# Patient Record
Sex: MALE | Race: WHITE | NOT HISPANIC OR LATINO | Employment: UNEMPLOYED | URBAN - METROPOLITAN AREA
[De-identification: names, ages, dates, MRNs, and addresses within clinical notes are randomized per-mention and may not be internally consistent; named-entity substitution may affect disease eponyms.]

---

## 2019-12-19 ENCOUNTER — TRANSFERRED RECORDS (OUTPATIENT)
Dept: HEALTH INFORMATION MANAGEMENT | Facility: CLINIC | Age: 29
End: 2019-12-19

## 2019-12-22 ENCOUNTER — TRANSFERRED RECORDS (OUTPATIENT)
Dept: HEALTH INFORMATION MANAGEMENT | Facility: CLINIC | Age: 29
End: 2019-12-22

## 2019-12-23 ENCOUNTER — MEDICAL CORRESPONDENCE (OUTPATIENT)
Dept: HEALTH INFORMATION MANAGEMENT | Facility: CLINIC | Age: 29
End: 2019-12-23

## 2020-01-14 NOTE — PLAN OF CARE
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 him to the college - has had him in her car many times with no issue - she thinks that all his services closed because he was doing so well and he could not happen the lack of support.   We do have a large program right now and he would do better with smaller numbers so will see how he stabilizes over the next weeks or so as some of the patients start to complete the program and then will look at starting him.  Also reviewed this with the consultation team and agreed that he needs more stability to manage the program.    She did call back mid week and said she had some phone contact with him and he seemed to be better, however, he still had not set up medications management follow up, and she was going to be moving the kids as well as having a visit with him with the kids so that might be a good test.  Talia called back on 1/31/2020 and stated he had set up follow up med management and the visit went well and he managed the information that the kids were being moved to foster care from mom's house.  She states he appears to be much closer to his baseline and thinks he could manage the program so we will try him in PHP.      We will work with his CPS worker regarding referrals for more services.  ARM (Adult Rehabilitative Mental Health Services) to rehabilitate the areas of functional impairments.    It is my professional opinion that patient:     1. Has symptoms of mental illness that impair function in the  following areas:       Mental health symptoms, Mental health service needs, Interpersonal skills, Community integration, Obtaining / maintaining financial assistance, Self-care / Independent living, Using transportation and Use of drugs or alcohol     2. Rehabilitative mental health services would reduce symptoms to allow regulated, restored or improved functioning.  Maintain stability, function, preventing risk of significant functional decompensation or more restrictive service setting.      3. Has the cognitive capacity to benefit from rehabilitative mental health techniques and methods.    A Release of Information has been obtained for the following: CPS worker Talia.    The patient  MAY utilize the Alpha Stimulator therapy.   Patient Does not have a pacemaker.      I certify that Partial Hospitalization (PHP) services are medically necessary to improve or maintain the client's condition and functional level and to prevent relapse or hospitalization.  PHP services will be provided in lieu of psychiatric hospitalization, no less intensive level of care would be sufficient to provide the medically necessary treatment the client requires.  These services will include group therapy and OT group therapy daily and individual therapy and medications management as needed.    Due to the clinical severity of the symptoms reported by the client, functional impairments exist that significantly disrupt functioning.  The client reports these symptoms negatively impact their quality of life.  Without the recommended medically necessary treatments listed, the client's symptoms are likely to increase in severity and functioning may further decline.  If the client participates and complies with recommended treatment, the prognosis if fair.    Yes, the patient has been informed that any other mental health professional providing mental health services to me will need access to this Diagnostic Assessment in order to develop a  treatment plan and receive payment.    Julia Johnson, [facility]

## 2020-01-20 ENCOUNTER — HOSPITAL ENCOUNTER (OUTPATIENT)
Dept: BEHAVIORAL HEALTH | Facility: HOSPITAL | Age: 30
Discharge: HOME | End: 2020-01-20
Attending: PSYCHIATRY & NEUROLOGY | Admitting: PSYCHIATRY & NEUROLOGY
Payer: COMMERCIAL

## 2020-01-20 PROCEDURE — 90791 PSYCH DIAGNOSTIC EVALUATION: CPT | Performed by: SOCIAL WORKER

## 2020-01-20 ASSESSMENT — PATIENT HEALTH QUESTIONNAIRE - PHQ9
SUM OF ALL RESPONSES TO PHQ QUESTIONS 1-9: 18
5. POOR APPETITE OR OVEREATING: NEARLY EVERY DAY

## 2020-01-20 ASSESSMENT — ANXIETY QUESTIONNAIRES
7. FEELING AFRAID AS IF SOMETHING AWFUL MIGHT HAPPEN: MORE THAN HALF THE DAYS
IF YOU CHECKED OFF ANY PROBLEMS ON THIS QUESTIONNAIRE, HOW DIFFICULT HAVE THESE PROBLEMS MADE IT FOR YOU TO DO YOUR WORK, TAKE CARE OF THINGS AT HOME, OR GET ALONG WITH OTHER PEOPLE: VERY DIFFICULT
5. BEING SO RESTLESS THAT IT IS HARD TO SIT STILL: MORE THAN HALF THE DAYS
GAD7 TOTAL SCORE: 16
2. NOT BEING ABLE TO STOP OR CONTROL WORRYING: MORE THAN HALF THE DAYS
1. FEELING NERVOUS, ANXIOUS, OR ON EDGE: MORE THAN HALF THE DAYS
3. WORRYING TOO MUCH ABOUT DIFFERENT THINGS: MORE THAN HALF THE DAYS
6. BECOMING EASILY ANNOYED OR IRRITABLE: NEARLY EVERY DAY

## 2020-01-21 ASSESSMENT — ANXIETY QUESTIONNAIRES: GAD7 TOTAL SCORE: 16

## 2020-02-04 ENCOUNTER — HOSPITAL ENCOUNTER (OUTPATIENT)
Dept: BEHAVIORAL HEALTH | Facility: HOSPITAL | Age: 30
End: 2020-02-04
Attending: PSYCHIATRY & NEUROLOGY
Payer: COMMERCIAL

## 2020-02-04 PROCEDURE — H0035 MH PARTIAL HOSP TX UNDER 24H: HCPCS | Performed by: SOCIAL WORKER

## 2020-02-04 NOTE — PLAN OF CARE
MY COPING PLAN FOR SAFETY          The things that are most important to me and the reasons for living, are: My kids              My relapse warning signs are: Using chemicals, increased hallucinations  I will make my environment safer by: taking my meds, attending my appointments as scheduled   When in crisis, I will cope in the following ways: talking to my mom  I will use my support system:   Personal Support: I can ask for reminders, support, them to stay with me  Family Member/s : My mom         Professional Support: I can ask for med changes, emergency appointments, help  Psychiatrist: [facility] at [facility]   Therapist: None at this time - possible referral later     Other/s: CPS worker, Talia - maybe a referral to [facility] or case management again after PHP     I can call a crisis line to know of options I can t see when I am this depressed, anxious or confused:     [place] Area:  [facility], Crisis stabilization [facility]- [phone]  [facility] Crisis Line: 1-261.147.2485  [facility]: 823-3466  [facility]: [phone] or 1-186.115.6029  [place] [facility]: [phone] Ext: 279       Calls answered Mon-Fri-8:00 am--4:30 pm    [place]:  [facility]: [phone]  [facility] first call for help: [phone]  [facility]:  [phone]    [place] Area:  Warm Line: 1-420.754.6298       Calls answered Tuesday--Saturday 4:00 pm--10:00 pm  [facility] Crisis Line - [phone]  [place] [facility] [phone]    [place] Statewide:  [place] Crisis and Referral Services: 6-486-430-5559  National Suicide Prevention Lifeline: 4-222-318-TALK (1957)   - vts1vpnj- Text  Life  to 86823  First Call for Help: 2-1-1  [facility] Helpline- 7-638-RDJN-HELP       I will attend my Treatment Program and talk to my one of my therapists: x    x   I agree that I will report any thoughts, impulses or plans of suicide, homicide,  SIB or substance relapse as they occur, during the treatment day.   x   I agree that I will not act on the above symptoms, but will follow the above plan instead.    If nothing above is working for me, I can go to:   Emergency Care Department - Located at the [address] [address]   You can reach us directly by calling [phone] or call the [facility] at [phone] or [phone].

## 2020-02-04 NOTE — PROGRESS NOTES
Group Therapy Progress Notes     Client Initial Individualized Goals for Treatment: Will report on symptoms and identify skills to use to manage moods and Will learn and practice 1-2 coping skills to help improve moods    Area of Treatment Focus:  Develop / Improve Independent Living / Socialization Skills    Therapeutic Interventions/Treatment Strategies:  Facilitate increased self awareness  Provide education regarding time management  Teach adaptive coping skills and communication skills    Response to Treatment Strategies:  Accepted Feedback, Gave Feedback, Listened , Attentive and Alert    Name of Groups:  Time Management - Time: 11:10-12:00    Description and Therapeutic Outcome:   Self development group - OT - Time Management  Time management group focuses on assisting clients to define self and increase positive self -regard.  Psychoeducation provided related to developing strategies/skills to support effective time management.  Clients will increase knowledge and awareness of time management, increase knowledge/awareness of skills/techniques/behaviors that support time management and when/how to utilize new time management strategies.  Clients will recognize that other peers share similar feelings, thoughts, and problems, and will expand their knowledge/skills through observing others self exploration and working through issues and personal development.  The goal is to help clients learn strategies to have success with time management ie. prioritizing, task analysis, limiting distractions, giving yourself enough time, and use of lists.  Steven is new to this OT.  Notes that typically he has had good time management in the past but states that it has been slipping.  Notes not working has lessened his routine and he could just stay in bed for days.  Was open to idea of utilizing daily routine and calender to assist with time management.       Is this a Weekly Review of the Progress on the Treatment  Plan?  NO    Are Treatment Plan Goals being addressed?  YES      Are Treatment Plan Strategies to Address Goals Effective?  YES      Are there any current contracts in place?  YES

## 2020-02-04 NOTE — PROGRESS NOTES
"Group Therapy Progress Notes     Client Initial Individualized Goals for Treatment:     Will report on symptoms and identify skills to use to manage moods and Will learn and practice 1-2 coping skills to help improve moods    Area of Treatment Focus:  Symptom Stabilization and Management  Abstinence / Relapse Prevention    Therapeutic Interventions/Treatment Strategies:  Facilitate increased self awareness  Teach adaptive coping skills and communication skills    Response to Treatment Strategies:  Accepted Feedback, Gave Feedback, Listened  and Focused on Goals    Name of Groups:  Default/Focused Mode 10-10:50; Positive Thoughts 1-1:50    Description and Therapeutic Outcome:     During Default/Focused Mode group, Steven presented as focused, moderately guarded - evidenced in sharing without specifics on some responses; easily participatory, and grasped the concept. He was able to identify his Defaults as shifting from contentment to complacency; Isolating and sitting in the dark; All or Nothing Thinking. He is aware of feeling \"empty\" with his children not being as dependent on him as now his 10 year-old son plays video games and his seven-year-old daughter plays with a neighbor friend.  \"I take ownership for where I am at. I don't blame anyone. It's just a fear of the unknown and paranoia\". Pt reports \"I have sat at so many tables\" referring to previous groups he has attended; I have done this before. Discussed downloading what he is learning. He was not specific in responding to what he wants to get out of the program. He did identify that he will work on \"Know My Truth\" skill.    During the Positive Thoughts group, Steven identified \"good things\" as family, home; having \"another day\". He is \"passionate about his kids and getting them back\". He enjoys coffee; working out; good hygiene. An early warning sign is poor hygiene; dishes not washed. Discussed Awareness; Acceptance and Action. He also has his mother for " "support. He does report \"Determination to get back what he has lost\" is what is going well. He identified Thriving of the Focused as a skill he wants to work on.     Is this a Weekly Review of the Progress on the Treatment Plan?  NO    Are Treatment Plan Goals being addressed?  YES      Are Treatment Plan Strategies to Address Goals Effective?  YES      Are there any current contracts in place?  YES           "

## 2020-02-04 NOTE — PROGRESS NOTES
"Group Therapy Progress Notes     Client Initial Individualized Goals for Treatment: Will report on symptoms and identify skills to use to manage moods and Will learn and practice 1-2 coping skills to help improve moods    Area of Treatment Focus:  Symptom Stabilization and Management and Abstinence / Relapse Prevention    Therapeutic Interventions/Treatment Strategies:  Facilitate increased self awareness  Teach adaptive coping skills and communication skills    Response to Treatment Strategies:  Accepted Feedback, Listened , Focused on Goals and Accepted Support    Name of Groups:  Psychtherapy Wrap Up - Time: 2:00-3:00    Description and Therapeutic Outcome:   In psychotherapy wrap up group, Steven reviewed his take away from today - states he is not sure - this is the earliest he has gotten up in a long time.  States \"I've spent a lot of time around tables learning this stuff\".  States he liked the idea of thriving of the focused and thought the day was not too bad.  Steven remains guarded and is difficult to engage however this is only his first day - will continue to encourage him to engage with peers and learn the skills.  No safety issues noted.  Steven remains appropriate for PHP as long as he is willing to engage with the staff and peers - if he remains too guarded we will have to discuss further with him.        Is this a Weekly Review of the Progress on the Treatment Plan?  NO    Are Treatment Plan Goals being addressed?  YES      Are Treatment Plan Strategies to Address Goals Effective?  YES      Are there any current contracts in place?  YES             "

## 2020-02-04 NOTE — TREATMENT PLAN
Individualized Treatment Plan     Date of Plan: 2020    Name: Steven Carter MRN: [id number]    : 1990    Programs:  [facility] ()     DSM-V Diagnoses: Schizoaffective Disorder Bipolar Type;  TBI at the age of 3, ADD per DAYSI Jaimes, CNP 17    DA Date: 2020  Psychosocial & Contextual Factors: family of origin issues, financial hardship, limited social support, mental health symptoms, parent-child stress, relationship stress and transportation issues  WHODAS: 38  LOCUS: 21      Team Members Contributing to Plan:  Dr. Allyssa Johnson, [facility]  Christy Cain, [facility]  Ailyn Jamil LUCINDA    Client Strengths:  caring, has a previous history of therapy, motivated, open to learning, responsible parent, wants to learn and work history .     Client Participation in Plan:  Contributed to goals and plan   Agrees with plan   Received copy of treatment plan     Areas of Vulnerability:  Poor impulse control   Anxiety  Cognitive impairment   Active/history of addiction/substance abuse  TBI     Long-Term Goals:  Knowledge about illness and management of symptoms   Maintenance of personal safety   Maintenance of sobriety   Effective management of impulsivity     Abuse Prevention Plan:  Safe, therapeutic environment   Safety coping plan as needed   Education regarding illness and skill development   Coordination with care providers   Impluse control education and intervention   Medication adjustment/management (MI/CD)   Monitor for use of substances    Discharge Criteria:  Satisfactory progress toward treatment goals   Improvement re: identified problems and symptoms   Ability to continue recovery at next level of service   Has a discharge plan in place   Has safety/coping plan in place   Ability to maintain sobriety  Regular attendance as scheduled         Areas of Treatment Focus            Area of Treatment Focus:   Symptom Stabilization and Management and Abstinence  / Relapse Prevention  Start Date:    2/4/2020    Goal:  Target Date: 2/7/2020 Status: Active  Will report on symptoms and identify skills to use to manage moods and Will learn and practice 1-2 coping skills to help improve moods      Progress:             Treatment Strategies:   Facilitate increased self awareness  Teach adaptive coping skills and communication skills    These services will include group therapy and OT group therapy daily and individual therapy and medications management as needed.                 Area of Treatment Focus:   Symptom Stabilization and Management and Abstinence / Relapse Prevention  Start Date:    2/10/2020    Goal:  Target Date: 2/14/2020 Status: Active  Will increase use of adaptive life skills by reporting in group how you are starting to use some of the coping skills and Will learn and practice 1-2 coping skills to help improve mood and functioning      Progress:             Treatment Strategies:   Engage in safety planning when indicated  Facilitate increased self awareness  Teach adaptive coping skills and communication skills  Use reality based supportive approach    These services will include group therapy and OT group therapy daily and individual therapy and medications management as needed.               Area of Treatment Focus:   Symptom Stabilization and Management, Community Resources / Support and Discharge Planning and Abstinence / Relapse Prevention  Start Date:    2/17/2020    Goal:  Target Date: 2/21/2020 Status: Active  Will improve wellness related behaviors by reporting skills he has gained confidence in using to help him manage his MH symptoms and Will develop an aftercare / transition plan by the end of the program      Progress:             Treatment Strategies:   Assist with discharge planning  Facilitate increased self awareness  Provide education regarding community based resources  Teach adaptive coping skills and communication skills    These services will  include group therapy and OT group therapy daily and individual therapy and medications management as needed.

## 2020-02-04 NOTE — PROGRESS NOTES
Group Therapy Progress Notes     Client Initial Individualized Goals for Treatment: Will report on symptoms and identify skills to use to manage moods and Will learn and practice 1-2 coping skills to help improve moods    Area of Treatment Focus:  Symptom Stabilization and Management and Abstinence / Relapse Prevention    Therapeutic Interventions/Treatment Strategies:  Facilitate increased self awareness  Teach adaptive coping skills and communication skills    Response to Treatment Strategies:  Accepted Feedback, Listened , Focused on Goals and Accepted Support    Name of Groups:  Psychotherapy - Time: 9-9:50    Description and Therapeutic Outcome:   In psychotherapy group, this was Steven's first group - presented as quiet and reserved - allowed him to tell the group anything he wanted about himself - appeared uncomfortable.       Is this a Weekly Review of the Progress on the Treatment Plan?  NO    Are Treatment Plan Goals being addressed?  YES      Are Treatment Plan Strategies to Address Goals Effective?  YES      Are there any current contracts in place?  YES

## 2020-02-05 ENCOUNTER — HOSPITAL ENCOUNTER (OUTPATIENT)
Dept: BEHAVIORAL HEALTH | Facility: HOSPITAL | Age: 30
End: 2020-02-05
Attending: PSYCHIATRY & NEUROLOGY
Payer: COMMERCIAL

## 2020-02-05 PROCEDURE — H0035 MH PARTIAL HOSP TX UNDER 24H: HCPCS | Performed by: SOCIAL WORKER

## 2020-02-05 NOTE — PROGRESS NOTES
"Group Therapy Progress Notes     Client Initial Individualized Goals for Treatment:     Will report on symptoms and identify skills to use to manage moods and Will learn and practice 1-2 coping skills to help improve moods    Area of Treatment Focus:  Symptom Stabilization and Management  Abstinence / Relapse Prevention    Therapeutic Interventions/Treatment Strategies:  Facilitate increased self awareness  Teach adaptive coping skills and communication skills    Response to Treatment Strategies:  Accepted Feedback, Gave Feedback and Listened     Name of Groups:  Changing My Thinking - Time: 10-10:50; 1-1:50    Description and Therapeutic Outcome:     During Changing My Thinking group,Steven presented as socially appropriate, moderately guarded and occasionally tangential in response. He had a challenge tracking when responding . \"I think I lost my thought. Where wasI I going with that. What was the question?\". He did provide encouraging feedback to a graduating peer. He responded to a challenge on the worksheet of \"I can't stand it if someone gets upset with me\" with \"I can be confident with myself'. Discussed 3 C's. He also reports can be Passive Aggressive but is aware of this and is working on it to rather be assertive.     During the Self Respect group, Steven responded that self respect for him is staying home and isolating and \"I don't trust\". Discussed Default Thinking and developing Focused Mode. This was his skill yesterday but today said \"I don't understand why we don't add ...ed to \"Focus\" . \"O. I see you do have ..e d..nevermind\". Steven required redirection due to rambling responses and run on sentences.He is otherwise appropriate with peers.       Is this a Weekly Review of the Progress on the Treatment Plan?  YES    Are Treatment Plan Goals being addressed?  YES      Are Treatment Plan Strategies to Address Goals Effective?  YES      Are there any current contracts in place?  YES           "

## 2020-02-05 NOTE — PROGRESS NOTES
"Group Therapy Progress Notes     Client Initial Individualized Goals for Treatment: Will report on symptoms and identify skills to use to manage moods and Will learn and practice 1-2 coping skills to help improve moods    Area of Treatment Focus:  Symptom Stabilization and Management and Abstinence / Relapse Prevention    Therapeutic Interventions/Treatment Strategies:  Facilitate increased self awareness  Provide education regarding healthy goal setting  Teach adaptive coping skills and communication skills    Response to Treatment Strategies:  Accepted Feedback, Gave Feedback and Accepted Support    Name of Groups:  Healthy Goals - Time: 11:10-12:00    Description and Therapeutic Outcome:   Healthy Goals  OT life skills group with focus on identification of appropriate goals for overall health and wellbeing.  Clients will create individualized goals, identify barriers to meeting those goals, and problem solve to make goals achievable.  \"Setting healthy goals\" handout provided as well as individualized goal setting worksheet. In OT group today, Steven presents as somewhat distracted but is able to participate appropriately.  Does discuss how the \"little things\" are important to him.  Set a goal and worked through what he can do it he runs into an obstacle.         Is this a Weekly Review of the Progress on the Treatment Plan?  NO    Are Treatment Plan Goals being addressed?  YES      Are Treatment Plan Strategies to Address Goals Effective?  YES      Are there any current contracts in place?  YES             "

## 2020-02-06 ENCOUNTER — HOSPITAL ENCOUNTER (OUTPATIENT)
Dept: BEHAVIORAL HEALTH | Facility: HOSPITAL | Age: 30
End: 2020-02-06
Attending: PSYCHIATRY & NEUROLOGY
Payer: COMMERCIAL

## 2020-02-06 PROCEDURE — H0035 MH PARTIAL HOSP TX UNDER 24H: HCPCS | Performed by: SOCIAL WORKER

## 2020-02-06 PROCEDURE — 99213 OFFICE O/P EST LOW 20 MIN: CPT | Performed by: NURSE PRACTITIONER

## 2020-02-06 NOTE — PROGRESS NOTES
Group Therapy Progress Notes     Client Initial Individualized Goals for Treatment: Will report on symptoms and identify skills to use to manage moods and Will learn and practice 1-2 coping skills to help improve moods    Area of Treatment Focus:  Symptom Stabilization and Management and Abstinence / Relapse Prevention    Therapeutic Interventions/Treatment Strategies:  Facilitate increased self awareness  Teach adaptive coping skills and communication skills    Response to Treatment Strategies:  Accepted Feedback, Listened , Focused on Goals and Accepted Support    Name of Groups:  Psychtherapy Wrap Up - Time: 2:00-3:00    Description and Therapeutic Outcome:   In psychotherapy wrap up group, Steven reviewed his take away from today - states he felt like he is getting to know people a little better today, a little more comfortable in the groups - he clarified better but not safer.  Steven remains guarded and takes a lot of support to get going on talking about things - he again states he did not get anything out of the day that he will use, however he did engage with his peers and gave some nice feedback to his peers during the groups today - will continue to monitor to see if this is the appropriate level of programming for Steven.  For today, Steven is appropriate for PHP.       Is this a Weekly Review of the Progress on the Treatment Plan?  NO    Are Treatment Plan Goals being addressed?  YES      Are Treatment Plan Strategies to Address Goals Effective?  YES      Are there any current contracts in place?  YES

## 2020-02-06 NOTE — PROGRESS NOTES
Group Therapy Progress Notes     Client Initial Individualized Goals for Treatment: Will report on symptoms and identify skills to use to manage moods and Will learn and practice 1-2 coping skills to help improve moods    Area of Treatment Focus:  Symptom Stabilization and Management and Abstinence / Relapse Prevention    Therapeutic Interventions/Treatment Strategies:  Facilitate increased self awareness  Teach adaptive coping skills and communication skills    Response to Treatment Strategies:  Accepted Feedback, Listened , Focused on Goals and Accepted Support    Name of Groups:  Psychotherapy - Time: 9-9:50    Description and Therapeutic Outcome:   In psychotherapy group, Steven reviewed his evening - very productive evening - took out the garbage, cleaned the fridge, did the dishes, went grocery shopping got the laundry ready to do and watched a movie.  He states he wanted to go to the liquor store because he felt like he deserved a drink after all the hard work but he did not go - watched his movie and went to bed.   He also might get his kids for a visit so didn't want to blow anything - interesting discussion about needing to fix the holes in his walls that he punched - unclear why he punched the holes as he did not really say, sort of said he was angry but also made it sound like it related to his MH - but he does feel like he needs to fix the holes and repaint.  He does seems slightly more comfortable with the group each day.       Is this a Weekly Review of the Progress on the Treatment Plan?  NO    Are Treatment Plan Goals being addressed?  YES      Are Treatment Plan Strategies to Address Goals Effective?  YES      Are there any current contracts in place?  YES

## 2020-02-06 NOTE — PROGRESS NOTES
Group Therapy Progress Notes     Client Initial Individualized Goals for Treatment:     Will report on symptoms and identify skills to use to manage moods and Will learn and practice 1-2 coping skills to help improve moods    Area of Treatment Focus:  Symptom Stabilization and Management  Abstinence / Relapse Prevention    Therapeutic Interventions/Treatment Strategies:  Facilitate increased self awareness  Teach adaptive coping skills and communication skills    Response to Treatment Strategies:  Accepted Feedback, Gave Feedback and Listened     Name of Groups: Psychotherapy Check in group 9:00-10:00     Description and Therapeutic Outcome:     In Psychotherapy Check in group Steven was quiet and stand offish, due to this being his first group with new people. He did report that he slept well and ate a healthy dinner. Steven was respectful of his peers and his peers were welcoming him into the group. He presented as calm with a blunted affect during Psychotherapy Check in group. Steven seems to be appropriate for PHP at this time.      Is this a Weekly Review of the Progress on the Treatment Plan?  YES    Are Treatment Plan Goals being addressed?  YES      Are Treatment Plan Strategies to Address Goals Effective?  YES      Are there any current contracts in place?  YES

## 2020-02-06 NOTE — PROGRESS NOTES
Group Therapy Progress Notes     Client Initial Individualized Goals for Treatment: Will report on symptoms and identify skills to use to manage moods and Will learn and practice 1-2 coping skills to help improve moods    Area of Treatment Focus:  Symptom Stabilization and Management and Abstinence / Relapse Prevention    Therapeutic Interventions/Treatment Strategies:  Facilitate increased self awareness  Teach adaptive coping skills and communication skills    Response to Treatment Strategies:  Accepted Feedback, Listened , Focused on Goals and Accepted Support    Name of Groups:  Psychtherapy Wrap Up - Time: 2:00-3:00    Description and Therapeutic Outcome:   In psychotherapy wrap up group, Steven reviewed his take away from today - he states he was mostly just listening today and getting to know people - met with the provider today and he felt that was helpful.   Steven engaged when other people were talking and gave some feedback and support to them however when the focus is on him, he is less comfortable and sharing - plans to nap today and do laundry before his kids come.  No safety issues noted - Steven remains appropriate for PHP.      Is this a Weekly Review of the Progress on the Treatment Plan?  NO    Are Treatment Plan Goals being addressed?  YES      Are Treatment Plan Strategies to Address Goals Effective?  YES      Are there any current contracts in place?  YES

## 2020-02-06 NOTE — PROGRESS NOTES
Group Therapy Progress Notes     Client Initial Individualized Goals for Treatment:  Will report on symptoms and identify skills to use to manage moods and Will learn and practice 1-2 coping skills to help improve moods    Area of Treatment Focus:  Symptom Stabilization and Management    Therapeutic Interventions/Treatment Strategies:  Facilitate increased self awareness  Provide education regarding money management  Teach adaptive coping skills and communication skills    Response to Treatment Strategies:  Accepted Feedback, Gave Feedback and Listened     Name of Groups:  Money Management - Time: 11:10-12:00    Description and Therapeutic Outcome:   OT - Life Skills - Money Management  In this group, clients learn the basics of banking and money management.  Education and discussion provided around wants vs needs when creating a feasible budget.  Clients are provided with a budget form to complete and assistance is provided as needed to complete the budgeting task.  Clients will leave group with increased knowledge of banking accounts, acquire an appropriate budget, increased knowledge of general money management skills, and what tools can be used to assist with money management if having continued difficulty (ie. Rep payee.). In OT group today, Steven presents as engaged and participatory.  Talks about going to the bank to look at the resources that are available to get his debt paid off and work towards creating a savings account.  States he already has an appointment set up with his bank to do this.  Also talks about wanting to go to school for culinary arts so he can make a better living wage to support himself and his kids.          Is this a Weekly Review of the Progress on the Treatment Plan?  NO    Are Treatment Plan Goals being addressed?  YES      Are Treatment Plan Strategies to Address Goals Effective?  YES      Are there any current contracts in place?  YES

## 2020-02-06 NOTE — PROGRESS NOTES
"Group Therapy Progress Notes     Client Initial Individualized Goals for Treatment:     Will report on symptoms and identify skills to use to manage moods and Will learn and practice 1-2 coping skills to help improve moods    Area of Treatment Focus:  Symptom Stabilization and Management  Abstinence/ Relapse Prevention    Therapeutic Interventions/Treatment Strategies:  Facilitate increased self awareness  Teach adaptive coping skills and communication skills    Response to Treatment Strategies:  Accepted Feedback, Gave Feedback and Listened     Name of Groups:  Communicatio - Time: 10-10:50; Dialectics 1-1:50    Description and Therapeutic Outcome:     During Communication group, Steven presented as tired though responsive to the group process. He successfully practiced the I  Statements. Steven refers to his girlfriend calling and wanting to come over. He realized she had been drinking and had though of trying to be supportive - \"the mom of my kids\". He did realized that is not healthy to have her come over. Discussed saying \"no\" without an excuse. He owns that as a challenge for him.     During Dialectic group, Steven continues to present as moderately tired but was responsive, mainly when prompted. He engages appropriately with peers. Steven was less intrusive today regarding rambly responses and tracked when responding. He reports trust issues with females so just isolates. Discussed finding the Middle Ground, being non-judgemental skill and Present Moment as well as Drop The Rope and 3 C's so he can move forward. He listened intently regarding the skills.    Is this a Weekly Review of the Progress on the Treatment Plan?    NO    Are Treatment Plan Goals being addressed?  YES      Are Treatment Plan Strategies to Address Goals Effective?  YES      Are there any current contracts in place?  YES           "
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
## 2020-02-07 ENCOUNTER — HOSPITAL ENCOUNTER (OUTPATIENT)
Dept: BEHAVIORAL HEALTH | Facility: HOSPITAL | Age: 30
End: 2020-02-07
Attending: PSYCHIATRY & NEUROLOGY
Payer: COMMERCIAL

## 2020-02-07 PROCEDURE — H0035 MH PARTIAL HOSP TX UNDER 24H: HCPCS | Performed by: SOCIAL WORKER

## 2020-02-07 NOTE — PROGRESS NOTES
Group Therapy Progress Notes     Client Initial Individualized Goals for Treatment: Will report on symptoms and identify skills to use to manage moods and Will learn and practice 1-2 coping skills to help improve moods    Area of Treatment Focus:  Symptom Stabilization and Management and Abstinence / Relapse Prevention    Therapeutic Interventions/Treatment Strategies:  Facilitate increased self awareness  Teach adaptive coping skills and communication skills    Response to Treatment Strategies:  Accepted Feedback, Listened , Focused on Goals and Accepted Support    Name of Groups:  Psychotherapy - Time: 9-9:50     Description and Therapeutic Outcome:   In psychotherapy group, Steven reviewed his evening - he did say in group yesterday that he was out of his meds so had him bring in the bottles today but it was the Vivance and Gabapentin - which he already talked to Alvin Singh about - explained that we can't fill these and Alvin talked to him about this and he denied and started to walk away - made him come back into the office and reminded him of the conversation and then he agreed - he got upset one more time and was going to walk away and again I had him come back and reminded him that we are here to help him and he needs to stick with it and talk to us and not just get mad and walk away.      In group - he said he gets to visit with his kids tonight and he went out to eat chinese last night and he got an order of some sweet and sour chicken that his daughter likes.  He is very animated and engaged when talking about his kids - feels he has a purpose when he talks about his kids.       Is this a Weekly Review of the Progress on the Treatment Plan?  NO    Are Treatment Plan Goals being addressed?  YES      Are Treatment Plan Strategies to Address Goals Effective?  YES      Are there any current contracts in place?  YES

## 2020-02-07 NOTE — PROGRESS NOTES
"Group Therapy Progress Notes     Client Initial Individualized Goals for Treatment:     Will report on symptoms and identify skills to use to manage moods and Will learn and practice 1-2 coping skills to help improve moods    Area of Treatment Focus:  Symptom Stabilization and Management  Abstinence/ Relapse Prevention    Therapeutic Interventions/Treatment Strategies:  Facilitate increased self awareness  Teach adaptive coping skills and communication skills    Response to Treatment Strategies:  Accepted Feedback, Gave Feedback and Listened     Name of Groups:  Hello Emotion - Time: 10-10:50    Description and Therapeutic Outcome:     During Hello Emotion group, Steven presented as moderately attentive and did respond appropriately to the prompt regarding putting hs phone away. He requires prompts at times to maintain focus by having him read or prompting an opinion. He identified \"empathy\" for his Hello Emotion. Because of all my experiences , I feel I can have empathy for prisoners even though I have not been to jail\". He reports meds and therapy and rewiring will help interrupt when he entertains negative thoughts as well as the Thriving of the Focused skill. Steven lights up when talking about his kids and is excited that they come for a visit today. He remains appropriate for PHP and presents as more comfortable in group each day.      Is this a Weekly Review of the Progress on the Treatment Plan?  NO    Are Treatment Plan Goals being addressed?  YES      Are Treatment Plan Strategies to Address Goals Effective?  YES      Are there any current contracts in place?  YES           "

## 2020-02-07 NOTE — PROGRESS NOTES
"Group Therapy Progress Notes     Client Initial Individualized Goals for Treatment:     Will report on symptoms and identify skills to use to manage moods and Will learn and practice 1-2 coping skills to help improve moods    Area of Treatment Focus:  Symptom Stabilization and Management  Abstinence/ Relapse Prevention    Therapeutic Interventions/Treatment Strategies:  Facilitate increased self awareness  Teach adaptive coping skills and communication skills    Response to Treatment Strategies:  Accepted Feedback, Gave Feedback and Listened     Name of Groups:  Distress Tolerance 1-1:50    Description and Therapeutic Outcome:   During distress tolerance, Steven presented as distracted and states that he doesn't want to share when asked how he can use the \"STOP\" skill. However, he later shares that he will be seeing his kids this weekend and plans on using \"present moment\" and focusing on \"being grateful\" for the time he gets with them.    Is this a Weekly Review of the Progress on the Treatment Plan?    NO    Are Treatment Plan Goals being addressed?  YES      Are Treatment Plan Strategies to Address Goals Effective?  YES      Are there any current contracts in place?  YES           "

## 2020-02-07 NOTE — PROGRESS NOTES
Group Therapy Progress Notes     Client Initial Individualized Goals for Treatment: Will report on symptoms and identify skills to use to manage moods and Will learn and practice 1-2 coping skills to help improve moods    Area of Treatment Focus:  Symptom Stabilization and Management and Abstinence / Relapse Prevention    Therapeutic Interventions/Treatment Strategies:  Facilitate increased self awareness  Teach adaptive coping skills and communication skills    Response to Treatment Strategies:  Accepted Feedback, Listened , Focused on Goals and Accepted Support    Name of Groups:  Psychtherapy Wrap Up - Time: 2:00-3:00    Description and Therapeutic Outcome:   In psychotherapy wrap up group, Steven reviewed his take away from today - initially when asked if there was anything that stood out for him today he said no.  But then goes on to say he needs to stay motivated to get back into working out, back into getting back on track - said it is hard to be motivated right now and not get down on himself and he needs to stay focused.  Steven is making slow progress as each day he appears to be a little more comfortable with the process.  No safety issues noted - Steven remains appropriate for PHP.      Is this a Weekly Review of the Progress on the Treatment Plan?  NO    Are Treatment Plan Goals being addressed?  YES      Are Treatment Plan Strategies to Address Goals Effective?  YES      Are there any current contracts in place?  YES

## 2020-02-10 ENCOUNTER — HOSPITAL ENCOUNTER (OUTPATIENT)
Dept: BEHAVIORAL HEALTH | Facility: HOSPITAL | Age: 30
End: 2020-02-10
Attending: PSYCHIATRY & NEUROLOGY
Payer: COMMERCIAL

## 2020-02-10 PROCEDURE — H0035 MH PARTIAL HOSP TX UNDER 24H: HCPCS

## 2020-02-10 NOTE — PROGRESS NOTES
Group Therapy Progress Notes     Client Initial Individualized Goals for Treatment: Will increase use of adaptive life skills by reporting in group how you are starting to use some of the coping skills and Will learn and practice 1-2 coping skills to help improve mood and functioning    Area of Treatment Focus:  Symptom Stabilization and Management and Abstinence / Relapse Prevention    Therapeutic Interventions/Treatment Strategies:  Engage in safety planning when indicated  Facilitate increased self awareness  Teach adaptive coping skills and communication skills  Use reality based supportive approach    Response to Treatment Strategies:  Accepted Feedback, Gave Feedback, Listened , Focused on Goals, Attentive and Accepted Support    Name of Groups:  Psychtherapy Wrap Up - Time: 2:00-3:00    Description and Therapeutic Outcome:   In psychotherapy wrap up group, attempted to have Steven review his take away from the day - he was on his phone texting while another group member was talking - confronted him about this and he said he got a text from his CPS worker.   I asked him if he was wasting his time being in the program and he became very defensive - states we don't understand and don't see his side of things - asked him to explain his side of things and he kept saying just forget it.  Tried to talk about the coping skills we are teaching in the group that might be helpful for his situation right now - he states he has been in treatment programs like this before and he isn't learning anything because he has heard all this before and maybe if we would teach different things or in a different way then maybe he could get something out of it.  Again suggested that we are wasting his time if he has no investment in learning anything or making any changes - he kept saying that we are not seeing things from his side and to just forget it - I ended by saying that if he is going to continue in the program and come back  tomorrow, he needs to think about what he wants to really get out of the program so he is not wasting his time in coming each day.      I did call and speak with Steven's CPS worker, Talia earlier in the day.  With his presentation today being worse than last week, I am questioning if he used chemicals over the weekend and wondering if they do UA's on him through CPS or probation as he has talked about using illegal substances since he has gotten out of the hospital and he is now having visits with his kids - his presentation on Friday is completely different than today and would lead me to believe he used some kind of chemicals over the weekend.  He also forgot to set up a ride home today so called and asked her to set up a ride for him so she is sending a  for him and will have them go by [facility] for a UA - she states she talked to him on Friday and he seemed really good and he does seem irritable and edgy today to her also.  We will connect again tomorrow.  I will set very clear expectation for Steven prior to the first group if he return tomorrow and expectations - if he is not agreeable, we will not be able to have him continue in the program.      Is this a Weekly Review of the Progress on the Treatment Plan?  YES    Are Treatment Plan Goals being addressed?  YES      Are Treatment Plan Strategies to Address Goals Effective?  YES      Are there any current contracts in place?  YES

## 2020-02-10 NOTE — PROGRESS NOTES
Group Therapy Progress Notes     Client Initial Individualized Goals for Treatment: Will increase use of adaptive life skills by reporting in group how you are starting to use some of the coping skills and Will learn and practice 1-2 coping skills to help improve mood and functioning    Area of Treatment Focus:  Symptom Stabilization and Management and Develop / Improve Independent Living / Socialization Skills    Therapeutic Interventions/Treatment Strategies:  Facilitate increased self awareness  Provide education regarding hygiene  Teach adaptive coping skills and communication skills    Response to Treatment Strategies:  Accepted Feedback and Listened     Name of Groups:  Hygeine/Self Care - Time: 11:10-12:00    Description and Therapeutic Outcome:   OT group - Hygiene and Self Care  This group focus is on self care and proper hygiene.  Clients will explore self care topics such as diet, exercise, spirituality, overall health management, self improvement, hygiene, self regulation, leisure, sleep, and sobriety; as well as learn how those aspects can be addressed for overall health and wellness.  Clients will have the opportunity to assess their current hygiene routines and habits, and make changes for improvement if necessary.    Steven is quiet and sleepy in OT group today.  Does not actively engage in group topic unless directly asked.  Reports poor sleep lastnight when questioned why he is tired today.    Is this a Weekly Review of the Progress on the Treatment Plan?  NO    Are Treatment Plan Goals being addressed?  YES      Are Treatment Plan Strategies to Address Goals Effective?  YES      Are there any current contracts in place?  YES

## 2020-02-10 NOTE — TREATMENT PLAN
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 Allyssa Johnson, [facility]  Christy Cain, [facility]  Ailyn Jamil LUCINDA    Client Strengths:  caring, has a previous history of therapy, motivated, open to learning, responsible parent, wants to learn and work history .     Client Participation in Plan:  Contributed to goals and plan   Agrees with plan   Received copy of treatment plan     Areas of Vulnerability:  Poor impulse control   Anxiety  Cognitive impairment   Active/history of addiction/substance abuse  TBI     Long-Term Goals:  Knowledge about illness and management of symptoms   Maintenance of personal safety   Maintenance of sobriety   Effective management of impulsivity     Abuse Prevention Plan:  Safe, therapeutic environment   Safety coping plan as needed   Education regarding illness and skill development   Coordination with care providers   Impluse control education and intervention   Medication adjustment/management (MI/CD)   Monitor for use of substances    Discharge Criteria:  Satisfactory progress toward treatment goals   Improvement re: identified problems and symptoms   Ability to continue recovery at next level of service   Has a discharge plan in place   Has safety/coping plan in place   Ability to maintain sobriety  Regular attendance as scheduled         Areas of Treatment Focus          Area of Treatment Focus:   Symptom Stabilization and Management and Abstinence /  Relapse Prevention  Start Date:    2/4/2020    Goal:  Target Date: 2/7/2020 Status: Active  Will report on symptoms and identify skills to use to manage moods and Will learn and practice 1-2 coping skills to help improve moods      Progress:  Each day that Steven has been in the program, he seems to be a little more comfortable.  He engages well with the other group members and does participate in the groups at times.   Hopefully as we continue to review coping skills and discuss symptoms this next week, Steven will find some things that are helpful to him and be able to start practicing the skills outside of the group.        Treatment Strategies:   Facilitate increased self awareness  Teach adaptive coping skills and communication skills    These services will include group therapy and OT group therapy daily and individual therapy and medications management as needed.                 Area of Treatment Focus:   Symptom Stabilization and Management and Abstinence / Relapse Prevention  Start Date:    2/10/2020    Goal:  Target Date: 2/14/2020 Status: Active  Will increase use of adaptive life skills by reporting in group how you are starting to use some of the coping skills and Will learn and practice 1-2 coping skills to help improve mood and functioning      Progress:             Treatment Strategies:   Engage in safety planning when indicated  Facilitate increased self awareness  Teach adaptive coping skills and communication skills  Use reality based supportive approach    These services will include group therapy and OT group therapy daily and individual therapy and medications management as needed.               Area of Treatment Focus:   Symptom Stabilization and Management, Community Resources / Support and Discharge Planning and Abstinence / Relapse Prevention  Start Date:    2/17/2020    Goal:  Target Date: 2/21/2020 Status: Active  Will improve wellness related behaviors by reporting skills he has gained  confidence in using to help him manage his MH symptoms and Will develop an aftercare / transition plan by the end of the program      Progress:             Treatment Strategies:   Assist with discharge planning  Facilitate increased self awareness  Provide education regarding community based resources  Teach adaptive coping skills and communication skills    These services will include group therapy and OT group therapy daily and individual therapy and medications management as needed.

## 2020-02-10 NOTE — PROGRESS NOTES
"Group Therapy Progress Notes     Client Initial Individualized Goals for Treatment:     Will report on symptoms and identify skills to use to manage moods and Will learn and practice 1-2 coping skills to help improve moods    Area of Treatment Focus:  Symptom Stabilization and Management  Abstinence/ Relapse Prevention    Therapeutic Interventions/Treatment Strategies:    Engage in safety planning when indicated  Facilitate increased self awareness  Teach adaptive coping skills and communication skills  Use reality based supportive approach    Response to Treatment Strategies:  Gave Feedback and Listened     Name of Groups:  Self Destructive Coping Strategies - Time: 10-10:50; Improve The Moment 1-150    Description and Therapeutic Outcome:      During Self Destructive Coping Strategies group, Steven presented as withdrawn, \"tired, edgy and irritable\". \"Now you asking me what I am getting out of or would like to get out of the program. I'm here am I not? Too many questions. This is my 50th [place]\". He remained quiet after his response.     During Improve the Moment, pt presented as moderately engaged evidenced in his initiating to read from the handout. He also identified that he will work on the brief \"Vacation\" skill. Steven did promote to a peer that struggled with sobriety that \"You can have a bottle once a month\". Even after the peer declined this approach, he reiterated his proposal. The peer remained assertive on his stance of not drinking.  Pt did not identify skills that he will work on today but has cleaned his apartment and still has laundry to do.      Is this a Weekly Review of the Progress on the Treatment Plan?  YES    Are Treatment Plan Goals being addressed?  YES      Are Treatment Plan Strategies to Address Goals Effective?  YES      Are there any current contracts in place?  NO           "

## 2020-03-03 ENCOUNTER — HOSPITAL ENCOUNTER (INPATIENT)
Facility: HOSPITAL | Age: 30
End: 2020-03-03
Attending: PSYCHIATRY & NEUROLOGY | Admitting: PSYCHIATRY & NEUROLOGY
Payer: COMMERCIAL

## 2020-03-20 ENCOUNTER — TRANSFERRED RECORDS (OUTPATIENT)
Dept: HEALTH INFORMATION MANAGEMENT | Facility: CLINIC | Age: 30
End: 2020-03-20

## 2020-11-29 ENCOUNTER — OFFICE VISIT (OUTPATIENT)
Dept: FAMILY MEDICINE | Facility: OTHER | Age: 30
End: 2020-11-29
Attending: FAMILY MEDICINE
Payer: COMMERCIAL

## 2020-11-29 DIAGNOSIS — Z01.818 PRE-OP EXAM: Primary | ICD-10-CM

## 2020-11-29 PROCEDURE — U0003 INFECTIOUS AGENT DETECTION BY NUCLEIC ACID (DNA OR RNA); SEVERE ACUTE RESPIRATORY SYNDROME CORONAVIRUS 2 (SARS-COV-2) (CORONAVIRUS DISEASE [COVID-19]), AMPLIFIED PROBE TECHNIQUE, MAKING USE OF HIGH THROUGHPUT TECHNOLOGIES AS DESCRIBED BY CMS-2020-01-R: HCPCS | Mod: ZL | Performed by: FAMILY MEDICINE

## 2020-11-30 LAB
SARS-COV-2 RNA SPEC QL NAA+PROBE: NOT DETECTED
SPECIMEN SOURCE: NORMAL

## 2021-03-07 ENCOUNTER — HOSPITAL ENCOUNTER (INPATIENT)
Facility: HOSPITAL | Age: 31
LOS: 26 days | Discharge: PSYCHIATRIC OR SUBSTANCE USE FACILITY | End: 2021-04-02
Attending: EMERGENCY MEDICINE | Admitting: PSYCHIATRY & NEUROLOGY
Payer: COMMERCIAL

## 2021-03-07 DIAGNOSIS — F32.A DEPRESSION, UNSPECIFIED DEPRESSION TYPE: ICD-10-CM

## 2021-03-07 PROBLEM — F25.9 SCHIZOAFFECTIVE DISORDER (H): Status: ACTIVE | Noted: 2021-03-07

## 2021-03-07 PROBLEM — F06.1 CATATONIA: Status: ACTIVE | Noted: 2021-03-07

## 2021-03-07 LAB
ANION GAP SERPL CALCULATED.3IONS-SCNC: 11 MMOL/L (ref 3–14)
APAP SERPL-MCNC: <2 MG/L (ref 10–20)
BASOPHILS # BLD AUTO: 0 10E9/L (ref 0–0.2)
BASOPHILS NFR BLD AUTO: 0.5 %
BUN SERPL-MCNC: 23 MG/DL (ref 7–30)
CALCIUM SERPL-MCNC: 9.2 MG/DL (ref 8.5–10.1)
CHLORIDE SERPL-SCNC: 107 MMOL/L (ref 94–109)
CO2 SERPL-SCNC: 20 MMOL/L (ref 20–32)
CREAT SERPL-MCNC: 0.8 MG/DL (ref 0.66–1.25)
DIFFERENTIAL METHOD BLD: NORMAL
EOSINOPHIL # BLD AUTO: 0.1 10E9/L (ref 0–0.7)
EOSINOPHIL NFR BLD AUTO: 0.6 %
ERYTHROCYTE [DISTWIDTH] IN BLOOD BY AUTOMATED COUNT: 11.9 % (ref 10–15)
GFR SERPL CREATININE-BSD FRML MDRD: >90 ML/MIN/{1.73_M2}
GLUCOSE BLDC GLUCOMTR-MCNC: 74 MG/DL (ref 70–99)
GLUCOSE BLDC GLUCOMTR-MCNC: 84 MG/DL (ref 70–99)
GLUCOSE SERPL-MCNC: 69 MG/DL (ref 70–99)
HCT VFR BLD AUTO: 49.5 % (ref 40–53)
HGB BLD-MCNC: 16.9 G/DL (ref 13.3–17.7)
IMM GRANULOCYTES # BLD: 0 10E9/L (ref 0–0.4)
IMM GRANULOCYTES NFR BLD: 0.3 %
LABORATORY COMMENT REPORT: NORMAL
LYMPHOCYTES # BLD AUTO: 2.1 10E9/L (ref 0.8–5.3)
LYMPHOCYTES NFR BLD AUTO: 25.8 %
MCH RBC QN AUTO: 29.3 PG (ref 26.5–33)
MCHC RBC AUTO-ENTMCNC: 34.1 G/DL (ref 31.5–36.5)
MCV RBC AUTO: 86 FL (ref 78–100)
MONOCYTES # BLD AUTO: 0.6 10E9/L (ref 0–1.3)
MONOCYTES NFR BLD AUTO: 7 %
NEUTROPHILS # BLD AUTO: 5.2 10E9/L (ref 1.6–8.3)
NEUTROPHILS NFR BLD AUTO: 65.8 %
NRBC # BLD AUTO: 0 10*3/UL
NRBC BLD AUTO-RTO: 0 /100
PLATELET # BLD AUTO: 278 10E9/L (ref 150–450)
POTASSIUM SERPL-SCNC: 4 MMOL/L (ref 3.4–5.3)
RBC # BLD AUTO: 5.76 10E12/L (ref 4.4–5.9)
SALICYLATES SERPL-MCNC: 3 MG/DL
SARS-COV-2 RNA RESP QL NAA+PROBE: NEGATIVE
SODIUM SERPL-SCNC: 138 MMOL/L (ref 133–144)
SPECIMEN SOURCE: NORMAL
WBC # BLD AUTO: 8 10E9/L (ref 4–11)

## 2021-03-07 PROCEDURE — 999N001017 HC STATISTIC GLUCOSE BY METER IP

## 2021-03-07 PROCEDURE — 80048 BASIC METABOLIC PNL TOTAL CA: CPT | Performed by: EMERGENCY MEDICINE

## 2021-03-07 PROCEDURE — 85025 COMPLETE CBC W/AUTO DIFF WBC: CPT | Performed by: EMERGENCY MEDICINE

## 2021-03-07 PROCEDURE — 99285 EMERGENCY DEPT VISIT HI MDM: CPT | Performed by: PSYCHIATRY & NEUROLOGY

## 2021-03-07 PROCEDURE — 124N000001 HC R&B MH

## 2021-03-07 PROCEDURE — 99285 EMERGENCY DEPT VISIT HI MDM: CPT | Performed by: EMERGENCY MEDICINE

## 2021-03-07 PROCEDURE — C9803 HOPD COVID-19 SPEC COLLECT: HCPCS | Performed by: PSYCHIATRY & NEUROLOGY

## 2021-03-07 PROCEDURE — U0003 INFECTIOUS AGENT DETECTION BY NUCLEIC ACID (DNA OR RNA); SEVERE ACUTE RESPIRATORY SYNDROME CORONAVIRUS 2 (SARS-COV-2) (CORONAVIRUS DISEASE [COVID-19]), AMPLIFIED PROBE TECHNIQUE, MAKING USE OF HIGH THROUGHPUT TECHNOLOGIES AS DESCRIBED BY CMS-2020-01-R: HCPCS | Performed by: EMERGENCY MEDICINE

## 2021-03-07 PROCEDURE — 93010 ELECTROCARDIOGRAM REPORT: CPT | Performed by: INTERNAL MEDICINE

## 2021-03-07 PROCEDURE — 36415 COLL VENOUS BLD VENIPUNCTURE: CPT | Performed by: PSYCHIATRY & NEUROLOGY

## 2021-03-07 PROCEDURE — 80179 DRUG ASSAY SALICYLATE: CPT | Performed by: EMERGENCY MEDICINE

## 2021-03-07 PROCEDURE — U0005 INFEC AGEN DETEC AMPLI PROBE: HCPCS | Performed by: EMERGENCY MEDICINE

## 2021-03-07 PROCEDURE — 93005 ELECTROCARDIOGRAM TRACING: CPT | Performed by: PSYCHIATRY & NEUROLOGY

## 2021-03-07 PROCEDURE — 80143 DRUG ASSAY ACETAMINOPHEN: CPT | Performed by: EMERGENCY MEDICINE

## 2021-03-07 RX ORDER — OLANZAPINE 10 MG/1
10 TABLET ORAL 3 TIMES DAILY PRN
Status: DISCONTINUED | OUTPATIENT
Start: 2021-03-07 | End: 2021-03-08

## 2021-03-07 RX ORDER — ACETAMINOPHEN 325 MG/1
650 TABLET ORAL EVERY 4 HOURS PRN
Status: DISCONTINUED | OUTPATIENT
Start: 2021-03-07 | End: 2021-04-02 | Stop reason: HOSPADM

## 2021-03-07 RX ORDER — MAGNESIUM HYDROXIDE/ALUMINUM HYDROXICE/SIMETHICONE 120; 1200; 1200 MG/30ML; MG/30ML; MG/30ML
30 SUSPENSION ORAL EVERY 4 HOURS PRN
Status: DISCONTINUED | OUTPATIENT
Start: 2021-03-07 | End: 2021-04-02 | Stop reason: HOSPADM

## 2021-03-07 RX ORDER — LANOLIN ALCOHOL/MO/W.PET/CERES
3 CREAM (GRAM) TOPICAL
Status: DISCONTINUED | OUTPATIENT
Start: 2021-03-07 | End: 2021-03-22

## 2021-03-07 RX ORDER — OLANZAPINE 10 MG/2ML
10 INJECTION, POWDER, FOR SOLUTION INTRAMUSCULAR 3 TIMES DAILY PRN
Status: DISCONTINUED | OUTPATIENT
Start: 2021-03-07 | End: 2021-03-08

## 2021-03-07 RX ORDER — HYDROXYZINE HYDROCHLORIDE 25 MG/1
25-50 TABLET, FILM COATED ORAL EVERY 4 HOURS PRN
Status: DISCONTINUED | OUTPATIENT
Start: 2021-03-07 | End: 2021-04-02 | Stop reason: HOSPADM

## 2021-03-07 ASSESSMENT — ACTIVITIES OF DAILY LIVING (ADL)
TOILETING_ISSUES: NO
WHICH_OF_THE_ABOVE_FUNCTIONAL_RISKS_HAD_A_RECENT_ONSET_OR_CHANGE?: COGNITION;COMMUNICATION/SPEECH
DIFFICULTY_COMMUNICATING: YES
COMMUNICATION: UNABLE TO SPEAK;OTHER (SEE COMMENTS)
FALL_HISTORY_WITHIN_LAST_SIX_MONTHS: NO
DIFFICULTY_EATING/SWALLOWING: OTHER (SEE COMMENTS)
DOING_ERRANDS_INDEPENDENTLY_DIFFICULTY: NO
WALKING_OR_CLIMBING_STAIRS_DIFFICULTY: NO
VISION_MANAGEMENT: GLASSES
WEAR_GLASSES_OR_BLIND: YES
CONCENTRATING,_REMEMBERING_OR_MAKING_DECISIONS_DIFFICULTY: YES
DRESSING/BATHING_DIFFICULTY: NO
HEARING_DIFFICULTY_OR_DEAF: NO

## 2021-03-07 ASSESSMENT — MIFFLIN-ST. JEOR: SCORE: 2000.88

## 2021-03-07 NOTE — ED PROVIDER NOTES
History     Chief Complaint   Patient presents with     Drug Overdose     HPI  Steven Carter is a 30 year old male who has a history of schizoaffective disorder, depression, suicide attempts, drug abuse, who presents with abnormal behavior and unresponsiveness.  He does not provide any history on arrival.  All history is obtained from his mother Sherrill.  She reports that she was able to converse with him just 2 days ago and he seemed to be doing okay, he is staying in his own apartment currently.  She had not heard from him and he had apparently turned off his phone, so she went to check on him today and found him laying in bed, not responding to her at all and seeming very withdrawn.  He would not participate or talk to her.  She had not observed him to be sick at all previous to this.  He had not made any mention of clotting overdose or self-harm.  Despite multiple prompts here the patient does not answer any questions or participate at all in exam.    Allergies:  No Known Allergies    Problem List:    Patient Active Problem List    Diagnosis Date Noted     Catatonia 03/07/2021     Priority: Medium     Depression, unspecified depression type 03/07/2021     Priority: Medium     Suicidal behavior 07/23/2014     Priority: Medium     Polydrug dependence excluding opioid type drug, abuse (H) 07/23/2014     Priority: Medium     Moderate major depression (H) 07/23/2014     Priority: Medium        Past Medical History:    Reviewed in EMR.     Past Surgical History:    None pertinent    Family History:    None pertinent    Social History:  Marital Status:  Single [1]  Social History     Tobacco Use     Smoking status: Current Every Day Smoker     Packs/day: 0.50     Years: 9.00     Pack years: 4.50     Smokeless tobacco: Current User     Types: Chew   Substance Use Topics     Alcohol use: No     Comment: daily to occasionally per pt.     Drug use: Yes     Types: Other     Comment: reports hx of use but none recently         Medications:    No current outpatient medications on file.    Review of Systems   Unable to complete ROS due to poor patient cooperation    Physical Exam   BP: 142/93  Pulse: 69  Temp: 98.4  F (36.9  C)  Resp: 16  Weight: 101.4 kg (223 lb 8.7 oz)  SpO2: 96 %  Constitutional: Well developed, Well nourished, no distress  HENT: Normocephalic, Atraumatic, Bilateral external ears normal. Neck Supple.  Eyes: EOMI, Conjunctiva normal.  Respiratory: Breathing comfortably on room air. Speaks full sentences easily. Lungs clear to ascultation.  Cardiovascular: Normal heart rate, Regular rhythm. No peripheral edema.  Abdomen: Soft, nontender.  Musculoskeletal: Good range of motion in all major joints. No major deformities noted.  Integument: Warm, Dry.  Neurologic: Alert & awake. Nonparticipatory. Face is symmetric.  Psychiatric: Disheveled appearing.  Fairly limited participation in any exam.  Poor eye contact.  Not obviously responding to internal stimuli or hallucinating.  Does not answer any other questions.    ED Course          EKG Interpretation:      Interpreted by Gregory Seaman MD  Time reviewed: 248  Symptoms at time of EKG: ?Overdose   Rhythm: normal sinus   Rate: normal  Axis: normal  Ectopy: none  Conduction: normal  ST Segments/ T Waves: No ST-T wave changes  Q Waves: none  Comparison to prior: Unchanged from 7/19/2014    Clinical Impression: Sinus rhythm with sinus arrhythmia      Results for orders placed or performed during the hospital encounter of 03/07/21 (from the past 24 hour(s))   Glucose by meter   Result Value Ref Range    Glucose 84 70 - 99 mg/dL   Asymptomatic SARS-CoV-2 COVID-19 Virus (Coronavirus) by PCR    Specimen: Nasopharyngeal   Result Value Ref Range    SARS-CoV-2 Virus Specimen Source Nasopharyngeal     SARS-CoV-2 PCR Result NEGATIVE     SARS-CoV-2 PCR Comment       Testing was performed using the [employer] X[employer] SARS-CoV-2 Assay on the [employer].  Additional information about this Emergency Use Authorization (EUA)   assay can be found via the Lab Guide.     CBC with platelets differential   Result Value Ref Range    WBC 8.0 4.0 - 11.0 10e9/L    RBC Count 5.76 4.4 - 5.9 10e12/L    Hemoglobin 16.9 13.3 - 17.7 g/dL    Hematocrit 49.5 40.0 - 53.0 %    MCV 86 78 - 100 fl    MCH 29.3 26.5 - 33.0 pg    MCHC 34.1 31.5 - 36.5 g/dL    RDW 11.9 10.0 - 15.0 %    Platelet Count 278 150 - 450 10e9/L    Diff Method Automated Method     % Neutrophils 65.8 %    % Lymphocytes 25.8 %    % Monocytes 7.0 %    % Eosinophils 0.6 %    % Basophils 0.5 %    % Immature Granulocytes 0.3 %    Nucleated RBCs 0 0 /100    Absolute Neutrophil 5.2 1.6 - 8.3 10e9/L    Absolute Lymphocytes 2.1 0.8 - 5.3 10e9/L    Absolute Monocytes 0.6 0.0 - 1.3 10e9/L    Absolute Eosinophils 0.1 0.0 - 0.7 10e9/L    Absolute Basophils 0.0 0.0 - 0.2 10e9/L    Abs Immature Granulocytes 0.0 0 - 0.4 10e9/L    Absolute Nucleated RBC 0.0    Basic metabolic panel   Result Value Ref Range    Sodium 138 133 - 144 mmol/L    Potassium 4.0 3.4 - 5.3 mmol/L    Chloride 107 94 - 109 mmol/L    Carbon Dioxide 20 20 - 32 mmol/L    Anion Gap 11 3 - 14 mmol/L    Glucose 69 (L) 70 - 99 mg/dL    Urea Nitrogen 23 7 - 30 mg/dL    Creatinine 0.80 0.66 - 1.25 mg/dL    GFR Estimate >90 >60 mL/min/[1.73_m2]    GFR Estimate If Black >90 >60 mL/min/[1.73_m2]    Calcium 9.2 8.5 - 10.1 mg/dL   Acetaminophen level   Result Value Ref Range    Acetaminophen Level <2 mg/L   Salicylate level   Result Value Ref Range    Salicylate Level 3 mg/dL   Glucose by meter   Result Value Ref Range    Glucose 74 70 - 99 mg/dL       Medications - No data to display    Assessments & Plan (with Medical Decision Making)   Patient is a 30-year-old male with a history of schizoaffective disorder, depression, previous suicide attempt, who presents with abnormal behavior.  He is vitally stable when he arrives to the emergency department.  He does not really  participate in any sort of psychiatric assessment, but seems to be fairly volitional.  He underwent broad screening for potential overdose as above.  This was generally stable and reassuring including EKG, Tylenol level, and basic labs.  Reviewed his medication list with poison control, the most concerning medication would be propranolol and fortunately this would have a relatively short onset of action.  They recommended observing him for 4 hours and this was accomplished here.  He continued to essentially be nonparticipatory throughout his ED observation.  I am concerned he may be severely depressed and decompensated.  Think he would be best served by mental health admission.  Reviewed case with Christelle, the TAURUS on call for mental health admissions who was in agreement with admitting him here for behavioral health stabilization.  Updated the patient's mother who was also agreeable.  Patient was stable throughout his ED course.    Discussed patient again with overnight TAURUS and addressed some nursing staff concerns. At this point I do not think the patient has true catatonia. He is volitional, able to adjust his blankets and stand up with staff assistance here. He is still very poorly participatory but I do not think requires additional medical treatment at this time. Should be stable for behavioral health admission.      Final diagnoses:   Depression, unspecified depression type       3/7/2021   [facility]        Gregory Seaman MD  03/07/21 7146

## 2021-03-07 NOTE — ED NOTES
Patient presented via paramedics; paramedics brought 4 containers of medications with him.  1.  Fluphenazepine 5mg #30 that was filled 10/30/2020-zero tablets left  2.  Propranolol 10mg BID #60 that was filled 12/24/2020-zero tablets left  3.  Gabapentin 400 mg TID # 90-filled 12/2/2020 zero tablets left  4. Benztropine 0.5mg BID #30 filled 10/20/2020 #24 left     Provider updated.

## 2021-03-07 NOTE — ED NOTES
Brought in via [place] [facility].  Will not communicate, lying on bed with hands together.  Will resist when attempting to move arm. Eye were open on arrival, but will clench lids when trying to open eyes. Md in room on pt arrival

## 2021-03-07 NOTE — ED NOTES
Security noted pt with eyes open.   Staff attempted to communicate with pt, pt remains to be non-verbal, will not communicate with staff.  Vitals remain WNL.

## 2021-03-07 NOTE — ED NOTES
Attempted to see if pt would want food to eye, had eyes open prior to entering the room but closed them we staff walked into the room.  Remains none verbal.  Pupils PEARLA on previous assessment.

## 2021-03-08 LAB
GLUCOSE BLDC GLUCOMTR-MCNC: 67 MG/DL (ref 70–99)
GLUCOSE BLDC GLUCOMTR-MCNC: 68 MG/DL (ref 70–99)
GLUCOSE BLDC GLUCOMTR-MCNC: 71 MG/DL (ref 70–99)
GLUCOSE BLDC GLUCOMTR-MCNC: 75 MG/DL (ref 70–99)
GLUCOSE BLDC GLUCOMTR-MCNC: 76 MG/DL (ref 70–99)

## 2021-03-08 PROCEDURE — 999N001017 HC STATISTIC GLUCOSE BY METER IP

## 2021-03-08 PROCEDURE — 124N000001 HC R&B MH

## 2021-03-08 PROCEDURE — 99223 1ST HOSP IP/OBS HIGH 75: CPT | Performed by: NURSE PRACTITIONER

## 2021-03-08 RX ORDER — FLUPHENAZINE HYDROCHLORIDE 5 MG/1
5 TABLET ORAL AT BEDTIME
Status: DISCONTINUED | OUTPATIENT
Start: 2021-03-08 | End: 2021-03-24

## 2021-03-08 RX ORDER — LORAZEPAM 1 MG/1
1 TABLET ORAL EVERY 4 HOURS PRN
Status: DISCONTINUED | OUTPATIENT
Start: 2021-03-08 | End: 2021-03-09

## 2021-03-08 RX ORDER — FLUPHENAZINE HYDROCHLORIDE 5 MG/1
5 TABLET ORAL AT BEDTIME
Status: ON HOLD | COMMUNITY
End: 2021-03-25

## 2021-03-08 RX ORDER — OLANZAPINE 5 MG/1
5 TABLET ORAL AT BEDTIME
Status: ON HOLD | COMMUNITY
End: 2021-03-30

## 2021-03-08 RX ORDER — BENZTROPINE MESYLATE 0.5 MG/1
0.5 TABLET ORAL 2 TIMES DAILY PRN
Status: DISCONTINUED | OUTPATIENT
Start: 2021-03-08 | End: 2021-04-02 | Stop reason: HOSPADM

## 2021-03-08 RX ORDER — BENZTROPINE MESYLATE 0.5 MG/1
0.5 TABLET ORAL 2 TIMES DAILY
Status: ON HOLD | COMMUNITY
End: 2021-03-25

## 2021-03-08 RX ORDER — DEXTROAMPHETAMINE SACCHARATE, AMPHETAMINE ASPARTATE MONOHYDRATE, DEXTROAMPHETAMINE SULFATE AND AMPHETAMINE SULFATE 2.5; 2.5; 2.5; 2.5 MG/1; MG/1; MG/1; MG/1
10 CAPSULE, EXTENDED RELEASE ORAL DAILY
Status: ON HOLD | COMMUNITY
End: 2021-03-25

## 2021-03-08 RX ORDER — GABAPENTIN 300 MG/1
300 CAPSULE ORAL 3 TIMES DAILY
Status: DISCONTINUED | OUTPATIENT
Start: 2021-03-08 | End: 2021-03-20

## 2021-03-08 RX ORDER — PROPRANOLOL HYDROCHLORIDE 10 MG/1
10 TABLET ORAL 2 TIMES DAILY PRN
Status: ON HOLD | COMMUNITY
End: 2021-03-25

## 2021-03-08 RX ORDER — PROPRANOLOL HYDROCHLORIDE 10 MG/1
10 TABLET ORAL 2 TIMES DAILY PRN
Status: DISCONTINUED | OUTPATIENT
Start: 2021-03-08 | End: 2021-03-22

## 2021-03-08 RX ORDER — OLANZAPINE 10 MG/1
10 TABLET, ORALLY DISINTEGRATING ORAL 3 TIMES DAILY PRN
Status: DISCONTINUED | OUTPATIENT
Start: 2021-03-08 | End: 2021-03-21

## 2021-03-08 RX ORDER — LISDEXAMFETAMINE DIMESYLATE 50 MG/1
50 CAPSULE ORAL EVERY MORNING
Status: ON HOLD | COMMUNITY
End: 2021-03-25

## 2021-03-08 RX ORDER — LORAZEPAM 2 MG/ML
1 INJECTION INTRAMUSCULAR EVERY 4 HOURS PRN
Status: DISCONTINUED | OUTPATIENT
Start: 2021-03-08 | End: 2021-03-09

## 2021-03-08 RX ORDER — OLANZAPINE 10 MG/2ML
10 INJECTION, POWDER, FOR SOLUTION INTRAMUSCULAR 3 TIMES DAILY PRN
Status: DISCONTINUED | OUTPATIENT
Start: 2021-03-08 | End: 2021-03-21

## 2021-03-08 RX ORDER — TRAZODONE HYDROCHLORIDE 100 MG/1
100 TABLET ORAL AT BEDTIME
Status: ON HOLD | COMMUNITY
End: 2021-03-25

## 2021-03-08 RX ORDER — GABAPENTIN 400 MG/1
400 CAPSULE ORAL 3 TIMES DAILY
Status: ON HOLD | COMMUNITY
End: 2021-03-25

## 2021-03-08 ASSESSMENT — ACTIVITIES OF DAILY LIVING (ADL)
LAUNDRY: UNABLE TO COMPLETE
DRESS: SCRUBS (BEHAVIORAL HEALTH);INDEPENDENT
ORAL_HYGIENE: INDEPENDENT
HYGIENE/GROOMING: INDEPENDENT

## 2021-03-08 NOTE — PLAN OF CARE
"  Problem: Behavioral Health Plan of Care  Goal: Patient-Specific Goal (Individualization)  Description: Pt will be compliant with with treatment team recommendations during hospitalization.   Pt will be medication compliatn.   Pt will participate in nursing assessment.   Pt will eat at least 50% of meals.   ELOPEMENT RISK   1:1 d/t unknown ability to ambulate   Outcome: Improving     Report received from PM nurse.   0004: Patient in bed, appears to be comfortable. Does not speak or answer any assessment questions. Unresponsive, but does open eyes spontaneously.  Withdraws from pain. BG was 68. On call NP notified and updated on patient. No new orders at this time. Offered patient juice and crackers. Did not verbally respond.     Vitals at the time:   /83   Pulse 68   Temp 98.6  F (37  C) (Tympanic)   Resp 16   Ht 1.905 m (6' 3\")   Wt 96.5 kg (212 lb 12.8 oz)   SpO2 95%   BMI 26.60 kg/m      0200: BG 67. Remains in bed, eyes closed. Juice and snack once again offered, but does not verbally communicate with writer. Eyes open occasionally. Respirations regular. Has 1:1 staff at bedside for safety. Per 1:1 attendant, patient did get up x 2 quickly and sat at edge up bed. Some purposeful movement noted. Did not verbally respond to staff or make eye contact. Laid back down right away.   0400: Did get up to side of bed and drank 240 ml of milk.   0550: Patient has slept about 5 hours. Has been up intermittently throughout the shift. Continues to be non-verbal. Does not respond to questions. 1:1 continues to be at bedside.   0640: Patient BG 75.     Face to face end of shift report will communicated to  AM TRAE.     Jade Shaikh RN  3/8/2021  6:56 AM          "
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
## 2021-03-08 NOTE — PLAN OF CARE
Face to face shift report received from nurse. Rounding completed, pt observed.    Problem: Behavioral Health Plan of Care  Goal: Patient-Specific Goal (Individualization)  Description: Pt will be compliant with with treatment team recommendations during hospitalization.   Pt will be medication compliatn.   Pt will participate in nursing assessment.   Pt will eat at least 50% of meals.   ELOPEMENT RISK   1:1 d/t unknown ability to ambulate   Outcome: No Change  Note: Patient didn't eat an breakfast or lunch. Patients blood glucose at 1020 was 71. Hat was placed in toilet to try and get a urine sample. Patient hasn't said a single word to anyone this shift. Patient most of the time doesn't respond to any voices. VS are normal. 1400 Patients 1:1 order was discontinued. Patient got up and went to the bathroom at 1300 by himself, it was noted that he was steady and balanced on his feet. Patient removed the hat in toilet before voiding.       Problem: Suicide Risk  Goal: Absence of Self-Harm  Outcome: No Change  Face to face report will be communicated to oncoming RN.    Yoan Erickson RN  3/8/2021
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
## 2021-03-08 NOTE — PLAN OF CARE
Problem: Behavioral Health Plan of Care  Goal: Patient-Specific Goal (Individualization)  Description: Pt will be compliant with with treatment team recommendations during hospitalization.   Pt will be medication compliatn.   Pt will participate in nursing assessment.   Pt will eat at least 50% of meals.   ELOPEMENT RISK       Pt has not got out of bed this shift. Pt refuses to talk to staff, refused to eat dinner or drink water. Pt did appear to react to staff when staff asked if his tatoos are the names of his children, pt turned to his side away from staff.  Staff heard a noise coming from pt's room, pt did knock off his grapes from the nightstand. Pt did not eat any grapes or pick them up.     Pt did eat some of his dinner and some of his snack. Pt refused to have his vitals taken at 1600 and accucheck taken at dinner time. Pt did allow accucheck at 2000 (76). Staff came into pt's room and took vitals. Pt did not agree to vitals but did not stop staff when they took them.     Pt refused to take night time meds, pt had been sitting up to eat his snack but laid down and crossed his arms over his chest as soon as staff entered room. Pt at one point held his breath, second staff noticed and asked him if he was holding his breath. Pt started breathing after about 45 seconds.         Outcome: Improving  Note:        Problem: Suicide Risk  Goal: Absence of Self-Harm  Outcome: Improving       Face to face end of shift report communicated to night shift RN. Reported that pt is a suicide risk.     Sarahi Hui, TRAE  3/8/2021  3:51 PM

## 2021-03-08 NOTE — PLAN OF CARE
ADMISSION NOTE    Reason for admission catatonia.  Safety concerns unknown ability to communicate or ambulate at time of admit.  Risk for or history of violence unknown.   Full skin assessment: deferred and reported to oncoming RN    Patient arrived on unit from [facility] ED accompanied by house supervisor, two security officers, ED RN and ED unit assistant on 3/7/2021  6:31 PM.     Status on arrival: transported by W/C, eyes closed, pt is nonverbal and not responding to verbal cues. Three staff required to transfer pt when out of wheelchair. Lifted onto bed and 1:1 ordered for pt safety. Pt glucose checked at bedside by staff RN, result of 74 reported to provider by phone. VSS as noted in flowsheet. Pt respirations increased to 26 at times.     Patient not given tour of unit or Welcome to  unit papers, unable to wand pt as he is not able to stand on own. Belongings inventoried, admission assessment deferred due to pt being unresponsive to staff.   Patient's legal status on arrival is health officer hold. Legal rights not discussed with or given to patient due to unresponsiveness. Patient Bill of Rights not yet discussed or given to patient.   Unable to assess patient for current SI, HI, and thoughts of self harm while on unit. Oncoming shift RN to be updated that pt has not been given paperwork and legal status papers.     3/7/2021  9:34 PM     Problem: Behavioral Health Plan of Care  Goal: Patient-Specific Goal (Individualization)  Description: Pt will be compliant with with treatment team recommendations during hospitalization.   Pt will be medication compliatn.   Pt will participate in nursing assessment.   Pt will eat at least 50% of meals.   ELOPEMENT RISK   1:1 d/t unknown ability to ambulate   Outcome: No Change    Pt remained unresponsive to verbal cues throughout the 3-11 shift, does not open eyes to voice or command, although frequent eye fluttering and some blinking is noted. Pt was noted to  have purposeful movements, adjusted pillows and covered his face with blanket. At 10pm pt quickly jumped out of bed and walked short distance in room; eyes were open but he did not respond to staff speaking to him. Pt turned and got back into bed, did not answer this writer when brief questions asked. Pt has Hx of Schizoaffective disorder, TBI at age 3, seizure history with cardiac arrest at [facility] in 2020, intentional selective serotonin reuptake inhibitor overdose as well as polydrug overdose per records. Noted history of absconding from facility per chart.   Pt has had no PO intake on this shift, did abruptly get up OOB at 2245, walked into bathroom on his own and voided in the toilet. Pt accompanied back to bed with stand-by from RN, no response when spoken to, and pt returned to bed.     Problem: Suicide Risk  Goal: Absence of Self-Harm  Outcome: No Change  2330 - Face to face end of shift report to be communicated to oncoming shift RN.     Harriett Cameron RN  3/7/2021  11:02 PM

## 2021-03-08 NOTE — PROGRESS NOTES
03/07/21 2009   Patient Belongings   Did you bring any home meds/supplements to the hospital?  Yes   Disposition of meds  Sent to security/pharmacy per site process   Patient Belongings sent to security per site process;remains with patient   Patient Belongings Remaining with Patient glasses;clothing   Patient Belongings Put in Hospital Secure Location (Security or Locker, etc.) other (see comments)   Belongings Search Yes   Clothing Search Yes   Second Staff patrice KELLY   Comment black winter hat, gray baasketball shorts, black nike sweatpants, blue nike hoodie (has been cut by the neck), columbia quarter button fleece (has been cut also by the neck down the sleeve), black face mask, black tshirt, and pair of socks   .List items sent to safe: white envelope has been torn on both corners is not open.  All other belongings put in assigned cubby in belongings room.   On 4/1/2021 pt had belongings dropped off:  Maroon suitcase, black jacket,blue mask, black bandana, black mask, dress pants, black pants with , black shirt with . Belt, 4 shirts with 4 hangers, small deodorant, shaving cream, lotion, 2 body washes, finger nail kit, shoes, 3 tins of snus, 2 packs of yolanda sweets, 2 packs of cigarillos, 1 pack of marboro, matches, poster of kids, 2 pairs of underwear, 3 pairs of socks, package of razors, capps shaver kit in box.    I have reviewed my belongings list on admission and verify that it is correct.     Patient signature_______________________________    Second staff witness (if patient unable to sign) ______________________________       I have received all my belongings at discharge.    Patient signature________________________________    Kayley ALCARAZ  04/01/2021  9:19PM

## 2021-03-08 NOTE — ED NOTES
Stephanie (poison control) called for update on pt.  Vitals WNL, tylenol and salicylate levels normal.  Poison control will sigh off on pt.

## 2021-03-09 PROBLEM — Z00.8 ENCOUNTER FOR MEDICAL CLEARANCE FOR PATIENT HOLD: Status: ACTIVE | Noted: 2020-03-02

## 2021-03-09 PROBLEM — F25.1 SCHIZOAFFECTIVE DISORDER, DEPRESSIVE TYPE (H): Status: ACTIVE | Noted: 2018-01-30

## 2021-03-09 PROBLEM — F29 PSYCHOSIS (H): Status: ACTIVE | Noted: 2020-06-23

## 2021-03-09 PROBLEM — S62.024K CLOSED NONDISPLACED FRACTURE OF MIDDLE THIRD OF SCAPHOID OF RIGHT WRIST WITH NONUNION, SUBSEQUENT ENCOUNTER: Status: ACTIVE | Noted: 2020-09-01

## 2021-03-09 LAB — GLUCOSE BLDC GLUCOMTR-MCNC: 80 MG/DL (ref 70–99)

## 2021-03-09 PROCEDURE — 999N001017 HC STATISTIC GLUCOSE BY METER IP

## 2021-03-09 PROCEDURE — 99233 SBSQ HOSP IP/OBS HIGH 50: CPT | Performed by: NURSE PRACTITIONER

## 2021-03-09 PROCEDURE — 124N000001 HC R&B MH

## 2021-03-09 RX ORDER — LORAZEPAM 1 MG/1
1 TABLET ORAL 3 TIMES DAILY
Status: DISCONTINUED | OUTPATIENT
Start: 2021-03-09 | End: 2021-03-10

## 2021-03-09 RX ORDER — LORAZEPAM 1 MG/1
1 TABLET ORAL EVERY 6 HOURS PRN
Status: DISCONTINUED | OUTPATIENT
Start: 2021-03-09 | End: 2021-03-26

## 2021-03-09 RX ORDER — LORAZEPAM 2 MG/ML
1 INJECTION INTRAMUSCULAR EVERY 6 HOURS PRN
Status: DISCONTINUED | OUTPATIENT
Start: 2021-03-09 | End: 2021-03-26

## 2021-03-09 ASSESSMENT — ACTIVITIES OF DAILY LIVING (ADL)
HYGIENE/GROOMING: INDEPENDENT
ORAL_HYGIENE: INDEPENDENT
ORAL_HYGIENE: INDEPENDENT
LAUNDRY: UNABLE TO COMPLETE
LAUNDRY: UNABLE TO COMPLETE
DRESS: SCRUBS (BEHAVIORAL HEALTH);INDEPENDENT
HYGIENE/GROOMING: INDEPENDENT
DRESS: SCRUBS (BEHAVIORAL HEALTH);INDEPENDENT

## 2021-03-09 NOTE — PLAN OF CARE
Problem: Behavioral Health Plan of Care  Goal: Patient-Specific Goal (Individualization)  Description: Pt will be compliant with with treatment team recommendations during hospitalization.   Pt will be medication compliatn.   Pt will participate in nursing assessment.   Pt will eat at least 50% of meals.   ELOPEMENT RISK   Outcome: No Change     End of shift report received from previous shift RN. Pt observed, resting supine in bed having regular respirations. Wouldn't allow staff to do VS.  1630- Pt sitting on edge of bed when writer enters room to assess. Declines participating in nursing assessment, pt takes deep breath and becomes tense when nurse asks questions but does not answer. Looks at writer upon entering room then avoids eye contact. Disheveled appearance. Purposeful movements noted, adjusts pillow and blanket.  Observed having balanced, steady gait.   Meal tray brought to pt, fluids at bedside.   1830- Received call from pt's sister, Mariposa. Transferred call to Loop phone for pt, pt declines talking by pulling the blanket over his head. Encouraged sister to try again tomorrow.   Does drink fluids this shift, 360mL noted. No food intake. Does get up to go to the bathroom.  Does allow staff to get HS VS, WNL.  Is not compliant with HS meds. Remains supine in bed with eyes closed during attempt. Will continue to monitor.   Face to face end of shift report communicated to oncoming RN.     Jazmyne Clifton RN  3/9/2021  10:35 PM    Problem: Suicide Risk  Goal: Absence of Self-Harm  Outcome: No Change  Not eating.   Problem: Suicidal Behavior  Goal: Suicidal Behavior is Absent or Managed  Outcome: No Change

## 2021-03-09 NOTE — PLAN OF CARE
KENDRA submitted Petition for Committment to [facility].     KENDRA called KENDRA Rees with [facility] and she verified she received the Petition for committment and will review it.

## 2021-03-09 NOTE — PLAN OF CARE
Face to face shift report received from nurse. Rounding completed, pt observed.    Problem: Behavioral Health Plan of Care  Goal: Patient-Specific Goal (Individualization)  Description: Pt will be compliant with with treatment team recommendations during hospitalization.   Pt will be medication compliatn.   Pt will participate in nursing assessment.   Pt will eat at least 50% of meals.   ELOPEMENT RISK   1:1 d/t unknown ability to ambulate   Outcome: No Change  Note: Patient remains the same as yesterday. Patient wont talk to anyone or acknowledge anyone there. Patient has gotten up and used the bathroom and been noted nibbling on some food. Patient still refuses to acknowledge staff.        Problem: Suicide Risk  Goal: Absence of Self-Harm  Outcome: No Change     Problem: Suicidal Behavior  Goal: Suicidal Behavior is Absent or Managed  Outcome: No Change     Face to face report will be communicated to oncoming RN.    Yoan Erickson RN  3/9/2021

## 2021-03-09 NOTE — PLAN OF CARE
"Problem: Behavioral Health Plan of Care  Goal: Patient-Specific Goal (Individualization)  Description: Pt will be compliant with with treatment team recommendations during hospitalization.   Pt will be medication compliatn.   Pt will participate in nursing assessment.   Pt will eat at least 50% of meals.   ELOPEMENT RISK   Outcome: No Change    Report received from evening RN, pt initially saw in room eating food. Per evening RN pt had been seen in room eat. When staff attempted to interact pt laid down in bed and closed eyes. 15 minute checks continued.        Pt appeared to be sleeping for roughly 4 hours. Difficult to get accurate estimate as pt shuts when staff enter room but is seen moving around in bed between checks. Pt repositioning independently through the night. Fluids and food offered and encouraged. Pt refused to participate in nursing assessment. BG this AM 80. BP 97/62 (BP Location: Right arm)   Pulse 55   Temp 97.8  F (36.6  C) (Temporal)   Resp 14   Ht 1.905 m (6' 3\")   Wt 96.5 kg (212 lb 12.8 oz)   SpO2 92%   BMI 26.60 kg/m      Face to face end of shift report will be given to Yoan LARKIN.     Charlie Beltran RN  3/9/2021  6:47 AM        "

## 2021-03-10 PROCEDURE — 250N000011 HC RX IP 250 OP 636: Performed by: NURSE PRACTITIONER

## 2021-03-10 PROCEDURE — 99233 SBSQ HOSP IP/OBS HIGH 50: CPT | Performed by: NURSE PRACTITIONER

## 2021-03-10 PROCEDURE — 124N000001 HC R&B MH

## 2021-03-10 RX ORDER — LORAZEPAM 2 MG/ML
2 INJECTION INTRAMUSCULAR ONCE
Status: COMPLETED | OUTPATIENT
Start: 2021-03-10 | End: 2021-03-10

## 2021-03-10 RX ORDER — LORAZEPAM 2 MG/ML
2 INJECTION INTRAMUSCULAR 3 TIMES DAILY
Status: DISCONTINUED | OUTPATIENT
Start: 2021-03-10 | End: 2021-03-11

## 2021-03-10 RX ADMIN — LORAZEPAM 2 MG: 2 INJECTION INTRAMUSCULAR; INTRAVENOUS at 11:33

## 2021-03-10 NOTE — PLAN OF CARE
Face to face report received from Jazmyne GARCIA RN. Pt. Observed.     Problem: Behavioral Health Plan of Care  Goal: Patient-Specific Goal (Individualization)  Description: Pt will be compliant with with treatment team recommendations during hospitalization.   Pt will be medication compliatn.   Pt will participate in nursing assessment.   Pt will eat at least 50% of meals.   Staff to bring meal trays to patient's room.  ELOPEMENT RISK     Outcome: No Change  Note: Pt has been in bed with eyes closed and regular respirations x 7 hours this noc shift. 15 minute and PRN checks all night. No complaints offered. Will continue to monitor.         Face to face end of shift report to be communicated to oncoming RN.     Liat Lai RN  3/10/2021

## 2021-03-10 NOTE — PLAN OF CARE
"Pt in bed at start of shift. According to charting, slept approx. 8 hours last night. Did not eat breakfast. Fluids at bedside. There was an empty apple juice at bedside this morning. Declines to participate in assessment. Refused medications. Refuses to acknowledge staff. Disheveled. Screener called; did not acknowledge staff when told him who was on the phone. Hat remains in bathroom to attempt to obtain utox.    1133 Ativan 2mg IM administered for catatonia. Needed assistance by other RN to roll pt to side for administration.  1200 Lunch tray brought in.    1245 Went in with other RN to attempt to get pt to change into new scrubs. Pt refused; kept pulling blankets back up. Left scrubs next to patient on bed in hopes that he would change himself.  1300 Pt noted to be sitting up. Lunch tray left untouched. Went in with other RN to attempt to give scheduled 1400 medications. Pt mumbled something along the lines of \"I don't need that\". Attempted to help pt sit up in bed. Pt would not take medications. It was noted that the scrubs that were set in front of him were now placed on the other bed.   1330 [place] called again to attempt to screen. Pt shook head when asked if he would talk or just listen to screener. Pt sitting half up in bed at this time.  1430 Pt drank rolando cup full of water and ate a saltine cracker.  1500 Currently eating a bag of chips and banana.  1515 Pt changed into clean clothes independently.    Problem: Behavioral Health Plan of Care  Goal: Patient-Specific Goal (Individualization)  Description: Pt will be compliant with with treatment team recommendations during hospitalization.   Pt will be medication compliatn.   Pt will participate in nursing assessment.   Pt will eat at least 50% of meals.   Staff to bring meal trays to patient's room.  ELOPEMENT RISK     Outcome: No Change    Problem: Suicidal Behavior  Goal: Suicidal Behavior is Absent or Managed  Outcome: No Change     Face to face end of " shift report communicated to oncoming RN.     Yuridia Blancas, TRAE  3/10/2021

## 2021-03-11 PROCEDURE — 124N000001 HC R&B MH

## 2021-03-11 PROCEDURE — 99233 SBSQ HOSP IP/OBS HIGH 50: CPT | Performed by: NURSE PRACTITIONER

## 2021-03-11 PROCEDURE — 250N000011 HC RX IP 250 OP 636: Performed by: NURSE PRACTITIONER

## 2021-03-11 RX ORDER — LORAZEPAM 2 MG/ML
2 INJECTION INTRAMUSCULAR 4 TIMES DAILY
Status: DISCONTINUED | OUTPATIENT
Start: 2021-03-11 | End: 2021-03-14

## 2021-03-11 RX ADMIN — LORAZEPAM 2 MG: 2 INJECTION INTRAMUSCULAR; INTRAVENOUS at 22:09

## 2021-03-11 RX ADMIN — LORAZEPAM 2 MG: 2 INJECTION INTRAMUSCULAR; INTRAVENOUS at 13:09

## 2021-03-11 RX ADMIN — LORAZEPAM 2 MG: 2 INJECTION INTRAMUSCULAR; INTRAVENOUS at 06:23

## 2021-03-11 ASSESSMENT — ACTIVITIES OF DAILY LIVING (ADL)
HYGIENE/GROOMING: INDEPENDENT
ORAL_HYGIENE: INDEPENDENT
LAUNDRY: UNABLE TO COMPLETE
DRESS: SCRUBS (BEHAVIORAL HEALTH);INDEPENDENT

## 2021-03-11 NOTE — PROGRESS NOTES
[facility]  Psychiatric Progress Note      Impression:   This is a 30 year old yo male with a history of schizoaffective disorder, TBI, and polysubstance abuse.    This is my first time meeting with him.  He will not open his eyes when I am talking to him and he is awake.  I asked him questions and he would not acknowledge me in the room.  He did not open his eyes.  I asked him if I could touch his arm he did not respond so I did try to move his arm and he was quite resistant with any movements though not aggressive.  He does have significant social aversion and negativism.  he meets multiple criteria on the clemons [place] rating scale to start administering lorazepam started though he did not take any medications.  He has not taken any of his scheduled Prolixin eitherl. He does not acknowledge when staff bring him medications.  He has apparently been in his catatonic state in the past and does have a history of catatonia.  We will try to review documentation as to what helps him in these states and what he stabilizes most quickly on.  He has had some food and fluid intake and his vitals remained stable though his intake is very minimal.          Educated regarding medication indications, risks, benefits, side effects, contraindications and possible interactions. Verbally expressed understanding.        Diagnoses:   Schizoaffective disorder- depressed type (by history)  R/o substance induced psychosis  ADHD, unspecified  History of TBI  History of polysubstance abuse (amphetamines most recent)      Attestation:  Patient has been seen and evaluated by me,  April Dominga Heaton NP          Interim History:   The patient's care was discussed with the treatment team and chart notes were reviewed.               Medications:     Current Facility-Administered Medications Ordered in Epic   Medication Dose Route Frequency Last Rate Last Admin     acetaminophen (TYLENOL) tablet 650 mg  650 mg Oral Q4H PRN      "    alum & mag hydroxide-simethicone (MAALOX) suspension 30 mL  30 mL Oral Q4H PRN         benztropine (COGENTIN) tablet 0.5 mg  0.5 mg Oral BID PRN         fluPHENAZine (PROLIXIN) tablet 5 mg  5 mg Oral At Bedtime         gabapentin (NEURONTIN) capsule 300 mg  300 mg Oral TID         hydrOXYzine (ATARAX) tablet 25-50 mg  25-50 mg Oral Q4H PRN         LORazepam (ATIVAN) tablet 1 mg  1 mg Oral Q6H PRN        Or     LORazepam (ATIVAN) injection 1 mg  1 mg Intramuscular Q6H PRN         LORazepam (ATIVAN) tablet 1 mg  1 mg Oral TID         melatonin tablet 3 mg  3 mg Oral At Bedtime PRN         nicotine (NICORETTE) gum 2 mg  2 mg Buccal Q1H PRN         OLANZapine zydis (zyPREXA) ODT tab 10 mg  10 mg Oral TID PRN        Or     OLANZapine (zyPREXA) injection 10 mg  10 mg Intramuscular TID PRN         propranolol (INDERAL) tablet 10 mg  10 mg Oral BID PRN         No current Epic-ordered outpatient medications on file.            10 point ROS- uncooperative with assessment       Allergies:   No Known Allergies         Psychiatric Examination:   /72   Pulse 87   Temp 98.2  F (36.8  C) (Temporal)   Resp 16   Ht 1.905 m (6' 3\")   Wt 96.5 kg (212 lb 12.8 oz)   SpO2 96%   BMI 26.60 kg/m    Weight is 212 lbs 12.8 oz  Body mass index is 26.6 kg/m .    Appearance:  dressed in hospital scrubs, appeared as age stated and slightly unkempt  Attitude:  uncooperative  Eye Contact:  None, keeps eyes closed  Mood:  Unable to assess. Pt not participating in assessment  Affect:  Unable to assess. Pt not participating in assessment  Speech:  nonverbal  Psychomotor Behavior:  no evidence of tardive dyskinesia, dystonia, or tics  Thought Process:  Unable to assess. Pt not participating in assessment  Associations:  Unable to assess. Pt not participating in assessment  Thought Content:  Unable to assess. Pt not participating in assessment  Insight:  Unable to assess. Pt not participating in assessment  Judgment:  Unable to assess. " Pt not participating in assessment  Oriented to:  Unable to assess. Pt not participating in assessment  Attention Span and Concentration:  Unable to assess. Pt not participating in assessment  Recent and Remote Memory:  Unable to assess. Pt not participating in assessment  Fund of Knowledge: Unable to assess. Pt not participating in assessment  Muscle Strength and Tone: normal  Gait and Station: Normal           Labs:     No results found for this or any previous visit (from the past 24 hour(s)).   BEHAVIORAL TEAM DISCUSSION    Progress: minimal. Limited intake.  Did open eyes after injection given, sat up and ate shortly after.     Continued Stay Criteria/Rationale: Catatonic symptoms, adding scheduled Ativan. Petition for commitment filed.    Medical/Physical: no acute, poor intake- monitoring I&O  Precautions:   Falls precaution?: No  Behavioral Orders   Procedures     Code 1 - Restrict to Unit     Elopement precautions     Routine Programming     As clinically indicated     Status 15     Every 15 minutes.       Plan:     Schedule ativan 2 mg im tid.   Can not force the ativan unless vitals unstable  Continue offer prolixin  Will fill out ledbetter

## 2021-03-11 NOTE — PLAN OF CARE
In bed at start of shift; propped self up with arm, eyes wide open. According to charting, slept approx. 2.5 hours last night. Ate 25% of breakfast- fruit cup. Stares directly at writer when asked assessment questions. Did not take scheduled gabapentin this morning; attempted to get patient to shake head yes or no for medication but did not do this. Did get up to use the restroom. Did not eat lunch. Did allow administration of ativan IM. Drank half a bottle of water.    1330 Pt up to bathroom. Shower supplies placed in room. Pt awake in bed.  1345  called; phone placed in front of patient on speaker.  1355 Pt up walking hallway, changed socks and returned to bed.    Problem: Behavioral Health Plan of Care  Goal: Patient-Specific Goal (Individualization)  Description: Pt will be compliant with with treatment team recommendations during hospitalization.   Pt will be medication compliatn.   Pt will participate in nursing assessment.   Pt will eat at least 50% of meals.   Staff to bring meal trays to patient's room.  ELOPEMENT RISK     Outcome: No Change    Problem: Suicide Risk  Goal: Absence of Self-Harm  Outcome: Improving     Problem: Suicidal Behavior  Goal: Suicidal Behavior is Absent or Managed  Outcome: Improving     Face to face end of shift report communicated to oncoming RN.     Yuridia Blancas RN  3/11/2021

## 2021-03-11 NOTE — PLAN OF CARE
Problem: Behavioral Health Plan of Care  Goal: Patient-Specific Goal (Individualization)  Description: Pt will be compliant with with treatment team recommendations during hospitalization.   Pt will be medication compliatn.   Pt will participate in nursing assessment.   Pt will eat at least 50% of meals.   Staff to bring meal trays to patient's room.  ELOPEMENT RISK     Pt remained in his room all shift, mostly in his bed. Pt walked in lounge and used the phone. Pt refused dinner. Pt refused his oral medications at 2100. Pt given ativan 2mg IM.     Outcome: No Change     Problem: Suicide Risk  Goal: Absence of Self-Harm  Outcome: Improving     Problem: Suicidal Behavior  Goal: Suicidal Behavior is Absent or Managed  Outcome: Improving     Face to face end of shift report communicated to night shift RN. Reported that pt is a suicide risk and an elopement risk.     Sarahi Hui, RN  3/11/2021  3:57 PM

## 2021-03-11 NOTE — PLAN OF CARE
Face to face report received from Sobeida CARDOZA RN. Pt. Observed.     Problem: Behavioral Health Plan of Care  Goal: Patient-Specific Goal (Individualization)  Description: Pt will be compliant with with treatment team recommendations during hospitalization.   Pt will be medication compliatn.   Pt will participate in nursing assessment.   Pt will eat at least 50% of meals.   Staff to bring meal trays to patient's room.  ELOPEMENT RISK     Outcome: No Change  Note: Pt has been in bed with eyes closed and regular respirations for a total of 2.5 hours this noc shift. Pt. Having difficulty falling asleep and staying asleep. 15 minute and PRN checks all night. No complaints offered. Will continue to monitor.       Face to face end of shift report to be communicated to oncoming RN.     Liat Lai RN  3/11/2021

## 2021-03-11 NOTE — PROGRESS NOTES
[facility]  Psychiatric Progress Note      Impression:   This is a 30 year old yo male with a history of schizoaffective disorder, TBI, and polysubstance abuse.      Yesterday after receiving Ativan in the evening, he went out to the day room walked around, he asked staff for soap, and he ate a significant amount more food.  When I met with him this morning he did not respond to me and did not open his eyes and I did know he was awake.  Staff state that they feel the Ativan has helped him remarkably specifically after the amount he ate yesterday and walking out of his room requesting soap.  I have only scheduled it at 3 times a day and may need to schedule it 4 times a day.  I did fill out a Sarah petition as he still is not taking his medications and he would likely benefit from a long-acting medication.        Educated regarding medication indications, risks, benefits, side effects, contraindications and possible interactions. Verbally expressed understanding.        Diagnoses:   Schizoaffective disorder- depressed type (by history)  R/o substance induced psychosis  ADHD, unspecified  History of TBI  History of polysubstance abuse (amphetamines most recent)      Attestation:  Patient has been seen and evaluated by me,  April Dominga Heaton NP          Interim History:   The patient's care was discussed with the treatment team and chart notes were reviewed.               Medications:     Current Facility-Administered Medications Ordered in Epic   Medication Dose Route Frequency Last Rate Last Admin     acetaminophen (TYLENOL) tablet 650 mg  650 mg Oral Q4H PRN         alum & mag hydroxide-simethicone (MAALOX) suspension 30 mL  30 mL Oral Q4H PRN         benztropine (COGENTIN) tablet 0.5 mg  0.5 mg Oral BID PRN         fluPHENAZine (PROLIXIN) tablet 5 mg  5 mg Oral At Bedtime         gabapentin (NEURONTIN) capsule 300 mg  300 mg Oral TID         hydrOXYzine (ATARAX) tablet 25-50 mg  25-50 mg Oral  "Q4H PRN         LORazepam (ATIVAN) tablet 1 mg  1 mg Oral Q6H PRN        Or     LORazepam (ATIVAN) injection 1 mg  1 mg Intramuscular Q6H PRN         LORazepam (ATIVAN) tablet 1 mg  1 mg Oral TID         melatonin tablet 3 mg  3 mg Oral At Bedtime PRN         nicotine (NICORETTE) gum 2 mg  2 mg Buccal Q1H PRN         OLANZapine zydis (zyPREXA) ODT tab 10 mg  10 mg Oral TID PRN        Or     OLANZapine (zyPREXA) injection 10 mg  10 mg Intramuscular TID PRN         propranolol (INDERAL) tablet 10 mg  10 mg Oral BID PRN         No current Epic-ordered outpatient medications on file.            10 point ROS- uncooperative with assessment       Allergies:   No Known Allergies         Psychiatric Examination:   /72   Pulse 87   Temp 98.2  F (36.8  C) (Temporal)   Resp 16   Ht 1.905 m (6' 3\")   Wt 96.5 kg (212 lb 12.8 oz)   SpO2 96%   BMI 26.60 kg/m    Weight is 212 lbs 12.8 oz  Body mass index is 26.6 kg/m .    Appearance:  dressed in hospital scrubs, appeared as age stated and slightly unkempt  Attitude:  uncooperative  Eye Contact:  None, keeps eyes closed  Mood:  Unable to assess. Pt not participating in assessment  Affect:  Unable to assess. Pt not participating in assessment  Speech:  nonverbal  Psychomotor Behavior:  no evidence of tardive dyskinesia, dystonia, or tics  Thought Process:  Unable to assess. Pt not participating in assessment  Associations:  Unable to assess. Pt not participating in assessment  Thought Content:  Unable to assess. Pt not participating in assessment  Insight:  Unable to assess. Pt not participating in assessment  Judgment:  Unable to assess. Pt not participating in assessment  Oriented to:  Unable to assess. Pt not participating in assessment  Attention Span and Concentration:  Unable to assess. Pt not participating in assessment  Recent and Remote Memory:  Unable to assess. Pt not participating in assessment  Fund of Knowledge: Unable to assess. Pt not participating in " assessment  Muscle Strength and Tone: normal  Gait and Station: Normal           Labs:     No results found for this or any previous visit (from the past 24 hour(s)).   BEHAVIORAL TEAM DISCUSSION    Progress: minimal. Limited intake.  Did open eyes after injection given, sat up and ate shortly after.     Continued Stay Criteria/Rationale: Catatonic symptoms, adding scheduled Ativan. Petition for commitment filed.    Medical/Physical: no acute, poor intake- monitoring I&O  Precautions:   Falls precaution?: No  Behavioral Orders   Procedures     Code 1 - Restrict to Unit     Elopement precautions     Routine Programming     As clinically indicated     Status 15     Every 15 minutes.       Plan:     Schedule ativan 2 mg im qid.   Can not force the ativan unless vitals unstable  Continue offer prolixin  Will fill out ledbetter           Pineda-Orville Catatonia Rating Scale   Severity Score (Number of points for items 1 -23) __________   Screening Score (Presence or absence of items 1 - 14) ___________   Number of items 1-23 __________   Patient: ____________________ Date: ________ Time:_________ Examiner: _______   1. Immobility/stupor: Extreme hypoactivity, immobile, minimally responsive to stimuli.   0 - Absent.   1 - Sits abnormally still, may interact briefly.   2 - Virtually no interaction with external world.   3 - Stuporous, non-reactive to painful stimuli.   2. Mutism: Verbally unresponsive or minimally responsive.   0 = Absent.   1 = Verbally unresponsive to majority of questions; incomprehensible whisper.   2 = Speaks less than 20 words/5mins.   3 = No speech.   3. = Staring: Fixed gaze, little or no visual scanning of environment, decreased blinking.   0 = Absent.   1 = Poor eye contact, repeatedly gazes less than 20 s between shifting of attention; decreased blinking.   2 = Gaze held longer than 20 s, occasionally shifts attention.   3 = Fixed gaze, non-reactive.   4. Posturing/catalepsy: Spontaneous maintenance  of posture (s), including mundane (e.g. sitting or standing for long periods without reacting).   0 = Absent.   1 = Less than I min.   2 Greater than one minute, less than 15 min.   3 Bizarre posture, or mundane maintained more than 15 min.   5. Grimacing: Maintenance of odd facial expressions.   0 = Absent.   1 = Less than [id number].   2 = Less than 1 min.   3 = Bizarre expression(s) or maintained more than 1 min.   6. Echopraxia/echolalia: Mimicking of examiner's movements (echopraxia) or speech (echolalia).   0 = Absent   1 = Occasional.   2 = Frequent.   3 = Constant   7. Stereotypy: Repetitive, non-goal-directed motor activity (e.g. finger-play, repeatedly touching, patting or rubbing self); abnormality not inherent in act but in its frequency.   0 - Absent   1 - Occasional.   2 - Frequent.   3 - Constant.   8. Mannerisms: Odd, purposeful movements (hopping or walking tiptoe, saluting passers-by or exaggerated caricatures of mundane movements); abnormality inherent in act itself.   0 - Absent   1 - Occasional.   2 - Frequent.   3 - Constant.   9. Stereotyped & meaningless repetition of words & phrases (verbigeration): Repetition of phrases or sentences (like a scratched records).   0 - Absent.   1 - Occasional.   2 - Frequent, difficult to interrupt.   3 - Constant.   10. Rigidity: Maintenance of a rigid position despite efforts to be moved (exclude if cog-wheeling or tremor present)   0 = Absent.   1 = Mild resistance.   2 = Moderate.   3 = Severe, cannot be repostured.   11. Negativism: Apparently motiveless resistance to instructions or attempts to move/examine patients. Contrary behavior, does exact opposite of instruction.   0 - Absent   1 - Mild resistance and/or occasionally contrary.   2 - Moderate resistance and/or frequently contrary.   3 - Severe resistance and/or continually contrary.   12. Waxy flexibility: During repositioning of patient, patient offers initial resistance before allowing  him/herself to be repositioned, similar to that of a bending candle. (also defined as slow resistance to movement as the patient allows the examiner to place his/her extremities in unusual positions. The limb may remain in the position in which they are placed or not)   0 - Absent   3 - Present.   13. Withdrawal: Refusal to eat, drink and/or make eye contact.   0 = Absent.   1 = Minimal oral intake/interaction for less than 1 day.   2 = Minimal oral intake/interaction for more than 1 day.   3 = No oral intake/interaction for 1 day or more.   14. Excitement: Extreme hyperactivity, constant motor unrest which is apparently non-purposeful.   Not to be attributed to akathisia or goal-directed agitation.   1 - Excessive motion, intermittent.   2 - Constant motion, hyperkinetic without rest periods.   3 - Full-blown catatonic excitement, endless frenzied motor activity.   ------------End of Screening Items-------------   15. Impulsivity: Patient suddenly engages in inappropriate behavior (e.g. runs down hallway, starts screaming or takes off clothes) without provocation. Afterwards can give no, or only a facile explanation.   0 - Absent.   1 - Occasional.   2 - Frequent.   3 - Constant or not redirectable.   16. Automatic obedience: Exaggerated cooperation with examiner's request or spontaneous continuation of movement requested.   0 = Absent.   1 = Occasional   2 = Frequent   3 = Constant.   17. Passive Obedience (mitgehen): Patient raises arm in response to light pressure of finger, despite instructions to the contrary.   0 = Absent.   3 = Present.   18. Muscle Resistance (gegenhalten): Involuntary resistance to passive movement of a limb to a new position. Resistance increases with the speed of the movement.   0 - Absent   3 - Present.   19. Motorically Stuck (ambitendency): Patient appears stuck in indecisive, hesitant motor movements.   0 - Absent.   3 = Present.   20. Grasp reflex: Striking the patient's open palm  "with two extended fingers of the examiner's hand results in automatic closure of patients hand.   0 = Absent   3 = Present   21. Perseveration: Repeatedly returns to same topic or persists with the same movements.   0 = Absent.   3 = Present.   22. Combativeness: Belligerence or aggression, Usually in an undirected manner, without explanation.   0 = Absent   1 = Occasionally strikes out, low potential for injury.   2 = Frequently strikes out, moderate potential for injury.   3 = Serious danger to others.   23. Autonomic abnormality: Abnormality of body temperature (fever), blood pressure, pulse, respiratory rate, inappropriate sweating, flushing.   0 = Absent   1 = Abnormality of one parameter (exclude pre-existing hypertension).   2 = Abnormality of two parameters.   3 = Abnormality of three or more parameters.   Appendix I - Standardized examination for catatonia. The method described here is used to complete the 23-item Pineda-Orville Catatonia Rating Scale (CRS) and the 14-item Catatonia Screening Instrument (CSI). Item definitions on the two scales are the same. The CRS measures the severity of 23 signs on a 0- 3 scale, while the CSI measures only the presence or absence of the first 14 signs.   Ratings are to be made solely on the basis of observed behaviour during the examination with the exception of completion of the items for 'withdrawal' and autonomic abnormality', which may be based on directly observed behaviour and for chart documentation. As a general rule, only rate items which are clearly present. If uncertain as to the presence of an item, rate the item as '0'.   Procedure  Examines    1  Observe patient while trying to engage in a conversation  Activity level, Abn movements Abn speech    2  Examiner scratches head in exaggerated manner  Echopraxia    3  Examine arm for cogwheeling. Attempt to reposture, instructing patient to \"keep your arm loose\" - move arm with alternating lighter & heavier force. " " Negativism, Waxy flexibility    4  Ask patient to extend arm. Place one finger beneath hand and try to raise slowly after stating, \"Do NOT let me raise your arm\".  Passive obedience    5  Extend hand stating \"Do NOT shake my hand\". Gets stuck trying to do both.  Motorically stuck    6  Reach into pocket and state, \"Stick out your tongue, I want to stick a pin in it\".  Automatic obedience    7  Check for grasp reflex.  Grasp reflex    8  Check chart for reports of previous 24-hour period. In particular check for oral intake, vital signs, and any incidents.    9  Observe patient indirectly, at least for a brief period, each day.    Honey, 1996, c/o Robert Perkins, Modified by Ashok, 2013                                                                                                                                      "

## 2021-03-11 NOTE — PLAN OF CARE
SW received Commitment Rights for patient from [facility]. Provided and explained Commitment Rights to patient. Patient just stared and did not respond.

## 2021-03-12 LAB — GLUCOSE BLDC GLUCOMTR-MCNC: 82 MG/DL (ref 70–99)

## 2021-03-12 PROCEDURE — 999N001017 HC STATISTIC GLUCOSE BY METER IP

## 2021-03-12 PROCEDURE — 250N000011 HC RX IP 250 OP 636: Performed by: NURSE PRACTITIONER

## 2021-03-12 PROCEDURE — 124N000001 HC R&B MH

## 2021-03-12 PROCEDURE — 99233 SBSQ HOSP IP/OBS HIGH 50: CPT | Performed by: NURSE PRACTITIONER

## 2021-03-12 RX ADMIN — LORAZEPAM 2 MG: 2 INJECTION INTRAMUSCULAR; INTRAVENOUS at 06:19

## 2021-03-12 RX ADMIN — LORAZEPAM 2 MG: 2 INJECTION INTRAMUSCULAR; INTRAVENOUS at 11:19

## 2021-03-12 RX ADMIN — LORAZEPAM 2 MG: 2 INJECTION INTRAMUSCULAR; INTRAVENOUS at 17:02

## 2021-03-12 ASSESSMENT — ACTIVITIES OF DAILY LIVING (ADL)
HYGIENE/GROOMING: INDEPENDENT
DRESS: SCRUBS (BEHAVIORAL HEALTH);INDEPENDENT
LAUNDRY: UNABLE TO COMPLETE
ORAL_HYGIENE: INDEPENDENT

## 2021-03-12 NOTE — PLAN OF CARE
Face to face shift report received from TRAE Josue.       Problem: Behavioral Health Plan of Care  Goal: Patient-Specific Goal (Individualization)  Description: Pt will be compliant with with treatment team recommendations during hospitalization.   Pt will be medication compliatn.   Pt will participate in nursing assessment.   Pt will eat at least 50% of meals.   Staff to bring meal trays to patient's room.  ELOPEMENT RISK     Outcome: No Change   Does not engage in conversation with others. At times opens eyes and blinks,when spoken to, but does not make eye contact. Attempted to administer AM and 1400 Gabapentin, pt did not respond to writer when medication offered. 1115 VS obtained prior to IM Ativan administration. Pt lifted arm when requested to apply BP cuff. Pt educated on IM injections. Pt cooperative with administration Ativan 2mg IM. Pt observed to reposition self frequently throughout shift. Ankles slightly red due to having feet crossed while laying. Blanchable, no open areas. Ankles uncrossed by writer. Pillow placed to elevate heels from bed.  A short time later pt had removed feet from pillow. No redness observed on coccyx, hips and heels. Ate 0% of breakfast. Drank 250ml of water.  1400: Pt observed to be sitting on edge of bed eating food items left from lunch. Ate dinner roll with butter, and tomato soup. Drank 180ml coffee and 240ml water. Writer brought pt 240 ml of apple juice and chocolate pudding. When writer attempted to hand pt apple juice he reached over to nightstand and moved a napkin as if to allow writer to set down items.  Writer attempted to engage pt in conversation, pt continued to stare at wall in front on him and did not respond.     Problem: Suicide Risk  Goal: Absence of Self-Harm  Outcome: Improving   Free from self harm or injury this shift.       Face to face end of shift report to be communicated to oncoming RN.

## 2021-03-12 NOTE — PROGRESS NOTES
[facility]  Psychiatric Progress Note      Impression:   This is a 30 year old yo male with a history of schizoaffective disorder, TBI, and polysubstance abuse.      I tried to meet with Steven on approximately 3 different occasions within a couple hour timeframe though it had been 4 hours since his last injection of lorazepam.  He did open his eyes though when he opened his eyes he was blinking excessively and making no eye contact with me.  He did seem to have some regard that I was talking to him.  When I was trying to talk to him his pillow fell and he reached for his pillow picked it back up and put it under him.  He has been laying in the same position each time I see him though nursing staff has been having to change positions if they do not notice that he has.  He has been getting out of bed more frequently per nursing staff and today he told the  that he did not want to go to court.  It was the first time we have heard him speak what I understand.  When I asked him questions today he shrugged his shoulders in response which was improvement from what I have seen so far.  His intake has improved a bit.  I discussed with him restarting a mood stabilizer while I was in his room and his eyes were open and he did not disagree to do this.  He had been on Trileptal in the past.  I do not see that he has ever been on lithium.  He may want to wait until his oral intake for fluids has improved a bit..        Educated regarding medication indications, risks, benefits, side effects, contraindications and possible interactions. Verbally expressed understanding.        Diagnoses:   Schizoaffective disorder- depressed type (by history)  R/o substance induced psychosis  ADHD, unspecified  History of TBI  History of polysubstance abuse (amphetamines most recent)      Attestation:  Patient has been seen and evaluated by me,  April Dominga Heaton NP          Interim History:   The patient's care  "was discussed with the treatment team and chart notes were reviewed.               Medications:     Current Facility-Administered Medications Ordered in Epic   Medication Dose Route Frequency Last Rate Last Admin     acetaminophen (TYLENOL) tablet 650 mg  650 mg Oral Q4H PRN         alum & mag hydroxide-simethicone (MAALOX) suspension 30 mL  30 mL Oral Q4H PRN         benztropine (COGENTIN) tablet 0.5 mg  0.5 mg Oral BID PRN         fluPHENAZine (PROLIXIN) tablet 5 mg  5 mg Oral At Bedtime         gabapentin (NEURONTIN) capsule 300 mg  300 mg Oral TID         hydrOXYzine (ATARAX) tablet 25-50 mg  25-50 mg Oral Q4H PRN         LORazepam (ATIVAN) tablet 1 mg  1 mg Oral Q6H PRN        Or     LORazepam (ATIVAN) injection 1 mg  1 mg Intramuscular Q6H PRN         LORazepam (ATIVAN) tablet 1 mg  1 mg Oral TID         melatonin tablet 3 mg  3 mg Oral At Bedtime PRN         nicotine (NICORETTE) gum 2 mg  2 mg Buccal Q1H PRN         OLANZapine zydis (zyPREXA) ODT tab 10 mg  10 mg Oral TID PRN        Or     OLANZapine (zyPREXA) injection 10 mg  10 mg Intramuscular TID PRN         propranolol (INDERAL) tablet 10 mg  10 mg Oral BID PRN         No current [facility]-ordered outpatient medications on file.            10 point ROS- uncooperative with assessment       Allergies:   No Known Allergies         Psychiatric Examination:   /60 (BP Location: Left arm)   Pulse 78   Temp 97.1  F (36.2  C) (Tympanic)   Resp 16   Ht 1.905 m (6' 3\")   Wt 96.5 kg (212 lb 12.8 oz)   SpO2 98%   BMI 26.60 kg/m    Weight is 212 lbs 12.8 oz  Body mass index is 26.6 kg/m .    Appearance:  dressed in hospital scrubs, appeared as age stated and slightly unkempt  Attitude: Appears to respond to me a bit better today by opening his eyes but makes no eye contact  Eye Contact: Does open eyes today  Mood: Extremely blunted  Affect: Extremely blunted and restricted  Speech:  nonverbal.  Does shrug shoulders which is some improvement  Psychomotor " Behavior:  no evidence of tardive dyskinesia, dystonia, or tics  Thought Process:  Unable to assess. Pt not participating in assessment.  Does not appear preoccupied though very difficult to assess  Associations:  Unable to assess. Pt not participating in assessment  Thought Content:  Unable to assess. Pt not participating in assessment  Insight: Likely very poor  Judgment: Very poor  Oriented to:  Unable to assess. Pt not participating in assessment  Attention Span and Concentration:  Unable to assess. Pt not participating in assessment  Recent and Remote Memory:  Unable to assess. Pt not participating in assessment  Fund of Knowledge: Unable to assess. Pt not participating in assessment  Muscle Strength and Tone: normal  Gait and Station: Normal           Labs:     No results found for this or any previous visit (from the past 24 hour(s)).   BEHAVIORAL TEAM DISCUSSION    Progress: Has had some improvement with lorazepam.  He is coming out of his room more, is liquid intake has improved and he has spoke very briefly with staff.     Continued Stay Criteria/ ationale: Catatonic symptoms persist.    Medical/Physical: no acute, poor intake- monitoring I&O  Precautions:   Falls precaution?: No  Behavioral Orders   Procedures     Code 1 - Restrict to Unit     Elopement precautions     Routine Programming     As clinically indicated     Status 15     Every 15 minutes.       Plan:     Schedule ativan 2 mg im qid.   Can not force the ativan unless vitals unstable  Continue offer prolixin  Will fill out ledbetter           Pineda-Orville Catatonia Rating Scale   Severity Score (Number of points for items 1 -23) __________   Screening Score (Presence or absence of items 1 - 14) ___________   Number of items 1-23 __________   Patient: ____________________ Date: ________ Time:_________ Examiner: _______   1. Immobility/stupor: Extreme hypoactivity, immobile, minimally responsive to stimuli.   0 - Absent.   1 - Sits abnormally still,  may interact briefly.   2 - Virtually no interaction with external world.   3 - Stuporous, non-reactive to painful stimuli.   2. Mutism: Verbally unresponsive or minimally responsive.   0 = Absent.   1 = Verbally unresponsive to majority of questions; incomprehensible whisper.   2 = Speaks less than 20 words/5mins.   3 = No speech.   3. = Staring: Fixed gaze, little or no visual scanning of environment, decreased blinking.   0 = Absent.   1 = Poor eye contact, repeatedly gazes less than 20 s between shifting of attention; decreased blinking.   2 = Gaze held longer than 20 s, occasionally shifts attention.   3 = Fixed gaze, non-reactive.   4. Posturing/catalepsy: Spontaneous maintenance of posture (s), including mundane (e.g. sitting or standing for long periods without reacting).   0 = Absent.   1 = Less than I min.   2 Greater than one minute, less than 15 min.   3 Bizarre posture, or mundane maintained more than 15 min.   5. Grimacing: Maintenance of odd facial expressions.   0 = Absent.   1 = Less than [id number].   2 = Less than 1 min.   3 = Bizarre expression(s) or maintained more than 1 min.   6. Echopraxia/echolalia: Mimicking of examiner's movements (echopraxia) or speech (echolalia).   0 = Absent   1 = Occasional.   2 = Frequent.   3 = Constant   7. Stereotypy: Repetitive, non-goal-directed motor activity (e.g. finger-play, repeatedly touching, patting or rubbing self); abnormality not inherent in act but in its frequency.   0 - Absent   1 - Occasional.   2 - Frequent.   3 - Constant.   8. Mannerisms: Odd, purposeful movements (hopping or walking tiptoe, saluting passers-by or exaggerated caricatures of mundane movements); abnormality inherent in act itself.   0 - Absent   1 - Occasional.   2 - Frequent.   3 - Constant.   9. Stereotyped & meaningless repetition of words & phrases (verbigeration): Repetition of phrases or sentences (like a scratched records).   0 - Absent.   1 - Occasional.   2 - Frequent,  difficult to interrupt.   3 - Constant.   10. Rigidity: Maintenance of a rigid position despite efforts to be moved (exclude if cog-wheeling or tremor present)   0 = Absent.   1 = Mild resistance.   2 = Moderate.   3 = Severe, cannot be repostured.   11. Negativism: Apparently motiveless resistance to instructions or attempts to move/examine patients. Contrary behavior, does exact opposite of instruction.   0 - Absent   1 - Mild resistance and/or occasionally contrary.   2 - Moderate resistance and/or frequently contrary.   3 - Severe resistance and/or continually contrary.   12. Waxy flexibility: During repositioning of patient, patient offers initial resistance before allowing him/herself to be repositioned, similar to that of a bending candle. (also defined as slow resistance to movement as the patient allows the examiner to place his/her extremities in unusual positions. The limb may remain in the position in which they are placed or not)   0 - Absent   3 - Present.   13. Withdrawal: Refusal to eat, drink and/or make eye contact.   0 = Absent.   1 = Minimal oral intake/interaction for less than 1 day.   2 = Minimal oral intake/interaction for more than 1 day.   3 = No oral intake/interaction for 1 day or more.   14. Excitement: Extreme hyperactivity, constant motor unrest which is apparently non-purposeful.   Not to be attributed to akathisia or goal-directed agitation.   1 - Excessive motion, intermittent.   2 - Constant motion, hyperkinetic without rest periods.   3 - Full-blown catatonic excitement, endless frenzied motor activity.   ------------End of Screening Items-------------   15. Impulsivity: Patient suddenly engages in inappropriate behavior (e.g. runs down hallway, starts screaming or takes off clothes) without provocation. Afterwards can give no, or only a facile explanation.   0 - Absent.   1 - Occasional.   2 - Frequent.   3 - Constant or not redirectable.   16. Automatic obedience: Exaggerated  cooperation with examiner's request or spontaneous continuation of movement requested.   0 = Absent.   1 = Occasional   2 = Frequent   3 = Constant.   17. Passive Obedience (mitgehen): Patient raises arm in response to light pressure of finger, despite instructions to the contrary.   0 = Absent.   3 = Present.   18. Muscle Resistance (gegenhalten): Involuntary resistance to passive movement of a limb to a new position. Resistance increases with the speed of the movement.   0 - Absent   3 - Present.   19. Motorically Stuck (ambitendency): Patient appears stuck in indecisive, hesitant motor movements.   0 - Absent.   3 = Present.   20. Grasp reflex: Striking the patient's open palm with two extended fingers of the examiner's hand results in automatic closure of patients hand.   0 = Absent   3 = Present   21. Perseveration: Repeatedly returns to same topic or persists with the same movements.   0 = Absent.   3 = Present.   22. Combativeness: Belligerence or aggression, Usually in an undirected manner, without explanation.   0 = Absent   1 = Occasionally strikes out, low potential for injury.   2 = Frequently strikes out, moderate potential for injury.   3 = Serious danger to others.   23. Autonomic abnormality: Abnormality of body temperature (fever), blood pressure, pulse, respiratory rate, inappropriate sweating, flushing.   0 = Absent   1 = Abnormality of one parameter (exclude pre-existing hypertension).   2 = Abnormality of two parameters.   3 = Abnormality of three or more parameters.   Appendix I - Standardized examination for catatonia. The method described here is used to complete the 23-item Pineda-Orville Catatonia Rating Scale (CRS) and the 14-item Catatonia Screening Instrument (CSI). Item definitions on the two scales are the same. The CRS measures the severity of 23 signs on a 0- 3 scale, while the CSI measures only the presence or absence of the first 14 signs.   Ratings are to be made solely on the basis  "of observed behaviour during the examination with the exception of completion of the items for 'withdrawal' and autonomic abnormality', which may be based on directly observed behaviour and for chart documentation. As a general rule, only rate items which are clearly present. If uncertain as to the presence of an item, rate the item as '0'.   Procedure  Examines    1  Observe patient while trying to engage in a conversation  Activity level, Abn movements Abn speech    2  Examiner scratches head in exaggerated manner  Echopraxia    3  Examine arm for cogwheeling. Attempt to reposture, instructing patient to \"keep your arm loose\" - move arm with alternating lighter & heavier force.  Negativism, Waxy flexibility    4  Ask patient to extend arm. Place one finger beneath hand and try to raise slowly after stating, \"Do NOT let me raise your arm\".  Passive obedience    5  Extend hand stating \"Do NOT shake my hand\". Gets stuck trying to do both.  Motorically stuck    6  Reach into pocket and state, \"Stick out your tongue, I want to stick a pin in it\".  Automatic obedience    7  Check for grasp reflex.  Grasp reflex    8  Check chart for reports of previous 24-hour period. In particular check for oral intake, vital signs, and any incidents.    9  Observe patient indirectly, at least for a brief period, each day.    Honey, 1996, c/o Robert Perkins, Modified by Ashok, 2013                                                                                                                                      "

## 2021-03-12 NOTE — PLAN OF CARE
Face to face report received from Sarahi ALEXANDRA RN. Pt. Observed.     Problem: Behavioral Health Plan of Care  Goal: Patient-Specific Goal (Individualization)  Description: Pt will be compliant with with treatment team recommendations during hospitalization.   Pt will be medication compliatn.   Pt will participate in nursing assessment.   Pt will eat at least 50% of meals.   Staff to bring meal trays to patient's room.  ELOPEMENT RISK     Outcome: No Change  Note: Pt has been in bed with eyes closed and regular respirations for a total of 2 hours this noc shift. 15 minute and PRN checks all night. No complaints offered. Will continue to monitor.    Pt. To lounge multiple times to get water. Pt in room eating chips.      Face to face end of shift report to be communicated to oncoming RN.     Liat Lai RN  3/12/2021

## 2021-03-12 NOTE — PLAN OF CARE
"  Patient had his confinement hearing this morning @ 8:15 am. SW attempted to have patient go to court, patient declined he said, '' No I do not.\"   ordered for the continued confinement of patient until his commitment hearing March 23 rd @ 8:15 am.     SW notified patient that the  ordered his continued confinement until his next hearing scheduled for March 23 rd. Patient did not respond.     KENDRA updated nursing staff that patient is confined until next court hearing.    KENDRA faxed Sarah paper work to court admin.       "

## 2021-03-13 PROCEDURE — 250N000011 HC RX IP 250 OP 636: Performed by: NURSE PRACTITIONER

## 2021-03-13 PROCEDURE — 124N000001 HC R&B MH

## 2021-03-13 PROCEDURE — 99232 SBSQ HOSP IP/OBS MODERATE 35: CPT | Performed by: NURSE PRACTITIONER

## 2021-03-13 RX ADMIN — LORAZEPAM 2 MG: 2 INJECTION INTRAMUSCULAR; INTRAVENOUS at 11:19

## 2021-03-13 RX ADMIN — LORAZEPAM 2 MG: 2 INJECTION INTRAMUSCULAR; INTRAVENOUS at 21:31

## 2021-03-13 RX ADMIN — LORAZEPAM 2 MG: 2 INJECTION INTRAMUSCULAR; INTRAVENOUS at 16:12

## 2021-03-13 ASSESSMENT — ACTIVITIES OF DAILY LIVING (ADL)
DRESS: SCRUBS (BEHAVIORAL HEALTH);INDEPENDENT
ORAL_HYGIENE: INDEPENDENT
ORAL_HYGIENE: INDEPENDENT
DRESS: INDEPENDENT
HYGIENE/GROOMING: INDEPENDENT
HYGIENE/GROOMING: PROMPTS
LAUNDRY: UNABLE TO COMPLETE

## 2021-03-13 NOTE — PLAN OF CARE
"  Problem: Behavioral Health Plan of Care  Goal: Patient-Specific Goal (Individualization)  Description: Pt will be compliant with with treatment team recommendations during hospitalization.   Pt will be medication compliatn.   Pt will participate in nursing assessment.   Pt will eat at least 50% of meals.   Staff to bring meal trays to patient's room.  ELOPEMENT RISK     Pt in room this shift. Pt not talking to staff this shift, pt did communicate with staff through shoulder shrug. Pt came out of his room at 2100, shrugged his shoulders when asked if he wanted a snack. Pt ate his night snack and went back to bed. Pt did not refuse his ativan IM this afternoon, pt asked to lay on his bed for his IM which he did.     Pt has not agreed to take his medications this shift. Pt was laying on his side when nursing asked him to take his oral medications. Pt shook his head \"no\". Nursing then told pt that he has an ativan IM also, pt turned onto his back and pulled the covers up to his neck. Pt did allow UA to take his vitals but refused to turn over for the IM. Nursing put a hand on his shoulder to attempt to guide him to his side but pt kept covers up to his neck and would not move.     Outcome: Improving  Note:        Problem: Suicide Risk  Goal: Absence of Self-Harm  Outcome: Improving     Problem: Suicidal Behavior  Goal: Suicidal Behavior is Absent or Managed  Outcome: Improving    Face to face end of shift report communicated to Night shift RN. Reported that pt is a suicide risk.     Sarahi Hui RN  3/12/2021  8:04 PM          "

## 2021-03-13 NOTE — PLAN OF CARE
Face to face shift report received from nurse. Rounding completed, pt observed.    Problem: Behavioral Health Plan of Care  Goal: Patient-Specific Goal (Individualization)  Description: Pt will be compliant with with treatment team recommendations during hospitalization.   Pt will be medication compliatn.   Pt will participate in nursing assessment.   Pt will eat at least 50% of meals.   Staff to bring meal trays to patient's room.  ELOPEMENT RISK   Outcome: No Change  Note: Pt has been in bed with eyes closed and regular respirations observed all night. Will continue to monitor. Normal breathing noted. Patient slept 7 hours. Patient refused ativan this morning.       Problem: Suicide Risk  Goal: Absence of Self-Harm  Outcome: No Change     Problem: Suicidal Behavior  Goal: Suicidal Behavior is Absent or Managed  Outcome: No Change     Face to face report will be communicated to oncoming RN.    Yoan Erickson RN  3/13/2021

## 2021-03-13 NOTE — PLAN OF CARE
Problem: Behavioral Health Plan of Care  Goal: Patient-Specific Goal (Individualization)  Description: Pt will be compliant with with treatment team recommendations during hospitalization.   Pt will be medication compliatn.   Pt will participate in nursing assessment.   Pt will eat at least 50% of meals.   Staff to bring meal trays to patient's room.  ELOPEMENT RISK     Outcome: No Change  Note:      Patient asleep most of shift. Does open eyes and blink on occassions. Unable to assess and complete full nursing assessment.  Remains mute, does not answer assessment questions. No non-verbal forms of communication present. Attempted to reassess patient on a few different occassions to complete assessment with no success. Did take vitals prior to administration of 1130 dose of ativan.  When taking vitals, patient sat up in bed for a brief second and rolled over to oppostie side he was laying on. Noted to have purposeful movement in both arms and legs. Writer educated patient on ativan. No indication of learning observed.  Patient did not verbally respond but opened eyes and blinked. Allowed writer to administer scheduled ativan IM.      Patient did not eat breakfast or lunch this shift. Did not have any fluid intake as well. Did place patient's breakfast and lunch at bedside. He was reminded on a few occassions that he had a meal and drinks at bedside.   Did assess patient's pressure points to heels, ankles, and hips. Some blanchable redness noted to bilateral lower ankles. Patient has been shifting weight and repositioning throughout the shift. Also reminded patient about shifting weight while in bed. 15 minute safety and PRN checks done throughout the shift.     1400: Patient did sit up in bed. Ate half a cheese burger, brownie, and had about 360 ml of fluid. Did not communicate with staff at this time. Laid back down afterwards.       Face to face end of shift report will be communicated to TRAE liu.    Jade  RADHA Shaikh RN  3/13/2021  2:54 PM

## 2021-03-13 NOTE — PROGRESS NOTES
"[facility]  Psychiatric Progress Note      Impression:   This is a 30 year old yo male with a history of schizoaffective disorder, TBI, and polysubstance abuse.      Missed 2 doses of ativan in a row (last night and this morning). He did not necessarily say \"no\" though pulled up his blankets. After he received todays dose nursing started noticing small improvement. He did not move position or eat breakfast this morning. Did not drink either. After injection at half of lunch and drank. He sat up and had his eyes open. I tried to talk to him at this time though he would not talk to me.         Educated regarding medication indications, risks, benefits, side effects, contraindications and possible interactions. Verbally expressed understanding.        Diagnoses:   Schizoaffective disorder- depressed type (by history)  R/o substance induced psychosis  ADHD, unspecified  History of TBI  History of polysubstance abuse (amphetamines most recent)      Attestation:  Patient has been seen and evaluated by me,  Ashely Heaton NP          Interim History:   The patient's care was discussed with the treatment team and chart notes were reviewed.               Medications:     Current Facility-Administered Medications Ordered in Epic   Medication Dose Route Frequency Last Rate Last Admin     acetaminophen (TYLENOL) tablet 650 mg  650 mg Oral Q4H PRN         alum & mag hydroxide-simethicone (MAALOX) suspension 30 mL  30 mL Oral Q4H PRN         benztropine (COGENTIN) tablet 0.5 mg  0.5 mg Oral BID PRN         fluPHENAZine (PROLIXIN) tablet 5 mg  5 mg Oral At Bedtime         gabapentin (NEURONTIN) capsule 300 mg  300 mg Oral TID         hydrOXYzine (ATARAX) tablet 25-50 mg  25-50 mg Oral Q4H PRN         LORazepam (ATIVAN) tablet 1 mg  1 mg Oral Q6H PRN        Or     LORazepam (ATIVAN) injection 1 mg  1 mg Intramuscular Q6H PRN         LORazepam (ATIVAN) tablet 1 mg  1 mg Oral TID         melatonin tablet 3 mg  " "3 mg Oral At Bedtime PRN         nicotine (NICORETTE) gum 2 mg  2 mg Buccal Q1H PRN         OLANZapine zydis (zyPREXA) ODT tab 10 mg  10 mg Oral TID PRN        Or     OLANZapine (zyPREXA) injection 10 mg  10 mg Intramuscular TID PRN         propranolol (INDERAL) tablet 10 mg  10 mg Oral BID PRN         No current Epic-ordered outpatient medications on file.            10 point ROS- uncooperative with assessment       Allergies:   No Known Allergies         Psychiatric Examination:   /69   Pulse 97   Temp 97.3  F (36.3  C) (Temporal)   Resp 12   Ht 1.905 m (6' 3\")   Wt 96.5 kg (212 lb 12.8 oz)   SpO2 98%   BMI 26.60 kg/m    Weight is 212 lbs 12.8 oz  Body mass index is 26.6 kg/m .    Appearance:  dressed in hospital scrubs, appeared as age stated and slightly unkempt  Attitude: Appears to respond to me a bit better today by opening his eyes but makes no eye contact  Eye Contact: Does open eyes today  Mood: Extremely blunted  Affect: Extremely blunted and restricted  Speech:  nonverbal.  Does shrug shoulders which is some improvement  Psychomotor Behavior:  no evidence of tardive dyskinesia, dystonia, or tics  Thought Process:  Unable to assess. Pt not participating in assessment.  Does not appear preoccupied though very difficult to assess  Associations:  Unable to assess. Pt not participating in assessment  Thought Content:  Unable to assess. Pt not participating in assessment  Insight: Likely very poor  Judgment: Very poor  Oriented to:  Unable to assess. Pt not participating in assessment  Attention Span and Concentration:  Unable to assess. Pt not participating in assessment  Recent and Remote Memory:  Unable to assess. Pt not participating in assessment  Fund of Knowledge: Unable to assess. Pt not participating in assessment  Muscle Strength and Tone: normal  Gait and Station: Normal           Labs:     No results found for this or any previous visit (from the past 24 hour(s)).   BEHAVIORAL TEAM " DISCUSSION    Progress: Has had some improvement with lorazepam.  He is coming out of his room more, is liquid intake has improved and he has spoke very briefly with staff.     Continued Stay Criteria/ ationale: Catatonic symptoms persist.    Medical/Physical: no acute, poor intake- monitoring I&O  Precautions:   Falls precaution?: No  Behavioral Orders   Procedures     Code 1 - Restrict to Unit     Elopement precautions     Routine Programming     As clinically indicated     Status 15     Every 15 minutes.       Plan:     Continue Scheduled ativan 2 mg im qid.   Can not force the ativan unless vitals unstable  Continue offer prolixin  Will fill out khloe           Pineda-Orville Catatonia Rating Scale   Severity Score (Number of points for items 1 -23) __________   Screening Score (Presence or absence of items 1 - 14) ___________   Number of items 1-23 __________   Patient: ____________________ Date: ________ Time:_________ Examiner: _______   1. Immobility/stupor: Extreme hypoactivity, immobile, minimally responsive to stimuli.   0 - Absent.   1 - Sits abnormally still, may interact briefly.   2 - Virtually no interaction with external world.   3 - Stuporous, non-reactive to painful stimuli.   2. Mutism: Verbally unresponsive or minimally responsive.   0 = Absent.   1 = Verbally unresponsive to majority of questions; incomprehensible whisper.   2 = Speaks less than 20 words/5mins.   3 = No speech.   3. = Staring: Fixed gaze, little or no visual scanning of environment, decreased blinking.   0 = Absent.   1 = Poor eye contact, repeatedly gazes less than 20 s between shifting of attention; decreased blinking.   2 = Gaze held longer than 20 s, occasionally shifts attention.   3 = Fixed gaze, non-reactive.   4. Posturing/catalepsy: Spontaneous maintenance of posture (s), including mundane (e.g. sitting or standing for long periods without reacting).   0 = Absent.   1 = Less than I min.   2 Greater than one minute, less  than 15 min.   3 Bizarre posture, or mundane maintained more than 15 min.   5. Grimacing: Maintenance of odd facial expressions.   0 = Absent.   1 = Less than [id number].   2 = Less than 1 min.   3 = Bizarre expression(s) or maintained more than 1 min.   6. Echopraxia/echolalia: Mimicking of examiner's movements (echopraxia) or speech (echolalia).   0 = Absent   1 = Occasional.   2 = Frequent.   3 = Constant   7. Stereotypy: Repetitive, non-goal-directed motor activity (e.g. finger-play, repeatedly touching, patting or rubbing self); abnormality not inherent in act but in its frequency.   0 - Absent   1 - Occasional.   2 - Frequent.   3 - Constant.   8. Mannerisms: Odd, purposeful movements (hopping or walking tiptoe, saluting passers-by or exaggerated caricatures of mundane movements); abnormality inherent in act itself.   0 - Absent   1 - Occasional.   2 - Frequent.   3 - Constant.   9. Stereotyped & meaningless repetition of words & phrases (verbigeration): Repetition of phrases or sentences (like a scratched records).   0 - Absent.   1 - Occasional.   2 - Frequent, difficult to interrupt.   3 - Constant.   10. Rigidity: Maintenance of a rigid position despite efforts to be moved (exclude if cog-wheeling or tremor present)   0 = Absent.   1 = Mild resistance.   2 = Moderate.   3 = Severe, cannot be repostured.   11. Negativism: Apparently motiveless resistance to instructions or attempts to move/examine patients. Contrary behavior, does exact opposite of instruction.   0 - Absent   1 - Mild resistance and/or occasionally contrary.   2 - Moderate resistance and/or frequently contrary.   3 - Severe resistance and/or continually contrary.   12. Waxy flexibility: During repositioning of patient, patient offers initial resistance before allowing him/herself to be repositioned, similar to that of a bending candle. (also defined as slow resistance to movement as the patient allows the examiner to place his/her  extremities in unusual positions. The limb may remain in the position in which they are placed or not)   0 - Absent   3 - Present.   13. Withdrawal: Refusal to eat, drink and/or make eye contact.   0 = Absent.   1 = Minimal oral intake/interaction for less than 1 day.   2 = Minimal oral intake/interaction for more than 1 day.   3 = No oral intake/interaction for 1 day or more.   14. Excitement: Extreme hyperactivity, constant motor unrest which is apparently non-purposeful.   Not to be attributed to akathisia or goal-directed agitation.   1 - Excessive motion, intermittent.   2 - Constant motion, hyperkinetic without rest periods.   3 - Full-blown catatonic excitement, endless frenzied motor activity.   ------------End of Screening Items-------------   15. Impulsivity: Patient suddenly engages in inappropriate behavior (e.g. runs down hallway, starts screaming or takes off clothes) without provocation. Afterwards can give no, or only a facile explanation.   0 - Absent.   1 - Occasional.   2 - Frequent.   3 - Constant or not redirectable.   16. Automatic obedience: Exaggerated cooperation with examiner's request or spontaneous continuation of movement requested.   0 = Absent.   1 = Occasional   2 = Frequent   3 = Constant.   17. Passive Obedience (mitgehen): Patient raises arm in response to light pressure of finger, despite instructions to the contrary.   0 = Absent.   3 = Present.   18. Muscle Resistance (gegenhalten): Involuntary resistance to passive movement of a limb to a new position. Resistance increases with the speed of the movement.   0 - Absent   3 - Present.   19. Motorically Stuck (ambitendency): Patient appears stuck in indecisive, hesitant motor movements.   0 - Absent.   3 = Present.   20. Grasp reflex: Striking the patient's open palm with two extended fingers of the examiner's hand results in automatic closure of patients hand.   0 = Absent   3 = Present   21. Perseveration: Repeatedly returns to  "same topic or persists with the same movements.   0 = Absent.   3 = Present.   22. Combativeness: Belligerence or aggression, Usually in an undirected manner, without explanation.   0 = Absent   1 = Occasionally strikes out, low potential for injury.   2 = Frequently strikes out, moderate potential for injury.   3 = Serious danger to others.   23. Autonomic abnormality: Abnormality of body temperature (fever), blood pressure, pulse, respiratory rate, inappropriate sweating, flushing.   0 = Absent   1 = Abnormality of one parameter (exclude pre-existing hypertension).   2 = Abnormality of two parameters.   3 = Abnormality of three or more parameters.   Appendix I - Standardized examination for catatonia. The method described here is used to complete the 23-item Pineda-Orville Catatonia Rating Scale (CRS) and the 14-item Catatonia Screening Instrument (CSI). Item definitions on the two scales are the same. The CRS measures the severity of 23 signs on a 0- 3 scale, while the CSI measures only the presence or absence of the first 14 signs.   Ratings are to be made solely on the basis of observed behaviour during the examination with the exception of completion of the items for 'withdrawal' and autonomic abnormality', which may be based on directly observed behaviour and for chart documentation. As a general rule, only rate items which are clearly present. If uncertain as to the presence of an item, rate the item as '0'.   Procedure  Examines    1  Observe patient while trying to engage in a conversation  Activity level, Abn movements Abn speech    2  Examiner scratches head in exaggerated manner  Echopraxia    3  Examine arm for cogwheeling. Attempt to reposture, instructing patient to \"keep your arm loose\" - move arm with alternating lighter & heavier force.  Negativism, Waxy flexibility    4  Ask patient to extend arm. Place one finger beneath hand and try to raise slowly after stating, \"Do NOT let me raise your arm\".  " "Passive obedience    5  Extend hand stating \"Do NOT shake my hand\". Gets stuck trying to do both.  Motorically stuck    6  Reach into pocket and state, \"Stick out your tongue, I want to stick a pin in it\".  Automatic obedience    7  Check for grasp reflex.  Grasp reflex    8  Check chart for reports of previous 24-hour period. In particular check for oral intake, vital signs, and any incidents.    9  Observe patient indirectly, at least for a brief period, each day.    Honey, 1996, c/o Robert Perkins, Modified by Ashok, 2013                                                                                                                                      "

## 2021-03-14 PROCEDURE — 124N000001 HC R&B MH

## 2021-03-14 PROCEDURE — 250N000011 HC RX IP 250 OP 636: Performed by: NURSE PRACTITIONER

## 2021-03-14 RX ORDER — LORAZEPAM 2 MG/ML
3 INJECTION INTRAMUSCULAR 4 TIMES DAILY
Status: DISCONTINUED | OUTPATIENT
Start: 2021-03-14 | End: 2021-03-15

## 2021-03-14 RX ADMIN — LORAZEPAM 2 MG: 2 INJECTION INTRAMUSCULAR; INTRAVENOUS at 11:04

## 2021-03-14 RX ADMIN — LORAZEPAM 2 MG: 2 INJECTION INTRAMUSCULAR; INTRAVENOUS at 06:36

## 2021-03-14 RX ADMIN — LORAZEPAM 3 MG: 2 INJECTION INTRAMUSCULAR; INTRAVENOUS at 16:22

## 2021-03-14 RX ADMIN — LORAZEPAM 3 MG: 2 INJECTION INTRAMUSCULAR; INTRAVENOUS at 21:02

## 2021-03-14 ASSESSMENT — ACTIVITIES OF DAILY LIVING (ADL)
HYGIENE/GROOMING: PROMPTS
DRESS: WITH ASSISTANCE
ORAL_HYGIENE: PROMPTS

## 2021-03-14 ASSESSMENT — MIFFLIN-ST. JEOR: SCORE: 2017.21

## 2021-03-14 NOTE — PLAN OF CARE
"Problem: Thought Process Alteration  Goal: Optimal Thought Clarity  Description: Pt will participate in nursing assessment by discharge  Pt will be able to complete his ADL's independently by discharge  Pt will be able to ask for his needs to be met by discharge  Outcome: No change    Pt has not answered any assessment questions, has not completed ADL's independently, Pt has not spoken all shift.     Problem: Behavioral Health Plan of Care  Goal: Patient-Specific Goal (Individualization)  Description: Pt will be compliant with treatment team recommendations during hospitalization.   Pt will be medication compliant.   Pt will participate in nursing assessment.   Pt will eat at least 50% of meals.   Staff to bring meal trays to patient's room.  ELOPEMENT RISK     3/14/2021 1400 by Joon Funk RN  Outcome: Improving  Note: Shift Summery:      Patient's Stated Goal for Shift:  \"Get bedding changed\"    Goal Status:  In Process    0730 Face to face rounding complete.  Pt introduced to nursing for the shift.    Pt has been in bed almost all shift except to eat, drink, and to use the bathroom.  Pt's fluid and food intake was adequate.  Pt has not responded to nursing during any interactions.  PT was helped to a standing position to be weighed and did shake his head \"no\" to feeling dizzy. Pt cooperated with his Ativan injection. He would not take his oral medications.  Pt has moved some independently changing positions at least every hour. He has some redness where he crosses his ankles when laying on his back.    Pt was helped into the shower with two staff. He needed significant prompting and hand over hand direction    1500 Face to face end of shift report communicated to Evening shift RN's along with Pt's fall risk..     Joon Funk RN  3/14/2021         "

## 2021-03-14 NOTE — PLAN OF CARE
Face to face shift report received from nurse. Rounding completed, pt observed.    Problem: Behavioral Health Plan of Care  Goal: Patient-Specific Goal (Individualization)  Description: Pt will be compliant with with treatment team recommendations during hospitalization.   Pt will be medication compliatn.   Pt will participate in nursing assessment.   Pt will eat at least 50% of meals.   Staff to bring meal trays to patient's room.  ELOPEMENT RISK   Outcome: No Change  Note: Pt has been in bed with eyes closed and regular respirations observed all night. Will continue to monitor. Normal breathing noted continued to monitor. Patient slept 6.5 hours.    Problem: Suicide Risk  Goal: Absence of Self-Harm  Outcome: No Change     Problem: Suicidal Behavior  Goal: Suicidal Behavior is Absent or Managed  Outcome: No Change     Face to face report will be communicated to oncoming RN.    Yoan Erickson RN  3/14/2021

## 2021-03-15 PROCEDURE — 124N000001 HC R&B MH

## 2021-03-15 PROCEDURE — 250N000011 HC RX IP 250 OP 636: Performed by: NURSE PRACTITIONER

## 2021-03-15 PROCEDURE — 99232 SBSQ HOSP IP/OBS MODERATE 35: CPT | Performed by: NURSE PRACTITIONER

## 2021-03-15 RX ORDER — LORAZEPAM 2 MG/ML
2 INJECTION INTRAMUSCULAR 4 TIMES DAILY
Status: DISCONTINUED | OUTPATIENT
Start: 2021-03-15 | End: 2021-03-17

## 2021-03-15 RX ADMIN — LORAZEPAM 3 MG: 2 INJECTION INTRAMUSCULAR; INTRAVENOUS at 06:27

## 2021-03-15 RX ADMIN — LORAZEPAM 3 MG: 2 INJECTION INTRAMUSCULAR; INTRAVENOUS at 16:37

## 2021-03-15 RX ADMIN — LORAZEPAM 2 MG: 2 INJECTION INTRAMUSCULAR; INTRAVENOUS at 21:27

## 2021-03-15 RX ADMIN — LORAZEPAM 3 MG: 2 INJECTION INTRAMUSCULAR; INTRAVENOUS at 10:54

## 2021-03-15 ASSESSMENT — ACTIVITIES OF DAILY LIVING (ADL)
ORAL_HYGIENE: PROMPTS
HYGIENE/GROOMING: PROMPTS
LAUNDRY: UNABLE TO COMPLETE
LAUNDRY: UNABLE TO COMPLETE
ORAL_HYGIENE: PROMPTS
DRESS: WITH ASSISTANCE
HYGIENE/GROOMING: PROMPTS
DRESS: PROMPTS

## 2021-03-15 NOTE — PLAN OF CARE
Faxed the Sarah Petition to  Harry Sosa. Emailed and left message asking if he can represent the hospital for the Sarah Hearing scheduled for March 23 rd @ 8:15 am.

## 2021-03-15 NOTE — PLAN OF CARE
Face to face end of shift report received from Ita GANNON RN.  Pt observed in his bed with normal respirations.      Problem: Behavioral Health Plan of Care  Goal: Patient-Specific Goal (Individualization)  Description: Pt will be compliant with with treatment team recommendations during hospitalization.   Pt will be medication compliatn.   Pt will participate in nursing assessment.   Pt will eat at least 50% of meals.   Staff to bring meal trays to patient's room.  ELOPEMENT RISK   Outcome: No Change  Note:      Pt has been laying in bed with eyes closed.  He refuses to acknowlge writer.  He does not answer any questions.  Pt refused his scheduled oral gabapentin at 0800 and 1400. Pt was accepting of his IM ativan 3 at 1054.  Pt's vitals were monitored and continued to remain within normal limits. He did not eat breakfast or lunch even though staff gave pt several prompts.   He does move independently in bed.   Pt drank a total of 240 mls of water.    1530- Writer is continuing care through evening shift for pt. Pt observed in his bed with a normal breathing pattern. Pt continues to isolate to his bed. He continues to move independently in his bed.  He was cooperative with his scheduled IM ativan at 1637.  Pt's vitals were monitored and continued to remain within normal limits.  Pt ate a late supper.  He ate 50 % supper after nursing staff gave him prompts.  Writer did call suman ESTES NP and give her on update on how the pt was doing.  2000 pt popped out of bed and began walking around the unit.  He used the water dispenser in the lounge and got himself some water.  Staff prompted pt to group room where he ate 100% of snack.  Pt ate two sandwiches a banana, and two pepe crackers.  After pt was done eating he went to the bathroom and got back into bed.  Writer did attempt to talk to pt.  Pt would not say anything he just stared at writer.  Pt refused his oral HS medications.  He was accepting of 2 mg ativan  IM at 2127.  Pt drank a total of 240 mls of water this shift.  He had to urine occurrences.  Will continue to monitor pt.     Problem: Thought Process Alteration  Goal: Optimal Thought Clarity  Description: Pt will participate in nursing assessment by discharge  Pt will be able to complete his ADL's independently by discharge  Pt will be able to ask for his needs to be met by discharge  Outcome: No Change   Pt refused to participate in nursing assessment.    Face to face end of shift report communicated to oncoming RN.     Prisca Erickson RN  3/15/2021

## 2021-03-15 NOTE — PLAN OF CARE
Face to face end of shift report will be communicated to oncoming RN.      Problem: Behavioral Health Plan of Care  Goal: Patient-Specific Goal (Individualization)  Description: Pt will be compliant with with treatment team recommendations during hospitalization.   Pt will be medication compliatn.   Pt will participate in nursing assessment.   Pt will eat at least 50% of meals.   Staff to bring meal trays to patient's room.  ELOPEMENT RISK     Outcome: No Change     Problem: Thought Process Alteration  Goal: Optimal Thought Clarity  Description: Pt will participate in nursing assessment by discharge  Pt will be able to complete his ADL's independently by discharge  Pt will be able to ask for his needs to be met by discharge  Outcome: No Change   Face to face end of shift report obtained from TRAE Lujan. Pt observed resting in bed.  Pt appears to be sleeping in bed with eyes closed, having regular respirations, and position changes. 15 minutes and PRN safety checks completed with no noted complains. No delusional comments noted or reported so far this shift.  0600- Pt appeared to have slept 6.5 hours last night. No intake or output noted this shift.   0728- 1 hour after Ativan BP decrease from 99/63 to 90/54. Respirations 14 P 57. Pt was laying from on right side at this time, previously pt was in his back.  Pt did open eyes for second set of VS.

## 2021-03-15 NOTE — PLAN OF CARE
"  Problem: Behavioral Health Plan of Care  Goal: Patient-Specific Goal (Individualization)  Description: Pt will be compliant with with treatment team recommendations during hospitalization.   Pt will be medication compliatn.   Pt will participate in nursing assessment.   Pt will eat at least 50% of meals.   Staff to bring meal trays to patient's room.  ELOPEMENT RISK     Outcome: Improving  Note: Shift Summery:      Patient remains in bed and has not eaten anything this shift. Patient refuses to look at or talk with staff when questions are asked of him. Allowed shot of Ativan this afternoon. Patient moves independently in bed. At times is seen sitting at the edge of the bed.    2200 Patient out of his room briefly to get water. Declined offer of snack.    Face to face end of shift report communicated to 11-7 shift RN.     Le Bermudez RN  3/14/2021  10:48 PM          Patient's Stated Goal for Shift:  \"no stated goal\"    Goal Status:  In Process       Problem: Thought Process Alteration  Goal: Optimal Thought Clarity  Description: Pt will participate in nursing assessment by discharge  Pt will be able to complete his ADL's independently by discharge  Pt will be able to ask for his needs to be met by discharge  Outcome: Improving     "

## 2021-03-15 NOTE — PROGRESS NOTES
[facility]  Psychiatric Progress Note      Impression:   This is a 30 year old yo male with a history of schizoaffective disorder, TBI, and polysubstance abuse.      Ativan was increased yesterday to 3 mg qid at 4 pm. Will continue this until today at 4 pm and if no improvements will likely lower back down.  No significant changes in speech or activity level since increase of Ativan.  He is now had 3 doses.  Will wait till this evening and likely then change to 2 mg.  When I went to speak with him today he did not acknowledge me his eyes were open.  He did not appear to be sedated though not engaging at all with eye contact or conversation.  Staff did note that yesterday they noticed improvement as he did put his hand out when he was in the shower signaling that he wanted shampoo.  I do not believe he was prompted to do this.  He did need to be prompted to shower      Educated regarding medication indications, risks, benefits, side effects, contraindications and possible interactions. Verbally expressed understanding.        Diagnoses:   Schizoaffective disorder- depressed type (by history)  R/o substance induced psychosis  ADHD, unspecified  History of TBI  History of polysubstance abuse (amphetamines most recent)      Attestation:  Patient has been seen and evaluated by me,  April Dominga Heaton NP          Interim History:   The patient's care was discussed with the treatment team and chart notes were reviewed.               Medications:     Current Facility-Administered Medications Ordered in Epic   Medication Dose Route Frequency Last Rate Last Admin     acetaminophen (TYLENOL) tablet 650 mg  650 mg Oral Q4H PRN         alum & mag hydroxide-simethicone (MAALOX) suspension 30 mL  30 mL Oral Q4H PRN         benztropine (COGENTIN) tablet 0.5 mg  0.5 mg Oral BID PRN         fluPHENAZine (PROLIXIN) tablet 5 mg  5 mg Oral At Bedtime         gabapentin (NEURONTIN) capsule 300 mg  300 mg Oral TID      "    hydrOXYzine (ATARAX) tablet 25-50 mg  25-50 mg Oral Q4H PRN         LORazepam (ATIVAN) tablet 1 mg  1 mg Oral Q6H PRN        Or     LORazepam (ATIVAN) injection 1 mg  1 mg Intramuscular Q6H PRN         LORazepam (ATIVAN) tablet 1 mg  1 mg Oral TID         melatonin tablet 3 mg  3 mg Oral At Bedtime PRN         nicotine (NICORETTE) gum 2 mg  2 mg Buccal Q1H PRN         OLANZapine zydis (zyPREXA) ODT tab 10 mg  10 mg Oral TID PRN        Or     OLANZapine (zyPREXA) injection 10 mg  10 mg Intramuscular TID PRN         propranolol (INDERAL) tablet 10 mg  10 mg Oral BID PRN         No current Epic-ordered outpatient medications on file.            10 point ROS- uncooperative with assessment       Allergies:   No Known Allergies         Psychiatric Examination:   BP 90/54 (BP Location: Left arm)   Pulse 57   Temp 97.7  F (36.5  C) (Temporal)   Resp 14   Ht 1.905 m (6' 3\")   Wt 98.2 kg (216 lb 6.4 oz)   SpO2 96%   BMI 27.05 kg/m    Weight is 216 lbs 6.4 oz  Body mass index is 27.05 kg/m .    Appearance:  dressed in hospital scrubs, appeared as age stated and slightly unkempt  Attitude:  Eye Contact: Does open eyes today  Mood: Extremely blunted  Affect: Extremely blunted and restricted  Speech:  nonverbal.  Does shrug shoulders which is some improvement  Psychomotor Behavior:  no evidence of tardive dyskinesia, dystonia, or tics  Thought Process:  Unable to assess. Pt not participating in assessment.  Does not appear preoccupied though very difficult to assess  Associations:  Unable to assess. Pt not participating in assessment  Thought Content:  Unable to assess. Pt not participating in assessment  Insight: Likely very poor  Judgment: Very poor  Oriented to:  Unable to assess. Pt not participating in assessment  Attention Span and Concentration:  Unable to assess. Pt not participating in assessment  Recent and Remote Memory:  Unable to assess. Pt not participating in assessment  Fund of Knowledge: Unable to " assess. Pt not participating in assessment  Muscle Strength and Tone: normal  Gait and Station: Normal           Labs:     No results found for this or any previous visit (from the past 24 hour(s)).   BEHAVIORAL TEAM DISCUSSION    Progress: Has had some improvement with lorazepam.  He is coming out of his room more, is liquid intake has improved and he has spoke very briefly with staff.     Continued Stay Criteria/ ationale: Catatonic symptoms persist.    Medical/Physical: no acute, poor intake- monitoring I&O  Precautions:   Falls precaution?: No  Behavioral Orders   Procedures     Code 1 - Restrict to Unit     Elopement precautions     Routine Programming     As clinically indicated     Status 15     Every 15 minutes.       Plan:     Continue Scheduled ativan 3 mg im qid we will discontinue tonight if no further improvement is noted  Can not force the ativan unless vitals unstable  Continue offer prolixin  [facility] petition was filled out.  Considering jarrett Herman           Pineda-Orvlile Catatonia Rating Scale   Severity Score (Number of points for items 1 -23) __________   Screening Score (Presence or absence of items 1 - 14) ___________   Number of items 1-23 __________   Patient: ____________________ Date: ________ Time:_________ Examiner: _______   1. Immobility/stupor: Extreme hypoactivity, immobile, minimally responsive to stimuli.   0 - Absent.   1 - Sits abnormally still, may interact briefly.   2 - Virtually no interaction with external world.   3 - Stuporous, non-reactive to painful stimuli.   2. Mutism: Verbally unresponsive or minimally responsive.   0 = Absent.   1 = Verbally unresponsive to majority of questions; incomprehensible whisper.   2 = Speaks less than 20 words/5mins.   3 = No speech.   3. = Staring: Fixed gaze, little or no visual scanning of environment, decreased blinking.   0 = Absent.   1 = Poor eye contact, repeatedly gazes less than 20 s between shifting of attention; decreased  blinking.   2 = Gaze held longer than 20 s, occasionally shifts attention.   3 = Fixed gaze, non-reactive.   4. Posturing/catalepsy: Spontaneous maintenance of posture (s), including mundane (e.g. sitting or standing for long periods without reacting).   0 = Absent.   1 = Less than I min.   2 Greater than one minute, less than 15 min.   3 Bizarre posture, or mundane maintained more than 15 min.   5. Grimacing: Maintenance of odd facial expressions.   0 = Absent.   1 = Less than [id number].   2 = Less than 1 min.   3 = Bizarre expression(s) or maintained more than 1 min.   6. Echopraxia/echolalia: Mimicking of examiner's movements (echopraxia) or speech (echolalia).   0 = Absent   1 = Occasional.   2 = Frequent.   3 = Constant   7. Stereotypy: Repetitive, non-goal-directed motor activity (e.g. finger-play, repeatedly touching, patting or rubbing self); abnormality not inherent in act but in its frequency.   0 - Absent   1 - Occasional.   2 - Frequent.   3 - Constant.   8. Mannerisms: Odd, purposeful movements (hopping or walking tiptoe, saluting passers-by or exaggerated caricatures of mundane movements); abnormality inherent in act itself.   0 - Absent   1 - Occasional.   2 - Frequent.   3 - Constant.   9. Stereotyped & meaningless repetition of words & phrases (verbigeration): Repetition of phrases or sentences (like a scratched records).   0 - Absent.   1 - Occasional.   2 - Frequent, difficult to interrupt.   3 - Constant.   10. Rigidity: Maintenance of a rigid position despite efforts to be moved (exclude if cog-wheeling or tremor present)   0 = Absent.   1 = Mild resistance.   2 = Moderate.   3 = Severe, cannot be repostured.   11. Negativism: Apparently motiveless resistance to instructions or attempts to move/examine patients. Contrary behavior, does exact opposite of instruction.   0 - Absent   1 - Mild resistance and/or occasionally contrary.   2 - Moderate resistance and/or frequently contrary.   3 - Severe  resistance and/or continually contrary.   12. Waxy flexibility: During repositioning of patient, patient offers initial resistance before allowing him/herself to be repositioned, similar to that of a bending candle. (also defined as slow resistance to movement as the patient allows the examiner to place his/her extremities in unusual positions. The limb may remain in the position in which they are placed or not)   0 - Absent   3 - Present.   13. Withdrawal: Refusal to eat, drink and/or make eye contact.   0 = Absent.   1 = Minimal oral intake/interaction for less than 1 day.   2 = Minimal oral intake/interaction for more than 1 day.   3 = No oral intake/interaction for 1 day or more.   14. Excitement: Extreme hyperactivity, constant motor unrest which is apparently non-purposeful.   Not to be attributed to akathisia or goal-directed agitation.   1 - Excessive motion, intermittent.   2 - Constant motion, hyperkinetic without rest periods.   3 - Full-blown catatonic excitement, endless frenzied motor activity.   ------------End of Screening Items-------------   15. Impulsivity: Patient suddenly engages in inappropriate behavior (e.g. runs down hallway, starts screaming or takes off clothes) without provocation. Afterwards can give no, or only a facile explanation.   0 - Absent.   1 - Occasional.   2 - Frequent.   3 - Constant or not redirectable.   16. Automatic obedience: Exaggerated cooperation with examiner's request or spontaneous continuation of movement requested.   0 = Absent.   1 = Occasional   2 = Frequent   3 = Constant.   17. Passive Obedience (mitgehen): Patient raises arm in response to light pressure of finger, despite instructions to the contrary.   0 = Absent.   3 = Present.   18. Muscle Resistance (gegenhalten): Involuntary resistance to passive movement of a limb to a new position. Resistance increases with the speed of the movement.   0 - Absent   3 - Present.   19. Motorically Stuck (ambitendency):  Patient appears stuck in indecisive, hesitant motor movements.   0 - Absent.   3 = Present.   20. Grasp reflex: Striking the patient's open palm with two extended fingers of the examiner's hand results in automatic closure of patients hand.   0 = Absent   3 = Present   21. Perseveration: Repeatedly returns to same topic or persists with the same movements.   0 = Absent.   3 = Present.   22. Combativeness: Belligerence or aggression, Usually in an undirected manner, without explanation.   0 = Absent   1 = Occasionally strikes out, low potential for injury.   2 = Frequently strikes out, moderate potential for injury.   3 = Serious danger to others.   23. Autonomic abnormality: Abnormality of body temperature (fever), blood pressure, pulse, respiratory rate, inappropriate sweating, flushing.   0 = Absent   1 = Abnormality of one parameter (exclude pre-existing hypertension).   2 = Abnormality of two parameters.   3 = Abnormality of three or more parameters.   Appendix I - Standardized examination for catatonia. The method described here is used to complete the 23-item Pineda-Orville Catatonia Rating Scale (CRS) and the 14-item Catatonia Screening Instrument (CSI). Item definitions on the two scales are the same. The CRS measures the severity of 23 signs on a 0- 3 scale, while the CSI measures only the presence or absence of the first 14 signs.   Ratings are to be made solely on the basis of observed behaviour during the examination with the exception of completion of the items for 'withdrawal' and autonomic abnormality', which may be based on directly observed behaviour and for chart documentation. As a general rule, only rate items which are clearly present. If uncertain as to the presence of an item, rate the item as '0'.   Procedure  Examines    1  Observe patient while trying to engage in a conversation  Activity level, Abn movements Abn speech    2  Examiner scratches head in exaggerated manner  Echopraxia    3   "Examine arm for cogwheeling. Attempt to reposture, instructing patient to \"keep your arm loose\" - move arm with alternating lighter & heavier force.  Negativism, Waxy flexibility    4  Ask patient to extend arm. Place one finger beneath hand and try to raise slowly after stating, \"Do NOT let me raise your arm\".  Passive obedience    5  Extend hand stating \"Do NOT shake my hand\". Gets stuck trying to do both.  Motorically stuck    6  Reach into pocket and state, \"Stick out your tongue, I want to stick a pin in it\".  Automatic obedience    7  Check for grasp reflex.  Grasp reflex    8  Check chart for reports of previous 24-hour period. In particular check for oral intake, vital signs, and any incidents.    9  Observe patient indirectly, at least for a brief period, each day.    Honey, 1996, c/o Robert Perkins, Modified by Ashok, 2013                                                                                                                                      "

## 2021-03-16 PROCEDURE — 250N000011 HC RX IP 250 OP 636: Performed by: NURSE PRACTITIONER

## 2021-03-16 PROCEDURE — 124N000001 HC R&B MH

## 2021-03-16 PROCEDURE — 99233 SBSQ HOSP IP/OBS HIGH 50: CPT | Performed by: NURSE PRACTITIONER

## 2021-03-16 RX ADMIN — LORAZEPAM 2 MG: 2 INJECTION INTRAMUSCULAR; INTRAVENOUS at 16:36

## 2021-03-16 RX ADMIN — LORAZEPAM 2 MG: 2 INJECTION INTRAMUSCULAR; INTRAVENOUS at 11:42

## 2021-03-16 RX ADMIN — LORAZEPAM 2 MG: 2 INJECTION INTRAMUSCULAR; INTRAVENOUS at 06:11

## 2021-03-16 RX ADMIN — LORAZEPAM 2 MG: 2 INJECTION INTRAMUSCULAR; INTRAVENOUS at 21:26

## 2021-03-16 ASSESSMENT — ACTIVITIES OF DAILY LIVING (ADL)
LAUNDRY: UNABLE TO COMPLETE
ORAL_HYGIENE: PROMPTS
ORAL_HYGIENE: PROMPTS
DRESS: SCRUBS (BEHAVIORAL HEALTH)
LAUNDRY: UNABLE TO COMPLETE
DRESS: SCRUBS (BEHAVIORAL HEALTH);PROMPTS;INDEPENDENT
HYGIENE/GROOMING: PROMPTS
HYGIENE/GROOMING: PROMPTS

## 2021-03-16 NOTE — PLAN OF CARE
Problem: Behavioral Health Plan of Care  Goal: Patient-Specific Goal (Individualization)  Description: Pt will be compliant with with treatment team recommendations during hospitalization.   Pt will be medication compliatn.   Pt will participate in nursing assessment.   Pt will eat at least 50% of meals.   Staff to bring meal trays to patient's room.  ELOPEMENT RISK     Outcome: Improving  Note: 07:30: Received end of shift report from TRAE Jean Baptiste. Pt displaying s/s of sleep upon arrival.  08:30: Pt up eating breakfast, continues to unacknowledged this writer et/or staff,  0900: Refusal of 0900 gabapentin, reluctant to respond or make eye contact; mute.     11:45: Scheduled IM lorazepam 2 mg given to right ventral gluteal, follows simple commands, is delayed, continues withdrawn catatonic like activity.    14:43: Pt up ambulated with this writer et Charge RN, extensive prompting et encouragement needed; Conversed with charge nurse; unmeasured void x1; approximately 680 ml of witnessed fluid intake. Ate 75% of breakfast.      See above progress note by author Tobi Jordan for further details.     Face to face end of shift report to be communicated to on-coming staff.     Connie Wharton, TRAE  3/16/2021  2:44 PM                Problem: Behavioral Health Plan of Care  Goal: Adheres to Safety Considerations for Self and Others  Outcome: No Change     Problem: Thought Process Alteration  Goal: Optimal Thought Clarity  Description: Pt will participate in nursing assessment by discharge  Pt will be able to complete his ADL's independently by discharge  Pt will be able to ask for his needs to be met by discharge  Outcome: No Change     Problem: Behavioral Health Plan of Care  Goal: Develops/Participates in Therapeutic [place] to Support Successful Transition  Intervention: Foster Therapeutic [place]  Recent Flowsheet Documentation  Taken 3/16/2021 1000 by Connie Wharton, RN  Trust Relationship/Rapport:    care  explained    choices provided    reassurance provided    questions encouraged

## 2021-03-16 NOTE — PLAN OF CARE
KENDRA received an email from  Harry Sosa. He stated he can represent the Hospital on March 23 rd, 2021 @ 8:15 am for the [facility] Hearing.

## 2021-03-16 NOTE — PLAN OF CARE
"1350: patient approached regarding signing a release of information for [facility] facility. He continues to have a blank stare and not sign the form. He is then assisted by staff to get out of bed, walk in the hallway and get some ice water in the lounge. He is also given clean scrubs and a clean bed sheet. He went into the bathroom and changed his scrubs. He then went and sat on his bed.   1400: Patient sat up on his bed and tells this nurse that \"this is bull shit, I came in on a 72 hour hold, how long am I going to be kept here\". He is told that he is currently court confined and his next court hearing is on the 23rd.  He is encouraged to participate in his care here. He states \"I'm not going to stop drinking, I will go home and drink, they think I took a bunch of pills but I didn't, they came into my apartment and saw empty pill bottles from last October and thought I took them all\". When asked again about getting records from [facility] he states \"I'm not going to sign that, why would I do that it was three years ago and they didn't help me either\". \"I just want to go home\". \"I'm pissed, I want to go home\". \"I need my vyvanse and adderall\", \"I take 50mg in the morning and 10 mg of adderall after lunch.   1410: he came to the nurses station and asked for a snack. Snack was provided for him. He returned to his room to eat.   "

## 2021-03-16 NOTE — PLAN OF CARE
"Face to face end of shift report will be communicated to oncoming RN.      Problem: Behavioral Health Plan of Care  Goal: Patient-Specific Goal (Individualization)  Description: Pt will be compliant with with treatment team recommendations during hospitalization.   Pt will be medication compliatn.   Pt will participate in nursing assessment.   Pt will eat at least 50% of meals.   Staff to bring meal trays to patient's room.  ELOPEMENT RISK     Outcome: No Change     Problem: Thought Process Alteration  Goal: Optimal Thought Clarity  Description: Pt will participate in nursing assessment by discharge  Pt will be able to complete his ADL's independently by discharge  Pt will be able to ask for his needs to be met by discharge  Outcome: No Change   Face to face end of shift report obtained from TRAE Cope. Pt observed resting in bed awake. Water cup placed at bedside.  0000- Pt walked to [employer], looks around, reads the board, then walks to nurse station. Nurse asked if needed anything, Pt looked at nurse and shrugged shoulder and walked back to his bed.  0158- Pt again got up and walked to ice machine. Drink cup of water and returned to bed. Pt did not look as \"robotic\" as has been noted previously.    0600- Pt appeared to had slept 6 hours. At times difficult to determine. Pt continues to not talk to staff. Pt was noted to be repositioning in bed freely on and off this shift.   0615- Ativan 2 mg IM given per order. Nurse gave patient opportunity to refused. Pt acknowledge nurse by opening eyes but didn't verbalized, move eyes in response or move arm to refused medication. Pt drink glass of water left earlier in the shift. Patient drank total of 240 ml this shift so far.        "

## 2021-03-16 NOTE — PROGRESS NOTES
"[facility]  Psychiatric Progress Note      Impression:   This is a 30 year old yo male with a history of schizoaffective disorder, TBI, and polysubstance abuse.      Sister kingsley was listed as emergency contact. Did try to call her and I left a message with our unit number and cell. Wanting to find out what medications have helped him in the past. [facility] will not send his records unless he signs. I signed them stating emergency situation patient unable to sign and they would not send records. He is not necessarily unwilling to sign just does not sign.     At around 330 his mother called.  I have not talked to her yet.  I was able to talk with her for about 1/2-hour.  She tells me that she was with why it to days prior to finding him in this catatonic state.  She states he was depressed appearing though was talking and interacting.  She took [facility] to [facility].  She states she is concerned about the medication \"that he was liking too much\" that he was prescribed [facility].  She did believe it was a stimulant.  It does appear he was taking Adderall she is concerned he was abusing that and is also concerned he was abusing large amounts of dextromethorphan.  She did look around his apartment a bit and did not find any dextromethorphan though states he has abused large amounts in the past.  She states \"he has told me that when he takes that he will be high for many days\" she states that he is taken 90 tablets at a time.  She is concerned that the state he is in now could be secondary to that and then he could still possibly have some intoxication of some sort from the dextromethorphan.  Nursing staff did call me around the same time and told me that he had been talking more this evening which is very unusual for him no medication changes have been made in the past 24 hours.  Now that he is talking and he is eating and drinking sufficient we will likely wait on Patterson " Cheng though if he declines we will fill it out.  His court hearing is on the 23rd.  This will need to be evaluated closely over the next couple of days to see if the farias Herman needs to be completed      Educated regarding medication indications, risks, benefits, side effects, contraindications and possible interactions. Verbally expressed understanding.        Diagnoses:   Schizoaffective disorder- depressed type (by history)  R/o substance induced psychosis  ADHD, unspecified  History of TBI  History of polysubstance abuse (amphetamines most recent)      Attestation:  Patient has been seen and evaluated by me,  Ashely Heaton NP          Interim History:   The patient's care was discussed with the treatment team and chart notes were reviewed.               Medications:     Current Facility-Administered Medications Ordered in Epic   Medication Dose Route Frequency Last Rate Last Admin     acetaminophen (TYLENOL) tablet 650 mg  650 mg Oral Q4H PRN         alum & mag hydroxide-simethicone (MAALOX) suspension 30 mL  30 mL Oral Q4H PRN         benztropine (COGENTIN) tablet 0.5 mg  0.5 mg Oral BID PRN         fluPHENAZine (PROLIXIN) tablet 5 mg  5 mg Oral At Bedtime         gabapentin (NEURONTIN) capsule 300 mg  300 mg Oral TID         hydrOXYzine (ATARAX) tablet 25-50 mg  25-50 mg Oral Q4H PRN         LORazepam (ATIVAN) tablet 1 mg  1 mg Oral Q6H PRN        Or     LORazepam (ATIVAN) injection 1 mg  1 mg Intramuscular Q6H PRN         LORazepam (ATIVAN) tablet 1 mg  1 mg Oral TID         melatonin tablet 3 mg  3 mg Oral At Bedtime PRN         nicotine (NICORETTE) gum 2 mg  2 mg Buccal Q1H PRN         OLANZapine zydis (zyPREXA) ODT tab 10 mg  10 mg Oral TID PRN        Or     OLANZapine (zyPREXA) injection 10 mg  10 mg Intramuscular TID PRN         propranolol (INDERAL) tablet 10 mg  10 mg Oral BID PRN         No current [facility]-ordered outpatient medications on file.            10 point ROS- uncooperative  "with assessment       Allergies:   No Known Allergies         Psychiatric Examination:   /70   Pulse 58   Temp 97.8  F (36.6  C) (Temporal)   Resp 16   Ht 1.905 m (6' 3\")   Wt 98.2 kg (216 lb 6.4 oz)   SpO2 94%   BMI 27.05 kg/m    Weight is 216 lbs 6.4 oz  Body mass index is 27.05 kg/m .    Appearance:  dressed in hospital scrubs, appeared as age stated and slightly unkempt  Attitude:  Eye Contact: Does open eyes   Mood: Extremely blunted  Affect: Extremely blunted and restricted  Speech:  nonverbal.  Does shrug shoulders which is some improvement  Psychomotor Behavior:  no evidence of tardive dyskinesia, dystonia, or tics  Thought Process:  Unable to assess. Pt not participating in assessment.  Does not appear preoccupied though very difficult to assess  Associations:  Unable to assess. Pt not participating in assessment  Thought Content:  Unable to assess. Pt not participating in assessment  Insight: Likely very poor  Judgment: Very poor  Oriented to:  Unable to assess. Pt not participating in assessment  Attention Span and Concentration:  Unable to assess. Pt not participating in assessment  Recent and Remote Memory:  Unable to assess. Pt not participating in assessment  Fund of Knowledge: Unable to assess. Pt not participating in assessment  Muscle Strength and Tone: normal  Gait and Station: Normal           Labs:     No results found for this or any previous visit (from the past 24 hour(s)).   BEHAVIORAL TEAM DISCUSSION    Progress: Has had some improvement with lorazepam.  Is eating and drinking sufficient amounts.    Continued Stay Criteria/ ationale: Catatonic symptoms persist.  Mother concerned that he had been abusing large amounts of dextromethorphan in the state he is in now could be secondary to that.    Medical/Physical:  monitoring I&O  Precautions:   Falls precaution?: No  Behavioral Orders   Procedures     Code 1 - Restrict to Unit     Elopement precautions     Routine Programming     " As clinically indicated     Status 15     Every 15 minutes.       Plan:     Continue Ativan 2 mg 4 times a day for catatonia  If catatonia persists, not fill out price-Sandoval for court on the 23rd.  This will need to be evaluated closely over the next couple of days and filled out within the next couple of days.  Apparently he has had some improvement this evening and is talking much more.  Mother is concerned that this is secondary to dextromethorphan abuse    Continue offer prolixin    Sarah petition was filled out.     Did contact lexy Ratliff for further recommendations regarding this patient's care.  I spoke with Dr. Guerrero prior to finding out information about the dextromethorphan abuse as well as his improvement in speech this evening.  Recommended to fill out farias Herman though will monitor close over the next 2 days and fill out if still needed.           Pineda-Orville Catatonia Rating Scale   Severity Score (Number of points for items 1 -23) __________   Screening Score (Presence or absence of items 1 - 14) ___________   Number of items 1-23 __________   Patient: ____________________ Date: ________ Time:_________ Examiner: _______   1. Immobility/stupor: Extreme hypoactivity, immobile, minimally responsive to stimuli.   0 - Absent.   1 - Sits abnormally still, may interact briefly.   2 - Virtually no interaction with external world.   3 - Stuporous, non-reactive to painful stimuli.   2. Mutism: Verbally unresponsive or minimally responsive.   0 = Absent.   1 = Verbally unresponsive to majority of questions; incomprehensible whisper.   2 = Speaks less than 20 words/5mins.   3 = No speech.   3. = Staring: Fixed gaze, little or no visual scanning of environment, decreased blinking.   0 = Absent.   1 = Poor eye contact, repeatedly gazes less than 20 s between shifting of attention; decreased blinking.   2 = Gaze held longer than 20 s, occasionally shifts attention.   3 = Fixed gaze,  non-reactive.   4. Posturing/catalepsy: Spontaneous maintenance of posture (s), including mundane (e.g. sitting or standing for long periods without reacting).   0 = Absent.   1 = Less than I min.   2 Greater than one minute, less than 15 min.   3 Bizarre posture, or mundane maintained more than 15 min.   5. Grimacing: Maintenance of odd facial expressions.   0 = Absent.   1 = Less than [id number].   2 = Less than 1 min.   3 = Bizarre expression(s) or maintained more than 1 min.   6. Echopraxia/echolalia: Mimicking of examiner's movements (echopraxia) or speech (echolalia).   0 = Absent   1 = Occasional.   2 = Frequent.   3 = Constant   7. Stereotypy: Repetitive, non-goal-directed motor activity (e.g. finger-play, repeatedly touching, patting or rubbing self); abnormality not inherent in act but in its frequency.   0 - Absent   1 - Occasional.   2 - Frequent.   3 - Constant.   8. Mannerisms: Odd, purposeful movements (hopping or walking tiptoe, saluting passers-by or exaggerated caricatures of mundane movements); abnormality inherent in act itself.   0 - Absent   1 - Occasional.   2 - Frequent.   3 - Constant.   9. Stereotyped & meaningless repetition of words & phrases (verbigeration): Repetition of phrases or sentences (like a scratched records).   0 - Absent.   1 - Occasional.   2 - Frequent, difficult to interrupt.   3 - Constant.   10. Rigidity: Maintenance of a rigid position despite efforts to be moved (exclude if cog-wheeling or tremor present)   0 = Absent.   1 = Mild resistance.   2 = Moderate.   3 = Severe, cannot be repostured.   11. Negativism: Apparently motiveless resistance to instructions or attempts to move/examine patients. Contrary behavior, does exact opposite of instruction.   0 - Absent   1 - Mild resistance and/or occasionally contrary.   2 - Moderate resistance and/or frequently contrary.   3 - Severe resistance and/or continually contrary.   12. Waxy flexibility: During repositioning of  patient, patient offers initial resistance before allowing him/herself to be repositioned, similar to that of a bending candle. (also defined as slow resistance to movement as the patient allows the examiner to place his/her extremities in unusual positions. The limb may remain in the position in which they are placed or not)   0 - Absent   3 - Present.   13. Withdrawal: Refusal to eat, drink and/or make eye contact.   0 = Absent.   1 = Minimal oral intake/interaction for less than 1 day.   2 = Minimal oral intake/interaction for more than 1 day.   3 = No oral intake/interaction for 1 day or more.   14. Excitement: Extreme hyperactivity, constant motor unrest which is apparently non-purposeful.   Not to be attributed to akathisia or goal-directed agitation.   1 - Excessive motion, intermittent.   2 - Constant motion, hyperkinetic without rest periods.   3 - Full-blown catatonic excitement, endless frenzied motor activity.   ------------End of Screening Items-------------   15. Impulsivity: Patient suddenly engages in inappropriate behavior (e.g. runs down hallway, starts screaming or takes off clothes) without provocation. Afterwards can give no, or only a facile explanation.   0 - Absent.   1 - Occasional.   2 - Frequent.   3 - Constant or not redirectable.   16. Automatic obedience: Exaggerated cooperation with examiner's request or spontaneous continuation of movement requested.   0 = Absent.   1 = Occasional   2 = Frequent   3 = Constant.   17. Passive Obedience (mitgehen): Patient raises arm in response to light pressure of finger, despite instructions to the contrary.   0 = Absent.   3 = Present.   18. Muscle Resistance (gegenhalten): Involuntary resistance to passive movement of a limb to a new position. Resistance increases with the speed of the movement.   0 - Absent   3 - Present.   19. Motorically Stuck (ambitendency): Patient appears stuck in indecisive, hesitant motor movements.   0 - Absent.   3 =  "Present.   20. Grasp reflex: Striking the patient's open palm with two extended fingers of the examiner's hand results in automatic closure of patients hand.   0 = Absent   3 = Present   21. Perseveration: Repeatedly returns to same topic or persists with the same movements.   0 = Absent.   3 = Present.   22. Combativeness: Belligerence or aggression, Usually in an undirected manner, without explanation.   0 = Absent   1 = Occasionally strikes out, low potential for injury.   2 = Frequently strikes out, moderate potential for injury.   3 = Serious danger to others.   23. Autonomic abnormality: Abnormality of body temperature (fever), blood pressure, pulse, respiratory rate, inappropriate sweating, flushing.   0 = Absent   1 = Abnormality of one parameter (exclude pre-existing hypertension).   2 = Abnormality of two parameters.   3 = Abnormality of three or more parameters.   Appendix I - Standardized examination for catatonia. The method described here is used to complete the 23-item Pineda-Orville Catatonia Rating Scale (CRS) and the 14-item Catatonia Screening Instrument (CSI). Item definitions on the two scales are the same. The CRS measures the severity of 23 signs on a 0- 3 scale, while the CSI measures only the presence or absence of the first 14 signs.   Ratings are to be made solely on the basis of observed behaviour during the examination with the exception of completion of the items for 'withdrawal' and autonomic abnormality', which may be based on directly observed behaviour and for chart documentation. As a general rule, only rate items which are clearly present. If uncertain as to the presence of an item, rate the item as '0'.   Procedure  Examines    1  Observe patient while trying to engage in a conversation  Activity level, Abn movements Abn speech    2  Examiner scratches head in exaggerated manner  Echopraxia    3  Examine arm for cogwheeling. Attempt to reposture, instructing patient to \"keep your " "arm loose\" - move arm with alternating lighter & heavier force.  Negativism, Waxy flexibility    4  Ask patient to extend arm. Place one finger beneath hand and try to raise slowly after stating, \"Do NOT let me raise your arm\".  Passive obedience    5  Extend hand stating \"Do NOT shake my hand\". Gets stuck trying to do both.  Motorically stuck    6  Reach into pocket and state, \"Stick out your tongue, I want to stick a pin in it\".  Automatic obedience    7  Check for grasp reflex.  Grasp reflex    8  Check chart for reports of previous 24-hour period. In particular check for oral intake, vital signs, and any incidents.    9  Observe patient indirectly, at least for a brief period, each day.    Honey, 1996, c/o Robert Perkins, Modified by Ashok, 2013                                                                                                                                      "

## 2021-03-17 PROCEDURE — 250N000011 HC RX IP 250 OP 636: Performed by: NURSE PRACTITIONER

## 2021-03-17 PROCEDURE — 99233 SBSQ HOSP IP/OBS HIGH 50: CPT | Performed by: NURSE PRACTITIONER

## 2021-03-17 PROCEDURE — 124N000001 HC R&B MH

## 2021-03-17 PROCEDURE — 250N000013 HC RX MED GY IP 250 OP 250 PS 637: Performed by: NURSE PRACTITIONER

## 2021-03-17 RX ORDER — LORAZEPAM 2 MG/ML
1.5 INJECTION INTRAMUSCULAR 4 TIMES DAILY
Status: DISCONTINUED | OUTPATIENT
Start: 2021-03-17 | End: 2021-03-17

## 2021-03-17 RX ADMIN — LORAZEPAM 2 MG: 2 INJECTION INTRAMUSCULAR; INTRAVENOUS at 11:12

## 2021-03-17 RX ADMIN — LORAZEPAM 2 MG: 2 INJECTION INTRAMUSCULAR; INTRAVENOUS at 06:44

## 2021-03-17 RX ADMIN — LORAZEPAM 1.5 MG: 0.5 TABLET ORAL at 20:30

## 2021-03-17 RX ADMIN — GABAPENTIN 300 MG: 300 CAPSULE ORAL at 20:30

## 2021-03-17 ASSESSMENT — ACTIVITIES OF DAILY LIVING (ADL)
DRESS: SCRUBS (BEHAVIORAL HEALTH)
ORAL_HYGIENE: PROMPTS
LAUNDRY: UNABLE TO COMPLETE
DRESS: PROMPTS
LAUNDRY: UNABLE TO COMPLETE
HYGIENE/GROOMING: INDEPENDENT
ORAL_HYGIENE: INDEPENDENT
HYGIENE/GROOMING: PROMPTS

## 2021-03-17 NOTE — PLAN OF CARE
Problem: Behavioral Health Plan of Care  Goal: Patient-Specific Goal (Individualization)  Description: Pt will be compliant with with treatment team recommendations during hospitalization.   Pt will be medication compliatn.   Pt will participate in nursing assessment.   Pt will eat at least 50% of meals.   Staff to bring meal trays to patient's room.  ELOPEMENT RISK     Patient has been in bed most of the shift, he did come out a few times to get some water and to non-verbally (with gestures) ask for juice. His eye contact is poor, he opens eyes for a moment, then closes them. He fails to acknowledge staff in his room at times. He allowed his IM injections of Ativan before dinner and at bedtime with no issues, but does not take the PO medications. He listens with no reply to writer explaining things, but he does not answer questions. He is taking in fluids. He ate his sides for his dinner, but did not eat his pizza. He has gotten up on his own to urinate, then lies back down in bed.   2215-patient came out to [facility] and asked staff for a bag of chips, then went back to his room. Has ambulated and had position changes independently all shift.  Patient has eaten 3 bags of chips this evening, and had 1200 mL of fluid intake.  Face to face end of shift report communicated to oncoming shift RN.     Shruthi Sepulveda RN  3/16/2021  10:16 PM          Outcome: No Change    Problem: Thought Process Alteration  Goal: Optimal Thought Clarity  Description: Pt will participate in nursing assessment by discharge  Pt will be able to complete his ADL's independently by discharge  Pt will be able to ask for his needs to be met by discharge    Patient does not participate in nursing assessment, but is able to make his needs known.   Outcome: No Change

## 2021-03-17 NOTE — PROGRESS NOTES
"[facility]  Psychiatric Progress Note      Impression:   This is a 30 year old yo male with a history of schizoaffective disorder, TBI, and polysubstance abuse.    Steven is resting in bed when I see him today. He is awake and does make brief eye contact, though does not respond to any of my questions. He apparently was talking more yesterday to his nurse in the afternoon, asking questions about why he was still here since his hold had . He then did not speak to staff yesterday evening or so far today. This did not seem to be correlated with the timing of the IM Ativan, as he received a dose at 1130 and was conversing with his nurse around 1400. He is still eating meals, does receive Ativan dose prior to each meal. Continues to not accept any oral medications from staff.     In review of notes it appears that he was hospitalized at [facility] in 2020 and was treated with Ativan for likely catatonia. He had a somewhat similar presentation where he was essentially nonverbal and was not eating and required IV dextrose due to hypoglycemia. However I am only able to see the records available from his brief stay on medical unit prior to transfer to behavioral health. It does appear that Ativan improved catatonic symptoms, though initially was able to get Ativan IV when on the medical floor, which was changed to PO after a day or two. There is also some collateral provided in that ED note from his  who indicated that Steven had attempted suicide several months before and was set up for organ donation, however the patient \"woke up\" and since then has not been the same.     Addendum: I did receive a call from the nurse around 1630 indicating that Steven was up and speaking more. This is just prior to him receiving an Ativan dose. He requested to take Ativan in pill form as opposed to IM. He also stated that he was not taking the other medications because they did not help before. Will " change order to oral per his request.      Educated regarding medication indications, risks, benefits, side effects, contraindications and possible interactions. Verbally expressed understanding.        Diagnoses:   Schizoaffective disorder- depressed type (by history)  R/o substance induced psychosis  ADHD, unspecified  History of TBI  History of polysubstance abuse (amphetamines most recent)      Attestation:  Patient has been seen and evaluated by me,  Christelle Contreras NP          Interim History:   The patient's care was discussed with the treatment team and chart notes were reviewed.      BEHAVIORAL TEAM DISCUSSION    Progress: Limited. Has had some improvement with lorazepam.  Is eating and drinking sufficient amounts. Would not speak with me today.    Continued Stay Criteria/Rationale: Catatonic symptoms persist.  Mother concerned that he had been abusing large amounts of dextromethorphan in the state he is in now could be secondary to that.    Medical/Physical:  monitoring I&O  Precautions:   Falls precaution?: No  Behavioral Orders   Procedures     Code 1 - Restrict to Unit     Elopement precautions     Routine Programming     As clinically indicated     Status 15     Every 15 minutes.       Plan:     Change Ativan to 1.5 mg oral 4 times a day for catatonia. He requested to have switched to pill form.  If catatonia persists, consider farias-Cheng for court on the 23rd. Will likely decide this tomorrow if no further improvement. There have been brief periods of improvement .  Mother is concerned that this is secondary to dextromethorphan abuse.    Continue offer nichole Sarah petition was filled out.            Medications:     Current Facility-Administered Medications Ordered in Epic   Medication Dose Route Frequency Last Rate Last Admin     acetaminophen (TYLENOL) tablet 650 mg  650 mg Oral Q4H PRN         alum & mag hydroxide-simethicone (MAALOX) suspension 30 mL  30 mL Oral Q4H PRN          "benztropine (COGENTIN) tablet 0.5 mg  0.5 mg Oral BID PRN         fluPHENAZine (PROLIXIN) tablet 5 mg  5 mg Oral At Bedtime         gabapentin (NEURONTIN) capsule 300 mg  300 mg Oral TID         hydrOXYzine (ATARAX) tablet 25-50 mg  25-50 mg Oral Q4H PRN         LORazepam (ATIVAN) tablet 1 mg  1 mg Oral Q6H PRN        Or     LORazepam (ATIVAN) injection 1 mg  1 mg Intramuscular Q6H PRN         LORazepam (ATIVAN) tablet 1 mg  1 mg Oral TID         melatonin tablet 3 mg  3 mg Oral At Bedtime PRN         nicotine (NICORETTE) gum 2 mg  2 mg Buccal Q1H PRN         OLANZapine zydis (zyPREXA) ODT tab 10 mg  10 mg Oral TID PRN        Or     OLANZapine (zyPREXA) injection 10 mg  10 mg Intramuscular TID PRN         propranolol (INDERAL) tablet 10 mg  10 mg Oral BID PRN         No current Epic-ordered outpatient medications on file.            10 point ROS- uncooperative with assessment       Allergies:   No Known Allergies         Psychiatric Examination:   /62   Pulse 61   Temp 97.3  F (36.3  C) (Tympanic)   Resp 14   Ht 1.905 m (6' 3\")   Wt 98.2 kg (216 lb 6.4 oz)   SpO2 97%   BMI 27.05 kg/m    Weight is 216 lbs 6.4 oz  Body mass index is 27.05 kg/m .    Appearance:  dressed in hospital scrubs, appeared as age stated and slightly unkempt  Attitude: relatively uncooperative  Eye Contact: Does open eyes   Mood: Extremely blunted  Affect: Extremely blunted and restricted  Speech:  Nonverbal when I meet with him, has spoken some to staff  Psychomotor Behavior:  no evidence of tardive dyskinesia, dystonia, or tics  Thought Process:  Unable to assess. Pt not participating in assessment.  Does not appear preoccupied though very difficult to assess  Associations:  Unable to assess. Pt not participating in assessment  Thought Content:  Unable to assess. Pt not participating in assessment  Insight: Likely very poor  Judgment: Very poor  Oriented to:  Unable to assess. Pt not participating in assessment  Attention Span " and Concentration:  Unable to assess. Pt not participating in assessment  Recent and Remote Memory:  Unable to assess. Pt not participating in assessment  Fund of Knowledge: Unable to assess. Pt not participating in assessment  Muscle Strength and Tone: normal  Gait and Station: Normal           Labs:     No results found for this or any previous visit (from the past 24 hour(s)).

## 2021-03-17 NOTE — PLAN OF CARE
Problem: Behavioral Health Plan of Care  Goal: Optimized Coping Skills in Response to Life Stressors  Outcome: No Change     Problem: Thought Process Alteration  Goal: Optimal Thought Clarity  Description: Pt will participate in nursing assessment by discharge  Pt will be able to complete his ADL's independently by discharge  Pt will be able to ask for his needs to be met by discharge  Outcome: No Change     Problem: Behavioral Health Plan of Care  Goal: Patient-Specific Goal (Individualization)  Description: Pt will be compliant with with treatment team recommendations during hospitalization.   Pt will be medication compliatn.   Pt will participate in nursing assessment.   Pt will eat at least 50% of meals.   Staff to bring meal trays to patient's room.  ELOPEMENT RISK     Outcome: Improving   Face to face shift report received from RN. Rounding completed, pt observed. Client ate 75% of breakfast and took 240cc fluid. He refused to take his oral medications. Client opened his eyes and followed prompts to slide down on his bed as his head was hanging over the edge. Client refused to engage this recorder in any conversation. He is noted to make position changes throughout the day. He was compliant with scheduled IM Ativan 2mg. Client encouraged by this recorder and staff to take an active role in cares, take medications and verbalize with staff and treatment team. Client walked out of room, approached water dispenser and drank 2 glasses of water then took a 3rd back to his room with him. He ate lunch and allowed this recorder to obtain vital signs but remained non verbal.He refused his 1400 Gabapentin. Face to face report will be communicated to oncoming RN.    Didier Brooks RN  3/17/2021  1:49 PM

## 2021-03-17 NOTE — PLAN OF CARE
"  Problem: Behavioral Health Plan of Care  Goal: Patient-Specific Goal (Individualization)  Description: Pt will be compliant with with treatment team recommendations during hospitalization.   Pt will be medication compliatn.   Pt will participate in nursing assessment.   Pt will eat at least 50% of meals.   Staff to bring meal trays to patient's room.  ELOPEMENT RISK     Patient isolative and withdrawn, spending time in his bed. Affect is flat, mood is calm. He did become a bit agitated when he was told his Ativan dose was decreased, he refused the IM dose, states \"why'd it get cut? Can I have a pill?\". Provider updated. Patient was offered Ativan 1.5 mg PO, he refused this, states \"I changed my mind, I want the shot\", then refused to respond to writer. Provider updated. Patient was reminded that he has a voice and if he wants to get out of here sooner, he needs to verbalize his feelings, needs, and wants, he refuses to speak any further to writer, and pulls the blanket over his head.   Patient took his bedtime dose of Ativan and Gabapentin, he refused the Prolixin. Writer entered room with pills in package and set them on the bed, he chose the ones he would take and set aside the one he would not.   His fluid intake this evening was 960 mL. He ate 25% of dinner, and ate 2 bags of chips and 2 cheese sticks throughout the evening.    Continuation of care provided by writer to next shift.  Outcome: No Change    Problem: Thought Process Alteration  Goal: Optimal Thought Clarity  Description: Pt will participate in nursing assessment by discharge  Pt will be able to complete his ADL's independently by discharge  Pt will be able to ask for his needs to be met by discharge    Patient able to non-verbally and verbally make his needs known.  Outcome: No Change     Problem: Suicide Risk    Patient has remained free from self harm/injury.   Goal: Absence of Self-Harm            "

## 2021-03-17 NOTE — PLAN OF CARE
Face to face end of shift report will be communicated to oncoming RN.     Problem: Behavioral Health Plan of Care  Goal: Patient-Specific Goal (Individualization)  Description: Pt will be compliant with with treatment team recommendations during hospitalization.   Pt will be medication compliatn.   Pt will participate in nursing assessment.   Pt will eat at least 50% of meals.   Staff to bring meal trays to patient's room.  ELOPEMENT RISK     Outcome: No Change     Problem: Thought Process Alteration  Goal: Optimal Thought Clarity  Description: Pt will participate in nursing assessment by discharge  Pt will be able to complete his ADL's independently by discharge  Pt will be able to ask for his needs to be met by discharge  Outcome: No Change   Face to face end of shift report obtained from TRAE Hawkins. Pt observed resting in bed.  0115- Pt walked to get water of ice machine, looked to nurses in nurse station and walked back to bed.   0300-Pt appears to be sleeping in bed with eyes closed, having regular respirations, and position changes. 15 minutes and PRN safety checks completed with no noted complains.   0510- Pt use bathroom and walked to get another glass of water. Look at staff and then walked to bed.  0600-Pt did not acknowledge staff for vitals.  0630- Pt allowed nurse to check VS. VS stable and within normal limits.  0644- Pt allowed IM Ativan to left deltoid.

## 2021-03-18 PROCEDURE — 250N000013 HC RX MED GY IP 250 OP 250 PS 637: Performed by: NURSE PRACTITIONER

## 2021-03-18 PROCEDURE — 124N000001 HC R&B MH

## 2021-03-18 RX ADMIN — LORAZEPAM 1.5 MG: 0.5 TABLET ORAL at 12:00

## 2021-03-18 RX ADMIN — GABAPENTIN 300 MG: 300 CAPSULE ORAL at 13:43

## 2021-03-18 RX ADMIN — GABAPENTIN 300 MG: 300 CAPSULE ORAL at 08:55

## 2021-03-18 RX ADMIN — LORAZEPAM 1.5 MG: 0.5 TABLET ORAL at 20:39

## 2021-03-18 RX ADMIN — GABAPENTIN 300 MG: 300 CAPSULE ORAL at 20:39

## 2021-03-18 RX ADMIN — LORAZEPAM 1.5 MG: 0.5 TABLET ORAL at 06:17

## 2021-03-18 RX ADMIN — LORAZEPAM 1.5 MG: 0.5 TABLET ORAL at 16:30

## 2021-03-18 ASSESSMENT — ACTIVITIES OF DAILY LIVING (ADL)
LAUNDRY: UNABLE TO COMPLETE
HYGIENE/GROOMING: INDEPENDENT
ORAL_HYGIENE: INDEPENDENT
DRESS: SCRUBS (BEHAVIORAL HEALTH)

## 2021-03-18 NOTE — PLAN OF CARE
Problem: Behavioral Health Plan of Care  Goal: Patient-Specific Goal (Individualization)  Description: Pt will be compliant with with treatment team recommendations during hospitalization.   Pt will be medication compliatn.   Pt will participate in nursing assessment.   Pt will eat at least 50% of meals.   Staff to bring meal trays to patient's room.  ELOPEMENT RISK     0015-patient in bed with eyes closed and regular respirations noted.  0600-patient slept through the night with no issues. He slept about 7 hours last night.   He was in bed, refused morning vitals, denies feeling dizzy or lightheaded. Was initially refusing his Ativan 1.5 mg, once writer started walking away with the pills, he stuck his hand out to take them.   His fluid intake over night was 330 mL.  Face to face end of shift report communicated to oncoming shift RN.     Shruthi Sepulveda RN  3/18/2021  6:03 AM      3/18/2021 0020 by Shruthi Sepulveda RN  Outcome: No Change

## 2021-03-18 NOTE — PLAN OF CARE
Problem: Behavioral Health Plan of Care  Goal: Patient-Specific Goal (Individualization)  Description: Pt will be compliant with with treatment team recommendations during hospitalization.   Pt will be medication compliant.   Pt will participate in nursing assessment.   Pt will eat at least 50% of meals.   Staff to bring meal trays to patient's room.  ELOPEMENT RISK     Outcome: No Change  Note: Patient in bed at start of shift.  Meal trays brought to patient's room.  Patient compliant with scheduled Gabapentin.  Patient would not participate in assessment this morning.  His affect is flat.  Patient is untidy and disheveled.  Repositioning independently. Patient denies he needs anything at time of morning medication administration.        Problem: Thought Process Alteration  Goal: Optimal Thought Clarity  Description: Pt will participate in nursing assessment by discharge  Pt will be able to complete his ADL's independently by discharge  Pt will be able to ask for his needs to be met by discharge  Outcome: No Change   Patient does not participate in nursing assessment.

## 2021-03-19 PROCEDURE — 250N000013 HC RX MED GY IP 250 OP 250 PS 637: Performed by: NURSE PRACTITIONER

## 2021-03-19 PROCEDURE — 124N000001 HC R&B MH

## 2021-03-19 PROCEDURE — 99233 SBSQ HOSP IP/OBS HIGH 50: CPT | Performed by: NURSE PRACTITIONER

## 2021-03-19 RX ADMIN — LORAZEPAM 1.5 MG: 0.5 TABLET ORAL at 17:11

## 2021-03-19 RX ADMIN — LORAZEPAM 1.5 MG: 0.5 TABLET ORAL at 21:01

## 2021-03-19 RX ADMIN — LORAZEPAM 1.5 MG: 0.5 TABLET ORAL at 11:37

## 2021-03-19 RX ADMIN — GABAPENTIN 300 MG: 300 CAPSULE ORAL at 08:15

## 2021-03-19 RX ADMIN — GABAPENTIN 300 MG: 300 CAPSULE ORAL at 14:22

## 2021-03-19 RX ADMIN — LORAZEPAM 1.5 MG: 0.5 TABLET ORAL at 06:20

## 2021-03-19 RX ADMIN — GABAPENTIN 300 MG: 300 CAPSULE ORAL at 21:01

## 2021-03-19 NOTE — PLAN OF CARE
Face to face end of shift report received from . RN. Rounding completed and patient observed lying in bed with eyes closed, breathing regular and unlabored.     21:00 Update: Staff did not bring patient his meals and he was prompted to get up and get them. He did and he ate about 50%. Patient made eye contact with this writer which is improved from previous. He did not answer any questions and did not nod or shake his head when asked questions. He isolated to his room but was observed to walk out to the phones and  the . He was not observed to talk to anyone on the phone. Patient was to leave his room for a short time so that staff could change his bedding. He allowed this and walked in the lounge and looked out the window. Patient took scheduled Ativan and gabapentin but refused to take the prolixin. He appears blank. He is disheveled and did not shower on this shift.     Face to face end of shift report communicated to oncoming RN.       Problem: Behavioral Health Plan of Care  Goal: Patient-Specific Goal (Individualization)  Description: Pt will be compliant with with treatment team recommendations during hospitalization.   Pt will be medication compliant.   Pt will participate in nursing assessment.   Pt will eat at least 50% of meals.   Staff to bring meal trays to patient's room.  ELOPEMENT RISK     Outcome: No Change     Problem: Thought Process Alteration  Goal: Optimal Thought Clarity  Description: Pt will participate in nursing assessment by discharge  Pt will be able to complete his ADL's independently by discharge  Pt will be able to ask for his needs to be met by discharge  Outcome: No Change

## 2021-03-19 NOTE — PLAN OF CARE
Face to face end of shift report received from . RN. Rounding completed and patient observed lying in bed with eyes closed, breathing regular and unlabored.     06:30 Update: Patient appeared to sleep throughout the night with position changes noted. Patient slept approximately 7 hours. Patient did not request any PRNs and had no complaints of pain. No falls.     Face to face end of shift report communicated to oncoming RN.    Problem: Behavioral Health Plan of Care  Goal: Patient-Specific Goal (Individualization)  Description: Pt will be compliant with with treatment team recommendations during hospitalization.   Pt will be medication compliant.   Pt will participate in nursing assessment.   Pt will eat at least 50% of meals.   Staff to bring meal trays to patient's room.  ELOPEMENT RISK     Outcome: No Change     Problem: Thought Process Alteration  Goal: Optimal Thought Clarity  Description: Pt will participate in nursing assessment by discharge  Pt will be able to complete his ADL's independently by discharge  Pt will be able to ask for his needs to be met by discharge  Outcome: No Change

## 2021-03-19 NOTE — PROGRESS NOTES
[facility][1]  Psychiatric Progress Note      Impression:   This is a 30 year old yo male with a history of schizoaffective disorder, TBI, and polysubstance abuse.    Steven is resting in bed when I see him today. He has the blankets pulled up over his head and does not lower them to acknowledge me. Does not respond when asked if he has any questions about medication or about his upcoming commitment hearing. He continues to willingly take the scheduled Ativan and Gabapentin, though refuses to take the neuroleptic. Ativan is being given prior to meals, and he has been able to eat adequate amounts. I did observe him yesterday to get out of bed and to go out to the phones to contact someone. Unclear if he was able to get in touch with anyone, as he then did not respond when I asked who he had been speaking to. He will have his commitment and Sarah hearing on Tuesday.        Educated regarding medication indications, risks, benefits, side effects, contraindications and possible interactions. Verbally expressed understanding.        Diagnoses:   Schizoaffective disorder- depressed type (by history)  R/o substance induced psychosis  ADHD, unspecified  History of TBI  History of polysubstance abuse (amphetamines most recent)      Attestation:  Patient has been seen and evaluated by me,  Christelle Contreras NP          Interim History:   The patient's care was discussed with the treatment team and chart notes were reviewed.      BEHAVIORAL TEAM DISCUSSION    Progress: Limited. Is eating and drinking adequate amounts. Remains nonverbal, does not make eye contact today. Taking scheduled oral Ativan and Gabapentin, though declining neuroleptic.     Continued Stay Criteria/Rationale: Catatonic symptoms persist.  Mother concerned that he had been abusing large amounts of dextromethorphan in the state he is in now could be secondary to that.    Medical/Physical:  monitoring I&O  Precautions:   Falls precaution?:  "No  Behavioral Orders   Procedures     Code 1 - Restrict to Unit     Elopement precautions     Routine Programming     As clinically indicated     Status 15     Every 15 minutes.       Plan:   Continue Ativan 1.5 mg oral QID for catatonia. Document response after dose given.   Continue to offer prolixin  Continue Gabapentin 300 mg TID  Confined, commitment and ledbetter hearing 3/23           Medications:     Current Facility-Administered Medications Ordered in Epic   Medication Dose Route Frequency Last Rate Last Admin     acetaminophen (TYLENOL) tablet 650 mg  650 mg Oral Q4H PRN         alum & mag hydroxide-simethicone (MAALOX) suspension 30 mL  30 mL Oral Q4H PRN         benztropine (COGENTIN) tablet 0.5 mg  0.5 mg Oral BID PRN         fluPHENAZine (PROLIXIN) tablet 5 mg  5 mg Oral At Bedtime         gabapentin (NEURONTIN) capsule 300 mg  300 mg Oral TID         hydrOXYzine (ATARAX) tablet 25-50 mg  25-50 mg Oral Q4H PRN         LORazepam (ATIVAN) tablet 1 mg  1 mg Oral Q6H PRN        Or     LORazepam (ATIVAN) injection 1 mg  1 mg Intramuscular Q6H PRN         LORazepam (ATIVAN) tablet 1 mg  1 mg Oral TID         melatonin tablet 3 mg  3 mg Oral At Bedtime PRN         nicotine (NICORETTE) gum 2 mg  2 mg Buccal Q1H PRN         OLANZapine zydis (zyPREXA) ODT tab 10 mg  10 mg Oral TID PRN        Or     OLANZapine (zyPREXA) injection 10 mg  10 mg Intramuscular TID PRN         propranolol (INDERAL) tablet 10 mg  10 mg Oral BID PRN         No current [facility]-ordered outpatient medications on file.            10 point ROS- uncooperative with assessment       Allergies:   No Known Allergies         Psychiatric Examination:   BP 94/52   Pulse 72   Temp 98.2  F (36.8  C) (Temporal)   Resp 14   Ht 1.905 m (6' 3\")   Wt 98.2 kg (216 lb 6.4 oz)   SpO2 95%   BMI 27.05 kg/m    Weight is 216 lbs 6.4 oz  Body mass index is 27.05 kg/m .    Appearance:  dressed in hospital scrubs, appeared as age stated and slightly " unkempt  Attitude: relatively uncooperative  Eye Contact: no eye contact today  Mood: Extremely blunted  Affect: Extremely blunted and restricted  Speech:  Nonverbal today  Psychomotor Behavior:  no evidence of tardive dyskinesia, dystonia, or tics  Thought Process:  Unable to assess. Pt not participating in assessment.  Does not appear preoccupied though very difficult to assess  Associations:  Unable to assess. Pt not participating in assessment  Thought Content:  Unable to assess. Pt not participating in assessment  Insight: Likely very poor  Judgment: Very poor  Oriented to:  Unable to assess. Pt not participating in assessment  Attention Span and Concentration:  Unable to assess. Pt not participating in assessment  Recent and Remote Memory:  Unable to assess. Pt not participating in assessment  Fund of Knowledge: Unable to assess. Pt not participating in assessment  Muscle Strength and Tone: normal  Gait and Station: Normal           Labs:     No results found for this or any previous visit (from the past 24 hour(s)).

## 2021-03-19 NOTE — PLAN OF CARE
Problem: Behavioral Health Plan of Care  Goal: Patient-Specific Goal (Individualization)  Description: Pt will be compliant with with treatment team recommendations during hospitalization.   Pt will be medication compliant.   Pt will participate in nursing assessment.   Pt will eat at least 50% of meals.   Staff to bring meal trays to patient's room.  ELOPEMENT RISK     Note: Patient laying in bed resting/napping for entire shift. Ate 100% of breakfast and 25% of lunch with meal trays brought to him in bedroom. Patient did not speak to staff once this entire shift, despite this writer's numerous attempts. There were a few times that patient would smile and pull the blanket up over his head as if to avoid laughing with writer. Compliant with all scheduled medications. Continue to monitor at this time.      Problem: Thought Process Alteration  Goal: Optimal Thought Clarity  Description: Pt will participate in nursing assessment by discharge  Pt will be able to complete his ADL's independently by discharge  Pt will be able to ask for his needs to be met by discharge  Note: Continue to monitor at this time.      Face to face end of shift report communicated to oncoming RN.     Stephanie Pearson RN  3/19/2021  2:56 PM

## 2021-03-19 NOTE — PLAN OF CARE
Problem: Thought Process Alteration  Goal: Optimal Thought Clarity  Description: Pt will participate in nursing assessment by discharge  Pt will be able to complete his ADL's independently by discharge  Pt will be able to ask for his needs to be met by discharge  Outcome: No Change   Patient refuses to respond to staff during nursing assessment.       Problem: Behavioral Health Plan of Care  Goal: Patient-Specific Goal (Individualization)  Description: Pt will be compliant with with treatment team recommendations during hospitalization.   Pt will be medication compliant.   Pt will participate in nursing assessment.   Pt will eat at least 50% of meals.   Staff to bring meal trays to patient's room.  ELOPEMENT RISK     Outcome: No Change  Note:      Patient has been isolative and withdrawn to room all shift.  He gets up to use the bathroom and did come out to the lounge to make a phone call.  Does not answer any questions from staff.  Drinking fluids and ate 25% of supper.  When given HS medications patient took out the prolixin pill and gave it back to this writer.  Patient did take scheduled Ativan and gabapentin.  Accepting of offers of water and juice.  VS WNL.  Face to face end of shift report communicated to night shift RN.     Shantal Real, TRAE  3/18/2021  10:06 PM

## 2021-03-20 PROCEDURE — 99233 SBSQ HOSP IP/OBS HIGH 50: CPT | Performed by: NURSE PRACTITIONER

## 2021-03-20 PROCEDURE — 124N000001 HC R&B MH

## 2021-03-20 PROCEDURE — 250N000013 HC RX MED GY IP 250 OP 250 PS 637: Performed by: NURSE PRACTITIONER

## 2021-03-20 RX ORDER — GABAPENTIN 400 MG/1
400 CAPSULE ORAL 3 TIMES DAILY
Status: DISCONTINUED | OUTPATIENT
Start: 2021-03-20 | End: 2021-03-21

## 2021-03-20 RX ORDER — CLONAZEPAM 1 MG/1
1 TABLET ORAL 4 TIMES DAILY
Status: DISCONTINUED | OUTPATIENT
Start: 2021-03-20 | End: 2021-03-22

## 2021-03-20 RX ADMIN — LORAZEPAM 1 MG: 1 TABLET ORAL at 21:36

## 2021-03-20 RX ADMIN — GABAPENTIN 300 MG: 300 CAPSULE ORAL at 08:24

## 2021-03-20 RX ADMIN — LORAZEPAM 1.5 MG: 0.5 TABLET ORAL at 06:33

## 2021-03-20 RX ADMIN — GABAPENTIN 400 MG: 400 CAPSULE ORAL at 20:19

## 2021-03-20 RX ADMIN — CLONAZEPAM 1 MG: 1 TABLET ORAL at 20:19

## 2021-03-20 RX ADMIN — LORAZEPAM 1.5 MG: 0.5 TABLET ORAL at 11:51

## 2021-03-20 RX ADMIN — CLONAZEPAM 1 MG: 1 TABLET ORAL at 14:04

## 2021-03-20 RX ADMIN — GABAPENTIN 400 MG: 400 CAPSULE ORAL at 14:04

## 2021-03-20 RX ADMIN — CLONAZEPAM 1 MG: 1 TABLET ORAL at 17:18

## 2021-03-20 ASSESSMENT — ACTIVITIES OF DAILY LIVING (ADL)
DRESS: PROMPTS;INDEPENDENT
HYGIENE/GROOMING: INDEPENDENT
DRESS: SCRUBS (BEHAVIORAL HEALTH)
ORAL_HYGIENE: PROMPTS;INDEPENDENT
HYGIENE/GROOMING: INDEPENDENT;PROMPTS
LAUNDRY: UNABLE TO COMPLETE
LAUNDRY: UNABLE TO COMPLETE
ORAL_HYGIENE: INDEPENDENT

## 2021-03-20 NOTE — PLAN OF CARE
Problem: Behavioral Health Plan of Care  Goal: Patient-Specific Goal (Individualization)  Description: Pt will be compliant with with treatment team recommendations during hospitalization.   Pt will be medication compliant.   Pt will participate in nursing assessment.   Pt will eat at least 50% of meals.   Staff to bring meal trays to patient's room.  ELOPEMENT RISK     Outcome: Improving   Face to face shift report received from RN. Rounding completed, pt observed. Client rested in room all night. He was up x 1 at 0330 and requested snack. He ate crackers and drank 480 cc of fluid. He returned to his room and has rested for 4 hours.Face to face report will be communicated to oncoming RN.    Didier Brooks RN  3/20/2021  5:54 AM

## 2021-03-20 NOTE — PROGRESS NOTES
"[facility]  Psychiatric Progress Note      Impression:   This is a 30 year old yo male with a history of schizoaffective disorder, TBI, and polysubstance abuse.    Initially Steven is resting in bed when I go to see him today. He does make eye contact, though does not answer any of the questions I ask and just stares blankly. Later in the afternoon Steven was told by staff that the independent examiner would like to talk to him on the phone. He got out of bed and was able to have a roughly 20 minute conversation with this person, seemed to be answering questions appropriately. After he was finished with this phone call I did go back to his room to try to speak to him again. He was able to participate in a linear conversation. He reported feeling angry that he was in the hospital, \"I don't belong here. I didn't do anything wrong\". He states that he is upset that the police were called to his house in the first place, does blame his mother for this though states he is no longer upset with her. He adamantly denies that he had overdosed prior to admission. He states that in the past he was prescribed Prolixin, though never took it. He states that in the past he has gained a large amount of weight from Zyprexa and felt it was too sedating. States he was on Buspar which made him feel \"icky\". States he takes the Gabapentin for nerve pain, we do agree to increase this. He notes that he was previously dosed at 800 mg. He denies any hallucinations, he does not make any paranoid or delusional statements during our conversation. He states that he just got off of commitment and probation and does not want to be placed back on another commitment. He states that the Ativan he is taking makes him feel \"terrible\", states he feels it wear off about 2-3 hours prior to the next dose. It is really unclear at this point how much the Ativan has been helping and if symptoms have been specifically related to catatonia. We discuss " "switching to something longer acting, such as Valium or Klonopin, which can then be more easily tapered when needed. Will see if his ability to interact changes with this switch. He does state that he had been tested for ADHD at [facility] a few months ago and had been started back on Vyvanse in January. He states that when he was on this medication in the past, he was able to maintain his sobriety for about 3.5 years. He does not feel that he needs any type of treatment. He states that the reason that he has not been interacting with staff or answering any questions is because he is angry that he is in the hospital \"because I don't belong here\". Discussed that he needs to cooperate with staff and providers so that an accurate assessment can be made of his mental state over the next few days as he does have his commitment and ledbetter hearing on Tuesday. He does state that he does not wish to attend the hearing \"because I just told you and him (ind examiner) everything. I don't need to say it again\".       Educated regarding medication indications, risks, benefits, side effects, contraindications and possible interactions. Verbally expressed understanding.        Diagnoses:   Schizoaffective disorder- depressed type (by history)  R/o substance induced psychosis  ADHD, unspecified  History of TBI  History of polysubstance abuse (amphetamines most recent)      Attestation:  Patient has been seen and evaluated by me,  Christelle Contreras NP          Interim History:   The patient's care was discussed with the treatment team and chart notes were reviewed.      BEHAVIORAL TEAM DISCUSSION    Progress: Progress in the sense that he participated in independent examination and did speak with me today. Conversation was relatively linear though he lacks any insight into his current situation. Does not feel he needs commitment. Is eating and drinking adequate amounts.     Continued Stay Criteria/Rationale: Catatonic " "symptoms persist, though showed some improvement today. Mother concerned that he had been abusing large amounts of dextromethorphan in the state he is in now could be secondary to that.    Medical/Physical:  monitoring I&O  Precautions:   Falls precaution?: No  Behavioral Orders   Procedures     Code 1 - Restrict to Unit     Elopement precautions     Routine Programming     As clinically indicated     Status 15     Every 15 minutes.       Plan:   Change Ativan to Klonopin, pt reports Ativan makes him feel \"terrible\" as it wears off too soon and he does not want to stay on it. Will see how this affects his interactions with staff.  Continue to offer Prolixin  Increase Gabapentin to 400 mg TID  Confined, commitment and ledbetter hearing 3/23           Medications:     Current Facility-Administered Medications Ordered in Epic   Medication Dose Route Frequency Last Rate Last Admin     acetaminophen (TYLENOL) tablet 650 mg  650 mg Oral Q4H PRN         alum & mag hydroxide-simethicone (MAALOX) suspension 30 mL  30 mL Oral Q4H PRN         benztropine (COGENTIN) tablet 0.5 mg  0.5 mg Oral BID PRN         fluPHENAZine (PROLIXIN) tablet 5 mg  5 mg Oral At Bedtime         gabapentin (NEURONTIN) capsule 300 mg  300 mg Oral TID         hydrOXYzine (ATARAX) tablet 25-50 mg  25-50 mg Oral Q4H PRN         LORazepam (ATIVAN) tablet 1 mg  1 mg Oral Q6H PRN        Or     LORazepam (ATIVAN) injection 1 mg  1 mg Intramuscular Q6H PRN         LORazepam (ATIVAN) tablet 1 mg  1 mg Oral TID         melatonin tablet 3 mg  3 mg Oral At Bedtime PRN         nicotine (NICORETTE) gum 2 mg  2 mg Buccal Q1H PRN         OLANZapine zydis (zyPREXA) ODT tab 10 mg  10 mg Oral TID PRN        Or     OLANZapine (zyPREXA) injection 10 mg  10 mg Intramuscular TID PRN         propranolol (INDERAL) tablet 10 mg  10 mg Oral BID PRN         No current [facility]-ordered outpatient medications on file.            10 point ROS- uncooperative with assessment       " "Allergies:   No Known Allergies         Psychiatric Examination:   /70   Pulse 67   Temp 96.8  F (36  C) (Temporal)   Resp 14   Ht 1.905 m (6' 3\")   Wt 98.2 kg (216 lb 6.4 oz)   SpO2 96%   BMI 27.05 kg/m    Weight is 216 lbs 6.4 oz  Body mass index is 27.05 kg/m .    Appearance:  dressed in hospital scrubs, appeared as age stated and slightly unkempt  Attitude: became more cooperative today  Eye Contact: better  Mood: mildly irritable  Affect: blunted  Speech:  Speech is clear, coherent, regular rate and rhythm  Psychomotor Behavior:  no evidence of tardive dyskinesia, dystonia, or tics  Thought Process:  Seems more linear  Associations: no loosening of associations  Thought Content: no delusional thoughts noted in conversation today  Insight: limited  Judgment: limited  Oriented to:  Person, place  Attention Span and Concentration: somewhat distractable during our conversation  Recent and Remote Memory: largely intact  Fund of Knowledge: difficult to assess  Muscle Strength and Tone: normal  Gait and Station: Normal           Labs:     No results found for this or any previous visit (from the past 24 hour(s)).         "

## 2021-03-20 NOTE — PLAN OF CARE
Face to face report received from enma CORDOVA RN. Pt. Observed.     Problem: Behavioral Health Plan of Care  Goal: Patient-Specific Goal (Individualization)  Description: Pt will be compliant with with treatment team recommendations during hospitalization.   Pt will be medication compliant.   Pt will participate in nursing assessment.   Pt will eat at least 50% of meals.   Staff to bring meal trays to patient's room.  ELOPEMENT RISK     Outcome: No Change  Note:   Pt. is withdrawn and isolating to room refusing to get out of bed, only for geting his meal tray. Refusing skin assessment but is able to offload his weight and transfer independently. Denies HI, SI, anxiety, AH, depression and pain. Pt Out of room to grab meals. Will not answer this writer verbaly but nods his head when this writer askes yes or no questions. Pt. encouraged to shower and attend unit programing. Pt. nods In agreement to update staff to thoughts feelings of wanting to harm self or others. Pt. Cooperative with medications. Pt.eating WDL. Pt. Denies issues with bowel and bladder.      Face to face end of shift report to be communicated to oncoming RN.     Liat Lai RN  3/20/2021

## 2021-03-20 NOTE — PLAN OF CARE
"  Problem: Behavioral Health Plan of Care  Goal: Patient-Specific Goal (Individualization)  Description: Pt will be compliant with with treatment team recommendations during hospitalization.   Pt will be medication compliant.   Pt will participate in nursing assessment.   Pt will eat at least 50% of meals.   Staff to bring meal trays to patient's room.  ELOPEMENT RISK     Patient is flat, withdrawn and isolative. He did come out to the [facility] to use the telephone multiple times for long conversations, and to drink some ice water. He refuses to respond to assessment questions, he just stares at writer. He refused his bedtime Prolixin dose. He is eating and drinking appropriately, he does help himself to the refrigerator in the group room.  2055-patient in room, frustrated and angry, threw a chair against the wall, then sat on the edge of his bed and refused to speak to staff. He then went out to the [facility] to make a phone call. Writer spent some time talking with patient, he is frustrated with the court process, with being here, and not being able to reach his children. He feels he doesn't have a say in what happens, that he wasn't doing anything wrong to end up here, that he is afraid of losing his apartment, and he is angry at his mother. States \"I don't want a commitment, I fucking just got off of one. I want to be in my kids' lives. I want to get a job. I don't want to end up in a homeless shelter again. The meds don't work, I just need my Vyvanse and Adderall. Actually what I really need is Sonata\". He was agreeable to take Ativan 1 mg PO at 2136. States he doesn't want to take Zyprexa as it causes him to gain too much weight. He calmed down and went to bed.   Face to face end of shift report communicated to oncoming shift RN.     Shruthi Sepulveda RN  3/20/2021  10:42 PM          Outcome: No Change    Problem: Thought Process Alteration  Goal: Optimal Thought Clarity  Description: Pt will participate in " nursing assessment by discharge  Pt will be able to complete his ADL's independently by discharge  Pt will be able to ask for his needs to be met by discharge    Patient refuses to participate with nursing assessment, is able to make his needs known though, verbally and non-verbally.   Outcome: No Change

## 2021-03-21 PROCEDURE — 124N000001 HC R&B MH

## 2021-03-21 PROCEDURE — 250N000013 HC RX MED GY IP 250 OP 250 PS 637: Performed by: NURSE PRACTITIONER

## 2021-03-21 RX ORDER — DIPHENHYDRAMINE HCL 50 MG
50 CAPSULE ORAL 3 TIMES DAILY PRN
Status: DISCONTINUED | OUTPATIENT
Start: 2021-03-21 | End: 2021-04-02 | Stop reason: HOSPADM

## 2021-03-21 RX ORDER — HALOPERIDOL 5 MG/ML
5-10 INJECTION INTRAMUSCULAR 3 TIMES DAILY PRN
Status: DISCONTINUED | OUTPATIENT
Start: 2021-03-21 | End: 2021-03-31

## 2021-03-21 RX ORDER — GABAPENTIN 300 MG/1
600 CAPSULE ORAL 3 TIMES DAILY
Status: DISCONTINUED | OUTPATIENT
Start: 2021-03-21 | End: 2021-03-22

## 2021-03-21 RX ORDER — HALOPERIDOL 5 MG/1
5-10 TABLET ORAL 3 TIMES DAILY PRN
Status: DISCONTINUED | OUTPATIENT
Start: 2021-03-21 | End: 2021-03-31

## 2021-03-21 RX ORDER — DIPHENHYDRAMINE HYDROCHLORIDE 50 MG/ML
50 INJECTION INTRAMUSCULAR; INTRAVENOUS 3 TIMES DAILY PRN
Status: DISCONTINUED | OUTPATIENT
Start: 2021-03-21 | End: 2021-04-02 | Stop reason: HOSPADM

## 2021-03-21 RX ADMIN — LORAZEPAM 1 MG: 1 TABLET ORAL at 18:28

## 2021-03-21 RX ADMIN — CLONAZEPAM 1 MG: 1 TABLET ORAL at 21:19

## 2021-03-21 RX ADMIN — GABAPENTIN 600 MG: 300 CAPSULE ORAL at 21:19

## 2021-03-21 RX ADMIN — DIPHENHYDRAMINE HYDROCHLORIDE 50 MG: 50 CAPSULE ORAL at 21:19

## 2021-03-21 RX ADMIN — CLONAZEPAM 1 MG: 1 TABLET ORAL at 14:13

## 2021-03-21 RX ADMIN — GABAPENTIN 400 MG: 400 CAPSULE ORAL at 14:13

## 2021-03-21 RX ADMIN — LORAZEPAM 1 MG: 1 TABLET ORAL at 09:11

## 2021-03-21 RX ADMIN — GABAPENTIN 400 MG: 400 CAPSULE ORAL at 08:37

## 2021-03-21 RX ADMIN — CLONAZEPAM 1 MG: 1 TABLET ORAL at 17:47

## 2021-03-21 RX ADMIN — CLONAZEPAM 1 MG: 1 TABLET ORAL at 08:37

## 2021-03-21 ASSESSMENT — ACTIVITIES OF DAILY LIVING (ADL)
ORAL_HYGIENE: INDEPENDENT
LAUNDRY: UNABLE TO COMPLETE
HYGIENE/GROOMING: INDEPENDENT
DRESS: SCRUBS (BEHAVIORAL HEALTH);INDEPENDENT

## 2021-03-21 ASSESSMENT — MIFFLIN-ST. JEOR: SCORE: 2046.24

## 2021-03-21 NOTE — PLAN OF CARE
"This writer was standing in the [facility] for dinner time and PT was talking to someone on the phone stating \"my house is tapped.\" \"once I get home I am just going to go get myself a bottle and drink it then I am going to pack my bags and leave.\" \"I only get to see my kids once a month if that!\" \"when I get home I am going to do the fattest line.\" \"Well I dont actually mean that.\" I am sick of life!\" \"I have called for 5 days straight and she wont answer the phone.\" Patient ended the call by slamming the phone down and going in his room.   "

## 2021-03-21 NOTE — PLAN OF CARE
Problem: Behavioral Health Plan of Care  Goal: Patient-Specific Goal (Individualization)  Description: Pt will be compliant with with treatment team recommendations during hospitalization.   Pt will be medication compliant.   Pt will participate in nursing assessment.   Pt will eat at least 50% of meals.   Staff to bring meal trays to patient's room.  ELOPEMENT RISK     Outcome: Improving   Face to face shift report received from RN. Rounding completed, pt observed. Client has rested in room for 7 hours with eyes closed and respirations noted. He was up x 1 and drank a glass of water then returned to room. Face to face report will be communicated to oncoming RN.    Didier Brooks RN  3/21/2021  4:47 AM

## 2021-03-21 NOTE — PLAN OF CARE
"Face to face report received from Didier CORDOVA RN. Pt. Observed.     Problem: Behavioral Health Plan of Care  Goal: Patient-Specific Goal (Individualization)  Description: Pt will be compliant with with treatment team recommendations during hospitalization.   Pt will be medication compliant.   Pt will participate in nursing assessment.   Pt will eat at least 50% of meals.   ELOPEMENT RISK     Pt. is withdrawn and isolating to room refusing to get out of bed most of the a.m. only for geting his meal tray and phone. Pt. Speaking minimal to this writer. When this pt. Asks for pt. Name or date of birth pt. Refuses. \"It's a secret.\" Pt. Handing over wrist band when asking for PRN. Refusing skin assessment but is able to offload his weight and transfer independently. Denies HI, SI, anxiety, AH, depression and pain. Pt Out of room to grab Breakfast but returns to room with meal. Pt. encouraged to shower and attend unit programing. Pt. Not in agreement with this writer, Reporting \"I just showered, I'm not going to again\" Pt. Mom Ml called this writer @ 0857 reporting Pt. Was calling her and was threatening and agitated making his daughter cry. No VAMSI was signed and pt. Refused to sign so this writer was not able to give mom update. Mom reports she will not call PD but will block pt. From her phone. Pt. asked by this writer if he was threatening his mom and daughter. Pt. answers with \"I don't want you to talk to them. Staff housekeeping updated this writer pt. Threw his chair in his room causing a hole in the wall approximately 3 feet from floor. Damage to wall was unwitnessed by any floor staff. House supervisor called with update room was blocked off till wall is fixed. Pt. Transfered to -ICU @ approximately 1030 for increasing agitation. \"I didn't do anything. It was an accident.\" Pt. Refusing to wake up for lunch X multiple attempts. Lunch was removed from pt. Room after an hour. Pt. Raising voice, clenching fist and " "pointing at this writer \"You took my lunch. I had it for a few minutes. Its not one o'clock. Your lying. Let me see a clock.\" Pt. Showed clock and fresh lunch reorder. Pt. Offered and refused Zyprexa. Pt. Reporting to this writer he did not throw a chair today. \"It happened yesterday. I was told it was no big deal. I don't why you are being mean to me.\" Pt. asked why he thought staff was mean to him. Pt. Reporting \"Because you took my food.\"     Outcome: No Change   Face to face end of shift report communicated to alexa LARKIN.     Liat Lai RN  3/21/2021  10:23 AM       "

## 2021-03-22 PROCEDURE — 99233 SBSQ HOSP IP/OBS HIGH 50: CPT | Performed by: NURSE PRACTITIONER

## 2021-03-22 PROCEDURE — 250N000013 HC RX MED GY IP 250 OP 250 PS 637: Performed by: NURSE PRACTITIONER

## 2021-03-22 PROCEDURE — U0003 INFECTIOUS AGENT DETECTION BY NUCLEIC ACID (DNA OR RNA); SEVERE ACUTE RESPIRATORY SYNDROME CORONAVIRUS 2 (SARS-COV-2) (CORONAVIRUS DISEASE [COVID-19]), AMPLIFIED PROBE TECHNIQUE, MAKING USE OF HIGH THROUGHPUT TECHNOLOGIES AS DESCRIBED BY CMS-2020-01-R: HCPCS | Performed by: NURSE PRACTITIONER

## 2021-03-22 PROCEDURE — U0005 INFEC AGEN DETEC AMPLI PROBE: HCPCS | Performed by: NURSE PRACTITIONER

## 2021-03-22 PROCEDURE — 124N000001 HC R&B MH

## 2021-03-22 RX ORDER — CLONAZEPAM 1 MG/1
1 TABLET ORAL 2 TIMES DAILY
Status: DISCONTINUED | OUTPATIENT
Start: 2021-03-22 | End: 2021-03-22

## 2021-03-22 RX ORDER — DIPHENHYDRAMINE HYDROCHLORIDE AND LIDOCAINE HYDROCHLORIDE AND ALUMINUM HYDROXIDE AND MAGNESIUM HYDRO
10 KIT EVERY 6 HOURS PRN
Status: DISCONTINUED | OUTPATIENT
Start: 2021-03-22 | End: 2021-04-02 | Stop reason: HOSPADM

## 2021-03-22 RX ORDER — GABAPENTIN 400 MG/1
800 CAPSULE ORAL 3 TIMES DAILY
Status: DISCONTINUED | OUTPATIENT
Start: 2021-03-22 | End: 2021-03-31

## 2021-03-22 RX ORDER — LANOLIN ALCOHOL/MO/W.PET/CERES
6 CREAM (GRAM) TOPICAL
Status: DISCONTINUED | OUTPATIENT
Start: 2021-03-22 | End: 2021-04-01

## 2021-03-22 RX ORDER — IBUPROFEN 600 MG/1
600 TABLET, FILM COATED ORAL EVERY 6 HOURS PRN
Status: DISCONTINUED | OUTPATIENT
Start: 2021-03-22 | End: 2021-04-02 | Stop reason: HOSPADM

## 2021-03-22 RX ORDER — CLONIDINE HYDROCHLORIDE 0.1 MG/1
0.1 TABLET ORAL 3 TIMES DAILY PRN
Status: DISCONTINUED | OUTPATIENT
Start: 2021-03-22 | End: 2021-04-02 | Stop reason: HOSPADM

## 2021-03-22 RX ADMIN — ACETAMINOPHEN 650 MG: 325 TABLET, FILM COATED ORAL at 01:23

## 2021-03-22 RX ADMIN — ACETAMINOPHEN 650 MG: 325 TABLET, FILM COATED ORAL at 21:02

## 2021-03-22 RX ADMIN — GABAPENTIN 600 MG: 300 CAPSULE ORAL at 13:31

## 2021-03-22 RX ADMIN — CLONAZEPAM 1 MG: 1 TABLET ORAL at 20:26

## 2021-03-22 RX ADMIN — ACETAMINOPHEN 650 MG: 325 TABLET, FILM COATED ORAL at 10:24

## 2021-03-22 RX ADMIN — IBUPROFEN 600 MG: 600 TABLET ORAL at 10:24

## 2021-03-22 RX ADMIN — DIPHENHYDRAMINE HYDROCHLORIDE 50 MG: 50 CAPSULE ORAL at 08:46

## 2021-03-22 RX ADMIN — CLONAZEPAM 1 MG: 1 TABLET ORAL at 08:46

## 2021-03-22 RX ADMIN — GABAPENTIN 800 MG: 400 CAPSULE ORAL at 20:25

## 2021-03-22 RX ADMIN — ACETAMINOPHEN 650 MG: 325 TABLET, FILM COATED ORAL at 05:52

## 2021-03-22 RX ADMIN — GABAPENTIN 600 MG: 300 CAPSULE ORAL at 08:46

## 2021-03-22 RX ADMIN — DIPHENHYDRAMINE HYDROCHLORIDE 50 MG: 50 CAPSULE ORAL at 20:25

## 2021-03-22 RX ADMIN — LORAZEPAM 1 MG: 1 TABLET ORAL at 14:00

## 2021-03-22 RX ADMIN — LORAZEPAM 1 MG: 1 TABLET ORAL at 20:25

## 2021-03-22 RX ADMIN — CLONAZEPAM 1 MG: 1 TABLET ORAL at 13:02

## 2021-03-22 RX ADMIN — CLONAZEPAM 1.5 MG: 1 TABLET ORAL at 16:37

## 2021-03-22 RX ADMIN — MELATONIN 3 MG: 3 TAB ORAL at 00:44

## 2021-03-22 ASSESSMENT — ACTIVITIES OF DAILY LIVING (ADL)
HYGIENE/GROOMING: INDEPENDENT
DRESS: SCRUBS (BEHAVIORAL HEALTH);INDEPENDENT
LAUNDRY: UNABLE TO COMPLETE
DRESS: INDEPENDENT;SCRUBS (BEHAVIORAL HEALTH)
HYGIENE/GROOMING: PROMPTS;SHOWER
ORAL_HYGIENE: INDEPENDENT

## 2021-03-22 NOTE — PLAN OF CARE
Problem: Behavioral Health Plan of Care  Goal: Patient-Specific Goal (Individualization)  Description: Pt will be compliant with with treatment team recommendations during hospitalization.   Pt will be medication compliant.   Pt will participate in nursing assessment.   Pt will eat at least 50% of meals.   Staff to bring meal trays to patient's room.  ELOPEMENT RISK   Outcome: Improving    Pt remains in MHICU today, not weaning out to open unit. Pt has chosen to intermittently respond to staff today, makes clear requests at times and will be unresponsive at others. Pt did c/o 7/10 toothache pain, Ibuprofen order rcv'd and given. Pt refused medicated mouth rinse. Re-swab for Covid was tolerated and sent to lab. Pt made specific requests for snack foods mid-afternoon, and was provided with phone to make calls. Quite talkative and shared frustration with legal system this afternoon, was angry on phone call. Requested to discuss medication changes with provider, messaged relayed.     Problem: Thought Process Alteration  Goal: Optimal Thought Clarity  Description: Pt will participate in nursing assessment by discharge  Pt will be able to complete his ADL's independently by discharge  Pt will be able to ask for his needs to be met by discharge  Outcome: Improving    1530 - Face to face end of shift report communicated to oncoming shift RN.     Harriett Cameron RN  3/22/2021  5:11 PM

## 2021-03-22 NOTE — PLAN OF CARE
Problem: Behavioral Health Plan of Care  Goal: Patient-Specific Goal (Individualization)  Description: Pt will be compliant with with treatment team recommendations during hospitalization.   Pt will be medication compliant.   Pt will participate in nursing assessment.   Pt will eat at least 50% of meals.   Staff to bring meal trays to patient's room.  ELOPEMENT RISK     Outcome: Improving   Face to face shift report received from RN. Rounding completed, pt observed. Client was up and requested a snack at beginning of shift. He laid down but approached staff shortly after and stated that he could not sleep. Client given Melatonin 3 mg po for insomnia at 0044. He received Tylenol 650 mg po at 0123 for tooth pain of 5 and 650 mg at 0552 for tooth pain of 6. Client has rested in room with eyes closed and respirations noted for 4 hours.Face to face report will be communicated to oncoming RN.    Didier Brooks RN  3/22/2021  6:13 AM

## 2021-03-22 NOTE — PROGRESS NOTES
[facility]    Medical Services Progress Note    Date of Service (when I saw the patient): 03/22/2021    Assessment & Plan     Principal Problem:    Schizoaffective disorder (H)    Active Problems:    Catatonia    Depression, unspecified depression type    Tooth pain- nursing reports pt is complaining of lower right side tooth pain. Nurse Harriett stated he initially refused medication this morning but eventually did take ibuprofen. Tried assessing pt but he refused to talk. He laid with his eyes open staring, but would not acknowledge any questions or allow provider to look in his mouth. Did not notice any facial edema or erythema, afebrile.  He does have tylenol, ibuprofen ordered prn and will order magic mouthwash prn.    Pt medically stable, no acute medical concerns. No chronic medical problems idenitifed. Will sign off. Please consult for any new medical issues or concerns.       Code Status: Full Code.    Harmony Samuel CNP      -Data reviewed today: I reviewed all new labs and imaging results over the last 24 hours.     Physical Exam   Temp: 96.4  F (35.8  C) Temp src: Temporal BP: 96/50 Pulse: 66   Resp: 15 SpO2: 97 % O2 Device: None (Room air)    Vitals:    03/07/21 1832 03/14/21 1217 03/21/21 0800   Weight: 96.5 kg (212 lb 12.8 oz) 98.2 kg (216 lb 6.4 oz) 101.1 kg (222 lb 12.8 oz)     Vital Signs with Ranges  Temp:  [96.4  F (35.8  C)] 96.4  F (35.8  C)  Pulse:  [66] 66  Resp:  [15] 15  BP: (96)/(50) 96/50  SpO2:  [97 %] 97 %  No intake/output data recorded.    Constitutional: awake, uncooperative, no apparent distress, and appears stated age  ENT: (would not cooperate with assessment) no facial edema or erythema noted, Normocephalic, without obvious abnormality, atraumatic, external ears without lesions  Respiratory: No increased work of breathing, good air exchange, clear to auscultation bilaterally, no crackles or wheezing  Cardiovascular: Normal apical impulse, regular rate and rhythm, normal  S1 and S2, no S3 or S4, and no murmur noted  Neuropsychiatric: General: silent, uncooperative and normal eye contact    Medications     clonazePAM  1 mg Oral 4x Daily     fluPHENAZine  5 mg Oral At Bedtime     gabapentin  600 mg Oral TID       Data   No lab results found in last 7 days.    No results found for this or any previous visit (from the past 24 hour(s)).

## 2021-03-22 NOTE — PLAN OF CARE
"  Problem: Behavioral Health Plan of Care  Goal: Patient-Specific Goal (Individualization)  Description: Pt will be compliant with with treatment team recommendations during hospitalization.   Pt will be medication compliant.   Pt will participate in nursing assessment.   Pt will eat at least 50% of meals.   Staff to bring meal trays to patient's room.  ELOPEMENT RISK     Outcome: No Change  Note: Patient is withdrawn and isolative to his room this shift.  He states he is agitated.    1828:  PRN ativan 1 mg po adminsitered for c/o agitation.  He refused to take offered haldol and benadryl at that time.  \"I'm not taking Haldol. I have taken it before. It a horrible med.\"  Patient gives minimal answers to staff during assessment.  Refused t take scheduled HS Prolixin.  His affect is tense.  Patient is irritable.  Patient is untidy and disheveled.    2119:  PRN benadryl 50 mg po administered for sleep.       Problem: Thought Process Alteration  Goal: Optimal Thought Clarity  Description: Pt will participate in nursing assessment by discharge  Pt will be able to complete his ADL's independently by discharge  Pt will be able to ask for his needs to be met by discharge  Outcome: No Change  Note: Patient does not participate in assessment.      "

## 2021-03-22 NOTE — PROGRESS NOTES
"[facility]  Psychiatric Progress Note      Impression:   This is a 30 year old yo male with a history of schizoaffective disorder, TBI, and polysubstance abuse.    [facility] becoming agitated and throwing a chair against the wall, creating a hole in the wall. He continues to be more verbal over the last two days. He does state that he does not need to be here in the hospital and there is no reason to keep him here. Becomes dismissive when asked why he had been uncooperative with all nursing and provider assessments for the first 2 weeks of his hospital stay, to which he replies \"I was just mad. And I don't like talking to doctors\". He is upset about the commitment hearing tomorrow, as he states \"I just got off of a commitment two months ago!\" He does report feeling ongoing agitation. Attempted to discuss use of a mood stabilizer such as Lithium or Depakote, though he replies \"no, I've heard bad things about those\". It appears he had taken Trileptal in the past though he has no interest in starting on this again. He reports that he does not want to take any type of neuroleptic medication, as he does not like how they make him feel. He does have commitment and ledbetter hearing tomorrow.      Educated regarding medication indications, risks, benefits, side effects, contraindications and possible interactions. Verbally expressed understanding.        Diagnoses:   Schizoaffective disorder- depressed type (by history)  R/o substance induced psychosis  ADHD, unspecified  History of TBI  History of polysubstance abuse (amphetamines most recent)      Attestation:  Patient has been seen and evaluated by me,  Christelle Contreras NP          Interim History:   The patient's care was discussed with the treatment team and chart notes were reviewed.      BEHAVIORAL TEAM DISCUSSION    Progress: Progress in the sense that he participated in independent examination and did speak with me " "today. Conversation was relatively linear though he lacks any insight into his current situation. Does not feel he needs commitment. Is eating and drinking adequate amounts.     Continued Stay Criteria/Rationale: Catatonic symptoms persist, though showed some improvement today. Mother concerned that he had been abusing large amounts of dextromethorphan in the state he is in now could be secondary to that.    Medical/Physical:  monitoring I&O  Precautions:   Falls precaution?: No  Behavioral Orders   Procedures     Code 1 - Restrict to Unit     Elopement precautions     Routine Programming     As clinically indicated     Status 15     Every 15 minutes.       Plan:   Continue scheduled Klonopin, pt reports Ativan makes him feel \"terrible\" as it wears off too soon and he does not want to stay on it. Had been taking Ativan 2 mg QID. Once taking a neuroleptic, will look at switching to phenobarbital and tapering off.   Continue to offer Prolixin at bedtime  Increase Gabapentin to 800 mg TID  Add Clonidine 0.1 mg TID prn anxiety/restless  Confined, commitment and ledbetter hearing tomorrow 3/23           Medications:     Current Facility-Administered Medications Ordered in Epic   Medication Dose Route Frequency Last Rate Last Admin     acetaminophen (TYLENOL) tablet 650 mg  650 mg Oral Q4H PRN         alum & mag hydroxide-simethicone (MAALOX) suspension 30 mL  30 mL Oral Q4H PRN         benztropine (COGENTIN) tablet 0.5 mg  0.5 mg Oral BID PRN         fluPHENAZine (PROLIXIN) tablet 5 mg  5 mg Oral At Bedtime         gabapentin (NEURONTIN) capsule 300 mg  300 mg Oral TID         hydrOXYzine (ATARAX) tablet 25-50 mg  25-50 mg Oral Q4H PRN         LORazepam (ATIVAN) tablet 1 mg  1 mg Oral Q6H PRN        Or     LORazepam (ATIVAN) injection 1 mg  1 mg Intramuscular Q6H PRN         LORazepam (ATIVAN) tablet 1 mg  1 mg Oral TID         melatonin tablet 3 mg  3 mg Oral At Bedtime PRN         nicotine (NICORETTE) gum 2 mg  2 mg " "Buccal Q1H PRN         OLANZapine zydis (zyPREXA) ODT tab 10 mg  10 mg Oral TID PRN        Or     OLANZapine (zyPREXA) injection 10 mg  10 mg Intramuscular TID PRN         propranolol (INDERAL) tablet 10 mg  10 mg Oral BID PRN         No current Epic-ordered outpatient medications on file.            10 point ROS- reporting some tooth pain, medical NP to assess       Allergies:   No Known Allergies         Psychiatric Examination:   BP 96/50   Pulse 66   Temp 96.4  F (35.8  C) (Temporal)   Resp 15   Ht 1.905 m (6' 3\")   Wt 101.1 kg (222 lb 12.8 oz)   SpO2 97%   BMI 27.85 kg/m    Weight is 222 lbs 12.8 oz  Body mass index is 27.85 kg/m .    Appearance:  dressed in hospital scrubs, appeared as age stated and slightly unkempt  Attitude: has been more cooperative  Eye Contact: better  Mood: mildly irritable  Affect: blunted  Speech:  Speech is clear, coherent, regular rate and rhythm  Psychomotor Behavior:  no evidence of tardive dyskinesia, dystonia, or tics  Thought Process:  Seems more linear  Associations: no loosening of associations  Thought Content: no delusional thoughts noted in conversation today  Insight: limited  Judgment: limited  Oriented to:  Person, place, time  Attention Span and Concentration: somewhat distractable during our conversation  Recent and Remote Memory: largely intact  Fund of Knowledge: difficult to assess  Muscle Strength and Tone: normal  Gait and Station: Normal           Labs:     No results found for this or any previous visit (from the past 24 hour(s)).         "

## 2021-03-23 LAB
LABORATORY COMMENT REPORT: NORMAL
SARS-COV-2 RNA RESP QL NAA+PROBE: NEGATIVE
SPECIMEN SOURCE: NORMAL

## 2021-03-23 PROCEDURE — 124N000004

## 2021-03-23 PROCEDURE — 250N000013 HC RX MED GY IP 250 OP 250 PS 637: Performed by: NURSE PRACTITIONER

## 2021-03-23 PROCEDURE — 99233 SBSQ HOSP IP/OBS HIGH 50: CPT | Performed by: NURSE PRACTITIONER

## 2021-03-23 PROCEDURE — 124N000001 HC R&B MH

## 2021-03-23 RX ADMIN — CLONAZEPAM 1.5 MG: 1 TABLET ORAL at 08:18

## 2021-03-23 RX ADMIN — MELATONIN 6 MG: 3 TAB ORAL at 20:50

## 2021-03-23 RX ADMIN — IBUPROFEN 600 MG: 600 TABLET ORAL at 20:50

## 2021-03-23 RX ADMIN — DIPHENHYDRAMINE HYDROCHLORIDE 50 MG: 50 CAPSULE ORAL at 16:13

## 2021-03-23 RX ADMIN — LORAZEPAM 1 MG: 1 TABLET ORAL at 16:13

## 2021-03-23 RX ADMIN — IBUPROFEN 600 MG: 600 TABLET ORAL at 03:49

## 2021-03-23 RX ADMIN — ACETAMINOPHEN 650 MG: 325 TABLET, FILM COATED ORAL at 03:49

## 2021-03-23 RX ADMIN — CLONAZEPAM 1.5 MG: 1 TABLET ORAL at 13:28

## 2021-03-23 RX ADMIN — CLONAZEPAM 1.5 MG: 1 TABLET ORAL at 20:43

## 2021-03-23 RX ADMIN — GABAPENTIN 800 MG: 400 CAPSULE ORAL at 13:28

## 2021-03-23 RX ADMIN — LORAZEPAM 1 MG: 1 TABLET ORAL at 09:11

## 2021-03-23 RX ADMIN — GABAPENTIN 800 MG: 400 CAPSULE ORAL at 20:43

## 2021-03-23 RX ADMIN — DIPHENHYDRAMINE HYDROCHLORIDE 50 MG: 50 CAPSULE ORAL at 09:11

## 2021-03-23 RX ADMIN — GABAPENTIN 800 MG: 400 CAPSULE ORAL at 08:19

## 2021-03-23 RX ADMIN — CLONAZEPAM 1.5 MG: 1 TABLET ORAL at 01:36

## 2021-03-23 RX ADMIN — ACETAMINOPHEN 650 MG: 325 TABLET, FILM COATED ORAL at 20:50

## 2021-03-23 NOTE — PLAN OF CARE
"  Problem: Behavioral Health Plan of Care  Goal: Patient-Specific Goal (Individualization)  Description: Pt will be compliant with with treatment team recommendations during hospitalization.   Pt will be medication compliant.   Pt will participate in nursing assessment.   Pt will eat at least 50% of meals.   Staff to bring meal trays to patient's room.  ELOPEMENT RISK     Outcome: No Change  Note:   In MHICU due to unpredictable behavior.  Very demanding, yelling at staff regarding medications. Asking why  Klonopin orders were changed.  Asked to use the phone, called his sister, & mother. Blames his mother for him being here.   Asked staff to give him a list of his medications. Two different staff wrote out med list and he was upset the Ativan and Klonopin had changed. Wanted to show writer that he did not have to take neuroleptics because of something he read in his court papers, then he couldn't find it in the papers. Refused to take Prolixin. Another staff went back and spoke with patient.   Did call Freda GANNON, who was on call and updated her. See new orders.   Patient did apologize for being so upset earlier states, \"I'm not upset about the medications it's because I don't belong here, I was at home just relaxing and enjoying the quietness and they came & got me & brought me here\".   Asked patient how he could lay in bed and not talk to anybody for seven days and he responded, \"I was mad & I didn't want to talk to anybody\"        End of shift report given to oncoming midnight shift RN       "

## 2021-03-23 NOTE — PLAN OF CARE
"Pt had court via ITV with [facility]. Pt was ordered a ledbetter and 6 month commitment.    Pt was upset right after the hearing ended and gave the middle finger to the ITV saying \"fuck you\" and then walked to the door and stated \"And fuck this place!\". Pt left group room and entered back in the MHICU.  "

## 2021-03-23 NOTE — PLAN OF CARE
"  Problem: Thought Process Alteration  Goal: Optimal Thought Clarity  Description: Pt will participate in nursing assessment by discharge  Pt will be able to complete his ADL's independently by discharge  Pt will be able to ask for his needs to be met by discharge  Outcome: No Change   Patient is unable to have a reality based conversation with staff.  Speech is pressured and rambling.         Problem: Behavioral Health Plan of Care  Goal: Patient-Specific Goal (Individualization)  Description: Pt will be compliant with with treatment team recommendations during hospitalization.   Pt will be medication compliant.   Pt will participate in nursing assessment.   Pt will eat at least 50% of meals.   Staff to bring meal trays to patient's room.  ELOPEMENT RISK   3/23/21-pt is able to wean if behavior is appropriate. Treatment team to assess daily.    Outcome: No Change   Patient has been angry and irritable most of the shift.  He was yelling and swearing after court.  States \"I didn't fucking do anything.  You guys are all fucking liars.  I'm going to lose my apartment because of this.\"  Patient demanding to use the phone and speak to a provider.  He agreed to take Ativan and Benadryl at 0911.  He then sat in the [facility] lounge for a while before laying down in his room at 1045.  He was calmer after lunch and took a shower.  Still upset but was not swearing at staff.  He did state \"I'm not taking the fucking Prolixin and I'm not taking a shot.\"  No complaints of pain.  Vs WNL.  Face to face end of shift report communicated to evening shift RN.     Shantal Real RN  3/23/2021  3:06 PM       "

## 2021-03-23 NOTE — PLAN OF CARE
"Face to face end of shift report received from Shantal ALCARAZ RN. Rounding completed and patient observed in the MHICU lounge. Requested 1mg Ativan and 50mg Benadryl and received at 16:13.     20:00 Update: Patient has been quiet and withdrawn throughout the shift. He made only a few requests. He verbalized being upset about court today but did not raise his voice. He denied HI/SI and hallucinations. He showed no insight and took no responsibility for his actions prior to admit. He continued to blame it on his mom and said since he should've never been here in the first place, everything that's happened since being here should not be admissible. Patient talked quite a bit and then said \"I don't want to talk about it anymore. It's just making me upset.\"     20:50 Update: Patient requested ibuprofen and tylenol for at toothache rated 6 of 10 on the right lower side of his mouth. He also requested 6mg Melatonin to help him sleep. Patient did come out of the MHICU to make a phone call when peers were in group. He ate double portions on snack and requested more to drink. He declined to take the prolixin. Patient returned to the MHICU without incident. He weaned for a total of 5 minutes and said he had no interest to wean longer. He did not raise his voice when using the phone.     Face to face end of shift report communicated to oncoming RN.      Problem: Behavioral Health Plan of Care  Goal: Patient-Specific Goal (Individualization)  Description: Pt will be compliant with with treatment team recommendations during hospitalization.   Pt will be medication compliant.   Pt will participate in nursing assessment.   Pt will eat at least 50% of meals.   Staff to bring meal trays to patient's room.  ELOPEMENT RISK     Outcome: No Change     Problem: Thought Process Alteration  Goal: Optimal Thought Clarity  Description: Pt will participate in nursing assessment by discharge  Pt will be able to complete his ADL's independently by " discharge  Pt will be able to ask for his needs to be met by discharge  Outcome: No Change

## 2021-03-23 NOTE — PROGRESS NOTES
"[facility]  Psychiatric Progress Note      Impression:   This is a 30 year old yo male with a history of schizoaffective disorder, TBI, and polysubstance abuse.    Steven remains in the MHICU due to unpredictable behavior and periods of increased irritability. Commitment and Sarah hearing was this morning, he became upset after being told via ITV that he would be under a 6 month commitment. Stuck his middle finger up and yelled \"well fuck you!\" and walked out of the room stating \"and fuck this place!\" He continues to argue that he does not need to be in the hospital, has no insight into the fact that for the first 13 days of his stay he has been largely unresponsive to staff and provider assessments, which is what led to the commitment being filed. He states that he will not go to a treatment center and is only willing to go home. He states that if he does go home and can't continue to take the benzodiazepines that he will \"start drinking and use dope\". Discussed that mood stabilizers and neuroleptics would help with his irritability and anger, though he continues to feel that he does not need these types of medications.       Educated regarding medication indications, risks, benefits, side effects, contraindications and possible interactions. Verbally expressed understanding.        Diagnoses:   Schizoaffective disorder- depressed type (by history)  R/o substance induced psychosis   ADHD, unspecified  History of TBI  History of polysubstance abuse (amphetamines most recent)      Attestation:  Patient has been seen and evaluated by me,  Christelle Contreras NP          Interim History:   The patient's care was discussed with the treatment team and chart notes were reviewed.      BEHAVIORAL TEAM DISCUSSION    Progress: Has been speaking with staff and provider since Saturday afternoon. Conversation was relatively linear though he lacks any insight into his current situation. Does not feel he needs " "commitment. Is eating and drinking adequate amounts.     Continued Stay Criteria/Rationale: ongoing irritability and agitation. Will start neuroleptic if Ledbetter granted    Medical/Physical:  monitoring I&O  Precautions:   Falls precaution?: No  Behavioral Orders   Procedures     Code 1 - Restrict to Unit     Elopement precautions     Routine Programming     As clinically indicated     Status 15     Every 15 minutes.       Plan:   Continue scheduled Klonopin, pt reports Ativan makes him feel \"terrible\" as it wears off too soon and he does not want to stay on it. Had been taking Ativan 2 mg QID. Once taking a neuroleptic, will look at switching to phenobarbital and tapering off.   Continue to offer Prolixin at bedtime  Continue Gabapentin 800 mg TID  Add Clonidine 0.1 mg TID prn anxiety/restless  Commitment and ledbetter hearing today 3/23- no paperwork received as of 330pm. Will link neuroleptic to IM if Ledbetter order received.           Medications:     Current Facility-Administered Medications Ordered in Epic   Medication Dose Route Frequency Last Rate Last Admin     acetaminophen (TYLENOL) tablet 650 mg  650 mg Oral Q4H PRN         alum & mag hydroxide-simethicone (MAALOX) suspension 30 mL  30 mL Oral Q4H PRN         benztropine (COGENTIN) tablet 0.5 mg  0.5 mg Oral BID PRN         fluPHENAZine (PROLIXIN) tablet 5 mg  5 mg Oral At Bedtime         gabapentin (NEURONTIN) capsule 300 mg  300 mg Oral TID         hydrOXYzine (ATARAX) tablet 25-50 mg  25-50 mg Oral Q4H PRN         LORazepam (ATIVAN) tablet 1 mg  1 mg Oral Q6H PRN        Or     LORazepam (ATIVAN) injection 1 mg  1 mg Intramuscular Q6H PRN         LORazepam (ATIVAN) tablet 1 mg  1 mg Oral TID         melatonin tablet 3 mg  3 mg Oral At Bedtime PRN         nicotine (NICORETTE) gum 2 mg  2 mg Buccal Q1H PRN         OLANZapine zydis (zyPREXA) ODT tab 10 mg  10 mg Oral TID PRN        Or     OLANZapine (zyPREXA) injection 10 mg  10 mg Intramuscular TID PRN         " "propranolol (INDERAL) tablet 10 mg  10 mg Oral BID PRN         No current Epic-ordered outpatient medications on file.            10 point ROS- reports some low back pain, denies need for intervention       Allergies:   No Known Allergies         Psychiatric Examination:   /71   Pulse 75   Temp 98.6  F (37  C) (Temporal)   Resp 17   Ht 1.905 m (6' 3\")   Wt 101.1 kg (222 lb 12.8 oz)   SpO2 100%   BMI 27.85 kg/m    Weight is 222 lbs 12.8 oz  Body mass index is 27.85 kg/m .    Appearance:  dressed in hospital scrubs, appeared as age stated and slightly unkempt  Attitude: argumentative though has remained cooperative  Eye Contact: better  Mood: irritable and angry today  Affect: blunted  Speech:  Speech is clear, coherent, regular rate and rhythm  Psychomotor Behavior:  no evidence of tardive dyskinesia, dystonia, or tics  Thought Process: can be irrational at times  Associations: no loosening of associations  Thought Content: no delusional thoughts noted in conversation today  Insight: limited  Judgment: limited  Oriented to:  Person, place, time  Attention Span and Concentration: somewhat distractable during our conversation  Recent and Remote Memory: limited recall of hospital stay  Fund of Knowledge: difficult to assess  Muscle Strength and Tone: normal  Gait and Station: Normal           Labs:     No results found for this or any previous visit (from the past 24 hour(s)).         "

## 2021-03-23 NOTE — PLAN OF CARE
Problem: Behavioral Health Plan of Care  Goal: Patient-Specific Goal (Individualization)  Description: Pt will be compliant with with treatment team recommendations during hospitalization.   Pt will be medication compliant.   Pt will participate in nursing assessment.   Pt will eat at least 50% of meals.   Staff to bring meal trays to patient's room.  ELOPEMENT RISK     Outcome: Improving  Note: Report received from Lorenzo Rounding complete. Pt observed sleeping in supine position with regular and unlabored respirations.    0135- Pt up and to the nurse's station window with request for a snack. He was provided vanilla pudding, apple juice, and pepe crackers. Pt is pleasant and cooperative but does verbalize that he is upset regarding the way he ended up here. He stated he didn't answer his mother's phone call and when she came to his apt he told her to leave and she called 911. He does feel that he needed some medication adjustments so he sees the positives in being here. He also endorses that him not talking to the police or healthcare workers did cause him further issues and for them to think he was having a mental health type crisis, when in fact, he stated he was just upset and didn't want to talk to anyone at the time. He stated he had an interview set up with a temp agency and worked hard to get his apartment that he just got in October of 2020. He is also upset that his mother has not been allowing him to talk to his children. He endorses getting agitated at times and stated he doesn't like getting upset. He stated that he has tried to notify staff that he feels like he is getting agitated and feels like staff thinks he is med seeking and feels they are annoyed with him, when he is just trying to keep from his agitation and anxiety from getting worse. Pt offered reassurance that staff is here to help him and does try to get to everyone in a timely manner, although it may seem like things are taking longer  "than they really are if you feel you are agitated and anxious. Pt verbalized that he tends to blame others and knows that sometimes it might take a minute because the unit is busy. He did state he was upset today with getting his court papers and that they were going off a DA from 2 years ago. He stated that he just wants to be home and feels like the way he ended up here wasn't right. He did state that he does not like Prolixin and doesn't feel it works for him. as well as side effects he considers more of a risk than benefit, which is why he doesn't want to take it now. He also stated that ativan and benadryl work well for when he is upset and that the Klonopin and gabapentin help for his anxiety. He wants to keep these as they were scheduled before the changes made on 3/22/21 where his klonopin was decreased. He endorses being afraid that he \"is never gonna get out of here.\" He does state that he likes the peace quiet of having a private room but also feels like being in the MHICU will prevent him from getting to go home as well. Pt remained pleasant and cooperative throughout our conversation and was able to realistically and logically relay his thoughts, feelings, and goals.    0330- Pt up to nurse's station with request for a snack. He stated he can't eat meat or ng. Unfortunately we do not have any other options for sandwiches tonight other than egg salad and meat/cheese. Pt declined and accepted jello instead. Pt would like to see if another option like PB&J could be ordered for a snack for him for NOC shift due to dietary restrictions. Sticky note sent to provider.     0349- Pt c/o 7/10 lower right wisdom tooth pain due to what he described as a broken and fragmented wisdom tooth. Pt was offered and accepted 600 mg Ibuprofen and 650 mg Tylenol.    0430- Pt observed to be sleeping again    Pt has been in bed with eyes closed and regular respirations. 15 minute and PRN checks all night. No complaints " offered. Will continue to monitor.    Pt slept approx 6 hours this NOC shift    Face to face end of shift report communicated to oncoming RN.    March 23, 2021  12:02 AM          Problem: Thought Process Alteration  Goal: Optimal Thought Clarity  Description: Pt will participate in nursing assessment by discharge  Pt will be able to complete his ADL's independently by discharge  Pt will be able to ask for his needs to be met by discharge  Outcome: Improving  Note: See above. Pt is able to make his needs known and maintain a linear and reality conversation.

## 2021-03-24 PROCEDURE — 124N000004

## 2021-03-24 PROCEDURE — 124N000001 HC R&B MH

## 2021-03-24 PROCEDURE — 99233 SBSQ HOSP IP/OBS HIGH 50: CPT | Performed by: NURSE PRACTITIONER

## 2021-03-24 PROCEDURE — 250N000013 HC RX MED GY IP 250 OP 250 PS 637: Performed by: NURSE PRACTITIONER

## 2021-03-24 RX ORDER — FLUPHENAZINE HYDROCHLORIDE 2.5 MG/ML
2.5 INJECTION, SOLUTION INTRAMUSCULAR AT BEDTIME
Status: DISCONTINUED | OUTPATIENT
Start: 2021-03-24 | End: 2021-03-25

## 2021-03-24 RX ADMIN — CLONAZEPAM 1.5 MG: 1 TABLET ORAL at 01:53

## 2021-03-24 RX ADMIN — MELATONIN 6 MG: 3 TAB ORAL at 21:35

## 2021-03-24 RX ADMIN — GABAPENTIN 800 MG: 400 CAPSULE ORAL at 20:33

## 2021-03-24 RX ADMIN — CLONAZEPAM 1.5 MG: 1 TABLET ORAL at 14:29

## 2021-03-24 RX ADMIN — CLONAZEPAM 1.5 MG: 1 TABLET ORAL at 09:09

## 2021-03-24 RX ADMIN — CLONAZEPAM 1.5 MG: 1 TABLET ORAL at 20:33

## 2021-03-24 RX ADMIN — GABAPENTIN 800 MG: 400 CAPSULE ORAL at 09:09

## 2021-03-24 RX ADMIN — ARIPIPRAZOLE 7.5 MG: 15 TABLET ORAL at 20:33

## 2021-03-24 RX ADMIN — LORAZEPAM 1 MG: 1 TABLET ORAL at 18:32

## 2021-03-24 RX ADMIN — GABAPENTIN 800 MG: 400 CAPSULE ORAL at 14:31

## 2021-03-24 RX ADMIN — DIPHENHYDRAMINE HYDROCHLORIDE 50 MG: 50 CAPSULE ORAL at 18:32

## 2021-03-24 RX ADMIN — DIPHENHYDRAMINE HYDROCHLORIDE 50 MG: 50 CAPSULE ORAL at 12:21

## 2021-03-24 NOTE — PLAN OF CARE
"Problem: Behavioral Health Plan of Care  Goal: Patient-Specific Goal (Individualization)  Description: Pt will be compliant with treatment team recommendations during hospitalization.   Pt will be medication compliant.   Pt will participate in nursing assessment.   Pt will eat at least 50% of meals.   Weaning Care Plan: 3/23/21 Patient is allowed to wean to open unit for phone use/drinks if behavior is appropriate. Treatment team to re-assess daily.   ELOPEMENT RISK   Outcome: Improving  He is awake in his bed. He has a flat affect. He is irritable with being in the hospital. When asked about being committed he states \"I guess\". He talks of \"I like to sleep\". He asks for benadryl and when questioned about why he wanted it he couldn't give a reason. His request was denied at this time. He took his scheduled clonopin and gabapentin this morning. He is offered opportunity to come to the open unit and he declined.   1130: refused vital signs. Irritable with being jarvised and then took the BP cuff off.   1149: patient demanding benadryl and ativan. When medication brought to his room he refused it due to not having the ativan.   1221: patient again asked for \"my prn's\". He is brought haldol and benadryl. He states \"I don't take haldol, they forced me to take that when the put me in the hole in [facility] for 8 months\". He then took the benadryl and threw it on the floor. He waited a minute then picked it up and took it. He continued to refuse the haldol. \"that's it, I'm done, I'm not talking anymore, I'm not taking any more medications\".    1326: patient offered scheduled clonopin and gabapentin and he refused. He did not talk to this nurse this time.    1431: patient again requested his medication. He is given clonopin 1.5 mg and gabapentin 800 mg at this time.    Problem: Thought Process Alteration  Goal: Optimal Thought Clarity  Description: Pt will participate in nursing assessment by discharge  Pt will be able to " complete his ADL's independently by discharge  Pt will be able to ask for his needs to be met by discharge  Outcome: Improving  He denies hallucinations. He spend most of his time laying in bed. He is able to manage a linear conversation.      Face to face end of shift report communicated to evening shift RN.     Tobi Jordan RN  3/24/2021  3:33 PM

## 2021-03-24 NOTE — PLAN OF CARE
Face to face end of shift report communicated to oncoming shift.      Sobeida Benton RN  3/24/2021  11:16 PM    Problem: Behavioral Health Plan of Care  Goal: Patient-Specific Goal (Individualization)  Description: Pt will be compliant with treatment team recommendations during hospitalization.   Pt will be medication compliant.   Pt will participate in nursing assessment.   Pt will eat at least 50% of meals.   Weaning Care Plan: 3/23/21 Patient is allowed to wean to open unit for phone use/drinks if behavior is appropriate. Treatment team to re-assess daily.     ELOPEMENT RISK     Outcome: Improving  Note: Shift Summery:    1740:  Patient irritable at this time, requesting PRN medication to take the edge off.  Patient requesting Benadryl and Ativan.  Educated patient too soon for the Benadryl at this time the way it is ordered.  Patient agrees to wait on medication until he can take both medications together.   1820: Patient requesting Ativan and Benadryl at this time.   1832:  PRN Benadryl 50 mg and Ativan 1 mg given at this time.    Patient uncooperative and irritable with nursing assessment.  Patient eats 100% of dinner.  Patient takes all medications as ordered.      Minimal requests made this shift, only to ask for PRN medication and a snack.    2135:  PRN melatonin 6 mg for sleep    Face to face start of shift report received from RN.  [place] [facility] [place], will continue to monitor.

## 2021-03-24 NOTE — PLAN OF CARE
Problem: Behavioral Health Plan of Care  Goal: Patient-Specific Goal (Individualization)  Description: Pt will be compliant with treatment team recommendations during hospitalization.   Pt will be medication compliant.   Pt will participate in nursing assessment.   Pt will eat at least 50% of meals.   Weaning Care Plan: 3/23/21 Patient is allowed to wean to open unit for phone use/drinks if behavior is appropriate. Treatment team to re-assess daily.     ELOPEMENT RISK     Outcome: Improving  Note: Report received from Felix. Rounding complete. Pt observed sleeping in supine position with regular and unlabored respirations.    0153- Pt up with request for snack. Pt provided PB&J sandwich with ice water and pepe crackers. Pt also admin his scheduled 1.5 mg clonazepam at this time. Pt is balanced and steady on his feet and pleasant and cooperative with staff.     Pt has been in bed with eyes closed and regular respirations. 15 minute and PRN checks all night. No complaints offered. Will continue to monitor.    Pt slept approx 7 hours this NOC shift    Face to face end of shift report communicated to oncoming RN.    March 24, 2021  1:59 AM          Problem: Thought Process Alteration  Goal: Optimal Thought Clarity  Description: Pt will participate in nursing assessment by discharge  Pt will be able to complete his ADL's independently by discharge  Pt will be able to ask for his needs to be met by discharge  Outcome: Improving  Note: Pt does not converse with staff much tonight but is pleasant and cooperative and able to make his needs easily known. No issues or concerns noted at this time.

## 2021-03-24 NOTE — PLAN OF CARE
"    Faxed Referral to [facility] for [facility] list.    SW and provider spoke with patient. Patient stressed his view that he liked his medications where they were at and did not want them changed. Patient further explained that the time he appeared catatonic he was faking it putting on his poker face to make sure staff worked for their pay. The provider attempted to persuade the patient to take his medications voluntarily. Patient said, \" Put me on what ever the fuck you want, now I am angry.\"   "

## 2021-03-25 PROCEDURE — 250N000013 HC RX MED GY IP 250 OP 250 PS 637: Performed by: NURSE PRACTITIONER

## 2021-03-25 PROCEDURE — 99233 SBSQ HOSP IP/OBS HIGH 50: CPT | Performed by: NURSE PRACTITIONER

## 2021-03-25 PROCEDURE — 124N000004

## 2021-03-25 RX ORDER — TRAZODONE HYDROCHLORIDE 100 MG/1
100 TABLET ORAL
Status: ON HOLD | COMMUNITY
End: 2021-03-30

## 2021-03-25 RX ORDER — GABAPENTIN 400 MG/1
400 CAPSULE ORAL 3 TIMES DAILY
Status: ON HOLD | COMMUNITY
End: 2021-03-30

## 2021-03-25 RX ORDER — DIVALPROEX SODIUM 500 MG/1
500 TABLET, EXTENDED RELEASE ORAL AT BEDTIME
Status: DISCONTINUED | OUTPATIENT
Start: 2021-03-25 | End: 2021-03-26

## 2021-03-25 RX ORDER — PROPRANOLOL HYDROCHLORIDE 10 MG/1
10 TABLET ORAL 2 TIMES DAILY PRN
Status: ON HOLD | COMMUNITY
End: 2021-03-30

## 2021-03-25 RX ORDER — FLUPHENAZINE HYDROCHLORIDE 5 MG/1
5 TABLET ORAL EVERY EVENING
Status: ON HOLD | COMMUNITY
End: 2021-03-30

## 2021-03-25 RX ORDER — BENZTROPINE MESYLATE 0.5 MG/1
0.5 TABLET ORAL 2 TIMES DAILY
Status: ON HOLD | COMMUNITY
End: 2021-03-30

## 2021-03-25 RX ADMIN — ACETAMINOPHEN 650 MG: 325 TABLET, FILM COATED ORAL at 02:48

## 2021-03-25 RX ADMIN — LORAZEPAM 1 MG: 1 TABLET ORAL at 12:21

## 2021-03-25 RX ADMIN — CLONAZEPAM 1.5 MG: 1 TABLET ORAL at 14:57

## 2021-03-25 RX ADMIN — IBUPROFEN 600 MG: 600 TABLET ORAL at 23:53

## 2021-03-25 RX ADMIN — GABAPENTIN 800 MG: 400 CAPSULE ORAL at 14:57

## 2021-03-25 RX ADMIN — GABAPENTIN 800 MG: 400 CAPSULE ORAL at 08:02

## 2021-03-25 RX ADMIN — CLONAZEPAM 1.5 MG: 1 TABLET ORAL at 08:02

## 2021-03-25 RX ADMIN — CLONAZEPAM 1.5 MG: 1 TABLET ORAL at 01:43

## 2021-03-25 RX ADMIN — GABAPENTIN 800 MG: 400 CAPSULE ORAL at 20:03

## 2021-03-25 RX ADMIN — CLONAZEPAM 1.5 MG: 1 TABLET ORAL at 20:03

## 2021-03-25 RX ADMIN — LORAZEPAM 1 MG: 1 TABLET ORAL at 02:48

## 2021-03-25 RX ADMIN — DIVALPROEX SODIUM 500 MG: 500 TABLET, EXTENDED RELEASE ORAL at 20:03

## 2021-03-25 RX ADMIN — IBUPROFEN 600 MG: 600 TABLET ORAL at 02:48

## 2021-03-25 RX ADMIN — ACETAMINOPHEN 650 MG: 325 TABLET, FILM COATED ORAL at 23:53

## 2021-03-25 RX ADMIN — DIPHENHYDRAMINE HYDROCHLORIDE 50 MG: 50 CAPSULE ORAL at 20:03

## 2021-03-25 RX ADMIN — DIPHENHYDRAMINE HYDROCHLORIDE 50 MG: 50 CAPSULE ORAL at 02:48

## 2021-03-25 RX ADMIN — DIPHENHYDRAMINE HYDROCHLORIDE 50 MG: 50 CAPSULE ORAL at 12:21

## 2021-03-25 ASSESSMENT — ACTIVITIES OF DAILY LIVING (ADL)
HYGIENE/GROOMING: INDEPENDENT;PROMPTS
HYGIENE/GROOMING: INDEPENDENT
LAUNDRY: UNABLE TO COMPLETE
DRESS: SCRUBS (BEHAVIORAL HEALTH);INDEPENDENT
ORAL_HYGIENE: INDEPENDENT
LAUNDRY: UNABLE TO COMPLETE
ORAL_HYGIENE: INDEPENDENT
DRESS: SCRUBS (BEHAVIORAL HEALTH);INDEPENDENT

## 2021-03-25 NOTE — PLAN OF CARE
Prior to Admission Medication Reconciliation:     Medications added:   [x] None  [] As listed below:    Medications deleted:   [] None  [x] As listed below:    vyvanse 50 mg- stopped on 2/23/21 by behavioral health provider    adderall- stopped on 2/23/21 by behavioral health provider    Marked cogentin, fluphenazine, gabapentin, inderal and trazodone for review/removal: per [facility], pt came from [facility] beginning of the year. There was no mention of these medications when he was seen so they were not formally stopped by his new provider. Fill dates look like he should be out of them all. The only medication that is marked as active at [facility] is olanzapine. He was on 2 stimulants for a month but those were both stopped. He should be out of the stimulants.     Cleared outside meds.     Changes made to existing medications:   [] None  [x] As listed below:    Cleaned up meds    Last times/dates taken verified with patient:  [] Yes- completed myself  [] Nurse completed no changes made  [x] Unable to review with patient:  [] Nurse completed/changes made:     Allergy modifications:    [x]Did not review  []Patient verified NKA  []Patient verified current existing allergies: no changes made  []New allergies: listed below    Medication reconciliation sources:   []Patient  []Patient family member/emergency contact: **  [][facility] Report Review  []Epic Chart Review  [x]Care Everywhere review:  fluPHENAZine (Prolixin) 5 MG tablet   Take 1 Tab by mouth every evening. 30 Tab   0 08/05/2020   Active   Multiple Vitamins-Minerals (Multi-Vitamin Gummies) Tablet Chewable   Take 2 Tabs by mouth one time a day.   0     Active   benztropine (Cogentin) 0.5 MG tablet   Take 0.5 mg by mouth two times a day. Take after meals or with food if GI upset occurs.   0     Active   propranolol (Inderal) 10 MG tablet   Take 10 mg by mouth two times a day as needed.   0     Active   traZODone (Desyrel) 50 MG tablet   Take 100 mg by mouth at bedtime  "as needed.   0     Active   gabapentin (Neurontin) 300 MG capsule   Take 300 mg by mouth three times a day.   0     Active   Magnesium Hydroxide (MILK OF MAGNESIA OR)   Take by mouth as needed. 2-4 TBSP prn do not exceed 4 TBSP in 24 hours   0     Active   Acetaminophen (Tylenol) 325 MG Capsule   Take 650 mg by mouth every four hours as needed.   0     Active   GNP Natural Fiber 48.57 % Powder   GIVE 1 ROUNDED TEASPOONFUL BY MOUTH ONCE A DAY FOR 30 DAYS (MIX WITH AT LEAST 8OZ OF LIQUID) 368 g   0 10/09/2020   Active       []Pharmacy med list: **     []Pharmacy phone call  []Outside meds dispense report  []Nursing home or Assisted Living MAR:  [x]Other: [facility]: [facility]- last seen 2/23/21  \"Stopped stimulants and restarted stimulant on 2/23/21\"    zyprexa 5 mg at bedtime     adderall 10 mg once in the afternoon (stopped)    vyvanse 50 mg once daily (Stopped)    Pharmacy desired at discharge: uses [facility]    Is patient on coumadin?  [x]No    Requests for consultation by provider or pharmacist:   [] Patient understands why all of their meds were prescribed and how to take them. No questions.   [] Fill dates coincide with compliancy for all maintenance meds.   [] Fill dates do not coincide with compliancy with maintenance meds. See notes in PTA medlist about how patient is taking.   [] Patient has questions about the following:    Comments: unable to speak with patient. At this time his behavioral health medications are being managed by [facility] and the only active med is the olanzapine. See above for those notes. There was no documentation of the medications from [facility] being stopped when he switched from [facility] to [facility], or documentation that the provider was aware of those medications. When I called over there Raya was unable to find notes about them. So I marked them for review by MD. Medical medications that I could find are only PRN's and OTC's so without speaking with patient I will " leave them out of PTA meds for now.       Cynthia Nguyen on 3/25/2021 at 10:23 AM       Discrepancies: [x] No []Not Applicable []Yes: listed below    Time spent on medication reconciliation:   []5-20 mins  [x]20-40 mins  []> 40 mins    Issues completing PTA medication reconciliation:  [] On hold for a long time  [] Waited for a call back  [] Fax didn't come through  [] Fax took a long time  [] Other:    Notifying appropriate party of changes/additions/discrepancies:  []No pertinent changes made, notification not necessary.   [] Notified attending provider via text page  [] Notified attending provider in person  [] Notified pharmacy  [x] Notified nurse  [] Attending provider not available, left detailed notes  [] Changes/additions made don't need provider notification because provider has not seen patient or input any orders  [] Changes/additions made don't need provider notification because changes made are to medications not ordered  Medications Prior to Admission   Medication Sig Dispense Refill Last Dose     benztropine (COGENTIN) 0.5 MG tablet Take 0.5 mg by mouth 2 times daily        fluPHENAZine (PROLIXIN) 5 MG tablet Take 5 mg by mouth every evening        gabapentin (NEURONTIN) 400 MG capsule Take 400 mg by mouth 3 times daily        OLANZapine (ZYPREXA) 5 MG tablet Take 5 mg by mouth At Bedtime as directed        propranolol (INDERAL) 10 MG tablet Take 10 mg by mouth 2 times daily as needed        traZODone (DESYREL) 100 MG tablet Take 100 mg by mouth nightly as needed for sleep          Medication Dispense History (from 3/25/2020 to 3/25/2021)  Expand All  Collapse All  Albuterol Sulfate   Dispensed Days Supply Quantity Provider Pharmacy   ALBUTEROL SUL 2.5 MG/3 ML SOLN 04/09/2020 7 75 vial KERI COELHO [facility] ...         Amoxicillin   Dispensed Days Supply Quantity Provider Pharmacy   AMOXICILLIN 500 MG CAPSULE 08/10/2020 10 30 each TALIB CHURCH M...          Amphetamine-Dextroamphetamine   Dispensed Days Supply Quantity Provider Pharmacy   DEXTROAMP-AMPHETAMIN 10 MG TAB [facility]23/2021 [id number]         Benztropine Mesylate   Dispensed Days Supply Quantity Provider Pharmacy   BENZTROPINE 0.5MG   TAB 12/22/2020 30 60 Units JOSHLISA CARLTON [facility] ...   BENZTROPINE 0.5MG   TAB 11/11/2020 30 [facility] ...   BENZTROPINE MES 0.5 MG TAB 10/20/2020 30 60 Units LISA KELLY M...   BENZTROPINE MES 0.5 MG TAB 09/21/2020 30 60 each LISA KELLY M...   BENZTROPINE MES 0.5 MG TAB 08/17/2020 30 60 each LISA KELLY M...   BENZTROPINE MES 0.5 MG TAB 07/23/2020 30 60 tablet PARTH VICTORIA M...   BENZTROPINE MES 0.5 MG TAB 07/17/2020 10 20 tablet PARTH VICTORIA M...         Bisacodyl   Dispensed Days Supply Quantity Provider Pharmacy   EQ GENTLE LAX 5MG TAB 11/18/2020 1 2 [facility] ...         Gabapentin   Dispensed Days Supply Quantity Provider Pharmacy   GABAPENTIN 400MG    CAP 12/24/2020 30 90 Units JOSHLISA CARLTON [facility] ...   GABAPENTIN 400MG    CAP 12/02/2020 30 90 Units JOSHLISA CARLTON [facility] ...   GABAPENTIN 400MG    CAP 10/29/2020 30 90 Units JOSHLISA CARLTON [facility] ...   GABAPENTIN 300 MG CAPS 10/20/2020 30 90 Units LISA KELLY M...   GABAPENTIN 300 MG CAPSULE 09/21/2020 30 90 capsule LISA KELLY M...   GABAPENTIN 300 MG CAPS 08/26/2020 30 90 each LISA KELLY M...   GABAPENTIN 300 MG CAPSULE 08/19/2020 7 14 capsule LISA KELLY M...         HYDROcodone-Acetaminophen   Dispensed Days Supply Quantity Provider Pharmacy   HYDROCODON-APAP 5-325 09/21/2020 5 20 each YADIRA CANELA -  [facility]         Lisdexamfetamine Dimesylate   Dispensed Days Supply Quantity Provider Pharmacy   VYVANSE 50 MG CAPSULE 01/23/2021 30 30 capsule KAREN PRITCHARD [id number] ...   VYVANSE 50 MG CAPSULE 01/13/2021 12 12 capsule KAREN PRITCHARD [facility] ...         Magnesium Citrate   Dispensed Days Supply Quantity Provider Pharmacy   CITRATE OF MAGNESIA SOLN 08/27/2020 1 296 each GIO SANCHEZ M...         Multiple Vitamin   Dispensed Days Supply Quantity Provider Pharmacy   DAILY-JERE TABLET 04/20/2020 30 30 tablet LELE RIVERA [facility] ...         Omeprazole   Dispensed Days Supply Quantity Provider Pharmacy   OMEPRAZOLE DR 20 MG CAPSULE 04/09/2020 30 60 capsule KERI COELHO [facility] ...         PEG 3350-KCl-Na Bicarb-NaCl   Dispensed Days Supply Quantity Provider Pharmacy   PEG-3350/KCL/SODIUM SOL 11/18/2020 2 4,000 mL EDDI QUIROS [facility] ...         Propranolol HCl   Dispensed Days Supply Quantity Provider Pharmacy   PROPRANOLOL 10MG    TAB 12/24/2020 30 60 Units LISA KELLY [facility] ...   PROPRANOLOL 10MG    TAB 12/02/2020 30 60 Units LETICIALISA [facility] ...   PROPRANOLOL 10 MG TABLET 10/20/2020 30 60 Units LISA KELLY [facility]   PROPRANOLOL 10 MG TABLET 09/17/2020 30 60 each LISA KELLY M...   PROPRANOLOL 10 MG TABLET 08/17/2020 30 60 each LISA KELLY M...   PROPRANOLOL 10 MG TABLET 07/23/2020 30 60 tablet PARTH VICTOIRA M...   PROPRANOLOL 10 MG TABLET 07/15/2020 10 20 tablet PARTH VICTORIA M...         Psyllium   Dispensed Days Supply Quantity Provider Pharmacy   NATURAL FIBER POWDER 10/09/2020 30 368 g GIO SANCHEZ M...   NATURAL FIBER POWDER 08/27/2020 30 368 each GIO SANCHEZ M...         Other   Dispensed Days Supply  Quantity Provider Pharmacy   OLANZapine 5MG      TAB 02/23/2021 30 30 Units KAREN PRITCHARD [facility] ...   FOLIC ACID 800 MCG TABLET [facility]/20/2020 30 [facility]   VITAMIN B-1 100 MG TABLET 04/20/2020 30 30 [facility] ...         fluPHENAZine HCl   Dispensed Days Supply Quantity Provider Pharmacy   FluPHENAZine 5MG    TAB 12/24/2020 30 [facility] ...   FluPHENAZine 5MG    TAB 12/02/2020 30 30 Units [facility] ...   FluPHENAZine 5MG    TAB 10/30/2020 30 30 Units [facility] ...   FLUPHENAZINE HCL 5 MG TABS 10/05/2020 30 30 Units LISA KELLY M...   FLUPHENAZINE HCL 5 MG TABS 09/02/2020 30 30 each LISA KELLY M...   FLUPHENAZINE HCL 5 MG TABS 08/05/2020 30 30 each GIO SANCHEZ M...   FLUPHENAZINE 5 MG TABLET 07/14/2020 30 30 tablet EDDI DE ANDA [address]...         hydrOXYzine HCl   Dispensed Days Supply Quantity Provider Pharmacy   HYDROXYZINE HCL 50MG TABLET 06/05/2020 10 30 Units KERI COELHO [facility] - Vi...   HYDROXYZINE HCL 50 MG TABLET 04/09/2020 10 30 tablet KERI COELHO [facility] ...         traZODone HCl   Dispensed Days Supply Quantity Provider Pharmacy   TraZODone 100MG     TAB 12/24/2020 30 30 [facility] ...   TraZODone 100MG     TAB 12/02/2020 30 30 Units [facility] ...   TraZODone 100MG     TAB 10/29/2020 30 30 [facility] ...   TRAZODONE  MG TABS 10/05/2020 30 30 Units LISA KELLY M...   TRAZODONE  MG TABS 09/09/2020 30 30 each LISA KELLY M...   TRAZODONE  MG TABS 08/13/2020 30 30 each LISA KELLY M...   TRAZODONE 50 MG TABLET  07/24/2020 30 30 tablet PARTH VICTORIA M...   TRAZODONE 50 MG TABLET 07/15/2020 7 7 tablet PARTH VICTORIA M...

## 2021-03-25 NOTE — PROGRESS NOTES
"[facility]  Psychiatric Progress Note      Impression:   This is a 30 year old yo male with a history of schizoaffective disorder, TBI, and polysubstance abuse.      Last night at 0200 he had outburst with staff after he heard bangining on his door. He believed nurses were doing it to manipuatle him and make him stay longer. He ended up throwing an empty water cup across his room yelling \"fuck you\" to staff. Did sleep a total of 6 hours waking up twice. Staff note him to be agitated and pressured in speech. He did take abilify last night.       He was much more pleasant with me than I expected him to be after the difficult night he had a last night.  He is very blaming about staff for \"the knock on my door\" apparently another patient had knocked on his door.  I told him that his reactions to small stressors are very extreme.  He tells me \"I just hate people\".  He is still quite disheveled.  I asked him if he always, even when he is doing well, has a beard.  He is unshaven and his hair is quite disheveled.  He tells me \"sometimes, but I am actually doing just fine I do not need other medications\" we discussed mood stabilizers more at length.  He states he does not want to be a zombie nor does he want to gain a ton of weight.  He states that he used to lift weights and been in very good shape though gained 50 or more pounds on medications.  I told him that I assume it was the Zyprexa and Prolixin he had been on last year and he states that he believes so as well.  I did tell him that I would like to see him on medication he can tolerate because the chances of him continuing the medication are much higher after his commitment is up and once he is at home.  At times he seems to agree with this though at other times he will make comments such as \"I am just going to quit taking the medications when I am out of here anyways\" he denies having any current suicidal ideation.  He does not appear to be responding " to any hallucinations and does not appear to be preoccupied.  Educated regarding medication indications, risks, benefits, side effects, contraindications and possible interactions. Verbally expressed understanding.        Diagnoses:     Bipolar disorder type I, most recent episode depressed versus mixed severe with catatonic features  Schizoaffective disorder- depressed type (by history)  R/o substance induced psychosis  ADHD, unspecified  History of TBI  History of polysubstance abuse (amphetamines most recent)      Attestation:  Patient has been seen and evaluated by me,  Ashely Heaton NP          Interim History:   The patient's care was discussed with the treatment team and chart notes were reviewed.               Medications:     Current Facility-Administered Medications Ordered in Epic   Medication Dose Route Frequency Last Rate Last Admin     acetaminophen (TYLENOL) tablet 650 mg  650 mg Oral Q4H PRN         alum & mag hydroxide-simethicone (MAALOX) suspension 30 mL  30 mL Oral Q4H PRN         benztropine (COGENTIN) tablet 0.5 mg  0.5 mg Oral BID PRN         fluPHENAZine (PROLIXIN) tablet 5 mg  5 mg Oral At Bedtime         gabapentin (NEURONTIN) capsule 300 mg  300 mg Oral TID         hydrOXYzine (ATARAX) tablet 25-50 mg  25-50 mg Oral Q4H PRN         LORazepam (ATIVAN) tablet 1 mg  1 mg Oral Q6H PRN        Or     LORazepam (ATIVAN) injection 1 mg  1 mg Intramuscular Q6H PRN         LORazepam (ATIVAN) tablet 1 mg  1 mg Oral TID         melatonin tablet 3 mg  3 mg Oral At Bedtime PRN         nicotine (NICORETTE) gum 2 mg  2 mg Buccal Q1H PRN         OLANZapine zydis (zyPREXA) ODT tab 10 mg  10 mg Oral TID PRN        Or     OLANZapine (zyPREXA) injection 10 mg  10 mg Intramuscular TID PRN         propranolol (INDERAL) tablet 10 mg  10 mg Oral BID PRN         No current [facility]-ordered outpatient medications on file.            10 point ROS- uncooperative with assessment       Allergies:     Allergies  "  Allergen Reactions     Pork Allergy             Psychiatric Examination:   /73   Pulse 95   Temp 96.9  F (36.1  C) (Temporal)   Resp 14   Ht 1.905 m (6' 3\")   Wt 101.1 kg (222 lb 12.8 oz)   SpO2 95%   BMI 27.85 kg/m    Weight is 222 lbs 12.8 oz  Body mass index is 27.85 kg/m .    Appearance:  dressed in hospital scrubs, appeared as age stated and slightly unkempt  Attitude: A bit more cooperative today at times but this fluctuates  Eye Contact: Intact  Mood: Flat  Affect: Restricted though not as blunted  Speech: Not pressured not tangential  Psychomotor Behavior:  no evidence of tardive dyskinesia, dystonia, or tics  Thought Process: No current delusional thoughts although thought process is very fixated on how \"people fuck me over all of the time\".  Associations: No loose associations  Thought Content: Some delusional.  Denies suicidal ideation no thoughts to harm others.    Insight: Limited  Judgment: Very poor  Oriented to: Oriented to all spheres  Attention Span and Concentration: Appears to be intact  Recent and Remote Memory: Intact  Fund of Knowledge: Average  Muscle Strength and Tone: normal  Gait and Station: Normal           Labs:     No results found for this or any previous visit (from the past 24 hour(s)).   BEHAVIORAL TEAM DISCUSSION    Progress: none    Continued Stay Criteria/ ationale: Very minimal insight.  Is now just starting medication stopping Abilify and starting Depakote    Medical/Physical: None  Precautions:   Falls precaution?: No  Behavioral Orders   Procedures     Code 1 - Restrict to Unit     Elopement precautions     Routine Programming     As clinically indicated     Status 15     Every 15 minutes.       Plan:     Starting Abilify and Prolixin and starting Depakote.  If this is not effective, will likely restart Abilify                                                                                               "

## 2021-03-25 NOTE — PLAN OF CARE
"  Problem: Behavioral Health Plan of Care  Goal: Patient-Specific Goal (Individualization)  Description: Pt will be compliant with treatment team recommendations during hospitalization.   Pt will be medication compliant.   Pt will participate in nursing assessment.   Pt will eat at least 50% of meals.   Weaning Care Plan: 3/23/21 Patient is allowed to wean to open unit for phone use/drinks if behavior is appropriate. Treatment team to re-assess daily.     ELOPEMENT RISK     Outcome: IMPROVING  Note: 15:40: Received end of shift report from TRAE Jenkins. Pt requesting snack upon arrival, blunted affect, irritable mood.     16:37: Pt out on open unit x2 briefly for phone call, attempt to call mother to apologize for behavior. Pt voices frustrations re: hospitalization et not wanting to be here, \"all I want to do is go home, play call of duty et drink a beer\" \"That abilify makes me feel like shit\" \"I want to sleep, can't do that.\"  Pt continues to fixates on the negative aspects in life, statements of, \"I'm just going to lay in bed.\" \"I'm not going to groups.\" \"Everyone here is full of bullshit, I just want to go home.\" \"Just give me all the pills you can, just the way you want me to be, fake!!\" \"like this pill, depakote\"     23:02: PRN diphenhydramine given with HS medication administration per pt request for sleep. Pt has been displaying s/s of sleep since approximately 2100. Frequent encouragement, reinforcement initiated via out the shift.     Face to face end of shift report to be communicated to on-coming staff.     Connie Wharton RN  3/25/2021  11:03 PM          Problem: Behavioral Health Plan of Care  Goal: Optimized Coping Skills in Response to Life Stressors  Outcome: No Change     Problem: Behavioral Health Plan of Care  Goal: Adheres to Safety Considerations for Self and Others  Outcome: Improving     Problem: Thought Process Alteration  Goal: Optimal Thought Clarity  Description: Pt will participate in " nursing assessment by discharge  Pt will be able to complete his ADL's independently by discharge  Pt will be able to ask for his needs to be met by discharge  Outcome: Improving

## 2021-03-25 NOTE — PLAN OF CARE
"  Problem: Behavioral Health Plan of Care  Goal: Patient-Specific Goal (Individualization)  Description: Pt will be compliant with treatment team recommendations during hospitalization.   Pt will be medication compliant.   Pt will participate in nursing assessment.   Pt will eat at least 50% of meals.   Weaning Care Plan: 3/23/21 Patient is allowed to wean to open unit for phone use/drinks if behavior is appropriate. Treatment team to re-assess daily.     ELOPEMENT RISK     Outcome: Improving  Note: Report received from Sobeida. Rounding complete. Pt observed sleeping in left side lying position with regular and unlabored respirations.    Pt has been in bed with eyes closed and regular respirations. 15 minute and PRN checks all night. No complaints offered. Will continue to monitor.    0141- Pt tot he nurse's station accusing staff of banging on hs door with a clipboard. Peer in the MHICU admitted to knocking on his door and he claimed we were lying and that a staff member banged on his door with a clipboard just to wake him up. Pt again advised that staff was not in the MHICU when he claims we \"banged\" on his door loudly with a clipboard and we do not bring the clipboard into the MHICU for checks during the NOC shift. Pt again loudly accused staff of lying and doing so to keep him here longer and to make him angry so we can chart that he is having outbursts and keep him here longer, when he shouldn't be here in the first place. Pt insisted that we rewind and show him the security footage from the cameras proving that staff did not bang on his door. Pt offered reassurance that we did not bang on the door but also that we do not have a way to rewind our cameras on the unit or within the unit and have no way to show him the proof that he is requesting. Even with peer apologizing to pt for knocking on his door, pt still claimed that we were lying and that we were all ganging up on him. Writer and UA reassured pt that " "we try to be as quiet at night as possible, especially when doing the checks because we know the ICU doors can be loud and don't want to wake them. Pt stated, \"You guys are fucking liars! You're all lying to me! You're doing it on purpose to piss me off so you can chart that I'm flippin out and keep me here longer!\" Writer again advised pt that we have no control over how long he is here and don't do things to punish or keep pt's here and we try to take care of pt's the best we can while they are here. Pt did also accuse staff of being mean, manipulating him, treating him \"like shit\", \"making shit up\", and that someone on staff lied and told him if he took his abilify, he would get to go home. He then stated, \"that was a fucking lie too! I'm never gonna get to go home because you guys just wanna keep me here!\" Pt again stated we were lying and demanded to speak to a supervisor. Writer did call supervisor and was advised to give patient a grievance which was offered to and refused by pt. Pt stated, \"I ain't filling out a fucking grievance!\" Writer advised that it may be a bit before supervisor is available and pt stated, \"Well then it looks like I'm not sleeping tonight\" as he sat with a chair pulled up to the nurse's station door. [facility] for approx 5-10 minutes before getting up, sliding his chair and holding onto it for a moment as if he was going to shove or throw it, but instead ended up throwing his empty water cup across the MHICU and yelling \"Fuck You!\" before going back to his room and getting into bed.     0245- Pt back to the nurse's station window asking, \"What prn's can I have?\" Pt is still visibly and verbally agitated but speaking somewhat calmer. Pt stated he doesn't get mad and doesn't like when he is angry. He stated he thinks that staff intentionally makes him angry and triggers him so we can keep him here longer. Writer again reassured him that this is not " "the case. Pt again starts getting loud and claiming staff or someone was banging on his door with a clipboard to intentionally wake him up.His tone is somewhat cyclical in going from seemingly calm to highly agitated and angry. Pt is labile in presentation and affect. Writer advised pt that his ativan and bendryl are available and he asked about zydis. He did have zyprexa ordered with the first 24 hours he was here but it was discontinued shortly after. Pt stated he wanted the benadryl and ativan but adamantly stated he does not take the haldol and will not. Writer did offer tylenol and ibuprofen for recurring c/o right lower wisdom tooth pain each night, which pt also accepted. Writer and charge brought requested meds back to pt in the MHICU and he then stated, \"you guys are constantly trying to throw pills at me.\" Writer reminded pt that he had just asked for these meds and that if he didn't want them we were not trying to make him take them. Pt stated \"no I'll take them.. He then stated, \"but then you guys think I am a drug seeker when I ask for meds.\" Writer did advise pt that we would prefer he let us know he needs a prn than to get angry or agitated to the point of having a big outburst and not being able to maintain control of his behaviors. He is still accusatory and his complaints are incongruent and don't make sense. He stated no one talks to him and when advised we will talk to him whenever he needs to talk even after we have done our assessment for the shift, he then stated, \"I don't want to talk to anyone, I wanna be left alone.\" Writer did try to explain to pt where this can be confusing for staff if he wants to talk to someone but then states he wants to be left alone. He also stated he is sick of being in the MHICU and stated he was not allowed to wean, when reminded that they tried to let him wean and he stated he did not want to and said he wanted to be alone in his room. He stated yeah I guess " "that is true. Pt then stated we don't give him anything good for sleep and that he requested sonata but was told no.  When asked what he thought he needed for sleep, Pt stated, \"4 mg of Xanax bars.\" Writer advised that was not going to be happening. Pt then asked, \"why not?\" He then talked about when he was just committed before due to a suicide attempt and spoke about it as if it were nothing and has no insight into how his behaviors and serious things like suicide attempts have caused him to be inpatient and committed or that these were serious events. He blames others for his actions and does not accept responsibility for any of the things he has done. By the end of the conversation with staff this time he is visibly calmer and no longer being rude or dismissive. He does request a snack and reminds writer that he can't have ng or meat. We only have egg salad sandwiches tonight so writer brought pt 3 types of jello and apple juice and pt stated he just wanted to be left alone and didn't want anything anymore and stated, \"the jello tastes like shit anyways.\" Pt advised for staff to throw it away and didn't want it left in his room.     0330- Pt appears to be sleeping with regular and unlabored respirations.    Pt did manage to sleep approx 6 hours this NOC shift in intervals this NOC shift.    0650- Pt up briefly asking what he can have for sleep stating he hasn't slept all night. Writer advised that we don't give meds for sleep at this time of the morning due to it being almost shift change into day shift. He asked what time it was and writer advised 0650 am. Pt stated \"No it's not, you're lying.\" Writer showed pt her watch and pt stated well I haven't slept at all tonight and need a prn. He then asked for melatonin and writer advised that he already got it last night at HS and we don't give it after 0300 in the morning. Pt also advised that he was observed asleep last night with the exception of the 2 times he " "got up and he stated \"no he wasn't.\" Staff did physically observe pt q15m sleeping with audible light snoring at times. Pt upset that writer advised that I had only hydroxyzine or clonidine for anxiety (which he was not reporting as the issue) and that he had nothing available for sleep at this time. Pt put his head down in a disappointed manner and walked back to his room and got back into bed. Pt was more pleasant this interaction, even when somewhat argumentative and disappointed with this writers response.     Face to face end of shift report communicated to alexa RN.    March 24, 2021  11:59 PM        Problem: Thought Process Alteration  Goal: Optimal Thought Clarity  Description: Pt will participate in nursing assessment by discharge  Pt will be able to complete his ADL's independently by discharge  Pt will be able to ask for his needs to be met by discharge  Outcome: Improving  Note: Pt is tangential, pressured, rambling, and extremely agitated tonight. He is not able to maintain a linear, congruent, or reality based conversation. This is different than what this writer has observed on previous shifts as pt has always been pleasant and cooperative with this writer and staff. He was making paranoid, accusatory, and delusional statements tonight which was also out of the ordinary for what this writer has previously seen with this pt. He was also not very redirectable with his level of irritability and agitation. Please see above note.           "

## 2021-03-25 NOTE — PLAN OF CARE
"  Problem: Behavioral Health Plan of Care  Goal: Patient-Specific Goal (Individualization)  Description: Pt will be compliant with treatment team recommendations during hospitalization.   Pt will be medication compliant.   Pt will participate in nursing assessment.   Pt will eat at least 50% of meals.   Weaning Care Plan: 3/23/21 Patient is allowed to wean to open unit for phone use/drinks if behavior is appropriate. Treatment team to re-assess daily.     ELOPEMENT RISK     Outcome: No Change  Note: Shift Summery:  Patient withdrawn and isolative to his room most of this shift.  Patient compliant with morning medications.  He is not compliant with nursing assessment.  His affect is flat.  Patient is irritable and mood is labile.  Patient is untidy and disheveled.    1221:  Patient asks this writer to communicate with NP.  \"I told her im going to stop taking my meds when I leave here.  I'm not going to stop taking them. I feel tired and groggy from the Abilify.\"  Patient states he feels like a \"remedial\".  \"You guys are forcing fucking meds down my throat that I don't need.  I wasn't on meds before I got here and I was doing just fine.\"  Patient is aware Abilify was discontinued and Depakote will start today at HS.  \"I feel like Depakote is an end of the line drug. It's a last resrt like Haldol.\"  PRN Ativan 1 mg po and benadryl 50 mg po administered. Patient appears to be increasingly agitated as he talks about medications.            Problem: Thought Process Alteration  Goal: Optimal Thought Clarity  Description: Pt will participate in nursing assessment by discharge  Pt will be able to complete his ADL's independently by discharge  Pt will be able to ask for his needs to be met by discharge  Outcome: No Change  Note: Patient is not compliant with nursing assessment.       "

## 2021-03-26 PROCEDURE — 250N000013 HC RX MED GY IP 250 OP 250 PS 637: Performed by: NURSE PRACTITIONER

## 2021-03-26 PROCEDURE — 124N000004

## 2021-03-26 PROCEDURE — 99233 SBSQ HOSP IP/OBS HIGH 50: CPT | Performed by: NURSE PRACTITIONER

## 2021-03-26 RX ORDER — CLONAZEPAM 1 MG/1
1 TABLET ORAL EVERY 8 HOURS PRN
Status: DISCONTINUED | OUTPATIENT
Start: 2021-03-26 | End: 2021-03-30

## 2021-03-26 RX ORDER — CLONAZEPAM 1 MG/1
2 TABLET ORAL 3 TIMES DAILY
Status: DISCONTINUED | OUTPATIENT
Start: 2021-03-26 | End: 2021-04-01

## 2021-03-26 RX ADMIN — GABAPENTIN 800 MG: 400 CAPSULE ORAL at 14:46

## 2021-03-26 RX ADMIN — CLONAZEPAM 1 MG: 1 TABLET ORAL at 20:04

## 2021-03-26 RX ADMIN — MELATONIN 6 MG: 3 TAB ORAL at 01:01

## 2021-03-26 RX ADMIN — CLONAZEPAM 1.5 MG: 1 TABLET ORAL at 08:00

## 2021-03-26 RX ADMIN — HYDROXYZINE HYDROCHLORIDE 50 MG: 25 TABLET, FILM COATED ORAL at 16:29

## 2021-03-26 RX ADMIN — DIPHENHYDRAMINE HYDROCHLORIDE 50 MG: 50 CAPSULE ORAL at 16:29

## 2021-03-26 RX ADMIN — LORAZEPAM 1 MG: 1 TABLET ORAL at 11:38

## 2021-03-26 RX ADMIN — GABAPENTIN 800 MG: 400 CAPSULE ORAL at 21:32

## 2021-03-26 RX ADMIN — DIPHENHYDRAMINE HYDROCHLORIDE 50 MG: 50 CAPSULE ORAL at 08:27

## 2021-03-26 RX ADMIN — CLONAZEPAM 2 MG: 1 TABLET ORAL at 21:32

## 2021-03-26 RX ADMIN — GABAPENTIN 800 MG: 400 CAPSULE ORAL at 08:00

## 2021-03-26 RX ADMIN — DIVALPROEX SODIUM 750 MG: 500 TABLET, EXTENDED RELEASE ORAL at 21:32

## 2021-03-26 RX ADMIN — CLONAZEPAM 2 MG: 1 TABLET ORAL at 14:46

## 2021-03-26 RX ADMIN — CLONAZEPAM 1.5 MG: 1 TABLET ORAL at 01:01

## 2021-03-26 ASSESSMENT — ACTIVITIES OF DAILY LIVING (ADL)
HYGIENE/GROOMING: INDEPENDENT;PROMPTS
LAUNDRY: UNABLE TO COMPLETE
DRESS: INDEPENDENT;SCRUBS (BEHAVIORAL HEALTH)
ORAL_HYGIENE: INDEPENDENT

## 2021-03-26 NOTE — PLAN OF CARE
Andrade called [facility] and spoke with James. He stated that they are in the process of reviewing the [facility] referral and [place] paper work and the patient will be put on the [facility] list. James recommended calling mid next week to see if the  has him open to all.     ANDRADE faxed updated notes to [facility] for the [facility] list.

## 2021-03-26 NOTE — PLAN OF CARE
1138: Pt requested and received Ativan 1mg PO after speaking with April EDWARD. Pt irritable on approach. Tense affect. April CNP agreeable to administration of this dose.

## 2021-03-26 NOTE — PLAN OF CARE
"  Problem: Behavioral Health Plan of Care  Goal: Patient-Specific Goal (Individualization)  Description: Pt will be compliant with treatment team recommendations during hospitalization.   Pt will be medication compliant.   Pt will participate in nursing assessment.   Pt will eat at least 50% of meals.   Weaning Care Plan: 3/26/21 Patient is allowed to wean to open unit for phone use/drinks if behavior is appropriate. Treatment team to re-assess daily.     ELOPEMENT RISK     Outcome: No Change  Note: Shift Summery:  Patient is irritable and tense this shift. Patient is upset he did not receive a lorazepam at 1100 yesterday.  Attempted to explain this medication is PRN and is not scheduled.  Patient became somewhat agitated \"I need it at 11 and 5 pm. They wouldn't give it to me until after 6 pm yesterday.\"  Will reassess at 1100 to see if patient meets the criteria for administration of lorazepam.  0827:  PRN diphenhydramine 50 mg po administered r/t agitation.  His affect is tense.  Patient minimally participates in nursing assessment.  Patient is untidy and disheveled.  He refused shower supplies.  \"I showered yesterday.\"     Problem: Thought Process Alteration  Goal: Optimal Thought Clarity  Description: Pt will participate in nursing assessment by discharge  Pt will be able to complete his ADL's independently by discharge  Pt will be able to ask for his needs to be met by discharge  Outcome: No Change  Note: Patient minimally participates in nursing assessment.  If patient wants to talk about a topic, his speech is pressured and tangential.       "

## 2021-03-26 NOTE — PLAN OF CARE
"Became irritated when another patient was leaving. \"Don't believe anything they say to you they lie.\" Upset that he is still here and feels staff are all against him. \"They don't even talk to me about a plan.Because they don't have one. They will send me somewhere but when.\" Refused to take haldol and benadryl. \"I hate medications. I have to stop whinnying.Can't I have the klonopin..\" Informed him orders had changed. Agreed to take a shower.  Asked for atarax and benadryl after shower.  [facility] 1640[facility] with [facility]. \" Do you know how many times I have had to start over. Eight times were I lost everything and had to start over.\" Fell asleep on the floor for awhile. Weaned out to make a phone call. Behavior is now in control but states still feels angry and frustrated. Given an Klonopin 1 mg at 2000. Took his HS meds at 2145.    Face to face end of shift report communicated to night shift RN.     Neema Dias RN  3/26/2021  9:58 PM            Problem: Behavioral Health Plan of Care  Goal: Patient-Specific Goal (Individualization)  Description: Pt will be compliant with treatment team recommendations during hospitalization.   Pt will be medication compliant.   Pt will participate in nursing assessment.   Pt will eat at least 50% of meals.   Weaning Care Plan: 3/26/21 Patient is allowed to wean to open unit for phone use/drinks if behavior is appropriate. Treatment team to re-assess daily.     ELOPEMENT RISK     Outcome: No Change     Problem: Thought Process Alteration  Goal: Optimal Thought Clarity  Description: Pt will participate in nursing assessment by discharge  Pt will be able to complete his ADL's independently by discharge  Pt will be able to ask for his needs to be met by discharge  Outcome: No Change     "

## 2021-03-26 NOTE — PROGRESS NOTES
"[facility]  Psychiatric Progress Note      Impression:   This is a 30 year old yo male with a history of schizoaffective disorder, TBI, and polysubstance abuse.    Slept 7 hours. Was not as argumentative and confrontational with staff. continues to feel depakote and other psychiatric medications \"make me feel fake\".  Steven is still argumentative today though not as argumentative.  He appears to be very irritable with a sad affect.  He is not aggressive verbally though has minimal to no insight.  He states that he feels much better on the clonazepam and the Ativan makes him too tired.  He feels that a higher dose of clonazepam with less frequency would help him more.  I did tell him that I would not be increasing his overall daytime dose so I could increase his dose and spread out the frequency through the day.  He continuously states that he absolutely does not want to go to an Ertz program and that he had just finished an Ertz program.  He also states \"I am not going to [facility] I do not want to go to another hospital and want to go home\" he believes that he is stable.  I told him there has been some improvement since I first met with him though he is not stable by any means.  He asked why he was not and I said that he does not socialize with any other people and he is very irritable.  He states \"it is because I hate people because I have been flopped over so many times\" he is very accusatory of staff and myself of causing the situation for him.  His accusatory statements are not necessarily delusional though definitely misperceptions.  He is not grossly delusional though very accusatory      Educated regarding medication indications, risks, benefits, side effects, contraindications and possible interactions. Verbally expressed understanding.        Diagnoses:     Bipolar disorder type I, most recent episode depressed versus mixed severe with catatonic features  Schizoaffective disorder- depressed type " "(by history)  R/o substance induced psychosis  ADHD, unspecified  History of TBI  History of polysubstance abuse (amphetamines most recent)      Attestation:  Patient has been seen and evaluated by me,  Ashely Heaton NP          Interim History:   The patient's care was discussed with the treatment team and chart notes were reviewed.               Medications:     Current Facility-Administered Medications Ordered in Epic   Medication Dose Route Frequency Last Rate Last Admin     acetaminophen (TYLENOL) tablet 650 mg  650 mg Oral Q4H PRN         alum & mag hydroxide-simethicone (MAALOX) suspension 30 mL  30 mL Oral Q4H PRN         benztropine (COGENTIN) tablet 0.5 mg  0.5 mg Oral BID PRN         fluPHENAZine (PROLIXIN) tablet 5 mg  5 mg Oral At Bedtime         gabapentin (NEURONTIN) capsule 300 mg  300 mg Oral TID         hydrOXYzine (ATARAX) tablet 25-50 mg  25-50 mg Oral Q4H PRN         LORazepam (ATIVAN) tablet 1 mg  1 mg Oral Q6H PRN        Or     LORazepam (ATIVAN) injection 1 mg  1 mg Intramuscular Q6H PRN         LORazepam (ATIVAN) tablet 1 mg  1 mg Oral TID         melatonin tablet 3 mg  3 mg Oral At Bedtime PRN         nicotine (NICORETTE) gum 2 mg  2 mg Buccal Q1H PRN         OLANZapine zydis (zyPREXA) ODT tab 10 mg  10 mg Oral TID PRN        Or     OLANZapine (zyPREXA) injection 10 mg  10 mg Intramuscular TID PRN         propranolol (INDERAL) tablet 10 mg  10 mg Oral BID PRN         No current [facility]-ordered outpatient medications on file.            10 point ROS- uncooperative with assessment       Allergies:     Allergies   Allergen Reactions     Pork Allergy             Psychiatric Examination:   /75   Pulse 76   Temp 96  F (35.6  C) (Temporal)   Resp 16   Ht 1.905 m (6' 3\")   Wt 101.1 kg (222 lb 12.8 oz)   SpO2 97%   BMI 27.85 kg/m    Weight is 222 lbs 12.8 oz  Body mass index is 27.85 kg/m .    Appearance:  dressed in hospital scrubs, appeared as age stated and slightly " "unkempt  Attitude: Still argumentative though not as agitated  Eye Contact: Intact  Mood: Flat  Affect: Restricted though not as blunted  Speech: Not pressured not tangential  Psychomotor Behavior:  no evidence of tardive dyskinesia, dystonia, or tics  Thought Process: No current delusional thoughts although thought process is very fixated on how \"people fuck me over all of the time\".  Associations: No loose associations  Thought Content: Some delusional.  Denies suicidal ideation no thoughts to harm others.    Insight: Limited  Judgment: Very poor  Oriented to: Oriented to all spheres  Attention Span and Concentration: Appears to be intact  Recent and Remote Memory: Intact  Fund of Knowledge: Average  Muscle Strength and Tone: normal  Gait and Station: Normal           Labs:     No results found for this or any previous visit (from the past 24 hour(s)).   BEHAVIORAL TEAM DISCUSSION    Progress: none. Is taking depakote though makes comments about \"I just want ot go home and drink:\"does not want to take naltrexone. Was offered     Continued Stay Criteria/ ationale: Very minimal insight.  Increase depakote to 7 or 50 mg will monitor ammonia levels and any signs of elevated ammonia closely as he is on clonazepam with Depakote    Medical/Physical: None  Precautions:   Falls precaution?: No  Behavioral Orders   Procedures     Code 1 - Restrict to Unit     Elopement precautions     Routine Programming     As clinically indicated     Status 15     Every 15 minutes.       Plan:     Decrease frequency of clonazepam though increase scheduled doses.  Will not start tapering this medication yet  Change as needed Ativan to clonazepam  Increase depakote to 750 today  Monitoring closely for symptoms of elevated ammonia as he is on Depakote and clonazepam which can both contribute to elevated ammonia.                                                                                             "

## 2021-03-26 NOTE — PLAN OF CARE
Problem: Behavioral Health Plan of Care  Goal: Patient-Specific Goal (Individualization)  Description: Pt will be compliant with treatment team recommendations during hospitalization.   Pt will be medication compliant.   Pt will participate in nursing assessment.   Pt will eat at least 50% of meals.   Weaning Care Plan: 3/23/21 Patient is allowed to wean to open unit for phone use/drinks if behavior is appropriate. Treatment team to re-assess daily.     ELOPEMENT RISK     Outcome: No Change  Note: Report received from Connie. Rounding complete. Pt observed sleeping in right side lying position with regular and unlabored respirations.    0101- Pt requested and was admin 6 mg melatonin for sleep with his scheduled 1.5 mg klonopin. Pt was much more pleasant and polite this NOC shift. He was not argumentative and made no delusional, accusatory, or paranoid statements this shift.     Pt slept approx 7 hours this NOC shift     Pt has been in bed with eyes closed and regular respirations. 15 minute and PRN checks all night. No complaints offered. Will continue to monitor.    Face to face end of shift report communicated to oncoming RN.    March 25, 2021  11:42 PM          Problem: Thought Process Alteration  Goal: Optimal Thought Clarity  Description: Pt will participate in nursing assessment by discharge  Pt will be able to complete his ADL's independently by discharge  Pt will be able to ask for his needs to be met by discharge  Outcome: No Change  Note: See above note. No issues or concerns noted at this time.

## 2021-03-27 LAB
AMPHETAMINES UR QL: NOT DETECTED NG/ML
BARBITURATES UR QL SCN: NOT DETECTED NG/ML
BENZODIAZ UR QL SCN: ABNORMAL NG/ML
BUPRENORPHINE UR QL: NOT DETECTED NG/ML
CANNABINOIDS UR QL: NOT DETECTED NG/ML
COCAINE UR QL SCN: NOT DETECTED NG/ML
D-METHAMPHET UR QL: NOT DETECTED NG/ML
METHADONE UR QL SCN: NOT DETECTED NG/ML
OPIATES UR QL SCN: NOT DETECTED NG/ML
OXYCODONE UR QL SCN: NOT DETECTED NG/ML
PCP UR QL SCN: NOT DETECTED NG/ML
PROPOXYPH UR QL: NOT DETECTED NG/ML
TRICYCLICS UR QL SCN: NOT DETECTED NG/ML

## 2021-03-27 PROCEDURE — 80306 DRUG TEST PRSMV INSTRMNT: CPT | Performed by: NURSE PRACTITIONER

## 2021-03-27 PROCEDURE — 124N000004

## 2021-03-27 PROCEDURE — 99232 SBSQ HOSP IP/OBS MODERATE 35: CPT | Performed by: NURSE PRACTITIONER

## 2021-03-27 PROCEDURE — 250N000013 HC RX MED GY IP 250 OP 250 PS 637: Performed by: NURSE PRACTITIONER

## 2021-03-27 RX ORDER — DIVALPROEX SODIUM 500 MG/1
1000 TABLET, EXTENDED RELEASE ORAL AT BEDTIME
Status: DISCONTINUED | OUTPATIENT
Start: 2021-03-27 | End: 2021-03-30

## 2021-03-27 RX ADMIN — DIPHENHYDRAMINE HYDROCHLORIDE 50 MG: 50 CAPSULE ORAL at 21:20

## 2021-03-27 RX ADMIN — GABAPENTIN 800 MG: 400 CAPSULE ORAL at 21:20

## 2021-03-27 RX ADMIN — CLONAZEPAM 2 MG: 1 TABLET ORAL at 08:12

## 2021-03-27 RX ADMIN — CLONAZEPAM 1 MG: 1 TABLET ORAL at 15:56

## 2021-03-27 RX ADMIN — IBUPROFEN 600 MG: 600 TABLET ORAL at 00:43

## 2021-03-27 RX ADMIN — DIPHENHYDRAMINE HYDROCHLORIDE 50 MG: 50 CAPSULE ORAL at 00:43

## 2021-03-27 RX ADMIN — MELATONIN 6 MG: 3 TAB ORAL at 00:43

## 2021-03-27 RX ADMIN — CLONAZEPAM 2 MG: 1 TABLET ORAL at 13:43

## 2021-03-27 RX ADMIN — GABAPENTIN 800 MG: 400 CAPSULE ORAL at 13:43

## 2021-03-27 RX ADMIN — CLONAZEPAM 2 MG: 1 TABLET ORAL at 21:19

## 2021-03-27 RX ADMIN — ACETAMINOPHEN 650 MG: 325 TABLET, FILM COATED ORAL at 00:42

## 2021-03-27 RX ADMIN — MELATONIN 6 MG: 3 TAB ORAL at 23:51

## 2021-03-27 RX ADMIN — CLONAZEPAM 1 MG: 1 TABLET ORAL at 23:50

## 2021-03-27 RX ADMIN — GABAPENTIN 800 MG: 400 CAPSULE ORAL at 08:12

## 2021-03-27 RX ADMIN — HYDROXYZINE HYDROCHLORIDE 50 MG: 25 TABLET, FILM COATED ORAL at 13:43

## 2021-03-27 RX ADMIN — DIVALPROEX SODIUM 1000 MG: 500 TABLET, EXTENDED RELEASE ORAL at 21:20

## 2021-03-27 ASSESSMENT — MIFFLIN-ST. JEOR: SCORE: 2087.98

## 2021-03-27 NOTE — PROGRESS NOTES
"[facility]  Psychiatric Progress Note      Impression:   This is a 30 year old yo male with a history of schizoaffective disorder, TBI, and polysubstance abuse.    Steven is still slightly irritable though not agitated. He continues to state \"I dont want to come out to the lounge. I want to go home, plus staff will not let me come to the lounge\". His affect and mood are tense and distressed though he is not pressurred. He constantly returns to the same topics almost verbatim each time I see him. He states he hasnt been sleeping well at night beucase \"I sleep all day\". I did tell him that I do not want him sleeping all day and that he will need to come out to the lounge and show social improvments to have any consideration of being discharged as he is currently not viewed as being stable. He states \"what do you mean Im not stable' I explain my rationale every day to him and he has no insight. He is not appearing to be responding to hallucinations. He denies hallucinations.     Educated regarding medication indications, risks, benefits, side effects, contraindications and possible interactions. Verbally expressed understanding.        Diagnoses:     Bipolar disorder type I, most recent episode depressed versus mixed severe with catatonic features  Schizoaffective disorder- depressed type (by history)  R/o substance induced psychosis  ADHD, unspecified  History of TBI  History of polysubstance abuse (amphetamines most recent)      Attestation:  Patient has been seen and evaluated by me,  Ashely Heaton NP          Interim History:   The patient's care was discussed with the treatment team and chart notes were reviewed.               Medications:     Current Facility-Administered Medications Ordered in Epic   Medication Dose Route Frequency Last Rate Last Admin     acetaminophen (TYLENOL) tablet 650 mg  650 mg Oral Q4H PRN         alum & mag hydroxide-simethicone (MAALOX) suspension 30 mL  30 mL " "Oral Q4H PRN         benztropine (COGENTIN) tablet 0.5 mg  0.5 mg Oral BID PRN         fluPHENAZine (PROLIXIN) tablet 5 mg  5 mg Oral At Bedtime         gabapentin (NEURONTIN) capsule 300 mg  300 mg Oral TID         hydrOXYzine (ATARAX) tablet 25-50 mg  25-50 mg Oral Q4H PRN         LORazepam (ATIVAN) tablet 1 mg  1 mg Oral Q6H PRN        Or     LORazepam (ATIVAN) injection 1 mg  1 mg Intramuscular Q6H PRN         LORazepam (ATIVAN) tablet 1 mg  1 mg Oral TID         melatonin tablet 3 mg  3 mg Oral At Bedtime PRN         nicotine (NICORETTE) gum 2 mg  2 mg Buccal Q1H PRN         OLANZapine zydis (zyPREXA) ODT tab 10 mg  10 mg Oral TID PRN        Or     OLANZapine (zyPREXA) injection 10 mg  10 mg Intramuscular TID PRN         propranolol (INDERAL) tablet 10 mg  10 mg Oral BID PRN         No current Epic-ordered outpatient medications on file.            10 point ROS- uncooperative with assessment       Allergies:     Allergies   Allergen Reactions     Pork Allergy             Psychiatric Examination:   /67   Pulse 66   Temp 96  F (35.6  C) (Temporal)   Resp 16   Ht 1.905 m (6' 3\")   Wt 105.2 kg (232 lb)   SpO2 96%   BMI 29.00 kg/m    Weight is 232 lbs 0 oz  Body mass index is 29 kg/m .    Appearance:  dressed in hospital scrubs, appeared as age stated and slightly unkempt  Attitude: Still argumentative though not as agitated  Eye Contact: Intact  Mood: Flat  Affect: Restricted though not as blunted  Speech: Not pressured not tangential  Psychomotor Behavior:  no evidence of tardive dyskinesia, dystonia, or tics  Thought Process: No current delusional thoughts although thought process is very fixated on how \"people fuck me over all of the time\".  Associations: No loose associations  Thought Content: Some delusional.  Denies suicidal ideation no thoughts to harm others.    Insight: Limited  Judgment: Very poor  Oriented to: Oriented to all spheres  Attention Span and Concentration: Appears to be " "intact  Recent and Remote Memory: Intact  Fund of Knowledge: Average  Muscle Strength and Tone: normal  Gait and Station: Normal           Labs:     Results for orders placed or performed during the hospital encounter of 03/07/21 (from the past 24 hour(s))   Urine Drugs of Abuse Screen Panel 13   Result Value Ref Range    Cannabinoids (19-mzh-1-carboxy-9-THC) Not Detected NDET^Not Detected ng/mL    Phencyclidine (Phencyclidine) Not Detected NDET^Not Detected ng/mL    Cocaine (Benzoylecgonine) Not Detected NDET^Not Detected ng/mL    Methamphetamine (d-Methamphetamine) Not Detected NDET^Not Detected ng/mL    Opiates (Morphine) Not Detected NDET^Not Detected ng/mL    Amphetamine (d-Amphetamine) Not Detected NDET^Not Detected ng/mL    Benzodiazepines (Nordiazepam) Detected, Abnormal Result (A) NDET^Not Detected ng/mL    Tricyclic Antidepressants (Desipramine) Not Detected NDET^Not Detected ng/mL    Methadone (Methadone) Not Detected NDET^Not Detected ng/mL    Barbiturates (Butalbital) Not Detected NDET^Not Detected ng/mL    Oxycodone (Oxycodone) Not Detected NDET^Not Detected ng/mL    Propoxyphene (Norpropoxyphene) Not Detected NDET^Not Detected ng/mL    Buprenorphine (Buprenorphine) Not Detected NDET^Not Detected ng/mL      BEHAVIORAL TEAM DISCUSSION    Progress: none. Is taking depakote though makes comments about \"I just want ot go home and drink:\"does not want to take naltrexone. Was offered     Continued Stay Criteria/ ationale: Very minimal insight.  Increase depakote to 7 or 50 mg will monitor ammonia levels and any signs of elevated ammonia closely as he is on clonazepam with Depakote    Medical/Physical: None  Precautions:   Falls precaution?: No  Behavioral Orders   Procedures     Code 1 - Restrict to Unit     Elopement precautions     Routine Programming     As clinically indicated     Status 15     Every 15 minutes.       Plan:     Increase depakote to 1000 mg   Monitoring closely for symptoms of elevated " ammonia as he is on Depakote and clonazepam which can both contribute to elevated ammonia.

## 2021-03-27 NOTE — PLAN OF CARE
Has weaned to open side. Made several phone calls and has been social with peers. Asks a lot of questions about his stay here. [facility] His medications and when he had some and when he can have more.   Asking for double portions to be ordered. Note left for NP.        Problem: Behavioral Health Plan of Care  Goal: Patient-Specific Goal (Individualization)  Description: Pt will be compliant with treatment team recommendations during hospitalization.   Pt will be medication compliant.   Pt will participate in nursing assessment.   Pt will eat at least 50% of meals.   Weaning Care Plan: 3/26/21 Patient is allowed to wean to open unit for phone use/drinks if behavior is appropriate. Treatment team to re-assess daily.     ELOPEMENT RISK     Outcome: No Change     Problem: Thought Process Alteration  Goal: Optimal Thought Clarity  Description: Pt will participate in nursing assessment by discharge  Pt will be able to complete his ADL's independently by discharge  Pt will be able to ask for his needs to be met by discharge  Outcome: No Change

## 2021-03-27 NOTE — PLAN OF CARE
"  Problem: Behavioral Health Plan of Care  Goal: Patient-Specific Goal (Individualization)  Description: Pt will be compliant with treatment team recommendations during hospitalization.   Pt will be medication compliant.   Pt will participate in nursing assessment.   Pt will eat at least 50% of meals.   Weaning Care Plan: 3/26/21 Patient is allowed to wean to open unit for phone use/drinks if behavior is appropriate. Treatment team to re-assess daily.   ELOPEMENT RISK   Outcome: Improving  He is awake in his room this morning. He ate all of his breakfast. He is irritable with conversation. He states \"are you here to provoke me and hurry through this\".  He is taking his medication as prescribed. He makes very little eye contact.   1343: patient is anxious. He requested and received hydroxyxine 50 mg at this time.   1425: requested and received shower supplies. He is also requesting to speak with his provider. April, NP notified.     Problem: Thought Process Alteration  Goal: Optimal Thought Clarity  Description: Pt will participate in nursing assessment by discharge  Pt will be able to complete his ADL's independently by discharge  Pt will be able to ask for his needs to be met by discharge  Outcome: Improving  He doesn't answer questions appropriately. He is irritable with interaction. He is able to make his needs known. He denies hallucinations.      10:30: patient is up in the hallway. Urine obtained per orders. Patient now has increased paranoia. He talks of \"If anything comes up in that tox screen, that is bull shit, it was that oval white pill that that lady slipped me yesterday\". \"If this fucks me over, that is bull shit, I didn't do any drugs\".    Face to face end of shift report to be communicated to evening shift RN.     Tobi Jordan RN  3/27/2021  2:26 PM          "

## 2021-03-27 NOTE — PLAN OF CARE
Took his HS meds plus benadryl 50 for bedtime. In bed at present.    Face to face end of shift report communicated to night nurse RN.     Neema Dias RN  3/27/2021  9:34 PM          Problem: Behavioral Health Plan of Care  Goal: Patient-Specific Goal (Individualization)  Description: Pt will be compliant with treatment team recommendations during hospitalization.   Pt will be medication compliant.   Pt will participate in nursing assessment.   Pt will eat at least 50% of meals.   Weaning Care Plan: 3/26/21 Patient is allowed to wean to open unit for phone use/drinks if behavior is appropriate. Treatment team to re-assess daily.     ELOPEMENT RISK     Outcome: No Change     Problem: Thought Process Alteration  Goal: Optimal Thought Clarity  Description: Pt will participate in nursing assessment by discharge  Pt will be able to complete his ADL's independently by discharge  Pt will be able to ask for his needs to be met by discharge  Outcome: No Change

## 2021-03-27 NOTE — PLAN OF CARE
Problem: Behavioral Health Plan of Care  Goal: Patient-Specific Goal (Individualization)  Description: Pt will be compliant with treatment team recommendations during hospitalization.   Pt will be medication compliant.   Pt will participate in nursing assessment.   Pt will eat at least 50% of meals.   Weaning Care Plan: 3/26/21 Patient is allowed to wean to open unit for phone use/drinks if behavior is appropriate. Treatment team to re-assess daily.     ELOPEMENT RISK     Outcome: Improving  Note: Report received from Neema. Rounding complete. Pt observed sleeping in supine position with regular and unlabored respirations.    0043- Pt requested and was admin melatonin 6 mg and benadryl 50 mg for sleep and mild agitation, and Tylenol 650 mg and  mg for 5/10 right lower wisdom tooth pain. Pt is still upset about minor confusion with a med time on 3/25/21 evening shift (feels staff told him a wrong time his med would be available again by 2 hours later- no evidence that pt was misadvised) but is not overly agitated and verbalizes his frustration in an acceptable way. He does seem calmer and not as tense when verbalizing his frustration compared to previous shifts.     0330- Pt awake and eating a snack in bed of juice and chips    Pt has been in bed with eyes closed and regular respirations. 15 minute and PRN checks all night. No complaints offered. Will continue to monitor.    Pt slept approx 6 hours this NOC shift. He was fairly pleasant and cooperative at interactions with staff and polite with all requests.    Face to face end of shift report communicated to oncoming RN.    March 26, 2021  11:50 PM          Problem: Thought Process Alteration  Goal: Optimal Thought Clarity  Description: Pt will participate in nursing assessment by discharge  Pt will be able to complete his ADL's independently by discharge  Pt will be able to ask for his needs to be met by discharge  Outcome: Improving  Note: Unable to  assess due to pt sleeping. No issues or concerns noted at this time.

## 2021-03-28 PROCEDURE — 250N000013 HC RX MED GY IP 250 OP 250 PS 637: Performed by: NURSE PRACTITIONER

## 2021-03-28 PROCEDURE — 124N000004

## 2021-03-28 RX ADMIN — CLONAZEPAM 1 MG: 1 TABLET ORAL at 17:12

## 2021-03-28 RX ADMIN — CLONAZEPAM 2 MG: 1 TABLET ORAL at 08:16

## 2021-03-28 RX ADMIN — DIPHENHYDRAMINE HYDROCHLORIDE 50 MG: 50 CAPSULE ORAL at 23:18

## 2021-03-28 RX ADMIN — CLONAZEPAM 1 MG: 1 TABLET ORAL at 11:12

## 2021-03-28 RX ADMIN — DIVALPROEX SODIUM 1000 MG: 500 TABLET, EXTENDED RELEASE ORAL at 21:02

## 2021-03-28 RX ADMIN — IBUPROFEN 600 MG: 600 TABLET ORAL at 01:58

## 2021-03-28 RX ADMIN — GABAPENTIN 800 MG: 400 CAPSULE ORAL at 21:01

## 2021-03-28 RX ADMIN — IBUPROFEN 600 MG: 600 TABLET ORAL at 23:18

## 2021-03-28 RX ADMIN — GABAPENTIN 800 MG: 400 CAPSULE ORAL at 08:16

## 2021-03-28 RX ADMIN — CLONAZEPAM 2 MG: 1 TABLET ORAL at 21:01

## 2021-03-28 RX ADMIN — CLONAZEPAM 2 MG: 1 TABLET ORAL at 14:28

## 2021-03-28 RX ADMIN — ACETAMINOPHEN 650 MG: 325 TABLET, FILM COATED ORAL at 23:18

## 2021-03-28 RX ADMIN — MELATONIN 6 MG: 3 TAB ORAL at 21:02

## 2021-03-28 RX ADMIN — ACETAMINOPHEN 650 MG: 325 TABLET, FILM COATED ORAL at 01:58

## 2021-03-28 RX ADMIN — GABAPENTIN 800 MG: 400 CAPSULE ORAL at 14:29

## 2021-03-28 ASSESSMENT — ACTIVITIES OF DAILY LIVING (ADL)
HYGIENE/GROOMING: INDEPENDENT
ORAL_HYGIENE: INDEPENDENT
DRESS: SCRUBS (BEHAVIORAL HEALTH)
LAUNDRY: UNABLE TO COMPLETE

## 2021-03-28 NOTE — PLAN OF CARE
Problem: Thought Process Alteration  Goal: Optimal Thought Clarity  Description: Pt will participate in nursing assessment by discharge  Pt will be able to complete his ADL's independently by discharge  Pt will be able to ask for his needs to be met by discharge  Outcome: Improving   Patient is able to have a linear, reality based conversation with this writer.  He denies hallucinations and delusional thinking.        Problem: Behavioral Health Plan of Care  Goal: Patient-Specific Goal (Individualization)  Description: Pt will be compliant with treatment team recommendations during hospitalization.   Pt will be medication compliant.   Pt will participate in nursing assessment.   Pt will eat at least 50% of meals.   Weaning Care Plan: 3/26/21 Patient is allowed to wean to open unit for phone use/drinks if behavior is appropriate. Treatment team to re-assess daily.     ELOPEMENT RISK     Outcome: Improving  Note:      Patient has been isolative to his room much of the day.  He comes out to the lounge briefly to make phone calls.  He did shower independently today.  Denies all mental health criteria.  Affect is blunted and flat.  He states he feels anxious and agitated much of the time.  Requested PRN medication and was given Klonopin 1 mg at 1112 with good relief of symptoms. At end of the shift patient became irritable with staff over his medications.  He states he is suppose to be getting his Klonopin every 3 hours and is angry that his med times keep changing.  Provided education to patient about doses and times but he just became more frustrated and upset.  Patient was not verbally threatening or aggressive in anyway just frustrated that he doesn't understand his medication schedule.   No complaints of pain.  VS WNL.  Face to face end of shift report communicated to evening shift RN.     Shantal Real RN  3/28/2021  1:12 PM

## 2021-03-28 NOTE — PLAN OF CARE
2345-Pt requested something for sleep.  Administered PRN Melatonin and Klonopin per MD order.  Update given to primary RN.

## 2021-03-28 NOTE — PLAN OF CARE
"  Problem: Behavioral Health Plan of Care  Goal: Patient-Specific Goal (Individualization)  Description: Pt will be compliant with treatment team recommendations during hospitalization.   Pt will be medication compliant.   Pt will participate in nursing assessment.   Pt will eat at least 50% of meals.   Weaning Care Plan: 3/26/21 Patient is allowed to wean to open unit for phone use/drinks if behavior is appropriate. Treatment team to re-assess daily.     ELOPEMENT RISK     Outcome: Improving  Note: Report received from Neema. Rounding complete.     2350- Per other RN on shift tonight... Pt c/o not being able to sleep and being slightly agitated with feeling like the increase in depakote is not helping him sleep \"like they said it would, and I ain't sleeping. It's not working.\" Pt requested and was admin 6 mg melatonin and 1 mg clonazepam. Pt returned to his bed and laid down.    0158- Pt c/o right lower wisdom tooth pain again. Pt requested and was admin 650 mg Tylenol and 600 mg IB. He also requested and was provided a snack of pepe crackers and orange juice. Pt again back to bed and laying in supine position with no other complaints offered. He is polite and cooperative with staff this NOC shift.     0245- Pt observed sleeping in supine position with regular and unlabored respirations.    Pt has been in bed with eyes closed and regular respirations. 15 minute and PRN checks all night. No complaints offered. Will continue to monitor.    Pt slept approx 6 hours total this NOC shift    Face to face end of shift report communicated to oncoming RN.    March 28, 2021  2:19 AM          Problem: Thought Process Alteration  Goal: Optimal Thought Clarity  Description: Pt will participate in nursing assessment by discharge  Pt will be able to complete his ADL's independently by discharge  Pt will be able to ask for his needs to be met by discharge  Outcome: Improving  Note: Pt is able to makes needs known and is able to " maintain linear and reality based conversations. No issues or concerns noted at this time.

## 2021-03-29 PROCEDURE — 250N000013 HC RX MED GY IP 250 OP 250 PS 637: Performed by: NURSE PRACTITIONER

## 2021-03-29 PROCEDURE — 99232 SBSQ HOSP IP/OBS MODERATE 35: CPT | Performed by: NURSE PRACTITIONER

## 2021-03-29 PROCEDURE — 124N000004

## 2021-03-29 RX ADMIN — CLONAZEPAM 1 MG: 1 TABLET ORAL at 01:08

## 2021-03-29 RX ADMIN — CLONAZEPAM 1 MG: 1 TABLET ORAL at 11:28

## 2021-03-29 RX ADMIN — GABAPENTIN 800 MG: 400 CAPSULE ORAL at 07:32

## 2021-03-29 RX ADMIN — CLONAZEPAM 2 MG: 1 TABLET ORAL at 22:30

## 2021-03-29 RX ADMIN — HYDROXYZINE HYDROCHLORIDE 50 MG: 25 TABLET, FILM COATED ORAL at 12:57

## 2021-03-29 RX ADMIN — IBUPROFEN 600 MG: 600 TABLET ORAL at 22:35

## 2021-03-29 RX ADMIN — MELATONIN 6 MG: 3 TAB ORAL at 22:35

## 2021-03-29 RX ADMIN — CLONAZEPAM 2 MG: 1 TABLET ORAL at 07:33

## 2021-03-29 RX ADMIN — CLONAZEPAM 1 MG: 1 TABLET ORAL at 19:30

## 2021-03-29 RX ADMIN — GABAPENTIN 800 MG: 400 CAPSULE ORAL at 13:57

## 2021-03-29 RX ADMIN — DIVALPROEX SODIUM 1000 MG: 500 TABLET, EXTENDED RELEASE ORAL at 21:58

## 2021-03-29 RX ADMIN — ACETAMINOPHEN 650 MG: 325 TABLET, FILM COATED ORAL at 22:34

## 2021-03-29 RX ADMIN — CLONAZEPAM 2 MG: 1 TABLET ORAL at 13:57

## 2021-03-29 RX ADMIN — GABAPENTIN 800 MG: 400 CAPSULE ORAL at 22:35

## 2021-03-29 ASSESSMENT — ACTIVITIES OF DAILY LIVING (ADL)
HYGIENE/GROOMING: INDEPENDENT
ORAL_HYGIENE: INDEPENDENT
DRESS: SCRUBS (BEHAVIORAL HEALTH);INDEPENDENT
LAUNDRY: UNABLE TO COMPLETE

## 2021-03-29 NOTE — PLAN OF CARE
Weaned a lot tonight. Ate supper in his room but brought out his own dishes in a garbage bag. Inquired about his medications only once tonight and has been much more polite with peers and staff. Klonopin 1 mg given after supper.2321 tylenol  650 Ibuprofen 600 and benadryl 50 given for toothache.    Face to face end of shift report communicated to night shift RN.     Neema Dias RN  3/28/2021  9:39 PM          Problem: Behavioral Health Plan of Care  Goal: Patient-Specific Goal (Individualization)  Description: Pt will be compliant with treatment team recommendations during hospitalization.   Pt will be medication compliant.   Pt will participate in nursing assessment.   Pt will eat at least 50% of meals.   Weaning Care Plan: 3/26/21 Patient is allowed to wean to open unit for phone use/drinks if behavior is appropriate. Treatment team to re-assess daily.     ELOPEMENT RISK     Outcome: Improving     Problem: Thought Process Alteration  Goal: Optimal Thought Clarity  Description: Pt will participate in nursing assessment by discharge  Pt will be able to complete his ADL's independently by discharge  Pt will be able to ask for his needs to be met by discharge  Outcome: Improving

## 2021-03-29 NOTE — PLAN OF CARE
Problem: Behavioral Health Plan of Care  Goal: Patient-Specific Goal (Individualization)  Description: Pt will be compliant with treatment team recommendations during hospitalization.   Pt will be medication compliant.   Pt will participate in nursing assessment.   Pt will eat at least 50% of meals.   Weaning Care Plan:3/28/21 Wean as appropriate as long as behaviors are controlled and pt has no outbursts.    ELOPEMENT RISK     Outcome: Improving  Note: Report received from Neema. Rounding complete. Pt observed sleeping in left side lying position with regular and unlabored respirations.    Pt has been in bed with eyes closed and regular respirations. 15 minute and PRN checks all night. No complaints offered. Will continue to monitor.    0108- Pt requested and was admin 1 mg clonazepam for c/o mild anxiety related to not being able to sleep. Pt was observed to be sleeping soundly previously and had audible low snoring.     Pt slept approx 5.5 hours this NOC shift    Face to face end of shift report communicated to alexa RN.    March 28, 2021  11:50 PM          Problem: Thought Process Alteration  Goal: Optimal Thought Clarity  Description: Pt will participate in nursing assessment by discharge  Pt will be able to complete his ADL's independently by discharge  Pt will be able to ask for his needs to be met by discharge  Outcome: Improving  Note: Unable to assess due to pt sleeping. No issues or concerns noted at this time.

## 2021-03-29 NOTE — PROGRESS NOTES
"[facility]  Psychiatric Progress Note      Impression:   This is a 30 year old yo male with a history of schizoaffective disorder, TBI, and polysubstance abuse.    He is much less irritable than I have seen him in the past. He has been more pleasant with staff. Not a lot brighter though not as negative. Speaks about more topics than he had before. In past few days all he would say was things related to\"this is bullshit. I shouldn't have to socialzie with others, I hate people. Its their fault that im here\".  Today he talks about hobbies he used to do. He talks about his children more and the relationship he used to have with his children's mother. He is still wanting to leave and still has limited insight though he seems to have some improvement. Doesn't appear sedated. No side effects noted.     Educated regarding medication indications, risks, benefits, side effects, contraindications and possible interactions. Verbally expressed understanding.        Diagnoses:     Bipolar disorder type I, most recent episode depressed versus mixed severe with catatonic features  Schizoaffective disorder- depressed type (by history)  R/o substance induced psychosis  ADHD, unspecified  History of TBI  History of polysubstance abuse (amphetamines most recent)      Attestation:  Patient has been seen and evaluated by me,  Ashely Heaton NP          Interim History:   The patient's care was discussed with the treatment team and chart notes were reviewed.               Medications:     Current Facility-Administered Medications Ordered in Epic   Medication Dose Route Frequency Last Rate Last Admin     acetaminophen (TYLENOL) tablet 650 mg  650 mg Oral Q4H PRN         alum & mag hydroxide-simethicone (MAALOX) suspension 30 mL  30 mL Oral Q4H PRN         benztropine (COGENTIN) tablet 0.5 mg  0.5 mg Oral BID PRN         fluPHENAZine (PROLIXIN) tablet 5 mg  5 mg Oral At Bedtime         gabapentin (NEURONTIN) capsule " "300 mg  300 mg Oral TID         hydrOXYzine (ATARAX) tablet 25-50 mg  25-50 mg Oral Q4H PRN         LORazepam (ATIVAN) tablet 1 mg  1 mg Oral Q6H PRN        Or     LORazepam (ATIVAN) injection 1 mg  1 mg Intramuscular Q6H PRN         LORazepam (ATIVAN) tablet 1 mg  1 mg Oral TID         melatonin tablet 3 mg  3 mg Oral At Bedtime PRN         nicotine (NICORETTE) gum 2 mg  2 mg Buccal Q1H PRN         OLANZapine zydis (zyPREXA) ODT tab 10 mg  10 mg Oral TID PRN        Or     OLANZapine (zyPREXA) injection 10 mg  10 mg Intramuscular TID PRN         propranolol (INDERAL) tablet 10 mg  10 mg Oral BID PRN         No current Epic-ordered outpatient medications on file.            10 point ROS- uncooperative with assessment       Allergies:     Allergies   Allergen Reactions     Pork Allergy             Psychiatric Examination:   /71   Pulse 78   Temp 96.7  F (35.9  C) (Temporal)   Resp 14   Ht 1.905 m (6' 3\")   Wt 105.2 kg (232 lb)   SpO2 97%   BMI 29.00 kg/m    Weight is 232 lbs 0 oz  Body mass index is 29 kg/m .    Appearance:  dressed in hospital scrubs, appeared as age stated and slightly unkempt  Attitude: less irritable.   Eye Contact: Intact  Mood: Flat  Affect:less restricted  Speech: Not pressured not tangential  Psychomotor Behavior:  no evidence of tardive dyskinesia, dystonia, or tics  Thought Process: Not as accusatory .. less persecutory thoughts.   Associations: No loose associations  Thought Content: Some delusional.  Denies suicidal ideation no thoughts to harm others.    Insight: Limited  Judgment: slight improvement.   Oriented to: Oriented to all spheres  Attention Span and Concentration: Appears to be intact  Recent and Remote Memory: Intact  Fund of Knowledge: Average  Muscle Strength and Tone: normal  Gait and Station: Normal           Labs:     No results found for this or any previous visit (from the past 24 hour(s)).   BEHAVIORAL TEAM DISCUSSION    Progress: some. Not as irritable. " Less repetitious speech and talking about different more appropriate topics.     Continued Stay Criteria/ ationale: still has minimal insight. Still irritable. depakote likely not yet therapuetic.     Medical/Physical: None  Precautions:   Falls precaution?: No  Behavioral Orders   Procedures     Code 1 - Restrict to Unit     Elopement precautions     Routine Programming     As clinically indicated     Status 15     Every 15 minutes.       Plan:           Cbc, cmp, depakote, and ammonia in computer for tomorrow night  No changes in medications today.     Monitoring closely for symptoms of elevated ammonia as he is on Depakote and clonazepam which can both contribute to elevated ammonia.

## 2021-03-29 NOTE — PLAN OF CARE
KENDRA called [facility] and asked if patient was officially added to the [facility] list. James from [facility] stated that the patient was not opened up to all of them yet and his  needs to call and have him added to all.     KENDRA emailed and left phone message with patient's  updating them that patient needs to be added to all [facility] lists.     KENDRA spoke to patient. Explained that he was on the [facility] list. The patient upset and stress he wanted to just go home.
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
## 2021-03-29 NOTE — PLAN OF CARE
"Problem: Behavioral Health Plan of Care  Goal: Patient-Specific Goal (Individualization)  Description: Pt will be compliant with treatment team recommendations during hospitalization.   Pt will be medication compliant.   Pt will participate in nursing assessment.   Pt will eat at least 50% of meals.   Weaning Care Plan: 3/26/21 Patient is allowed to wean to open unit for phone use/drinks if behavior is appropriate. Treatment team to re-assess daily.   ELOPEMENT RISK   Outcome: Improving  He is awake in his room this morning. He answers minimal questions. He is appropriate with his responses. His affect is flat. He continues to feel tired this morning. He has complaints of a \"sore throat\" and states \"I think I have strep\". Patient encouraged to continue drinking fluids and the provider will be notified of patients complaints. He denies any problems with appetite or sleep. He denies hallucinations. He continues with some depression and anxiety. He denies any suicidal thoughts.   1128: he requested and received klonopin 1 mg for increased anxiety at this time.  1257: he requested and received hydroxyzine 50 mg at this time for anxiety.     Problem: Thought Process Alteration  Goal: Optimal Thought Clarity  Description: Pt will participate in nursing assessment by discharge  Pt will be able to complete his ADL's independently by discharge  Pt will be able to ask for his needs to be met by discharge  Outcome: Improving  He denies hallucinations. He is frustrated with being in the hospital. He is able to make his needs known appropriately.     Face to face end of shift report to be communicated to evening shift RN.      Tobi Jordan RN  3/29/2021  2:51 PM           "

## 2021-03-30 LAB
ALBUMIN SERPL-MCNC: 3.7 G/DL (ref 3.4–5)
ALP SERPL-CCNC: 70 U/L (ref 40–150)
ALT SERPL W P-5'-P-CCNC: 90 U/L (ref 0–70)
AMMONIA PLAS-SCNC: 70 UMOL/L (ref 10–50)
ANION GAP SERPL CALCULATED.3IONS-SCNC: 7 MMOL/L (ref 3–14)
AST SERPL W P-5'-P-CCNC: 25 U/L (ref 0–45)
BASOPHILS # BLD AUTO: 0 10E9/L (ref 0–0.2)
BASOPHILS NFR BLD AUTO: 0.4 %
BILIRUB SERPL-MCNC: 0.4 MG/DL (ref 0.2–1.3)
BUN SERPL-MCNC: 12 MG/DL (ref 7–30)
CALCIUM SERPL-MCNC: 8.5 MG/DL (ref 8.5–10.1)
CHLORIDE SERPL-SCNC: 107 MMOL/L (ref 94–109)
CO2 SERPL-SCNC: 24 MMOL/L (ref 20–32)
CREAT SERPL-MCNC: 0.61 MG/DL (ref 0.66–1.25)
DIFFERENTIAL METHOD BLD: NORMAL
EOSINOPHIL # BLD AUTO: 0.1 10E9/L (ref 0–0.7)
EOSINOPHIL NFR BLD AUTO: 1 %
ERYTHROCYTE [DISTWIDTH] IN BLOOD BY AUTOMATED COUNT: 11.9 % (ref 10–15)
GFR SERPL CREATININE-BSD FRML MDRD: >90 ML/MIN/{1.73_M2}
GLUCOSE SERPL-MCNC: 117 MG/DL (ref 70–99)
HCT VFR BLD AUTO: 46.5 % (ref 40–53)
HGB BLD-MCNC: 15.6 G/DL (ref 13.3–17.7)
IMM GRANULOCYTES # BLD: 0.1 10E9/L (ref 0–0.4)
IMM GRANULOCYTES NFR BLD: 0.7 %
LYMPHOCYTES # BLD AUTO: 2.2 10E9/L (ref 0.8–5.3)
LYMPHOCYTES NFR BLD AUTO: 30.6 %
MCH RBC QN AUTO: 29.2 PG (ref 26.5–33)
MCHC RBC AUTO-ENTMCNC: 33.5 G/DL (ref 31.5–36.5)
MCV RBC AUTO: 87 FL (ref 78–100)
MONOCYTES # BLD AUTO: 0.3 10E9/L (ref 0–1.3)
MONOCYTES NFR BLD AUTO: 4.6 %
NEUTROPHILS # BLD AUTO: 4.6 10E9/L (ref 1.6–8.3)
NEUTROPHILS NFR BLD AUTO: 62.7 %
NRBC # BLD AUTO: 0 10*3/UL
NRBC BLD AUTO-RTO: 0 /100
PLATELET # BLD AUTO: 235 10E9/L (ref 150–450)
POTASSIUM SERPL-SCNC: 3.6 MMOL/L (ref 3.4–5.3)
PROT SERPL-MCNC: 6.7 G/DL (ref 6.8–8.8)
RBC # BLD AUTO: 5.35 10E12/L (ref 4.4–5.9)
SODIUM SERPL-SCNC: 138 MMOL/L (ref 133–144)
VALPROATE SERPL-MCNC: 73 MG/L (ref 50–100)
WBC # BLD AUTO: 7.3 10E9/L (ref 4–11)

## 2021-03-30 PROCEDURE — 36415 COLL VENOUS BLD VENIPUNCTURE: CPT | Performed by: NURSE PRACTITIONER

## 2021-03-30 PROCEDURE — 99232 SBSQ HOSP IP/OBS MODERATE 35: CPT | Performed by: NURSE PRACTITIONER

## 2021-03-30 PROCEDURE — 85025 COMPLETE CBC W/AUTO DIFF WBC: CPT | Performed by: NURSE PRACTITIONER

## 2021-03-30 PROCEDURE — 250N000013 HC RX MED GY IP 250 OP 250 PS 637: Performed by: NURSE PRACTITIONER

## 2021-03-30 PROCEDURE — 82140 ASSAY OF AMMONIA: CPT | Performed by: NURSE PRACTITIONER

## 2021-03-30 PROCEDURE — 80164 ASSAY DIPROPYLACETIC ACD TOT: CPT | Performed by: NURSE PRACTITIONER

## 2021-03-30 PROCEDURE — 124N000004

## 2021-03-30 PROCEDURE — 80053 COMPREHEN METABOLIC PANEL: CPT | Performed by: NURSE PRACTITIONER

## 2021-03-30 RX ORDER — DIVALPROEX SODIUM 500 MG/1
500 TABLET, EXTENDED RELEASE ORAL AT BEDTIME
COMMUNITY
Start: 2021-03-31 | End: 2021-03-31

## 2021-03-30 RX ORDER — GABAPENTIN 400 MG/1
800 CAPSULE ORAL 3 TIMES DAILY
COMMUNITY
Start: 2021-03-31 | End: 2021-04-01

## 2021-03-30 RX ORDER — IBUPROFEN 600 MG/1
600 TABLET, FILM COATED ORAL EVERY 6 HOURS PRN
Status: ON HOLD | COMMUNITY
Start: 2021-03-30 | End: 2021-10-22

## 2021-03-30 RX ORDER — DIVALPROEX SODIUM 500 MG/1
500 TABLET, EXTENDED RELEASE ORAL AT BEDTIME
Status: DISCONTINUED | OUTPATIENT
Start: 2021-03-31 | End: 2021-03-31

## 2021-03-30 RX ORDER — DIPHENHYDRAMINE HCL 50 MG
50 CAPSULE ORAL 3 TIMES DAILY PRN
Status: ON HOLD | COMMUNITY
Start: 2021-03-30 | End: 2021-10-22

## 2021-03-30 RX ORDER — CLONAZEPAM 2 MG/1
2 TABLET ORAL 3 TIMES DAILY
Status: ON HOLD | COMMUNITY
Start: 2021-03-31 | End: 2021-10-22

## 2021-03-30 RX ORDER — BENZTROPINE MESYLATE 0.5 MG/1
0.5 TABLET ORAL 2 TIMES DAILY PRN
Status: ON HOLD | COMMUNITY
Start: 2021-03-30 | End: 2021-10-21

## 2021-03-30 RX ORDER — HALOPERIDOL 5 MG/1
5-10 TABLET ORAL 3 TIMES DAILY PRN
COMMUNITY
Start: 2021-03-30 | End: 2021-03-31

## 2021-03-30 RX ORDER — LANOLIN ALCOHOL/MO/W.PET/CERES
6 CREAM (GRAM) TOPICAL
COMMUNITY
Start: 2021-03-30 | End: 2021-04-01

## 2021-03-30 RX ORDER — CLONIDINE HYDROCHLORIDE 0.1 MG/1
0.1 TABLET ORAL 3 TIMES DAILY PRN
Status: ON HOLD | COMMUNITY
Start: 2021-03-30 | End: 2021-10-22

## 2021-03-30 RX ADMIN — GABAPENTIN 800 MG: 400 CAPSULE ORAL at 21:03

## 2021-03-30 RX ADMIN — DIPHENHYDRAMINE HYDROCHLORIDE 50 MG: 50 CAPSULE ORAL at 05:58

## 2021-03-30 RX ADMIN — DIPHENHYDRAMINE HYDROCHLORIDE 50 MG: 50 CAPSULE ORAL at 21:03

## 2021-03-30 RX ADMIN — CLONAZEPAM 2 MG: 1 TABLET ORAL at 15:01

## 2021-03-30 RX ADMIN — MELATONIN 6 MG: 3 TAB ORAL at 21:03

## 2021-03-30 RX ADMIN — HYDROXYZINE HYDROCHLORIDE 50 MG: 25 TABLET, FILM COATED ORAL at 17:36

## 2021-03-30 RX ADMIN — GABAPENTIN 800 MG: 400 CAPSULE ORAL at 08:42

## 2021-03-30 RX ADMIN — BENZOCAINE AND MENTHOL 1 LOZENGE: 15; 3.6 LOZENGE ORAL at 06:13

## 2021-03-30 RX ADMIN — CLONAZEPAM 2 MG: 1 TABLET ORAL at 08:36

## 2021-03-30 RX ADMIN — GABAPENTIN 800 MG: 400 CAPSULE ORAL at 15:02

## 2021-03-30 RX ADMIN — HYDROXYZINE HYDROCHLORIDE 50 MG: 25 TABLET, FILM COATED ORAL at 22:32

## 2021-03-30 RX ADMIN — HYDROXYZINE HYDROCHLORIDE 50 MG: 25 TABLET, FILM COATED ORAL at 05:58

## 2021-03-30 RX ADMIN — CLONAZEPAM 2 MG: 1 TABLET ORAL at 21:03

## 2021-03-30 ASSESSMENT — ACTIVITIES OF DAILY LIVING (ADL)
HYGIENE/GROOMING: INDEPENDENT
LAUNDRY: UNABLE TO COMPLETE
ORAL_HYGIENE: INDEPENDENT
DRESS: SCRUBS (BEHAVIORAL HEALTH)
ORAL_HYGIENE: INDEPENDENT
DRESS: SCRUBS (BEHAVIORAL HEALTH);INDEPENDENT
HYGIENE/GROOMING: INDEPENDENT

## 2021-03-30 NOTE — PLAN OF CARE
Face to face end of shift report received from Alice CARDOZA RN. Rounding completed and patient observed lying in bed with eyes closed, breathing regular and unlabored.      06:30 Update: Patient appeared to sleep off and on throughout the night with position changes noted. Patient slept approximately 7 hours. Patient requested any PRN he could have at 6:00am. When he was offered it, he refused everything but took 50mg Benadryl and 50mg Atarax. He complained of pain but refused to take a PRN for this. No falls. Patient verbalized being frustrated and requested to talk to provider this AM.     Face to face end of shift report communicated to alexa RN.      Problem: Behavioral Health Plan of Care  Goal: Patient-Specific Goal (Individualization)  Description: Pt will be compliant with treatment team recommendations during hospitalization.   Pt will be medication compliant.   Pt will participate in nursing assessment.   Pt will eat at least 50% of meals.   Weaning Care Plan: 3/29/21 Patient is allowed to wean to open unit if behavior is appropriate. Treatment team to re-assess daily.   ELOPEMENT RISK     Outcome: No Change     Problem: Thought Process Alteration  Goal: Optimal Thought Clarity  Description: Pt will participate in nursing assessment by discharge  Pt will be able to complete his ADL's independently by discharge  Pt will be able to ask for his needs to be met by discharge  Outcome: No Change

## 2021-03-30 NOTE — PLAN OF CARE
"  Problem: Behavioral Health Plan of Care  Goal: Patient-Specific Goal (Individualization)  Description: Pt will be compliant with treatment team recommendations during hospitalization.   Pt will be medication compliant.   Pt will participate in nursing assessment.   Pt will eat at least 50% of meals.   Weaning Care Plan: 3/30/21 Patient is allowed to wean to open unit if behavior is appropriate. Treatment team to re-assess daily.   ELOPEMENT RISK     Patient isolative and withdrawn, spending time in his room in bed, but does come to nurse's station window in [facility] with questions and concerns. He is flat, and a bit irritable at times. Does refuse to participate in nursing assessment, but did take his morning medications as prescribed. States \"I don't want to talk to anybody right now, I don't want to be here, I just want to go home\". Later states \"can you tell April I have a DA from [facility]?\", writer attempted to call provider, no answer. He was encouraged to shower today.   1345-patient in bed sleeping. Writer attempted to administer his 1400 medications, patient unable to rouse or is refusing to open his eyes to take his medications. Will attempt again. Patient can be seen on camera changing positions with eyes open.   1400-writer attempted again to administer medications of Klonopin 2 mg and Gabapentin 800 mg, again patient refused to acknowledge that writer was there, writer left room with pills in hand and entered the nurse's station and could see patient on camera adjusting his pillows. Patient was later up ambulating to bathroom, then went back to bed, refuses to acknowledge staff when they enter his room.   1445-writer went to let patient know that he was accepted to the [facility] in [place] tomorrow, with a  time of 1000. Patient states \"that's bullshit, I'm not going to a [facility], April said I didn't have to go\". Writer clarified that April told him that a [facility] was where he is going " next. Patient upset and yelling that he wants to speak to April, that he wants to go home.   Patient agreeable to take his 1400 medications, and is requesting food, pizza was ordered. He does want to use the telephone, [facility] so he can wean to the open use to use the telephone.   Face to face end of shift report communicated [facility].     Shruthi Sepulveda RN  3/30/2021  2:36 PM      Outcome: No Change    Problem: Thought Process Alteration  Goal: Optimal Thought Clarity  Description: Pt will participate in nursing assessment by discharge  Pt will be able to complete his ADL's independently by discharge  Pt will be able to ask for his needs to be met by discharge    Patient is able to make needs known, is able to complete ADL's, but does not participate in nursing assessment.   Outcome: No Change     Problem: Suicidal Behavior  Goal: Suicidal Behavior is Absent or Managed  Patient denies SI, has remained free from self harm/injury.

## 2021-03-30 NOTE — PLAN OF CARE
"Problem: Behavioral Health Plan of Care  Goal: Patient-Specific Goal (Individualization)  Description: Pt will be compliant with treatment team recommendations during hospitalization.   Pt will be medication compliant.   Pt will participate in nursing assessment.   Pt will eat at least 50% of meals.   Weaning Care Plan: 3/30/21 Patient is allowed to wean to open unit if behavior is appropriate. Treatment team to re-assess daily.   ELOPEMENT RISK   Outcome: No Change  Note: Pt knocking on nurses station window at the start of the shift with requests to use the phone. Phone was brought back to pt. Pt attempted to call his mom, sister and [facility] worker to see if they could drop him off clothing, cigarettes and money. Pt expressed feelings of anger and frustration re: nobody answering the phone and with plan to discharge to [facility] tomorrow morning. Insight into situation remains poor. Pt denies mental illness. Pt stated \"I didn't even do anything to be here. I was just laying in bed. My mom is responsible for me being here.\" Pt weaned and was social with peers. He showered tonight. He was compliant with his scheduled medications. Depakote not given due to critical ammonia lab of 70.     1735 - Pt requested and received 50 mg of PRN Hydroxyzine for anxiety. Medication somewhat effective.    2103 - Pt requested and received 50 mg of PO PRN Benadryl and 6 mg of PRN Melatonin for sleep.    2230 - Pt requested and received 50 mg of PRN Hydroxyzine for ongoing anxiety.     Face to face end of shift report communicated to oncoming RN.      Problem: Thought Process Alteration  Goal: Optimal Thought Clarity  Description: Pt will participate in nursing assessment by discharge  Pt will be able to complete his ADL's independently by discharge  Pt will be able to ask for his needs to be met by discharge  Outcome: Improving  Note: Pt was able to make his needs known. He showered tonight without prompting. He participated in " the nursing assessment.
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
## 2021-03-30 NOTE — PLAN OF CARE
Patient's  Rajwinder emailed  and updated that she called [facility] and had patient opened up to all [facility] lists. She also expressed it was a shame that patient's committment did not include CD a s that is a large part of the patient's issues.     James with [facility] called and stated the patient was accepted to [facility] for Thursday after noon. Rajwinder patient's  is working on setting up transportation. Rajwinder will contact  when a ride has officially been scheduled.        Patient's  called and updated SW that [facility] had the wrong date. Patient can be accepted tomorrow 3/31/2021 and is being picked up for transport for 10:00 am to [facility]. Rajwinder is setting up transport with [facility] to pick patient up.

## 2021-03-31 PROCEDURE — 99232 SBSQ HOSP IP/OBS MODERATE 35: CPT | Performed by: NURSE PRACTITIONER

## 2021-03-31 PROCEDURE — 124N000004

## 2021-03-31 PROCEDURE — 99233 SBSQ HOSP IP/OBS HIGH 50: CPT | Performed by: NURSE PRACTITIONER

## 2021-03-31 PROCEDURE — U0003 INFECTIOUS AGENT DETECTION BY NUCLEIC ACID (DNA OR RNA); SEVERE ACUTE RESPIRATORY SYNDROME CORONAVIRUS 2 (SARS-COV-2) (CORONAVIRUS DISEASE [COVID-19]), AMPLIFIED PROBE TECHNIQUE, MAKING USE OF HIGH THROUGHPUT TECHNOLOGIES AS DESCRIBED BY CMS-2020-01-R: HCPCS | Performed by: NURSE PRACTITIONER

## 2021-03-31 PROCEDURE — 250N000013 HC RX MED GY IP 250 OP 250 PS 637: Performed by: NURSE PRACTITIONER

## 2021-03-31 PROCEDURE — U0005 INFEC AGEN DETEC AMPLI PROBE: HCPCS | Performed by: NURSE PRACTITIONER

## 2021-03-31 RX ORDER — GABAPENTIN 300 MG/1
900 CAPSULE ORAL 3 TIMES DAILY
Status: DISCONTINUED | OUTPATIENT
Start: 2021-03-31 | End: 2021-04-02 | Stop reason: HOSPADM

## 2021-03-31 RX ORDER — CARBAMAZEPINE 100 MG/1
100 TABLET, EXTENDED RELEASE ORAL 2 TIMES DAILY
Status: DISCONTINUED | OUTPATIENT
Start: 2021-03-31 | End: 2021-04-02 | Stop reason: HOSPADM

## 2021-03-31 RX ORDER — FLUPHENAZINE HYDROCHLORIDE 5 MG/1
5 TABLET ORAL EVERY 8 HOURS PRN
Status: ON HOLD | COMMUNITY
Start: 2021-03-31 | End: 2021-10-21

## 2021-03-31 RX ORDER — FLUPHENAZINE HYDROCHLORIDE 5 MG/1
5 TABLET ORAL EVERY 8 HOURS PRN
Status: DISCONTINUED | OUTPATIENT
Start: 2021-03-31 | End: 2021-04-02 | Stop reason: HOSPADM

## 2021-03-31 RX ORDER — FLUPHENAZINE HYDROCHLORIDE 2.5 MG/ML
2.5 INJECTION, SOLUTION INTRAMUSCULAR EVERY 8 HOURS PRN
Status: DISCONTINUED | OUTPATIENT
Start: 2021-03-31 | End: 2021-04-02 | Stop reason: HOSPADM

## 2021-03-31 RX ADMIN — GABAPENTIN 800 MG: 400 CAPSULE ORAL at 08:19

## 2021-03-31 RX ADMIN — DIPHENHYDRAMINE HYDROCHLORIDE 50 MG: 50 CAPSULE ORAL at 18:50

## 2021-03-31 RX ADMIN — CLONAZEPAM 2 MG: 1 TABLET ORAL at 08:19

## 2021-03-31 RX ADMIN — GABAPENTIN 900 MG: 300 CAPSULE ORAL at 20:07

## 2021-03-31 RX ADMIN — HYDROXYZINE HYDROCHLORIDE 50 MG: 25 TABLET, FILM COATED ORAL at 22:21

## 2021-03-31 RX ADMIN — CLONIDINE HYDROCHLORIDE 0.1 MG: 0.1 TABLET ORAL at 02:05

## 2021-03-31 RX ADMIN — CLONAZEPAM 2 MG: 1 TABLET ORAL at 13:18

## 2021-03-31 RX ADMIN — CARBAMAZEPINE 100 MG: 100 TABLET, EXTENDED RELEASE ORAL at 20:07

## 2021-03-31 RX ADMIN — CLONAZEPAM 2 MG: 1 TABLET ORAL at 20:04

## 2021-03-31 RX ADMIN — MELATONIN 6 MG: 3 TAB ORAL at 22:21

## 2021-03-31 RX ADMIN — DIPHENHYDRAMINE HYDROCHLORIDE 50 MG: 50 CAPSULE ORAL at 08:34

## 2021-03-31 RX ADMIN — GABAPENTIN 900 MG: 300 CAPSULE ORAL at 13:18

## 2021-03-31 ASSESSMENT — ACTIVITIES OF DAILY LIVING (ADL)
ORAL_HYGIENE: INDEPENDENT
LAUNDRY: UNABLE TO COMPLETE
HYGIENE/GROOMING: INDEPENDENT
HYGIENE/GROOMING: INDEPENDENT
DRESS: SCRUBS (BEHAVIORAL HEALTH)
DRESS: SCRUBS (BEHAVIORAL HEALTH)
ORAL_HYGIENE: INDEPENDENT

## 2021-03-31 NOTE — PLAN OF CARE
"    SW received an email from patient's  Rajwinder, CPA was also wrong concerning Thursday acceptance to [facility]. [facility] can accept patient Friday 04/02/2021. Rajwinder set up [facility] transport to [facility] for 8:00 am 4/02/2021. Updated patient's Nurse and NP.    SW spoke to patient and explained the miscommunication between CPA, his  Rajwinder, and the Treatment Team. Patient said,   \" I am sorry for my outburst of anger I had my mom go out of her way to bring my belongings this morning, and now its not happening. I feel bad that I unconvinced my mom.\"        Patient further explained his frustration with the PRNs. SW explained he needs to speak about medications with his provider and nursing staff. The patient stated he understood and would bring up his issues with his provider.     KENDRA spoke with NP and explained that patient wanted to speak to her about the his medications.   "

## 2021-03-31 NOTE — PROGRESS NOTES
[facility]  Psychiatric Progress Note      Impression:   This is a 30 year old yo male with a history of schizoaffective disorder, TBI, and polysubstance abuse.     Steven is upset this morning as his discharge plan apparently fell through and was pushed back to Friday. He reports feeling frustrated and feels that staff is trying to make him frustrated on purpose. He is argumentative throughout our conversation. He states that he does not want the conversation to be solely about medications, though then proceeds to only talk about the medications. We discussed his elevated ammonia and ALT, likely from depakote which we agree to stop. Did discuss that if he was not willing to try another mood stabilizer that we would need to start a neuroleptic, to which he agreed to try Tegretol tonight. Discussed that our main concern is his ongoing mood lability which would likely respond to a mood stabilizer. He does continue to deny the need to be in the hospital and places all blame regarding admission on his mother. Current plan per  is for discharge to [facility] on Friday.     Educated regarding medication indications, risks, benefits, side effects, contraindications and possible interactions. Verbally expressed understanding.        Diagnoses:     Bipolar disorder type I, most recent episode depressed versus mixed severe with catatonic features  Schizoaffective disorder- depressed type (by history)  R/o substance induced psychosis  History of TBI  History of polysubstance abuse (amphetamines most recent)  History of dextromethorphan abuse        Attestation:  Patient has been seen and evaluated by me,  Christelle Contreras NP          Interim History:   The patient's care was discussed with the treatment team and chart notes were reviewed.      BEHAVIORAL TEAM DISCUSSION    Progress: Limited. Continues to be argumentative in conversation. Lacks insight. Mood lability continues.    Continued Stay Criteria/  ationale: still has minimal insight. Mood labile. Depakote stopped, started on Tegretol.    Medical/Physical: None  Precautions:   Falls precaution?: No  Behavioral Orders   Procedures     Code 1 - Restrict to Unit     Elopement precautions     Routine Programming     As clinically indicated     Status 15     Every 15 minutes.       Plan:     Plan for discharge to [facility] on Friday 4/2/21    Stopping prn klonopin. Monitoring for return of catatonic symptoms.   Ammonia, ALT elevated. Depakote stopped  Start Tegretol  mg BID. If refusing to take this, will restart Abilify.             Medications:     Current Facility-Administered Medications Ordered in Epic   Medication Dose Route Frequency Last Rate Last Admin     acetaminophen (TYLENOL) tablet 650 mg  650 mg Oral Q4H PRN         alum & mag hydroxide-simethicone (MAALOX) suspension 30 mL  30 mL Oral Q4H PRN         benztropine (COGENTIN) tablet 0.5 mg  0.5 mg Oral BID PRN         fluPHENAZine (PROLIXIN) tablet 5 mg  5 mg Oral At Bedtime         gabapentin (NEURONTIN) capsule 300 mg  300 mg Oral TID         hydrOXYzine (ATARAX) tablet 25-50 mg  25-50 mg Oral Q4H PRN         LORazepam (ATIVAN) tablet 1 mg  1 mg Oral Q6H PRN        Or     LORazepam (ATIVAN) injection 1 mg  1 mg Intramuscular Q6H PRN         LORazepam (ATIVAN) tablet 1 mg  1 mg Oral TID         melatonin tablet 3 mg  3 mg Oral At Bedtime PRN         nicotine (NICORETTE) gum 2 mg  2 mg Buccal Q1H PRN         OLANZapine zydis (zyPREXA) ODT tab 10 mg  10 mg Oral TID PRN        Or     OLANZapine (zyPREXA) injection 10 mg  10 mg Intramuscular TID PRN         propranolol (INDERAL) tablet 10 mg  10 mg Oral BID PRN         No current [facility]-ordered outpatient medications on file.            10 point ROS- reports some lower back pain though refused to accept any treatments when seen by medical NP       Allergies:     Allergies   Allergen Reactions     Pork Allergy             Psychiatric  "Examination:   BP 98/62   Pulse 70   Temp 96.8  F (36  C) (Temporal)   Resp 14   Ht 1.905 m (6' 3\")   Wt 105.2 kg (232 lb)   SpO2 97%   BMI 29.00 kg/m    Weight is 232 lbs 0 oz  Body mass index is 29 kg/m .    Appearance:  dressed in hospital scrubs, appeared as age stated and slightly unkempt  Attitude: irritable and argumentative   Eye Contact: Intact  Mood: labile  Affect: less restricted  Speech: does have some trouble with stutter at times, slightly pressured  Psychomotor Behavior:  no evidence of tardive dyskinesia, dystonia, or tics  Thought Process: Not as accusatory .. less persecutory thoughts. Circumstantial  Associations: No loose associations  Thought Content: Denies suicidal ideation no thoughts to harm others. Denies hallucinations.   Insight: Limited  Judgment: slight improvement.   Oriented to: Oriented to all spheres  Attention Span and Concentration: Appears to be intact  Recent and Remote Memory: Intact  Fund of Knowledge: Average  Muscle Strength and Tone: normal  Gait and Station: Normal           Labs:     Results for orders placed or performed during the hospital encounter of 03/07/21 (from the past 24 hour(s))   Valproic acid   Result Value Ref Range    Valproic Acid Level 73 50 - 100 mg/L   Ammonia   Result Value Ref Range    Ammonia 70 (HH) 10 - 50 umol/L   Comprehensive metabolic panel   Result Value Ref Range    Sodium 138 133 - 144 mmol/L    Potassium 3.6 3.4 - 5.3 mmol/L    Chloride 107 94 - 109 mmol/L    Carbon Dioxide 24 20 - 32 mmol/L    Anion Gap 7 3 - 14 mmol/L    Glucose 117 (H) 70 - 99 mg/dL    Urea Nitrogen 12 7 - 30 mg/dL    Creatinine 0.61 (L) 0.66 - 1.25 mg/dL    GFR Estimate >90 >60 mL/min/[1.73_m2]    GFR Estimate If Black >90 >60 mL/min/[1.73_m2]    Calcium 8.5 8.5 - 10.1 mg/dL    Bilirubin Total 0.4 0.2 - 1.3 mg/dL    Albumin 3.7 3.4 - 5.0 g/dL    Protein Total 6.7 (L) 6.8 - 8.8 g/dL    Alkaline Phosphatase 70 40 - 150 U/L    ALT 90 (H) 0 - 70 U/L    AST 25 0 - 45 " U/L   CBC with platelets differential   Result Value Ref Range    WBC 7.3 4.0 - 11.0 10e9/L    RBC Count 5.35 4.4 - 5.9 10e12/L    Hemoglobin 15.6 13.3 - 17.7 g/dL    Hematocrit 46.5 40.0 - 53.0 %    MCV 87 78 - 100 fl    MCH 29.2 26.5 - 33.0 pg    MCHC 33.5 31.5 - 36.5 g/dL    RDW 11.9 10.0 - 15.0 %    Platelet Count 235 150 - 450 10e9/L    Diff Method Automated Method     % Neutrophils 62.7 %    % Lymphocytes 30.6 %    % Monocytes 4.6 %    % Eosinophils 1.0 %    % Basophils 0.4 %    % Immature Granulocytes 0.7 %    Nucleated RBCs 0 0 /100    Absolute Neutrophil 4.6 1.6 - 8.3 10e9/L    Absolute Lymphocytes 2.2 0.8 - 5.3 10e9/L    Absolute Monocytes 0.3 0.0 - 1.3 10e9/L    Absolute Eosinophils 0.1 0.0 - 0.7 10e9/L    Absolute Basophils 0.0 0.0 - 0.2 10e9/L    Abs Immature Granulocytes 0.1 0 - 0.4 10e9/L    Absolute Nucleated RBC 0.0    Asymptomatic SARS-CoV-2 COVID-19 Virus (Coronavirus) by PCR    Specimen: Nasopharyngeal   Result Value Ref Range    SARS-CoV-2 Virus Specimen Source Nasopharyngeal     SARS-CoV-2 PCR Result NEGATIVE     SARS-CoV-2 PCR Comment       Testing was performed using the Nextt Xpress SARS-CoV-2 Assay on the Cepheid Gene-Xpert   Instrument Systems. Additional information about this Emergency Use Authorization (EUA)   assay can be found via the Lab Guide.

## 2021-03-31 NOTE — PLAN OF CARE
Problem: Behavioral Health Plan of Care  Goal: Patient-Specific Goal (Individualization)  Description: Pt will be compliant with treatment team recommendations during hospitalization.   Pt will be medication compliant.   Pt will participate in nursing assessment.   Pt will eat at least 50% of meals.   Weaning Care Plan: 3/30/21 Patient is allowed to wean to open unit if behavior is appropriate. Treatment team to re-assess daily.   ELOPEMENT RISK     Outcome: No Change  Note:        Problem: Thought Process Alteration  Goal: Optimal Thought Clarity  Description: Pt will participate in nursing assessment by discharge  Pt will be able to complete his ADL's independently by discharge  Pt will be able to ask for his needs to be met by discharge  Outcome: No Change   Pt. Has been spending all of time in bed this morning, irritable with questions, is medication compliant, eating 100% of meals, has not wanted to wean out to open unit, is independent with ADL's, becomes irritable easily, ruminates frequently about discharge plan not working out as planned, denies suicidal ideation and hallucinations.      Face to face end of shift report will be communicated to oncoming afternoon shift RN.     Farida Saenz RN  3/31/2021  12:07 PM

## 2021-03-31 NOTE — PLAN OF CARE
DATE:  3/30/2021   TIME OF RECEIPT FROM LAB:  2107  LAB TEST:  Ammonia  LAB VALUE:  70  RESULTS GIVEN WITH READ-BACK TO (PROVIDER):  Christelle Contreras  TIME LAB VALUE REPORTED TO PROVIDER:   2110    Pt did not have any confusion. He was alert and oriented to person, place, date and time. Appetite was excellent. No hand tremor observed. Will continue to monitor. 1,000 mg dose of Depakote ER discontinued by on-call provider Christelle HARRIS

## 2021-03-31 NOTE — PROGRESS NOTES
"[facility]    Medical Services Progress Note    Date of Service (when I saw the patient): 03/31/2021    Assessment & Plan     Principal Problem:    Schizoaffective disorder (H)    Active Medical Problems:    Catatonia    Depression, unspecified depression type    Back pain- consulted by pmhnp Christelle. Pt complaining of low back pain. He states this is a chronic issue that he has seen a chiropractor about in the past. Denies any injury, denies any urinary symptoms.  He states he has tried flexeril but it makes him too tired so can't it. He also states he has tried robaxin and all the other muscle relaxers and none of them work. He states he was told by the pmhnp that I could give him the muscle relaxer to help him relax so he isn't so agitated with nursing. This provider explained to him that muscle relaxers are not prescribed for agitation. He also states he wants to be off all pills and does not want any \"pain killers\". This provider offered muscle rub or to try lidoderm patches and he states nothing works and he has tried all of these in the past. He states he just wants a muscle relaxer for agitation.  Pt does have tylenol and ibuprofen ordered prn. Explained to pt that I would discuss his concerns about agitation  with his pmhnp. Pt verbalized understanding.     Pt medically stable, no acute medical concerns. Chronic medical problems stable. Will sign off. Please consult for any new medical issues or concerns.         Code Status: Full Code.    Harmony Samuel CNP        -Data reviewed today: I reviewed all new labs and imaging results over the last 24 hours.     Physical Exam   Temp: 96.8  F (36  C) Temp src: Temporal BP: 98/62 Pulse: 70   Resp: 14 SpO2: 97 % O2 Device: None (Room air)    Vitals:    03/14/21 1217 03/21/21 0800 03/27/21 1230   Weight: 98.2 kg (216 lb 6.4 oz) 101.1 kg (222 lb 12.8 oz) 105.2 kg (232 lb)     Vital Signs with Ranges  Temp:  [96.8  F (36  C)-98.8  F (37.1  C)] 96.8  F (36 "  C)  Pulse:  [] 70  Resp:  [14-18] 14  BP: ()/(62-82) 98/62  SpO2:  [97 %] 97 %  No intake/output data recorded.    Constitutional: awake, alert, cooperative, no apparent distress, and appears stated age  Respiratory: No increased work of breathing, good air exchange, clear to auscultation bilaterally, no crackles or wheezing  Cardiovascular: Normal apical impulse, regular rate and rhythm, normal S1 and S2, no S3 or S4, and no murmur noted  Musculoskeletal: There is no redness, warmth, or swelling of the joints.  Full range of motion noted.  Motor strength is 5 out of 5 all extremities bilaterally.  Tone is normal.  Neuropsychiatric: General: fidgeting, restless, and normal eye contact    Medications     clonazePAM  2 mg Oral TID     divalproex sodium extended-release  500 mg Oral At Bedtime     gabapentin  800 mg Oral TID       Data   Recent Labs   Lab 03/30/21  2040   WBC 7.3   HGB 15.6   MCV 87         POTASSIUM 3.6   CHLORIDE 107   CO2 24   BUN 12   CR 0.61*   ANIONGAP 7   NIKKI 8.5   *   ALBUMIN 3.7   PROTTOTAL 6.7*   BILITOTAL 0.4   ALKPHOS 70   ALT 90*   AST 25       No results found for this or any previous visit (from the past 24 hour(s)).

## 2021-03-31 NOTE — PLAN OF CARE
Face to face shift report received from Caryl KIMBLE RN. Rounding completed, pt observed in [facility], awake requesting a snack.  Pt denies pain.   Respirations are even and non labored.  Remains in [facility] for decreased stimuli.        Problem: Behavioral Health Plan of Care  Goal: Patient-Specific Goal (Individualization)  Description: Pt will be compliant with treatment team recommendations during hospitalization.   Pt will be medication compliant.   Pt will participate in nursing assessment.   Pt will eat at least 50% of meals.   Weaning Care Plan: 3/30/21 Patient is allowed to wean to open unit if behavior is appropriate. Treatment team to re-assess daily.   ELOPEMENT RISK     Outcome: Improving     Problem: Thought Process Alteration  Goal: Optimal Thought Clarity  Description: Pt will participate in nursing assessment by discharge  Pt will be able to complete his ADL's independently by discharge  Pt will be able to ask for his needs to be met by discharge  Outcome: Improving     Pt requested and received Clonoidine at 0205 for anxiety.  Pt returned to bed.  Slept approx. 5 hours during the night.      Face to face report will be communicated to oncoming RN.    Maria Elena Yan RN  3/31/2021  6:08 AM

## 2021-04-01 PROCEDURE — 250N000013 HC RX MED GY IP 250 OP 250 PS 637: Performed by: NURSE PRACTITIONER

## 2021-04-01 PROCEDURE — 124N000004

## 2021-04-01 PROCEDURE — 99232 SBSQ HOSP IP/OBS MODERATE 35: CPT | Performed by: NURSE PRACTITIONER

## 2021-04-01 RX ORDER — LANOLIN ALCOHOL/MO/W.PET/CERES
6 CREAM (GRAM) TOPICAL AT BEDTIME
Status: ON HOLD | COMMUNITY
Start: 2021-04-01 | End: 2021-10-22

## 2021-04-01 RX ORDER — CARBAMAZEPINE 100 MG/1
100 TABLET, EXTENDED RELEASE ORAL 2 TIMES DAILY
Status: ON HOLD | COMMUNITY
Start: 2021-04-01 | End: 2021-10-21

## 2021-04-01 RX ORDER — LANOLIN ALCOHOL/MO/W.PET/CERES
6 CREAM (GRAM) TOPICAL AT BEDTIME
Status: DISCONTINUED | OUTPATIENT
Start: 2021-04-01 | End: 2021-04-02 | Stop reason: HOSPADM

## 2021-04-01 RX ORDER — CLONAZEPAM 1 MG/1
2 TABLET ORAL 3 TIMES DAILY
Status: DISCONTINUED | OUTPATIENT
Start: 2021-04-01 | End: 2021-04-02 | Stop reason: HOSPADM

## 2021-04-01 RX ORDER — DIAZEPAM 5 MG
10 TABLET ORAL
Status: DISCONTINUED | OUTPATIENT
Start: 2021-04-01 | End: 2021-04-02 | Stop reason: HOSPADM

## 2021-04-01 RX ORDER — GABAPENTIN 300 MG/1
900 CAPSULE ORAL 3 TIMES DAILY
Status: ON HOLD | COMMUNITY
Start: 2021-04-01 | End: 2021-10-31

## 2021-04-01 RX ADMIN — ACETAMINOPHEN 650 MG: 325 TABLET, FILM COATED ORAL at 01:15

## 2021-04-01 RX ADMIN — CLONAZEPAM 2 MG: 1 TABLET ORAL at 17:11

## 2021-04-01 RX ADMIN — MELATONIN 6 MG: 3 TAB ORAL at 20:10

## 2021-04-01 RX ADMIN — GABAPENTIN 900 MG: 300 CAPSULE ORAL at 08:09

## 2021-04-01 RX ADMIN — GABAPENTIN 900 MG: 300 CAPSULE ORAL at 20:10

## 2021-04-01 RX ADMIN — CLONAZEPAM 2 MG: 1 TABLET ORAL at 08:09

## 2021-04-01 RX ADMIN — CARBAMAZEPINE 100 MG: 100 TABLET, EXTENDED RELEASE ORAL at 08:09

## 2021-04-01 RX ADMIN — GABAPENTIN 900 MG: 300 CAPSULE ORAL at 13:01

## 2021-04-01 RX ADMIN — DIAZEPAM 10 MG: 5 TABLET ORAL at 20:10

## 2021-04-01 RX ADMIN — CLONAZEPAM 2 MG: 1 TABLET ORAL at 13:01

## 2021-04-01 RX ADMIN — CARBAMAZEPINE 100 MG: 100 TABLET, EXTENDED RELEASE ORAL at 20:10

## 2021-04-01 RX ADMIN — IBUPROFEN 600 MG: 600 TABLET ORAL at 01:16

## 2021-04-01 ASSESSMENT — ACTIVITIES OF DAILY LIVING (ADL)
ORAL_HYGIENE: INDEPENDENT
DRESS: SCRUBS (BEHAVIORAL HEALTH)
HYGIENE/GROOMING: INDEPENDENT
DRESS: INDEPENDENT;SCRUBS (BEHAVIORAL HEALTH)
HYGIENE/GROOMING: INDEPENDENT
ORAL_HYGIENE: INDEPENDENT
LAUNDRY: UNABLE TO COMPLETE

## 2021-04-01 NOTE — PLAN OF CARE
Face to face end of shift report received from Farida BERGERON RN.  Pt observed awake in his room.     Problem: Behavioral Health Plan of Care  Goal: Patient-Specific Goal (Individualization)  Description: Pt will be compliant with treatment team recommendations during hospitalization.   Pt will be medication compliant.   Pt will participate in nursing assessment.   Pt will eat at least 50% of meals.   Weaning Care Plan: 3/30/21 Patient is allowed to wean to open unit if behavior is appropriate. Treatment team to re-assess daily.   ELOPEMENT RISK     Outcome: No Change  Note:   Pt is irritable at times. Pt received the wrong meal at supper.  He thought that staff was playing tricks on him and that staff spit in his food.  He did order a new meal but continued to say that staff spit in his food. Writer did reinforce to pt that staff did not spit in his food. He did wean to the open for a phone call. Cooperative with all scheduled medications.  Requested PRN valium 10 mg for sleep and anxiety at 2010.  Pt then went to bed.  He came to the nurses station and asked for a snack at 2230 and then went back to bed.  Pt makes needs known.      Problem: Thought Process Alteration  Goal: Optimal Thought Clarity  Description: Pt will participate in nursing assessment by discharge  Pt will be able to complete his ADL's independently by discharge  Pt will be able to ask for his needs to be met by discharge  Outcome: No Change     Face to face end of shift report communicated to alexa LARKIN.     Prisca Erickson RN  4/1/2021

## 2021-04-01 NOTE — DISCHARGE SUMMARY
Psychiatric Discharge Summary    Steven Carter MRN# [id number]   Age: 30 year old YOB: 1990     Date of Admission:  3/7/2021  Date of Discharge:  4/2/2021  Admitting Physician:  Nuno Guerrero MD  Discharge Physician:  Christelle Contreras CNP         Event Leading to Hospitalization and Hospital Stay   Admission:  Steven Carter is a 30 year old single male who was brought to the [facility] ED by EMS for evaluation of possible catatonia and decompensated schizoaffective disorder. His mother had not heard from him in 2 days and went to check on him, found him to be withdrawn, minimal responses. In the ED he would not respond to any questions or participate in assessments. Mother reported that he had been able to carry a conversation just 2 days prior, has been staying in his own apartment. Documented history of schizoaffective disorder- depressed type, TBI, ADHD, and polysubstance abuse. He was observed in the ED to rule out overdose on any of his medications. When medically cleared he was transferred to behavioral health for further evaluation.      Steven is lying in bed with his hands across his chest. He has his eyes closed, he does not open them when I try to engage him in conversation. I am unable to obtain any information from the patient. It appears that he has not been taking medications, last fill of Fluphenazine was in December. Has had a few hospitalizations in the last year, at least 2 at [facility] and 1 at [facility]. Seems to present with similar symptoms, initially very withdrawn and noncommunicative. PCP notes indicate that he was on a civil commitment in August 2020, was at [facility]. Most recent prescriptions found were Fluphenazine 5 mg at bedtime, last filled in December. Also taking Gabapentin, Cogentin, and Propranolol as needed.      In further review of [place] prescription monitoring site, he was prescribed Vyvanse 50 mg and Adderall 10 mg on 1/23/21. There is documentation  "in his [facility] record that there was question of amphetamine induced psychosis as he had admitted to overusing his prescriptions. These were not continued when he was in the hospital, and will not be ordered while he is here.     Hospital stay:  Steven was admitted to the behavioral health unit on a 72 hour hold. A petition for commitment and ledbetter were filled out and both were supported by the [facility]. For the first 13 days of his stay he remained largely unresponsive to staff, would not answer assessment questions. He initially had very poor intake, was not eating. Ativan was scheduled for possible catatonia and concern for poor nutrition. This did seem to help improve his ability to engage to some extent, he did start eating meals and snacks. Hospital day 14 was when he started to speak with providers and staff. However he took no responsibility for events prior to admission and blamed his mother for putting him in the hospital. He stated that the only reason that he was not responding to staff was because \"I was pissed\". He did then start to show increased mood lability, easily agitated with poor frustration tolerance. He initially refused all mood stabilizing medications, though after Ledbetter was granted did agree to try Depakote. However follow-up lab work showed critical ammonia level and some elevation in LFTs, so this was stopped. He was started on Tegretol just prior to discharge, seemed to be tolerating this. He was accepted to [facility] in [place] for today, will discharge for further evaluation and care.          At time of discharge, there is no evidence that patient is in immediate danger of self or others.        DIagnoses:   Bipolar disorder type I, most recent episode depressed versus mixed severe with catatonic features  Schizoaffective disorder- depressed type (by history)  R/o substance induced psychosis  History of TBI  History of polysubstance abuse (amphetamines most recent)  History of " dextromethorphan abuse           Labs:     Results for orders placed or performed during the hospital encounter of 03/07/21   CBC with platelets differential     Status: None   Result Value Ref Range    WBC 8.0 4.0 - 11.0 10e9/L    RBC Count 5.76 4.4 - 5.9 10e12/L    Hemoglobin 16.9 13.3 - 17.7 g/dL    Hematocrit 49.5 40.0 - 53.0 %    MCV 86 78 - 100 fl    MCH 29.3 26.5 - 33.0 pg    MCHC 34.1 31.5 - 36.5 g/dL    RDW 11.9 10.0 - 15.0 %    Platelet Count 278 150 - 450 10e9/L    Diff Method Automated Method     % Neutrophils 65.8 %    % Lymphocytes 25.8 %    % Monocytes 7.0 %    % Eosinophils 0.6 %    % Basophils 0.5 %    % Immature Granulocytes 0.3 %    Nucleated RBCs 0 0 /100    Absolute Neutrophil 5.2 1.6 - 8.3 10e9/L    Absolute Lymphocytes 2.1 0.8 - 5.3 10e9/L    Absolute Monocytes 0.6 0.0 - 1.3 10e9/L    Absolute Eosinophils 0.1 0.0 - 0.7 10e9/L    Absolute Basophils 0.0 0.0 - 0.2 10e9/L    Abs Immature Granulocytes 0.0 0 - 0.4 10e9/L    Absolute Nucleated RBC 0.0    Basic metabolic panel     Status: Abnormal   Result Value Ref Range    Sodium 138 133 - 144 mmol/L    Potassium 4.0 3.4 - 5.3 mmol/L    Chloride 107 94 - 109 mmol/L    Carbon Dioxide 20 20 - 32 mmol/L    Anion Gap 11 3 - 14 mmol/L    Glucose 69 (L) 70 - 99 mg/dL    Urea Nitrogen 23 7 - 30 mg/dL    Creatinine 0.80 0.66 - 1.25 mg/dL    GFR Estimate >90 >60 mL/min/[1.73_m2]    GFR Estimate If Black >90 >60 mL/min/[1.73_m2]    Calcium 9.2 8.5 - 10.1 mg/dL   Acetaminophen level     Status: None   Result Value Ref Range    Acetaminophen Level <2 mg/L   Salicylate level     Status: None   Result Value Ref Range    Salicylate Level 3 mg/dL   Asymptomatic SARS-CoV-2 COVID-19 Virus (Coronavirus) by PCR     Status: None    Specimen: Nasopharyngeal   Result Value Ref Range    SARS-CoV-2 Virus Specimen Source Nasopharyngeal     SARS-CoV-2 PCR Result NEGATIVE     SARS-CoV-2 PCR Comment       Testing was performed using the Xpert Xpress SARS-CoV-2 Assay on the  [employer]. Additional information about this Emergency Use Authorization (EUA)   assay can be found via the Lab Guide.     Glucose by meter     Status: None   Result Value Ref Range    Glucose 84 70 - 99 mg/dL   Glucose by meter     Status: None   Result Value Ref Range    Glucose 74 70 - 99 mg/dL   Glucose by meter     Status: Abnormal   Result Value Ref Range    Glucose 68 (L) 70 - 99 mg/dL   Glucose by meter     Status: Abnormal   Result Value Ref Range    Glucose 67 (L) 70 - 99 mg/dL   Glucose by meter     Status: None   Result Value Ref Range    Glucose 75 70 - 99 mg/dL   Glucose by meter     Status: None   Result Value Ref Range    Glucose 71 70 - 99 mg/dL   Urine Drugs of Abuse Screen Panel 13     Status: Abnormal   Result Value Ref Range    Cannabinoids (31-nxd-3-carboxy-9-THC) Not Detected NDET^Not Detected ng/mL    Phencyclidine (Phencyclidine) Not Detected NDET^Not Detected ng/mL    Cocaine (Benzoylecgonine) Not Detected NDET^Not Detected ng/mL    Methamphetamine (d-Methamphetamine) Not Detected NDET^Not Detected ng/mL    Opiates (Morphine) Not Detected NDET^Not Detected ng/mL    Amphetamine (d-Amphetamine) Not Detected NDET^Not Detected ng/mL    Benzodiazepines (Nordiazepam) Detected, Abnormal Result (A) NDET^Not Detected ng/mL    Tricyclic Antidepressants (Desipramine) Not Detected NDET^Not Detected ng/mL    Methadone (Methadone) Not Detected NDET^Not Detected ng/mL    Barbiturates (Butalbital) Not Detected NDET^Not Detected ng/mL    Oxycodone (Oxycodone) Not Detected NDET^Not Detected ng/mL    Propoxyphene (Norpropoxyphene) Not Detected NDET^Not Detected ng/mL    Buprenorphine (Buprenorphine) Not Detected NDET^Not Detected ng/mL   Glucose by meter     Status: None   Result Value Ref Range    Glucose 76 70 - 99 mg/dL   Glucose by meter     Status: None   Result Value Ref Range    Glucose 80 70 - 99 mg/dL   Glucose by meter     Status: None   Result Value Ref Range     Glucose 82 70 - 99 mg/dL   Asymptomatic SARS-CoV-2 COVID-19 Virus (Coronavirus) by PCR     Status: None    Specimen: Nasopharyngeal   Result Value Ref Range    SARS-CoV-2 Virus Specimen Source Nasopharyngeal     SARS-CoV-2 PCR Result NEGATIVE     SARS-CoV-2 PCR Comment       Testing was performed using the Xpert Xpress SARS-CoV-2 Assay on the Cep[employer] Gene-Xpert   Instrument Systems. Additional information about this Emergency Use Authorization (EUA)   assay can be found via the Lab Guide.     Valproic acid     Status: None   Result Value Ref Range    Valproic Acid Level 73 50 - 100 mg/L   Ammonia     Status: Abnormal   Result Value Ref Range    Ammonia 70 (HH) 10 - 50 umol/L   Comprehensive metabolic panel     Status: Abnormal   Result Value Ref Range    Sodium 138 133 - 144 mmol/L    Potassium 3.6 3.4 - 5.3 mmol/L    Chloride 107 94 - 109 mmol/L    Carbon Dioxide 24 20 - 32 mmol/L    Anion Gap 7 3 - 14 mmol/L    Glucose 117 (H) 70 - 99 mg/dL    Urea Nitrogen 12 7 - 30 mg/dL    Creatinine 0.61 (L) 0.66 - 1.25 mg/dL    GFR Estimate >90 >60 mL/min/[1.73_m2]    GFR Estimate If Black >90 >60 mL/min/[1.73_m2]    Calcium 8.5 8.5 - 10.1 mg/dL    Bilirubin Total 0.4 0.2 - 1.3 mg/dL    Albumin 3.7 3.4 - 5.0 g/dL    Protein Total 6.7 (L) 6.8 - 8.8 g/dL    Alkaline Phosphatase 70 40 - 150 U/L    ALT 90 (H) 0 - 70 U/L    AST 25 0 - 45 U/L   CBC with platelets differential     Status: None   Result Value Ref Range    WBC 7.3 4.0 - 11.0 10e9/L    RBC Count 5.35 4.4 - 5.9 10e12/L    Hemoglobin 15.6 13.3 - 17.7 g/dL    Hematocrit 46.5 40.0 - 53.0 %    MCV 87 78 - 100 fl    MCH 29.2 26.5 - 33.0 pg    MCHC 33.5 31.5 - 36.5 g/dL    RDW 11.9 10.0 - 15.0 %    Platelet Count 235 150 - 450 10e9/L    Diff Method Automated Method     % Neutrophils 62.7 %    % Lymphocytes 30.6 %    % Monocytes 4.6 %    % Eosinophils 1.0 %    % Basophils 0.4 %    % Immature Granulocytes 0.7 %    Nucleated RBCs 0 0 /100    Absolute Neutrophil 4.6 1.6 - 8.3  10e9/L    Absolute Lymphocytes 2.2 0.8 - 5.3 10e9/L    Absolute Monocytes 0.3 0.0 - 1.3 10e9/L    Absolute Eosinophils 0.1 0.0 - 0.7 10e9/L    Absolute Basophils 0.0 0.0 - 0.2 10e9/L    Abs Immature Granulocytes 0.1 0 - 0.4 10e9/L    Absolute Nucleated RBC 0.0    Asymptomatic SARS-CoV-2 COVID-19 Virus (Coronavirus) by PCR     Status: None    Specimen: Nasopharyngeal   Result Value Ref Range    SARS-CoV-2 Virus Specimen Source Nasopharyngeal     SARS-CoV-2 PCR Result NEGATIVE     SARS-CoV-2 PCR Comment       Testing was performed using the [place] Xpress SARS-CoV-2 Assay on the Cepheid Gene-Xpert   Instrument Systems. Additional information about this Emergency Use Authorization (EUA)   assay can be found via the Lab Guide.                Discharge Medications:     Current Discharge Medication List      START taking these medications    Details   carBAMazepine (TEGRETOL XR) 100 MG 12 hr tablet Take 1 tablet (100 mg) by mouth 2 times daily  Qty:        clonazePAM (KLONOPIN) 2 MG tablet Take 1 tablet (2 mg) by mouth 3 times daily  Qty:        cloNIDine (CATAPRES) 0.1 MG tablet Take 1 tablet (0.1 mg) by mouth 3 times daily as needed (anxiety/restless)  Qty:        diphenhydrAMINE (BENADRYL) 50 MG capsule Take 1 capsule (50 mg) by mouth 3 times daily as needed (agitation/sleep)  Qty:        ibuprofen (ADVIL/MOTRIN) 600 MG tablet Take 1 tablet (600 mg) by mouth every 6 hours as needed for moderate pain  Qty:           CONTINUE these medications which have CHANGED    Details   benztropine (COGENTIN) 0.5 MG tablet Take 1 tablet (0.5 mg) by mouth 2 times daily as needed for movement disorders  Qty:        fluPHENAZine (PROLIXIN) 5 MG tablet Take 1 tablet (5 mg) by mouth every 8 hours as needed (agitation/paranoia)  Qty:        gabapentin (NEURONTIN) 300 MG capsule Take 3 capsules (900 mg) by mouth 3 times daily  Qty:           CONTINUE these medications which have NOT CHANGED    Details   melatonin 3 MG tablet Take 2 tablets  (6 mg) by mouth At Bedtime  Qty:           STOP taking these medications       OLANZapine (ZYPREXA) 5 MG tablet Comments:   Reason for Stopping: not taking        propranolol (INDERAL) 10 MG tablet Comments:   Reason for Stopping: not taking        traZODone (DESYREL) 100 MG tablet Comments:   Reason for Stopping: not taking                Justification for dual anti-psychotic use: not applicable       Psychiatric Examination:   Appearance:  awake, alert and disheveled   Attitude: limited cooperation,    Eye Contact:  good, intense  Mood:  angry, labile  Affect:  labile  Speech:  Stuttered at times  Psychomotor Behavior:  no evidence of tardive dyskinesia, dystonia, or tics  Thought Process:  Irrational, circumstantial  Associations:  no loose associations  Thought Content:  Denies any suicidal thoughts, denies hallucinations  Insight:  limited  Judgment:  limited  Oriented to:  time, person, and place  Attention Span and Concentration:  limited  Recent and Remote Memory:  fair  Fund of Knowledge: low-normal  Muscle Strength and Tone: normal  Gait and Station: Normal  Perception: no perceptual disturbances noted       Discharge Plan:   Patient discharging to [facility] on 4/[facility]/21 for further stabilization.    Can follow-up with outpatient providers when back in the area.    Attestation:  The patient has been seen and evaluated by Christelle mendoza NP         Discharge Services Provided:    40 minutes spent on discharge services, including:  Final examination of patient.  Review and discussion of Hospital stay.  Instructions for continued outpatient care/goals.  Preparation of discharge records.  Preparation of medications refills and new prescriptions.

## 2021-04-01 NOTE — PLAN OF CARE
0115- Pt requested and was admin 650 mg Tylenol and 600 mg Ibuprofen for c/o 6/10 lower right wisdom tooth pain.

## 2021-04-01 NOTE — PLAN OF CARE
Face to face shift report received from Jazmyne GARCIA RN. Rounding completed, pt observed in MHICU awake requesting something for sleep, writer advised that PRN medication Benadryl and Clonopin could be given pt agreed to benadryl then refused once offered.  Pt was stating he could not sleep and stated that all the medications were not working.  Writer offered non pharmaceutical options, to which pt declined. Pt then went to bed to try to sleep.  Respirations are even and non labored, in no apparent distress, seems a bit agitated.        Problem: Behavioral Health Plan of Care  Goal: Patient-Specific Goal (Individualization)  Description: Pt will be compliant with treatment team recommendations during hospitalization.   Pt will be medication compliant.   Pt will participate in nursing assessment.   Pt will eat at least 50% of meals.   Weaning Care Plan: 3/30/21 Patient is allowed to wean to open unit if behavior is appropriate. Treatment team to re-assess daily.   ELOPEMENT RISK     Outcome: No Change     Problem: Thought Process Alteration  Goal: Optimal Thought Clarity  Description: Pt will participate in nursing assessment by discharge  Pt will be able to complete his ADL's independently by discharge  Pt will be able to ask for his needs to be met by discharge  Outcome: No Change   0115-Pt requested and received APAP/IBU from staff RN for report of tooth pain.  0200-Pt sleeping, no complaint of pain at this time.  Medication appears to be effective.      Pt requested to see Christelle obrien 04/01/21.  Sticky not left.      Pt slept approx.  4 hours    Face to face report will be communicated to oncoming RN.    Maria Elena Yan RN  4/1/2021  5:31 AM

## 2021-04-01 NOTE — PLAN OF CARE
"  Problem: Thought Process Alteration  Goal: Optimal Thought Clarity  Description: Pt will participate in nursing assessment by discharge  Pt will be able to complete his ADL's independently by discharge  Pt will be able to ask for his needs to be met by discharge  Outcome: No Change     Problem: Behavioral Health Plan of Care  Goal: Patient-Specific Goal (Individualization)  Description: Pt will be compliant with treatment team recommendations during hospitalization.   Pt will be medication compliant.   Pt will participate in nursing assessment.   Pt will eat at least 50% of meals.   Weaning Care Plan: 3/30/21 Patient is allowed to wean to open unit if behavior is appropriate. Treatment team to re-assess daily.   ELOPEMENT RISK     Outcome: No Change  Note:      Patient has been angry and irritable all shift.  He requested multiple PRN medications then would refuse them when nursing staff brought them to him. Did agree to take Benadryl 50 mg from other staff nurse at 1850 and also had hydroxyzine 50 mg and melatonin 6 mg at 2221 Stated that all the nurses are \"liars\" and we are charting that he is taking medications that he is not taking.  Yelling in the MHICU for a few hours.  Stated \"you don't know how I feel, I'm going to make you understand.\"  Patient then yelled at nursing staff through the window about how horrible his life has been and having his kids taken away.  Patient then stated \"Now I bet you feel sorry for me!\"  Unable to have a reality based conversation with patient.  He is tangential with pressured speech.  Arguing with staff about his medications all evening.  He is requesting a urine screening to prove to staff that he has not been taking clonidine.  Believes staff are laughing at him and spitting in his water.  Has not threatened staff verbally or physically.  Just angry and accusatory.  CM will drop off patient belongings at 0730 tomorrow morning.  Nurse to nurse report called to [facility] Shamar " Clint and MAR faxed.  Face to face end of shift report communicated to night shift RN.     Shantal Real RN  3/31/2021  10:50 PM

## 2021-04-01 NOTE — PROGRESS NOTES
"[facility]  Psychiatric Progress Note      Impression:   This is a 30 year old yo male with a history of schizoaffective disorder, TBI, and polysubstance abuse.     Steven has been requesting to speak with me all morning. When I go to see him he again wants to talk about medications while in the same statement tells me that he does not want to discuss medications again. Talks about wanting to be off of all medications though at the same time states that he feels that the medications help. We discussed not changing anything drastically today as he will be leaving in the AM. Will change timing of Clonazepam per his request. Still remains upset that he will be discharging to another hospital, stating \"now I'm just gonna lose my apartment and I have to start all over again!\" I do believe that his mother pays for his apartment currently. Denies any side effects from Tegretol, can likely increase in next few days.     Educated regarding medication indications, risks, benefits, side effects, contraindications and possible interactions. Verbally expressed understanding.        Diagnoses:     Bipolar disorder type I, most recent episode depressed versus mixed severe with catatonic features  Schizoaffective disorder- depressed type (by history)  R/o substance induced psychosis  History of TBI  History of polysubstance abuse (amphetamines most recent)  History of dextromethorphan abuse        Attestation:  Patient has been seen and evaluated by me,  Christelle Contreras NP          Interim History:   The patient's care was discussed with the treatment team and chart notes were reviewed.      BEHAVIORAL TEAM DISCUSSION    Progress: Limited. Continues to be argumentative in conversation. Lacks insight. Mood lability continues at times.    Continued Stay Criteria/Rationale: still has minimal insight. Mood labile. Depakote stopped, started on Tegretol.    Medical/Physical: None  Precautions:   Falls precaution?: " "No  Behavioral Orders   Procedures     Code 1 - Restrict to Unit     Elopement precautions     Routine Programming     As clinically indicated     Status 15     Every 15 minutes.       Plan:     Plan for discharge to [facility] tomorrow 4/2    Schedule Melatonin 6 mg at bedtime  Continue Tegretol  mg BID. If refusing to take this, will restart Abilify.             Medications:     Current Facility-Administered Medications Ordered in Epic   Medication Dose Route Frequency Last Rate Last Admin     acetaminophen (TYLENOL) tablet 650 mg  650 mg Oral Q4H PRN         alum & mag hydroxide-simethicone (MAALOX) suspension 30 mL  30 mL Oral Q4H PRN         benztropine (COGENTIN) tablet 0.5 mg  0.5 mg Oral BID PRN         fluPHENAZine (PROLIXIN) tablet 5 mg  5 mg Oral At Bedtime         gabapentin (NEURONTIN) capsule 300 mg  300 mg Oral TID         hydrOXYzine (ATARAX) tablet 25-50 mg  25-50 mg Oral Q4H PRN         LORazepam (ATIVAN) tablet 1 mg  1 mg Oral Q6H PRN        Or     LORazepam (ATIVAN) injection 1 mg  1 mg Intramuscular Q6H PRN         LORazepam (ATIVAN) tablet 1 mg  1 mg Oral TID         melatonin tablet 3 mg  3 mg Oral At Bedtime PRN         nicotine (NICORETTE) gum 2 mg  2 mg Buccal Q1H PRN         OLANZapine zydis (zyPREXA) ODT tab 10 mg  10 mg Oral TID PRN        Or     OLANZapine (zyPREXA) injection 10 mg  10 mg Intramuscular TID PRN         propranolol (INDERAL) tablet 10 mg  10 mg Oral BID PRN         No current [facility]-ordered outpatient medications on file.            10 point ROS- reports some lower back pain though refused to accept any treatments when seen by medical NP       Allergies:     Allergies   Allergen Reactions     Pork Allergy             Psychiatric Examination:   /64   Pulse 71   Temp 96.9  F (36.1  C) (Temporal)   Resp 18   Ht 1.905 m (6' 3\")   Wt 105.2 kg (232 lb)   SpO2 96%   BMI 29.00 kg/m    Weight is 232 lbs 0 oz  Body mass index is 29 kg/m .    Appearance:  " Awake, alert, dressed in hospital scrubs, disheveled appearance  Attitude: argumentative though remains cooperative  Eye Contact: Intact  Mood: labile at times, gets upset when discussing discharge  Affect: less restricted  Speech: does have some trouble with stutter at times, slightly pressured  Psychomotor Behavior:  no evidence of tardive dyskinesia, dystonia, or tics  Thought Process: Not as accusatory .. less persecutory thoughts. Circumstantial  Associations: No loose associations  Thought Content: Denies suicidal ideation no thoughts to harm others. Denies hallucinations.   Insight: Limited  Judgment: slight improvement.   Oriented to: Oriented to all spheres  Attention Span and Concentration: Appears to be intact  Recent and Remote Memory: Intact  Fund of Knowledge: Average  Muscle Strength and Tone: normal  Gait and Station: Normal           Labs:     Results for orders placed or performed during the hospital encounter of 03/07/21 (from the past 24 hour(s))   Asymptomatic SARS-CoV-2 COVID-19 Virus (Coronavirus) by PCR    Specimen: Nasopharyngeal   Result Value Ref Range    SARS-CoV-2 Virus Specimen Source Nasopharyngeal     SARS-CoV-2 PCR Result NEGATIVE     SARS-CoV-2 PCR Comment       Testing was performed using the Xpert Xpress SARS-CoV-2 Assay on the Cepheid Gene-Xpert   Instrument Systems. Additional information about this Emergency Use Authorization (EUA)   assay can be found via the Lab Guide.

## 2021-04-01 NOTE — PLAN OF CARE
Problem: Behavioral Health Plan of Care  Goal: Patient-Specific Goal (Individualization)  Description: Pt will be compliant with treatment team recommendations during hospitalization.   Pt will be medication compliant.   Pt will participate in nursing assessment.   Pt will eat at least 50% of meals.   Weaning Care Plan: 3/30/21 Patient is allowed to wean to open unit if behavior is appropriate. Treatment team to re-assess daily.   ELOPEMENT RISK     Outcome: No Change  Note:        Problem: Thought Process Alteration  Goal: Optimal Thought Clarity  Description: Pt will participate in nursing assessment by discharge  Pt will be able to complete his ADL's independently by discharge  Pt will be able to ask for his needs to be met by discharge  Outcome: No Change   Pt. Was up and ate breakfast in room, walked out to lounge to get water, walked right back to room in [facility], took prescribed medications, irritable at times, asking to talk to provider, NP notified, is independent with ADL's,  1230-  Pt. Talked to provider, behavior in control, ate lunch, appetite good, up to shower.  1400-  Pt. Weaning to open unit, making phone calls, social with peers.    Face to face end of shift report will be communicated to oncoming afternoon shift RN.     Farida Saenz, RN  4/1/2021  11:13 AM

## 2021-04-02 VITALS
SYSTOLIC BLOOD PRESSURE: 134 MMHG | TEMPERATURE: 97.3 F | HEIGHT: 75 IN | RESPIRATION RATE: 18 BRPM | DIASTOLIC BLOOD PRESSURE: 89 MMHG | HEART RATE: 80 BPM | WEIGHT: 232 LBS | BODY MASS INDEX: 28.85 KG/M2 | OXYGEN SATURATION: 98 %

## 2021-04-02 PROCEDURE — 99239 HOSP IP/OBS DSCHRG MGMT >30: CPT | Performed by: NURSE PRACTITIONER

## 2021-04-02 PROCEDURE — 250N000013 HC RX MED GY IP 250 OP 250 PS 637: Performed by: NURSE PRACTITIONER

## 2021-04-02 RX ADMIN — CLONAZEPAM 2 MG: 1 TABLET ORAL at 05:55

## 2021-04-02 RX ADMIN — GABAPENTIN 900 MG: 300 CAPSULE ORAL at 05:55

## 2021-04-02 RX ADMIN — CARBAMAZEPINE 100 MG: 100 TABLET, EXTENDED RELEASE ORAL at 05:55

## 2021-04-02 NOTE — PLAN OF CARE
Patient discharged to [facility] via [facility].  Faxed discharge paperwork to   [facility] per their request as well as CPA and  when it is completed.

## 2021-04-02 NOTE — PLAN OF CARE
Discharge Note    Patient Discharged to [facility] on 4/2/2021 8:24 AM via [facility] accompanied by security.     Patient informed of discharge instructions in AVS. patient verbalizes understanding and denies having any questions pertaining to AVS. Patient stable at time of discharge. Patient denies SI, HI, and thoughts of self harm at time of discharge. All personal belongings returned to patient. Discharge prescriptions sent to [facility] via electronic communication. Psych evaluation, history and physical, AVS, and discharge summary faxed to next level of care- per KENDRA.     Shantal Real, RN  4/2/2021  8:42 AM

## 2021-04-02 NOTE — PLAN OF CARE
Face to face shift report received from Prisca LARKIN. Rounding completed, pt observed in room appears to be sleeping in no apparent distress, respirations are even and non labored.        Problem: Behavioral Health Plan of Care  Goal: Patient-Specific Goal (Individualization)  Description: Pt will be compliant with treatment team recommendations during hospitalization.   Pt will be medication compliant.   Pt will participate in nursing assessment.   Pt will eat at least 50% of meals.   Weaning Care Plan: 3/30/21 Patient is allowed to wean to open unit if behavior is appropriate. Treatment team to re-assess daily.   ELOPEMENT RISK     Outcome: Improving     Problem: Thought Process Alteration  Goal: Optimal Thought Clarity  Description: Pt will participate in nursing assessment by discharge  Pt will be able to complete his ADL's independently by discharge  Pt will be able to ask for his needs to be met by discharge  Outcome: Adequate for Discharge     Pt slept approx. 5.5 hours during the night.  AVS signed this AM-Pt stated he did not want writer to review AVS with him, writer highlighted points, and advised if he had any questions to speak with staff prior to discharge.      Face to face report will be communicated to oncsalomón LARKIN.    Maria Elena Yan RN  4/2/2021  6:40 AM

## 2021-04-02 NOTE — PLAN OF CARE
Problem: Behavioral Health Plan of Care  Goal: Plan of Care Review  Outcome: Adequate for Discharge  Goal: Adheres to Safety Considerations for Self and Others  Outcome: Adequate for Discharge  Goal: Absence of New-Onset Illness or Injury  Outcome: Adequate for Discharge  Goal: Optimized Coping Skills in Response to Life Stressors  Outcome: Adequate for Discharge  Goal: Develops/Participates in Therapeutic [place] to Support Successful Transition  Outcome: Adequate for Discharge     Problem: Thought Process Alteration  Goal: Optimal Thought Clarity  Description: Pt will participate in nursing assessment by discharge  Pt will be able to complete his ADL's independently by discharge  Pt will be able to ask for his needs to be met by discharge  Outcome: Adequate for Discharge   Pt discharging to [facility] for continued care.

## 2021-06-08 ENCOUNTER — HOSPITAL ENCOUNTER (EMERGENCY)
Facility: HOSPITAL | Age: 31
Discharge: HOME | End: 2021-06-09
Attending: EMERGENCY MEDICINE | Admitting: INTERNAL MEDICINE
Payer: COMMERCIAL

## 2021-06-08 VITALS — SYSTOLIC BLOOD PRESSURE: 125 MMHG | DIASTOLIC BLOOD PRESSURE: 93 MMHG | OXYGEN SATURATION: 95 % | HEART RATE: 114 BPM

## 2021-06-08 DIAGNOSIS — F23 ACUTE PSYCHOSIS (H): ICD-10-CM

## 2021-06-08 LAB
ALBUMIN SERPL-MCNC: 4.6 G/DL (ref 3.4–5)
ALP SERPL-CCNC: 74 U/L (ref 40–150)
ALT SERPL W P-5'-P-CCNC: 29 U/L (ref 0–70)
ANION GAP SERPL CALCULATED.3IONS-SCNC: 8 MMOL/L (ref 3–14)
AST SERPL W P-5'-P-CCNC: 32 U/L (ref 0–45)
BASOPHILS # BLD AUTO: 0 10E9/L (ref 0–0.2)
BASOPHILS NFR BLD AUTO: 0.4 %
BILIRUB SERPL-MCNC: 0.8 MG/DL (ref 0.2–1.3)
BUN SERPL-MCNC: 22 MG/DL (ref 7–30)
CALCIUM SERPL-MCNC: 9.4 MG/DL (ref 8.5–10.1)
CHLORIDE SERPL-SCNC: 110 MMOL/L (ref 94–109)
CO2 SERPL-SCNC: 23 MMOL/L (ref 20–32)
CREAT SERPL-MCNC: 1.29 MG/DL (ref 0.66–1.25)
DIFFERENTIAL METHOD BLD: ABNORMAL
EOSINOPHIL # BLD AUTO: 0 10E9/L (ref 0–0.7)
EOSINOPHIL NFR BLD AUTO: 0.4 %
ERYTHROCYTE [DISTWIDTH] IN BLOOD BY AUTOMATED COUNT: 12.1 % (ref 10–15)
ETHANOL SERPL-MCNC: <0.01 G/DL
GFR SERPL CREATININE-BSD FRML MDRD: 72 ML/MIN/{1.73_M2}
GLUCOSE SERPL-MCNC: 101 MG/DL (ref 70–99)
HCT VFR BLD AUTO: 47.6 % (ref 40–53)
HGB BLD-MCNC: 16.3 G/DL (ref 13.3–17.7)
IMM GRANULOCYTES # BLD: 0 10E9/L (ref 0–0.4)
IMM GRANULOCYTES NFR BLD: 0.3 %
LYMPHOCYTES # BLD AUTO: 2.1 10E9/L (ref 0.8–5.3)
LYMPHOCYTES NFR BLD AUTO: 18.7 %
MCH RBC QN AUTO: 29.1 PG (ref 26.5–33)
MCHC RBC AUTO-ENTMCNC: 34.2 G/DL (ref 31.5–36.5)
MCV RBC AUTO: 85 FL (ref 78–100)
MONOCYTES # BLD AUTO: 1.2 10E9/L (ref 0–1.3)
MONOCYTES NFR BLD AUTO: 10.7 %
NEUTROPHILS # BLD AUTO: 7.8 10E9/L (ref 1.6–8.3)
NEUTROPHILS NFR BLD AUTO: 69.5 %
NRBC # BLD AUTO: 0 10*3/UL
NRBC BLD AUTO-RTO: 0 /100
PLATELET # BLD AUTO: 261 10E9/L (ref 150–450)
POTASSIUM SERPL-SCNC: 3.4 MMOL/L (ref 3.4–5.3)
PROT SERPL-MCNC: 8.3 G/DL (ref 6.8–8.8)
RBC # BLD AUTO: 5.6 10E12/L (ref 4.4–5.9)
SODIUM SERPL-SCNC: 141 MMOL/L (ref 133–144)
WBC # BLD AUTO: 11.2 10E9/L (ref 4–11)

## 2021-06-08 PROCEDURE — 80053 COMPREHEN METABOLIC PANEL: CPT | Performed by: INTERNAL MEDICINE

## 2021-06-08 PROCEDURE — 99284 EMERGENCY DEPT VISIT MOD MDM: CPT | Mod: 25

## 2021-06-08 PROCEDURE — 80306 DRUG TEST PRSMV INSTRMNT: CPT | Performed by: INTERNAL MEDICINE

## 2021-06-08 PROCEDURE — 99284 EMERGENCY DEPT VISIT MOD MDM: CPT | Performed by: INTERNAL MEDICINE

## 2021-06-08 PROCEDURE — 85025 COMPLETE CBC W/AUTO DIFF WBC: CPT | Performed by: INTERNAL MEDICINE

## 2021-06-08 PROCEDURE — 36415 COLL VENOUS BLD VENIPUNCTURE: CPT | Performed by: INTERNAL MEDICINE

## 2021-06-08 PROCEDURE — 82077 ASSAY SPEC XCP UR&BREATH IA: CPT | Performed by: INTERNAL MEDICINE

## 2021-06-08 PROCEDURE — 250N000011 HC RX IP 250 OP 636: Performed by: INTERNAL MEDICINE

## 2021-06-08 PROCEDURE — 96372 THER/PROPH/DIAG INJ SC/IM: CPT | Performed by: INTERNAL MEDICINE

## 2021-06-08 RX ORDER — MIDAZOLAM HYDROCHLORIDE 5 MG/ML
7.5 INJECTION, SOLUTION INTRAMUSCULAR; INTRAVENOUS ONCE
Status: COMPLETED | OUTPATIENT
Start: 2021-06-08 | End: 2021-06-08

## 2021-06-08 RX ORDER — HALOPERIDOL 5 MG/ML
10 INJECTION INTRAMUSCULAR ONCE
Status: COMPLETED | OUTPATIENT
Start: 2021-06-08 | End: 2021-06-08

## 2021-06-08 RX ADMIN — HALOPERIDOL LACTATE 10 MG: 5 INJECTION, SOLUTION INTRAMUSCULAR at 22:06

## 2021-06-08 RX ADMIN — MIDAZOLAM HYDROCHLORIDE 7.5 MG: 5 INJECTION, SOLUTION INTRAMUSCULAR; INTRAVENOUS at 22:06

## 2021-06-09 LAB
AMPHETAMINES UR QL: ABNORMAL NG/ML
BARBITURATES UR QL SCN: NOT DETECTED NG/ML
BENZODIAZ UR QL SCN: ABNORMAL NG/ML
BUPRENORPHINE UR QL: NOT DETECTED NG/ML
CANNABINOIDS UR QL: ABNORMAL NG/ML
COCAINE UR QL SCN: NOT DETECTED NG/ML
D-METHAMPHET UR QL: ABNORMAL NG/ML
METHADONE UR QL SCN: NOT DETECTED NG/ML
OPIATES UR QL SCN: NOT DETECTED NG/ML
OXYCODONE UR QL SCN: NOT DETECTED NG/ML
PCP UR QL SCN: NOT DETECTED NG/ML
PROPOXYPH UR QL: NOT DETECTED NG/ML
TRICYCLICS UR QL SCN: NOT DETECTED NG/ML

## 2021-06-09 PROCEDURE — 250N000013 HC RX MED GY IP 250 OP 250 PS 637: Performed by: INTERNAL MEDICINE

## 2021-06-09 RX ORDER — DIAZEPAM 5 MG
10 TABLET ORAL ONCE
Status: COMPLETED | OUTPATIENT
Start: 2021-06-09 | End: 2021-06-09

## 2021-06-09 RX ADMIN — DIAZEPAM 10 MG: 5 TABLET ORAL at 01:43

## 2021-06-09 ASSESSMENT — ENCOUNTER SYMPTOMS
AGITATION: 1
CONFUSION: 0
HALLUCINATIONS: 1
NECK STIFFNESS: 0
SLEEP DISTURBANCE: 1
DELUSIONS: 1
HEADACHES: 0
EYE REDNESS: 0
BIZARRE BEHAVIOR: 1
PARANOIA: 1
FEVER: 0
HYPERACTIVE: 1
ARTHRALGIAS: 0
DIFFICULTY URINATING: 0
SHORTNESS OF BREATH: 0
ABDOMINAL PAIN: 0
COLOR CHANGE: 0
NERVOUS/ANXIOUS: 1

## 2021-06-09 NOTE — ED NOTES
Patient wanting to leave, addressed with provider and provider wanted AMA paperwork signed. AMA paperwork signed by patient at this time. Vitals not completed due to patient refused.

## 2021-06-09 NOTE — ED NOTES
Patient stated he just wants to leave.  Refusing to talk to DEC.  Ate breakfast.  Denies being suicidal or homicidal.  Refuses detox or any other intervention.  Asking to use the phone.    Pt will be released against medical advice.    GOOD Cardoso

## 2021-06-09 NOTE — ED NOTES
"Provided patient with my card.  Discussed mental health services in the clinic with him.  Also the saboxone program.  \" I don't use opioids any more, just some meth\"  Encouraged him to come back or reach out to me if he would like help.    Jennifer Jung, MOLINAW  "

## 2021-06-09 NOTE — ED PROVIDER NOTES
History     Chief Complaint   Patient presents with     Paranoid     having hallucinations     Psychiatric Evaluation     The history is provided by the patient.   Mental Health Problem  Presenting symptoms: agitation, bizarre behavior, delusional, hallucinations and paranoid behavior    Presenting symptoms: no suicidal thoughts, no suicidal threats and no suicide attempt    Patient accompanied by:  Law enforcement  Degree of incapacity (severity):  Severe  Onset quality:  Unable to specify  Chronicity:  Recurrent  Context: drug abuse    Associated symptoms: anxiety    Associated symptoms: no abdominal pain, no chest pain and no headaches    Risk factors: hx of mental illness          Allergies:  Allergies   Allergen Reactions     Pork Allergy        Problem List:    Patient Active Problem List    Diagnosis Date Noted     Catatonia 03/07/2021     Priority: Medium     Depression, unspecified depression type 03/07/2021     Priority: Medium     Schizoaffective disorder (H) 03/07/2021     Priority: Medium     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, subsequent encounter 09/01/2020     Priority: Medium     Added automatically from request for surgery [id number]       Psychosis (H) 06/23/2020     Priority: Medium     Encounter for medical clearance for patient hold 03/02/2020     Priority: Medium     Schizoaffective disorder, depressive type (H) 01/30/2018     Priority: Medium     Seizure (H) 04/19/2016     Priority: Medium     ADD (attention deficit disorder) 12/02/2015     Priority: Medium     Methamphetamine abuse (H) 12/02/2015     Priority: Medium     Back pain without sciatica 11/30/2015     Priority: Medium     Suicidal behavior 07/23/2014     Priority: Medium     Polydrug dependence excluding opioid type drug, abuse (H) 07/23/2014     Priority: Medium     Moderate major depression (H) 07/23/2014     Priority: Medium     Moderate dextromethorphan use disorder (H)  07/20/2014     Priority: Medium     Paranoia (H) 07/20/2014     Priority: Medium     Depression with anxiety 03/02/2009     Priority: Medium     Overview:   IMO Update 10/11       Alcohol abuse 02/02/2009     Priority: Medium     Overview:   IMO Update 10/11       Drug abuse (H) 02/02/2009     Priority: Medium     Tobacco use disorder 11/01/2008     Priority: Medium     Anxiety disorder 11/01/2008     Priority: Medium     Formatting of this note might be different from the original.  Diagnosed Rosalba Padilla          Past Medical History:    No past medical history on file.    Past Surgical History:    No past surgical history on file.    Family History:    No family history on file.    Social History:  Marital Status:  Single [1]  Social History     Tobacco Use     Smoking status: Current Every Day Smoker     Packs/day: 0.50     Years: 9.00     Pack years: 4.50     Smokeless tobacco: Current User     Types: Chew   Substance Use Topics     Alcohol use: No     Comment: daily to occasionally per pt.     Drug use: Yes     Types: Other     Comment: reports hx of use but none recently        Medications:    benztropine (COGENTIN) 0.5 MG tablet  carBAMazepine (TEGRETOL XR) 100 MG 12 hr tablet  clonazePAM (KLONOPIN) 2 MG tablet  cloNIDine (CATAPRES) 0.1 MG tablet  diphenhydrAMINE (BENADRYL) 50 MG capsule  fluPHENAZine (PROLIXIN) 5 MG tablet  gabapentin (NEURONTIN) 300 MG capsule  ibuprofen (ADVIL/MOTRIN) 600 MG tablet  melatonin 3 MG tablet          Review of Systems   Constitutional: Negative for fever.   HENT: Negative for congestion.    Eyes: Negative for redness.   Respiratory: Negative for shortness of breath.    Cardiovascular: Negative for chest pain.   Gastrointestinal: Negative for abdominal pain.   Genitourinary: Negative for difficulty urinating.   Musculoskeletal: Negative for arthralgias and neck stiffness.   Skin: Negative for color change.   Neurological: Negative for headaches.   Psychiatric/Behavioral:  Positive for agitation, behavioral problems, hallucinations, paranoia and sleep disturbance. Negative for confusion and suicidal ideas. The patient is nervous/anxious and is hyperactive.    All other systems reviewed and are negative.      Physical Exam   BP: 125/93  Pulse: 114  SpO2: 95 %      Physical Exam  Constitutional:       General: He is not in acute distress.     Appearance: He is not diaphoretic.   HENT:      Head: Atraumatic.   Eyes:      General: No scleral icterus.     Pupils: Pupils are equal, round, and reactive to light.   Cardiovascular:      Heart sounds: Normal heart sounds.   Pulmonary:      Effort: No respiratory distress.      Breath sounds: Normal breath sounds.   Abdominal:      General: Bowel sounds are normal.      Palpations: Abdomen is soft.      Tenderness: There is no abdominal tenderness.   Musculoskeletal:         General: No tenderness.   Skin:     General: Skin is warm.      Findings: No rash.   Psychiatric:         Attention and Perception: He is inattentive.         Mood and Affect: Affect is labile.         Speech: Speech is rapid and pressured.         Behavior: Behavior is uncooperative and agitated.         Thought Content: Thought content is paranoid and delusional. Thought content does not include homicidal or suicidal ideation. Thought content does not include homicidal or suicidal plan.         Judgment: Judgment is inappropriate.         ED Course        Procedures             Results for orders placed or performed during the hospital encounter of 06/08/21 (from the past 24 hour(s))   Alcohol ethyl   Result Value Ref Range    Ethanol g/dL <0.01 0.01 g/dL   CBC with platelets differential   Result Value Ref Range    WBC 11.2 (H) 4.0 - 11.0 10e9/L    RBC Count 5.60 4.4 - 5.9 10e12/L    Hemoglobin 16.3 13.3 - 17.7 g/dL    Hematocrit 47.6 40.0 - 53.0 %    MCV 85 78 - 100 fl    MCH 29.1 26.5 - 33.0 pg    MCHC 34.2 31.5 - 36.5 g/dL    RDW 12.1 10.0 - 15.0 %    Platelet Count  261 150 - 450 10e9/L    Diff Method Automated Method     % Neutrophils 69.5 %    % Lymphocytes 18.7 %    % Monocytes 10.7 %    % Eosinophils 0.4 %    % Basophils 0.4 %    % Immature Granulocytes 0.3 %    Nucleated RBCs 0 0 /100    Absolute Neutrophil 7.8 1.6 - 8.3 10e9/L    Absolute Lymphocytes 2.1 0.8 - 5.3 10e9/L    Absolute Monocytes 1.2 0.0 - 1.3 10e9/L    Absolute Eosinophils 0.0 0.0 - 0.7 10e9/L    Absolute Basophils 0.0 0.0 - 0.2 10e9/L    Abs Immature Granulocytes 0.0 0 - 0.4 10e9/L    Absolute Nucleated RBC 0.0    Comprehensive metabolic panel   Result Value Ref Range    Sodium 141 133 - 144 mmol/L    Potassium 3.4 3.4 - 5.3 mmol/L    Chloride 110 (H) 94 - 109 mmol/L    Carbon Dioxide 23 20 - 32 mmol/L    Anion Gap 8 3 - 14 mmol/L    Glucose 101 (H) 70 - 99 mg/dL    Urea Nitrogen 22 7 - 30 mg/dL    Creatinine 1.29 (H) 0.66 - 1.25 mg/dL    GFR Estimate 72 >60 mL/min/[1.73_m2]    GFR Estimate If Black 84 >60 mL/min/[1.73_m2]    Calcium 9.4 8.5 - 10.1 mg/dL    Bilirubin Total 0.8 0.2 - 1.3 mg/dL    Albumin 4.6 3.4 - 5.0 g/dL    Protein Total 8.3 6.8 - 8.8 g/dL    Alkaline Phosphatase 74 40 - 150 U/L    ALT 29 0 - 70 U/L    AST 32 0 - 45 U/L       Medications   haloperidol lactate (HALDOL) injection 10 mg (10 mg Intramuscular Given 6/8/21 2206)   midazolam (VERSED) injection 7.5 mg (7.5 mg Intramuscular Given 6/8/21 2206)   diazepam (VALIUM) tablet 10 mg (10 mg Oral Given 6/9/21 0143)       Assessments & Plan (with Medical Decision Making)   Acute psychosis with paranoid delusion , hallucination   He believes he saw undercover policemen in his home trying to get him  Very agitated in ER , sedated with IM haldol + versed  Suspected to Substance abuse, heroin abuse  DEC assessment appreciated, not able to properly evaluate him and recommended reevaluation.  S/o to Dr Stone at the change of shift.   I have reviewed the nursing notes.    I have reviewed the findings, diagnosis, plan and need for follow up with the  patient.      New Prescriptions    No medications on file       Final diagnoses:   Acute psychosis (H)       6/8/2021   [facility]     Edis Burkett MD  06/09/21 0648

## 2021-06-09 NOTE — ED NOTES
Patient in handcuffs that were placed by [place] officer. They state that patient is having hallucinations. Patient states that he called police because there were undercover officers in his apartment and they were using heroin. Officers state that patient pointed to a towel and told them that that was one of the undercover officers. Officers also state that patient had tore up his apartment and had made cuts in his furniture and tried to burn a mattress. Patient is yelling at officers and swearing.

## 2021-06-09 NOTE — ED NOTES
"LICSW attempted to complete DEC assessment with pt. However, pt mainly answered questions with \"no,\" regardless of the type of question asked. Pt's responses are contrary to the information provided by police and ED staff. LICSW not able to effectively assess pt and make a determination at this time. Pt to be reassessed in the morning.   "

## 2021-06-09 NOTE — ED NOTES
Care Transitions focused note:      Chart reviewed, patient did meet with DEC  however she wants him more awake and she will reassess.  Ordered breakfast and will get him up and feed him.    If he is cleared by DEC I will offer detox services and other treatment options.    Will follow    GOOD Cardoso

## 2021-06-09 NOTE — ED TRIAGE NOTES
"Patient arrives ambulatory and handcuffed by [place] [employer] officer RDAHA Powell. Patient's mom called police for a check. Patient was found with heroin and having paranoia and hallucinations. Patient told PD that he was going to burn things down and feels that there are undercover police trying to get him. Patient is loud and yelling about how the police \"are corrupt\", threatening to \"kill all you \". Patient swearing. Patient remains handcuffed in room.   "

## 2021-09-04 ENCOUNTER — HOSPITAL ENCOUNTER (EMERGENCY)
Facility: HOSPITAL | Age: 31
Discharge: HOME | End: 2021-09-04
Attending: NURSE PRACTITIONER | Admitting: NURSE PRACTITIONER
Payer: COMMERCIAL

## 2021-09-04 VITALS
DIASTOLIC BLOOD PRESSURE: 89 MMHG | SYSTOLIC BLOOD PRESSURE: 135 MMHG | OXYGEN SATURATION: 96 % | HEART RATE: 90 BPM | RESPIRATION RATE: 18 BRPM | TEMPERATURE: 97.4 F

## 2021-09-04 PROCEDURE — 999N000104 HC STATISTIC NO CHARGE

## 2021-09-08 NOTE — PLAN OF CARE
Face to face end of shift report communicated to oncoming shift.     Sobeida Benton RN  3/10/2021  11:11 PM    Problem: Behavioral Health Plan of Care  Goal: Patient-Specific Goal (Individualization)  Description: Pt will be compliant with with treatment team recommendations during hospitalization.   Pt will be medication compliatn.   Pt will participate in nursing assessment.   Pt will eat at least 50% of meals.   Staff to bring meal trays to patient's room.  ELOPEMENT RISK     Outcome: No Change  Note: Shift Summery:    Patient lies in bed, does not participate with nursing assessment at this time.  Fluids offered and encouraged.  Patient does not acknowledge staff at this time.      1710:  Patient up on the unit walking in the pacheco.  Patient tries door on coffee room, redirectable by staff.  Patient rubbing hands together, requests soap at this time.     1815:  Patient ate side salad and cookie from dinner tray.  Encouraged fluids.  Patient drinks water.     2145:  Brought in patient's HS medications at this time, patient does not acknowledge this writer, asked multiple times if patient would like medication.  Told patient to lay on back if he was refusing the IM medication, patient rolled to back, will attempt administration again in 20 minutes.      2220:  Attempted to give patient HS medications again at this time, patient stares at the wall eating a bag of chips, does not respond to this writer.  No HS medications given.     Face to face start of shift report received from RN.  Patient in room at this time, will continue to monitor.       [facility]    PROGRESS NOTE - Hospitalist Service    Assessment and Plan    Principal Problem:    Bilateral cerebellar and left medulla infarction  Active Problems:    Essential hypertension    Mixed hyperlipidemia    Yoana Weeks is a 60 year old old female with h/o hypertension, smoking, hyperlipidemia who presented with acute onset dizziness slurred speech and left-sided facial numbness with nausea.  MRI MRA of the head showed multiple infarcts of varying ages in each of the cerebellar hemisphere.  Patient failed swallow study and is currently n.p.o.  Neurology was consulted and started on dual antiplatelet therapy with aspirin and Brilinta.  Echocardiogram is unremarkable carotid ultrasound is still pending patient was started on Lipitor.  Patient is visiting from [place] and insisting on leaving.  Urine toxicology was positive for marijuana which patient the patient acknowledged.     Acute bilateral cerebellar strokes  --MRI confirmed bilateral subacute and acute infarcts  --Continue aspirin & Brilinta for 30 days and then aspirin indefinitely  --Continue Lipitor as per neurology   --Patient is normotensive and normoglycemic.  --Echocardiogram is unremarkable  --Carotid ultrasound less than 50% bilateral ICA stenosis  - SLP and VSS soft & bite sized (level6) and mildly thick liquids level 2.   --Continue on Neurotele   --EKG does not show atrial fibrillation therefore no indication for anticoagulation  - PT note on 9/7 recommends Acute rehab but per nursing notes scoring 1-2NIH overnight and per neurology notes no severe findings on exam today other then dysmetria FNF on left and Heel shin. Motor intact on todays exam,uncertain of how severe patient was yesterday?? Awaiting PT/OT documentation today.      Dysphagia   - SLP and VSS diet soft & bite sized (level6) and mildly thick liquids level 2.      Acute confusion on presentation none seen on my exam   --Urine tox was positive for  marijuana     Hypertension  --Allow permissive hypertension as per neurology     BISI resolved  --Creatinine of 1.3 on admission resolved      Hypokalemia mild  Stable 3.7      Moderate protein calorie malnutrition  --Likely due to poor p.o. intake     Smoking and marijuana use  --Patient is planning to quit.     VTE prophylaxis:  Enoxaparin (Lovenox) SQ  DIET: Orders Placed This Encounter      Soft & Bite Sized Diet (level 6) Mildly Thick (level 2); No Straws    Drains/Lines: none  Weight bearing status: WBAT  Disposition/Barriers to discharge: anticipated discharge tomorrow, patient declining TCU or acute rehab, she would like to return to [place] and then will contact her PCP for HC orders. She sosa shave a friend who can transport her home. Patient willing to stay overnight and get more therapy, she has improved per notes from previously   Code Status: Full Code    Subjective:  Patient very pleasant to writer and would like to return home and declines TCU or acute rehab but is open to HC but lives in [place] and would contact her PCP when she returns home since we can not order at discharge but only recommend     PHYSICAL EXAM  Temp:  [97.5  F (36.4  C)-98.9  F (37.2  C)] 98.5  F (36.9  C)  Pulse:  [56-90] 90  Resp:  [12-20] 20  BP: (140-184)/(58-81) 149/58  SpO2:  [95 %-97 %] 97 %  Wt Readings from Last 1 Encounters:   09/07/21 53.7 kg (118 lb 4.8 oz)       Intake/Output Summary (Last 24 hours) at 9/8/2021 0734  Last data filed at 9/7/2021 2313  Gross per 24 hour   Intake 1396 ml   Output --   Net 1396 ml      Body mass index is 19.09 kg/m .    Physical Exam  Constitutional:       Appearance: Normal appearance.   HENT:      Head:      Comments: Left facial droop, lower  Eyes:      Extraocular Movements: Extraocular movements intact.      Pupils: Pupils are equal, round, and reactive to light.   Cardiovascular:      Rate and Rhythm: Normal rate and regular rhythm.      Pulses: Normal pulses.      Heart sounds:  Normal heart sounds.   Pulmonary:      Effort: Pulmonary effort is normal.      Breath sounds: Normal breath sounds.   Abdominal:      General: Bowel sounds are normal.      Palpations: Abdomen is soft.   Musculoskeletal:         General: Normal range of motion.   Skin:     General: Skin is warm.   Neurological:      Mental Status: She is alert and oriented to person, place, and time.      Cranial Nerves: No cranial nerve deficit.      Comments: Left lower facial droop and loss of sensation, abn fine motor, motor intact no lateralizing deficits          PERTINENT LABS/IMAGING:  Results for orders placed or performed during the hospital encounter of 09/06/21   MRA Brain ([place]) wo Contrast    Impression    IMPRESSION:  HEAD MRI:   1.  Multiple infarcts of varying ages in each cerebellar hemisphere. These are predominantly favored to be subacute in nature with a few scattered small acute infarcts in the left cerebellar hemisphere and left dorsal medulla. The remainder are chronic.   No mass effect or space occupying hemorrhage.    2.  Small amount of presumed chronic hemorrhage in the left cerebellar hemisphere.    3.  Age-related changes.    HEAD MRA:   1.  Limited by motion. No proximal occlusion of the [place].    Findings were discussed with Dr. Castaneda at 2042 09/06/2021.   CT Head w/o Contrast    Impression    IMPRESSION:  1.  Patchy areas of low attenuation in the left cerebellar hemisphere are somewhat poorly evaluated due to streak artifact, though may represent acute to early subacute ischemia. These are superimposed on mild burden scattered chronic bilateral   cerebellar infarcts.    2.  Mild volume loss and moderate burden presumed chronic small vessel ischemia.    3.  No hemorrhage.    Findings discussed Dr. Castaneda at 1936 09/06/2021.   MR Brain for Stroke Limited    Impression    IMPRESSION:  HEAD MRI:   1.  Multiple infarcts of varying ages in each cerebellar hemisphere. These are  predominantly favored to be subacute in nature with a few scattered small acute infarcts in the left cerebellar hemisphere and left dorsal medulla. The remainder are chronic.   No mass effect or space occupying hemorrhage.    2.  Small amount of presumed chronic hemorrhage in the left cerebellar hemisphere.    3.  Age-related changes.    HEAD MRA:   1.  Limited by motion. No proximal occlusion of the [place].    Findings were discussed with Dr. Castaneda at 2042 09/06/2021.   US Carotid Bilateral    Impression    IMPRESSION:  1.  Mild plaque formation, velocities consistent with less than 50% stenosis in the right internal carotid artery.  2.  Mild plaque formation, velocities consistent with less than 50% stenosis in the left internal carotid artery.  3.  Flow within the vertebral arteries is antegrade.   XR Video Swallow with SLP or OT    Impression    IMPRESSION:  1.  Incomplete epiglottic tilt.  2.  Thin barium penetration and trace silent aspiration.     Echocardiogram Complete   Result Value Ref [facility]    LVEF  55-60%      Most Recent 3 CBC's:Recent Labs   Lab Test 09/07/21 0617 09/06/21 1944   WBC 11.1* 11.5*   HGB 10.8* 12.6   MCV 90 91    339     Most Recent 3 BMP's:Recent Labs   Lab Test 09/08/21  1324 09/08/21  0926 09/08/21  0851 09/08/21  0829 09/07/21  2145 09/07/21  1257 09/07/21  0617 09/07/21  0133 09/06/21 1944 09/06/21 1938   NA  --   --   --   --   --   --  138  --  139  --    POTASSIUM  --   --  3.7  --  3.6 3.3* 3.3*  --  3.1*  --    CHLORIDE  --   --   --   --   --   --  104  --  101  --    CO2  --   --   --   --   --   --  25  --  23  --    BUN  --   --   --   --   --   --  11  --  14  --    CR  --   --   --   --   --   --  0.92  --  1.30* 1.3*   ANIONGAP  --   --   --   --   --   --  9  --  15  --    KIRSTY  --   --   --   --   --   --  8.5  --  9.7  --     88  --  59*  --   --  89   < > 112  --     < > = values in this interval not displayed.     Most Recent 2 LFT's:No lab  results found.  Argentina Isabel MD  [facility] Medicine Service  [phone]

## 2021-09-23 ENCOUNTER — MEDICAL CORRESPONDENCE (OUTPATIENT)
Dept: HEALTH INFORMATION MANAGEMENT | Facility: CLINIC | Age: 31
End: 2021-09-23

## 2021-10-21 ENCOUNTER — TELEPHONE (OUTPATIENT)
Dept: BEHAVIORAL HEALTH | Facility: CLINIC | Age: 31
End: 2021-10-21

## 2021-10-21 ENCOUNTER — HOSPITAL ENCOUNTER (INPATIENT)
Facility: HOSPITAL | Age: 31
LOS: 11 days | Discharge: NURSING, REHAB OR LONG-TERM CARE | End: 2021-11-01
Attending: STUDENT IN AN ORGANIZED HEALTH CARE EDUCATION/TRAINING PROGRAM | Admitting: STUDENT IN AN ORGANIZED HEALTH CARE EDUCATION/TRAINING PROGRAM
Payer: COMMERCIAL

## 2021-10-21 ENCOUNTER — TRANSFERRED RECORDS (OUTPATIENT)
Dept: HEALTH INFORMATION MANAGEMENT | Facility: CLINIC | Age: 31
End: 2021-10-21
Payer: COMMERCIAL

## 2021-10-21 DIAGNOSIS — F06.1 CATATONIA: Primary | ICD-10-CM

## 2021-10-21 DIAGNOSIS — F28 OTHER PSYCHOTIC DISORDER NOT DUE TO SUBSTANCE OR KNOWN PHYSIOLOGICAL CONDITION (H): ICD-10-CM

## 2021-10-21 DIAGNOSIS — F25.1 SCHIZOAFFECTIVE DISORDER, DEPRESSIVE TYPE (H): ICD-10-CM

## 2021-10-21 PROBLEM — R45.851 SUICIDAL IDEATION: Status: ACTIVE | Noted: 2021-10-21

## 2021-10-21 PROCEDURE — 124N000001 HC R&B MH

## 2021-10-21 PROCEDURE — 250N000013 HC RX MED GY IP 250 OP 250 PS 637: Performed by: NURSE PRACTITIONER

## 2021-10-21 PROCEDURE — 99222 1ST HOSP IP/OBS MODERATE 55: CPT | Performed by: NURSE PRACTITIONER

## 2021-10-21 PROCEDURE — 99223 1ST HOSP IP/OBS HIGH 75: CPT | Performed by: NURSE PRACTITIONER

## 2021-10-21 RX ORDER — GABAPENTIN 300 MG/1
900 CAPSULE ORAL 3 TIMES DAILY
Status: DISCONTINUED | OUTPATIENT
Start: 2021-10-21 | End: 2021-11-01 | Stop reason: HOSPADM

## 2021-10-21 RX ORDER — RISPERIDONE 2 MG/1
2 TABLET ORAL AT BEDTIME
Status: DISCONTINUED | OUTPATIENT
Start: 2021-10-21 | End: 2021-10-22

## 2021-10-21 RX ORDER — IBUPROFEN 200 MG
400 TABLET ORAL EVERY 6 HOURS PRN
Status: DISCONTINUED | OUTPATIENT
Start: 2021-10-21 | End: 2021-10-21 | Stop reason: DRUGHIGH

## 2021-10-21 RX ORDER — ACETAMINOPHEN 325 MG/1
650 TABLET ORAL EVERY 4 HOURS PRN
Status: DISCONTINUED | OUTPATIENT
Start: 2021-10-21 | End: 2021-10-26

## 2021-10-21 RX ORDER — TRAZODONE HYDROCHLORIDE 50 MG/1
50 TABLET, FILM COATED ORAL
Status: DISCONTINUED | OUTPATIENT
Start: 2021-10-21 | End: 2021-11-01 | Stop reason: HOSPADM

## 2021-10-21 RX ORDER — HYDROXYZINE HYDROCHLORIDE 25 MG/1
50 TABLET, FILM COATED ORAL EVERY 4 HOURS PRN
Status: DISCONTINUED | OUTPATIENT
Start: 2021-10-21 | End: 2021-11-01 | Stop reason: HOSPADM

## 2021-10-21 RX ORDER — CLONAZEPAM 1 MG/1
2 TABLET ORAL ONCE
Status: COMPLETED | OUTPATIENT
Start: 2021-10-21 | End: 2021-10-21

## 2021-10-21 RX ORDER — LAMOTRIGINE 200 MG/1
200 TABLET ORAL DAILY
Status: ON HOLD | COMMUNITY
End: 2021-10-31

## 2021-10-21 RX ORDER — AMOXICILLIN 250 MG
1 CAPSULE ORAL 2 TIMES DAILY PRN
Status: DISCONTINUED | OUTPATIENT
Start: 2021-10-21 | End: 2021-11-01 | Stop reason: HOSPADM

## 2021-10-21 RX ORDER — RISPERIDONE 2 MG/1
2 TABLET ORAL AT BEDTIME
Status: ON HOLD | COMMUNITY
End: 2021-10-31

## 2021-10-21 RX ORDER — OLANZAPINE 10 MG/1
10 TABLET ORAL 3 TIMES DAILY PRN
Status: DISCONTINUED | OUTPATIENT
Start: 2021-10-21 | End: 2021-10-27

## 2021-10-21 RX ORDER — IBUPROFEN 600 MG/1
600 TABLET, FILM COATED ORAL EVERY 6 HOURS PRN
Status: DISCONTINUED | OUTPATIENT
Start: 2021-10-21 | End: 2021-10-23

## 2021-10-21 RX ORDER — DIPHENHYDRAMINE HYDROCHLORIDE AND LIDOCAINE HYDROCHLORIDE AND ALUMINUM HYDROXIDE AND MAGNESIUM HYDRO
10 KIT EVERY 6 HOURS PRN
Status: DISCONTINUED | OUTPATIENT
Start: 2021-10-21 | End: 2021-11-01 | Stop reason: HOSPADM

## 2021-10-21 RX ORDER — MAGNESIUM HYDROXIDE/ALUMINUM HYDROXICE/SIMETHICONE 120; 1200; 1200 MG/30ML; MG/30ML; MG/30ML
30 SUSPENSION ORAL EVERY 4 HOURS PRN
Status: DISCONTINUED | OUTPATIENT
Start: 2021-10-21 | End: 2021-11-01 | Stop reason: HOSPADM

## 2021-10-21 RX ORDER — LAMOTRIGINE 100 MG/1
200 TABLET ORAL DAILY
Status: DISCONTINUED | OUTPATIENT
Start: 2021-10-21 | End: 2021-10-22

## 2021-10-21 RX ORDER — LANOLIN ALCOHOL/MO/W.PET/CERES
3 CREAM (GRAM) TOPICAL
Status: DISCONTINUED | OUTPATIENT
Start: 2021-10-21 | End: 2021-10-28

## 2021-10-21 RX ORDER — LIDOCAINE 4 G/G
2 PATCH TOPICAL
Status: DISCONTINUED | OUTPATIENT
Start: 2021-10-21 | End: 2021-11-01 | Stop reason: HOSPADM

## 2021-10-21 RX ADMIN — GABAPENTIN 900 MG: 300 CAPSULE ORAL at 20:41

## 2021-10-21 RX ADMIN — LAMOTRIGINE 200 MG: 100 TABLET ORAL at 20:42

## 2021-10-21 RX ADMIN — RISPERIDONE 2 MG: 2 TABLET ORAL at 20:42

## 2021-10-21 RX ADMIN — IBUPROFEN 400 MG: 200 TABLET, FILM COATED ORAL at 16:37

## 2021-10-21 RX ADMIN — CLONAZEPAM 2 MG: 1 TABLET ORAL at 20:42

## 2021-10-21 ASSESSMENT — ACTIVITIES OF DAILY LIVING (ADL)
HYGIENE/GROOMING: INDEPENDENT
ORAL_HYGIENE: INDEPENDENT

## 2021-10-21 ASSESSMENT — MIFFLIN-ST. JEOR: SCORE: 2008.58
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
## 2021-10-21 NOTE — PROGRESS NOTES
10/21/21 1624   Patient Belongings   Did you bring any home meds/supplements to the hospital?  Yes   Disposition of meds  Sent to security/pharmacy per site process  (Given to RN)   Patient Belongings remains with patient;locker;sent to security per site process   Patient Belongings Remaining with Patient other (see comments)  (Glasses)   Patient Belongings Put in Hospital Secure Location (Security or Locker, etc.) clothing;shoes;other (see comments)  (Sweatshirt, sweatpants, hat, longsleeve, lighter, blue shoes, black shoes, black pants, socks, dresshirt)   Belongings Search Yes   Clothing Search Yes   Second Staff Vero   List items sent to safe: Black wallet-[place] ID card, social security card, bus pass, $5.00 dollar bill, vaccine card, two debit cards, casino card.    10/31/2021- Pt received Jacket, 6 Pack of underwear, underwear, shirt x2, socks x3, envelope w/ papers, hat, mouthwash, toothbrush, toothpaste, deodorant, body wash, bag of tobacco, lighter, rolling paper x2, pack of cigars, bag  All other belongings put in assigned cubby in belongings room.     I have reviewed my belongings list on admission and verify that it is correct.     Patient signature_______________________________    Second staff witness (if patient unable to sign) ______________________________       I have received all my belongings at discharge.    Patient signature________________________________    Kavita  10/21/2021  4:27 PM
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
## 2021-10-22 PROCEDURE — 124N000001 HC R&B MH

## 2021-10-22 PROCEDURE — 250N000013 HC RX MED GY IP 250 OP 250 PS 637: Performed by: NURSE PRACTITIONER

## 2021-10-22 PROCEDURE — 99233 SBSQ HOSP IP/OBS HIGH 50: CPT | Performed by: NURSE PRACTITIONER

## 2021-10-22 RX ORDER — PENICILLIN V POTASSIUM 500 MG/1
500 TABLET, FILM COATED ORAL EVERY 6 HOURS
Status: ON HOLD | COMMUNITY
Start: 2021-10-11 | End: 2021-10-31

## 2021-10-22 RX ORDER — LITHIUM CARBONATE 450 MG
450 TABLET, EXTENDED RELEASE ORAL AT BEDTIME
Status: DISCONTINUED | OUTPATIENT
Start: 2021-10-22 | End: 2021-10-23

## 2021-10-22 RX ORDER — CLONAZEPAM 1 MG/1
1 TABLET ORAL ONCE
Status: DISCONTINUED | OUTPATIENT
Start: 2021-10-22 | End: 2021-10-22

## 2021-10-22 RX ORDER — RAMELTEON 8 MG/1
8 TABLET ORAL AT BEDTIME
Status: DISCONTINUED | OUTPATIENT
Start: 2021-10-22 | End: 2021-10-23

## 2021-10-22 RX ORDER — CLONAZEPAM 1 MG/1
1 TABLET ORAL ONCE
Status: COMPLETED | OUTPATIENT
Start: 2021-10-22 | End: 2021-10-22

## 2021-10-22 RX ORDER — CLONAZEPAM 1 MG/1
1 TABLET ORAL 3 TIMES DAILY
Status: DISCONTINUED | OUTPATIENT
Start: 2021-10-22 | End: 2021-10-23

## 2021-10-22 RX ORDER — CLONAZEPAM 1 MG/1
1 TABLET ORAL 3 TIMES DAILY PRN
Status: ON HOLD | COMMUNITY
Start: 2021-10-16 | End: 2021-10-31

## 2021-10-22 RX ADMIN — CLONAZEPAM 1 MG: 1 TABLET ORAL at 12:22

## 2021-10-22 RX ADMIN — GABAPENTIN 900 MG: 300 CAPSULE ORAL at 20:00

## 2021-10-22 RX ADMIN — GABAPENTIN 900 MG: 300 CAPSULE ORAL at 13:15

## 2021-10-22 RX ADMIN — LAMOTRIGINE 200 MG: 100 TABLET ORAL at 08:26

## 2021-10-22 RX ADMIN — CLONAZEPAM 1 MG: 1 TABLET ORAL at 14:26

## 2021-10-22 RX ADMIN — Medication 2 PATCH: at 19:59

## 2021-10-22 RX ADMIN — CLONAZEPAM 1 MG: 1 TABLET ORAL at 20:00

## 2021-10-22 RX ADMIN — GABAPENTIN 900 MG: 300 CAPSULE ORAL at 08:26

## 2021-10-22 RX ADMIN — IBUPROFEN 600 MG: 600 TABLET, FILM COATED ORAL at 09:13

## 2021-10-22 RX ADMIN — RAMELTEON 8 MG: 8 TABLET, FILM COATED ORAL at 20:00

## 2021-10-22 RX ADMIN — LITHIUM CARBONATE 450 MG: 450 TABLET, EXTENDED RELEASE ORAL at 20:00

## 2021-10-22 ASSESSMENT — ACTIVITIES OF DAILY LIVING (ADL)
DRESS: SCRUBS (BEHAVIORAL HEALTH);INDEPENDENT
HYGIENE/GROOMING: INDEPENDENT
ORAL_HYGIENE: INDEPENDENT
LAUNDRY: UNABLE TO COMPLETE

## 2021-10-22 NOTE — PLAN OF CARE
Prior to Admission Medication Reconciliation:     Medications added:   [] None  [x] As listed below:    Penicillin- pt started on 10/12. Was supposed to take q6h until gone. Only took 6 doses. Missed a lot of doses.     Medications deleted:   [] None  [x] As listed below:    Clonidine    ibu    Melatonin    benadryl    Medications marked for review/removal by attending:  [x] None  [] As listed below:    Changes made to existing medications:   [] None  [] Updated strengths and frequencies to most current  [x] As listed below:    Clonazepam from 2 mg to 1 mg TID PRN, no doses given this past month    Last times/dates taken verified with patient:  [x] Yes- completed myself  [] Prepared PTA medlist for review only. (will not be available to review personally)  [] Did not review with patient. Rx verification only. Review completed by nursing.    [] Nurse completed no changes made (double checked entries)  [] Unable to review with patient at this time:  [] Nurse completed/changes made:     Allergies listed at another location:  []Allergies match allergies listed in Epic  []Other allergies listed:    Allergy review:    [x]Did not review: reviewed by nursing  []Did not review: pt unable at this time  []Patient/MAR verified NKDA  []Patient/MAR verified current existing allergies: no changes made  []Patient confirmed current existing allergies and new allergies added:    Medication reconciliation sources:   []Patient  []Patient family member/emergency contact: **  [][facility] Report Review  []Epic Chart Review  []Care Everywhere review  []Pharmacy med list: **  []Pharmacy phone call  [x]Outside meds dispense report: see below  [x]Nursing home or Assisted Living MAR: [facility]   Name Strength Instructions DATE TAKEN TIME TAKEN Notes   [x] Gabapentin  300 mg 3 caps TID  10/18     [x] lamictal  200 mg  Daily  10/19/21  Relatively compliant with. Has taken 12 times in October.    [x] Penicillin V potassium  500 mg 1  tab q6h til gone 10/18  Non-compliant: took six doses within 6 days   [x] risperidone 2 mg  At bedtime  10/14     [x] clonazepam 1 mg TID PRN         []Other: **    Pharmacy desired at discharge: [facility] at [facility] otherwise    Is patient on coumadin?  [x]No    Requests for consultation by provider or pharmacist:   [] Patient understands why all of their meds were prescribed and how to take them. No questions.   [x] Managing party has no questions.   [] Patient/ managing party has questions about the following:  [] Did not review with patient. Cannot assess.     Fill dates and reported compliancy:  [x] Fill dates coincide with reported compliancy for all/most maintenance meds.   [] Fill dates do not coincide with compliancy with maintenance meds. See notes in PTA medlist and in comments.    [] Fill dates do not coincide with the following medications but pt reports compliancy:  [] Did not review with patient. Cannot assess.     Historian accuracy:  [] Excellent- alert and oriented, understands why meds were prescribed and how to take, able to answer specifics  [] Good- alert and oriented, understands why meds were prescribed and how to take, some confusion   [] Fair- alert and oriented, doesn't know medications without list, cannot answer specifics about medications, but has a decent process for which to take at home  [] Poor- does not know medications, may not have a process to take at home, may be cognitively unable to review at this time  []Medication management done by family member or facility, no concerns about historian accuracy.   [x] Did not review with patient. Cannot assess.     Medication Management:  [] Manages meds independently  [] Family member/ other party manages meds/assists:  [x] Meds managed by staff at facility  [] Meds set up by home care, family/other party helps administer  [] Meds set up by home care, self administers  [] Did not review with patient. Cannot assess.     Other  medications aside from PTA:  [x] Denies taking any medications aside from those listed in PTA meds  [] Reports taking another medication(s) but cannot recall the name(s)  [] Refuses to say.  [] Did not review with patient. Cannot assess.     Comments: non-compliant at facility.       Cynthia Nguyen on 10/22/2021 at 8:24 AM       Discrepancies: [x] No []Not Applicable [x]Yes: listed below    Issues completing PTA medication reconciliation:  [] On hold for a long time  [] Waited for a call back  [] Fax didn't come through  [] Fax took a long time  [] Other:    Notifying appropriate party of changes/additions/discrepancies:  [x]No pertinent changes made, notification not necessary.   [] Notified attending provider via text page/phone call  [] Notified attending provider in person  [] Notified pharmacy  [] Notified nurse  [] Medications have not been reconciled by a provider yet, notification not necessary  [] Pt is not admitted to floor yet, PTA meds completed before admission.     Medications Prior to Admission   Medication Sig Dispense Refill Last Dose     clonazePAM (KLONOPIN) 1 MG tablet Take 1 mg by mouth 3 times daily as needed    Unknown at Unknown time     gabapentin (NEURONTIN) 300 MG capsule Take 3 capsules (900 mg) by mouth 3 times daily   10/20/2021 at Unknown time     lamoTRIgine (LAMICTAL) 200 MG tablet Take 200 mg by mouth daily   Past Week at Unknown time     risperiDONE (RISPERDAL) 2 MG tablet Take 2 mg by mouth At Bedtime   Past Month at Unknown time     penicillin V (VEETID) 500 MG tablet Take 500 mg by mouth every 6 hours until gone.   10/18/2021       Medication Dispense History (from 10/22/2020 to 10/21/2021)  Expand All  Collapse All    Amphetamine-Dextroamphetamine     Dispensed Days Supply Quantity Provider Pharmacy   DEXTROAMP-AMPHETAMIN 10 MG TAB 01/23/2021 30 30 tablet [id number] ...           Benztropine Mesylate     Dispensed Days Supply Quantity Provider Pharmacy    BENZTROPINE 0.5MG   TAB 12/22/2020 [facility] ...   BENZTROPINE 0.5MG   TAB 11/11/2020 30 [facility] ...           Bisacodyl     Dispensed Days Supply Quantity Provider Pharmacy   EQ GENTLE LAX 5MG TAB 11/18/2020 1 2 Units EDDI QUIROS [facility] ...           Gabapentin     Dispensed Days Supply Quantity Provider Pharmacy   GABAPENTIN 300MG CAPSULE 10/12/2021 30 270 Units [facility]...   GABAPENTIN 300MG    CAP 09/15/2021 30 [facility] ...   GABAPENTIN 300MG    CAP 08/21/2021 30 270 Units KAREN PRITCHARD [facility] ...   GABAPENTIN 300MG    CAP 07/23/2021 30 270 Units KAREN PRITCHARD [facility] ...   GABAPENTIN 300MG    CAP 06/30/2021 30 270 Units KAREN PRITCHARD [facility] ...   GABAPENTIN 300MG    CAP 06/05/2021 30 270 Units EDDI TEE [facility] ...   GABAPENTIN 300MG    CAP 05/07/2021 30 270 Units EDDI TEE [facility] ...   GABAPENTIN 400MG    CAP 12/24/2020 30 [facility] ...   GABAPENTIN 400MG    CAP 12/02/2020 30 90 [facility] ...   GABAPENTIN 400MG    CAP 10/29/2020 30 90 [facility] ...           Lisdexamfetamine Dimesylate     Dispensed Days Supply Quantity Provider Pharmacy   VYVANSE 50 MG CAPSULE 01/23/2021 30 30 capsule [facility] ...   VYVANSE 50 MG CAPSULE 01/13/2021 12 12 capsule KAREN PRITCHARD [facility] ...           Multiple Vitamins-Minerals     Dispensed Days Supply Quantity Provider Pharmacy   UNICOMPLEX-M TAB 06/05/2021 30 30 Units EDDI TEE [facility] ...   UNICOMPLEX-M TAB 05/08/2021 30 30 Units [facility] ...           PEG 3350-KCl-Na Bicarb-NaCl     Dispensed Days Supply Quantity Provider Pharmacy   PEG-3350/KCL/SODIUM SOL  11/18/2020 2 4,000 [facility] ...           Penicillin V Potassium     Dispensed Days Supply Quantity Provider Pharmacy   PENICILLN VK 500MG TAB 10/11/2021 10 40 [facility] ...           Propranolol HCl     Dispensed Days Supply Quantity Provider Pharmacy   PROPRANOLOL 10MG    TAB 12/24/2020 30 60 Units LISA KELLY [facility] ...   PROPRANOLOL 10MG    TAB 12/02/2020 30 60 Units LETICIALISA [facility] ...           Other     Dispensed Days Supply Quantity Provider Pharmacy   OLANZapine 5MG      TAB 02/23/2021 30 30 Units KAREN PRITCHARD [facility] ...           carBAMazepine     Dispensed Days Supply Quantity Provider Pharmacy   CARBAMAZEPIN ER 100MG TAB 06/30/2021 30 30 Units KAREN PRITCHARD [facility] ...   CARBAMAZEPINE ER 200MG TAB 06/30/2021 30 30 Units KAREN PRITCHARD [facility] ...   CARBAMAZEPIN ER 100MG TAB 06/05/2021 30 30 [facility] ...   CARBAMAZEPINE ER 200MG TAB 06/05/2021 30 30 Units EDDI TEE [facility] ...   CARBAMAZEPIN ER 100MG TAB 05/07/2021 30 30 Units EDDI TEE [facility] ...   CARBAMAZEPINE ER 200MG TAB 05/07/2021 30 30 Units EDDI TEE [facility] ...           clonazePAM     Dispensed Days Supply Quantity Provider Pharmacy   CLONAZEPAM 1MG      TAB 10/16/2021 30 90 Units [facility] ...   CLONAZEPAM 1MG      TAB 09/15/2021 30 90 [facility] ...   CLONAZEPAM 1MG      TAB 09/09/2021 30 90 [facility] ...   CLONAZEPAM 0.5MG    TAB 08/12/2021 30 90 [facility] ...   CLONAZEPAM 1MG      TAB 07/27/2021 30 90 Units KAREN PRITCHARD [facility] ...   CLONAZEPAM 1MG      TAB 06/30/2021 30 90 [facility] ...   CLONAZEPAM 1MG      TAB 06/05/2021 30 90 Units EDDI TEE [facility]  [facility] ...   CLONAZEPAM 1MG      TAB 05/07/2021 30 90 Units EDDI TEE [facility] ...           fluPHENAZine HCl     Dispensed Days Supply Quantity Provider Pharmacy   FluPHENAZine 5MG    TAB 12/24/2020 30 30 Units LETICIALISA [facility] ...   FluPHENAZine 5MG    TAB 12/02/2020 30 [facility] ...   FluPHENAZine 5MG    TAB 10/30/2020 30 30 [facility] ...           lamoTRIgine     Dispensed Days Supply Quantity Provider Pharmacy   LAMOTRIGINE 200MG TABLET 10/18/2021 30 30 Units KAREN PRITCHARD #38 - Vi...   LamoTRIgine 200MG   TAB 09/15/2021 30 30 Units [facility] ...   LamoTRIgine 200MG   TAB 08/18/2021 30 30 [facility] ...   LamoTRIgine 100MG   TAB 07/27/2021 14 14 Units KAREN PRITCHARD [facility] ...   LamoTRIgine 200MG   TAB 07/27/2021 30 30 Units KAREN PRITCHARD [facility] ...   LamoTRIgine 25MG    TAB 06/22/2021 28 45 [facility] ...           risperiDONE     Dispensed Days Supply Quantity Provider Pharmacy   RisperiDONE 2MG     TAB 10/05/2021 30 30 [facility] ...   RisperiDONE 2MG     TAB 09/09/2021 30 30 [facility] ...   RisperiDONE 3MG     TAB 08/21/2021 30 30 Units KAREN PRITCHARD [facility] ...   RisperiDONE 3MG     TAB 07/27/2021 30 [facility] ...   RisperiDONE 3MG     TAB 06/05/2021 30 30 Units EDDI TEE [facility] ...   RisperiDONE 3MG     TAB 05/07/2021 30 30 Units EDDI TEE [facility] ...           traZODone HCl     Dispensed Days Supply Quantity Provider Pharmacy   TraZODone 100MG     TAB 12/24/2020 30 30 [facility] ...   TraZODone 100MG     TAB 12/02/2020 30 30 Units LISA KELLY [facility] ...   TraZODone 100MG     TAB  10/29/2020 30 30 Units LISA KELLY [facility] ...           Disclaimer    Certain dispenses may not be available or accurate in this report, including over-the-counter medications, low cost prescriptions, prescriptions paid for by the patient or non-participating sources, or errors in insurance claims information. The provider should independently verify medication history with the patient.     External Sources

## 2021-10-22 NOTE — PLAN OF CARE
Sent petition for committment to [facility].    Patrizia,  from [facility] came and screened pt for commitment. She will follow-up with pt/staff on Monday.

## 2021-10-22 NOTE — PLAN OF CARE
"Face to face shift report received from TRAE Dupree.       Problem: Behavioral Health Plan of Care  Goal: Patient-Specific Goal (Individualization)  Description: Pt will eat at least 50% of meals  Pt will sleep at least 6 hours at night  Pt will attend at least 50% of group  Pt will comply with treatment team and recommendations  Pt will be medication compliant      Outcome: No Change  Note: Irritable but cooperative. Medication compliant. Reports suicidal ideation, \"it's going to happen, doesn't matter if you keep me here. Once I get out I'm going to O.D. or something.\" Contracts for safety at this time. Reports depression, stated \"the only time I am happy is when I am drunk or high.\" Makes threats of \"if I get commited I will throw this chair at that TV and this table through the window then jump out of it.\" Denies intention to follow through with this at this time. Voiced frustration of being treated \"like a a guinea pig with all these medications.\" Pt reports \"the only thing that helped me is when I was on Klonopin and Ativan last time I was here. 1mg of Klonopin three times a day doesn't do shit. It wears off before my next dose.\" Pt also voiced that he believes that Rozarem will not be helpful with sleep and told writer that Ambien or Lunesta would be helpful. Irritable affect. No reports of pain.       Problem: Suicidal Behavior  Goal: Suicidal Behavior is Absent or Managed  Description: Pt will have an absence of suicidal of ideation by discharge.  Pt will contract for safety while on the unit  Pt will let staff know if he feels like harming himself  Outcome: Improving   Free from self harm or injury this shift.         Face to face end of shift report to be communicated to oncoming RN.     "

## 2021-10-22 NOTE — PLAN OF CARE
"  Problem: Suicidal Behavior  Goal: Suicidal Behavior is Absent or Managed  Description: Pt will have an absence of suicidal of ideation by discharge.  Pt will contract for safety while on the unit  Pt will let staff know if he feels like harming himself  10/22/2021 1253 by Joon Funk, RN  Outcome: Declining    Pt is actively suicidal with a plan to sign out, go to the woods, and hang himself.  PT contracts to remain safe on the unit and will tell staff if he feels like hurting himself.     Problem: Behavioral Health Plan of Care  Goal: Patient-Specific Goal (Individualization)  Description: Pt will eat at least 50% of meals  Pt will sleep at least 6 hours at night  Pt will attend at least 50% of group  Pt will comply with treatment team and recommendations  Pt will be medication compliant    10/22/2021 1253 by Joon Funk, RN  Outcome: Improving  Note: Shift Summery:      Patient's Stated Goal for Shift:  \"leave hospital to kill self\"    Goal Status:  In Process    0730 Face to face rounding complete.  Pt introduced to nursing for the shift.    Pt was up and active all shift. He attended part of some groups.and spent time watching TV in the dayroom. He told me that he is still very suicidal.  \"See other notes\"  He told me that he just cannot take continuing on like this.   He said that the 72 hour hold will not matter and will not change his mind about killing himself. He says that he will leave at some point and will follow through killing himself by hanging or overdose.  He told me that nothing has worked for his depression in the past.  He said that the only time he has not felt suicidal or depressed is when he is high.  He told me that he is not that interested in taking the lithium because he has heard it is a bad medication. I did some extensive teaching on Lithium with him but he told me that it will not work and that it does not matter.      1500 Pt approached the nurses station complaining about " being on a 72 hour hold and facing being committed again. Pt told me that he just wants to go home even though he has no home. He said that none of this matters because he will just kill himself at some point anyway.  Pt was very angry and called someone on the phone and could be heard loudly telling them that he wants to just leave and be dead.      1400  Face to face end of shift report communicated to Evening shift RN's along with Pt's fall risk.     Joon Funk RN  10/22/2021

## 2021-10-22 NOTE — PLAN OF CARE
"Pt told me that he is really struggling right now. He said, \"I am just done!  I just want to leave here and go in the woods and hang myself.  I told my mom the same thing.  I just can't do this anymore!\"  Pt told me that he is suicidal but that he will be safe here on the unit for now.  Pt was given a one time dose of Clonopin at 1230 to help with his anxiety.  Pt's affect is very flat and he appears very preoccupied.  "

## 2021-10-22 NOTE — PLAN OF CARE
Pt's CM from [facility] called to inform me that Steven's mother had just received a call from Steven.  Steven told her that he plans to sign out today so that he can go to the woods and hang himself.  She was given assurance that we would intervene.  She is hoping that Steven could be committed again with longer hospitalization
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
## 2021-10-22 NOTE — PLAN OF CARE
Problem: Behavioral Health Plan of Care  Goal: Patient-Specific Goal (Individualization)  Description: Pt will eat at least 50% of meals  Pt will sleep at least 6 hours at night  Pt will attend at least 50% of group  Pt will comply with treatment team and recommendations  Pt will be medication compliant      Outcome: No Change  Note:        Problem: Suicidal Behavior  Goal: Suicidal Behavior is Absent or Managed  Outcome: No Change     Face to face shift report received from Denise LARKIN. Rounding completed, pt observed.     Pt appeared to be sleeping most of this shift. Pt did not have any noted episodes of self harm this shift.    Face to face report will be communicated to oncoming RN.    Kecia Eisenberg RN  10/22/2021  6:20 AM

## 2021-10-23 PROCEDURE — 250N000013 HC RX MED GY IP 250 OP 250 PS 637: Performed by: NURSE PRACTITIONER

## 2021-10-23 PROCEDURE — 124N000001 HC R&B MH

## 2021-10-23 PROCEDURE — 99232 SBSQ HOSP IP/OBS MODERATE 35: CPT | Performed by: NURSE PRACTITIONER

## 2021-10-23 RX ORDER — ESZOPICLONE 1 MG/1
1 TABLET, FILM COATED ORAL AT BEDTIME
Status: DISCONTINUED | OUTPATIENT
Start: 2021-10-23 | End: 2021-10-24

## 2021-10-23 RX ORDER — CLONAZEPAM 1 MG/1
1 TABLET ORAL 2 TIMES DAILY
Status: DISCONTINUED | OUTPATIENT
Start: 2021-10-24 | End: 2021-10-26

## 2021-10-23 RX ORDER — LITHIUM CARBONATE 300 MG/1
600 TABLET, FILM COATED, EXTENDED RELEASE ORAL AT BEDTIME
Status: DISCONTINUED | OUTPATIENT
Start: 2021-10-23 | End: 2021-10-25

## 2021-10-23 RX ADMIN — ESZOPICLONE 1 MG: 1 TABLET, FILM COATED ORAL at 20:06

## 2021-10-23 RX ADMIN — IBUPROFEN 600 MG: 600 TABLET, FILM COATED ORAL at 04:03

## 2021-10-23 RX ADMIN — CLONAZEPAM 1 MG: 1 TABLET ORAL at 14:13

## 2021-10-23 RX ADMIN — CLONAZEPAM 1 MG: 1 TABLET ORAL at 08:08

## 2021-10-23 RX ADMIN — GABAPENTIN 900 MG: 300 CAPSULE ORAL at 14:13

## 2021-10-23 RX ADMIN — GABAPENTIN 900 MG: 300 CAPSULE ORAL at 08:08

## 2021-10-23 RX ADMIN — ACETAMINOPHEN 650 MG: 325 TABLET, FILM COATED ORAL at 19:24

## 2021-10-23 RX ADMIN — Medication 2 PATCH: at 20:06

## 2021-10-23 RX ADMIN — GABAPENTIN 900 MG: 300 CAPSULE ORAL at 20:06

## 2021-10-23 RX ADMIN — LITHIUM CARBONATE 600 MG: 300 TABLET, EXTENDED RELEASE ORAL at 20:06

## 2021-10-23 ASSESSMENT — ACTIVITIES OF DAILY LIVING (ADL)
DRESS: SCRUBS (BEHAVIORAL HEALTH);INDEPENDENT
HYGIENE/GROOMING: INDEPENDENT
ORAL_HYGIENE: INDEPENDENT
HYGIENE/GROOMING: INDEPENDENT
ORAL_HYGIENE: INDEPENDENT
LAUNDRY: UNABLE TO COMPLETE
DRESS: SCRUBS (BEHAVIORAL HEALTH);INDEPENDENT

## 2021-10-23 NOTE — PLAN OF CARE
"Problem: Behavioral Health Plan of Care  Goal: Patient-Specific Goal (Individualization)  Description: Pt will eat at least 50% of meals  Pt will sleep at least 6 hours at night  Pt will attend at least 50% of group  Pt will comply with treatment team and recommendations  Pt will be medication compliant  Outcome: No Change  Note: Pt had a flat affect and irritable, depressed mood tonight. He expressed frustration about coming in voluntary and then being placed on a 72 hour hold and then being filed on for commitment. Pt informed writer that he wanted to be brought to \"detox\" not the hospital. He stated \"I would have froze to death if I stayed outside, so I just said I was suicidal.\" Pt fixated on his medication regimen and potential for commitment. Pt stated \"I'm going to miss [facility], [facility] and [place] with my kids now. I might as well just kill myself by hanging myself.\"  When pt was asked if he had suicidal intent, he denied and reported that he is making suicidal statements out of anger and frustration.     Pt became upset when he was informed that his Klonopin was decreased to two times daily. At one point, he requested to go into the MH-ICU. When staff was about to change his room assignment, he changed his mind and decided to sit at a table in the lounge. He calmed down and did take all of his scheduled medications.     1924 - Pt requested and received 650 mg of PRN Tylenol for \"5/10\" left lower tooth pain. Pt reported that he is supposed to be taking Penicillin right now.     Face to face end of shift report communicated to oncoming RN.      Problem: Suicidal Behavior  Goal: Suicidal Behavior is Absent or Managed  Description: Pt will have an absence of suicidal of ideation by discharge.  Pt will contract for safety while on the unit  Pt will let staff know if he feels like harming himself  Outcome: No Change     "

## 2021-10-23 NOTE — PLAN OF CARE
Problem: Behavioral Health Plan of Care  Goal: Patient-Specific Goal (Individualization)  Description: Pt will eat at least 50% of meals  Pt will sleep at least 6 hours at night  Pt will attend at least 50% of group  Pt will comply with treatment team and recommendations  Pt will be medication compliant      Outcome: No Change  Note:        Problem: Suicidal Behavior  Goal: Suicidal Behavior is Absent or Managed  Description: Pt will have an absence of suicidal of ideation by discharge.  Pt will contract for safety while on the unit  Pt will let staff know if he feels like harming himself  Outcome: No Change     Face to face shift report received from Nevaeh LARKIN. Rounding completed, pt observed.     Pt appeared to be sleeping most of this shift. Pt did wake up and was given Ibuprofen 600 mg at 0403 and returned to bed. Pt did not have any noted episodes of self harm this shift.    Face to face report will be communicated to oncoming RN.    Kecia Eisenberg RN  10/23/2021  6:13 AM

## 2021-10-23 NOTE — PLAN OF CARE
"Problem: Suicidal Behavior  Goal: Suicidal Behavior is Absent or Managed  Description: Pt will have an absence of suicidal of ideation by discharge.  Pt will contract for safety while on the unit  Pt will let staff know if he feels like harming himself  Outcome: No change    Pt states he is still suicidal and will kill himself when he is realeased     Problem: Behavioral Health Plan of Care  Goal: Patient-Specific Goal (Individualization)  Description: Pt will eat at least 50% of meals  Pt will sleep at least 6 hours at night  Pt will attend at least 50% of group  Pt will comply with treatment team and recommendations  Pt will be medication compliant    Outcome: Improving  Note: Shift Summery:      Patient's Stated Goal for Shift:  \"Pt did not have a goal\"    Goal Status:  In Process    0730 Face to face rounding complete.  Pt introduced to nursing for the shift.    Pt was withdrawn to his room until lunch today. He declined to attend the available groups and instead slept. Pt's affect is very sullen and flat. Pt told me nothing has changed since yesterday and does not want to talk with me. He is complaining of not sleeping at night though is sleeping a great deal during the day and evening shift.    1500 Face to face end of shift report communicated to Evening shift RN's along with Pt's fall risk.     Joon Funk RN  10/23/2021              "
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
## 2021-10-24 PROCEDURE — 99233 SBSQ HOSP IP/OBS HIGH 50: CPT | Performed by: NURSE PRACTITIONER

## 2021-10-24 PROCEDURE — 250N000013 HC RX MED GY IP 250 OP 250 PS 637: Performed by: NURSE PRACTITIONER

## 2021-10-24 PROCEDURE — 124N000001 HC R&B MH

## 2021-10-24 RX ORDER — DIAZEPAM 5 MG
10 TABLET ORAL AT BEDTIME
Status: DISCONTINUED | OUTPATIENT
Start: 2021-10-24 | End: 2021-10-26

## 2021-10-24 RX ADMIN — CLONAZEPAM 1 MG: 1 TABLET ORAL at 08:37

## 2021-10-24 RX ADMIN — Medication 2 PATCH: at 20:03

## 2021-10-24 RX ADMIN — LITHIUM CARBONATE 600 MG: 300 TABLET, EXTENDED RELEASE ORAL at 20:03

## 2021-10-24 RX ADMIN — GABAPENTIN 900 MG: 300 CAPSULE ORAL at 13:39

## 2021-10-24 RX ADMIN — ACETAMINOPHEN 650 MG: 325 TABLET, FILM COATED ORAL at 20:08

## 2021-10-24 RX ADMIN — DIAZEPAM 10 MG: 5 TABLET ORAL at 20:03

## 2021-10-24 RX ADMIN — GABAPENTIN 900 MG: 300 CAPSULE ORAL at 20:03

## 2021-10-24 RX ADMIN — GABAPENTIN 900 MG: 300 CAPSULE ORAL at 08:37

## 2021-10-24 RX ADMIN — CLONAZEPAM 1 MG: 1 TABLET ORAL at 15:26

## 2021-10-24 ASSESSMENT — ACTIVITIES OF DAILY LIVING (ADL)
LAUNDRY: UNABLE TO COMPLETE
DRESS: SCRUBS (BEHAVIORAL HEALTH);INDEPENDENT
HYGIENE/GROOMING: INDEPENDENT
ORAL_HYGIENE: INDEPENDENT

## 2021-10-24 NOTE — PLAN OF CARE
Face to face shift report received from TRAE Dupree.       Problem: Behavioral Health Plan of Care  Goal: Patient-Specific Goal (Individualization)  Description: Pt will eat at least 50% of meals  Pt will sleep at least 6 hours at night  Pt will attend at least 50% of group  Pt will comply with treatment team and recommendations  Pt will be medication compliant      Outcome: Improving  Note: Calm and cooperative. Medication compliant. Denies thoughts of harming self or others. Reports frustration due to continued hospitalization and petition for commitment. Short during conversation but less irritable than previously. Withdrawn to room. Report left lower molar pain, requested and received Tylenol 650mg with HS medication.        Problem: Suicidal Behavior  Goal: Suicidal Behavior is Absent or Managed  Description: Pt will have an absence of suicidal of ideation by discharge.  Pt will contract for safety while on the unit  Pt will let staff know if he feels like harming himself  Outcome: Improving   Free from self harm or injury this shift.         Face to face end of shift report to be communicated to oncoming RN.

## 2021-10-24 NOTE — PLAN OF CARE
"Problem: Suicidal Behavior  Goal: Suicidal Behavior is Absent or Managed  Description: Pt will have an absence of suicidal of ideation by discharge.  Pt will contract for safety while on the unit  Pt will let staff know if he feels like harming himself  Outcome: Improving    Pt says that he is not suicidal anymore, says he is safe on the unit, and has no thoughts of harming himself     Problem: Behavioral Health Plan of Care  Goal: Patient-Specific Goal (Individualization)  Description: Pt will eat at least 50% of meals  Pt will sleep at least 6 hours at night  Pt will attend at least 50% of group  Pt will comply with treatment team and recommendations  Pt will be medication compliant      Outcome: Improving  Note: Shift Summery:      Patient's Stated Goal for Shift:  \"Pt did not state a goal\"    Goal Status:  In Process    0730 Face to face rounding complete.  Pt introduced to nursing for the shift.    Pt was up watching TV most of the shift today. He did not attend groups. He told me that he is no longer suicidal and want to go home. He denied depression though has a very sullen affect.  He would not talk to me about his symptoms but did talk to me about not sleeping last night.  He discussed his medications with his provider.      1500 Face to face end of shift report communicated to Evening shift RN's along with Pt's fall risk.     Joon Funk RN  10/24/2021            "

## 2021-10-24 NOTE — PROGRESS NOTES
Pt came to the room to wait for popcorn and began to talk with this writer.  He reported how he felt that he was brought here under false pretenses as he wanted to be brought to detox.  He stated that it was to cold to stay outdoors and he wanted a warm plac to sleep.  Now he is here and he is very angry because he just got off of commitment and will now be put back on one again. He reports that he just wants his life to be over, yet he also wants to be with his children for the holidays.  This evening he was upset about meds and  Was given the option by his nurse to call the provider about them, and he couldn't make a decision about doing that, it appeared to increase his anxiety to the point that he asked to be put in the MHICU.  He was given the option to go in the MHICU or to his room to rest and he couldn't decide what to do, so he sat down at the table in the lounge and was left to try and calm down. He is showing signs of erratic behavior and disorganized thinking.  He was able to put some thoughts together during our conversation but was having difficulty with low self esteem. During our conversation he mentioned that his children don't know about his other SA, and they will be fine when he is gone.  He has mentioned hanging himself with bedsheets here or  using a gun or his meds as a means of suicide when he leaves.  He is looking for a way to harm himself when he is here as he stated this in our conversation.  He reports that he has no trust for a provider that says one thing and does another without telling him.

## 2021-10-24 NOTE — PLAN OF CARE
Problem: Behavioral Health Plan of Care  Goal: Patient-Specific Goal (Individualization)  Description: Pt will eat at least 50% of meals  Pt will sleep at least 6 hours at night  Pt will attend at least 50% of group  Pt will comply with treatment team and recommendations  Pt will be medication compliant      Outcome: No Change  Note:        Problem: Suicidal Behavior  Goal: Suicidal Behavior is Absent or Managed  Description: Pt will have an absence of suicidal of ideation by discharge.  Pt will contract for safety while on the unit  Pt will let staff know if he feels like harming himself  Outcome: No Change     Face to face shift report received from Le LARKIN. Rounding completed, pt observed.     Pt appeared to sleep most of this shift. Pt did not have any noted episodes of self harm this shift.    Face to face report will be communicated to oncoming RN.    Kecia Eisenberg RN  10/24/2021  6:11 AM

## 2021-10-25 PROCEDURE — 250N000013 HC RX MED GY IP 250 OP 250 PS 637: Performed by: NURSE PRACTITIONER

## 2021-10-25 PROCEDURE — 99232 SBSQ HOSP IP/OBS MODERATE 35: CPT | Performed by: NURSE PRACTITIONER

## 2021-10-25 PROCEDURE — 124N000001 HC R&B MH

## 2021-10-25 RX ORDER — LITHIUM CARBONATE 450 MG
900 TABLET, EXTENDED RELEASE ORAL AT BEDTIME
Status: DISCONTINUED | OUTPATIENT
Start: 2021-10-25 | End: 2021-11-01 | Stop reason: HOSPADM

## 2021-10-25 RX ORDER — PENICILLIN V POTASSIUM 500 MG/1
500 TABLET, FILM COATED ORAL EVERY 6 HOURS SCHEDULED
Status: COMPLETED | OUTPATIENT
Start: 2021-10-25 | End: 2021-11-01

## 2021-10-25 RX ADMIN — CLONAZEPAM 1 MG: 1 TABLET ORAL at 16:38

## 2021-10-25 RX ADMIN — PENICILLIN V POTASSIUM 500 MG: 500 TABLET, FILM COATED ORAL at 20:13

## 2021-10-25 RX ADMIN — DIPHENHYDRAMINE HYDROCHLORIDE AND LIDOCAINE HYDROCHLORIDE AND ALUMINUM HYDROXIDE AND MAGNESIUM HYDRO 10 ML: KIT at 22:28

## 2021-10-25 RX ADMIN — DIAZEPAM 10 MG: 5 TABLET ORAL at 20:08

## 2021-10-25 RX ADMIN — GABAPENTIN 900 MG: 300 CAPSULE ORAL at 20:08

## 2021-10-25 RX ADMIN — LITHIUM CARBONATE 900 MG: 450 TABLET, EXTENDED RELEASE ORAL at 20:08

## 2021-10-25 RX ADMIN — GABAPENTIN 900 MG: 300 CAPSULE ORAL at 08:03

## 2021-10-25 RX ADMIN — Medication 2 PATCH: at 20:08

## 2021-10-25 RX ADMIN — GABAPENTIN 900 MG: 300 CAPSULE ORAL at 14:12

## 2021-10-25 RX ADMIN — ACETAMINOPHEN 650 MG: 325 TABLET, FILM COATED ORAL at 20:07

## 2021-10-25 RX ADMIN — PENICILLIN V POTASSIUM 500 MG: 500 TABLET, FILM COATED ORAL at 23:52

## 2021-10-25 RX ADMIN — CLONAZEPAM 1 MG: 1 TABLET ORAL at 08:03

## 2021-10-25 ASSESSMENT — ACTIVITIES OF DAILY LIVING (ADL)
DRESS: INDEPENDENT
ORAL_HYGIENE: INDEPENDENT
HYGIENE/GROOMING: INDEPENDENT
LAUNDRY: UNABLE TO COMPLETE
HYGIENE/GROOMING: INDEPENDENT
DRESS: INDEPENDENT;SCRUBS (BEHAVIORAL HEALTH)
LAUNDRY: UNABLE TO COMPLETE
ORAL_HYGIENE: INDEPENDENT

## 2021-10-25 NOTE — PLAN OF CARE
Problem: Behavioral Health Plan of Care  Goal: Patient-Specific Goal (Individualization)  Description: Pt will eat at least 50% of meals  Pt will sleep at least 6 hours at night  Pt will attend at least 50% of group  Pt will comply with treatment team and recommendations  Pt will be medication compliant      Outcome: Improving  Note: Shift Summery:     Pt in bed at the start of the shift. Pt denied anxiety, depression, SI, HI and hallucinations. Pt did report pain at 4/10. Pt out in the lounge most of the shift watching tv but did attend group for a short time. Pt declined prn for pain this shift.         Problem: Suicidal Behavior  Goal: Suicidal Behavior is Absent or Managed  Description: Pt will have an absence of suicidal of ideation by discharge.  Pt will contract for safety while on the unit  Pt will let staff know if he feels like harming himself  Outcome: Improving

## 2021-10-25 NOTE — PLAN OF CARE
Problem: Behavioral Health Plan of Care  Goal: Patient-Specific Goal (Individualization)  Description: Pt will eat at least 50% of meals  Pt will sleep at least 6 hours at night  Pt will attend at least 50% of group  Pt will comply with treatment team and recommendations  Pt will be medication compliant      Outcome: No Change  Note:        Problem: Suicidal Behavior  Goal: Suicidal Behavior is Absent or Managed  Description: Pt will have an absence of suicidal of ideation by discharge.  Pt will contract for safety while on the unit  Pt will let staff know if he feels like harming himself  Outcome: No Change     Face to face shift report received from Nevaeh LARKIN. Rounding completed, pt observed.     Pt appeared to be sleeping most of this shift. Pt did not have any noted episodes of self harm this shift.    Face to face report will be communicated to oncoming RN.    Kecia Eisenberg RN  10/25/2021  6:06 AM

## 2021-10-25 NOTE — PLAN OF CARE
SHIFT SUMMARY:  Denies pain, suicidal or homicidal ideation, auditory or visual hallucinations, depression and anxiety. Watching TV, conversing with a peer this evening. Flat affect. Calm and cooperative. Administered acetaminophen 650 mg PO PRN related to 3/10 lower left tooth pain. Pencillin first-dose administered after confirming he was not allergic and provided education.       Problem: Behavioral Health Plan of Care  Goal: Patient-Specific Goal (Individualization)  Description: Pt will eat at least 50% of meals  Pt will sleep at least 6 hours at night  Pt will attend at least 50% of group  Pt will comply with treatment team and recommendations  Pt will be medication compliant      Outcome: Improving  Goal: Absence of New-Onset Illness or Injury  Outcome: Improving     Problem: Suicidal Behavior  Goal: Suicidal Behavior is Absent or Managed  Description: Pt will have an absence of suicidal of ideation by discharge.  Pt will contract for safety while on the unit  Pt will let staff know if he feels like harming himself  Outcome: Improving

## 2021-10-26 LAB
ALBUMIN SERPL-MCNC: 4 G/DL (ref 3.4–5)
ALP SERPL-CCNC: 71 U/L (ref 40–150)
ALT SERPL W P-5'-P-CCNC: 41 U/L (ref 0–70)
ANION GAP SERPL CALCULATED.3IONS-SCNC: 5 MMOL/L (ref 3–14)
AST SERPL W P-5'-P-CCNC: 12 U/L (ref 0–45)
BILIRUB SERPL-MCNC: 0.3 MG/DL (ref 0.2–1.3)
BUN SERPL-MCNC: 13 MG/DL (ref 7–30)
CALCIUM SERPL-MCNC: 9.1 MG/DL (ref 8.5–10.1)
CHLORIDE BLD-SCNC: 107 MMOL/L (ref 94–109)
CO2 SERPL-SCNC: 25 MMOL/L (ref 20–32)
CREAT SERPL-MCNC: 0.87 MG/DL (ref 0.66–1.25)
GFR SERPL CREATININE-BSD FRML MDRD: >90 ML/MIN/1.73M2
GLUCOSE BLD-MCNC: 120 MG/DL (ref 70–99)
POTASSIUM BLD-SCNC: 4.3 MMOL/L (ref 3.4–5.3)
PROT SERPL-MCNC: 7.4 G/DL (ref 6.8–8.8)
SODIUM SERPL-SCNC: 137 MMOL/L (ref 133–144)
TSH SERPL DL<=0.005 MIU/L-ACNC: 2.47 MU/L (ref 0.4–4)

## 2021-10-26 PROCEDURE — 250N000013 HC RX MED GY IP 250 OP 250 PS 637: Performed by: NURSE PRACTITIONER

## 2021-10-26 PROCEDURE — 250N000009 HC RX 250: Performed by: NURSE PRACTITIONER

## 2021-10-26 PROCEDURE — 124N000001 HC R&B MH

## 2021-10-26 PROCEDURE — 99233 SBSQ HOSP IP/OBS HIGH 50: CPT | Performed by: NURSE PRACTITIONER

## 2021-10-26 PROCEDURE — 36415 COLL VENOUS BLD VENIPUNCTURE: CPT | Performed by: NURSE PRACTITIONER

## 2021-10-26 PROCEDURE — 84443 ASSAY THYROID STIM HORMONE: CPT | Performed by: NURSE PRACTITIONER

## 2021-10-26 PROCEDURE — 80053 COMPREHEN METABOLIC PANEL: CPT | Performed by: NURSE PRACTITIONER

## 2021-10-26 RX ORDER — ACETAMINOPHEN 325 MG/1
975 TABLET ORAL EVERY 6 HOURS PRN
Status: DISCONTINUED | OUTPATIENT
Start: 2021-10-26 | End: 2021-11-01 | Stop reason: HOSPADM

## 2021-10-26 RX ORDER — LORAZEPAM 1 MG/1
1 TABLET ORAL 2 TIMES DAILY
Status: DISCONTINUED | OUTPATIENT
Start: 2021-10-26 | End: 2021-10-28

## 2021-10-26 RX ORDER — ACETAMINOPHEN 325 MG/1
325 TABLET ORAL ONCE
Status: COMPLETED | OUTPATIENT
Start: 2021-10-26 | End: 2021-10-26

## 2021-10-26 RX ORDER — LORAZEPAM 1 MG/1
2 TABLET ORAL AT BEDTIME
Status: DISCONTINUED | OUTPATIENT
Start: 2021-10-26 | End: 2021-10-28

## 2021-10-26 RX ADMIN — PENICILLIN V POTASSIUM 500 MG: 500 TABLET, FILM COATED ORAL at 18:51

## 2021-10-26 RX ADMIN — OLANZAPINE 10 MG: 10 TABLET, FILM COATED ORAL at 21:09

## 2021-10-26 RX ADMIN — PENICILLIN V POTASSIUM 500 MG: 500 TABLET, FILM COATED ORAL at 12:58

## 2021-10-26 RX ADMIN — CLONAZEPAM 1 MG: 1 TABLET ORAL at 08:44

## 2021-10-26 RX ADMIN — LITHIUM CARBONATE 900 MG: 450 TABLET, EXTENDED RELEASE ORAL at 20:06

## 2021-10-26 RX ADMIN — GABAPENTIN 900 MG: 300 CAPSULE ORAL at 13:22

## 2021-10-26 RX ADMIN — ACETAMINOPHEN 325 MG: 325 TABLET, FILM COATED ORAL at 19:39

## 2021-10-26 RX ADMIN — PENICILLIN V POTASSIUM 500 MG: 500 TABLET, FILM COATED ORAL at 06:19

## 2021-10-26 RX ADMIN — DIPHENHYDRAMINE HYDROCHLORIDE AND LIDOCAINE HYDROCHLORIDE AND ALUMINUM HYDROXIDE AND MAGNESIUM HYDRO 10 ML: KIT at 18:01

## 2021-10-26 RX ADMIN — LORAZEPAM 1 MG: 1 TABLET ORAL at 13:22

## 2021-10-26 RX ADMIN — LORAZEPAM 2 MG: 1 TABLET ORAL at 20:06

## 2021-10-26 RX ADMIN — Medication: at 20:34

## 2021-10-26 RX ADMIN — ACETAMINOPHEN 650 MG: 325 TABLET, FILM COATED ORAL at 18:12

## 2021-10-26 RX ADMIN — GABAPENTIN 900 MG: 300 CAPSULE ORAL at 20:06

## 2021-10-26 RX ADMIN — GABAPENTIN 900 MG: 300 CAPSULE ORAL at 08:44

## 2021-10-26 ASSESSMENT — ACTIVITIES OF DAILY LIVING (ADL)
DRESS: SCRUBS (BEHAVIORAL HEALTH);INDEPENDENT
HYGIENE/GROOMING: INDEPENDENT
ORAL_HYGIENE: INDEPENDENT
LAUNDRY: UNABLE TO COMPLETE
DRESS: SCRUBS (BEHAVIORAL HEALTH);INDEPENDENT
HYGIENE/GROOMING: INDEPENDENT
ORAL_HYGIENE: INDEPENDENT
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
## 2021-10-26 NOTE — PLAN OF CARE
"  Problem: Behavioral Health Plan of Care  Goal: Patient-Specific Goal (Individualization)  Description: Pt will eat at least 50% of meals  Pt will sleep at least 6 hours at night  Pt will attend at least 50% of group  Pt will comply with treatment team and recommendations  Pt will be medication compliant      Outcome: No Change  Note: Patient denies SI, HI, hallucinations.  Patient has some anxiety r/t medication changes occurring today. Patient states he does not understand why valium and klonopin have been discontinued and lorazepam will be started instead.  \"I guess I do know why.  It's fine.\"  Patient's affect is flat.  He is withdrawn, though he did attend group this morning.  Cooperative with medications.        Problem: Suicidal Behavior  Goal: Suicidal Behavior is Absent or Managed  Description: Pt will have an absence of suicidal of ideation by discharge.  Pt will contract for safety while on the unit  Pt will let staff know if he feels like harming himself  Outcome: Improving  Note: Patient had no noted self harm this shift.        Face to face end of shift report communicated to evening shift RN.     Alice CARDOZA RN  10/26/2021  3:15 PM        "

## 2021-10-26 NOTE — PLAN OF CARE
Problem: Behavioral Health Plan of Care  Goal: Patient-Specific Goal (Individualization)  Description: Pt will eat at least 50% of meals  Pt will sleep at least 6 hours at night  Pt will attend at least 50% of group  Pt will comply with treatment team and recommendations  Pt will be medication compliant      Outcome: Improving  Note: Report received from Adithya. Gertrudis complete. Pt observed sleeping in right side lying position with regular and unlabored respirations.    Pt has been in bed with eyes closed and regular respirations. 15 minute and PRN checks all night. No complaints offered. Will continue to monitor.    Pt slept approx 5.25 hours this NOC shift    Face to face end of shift report communicated to oncoming RN.    Farida KIMBLE RN  October 26, 2021  3:00 AM          Problem: Suicidal Behavior  Goal: Suicidal Behavior is Absent or Managed  Description: Pt will have an absence of suicidal of ideation by discharge.  Pt will contract for safety while on the unit  Pt will let staff know if he feels like harming himself  Outcome: Improving  Note: Unable to assess due to pt sleeping. Pt has remained free of self-harm.

## 2021-10-26 NOTE — PHARMACY
Pharmacy Antimicrobial Stewardship Assessment     Current Antimicrobial Therapy:  Anti-infectives (From now, onward)    Start     Dose/Rate Route Frequency Ordered Stop    10/25/21 1930  penicillin V (VEETID) tablet 500 mg      500 mg Oral EVERY 6 HOURS SCHEDULED 10/25/21 1904 21 1759        Indication: Abscess    Days of Therapy: 2     Pertinent Labs:  Recent Labs   Lab Test 21  2301 21  2040 21  1518   WBC 11.2* 7.3 8.0     No lab results found.    Invalid input(s): RATE, ESR     Temperature:  Temp (24hrs), Av.1  F (37.3  C), Min:98.4  F (36.9  C), Max:99.8  F (37.7  C)    Culture Results:   10/21/21: COVID = negative     Recommendations/Interventions:  1. None at this time    Patrizia Terrell, [facility]  2021

## 2021-10-26 NOTE — PLAN OF CARE
"Emailed [facility]  to follow-up on petition for commitment. She stated, \"I completed the petition and sent it to the  yesterday morning about 10:00...\".    She shortly wrote this writer stating that, pt's NP was requesting to withdraw petition and was going to speak with pt's , Keila FLORES. [facility]  then confirmed that they retracted filing, per hospital (NP's) request.     Emailed , Keila ALEXANDRA to ask more about discharge plans and anything this writer could assist with. She asked, ..\"What are your thoughts on CD treatment following his stay with you before returning to the community...\". Let her know that pt has declined wanting a rule 25 or treatment. Asked if she could talk to pt and talk to him more about this.   "
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
## 2021-10-27 PROCEDURE — 124N000001 HC R&B MH

## 2021-10-27 PROCEDURE — 250N000013 HC RX MED GY IP 250 OP 250 PS 637: Performed by: NURSE PRACTITIONER

## 2021-10-27 PROCEDURE — 99233 SBSQ HOSP IP/OBS HIGH 50: CPT | Performed by: NURSE PRACTITIONER

## 2021-10-27 RX ORDER — OLANZAPINE 5 MG/1
5 TABLET ORAL 3 TIMES DAILY PRN
Status: DISCONTINUED | OUTPATIENT
Start: 2021-10-27 | End: 2021-11-01 | Stop reason: HOSPADM

## 2021-10-27 RX ADMIN — PENICILLIN V POTASSIUM 500 MG: 500 TABLET, FILM COATED ORAL at 19:58

## 2021-10-27 RX ADMIN — ACETAMINOPHEN 975 MG: 325 TABLET, FILM COATED ORAL at 23:00

## 2021-10-27 RX ADMIN — LORAZEPAM 1 MG: 1 TABLET ORAL at 08:12

## 2021-10-27 RX ADMIN — PENICILLIN V POTASSIUM 500 MG: 500 TABLET, FILM COATED ORAL at 05:56

## 2021-10-27 RX ADMIN — GABAPENTIN 900 MG: 300 CAPSULE ORAL at 20:00

## 2021-10-27 RX ADMIN — PENICILLIN V POTASSIUM 500 MG: 500 TABLET, FILM COATED ORAL at 13:19

## 2021-10-27 RX ADMIN — LITHIUM CARBONATE 900 MG: 450 TABLET, EXTENDED RELEASE ORAL at 20:00

## 2021-10-27 RX ADMIN — GABAPENTIN 900 MG: 300 CAPSULE ORAL at 08:12

## 2021-10-27 RX ADMIN — GABAPENTIN 900 MG: 300 CAPSULE ORAL at 13:19

## 2021-10-27 RX ADMIN — LORAZEPAM 1 MG: 1 TABLET ORAL at 13:18

## 2021-10-27 RX ADMIN — PENICILLIN V POTASSIUM 500 MG: 500 TABLET, FILM COATED ORAL at 00:04

## 2021-10-27 RX ADMIN — LORAZEPAM 2 MG: 1 TABLET ORAL at 20:00

## 2021-10-27 ASSESSMENT — ACTIVITIES OF DAILY LIVING (ADL)
DRESS: SCRUBS (BEHAVIORAL HEALTH)
DRESS: SCRUBS (BEHAVIORAL HEALTH);INDEPENDENT
HYGIENE/GROOMING: INDEPENDENT
ORAL_HYGIENE: INDEPENDENT
HYGIENE/GROOMING: INDEPENDENT
ORAL_HYGIENE: INDEPENDENT
LAUNDRY: UNABLE TO COMPLETE

## 2021-10-27 NOTE — PLAN OF CARE
Problem: Behavioral Health Plan of Care  Goal: Patient-Specific Goal (Individualization)  Description: Pt will eat at least 50% of meals  Pt will sleep at least 6 hours at night  Pt will attend at least 50% of group  Pt will comply with treatment team and recommendations  Pt will be medication compliant      Outcome: No Change  Note:        Problem: Suicidal Behavior  Goal: Suicidal Behavior is Absent or Managed  Description: Pt will have an absence of suicidal of ideation by discharge.  Pt will contract for safety while on the unit  Pt will let staff know if he feels like harming himself  Outcome: No Change     Face to face shift report received from Farida LARKIN. Rounding completed, pt observed.     Pt denies SI at this time. Pt does eat meals in lobby and does socialize with some peers on the unit. Encouraged pt to attend some groups this shift. Pt compliant with medications this shift. Pt signed voluntary paperwork this shift.    Face to face report will be communicated to oncoming RN.    Kecia Eisenberg RN  10/27/2021  12:55 PM

## 2021-10-27 NOTE — PLAN OF CARE
Problem: Behavioral Health Plan of Care  Goal: Patient-Specific Goal (Individualization)  Description: Pt will eat at least 50% of meals  Pt will sleep at least 6 hours at night  Pt will attend at least 50% of group  Pt will comply with treatment team and recommendations  Pt will be medication compliant      Outcome: Improving  Note: Report received from Le castañeda. Pt observed sleeping in left side lying position with regular and unlabored respirations.    Pt has been in bed with eyes closed and regular respirations. 15 minute and PRN checks all night. No complaints offered. Will continue to monitor.    Pt slept approx 7 hours     Face to face end of shift report communicated to oncoming RN.    Farida KIMBLE RN  October 27, 2021  12:19 AM          Problem: Suicidal Behavior  Goal: Suicidal Behavior is Absent or Managed  Description: Pt will have an absence of suicidal of ideation by discharge.  Pt will contract for safety while on the unit  Pt will let staff know if he feels like harming himself  Outcome: Improving  Note: Unable to assess due to pt sleeping. Pt has remained free of self-harm.
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
## 2021-10-27 NOTE — PLAN OF CARE
Problem: Behavioral Health Plan of Care  Goal: Patient-Specific Goal (Individualization)  Description: Pt will eat at least 50% of meals  Pt will sleep at least 6 hours at night  Pt will attend at least 50% of group  Pt will comply with treatment team and recommendations  Pt will be medication compliant      Outcome: No Change  Note:        Problem: Suicidal Behavior  Goal: Suicidal Behavior is Absent or Managed  Description: Pt will have an absence of suicidal of ideation by discharge.  Pt will contract for safety while on the unit  Pt will let staff know if he feels like harming himself  Outcome: Improving   Pt. Has been up and about on unit, eating at least 50% of meals, slept 7 hours last night, does not like to attend group therapy, compliant with treatment team recommendations, taking prescribed medications, denies suicidal ideation, endorses depression and anxiety, spending time in dayroom, will continue to monitor.  2300-  Pt. Requested/received tylenol 975 mg po for mouth pain rated at a 3 of 10.    Face to face end of shift report will be communicated to oncoming night shift RN.     Farida Saenz RN  10/27/2021  6:14 PM

## 2021-10-27 NOTE — PLAN OF CARE
"Problem: Behavioral Health Plan of Care  Goal: Patient-Specific Goal (Individualization)  Description: Pt will eat at least 50% of meals  Pt will sleep at least 6 hours at night  Pt will attend at least 50% of group  Pt will comply with treatment team and recommendations  Pt will be medication compliant  Outcome: No Change  Note: Pt had a flat affect tonight. Re: depression and anxiety, he stated \"of course, always.\" He denied suicidal ideation and has remained free from self harm. He offered minimal conversation. Pt's main concern tonight was left lower tooth pain. 1801 - Pt received magic mouth wash. 1812 - Pt received 650 mg of PRN Tylenol. On-call provider called due to no change in pain. Pt was given an additional 325 mg at 1939. 2034 - Pt requested to apply orajel maximum strength. He remains on antibiotic Penicillin V. He was compliant with his scheduled medications tonight. No tremor noted by this writer. 2109 - Pt requested and received 10 mg of PRN Zyprexa for agitation r/t tooth pain and difficulty falling asleep.     Face to face end of shift report communicated to oncoming RN.     Problem: Suicidal Behavior  Goal: Suicidal Behavior is Absent or Managed  Description: Pt will have an absence of suicidal of ideation by discharge.  Pt will contract for safety while on the unit  Pt will let staff know if he feels like harming himself  Outcome: Improving  Note: Pt denied suicidal ideation. He remained free from self harm.      "

## 2021-10-27 NOTE — PLAN OF CARE
Pt's  Keila, contacted this writer and sent a VAMSI for pt to sign for [facility]. Pt signed and is hopeful he will be able to discharge here. Let him know that I will keep him updated about this. Sent signed VAMSI back to .     [facility] requested pt's H&P and MAR. Emailed this to [facility] staff, Sobeida.     Sobeida from [facility] got back to this writer. She stated that pt has been accepted for Monday 11/1. Pharmacy is [facility] [phone] and fax is [phone]. Informed pt's NP.

## 2021-10-28 PROCEDURE — 124N000001 HC R&B MH

## 2021-10-28 PROCEDURE — 250N000013 HC RX MED GY IP 250 OP 250 PS 637: Performed by: NURSE PRACTITIONER

## 2021-10-28 PROCEDURE — 99232 SBSQ HOSP IP/OBS MODERATE 35: CPT | Performed by: NURSE PRACTITIONER

## 2021-10-28 RX ORDER — LORAZEPAM 1 MG/1
1 TABLET ORAL 3 TIMES DAILY
Status: DISCONTINUED | OUTPATIENT
Start: 2021-10-28 | End: 2021-11-01 | Stop reason: HOSPADM

## 2021-10-28 RX ORDER — RAMELTEON 8 MG/1
8 TABLET ORAL
Status: DISCONTINUED | OUTPATIENT
Start: 2021-10-28 | End: 2021-11-01 | Stop reason: HOSPADM

## 2021-10-28 RX ORDER — LORAZEPAM 0.5 MG/1
0.5 TABLET ORAL
Status: DISCONTINUED | OUTPATIENT
Start: 2021-10-28 | End: 2021-11-01 | Stop reason: HOSPADM

## 2021-10-28 RX ADMIN — LORAZEPAM 0.5 MG: 0.5 TABLET ORAL at 20:09

## 2021-10-28 RX ADMIN — LORAZEPAM 1 MG: 1 TABLET ORAL at 13:29

## 2021-10-28 RX ADMIN — GABAPENTIN 900 MG: 300 CAPSULE ORAL at 13:29

## 2021-10-28 RX ADMIN — OLANZAPINE 5 MG: 5 TABLET, FILM COATED ORAL at 21:04

## 2021-10-28 RX ADMIN — GABAPENTIN 900 MG: 300 CAPSULE ORAL at 20:04

## 2021-10-28 RX ADMIN — PENICILLIN V POTASSIUM 500 MG: 500 TABLET, FILM COATED ORAL at 13:29

## 2021-10-28 RX ADMIN — ACETAMINOPHEN 975 MG: 325 TABLET, FILM COATED ORAL at 21:04

## 2021-10-28 RX ADMIN — GABAPENTIN 900 MG: 300 CAPSULE ORAL at 08:28

## 2021-10-28 RX ADMIN — LITHIUM CARBONATE 900 MG: 450 TABLET, EXTENDED RELEASE ORAL at 20:04

## 2021-10-28 RX ADMIN — PENICILLIN V POTASSIUM 500 MG: 500 TABLET, FILM COATED ORAL at 23:51

## 2021-10-28 RX ADMIN — LORAZEPAM 1 MG: 1 TABLET ORAL at 20:04

## 2021-10-28 RX ADMIN — DIPHENHYDRAMINE HYDROCHLORIDE AND LIDOCAINE HYDROCHLORIDE AND ALUMINUM HYDROXIDE AND MAGNESIUM HYDRO 10 ML: KIT at 23:00

## 2021-10-28 RX ADMIN — PENICILLIN V POTASSIUM 500 MG: 500 TABLET, FILM COATED ORAL at 00:22

## 2021-10-28 RX ADMIN — PENICILLIN V POTASSIUM 500 MG: 500 TABLET, FILM COATED ORAL at 06:14

## 2021-10-28 RX ADMIN — LORAZEPAM 1 MG: 1 TABLET ORAL at 08:28

## 2021-10-28 RX ADMIN — PENICILLIN V POTASSIUM 500 MG: 500 TABLET, FILM COATED ORAL at 19:39

## 2021-10-28 ASSESSMENT — ACTIVITIES OF DAILY LIVING (ADL)
ORAL_HYGIENE: INDEPENDENT
DRESS: SCRUBS (BEHAVIORAL HEALTH)
LAUNDRY: UNABLE TO COMPLETE
HYGIENE/GROOMING: INDEPENDENT

## 2021-10-28 NOTE — PROGRESS NOTES
"[facility]  Psychiatric Progress Note      Impression:     This is a 31 year old yo male with history of bipolar disorder, TBI, and polysubstance abuse who just got off of a MI/CD commitment in September. He suprisingly agreed to continue to work with his  after the commitment  though he recently started using, got evicted from the [facility] and suicidal ideation increased. He states he has been taking risperdal and lamictal regularly. He does not want to continue the risperdal. He is making suicidal statements and feels he has nothing left to live for.         Interim History:   Steven is up in the lounge when I see him today. He states that the medications seem to \"be doing pretty good\". He states that the Lithium was something that he never thought he would have to take, though does feel that it is helping his mood. Is happy that he is no longer taking the Risperdal. Denies any suicidal thoughts. Denies hallucinations. He is glad that he will be able to go to [facility] on Monday, feels that he is ready. States that he is happy because it is close to his kids who are in [place], so he can hopefully see them once a month. Does appear that he has bene sleeping well and attending groups.       Educated regarding medication indications, risks, benefits, side effects, contraindications and possible interactions. Verbally expressed understanding.        Diagnoses:   Bipolar disorder, most recent episode depressed  Methamphetamine use disorder severe  Alcohol use disorder, moderate to severe       Attestation:  Patient has been seen and evaluated by me,  Christelle Contreras NP      The patient's care was discussed with the treatment team and chart notes were reviewed.            Medications:     Current Facility-Administered Medications   Medication     acetaminophen (TYLENOL) tablet 975 mg     alum & mag hydroxide-simethicone (MAALOX) suspension 30 mL     benzocaine (ORAJEL MAXIMUM " "STRENGTH) 20 % gel     gabapentin (NEURONTIN) capsule 900 mg     hydrOXYzine (ATARAX) tablet 50 mg     Lidocaine (LIDOCARE) 4 % Patch 2 patch    And     lidocaine patch in PLACE     lithium (ESKALITH CR/LITHOBID) CR tablet 900 mg     LORazepam (ATIVAN) tablet 0.5 mg     LORazepam (ATIVAN) tablet 1 mg     magic mouthwash suspension (diphenhydramine, lidocaine, aluminum-magnesium & simethicone)     nicotine (NICORETTE) gum 2 mg     OLANZapine (zyPREXA) tablet 5 mg     penicillin V (VEETID) tablet 500 mg     ramelteon (ROZEREM) tablet 8 mg     senna-docusate (SENOKOT-S/PERICOLACE) 8.6-50 MG per tablet 1 tablet     traZODone (DESYREL) tablet 50 mg              10 point ROS negative        Allergies:     Allergies   Allergen Reactions     Pork Allergy             Psychiatric Examination:   /69   Pulse 85   Temp 99.1  F (37.3  C) (Temporal)   Resp 16   Ht 1.829 m (6')   Wt 102.6 kg (226 lb 1.6 oz)   SpO2 94%   BMI 30.66 kg/m    Weight is 226 lbs 1.6 oz  Body mass index is 30.66 kg/m .  378}    MSE/PSYCH  PSYCHIATRIC EXAM  /69   Pulse 85   Temp 99.1  F (37.3  C) (Temporal)   Resp 16   Ht 1.829 m (6')   Wt 102.6 kg (226 lb 1.6 oz)   SpO2 94%   BMI 30.66 kg/m    -Appearance/Behavior: awake, alert, dressed in hospital scrubs, casually groomed  -Motor: intact  -Gait: intact  -Abnormal involuntary movements: none  -Mood: \"little better\"  -Affect: still flat though a bit brighter than admit  -Speech: regular rate and rhythm  -Thought process/associations: more linear and goal oriented  -Thought content: no delusions or paranoia  -Perceptual disturbances: No hallucinations.             -Suicidal/Homicidal Ideation: denies any suicidal thoughts today  -Judgment: limited  -Insight: limited  *Orientation: time, place and person.  *Memory: intact  *Attention: fair  *Language: fluent, no aphasias, able to repeat phrases and name objects. Vocab intact.  *Fund of information: appropriate for " education  *Cognitive functioning estimate: 0 - independent.           Labs:     No results found for this or any previous visit (from the past 24 hour(s)).      BEHAVIORAL TEAM DISCUSSION    Progress: Gradual. Tolerating meds, cooperative and attending groups.    Continued Stay Criteria/Rationale: high suicide risk.  Does need to stabilize on medications prior to discharge    Medical/Physical: none  Precautions: suicide precautions  Falls precaution?: No  Behavioral Orders   Procedures     Code 1 - Restrict to Unit     Routine Programming     As clinically indicated     Status 15     Every 15 minutes.     Plan:   Continue Lithium  mg at bedtime.  Lithium level and BMP ordered for Saturday  Continue Gabapentin 900 mg TID  Decrease Lorazepam to 1 mg TID  Likely discharge Monday to [facility] where they will be administering his medications to him.          Participants: DAYSI Griffith, CNP, Dr. Timmons, SW, OT, Nursing

## 2021-10-28 NOTE — PLAN OF CARE
"Face to face shift report received from Farida SALVADOR RN.     Problem: Behavioral Health Plan of Care  Goal: Patient-Specific Goal (Individualization)  Description: Pt will eat at least 50% of meals  Pt will sleep at least 6 hours at night  Pt will attend at least 50% of group  Pt will comply with treatment team and recommendations  Pt will be medication compliant      Outcome: Improving  Note: Calm, cooperative, and medication compliant. Denies thoughts of harming self or others. Reports being \"tired\" this AM with a plan to rest in bed. Offers little during conversation, but not irritable. Withdrawn to bed before lunch, but then in public areas after. Attended group. No reports of pain.        Problem: Suicidal Behavior  Goal: Suicidal Behavior is Absent or Managed  Description: Pt will have an absence of suicidal of ideation by discharge.  Pt will contract for safety while on the unit  Pt will let staff know if he feels like harming himself  Outcome: Improving   Free from self harm or injury this shift.         Face to face end of shift report to be communicated to oncoming RN.     "

## 2021-10-28 NOTE — PLAN OF CARE
Talked 1:1 with this writer. Pt states he is thankful that he is able to get into [facility]. Pt lets this writer know that he will need to have follow-up scheduled with her outpatient provider and wasn't sure if he had a current appointment with them. Will follow-up with this tomorrow and schedule follow-ups. Pt has no other requests for this writer at this time.

## 2021-10-28 NOTE — PLAN OF CARE
Problem: Behavioral Health Plan of Care  Goal: Patient-Specific Goal (Individualization)  Description: Pt will eat at least 50% of meals  Pt will sleep at least 6 hours at night  Pt will attend at least 50% of group  Pt will comply with treatment team and recommendations  Pt will be medication compliant      Outcome: Improving  Note: Report received from Farida castañeda. Pt observed sleeping in left side lying position with regular and unlabored respirations.    Pt has been in bed with eyes closed and regular respirations. 15 minute and PRN checks all night. No complaints offered. Will continue to monitor.    Pt slept approx 6.75 hours this NOC shift    Face to face end of shift report communicated to oncoming RN.    Farida KIMBLE RN  October 28, 2021  1:18 AM          Problem: Suicidal Behavior  Goal: Suicidal Behavior is Absent or Managed  Description: Pt will have an absence of suicidal of ideation by discharge.  Pt will contract for safety while on the unit  Pt will let staff know if he feels like harming himself  Outcome: Improving  Note: Unable to assess due to pt sleeping. Pt has remained free of self-harm.

## 2021-10-29 LAB — SARS-COV-2 RNA RESP QL NAA+PROBE: NEGATIVE

## 2021-10-29 PROCEDURE — 250N000013 HC RX MED GY IP 250 OP 250 PS 637: Performed by: NURSE PRACTITIONER

## 2021-10-29 PROCEDURE — U0005 INFEC AGEN DETEC AMPLI PROBE: HCPCS | Performed by: NURSE PRACTITIONER

## 2021-10-29 PROCEDURE — 124N000001 HC R&B MH

## 2021-10-29 RX ADMIN — GABAPENTIN 900 MG: 300 CAPSULE ORAL at 08:15

## 2021-10-29 RX ADMIN — PENICILLIN V POTASSIUM 500 MG: 500 TABLET, FILM COATED ORAL at 06:13

## 2021-10-29 RX ADMIN — PENICILLIN V POTASSIUM 500 MG: 500 TABLET, FILM COATED ORAL at 17:51

## 2021-10-29 RX ADMIN — PENICILLIN V POTASSIUM 500 MG: 500 TABLET, FILM COATED ORAL at 12:02

## 2021-10-29 RX ADMIN — LORAZEPAM 1 MG: 1 TABLET ORAL at 08:15

## 2021-10-29 RX ADMIN — ACETAMINOPHEN 975 MG: 325 TABLET, FILM COATED ORAL at 20:09

## 2021-10-29 RX ADMIN — GABAPENTIN 900 MG: 300 CAPSULE ORAL at 13:10

## 2021-10-29 RX ADMIN — LORAZEPAM 1 MG: 1 TABLET ORAL at 13:09

## 2021-10-29 RX ADMIN — OLANZAPINE 5 MG: 5 TABLET, FILM COATED ORAL at 20:09

## 2021-10-29 RX ADMIN — LORAZEPAM 1 MG: 1 TABLET ORAL at 20:09

## 2021-10-29 RX ADMIN — LORAZEPAM 0.5 MG: 0.5 TABLET ORAL at 20:09

## 2021-10-29 RX ADMIN — LITHIUM CARBONATE 900 MG: 450 TABLET, EXTENDED RELEASE ORAL at 20:09

## 2021-10-29 RX ADMIN — GABAPENTIN 900 MG: 300 CAPSULE ORAL at 20:09

## 2021-10-29 ASSESSMENT — ACTIVITIES OF DAILY LIVING (ADL)
HYGIENE/GROOMING: INDEPENDENT
LAUNDRY: UNABLE TO COMPLETE
ORAL_HYGIENE: INDEPENDENT
DRESS: SCRUBS (BEHAVIORAL HEALTH)

## 2021-10-29 NOTE — PLAN OF CARE
"  Problem: Behavioral Health Plan of Care  Goal: Patient-Specific Goal (Individualization)  Description: Pt will eat at least 50% of meals  Pt will sleep at least 6 hours at night  Pt will attend at least 50% of group  Pt will comply with treatment team and recommendations  Pt will be medication compliant    Patient has been out on the open unit, attending groups and sitting in the lounge watching tv. Affect is flat, mood is calm. He denies SI, HI, anxiety, depression, hallucinations, and pain. States he is frustrated with his Ativan order still, he doesn't understand why it would be changed. States he doesn't want to take the Zyprexa at bedtime tonight, states \"I take enough pills, and I was on that before and it made me gain too much weight, and I don't want that to happen\". He states he wants to speak to his provider about his medications, then states \"I don't want to complain about things, I'm done with that\". Has been appropriate with staff and peers.   2009-requested and accepted Zyprexa 5 mg for agitation, and Ativan 0.5 mg for sleep, states \"if I don't take both, I won't sleep\".  Face to face end of shift report communicated to oncoming RN.     Shruthi Sepulveda RN  10/29/2021  10:53 PM    Outcome: No Change    Problem: Suicidal Behavior  Goal: Suicidal Behavior is Absent or Managed  Description: Pt will have an absence of suicidal of ideation by discharge.  Pt will contract for safety while on the unit  Pt will let staff know if he feels like harming himself    Patient has remained free from self harm/injury.   Outcome: Improving          "

## 2021-10-29 NOTE — PLAN OF CARE
"Face to face shift report received from Farida SALVADOR RN.       Problem: Behavioral Health Plan of Care  Goal: Patient-Specific Goal (Individualization)  Description: Pt will eat at least 50% of meals  Pt will sleep at least 6 hours at night  Pt will attend at least 50% of group  Pt will comply with treatment team and recommendations  Pt will be medication compliant      Outcome: Improving  Note: Calm, cooperative, and medication compliant. Denies mental health issues. Reports \"I'm still sleeping\" this AM during administration of medications. Out in lounge area this afternoon. Offers little during conversation. Slightly irritable. Withdrawn to bed. No reports of pain.          Problem: Suicidal Behavior  Goal: Suicidal Behavior is Absent or Managed  Description: Pt will have an absence of suicidal of ideation by discharge.  Pt will contract for safety while on the unit  Pt will let staff know if he feels like harming himself  Outcome: Improving   Free from self harm or injury this shift.       Face to face end of shift report to be communicated to oncoming RN.     "

## 2021-10-29 NOTE — PLAN OF CARE
Called to schedule pt an insurance ride for Monday with [employer]. They arranged pt a to get a discharge ride on Monday November 1st at noon with [employer] ([phone]). Requested they park at the [place] entrance and call the unit on arrival.     Informed pt of discharge time for Monday. Pt was hopeful that it would be earlier, although is glad that he will be leaving. Pt has no requests for this writer at this time.

## 2021-10-29 NOTE — PLAN OF CARE
Problem: Behavioral Health Plan of Care  Goal: Patient-Specific Goal (Individualization)  Description: Pt will eat at least 50% of meals  Pt will sleep at least 6 hours at night  Pt will attend at least 50% of group  Pt will comply with treatment team and recommendations  Pt will be medication compliant      Outcome: Improving  Note: Report received from Shruthi. Rounding complete. Pt observed at the water machine. Pt not happy with abx scheduled at 0100 and wanted it scheduled earlier or to see if there was a different abx that didn't require a q6h schedule. Sticky note sent to provider.     0145-Pt observed sleeping in right side lying position with regular and unlabored respirations.    Pt has been in bed with eyes closed and regular respirations. 15 minute and PRN checks all night. No complaints offered. Will continue to monitor.    Pt slept approx 6 hours this NOC shift    Face to face end of shift report communicated to oncoming RN.    Farida KIMBLE RN  October 29, 2021  3:29 AM          Problem: Suicidal Behavior  Goal: Suicidal Behavior is Absent or Managed  Description: Pt will have an absence of suicidal of ideation by discharge.  Pt will contract for safety while on the unit  Pt will let staff know if he feels like harming himself  Outcome: Improving  Note: Unable to assess due to pt sleeping. Pt has remained free of self-harm.

## 2021-10-30 LAB
ANION GAP SERPL CALCULATED.3IONS-SCNC: 4 MMOL/L (ref 3–14)
BUN SERPL-MCNC: 14 MG/DL (ref 7–30)
CALCIUM SERPL-MCNC: 8.9 MG/DL (ref 8.5–10.1)
CHLORIDE BLD-SCNC: 110 MMOL/L (ref 94–109)
CO2 SERPL-SCNC: 27 MMOL/L (ref 20–32)
CREAT SERPL-MCNC: 0.93 MG/DL (ref 0.66–1.25)
GFR SERPL CREATININE-BSD FRML MDRD: >90 ML/MIN/1.73M2
GLUCOSE BLD-MCNC: 77 MG/DL (ref 70–99)
LITHIUM SERPL-SCNC: 0.6 MMOL/L
POTASSIUM BLD-SCNC: 4.1 MMOL/L (ref 3.4–5.3)
SODIUM SERPL-SCNC: 141 MMOL/L (ref 133–144)

## 2021-10-30 PROCEDURE — 250N000013 HC RX MED GY IP 250 OP 250 PS 637: Performed by: NURSE PRACTITIONER

## 2021-10-30 PROCEDURE — 124N000001 HC R&B MH

## 2021-10-30 PROCEDURE — 99232 SBSQ HOSP IP/OBS MODERATE 35: CPT | Performed by: NURSE PRACTITIONER

## 2021-10-30 PROCEDURE — 36415 COLL VENOUS BLD VENIPUNCTURE: CPT | Performed by: NURSE PRACTITIONER

## 2021-10-30 PROCEDURE — 80178 ASSAY OF LITHIUM: CPT | Performed by: NURSE PRACTITIONER

## 2021-10-30 PROCEDURE — 80048 BASIC METABOLIC PNL TOTAL CA: CPT | Performed by: NURSE PRACTITIONER

## 2021-10-30 RX ORDER — METAXALONE 800 MG/1
800 TABLET ORAL 3 TIMES DAILY PRN
Status: DISCONTINUED | OUTPATIENT
Start: 2021-10-30 | End: 2021-10-30

## 2021-10-30 RX ORDER — BACLOFEN 10 MG/1
10 TABLET ORAL
Status: DISCONTINUED | OUTPATIENT
Start: 2021-10-30 | End: 2021-10-31

## 2021-10-30 RX ADMIN — PENICILLIN V POTASSIUM 500 MG: 500 TABLET, FILM COATED ORAL at 17:17

## 2021-10-30 RX ADMIN — LORAZEPAM 0.5 MG: 0.5 TABLET ORAL at 20:07

## 2021-10-30 RX ADMIN — GABAPENTIN 900 MG: 300 CAPSULE ORAL at 08:25

## 2021-10-30 RX ADMIN — GABAPENTIN 900 MG: 300 CAPSULE ORAL at 13:50

## 2021-10-30 RX ADMIN — PENICILLIN V POTASSIUM 500 MG: 500 TABLET, FILM COATED ORAL at 06:28

## 2021-10-30 RX ADMIN — OLANZAPINE 5 MG: 5 TABLET, FILM COATED ORAL at 20:05

## 2021-10-30 RX ADMIN — LITHIUM CARBONATE 900 MG: 450 TABLET, EXTENDED RELEASE ORAL at 20:05

## 2021-10-30 RX ADMIN — GABAPENTIN 900 MG: 300 CAPSULE ORAL at 20:05

## 2021-10-30 RX ADMIN — LORAZEPAM 1 MG: 1 TABLET ORAL at 20:05

## 2021-10-30 RX ADMIN — LORAZEPAM 1 MG: 1 TABLET ORAL at 08:25

## 2021-10-30 RX ADMIN — LORAZEPAM 1 MG: 1 TABLET ORAL at 13:51

## 2021-10-30 RX ADMIN — PENICILLIN V POTASSIUM 500 MG: 500 TABLET, FILM COATED ORAL at 12:31

## 2021-10-30 RX ADMIN — BACLOFEN 10 MG: 10 TABLET ORAL at 20:05

## 2021-10-30 RX ADMIN — PENICILLIN V POTASSIUM 500 MG: 500 TABLET, FILM COATED ORAL at 23:30

## 2021-10-30 RX ADMIN — PENICILLIN V POTASSIUM 500 MG: 500 TABLET, FILM COATED ORAL at 00:32

## 2021-10-30 ASSESSMENT — ACTIVITIES OF DAILY LIVING (ADL)
LAUNDRY: UNABLE TO COMPLETE
DRESS: INDEPENDENT;SCRUBS (BEHAVIORAL HEALTH)
ORAL_HYGIENE: INDEPENDENT
HYGIENE/GROOMING: INDEPENDENT

## 2021-10-30 NOTE — PLAN OF CARE
Problem: Behavioral Health Plan of Care  Goal: Patient-Specific Goal (Individualization)  Description: Pt will eat at least 50% of meals  Pt will sleep at least 6 hours at night  Pt will attend at least 50% of group  Pt will comply with treatment team and recommendations  Pt will be medication compliant      Outcome: Improving  Note: Report received from Pancho. Rounding complete. Pt observed sleeping in right side lying position with regular and unlabored respirations.    Pt has been in bed with eyes closed and regular respirations. 15 minute and PRN checks all night. No complaints offered. Will continue to monitor.    Pt slept approx 7.5 hours this NOC shift    Face to face end of shift report communicated to oncoming RN.    Farida KIMBLE RN  October 30, 2021  1:20 AM          Problem: Suicidal Behavior  Goal: Suicidal Behavior is Absent or Managed  Description: Pt will have an absence of suicidal of ideation by discharge.  Pt will contract for safety while on the unit  Pt will let staff know if he feels like harming himself  Outcome: Improving  Note: Unable to assess due to pt sleeping. Pt has remained free of self-harm.

## 2021-10-30 NOTE — PLAN OF CARE
"Face to face end of shift report received from Liat CORDOVA RN. Rounding completed and patient observed walking in the halls. No requests at this time.     18:20 Update: Patient denied depression and SI/HI and hallucinations. He endorsed a small amount of anxiety. He is pleasant and cooperative. He is clean and neatly dressed. Patient attends groups and interacts with peers using appropriate boundaries. He endorsed some tooth pain but said \"I'm okay. I think the penicillin is starting to work.\" Patient was awake all shift. Patient's affect is bright and he spends a great deal of time walking in the [place].     20:05 Update: Patient requested HS meds and PRNs to help him sleep. He verbalized frustration over not having the option of taking Soma which he said was the only thing that helps him. He took 10mg baclofen, 0.5mg Ativan, and 5mg Zyprexa at this time. Patient reported he has taken the baclofen in the past and it hasn't helped him but agreed to try it again.     Face to face end of shift report communicated to oncoming RN.         Problem: Behavioral Health Plan of Care  Goal: Patient-Specific Goal (Individualization)  Description: Pt will eat at least 50% of meals  Pt will sleep at least 6 hours at night  Pt will attend at least 50% of group  Pt will comply with treatment team and recommendations  Pt will be medication compliant      Outcome: Improving     Problem: Suicidal Behavior  Goal: Suicidal Behavior is Absent or Managed  Description: Pt will have an absence of suicidal of ideation by discharge.  Pt will contract for safety while on the unit  Pt will let staff know if he feels like harming himself  Outcome: Improving     "

## 2021-10-31 PROCEDURE — 250N000013 HC RX MED GY IP 250 OP 250 PS 637: Performed by: NURSE PRACTITIONER

## 2021-10-31 PROCEDURE — 99231 SBSQ HOSP IP/OBS SF/LOW 25: CPT | Performed by: NURSE PRACTITIONER

## 2021-10-31 PROCEDURE — 124N000001 HC R&B MH

## 2021-10-31 RX ORDER — CYCLOBENZAPRINE HCL 5 MG
10 TABLET ORAL
Status: DISCONTINUED | OUTPATIENT
Start: 2021-10-31 | End: 2021-11-01 | Stop reason: HOSPADM

## 2021-10-31 RX ORDER — GABAPENTIN 300 MG/1
900 CAPSULE ORAL 3 TIMES DAILY
Qty: 270 CAPSULE | Refills: 0 | Status: ON HOLD | OUTPATIENT
Start: 2021-10-31 | End: 2021-12-08

## 2021-10-31 RX ORDER — OLANZAPINE 5 MG/1
5 TABLET ORAL 3 TIMES DAILY PRN
Qty: 60 TABLET | Refills: 0 | Status: ON HOLD | OUTPATIENT
Start: 2021-10-31 | End: 2021-12-08

## 2021-10-31 RX ORDER — LORAZEPAM 1 MG/1
1 TABLET ORAL 3 TIMES DAILY
Qty: 90 TABLET | Refills: 0 | Status: ON HOLD | OUTPATIENT
Start: 2021-10-31 | End: 2021-12-08

## 2021-10-31 RX ORDER — LITHIUM CARBONATE 450 MG
900 TABLET, EXTENDED RELEASE ORAL AT BEDTIME
Qty: 60 TABLET | Refills: 0 | Status: ON HOLD | OUTPATIENT
Start: 2021-10-31 | End: 2021-12-08

## 2021-10-31 RX ADMIN — GABAPENTIN 900 MG: 300 CAPSULE ORAL at 13:03

## 2021-10-31 RX ADMIN — PENICILLIN V POTASSIUM 500 MG: 500 TABLET, FILM COATED ORAL at 17:09

## 2021-10-31 RX ADMIN — GABAPENTIN 900 MG: 300 CAPSULE ORAL at 20:14

## 2021-10-31 RX ADMIN — LORAZEPAM 1 MG: 1 TABLET ORAL at 20:14

## 2021-10-31 RX ADMIN — LORAZEPAM 0.5 MG: 0.5 TABLET ORAL at 20:14

## 2021-10-31 RX ADMIN — PENICILLIN V POTASSIUM 500 MG: 500 TABLET, FILM COATED ORAL at 12:06

## 2021-10-31 RX ADMIN — LITHIUM CARBONATE 900 MG: 450 TABLET, EXTENDED RELEASE ORAL at 20:14

## 2021-10-31 RX ADMIN — LORAZEPAM 1 MG: 1 TABLET ORAL at 08:58

## 2021-10-31 RX ADMIN — PENICILLIN V POTASSIUM 500 MG: 500 TABLET, FILM COATED ORAL at 23:56

## 2021-10-31 RX ADMIN — LORAZEPAM 1 MG: 1 TABLET ORAL at 13:03

## 2021-10-31 RX ADMIN — CYCLOBENZAPRINE 10 MG: 5 TABLET, FILM COATED ORAL at 20:14

## 2021-10-31 RX ADMIN — GABAPENTIN 900 MG: 300 CAPSULE ORAL at 08:58

## 2021-10-31 RX ADMIN — PENICILLIN V POTASSIUM 500 MG: 500 TABLET, FILM COATED ORAL at 06:18

## 2021-10-31 ASSESSMENT — ACTIVITIES OF DAILY LIVING (ADL)
ORAL_HYGIENE: INDEPENDENT
LAUNDRY: UNABLE TO COMPLETE
DRESS: INDEPENDENT;SCRUBS (BEHAVIORAL HEALTH)
HYGIENE/GROOMING: INDEPENDENT

## 2021-10-31 ASSESSMENT — MIFFLIN-ST. JEOR: SCORE: 2033.98

## 2021-10-31 NOTE — PROGRESS NOTES
"[facility]  Psychiatric Progress Note      Impression:     This is a 31 year old yo male with history of bipolar disorder, TBI, and polysubstance abuse who just got off of a MI/CD commitment in September. He suprisingly agreed to continue to work with his  after the commitment  though he recently started using, got evicted from the board and lodge and suicidal ideation increased. He states he has been taking risperdal and lamictal regularly. He does not want to continue the risperdal. He is making suicidal statements and feels he has nothing left to live for.         Interim History:   Steven is sitting in the lounge conversing with peers when I see him today. He is looking forward to discharging to [facility] tomorrow. He states that he was embarrassed that he ended up back in the hospital this time, though also states he is very appreciative of the care he received. He notes that he was able to get back on medications and find housing, which were both things that he did not have prior to admission. He does smile a few times during our conversation today, which is an improvement. He admits to sleeping \"pretty good\", though states that he always has a lot of anxiety about not being able to sleep at night. He denies any side effects from medications.     Educated regarding medication indications, risks, benefits, side effects, contraindications and possible interactions. Verbally expressed understanding.        Diagnoses:   Bipolar disorder, most recent episode depressed  Methamphetamine use disorder severe  Alcohol use disorder, moderate to severe       Attestation:  Patient has been seen and evaluated by me,  Christelle Contreras NP      The patient's care was discussed with the treatment team and chart notes were reviewed.            Medications:     Current Facility-Administered Medications   Medication     acetaminophen (TYLENOL) tablet 975 mg     alum & mag hydroxide-simethicone " (MAALOX) suspension 30 mL     benzocaine (ORAJEL MAXIMUM STRENGTH) 20 % gel     cyclobenzaprine (FLEXERIL) tablet 10 mg     gabapentin (NEURONTIN) capsule 900 mg     hydrOXYzine (ATARAX) tablet 50 mg     Lidocaine (LIDOCARE) 4 % Patch 2 patch    And     lidocaine patch in PLACE     lithium (ESKALITH CR/LITHOBID) CR tablet 900 mg     LORazepam (ATIVAN) tablet 0.5 mg     LORazepam (ATIVAN) tablet 1 mg     magic mouthwash suspension (diphenhydramine, lidocaine, aluminum-magnesium & simethicone)     nicotine (NICORETTE) gum 2 mg     OLANZapine (zyPREXA) tablet 5 mg     penicillin V (VEETID) tablet 500 mg     ramelteon (ROZEREM) tablet 8 mg     senna-docusate (SENOKOT-S/PERICOLACE) 8.6-50 MG per tablet 1 tablet     traZODone (DESYREL) tablet 50 mg              10 point ROS- tooth pain improving       Allergies:     Allergies   Allergen Reactions     Pork Allergy             Psychiatric Examination:   /73   Pulse 65   Temp 98  F (36.7  C) (Temporal)   Resp 14   Ht 1.829 m (6')   Wt 105.1 kg (231 lb 11.2 oz)   SpO2 96%   BMI 31.42 kg/m    Weight is 231 lbs 11.2 oz  Body mass index is 31.42 kg/m .  378}    MSE/PSYCH  PSYCHIATRIC EXAM  /73   Pulse 65   Temp 98  F (36.7  C) (Temporal)   Resp 14   Ht 1.829 m (6')   Wt 105.1 kg (231 lb 11.2 oz)   SpO2 96%   BMI 31.42 kg/m    -Appearance/Behavior: awake, alert, dressed in hospital scrubs, casually groomed  -Motor: intact  -Gait: intact  -Abnormal involuntary movements: none  -Mood: better, some anxiety about discharge   -Affect: brighter  -Speech: regular rate and rhythm  -Thought process/associations: more linear and goal oriented  -Thought content: no delusions or paranoia  -Perceptual disturbances: denies hallucinations             -Suicidal/Homicidal Ideation: denies any suicidal thoughts today  -Judgment: limited  -Insight: limited though some improvement since admit  *Orientation: time, place and person.  *Memory: intact  *Attention:  fair  *Language: fluent, no aphasias, able to repeat phrases and name objects. Vocab intact.  *Fund of information: appropriate for education  *Cognitive functioning estimate: 0 - independent.           Labs:     No results found for this or any previous visit (from the past 24 hour(s)).      BEHAVIORAL TEAM DISCUSSION    Progress: Gradual. Tolerating meds, cooperative and attending groups. Looking forward to discharge tomorrow.    Continued Stay Criteria/Rationale: adjust medications to treat symptoms. Needs safe discharge plan in place    Medical/Physical: none  Precautions: suicide precautions  Falls precaution?: No  Behavioral Orders   Procedures     Code 1 - Restrict to Unit     Routine Programming     As clinically indicated     Status 15     Every 15 minutes.     Plan:   Continue Lithium  mg at bedtime.    Continue Gabapentin 900 mg TID  Continue Lorazepam 1 mg TID  Will discharge Monday to [facility] and lod where they will be administering his medications to him.          Participants: DAYSI Griffith, CNP,  Nursing

## 2021-10-31 NOTE — PLAN OF CARE
"Face to face end of shift report received from Liat CORDOVA RN. Rounding completed and patient observed in the lounge. No requests at this time.     20:00 Update: Patient denied depression, anxiety, SI/HI and hallucinations. He is irritable but cooperative. He was dismissive to this writer. He was reminded that he needs to have his last dose of penicillin before he leaves tomorrow and he acknowledged this. He is clean and neatly dressed. Patient is withdrawn but he did stay out in the lounge much of the shift. He denied pain and denied needing any other PRNs at this time. Patient was awake all shift. He took meds without complaint.    20:14 Update: Patient requested 10mg Flexeril and 0.5mg Ativan. He declined to take PRN Zyprexa which he took the last few nights. He said he wasn't \"supposed to\" because he was taking flexeril.     Face to face end of shift report communicated to oncoming RN.         Problem: Behavioral Health Plan of Care  Goal: Patient-Specific Goal (Individualization)  Description: Pt will eat at least 50% of meals  Pt will sleep at least 6 hours at night  Pt will attend at least 50% of group  Pt will comply with treatment team and recommendations  Pt will be medication compliant      Outcome: No Change     Problem: Suicidal Behavior  Goal: Suicidal Behavior is Absent or Managed  Description: Pt will have an absence of suicidal of ideation by discharge.  Pt will contract for safety while on the unit  Pt will let staff know if he feels like harming himself  Outcome: No Change     "

## 2021-10-31 NOTE — PROGRESS NOTES
[facility]  Psychiatric Progress Note      Impression:     This is a 31 year old yo male with history of bipolar disorder, TBI, and polysubstance abuse who just got off of a MI/CD commitment in September. He suprisingly agreed to continue to work with his  after the commitment  though he recently started using, got evicted from the board and lodge and suicidal ideation increased. He states he has been taking risperdal and lamictal regularly. He does not want to continue the risperdal. He is making suicidal statements and feels he has nothing left to live for.         Interim History:   Setven is up pacing the hallway with a peer when I go to see him today. Does ask to speak with me. We reviewed his labs and lithium level. He denies any side effects from the medication. He denies any suicidal thoughts, denies hallucinations. His affect seems brighter even compared to yesterday and he is easier to engage in conversation. He is looking forward to discharge on Monday, is glad he will be closer to his family. Appears he has been sleeping well even with decrease in Ativan dose. He has been attending groups.     Educated regarding medication indications, risks, benefits, side effects, contraindications and possible interactions. Verbally expressed understanding.        Diagnoses:   Bipolar disorder, most recent episode depressed  Methamphetamine use disorder severe  Alcohol use disorder, moderate to severe       Attestation:  Patient has been seen and evaluated by me,  Christelle Contreras NP      The patient's care was discussed with the treatment team and chart notes were reviewed.            Medications:     Current Facility-Administered Medications   Medication     acetaminophen (TYLENOL) tablet 975 mg     alum & mag hydroxide-simethicone (MAALOX) suspension 30 mL     baclofen (LIORESAL) tablet 10 mg     benzocaine (ORAJEL MAXIMUM STRENGTH) 20 % gel     gabapentin (NEURONTIN) capsule 900 mg      hydrOXYzine (ATARAX) tablet 50 mg     Lidocaine (LIDOCARE) 4 % Patch 2 patch    And     lidocaine patch in PLACE     lithium (ESKALITH CR/LITHOBID) CR tablet 900 mg     LORazepam (ATIVAN) tablet 0.5 mg     LORazepam (ATIVAN) tablet 1 mg     magic mouthwash suspension (diphenhydramine, lidocaine, aluminum-magnesium & simethicone)     nicotine (NICORETTE) gum 2 mg     OLANZapine (zyPREXA) tablet 5 mg     penicillin V (VEETID) tablet 500 mg     ramelteon (ROZEREM) tablet 8 mg     senna-docusate (SENOKOT-S/PERICOLACE) 8.6-50 MG per tablet 1 tablet     traZODone (DESYREL) tablet 50 mg              10 point ROS- tooth pain improving       Allergies:     Allergies   Allergen Reactions     Pork Allergy             Psychiatric Examination:   /72   Pulse 83   Temp 98.8  F (37.1  C) (Tympanic)   Resp 16   Ht 1.829 m (6')   Wt 102.6 kg (226 lb 1.6 oz)   SpO2 95%   BMI 30.66 kg/m    Weight is 226 lbs 1.6 oz  Body mass index is 30.66 kg/m .  378}    MSE/PSYCH  PSYCHIATRIC EXAM  /72   Pulse 83   Temp 98.8  F (37.1  C) (Tympanic)   Resp 16   Ht 1.829 m (6')   Wt 102.6 kg (226 lb 1.6 oz)   SpO2 95%   BMI 30.66 kg/m    -Appearance/Behavior: awake, alert, dressed in hospital scrubs, casually groomed  -Motor: intact  -Gait: intact  -Abnormal involuntary movements: none  -Mood: better, no longer labile  -Affect: appears brighter  -Speech: regular rate and rhythm  -Thought process/associations: more linear and goal oriented  -Thought content: no delusions or paranoia  -Perceptual disturbances: denies hallucinations             -Suicidal/Homicidal Ideation: denies any suicidal thoughts today  -Judgment: limited  -Insight: limited thoug some improvement since admit  *Orientation: time, place and person.  *Memory: intact  *Attention: fair  *Language: fluent, no aphasias, able to repeat phrases and name objects. Vocab intact.  *Fund of information: appropriate for education  *Cognitive functioning estimate: 0 -  independent.           Labs:     Results for orders placed or performed during the hospital encounter of 10/21/21 (from the past 24 hour(s))   Lithium level   Result Value Ref Range    Lithium 0.6   mmol/L   Basic metabolic panel   Result Value Ref Range    Sodium 141 133 - 144 mmol/L    Potassium 4.1 3.4 - 5.3 mmol/L    Chloride 110 (H) 94 - 109 mmol/L    Carbon Dioxide (CO2) 27 20 - 32 mmol/L    Anion Gap 4 3 - 14 mmol/L    Urea Nitrogen 14 7 - 30 mg/dL    Creatinine 0.93 0.66 - 1.25 mg/dL    Calcium 8.9 8.5 - 10.1 mg/dL    Glucose 77 70 - 99 mg/dL    GFR Estimate >90 >60 mL/min/1.73m2         BEHAVIORAL TEAM DISCUSSION    Progress: Gradual. Tolerating meds, cooperative and attending groups.    Continued Stay Criteria/Rationale: adjust medications to treat symptoms. Needs safe discharge plan in place    Medical/Physical: none  Precautions: suicide precautions  Falls precaution?: No  Behavioral Orders   Procedures     Code 1 - Restrict to Unit     Routine Programming     As clinically indicated     Status 15     Every 15 minutes.     Plan:   Continue Lithium  mg at bedtime.    Labs reviewed  Continue Gabapentin 900 mg TID  Continue Lorazepam 1 mg TID  Will discharge Monday to [facility] where they will be administering his medications to him.          Participants: DAYSI Griffith, CNP,  Nursing

## 2021-10-31 NOTE — PLAN OF CARE
Face to face report received from Farida LARKIN. Pt. Observed.     Problem: Behavioral Health Plan of Care  Goal: Patient-Specific Goal (Individualization)  Description: Pt will eat at least 50% of meals  Pt will sleep at least 6 hours at night  Pt will attend at least 50% of group  Pt will comply with treatment team and recommendations  Pt will be medication compliant  Outcome: No Change   Pt. Denies HI, SI, depression, hallucinations and pain. Pt. isolating to room in early a.m. refusing breakfast. Pt. out to lounge before lunch, social wth peers and staff. Pt. Cooperative with medications and nursing assessment. Pt. In agreement to update staff to thoughts feelings of wanting to harm self or others. Pt. Eating WDL. Pt. Encouraged to attend unit programing and shower. Pt. requesting medication for back pain. Provider updated.     Face to face end of shift report communicated to oncoming TRAE.     Liat Lai RN

## 2021-10-31 NOTE — PLAN OF CARE
"Face to face report received from Farida SALVADOR RN. Pt. Observed.     Problem: Behavioral Health Plan of Care  Goal: Patient-Specific Goal (Individualization)  Description: Pt will eat at least 50% of meals  Pt will sleep at least 6 hours at night  Pt will attend at least 50% of group  Pt will comply with treatment team and recommendations  Pt will be medication compliant  10/31/2021 0943 by Liat Lai RN  Outcome: No Change  Note:   Pt. Denies HI, SI, depression, hallucinations and pain. Pt. isolating to room out for meals. Pt. Encouraged to get out of bed during morning medication pass. Pt. Stated \"Can I get my ativan PRN right now or is it just at bedtime.\" Pt. Updated his PRN ativan was only for HS. Cooperative with medications and nursing assessment. Pt. In agreement to update staff to thoughts feelings of wanting to harm self or others. Pt. Encouraged to attend unit programing and shower. Pt. Eating WDL. Pt. has no c/o issues with bowel and bladder.      Face to face end of shift report to be communicated to oncoming RN.     Liat Lai RN  10/31/2021    "

## 2021-10-31 NOTE — PLAN OF CARE
Problem: Behavioral Health Plan of Care  Goal: Patient-Specific Goal (Individualization)  Description: Pt will eat at least 50% of meals  Pt will sleep at least 6 hours at night  Pt will attend at least 50% of group  Pt will comply with treatment team and recommendations  Pt will be medication compliant      Outcome: Improving  Note: Report received from Felix. Rounding complete. Pt observed sleeping in right side lying position with regular and unlabored respirations.    Pt has been in bed with eyes closed and regular respirations. 15 minute and PRN checks all night. No complaints offered. Will continue to monitor.    Pt slept approx 7.5 hours this NOC shift.      Face to face end of shift report communicated to oncoming RN.    Farida KIMBLE RN  October 31, 2021  2:19 AM          Problem: Suicidal Behavior  Goal: Suicidal Behavior is Absent or Managed  Description: Pt will have an absence of suicidal of ideation by discharge.  Pt will contract for safety while on the unit  Pt will let staff know if he feels like harming himself  Outcome: Improving  Note: Unable to assess due to pt sleeping. Pt has remained free of self-harm.

## 2021-11-01 VITALS
DIASTOLIC BLOOD PRESSURE: 61 MMHG | WEIGHT: 231.7 LBS | OXYGEN SATURATION: 98 % | TEMPERATURE: 98 F | HEIGHT: 72 IN | HEART RATE: 70 BPM | BODY MASS INDEX: 31.38 KG/M2 | SYSTOLIC BLOOD PRESSURE: 96 MMHG | RESPIRATION RATE: 16 BRPM

## 2021-11-01 PROCEDURE — 250N000013 HC RX MED GY IP 250 OP 250 PS 637: Performed by: NURSE PRACTITIONER

## 2021-11-01 PROCEDURE — 99239 HOSP IP/OBS DSCHRG MGMT >30: CPT | Performed by: NURSE PRACTITIONER

## 2021-11-01 RX ADMIN — PENICILLIN V POTASSIUM 500 MG: 500 TABLET, FILM COATED ORAL at 10:38

## 2021-11-01 RX ADMIN — OLANZAPINE 5 MG: 5 TABLET, FILM COATED ORAL at 00:58

## 2021-11-01 RX ADMIN — LORAZEPAM 1 MG: 1 TABLET ORAL at 08:14

## 2021-11-01 RX ADMIN — GABAPENTIN 900 MG: 300 CAPSULE ORAL at 08:14

## 2021-11-01 RX ADMIN — PENICILLIN V POTASSIUM 500 MG: 500 TABLET, FILM COATED ORAL at 06:08

## 2021-11-01 ASSESSMENT — ACTIVITIES OF DAILY LIVING (ADL)
HYGIENE/GROOMING: INDEPENDENT
DRESS: INDEPENDENT
ORAL_HYGIENE: INDEPENDENT

## 2021-11-01 NOTE — PLAN OF CARE
Problem: Behavioral Health Plan of Care  Goal: Patient-Specific Goal (Individualization)  Description: Pt will eat at least 50% of meals  Pt will sleep at least 6 hours at night  Pt will attend at least 50% of group  Pt will comply with treatment team and recommendations  Pt will be medication compliant      Outcome: Improving  Note: Report received from Felix. Rounding complete. Pt to the restroom at shift change then came to nurse's station per this writer's request to take his scheduled PCN.     0058- Pt back to nurse's station with request for Zyprexa 5 mg for agitation r/t not being able to sleep. Admin by other RN    Pt observed sleeping in right side lying position with regular and unlabored respirations.    Pt has been in bed with eyes closed and regular respirations. 15 minute and PRN checks all night. No complaints offered. Will continue to monitor.    Pt slept approx 7 hours this NOC shift    Face to face end of shift report communicated to oncoming RN.    Farida KIMBLE RN  November 1, 2021  1:13 AM          Problem: Suicidal Behavior  Goal: Suicidal Behavior is Absent or Managed  Description: Pt will have an absence of suicidal of ideation by discharge.  Pt will contract for safety while on the unit  Pt will let staff know if he feels like harming himself  Outcome: Improving  Note: No endorsement of SI. Pt has not self harmed or injured this shift

## 2021-11-01 NOTE — PLAN OF CARE
Discharge Note    Patient Discharged to [facility] on 11/1/2021 12:01 PM via Health plan transportation, [employer].    Patient informed of discharge instructions in AVS. patient verbalizes understanding and denies having any questions pertaining to AVS. Patient stable at time of discharge. Patient denies SI, HI, and thoughts of self harm at time of discharge. All personal belongings returned to patient. Discharge prescriptions sent to [facility] in [place] via electronic communication.     Farida Saenz RN  11/1/2021  12:24 PM

## 2021-11-01 NOTE — PLAN OF CARE
Problem: Behavioral Health Plan of Care  Goal: Patient-Specific Goal (Individualization)  Description: Pt will eat at least 50% of meals  Pt will sleep at least 6 hours at night  Pt will attend at least 50% of group  Pt will comply with treatment team and recommendations  Pt will be medication compliant      11/1/2021 1004 by Farida Saenz, RN  Outcome: Adequate for Discharge  Note:        Problem: Suicidal Behavior  Goal: Suicidal Behavior is Absent or Managed  Description: Pt will have an absence of suicidal of ideation by discharge.  Pt will contract for safety while on the unit  Pt will let staff know if he feels like harming himself  11/1/2021 1004 by Farida Saenz, RN  Outcome: Adequate for Discharge

## 2021-11-01 NOTE — DISCHARGE INSTRUCTIONS
Behavioral Discharge Planning and Instructions    Summary: You were admitted on 10/21/2021  due to Suicidal Ideations.  You were treated by the treatment team and discharged on 11/1/2021 from [facility]    Main Diagnosis:   Bipolar disorder, most recent episode depressed  Methamphetamine use disorder severe  Alcohol use disorder, moderate to severe    Health Care Follow-up:     [facility]  Keila Klein, - [phone]  [facility]  [address] [place] [address]  Phone [phone]  Fax [phone]    [facility]- accepted and admit October 1st  Intake contact- Sobeida FISHER  Pharmacy- [facility] in [place]-P: [phone]; F:[phone]  [address] [facility]  [phone]    [facility]  Medication Management- Nuno Darianaadarsh- November 8th @ 11:00am via telehealth  [address] [place] [address] [phone] Phone  [phone] Fax    [facility]  Crisis Bed Services   [address] [place]  [address]  Phone - [phone]  Fax - [phone]    Attend all scheduled appointments with your outpatient providers. Call at least 24 hours in advance if you need to reschedule an appointment to ensure continued access to your outpatient providers.     Major Treatments, Procedures and Findings:  You were provided with: a psychiatric assessment, assessed for medical stability, medication evaluation and/or management and group therapy    Symptoms to Report: feeling more aggressive, increased confusion, losing more sleep, mood getting worse or thoughts of suicide    Early warning signs can include: increased depression or anxiety sleep disturbances increased thoughts or behaviors of suicide or self-harm  increased unusual thinking, such as paranoia or hearing voices    Safety and Wellness:  Take all medicines as directed.  Make no changes unless your doctor suggests them.       "Follow treatment recommendations.  Refrain from alcohol and non-prescribed drugs.  Ask your support system to help you reduce your access to items that could harm yourself or others. Items could include:  Firearms  Medicines (both prescribed and over-the-counter)  Knives and other sharp objects  Ropes and like materials  If there is a concern for safety, call 911. If there is a concern for safety, call 911.    Resources:   Crisis Intervention: 686.741.1281 or 669-747-4193 (TTY: 201.151.9222).  Call anytime for help.  National [place] on Mental Illness (www.[place].nichelle.org): 301.490.6403 or 005-524-4083.  Alcoholics Anonymous (www.alcoholics-anonymous.org): Check your phone book for your local chapter.  Suicide Awareness Voices of Education (SAVE) (www.save.org): 278-307-JJVS (1885)  National Suicide Prevention Line (www.mentalhealthmn.org): 727-089-WVUS (5249)  Mental Health Consumer/Survivor Network of [place] (www.mhcsn.net): 297.747.1590 or 130-449-9848  Mental Health Association of [place] (www.mentalhealth.org): 844.802.1467 or 750-846-1264  Self- Management and Recovery Training., SMART-- Toll free: 620.139.8295  www.[place].WelVU  Text 4 Life: txt \"LIFE\" to 33488 for immediate support and crisis intervention  Crisis text line: Text \"[place]\" to 076168. Free, confidential, 24/7.  Crisis Intervention: 266.711.8982 or 472-075-3995. Call anytime for help.   [place] Area:  [facility] stabilization [facility]- [phone]  [facility] Crisis Line: 1-593.619.1136  Advocates For Family Peace: 719-6043  Sexual Assault Program [facility]: 641.799.5174 or 1-620.821.2840  [place] [facility] Battered Women's Program: 9-402-251-6857 Ext: 279       Calls answered Mon-Fri-8:00 am--4:30 pm    [place]:  Advocates for Family Peace: 1-492-669-2377  [facility] - 5-642-892-8961  [facility] first call for help: 1-418.475.7175  [facility]:  (297) 986-7843    [place] Area:  Warm Line: " "0-221-033-3526       Calls answered Tuesday--Saturday 4:00 pm--10:00 pm  [facility] Crisis Line - 638.602.6988  [place] [facility] Stabilization 168-668-4197    [place] Statewide:  [place] Crisis and Referral Services: 0-501-421-6672  National Suicide Prevention Lifeline: 2-599-543-TALK (1746)   - zus5hxle- Text \"Life\" to 70555  First Call for Help: 2-1-1  PRATIK Helpline- 2-540-GJKX-HELP   Crisis Text Line: Text  [place]  to 678410    General Medication Instructions:   See your medication sheet(s) for instructions.   Take all medicines as directed.  Make no changes unless your doctor suggests them.   Go to all your doctor visits.  Be sure to have all your required lab tests. This way, your medicines can be refilled on time.  Do not use any drugs not prescribed by your doctor.  Avoid alcohol.    Advance Directives:   Scanned document on file with [place]? No scanned doc  Is document scanned? No. Copy Requested.  Honoring Choices Your Rights Handout: Informed and given  Was more information offered? Pt declined    The Treatment team has appreciated the opportunity to work with you. If you have any questions or concerns about your recent admission, you can contact the unit which can receive your call 24 hours a day, 7 days a week. They will be able to get in touch with a Provider if needed. The unit number is [phone] .  "
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
## 2021-11-01 NOTE — PLAN OF CARE
Problem: Behavioral Health Plan of Care  Goal: Patient-Specific Goal (Individualization)  Description: Pt will eat at least 50% of meals  Pt will sleep at least 6 hours at night  Pt will attend at least 50% of group  Pt will comply with treatment team and recommendations  Pt will be medication compliant      11/1/2021 0918 by Farida Saenz RN  Outcome: No Change  Note:        Problem: Suicidal Behavior  Goal: Suicidal Behavior is Absent or Managed  Description: Pt will have an absence of suicidal of ideation by discharge.  Pt will contract for safety while on the unit  Pt will let staff know if he feels like harming himself  Outcome: Improving

## 2021-11-29 ENCOUNTER — TRANSFERRED RECORDS (OUTPATIENT)
Dept: HEALTH INFORMATION MANAGEMENT | Facility: CLINIC | Age: 31
End: 2021-11-29
Payer: COMMERCIAL

## 2021-11-30 ENCOUNTER — TRANSFERRED RECORDS (OUTPATIENT)
Dept: HEALTH INFORMATION MANAGEMENT | Facility: CLINIC | Age: 31
End: 2021-11-30
Payer: COMMERCIAL

## 2021-12-01 ENCOUNTER — TELEPHONE (OUTPATIENT)
Dept: BEHAVIORAL HEALTH | Facility: CLINIC | Age: 31
End: 2021-12-01

## 2021-12-01 ENCOUNTER — HOSPITAL ENCOUNTER (INPATIENT)
Facility: HOSPITAL | Age: 31
LOS: 8 days | Discharge: PSYCHIATRIC OR SUBSTANCE USE FACILITY | End: 2021-12-09
Attending: STUDENT IN AN ORGANIZED HEALTH CARE EDUCATION/TRAINING PROGRAM | Admitting: STUDENT IN AN ORGANIZED HEALTH CARE EDUCATION/TRAINING PROGRAM
Payer: COMMERCIAL

## 2021-12-01 DIAGNOSIS — M54.9 BACK PAIN WITHOUT SCIATICA: Primary | ICD-10-CM

## 2021-12-01 DIAGNOSIS — F25.1 SCHIZOAFFECTIVE DISORDER, DEPRESSIVE TYPE (H): ICD-10-CM

## 2021-12-01 PROCEDURE — 250N000013 HC RX MED GY IP 250 OP 250 PS 637: Performed by: NURSE PRACTITIONER

## 2021-12-01 PROCEDURE — 124N000001 HC R&B MH

## 2021-12-01 PROCEDURE — 99222 1ST HOSP IP/OBS MODERATE 55: CPT | Performed by: NURSE PRACTITIONER

## 2021-12-01 PROCEDURE — 99223 1ST HOSP IP/OBS HIGH 75: CPT | Performed by: NURSE PRACTITIONER

## 2021-12-01 RX ORDER — LORAZEPAM 0.5 MG/1
0.5 TABLET ORAL 2 TIMES DAILY PRN
Status: DISCONTINUED | OUTPATIENT
Start: 2021-12-01 | End: 2021-12-01

## 2021-12-01 RX ORDER — LITHIUM CARBONATE 450 MG
900 TABLET, EXTENDED RELEASE ORAL AT BEDTIME
Status: DISCONTINUED | OUTPATIENT
Start: 2021-12-01 | End: 2021-12-09 | Stop reason: HOSPADM

## 2021-12-01 RX ORDER — PREGABALIN 25 MG/1
50 CAPSULE ORAL 3 TIMES DAILY
Status: DISCONTINUED | OUTPATIENT
Start: 2021-12-01 | End: 2021-12-02

## 2021-12-01 RX ORDER — OLANZAPINE 10 MG/1
10 TABLET ORAL 3 TIMES DAILY PRN
Status: DISCONTINUED | OUTPATIENT
Start: 2021-12-01 | End: 2021-12-09 | Stop reason: HOSPADM

## 2021-12-01 RX ORDER — LITHIUM CARBONATE 300 MG/1
300 TABLET, FILM COATED, EXTENDED RELEASE ORAL AT BEDTIME
Status: DISCONTINUED | OUTPATIENT
Start: 2021-12-01 | End: 2021-12-01

## 2021-12-01 RX ORDER — LANOLIN ALCOHOL/MO/W.PET/CERES
3 CREAM (GRAM) TOPICAL
Status: DISCONTINUED | OUTPATIENT
Start: 2021-12-01 | End: 2021-12-09 | Stop reason: HOSPADM

## 2021-12-01 RX ORDER — DIPHENHYDRAMINE HYDROCHLORIDE AND LIDOCAINE HYDROCHLORIDE AND ALUMINUM HYDROXIDE AND MAGNESIUM HYDRO
10 KIT EVERY 6 HOURS PRN
Status: DISCONTINUED | OUTPATIENT
Start: 2021-12-01 | End: 2021-12-09 | Stop reason: HOSPADM

## 2021-12-01 RX ORDER — RISPERIDONE 2 MG/1
2 TABLET ORAL AT BEDTIME
Status: ON HOLD | COMMUNITY
Start: 2021-11-15 | End: 2021-12-08

## 2021-12-01 RX ORDER — LORAZEPAM 0.5 MG/1
0.5 TABLET ORAL 2 TIMES DAILY PRN
Status: DISCONTINUED | OUTPATIENT
Start: 2021-12-01 | End: 2021-12-07

## 2021-12-01 RX ORDER — ACETAMINOPHEN 325 MG/1
650 TABLET ORAL EVERY 4 HOURS PRN
Status: DISCONTINUED | OUTPATIENT
Start: 2021-12-01 | End: 2021-12-09 | Stop reason: HOSPADM

## 2021-12-01 RX ORDER — IBUPROFEN 200 MG
400 TABLET ORAL EVERY 6 HOURS PRN
Status: DISCONTINUED | OUTPATIENT
Start: 2021-12-01 | End: 2021-12-01

## 2021-12-01 RX ORDER — LORAZEPAM 2 MG/ML
2 INJECTION INTRAMUSCULAR 2 TIMES DAILY PRN
Status: DISCONTINUED | OUTPATIENT
Start: 2021-12-01 | End: 2021-12-01

## 2021-12-01 RX ORDER — AMOXICILLIN 250 MG
1 CAPSULE ORAL 2 TIMES DAILY PRN
Status: DISCONTINUED | OUTPATIENT
Start: 2021-12-01 | End: 2021-12-09 | Stop reason: HOSPADM

## 2021-12-01 RX ORDER — TRAZODONE HYDROCHLORIDE 50 MG/1
50 TABLET, FILM COATED ORAL
Status: DISCONTINUED | OUTPATIENT
Start: 2021-12-01 | End: 2021-12-01

## 2021-12-01 RX ORDER — MAGNESIUM HYDROXIDE/ALUMINUM HYDROXICE/SIMETHICONE 120; 1200; 1200 MG/30ML; MG/30ML; MG/30ML
30 SUSPENSION ORAL EVERY 4 HOURS PRN
Status: DISCONTINUED | OUTPATIENT
Start: 2021-12-01 | End: 2021-12-09 | Stop reason: HOSPADM

## 2021-12-01 RX ORDER — LAMOTRIGINE 200 MG/1
200 TABLET ORAL DAILY
Status: ON HOLD | COMMUNITY
Start: 2021-11-15 | End: 2021-12-08

## 2021-12-01 RX ORDER — LORAZEPAM 1 MG/1
2 TABLET ORAL 2 TIMES DAILY PRN
Status: DISCONTINUED | OUTPATIENT
Start: 2021-12-01 | End: 2021-12-01

## 2021-12-01 RX ORDER — HYDROXYZINE HYDROCHLORIDE 25 MG/1
50 TABLET, FILM COATED ORAL EVERY 4 HOURS PRN
Status: DISCONTINUED | OUTPATIENT
Start: 2021-12-01 | End: 2021-12-09 | Stop reason: HOSPADM

## 2021-12-01 RX ORDER — CLONAZEPAM 1 MG/1
1 TABLET ORAL 3 TIMES DAILY PRN
Status: ON HOLD | COMMUNITY
Start: 2021-11-15 | End: 2021-12-08

## 2021-12-01 RX ADMIN — PREGABALIN 50 MG: 25 CAPSULE ORAL at 15:58

## 2021-12-01 RX ADMIN — PREGABALIN 50 MG: 25 CAPSULE ORAL at 20:14

## 2021-12-01 RX ADMIN — OLANZAPINE 10 MG: 10 TABLET, FILM COATED ORAL at 20:14

## 2021-12-01 RX ADMIN — LORAZEPAM 2 MG: 1 TABLET ORAL at 14:01

## 2021-12-01 RX ADMIN — Medication 3 MG: at 20:14

## 2021-12-01 RX ADMIN — LITHIUM CARBONATE 900 MG: 450 TABLET, EXTENDED RELEASE ORAL at 20:14

## 2021-12-01 ASSESSMENT — MIFFLIN-ST. JEOR: SCORE: 1961.41
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
## 2021-12-01 NOTE — PLAN OF CARE
Received report from pt's  that the patient would like to complete a substance use assessment. This writer spoke with patient and agreed to meet with patient to complete the assessment at this writer's next availability, the afternoon of Friday December 3rd.
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
## 2021-12-01 NOTE — PROGRESS NOTES
12/01/21 1039   Patient Belongings   Patient Belongings locker   Patient Belongings Remaining with Patient clothing;shoes   Patient Belongings Put in Hospital Secure Location (Security or Locker, etc.) none   Belongings Search Yes   Clothing Search Yes   Second Staff Colette Shaw Blue tennis shoes, navy underwear, black button up shirt, grey t-shirt, black hat, grey hospital socks, 2 pairs of white socks, , black pants, navy blue jacket, black gloves   List items sent to safe: NONE  All other belongings put in assigned cubby in belongings room.     EDIT 12/1/21: Eyeglasses remain w/ pt.     I have reviewed my belongings list on admission and verify that it is correct.     Patient signature_______________________________    Second staff witness (if patient unable to sign) ______________________________       I have received all my belongings at discharge.    Patient signature________________________________    Juanita CARDOZA   12/1/2021  10:42 AM
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
## 2021-12-01 NOTE — PLAN OF CARE
card was in pt's belonings with a court date for today at 8:15am. Gave this card to pt and encouraged him to call his . Pt went to phone. Pt also states that his PO is aware of him being here.

## 2021-12-01 NOTE — PLAN OF CARE
Prior to Admission Medication Reconciliation:     Medications added:   [] None  [x] As listed below:    Clonazepam, lamictal, risperidone- all dispensed 11/15/21    Medications deleted:   [x] None  [] As listed below:    Medications marked for review/removal by attending:  [] None  [x] As listed below:    Clonazepam, lamictal and risperidone: pt's psych provider, Nuno Hendrix- restarted these medications 11/15/21, pt reports that he refused to take them because they had been discontinued by our facility last admission. Staff at [facility] confirmed.     Changes made to existing medications:   [] None  [] Updated strengths and frequencies to most current  [x] As listed below:    zyprexa- pt usually only takes 1 at bedtime PRN    Last times/dates taken verified with patient:  [x] Yes- completed myself: pt reports taking medications last night at home and reports he was given Lorazepam and gabapentin at [place]  [] Prepared PTA medlist for review only. (will not be available to review personally)  [] Did not review with patient. Rx verification only. Review completed by nursing.    [] Nurse completed no changes made (double checked entries)  [] Unable to review with patient at this time:  [] Nurse completed/changes made:     Allergies listed at another location:  []Allergies match allergies listed in Epic  []Other allergies listed:    Allergy review:    []Did not review: reviewed by nursing  []Did not review: pt unable at this time  []Patient/MAR verified NKDA  [x]Patient/MAR verified current existing allergies: no changes made  []Patient confirmed current existing allergies and new allergies added:    Medication reconciliation sources:   [x]Patient  []Patient family member/emergency contact: **  [x][facility] Report Review  [x]Epic Chart Review:  [x]Care Everywhere review  []Pharmacy med list: **  []Pharmacy phone call  [x]Outside meds dispense report: see below  []Nursing home or Assisted Living MAR: [facility]  left 11/20/21    Wasn't taking all of the meds that he discharged from [facility] on   [x]Other: [facility] 8/12/21  [facility] 11/15/21    Clonazepam 1 mg TID PRN     Risperidone 2 mg at bedtime     Gabapentin 300 mg 3 caps TID    lamictal 200 mg every day     zyprexa 5 mg TID PRN    Lithium- not changed by Dr. Hendrix    Lorazepam- started elsewhere, not managed by Dr. Hendrix     Pharmacy desired at discharge: wants to select when discharged    Is patient on coumadin?  [x]No      Requests for consultation by provider or pharmacist:   [x] Patient understands why all of their meds were prescribed and how to take them. No questions.   [] Managing party has no questions.   [] Patient/ managing party has questions about the following:  [] Did not review with patient. Cannot assess.     Fill dates and reported compliancy:  [x] Fill dates coincide with reported compliancy for all/most maintenance meds.   [] Fill dates do not coincide with compliancy with maintenance meds. See notes in PTA medlist and in comments.    [] Fill dates do not coincide with the following medications but pt reports compliancy:  [] Did not review with patient. Cannot assess.     Historian accuracy:  [] Excellent- alert and oriented, understands why meds were prescribed and how to take, able to answer specifics  [x] Good- alert and oriented, understands why meds were prescribed and how to take, some confusion   [] Fair- alert and oriented, doesn't know medications without list, cannot answer specifics about medications, but has a decent process for which to take at home  [] Poor- does not know medications, may not have a process to take at home, may be cognitively unable to review at this time  []Medication management done by family member or facility, no concerns about historian accuracy.   [] Did not review with patient. Cannot assess.     Medication Management:  [x] Manages meds independently  [] Family member/  other party manages meds/assists:  [] Meds managed by staff at facility  [] Meds set up by home care, family/other party helps administer  [] Meds set up by home care, self administers  [] Did not review with patient. Cannot assess.     Other medications aside from PTA:  [x] Denies taking any medications aside from those listed in PTA meds  [] Reports taking another medication(s) but cannot recall the name(s)  [] Refuses to say.  [] Did not review with patient. Cannot assess.     Comments: pt knows the medications he takes well enough. Not sure on the accuracy of reported compliancy.     Cynthia Nguyen on 12/1/2021 at 10:32 AM       Discrepancies: [x] No []Not Applicable [x]Yes: listed below    Issues completing PTA medication reconciliation:  [] On hold for a long time  [] Waited for a call back  [] Fax didn't come through  [] Fax took a long time  [] Other:    Notifying appropriate party of changes/additions/discrepancies:  []No pertinent changes made, notification not necessary.   [] Notified attending provider via text page/phone call  [] Notified attending provider in person  [] Notified pharmacy  [] Notified nurse  [x] Medications have not been reconciled by a provider yet, notification not necessary  [] Pt is not admitted to floor yet, PTA meds completed before admission.     Medications Prior to Admission   Medication Sig Dispense Refill Last Dose     benzocaine (ORAJEL MAXIMUM STRENGTH) 20 % GEL gel Take by mouth 4 times daily as needed for moderate pain   Unknown at Unknown time     gabapentin (NEURONTIN) 300 MG capsule Take 3 capsules (900 mg) by mouth 3 times daily 270 capsule 0 12/1/2021 at AM     lithium (ESKALITH CR/LITHOBID) 450 MG CR tablet Take 2 tablets (900 mg) by mouth At Bedtime 60 tablet 0 11/30/2021 at HS     LORazepam (ATIVAN) 1 MG tablet Take 1 tablet (1 mg) by mouth 3 times daily 90 tablet 0 12/1/2021 at Am     OLANZapine (ZYPREXA) 5 MG tablet Take 1 tablet (5 mg) by mouth 3 times daily as  needed (anxiety/agitation) (Patient taking differently: Take 5 mg by mouth nightly as needed (anxiety/agitation) ) 60 tablet 0 11/30/2021 at HS     clonazePAM (KLONOPIN) 1 MG tablet Take 1 mg by mouth 3 times daily as needed        lamoTRIgine (LAMICTAL) 200 MG tablet Take 200 mg by mouth daily        risperiDONE (RISPERDAL) 2 MG tablet Take 2 mg by mouth At Bedtime                Medication Dispense History (from 12/1/2020 to 12/1/2021)  Expand All  Collapse All    Amphetamine-Dextroamphetamine     Dispensed Days Supply Quantity Provider Pharmacy   DEXTROAMP-AMPHETAMIN 10 MG TAB 01/23/2021 30 30 tablet KAREN PRITCHARD [id number] ...           Benztropine Mesylate     Dispensed Days Supply Quantity Provider Pharmacy   BENZTROPINE 0.5MG   TAB 12/22/2020 30 60 Units JOSHLISA CARLTON [facility] ...           Gabapentin     Dispensed Days Supply Quantity Provider Pharmacy   GABAPENTIN 300 MG CAPSULE 11/03/2021 30 270 capsule ETHAN LUCIANO Pharmacy...   GABAPENTIN 300MG CAPSULE 11/03/2021 30 270 Units ETHAN LUCIANO #61 - Fa...   GABAPENTIN 300MG CAPSULE 10/12/2021 30 270 Units KAREN PRITCHARD #38 - Vi...   GABAPENTIN 300MG    CAP 09/15/2021 30 270 Units KAREN PRITCHARD [facility] ...   GABAPENTIN 300MG    CAP 08/21/2021 30 270 Units KAREN PRITCHARD [facility] ...   GABAPENTIN 300MG    CAP 07/23/2021 30 270 Units KAREN PRITCHARD [facility] ...   GABAPENTIN 300MG    [facility]30/2021 [facility]   GABAPENTIN 300MG    CAP 06/05/2021 30 270 Units EDDI TEE [facility] ...   GABAPENTIN 300MG    CAP 05/07/2021 30 [place] [facility] ...   GABAPENTIN 400MG    [facility]2020 [facility] ...   GABAPENTIN 400MG    CAP 12/02/2020 [facility] ...           LORazepam     Dispensed Days Supply Quantity  Provider Pharmacy   LORAZEPAM 1MG TABLET 11/01/2021 30 90 Units ETHAN LUCIANO [facility]...           Lisdexamfetamine Dimesylate     Dispensed Days Supply Quantity Provider Pharmacy   VYVANSE 50 MG CAPSULE 01/23/2021 30 30 capsule [facility] ...   VYVANSE 50 MG CAPSULE 01/13/2021 12 12 capsule KAREN PRITCHARD [facility] ...           Lithium Carbonate     Dispensed Days Supply Quantity Provider Pharmacy   LITHIUM CARBONATE 450MG ER TAB 11/01/2021 30 60 Units ETHAN LUCIANO [facility]...           Multiple Vitamins-Minerals     Dispensed Days Supply Quantity Provider Pharmacy   UNICOMPLEX-M TAB 06/05/2021 30 30 Units EDDI TEE [facility] ...   UNICOMPLEX-M TAB 05/08/2021 30 30 Units EDDI TEE [facility] ...           OLANZapine     Dispensed Days Supply Quantity Provider Pharmacy   OLANZAPINE 5MG TABLET 11/16/2021 30 90 Units [facility]...   OLANZAPINE 5MG TABLET 11/02/2021 20 60 Units ETHAN LUCIANO [facility]...           Penicillin V Potassium     Dispensed Days Supply Quantity Provider Pharmacy   PENICILLN VK 500MG TAB 10/11/2021 10 40 [facility] ...           Propranolol HCl     Dispensed Days Supply Quantity Provider Pharmacy   PROPRANOLOL 10MG    TAB 12/24/2020 30 60 Units LISA KELLY [facility] ...   PROPRANOLOL 10MG    TAB 12/02/2020 30 60 Units LETICIALISA [facility] ...           Other     Dispensed Days Supply Quantity Provider Pharmacy   OLANZapine 5MG      TAB 02/23/2021 30 30 [facility] ...           carBAMazepine     Dispensed Days Supply Quantity Provider Pharmacy   CARBAMAZEPIN ER 100MG TAB 06/30/2021 30 30 Units KAREN PRITCHARD [facility] ...   CARBAMAZEPINE ER 200MG TAB 06/30/2021 30 30 Units KAREN PRITCHARD [facility] ...   CARBAMAZEPIN ER 100MG TAB 06/05/2021 30 30  Units [facility]37 ...   CARBAMAZEPINE ER 200MG TAB 06/05/2021 30 30 Units [facility] ...   CARBAMAZEPIN ER 100MG TAB 05/07/2021 30 30 Units [facility] ...   CARBAMAZEPINE ER 200MG TAB 05/07/2021 30 30 Units [facility] ...           clonazePAM     Dispensed Days Supply Quantity Provider Pharmacy   CLONAZEPAM 1MG TABLET 11/15/2021 30 90 Units KAREN PRITCHARD [place]...   CLONAZEPAM 1MG      TAB 10/16/2021 [facility] ...   CLONAZEPAM 1MG      TAB 09/15/2021 30 [facility] ...   CLONAZEPAM 1MG      TAB 09/09/2021 30 90 [facility] ...   CLONAZEPAM 0.5MG    TAB 08/12/2021 30 90 [facility] ...   CLONAZEPAM 1MG      TAB 07/27/2021 30 90 [facility] ...   CLONAZEPAM 1MG      TAB 06/30/2021 30 [facility] ...   CLONAZEPAM 1MG      TAB 06/05/2021 30 90 Units [facility] ...   CLONAZEPAM 1MG      TAB 05/07/2021 30 90 Units [facility] ...           fluPHENAZine HCl     Dispensed Days Supply Quantity Provider Pharmacy   FluPHENAZine 5MG    TAB [facility]/24/2020 [facility] ...   FluPHENAZine 5MG    TAB 12/02/2020 30 [facility] ...           lamoTRIgine     Dispensed Days Supply Quantity Provider Pharmacy   LAMOTRIGINE 200MG TABLET 11/15/2021 30 30 Units KAREN PRITCHARD Pharmacy...   LAMOTRIGINE 200MG TABLET 10/18/2021 30 30 Units KAREN PIRTCHARD #38 - Vi...   LamoTRIgine 200MG   TAB 09/15/2021 30 30 Units KAREN PRITCHARD [facility] ...   LamoTRIgine 200MG   TAB 08/18/2021 30 30 Units KAREN PRITCHARD [facility] ...   LamoTRIgine 100MG   TAB 07/27/2021 14 14 Units KAREN PRITCHARD [facility] ...    LamoTRIgine 200MG   TAB 07/27/2021 30 [facility] ...   LamoTRIgine 25MG    TAB 06/22/2021 28 45 [facility] ...           risperiDONE     Dispensed Days Supply Quantity Provider Pharmacy   RISPERIDONE 2MG TABLET 11/15/2021 30 30 Units KAREN PRITCHARD [place] [facility]...   RisperiDONE 2MG     TAB 10/05/2021 30 30 [facility] ...   RisperiDONE 2MG     TAB 09/09/2021 30 30 Units KAREN PRITCHARD [facility] ...   RisperiDONE 3MG     TAB 08/21/2021 30 30 [facility] ...   RisperiDONE 3MG     TAB 07/27/2021 30 30 Units KAREN PRITCHARD [facility] ...   RisperiDONE 3MG     TAB 06/05/2021 30 30 Units EDDI TEE [facility] ...   RisperiDONE 3MG     TAB 05/07/2021 30 30 Units EDDI TEE [facility] ...           traZODone HCl     Dispensed Days Supply Quantity Provider Pharmacy   TraZODone 100MG     TAB 12/24/2020 30 30 Units JOSHLISA CARLTON [facility] ...   TraZODone 100MG     TAB 12/02/2020 30 [facility] ...           Disclaimer    Certain dispenses may not be available or accurate in this report, including over-the-counter medications, low cost prescriptions, prescriptions paid for by the patient or non-participating sources, or errors in insurance claims information. The provider should independently verify medication history with the patient.     External Sources

## 2021-12-02 PROCEDURE — 250N000013 HC RX MED GY IP 250 OP 250 PS 637: Performed by: NURSE PRACTITIONER

## 2021-12-02 PROCEDURE — 124N000001 HC R&B MH

## 2021-12-02 PROCEDURE — 99233 SBSQ HOSP IP/OBS HIGH 50: CPT | Performed by: NURSE PRACTITIONER

## 2021-12-02 RX ORDER — RIVASTIGMINE TARTRATE 1.5 MG/1
1.5 CAPSULE ORAL 2 TIMES DAILY
Status: DISCONTINUED | OUTPATIENT
Start: 2021-12-03 | End: 2021-12-03

## 2021-12-02 RX ORDER — RIVASTIGMINE TARTRATE 1.5 MG/1
1.5 CAPSULE ORAL 2 TIMES DAILY
Status: DISCONTINUED | OUTPATIENT
Start: 2021-12-03 | End: 2021-12-02

## 2021-12-02 RX ORDER — PREGABALIN 25 MG/1
25 CAPSULE ORAL ONCE
Status: COMPLETED | OUTPATIENT
Start: 2021-12-02 | End: 2021-12-02

## 2021-12-02 RX ORDER — PREGABALIN 25 MG/1
75 CAPSULE ORAL 3 TIMES DAILY
Status: DISCONTINUED | OUTPATIENT
Start: 2021-12-03 | End: 2021-12-04

## 2021-12-02 RX ADMIN — OLANZAPINE 10 MG: 10 TABLET, FILM COATED ORAL at 20:08

## 2021-12-02 RX ADMIN — LITHIUM CARBONATE 900 MG: 450 TABLET, EXTENDED RELEASE ORAL at 20:08

## 2021-12-02 RX ADMIN — PREGABALIN 50 MG: 25 CAPSULE ORAL at 13:26

## 2021-12-02 RX ADMIN — PREGABALIN 25 MG: 25 CAPSULE ORAL at 21:08

## 2021-12-02 RX ADMIN — PREGABALIN 50 MG: 25 CAPSULE ORAL at 20:08

## 2021-12-02 RX ADMIN — PREGABALIN 50 MG: 25 CAPSULE ORAL at 08:15

## 2021-12-02 RX ADMIN — Medication 3 MG: at 20:08

## 2021-12-02 ASSESSMENT — ACTIVITIES OF DAILY LIVING (ADL)
LAUNDRY: UNABLE TO COMPLETE
ORAL_HYGIENE: INDEPENDENT
ORAL_HYGIENE: INDEPENDENT
HYGIENE/GROOMING: INDEPENDENT
LAUNDRY: UNABLE TO COMPLETE
HYGIENE/GROOMING: INDEPENDENT
DRESS: SCRUBS (BEHAVIORAL HEALTH);INDEPENDENT
DRESS: INDEPENDENT;SCRUBS (BEHAVIORAL HEALTH)

## 2021-12-02 NOTE — PLAN OF CARE
Face to face received from Ira CORDOVA RN.   Rounding completed, pt observed in lounge this morning eating breakfast with peers.     Pt cooperative with morning medications.   Pt states mild lower back pain this morning. Pt does receive scheduled Lyrica this morning.  Pt refuses PRN medication when offered at this time.     Pt denies SI and/or HI this shift.   Pt denies AH and/ VH.   Pt irritated and not wanting to elaborate when this writer questioning reason for admission.     Pt withdrawn, keeps to self this shift, although does eat meals in lounge.   Pt refuses group sessions this shift.     Problem: Behavioral Health Plan of Care  Goal: Patient-Specific Goal (Individualization)  Description: Pt will  be medication complaint, and follow recommendations of the treatment team.   Pt will attend at least 50 % or more of group sessions.  Pt will sleep at least 6-8 hours each night.   Pt will eat at least 50 % of all meals.  Outcome: Improving  Note:        Problem: Thought Process Alteration  Goal: Optimal Thought Clarity  Description: Pt will maintain reality based conversation prior to discharge.   Pt will report decrease in hallucinations by discharge.  Outcome: Improving     Problem: Suicidal Behavior  Goal: Suicidal Behavior is Absent or Managed  Description: Pt will be free from self harm while on unit.   Pt will be free from thoughts of self harm by discharge.   Outcome: Improving     Problem: Violence Risk or Actual  Goal: Anger and Impulse Control  Outcome: Improving    Face to face end of shift report communicated to oncsalomón LARKIN.     Stephanie Davila RN  12/2/2021  12:02 PM

## 2021-12-02 NOTE — PLAN OF CARE
"  Problem: Behavioral Health Plan of Care  Goal: Patient-Specific Goal (Individualization)  Description: Pt will  be medication complaint, and follow recommendations of the treatment team.   Pt will attend at least 50 % or more of group sessions.  Pt will sleep at least 6-8 hours each night.   Pt will eat at least 50 % of all meals.  Outcome: Improving  Note: Shift Summery:      Pt walking in the hallways at the start of the shift. Pt reported back pain in his lower back. Pt asked to rate his pain, pt stated \"I can't really say\". Pt also stated that his anxiety is still high. Pt offered tylenol for pain but refused. Pt offered medication for anxiety but refused stating \"I'll be alright\". Pt asked about suicidal thoughts and replied \"Not really\". When asked if he was having fleeting thoughts pt stated \"what is that?, I don't know, I'll be all right.\"     Pt approached nursing and asked if April had changed his medications. Nursing told pt we could look at her note and see if there were changes, pt then said \"no, it's ok. I'll be alright and walked away\". Pt asked what meds he could get with his night meds. Nursing went over his prns that he could choose from.   Pt asked for zyprexa 10mg and melatonin 3mg at 2008 with his night meds. Pt asked if he could also have his ativan after taking his night meds. After nursing pulled the med and brought him the med, pt refused and stated \"I'll be fine, I don't need that\". Pt was encouraged to take his prn, pt has been pacing most of the shift and had a hard time answering questions. Pt stated \"I don't want it, I'll work it out\".         Problem: Thought Process Alteration  Goal: Optimal Thought Clarity  Description: Pt will maintain reality based conversation prior to discharge.   Pt will report decrease in hallucinations by discharge.  12/2/2021 1607 by Sarahi Hui RN  Outcome: Improving     Problem: Suicidal Behavior  Goal: Suicidal Behavior is Absent or " Managed  Description: Pt will be free from self harm while on unit.   Pt will be free from thoughts of self harm by discharge.   12/2/2021 1607 by Sarahi Hui RN  Outcome: Improving     Problem: Violence Risk or Actual  Goal: Anger and Impulse Control  12/2/2021 1607 by Sarahi Hui RN  Outcome: Improving    Face to face end of shift report communicated to night shift RN. Reported that pt is a risk for violence and suicide.     Sarahi Hui RN  12/2/2021  4:08 PM

## 2021-12-02 NOTE — PLAN OF CARE
Face to face report received from Shima RN. Pt. Observed.     Problem: Behavioral Health Plan of Care  Goal: Patient-Specific Goal (Individualization)  Description: Pt will  be medication complaint, and follow recommendations of the treatment team.   Pt will attend at least 50 % or more of group sessions.  Pt will sleep at least 6-8 hours each night.   Pt will eat at least 50 % of all meals.  Outcome: No Change  Note:   Pt has been in bed with eyes closed and regular respirations x 7 hours this noc shift. 15 minute and PRN checks all night. No complaints offered. Will continue to monitor.       Face to face end of shift report to be communicated to oncoming RN.     Liat Lai RN  12/2/2021  12:19 AM

## 2021-12-03 PROCEDURE — 250N000013 HC RX MED GY IP 250 OP 250 PS 637: Performed by: NURSE PRACTITIONER

## 2021-12-03 PROCEDURE — 124N000001 HC R&B MH

## 2021-12-03 RX ORDER — RIVASTIGMINE TARTRATE 3 MG/1
3 CAPSULE ORAL DAILY
Status: DISCONTINUED | OUTPATIENT
Start: 2021-12-03 | End: 2021-12-08

## 2021-12-03 RX ADMIN — OLANZAPINE 10 MG: 10 TABLET, FILM COATED ORAL at 20:10

## 2021-12-03 RX ADMIN — PREGABALIN 75 MG: 25 CAPSULE ORAL at 08:08

## 2021-12-03 RX ADMIN — PREGABALIN 75 MG: 25 CAPSULE ORAL at 14:03

## 2021-12-03 RX ADMIN — RIVASTIGMINE TARTRATE 3 MG: 3 CAPSULE ORAL at 14:04

## 2021-12-03 RX ADMIN — LORAZEPAM 0.5 MG: 0.5 TABLET ORAL at 16:10

## 2021-12-03 RX ADMIN — LITHIUM CARBONATE 900 MG: 450 TABLET, EXTENDED RELEASE ORAL at 20:10

## 2021-12-03 RX ADMIN — HYDROXYZINE HYDROCHLORIDE 50 MG: 25 TABLET, FILM COATED ORAL at 18:56

## 2021-12-03 RX ADMIN — PREGABALIN 75 MG: 25 CAPSULE ORAL at 20:09

## 2021-12-03 ASSESSMENT — ACTIVITIES OF DAILY LIVING (ADL)
HYGIENE/GROOMING: INDEPENDENT
ORAL_HYGIENE: INDEPENDENT
ORAL_HYGIENE: INDEPENDENT
DRESS: INDEPENDENT;SCRUBS (BEHAVIORAL HEALTH)
DRESS: INDEPENDENT;SCRUBS (BEHAVIORAL HEALTH)
HYGIENE/GROOMING: INDEPENDENT
LAUNDRY: UNABLE TO COMPLETE
LAUNDRY: UNABLE TO COMPLETE
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
## 2021-12-03 NOTE — PLAN OF CARE
Care of pt. Continues into day shift.     Problem: Behavioral Health Plan of Care  Goal: Patient-Specific Goal (Individualization)  Description: Pt will  be medication complaint, and follow recommendations of the treatment team.   Pt will attend at least 50 % or more of group sessions.  Pt will sleep at least 6-8 hours each night.   Pt will eat at least 50 % of all meals.  12/3/2021 1140 by Liat Lai RN  Outcome: No Change  Note:   Pt. Denies HI,SI, hallucinations and pain. Pt. Appears responding to internal stimuli. Affect is flat, speech is very pressured and delayed this a.m. Pt. Withdrawn and isolating to room. Out to lounge for meals. Pt. Is in agreement to update staff to thoughts feelings of wanting to harm self or others. Pt. Is cooperative with medications and nursing assessment. Pt. Eating WDL. Pt. Denies any issues with bowel and bladder.      Pt. Pacing in pacheco for short time after 1330. Pt. Requesting written education on Exelon. education printed for pt.      Face to face end of shift report communicated to oncoming RN.     Liat Lai RN  12/3/2021

## 2021-12-03 NOTE — PROGRESS NOTES
"[facility]  Psychiatric Progress Note      Impression:     This is a 31 year old yo male with multiple hospitalizations here and a history of at least 2 civil commitments.  He was just released from longterm after methamphetamine charges and brought himself to the emergency room to seek help as he had been off of his lithium the entire time he was in longterm.  He states that it was quite helpful for him though he still had urges to use methamphetamine because initially \"it helps me think more clearly\".  He has no current psychosis no delusions or hallucinations though affect is very flat.  He did just restart his lithium yesterday after being off for 10 days.  He is having suicidal ideation though not a current plan and states that he is willing and wanting to get help though is \"unsure if I need to go to treatment because I have been to treatment many times but I need to do something different\".  He does tell me that he was on Vyvanse for 3 years in the past and was able to maintain sobriety the entire time until he got into a relationship with a person who used drugs heavily.  At that time he had custody of his children.  He was questioning the use of a stimulant and we did briefly discuss a Daytrana patch as he did bring this up and stated he has heard of a patch form of a stimulant.  He also discussed Wellbutrin though stated he had been on it in the past and was not sure if it was effective though at that time he was not on lithium.            Interim History:     Steven states he does not want to try wellbutrin again. He states he had been on it and did not notice any differences. Yesterday we discussed possibly starting rivistigmine off label for methamphetamine use dsiorder as there are studies that show effectiveness. He asks about this medication again today. He states he is \"willing to take almost anything so I quit feeling like this\". He does states his inability to think clear and complete tasks " causes his cravings for meth. 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 I did also discuss how aricept has been used in patients with TBI . rivistigmine could have more benefit with executive functioning.   Educated regarding medication indications, risks, benefits, side effects, contraindications and possible interactions. Verbally expressed understanding.        Diagnoses:        Schizoaffective disorder, depressed type.   History of catatonia  Methamphetamine use disorder, moderate to severe  etoh use disorder, mild to moderate       Attestation:  Patient has been seen and evaluated by me,  Ashely Heaton NP      The patient's care was discussed with the treatment team and chart notes were reviewed.            Medications:   I have reviewed this patient's current medications    Prescription Medications as of 12/2/2021       Rx Number Disp Refills Start End Last Dispensed Date Next Fill Date Owning Pharmacy    benzocaine (ORAJEL MAXIMUM STRENGTH) 20 % GEL gel    10/31/2021    [facility] [address] [place] [address]    Sig: Take by mouth 4 times daily as needed for moderate pain    Class: Historical    Route: Mouth/Throat    clonazePAM (KLONOPIN) 1 MG tablet    11/15/2021        Sig: Take 1 mg by mouth 3 times daily as needed    Class: Historical    Route: Oral    gabapentin (NEURONTIN) 300 MG capsule  270 capsule 0 10/31/2021    [facility] [address] [place] [address]    Sig: Take 3 capsules (900 mg) by mouth 3 times daily    Class: E-Prescribe    Route: Oral    Renewals     Renewal requests to authorizing provider (Christelle Contreras NP) <b>prohibited</b>          lamoTRIgine (LAMICTAL) 200 MG tablet    11/15/2021        Sig: Take 200 mg by mouth daily    Class: Historical    Route: Oral    lithium (ESKALITH CR/LITHOBID) 450 MG CR tablet  60 tablet 0 10/31/2021    [facility] [address] [place] [address]  [facility]    Sig: Take 2 tablets (900 mg) by mouth At Bedtime    Class: E-Prescribe    Route: Oral    Renewals     Renewal requests to authorizing provider (Christelle Contreras NP) <b>prohibited</b>          LORazepam (ATIVAN) 1 MG tablet  90 tablet 0 10/31/2021    [facility] [address]    Sig: Take 1 tablet (1 mg) by mouth 3 times daily    Class: E-Prescribe    Route: Oral    Renewals     Renewal requests to authorizing provider (Christelle Contreras NP) <b>prohibited</b>          OLANZapine (ZYPREXA) 5 MG tablet  60 tablet 0 10/31/2021    [facility] [address] [place] [address]    Sig: Take 1 tablet (5 mg) by mouth 3 times daily as needed (anxiety/agitation)    Class: E-Prescribe    Route: Oral    Renewals     Renewal requests to authorizing provider (Christelle Contreras NP) <b>prohibited</b>          risperiDONE (RISPERDAL) 2 MG tablet    11/15/2021        Sig: Take 2 mg by mouth At Bedtime    Class: Historical    Route: Oral      Hospital Medications as of 12/2/2021       Dose Frequency Start End    acetaminophen (TYLENOL) tablet 650 mg 650 mg EVERY 4 HOURS PRN 12/1/2021     Admin Instructions: Do not use if the patient has significant liver disease. MAX acetaminophen = 4000 mg/24 hrs.  MAX acetaminophen LESS than 3000 mg/24 hrs for patients GREATER than or EQUAL to 65 years old.  Maximum acetaminophen dose from all sources = 75 mg/kg/day not to exceed 4 grams/day.    Class: E-Prescribe    Route: Oral    alum & mag hydroxide-simethicone (MAALOX) suspension 30 mL 30 mL EVERY 4 HOURS PRN 12/1/2021     Admin Instructions: Shake well.    Class: E-Prescribe    Route: Oral    benzocaine (ORAJEL MAXIMUM STRENGTH) 20 % gel  4 TIMES DAILY PRN 12/1/2021     Class: E-Prescribe    Route: Mouth/Throat    hydrOXYzine (ATARAX) tablet 50 mg 50 mg EVERY 4 HOURS PRN 12/1/2021     Admin Instructions: 2nd choice for sleep    Class:  E-Prescribe    Route: Oral    lithium (ESKALITH CR/LITHOBID) CR tablet 900 mg 900 mg AT BEDTIME 12/1/2021     Admin Instructions: DO NOT CRUSH.    Class: E-Prescribe    Route: Oral    LORazepam (ATIVAN) tablet 0.5 mg 0.5 mg 2 TIMES DAILY PRN 12/1/2021     Class: E-Prescribe    Route: Oral    magic mouthwash suspension (diphenhydramine, lidocaine, aluminum-magnesium & simethicone) 10 mL EVERY 6 HOURS PRN 12/1/2021     Class: E-Prescribe    Route: Swish & Swallow    melatonin tablet 3 mg 3 mg AT BEDTIME PRN 12/1/2021     Class: E-Prescribe    Route: Oral    nicotine (NICORETTE) gum 2 mg 2 mg EVERY 1 HOUR PRN 12/1/2021     Admin Instructions: Chew until tingling, then place between cheek and gum.    Repeat.    Do not swallow.    Not to exceed 48 mg in a 24 hour time period.    Class: E-Prescribe    Route: Buccal    OLANZapine (zyPREXA) tablet 10 mg 10 mg 3 TIMES DAILY PRN 12/1/2021     Admin Instructions: Combined IM and PO doses may significantly increase the risk of orthostatic hypotension at 30 mg per day or higher.    Class: E-Prescribe    Route: Oral    pregabalin (LYRICA) capsule 50 mg 50 mg 3 TIMES DAILY 12/1/2021     Class: E-Prescribe    Route: Oral    senna-docusate (SENOKOT-S/PERICOLACE) 8.6-50 MG per tablet 1 tablet 1 tablet 2 TIMES DAILY PRN 12/1/2021     Admin Instructions: Hold for loose stools.    Class: E-Prescribe    Route: Oral               10 point ROS negative        Allergies:     Allergies   Allergen Reactions     Pork Allergy             Psychiatric Examination:   /76   Pulse 69   Temp 98.9  F (37.2  C) (Temporal)   Resp 16   Ht 1.829 m (6')   Wt 97.8 kg (215 lb 11.2 oz)   SpO2 98%   BMI 29.25 kg/m    Weight is 215 lbs 11.2 oz  Body mass index is 29.25 kg/m .    MSE/PSYCH  PSYCHIATRIC EXAM  /76   Pulse 69   Temp 98.9  F (37.2  C) (Temporal)   Resp 16   Ht 1.829 m (6')   Wt 97.8 kg (215 lb 11.2 oz)   SpO2 98%   BMI 29.25 kg/m    -Appearance/Behavior: flat  apathetic  -Motor: intact  -Gait: intact  -Abnormal involuntary movements: none  -Mood: sad  -Affect: flat  -Speech: regular though monotone  -Thought process/associations: Logical and Goal directed.  -Thought content: no delusions or hallucinations  -Perceptual disturbances: No hallucinations..              -Suicidal/Homicidal Ideation: denies current suicidal thoughts.  -Judgment: poor  -Insight: poor  *Orientation: time, place and person.  *Memory: intact  *Attention: fair  *Language: fluent, no aphasias, able to repeat phrases and name objects. Vocab intact.  *Fund of information: appropriate for education  *Cognitive functioning estimate: 0 - independent.                 Labs:   No results found for this or any previous visit (from the past 24 hour(s)).  No labs today    BEHAVIORAL TEAM DISCUSSION    Progress: minimal  Continued Stay Criteria/Rationale: need to target depression and likely door to door to treament  Medical/Physical: none  Precautions: none  Falls precaution?: No  Behavioral Orders   Procedures     Code 1 - Restrict to Unit     Routine Programming     As clinically indicated     Status 15     Every 15 minutes.           Plan:       -start rivistigmine tomorrow AM for cognitive dysfunction secondary to TBI as well as possibly aiding in methamphetamine abstinence.  He is aware this is off label and we did discuss side effects.     Rationale for change in precautions or plan: Rule 25 assessment will be completed likely tomorrow and will look into treatment centers.      Participants: DAYSI Pearce, CNP, Dr. Timmons, SW, OT, Nursing

## 2021-12-03 NOTE — PLAN OF CARE
Talked 1:1 with pt. Pt is walking the halls. Pt is flat in presentation, although talks with this writer. Ask pt how his rule 25 went, and he states it went fine and that he thinks he may be recommended for inpatient treatment. Ask pt more about his  Keila and if he would like to sign an VAMSI and have this writer update them. He states he will try to call her himself for now, and would let this writer know. Pt has no requests for this writer at this time.

## 2021-12-03 NOTE — PLAN OF CARE
ALEC Consult complete. The following are the patient's recommendations:     1. The patient is recommended to abstain from the use of non-prescribed mood-altering substances.   2. The patient is recommended to attend any scheduled/preventative medical appointments necessary to maintain healthy self-care. The patient is recommended to follow the recommendations of his medical providers, including taking medications as prescribed.   3. Per positive GAIN-SS it appears the patient may benefit from completion of a mental health diagnostic assessment to determine whether there is a need for additional mental health services.   4. The patient is recommended to enter and successfully complete a residential treatment program near his area of residence and follow discharge recommendations.     Patient's first choice for treatment was [facility], which is no longer open. This writer had also discussed a possible referral to [facility] due to patient's TBI, however the patient felt it would be more important to him to remain closer to his home area. Therefore, this writer emailed patient's referral documents to Nicolasa, intake at [facility], to inquire about bed availability. Pt signed an VAMSI for NR.

## 2021-12-03 NOTE — PROGRESS NOTES
"[facility]  Psychiatric Progress Note      Impression:     This is a 31 year old yo male with multiple hospitalizations here and a history of at least 2 civil commitments.  He was just released from half-way after methamphetamine charges and brought himself to the emergency room to seek help as he had been off of his lithium the entire time he was in half-way.  He states that it was quite helpful for him though he still had urges to use methamphetamine because initially \"it helps me think more clearly\".  He has no current psychosis no delusions or hallucinations though affect is very flat.  He did just restart his lithium yesterday after being off for 10 days.  He is having suicidal ideation though not a current plan and states that he is willing and wanting to get help though is \"unsure if I need to go to treatment because I have been to treatment many times but I need to do something different\".  He does tell me that he was on Vyvanse for 3 years in the past and was able to maintain sobriety the entire time until he got into a relationship with a person who used drugs heavily.  At that time he had custody of his children.  He was questioning the use of a stimulant and we did briefly discuss a Daytrana patch as he did bring this up and stated he has heard of a patch form of a stimulant.  He also discussed Wellbutrin though stated he had been on it in the past and was not sure if it was effective though at that time he was not on lithium.            Interim History:     Steven stated \"I was starting to consider leaving yesterday but I decided not to.\" I asked why and he stated \"if im going to go to treatment, I might as well go out and use another last time.\" he did state that he really does want to quit using though \"its hard because I feel terrible all of the time\". He states he is not currently having suicidal ideation though his affect is still extremely flat. He denies any hallucinations and doesn't appear " preoccupied.  He states he had very high anxiety last night. He was happy that he was able to talk to both of his children last night. He states he had not talked to them in about one month .        Educated regarding medication indications, risks, benefits, side effects, contraindications and possible interactions. Verbally expressed understanding.        Diagnoses:        Schizoaffective disorder, depressed type.   History of catatonia  Methamphetamine use disorder, moderate to severe  etoh use disorder, mild to moderate       Attestation:  Patient has been seen and evaluated by me,  Ashely Heaton NP      The patient's care was discussed with the treatment team and chart notes were reviewed.            Medications:   I have reviewed this patient's current medications    Prescription Medications as of 12/3/2021       Rx Number Disp Refills Start End Last Dispensed Date Next Fill Date Owning Pharmacy    benzocaine (ORAJEL MAXIMUM STRENGTH) 20 % GEL gel    10/31/2021    [facility] [address] [place] [address]    Sig: Take by mouth 4 times daily as needed for moderate pain    Class: Historical    Route: Mouth/Throat    clonazePAM (KLONOPIN) 1 MG tablet    11/15/2021        Sig: Take 1 mg by mouth 3 times daily as needed    Class: Historical    Route: Oral    gabapentin (NEURONTIN) 300 MG capsule  270 capsule 0 10/31/2021    [facility] [address] [place] [address]    Sig: Take 3 capsules (900 mg) by mouth 3 times daily    Class: E-Prescribe    Route: Oral    Renewals     Renewal requests to authorizing provider (Christelle Contreras, NP) <b>prohibited</b>          lamoTRIgine (LAMICTAL) 200 MG tablet    11/15/2021        Sig: Take 200 mg by mouth daily    Class: Historical    Route: Oral    lithium (ESKALITH CR/LITHOBID) 450 MG CR tablet  60 tablet 0 10/31/2021    [facility] [address] [place] [address]    Sig:  Take 2 tablets (900 mg) by mouth At Bedtime    Class: E-Prescribe    Route: Oral    Renewals     Renewal requests to authorizing provider (Christelle Contreras NP) <b>prohibited</b>          LORazepam (ATIVAN) 1 MG tablet  90 tablet 0 10/31/2021    [facility] [address] [place] [address], [address]    Sig: Take 1 tablet (1 mg) by mouth 3 times daily    Class: E-Prescribe    Route: Oral    Renewals     Renewal requests to authorizing provider (Christelle Contreras NP) <b>prohibited</b>          OLANZapine (ZYPREXA) 5 MG tablet  60 tablet 0 10/31/2021    [facility] [address] [place] [address]    Sig: Take 1 tablet (5 mg) by mouth 3 times daily as needed (anxiety/agitation)    Class: E-Prescribe    Route: Oral    Renewals     Renewal requests to authorizing provider (Christelle Contreras NP) <b>prohibited</b>          risperiDONE (RISPERDAL) 2 MG tablet    11/15/2021        Sig: Take 2 mg by mouth At Bedtime    Class: Historical    Route: Oral      Hospital Medications as of 12/3/2021       Dose Frequency Start End    acetaminophen (TYLENOL) tablet 650 mg 650 mg EVERY 4 HOURS PRN 12/1/2021     Admin Instructions: Do not use if the patient has significant liver disease. MAX acetaminophen = 4000 mg/24 hrs.  MAX acetaminophen LESS than 3000 mg/24 hrs for patients GREATER than or EQUAL to 65 years old.  Maximum acetaminophen dose from all sources = 75 mg/kg/day not to exceed 4 grams/day.    Class: E-Prescribe    Route: Oral    alum & mag hydroxide-simethicone (MAALOX) suspension 30 mL 30 mL EVERY 4 HOURS PRN 12/1/2021     Admin Instructions: Shake well.    Class: E-Prescribe    Route: Oral    benzocaine (ORAJEL MAXIMUM STRENGTH) 20 % gel  4 TIMES DAILY PRN 12/1/2021     Class: E-Prescribe    Route: Mouth/Throat    hydrOXYzine (ATARAX) tablet 50 mg 50 mg EVERY 4 HOURS PRN 12/1/2021     Admin Instructions: 2nd choice for sleep    Class: E-Prescribe    Route: Oral    lithium  (ESKALITH CR/LITHOBID) CR tablet 900 mg 900 mg AT BEDTIME 12/1/2021     Admin Instructions: DO NOT CRUSH.    Class: E-Prescribe    Route: Oral    LORazepam (ATIVAN) tablet 0.5 mg 0.5 mg 2 TIMES DAILY PRN 12/1/2021     Class: E-Prescribe    Route: Oral    magic mouthwash suspension (diphenhydramine, lidocaine, aluminum-magnesium & simethicone) 10 mL EVERY 6 HOURS PRN 12/1/2021     Class: E-Prescribe    Route: Swish & Swallow    melatonin tablet 3 mg 3 mg AT BEDTIME PRN 12/1/2021     Class: E-Prescribe    Route: Oral    nicotine (NICORETTE) gum 2 mg 2 mg EVERY 1 HOUR PRN 12/1/2021     Admin Instructions: Chew until tingling, then place between cheek and gum.    Repeat.    Do not swallow.    Not to exceed 48 mg in a 24 hour time period.    Class: E-Prescribe    Route: Buccal    OLANZapine (zyPREXA) tablet 10 mg 10 mg 3 TIMES DAILY PRN 12/1/2021     Admin Instructions: Combined IM and PO doses may significantly increase the risk of orthostatic hypotension at 30 mg per day or higher.    Class: E-Prescribe    Route: Oral    pregabalin (LYRICA) capsule 25 mg (Completed) 25 mg ONCE 12/2/2021 12/2/2021    Class: E-Prescribe    Route: Oral    pregabalin (LYRICA) capsule 75 mg 75 mg 3 TIMES DAILY 12/3/2021     Class: E-Prescribe    Route: Oral    rivastigmine (EXELON) capsule 1.5 mg 1.5 mg 2 TIMES DAILY 12/3/2021     Class: E-Prescribe    Notes to Pharmacy: im using this off label for his TBI as well as studies showing it can help with meth addiction    Route: Oral    senna-docusate (SENOKOT-S/PERICOLACE) 8.6-50 MG per tablet 1 tablet 1 tablet 2 TIMES DAILY PRN 12/1/2021     Admin Instructions: Hold for loose stools.    Class: E-Prescribe    Route: Oral               10 point ROS negative        Allergies:     Allergies   Allergen Reactions     Pork Allergy             Psychiatric Examination:   /54   Pulse 63   Temp 97.6  F (36.4  C) (Temporal)   Resp 14   Ht 1.829 m (6')   Wt 97.8 kg (215 lb 11.2 oz)   SpO2 98%    BMI 29.25 kg/m    Weight is 215 lbs 11.2 oz  Body mass index is 29.25 kg/m .    MSE/PSYCH  PSYCHIATRIC EXAM  /54   Pulse 63   Temp 97.6  F (36.4  C) (Temporal)   Resp 14   Ht 1.829 m (6')   Wt 97.8 kg (215 lb 11.2 oz)   SpO2 98%   BMI 29.25 kg/m    -Appearance/Behavior: flat apathetic  -Motor: intact  -Gait: intact  -Abnormal involuntary movements: none  -Mood: sad  -Affect: flat  -Speech: regular though monotone  -Thought process/associations: Logical and Goal directed.  -Thought content: no delusions or hallucinations  -Perceptual disturbances: No hallucinations..              -Suicidal/Homicidal Ideation: denies current suicidal thoughts.  -Judgment: poor  -Insight: poor  *Orientation: time, place and person.  *Memory: intact  *Attention: fair  *Language: fluent, no aphasias, able to repeat phrases and name objects. Vocab intact.  *Fund of information: appropriate for education  *Cognitive functioning estimate: 0 - independent.                 Labs:   No results found for this or any previous visit (from the past 24 hour(s)).  No labs today    BEHAVIORAL TEAM DISCUSSION    Progress: minimal  Continued Stay Criteria/Rationale: need to target depression and likely door to door to treament  Medical/Physical: none  Precautions: none  Falls precaution?: No  Behavioral Orders   Procedures     Code 1 - Restrict to Unit     Routine Programming     As clinically indicated     Status 15     Every 15 minutes.           Plan:     -continue rivastigmine  -increase lyrica 100 tid  -lithium level tomorrow am   -tsh recheck closer to discharge.         Rationale for change in precautions or plan: Rule 25 assessment will be completed likely tomorrow and will look into treatment centers.      Participants: DAYSI Pearce, CNP, Dr. Timmons, SW, OT, Nursing

## 2021-12-03 NOTE — PLAN OF CARE
Face to face report received from Shima RN. Pt. Observed.     Problem: Behavioral Health Plan of Care  Goal: Patient-Specific Goal (Individualization)  Description: Pt will  be medication complaint, and follow recommendations of the treatment team.   Pt will attend at least 50 % or more of group sessions.  Pt will sleep at least 6-8 hours each night.   Pt will eat at least 50 % of all meals.  Outcome: No Change  Pt has been in bed with eyes closed and regular respirations x 7 hours this noc shift. 15 minute and PRN checks all night. No complaints offered. Will continue to monitor.      Care of pt. Continues into day shift.     Liat Lai RN  12/3/2021

## 2021-12-04 PROCEDURE — 250N000013 HC RX MED GY IP 250 OP 250 PS 637: Performed by: NURSE PRACTITIONER

## 2021-12-04 PROCEDURE — 99233 SBSQ HOSP IP/OBS HIGH 50: CPT | Performed by: NURSE PRACTITIONER

## 2021-12-04 PROCEDURE — 124N000001 HC R&B MH

## 2021-12-04 RX ORDER — PREGABALIN 100 MG/1
100 CAPSULE ORAL 3 TIMES DAILY
Status: DISCONTINUED | OUTPATIENT
Start: 2021-12-04 | End: 2021-12-09 | Stop reason: HOSPADM

## 2021-12-04 RX ADMIN — PREGABALIN 100 MG: 100 CAPSULE ORAL at 20:06

## 2021-12-04 RX ADMIN — OLANZAPINE 10 MG: 10 TABLET, FILM COATED ORAL at 20:06

## 2021-12-04 RX ADMIN — PREGABALIN 100 MG: 100 CAPSULE ORAL at 13:07

## 2021-12-04 RX ADMIN — LITHIUM CARBONATE 900 MG: 450 TABLET, EXTENDED RELEASE ORAL at 20:06

## 2021-12-04 RX ADMIN — PREGABALIN 75 MG: 25 CAPSULE ORAL at 08:38

## 2021-12-04 RX ADMIN — LORAZEPAM 0.5 MG: 0.5 TABLET ORAL at 17:42

## 2021-12-04 RX ADMIN — RIVASTIGMINE TARTRATE 3 MG: 3 CAPSULE ORAL at 08:38

## 2021-12-04 NOTE — PLAN OF CARE
"Late entry for 12/3/21  Evening shift.        Problem: Behavioral Health Plan of Care  Goal: Patient-Specific Goal (Individualization)  Description: Pt will  be medication complaint, and follow recommendations of the treatment team.   Pt will attend at least 50 % or more of group sessions.  Pt will sleep at least 6-8 hours each night.   Pt will eat at least 50 % of all meals.  Outcome: Improving  Note: Shift Summery:     Pt in the hallway at the start of the shift pacing. Pt minimized all symptoms during nursing assessment stating \"I'll be alright.\" Pt does report back pain but declines a prn and ice pack. Pt does report anxiety and depression. Pt denies HI. When asked about suicidal ideation, pt states \"Not really\" when asked to clarify, pt states \"I shouldn't have said that\".  Pt encouraged to take a prn for anxiety, pt argued stating \"I don't need that\" but agreed that he has very high anxiety at this time. Pt did agree to take ativan 0.5mg at 1610.     After supper nursing walked with pt in the halls, pt stated that his anxiety did feel better after taking a prn. Pt then started to talk about being frustrated. Pt stated that he took his Rule 25 today so he doesn't know why he shouldn't just leave. Pt reminded that he won't take the Rule 25 until Monday. Pt states \"maybe I should just leave now and come back Monday then\". Pt speech became more pressured and pt started swearing often in conversation. Every now and then pt would say \"I'm sorry for swearing\"  Pt began to get agitated as he walked and talked to nursing staff. Pt states that he already got sober and had custody of his kids but he relapsed and \"fucked it up\" and not he \"barely relapsed\" but he will probably never get to see his kids again. Pt states that maybe he should just leave, he has been thinking of just getting some heroin, stating \"and i'm not even a heroin addict but it would be easier to just do what he always does and make bad decisions, " "then it wouldn't be so hard. Discussed that using may be easier right now but there are long term consequences to that choice. Discussed how depression can make you feel as if you don't care anymore, pt states he has been to therapy and treatment and it doesn't work and it is too hard. It he got high right now at least he could forget for awhile. Pt then stated that he may just quit taking his scheduled medications. Pt asked about the new medication and stated \"do I look like I have fucking dementia\" Nursing encouraged pt to take another prn as he was still agitated. Pt refused to take a prn, when told he would be more comfortable if he could get his anxiety down, pt declined. Pt paced some more and came to the nurses station and stated \"hydroxyzine then\". Pt give hydroxyzine 50mg at 1856. Pt did calm down after this and speech was less pressured. Pt watched some of the basketball game with peers, asked for his night time meds and zyprexa 10mg and melatonin and went to bed.           Problem: Thought Process Alteration  Goal: Optimal Thought Clarity  Description: Pt will maintain reality based conversation prior to discharge.   Pt will report decrease in hallucinations by discharge.  Outcome: Improving     Problem: Suicidal Behavior  Goal: Suicidal Behavior is Absent or Managed  Description: Pt will be free from self harm while on unit.   Pt will be free from thoughts of self harm by discharge.   Outcome: Improving     "

## 2021-12-04 NOTE — PLAN OF CARE
Problem: Behavioral Health Plan of Care  Goal: Patient-Specific Goal (Individualization)  Description: Pt will  be medication complaint, and follow recommendations of the treatment team.   Pt will attend at least 50 % or more of group sessions.  Pt will sleep at least 6-8 hours each night.   Pt will eat at least 50 % of all meals.  Outcome: Improving     Problem: Violence Risk or Actual  Goal: Anger and Impulse Control  Outcome: Improving     Face to face shift report received from RN. Rounding completed, pt observed.Client rested in room for 7 hours with eyes closed and respirations noted. No signs of distress. Client has had no physical or verbal aggression this shift.Face to face report will be communicated to oncoming RN.    Didier Brooks RN  12/4/2021  6:02 AM

## 2021-12-04 NOTE — PLAN OF CARE
Face to face shift report received from TRAE Velásquez.       Problem: Behavioral Health Plan of Care  Goal: Patient-Specific Goal (Individualization)  Description: Pt will  be medication complaint, and follow recommendations of the treatment team.   Pt will attend at least 50 % or more of group sessions.  Pt will sleep at least 6-8 hours each night.   Pt will eat at least 50 % of all meals.  Outcome: No Change  Note: Calm, cooperative, and medication compliant. Denies mental health issues. Flat affect. Withdrawn to bed beginning of shift, but then out in public areas at lunch time. Attends some groups. No reports of pain.       Problem: Thought Process Alteration  Goal: Optimal Thought Clarity  Description: Pt will maintain reality based conversation prior to discharge.   Pt will report decrease in hallucinations by discharge.  Outcome: No Change        Problem: Suicidal Behavior  Goal: Suicidal Behavior is Absent or Managed  Description: Pt will be free from self harm while on unit.   Pt will be free from thoughts of self harm by discharge.   Outcome: Improving   Free from self harm or injury this shift.     Problem: Violence Risk or Actual  Goal: Anger and Impulse Control  Outcome: Improving   Free from violent and/or threatening behaviors.     Face to face end of shift report to be communicated to oncoming RN.

## 2021-12-05 LAB — LITHIUM SERPL-SCNC: 0.7 MMOL/L

## 2021-12-05 PROCEDURE — 80178 ASSAY OF LITHIUM: CPT | Performed by: NURSE PRACTITIONER

## 2021-12-05 PROCEDURE — 250N000013 HC RX MED GY IP 250 OP 250 PS 637: Performed by: NURSE PRACTITIONER

## 2021-12-05 PROCEDURE — 36415 COLL VENOUS BLD VENIPUNCTURE: CPT | Performed by: NURSE PRACTITIONER

## 2021-12-05 PROCEDURE — 124N000001 HC R&B MH

## 2021-12-05 RX ADMIN — PREGABALIN 100 MG: 100 CAPSULE ORAL at 08:15

## 2021-12-05 RX ADMIN — PREGABALIN 100 MG: 100 CAPSULE ORAL at 20:06

## 2021-12-05 RX ADMIN — RIVASTIGMINE TARTRATE 3 MG: 3 CAPSULE ORAL at 08:15

## 2021-12-05 RX ADMIN — PREGABALIN 100 MG: 100 CAPSULE ORAL at 13:19

## 2021-12-05 RX ADMIN — LITHIUM CARBONATE 900 MG: 450 TABLET, EXTENDED RELEASE ORAL at 20:06

## 2021-12-05 RX ADMIN — OLANZAPINE 10 MG: 10 TABLET, FILM COATED ORAL at 20:06

## 2021-12-05 ASSESSMENT — ACTIVITIES OF DAILY LIVING (ADL)
DRESS: SCRUBS (BEHAVIORAL HEALTH);INDEPENDENT
ORAL_HYGIENE: INDEPENDENT
HYGIENE/GROOMING: INDEPENDENT

## 2021-12-05 NOTE — PLAN OF CARE
"Report received PT in bed.     PT came out into lounge and watched TV for most of shift. PT reported that he feels \"pretty much the same\"  PT denies SI/HI and hallucinations.  PT endorses anxiety and depression.   PT had flat affect and was withdrawn in lounge watching TV throughout most of shift.   PT requested Ativan 0.5mg per request for anxiety at 1742.    PT requested Zyprexa 10mg with HS medications. Reporting that it keeps him from getting agitated.   PT went to bed after medications.     Face to face end of shift report communicated to oncoming RN.     Maria Esther Bridges RN  12/4/2021  9:44 PM       Problem: Behavioral Health Plan of Care  Goal: Patient-Specific Goal (Individualization)  Description: Pt will  be medication complaint, and follow recommendations of the treatment team.   Pt will attend at least 50 % or more of group sessions.  Pt will sleep at least 6-8 hours each night.   Pt will eat at least 50 % of all meals.  Outcome: Improving     Problem: Thought Process Alteration  Goal: Optimal Thought Clarity  Description: Pt will maintain reality based conversation prior to discharge.   Pt will report decrease in hallucinations by discharge.  Outcome: Improving     Problem: Suicidal Behavior  Goal: Suicidal Behavior is Absent or Managed  Description: Pt will be free from self harm while on unit.   Pt will be free from thoughts of self harm by discharge.   Outcome: Improving     Problem: Violence Risk or Actual  Goal: Anger and Impulse Control  Outcome: Improving     "

## 2021-12-05 NOTE — PLAN OF CARE
"Face to face shift report received from TRAE Velásquez.       Problem: Behavioral Health Plan of Care  Goal: Patient-Specific Goal (Individualization)  Description: Pt will  be medication complaint, and follow recommendations of the treatment team.   Pt will attend at least 50 % or more of group sessions.  Pt will sleep at least 6-8 hours each night.   Pt will eat at least 50 % of all meals.  Outcome: No Change  Note: Calm, cooperative, and medication compliant. Denies thoughts of harming self or others. Reports depression and anxiety. Flat affect. Offers little during conversation, but is polite using \"please\" and \"thank you.\" Withdrawn to room. No reports of pain.        Problem: Thought Process Alteration  Goal: Optimal Thought Clarity  Description: Pt will maintain reality based conversation prior to discharge.   Pt will report decrease in hallucinations by discharge.  Outcome: Improving       Problem: Suicidal Behavior  Goal: Suicidal Behavior is Absent or Managed  Description: Pt will be free from self harm while on unit.   Pt will be free from thoughts of self harm by discharge.   Outcome: Improving   Free from self harm or injury this shift.     Problem: Violence Risk or Actual  Goal: Anger and Impulse Control  Outcome: Improving   Free from violent and threatening behaviors this shift.     Face to face end of shift report to be communicated to oncoming RN.     "

## 2021-12-05 NOTE — PLAN OF CARE
Problem: Behavioral Health Plan of Care  Goal: Patient-Specific Goal (Individualization)  Description: Pt will  be medication complaint, and follow recommendations of the treatment team.   Pt will attend at least 50 % or more of group sessions.  Pt will sleep at least 6-8 hours each night.   Pt will eat at least 50 % of all meals.  Outcome: Improving     Problem: Violence Risk or Actual  Goal: Anger and Impulse Control  Outcome: Improving     Face to face shift report received from RN. Rounding completed, pt observed.Client rested in room for 7 hours with eyes closed and respirations noted. No signs of distress. Client had no physical or verbal aggression this shift. Face to face report will be communicated to oncoming RN.    Didier Brooks RN  12/5/2021  6:26 AM

## 2021-12-06 PROCEDURE — 250N000013 HC RX MED GY IP 250 OP 250 PS 637: Performed by: NURSE PRACTITIONER

## 2021-12-06 PROCEDURE — 99232 SBSQ HOSP IP/OBS MODERATE 35: CPT | Performed by: NURSE PRACTITIONER

## 2021-12-06 PROCEDURE — 124N000001 HC R&B MH

## 2021-12-06 RX ORDER — BUPROPION HYDROCHLORIDE 150 MG/1
150 TABLET, EXTENDED RELEASE ORAL DAILY
Status: DISCONTINUED | OUTPATIENT
Start: 2021-12-06 | End: 2021-12-08

## 2021-12-06 RX ADMIN — PREGABALIN 100 MG: 100 CAPSULE ORAL at 20:14

## 2021-12-06 RX ADMIN — BUPROPION HYDROCHLORIDE 150 MG: 150 TABLET, EXTENDED RELEASE ORAL at 13:38

## 2021-12-06 RX ADMIN — OLANZAPINE 10 MG: 10 TABLET, FILM COATED ORAL at 20:14

## 2021-12-06 RX ADMIN — LITHIUM CARBONATE 900 MG: 450 TABLET, EXTENDED RELEASE ORAL at 20:14

## 2021-12-06 RX ADMIN — PREGABALIN 100 MG: 100 CAPSULE ORAL at 08:51

## 2021-12-06 RX ADMIN — PREGABALIN 100 MG: 100 CAPSULE ORAL at 13:38

## 2021-12-06 RX ADMIN — RIVASTIGMINE TARTRATE 3 MG: 3 CAPSULE ORAL at 08:51

## 2021-12-06 ASSESSMENT — ACTIVITIES OF DAILY LIVING (ADL)
HYGIENE/GROOMING: INDEPENDENT;SHOWER
ORAL_HYGIENE: INDEPENDENT
DRESS: INDEPENDENT;SCRUBS (BEHAVIORAL HEALTH)
HYGIENE/GROOMING: INDEPENDENT;SHOWER
DRESS: SCRUBS (BEHAVIORAL HEALTH);INDEPENDENT
LAUNDRY: UNABLE TO COMPLETE

## 2021-12-06 NOTE — PLAN OF CARE
Problem: Behavioral Health Plan of Care  Goal: Patient-Specific Goal (Individualization)  Description: Pt will  be medication complaint, and follow recommendations of the treatment team.   Pt will attend at least 50 % or more of group sessions.  Pt will sleep at least 6-8 hours each night.   Pt will eat at least 50 % of all meals.  Outcome: Improving     Problem: Violence Risk or Actual  Goal: Anger and Impulse Control  Outcome: Improving     Face to face shift report received from RN. Rounding completed, pt observed.Client rested in room for 7 hours with eyes closed and respirations noted. No signs of distress. Client demonstrated no verbal or physical aggression this shift.Face to face report will be communicated to oncoming RN.    Didier Brooks RN  12/6/2021  6:16 AM

## 2021-12-06 NOTE — PLAN OF CARE
"Problem: Behavioral Health Plan of Care  Goal: Patient-Specific Goal (Individualization)  Description: Pt will  be medication complaint, and follow recommendations of the treatment team.   Pt will attend at least 50 % or more of group sessions.  Pt will sleep at least 6-8 hours each night.   Pt will eat at least 50 % of all meals.  Outcome: No Change  Note: Pt observed in the dayroom at the start of the shift watching TV. Affect was blunted, flat. He endorsed depression and anxiety. He denied suicidal ideation. Eye contact was poor. Pt offered minimal conversation. He came up to the nurses station around 1730 and asked for an Ativan. He then stated \"nevermind\" and walked away to pace in the hallway. He was compliant with his scheduled medications. 2006 - Pt requested and received 10 mg of PRN Zyprexa for feelings of agitation and to help with sleep.     Face to face end of shift report communicated to oncoming RN.     Problem: Thought Process Alteration  Goal: Optimal Thought Clarity  Description: Pt will maintain reality based conversation prior to discharge.   Pt will report decrease in hallucinations by discharge.  Outcome: Improving  Note: Pt denied hallucinations. He offered minimal conversation; however, it was reality oriented.      Problem: Violence Risk or Actual  Goal: Anger and Impulse Control  Outcome: Improving  Note: Pt had controlled behavior tonight - no anger outbursts or impulsive behavior.      "

## 2021-12-06 NOTE — PLAN OF CARE
"Problem: Behavioral Health Plan of Care  Goal: Patient-Specific Goal (Individualization)  Description: Pt will  be medication complaint, and follow recommendations of the treatment team.   Pt will attend at least 50 % or more of group sessions.  Pt will sleep at least 6-8 hours each night.   Pt will eat at least 50 % of all meals.  Outcome: No Change    Pt is up on the unit for breakfast with peers, returned to bed after meal. Cooperative with assessment and feels he is \"doing pretty good.\" Denied SI and hallucinations, has ongoing depression and reported anxiety is tolerable at this time. Remains quiet and withdrawn, but is pleasant with staff and peers. Not attending groups, but is up to watch TV in lounge. Takes medication as ordered, denied need of PRN for anything and denied physical complaint today. Eating 100% of meals.     Problem: Thought Process Alteration  Goal: Optimal Thought Clarity  Description: Pt will maintain reality based conversation prior to discharge.   Pt will report decrease in hallucinations by discharge.  Outcome: Improving    Problem: Suicidal Behavior  Goal: Suicidal Behavior is Absent or Managed  Description: Pt will be free from self harm while on unit.   Pt will be free from thoughts of self harm by discharge.   Outcome: Improving    1530 - Face to face end of shift report to be communicated to oncoming shift RN.     Harriett Cameron RN  12/6/2021  11:41 AM                "

## 2021-12-06 NOTE — PLAN OF CARE
Discussion with patient about his treatment referral - updated patient that [facility] states they do have open beds available and that their treatment team is currently reviewing his documents. Pt stressed that he would like to go to a facility with smaller groups. Informed patient that [facility] has a max capacity of 16 men in their high intensity program. Pt would also like to explore other referral options with similar sized groups. This writer agreed to look into other possible referrals. Pt states that [facility] would still be his first choice as he would like to remain as close to home as possible.

## 2021-12-07 PROCEDURE — 99232 SBSQ HOSP IP/OBS MODERATE 35: CPT | Performed by: NURSE PRACTITIONER

## 2021-12-07 PROCEDURE — 250N000013 HC RX MED GY IP 250 OP 250 PS 637: Performed by: NURSE PRACTITIONER

## 2021-12-07 PROCEDURE — 124N000001 HC R&B MH

## 2021-12-07 RX ADMIN — PREGABALIN 100 MG: 100 CAPSULE ORAL at 08:55

## 2021-12-07 RX ADMIN — PREGABALIN 100 MG: 100 CAPSULE ORAL at 20:04

## 2021-12-07 RX ADMIN — BUPROPION HYDROCHLORIDE 150 MG: 150 TABLET, EXTENDED RELEASE ORAL at 08:55

## 2021-12-07 RX ADMIN — LITHIUM CARBONATE 900 MG: 450 TABLET, EXTENDED RELEASE ORAL at 20:04

## 2021-12-07 RX ADMIN — OLANZAPINE 10 MG: 10 TABLET, FILM COATED ORAL at 20:05

## 2021-12-07 RX ADMIN — RIVASTIGMINE TARTRATE 3 MG: 3 CAPSULE ORAL at 08:55

## 2021-12-07 RX ADMIN — PREGABALIN 100 MG: 100 CAPSULE ORAL at 13:40

## 2021-12-07 ASSESSMENT — ACTIVITIES OF DAILY LIVING (ADL)
HYGIENE/GROOMING: INDEPENDENT;SHOWER
DRESS: INDEPENDENT;SCRUBS (BEHAVIORAL HEALTH)
DRESS: SCRUBS (BEHAVIORAL HEALTH)
ORAL_HYGIENE: INDEPENDENT
HYGIENE/GROOMING: INDEPENDENT

## 2021-12-07 NOTE — PLAN OF CARE
Problem: Behavioral Health Plan of Care  Goal: Patient-Specific Goal (Individualization)  Description: Pt will  be medication complaint, and follow recommendations of the treatment team.   Pt will attend at least 50 % or more of group sessions.  Pt will sleep at least 6-8 hours each night.   Pt will eat at least 50 % of all meals.  Outcome: No Change    No change in pt condition, remains calm and cooperative with staff, takes medications as ordered and participates in group t/o the day. Denied SI, HI and hallucinations, anxiety is manageable without PRN meds needed, depression is ongoing. Pt is looking forward to discharge to a treatment program in coming days. Up most of the day, had no physical complaints.     Problem: Thought Process Alteration  Goal: Optimal Thought Clarity  Description: Pt will maintain reality based conversation prior to discharge.   Pt will report decrease in hallucinations by discharge.  Outcome: Improving    Problem: Suicidal Behavior  Goal: Suicidal Behavior is Absent or Managed  Description: Pt will be free from self harm while on unit.   Pt will be free from thoughts of self harm by discharge.   Outcome: Improving    Problem: Violence Risk or Actual  Goal: Anger and Impulse Control  Outcome: Improving    1530 - Face to face end of shift report to be communicated to oncoming shift RN.     Harriett Cameron RN  12/7/2021  3:42 PM

## 2021-12-07 NOTE — PLAN OF CARE
"Report received. PT in bed, no needs at this time.     PT in bed throughout almost entire shift with the exception of meals and snacks.  PT denies SI/HI and hallucinations. PT endorses depression and anxiety.  PT requested Zyprexa 10mg to \"keep me from getting agitated and helps me sleep\"  PT did not attend groups and was withdrawn and flat throughout most of the shift.     Face to face end of shift report communicated to oncoming RN    Maria Esther Bridges RN  12/6/2021  9:48 PM       Problem: Behavioral Health Plan of Care  Goal: Patient-Specific Goal (Individualization)  Description: Pt will  be medication complaint, and follow recommendations of the treatment team.   Pt will attend at least 50 % or more of group sessions.  Pt will sleep at least 6-8 hours each night.   Pt will eat at least 50 % of all meals.  Outcome: Improving  Note:        Problem: Suicidal Behavior  Goal: Suicidal Behavior is Absent or Managed  Description: Pt will be free from self harm while on unit.   Pt will be free from thoughts of self harm by discharge.   Outcome: Improving     Problem: Violence Risk or Actual  Goal: Anger and Impulse Control  Outcome: Improving     "

## 2021-12-07 NOTE — PLAN OF CARE
"Spoke to TENZIN Brooke from [facility], regarding patient's referral to treatment. Mary inquired about patient's history of catatonia and his history of two commitments. This writer assured her that his catatonia had been resolved several months ago and that the patient has been appropriate on our unit and attending groups. The patient appears motivated to get into treatment and is voluntarily seeking services. Mary states she will review this with another counselor and have someone get back to me.     This writer sent a follow-up email with the following information: \"After looking it up, it looks like he was committed from March to September of this year. He had also been committed from July 2020 to January 2021. The provider here said he seems different (better) than when he's been here in the past, and that they have no concerns about him decompensating as long as he stays sober. His catatonia has long-since resolved (months ago) and he is interacting appropriately with staff and attending groups here. He is taking his medications voluntarily. He has been here voluntarily since admission, including going to the ER on his own will to seek care when he was experiencing suicidal ideation. He follows up with me at least daily about when he will be able to go to treatment. He is not currently under commitment, however his  here at the hospital spoke to his past  at the [facility] (Keila Andrews) and she states that if he wants to re-engage in case management services voluntarily, she would be happy to do that.\"    Received response from [facility] that Steven has been approved from a counseling standpoint and is being reviewed by nursing. This writer updated patient and his nurse that someone from [facility] may be calling to do a nurse to nurse. Email sent to Nicolasa ([facility] Intake) to inquire when he would be able to transfer.   "

## 2021-12-07 NOTE — PROGRESS NOTES
"[facility]  Psychiatric Progress Note      Impression:     This is a 31 year old yo male with multiple hospitalizations here and a history of at least 2 civil commitments.  He was just released from [facility] after methamphetamine charges and brought himself to the emergency room to seek help as he had been off of his lithium the entire time he was in [facility].  He states that it was quite helpful for him though he still had urges to use methamphetamine because initially \"it helps me think more clearly\".  He has no current psychosis no delusions or hallucinations though affect is very flat.  He did just restart his lithium yesterday after being off for 10 days.  He is having suicidal ideation though not a current plan and states that he is willing and wanting to get help though is \"unsure if I need to go to treatment because I have been to treatment many times but I need to do something different\".  He does tell me that he was on Vyvanse for 3 years in the past and was able to maintain sobriety the entire time until he got into a relationship with a person who used drugs heavily.  At that time he had custody of his children.  He was questioning the use of a stimulant and we did briefly discuss a Daytrana patch as he did bring this up and stated he has heard of a patch form of a stimulant.  He also discussed Wellbutrin though stated he had been on it in the past and was not sure if it was effective though at that time he was not on lithium.            Interim History:     Steven appears to be doing quite well. He states he may be able to get into treatment very soon and is hopeful about [facility]. He asks me \"do you think im ready. I dont want to go unless im ready and im not sure if I am ready. Do you think im ready? My depression is still high but Im not suicidal. Do you think I should start the wellbutrin still because I will.\"  I did tell him that I felt it would benefit energy and movitation and " also could help improve focus and concentration. I did tell him that I was impressed with how he was doing as this was the first time he has ever brought himself in on a voluntary basis and first time he really seems interested in staying sober. He does state that he feels the lithium makes a significant difference and he realized that and wanted to get back on it. He feels that he would have a much easier time staying sober on the right medications.      Educated regarding medication indications, risks, benefits, side effects, contraindications and possible interactions. Verbally expressed understanding.        Diagnoses:        Schizoaffective disorder, depressed type.   History of catatonia  Methamphetamine use disorder, moderate to severe  etoh use disorder, mild to moderate       Attestation:  Patient has been seen and evaluated by me,  Ashely Heaton NP      The patient's care was discussed with the treatment team and chart notes were reviewed.            Medications:   I have reviewed this patient's current medications    Prescription Medications as of 12/6/2021       Rx Number Disp Refills Start End Last Dispensed Date Next Fill Date Owning Pharmacy    benzocaine (ORAJEL MAXIMUM STRENGTH) 20 % GEL gel    10/31/2021    [facility] [address] [place] [address], [address]    Sig: Take by mouth 4 times daily as needed for moderate pain    Class: Historical    Route: Mouth/Throat    clonazePAM (KLONOPIN) 1 MG tablet    11/15/2021        Sig: Take 1 mg by mouth 3 times daily as needed    Class: Historical    Route: Oral    gabapentin (NEURONTIN) 300 MG capsule  270 capsule 0 10/31/2021    [facility] [address] [place] [address]    Sig: Take 3 capsules (900 mg) by mouth 3 times daily    Class: E-Prescribe    Route: Oral    Renewals     Renewal requests to authorizing provider (Christelle Contreras, NP) <b>prohibited</b>          lamoTRIgine (LAMICTAL)  200 MG tablet    11/15/2021        Sig: Take 200 mg by mouth daily    Class: Historical    Route: Oral    lithium (ESKALITH CR/LITHOBID) 450 MG CR tablet  60 tablet 0 10/31/2021    [facility] [address] [place] [address]    Sig: Take 2 tablets (900 mg) by mouth At Bedtime    Class: E-Prescribe    Route: Oral    Renewals     Renewal requests to authorizing provider (Christelle Contreras NP) <b>prohibited</b>          LORazepam (ATIVAN) 1 MG tablet  90 tablet 0 10/31/2021    [facility] [address] [place] [address]    Sig: Take 1 tablet (1 mg) by mouth 3 times daily    Class: E-Prescribe    Route: Oral    Renewals     Renewal requests to authorizing provider (Christelle Contreras NP) <b>prohibited</b>          OLANZapine (ZYPREXA) 5 MG tablet  60 tablet 0 10/31/2021    [facility] [address] [place] [address]    Sig: Take 1 tablet (5 mg) by mouth 3 times daily as needed (anxiety/agitation)    Class: E-Prescribe    Route: Oral    Renewals     Renewal requests to authorizing provider (Christelle Contreras NP) <b>prohibited</b>          risperiDONE (RISPERDAL) 2 MG tablet    11/15/2021        Sig: Take 2 mg by mouth At Bedtime    Class: Historical    Route: Oral      Hospital Medications as of 12/6/2021       Dose Frequency Start End    acetaminophen (TYLENOL) tablet 650 mg 650 mg EVERY 4 HOURS PRN 12/1/2021     Admin Instructions: Do not use if the patient has significant liver disease. MAX acetaminophen = 4000 mg/24 hrs.  MAX acetaminophen LESS than 3000 mg/24 hrs for patients GREATER than or EQUAL to 65 years old.  Maximum acetaminophen dose from all sources = 75 mg/kg/day not to exceed 4 grams/day.    Class: E-Prescribe    Route: Oral    alum & mag hydroxide-simethicone (MAALOX) suspension 30 mL 30 mL EVERY 4 HOURS PRN 12/1/2021     Admin Instructions: Shake well.    Class: E-Prescribe    Route: Oral    benzocaine (ORAJEL  MAXIMUM STRENGTH) 20 % gel  4 TIMES DAILY PRN 12/1/2021     Class: E-Prescribe    Route: Mouth/Throat    buPROPion (WELLBUTRIN SR) 12 hr tablet 150 mg 150 mg DAILY 12/6/2021     Admin Instructions: DO NOT CRUSH.    Class: E-Prescribe    Route: Oral    hydrOXYzine (ATARAX) tablet 50 mg 50 mg EVERY 4 HOURS PRN 12/1/2021     Admin Instructions: 2nd choice for sleep    Class: E-Prescribe    Route: Oral    lithium (ESKALITH CR/LITHOBID) CR tablet 900 mg 900 mg AT BEDTIME 12/1/2021     Admin Instructions: DO NOT CRUSH.    Class: E-Prescribe    Route: Oral    LORazepam (ATIVAN) tablet 0.5 mg 0.5 mg 2 TIMES DAILY PRN 12/1/2021     Class: E-Prescribe    Route: Oral    magic mouthwash suspension (diphenhydramine, lidocaine, aluminum-magnesium & simethicone) 10 mL EVERY 6 HOURS PRN 12/1/2021     Class: E-Prescribe    Route: Swish & Swallow    melatonin tablet 3 mg 3 mg AT BEDTIME PRN 12/1/2021     Class: E-Prescribe    Route: Oral    nicotine (NICORETTE) gum 2 mg 2 mg EVERY 1 HOUR PRN 12/1/2021     Admin Instructions: Chew until tingling, then place between cheek and gum.    Repeat.    Do not swallow.    Not to exceed 48 mg in a 24 hour time period.    Class: E-Prescribe    Route: Buccal    OLANZapine (zyPREXA) tablet 10 mg 10 mg 3 TIMES DAILY PRN 12/1/2021     Admin Instructions: Combined IM and PO doses may significantly increase the risk of orthostatic hypotension at 30 mg per day or higher.    Class: E-Prescribe    Route: Oral    pregabalin (LYRICA) capsule 100 mg 100 mg 3 TIMES DAILY 12/4/2021     Class: E-Prescribe    Route: Oral    rivastigmine (EXELON) capsule 3 mg 3 mg DAILY 12/3/2021     Class: E-Prescribe    Route: Oral    senna-docusate (SENOKOT-S/PERICOLACE) 8.6-50 MG per tablet 1 tablet 1 tablet 2 TIMES DAILY PRN 12/1/2021     Admin Instructions: Hold for loose stools.    Class: E-Prescribe    Route: Oral               10 point ROS negative        Allergies:     Allergies   Allergen Reactions     Pork Allergy              Psychiatric Examination:   /80 (BP Location: Right arm)   Pulse 67   Temp 97.5  F (36.4  C) (Tympanic)   Resp 16   Ht 1.829 m (6')   Wt 97.8 kg (215 lb 11.2 oz)   SpO2 100%   BMI 29.25 kg/m    Weight is 215 lbs 11.2 oz  Body mass index is 29.25 kg/m .    MSE/PSYCH  PSYCHIATRIC EXAM  /80 (BP Location: Right arm)   Pulse 67   Temp 97.5  F (36.4  C) (Tympanic)   Resp 16   Ht 1.829 m (6')   Wt 97.8 kg (215 lb 11.2 oz)   SpO2 100%   BMI 29.25 kg/m    -Appearance/Behavior: unshaven though not dishevled.  -Motor: intact  -Gait: intact  -Abnormal involuntary movements: none  -Mood: less depressed  -Affect: flat  -Speech: regular though monotone  -Thought process/associations: Logical and Goal directed.  -Thought content: no delusions or hallucinations  -Perceptual disturbances: No hallucinations..              -Suicidal/Homicidal Ideation: denies current suicidal thoughts.  -Judgment: poor  -Insight: poor  *Orientation: time, place and person.  *Memory: intact  *Attention: fair  *Language: fluent, no aphasias, able to repeat phrases and name objects. Vocab intact.  *Fund of information: appropriate for education  *Cognitive functioning estimate: 0 - independent.                 Labs:   No results found for this or any previous visit (from the past 24 hour(s)).  No labs today    BEHAVIORAL TEAM DISCUSSION    Progress: minimal  Continued Stay Criteria/Rationale: need to target depression and likely door to door to [facility]  Medical/Physical: none  Precautions: none  Falls precaution?: No  Behavioral Orders   Procedures     Code 1 - Restrict to Unit     Routine Programming     As clinically indicated     Status 15     Every 15 minutes.           Plan:         -start wellbutirn  mg daily and increase to BID in 5 days if tolerates well.   -continue rivastigmine  -continue lyrica        Rationale for change in precautions or plan: should go door to door to  treatment    Participants:  Ashely green, APRN, CNP,

## 2021-12-07 NOTE — PLAN OF CARE
Talked 1:1 with pt. Pt states to have been doing okay and attending group programming. Pt asks this writer if they have heard anything about his referrals. [facility] know [facility] still reviewing referral, and have not heard back about them accepting pt or not. Pt has no other requests for this writer at this time.

## 2021-12-07 NOTE — PLAN OF CARE
Face to face report received from Maria Esther LARKIN. Pt. Observed.     Problem: Behavioral Health Plan of Care  Goal: Patient-Specific Goal (Individualization)  Description: Pt will  be medication complaint, and follow recommendations of the treatment team.   Pt will attend at least 50 % or more of group sessions.  Pt will sleep at least 6-8 hours each night.   Pt will eat at least 50 % of all meals.  Outcome: No Change   Pt has been in bed with eyes closed and regular respirations x 7.5 hours this noc shict. 15 minute and PRN checks all night. No complaints offered. Will continue to monitor.      Face to face end of shift report to be communicated to oncoming RN.     Liat Lai RN  12/7/2021

## 2021-12-08 LAB — SARS-COV-2 RNA RESP QL NAA+PROBE: NEGATIVE

## 2021-12-08 PROCEDURE — U0003 INFECTIOUS AGENT DETECTION BY NUCLEIC ACID (DNA OR RNA); SEVERE ACUTE RESPIRATORY SYNDROME CORONAVIRUS 2 (SARS-COV-2) (CORONAVIRUS DISEASE [COVID-19]), AMPLIFIED PROBE TECHNIQUE, MAKING USE OF HIGH THROUGHPUT TECHNOLOGIES AS DESCRIBED BY CMS-2020-01-R: HCPCS | Performed by: NURSE PRACTITIONER

## 2021-12-08 PROCEDURE — 99232 SBSQ HOSP IP/OBS MODERATE 35: CPT | Performed by: NURSE PRACTITIONER

## 2021-12-08 PROCEDURE — 250N000013 HC RX MED GY IP 250 OP 250 PS 637: Performed by: NURSE PRACTITIONER

## 2021-12-08 PROCEDURE — 124N000001 HC R&B MH

## 2021-12-08 RX ORDER — LITHIUM CARBONATE 450 MG
900 TABLET, EXTENDED RELEASE ORAL AT BEDTIME
Qty: 60 TABLET | Refills: 0 | Status: ON HOLD | OUTPATIENT
Start: 2021-12-08 | End: 2022-03-07

## 2021-12-08 RX ORDER — BUPROPION HYDROCHLORIDE 150 MG/1
150 TABLET ORAL DAILY
Qty: 30 TABLET | Refills: 0 | Status: ON HOLD | OUTPATIENT
Start: 2021-12-09 | End: 2022-03-07

## 2021-12-08 RX ORDER — OLANZAPINE 10 MG/1
10 TABLET ORAL 2 TIMES DAILY PRN
Qty: 60 TABLET | Refills: 0 | Status: ON HOLD | OUTPATIENT
Start: 2021-12-08 | End: 2022-03-07

## 2021-12-08 RX ORDER — PREGABALIN 100 MG/1
100 CAPSULE ORAL 3 TIMES DAILY
Qty: 90 CAPSULE | Refills: 0 | Status: ON HOLD | OUTPATIENT
Start: 2021-12-08 | End: 2022-03-07

## 2021-12-08 RX ORDER — BUPROPION HYDROCHLORIDE 150 MG/1
150 TABLET ORAL DAILY
Status: DISCONTINUED | OUTPATIENT
Start: 2021-12-09 | End: 2021-12-09 | Stop reason: HOSPADM

## 2021-12-08 RX ADMIN — LITHIUM CARBONATE 900 MG: 450 TABLET, EXTENDED RELEASE ORAL at 20:11

## 2021-12-08 RX ADMIN — PREGABALIN 100 MG: 100 CAPSULE ORAL at 20:11

## 2021-12-08 RX ADMIN — PREGABALIN 100 MG: 100 CAPSULE ORAL at 13:56

## 2021-12-08 RX ADMIN — PREGABALIN 100 MG: 100 CAPSULE ORAL at 08:21

## 2021-12-08 RX ADMIN — RIVASTIGMINE TARTRATE 3 MG: 3 CAPSULE ORAL at 08:21

## 2021-12-08 RX ADMIN — BUPROPION HYDROCHLORIDE 150 MG: 150 TABLET, EXTENDED RELEASE ORAL at 08:21

## 2021-12-08 RX ADMIN — OLANZAPINE 10 MG: 10 TABLET, FILM COATED ORAL at 20:11

## 2021-12-08 ASSESSMENT — ACTIVITIES OF DAILY LIVING (ADL)
DRESS: INDEPENDENT;SCRUBS (BEHAVIORAL HEALTH)
ORAL_HYGIENE: INDEPENDENT
DRESS: INDEPENDENT;SCRUBS (BEHAVIORAL HEALTH)
HYGIENE/GROOMING: INDEPENDENT
HYGIENE/GROOMING: INDEPENDENT
LAUNDRY: UNABLE TO COMPLETE
LAUNDRY: UNABLE TO COMPLETE
ORAL_HYGIENE: INDEPENDENT

## 2021-12-08 NOTE — PROGRESS NOTES
"[facility]  Psychiatric Progress Note      Impression:     This is a 31 year old yo male with multiple hospitalizations here and a history of at least 2 civil commitments.  He was just released from [facility] after methamphetamine charges and brought himself to the emergency room to seek help as he had been off of his lithium the entire time he was in [facility].  He states that it was quite helpful for him though he still had urges to use methamphetamine because initially \"it helps me think more clearly\".  He has no current psychosis no delusions or hallucinations though affect is very flat.  He did just restart his lithium yesterday after being off for 10 days.  He is having suicidal ideation though not a current plan and states that he is willing and wanting to get help though is \"unsure if I need to go to treatment because I have been to treatment many times but I need to do something different\".  He does tell me that he was on Vyvanse for 3 years in the past and was able to maintain sobriety the entire time until he got into a relationship with a person who used drugs heavily.  At that time he had custody of his children.  He was questioning the use of a stimulant and we did briefly discuss a Daytrana patch as he did bring this up and stated he has heard of a patch form of a stimulant.  He also discussed Wellbutrin though stated he had been on it in the past and was not sure if it was effective though at that time he was not on lithium.            Interim History:     Steven is up in his room when I see him today. Reviewed that he has been accepted to [facility] for tomorrow. He does feel that he is ready to discharge to treatment at this point. He denies any suicidal thoughts. He reports that he has been tolerating the medications, was started on Wellbutrin, agreeable to change to XL for once daily dosing. We also discuss whether he feels Exelon has been helpful for cravings or cognitive impairments, " patient is not sure whether he has noticed any difference. It is nonformulary with his insurance and given that there has been limited noticeable changes, will stop this to help simplify regimen. 30 days of medications sent to [facility] in [place].     Educated regarding medication indications, risks, benefits, side effects, contraindications and possible interactions. Verbally expressed understanding.        Diagnoses:     Schizoaffective disorder, depressed type.   History of catatonia  Methamphetamine use disorder, moderate to severe  ETOH use disorder, mild to moderate       Attestation:  Patient has been seen and evaluated by me,  Christelle Contreras NP      The patient's care was discussed with the treatment team and chart notes were reviewed.            Medications:   I have reviewed this patient's current medications    Current Facility-Administered Medications   Medication     acetaminophen (TYLENOL) tablet 650 mg     alum & mag hydroxide-simethicone (MAALOX) suspension 30 mL     benzocaine (ORAJEL MAXIMUM STRENGTH) 20 % gel     [START ON 12/9/2021] buPROPion (WELLBUTRIN XL) 24 hr tablet 150 mg     hydrOXYzine (ATARAX) tablet 50 mg     lithium (ESKALITH CR/LITHOBID) CR tablet 900 mg     magic mouthwash suspension (diphenhydramine, lidocaine, aluminum-magnesium & simethicone)     melatonin tablet 3 mg     nicotine (NICORETTE) gum 2 mg     OLANZapine (zyPREXA) tablet 10 mg     pregabalin (LYRICA) capsule 100 mg     senna-docusate (SENOKOT-S/PERICOLACE) 8.6-50 MG per tablet 1 tablet            10 point ROS- denies acute concerns       Allergies:     Allergies   Allergen Reactions     Pork Allergy             Psychiatric Examination:   /68 (BP Location: Right arm)   Pulse 92   Temp 96.8  F (36  C) (Temporal)   Resp 16   Ht 1.829 m (6')   Wt 97.8 kg (215 lb 11.2 oz)   SpO2 99%   BMI 29.25 kg/m    Weight is 215 lbs 11.2 oz  Body mass index is 29.25 kg/m .    MSE/PSYCH  PSYCHIATRIC EXAM  BP  "112/68 (BP Location: Right arm)   Pulse 92   Temp 96.8  F (36  C) (Temporal)   Resp 16   Ht 1.829 m (6')   Wt 97.8 kg (215 lb 11.2 oz)   SpO2 99%   BMI 29.25 kg/m    -Appearance/Behavior: awake, alert, dressed in hospital scrubs, casually groomed  -Motor: intact  -Gait: intact  -Abnormal involuntary movements: none   -Mood: \"okay\", is anxious about discharge  -Affect: slightly brighter  -Speech: regular though monotone  -Thought process/associations: Logical and Goal directed.  -Thought content: no delusions or hallucinations  -Perceptual disturbances: No hallucinations..              -Suicidal/Homicidal Ideation: denies current suicidal thoughts.  -Judgment: limited  -Insight: improving  *Orientation: time, place and person.  *Memory: intact  *Attention: fair  *Language: fluent, no aphasias, able to repeat phrases and name objects. Vocab intact.  *Fund of information: appropriate for education  *Cognitive functioning estimate: 0 - independent.           Labs:   No results found for this or any previous visit (from the past 24 hour(s)).      BEHAVIORAL TEAM DISCUSSION    Progress: improving. Denies SI. Looking forward to discharge tomorrow.    Continued Stay Criteria/Rationale: need to target depression and likely door to door to treament.    Medical/Physical: none  Precautions: none  Falls precaution?: No  Behavioral Orders   Procedures     Code 1 - Restrict to Unit     Routine Programming     As clinically indicated     Status 15     Every 15 minutes.     Plan:   Change Wellbutrin to  mg daily  Stop Rivastigmine d/t limited efficacy  Continue Lithium  mg at bedtime  Continue Lyrica 100 mg TID  Has been accepted to [facility] for tomorrow        Participants: Christelle Contreras, APRN, CNP,  OT, SW, Dr Timmons, nursing  "

## 2021-12-08 NOTE — PLAN OF CARE
Face to face report received from Le SUTTON RN. Pt. Observed.     Problem: Behavioral Health Plan of Care  Goal: Patient-Specific Goal (Individualization)  Description: Pt will  be medication complaint, and follow recommendations of the treatment team.   Pt will attend at least 50 % or more of group sessions.  Pt will sleep at least 6-8 hours each night.   Pt will eat at least 50 % of all meals.  Outcome: No Change     Pt has been in bed with eyes closed and regular respirations x 7 hours this noc shift. 15 minute and PRN checks all night. No complaints offered. Will continue to monitor.    Face to face end of shift report to be communicated to oncoming RN.     Liat Lai RN  12/8/2021

## 2021-12-08 NOTE — PLAN OF CARE
[facility] will be calling pt's nurse today to complete a nurse to nurse. Informed pt's RN.     Per [facility], pt has been accepted to [facility] for tomorrow 12/9. See today's [facility] note for full details.    Spoke with the patient. He declined to RS for his for his left shoulder at this time.

## 2021-12-08 NOTE — PLAN OF CARE
WILLIAM BRANDT RN  12/8/2021  7:43 AM  Face to face shift report received from TRAE Roth. Rounding completed, pt observed.     0849-Ate breakfast in lounge and went back to bed.  Compliant with meds.  Denies SI, HI, hallucinations, anxiety, depression, and pain at this time.  Reports sleeping well.  Answers questions with yes/no- not very talkative this morning.       1016-Selina from [facility] called and given a nurse to nurse report.    1400-pt did not attend groups today.  Compliant with meds and cooperative with staff.  COVID sample obtained and sent to lab.  Pt tolerated well. Offers no complaints.      Problem: Behavioral Health Plan of Care  Goal: Patient-Specific Goal (Individualization)  Description: Pt will  be medication complaint, and follow recommendations of the treatment team.   Pt will attend at least 50 % or more of group sessions.  Pt will sleep at least 6-8 hours each night.   Pt will eat at least 50 % of all meals.  Outcome: Improving     Problem: Thought Process Alteration  Goal: Optimal Thought Clarity  Description: Pt will maintain reality based conversation prior to discharge.   Pt will report decrease in hallucinations by discharge.  Outcome: Improving     Problem: Suicidal Behavior  Goal: Suicidal Behavior is Absent or Managed  Description: Pt will be free from self harm while on unit.   Pt will be free from thoughts of self harm by discharge.   Outcome: Improving     Problem: Violence Risk or Actual  Goal: Anger and Impulse Control  Outcome: Improving     Face to face end of shift report communicated to oncoming shift RN.

## 2021-12-08 NOTE — PROGRESS NOTES
"[facility]  Psychiatric Progress Note      Impression:     This is a 31 year old yo male with multiple hospitalizations here and a history of at least 2 civil commitments.  He was just released from residential after methamphetamine charges and brought himself to the emergency room to seek help as he had been off of his lithium the entire time he was in residential.  He states that it was quite helpful for him though he still had urges to use methamphetamine because initially \"it helps me think more clearly\".  He has no current psychosis no delusions or hallucinations though affect is very flat.  He did just restart his lithium yesterday after being off for 10 days.  He is having suicidal ideation though not a current plan and states that he is willing and wanting to get help though is \"unsure if I need to go to treatment because I have been to treatment many times but I need to do something different\".  He does tell me that he was on Vyvanse for 3 years in the past and was able to maintain sobriety the entire time until he got into a relationship with a person who used drugs heavily.  At that time he had custody of his children.  He was questioning the use of a stimulant and we did briefly discuss a Daytrana patch as he did bring this up and stated he has heard of a patch form of a stimulant.  He also discussed Wellbutrin though stated he had been on it in the past and was not sure if it was effective though at that time he was not on lithium.            Interim History:     Steven appears to be tolerating the wellbutrin. He was told that there is high possibiltiy that he may be accepted to [facility] treatment though nursing is revieing his information now. He is very hopeful of this. He is appearing a bit brighter than usual which is still pretty restricted though improved for him. He denies any suicidal thoughts. Multiple times he states \"do you think I should stay here while I wait for treatment\". I told him that " "he should go door to door and only other place I think I would approve would be a crisis center such as [facility] though it sounded like if he were accepted it would not take long to get in. He later approached me and stated \"I dont want to go. Ill be ok. Ill wait here. Pretend I didn't ask that\".       Educated regarding medication indications, risks, benefits, side effects, contraindications and possible interactions. Verbally expressed understanding.        Diagnoses:        Schizoaffective disorder, depressed type.   History of catatonia  Methamphetamine use disorder, moderate to severe  etoh use disorder, mild to moderate       Attestation:  Patient has been seen and evaluated by me,  Ashely Heaton NP      The patient's care was discussed with the treatment team and chart notes were reviewed.            Medications:   I have reviewed this patient's current medications    Prescription Medications as of 12/7/2021       Rx Number Disp Refills Start End Last Dispensed Date Next Fill Date Owning Pharmacy    benzocaine (ORAJEL MAXIMUM STRENGTH) 20 % GEL gel    10/31/2021    [facility] [address] [place] [address]    Sig: Take by mouth 4 times daily as needed for moderate pain    Class: Historical    Route: Mouth/Throat    clonazePAM (KLONOPIN) 1 MG tablet    11/15/2021        Sig: Take 1 mg by mouth 3 times daily as needed    Class: Historical    Route: Oral    gabapentin (NEURONTIN) 300 MG capsule  270 capsule 0 10/31/2021    [facility] [address] [place] [address]    Sig: Take 3 capsules (900 mg) by mouth 3 times daily    Class: E-Prescribe    Route: Oral    Renewals     Renewal requests to authorizing provider (Christelle Contreras, NP) <b>prohibited</b>          lamoTRIgine (LAMICTAL) 200 MG tablet    11/15/2021        Sig: Take 200 mg by mouth daily    Class: Historical    Route: Oral    lithium (ESKALITH CR/LITHOBID) 450 MG CR tablet  60 " tablet 0 10/31/2021    [facility] [address] [place] [address]    Sig: Take 2 tablets (900 mg) by mouth At Bedtime    Class: E-Prescribe    Route: Oral    Renewals     Renewal requests to authorizing provider (Christelle Contreras NP) <b>prohibited</b>          LORazepam (ATIVAN) 1 MG tablet  90 tablet 0 10/31/2021    [facility] [address] [place] [address]    Sig: Take 1 tablet (1 mg) by mouth 3 times daily    Class: E-Prescribe    Route: Oral    Renewals     Renewal requests to authorizing provider (Christelle Contreras NP) <b>prohibited</b>          OLANZapine (ZYPREXA) 5 MG tablet  60 tablet 0 10/31/2021    [facility] #[facility] [address]    Sig: Take 1 tablet (5 mg) by mouth 3 times daily as needed (anxiety/agitation)    Class: E-Prescribe    Route: Oral    Renewals     Renewal requests to authorizing provider (Christelle Contreras NP) <b>prohibited</b>          risperiDONE (RISPERDAL) 2 MG tablet    11/15/2021        Sig: Take 2 mg by mouth At Bedtime    Class: Historical    Route: Oral      Hospital Medications as of 12/7/2021       Dose Frequency Start End    acetaminophen (TYLENOL) tablet 650 mg 650 mg EVERY 4 HOURS PRN 12/1/2021     Admin Instructions: Do not use if the patient has significant liver disease. MAX acetaminophen = 4000 mg/24 hrs.  MAX acetaminophen LESS than 3000 mg/24 hrs for patients GREATER than or EQUAL to 65 years old.  Maximum acetaminophen dose from all sources = 75 mg/kg/day not to exceed 4 grams/day.    Class: E-Prescribe    Route: Oral    alum & mag hydroxide-simethicone (MAALOX) suspension 30 mL 30 mL EVERY 4 HOURS PRN 12/1/2021     Admin Instructions: Shake well.    Class: E-Prescribe    Route: Oral    benzocaine (ORAJEL MAXIMUM STRENGTH) 20 % gel  4 TIMES DAILY PRN 12/1/2021     Class: E-Prescribe    Route: Mouth/Throat    buPROPion (WELLBUTRIN SR) 12 hr tablet 150 mg 150 mg DAILY  12/6/2021     Admin Instructions: DO NOT CRUSH.    Class: E-Prescribe    Route: Oral    hydrOXYzine (ATARAX) tablet 50 mg 50 mg EVERY 4 HOURS PRN 12/1/2021     Admin Instructions: 2nd choice for sleep    Class: E-Prescribe    Route: Oral    lithium (ESKALITH CR/LITHOBID) CR tablet 900 mg 900 mg AT BEDTIME 12/1/2021     Admin Instructions: DO NOT CRUSH.    Class: E-Prescribe    Route: Oral    magic mouthwash suspension (diphenhydramine, lidocaine, aluminum-magnesium & simethicone) 10 mL EVERY 6 HOURS PRN 12/1/2021     Class: E-Prescribe    Route: Swish & Swallow    melatonin tablet 3 mg 3 mg AT BEDTIME PRN 12/1/2021     Class: E-Prescribe    Route: Oral    nicotine (NICORETTE) gum 2 mg 2 mg EVERY 1 HOUR PRN 12/1/2021     Admin Instructions: Chew until tingling, then place between cheek and gum.    Repeat.    Do not swallow.    Not to exceed 48 mg in a 24 hour time period.    Class: E-Prescribe    Route: Buccal    OLANZapine (zyPREXA) tablet 10 mg 10 mg 3 TIMES DAILY PRN 12/1/2021     Admin Instructions: Combined IM and PO doses may significantly increase the risk of orthostatic hypotension at 30 mg per day or higher.    Class: E-Prescribe    Route: Oral    pregabalin (LYRICA) capsule 100 mg 100 mg 3 TIMES DAILY 12/4/2021     Class: E-Prescribe    Route: Oral    rivastigmine (EXELON) capsule 3 mg 3 mg DAILY 12/3/2021     Class: E-Prescribe    Route: Oral    senna-docusate (SENOKOT-S/PERICOLACE) 8.6-50 MG per tablet 1 tablet 1 tablet 2 TIMES DAILY PRN 12/1/2021     Admin Instructions: Hold for loose stools.    Class: E-Prescribe    Route: Oral               10 point ROS negative        Allergies:     Allergies   Allergen Reactions     Pork Allergy             Psychiatric Examination:   /78   Pulse 68   Temp 98.7  F (37.1  C) (Temporal)   Resp 16   Ht 1.829 m (6')   Wt 97.8 kg (215 lb 11.2 oz)   SpO2 98%   BMI 29.25 kg/m    Weight is 215 lbs 11.2 oz  Body mass index is 29.25 kg/m .    MSE/PSYCH  PSYCHIATRIC  EXAM  /78   Pulse 68   Temp 98.7  F (37.1  C) (Temporal)   Resp 16   Ht 1.829 m (6')   Wt 97.8 kg (215 lb 11.2 oz)   SpO2 98%   BMI 29.25 kg/m    -Appearance/Behavior: unshaven though not dishevled.  -Motor: intact  -Gait: intact  -Abnormal involuntary movements: none  -Mood: less depressed  -Affect: slightly brighter  -Speech: regular though monotone  -Thought process/associations: Logical and Goal directed.  -Thought content: no delusions or hallucinations  -Perceptual disturbances: No hallucinations..              -Suicidal/Homicidal Ideation: denies current suicidal thoughts.  -Judgment: limited  -Insight: imrpvoing  *Orientation: time, place and person.  *Memory: intact  *Attention: fair  *Language: fluent, no aphasias, able to repeat phrases and name objects. Vocab intact.  *Fund of information: appropriate for education  *Cognitive functioning estimate: 0 - independent.                 Labs:   No results found for this or any previous visit (from the past 24 hour(s)).  No labs today    BEHAVIORAL TEAM DISCUSSION    Progress: improving  Continued Stay Criteria/Rationale: need to target depression and likely door to door to treament  Medical/Physical: none  Precautions: none  Falls precaution?: No  Behavioral Orders   Procedures     Code 1 - Restrict to Unit     Routine Programming     As clinically indicated     Status 15     Every 15 minutes.           Plan:         -continue wellbutirn  mg likely increase in 5 days to bid  -continue rivastigmine. Possibly increase tomorrow  -continue lyrica  -prn 0.5 mg ativan will be stopped.       Rationale for change in precautions or plan: should go door to door to Cd treatment    Participants: DAYSI Pearce, CNP,

## 2021-12-08 NOTE — PLAN OF CARE
"Received confirmation from [facility] that patient has been accepted to their program and can admit tomorrow morning at 11am. Called and informed patient's provider and provided pharmacy information for prescriptions to be called in. Also notified patient's .     Transportation has been arranged via [facility] through \"[employer]\" phone number [phone], for  from the hospital at 10am on 12/9/21. This writer provided [facility]'s phone number for contact upon arrival.     Note: While scheduling pt's transport to [facility], Critical access hospital ride line stated that the patient was already scheduled for a ride to an appointment at [facility] in [place] for 12/10/21. This writer asked to have that transport cancelled, as patient will be in treatment. Confirmed with patient, who states this was a dental appointment and approved for this writer to call [facility] to cancel his appointment so he can re-schedule it after he gets back from treatment.   "

## 2021-12-08 NOTE — PLAN OF CARE
"  Problem: Behavioral Health Plan of Care  Goal: Patient-Specific Goal (Individualization)  Description: Pt will  be medication complaint, and follow recommendations of the treatment team.   Pt will attend at least 50 % or more of group sessions.  Pt will sleep at least 6-8 hours each night.   Pt will eat at least 50 % of all meals.  Outcome: Improving  Note: Shift Summery:      1800 Patient is up and walking in the hallway. Patient is quiet and reserved. Hesitates to answer questions of staff but then answers with only 1 or 2 words. Denies having any current need. Patient is not attending groups and is not social with peers. Has no physical complaints of pain or other physical problems. Ate well at supper meal. Patient is steady on his feet. Appears restless as he paces in the hallway. Sad and flat affect.    2000 Patient requested and given HS medications and ate a snack. Sad, flat affect. Patient has retired for bed.    Face to face end of shift report communicated to 11-7 shift RN.     Le Bermudez RN  12/7/2021  9:49 PM        Patient's Stated Goal for Shift:  \"no stated goal\"    Goal Status:  In Process       Problem: Thought Process Alteration  Goal: Optimal Thought Clarity  Description: Pt will maintain reality based conversation prior to discharge.   Pt will report decrease in hallucinations by discharge.  Outcome: Improving     Problem: Suicidal Behavior  Goal: Suicidal Behavior is Absent or Managed  Description: Pt will be free from self harm while on unit.   Pt will be free from thoughts of self harm by discharge.   Outcome: Improving     Problem: Violence Risk or Actual  Goal: Anger and Impulse Control  Outcome: Improving     "

## 2021-12-09 VITALS
HEART RATE: 79 BPM | HEIGHT: 72 IN | TEMPERATURE: 97.5 F | SYSTOLIC BLOOD PRESSURE: 112 MMHG | WEIGHT: 215.7 LBS | BODY MASS INDEX: 29.22 KG/M2 | RESPIRATION RATE: 14 BRPM | OXYGEN SATURATION: 96 % | DIASTOLIC BLOOD PRESSURE: 65 MMHG

## 2021-12-09 PROCEDURE — 250N000013 HC RX MED GY IP 250 OP 250 PS 637: Performed by: NURSE PRACTITIONER

## 2021-12-09 PROCEDURE — 99239 HOSP IP/OBS DSCHRG MGMT >30: CPT | Performed by: NURSE PRACTITIONER

## 2021-12-09 RX ADMIN — BUPROPION HYDROCHLORIDE 150 MG: 150 TABLET, FILM COATED, EXTENDED RELEASE ORAL at 08:48

## 2021-12-09 RX ADMIN — PREGABALIN 100 MG: 100 CAPSULE ORAL at 08:48

## 2021-12-09 NOTE — PLAN OF CARE
Patient had not been picked up for his transport to treatment by 10:40, which had been scheduled for 10am. [facility]  was on the way. There was no answer, so this writer left a voicemail.  reported she believed that [facility] may have thought the ride was scheduled for 11:00 when they called for confirmation. This writer called and spoke to Nicolasa at [facility] to update that the patient will be arriving late and that I will update her when the patient is picked up or if the ride does not arrive.     Patient's  arrived a few moments later, and stated they were late due to road conditions. This writer called [facility] to update that the patient would likely arrive around 11:45.

## 2021-12-09 NOTE — DISCHARGE INSTRUCTIONS
Behavioral Discharge Planning and Instructions    Summary: You were admitted on 12/1/2021  due to Suicidal Ideations.  You were treated by the treatment team and discharged on 12/9/2021 from  to [facility]    Main Diagnosis:   Schizoaffective disorder, depressed type.   History of catatonia  Methamphetamine use disorder, moderate to severe  etoh use disorder, mild to moderate    Health Care Follow-up:     [facility]   Medication Management- Nuno Mercer- December 13th @ 1:00pm via video call  [address] [place] [address]  Phone: [phone]  Fax: [phone]   [email or web address]  Case Management- Keila Vol  [address] [place] [address]  Phone:  [phone]   Fax - [phone]    [facility]- accepted. Admission 12/9.  [address] [place], [place] [address] [phone]  Fax: [phone]    [facility] Crisis Stabilization  [address] [place], [place]  Phone - [phone] - Client number  Fax - [phone]    [facility]   [address] [place]  [address]  Phone - [phone]  Fax - [phone]    Attend all scheduled appointments with your outpatient providers. Call at least 24 hours in advance if you need to reschedule an appointment to ensure continued access to your outpatient providers.     Major Treatments, Procedures and Findings:  You were provided with: a psychiatric assessment, assessed for medical stability, medication evaluation and/or management and group therapy    Symptoms to Report: feeling more aggressive, increased confusion, losing more sleep, mood getting worse or thoughts of suicide    Early warning signs can include: increased depression or anxiety sleep disturbances increased thoughts or behaviors of suicide or self-harm  increased unusual thinking, such as paranoia or hearing voices    Safety and  "Wellness:  Take all medicines as directed.  Make no changes unless your doctor suggests them.      Follow treatment recommendations.  Refrain from alcohol and non-prescribed drugs.  Ask your support system to help you reduce your access to items that could harm yourself or others. If there is a concern for safety, call 911.    Resources:   Crisis Intervention: 364.543.1745 or 198-618-2452 (TTY: 715.709.7778).  Call anytime for help.  National [place] on Mental Illness (www.[place].nichelle.org): 468.988.7445 or 136-056-8555.  Alcoholics Anonymous (www.alcoholics-anonymous.org): Check your phone book for your local chapter.  Suicide Awareness Voices of Education (SAVE) (www.save.org): 684-934-JWMH (1900)  National Suicide Prevention Line (www.mentalhealthmn.org): 124-880-FATC (4916)  Mental Health Consumer/Survivor Network of [place] (www.mhcsn.net): 376.396.8033 or 983-352-8555  Mental Health Association of [place] (www.mentalhealth.org): 673.888.6330 or 558-927-4326  Self- Management and Recovery Training., SMART-- Toll free: 972.753.5431  www.[place].travelmob  Text 4 Life: txt \"LIFE\" to 64546 for immediate support and crisis intervention  Crisis text line: Text \"[place]\" to 711791. Free, confidential, 24/7.  Crisis Intervention: 543.550.4564 or 046-603-0585. Call anytime for help.   [place] Area:  [facility], Crisis stabilization [facility]- [phone]  [facility] Crisis Line: 1-999.595.1617  Advocates For Family Peace: 511-7577  [facility]ual [facility]: 894.398.5598 or 1-620.673.7310  [place] Battered Women's Program: 5-118-153-5892 Ext: 279       Calls answered Mon-Fri-8:00 am--4:30 pm    [place]:  Advocates for Family Peace: 3-082-820-0178  [facility] - 2-296-116-4246  [facility] first call for help: 1-597.901.4423  [facility] Crisis Center:  (583) 479-5088    [place] Area:  Warm Line: 1-262.467.5867       Calls answered Tuesday--Saturday 4:00 pm--10:00 pm  [facility] Crisis Line - " "593-917-9380  [facility] Stabil[facility] 838-488-9950    [place] Statewide:  [place] Crisis and Referral Services: 1-184.234.5646  National Suicide Prevention Lifeline: 4-339-719-TALK (7962)   - oqt2pcrl- Text \"Life\" to 91518  First Call for Help: 2-1-1  [facility] Helpline- 5-448-UJKW-HELP   Crisis Text Line: Text  [place]  to 916588    General Medication Instructions:   See your medication sheet(s) for instructions.   Take all medicines as directed.  Make no changes unless your doctor suggests them.   Go to all your doctor visits.  Be sure to have all your required lab tests. This way, your medicines can be refilled on time.  Do not use any drugs not prescribed by your doctor.  Avoid alcohol.    Advance Directives:   Scanned document on file with [facility]? No scanned doc  Is document scanned? Pt states no documents  Honoring Choices Your Rights Handout: Informed and given  Was more information offered? Pt declined    The Treatment team has appreciated the opportunity to work with you. If you have any questions or concerns about your recent admission, you can contact the unit which can receive your call 24 hours a day, 7 days a week. They will be able to get in touch with a Provider if needed. The unit number is [phone] .  "

## 2021-12-09 NOTE — DISCHARGE SUMMARY
"Psychiatric Discharge Summary    Steven Carter MRN# [id number]   Age: 31 year old YOB: 1990     Date of Admission:  12/1/2021  Date of Discharge:  12/9/2021  Admitting Physician:  David Timmons, DO  Discharge Physician:  Christelle Contreras CNP         Event Leading to Hospitalization and Hospital Stay   Admission: (see H&P completed by DUNCAN Heaton)  Steven Carter is a 31 year old  male who was just  released from senior care after a 10-day stay after he was arrested with methamphetamine.  We discharged him in early November last month after a week and a half long stay.  During that time he had started lithium and has had some improvement in moods.  He was discharged to the [facility] and [place] and states that when he was there he did not interact with many people and found the town to be very bleak.  He states \"the only thing the town had was a couple of bars\".  He got along with other residents though did not have much communication with them while he was at the [facility] [place].  He states that his roommate was frequently gone and he got very bored.  He states that he had decided to leave [facility] [place] on his own and started hitchhiking to [place] when he arrived to [place] he found methamphetamine and when he was \"loading his pipe\" police entered the residence and he was arrested and brought to senior care for 10 days.  He was not able to take lithium, Ativan or gabapentin while he was in senior care and when he was discharged from senior care after 10 days he went directly to the emergency room in [place] yesterday.  He was then sent to our unit for increase in suicidal ideation and hopelessness.  He is actually more verbal than I have seen him in the past he is less delayed though his affect is still quite flat and restricted.  He is very monotone as usual.  He states that he felt like feeling of lithium was \"doing something\" I felt a little bit better but then \"I fucked up\"  and went and used.  He states that he is " "disappointed in himself for going out and using.  He states he has not seen his kids for 2 months nor has he seen his mother and he states that now he will not be able to see his kids for a longer period of time because of this.  He appears to be remorseful and quite sad about his recent use and arrest.  He states he is not currently having active suicidal thoughts with a plan though is having passive suicidal thoughts.  He states \"I need to do something like go to treatment or do something otherwise I am going to be worse\".  He has better insight this admission than I have seen in the past.  He states that he is currently willing to stay here until he improves and figures out a safe discharge plan.  He is here on a voluntary basis and he is aware of this.  He states he has been having a difficult time sleeping which is typical for him he will frequently not sleep well and \"think all night long\" I have never seen him to have pressured speech or tangential thoughts or any other significant manic symptoms though his sleep is always been a significant issue while he is here.  He does have significant irritability during these times as well as a motivation and anhedonia.  I do not believe he is ever been physically aggressive while on the unit though at times in the past he has made some verbal threats.  He is much more cooperative this admission that I have seen him in the past.     He has been on multiple antipsychotics in the past including Risperdal, Zyprexa and Seroquel.  He has never had significant benefit from the medications though the Zyprexa did help him sleep better at times.  He currently has no symptoms of psychosis and the only symptom of psychosis that I can recall is him having the believe that a person was in his ceiling tiles trying to get to his daughter to sell her for sex trafficking.  He was high on methamphetamine when he had this delusion though this situation was very realistic to him and he " "still believes that somebody was in the ceiling tiles a couple of years ago attempting to steal his daughter.  He does not talk about people being in the ceiling tiles nor does he have other paranoid thoughts currently or during his last hospitalization.     I asked him how I can better help him and he states \"I am almost always miserable.  The lithium helped a little bit but I am still empty and sad\".  He states that this is what triggers him to use methamphetamine because he is then able to think more clearly and \"get things done but then everything else gets out of control quickly\"       Hospital stay:  Steven was admitted to the behavioral health unit as a voluntary patient. Lithium was restarted and Wellbutrin added to target depression and mood. Benzodiazepine was not restarted with anticipation that he would transfer to treatment facility, he had already been off of this for almost 2 weeks so no withdrawal symptoms exhibited. Gabapentin was not restarted however was replaced with Lyrica. Rivastigmine had initially been started for off label use of meth cravings and cognitive impairment from TBI. Patient was unsure this was providing any benefit so this was stopped. This also was nonformulary with his insurance. He did tolerate the above changes, denied side effects. Lithium level at discharge was 0.7. He denied any further suicidal thoughts. No paranoia or delusions reported. He was sleeping at least 7 hours at night. He did complete a Rule 25 which recommended that he enter and complete a residential treatment program near his area of residence. Referrals were sent and he was accepted to [facility] in [place]. During this process he did remain voluntary and was agreeable with door to door transfer to ensure he was able to maintain sobriety. He will discharge this afternoon to [facility] and will follow-up with outpatient services.         At time of discharge, there is no " evidence that patient is in immediate danger of self or others.        DIagnoses:   Schizoaffective disorder, depressed type.   Stimulant (methamphetamine) use disorder, severe  ETOH use disorder, severe  Cannabis use disorder, moderate  History of TBI         Labs:   Labs collected in ED prior to admission:  COVID- negative  CMP, CBC unremarkable  ETOH- negative  TSH at 4.66, free T3 and free T4 are WNL  Lithium level undetectable    Results for orders placed or performed during the hospital encounter of 12/01/21   Lithium level     Status: Normal   Result Value Ref Range    Lithium 0.7   mmol/L   Asymptomatic COVID-19 Virus (Coronavirus) by PCR Nasopharyngeal     Status: Normal    Specimen: Nasopharyngeal; Swab   Result Value Ref Range    SARS CoV2 PCR Negative Negative    Narrative    Testing was performed using the [employer] Xpress SARS-CoV-2 Assay on the   [employer]. Additional information about   this Emergency Use Authorization (EUA) assay can be found via the Lab   Guide. This test should be ordered for the detection of SARS-CoV-2 in   individuals who meet SARS-CoV-2 clinical and/or epidemiological   criteria. Test performance is unknown in asymptomatic patients. This   test is for in vitro diagnostic use under the FDA EUA for   laboratories certified under CLIA to perform high complexity testing.   This test has not been FDA cleared or approved. A negative result   does not rule out the presence of PCR inhibitors in the specimen or   target RNA in concentration below the limit of detection for the   assay. The possibility of a false negative should be considered if   the patient's recent exposure or clinical presentation suggests   COVID-19. This test was validated by [facility] laboratory. This laboratory is certified under the Clinical Laboratory Improve  ment Amendments (CLIA) as qualified to perform high complexity testing.              Discharge Medications:      Current Discharge Medication List      START taking these medications    Details   buPROPion (WELLBUTRIN XL) 150 MG 24 hr tablet Take 1 tablet (150 mg) by mouth daily  Qty: 30 tablet, Refills: 0    Associated Diagnoses: Schizoaffective disorder, depressive type (H)      pregabalin (LYRICA) 100 MG capsule Take 1 capsule (100 mg) by mouth 3 times daily  Qty: 90 capsule, Refills: 0    Associated Diagnoses: Back pain without sciatica         CONTINUE these medications which have CHANGED    Details   lithium (ESKALITH CR/LITHOBID) 450 MG CR tablet Take 2 tablets (900 mg) by mouth At Bedtime  Qty: 60 tablet, Refills: 0    Associated Diagnoses: Schizoaffective disorder, depressive type (H)      OLANZapine (ZYPREXA) 10 MG tablet Take 1 tablet (10 mg) by mouth 2 times daily as needed (agitation/anxiety)  Qty: 60 tablet, Refills: 0    Associated Diagnoses: Schizoaffective disorder, depressive type (H)         STOP taking these medications       benzocaine (ORAJEL MAXIMUM STRENGTH) 20 % GEL gel Comments:   Reason for Stopping: not taking        clonazePAM (KLONOPIN) 1 MG tablet Comments:   Reason for Stopping: not taking        gabapentin (NEURONTIN) 300 MG capsule Comments:   Reason for Stopping: not taking        lamoTRIgine (LAMICTAL) 200 MG tablet Comments:   Reason for Stopping: not taking        LORazepam (ATIVAN) 1 MG tablet Comments:   Reason for Stopping: not taking        risperiDONE (RISPERDAL) 2 MG tablet Comments:   Reason for Stopping: not taking                Justification for dual anti-psychotic use: not applicable       Psychiatric Examination:   Appearance:  awake, alert, adequately groomed and appeared as age stated  Attitude:  cooperative, pleasant  Eye Contact:  good  Mood:  better  Affect:  Restricted though some improvement after starting medication  Speech:  clear, coherent, monotone  Psychomotor Behavior:  no evidence of tardive dyskinesia, dystonia, or tics  Thought Process:  linear and goal  oriented  Associations:  no loose associations  Thought Content:  no evidence of suicidal ideation or homicidal ideation and no evidence of psychotic thought  Insight:  fair  Judgment:  fair  Oriented to:  time, person, and place  Attention Span and Concentration:  fair  Recent and Remote Memory:  intact  Fund of Knowledge: appropriate  Muscle Strength and Tone: normal  Gait and Station: Normal  Perception: no perceptual disturbances noted       Discharge Plan:   Health Care Follow-up: Discharged to [facility] for treatment     [facility]   Medication Management- Nuno Hudson- December 13th @ 1:00pm via video call  [address] [place] [address]  Phone: [phone]  Fax: [phone]   [email or web address]     [facility]  Case Management- Rajwinder Andrews  [address] [place] [address]  Phone:  [phone]   Fax - [phone]     [facility]- accepted. Admission 12/9.  [address] [place], [place] [address] [phone]  Fax: [phone]     [facility]  [address], [place]  Phone - [phone] - Client number  Fax - [phone]     [facility]   [address] [place]  [address]  Phone - [phone]  Fax - [phone]      Attestation:  The patient has been seen and evaluated by me,  Christelle Contreras NP         Discharge Services Provided:    40 minutes spent on discharge services, including:  Final examination of patient.  Review and discussion of Hospital stay.  Instructions for continued outpatient care/goals.  Preparation of discharge records.  Preparation of medications refills and new prescriptions.

## 2021-12-09 NOTE — PLAN OF CARE
Pt is discharging at the recommendation of the treatment team. Pt is discharging to [facility] transported by [employer] transportation via [employer] ride. Pt denies having any thoughts of hurting themself or anyone else. Pt has follow up with [facility]. Discharge instructions, including; demographic sheet, psychiatric evaluation, discharge summary, and AVS were faxed to these next level of care providers.

## 2021-12-09 NOTE — PLAN OF CARE
Problem: Behavioral Health Plan of Care  Goal: Patient-Specific Goal (Individualization)  Description: Pt will  be medication complaint, and follow recommendations of the treatment team.   Pt will attend at least 50 % or more of group sessions.  Pt will sleep at least 6-8 hours each night.   Pt will eat at least 50 % of all meals.  Outcome: Improving     Problem: Violence Risk or Actual  Goal: Anger and Impulse Control  Outcome: Improving     Face to face shift report received from RN. Rounding completed, pt observed.Client rested in room for 7 hours with eyes closed and respirations noted. No signs of distress.Client has displayed no verbal or physical aggression this shift.Face to face report will be communicated to oncoming RN.    Didier Brooks RN  12/9/2021  6:15 AM

## 2021-12-09 NOTE — PLAN OF CARE
SHIFT SUMMARY:  Denies pain, anxiety, suicidal or homicidal ideation, auditory or visual hallucinations. Rates depression 5/10. Flat affect. Calm and cooperative. Withdrawn. At 2011, administered PRN PO zyprexa 10 mg for feelings of agitation; Effective.      Problem: Thought Process Alteration  Goal: Optimal Thought Clarity  Description: Pt will maintain reality based conversation prior to discharge.   Pt will report decrease in hallucinations by discharge.  Outcome: No Change     Problem: Behavioral Health Plan of Care  Goal: Patient-Specific Goal (Individualization)  Description: Pt will  be medication complaint, and follow recommendations of the treatment team.   Pt will attend at least 50 % or more of group sessions.  Pt will sleep at least 6-8 hours each night.   Pt will eat at least 50 % of all meals.  Outcome: Improving  Goal: Absence of New-Onset Illness or Injury  Outcome: Improving     Problem: Suicidal Behavior  Goal: Suicidal Behavior is Absent or Managed  Description: Pt will be free from self harm while on unit.   Pt will be free from thoughts of self harm by discharge.   Outcome: Improving     Problem: Violence Risk or Actual  Goal: Anger and Impulse Control  Outcome: Improving

## 2021-12-09 NOTE — PLAN OF CARE
"Face to face shift report received from TRAE Velásquez.       Problem: Behavioral Health Plan of Care  Goal: Patient-Specific Goal (Individualization)  Description: Pt will  be medication complaint, and follow recommendations of the treatment team.   Pt will attend at least 50 % or more of group sessions.  Pt will sleep at least 6-8 hours each night.   Pt will eat at least 50 % of all meals.  Outcome: Improving  Note: Calm, cooperative, and medication compliant. Denies thoughts of harming self or others. Reports anxiety, declines PRN medications. Pt reports \"I guess so\" when asked if he was confident with discharge plan. Flat affect. No reports of pain.        Problem: Thought Process Alteration  Goal: Optimal Thought Clarity  Description: Pt will maintain reality based conversation prior to discharge.   Pt will report decrease in hallucinations by discharge.  Outcome: Improving         Problem: Suicidal Behavior  Goal: Suicidal Behavior is Absent or Managed  Description: Pt will be free from self harm while on unit.   Pt will be free from thoughts of self harm by discharge.   Outcome: Improving  Free from self harm or injury this shift.     Problem: Violence Risk or Actual  Goal: Anger and Impulse Control  Outcome: Improving   Free from violent and threatening behaviors.        Discharge Note    Patient Discharged to [facility] on 12/9/2021 10:52 AM via Health plan transportation accompanied by unit staff to [facility] to meet transportation. .     Patient informed of discharge instructions in AVS. patient verbalizes understanding and denies having any questions pertaining to AVS. Patient stable at time of discharge. Patient denies SI, HI, and thoughts of self harm at time of discharge. All personal belongings returned to patient. Discharge prescriptions sent to [facility] via electronic communication. Psych evaluation, history and physical, AVS, and discharge summary faxed to next level " of care- per LSW note.     Nevaeh Cui RN  12/9/2021  10:58 AM

## 2021-12-14 ENCOUNTER — OFFICE VISIT (OUTPATIENT)
Dept: PEDIATRICS | Facility: OTHER | Age: 31
End: 2021-12-14
Payer: COMMERCIAL

## 2021-12-14 ENCOUNTER — TELEPHONE (OUTPATIENT)
Dept: BEHAVIORAL HEALTH | Facility: HOSPITAL | Age: 31
End: 2021-12-14
Payer: COMMERCIAL

## 2021-12-14 VITALS
WEIGHT: 215.3 LBS | HEIGHT: 72 IN | RESPIRATION RATE: 16 BRPM | DIASTOLIC BLOOD PRESSURE: 70 MMHG | OXYGEN SATURATION: 98 % | TEMPERATURE: 98.6 F | HEART RATE: 89 BPM | SYSTOLIC BLOOD PRESSURE: 110 MMHG | BODY MASS INDEX: 29.16 KG/M2

## 2021-12-14 DIAGNOSIS — Z13.0 SCREENING, ANEMIA, DEFICIENCY, IRON: ICD-10-CM

## 2021-12-14 DIAGNOSIS — F25.0 SCHIZOAFFECTIVE DISORDER, BIPOLAR TYPE (H): ICD-10-CM

## 2021-12-14 DIAGNOSIS — F19.10 POLYSUBSTANCE ABUSE (H): Primary | ICD-10-CM

## 2021-12-14 DIAGNOSIS — F06.1 CATATONIA: ICD-10-CM

## 2021-12-14 DIAGNOSIS — Z91.89 AT RISK FOR TUBERCULOSIS: ICD-10-CM

## 2021-12-14 DIAGNOSIS — Z11.59 NEED FOR HEPATITIS C SCREENING TEST: ICD-10-CM

## 2021-12-14 DIAGNOSIS — Z11.4 ENCOUNTER FOR SCREENING FOR HIV: ICD-10-CM

## 2021-12-14 DIAGNOSIS — E55.9 VITAMIN D DEFICIENCY: ICD-10-CM

## 2021-12-14 DIAGNOSIS — F31.9 BIPOLAR 1 DISORDER (H): ICD-10-CM

## 2021-12-14 PROBLEM — F32.A DEPRESSION, UNSPECIFIED DEPRESSION TYPE: Status: RESOLVED | Noted: 2021-03-07 | Resolved: 2021-12-14

## 2021-12-14 PROBLEM — Z00.8 ENCOUNTER FOR MEDICAL CLEARANCE FOR PATIENT HOLD: Status: RESOLVED | Noted: 2020-03-02 | Resolved: 2021-12-14

## 2021-12-14 PROBLEM — F25.1 SCHIZOAFFECTIVE DISORDER, DEPRESSIVE TYPE (H): Status: RESOLVED | Noted: 2018-01-30 | Resolved: 2021-12-14

## 2021-12-14 PROCEDURE — 99204 OFFICE O/P NEW MOD 45 MIN: CPT | Performed by: INTERNAL MEDICINE

## 2021-12-14 PROCEDURE — G0463 HOSPITAL OUTPT CLINIC VISIT: HCPCS

## 2021-12-14 ASSESSMENT — PAIN SCALES - GENERAL: PAINLEVEL: NO PAIN (0)

## 2021-12-14 ASSESSMENT — MIFFLIN-ST. JEOR: SCORE: 1959.59

## 2021-12-14 NOTE — NURSING NOTE
Patient presents to the clinic for a H&P for [facility].  Mckayla Ayala LPN 12/14/2021   8:54 AM    Chief Complaint   Patient presents with     H & P       Initial /70 (BP Location: Right arm, Patient Position: Sitting, Cuff Size: Adult Regular)   Pulse 89   Temp 98.6  F (37  C) (Tympanic)   Resp 16   Ht 1.829 m (6')   Wt 97.7 kg (215 lb 4.8 oz)   SpO2 98%   BMI 29.20 kg/m   Estimated body mass index is 29.2 kg/m  as calculated from the following:    Height as of this encounter: 1.829 m (6').    Weight as of this encounter: 97.7 kg (215 lb 4.8 oz).  Medication Reconciliation: complete  Mckayla Ayala LPN

## 2021-12-14 NOTE — TELEPHONE ENCOUNTER
"Received email from Elida Kaur, TENZIN, [facility], stating the following: \"hoping you can help us out  W.T. is not eating meals, sleeping or attending groups. Any thoughts on how we should proceed with this hailee. IRTS facility?\" This writer inquired as to whether the patient was medication compliant, which Elida reports he is. This writer suggested finding out if the patient is willing to sign a VAMSI for [facility] so they can contact Keila Andrews, who had been patient's most recent commitment , because she had said she was willing to work with the patient voluntarily if he wanted to and may be able to help with IRTS referrals.   "

## 2021-12-14 NOTE — PROGRESS NOTES
"Subjective   Steven Carter is a 31 year old male who presents for H&P for [facility].  He tells me he is from [place].  He is at [facility] to \"get my life on track.\"  He is not feeling ill.    Objective   Vitals: /70 (BP Location: Right arm, Patient Position: Sitting, Cuff Size: Adult Regular)   Pulse 89   Temp 98.6  F (37  C) (Tympanic)   Resp 16   Ht 1.829 m (6')   Wt 97.7 kg (215 lb 4.8 oz)   SpO2 98%   BMI 29.20 kg/m      General: Appears well, is well-groomed.  Cardiovascular: Regular rate and rhythm, no murmur  Pulmonary: Clear  Psych: Flat affect is present.  At 1 point during our encounter he stood up and left.  I followed him as we walked very slowly down the [place] to the [employer].  I called to him a few times on the way.  As we arrived at the lobby I asked him if he would follow me back to the exam room which she did.  He indicated he felt stress after I mentioned a blood draw.    Review and Analysis of Data   I personally reviewed the following:  External notes: Yes Available notes from [facility] are reviewed  Results: Yes Most recent lab work from medical record is reviewed  Use of an independent historian: No  Independent review of a test performed by another physician: No  Discussion of management with another physician: No  Moderate risk of morbidity from additional diagnostic testing and/or treatment.    Assessment & Plan   1. Polysubstance abuse (H)  2. Bipolar 1 disorder (H)  3. Schizoaffective disorder, bipolar type (H)  4. Catatonia  Significant history of mental health and substance abuse.  I see no medical contraindication to undergoing treatments at [facility].  - TSH; Future  - Vitamin D Deficiency; Future  - Vitamin D Deficiency  - TSH    5. Screening, anemia, deficiency, iron  - CBC with platelets; Future  - CBC with platelets    6. Need for hepatitis C screening test  - Hepatitis C Screen Reflex to HCV RNA Quant and Genotype; " Future  - Hepatitis C Screen Reflex to HCV RNA Quant and Genotype    7. Encounter for screening for HIV  - HIV Antigen Antibody Combo; Future  - HIV Antigen Antibody Combo    8. At risk for tuberculosis  - Quantiferon-TB Gold Plus; Future  - Quantiferon-TB Gold Plus    I am glad he got his COVID-19 vaccine.  I recommended influenza vaccination which was declined.    Signed, Sudhakar Herrera MD, FAAP, FACP  Internal Medicine & Pediatrics

## 2021-12-14 NOTE — LETTER
December 17, 2021      Steven JOS Carter  [address] [place] [address]        Dear ,    We are writing to inform you of your test results.    Your vitamin D level is too low.  I'd like you to take the prescription vitamin D 50,000 IU weekly for 12 weeks, then come back for a recheck. I have sent the prescription to your pharmacy. If normal at that time then I'd have you switch to the over the counter vitamin D3 2000 I Udaily.    You do not have HIV.  You do not have Hep C.  You do not have tuberculosis.  Your thyroid is normal.    Signed, Sudhakar Herrera MD, FAAP, FACP  Internal Medicine & Pediatrics      Resulted Orders   HIV Antigen Antibody Combo   Result Value Ref Range    HIV Antigen Antibody Combo Nonreactive Nonreactive      Comment:      HIV-1 p24 Ag & HIV-1/HIV-2 Ab Not Detected   Hepatitis C Screen Reflex to HCV RNA Quant and Genotype   Result Value Ref Range    Hepatitis C Antibody Nonreactive Nonreactive    Narrative    Assay performance characteristics have not been established for newborns, infants, and children.   Vitamin D Deficiency   Result Value Ref Range    Vitamin D, Total (25-Hydroxy) 16 (L) 30 - 100 ug/L      Comment:      Deficiency:          <10 ug/L  Insufficiency:       10-29 ug/L  Sufficiency:          ug/L  Possible Toxicity:   >100 ug/L     TSH   Result Value Ref Range    TSH 3.46 0.40 - 4.00 mU/L   Quantiferon TB Gold Plus Grey Tube   Result Value Ref Range    Quantiferon Nil Tube 0.00 IU/mL   Quantiferon TB Gold Plus Green Tube   Result Value Ref Range    Quantiferon TB1 Tube 0.00 IU/mL   Quantiferon TB Gold Plus Yellow Tube   Result Value Ref Range    Quantiferon TB2 Tube 0.00    Quantiferon TB Gold Plus Purple Tube   Result Value Ref Range    Quantiferon Mitogen 10.00 IU/mL   Quantiferon TB Gold Plus   Result Value Ref Range    Quantiferon-TB Gold Plus Negative Negative      Comment:      No interferon gamma response to M.tuberculosis antigens was detected.  Infection with M.tuberculosis is unlikely, however a single negative result does not exclude infection. In patients at high risk for infection, a second test should be considered in accordance with the 2017 ATS/IDSA/CDC Clinical Pract  ice Guidelines for Diagnosis of Tuberculosis in Adults and Children     TB1 Ag minus Nil Value 0.00 IU/mL    TB2 Ag minus Nil Value 0.00 IU/mL    Mitogen minus Nil Result 10.00 IU/mL    Nil Result 0.00 IU/mL       If you have any questions or concerns, please call the clinic at the number listed above.       Sincerely,      Sudhakar Herrera MD

## 2021-12-15 ENCOUNTER — TELEPHONE (OUTPATIENT)
Dept: BEHAVIORAL HEALTH | Facility: CLINIC | Age: 31
End: 2021-12-15

## 2021-12-15 ENCOUNTER — HOSPITAL ENCOUNTER (EMERGENCY)
Facility: OTHER | Age: 31
Discharge: PSYCHIATRIC OR SUBSTANCE USE FACILITY | End: 2021-12-16
Attending: FAMILY MEDICINE | Admitting: FAMILY MEDICINE
Payer: COMMERCIAL

## 2021-12-15 DIAGNOSIS — F19.20 DRUG DEPENDENCE (H): ICD-10-CM

## 2021-12-15 DIAGNOSIS — F06.1 CATATONIA ASSOCIATED WITH ANOTHER MENTAL DISORDER: ICD-10-CM

## 2021-12-15 LAB
ALBUMIN SERPL-MCNC: 5 G/DL (ref 3.5–5.7)
ALP SERPL-CCNC: 53 U/L (ref 34–104)
ALT SERPL W P-5'-P-CCNC: 12 U/L (ref 7–52)
AMPHETAMINES UR QL: NOT DETECTED
ANION GAP SERPL CALCULATED.3IONS-SCNC: 10 MMOL/L (ref 3–14)
AST SERPL W P-5'-P-CCNC: 12 U/L (ref 13–39)
BARBITURATES UR QL SCN: NOT DETECTED
BASOPHILS # BLD AUTO: 0 10E3/UL (ref 0–0.2)
BASOPHILS NFR BLD AUTO: 0 %
BENZODIAZ UR QL SCN: NOT DETECTED
BILIRUB SERPL-MCNC: 0.9 MG/DL (ref 0.3–1)
BUN SERPL-MCNC: 9 MG/DL (ref 7–25)
BUPRENORPHINE UR QL: NOT DETECTED
CALCIUM SERPL-MCNC: 9.9 MG/DL (ref 8.6–10.3)
CANNABINOIDS UR QL: NOT DETECTED
CHLORIDE BLD-SCNC: 104 MMOL/L (ref 98–107)
CO2 SERPL-SCNC: 24 MMOL/L (ref 21–31)
COCAINE UR QL SCN: NOT DETECTED
CREAT SERPL-MCNC: 1.15 MG/DL (ref 0.7–1.3)
D-METHAMPHET UR QL: NOT DETECTED
DEPRECATED CALCIDIOL+CALCIFEROL SERPL-MC: 16 UG/L (ref 30–100)
EOSINOPHIL # BLD AUTO: 0.1 10E3/UL (ref 0–0.7)
EOSINOPHIL NFR BLD AUTO: 1 %
ERYTHROCYTE [DISTWIDTH] IN BLOOD BY AUTOMATED COUNT: 11.9 % (ref 10–15)
GFR SERPL CREATININE-BSD FRML MDRD: 83 ML/MIN/1.73M2
GLUCOSE BLD-MCNC: 84 MG/DL (ref 70–105)
HCT VFR BLD AUTO: 45.1 % (ref 40–53)
HGB BLD-MCNC: 15.7 G/DL (ref 13.3–17.7)
IMM GRANULOCYTES # BLD: 0 10E3/UL
IMM GRANULOCYTES NFR BLD: 0 %
LYMPHOCYTES # BLD AUTO: 1.6 10E3/UL (ref 0.8–5.3)
LYMPHOCYTES NFR BLD AUTO: 19 %
MCH RBC QN AUTO: 29.6 PG (ref 26.5–33)
MCHC RBC AUTO-ENTMCNC: 34.8 G/DL (ref 31.5–36.5)
MCV RBC AUTO: 85 FL (ref 78–100)
METHADONE UR QL SCN: NOT DETECTED
MONOCYTES # BLD AUTO: 0.6 10E3/UL (ref 0–1.3)
MONOCYTES NFR BLD AUTO: 7 %
NEUTROPHILS # BLD AUTO: 6.4 10E3/UL (ref 1.6–8.3)
NEUTROPHILS NFR BLD AUTO: 73 %
NRBC # BLD AUTO: 0 10E3/UL
NRBC BLD AUTO-RTO: 0 /100
OPIATES UR QL SCN: NOT DETECTED
OXYCODONE UR QL SCN: NOT DETECTED
PCP UR QL SCN: NOT DETECTED
PLATELET # BLD AUTO: 281 10E3/UL (ref 150–450)
POTASSIUM BLD-SCNC: 3.9 MMOL/L (ref 3.5–5.1)
PROPOXYPH UR QL: NOT DETECTED
PROT SERPL-MCNC: 7.2 G/DL (ref 6.4–8.9)
RBC # BLD AUTO: 5.3 10E6/UL (ref 4.4–5.9)
SARS-COV-2 RNA RESP QL NAA+PROBE: NEGATIVE
SODIUM SERPL-SCNC: 138 MMOL/L (ref 134–144)
TRICYCLICS UR QL SCN: NOT DETECTED
TSH SERPL DL<=0.005 MIU/L-ACNC: 3.46 MU/L (ref 0.4–4)
WBC # BLD AUTO: 8.7 10E3/UL (ref 4–11)

## 2021-12-15 PROCEDURE — 99285 EMERGENCY DEPT VISIT HI MDM: CPT | Mod: 25 | Performed by: FAMILY MEDICINE

## 2021-12-15 PROCEDURE — 36415 COLL VENOUS BLD VENIPUNCTURE: CPT | Mod: ZL | Performed by: INTERNAL MEDICINE

## 2021-12-15 PROCEDURE — C9803 HOPD COVID-19 SPEC COLLECT: HCPCS | Performed by: FAMILY MEDICINE

## 2021-12-15 PROCEDURE — 96372 THER/PROPH/DIAG INJ SC/IM: CPT | Performed by: FAMILY MEDICINE

## 2021-12-15 PROCEDURE — 87389 HIV-1 AG W/HIV-1&-2 AB AG IA: CPT | Mod: ZL | Performed by: INTERNAL MEDICINE

## 2021-12-15 PROCEDURE — 80306 DRUG TEST PRSMV INSTRMNT: CPT | Performed by: FAMILY MEDICINE

## 2021-12-15 PROCEDURE — 250N000013 HC RX MED GY IP 250 OP 250 PS 637: Performed by: FAMILY MEDICINE

## 2021-12-15 PROCEDURE — 85004 AUTOMATED DIFF WBC COUNT: CPT | Performed by: FAMILY MEDICINE

## 2021-12-15 PROCEDURE — 250N000011 HC RX IP 250 OP 636: Performed by: FAMILY MEDICINE

## 2021-12-15 PROCEDURE — 80053 COMPREHEN METABOLIC PANEL: CPT | Performed by: FAMILY MEDICINE

## 2021-12-15 PROCEDURE — 86803 HEPATITIS C AB TEST: CPT | Mod: ZL | Performed by: INTERNAL MEDICINE

## 2021-12-15 PROCEDURE — U0005 INFEC AGEN DETEC AMPLI PROBE: HCPCS | Performed by: FAMILY MEDICINE

## 2021-12-15 PROCEDURE — 99285 EMERGENCY DEPT VISIT HI MDM: CPT | Performed by: FAMILY MEDICINE

## 2021-12-15 PROCEDURE — 84443 ASSAY THYROID STIM HORMONE: CPT | Mod: ZL | Performed by: INTERNAL MEDICINE

## 2021-12-15 PROCEDURE — 86481 TB AG RESPONSE T-CELL SUSP: CPT | Mod: ZL | Performed by: INTERNAL MEDICINE

## 2021-12-15 PROCEDURE — 82306 VITAMIN D 25 HYDROXY: CPT | Mod: ZL | Performed by: INTERNAL MEDICINE

## 2021-12-15 RX ORDER — LORAZEPAM 1 MG/1
2 TABLET ORAL ONCE
Status: COMPLETED | OUTPATIENT
Start: 2021-12-15 | End: 2021-12-15

## 2021-12-15 RX ORDER — OLANZAPINE 2.5 MG/1
10 TABLET, FILM COATED ORAL ONCE
Status: DISCONTINUED | OUTPATIENT
Start: 2021-12-15 | End: 2021-12-15

## 2021-12-15 RX ORDER — LORAZEPAM 2 MG/ML
2 INJECTION INTRAMUSCULAR ONCE
Status: COMPLETED | OUTPATIENT
Start: 2021-12-15 | End: 2021-12-15

## 2021-12-15 RX ORDER — PREGABALIN 100 MG/1
100 CAPSULE ORAL 3 TIMES DAILY
Status: DISCONTINUED | OUTPATIENT
Start: 2021-12-15 | End: 2021-12-16 | Stop reason: HOSPADM

## 2021-12-15 RX ORDER — LITHIUM CARBONATE 300 MG/1
900 TABLET, FILM COATED, EXTENDED RELEASE ORAL AT BEDTIME
Status: DISCONTINUED | OUTPATIENT
Start: 2021-12-15 | End: 2021-12-16 | Stop reason: HOSPADM

## 2021-12-15 RX ORDER — OLANZAPINE 2.5 MG/1
10 TABLET, FILM COATED ORAL 2 TIMES DAILY PRN
Status: DISCONTINUED | OUTPATIENT
Start: 2021-12-15 | End: 2021-12-16 | Stop reason: HOSPADM

## 2021-12-15 RX ORDER — OLANZAPINE 10 MG/2ML
10 INJECTION, POWDER, FOR SOLUTION INTRAMUSCULAR ONCE
Status: DISCONTINUED | OUTPATIENT
Start: 2021-12-15 | End: 2021-12-16 | Stop reason: HOSPADM

## 2021-12-15 RX ADMIN — LORAZEPAM 2 MG: 1 TABLET ORAL at 17:16

## 2021-12-15 RX ADMIN — LITHIUM CARBONATE 900 MG: 300 TABLET, FILM COATED, EXTENDED RELEASE ORAL at 22:04

## 2021-12-15 RX ADMIN — OLANZAPINE 10 MG: 2.5 TABLET ORAL at 18:30

## 2021-12-15 RX ADMIN — PREGABALIN 100 MG: 100 CAPSULE ORAL at 17:17

## 2021-12-15 RX ADMIN — LORAZEPAM 2 MG: 2 INJECTION, SOLUTION INTRAMUSCULAR; INTRAVENOUS at 13:42

## 2021-12-15 NOTE — ED NOTES
Post ativan, patient is verbal, connecting eye to eye, states he is willing to participate in assessment with DEC in a few minutes.  States he thinks he has been taking his meds.

## 2021-12-15 NOTE — TELEPHONE ENCOUNTER
413pm - April, on call provider for [place], calls and accepts pt     S: [facility], 31/M, catatonia     B: MD reports concern of catatonia, not talking or eating due to catatonia.     Medically cleared, eating, drinking, ambulating indep  Patient cleared and ready for behavioral bed placement: Yes   No covid concerns, test ordered    A: Voluntary     R: 5S/[place]     Pt placed in queue   430pm - unit charge notified, report when covid is neg   431pm - ED notified

## 2021-12-15 NOTE — ED NOTES
Patient spoke with provider at [facility] and states that he is will to be admitted there.

## 2021-12-15 NOTE — ED TRIAGE NOTES
EMS Arrival Note  ________________________________  Steven Carter is a 31 year old Male that arrives via [facility] BLS ambulance service from [facility] where he has been for a couple weeks. Before he was sent to [facility] he came directly from [facility]. Staff from [facility] reported to EMS that patient was refusing to speak and was clenching his fists tight. They were concerned that he was going to act out physically.   Pre hospital clinical presentation per EMS personnel includes patient has been fairly cooperative but refuses to speak.  Pre hospital personnel report vital signs of:  B/P unable to get a BP due to patient pulled back his arm; HR 74, RR 14;SpO2 97% on RA.    Patient arrives with:  GCS Total = alert but able to assess orientation.  Airway intact  Breathing Assessment Normal  Circulation Assessment Normal      Placed in room E10, gowned, warm blanket provided, side rails up,  ID verified and band placed, and call light within reach.       Previous living situation Residential Facility [facility]
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
## 2021-12-15 NOTE — ED NOTES
MD checking with Susana to obtain suggestions about how to handle patient and his disposition.  Call made to behavioral health to get suggestions about assessment when patient is non conversive.

## 2021-12-15 NOTE — ED NOTES
Pt still not verbally responding to writer, has tried to leave the room 3 times. The first 2 times when writer asked pt to stay in the room, pt would return to sitting on bed. The 3rd time pt ignored reminder about staying in room, came passed writer and tried to push through double doors to icu. When writer asked for security pt returned back to room. Breakfast delivered on safe tray and is at bedside.

## 2021-12-15 NOTE — ED PROVIDER NOTES
History     Chief Complaint   Patient presents with     Mental Health Problem     HPI  Steven Carter is a 31 year old male with a known history of severe catatonia, schizoaffective disorder with bipolar illness as well as multidrug abuse history, paranoia and anxiety who presents to the emergency room from [facility] where he was sent on 12/9 by [place] psych when he was not interacting with staff and was clenching his fists.  They were concerned he might become aggressive and requested that he be evaluated.    Allergies:  Allergies   Allergen Reactions     Pork Allergy        Problem List:    Patient Active Problem List    Diagnosis Date Noted     Bipolar 1 disorder (H) 12/14/2021     Priority: Medium     Suicidal ideation 10/21/2021     Priority: Medium     Catatonia 03/07/2021     Priority: Medium     Schizoaffective disorder, bipolar type (H) 03/07/2021     Priority: Medium     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, subsequent encounter 09/01/2020     Priority: Medium     Added automatically from request for surgery [id number]       Psychosis (H) 06/23/2020     Priority: Medium     Seizure (H) 04/19/2016     Priority: Medium     ADD (attention deficit disorder) 12/02/2015     Priority: Medium     Methamphetamine abuse (H) 12/02/2015     Priority: Medium     Suicidal behavior 07/23/2014     Priority: Medium     Moderate dextromethorphan use disorder (H) 07/20/2014     Priority: Medium     Paranoia (H) 07/20/2014     Priority: Medium     Depression with anxiety 03/02/2009     Priority: Medium     Overview:   IMO Update 10/11       Alcohol abuse 02/02/2009     Priority: Medium     Overview:   IMO Update 10/11       Polysubstance abuse (H) 02/02/2009     Priority: Medium     Tobacco use disorder 11/01/2008     Priority: Medium     Anxiety disorder 11/01/2008     Priority: Medium     Formatting of this note might be different from the original.  Diagnosed  alliJefry Padilla          Past Medical History:    History reviewed. No pertinent past medical history.    Past Surgical History:    History reviewed. No pertinent surgical history.    Family History:    History reviewed. No pertinent family history.    Social History:  Marital Status:  Single [1]  Social History     Tobacco Use     Smoking status: Current Every Day Smoker     Packs/day: 0.50     Years: 9.00     Pack years: 4.50     Smokeless tobacco: Current User     Types: Chew   Vaping Use     Vaping Use: Never used   Substance Use Topics     Alcohol use: No     Comment: daily to occasionally per pt.     Drug use: Yes     Types: Other     Comment: reports hx of use but none recently        Medications:    buPROPion (WELLBUTRIN XL) 150 MG 24 hr tablet  lithium (ESKALITH CR/LITHOBID) 450 MG CR tablet  OLANZapine (ZYPREXA) 10 MG tablet  pregabalin (LYRICA) 100 MG capsule          Review of Systems   Unable to perform ROS: Mental status change   Patient is catatonic is unwilling to answer any questions.    Physical Exam   Pulse: 86  Temp: 98.3  F (36.8  C)  Resp: 18  SpO2: 98 %      Physical Exam  Vitals and nursing note reviewed.   Constitutional:       Appearance: Normal appearance.   HENT:      Head: Normocephalic and atraumatic.      Right Ear: Tympanic membrane and ear canal normal.      Left Ear: Tympanic membrane and ear canal normal.      Nose: Nose normal.      Mouth/Throat:      Mouth: Mucous membranes are moist.      Pharynx: Oropharynx is clear.   Eyes:      Extraocular Movements: Extraocular movements intact.      Conjunctiva/sclera: Conjunctivae normal.      Pupils: Pupils are equal, round, and reactive to light.   Cardiovascular:      Rate and Rhythm: Normal rate and regular rhythm.      Pulses: Normal pulses.      Heart sounds: Normal heart sounds.   Pulmonary:      Effort: Pulmonary effort is normal.      Breath sounds: Normal breath sounds.   Abdominal:      General: Abdomen is flat. Bowel sounds are  normal.      Palpations: Abdomen is soft.   Musculoskeletal:         General: Normal range of motion.      Cervical back: Normal range of motion and neck supple. No rigidity or tenderness.   Skin:     General: Skin is warm and dry.      Capillary Refill: Capillary refill takes less than 2 seconds.   Neurological:      General: No focal deficit present.      Mental Status: He is alert.       ED Course   Patient seen and examined upon initial arrival.  He was sitting in the bed staring straight ahead.  Was not interactive with this provider.  He allowed me to do the exam without difficulty and did have his hands clenched in his coat.  Was unable to get his coat off.  He did not offer any help.  He did not speak to me during the entire time I was doing his exam.    Attempted an hour later to interact with patient again he still did not interact.  We have been unable to get him to respond to us in any way shape or form.  He was offered breakfast and was put in his room but he did not take any of it.  After he initially got here he did try to leave by walking out of the door and was told that he could not do that.    He has since been in his room sitting looking at the wall and does not appear to be moving.  Review of his chart shows that he was on Wellbutrin  mg every 24 hours, Lyrica 100 mg p.o. 3 times daily, lithium 450 mg tablets 2 tabs at bedtime Zyprexa 10 mg p.o. twice daily as needed anxiety and agitation.  Initially I was going to give him Zyprexa, but the psych nurse practitioner stated that this sometimes will make the catatonia worse so we held that.    Phone call placed to [facility] as he was sent from [facility] to [facility] for 5 days ago.  Waiting a callback.      Patient signed out at change of shift for transportation to [place].       ED Course as of 12/18/21 1931   Wed Dec 15, 2021   1254 Discussed with Ashely Heaton at [facility] where he has been previously.  Recommended that we give him 2 mg Ativan as that will likely help to break the catatonia. They do have beds and she would be willing to take him back for further treatment. Would like a call back after the first dose of Ativan to see how he responds.    1335 Additional information- wasn't eating at [facility]. Barely answering questions.   Today sudden change.    1508 Transferred to Speak with April. Patient is agreeing to voluntarily be admitted. Need to call central intake for admission.      1623 Called Central intake- Dr Timmons will be accepting physician. Patient will need COVID 19 swab. Discussed case with intake Suyapa. She will let us know when he can come once he has had his COVID test.    1641 Transport forms filled out. Patient will need transport hold.    1654 April called and stated that they can't take him until morning. She will talk with patient to see if she can convince him to be voluntary. If not , he is holdable with history of catatonia and risk to himself because of that.    1655 Patient tried to elope from department. IS getting more agitated. Will give dosage of Zyprexa 10 mg and place on 72 hour hold.    1913 I assumed care of patient at shift change from Dr. Roca; in brief this is a 31-year-old man with history of schizophrenia, catatonia, recent inpatient behavioral health admission, coming from [facility] with possible catatonic state; improved after ativan, threatening to leave so placed on 72-hour hold. Accepted in transfer to [facility] in [place] but no staff available till tomorrow morning. S/p zyprexa for agitation   Thu Dec 16, 2021   0510 Patient now more agitated, requesting to leave; I informed patient he remains on a 72-hour hold and awaiting transport to [facility] for inpatient behavioral health evaluation   0550 Patient accepted 10 mg oral zyprexa for anxiety/agitation     She also noted that often he needs multiple doses of the Ativan to break his catatonia  and at times it is very difficult to get him to interact.  He has actually had an episode that lasted a week.  Procedures        Results for orders placed or performed during the hospital encounter of 12/15/21 (from the past 24 hour(s))   CBC with platelets differential    Narrative    The following orders were created for panel order CBC with platelets differential.  Procedure                               Abnormality         Status                     ---------                               -----------         ------                     CBC with platelets and d...[[id number]]                      Final result                 Please view results for these tests on the individual orders.   Comprehensive metabolic panel   Result Value Ref Range    Sodium 138 134 - 144 mmol/L    Potassium 3.9 3.5 - 5.1 mmol/L    Chloride 104 98 - 107 mmol/L    Carbon Dioxide (CO2) 24 21 - 31 mmol/L    Anion Gap 10 3 - 14 mmol/L    Urea Nitrogen 9 7 - 25 mg/dL    Creatinine 1.15 0.70 - 1.30 mg/dL    Calcium 9.9 8.6 - 10.3 mg/dL    Glucose 84 70 - 105 mg/dL    Alkaline Phosphatase 53 34 - 104 U/L    AST 12 (L) 13 - 39 U/L    ALT 12 7 - 52 U/L    Protein Total 7.2 6.4 - 8.9 g/dL    Albumin 5.0 3.5 - 5.7 g/dL    Bilirubin Total 0.9 0.3 - 1.0 mg/dL    GFR Estimate 83 >60 mL/min/1.73m2   CBC with platelets and differential   Result Value Ref Range    WBC Count 8.7 4.0 - 11.0 10e3/uL    RBC Count 5.30 4.40 - 5.90 10e6/uL    Hemoglobin 15.7 13.3 - 17.7 g/dL    Hematocrit 45.1 40.0 - 53.0 %    MCV 85 78 - 100 fL    MCH 29.6 26.5 - 33.0 pg    MCHC 34.8 31.5 - 36.5 g/dL    RDW 11.9 10.0 - 15.0 %    Platelet Count 281 150 - 450 10e3/uL    % Neutrophils 73 %    % Lymphocytes 19 %    % Monocytes 7 %    % Eosinophils 1 %    % Basophils 0 %    % Immature Granulocytes 0 %    NRBCs per 100 WBC 0 <1 /100    Absolute Neutrophils 6.4 1.6 - 8.3 10e3/uL    Absolute Lymphocytes 1.6 0.8 - 5.3 10e3/uL    Absolute Monocytes 0.6 0.0 - 1.3 10e3/uL    Absolute  Eosinophils 0.1 0.0 - 0.7 10e3/uL    Absolute Basophils 0.0 0.0 - 0.2 10e3/uL    Absolute Immature Granulocytes 0.0 <=0.4 10e3/uL    Absolute NRBCs 0.0 10e3/uL   Urine Drugs of Abuse Screen    Narrative    The following orders were created for panel order Urine Drugs of Abuse Screen.  Procedure                               Abnormality         Status                     ---------                               -----------         ------                     Urine Drugs of Abuse Scr...[[id number]]  Normal              Final result                 Please view results for these tests on the individual orders.   Urine Drugs of Abuse Screen Panel 13   Result Value Ref Range    Cannabinoids (73-cyg-4-carboxy-9-THC) Not Detected Not Detected    Phencyclidine Not Detected Not Detected    Cocaine (Benzoylecgonine) Not Detected Not Detected    Methamphetamine (d-Methamphetamine) Not Detected Not Detected    Opiates (Morphine) Not Detected Not Detected    Amphetamine (d-Amphetamine) Not Detected Not Detected    Benzodiazepines (Nordiazepam) Not Detected Not Detected    Tricyclic Antidepressants (Desipramine) Not Detected Not Detected    Methadone Not Detected Not Detected    Barbiturates (Butalbital) Not Detected Not Detected    Oxycodone Not Detected Not Detected    Propoxyphene (Norpropoxyphene) Not Detected Not Detected    Buprenorphine Not Detected Not Detected       Medications   LORazepam (ATIVAN) injection 2 mg (2 mg Intramuscular Given 12/15/21 1342)       Assessments & Plan (with Medical Decision Making)     I have reviewed the nursing notes.    I have reviewed the findings, diagnosis, plan and need for follow up with the patient.    New Prescriptions    No medications on file       Final diagnoses:   Catatonia associated with another mental disorder   Drug dependence (H)       12/15/2021   [facility]  12/18/21 1932

## 2021-12-16 ENCOUNTER — HOSPITAL ENCOUNTER (INPATIENT)
Facility: HOSPITAL | Age: 31
LOS: 82 days | Discharge: NURSING, REHAB OR LONG-TERM CARE | End: 2022-03-08
Attending: STUDENT IN AN ORGANIZED HEALTH CARE EDUCATION/TRAINING PROGRAM | Admitting: STUDENT IN AN ORGANIZED HEALTH CARE EDUCATION/TRAINING PROGRAM
Payer: COMMERCIAL

## 2021-12-16 VITALS — OXYGEN SATURATION: 97 % | TEMPERATURE: 98.2 F | RESPIRATION RATE: 18 BRPM | HEART RATE: 90 BPM

## 2021-12-16 DIAGNOSIS — M54.9 BACK PAIN WITHOUT SCIATICA: ICD-10-CM

## 2021-12-16 DIAGNOSIS — F25.1 SCHIZOAFFECTIVE DISORDER, DEPRESSIVE TYPE (H): ICD-10-CM

## 2021-12-16 DIAGNOSIS — F06.1 CATATONIA: Primary | Chronic | ICD-10-CM

## 2021-12-16 PROBLEM — T14.91XA SUICIDAL BEHAVIOR WITH ATTEMPTED SELF-INJURY (H): Status: ACTIVE | Noted: 2021-11-01

## 2021-12-16 PROCEDURE — 250N000011 HC RX IP 250 OP 636: Performed by: NURSE PRACTITIONER

## 2021-12-16 PROCEDURE — 250N000013 HC RX MED GY IP 250 OP 250 PS 637: Performed by: FAMILY MEDICINE

## 2021-12-16 PROCEDURE — 250N000013 HC RX MED GY IP 250 OP 250 PS 637: Performed by: NURSE PRACTITIONER

## 2021-12-16 PROCEDURE — 99223 1ST HOSP IP/OBS HIGH 75: CPT | Performed by: NURSE PRACTITIONER

## 2021-12-16 PROCEDURE — 124N000001 HC R&B MH

## 2021-12-16 PROCEDURE — 99222 1ST HOSP IP/OBS MODERATE 55: CPT | Performed by: NURSE PRACTITIONER

## 2021-12-16 RX ORDER — OLANZAPINE 10 MG/1
10 TABLET, ORALLY DISINTEGRATING ORAL 3 TIMES DAILY PRN
Status: DISCONTINUED | OUTPATIENT
Start: 2021-12-16 | End: 2022-03-08 | Stop reason: HOSPADM

## 2021-12-16 RX ORDER — OLANZAPINE 10 MG/1
10 TABLET ORAL 2 TIMES DAILY PRN
Status: DISCONTINUED | OUTPATIENT
Start: 2021-12-16 | End: 2021-12-16

## 2021-12-16 RX ORDER — ACETAMINOPHEN 325 MG/1
650 TABLET ORAL EVERY 4 HOURS PRN
Status: DISCONTINUED | OUTPATIENT
Start: 2021-12-16 | End: 2022-01-20

## 2021-12-16 RX ORDER — BUPROPION HYDROCHLORIDE 150 MG/1
150 TABLET ORAL DAILY
Status: DISCONTINUED | OUTPATIENT
Start: 2021-12-17 | End: 2021-12-23

## 2021-12-16 RX ORDER — OLANZAPINE 10 MG/2ML
10 INJECTION, POWDER, FOR SOLUTION INTRAMUSCULAR 3 TIMES DAILY PRN
Status: DISCONTINUED | OUTPATIENT
Start: 2021-12-16 | End: 2022-03-08 | Stop reason: HOSPADM

## 2021-12-16 RX ORDER — MEMANTINE HYDROCHLORIDE 5 MG/1
5 TABLET ORAL DAILY
Status: DISCONTINUED | OUTPATIENT
Start: 2021-12-16 | End: 2021-12-16

## 2021-12-16 RX ORDER — NICOTINE 21 MG/24HR
1 PATCH, TRANSDERMAL 24 HOURS TRANSDERMAL DAILY
Status: DISCONTINUED | OUTPATIENT
Start: 2021-12-16 | End: 2021-12-25

## 2021-12-16 RX ORDER — MEMANTINE HYDROCHLORIDE 5 MG/1
5 TABLET ORAL DAILY
Status: DISCONTINUED | OUTPATIENT
Start: 2021-12-16 | End: 2021-12-19

## 2021-12-16 RX ORDER — OLANZAPINE 10 MG/2ML
10 INJECTION, POWDER, FOR SOLUTION INTRAMUSCULAR 3 TIMES DAILY PRN
Status: DISCONTINUED | OUTPATIENT
Start: 2021-12-16 | End: 2021-12-16

## 2021-12-16 RX ORDER — OLANZAPINE 10 MG/1
10 TABLET, ORALLY DISINTEGRATING ORAL 3 TIMES DAILY PRN
Status: DISCONTINUED | OUTPATIENT
Start: 2021-12-16 | End: 2021-12-16

## 2021-12-16 RX ORDER — LORAZEPAM 1 MG/1
2 TABLET ORAL 4 TIMES DAILY
Status: DISCONTINUED | OUTPATIENT
Start: 2021-12-16 | End: 2021-12-18

## 2021-12-16 RX ORDER — LORAZEPAM 1 MG/1
2 TABLET ORAL ONCE
Status: COMPLETED | OUTPATIENT
Start: 2021-12-16 | End: 2021-12-16

## 2021-12-16 RX ORDER — OLANZAPINE 10 MG/1
10 TABLET ORAL AT BEDTIME
Status: DISCONTINUED | OUTPATIENT
Start: 2021-12-16 | End: 2022-01-03

## 2021-12-16 RX ORDER — LITHIUM CARBONATE 450 MG
900 TABLET, EXTENDED RELEASE ORAL AT BEDTIME
Status: DISCONTINUED | OUTPATIENT
Start: 2021-12-16 | End: 2021-12-17

## 2021-12-16 RX ORDER — IBUPROFEN 600 MG/1
600 TABLET, FILM COATED ORAL EVERY 6 HOURS PRN
Status: DISCONTINUED | OUTPATIENT
Start: 2021-12-16 | End: 2021-12-17

## 2021-12-16 RX ORDER — LORAZEPAM 2 MG/ML
2 INJECTION INTRAMUSCULAR ONCE
Status: COMPLETED | OUTPATIENT
Start: 2021-12-16 | End: 2021-12-16

## 2021-12-16 RX ORDER — ERGOCALCIFEROL 1.25 MG/1
50000 CAPSULE, LIQUID FILLED ORAL
Status: DISCONTINUED | OUTPATIENT
Start: 2021-12-16 | End: 2022-03-07

## 2021-12-16 RX ADMIN — OLANZAPINE 10 MG: 10 TABLET, FILM COATED ORAL at 21:20

## 2021-12-16 RX ADMIN — LITHIUM CARBONATE 900 MG: 450 TABLET, EXTENDED RELEASE ORAL at 21:20

## 2021-12-16 RX ADMIN — ACETAMINOPHEN 650 MG: 325 TABLET, FILM COATED ORAL at 16:15

## 2021-12-16 RX ADMIN — MEMANTINE 5 MG: 5 TABLET ORAL at 21:24

## 2021-12-16 RX ADMIN — OLANZAPINE 10 MG: 2.5 TABLET ORAL at 05:22

## 2021-12-16 RX ADMIN — LORAZEPAM 2 MG: 2 INJECTION INTRAMUSCULAR; INTRAVENOUS at 10:16

## 2021-12-16 RX ADMIN — NICOTINE 1 PATCH: 21 PATCH, EXTENDED RELEASE TRANSDERMAL at 11:59

## 2021-12-16 RX ADMIN — LORAZEPAM 2 MG: 1 TABLET ORAL at 17:53

## 2021-12-16 RX ADMIN — IBUPROFEN 600 MG: 600 TABLET, FILM COATED ORAL at 16:15

## 2021-12-16 RX ADMIN — LORAZEPAM 2 MG: 1 TABLET ORAL at 21:20

## 2021-12-16 RX ADMIN — ERGOCALCIFEROL 50000 UNITS: 1.25 CAPSULE, LIQUID FILLED ORAL at 13:01

## 2021-12-16 RX ADMIN — LORAZEPAM 2 MG: 1 TABLET ORAL at 11:59

## 2021-12-16 ASSESSMENT — ACTIVITIES OF DAILY LIVING (ADL)
DRESS: INDEPENDENT;SCRUBS (BEHAVIORAL HEALTH)
HYGIENE/GROOMING: INDEPENDENT;SHOWER

## 2021-12-16 ASSESSMENT — MIFFLIN-ST. JEOR: SCORE: 1947.35

## 2021-12-16 NOTE — ED NOTES
Pt is getting a little agitated and does not want to be here. Writer again explained to him. Pt is sitting in bed and sitting there. Pt is not agressive towards staff.

## 2021-12-16 NOTE — PROGRESS NOTES
12/16/21 1056   Patient Belongings   Patient Belongings locker   Patient Belongings Remaining with Patient clothing;shoes;glasses   Belongings Search No belongings   Clothing Search Yes   Second Staff Caryl   Comment black sweat pant, blue tennis shoes, black t shirt, black jacket, black hat, tin of chew,tooth paste, tooth brush, ear plugs     .behb

## 2021-12-16 NOTE — ED NOTES
"Pt walked out of hisroom. Writer asked him to go back into his room. Pt is redirectable and did sit back down when writer asked. Writer told him again he cannot walk  Out into the hallway unless he needs to \"go to the bathroom\". Pt is sitting at bedside. Pt did take PO Zyprexa when asked again if he wanted PO Zyprexa or a shot. Pt has not been aggressive towards staff and is redirected.   "
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
## 2021-12-16 NOTE — ED NOTES
Pt is up in hallway and wanting to leave. Writer explained to him why he is here and that he is going to [place] in the am.  Pt went back into his room and sat down. Pt was able to be redirected. MD in room talking with pt.

## 2021-12-16 NOTE — PROGRESS NOTES
12/16/21 1117   Patient Belongings   Did you bring any home meds/supplements to the hospital?  Yes   Disposition of meds  Sent to security/pharmacy per site process   Patient Belongings locker;remains with patient   Patient Belongings Remaining with Patient glasses   Patient Belongings Put in Hospital Secure Location (Security or Locker, etc.) clothing;shoes   Belongings Search Yes   Clothing Search Yes   Second Staff Caryl RN   Comment black tennis shoes, black sweat pant, black t shirt, black jacket, black hat, chew, toothpaste, tooth brush, ear plugs.   List items sent to safe:none    All other belongings put in assigned cubby in belongings room.       I have reviewed my belongings list on admission and verify that it is correct.     Patient signature_______________________________    Second staff witness (if patient unable to sign) ______________________________       I have received all my belongings at discharge.    Patient signature________________________________    Marvel   12/16/2021  11:22 AM
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
## 2021-12-16 NOTE — PLAN OF CARE
Prior to Admission Medication Reconciliation:     Medications added:   [x] None  [] As listed below:    Medications deleted:   [x] None  [] As listed below:    Medications marked for review/removal by attending:  [x] None  [] As listed below:    Changes made to existing medications:   [x] None  [] Updated strengths and frequencies to most current  [] As listed below:    Last times/dates taken verified with patient:  [x] Yes- completed myself: doses given while pt was in the ER  [] Prepared PTA medlist for review only. (will not be available to review personally)  [] Did not review with patient. Rx verification only. Review completed by nursing.    [] Nurse completed no changes made (double checked entries)  [] Unable to review with patient at this time:  [] Nurse completed/changes made:         Allergies listed at another location:  []Allergies match allergies listed in Epic  []Other allergies listed:    Allergy review:    [x]Did not review: reviewed by nursing  []Did not review: pt unable at this time  []Patient/MAR verified NKDA  []Patient/MAR verified current existing allergies: no changes made  []Patient confirmed current existing allergies and new allergies added:      Medication reconciliation sources:   []Patient  []Patient family member/emergency contact: **  [][facility] Report Review  []Epic Chart Review  []Care Everywhere review  []Pharmacy med list: **  []Pharmacy phone call  [x]Outside meds dispense report: see below  [x]Nursing home or Assisted Living MAR: [facility]       []Other: **    Pharmacy [facility] at discharge[facility] recovery    Is patient on coumadin?  [x]No    Requests for consultation by provider or pharmacist:   [] Patient understands why all of their meds were prescribed and how to take them. No questions.   [x] Managing party has no questions.   [] Patient/ managing party has questions about the following:  [] Did not review with patient. Cannot assess.      Fill dates and reported compliancy:  [x] Fill dates coincide with reported compliancy for all/most maintenance meds.   [] Fill dates do not coincide with compliancy with maintenance meds. See notes in PTA medlist and in comments.    [] Fill dates do not coincide with the following medications but pt reports compliancy:  [] Did not review with patient. Cannot assess.     Historian accuracy:  [] Excellent- alert and oriented, understands why meds were prescribed and how to take, able to answer specifics  [] Good- alert and oriented, understands why meds were prescribed and how to take, some confusion   [] Fair- alert and oriented, doesn't know medications without list, cannot answer specifics about medications, but has a decent process for which to take at home  [] Poor- does not know medications, may not have a process to take at home, may be cognitively unable to review at this time  [x]Medication management done by family member or facility, no concerns about historian accuracy.   [] Did not review with patient. Cannot assess.     Medication Management:  [] Manages meds independently  [] Family member/ other party manages meds/assists:  [x] Meds managed by staff at facility  [] Meds set up by home care, family/other party helps administer  [] Meds set up by home care, self administers  [] Did not review with patient. Cannot assess.     Other medications aside from PTA:  [x] Denies taking any medications aside from those listed in PTA meds  [] Reports taking another medication(s) but cannot recall the name(s)  [] Refuses to say.  [] Did not review with patient. Cannot assess.     Comments: none      Cynthia Nguyen on 12/16/2021 at 11:43 AM       Discrepancies: [x] No []Not Applicable [x]Yes: listed below    Issues completing PTA medication reconciliation:  [] On hold for a long time  [] Waited for a call back  [] Fax didn't come through  [] Fax took a long time  [] Other:    Notifying appropriate party of  changes/additions/discrepancies:  []No pertinent changes made, notification not necessary.   [] Notified attending provider via text page/phone call  [] Notified attending provider in person  [] Notified pharmacy  [] Notified nurse  [x] Medications have not been reconciled by a provider yet, notification not necessary  [] Pt is not admitted to floor yet, PTA meds completed before admission.       Medications Prior to Admission   Medication Sig Dispense Refill Last Dose     lithium (ESKALITH CR/LITHOBID) 450 MG CR tablet Take 2 tablets (900 mg) by mouth At Bedtime 60 tablet 0 12/15/2021 at 2204     OLANZapine (ZYPREXA) 10 MG tablet Take 1 tablet (10 mg) by mouth 2 times daily as needed (agitation/anxiety) 60 tablet 0 12/16/2021 at 0522     pregabalin (LYRICA) 100 MG capsule Take 1 capsule (100 mg) by mouth 3 times daily 90 capsule 0 12/15/2021 at 1717     buPROPion (WELLBUTRIN XL) 150 MG 24 hr tablet Take 1 tablet (150 mg) by mouth daily 30 tablet 0            Medication Dispense History (from 12/16/2020 to 12/16/2021)  Expand All  Collapse All    Amphetamine-Dextroamphetamine     Dispensed Days Supply Quantity Provider Pharmacy   DEXTROAMP-AMPHETAMIN 10 MG TAB 01/23/2021 30 30 tablet [facility] ...           Benztropine Mesylate     Dispensed Days Supply Quantity Provider Pharmacy   BENZTROPINE 0.5MG   TAB 12/22/2020 30 60 [facility] ...           Gabapentin     Dispensed Days Supply Quantity Provider Pharmacy   GABAPENTIN 300 MG CAPSULE 11/03/2021 30 270 capsule ETHAN LUCIANO Pharmacy...   GABAPENTIN 300MG CAPSULE 11/03/2021 30 270 Units ETHAN LUCIANO #61 - Fa...   GABAPENTIN 300MG CAPSULE 10/12/2021 30 270 Units KAREN PRITCHARD #38 - Vi...   GABAPENTIN 300MG    CAP 09/15/2021 30 270 Units [facility] ...   GABAPENTIN 300MG    CAP 08/21/2021 30 270 Units [facility] ...    GABAPENTIN 300MG    CAP 07/23/2021 30 270 [facility] ...   GABAPENTIN 300MG    CAP 06/30/2021 30 270 Units KAREN PRITCHARD [facility] ...   GABAPENTIN 300MG    CAP 06/05/2021 30 270 Units EDDI TEE [facility] ...   GABAPENTIN 300MG    CAP 05/07/2021 30 270 Units EDDI TEE [facility] ...   GABAPENTIN 400MG    CAP 12/24/2020 30 90 Units LISA KELLY [facility] ...           LORazepam     Dispensed Days Supply Quantity Provider Pharmacy   LORAZEPAM 1MG TABLET 11/01/2021 30 90 Units ETHAN LUCIANO Pharmacy...           Lisdexamfetamine Dimesylate     Dispensed Days Supply Quantity Provider Pharmacy   VYVANSE 50 MG CAPSULE 01/23/2021 30 30 capsule [facility] ...   VYVANSE 50 MG CAPSULE 01/13/2021 12 12 capsule KAREN PRITCHARD [facility] ...           Lithium Carbonate     Dispensed Days Supply Quantity Provider Pharmacy   LITHIUM CARBONATE 450MG ER TAB 12/08/2021 30 60 Units ETHAN LUCIANO Pharmacy...   LITHIUM CARBONATE 450MG ER TAB 11/01/2021 30 60 Units ETHAN LUCIANO Pharmacy...           Multiple Vitamins-Minerals     Dispensed Days Supply Quantity Provider Pharmacy   UNICOMPLEX-M TAB 06/05/2021 30 30 Units EDDI TEE [facility] ...   UNICOMPLEX-M TAB 05/08/2021 30 30 [facility] ...           OLANZapine     Dispensed Days Supply Quantity Provider Pharmacy   OLANZAPINE 10MG TABLET 12/08/2021 16 32 Units ETHAN LUCIANO Pharmacy...   OLANZAPINE 5MG TABLET 11/16/2021 30 90 Units KAREN PRITCHARD Pharmacy...   OLANZAPINE 5MG TABLET 11/02/2021 20 60 Units ETHAN LUCIANO Pharmacy...           Penicillin V Potassium     Dispensed Days Supply Quantity Provider Pharmacy   PENICILLN VK 500MG TAB 10/11/2021 10 40 Units [facility] ...           Pregabalin     Dispensed  Days Supply Quantity Provider Pharmacy   PREGABALIN 100MG CAPSULE 12/08/2021 30 [facility]           Propranolol HCl     Dispensed Days Supply Quantity Provider Pharmacy   PROPRANOLOL 10MG    TAB 12/24/2020 30 60 Units LISA KELLY [facility] ...           Other     Dispensed Days Supply Quantity Provider Pharmacy   OLANZapine 5MG      TAB 02/23/2021 30 30 [facility] ...           buPROPion HCl     Dispensed Days Supply Quantity Provider Pharmacy   BUPROPION 150MG ER (XL) TABLET 12/08/2021 30 30 [facility]           carBAMazepine     Dispensed Days Supply Quantity Provider Pharmacy   CARBAMAZEPIN ER 100MG TAB 06/30/2021 30 30 [facility] ...   CARBAMAZEPINE ER 200MG TAB 06/30/2021 30 30 [facility] ...   CARBAMAZEPIN ER 100MG TAB 06/05/2021 30 30 Units EDDI TEE [facility] ...   CARBAMAZEPINE ER 200MG TAB 06/05/2021 30 30 Units EDDI TEE [facility] ...   CARBAMAZEPIN ER 100MG TAB 05/07/2021 30 30 Units EDDI TEE [facility] ...   CARBAMAZEPINE ER 200MG TAB 05/07/2021 30 30 Units EDDI TEE [facility] ...           clonazePAM     Dispensed Days Supply Quantity Provider Pharmacy   CLONAZEPAM 1MG TABLET 11/15/2021 30 90 Units KAREN PRITCHARD Pharmacy...   CLONAZEPAM 1MG      TAB 10/16/2021 30 90 [facility] ...   CLONAZEPAM 1MG      TAB 09/15/2021 30 90 [facility] ...   CLONAZEPAM 1MG      TAB 09/09/2021 30 90 [facility] ...   CLONAZEPAM 0.5MG    TAB 08/12/2021 30 90 Units KAREN PRITCHARD [facility] ...   CLONAZEPAM 1MG      TAB 07/27/2021 30 90 [facility] ...   CLONAZEPAM 1MG      TAB 06/30/2021 30 90 Units KAR[facility] PRITCHARD [facility] ...   CLONAZEPAM  1MG      TAB 06/05/2021 30 [facility] ...   CLONAZEPAM 1MG      TAB 05/07/2021 30 90 Units EDDI TEE [facility] ...           fluPHENAZine HCl     Dispensed Days Supply Quantity Provider Pharmacy   FluPHENAZine 5MG    TAB 12/24/2020 30 30 [facility] ...           lamoTRIgine     Dispensed Days Supply Quantity Provider Pharmacy   LAMOTRIGINE 200MG TABLET 11/15/2021 30 30 Units KAREN PRITCHARD Pharmacy...   LAMOTRIGINE 200MG TABLET 10/18/2021 30 30 Units KAREN PRITCHARD #38 - Vi...   LamoTRIgine 200MG   TAB 09/15/2021 30 30 [facility] ...   LamoTRIgine 200MG   TAB 08/18/2021 30 30 Units [facility] ...   LamoTRIgine 100MG   TAB 07/27/2021 14 14 [facility] ...   LamoTRIgine 200MG   TAB 07/27/2021 30 30 Units KAREN PRITCHARD [facility] ...   LamoTRIgine 25MG    TAB 06/22/2021 28 45 Units [facility] ...           risperiDONE     Dispensed Days Supply Quantity Provider Pharmacy   RISPERIDONE 2MG TABLET 11/15/2021 30 30 Units KAREN PRITCHARD Pharmacy...   RisperiDONE 2MG     TAB 10/05/2021 30 30 [facility] ...   RisperiDONE 2MG     TAB 09/09/2021 30 30 Units [facility] ...   RisperiDONE 3MG     TAB 08/21/2021 30 30 [facility] ...   RisperiDONE 3MG     TAB 07/27/2021 30 30 Units KAREN PRITCHARD [facility] ...   RisperiDONE 3MG     TAB 06/05/2021 30 30 Units EDDI TEE [facility] ...   RisperiDONE 3MG     TAB 05/07/2021 30 30 Units EDDI TEE [facility] ...           traZODone HCl     Dispensed Days Supply Quantity Provider Pharmacy   TraZODone 100MG     TAB 12/24/2020 30 30 Units LISA KE[facility] ...           Disclaimer    Certain dispenses may not be available or  accurate in this report, including over-the-counter medications, low cost prescriptions, prescriptions paid for by the patient or non-participating sources, or errors in insurance claims information. The provider should independently verify medication history with the patient.     External Sources

## 2021-12-16 NOTE — ED NOTES
Nurse to Nurse report given to Caryl LARKIN.  [facility] called for transport, will be arriving around 0900 to transport pt to [place].

## 2021-12-16 NOTE — ED PROVIDER NOTES
Continuation of care note  ED Course as of 12/16/21 0609   5123 I assumed care of patient at shift change from Dr. Roca; in brief this is a 31-year-old man with history of schizophrenia, catatonia, recent inpatient behavioral health admission, coming from [facility] with possible catatonic state; improved after ativan, threatening to leave so placed on 72-hour hold. Accepted in transfer to [facility] in [place] but no staff available till tomorrow morning. S/p zyprexa for agitation   Thu Dec 16, 2021   0510 Patient now more agitated, requesting to leave; I informed patient he remains on a 72-hour hold and awaiting transport to [facility] for inpatient behavioral health evaluation   0550 Patient accepted 10 mg oral zyprexa for anxiety/agitation     Patient remained stable, transferred to [facility] in [place] for inpatient behavioral health.    Clinical impression:  1. Catatonia associated with another mental health disorder  2. Drug dependence       Thong Bach MD  12/16/21 0610

## 2021-12-16 NOTE — ED NOTES
Called [facility] to give report. Writer was told by Yoan to call back between [phone] to give the day nurse report. Will report off to day nurse.

## 2021-12-16 NOTE — ED NOTES
Pt belongings removed from locker and given to [facility].   Receiving facility called and notified to pt departure time.

## 2021-12-16 NOTE — PLAN OF CARE
"Placed call to [facility] per  request to find out whether patient was being discharged from programming or could return there when stable. Spoke to Elida Kaur, [facility], [facility], who states that they are discharging the patient from programming. Elida states that the patient was not eating for days at a time over the previous weekend, and that he had stopped speaking to staff and would not even answer questions like \"Are you okay\", \"Will you take your medication\", and \"Will you go to the hospital.\" Elida states the patient got to the point where he had been refusing groups and \"just sitting in his room.\" Updated NP and .   "

## 2021-12-16 NOTE — H&P
[facility]    History and Physical  Medical Services       Date of Admission:  12/16/2021  Date of Service (when I saw the patient): 12/16/21    Assessment & Plan     Principal Problem:    Catatonia    Active Medical Problems:  Chronic Back Pain- pt complains of chronic back pain on past admissions. He does not report any worsening back pain. He is guarded during assessment. Tylenol and ibuprofen ordered prn. He does shake his head yes that he would like to try Lidoderm patches.     Vitamin D deficiency- Vitamin D level in the ED on 12/15/21 was low at 16. Will start Vitamin D 50,000 units every week for 8 weeks.     ED course- ovid negative, UDS negative, TSH wnl, CBC and CMP unremarkable, Vitamin D level is low at 16, Hepatitis C, HIV and QuantiferonTB are all pending.     Pt medically stable, no acute medical concerns. Chronic medical problems stable. Will sign off. Please consult for any new medical issues or concerns.       Code Status: Full Code    Harmony Samuel CNP    Primary Care Physician   Physician No Ref-Primary    Chief Complaint   Psych evaluation     History is obtained from the patient and medical chart     History of Present Illness   (per ED) Steven Carter is a 31 year old male with a known history of severe catatonia, schizoaffective disorder with bipolar illness as well as multidrug abuse history, paranoia and anxiety who presents to the emergency room from [facility] where he was sent on 12/9 by Susana melvin when he was not interacting with staff and was clenching his fists.  They were concerned he might become aggressive and requested that he be evaluated.       Past Medical History    I have reviewed this patient's medical history and updated it with pertinent information if needed.   No past medical history on file.    Past Surgical History   I have reviewed this patient's surgical history and updated it with pertinent information if needed.  No past surgical history  on file.    Prior to Admission Medications   Prior to Admission Medications   Prescriptions Last Dose Informant Patient Reported? Taking?   OLANZapine (ZYPREXA) 10 MG tablet   No No   Sig: Take 1 tablet (10 mg) by mouth 2 times daily as needed (agitation/anxiety)   buPROPion (WELLBUTRIN XL) 150 MG 24 hr tablet   No No   Sig: Take 1 tablet (150 mg) by mouth daily   lithium (ESKALITH CR/LITHOBID) 450 MG CR tablet   No No   Sig: Take 2 tablets (900 mg) by mouth At Bedtime   pregabalin (LYRICA) 100 MG capsule   No No   Sig: Take 1 capsule (100 mg) by mouth 3 times daily      Facility-Administered Medications: None     Allergies   Allergies   Allergen Reactions     Pork Allergy        Social History   I have reviewed this patient's social history and updated it with pertinent information if needed. Steven Carter  reports that he has been smoking. He has a 4.50 pack-year smoking history. His smokeless tobacco use includes chew. He reports current drug use. Drug: Other. He reports that he does not drink alcohol.    Family History   I have reviewed this patient's family history and updated it with pertinent information if needed.   No family history on file.    Review of Systems   CONSTITUTIONAL:  negative  EYES:  negative  HEENT:  negative  RESPIRATORY:  negative  CARDIOVASCULAR:  negative  GASTROINTESTINAL:  negative  GENITOURINARY:  negative  INTEGUMENT/BREAST:  negative  HEMATOLOGIC/LYMPHATIC:  negative  ALLERGIC/IMMUNOLOGIC:  negative  ENDOCRINE:  negative  MUSCULOSKELETAL:  Negative except chronic back pain.   NEUROLOGICAL:  negative    Physical Exam   Temp: 97.6  F (36.4  C) Temp src: Tympanic BP: 120/70 Pulse: 73   Resp: 16 SpO2: 98 % O2 Device: None (Room air)    Vital Signs with Ranges  Temp:  [97.6  F (36.4  C)-98.2  F (36.8  C)] 97.6  F (36.4  C)  Pulse:  [73-90] 73  Resp:  [16-18] 16  BP: (120)/(70) 120/70  SpO2:  [97 %-98 %] 98 %  0 lbs 0 oz    Constitutional: awake, alert, cooperative, no apparent  distress, and vitals stable   Eyes: Lids and lashes normal, pupils equal, round and reactive to light, extra ocular muscles intact, sclera clear, conjunctiva normal  ENT: Normocephalic, without obvious abnormality, atraumatic, external ears without lesions, oral pharynx with moist mucous membranes, no erythema or exudates  Hematologic / Lymphatic: no cervical lymphadenopathy  Respiratory: No increased work of breathing, good air exchange, clear to auscultation bilaterally, no crackles or wheezing  Cardiovascular: Normal apical impulse, regular rate and rhythm, normal S1 and S2, no S3 or S4, and no murmur noted  GI: normal bowel sounds, soft, non-distended, non-tender, no masses palpated, no hepatosplenomegally  Genitounirinary: deferred  Skin: normal skin color, texture, turgor, no redness, warmth, or swelling and no rashes  Musculoskeletal: There is no redness, warmth, or swelling of the joints.  Full range of motion noted.    Neurologic: Awake, alert, responds to name. Unknown if oriented to place and time.   Neuropsychiatric: General: guarded, quiet, normal eye contact    Data   Data reviewed today:   Recent Labs   Lab 12/15/21  1413   WBC 8.7   HGB 15.7   MCV 85         POTASSIUM 3.9   CHLORIDE 104   CO2 24   BUN 9   CR 1.15   ANIONGAP 10   NIKKI 9.9   GLC 84   ALBUMIN 5.0   PROTTOTAL 7.2   BILITOTAL 0.9   ALKPHOS 53   ALT 12   AST 12*       No results found for this or any previous visit (from the past 24 hour(s)).

## 2021-12-17 LAB
GAMMA INTERFERON BACKGROUND BLD IA-ACNC: 0 IU/ML
HCV AB SERPL QL IA: NONREACTIVE
HIV 1+2 AB+HIV1 P24 AG SERPL QL IA: NONREACTIVE
M TB IFN-G BLD-IMP: NEGATIVE
M TB IFN-G CD4+ BCKGRND COR BLD-ACNC: 10 IU/ML
MITOGEN IGNF BCKGRD COR BLD-ACNC: 0 IU/ML
MITOGEN IGNF BCKGRD COR BLD-ACNC: 0 IU/ML
QUANTIFERON MITOGEN: 10 IU/ML
QUANTIFERON NIL TUBE: 0 IU/ML
QUANTIFERON TB1 TUBE: 0 IU/ML
QUANTIFERON TB2 TUBE: 0

## 2021-12-17 PROCEDURE — 124N000001 HC R&B MH

## 2021-12-17 PROCEDURE — 99233 SBSQ HOSP IP/OBS HIGH 50: CPT | Performed by: NURSE PRACTITIONER

## 2021-12-17 PROCEDURE — 250N000013 HC RX MED GY IP 250 OP 250 PS 637: Performed by: NURSE PRACTITIONER

## 2021-12-17 RX ORDER — AMANTADINE HYDROCHLORIDE 100 MG/1
100 CAPSULE, GELATIN COATED ORAL DAILY
Status: DISCONTINUED | OUTPATIENT
Start: 2021-12-17 | End: 2021-12-21

## 2021-12-17 RX ORDER — METAXALONE 800 MG/1
800 TABLET ORAL ONCE
Status: COMPLETED | OUTPATIENT
Start: 2021-12-17 | End: 2021-12-17

## 2021-12-17 RX ORDER — LITHIUM CARBONATE 450 MG
900 TABLET, EXTENDED RELEASE ORAL AT BEDTIME
Status: DISCONTINUED | OUTPATIENT
Start: 2021-12-18 | End: 2022-03-08 | Stop reason: HOSPADM

## 2021-12-17 RX ORDER — ERGOCALCIFEROL 1.25 MG/1
50000 CAPSULE, LIQUID FILLED ORAL WEEKLY
Qty: 12 CAPSULE | Refills: 0 | Status: SHIPPED | OUTPATIENT
Start: 2021-12-17 | End: 2022-03-07

## 2021-12-17 RX ORDER — LITHIUM CARBONATE 300 MG/1
600 TABLET, FILM COATED, EXTENDED RELEASE ORAL AT BEDTIME
Status: COMPLETED | OUTPATIENT
Start: 2021-12-17 | End: 2021-12-17

## 2021-12-17 RX ORDER — LITHIUM CARBONATE 300 MG/1
600 TABLET, FILM COATED, EXTENDED RELEASE ORAL AT BEDTIME
Status: DISCONTINUED | OUTPATIENT
Start: 2021-12-17 | End: 2021-12-17

## 2021-12-17 RX ADMIN — ACETAMINOPHEN 650 MG: 325 TABLET, FILM COATED ORAL at 23:19

## 2021-12-17 RX ADMIN — LORAZEPAM 2 MG: 1 TABLET ORAL at 16:48

## 2021-12-17 RX ADMIN — LITHIUM CARBONATE 600 MG: 300 TABLET, EXTENDED RELEASE ORAL at 20:02

## 2021-12-17 RX ADMIN — MEMANTINE 5 MG: 5 TABLET ORAL at 08:54

## 2021-12-17 RX ADMIN — BUPROPION HYDROCHLORIDE 150 MG: 150 TABLET, FILM COATED, EXTENDED RELEASE ORAL at 08:54

## 2021-12-17 RX ADMIN — LORAZEPAM 2 MG: 1 TABLET ORAL at 20:03

## 2021-12-17 RX ADMIN — METAXALONE 800 MG: 800 TABLET ORAL at 21:52

## 2021-12-17 RX ADMIN — OLANZAPINE 10 MG: 10 TABLET, FILM COATED ORAL at 20:03

## 2021-12-17 RX ADMIN — LORAZEPAM 2 MG: 1 TABLET ORAL at 08:54

## 2021-12-17 RX ADMIN — AMANTADINE HYDROCHLORIDE 100 MG: 100 CAPSULE ORAL at 14:53

## 2021-12-17 RX ADMIN — LORAZEPAM 2 MG: 1 TABLET ORAL at 13:11

## 2021-12-17 ASSESSMENT — ACTIVITIES OF DAILY LIVING (ADL)
ORAL_HYGIENE: INDEPENDENT
DRESS: INDEPENDENT
HYGIENE/GROOMING: INDEPENDENT
HYGIENE/GROOMING: INDEPENDENT
ORAL_HYGIENE: INDEPENDENT
DRESS: SCRUBS (BEHAVIORAL HEALTH);INDEPENDENT

## 2021-12-17 NOTE — PLAN OF CARE
"  Problem: Behavioral Health Plan of Care  Goal: Patient-Specific Goal (Individualization)  Description: Patient will be free from self harm or injury  Patient will eat at least 50% of meals  Patient will attend at least 50% of groups when able.   Outcome: Improving  Note: Shift Summery:      Pt in bed at the start of the shift. Pt willing to answer questions when asked but only with yes or no answers. Pt denied pain, anxiety, SI, HI and hallucinations. Pt does report depression but states it isn't high. Pt denies suicidal ideation but paused a long time before answering \"no\". Asked if he would admit to suicidal thoughts if he had them, pt said that he would.     Pt in bed most of the shift, pt up for meals and to request shower supplies. Pt incontinent of loose stool this morning.      Problem: Behavior Regulation Impairment (Psychotic Signs/Symptoms)  Goal: Improved Behavioral Control (Psychotic Signs/Symptoms)  Outcome: Improving     Problem: Suicidal Behavior  Goal: Suicidal Behavior is Absent or Managed  Outcome: Improving     Face to face end of shift report communicated to evening shift RN. Reported that pt is a suicidal risk.     Sarahi Hui RN  12/17/2021  9:26 AM       "
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
## 2021-12-17 NOTE — PLAN OF CARE
Problem: Behavioral Health Plan of Care  Goal: Patient-Specific Goal (Individualization)  Description: Patient will be free from self harm or injury  Patient will eat at least 50% of meals  Patient will attend at least 50% of groups when able.   Outcome: No Change    Pt moved to open unit earlier today, behavior is appropriate on open unit. Pt keeps to himself and is quiet, polite and slightly paranoid in conversation. Denied SI and hallucinations, only minimally responds to assessment questions. Pt gave mixed/unreliable responses to some admission questions when asked by this writer. Pt concerned at start of shift due to wanting his regular meds restarted, provider ordered and these were given at HS. Pt had complaints of 6/10 back pain, given PRN Ibuprofen and Tylenol at 1615, reported some relief after taking. Ate 100% of dinner.    Problem: Behavior Regulation Impairment (Psychotic Signs/Symptoms)  Goal: Improved Behavioral Control (Psychotic Signs/Symptoms)  Outcome: Improving    Problem: Suicidal Behavior  Goal: Suicidal Behavior is Absent or Managed  Outcome: Improving    2330 - Face to face end of shift report to be communicated to oncoming shift RN.     Harriett Cameron RN  12/16/2021  10:39 PM

## 2021-12-17 NOTE — PLAN OF CARE
Face to face end of shift report will be communicated to oncoming RN.     Problem: Behavioral Health Plan of Care  Goal: Patient-Specific Goal (Individualization)  Description: Patient will be free from self harm or injury  Patient will eat at least 50% of meals  Patient will attend at least 50% of groups when able.       Outcome: No Change     Problem: Behavior Regulation Impairment (Psychotic Signs/Symptoms)  Goal: Improved Behavioral Control (Psychotic Signs/Symptoms)  Outcome: No Change     Problem: Suicidal Behavior  Goal: Suicidal Behavior is Absent or Managed  Outcome: No Change    Face to face end of shift report obtained from TRAE Lorenzana. Pt observed resting in bed.  Pt appears to be sleeping in bed with eyes closed, having regular respirations, and position changes. 15 minutes and PRN safety checks completed with no noted complains. No self harm noted or reported so far this shift.  0600-Pt woke up at 0530, walked to nurse station and asked for set of scrubs. Noted odor coming from room. Pt had episode of loose stool. Pt changed pants, declined need for anything and denied any stomach pain. 2 cups of water brought to room, encouraged oral intake. Temp 100.2 This morning. Room warm. Shower supplies also provided.

## 2021-12-18 PROCEDURE — 124N000001 HC R&B MH

## 2021-12-18 PROCEDURE — 250N000013 HC RX MED GY IP 250 OP 250 PS 637: Performed by: NURSE PRACTITIONER

## 2021-12-18 PROCEDURE — 99233 SBSQ HOSP IP/OBS HIGH 50: CPT | Performed by: NURSE PRACTITIONER

## 2021-12-18 RX ORDER — ERGOCALCIFEROL 1.25 MG/1
50000 CAPSULE, LIQUID FILLED ORAL WEEKLY
Status: DISCONTINUED | OUTPATIENT
Start: 2021-12-18 | End: 2021-12-18

## 2021-12-18 RX ORDER — LIDOCAINE 4 G/G
2 PATCH TOPICAL
Status: DISCONTINUED | OUTPATIENT
Start: 2021-12-18 | End: 2021-12-25

## 2021-12-18 RX ORDER — LORAZEPAM 1 MG/1
2 TABLET ORAL 4 TIMES DAILY
Status: DISCONTINUED | OUTPATIENT
Start: 2021-12-18 | End: 2021-12-29

## 2021-12-18 RX ADMIN — AMANTADINE HYDROCHLORIDE 100 MG: 100 CAPSULE ORAL at 09:35

## 2021-12-18 RX ADMIN — OLANZAPINE 10 MG: 10 TABLET, FILM COATED ORAL at 20:16

## 2021-12-18 RX ADMIN — LORAZEPAM 2 MG: 1 TABLET ORAL at 09:35

## 2021-12-18 RX ADMIN — LORAZEPAM 2 MG: 1 TABLET ORAL at 22:14

## 2021-12-18 RX ADMIN — LITHIUM CARBONATE 900 MG: 450 TABLET, EXTENDED RELEASE ORAL at 20:16

## 2021-12-18 RX ADMIN — LORAZEPAM 2 MG: 1 TABLET ORAL at 16:24

## 2021-12-18 RX ADMIN — BUPROPION HYDROCHLORIDE 150 MG: 150 TABLET, FILM COATED, EXTENDED RELEASE ORAL at 09:35

## 2021-12-18 NOTE — PROGRESS NOTES
[facility]  Psychiatric Progress Note      Impression:     This is a 31 year old yo male with likely schizoaffective disorder, depressed type who has decompensated significantly after I discontinued his ativan at his last hospital stay. He was voluntary at that time and we discharged him to treatment where he quickly declined which lead to mutism and refusal of food for days. He has now started showing improvement with catatonic symptoms with ativan though still is extremely sad appearing.         Interim History:     Nursing notes indicate that he was talking loudly to himself at 0100. When I asked him about this he stated he was not having any hallucinations.  I asked him if he recalled talking to himself last night or talking out loud and he shook his head no.  He responds very minimally today and mostly answers by nodding his head yes or no.  I met with him at approximately 935.  He tells me he did not eat breakfast this morning.  When asked if he ate last night he appeared to be trying to recall if he ate.  He shrugged his shoulders as if he did not know.  It does appear that he did eat last night.  I am going to change the times of his lorazepam to half hour prior to meals with the hopes that this will help him eat more.      He is taking all prescribed medications. Amantadine was added to medications to target catatonia off label. I do not see that he has had further diarrhea. Unsure if this was secondary to lithium vs namenda. Would like to retry namenda as well for catatonia in a few days. Will increase lithium back to 900 mg tonight.  I did review past records from childhood he was on Wellbutrin and notes indicate that he did well on the Wellbutrin.  I believe this with age 10.  I did tell him that I reviewed this and that we likely should increase his Wellbutrin in the next few days.  He is not appearing to be preoccupied though extremely flat.  I asked if he is having any thoughts about  people trying to harm him and he shakes his head no.  I asked him if he feels like anybody else is present in his room or watching him and he shakes his head no.  He shakes his head no that he has not had any further diarrhea after I held Namenda.    Educated regarding medication indications, risks, benefits, side effects, contraindications and possible interactions. Verbally expressed understanding.        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            Medications:       amantadine  100 mg Oral Daily     buPROPion  150 mg Oral Daily     lithium ER  900 mg Oral At Bedtime     LORazepam  2 mg Oral 4x Daily     [Held by provider] memantine  5 mg Oral Daily     nicotine  1 patch Transdermal Daily     nicotine   Transdermal Q8H     OLANZapine  10 mg Oral At Bedtime     vitamin D2  50,000 Units Oral Q7 Days       Prescription Medications as of 12/18/2021       Rx Number Disp Refills Start End Last Dispensed Date Next Fill Date Owning Pharmacy    buPROPion (WELLBUTRIN XL) 150 MG 24 hr tablet  30 tablet 0 12/9/2021    [id number] - [place], [place] - [address]    Sig: Take 1 tablet (150 mg) by mouth daily    Class: E-Prescribe    Route: Oral    Renewals     Renewal requests to authorizing provider (Christelle Contreras NP) <b>prohibited</b>          lithium (ESKALITH CR/LITHOBID) 450 MG CR tablet  60 tablet 0 12/8/2021    [id number] - [place], [place] - [address]    Sig: Take 2 tablets (900 mg) by mouth At Bedtime    Class: E-Prescribe    Route: Oral    Renewals     Renewal requests to authorizing provider (Christelle Contreras NP) <b>prohibited</b>          OLANZapine  (ZYPREXA) 10 MG tablet  60 tablet 0 12/8/2021    [id number], [place] - [address]    Sig: Take 1 tablet (10 mg) by mouth 2 times daily as needed (agitation/anxiety)    Class: E-Prescribe    Route: Oral    Renewals     Renewal requests to authorizing provider (Christelle Contreras, NP) <b>prohibited</b>          pregabalin (LYRICA) 100 MG capsule  90 capsule 0 12/8/2021    [id number], [place] - [address]    Sig: Take 1 capsule (100 mg) by mouth 3 times daily    Class: E-Prescribe    Route: Oral    Renewals     Renewal requests to authorizing provider (Christelle Contreras, NP) <b>prohibited</b>          vitamin D2 (ERGOCALCIFEROL) 22245 units (1250 mcg) capsule  12 capsule 0 12/17/2021    [facility] [place] [address], [place] - [address]    Sig: Take 1 capsule (50,000 Units) by mouth once a week    Class: E-Prescribe    Route: Oral      Hospital Medications as of 12/18/2021       Dose Frequency Start End    acetaminophen (TYLENOL) tablet 650 mg 650 mg EVERY 4 HOURS PRN 12/16/2021     Admin Instructions: Use 1st.  Maximum acetaminophen dose from all sources = 75 mg/kg/day not to exceed 4 grams/day.    Class: E-Prescribe    Route: Oral    amantadine (SYMMETREL) capsule 100 mg 100 mg DAILY 12/17/2021     Class: E-Prescribe    Route: Oral    buPROPion (WELLBUTRIN XL) 24 hr tablet 150 mg 150 mg DAILY 12/17/2021     Admin Instructions: DO NOT CRUSH.    Class: E-Prescribe    Route: Oral    lithium (ESKALITH CR/LITHOBID) CR tablet 900 mg 900 mg AT BEDTIME 12/18/2021     Admin Instructions: DO NOT CRUSH.    Class: E-Prescribe    Route: Oral    lithium ER (LITHOBID) CR tablet 600 mg (Completed) 600 mg AT BEDTIME 12/17/2021 12/17/2021    Admin Instructions: DO NOT CRUSH.    Class: E-Prescribe    Route: Oral    LORazepam (ATIVAN) tablet 2 mg 2 mg 4 TIMES DAILY 12/16/2021     Admin Instructions: Please have patient chew tablet abs place sublingual  "or buccal    Class: E-Prescribe    Route: Oral    memantine (NAMENDA) tablet 5 mg ([Held by provider] since 12/17/2021  2:33 PM) 5 mg DAILY 12/16/2021     Class: E-Prescribe    Notes to Pharmacy: For catattonia    Route: Oral    metaxalone (SKELAXIN) tablet 800 mg (Completed) 800 mg ONCE 12/17/2021 12/17/2021    Admin Instructions: For back pain.    Class: E-Prescribe    Route: Oral    nicotine (NICODERM CQ) 21 MG/24HR 24 hr patch 1 patch 1 patch DAILY 12/16/2021     Admin Instructions: Reminder: Remove previous patch before applying new patch.    Class: E-Prescribe    Route: Transdermal    nicotine Patch in Place  EVERY 8 HOURS 12/16/2021     Admin Instructions: Chart every shift, confirming that patch is still in place on patient (no barcode scan needed). See patch order for dose information.    Class: E-Prescribe    Route: Transdermal    OLANZapine (zyPREXA) injection 10 mg 10 mg 3 TIMES DAILY PRN 12/16/2021     Admin Instructions: Dissolve the contents of the 10 mg vial using 2.1 mL of Sterile Water for Injection to provide a solution containing 5 mg/mL of olanzapine. Withdraw the ordered dose from vial. Use immediately (within 1 hour) after reconstitution.  Discard any unused portion.    Class: E-Prescribe    Route: Intramuscular    Linked Group 1: \"Or\" Linked Group Details        OLANZapine (zyPREXA) tablet 10 mg 10 mg AT BEDTIME 12/16/2021     Admin Instructions: Combined IM and PO doses may significantly increase the risk of orthostatic hypotension at 30 mg per day or higher.    Class: E-Prescribe    Route: Oral    OLANZapine zydis (zyPREXA) ODT tab 10 mg 10 mg 3 TIMES DAILY PRN 12/16/2021     Admin Instructions: Combined IM and PO doses may significantly increase the risk of orthostatic hypotension at 30 mg per day or higher.  With dry hands, peel back foil backing and gently remove tablet. Do not push oral disintegrating tablet through foil backing. Administer immediately on tongue and oral disintegrating " "tablet dissolves in seconds, then swallow with saliva. Liquid not required.    Class: E-Prescribe    Route: Oral    Linked Group 1: \"Or\" Linked Group Details        vitamin D2 (ERGOCALCIFEROL) 66196 units (1250 mcg) capsule 50,000 Units 50,000 Units EVERY 7 DAYS 12/16/2021     Admin Instructions: One capsule weekly for 8 weeks.    Class: E-Prescribe    Route: Oral               10 point ROS negative back pain       Allergies:     Allergies   Allergen Reactions     Pork Allergy             Psychiatric Examination:   /63 (BP Location: Left arm)   Pulse 71   Temp 97.1  F (36.2  C) (Temporal)   Resp 16   Ht 1.829 m (6')   Wt 96.4 kg (212 lb 9.6 oz)   SpO2 98%   BMI 28.83 kg/m    Weight is 212 lbs 9.6 oz  Body mass index is 28.83 kg/m .  Appearance/Behavior: flat apathetic  -Motor: intact  -Gait: intact  -Abnormal involuntary movements: none  -Mood: sad  -Affect: sad  -Speech: monotone and delayed.   -Thought process/associations: though blocking   -Thought content: no delusions or hallucinations  -Perceptual disturbances: No hallucinations..              -Suicidal/Homicidal Ideation: denies current SI. Denies hallucinations.   -Judgment: poor  -Insight: poor  *Orientation: time, place and person.  *Memory: intact  *Attention: fair  *Language: fluent, no aphasias, able to repeat phrases and name objects. Vocab intact.  *Fund of information: appropriate for education  *Cognitive functioning estimate: 0 - independent.           Labs:   No results found for this or any previous visit (from the past 24 hour(s)).  No labs today    BEHAVIORAL TEAM DISCUSSION    Progress: is eating. No aggression. Catatonia improving.   Continued Stay Criteria/Rationale: high risk of returning to catatonic state and refusal of food  Medical/Physical: back pain  Precautions: I and O  Falls precaution?: No  Behavioral Orders   Procedures     Code 1 - Restrict to Unit     Routine Programming     As clinically indicated     Status 15 "     Every 15 minutes.           Plan:       -Continue to hold namenda due to diarrhea  -Restart 900 of lithium.  Did have 600 mg dose yesterday  - started amantadine for catatonia yesterday  -continue ativan no change times two 1/2-hour prior to food  -continue zyprexa 10 mg  -Waiting on records from [facility]  -Likely increase Wellbutrin in the next few days  -Going to speak with Dr. Timmons about possible ECT if catatonia does not improve more.  May need to increase dose of lorazepam.      Family/Collateral contact if applicable: he is not wanting me to call his mother though states possibly he will be allowing this in the future.         Rationale for change in precautions or plan: did fill out petition for commitment      Participants: DAYSI Pearce, CNP, Dr. Timmons, SW, OT, Nursing

## 2021-12-18 NOTE — PLAN OF CARE
"  Problem: Behavioral Health Plan of Care  Goal: Patient-Specific Goal (Individualization)  Description: Patient will be free from self harm or injury  Patient will eat at least 50% of meals  Patient will attend at least 50% of groups when able.       Outcome: Improving  Note: Shift Summery:      1800 Patient has remained in his room except came out for dinner meal and ate well. Patient has good eye contact but has few verbal interactions with staff. Does answer questions with 1-2 words. Patient out to desk and requested a PRN and when the list of medications was read he said he would wait until bedtime. Patient is sad and flat. Does not attend groups or socialize with peers.    2100 Patient approached window and complained of back pain at a 6 on 0/10 scale. States that he would like a one time dose of hydocodone. Phoned practitioner and order received.    Face to face end of shift report communicated to 11-7 shift RN.     Le Bermudez RN  12/17/2021  9:43 PM        Patient's Stated Goal for Shift:  \"no stated goal\"    Goal Status:  In Process       Problem: Behavior Regulation Impairment (Psychotic Signs/Symptoms)  Goal: Improved Behavioral Control (Psychotic Signs/Symptoms)  Outcome: Improving     Problem: Suicidal Behavior  Goal: Suicidal Behavior is Absent or Managed  Outcome: Improving     "

## 2021-12-18 NOTE — PROGRESS NOTES
[facility]  Psychiatric Progress Note      Impression:     This is a 31 year old yo male with likely schizoaffective disorder, depressed type who has decompensated significantly after I discontinued his ativan at his last hospital stay. He was voluntary at that time and we discharged him to treatment where he quickly declined which lead to mutism and refusal of food for days. He has now started showing improvement with catatonic symptoms with ativan though still is extremely sad appearing.         Interim History:     Steven had diarrhea and was incontinent twice. Possibly secondary to namenda. Possibly secondary to restarting lithiujm at 900 and unsure if he had missed a few doses when catatonic. Will hold namenda. He is eating better. He is talking more. His affect is still extremely blunted. I discussed in our treatment team the rationale of why he may need a commitment vs not needing a commitment as he is quite cooperative. He recently was voluntary in lieu of commitment went to a [facility] [place] and relapsed on meth. The relapse was brief though he did decompensate mentally quite significantly. He does not follow through well with outpatient services while under commitment. I did tell him I was filling out a petition because of his historical follow through and stated that I want to get him feeling better and keep him feelling better and on the right track and feeling well and if his follow through is minimal after discharge he will decompensate quickly. He did not become angry though did quit talking to me until I returned to his room later in the day. He then talked to me and did state he would sign a release for hospital records to review his nuero notes from past tbi.       Educated regarding medication indications, risks, benefits, side effects, contraindications and possible interactions. Verbally expressed understanding.        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            Medications:       amantadine  100 mg Oral Daily     buPROPion  150 mg Oral Daily     lithium ER  900 mg Oral At Bedtime     LORazepam  2 mg Oral 4x Daily     [Held by provider] memantine  5 mg Oral Daily     nicotine  1 patch Transdermal Daily     nicotine   Transdermal Q8H     OLANZapine  10 mg Oral At Bedtime     vitamin D2  50,000 Units Oral Q7 Days       Prescription Medications as of 12/18/2021       Rx Number Disp Refills Start End Last Dispensed Date Next Fill Date Owning Pharmacy    buPROPion (WELLBUTRIN XL) 150 MG 24 hr tablet  30 tablet 0 12/9/2021    [id number], [place] - [address]    Sig: Take 1 tablet (150 mg) by mouth daily    Class: E-Prescribe    Route: Oral    Renewals     Renewal requests to authorizing provider (Christelle Contreras NP) <b>prohibited</b>          lithium (ESKALITH CR/LITHOBID) 450 MG CR tablet  60 tablet 0 12/8/2021    [facility] [place] [address], [place] - [address]    Sig: Take 2 tablets (900 mg) by mouth At Bedtime    Class: E-Prescribe    Route: Oral    Renewals     Renewal requests to authorizing provider (Christelle Contreras NP) <b>prohibited</b>          OLANZapine (ZYPREXA) 10 MG tablet  60 tablet 0 12/8/2021    [facility] [place] [address], [place] - [address]    Sig: Take 1 tablet (10 mg) by mouth 2 times daily as needed (agitation/anxiety)    Class: E-Prescribe    Route: Oral    Renewals     Renewal requests to authorizing provider (Christelle Contreras NP) <b>prohibited</b>          pregabalin (LYRICA) 100 MG capsule  90 capsule 0 12/8/2021    [facility], [place] - [address]     Sig: Take 1 capsule (100 mg) by mouth 3 times daily    Class: E-Prescribe    Route: Oral    Renewals     Renewal requests to authorizing provider (Christelle Contreras, NP) <b>prohibited</b>          vitamin D2 (ERGOCALCIFEROL) 86334 units (1250 mcg) capsule  12 capsule 0 12/17/2021    [id number] - [place], [place] - [address]    Sig: Take 1 capsule (50,000 Units) by mouth once a week    Class: E-Prescribe    Route: Oral      Hospital Medications as of 12/18/2021       Dose Frequency Start End    acetaminophen (TYLENOL) tablet 650 mg 650 mg EVERY 4 HOURS PRN 12/16/2021     Admin Instructions: Use 1st.  Maximum acetaminophen dose from all sources = 75 mg/kg/day not to exceed 4 grams/day.    Class: E-Prescribe    Route: Oral    amantadine (SYMMETREL) capsule 100 mg 100 mg DAILY 12/17/2021     Class: E-Prescribe    Route: Oral    buPROPion (WELLBUTRIN XL) 24 hr tablet 150 mg 150 mg DAILY 12/17/2021     Admin Instructions: DO NOT CRUSH.    Class: E-Prescribe    Route: Oral    lithium (ESKALITH CR/LITHOBID) CR tablet 900 mg 900 mg AT BEDTIME 12/18/2021     Admin Instructions: DO NOT CRUSH.    Class: E-Prescribe    Route: Oral    lithium ER (LITHOBID) CR tablet 600 mg (Completed) 600 mg AT BEDTIME 12/17/2021 12/17/2021    Admin Instructions: DO NOT CRUSH.    Class: E-Prescribe    Route: Oral    LORazepam (ATIVAN) tablet 2 mg 2 mg 4 TIMES DAILY 12/16/2021     Admin Instructions: Please have patient chew tablet abs place sublingual or buccal    Class: E-Prescribe    Route: Oral    memantine (NAMENDA) tablet 5 mg ([Held by provider] since 12/17/2021  2:33 PM) 5 mg DAILY 12/16/2021     Class: E-Prescribe    Notes to Pharmacy: For catattonia    Route: Oral    metaxalone (SKELAXIN) tablet 800 mg (Completed) 800 mg ONCE 12/17/2021 12/17/2021    Admin Instructions: For back pain.    Class: E-Prescribe    Route: Oral    nicotine (NICODERM CQ) 21 MG/24HR 24 hr patch 1 patch 1 patch DAILY 12/16/2021     Admin  "Instructions: Reminder: Remove previous patch before applying new patch.    Class: E-Prescribe    Route: Transdermal    nicotine Patch in Place  EVERY 8 HOURS 12/16/2021     Admin Instructions: Chart every shift, confirming that patch is still in place on patient (no barcode scan needed). See patch order for dose information.    Class: E-Prescribe    Route: Transdermal    OLANZapine (zyPREXA) injection 10 mg 10 mg 3 TIMES DAILY PRN 12/16/2021     Admin Instructions: Dissolve the contents of the 10 mg vial using 2.1 mL of Sterile Water for Injection to provide a solution containing 5 mg/mL of olanzapine. Withdraw the ordered dose from vial. Use immediately (within 1 hour) after reconstitution.  Discard any unused portion.    Class: E-Prescribe    Route: Intramuscular    Linked Group 1: \"Or\" Linked Group Details        OLANZapine (zyPREXA) tablet 10 mg 10 mg AT BEDTIME 12/16/2021     Admin Instructions: Combined IM and PO doses may significantly increase the risk of orthostatic hypotension at 30 mg per day or higher.    Class: E-Prescribe    Route: Oral    OLANZapine zydis (zyPREXA) ODT tab 10 mg 10 mg 3 TIMES DAILY PRN 12/16/2021     Admin Instructions: Combined IM and PO doses may significantly increase the risk of orthostatic hypotension at 30 mg per day or higher.  With dry hands, peel back foil backing and gently remove tablet. Do not push oral disintegrating tablet through foil backing. Administer immediately on tongue and oral disintegrating tablet dissolves in seconds, then swallow with saliva. Liquid not required.    Class: E-Prescribe    Route: Oral    Linked Group 1: \"Or\" Linked Group Details        vitamin D2 (ERGOCALCIFEROL) 22548 units (1250 mcg) capsule 50,000 Units 50,000 Units EVERY 7 DAYS 12/16/2021     Admin Instructions: One capsule weekly for 8 weeks.    Class: E-Prescribe    Route: Oral               10 point ROS negative back pain       Allergies:     Allergies   Allergen Reactions     Pork " Allergy             Psychiatric Examination:   /63 (BP Location: Left arm)   Pulse 71   Temp 97.1  F (36.2  C) (Temporal)   Resp 16   Ht 1.829 m (6')   Wt 96.4 kg (212 lb 9.6 oz)   SpO2 98%   BMI 28.83 kg/m    Weight is 212 lbs 9.6 oz  Body mass index is 28.83 kg/m .  Appearance/Behavior: flat apathetic  -Motor: intact  -Gait: intact  -Abnormal involuntary movements: none  -Mood: sad  -Affect: sad  -Speech: monotone and delayed.   -Thought process/associations: though blocking   -Thought content: no delusions or hallucinations  -Perceptual disturbances: No hallucinations..              -Suicidal/Homicidal Ideation: denies current SI. Denies hallucinations.   -Judgment: poor  -Insight: poor  *Orientation: time, place and person.  *Memory: intact  *Attention: fair  *Language: fluent, no aphasias, able to repeat phrases and name objects. Vocab intact.  *Fund of information: appropriate for education  *Cognitive functioning estimate: 0 - independent.           Labs:   No results found for this or any previous visit (from the past 24 hour(s)).  No labs today    BEHAVIORAL TEAM DISCUSSION    Progress: is eating. No aggression. Catatonia improving.   Continued Stay Criteria/Rationale: high risk of returning to catatonic state and refusal of food  Medical/Physical: back pain  Precautions: I and O  Falls precaution?: No  Behavioral Orders   Procedures     Code 1 - Restrict to Unit     Routine Programming     As clinically indicated     Status 15     Every 15 minutes.           Plan:       - did hold namenda due to diarrhea  -decreased lithium to 600mg due to diarrhea then increase to 900 mg again  - started amantadine for catatonia  -continue ativan  -continue zyprexa  -signed release for [facility] for TBI records.         Family/Collateral contact if applicable: he is not wanting me to call his mother though states possibly he will be allowing this in the future.         Rationale for change in  precautions or plan: did fill out petition for commitment      Participants: DAYSI Pearce, CNP, Dr. Timmons, SW, OT, Nursing

## 2021-12-18 NOTE — PLAN OF CARE
"Face to face end of shift report received from Le SUTTON RN. Rounding completed and patient observed lying in bed with eyes closed. Breathing regular and unlabored.     13:00 Update: Patient has isolated o his room fo most of the shift. He took he oral medications as prescribed. He declined his nicotine patch. Patient declined to eat any breakfast. He asked for it and then changed his mind and said \"never mind.\" He has been dismissive. He denied pain. He has been cooperative but declines all symptoms for this writer. He is clean and appropriately dressed. Once in awhile, he attends groups but denied all prompts to get him to go today. Patient has not had any angry outbursts. His speech is blunted and float and he says very few words.     15:15 Face to face end of shift report communicated to the charge nurse. This writer will continue to provide nursing services for patient throughout the next shift. Rounding completed and patient observed.    20:00 Update: Patient took his meds without complaint. He came to the nurses station and asked for his meds. He declined his lidocaine patches. Patient mainly isolated to his room thoughout the shift. Many times he did not respond to questions from staff. He ate his dinner and ate snack.     Face to face end of shift report communicated to oncsalomón RN.        Problem: Behavioral Health Plan of Care  Goal: Patient-Specific Goal (Individualization)  Description: Patient will be free from self harm or injury  Patient will eat at least 50% of meals  Patient will attend at least 50% of groups when able.       Outcome: No Change     Problem: Behavior Regulation Impairment (Psychotic Signs/Symptoms)  Goal: Improved Behavioral Control (Psychotic Signs/Symptoms)  Outcome: No Change     Problem: Suicidal Behavior  Goal: Suicidal Behavior is Absent or Managed  Outcome: Improving     "

## 2021-12-18 NOTE — PLAN OF CARE
"  Problem: Behavioral Health Plan of Care  Goal: Patient-Specific Goal (Individualization)  Description: Patient will be free from self harm or injury  Patient will eat at least 50% of meals  Patient will attend at least 50% of groups when able.       12/18/2021 0108 by Le Bermudez RN  Outcome: Improving  Note: Shift Summery:      0100 Patient remains awake and is heard at times talking loudly to himself. Offered patient Zyprexa but patient refused stating he doesn't need it. Mostly patient remains in his room and resting on his bed.    0400 Patient has finally fallen asleep in bed.     Face to face end of shift report communicated to 7-3 shift RN.     Le Bermudez RN  12/18/2021  5:07 AM          Patient's Stated Goal for Shift:  \"no stated goal\"    Goal Status:  Met       Problem: Behavior Regulation Impairment (Psychotic Signs/Symptoms)  Goal: Improved Behavioral Control (Psychotic Signs/Symptoms)  12/18/2021 0108 by Le Bermudez RN  Outcome: Improving  12/17/2021 1841 by Le Bermudez RN  Outcome: Improving     Problem: Suicidal Behavior  Goal: Suicidal Behavior is Absent or Managed  12/18/2021 0108 by Le Bermudez RN  Outcome: Improving     "

## 2021-12-19 PROCEDURE — 250N000013 HC RX MED GY IP 250 OP 250 PS 637: Performed by: NURSE PRACTITIONER

## 2021-12-19 PROCEDURE — 124N000001 HC R&B MH

## 2021-12-19 RX ADMIN — LORAZEPAM 2 MG: 1 TABLET ORAL at 16:08

## 2021-12-19 RX ADMIN — LORAZEPAM 2 MG: 1 TABLET ORAL at 08:05

## 2021-12-19 RX ADMIN — AMANTADINE HYDROCHLORIDE 100 MG: 100 CAPSULE ORAL at 08:05

## 2021-12-19 RX ADMIN — LORAZEPAM 2 MG: 1 TABLET ORAL at 21:23

## 2021-12-19 RX ADMIN — BUPROPION HYDROCHLORIDE 150 MG: 150 TABLET, FILM COATED, EXTENDED RELEASE ORAL at 08:05

## 2021-12-19 RX ADMIN — OLANZAPINE 10 MG: 10 TABLET, FILM COATED ORAL at 21:23

## 2021-12-19 RX ADMIN — LORAZEPAM 2 MG: 1 TABLET ORAL at 12:03

## 2021-12-19 RX ADMIN — LITHIUM CARBONATE 900 MG: 450 TABLET, EXTENDED RELEASE ORAL at 21:23

## 2021-12-19 NOTE — PLAN OF CARE
Problem: Behavioral Health Plan of Care  Goal: Patient-Specific Goal (Individualization)  Description: Patient will be free from self harm or injury  Patient will eat at least 50% of meals  Patient will attend at least 50% of groups when able.       Outcome: Improving     Problem: Suicidal Behavior  Goal: Suicidal Behavior is Absent or Managed  Outcome: Improving     Face to face shift report received from RN. Rounding completed, pt observed. Client rested in room for 7 hours with eyes closed and respirations noted.Client made no suicidal statements or gestures this shift. Face to face report will be communicated to oncoming RN.    Didier Brooks RN  12/19/2021  6:42 AM

## 2021-12-19 NOTE — PLAN OF CARE
"Face to face end of shift report received from Didier CORDOVA RN. Rounding completed and patient observed lying in bed with eyes closed. Breathing regular and unlabored.     12:45 Update: Patient did not communicate with this writer. When asked questions he would nod \"yes\" or \"no.\" He isolated to his room.. He declined to eat breakfast and then did not come out for lunch. Patient's affect was blunted and flat. He got up once to get coffee. He has been prompted to eat and drink multiple times by saff but declined.  He is disheveled and did not shower.     Face to face end of shift report communicated to oncoming RN.     Problem: Behavioral Health Plan of Care  Goal: Patient-Specific Goal (Individualization)  Description: Patient will be free from self harm or injury  Patient will eat at least 50% of meals  Patient will attend at least 50% of groups when able.       Outcome: No Change     Problem: Behavior Regulation Impairment (Psychotic Signs/Symptoms)  Goal: Improved Behavioral Control (Psychotic Signs/Symptoms)  Description: Patient will be able to have a reality based conversation.     Outcome: No Change     "

## 2021-12-20 PROCEDURE — 250N000013 HC RX MED GY IP 250 OP 250 PS 637: Performed by: NURSE PRACTITIONER

## 2021-12-20 PROCEDURE — 99233 SBSQ HOSP IP/OBS HIGH 50: CPT | Performed by: NURSE PRACTITIONER

## 2021-12-20 PROCEDURE — 124N000001 HC R&B MH

## 2021-12-20 RX ORDER — DIPHENHYDRAMINE HCL 50 MG
50 CAPSULE ORAL
Status: DISCONTINUED | OUTPATIENT
Start: 2021-12-20 | End: 2022-03-08 | Stop reason: HOSPADM

## 2021-12-20 RX ADMIN — BUPROPION HYDROCHLORIDE 150 MG: 150 TABLET, FILM COATED, EXTENDED RELEASE ORAL at 08:42

## 2021-12-20 RX ADMIN — LITHIUM CARBONATE 900 MG: 450 TABLET, EXTENDED RELEASE ORAL at 20:04

## 2021-12-20 RX ADMIN — DIPHENHYDRAMINE HCL 50 MG: 50 CAPSULE ORAL at 23:44

## 2021-12-20 RX ADMIN — LORAZEPAM 2 MG: 1 TABLET ORAL at 16:34

## 2021-12-20 RX ADMIN — AMANTADINE HYDROCHLORIDE 100 MG: 100 CAPSULE ORAL at 08:42

## 2021-12-20 RX ADMIN — OLANZAPINE 10 MG: 10 TABLET, FILM COATED ORAL at 20:04

## 2021-12-20 RX ADMIN — LORAZEPAM 2 MG: 1 TABLET ORAL at 11:42

## 2021-12-20 RX ADMIN — LORAZEPAM 2 MG: 1 TABLET ORAL at 21:06

## 2021-12-20 RX ADMIN — LORAZEPAM 2 MG: 1 TABLET ORAL at 08:42

## 2021-12-20 NOTE — PLAN OF CARE
Problem: Behavioral Health Plan of Care  Goal: Patient-Specific Goal (Individualization)  Description: Patient will be free from self harm or injury  Patient will eat at least 50% of meals  Patient will attend at least 50% of groups when able.       Outcome: Improving     Face to face shift report received from RN. Rounding completed, pt observed.Client rested in room for 7 hours with eyes closed and respirations noted. No signs of distress.Face to face report will be communicated to oncoming RN.    Didier Brooks RN  12/20/2021  6:31 AM

## 2021-12-20 NOTE — PROGRESS NOTES
"[facility]  Psychiatric Progress Note      Impression:     This is a 31 year old yo male with likely schizoaffective disorder, depressed type who has decompensated significantly after I discontinued his ativan at his last hospital stay. He was voluntary at that time and we discharged him to treatment where he quickly declined which lead to mutism and refusal of food for days. He has now started showing improvement with catatonic symptoms with ativan though still is extremely sad appearing.         Interim History:     Steven is veyr flat today. He intermittently eats his meals. Did not eat breakfast though ate dinner last night. Appear depressed. Not tearful though very sad. He states he is depressed. He tells me \"I accept this is what life is going to be like\". He asked to leave and I reminded him I filled out a petition for commitment and that he came in in a catatonic state and there is no way that he would stay stable if I discharged him and it would not be safe. He did not get upset about this. He is not irritable though at times he initially refuses meds though returns to nursing stating he will take them. He communicates with no one. He does not use the phone.     Educated regarding medication indications, risks, benefits, side effects, contraindications and possible interactions. Verbally expressed understanding.        Diagnoses:       Schizoaffective disorder, depressed type with catatonic symptoms  TBI-with coma for one week age 3  Methamphetamine use disorder severe  Alcohol use disorder, moderate    Attestation:  Patient has been seen and evaluated by me,  Ashely Heaton NP      The patient's care was discussed with the treatment team and chart notes were reviewed.            Medications:       amantadine  100 mg Oral Daily     buPROPion  150 mg Oral Daily     lidocaine  2 patch Transdermal Q24H     lidocaine   Transdermal Q8H     lithium ER  900 mg Oral At Bedtime     LORazepam  2 " mg Oral 4x Daily     nicotine  1 patch Transdermal Daily     nicotine   Transdermal Q8H     OLANZapine  10 mg Oral At Bedtime     vitamin D2  50,000 Units Oral Q7 Days       Prescription Medications as of 12/20/2021       Rx Number Disp Refills Start End Last Dispensed Date Next Fill Date Owning Pharmacy    buPROPion (WELLBUTRIN XL) 150 MG 24 hr tablet  30 tablet 0 12/9/2021    [facility] [address] [place] [address], [place] - [address]    Sig: Take 1 tablet (150 mg) by mouth daily    Class: E-Prescribe    Route: Oral    Renewals     Renewal requests to authorizing provider (Christelle Contreras, NP) <b>prohibited</b>          lithium (ESKALITH CR/LITHOBID) 450 MG CR tablet  60 tablet 0 12/8/2021    [facility] [address] [place] [address], [place] - [address]    Sig: Take 2 tablets (900 mg) by mouth At Bedtime    Class: E-Prescribe    Route: Oral    Renewals     Renewal requests to authorizing provider (Christelle Contreras, NP) <b>prohibited</b>          OLANZapine (ZYPREXA) 10 MG tablet  60 tablet 0 12/8/2021    [facility] [address] [place] [address], [place] - [address]    Sig: Take 1 tablet (10 mg) by mouth 2 times daily as needed (agitation/anxiety)    Class: E-Prescribe    Route: Oral    Renewals     Renewal requests to authorizing provider (Christelle Contreras, NP) <b>prohibited</b>          pregabalin (LYRICA) 100 MG capsule  90 capsule 0 12/8/2021    [facility] [address] [place] [address], [place] - [address]    Sig: Take 1 capsule (100 mg) by mouth 3 times daily    Class: E-Prescribe    Route: Oral    Renewals     Renewal requests to authorizing provider (Christelle Contreras NP) <b>prohibited</b>          vitamin D2 (ERGOCALCIFEROL) 22908 units (1250 mcg) capsule  12 capsule 0 12/17/2021    [facility] [address] [place] [address], [place] - [address]    Sig: Take 1 capsule (50,000 Units) by mouth once a week    Class: E-Prescribe    Route: Oral      Hospital Medications as of  12/20/2021       Dose Frequency Start End    acetaminophen (TYLENOL) tablet 650 mg 650 mg EVERY 4 HOURS PRN 12/16/2021     Admin Instructions: Use 1st.  Maximum acetaminophen dose from all sources = 75 mg/kg/day not to exceed 4 grams/day.    Class: E-Prescribe    Route: Oral    amantadine (SYMMETREL) capsule 100 mg 100 mg DAILY 12/17/2021     Class: E-Prescribe    Route: Oral    buPROPion (WELLBUTRIN XL) 24 hr tablet 150 mg 150 mg DAILY 12/17/2021     Admin Instructions: DO NOT CRUSH.    Class: E-Prescribe    Route: Oral    Lidocaine (LIDOCARE) 4 % Patch 2 patch 2 patch EVERY 24 HOURS 0800 12/18/2021     Admin Instructions: Apply patch(s) to back. To prevent lidocaine toxicity, patient should be patch free for 12 hrs daily. Patches may be cut to smaller size prior to removing release liner.  Reminder: Remove previous patch before applying new patch.  NEVER APPLY HEAT OVER PATCH which increases absorption and may lead to local anesthetic toxicity. Do not apply over area where liposomal bupivacaine was injected for 96 hours post injection.    Class: E-Prescribe    Route: Transdermal    lidocaine patch in PLACE  EVERY 8 HOURS 12/18/2021     Admin Instructions: Chart every shift, confirming that patch is still in place on patient (no barcode scan needed). See patch order for dose information.  NEVER APPLY HEAT OVER PATCH which will increase absorption and may lead to risk of local anesthetic toxicity.  Do not apply over area where liposomal bupivacaine injected for 96 hours.    Class: E-Prescribe    Route: Transdermal    lithium (ESKALITH CR/LITHOBID) CR tablet 900 mg 900 mg AT BEDTIME 12/18/2021     Admin Instructions: DO NOT CRUSH.    Class: E-Prescribe    Route: Oral    LORazepam (ATIVAN) tablet 2 mg 2 mg 4 TIMES DAILY 12/18/2021     Admin Instructions: Please have patient chew tablet abs place sublingual or buccal    Ativan to be given 30-45 min prior to meals    Class: E-Prescribe    Route: Oral    nicotine  "(NICODERM CQ) 21 MG/24HR 24 hr patch 1 patch 1 patch DAILY 12/16/2021     Admin Instructions: Reminder: Remove previous patch before applying new patch.    Class: E-Prescribe    Route: Transdermal    nicotine Patch in Place  EVERY 8 HOURS 12/16/2021     Admin Instructions: Chart every shift, confirming that patch is still in place on patient (no barcode scan needed). See patch order for dose information.    Class: E-Prescribe    Route: Transdermal    OLANZapine (zyPREXA) injection 10 mg 10 mg 3 TIMES DAILY PRN 12/16/2021     Admin Instructions: Dissolve the contents of the 10 mg vial using 2.1 mL of Sterile Water for Injection to provide a solution containing 5 mg/mL of olanzapine. Withdraw the ordered dose from vial. Use immediately (within 1 hour) after reconstitution.  Discard any unused portion.    Class: E-Prescribe    Route: Intramuscular    Linked Group 1: \"Or\" Linked Group Details        OLANZapine (zyPREXA) tablet 10 mg 10 mg AT BEDTIME 12/16/2021     Admin Instructions: Combined IM and PO doses may significantly increase the risk of orthostatic hypotension at 30 mg per day or higher.    Class: E-Prescribe    Route: Oral    OLANZapine zydis (zyPREXA) ODT tab 10 mg 10 mg 3 TIMES DAILY PRN 12/16/2021     Admin Instructions: Combined IM and PO doses may significantly increase the risk of orthostatic hypotension at 30 mg per day or higher.  With dry hands, peel back foil backing and gently remove tablet. Do not push oral disintegrating tablet through foil backing. Administer immediately on tongue and oral disintegrating tablet dissolves in seconds, then swallow with saliva. Liquid not required.    Class: E-Prescribe    Route: Oral    Linked Group 1: \"Or\" Linked Group Details        vitamin D2 (ERGOCALCIFEROL) 99825 units (1250 mcg) capsule 50,000 Units 50,000 Units EVERY 7 DAYS 12/16/2021     Admin Instructions: One capsule weekly for 8 weeks.    Class: E-Prescribe    Route: Oral               10 point ROS " negative back pain       Allergies:     Allergies   Allergen Reactions     Pork Allergy             Psychiatric Examination:   /59 (BP Location: Left arm)   Pulse 68   Temp 97.8  F (36.6  C) (Temporal)   Resp 16   Ht 1.829 m (6')   Wt 96.4 kg (212 lb 9.6 oz)   SpO2 95%   BMI 28.83 kg/m    Weight is 212 lbs 9.6 oz  Body mass index is 28.83 kg/m .  Appearance/Behavior: flat apathetic  -Motor: intact  -Gait: intact  -Abnormal involuntary movements: none  -Mood: sad  -Affect: sad  -Speech: monotone and delayed.   -Thought process/associations: though blocking   -Thought content: no delusions or hallucinations  -Perceptual disturbances: No hallucinations..              -Suicidal/Homicidal Ideation: denies current SI. Denies hallucinations.   -Judgment: poor  -Insight: poor  *Orientation: time, place and person.  *Memory: intact  *Attention: fair  *Language: fluent, no aphasias, able to repeat phrases and name objects. Vocab intact.  *Fund of information: appropriate for education  *Cognitive functioning estimate: 0 - independent.           Labs:   No results found for this or any previous visit (from the past 24 hour(s)).  No labs today    BEHAVIORAL TEAM DISCUSSION    Progress: is eating. No aggression. Catatonia improving.   Continued Stay Criteria/Rationale: high risk of returning to catatonic state and refusal of food  Medical/Physical: back pain  Precautions: I and O  Falls precaution?: No  Behavioral Orders   Procedures     Code 1 - Restrict to Unit     Routine Programming     As clinically indicated     Status 15     Every 15 minutes.           Plan:       -Continue to hold namenda due to diarrhea  -continue lithium 900  -continue amantadine  -continue ativan and give  1/2-hour prior to food  -continue zyprexa 10 mg  -[facility] on [facility]  -Likely increase Wellbutrin in the next few days  -Going to speak with Dr. Timmons about possible ECT if catatonia does not improve more.  May need to  increase dose of lorazepam.      Family/Collateral contact if applicable: he is not wanting me to call his mother though states possibly he will be allowing this in the future.         Rationale for change in precautions or plan: did fill out petition for commitment and I believe county is reviewing case      Participants: DAYSI Pearce, CNP, Dr. Timmons, SW, OT, Nursing

## 2021-12-20 NOTE — PLAN OF CARE
"Problem: Behavioral Health Plan of Care  Goal: Patient-Specific Goal (Individualization)  Description: Patient will be free from self harm or injury  Patient will eat at least 50% of meals  Patient will attend at least 50% of groups when able.   Outcome: No Change  Note: Pt isolative and withdrawn to his room tonight. He ate 75% of supper. Affect was blunted, flat. He endorsed anxiety and depression. He denied hallucinations, homicidal and suicidal ideation. He endorsed back pain, but would not rate on the numeric scale. He stated \"it's manageable\" and declined interventions. Responses were minimal - one or two words, a head nod, or shoulder shrug. He initially refused his bedtime medications; however, he did end up coming up to the nurses station to ask for them. He had a couple of snacks tonight.     Face to face end of shift report communicated to oncoming RN.      "

## 2021-12-20 NOTE — PLAN OF CARE
"Face to face end of shift report received from . RN. Rounding completed and patient observed lying in bed with eyes closed, breathing regular and unlabored.    11:00 Update: Patient has not been talking or responding much to staff. When offered his scheduled AM meds, patient refused at first. Shortly after, he said out loud, \"I'll just take them.\" He made better eye contact. He is disheveled,and he isolates to his room. He denied pain. Patient did not answer nursing assessment questions. He declined to eat breakfast. ..    Face to face end of shift report communicated      Problem: Behavioral Health Plan of Care  Goal: Patient-Specific Goal (Individualization)  Description: Patient will be free from self harm or injury  Patient will eat at least 50% of meals  Patient will attend at least 50% of groups when able.       Outcome: No Change     Problem: Behavior Regulation Impairment (Psychotic Signs/Symptoms)  Goal: Improved Behavioral Control (Psychotic Signs/Symptoms)  Description: Patient will be able to have a reality based conversation.     Outcome: No Change     "

## 2021-12-21 PROCEDURE — 124N000001 HC R&B MH

## 2021-12-21 PROCEDURE — 99233 SBSQ HOSP IP/OBS HIGH 50: CPT | Performed by: NURSE PRACTITIONER

## 2021-12-21 PROCEDURE — 250N000013 HC RX MED GY IP 250 OP 250 PS 637: Performed by: NURSE PRACTITIONER

## 2021-12-21 RX ORDER — AMANTADINE HYDROCHLORIDE 100 MG/1
100 CAPSULE, GELATIN COATED ORAL 2 TIMES DAILY
Status: DISCONTINUED | OUTPATIENT
Start: 2021-12-21 | End: 2022-02-03

## 2021-12-21 RX ADMIN — BUPROPION HYDROCHLORIDE 150 MG: 150 TABLET, FILM COATED, EXTENDED RELEASE ORAL at 08:29

## 2021-12-21 RX ADMIN — LORAZEPAM 2 MG: 1 TABLET ORAL at 12:31

## 2021-12-21 RX ADMIN — LITHIUM CARBONATE 900 MG: 450 TABLET, EXTENDED RELEASE ORAL at 20:08

## 2021-12-21 RX ADMIN — LORAZEPAM 2 MG: 1 TABLET ORAL at 06:51

## 2021-12-21 RX ADMIN — LORAZEPAM 2 MG: 1 TABLET ORAL at 21:03

## 2021-12-21 RX ADMIN — AMANTADINE HYDROCHLORIDE 100 MG: 100 CAPSULE ORAL at 20:08

## 2021-12-21 RX ADMIN — DIPHENHYDRAMINE HCL 50 MG: 50 CAPSULE ORAL at 21:04

## 2021-12-21 RX ADMIN — AMANTADINE HYDROCHLORIDE 100 MG: 100 CAPSULE ORAL at 08:29

## 2021-12-21 RX ADMIN — LORAZEPAM 2 MG: 1 TABLET ORAL at 16:35

## 2021-12-21 RX ADMIN — OLANZAPINE 10 MG: 10 TABLET, FILM COATED ORAL at 20:08

## 2021-12-21 ASSESSMENT — ACTIVITIES OF DAILY LIVING (ADL)
DRESS: INDEPENDENT;SCRUBS (BEHAVIORAL HEALTH)
HYGIENE/GROOMING: INDEPENDENT;PROMPTS
HYGIENE/GROOMING: INDEPENDENT
ORAL_HYGIENE: INDEPENDENT;PROMPTS
DRESS: INDEPENDENT;SCRUBS (BEHAVIORAL HEALTH)
LAUNDRY: UNABLE TO COMPLETE

## 2021-12-21 NOTE — PLAN OF CARE
Problem: Behavioral Health Plan of Care  Goal: Patient-Specific Goal (Individualization)  Description: Patient will be free from self harm or injury  Patient will eat at least 50% of meals  Patient will attend at least 50% of groups when able.       Outcome: Improving  Note: Report received from Elzbieta castañeda. Pt observed sleeping in left side lying position with regular and unlabored respirations.    Pt has been in bed with eyes closed and regular respirations. 15 minute and PRN checks all night. No complaints offered. Will continue to monitor.    Pt slept all NOC shift    Pt took scheduled ativan without issue and answered writers questions appropriately and endorsed that he slept well and did not need anything right now.    Face to face end of shift report communicated to oncoming RN.    Farida KIMBLE RN  December 21, 2021  5:31 AM          Problem: Behavior Regulation Impairment (Psychotic Signs/Symptoms)  Goal: Improved Behavioral Control (Psychotic Signs/Symptoms)  Description: Patient will be able to have a reality based conversation.     Outcome: Improving  Note: Unable to assess due to pt sleeping. No issues or concerns noted at this time.

## 2021-12-21 NOTE — PLAN OF CARE
"  Problem: Behavioral Health Plan of Care  Goal: Patient-Specific Goal (Individualization)  Description: Patient will be free from self harm or injury  Patient will eat at least 50% of meals  Patient will attend at least 50% of groups when able.       Outcome: Improving  Note: Shift Summery:     Pt in bed at the start of the shift. Pt woken up for morning medications. Pt asked what meds he was getting. Pt then asked why he wasn't getting ativan. Explained that it is not ordered at this time. Pt layed on bed and refused to answer nursing assessment question, repeatedly stated \"I don't know\".      Problem: Behavior Regulation Impairment (Psychotic Signs/Symptoms)  Goal: Improved Behavioral Control (Psychotic Signs/Symptoms)  Description: Patient will be able to have a reality based conversation.     Outcome: Improving     Face to face end of shift report communicated to evening shift RN.     Sarahi Hui RN  12/21/2021  7:49 AM       "

## 2021-12-21 NOTE — PLAN OF CARE
Problem: Behavioral Health Plan of Care  Goal: Patient-Specific Goal (Individualization)  Description: Patient will be free from self harm or injury  Patient will eat at least 50% of meals  Patient will attend at least 50% of groups when able.   Outcome: No Change    Pt appears quite depressed, remains isolative to his room and only comes out for meal. Pt is nonverbal for initial assessment, only gestured and nodded responses as he stood in the pacheco. Does give name and birth date outloud for medication pass. Ate 100% of supper meal, denied pain or need of PRN at start of shift. Takes scheduled Ativan and follows instruction to chew tablet. Not attending group or interacting with peers.   2344 - Pt up to nurse's desk earlier, noted difficulty sleeping and requested Benadryl. Provider order rcv'd and pt was given Benadryl at this time. Oncoming shift RN aware.    Problem: Behavior Regulation Impairment (Psychotic Signs/Symptoms)  Goal: Improved Behavioral Control (Psychotic Signs/Symptoms)  Description: Patient will be able to have a reality based conversation.   Outcome: No Change    2330 - Face to face end of shift report to be communicated to oncoming shift RN.     Hrariett Cameron RN  12/20/2021  7:10 PM

## 2021-12-21 NOTE — PLAN OF CARE
"Problem: Behavioral Health Plan of Care  Goal: Patient-Specific Goal (Individualization)  Description: Patient will be free from self harm or injury  Patient will eat at least 50% of meals  Patient will attend at least 50% of groups when able.   Outcome: No Change     Pt appears withdrawn and sad, described feeling hopeless and not ever having improvement from it. Denied SI, HI and hallucinations. Anxiety manageable with scheduled Ativan. Pt eats 100% of meal in the lounge and returns to his room. Does not have physical complaints or need for PRN meds at this time. Reported sleeping adequately after taking Benadryl late last evening, will offer to repeat at HS if needed.   2115 - Pt was in his room alone when he made loud bellowing sound. Writer responded to room to find pt sitting on edge of bed and very tense. Pt shared frustration that he only feels better when using drugs and ETOH, that he has been sober for \"probably 35 days,\" and his Mother wants him to return to treatment. Pt stated he doesn't believe he needs treatment. Writer listened and offered reassurance, but pt even more distraught when finding providers were to change after today. Pt expected to meet new provider today, this did not happen and he is upset and feels he cannot deal with changing to a new one. Pt continues to verbalize hopelessness and had more interaction than in past days. Dr. Timmons notified of pt distraught condition and spoke to pt by ascom phone. Pt calmed some, requested HS medication and Benadryl for sleep.     Problem: Behavior Regulation Impairment (Psychotic Signs/Symptoms)  Goal: Improved Behavioral Control (Psychotic Signs/Symptoms)  Description: Patient will be able to have a reality based conversation.   Outcome: No Change    2330 - Face to face end of shift report to be communicated to oncoming shift RN.     Harriett Cameron RN  12/21/2021  5:45 PM          "

## 2021-12-22 PROCEDURE — 250N000013 HC RX MED GY IP 250 OP 250 PS 637: Performed by: NURSE PRACTITIONER

## 2021-12-22 PROCEDURE — 99233 SBSQ HOSP IP/OBS HIGH 50: CPT | Mod: 95 | Performed by: STUDENT IN AN ORGANIZED HEALTH CARE EDUCATION/TRAINING PROGRAM

## 2021-12-22 PROCEDURE — 124N000001 HC R&B MH

## 2021-12-22 RX ADMIN — LORAZEPAM 2 MG: 1 TABLET ORAL at 21:19

## 2021-12-22 RX ADMIN — OLANZAPINE 10 MG: 10 TABLET, FILM COATED ORAL at 20:06

## 2021-12-22 RX ADMIN — AMANTADINE HYDROCHLORIDE 100 MG: 100 CAPSULE ORAL at 20:06

## 2021-12-22 RX ADMIN — AMANTADINE HYDROCHLORIDE 100 MG: 100 CAPSULE ORAL at 09:09

## 2021-12-22 RX ADMIN — LORAZEPAM 2 MG: 1 TABLET ORAL at 16:09

## 2021-12-22 RX ADMIN — LITHIUM CARBONATE 900 MG: 450 TABLET, EXTENDED RELEASE ORAL at 20:06

## 2021-12-22 RX ADMIN — LORAZEPAM 2 MG: 1 TABLET ORAL at 06:42

## 2021-12-22 RX ADMIN — BUPROPION HYDROCHLORIDE 150 MG: 150 TABLET, FILM COATED, EXTENDED RELEASE ORAL at 09:09

## 2021-12-22 RX ADMIN — LORAZEPAM 2 MG: 1 TABLET ORAL at 12:13

## 2021-12-22 ASSESSMENT — ACTIVITIES OF DAILY LIVING (ADL)
ORAL_HYGIENE: INDEPENDENT
DRESS: SCRUBS (BEHAVIORAL HEALTH);INDEPENDENT
HYGIENE/GROOMING: INDEPENDENT
LAUNDRY: UNABLE TO COMPLETE

## 2021-12-22 NOTE — PROGRESS NOTES
[facility] Psychiatric Progress Note     Assessment     Mr. Carter is a 31 year old male with a PMH of schizoaffective disorder, depressive type, catatonia, severe methamphetamine use disorder, alcohol use disorder, and significant TBI at the age of 3 who presented with depression with catatonia being off of Ativan after recently being discharged. This is the patient's 3rd hospitalization in the past roughly two months with substance use complicating his course.     The patient's catatonia has improved with resumption of Ativan. Attempting to see if the patient can be maintained on alternatives given the risks with his substance use disorders. Amantadine was recently increased. Will explore increasing this while watching for signs of psychosis. Will also determine best course of action to treat this depression with further review with increase in Wellbutrin being possible.    Educated regarding medication indications, risks, benefits, side effects, contraindications and possible interactions. Verbally expressed understanding.      Diagnoses     1. Schizoaffective disorder, depressive type, multiple episodes, currently in acute episode, with catatonia   2. 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 Methamphetamine use disorder, severe  4. Alcohol use disorder, moderate     Plan     Unit 5  Legal Status: Court Hold, Commitment filed    Safety Assessment:    Behavioral Orders   Procedures     Code 1 - Restrict to Unit     Routine Programming     As clinically indicated     Status 15     Every 15 minutes.      Medications:     Outpatient medications held:     Lyrica 100 mg TID    Outpatient medications continued/changed:     Wellbutrin  mg daily  Lithium 900 mg at bedtime  Zyprexa 10 mg BID prn - > 10 mg at bedtime   Vitamin D 50,000 international unit(s) weekly    New medications initiated:     Ativan 2 mg QID  Amantadine 100 mg BID  Standard unit PRNs    Programming:  "Patient will be treated in a therapeutic milieu with appropriate individual and group therapies. Education will be provided on diagnoses, medications, and treatments.     Medical diagnoses:      #. Concern for poor intake  - I/Os  - Consult nutrition  - Monitoring     Consult: Nutrition  Labs: None    Anticipated LOS: 2-4 weeks  Dispo: IRTS vs CD treatment vs home with outpatient services     Interim History     The patient notes that he is feeling depressed. He notes that the medications are not causing him any problems. He denies any current psychotic thought. He denies anxiety. Denies any medical concerns.    He notes that he is concerned about his life. He notes that his life was going well until he relapsed on meth and had his kids taken away a couple of years ago. He would like to get back on his feet. He despairs about this.     He denies any current safety concerns. He notes that he has some cravings for substances. Will continue to discuss this.     Medications       amantadine  100 mg Oral BID     buPROPion  150 mg Oral Daily     lidocaine  2 patch Transdermal Q24H     lidocaine   Transdermal Q8H     lithium ER  900 mg Oral At Bedtime     LORazepam  2 mg Oral 4x Daily     nicotine  1 patch Transdermal Daily     nicotine   Transdermal Q8H     OLANZapine  10 mg Oral At Bedtime     vitamin D2  50,000 Units Oral Q7 Days        Allergies     Allergies   Allergen Reactions     Pork Allergy         Psychiatric Examination     /81   Pulse 75   Temp 97.7  F (36.5  C) (Temporal)   Resp 16   Ht 1.829 m (6')   Wt 96.4 kg (212 lb 9.6 oz)   SpO2 97%   BMI 28.83 kg/m    Weight is 212 lbs 9.6 oz  Body mass index is 28.83 kg/m .    Appearance: Alert, oriented, dressed in hospital scrubs, disheveled  Attitude: Cooperative   Eye Contact: Fair  Mood: \"Depressed\"  Affect: Flat, mood congruent  Speech: Reduction in rate. Normal rhythm   Psychomotor Behavior: No tremor, rigidity, akathisia, or psychomotor " retardation. Recent automatic obedience   Thought Process: Logical, goal directed   Associations: No loose associations   Thought Content: Denies SI. No SIB. Denies A/V hallucinations. No evidence of delusional thought.  Insight: Adequate   Judgment: Adequate  Oriented to: Person, place, and time  Attention Span and Concentration: Intact  Recent and Remote Memory: Intact  Language: English with appropriate syntax and vocabulary  Fund of Knowledge: Average  Muscle Strength and Tone: Grossly normal  Gait and Station: Grossly normal     Labs     No results found for this or any previous visit (from the past 24 hour(s)).     Treatment Team Care Plan     BEHAVIORAL TEAM DISCUSSION    Progress: Continued symptoms    Continued Stay Criteria/Rationale: Continued symptoms without sufficient improvement/resolution    Medical/Physical: See above    Precautions: See above.     Plan: Continue inpatient care with unit support and medication management    Rationale for change in precautions or plan: NA due to no change    Participants: David Timmons DO, Nursing, SW, OT    The patient's care was discussed with the treatment team and chart notes were reviewed.    Attestation      Patient has been seen and evaluated by:    David Timmons DO, MA  Psychiatrist    VIDEO VISIT    Patient has given verbal consent for video visit?: Yes     Video- Visit Details  Type of service:  video visit for mental health treatment.  Time of service:    Date:  12/22/2021    Video Start Time: 200PM      Video End Time: 230PM    Reason for video visit: COVID-19 [place]  Originating Site (patient location):  [facility]  Distant Site (provider location):  Remote location  Mode of Communication:  Video Conference via [employer]

## 2021-12-22 NOTE — PROGRESS NOTES
"[facility]  Psychiatric Progress Note      Impression:     This is a 31 year old yo male with likely schizoaffective disorder, depressed type who has decompensated significantly after I discontinued his ativan at his last hospital stay. He was voluntary at that time and we discharged him to treatment where he quickly declined which lead to mutism and refusal of food for days. He has now started showing improvement with catatonic symptoms with ativan though still is extremely sad appearing.         Interim History:       Appears to still have catatonic symptoms.  He is extremely flat and speaks less than 20 words in 5 minutes.  He does interact more when he is approached though it is very minimal he has no rigidity no stereotypy no verbigeration though was very socially withdrawn.  He has significant automatic obedience and staff have noted how he has been \"almost overly agreeable\".  At times he refuses to take his medications shortly after walked back to the nursing station and states he will take them. At times he has periods of ambitendency.  He will start trying to ask a question then pause and states \"ill be ok\" and he never finishes the questions.     I have been having a difficult time with accuracy in diagnosis.  I do not believe I have ever seen him to have psychosis in the absence of methamphetamine use.  I questioned him about this and he tells me that he does not hear any voices and he does not appear to be preoccupied.  I reminded him of the situation he had told me about over 1 year ago where he was high on methamphetamine and believed that a person was in his ceiling tiles and was going to come down to harm his daughter.  He tells me that he still believes that there was a person attempting to do this on that day though he does not feel this is occurring any longer.  He states it was very real and he truly believes that during this stay somebody was in his ceiling tiles he does not believe " "that anyone and he is not worried about this.  He is very depressed.  He states he is not supposed for makes multiple statements such as \"this is just the way life is\" he insinuates that after he lost custody of his children to live for.  I am unsure how much he is eating some documentation indicates that he eats well and some documentation indicates he eats poorly.  There is no record of the fluid he is taking in.  His vital signs are all stable.    Educated regarding medication indications, risks, benefits, side effects, contraindications and possible interactions. Verbally expressed understanding.        Diagnoses:       Schizoaffective disorder, depressed type with catatonic symptoms  TBI-with coma for one week age 3  Methamphetamine use disorder severe  Alcohol use disorder, moderate    Attestation:  Patient has been seen and evaluated by me,  Ashely Heaton NP      The patient's care was discussed with the treatment team and chart notes were reviewed.            Medications:       amantadine  100 mg Oral BID     buPROPion  150 mg Oral Daily     lidocaine  2 patch Transdermal Q24H     lidocaine   Transdermal Q8H     lithium ER  900 mg Oral At Bedtime     LORazepam  2 mg Oral 4x Daily     nicotine  1 patch Transdermal Daily     nicotine   Transdermal Q8H     OLANZapine  10 mg Oral At Bedtime     vitamin D2  50,000 Units Oral Q7 Days       Prescription Medications as of 12/21/2021       Rx Number Disp Refills Start End Last Dispensed Date Next Fill Date Owning Pharmacy    buPROPion (WELLBUTRIN XL) 150 MG 24 hr tablet  30 tablet 0 12/9/2021    [id number] - [place], [place] - [address]    Sig: Take 1 tablet (150 mg) by mouth daily    Class: E-Prescribe    Route: Oral    Renewals     Renewal requests to authorizing provider (Christelle Contreras, NP) <b>prohibited</b>          lithium (ESKALITH CR/LITHOBID) 450 MG CR tablet  60 tablet 0 12/8/2021    [id number] - " [place], [place] - [address]    Sig: Take 2 tablets (900 mg) by mouth At Bedtime    Class: E-Prescribe    Route: Oral    Renewals     Renewal requests to authorizing provider (Christelle Contreras NP) <b>prohibited</b>          OLANZapine (ZYPREXA) 10 MG tablet  60 tablet 0 12/8/2021    [id number], [place] - [address]    Sig: Take 1 tablet (10 mg) by mouth 2 times daily as needed (agitation/anxiety)    Class: E-Prescribe    Route: Oral    Renewals     Renewal requests to authorizing provider (Christelle Contreras NP) <b>prohibited</b>          pregabalin (LYRICA) 100 MG capsule  90 capsule 0 12/8/2021    [id number], [place] - [address]    Sig: Take 1 capsule (100 mg) by mouth 3 times daily    Class: E-Prescribe    Route: Oral    Renewals     Renewal requests to authorizing provider (Christelle Contreras NP) <b>prohibited</b>          vitamin D2 (ERGOCALCIFEROL) 45335 units (1250 mcg) capsule  12 capsule 0 12/17/2021    [id number] [place] [address], [place] - [address]    Sig: Take 1 capsule (50,000 Units) by mouth once a week    Class: E-Prescribe    Route: Oral      Hospital Medications as of 12/21/2021       Dose Frequency Start End    acetaminophen (TYLENOL) tablet 650 mg 650 mg EVERY 4 HOURS PRN 12/16/2021     Admin Instructions: Use 1st.  Maximum acetaminophen dose from all sources = 75 mg/kg/day not to exceed 4 grams/day.    Class: E-Prescribe    Route: Oral    amantadine (SYMMETREL) capsule 100 mg 100 mg 2 TIMES DAILY 12/21/2021     Class: E-Prescribe    Route: Oral    buPROPion (WELLBUTRIN XL) 24 hr tablet 150 mg 150 mg DAILY 12/17/2021     Admin Instructions: DO NOT CRUSH.    Class: E-Prescribe    Route: Oral    diphenhydrAMINE (BENADRYL) capsule 50 mg 50 mg AT BEDTIME PRN 12/20/2021     Class: E-Prescribe    Route: Oral    Lidocaine (LIDOCARE) 4 % Patch 2 patch 2 patch EVERY 24 HOURS 0800 12/18/2021     Admin  Instructions: Apply patch(s) to back. To prevent lidocaine toxicity, patient should be patch free for 12 hrs daily. Patches may be cut to smaller size prior to removing release liner.  Reminder: Remove previous patch before applying new patch.  NEVER APPLY HEAT OVER PATCH which increases absorption and may lead to local anesthetic toxicity. Do not apply over area where liposomal bupivacaine was injected for 96 hours post injection.    Class: E-Prescribe    Route: Transdermal    lidocaine patch in PLACE  EVERY 8 HOURS 12/18/2021     Admin Instructions: Chart every shift, confirming that patch is still in place on patient (no barcode scan needed). See patch order for dose information.  NEVER APPLY HEAT OVER PATCH which will increase absorption and may lead to risk of local anesthetic toxicity.  Do not apply over area where liposomal bupivacaine injected for 96 hours.    Class: E-Prescribe    Route: Transdermal    lithium (ESKALITH CR/LITHOBID) CR tablet 900 mg 900 mg AT BEDTIME 12/18/2021     Admin Instructions: DO NOT CRUSH.    Class: E-Prescribe    Route: Oral    LORazepam (ATIVAN) tablet 2 mg 2 mg 4 TIMES DAILY 12/18/2021     Admin Instructions: Please have patient chew tablet abs place sublingual or buccal    Ativan to be given 30-45 min prior to meals    Class: E-Prescribe    Route: Oral    nicotine (NICODERM CQ) 21 MG/24HR 24 hr patch 1 patch 1 patch DAILY 12/16/2021     Admin Instructions: Reminder: Remove previous patch before applying new patch.    Class: E-Prescribe    Route: Transdermal    nicotine Patch in Place  EVERY 8 HOURS 12/16/2021     Admin Instructions: Chart every shift, confirming that patch is still in place on patient (no barcode scan needed). See patch order for dose information.    Class: E-Prescribe    Route: Transdermal    OLANZapine (zyPREXA) injection 10 mg 10 mg 3 TIMES DAILY PRN 12/16/2021     Admin Instructions: Dissolve the contents of the 10 mg vial using 2.1 mL of Sterile Water for  "Injection to provide a solution containing 5 mg/mL of olanzapine. Withdraw the ordered dose from vial. Use immediately (within 1 hour) after reconstitution.  Discard any unused portion.    Class: E-Prescribe    Route: Intramuscular    Linked Group 1: \"Or\" Linked Group Details        OLANZapine (zyPREXA) tablet 10 mg 10 mg AT BEDTIME 12/16/2021     Admin Instructions: Combined IM and PO doses may significantly increase the risk of orthostatic hypotension at 30 mg per day or higher.    Class: E-Prescribe    Route: Oral    OLANZapine zydis (zyPREXA) ODT tab 10 mg 10 mg 3 TIMES DAILY PRN 12/16/2021     Admin Instructions: Combined IM and PO doses may significantly increase the risk of orthostatic hypotension at 30 mg per day or higher.  With dry hands, peel back foil backing and gently remove tablet. Do not push oral disintegrating tablet through foil backing. Administer immediately on tongue and oral disintegrating tablet dissolves in seconds, then swallow with saliva. Liquid not required.    Class: E-Prescribe    Route: Oral    Linked Group 1: \"Or\" Linked Group Details        vitamin D2 (ERGOCALCIFEROL) 83249 units (1250 mcg) capsule 50,000 Units 50,000 Units EVERY 7 DAYS 12/16/2021     Admin Instructions: One capsule weekly for 8 weeks.    Class: E-Prescribe    Route: Oral               10 point ROS negative back pain       Allergies:     Allergies   Allergen Reactions     Pork Allergy             Psychiatric Examination:   /70   Pulse 73   Temp 97.8  F (36.6  C) (Temporal)   Resp 14   Ht 1.829 m (6')   Wt 96.4 kg (212 lb 9.6 oz)   SpO2 99%   BMI 28.83 kg/m    Weight is 212 lbs 9.6 oz  Body mass index is 28.83 kg/m .  Appearance/Behavior: flat apathetic  -Motor: intact  -Gait: intact  -Abnormal involuntary movements: none  -Mood: sad  -Affect: sad  -Speech: monotone and delayed.   -Thought process/associations: though blocking   -Thought content: no delusions or hallucinations  -Perceptual disturbances: " No hallucinations..              -Suicidal/Homicidal Ideation: denies current SI. Denies hallucinations.   -Judgment: poor  -Insight: poor  *Orientation: time, place and person.  *Memory: intact  *Attention: fair  *Language: fluent, no aphasias, able to repeat phrases and name objects. Vocab intact.  *Fund of information: appropriate for education  *Cognitive functioning estimate: 0 - independent.           Labs:   No results found for this or any previous visit (from the past 24 hour(s)).  No labs today    BEHAVIORAL TEAM DISCUSSION    Progress: is eating. No aggression. Catatonia improving.   Continued Stay Criteria/Rationale: high risk of returning to catatonic state and refusal of food  Medical/Physical: back pain  Precautions: I and O  Falls precaution?: No  Behavioral Orders   Procedures     Code 1 - Restrict to Unit     Routine Programming     As clinically indicated     Status 15     Every 15 minutes.           Plan:       -continue lithium 900  -amantadine 100 mg twice a day to target catatonia  -continue ativan and give  1/2-hour prior to food  -continue zyprexa 10 mg  -Waiting on records from [facility]  -Likely increase Wellbutrin in the next few days  -Going to speak with Dr. Timmons about possible ECT if catatonia does not improve more.  May need to increase dose of lorazepam.      Family/Collateral contact if applicable: he is not wanting me to call his mother though states possibly he will be allowing this in the future.         Rationale for change in precautions or plan: did fill out petition for commitment and I believe [facility] is reviewing case      Participants: DAYSI Pearce, CNP, Dr. Timmons, SW, OT, Nursing

## 2021-12-22 NOTE — PLAN OF CARE
Problem: Behavioral Health Plan of Care  Goal: Patient-Specific Goal (Individualization)  Description: Patient will be free from self harm or injury  Patient will eat at least 50% of meals  Patient will attend at least 50% of groups when able.       Outcome: No Change     Problem: Behavior Regulation Impairment (Psychotic Signs/Symptoms)  Goal: Improved Behavioral Control (Psychotic Signs/Symptoms)  Description: Patient will be able to have a reality based conversation.     Outcome: No Change     Face to face shift report received from TRAE Lorenzana. Rounding completed, pt observed laying in bed, appeared to be sleeping.    Patient appeared to be sleeping for approximately 7.75 hours.    Patient had no reported self harm this shift.      Patient took medications as ordered.     0100 - Patient up and requested 2 grape juice 236mL and 4 pk (8 crackers) of saltines.       Patient had 120mL of water with his morning mediations and no reported output this shift.    Face to face report will be communicated to alexa LARKIN.    Caryl Palmer RN  12/22/2021  6:47 AM

## 2021-12-22 NOTE — PLAN OF CARE
Face to face shift report received from NASRIN Ronquillo RN.       Problem: Behavioral Health Plan of Care  Goal: Patient-Specific Goal (Individualization)  Description: Patient will be free from self harm or injury  Patient will eat at least 50% of meals  Patient will attend at least 50% of groups when able.       Outcome: No Change  Note: Medication compliant. Clam and cooperative. Denies thoughts of harming self or others. Reports anxiety and depression. Flat affect. Offers little during conversation. Withdrawn to bed but did come to lounge for meal and snack. No reports of pain.         Problem: Behavior Regulation Impairment (Psychotic Signs/Symptoms)  Goal: Improved Behavioral Control (Psychotic Signs/Symptoms)  Description: Patient will be able to have a reality based conversation.   Outcome: Improving         Face to face end of shift report to be communicated to oncoming RN.

## 2021-12-22 NOTE — PLAN OF CARE
Problem: Behavioral Health Plan of Care  Goal: Patient-Specific Goal (Individualization)  Description: Patient will be free from self harm or injury  Patient will eat at least 50% of meals  Patient will attend at least 50% of groups when able.     12/22/2021 1130 by Sarahi Hui RN  Outcome: Improving  Note: Shift Summery:     Pt in bed at the start of the shift. Pt med compliant, answered nursing assessment questions with short one word answers. Pt reported low back pain rated 3/10 but declines a prn. Pt reported anxiety at 3/10, depression at 6/10. Pt denies SI, HI and hallucinations. Pt did get up and go to group this morning when encouraged.  served pt court papers this morning.     Pt met with provider, pt came to the nursing office later and asked to talk to provider again. Pt asked about the dementa medication he was on meaning exelon. Pt stated that he wanted to be restarted on that med, that it made his thinking more clear. Pt also asked about the medicaiton like suboxone only for meth.      Problem: Behavior Regulation Impairment (Psychotic Signs/Symptoms)  Goal: Improved Behavioral Control (Psychotic Signs/Symptoms)  Description: Patient will be able to have a reality based conversation.     12/22/2021 1130 by Sarahi Hui, RN  Outcome: Improving       Face to face end of shift report communicated to evening shift RN.     Sarahi Hui RN  12/22/2021  11:31 AM

## 2021-12-22 NOTE — PLAN OF CARE
"Pt was served court paper work this morning. Copy placed in pts paper chart. Pt has their confinement hearing December 27th @ 1:30 PM and their commitment hearing scheduled for January 4th @ 8:30 AM.     Spoke to pt this afternoon. Asked pt if he had any concerns or questions about court coming up. Pt denies but askes what will be his discharge plan. This writer states the team will need to discuss it in team. When asked what he would be interested in, pt states \"[facility] a shelter.\"  Pt affect flat and does not make eye contact. Pt has no other concerns at this time.   "

## 2021-12-23 LAB — SARS-COV-2 RNA RESP QL NAA+PROBE: NEGATIVE

## 2021-12-23 PROCEDURE — 93010 ELECTROCARDIOGRAM REPORT: CPT | Performed by: INTERNAL MEDICINE

## 2021-12-23 PROCEDURE — U0005 INFEC AGEN DETEC AMPLI PROBE: HCPCS | Performed by: STUDENT IN AN ORGANIZED HEALTH CARE EDUCATION/TRAINING PROGRAM

## 2021-12-23 PROCEDURE — 93005 ELECTROCARDIOGRAM TRACING: CPT

## 2021-12-23 PROCEDURE — 99233 SBSQ HOSP IP/OBS HIGH 50: CPT | Mod: 95 | Performed by: STUDENT IN AN ORGANIZED HEALTH CARE EDUCATION/TRAINING PROGRAM

## 2021-12-23 PROCEDURE — 250N000013 HC RX MED GY IP 250 OP 250 PS 637: Performed by: NURSE PRACTITIONER

## 2021-12-23 PROCEDURE — 124N000001 HC R&B MH

## 2021-12-23 PROCEDURE — 83825 ASSAY OF MERCURY: CPT | Performed by: STUDENT IN AN ORGANIZED HEALTH CARE EDUCATION/TRAINING PROGRAM

## 2021-12-23 PROCEDURE — 36415 COLL VENOUS BLD VENIPUNCTURE: CPT | Performed by: STUDENT IN AN ORGANIZED HEALTH CARE EDUCATION/TRAINING PROGRAM

## 2021-12-23 RX ORDER — BUPROPION HYDROCHLORIDE 300 MG/1
300 TABLET ORAL DAILY
Status: DISCONTINUED | OUTPATIENT
Start: 2021-12-24 | End: 2022-02-14

## 2021-12-23 RX ADMIN — OLANZAPINE 10 MG: 10 TABLET, FILM COATED ORAL at 20:05

## 2021-12-23 RX ADMIN — LORAZEPAM 2 MG: 1 TABLET ORAL at 06:44

## 2021-12-23 RX ADMIN — ERGOCALCIFEROL 50000 UNITS: 1.25 CAPSULE, LIQUID FILLED ORAL at 12:21

## 2021-12-23 RX ADMIN — AMANTADINE HYDROCHLORIDE 100 MG: 100 CAPSULE ORAL at 08:40

## 2021-12-23 RX ADMIN — LITHIUM CARBONATE 900 MG: 450 TABLET, EXTENDED RELEASE ORAL at 20:05

## 2021-12-23 RX ADMIN — LORAZEPAM 2 MG: 1 TABLET ORAL at 16:28

## 2021-12-23 RX ADMIN — LORAZEPAM 2 MG: 1 TABLET ORAL at 12:21

## 2021-12-23 RX ADMIN — AMANTADINE HYDROCHLORIDE 100 MG: 100 CAPSULE ORAL at 20:05

## 2021-12-23 RX ADMIN — LORAZEPAM 2 MG: 1 TABLET ORAL at 21:30

## 2021-12-23 RX ADMIN — BUPROPION HYDROCHLORIDE 150 MG: 150 TABLET, FILM COATED, EXTENDED RELEASE ORAL at 08:40

## 2021-12-23 RX ADMIN — DIPHENHYDRAMINE HCL 50 MG: 50 CAPSULE ORAL at 21:30

## 2021-12-23 ASSESSMENT — ACTIVITIES OF DAILY LIVING (ADL): HYGIENE/GROOMING: INDEPENDENT

## 2021-12-23 NOTE — PLAN OF CARE
"Spoke with pt today. Pt was found laying in bed. Pt's affect is flat and does not make much eye contact. Pt states he should not be here and that the hospital and staff \"fucked him over\". Explained to pt that with multiple admissions and the safety concern that the team felt it was neccessary to file a petition, so that he was able to get more services in the long run. Pt replied with \"that's not going to do anything for me\". Pt again states they would want to go to a shelter and that he know's the hospital will end up sending him to a [facility]. Encouraged pt to communicate with staff about wants for reasonable discharge and that [facility] isn't always the case. This writer explained possible places to go, pt responded with \"no I don't want those\". Pt had no further questions or concerns.   "

## 2021-12-23 NOTE — PROGRESS NOTES
[facility] Psychiatric Progress Note     Assessment     Mr. Carter is a 31 year old male with a PMH of schizoaffective disorder, depressive type, catatonia, severe methamphetamine use disorder, alcohol use disorder, and significant TBI at the age of 3 who presented with depression with catatonia being off of Ativan after recently being discharged. This is the patient's 3rd hospitalization in the past roughly two months with substance use complicating his course.     The patient's catatonia has improved with resumption of Ativan. Attempting to see if the patient can be maintained on alternatives given the risks with his substance use disorders, thus Amantadine is being used. Will address patient's depression also.    Today: Patient's catatonia is well controlled currently. He is not showing signs at examination with Ativan and Amantadine working. The patient continues to be depressed with some element likely being negative symptoms and despair over how his functioning has declined over the past few years. Provided the patient with multiple options with him preferring to increase Wellbutrin after discussing risks, including drug interactions given CYP2D6 strong inhibition which can increase levels of Ativan and Zyprexa. Will monitor for any complications, including any worsening of catatonia which can occur with increased blood levels of neuroleptics. Once patient is more stabilized, will try to lower Ativan dose to see if Amantadine will be sufficient to manage his catatonia.    Educated regarding medication indications, risks, benefits, side effects, contraindications and possible interactions. Verbally expressed understanding.      Diagnoses     1. Schizoaffective disorder, depressive type, multiple episodes, currently in acute episode, with catatonia   2. 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 Methamphetamine use disorder, severe  4. Alcohol use disorder, moderate      Plan     Unit 5  Legal Status: Court Hold, Commitment filed    Safety Assessment:    Behavioral Orders   Procedures     Code 1 - Restrict to Unit     Routine Programming     As clinically indicated     Status 15     Every 15 minutes.      Medications:     Outpatient medications held:     Lyrica 100 mg TID    Outpatient medications continued/changed:     Wellbutrin  mg daily -> 300 mg daily on 12/24  Lithium 900 mg at bedtime  Zyprexa 10 mg BID prn - > 10 mg at bedtime   Vitamin D 50,000 international unit(s) weekly    New medications initiated:     Ativan 2 mg QID  Amantadine 100 mg BID  Standard unit PRNs    Programming: Patient will be treated in a therapeutic milieu with appropriate individual and group therapies. Education will be provided on diagnoses, medications, and treatments.     Medical diagnoses:      #. 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 He consents to increasing Wellbutrin after discussing risks like drug interactions leading to headache, GI side effects, sedation, and fall risks. The patient notes that he has done the best with this medication in terms of antidepressants. He is not interested in alternatives right now.    The patient denies any withdrawal and is eating/drinking adequately currently. Treatment has helped with this. He is conversing regularly. No echopraxia or echolalia.    Steven denies any problems with his current medications. No side effects. Not dizzy or oversedated. Encouraged patient to go to groups if able.     Medications       amantadine  100 mg Oral BID     buPROPion  150 mg Oral Daily     lidocaine  2 patch Transdermal Q24H     lidocaine   Transdermal Q8H     lithium ER  900 mg  "Oral At Bedtime     LORazepam  2 mg Oral 4x Daily     nicotine  1 patch Transdermal Daily     nicotine   Transdermal Q8H     OLANZapine  10 mg Oral At Bedtime     vitamin D2  50,000 Units Oral Q7 Days        Allergies     Allergies   Allergen Reactions     Pork Allergy         Psychiatric Examination     /65   Pulse 84   Temp 98.6  F (37  C) (Temporal)   Resp 16   Ht 1.829 m (6')   Wt 96.4 kg (212 lb 9.6 oz)   SpO2 97%   BMI 28.83 kg/m    Weight is 212 lbs 9.6 oz  Body mass index is 28.83 kg/m .    Appearance: Alert, oriented, dressed in hospital scrubs, long-beard, unkempt   Attitude: Cooperative   Eye Contact: Fair  Mood: \"Depressed\"  Affect: Flat, mood congruent  Speech: Reduction in rate. Normal rhythm   Psychomotor Behavior: No tremor, rigidity, akathisia, or psychomotor retardation. No signs of catatonia.  Thought Process: Logical, goal directed   Associations: No loose associations   Thought Content: Denies SI. No SIB. Denies A/V hallucinations. No evidence of delusional thought.  Insight: Adequate   Judgment: Adequate  Oriented to: Person, place, and time  Attention Span and Concentration: Intact  Recent and Remote Memory: Intact  Language: English with appropriate syntax and vocabulary  Fund of Knowledge: Average  Muscle Strength and Tone: Grossly normal  Gait and Station: Grossly normal     Labs     No results found for this or any previous visit (from the past 24 hour(s)).     Treatment Team Care Plan     BEHAVIORAL TEAM DISCUSSION    Progress: Continued symptoms    Continued Stay Criteria/Rationale: Continued symptoms without sufficient improvement/resolution    Medical/Physical: See above    Precautions: See above.     Plan: Continue inpatient care with unit support and medication management    Rationale for change in precautions or plan: NA due to no change    Participants: David Timmons, DO, Nursing, SW, OT    The patient's care was discussed with the treatment team and chart notes were " reviewed.    Attestation      Patient has been seen and evaluated by:    David Timmons DO, MA  Psychiatrist    VIDEO VISIT    Patient has given verbal consent for video visit?: Yes     Video- Visit Details  Type of service:  video visit for mental health treatment.  Time of service:    Date:  12/23/2021    Video Start Time: 1030AM      Video End Time: 1045AM    Reason for video visit: COVID-19 [place]  Originating Site (patient location):  [facility]  Distant Site (provider location):  Remote location  Mode of Communication:  Video Conference via [employer]

## 2021-12-23 NOTE — PLAN OF CARE
"  Problem: Behavioral Health Plan of Care  Goal: Patient-Specific Goal (Individualization)  Description: Patient will be free from self harm or injury  Patient will eat at least 50% of meals  Patient will attend at least 50% of groups when able.       Outcome: No Change  Note:        Problem: Behavior Regulation Impairment (Psychotic Signs/Symptoms)  Goal: Improved Behavioral Control (Psychotic Signs/Symptoms)  Description: Patient will be able to have a reality based conversation.     Outcome: Improving   Pt is quiet and calm. Minimal conversation with writer but does answer questions. Medication compliant- Nicotine patch not given d/t pt stated \" I don't need it\". Isolates self to room but does come out for meals. Appears awake during assessment with no catatonic gestures.   1150- Dr Timmons completed a catatonia test. Covid swab obtained and sent to lab  1235- EKG being performed  1300- EKG report given to Dr Palacios - hospitalist by respiratory tech. Writer called Dr Timmons and read results, also faxed report to him per request and given hospitalist number- Dr Timmons stated he will call him. Assessed pt -pt denies chest pain, nausea, N/T SOB or any other symptoms. walking around with no difficulty .  /76 HR 84 RA sat 100% R 20.    1335- received a call from the superviser- EKG unchanged from previous EKG per hospitalist.   1445- no s/s chest pain or SOB  Face to face end of shift report communicated to TRAE Brown RN  12/23/2021  9:05 AM       "

## 2021-12-23 NOTE — PLAN OF CARE
Problem: Behavioral Health Plan of Care  Goal: Patient-Specific Goal (Individualization)  Description: Patient will be free from self harm or injury  Patient will eat at least 50% of meals  Patient will attend at least 50% of groups when able.      Outcome: No Change     Problem: Behavior Regulation Impairment (Psychotic Signs/Symptoms)  Goal: Improved Behavioral Control (Psychotic Signs/Symptoms)  Description: Patient will be able to have a reality based conversation.   Outcome: No Change     Face to face shift report received from TRAE Herring. Rounding completed, pt observed laying in bed appeared to be sleeping.    Patient appeared to be sleeping for approximately 7.25 hours this shift.    Patient took medications as ordered.     120mL of water in with AM medication.    Face to face report will be communicated to oncoming RN.    Caryl Palmer RN  12/23/2021  6:53 AM

## 2021-12-23 NOTE — PROGRESS NOTES
"CLINICAL NUTRITION SERVICES  -  ASSESSMENT NOTE      Steven Carter : provider order    31 yom admitted for catatonia. Pt has a hx of methamphetamine use, alcohol use disorder, schizoaffective disorder, TBI at age of 3. Noted weight loss in the past 2 months- 18lbs. Weight loss does meet criteria for malnutrition. Suspect alcohol and drug use contributing to weight loss. Weight in 2020 was around 235-240lb.  Intake has been variable this admission. Most recently 100% of the last 4 meals documented, no lunch documented yesterday. Pt is ordering an adequate amount of food. Pt feels he is eating his usual amount. Offered Ensure to be sent with meals and he would like strawberry ensure on all his meal trays.    Diet Order: Regular  Intake: 17 meals with 0-100% intake    Height: 6' 0\"  Weight: 212 lbs 9.6 oz  Body mass index is 28.83 kg/m .  Weight Status:  Overweight BMI 25-29.9  Weight History:  Max IBW- 83.9kg   Wt Readings from Last 10 Encounters:   12/16/21 96.4 kg (212 lb 9.6 oz)   12/14/21 97.7 kg (215 lb 4.8 oz)   12/01/21 97.8 kg (215 lb 11.2 oz)   10/31/21 105.1 kg (231 lb 11.2 oz)   03/27/21 105.2 kg (232 lb)   11/30/15 96.6 kg (213 lb)   08/18/14 94.5 kg (208 lb 6.4 oz)   08/15/14 93.4 kg (206 lb)   07/19/14 102.1 kg (225 lb)     83.9kg- max ibw  Estimated Energy Needs: 4866-1670 kcals (25-30 Kcal/Kg)   Estimated Protein Needs:  grams protein (1-1.2 g pro/Kg)    MALNUTRITION:  % Weight Loss: 7.5% in 3 months (severe malnutrition)  % Intake:  </= 75% for >/= 1 month (severe malnutrition)  Muscle and subcutaneous fat loss- mild loss     Malnutrition Diagnosis: severe malnutrition  In Context of:  Chronic illness or disease  Environmental or social circumstances    NUTRITION DIAGNOSIS:  Malnutrition related to inadequate oral intake as evidenced by 8% weight loss in the past 2 months    NUTRITION RECOMMENDATIONS  - Encourage intake with meals  - Kitchen to send strawberry Ensure on meal trays. Pt to note " on meal ticket if he wants a change in flavor.  - Monitor weight    MONITORING AND EVALUATION:  RD will monitor intake, weight, labs

## 2021-12-24 PROCEDURE — 124N000001 HC R&B MH

## 2021-12-24 PROCEDURE — 99233 SBSQ HOSP IP/OBS HIGH 50: CPT | Performed by: NURSE PRACTITIONER

## 2021-12-24 PROCEDURE — 250N000013 HC RX MED GY IP 250 OP 250 PS 637: Performed by: NURSE PRACTITIONER

## 2021-12-24 PROCEDURE — 250N000013 HC RX MED GY IP 250 OP 250 PS 637: Performed by: STUDENT IN AN ORGANIZED HEALTH CARE EDUCATION/TRAINING PROGRAM

## 2021-12-24 RX ORDER — PREGABALIN 25 MG/1
50 CAPSULE ORAL 3 TIMES DAILY
Status: DISCONTINUED | OUTPATIENT
Start: 2021-12-24 | End: 2021-12-26

## 2021-12-24 RX ADMIN — LORAZEPAM 2 MG: 1 TABLET ORAL at 21:00

## 2021-12-24 RX ADMIN — AMANTADINE HYDROCHLORIDE 100 MG: 100 CAPSULE ORAL at 20:16

## 2021-12-24 RX ADMIN — OLANZAPINE 10 MG: 10 TABLET, FILM COATED ORAL at 20:16

## 2021-12-24 RX ADMIN — AMANTADINE HYDROCHLORIDE 100 MG: 100 CAPSULE ORAL at 08:23

## 2021-12-24 RX ADMIN — PREGABALIN 50 MG: 25 CAPSULE ORAL at 20:17

## 2021-12-24 RX ADMIN — LORAZEPAM 2 MG: 1 TABLET ORAL at 15:56

## 2021-12-24 RX ADMIN — LITHIUM CARBONATE 900 MG: 450 TABLET, EXTENDED RELEASE ORAL at 20:16

## 2021-12-24 RX ADMIN — BUPROPION HYDROCHLORIDE 300 MG: 300 TABLET, EXTENDED RELEASE ORAL at 08:23

## 2021-12-24 RX ADMIN — LORAZEPAM 2 MG: 1 TABLET ORAL at 11:50

## 2021-12-24 RX ADMIN — LORAZEPAM 2 MG: 1 TABLET ORAL at 07:55

## 2021-12-24 RX ADMIN — PREGABALIN 50 MG: 25 CAPSULE ORAL at 14:53

## 2021-12-24 ASSESSMENT — ACTIVITIES OF DAILY LIVING (ADL)
HYGIENE/GROOMING: INDEPENDENT
DRESS: SCRUBS (BEHAVIORAL HEALTH)
ORAL_HYGIENE: INDEPENDENT
DRESS: SCRUBS (BEHAVIORAL HEALTH);INDEPENDENT
HYGIENE/GROOMING: INDEPENDENT
LAUNDRY: UNABLE TO COMPLETE
ORAL_HYGIENE: INDEPENDENT
LAUNDRY: UNABLE TO COMPLETE

## 2021-12-24 NOTE — PLAN OF CARE
Problem: Behavior Regulation Impairment (Psychotic Signs/Symptoms)  Goal: Improved Behavioral Control (Psychotic Signs/Symptoms)  Description: Patient will be able to have a reality based conversation.     Outcome: Improving   Patient has limited conversation with staff but will answer assessment questions.  States that he doesn't feel much different but is agreeable to increase in Wellbutrin.  Reviewed current dose and side effects with patient.       Problem: Behavioral Health Plan of Care  Goal: Patient-Specific Goal (Individualization)  Description: Patient will be free from self harm or injury  Patient will eat at least 50% of meals  Patient will attend at least 50% of groups when able.       Outcome: Improving  Note:      Patient has been isolative and withdrawn much of the morning.  He did come out to the lounge for meals but does not socialize with peers.  He is polite and friendly with staff.  Makes needs known.  Affect is blunted and flat with poor eye contact.  No complaints of pain.  VS WNL.  Face to face end of shift report communicated to evening shift RN.     Shantal Real RN  12/24/2021  10:47 AM

## 2021-12-24 NOTE — PLAN OF CARE
"Face to face shift report received from TRAE Walker.       Problem: Behavioral Health Plan of Care  Goal: Patient-Specific Goal (Individualization)  Description: Patient will be free from self harm or injury  Patient will eat at least 50% of meals  Patient will attend at least 50% of groups when able.     Outcome: No Change  Calm, cooperative, and medication compliant. Reports anxiety and depression. Denies thoughts of harming self or others. Withdrawn to bed majority of shift. Pt stated \"I don't thing any of these meds are working. I feel like self medicating with alcohol everyday.\" Did not attend groups. Ate 100% of dinner and drank 720ml   Requested and received Benadryl 50mg with HS medications.     Problem: Behavior Regulation Impairment (Psychotic Signs/Symptoms)  Goal: Improved Behavioral Control (Psychotic Signs/Symptoms)  Description: Patient will be able to have a reality based conversation.     Outcome: No Change         Face to face end of shift report to be communicated to oncoming RN.     "

## 2021-12-25 PROCEDURE — 124N000001 HC R&B MH

## 2021-12-25 PROCEDURE — 250N000013 HC RX MED GY IP 250 OP 250 PS 637: Performed by: NURSE PRACTITIONER

## 2021-12-25 PROCEDURE — 99232 SBSQ HOSP IP/OBS MODERATE 35: CPT | Performed by: NURSE PRACTITIONER

## 2021-12-25 PROCEDURE — 250N000013 HC RX MED GY IP 250 OP 250 PS 637: Performed by: STUDENT IN AN ORGANIZED HEALTH CARE EDUCATION/TRAINING PROGRAM

## 2021-12-25 RX ADMIN — LORAZEPAM 2 MG: 1 TABLET ORAL at 08:08

## 2021-12-25 RX ADMIN — PREGABALIN 50 MG: 25 CAPSULE ORAL at 20:12

## 2021-12-25 RX ADMIN — OLANZAPINE 10 MG: 10 TABLET, FILM COATED ORAL at 20:12

## 2021-12-25 RX ADMIN — LORAZEPAM 2 MG: 1 TABLET ORAL at 16:29

## 2021-12-25 RX ADMIN — BUPROPION HYDROCHLORIDE 300 MG: 300 TABLET, EXTENDED RELEASE ORAL at 08:09

## 2021-12-25 RX ADMIN — PREGABALIN 50 MG: 25 CAPSULE ORAL at 08:09

## 2021-12-25 RX ADMIN — AMANTADINE HYDROCHLORIDE 100 MG: 100 CAPSULE ORAL at 20:12

## 2021-12-25 RX ADMIN — LORAZEPAM 2 MG: 1 TABLET ORAL at 21:03

## 2021-12-25 RX ADMIN — LITHIUM CARBONATE 900 MG: 450 TABLET, EXTENDED RELEASE ORAL at 20:12

## 2021-12-25 RX ADMIN — AMANTADINE HYDROCHLORIDE 100 MG: 100 CAPSULE ORAL at 08:09

## 2021-12-25 RX ADMIN — OLANZAPINE 10 MG: 10 TABLET, ORALLY DISINTEGRATING ORAL at 10:42

## 2021-12-25 RX ADMIN — LORAZEPAM 2 MG: 1 TABLET ORAL at 11:45

## 2021-12-25 RX ADMIN — PREGABALIN 50 MG: 25 CAPSULE ORAL at 13:44

## 2021-12-25 ASSESSMENT — MIFFLIN-ST. JEOR: SCORE: 1956.87

## 2021-12-25 ASSESSMENT — ACTIVITIES OF DAILY LIVING (ADL)
ORAL_HYGIENE: INDEPENDENT
HYGIENE/GROOMING: INDEPENDENT
LAUNDRY: UNABLE TO COMPLETE
DRESS: SCRUBS (BEHAVIORAL HEALTH);INDEPENDENT

## 2021-12-25 NOTE — PLAN OF CARE
"PRN olanzapine zydis 10 mg given for c/o \"My anxiety is getting too much... I'm just pissed, I shouldn't even be here.\"   "

## 2021-12-25 NOTE — PLAN OF CARE
Face to face report received from Aishwarya LARKIN. Pt. Observed.     Problem: Behavioral Health Plan of Care  Goal: Patient-Specific Goal (Individualization)  Description: Patient will be free from self harm or injury  Patient will eat at least 50% of meals  Patient will attend at least 50% of groups when able.       Outcome: No Change    Problem: Behavioral Health Plan of Care  Goal: Patient-Specific Goal (Individualization)  Description: Patient will be free from self harm or injury  Patient will eat at least 50% of meals  Patient will attend at least 50% of groups when able.   Outcome: No Change     Pt has been in bed with eyes closed and regular respirations x 7 hours this noc shift. 15 minute and PRN checks all night. No complaints offered. Will continue to monitor.       Face to face end of shift report to be communicated to oncoming RN.     Liat Lai RN  12/25/2021

## 2021-12-25 NOTE — PLAN OF CARE
"  Problem: Behavioral Health Plan of Care  Goal: Patient-Specific Goal (Individualization)  Description: Patient will be free from self harm or injury  Patient will eat at least 50% of meals  Patient will attend at least 50% of groups when able.       12/25/2021 0922 by Aishwarya Harris, RN  Note: Pt was up this morning at shift change.  He ate breakfast in lounge and then requested his AM medication.  Pt came up to nurse's station around 0910 and said he wanted  to leave.  He doesn't feel like he should be here in the first place.  He said at the treatment facility he was at, he attended groups and did the programming required.  He just wasn't eating or talking.  He's frustrated that he will be committed again and he has to wait he for court.  He doesn't feel it's justified, especially because he was doing the deal and has 40 days clean.  Pt was not threatening, but frustrated.  He ate 100% this morning for breakfast.  Pt is able to make needs known.     Eves:  Pt asked when his medication times were and said \"the nurse usually brings them to me.\"  Writer assured him that if he didn't know the time, writer would find him to administer them.  Pt spent most of evening in room, laying or sitting on his bed.  Pt continues to be flat, but saying \"this is the best I have felt in a long time.\"  Pt requested to speak to practitioner saying \"it doesn't have to do with medication.\"  He asked at each med pass if the Lyrica went up to 100 mg.  When he was told it's at 50 mg he said \"it's no big deal.\"  Writer told him per practitioner that her goal was to get him back to the 100 mg he was on.           Problem: Behavior Regulation Impairment (Psychotic Signs/Symptoms)  Goal: Improved Behavioral Control (Psychotic Signs/Symptoms)  Description: Patient will be able to have a reality based conversation.     12/25/2021 0922 by Aishwarya Harris, RN  Outcome: Improving     "

## 2021-12-25 NOTE — PROGRESS NOTES
"[facility]  Psychiatric Progress Note      Impression:    Mr. Carter is a 31 year old male with a PMH of schizoaffective disorder, depressive type, catatonia, severe methamphetamine use disorder, alcohol use disorder, and significant TBI at the age of 3 who presented with depression with catatonia being off of Ativan after recently being discharged. This is the patient's 3rd hospitalization in the past roughly two months with substance use complicating his course.      The patient's catatonia has improved with resumption of Ativan. Attempting to see if the patient can be maintained on alternatives given the risks with his substance use disorders, thus Amantadine is being used. Will address patient's depression also.      Interim History:   Steven is in his room when I see him today. He appears somewhat tense. He reports that he is feeling depressed and feeling agitated. \"But I can't tell that new doctor that\". He states that he does not believe that he needs to be here in the hospital and is upset that a petition for commitment had been filed. He states he would rather go stay in a shelter. Reviewed whether agitated feeling has increased with Wellbutrin adjustment, though patient denies this and states it has been ongoing, will continue to monitor. He does report ongoing pain and asks about having Lyrica restarted. PTA had been taking 100 mg TID. We also discussed that this could potentially help with anxiety as well. We agree to add back a smaller dose today, monitor for any increased sedation. Seems to be handling current medications well, denies any side effects. I believe he had previous been taking Gabapentin for anxiety/pain though did feel Lyrica was more helpful. He states he is worried that \"they are gonna send me to a [facility] or [facility]\". Did review that this is not always the plan and that discharge disposition would be discussed at a later date.       Educated regarding medication " indications, risks, benefits, side effects, contraindications and possible interactions. Verbally expressed understanding.        Diagnoses:   1. Schizoaffective disorder, depressive type, multiple episodes, currently in acute episode, with catatonia   2. 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 Methamphetamine use disorder, severe  4. Alcohol use disorder, moderate      Attestation:  Patient has been seen and evaluated by me,  Christelle Contreras, NP      The patient's care was discussed with the treatment team and chart notes were reviewed.         Medications:     Current Facility-Administered Medications Ordered in Epic   Medication Dose Route Frequency Last Rate Last Admin     acetaminophen (TYLENOL) tablet 650 mg  650 mg Oral Q4H PRN   650 mg at 12/17/21 2319     amantadine (SYMMETREL) capsule 100 mg  100 mg Oral BID   100 mg at 12/25/21 0809     buPROPion (WELLBUTRIN XL) 24 hr tablet 300 mg  300 mg Oral Daily   300 mg at 12/25/21 0809     diphenhydrAMINE (BENADRYL) capsule 50 mg  50 mg Oral At Bedtime PRN   50 mg at 12/23/21 2130     lithium (ESKALITH CR/LITHOBID) CR tablet 900 mg  900 mg Oral At Bedtime   900 mg at 12/24/21 2016     LORazepam (ATIVAN) tablet 2 mg  2 mg Oral 4x Daily   2 mg at 12/25/21 1145     OLANZapine zydis (zyPREXA) ODT tab 10 mg  10 mg Oral TID PRN   10 mg at 12/25/21 1042    Or     OLANZapine (zyPREXA) injection 10 mg  10 mg Intramuscular TID PRN         OLANZapine (zyPREXA) tablet 10 mg  10 mg Oral At Bedtime   10 mg at 12/24/21 2016     pregabalin (LYRICA) capsule 50 mg  50 mg Oral TID   50 mg at 12/25/21 0809     vitamin D2 (ERGOCALCIFEROL) 35312 units (1250 mcg) capsule 50,000 Units  50,000 Units Oral Q7 Days   50,000 Units at 12/23/21 1221     Current Outpatient Medications Ordered in Epic   Medication     vitamin D2 (ERGOCALCIFEROL) 70861 units (1250 mcg) capsule            10 point ROS- chronic back pain       Allergies:     Allergies   Allergen  Reactions     Pork Allergy             Psychiatric Examination:   /73 (BP Location: Left arm, Patient Position: Chair)   Pulse 92   Temp 98.7  F (37.1  C) (Tympanic)   Resp 12   Ht 1.829 m (6')   Wt 97.4 kg (214 lb 11.2 oz)   SpO2 100%   BMI 29.12 kg/m    Weight is 214 lbs 11.2 oz  Body mass index is 29.12 kg/m .    Appearance:  awake, alert, dressed in hospital scrubs, appeared as age stated and slightly unkempt  Attitude:  cooperative  Eye Contact:  fair  Mood: depressed, some mild irritability  Affect:  intensity is flat  Speech: reduction of rate, normal rhythm  Psychomotor Behavior:  no evidence of tardive dyskinesia, dystonia, or tics  Thought Process:  linear and goal oriented  Associations:  no loose associations  Thought Content:  no evidence of suicidal ideation or homicidal ideation and no evidence of psychotic thought, denies hallucinations  Insight:  fair  Judgment:  fair  Oriented to:  time, person, and place  Attention Span and Concentration:  intact  Recent and Remote Memory:  intact  Fund of Knowledge: appropriate  Muscle Strength and Tone: normal  Gait and Station: Normal           Labs:   No results found for this or any previous visit (from the past 24 hour(s)).      BEHAVIORAL TEAM DISCUSSION    Progress: Fair. Tolerating medication well. Does remain isolative.    Continued Stay Criteria/Rationale: Continued symptoms without sufficient improvement/resolution.    Medical/Physical: chronic back pain  Precautions:   Falls precaution?: No  Behavioral Orders   Procedures     Code 1 - Restrict to Unit     Routine Programming     As clinically indicated     Status 15     Every 15 minutes.     Plan:   Wellbutrin XL increased to 300 mg daily today  Continue Lithium  mg at bedtime  Continue Zyprexa 10 mg at bedtime  Continue Symmetrel 100 mg BID  Continue Ativan 2 mg QID  Add back Lyrica 50 mg TID for anxiety/pain. Patient taking 100 mg TID PTA. Reviewed to watch for any increased  sedation given combination of medications he is taking.        Rationale for change in precautions or plan:   Reports chronic pain is helped by lyrica, targeting depression with wellbutrin      Participants: DAYSI Griffith, CNP,  Nursing

## 2021-12-25 NOTE — PROGRESS NOTES
"[facility]  Psychiatric Progress Note      Impression:    Mr. Carter is a 31 year old male with a PMH of schizoaffective disorder, depressive type, catatonia, severe methamphetamine use disorder, alcohol use disorder, and significant TBI at the age of 3 who presented with depression with catatonia being off of Ativan after recently being discharged. This is the patient's 3rd hospitalization in the past roughly two months with substance use complicating his course.      The patient's catatonia has improved with resumption of Ativan. Attempting to see if the patient can be maintained on alternatives given the risks with his substance use disorders, thus Amantadine is being used. Will address patient's depression also.      Interim History:   Steven was out of his room when I saw him today, asks to speak with me. He states \"it's weird, but I think the medications are helping. I don't feel as depressed today\". He does not appear as tense today though affect remains flat. He does again state that he feels it is unfair that he has to stay in the hospital as he feels he did not do anything wrong. He reports that he slept well last night, appetite has been good. He has not noticed any increase in sedation or dizziness with medication adjustment. Of note, he did attend group yesterday afternoon and did trim his mane.     Educated regarding medication indications, risks, benefits, side effects, contraindications and possible interactions. Verbally expressed understanding.        Diagnoses:   1. Schizoaffective disorder, depressive type, multiple episodes, currently in acute episode, with catatonia   2. 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 Methamphetamine use disorder, severe  4. Alcohol use disorder, moderate      Attestation:  Patient has been seen and evaluated by me,  Christelle Contreras NP      The patient's care was discussed with the treatment team and chart notes were " reviewed.         Medications:     Current Facility-Administered Medications Ordered in Epic   Medication Dose Route Frequency Last Rate Last Admin     acetaminophen (TYLENOL) tablet 650 mg  650 mg Oral Q4H PRN   650 mg at 12/17/21 2319     amantadine (SYMMETREL) capsule 100 mg  100 mg Oral BID   100 mg at 12/25/21 0809     buPROPion (WELLBUTRIN XL) 24 hr tablet 300 mg  300 mg Oral Daily   300 mg at 12/25/21 0809     diphenhydrAMINE (BENADRYL) capsule 50 mg  50 mg Oral At Bedtime PRN   50 mg at 12/23/21 2130     lithium (ESKALITH CR/LITHOBID) CR tablet 900 mg  900 mg Oral At Bedtime   900 mg at 12/24/21 2016     LORazepam (ATIVAN) tablet 2 mg  2 mg Oral 4x Daily   2 mg at 12/25/21 1145     OLANZapine zydis (zyPREXA) ODT tab 10 mg  10 mg Oral TID PRN   10 mg at 12/25/21 1042    Or     OLANZapine (zyPREXA) injection 10 mg  10 mg Intramuscular TID PRN         OLANZapine (zyPREXA) tablet 10 mg  10 mg Oral At Bedtime   10 mg at 12/24/21 2016     pregabalin (LYRICA) capsule 50 mg  50 mg Oral TID   50 mg at 12/25/21 0809     vitamin D2 (ERGOCALCIFEROL) 90106 units (1250 mcg) capsule 50,000 Units  50,000 Units Oral Q7 Days   50,000 Units at 12/23/21 1221     Current Outpatient Medications Ordered in Epic   Medication     vitamin D2 (ERGOCALCIFEROL) 12607 units (1250 mcg) capsule            10 point ROS- chronic back pain       Allergies:     Allergies   Allergen Reactions     Pork Allergy             Psychiatric Examination:   /73 (BP Location: Left arm, Patient Position: Chair)   Pulse 92   Temp 98.7  F (37.1  C) (Tympanic)   Resp 12   Ht 1.829 m (6')   Wt 97.4 kg (214 lb 11.2 oz)   SpO2 100%   BMI 29.12 kg/m    Weight is 214 lbs 11.2 oz  Body mass index is 29.12 kg/m .    Appearance: awake, alert, dressed in hospital scrubs, did trim his beard yesterday  Attitude:  cooperative  Eye Contact:  fair  Mood: reports feeling less depressed today  Affect:  intensity is flat  Speech: reduction of rate, normal  rhythm  Psychomotor Behavior:  no evidence of tardive dyskinesia, dystonia, or tics  Thought Process:  linear and goal oriented  Associations:  no loose associations  Thought Content:  no evidence of suicidal ideation or homicidal ideation and no evidence of psychotic thought, denies hallucinations  Insight:  fair  Judgment:  fair  Oriented to:  time, person, and place  Attention Span and Concentration:  intact  Recent and Remote Memory:  intact  Fund of Knowledge: appropriate  Muscle Strength and Tone: normal  Gait and Station: Normal           Labs:   No results found for this or any previous visit (from the past 24 hour(s)).      BEHAVIORAL TEAM DISCUSSION    Progress: Fair. Tolerating medication well, denies side effects. Did attend group yesterday.    Continued Stay Criteria/Rationale: Continued symptoms without sufficient improvement/resolution.    Medical/Physical: chronic back pain  Precautions:   Falls precaution?: No  Behavioral Orders   Procedures     Code 1 - Restrict to Unit     Routine Programming     As clinically indicated     Status 15     Every 15 minutes.     Plan:   Continue Wellbutrin  mg daily  Continue Lithium  mg at bedtime  Continue Zyprexa 10 mg at bedtime  Continue Symmetrel 100 mg BID  Continue Ativan 2 mg QID  Continue Lyrica 50 mg TID for anxiety/pain. Patient taking 100 mg TID PTA.   Will stop nicotine patch and lidocaine patch, has been refusing since admission      Rationale for change in precautions or plan:   Treat and manage symptoms      Participants: DAYSI Griffith, CNP,  Nursing

## 2021-12-25 NOTE — PLAN OF CARE
Problem: Behavioral Health Plan of Care  Goal: Patient-Specific Goal (Individualization)  Description: Patient will be free from self harm or injury  Patient will eat at least 50% of meals  Patient will attend at least 50% of groups when able.       Note: Pt was med compliant.  Pt was isolative and withdrawn.  He did attend evening group this evening shift.  Pt reports that he is feeling less depressed/anxious.  Pt denies hallucinations.  He asked how writer's night was going on [employer].  Pt said he's eating and sleeping well.  He shaved his mane this evening.  Pt is able to make needs known.  Pt said that he doesn't want to take the Benadryl anymore, because it is causing his legs to be restless, which he states wasn't an issue before taking the Benadryl for catatonia.         Problem: Behavior Regulation Impairment (Psychotic Signs/Symptoms)  Goal: Improved Behavioral Control (Psychotic Signs/Symptoms)  Description: Patient will be able to have a reality based conversation.     Note: Pt is able to have reality based conversation.

## 2021-12-26 LAB
ARSENIC BLD-MCNC: <10 UG/L
LEAD BLDV-MCNC: <2 UG/DL
MERCURY BLD-MCNC: 5.3 UG/L

## 2021-12-26 PROCEDURE — 250N000013 HC RX MED GY IP 250 OP 250 PS 637: Performed by: STUDENT IN AN ORGANIZED HEALTH CARE EDUCATION/TRAINING PROGRAM

## 2021-12-26 PROCEDURE — 250N000013 HC RX MED GY IP 250 OP 250 PS 637: Performed by: NURSE PRACTITIONER

## 2021-12-26 PROCEDURE — 124N000001 HC R&B MH

## 2021-12-26 RX ORDER — PREGABALIN 75 MG/1
75 CAPSULE ORAL 3 TIMES DAILY
Status: DISCONTINUED | OUTPATIENT
Start: 2021-12-26 | End: 2021-12-28

## 2021-12-26 RX ADMIN — AMANTADINE HYDROCHLORIDE 100 MG: 100 CAPSULE ORAL at 08:01

## 2021-12-26 RX ADMIN — PREGABALIN 75 MG: 75 CAPSULE ORAL at 13:32

## 2021-12-26 RX ADMIN — LORAZEPAM 2 MG: 1 TABLET ORAL at 08:01

## 2021-12-26 RX ADMIN — LORAZEPAM 2 MG: 1 TABLET ORAL at 21:01

## 2021-12-26 RX ADMIN — LORAZEPAM 2 MG: 1 TABLET ORAL at 11:31

## 2021-12-26 RX ADMIN — PREGABALIN 75 MG: 75 CAPSULE ORAL at 20:08

## 2021-12-26 RX ADMIN — LITHIUM CARBONATE 900 MG: 450 TABLET, EXTENDED RELEASE ORAL at 20:08

## 2021-12-26 RX ADMIN — PREGABALIN 50 MG: 25 CAPSULE ORAL at 08:01

## 2021-12-26 RX ADMIN — OLANZAPINE 10 MG: 10 TABLET, FILM COATED ORAL at 20:08

## 2021-12-26 RX ADMIN — BUPROPION HYDROCHLORIDE 300 MG: 300 TABLET, EXTENDED RELEASE ORAL at 08:01

## 2021-12-26 RX ADMIN — AMANTADINE HYDROCHLORIDE 100 MG: 100 CAPSULE ORAL at 20:08

## 2021-12-26 RX ADMIN — LORAZEPAM 2 MG: 1 TABLET ORAL at 16:43

## 2021-12-26 ASSESSMENT — ACTIVITIES OF DAILY LIVING (ADL)
HYGIENE/GROOMING: INDEPENDENT
DRESS: INDEPENDENT;SCRUBS (BEHAVIORAL HEALTH)

## 2021-12-26 NOTE — PLAN OF CARE
Face to face end of shift report received from Harriett TOTH RN. Rounding completed and patient observed in the hallway. No requests at this time.    20:00 Update: Patient denied all criteria. He was dismissive but made better eye contact and communicated better than previous. He denied pain. He was in the milieu more but still withdrawn. His affect is blunted and flat. Patient ate 100% of dinner and snack. Patient does seem preoccupied at times. He verbalized an understanding of his med change today.     Face to face end of shift report communicated to oncsalomón RN.    Problem: Behavioral Health Plan of Care  Goal: Patient-Specific Goal (Individualization)  Description: Patient will be free from self harm or injury  Patient will eat at least 50% of meals  Patient will attend at least 50% of groups when able.       Outcome: No Change     Problem: Behavior Regulation Impairment (Psychotic Signs/Symptoms)  Goal: Improved Behavioral Control (Psychotic Signs/Symptoms)  Description: Patient will be able to have a reality based conversation.     Outcome: No Change

## 2021-12-26 NOTE — PLAN OF CARE
"Problem: Behavioral Health Plan of Care  Goal: Patient-Specific Goal (Individualization)  Description: Patient will be free from self harm or injury  Patient will eat at least 50% of meals  Patient will attend at least 50% of groups when able.   Outcome: No Change    Pt remains calm and cooperative, less isolative today with 1:1 time spent in group setting with male  for 1.5 hours. Pt felt talking was helpful, does feel depression is improving a little. Anxiety is manageable, denied physical complaint. Stated \"No matter what, I will be here until the 4th.\" In agreement with Lyrica increase today, does take medications as ordered. Pt reported sleeping adequately at night, rests t/o the day. Ate 100% of breakfast, only 25% of lunch, but drinks Ensure with meals.     Problem: Behavior Regulation Impairment (Psychotic Signs/Symptoms)  Goal: Improved Behavioral Control (Psychotic Signs/Symptoms)  Description: Patient will be able to have a reality based conversation.   Outcome: Improving    1530 - Face to face end of shift report to be communicated to oncoming shift RN.     Harriett Cameron RN  12/26/2021  1:50 PM          "

## 2021-12-26 NOTE — PLAN OF CARE
Face to face report received from Aishwarya LARKIN. Pt. Observed.     Problem: Behavioral Health Plan of Care  Goal: Patient-Specific Goal (Individualization)  Description: Patient will be free from self harm or injury  Patient will eat at least 50% of meals  Patient will attend at least 50% of groups when able.       Outcome: No Change  Note:   Pt has been in bed with eyes closed and regular respirations for a total of 7 hours this noc shift. 15 minute and PRN checks all night. No complaints offered. Will continue to monitor.           Face to face end of shift report to be communicated to oncoming RN.     Liat Lai RN  12/26/2021

## 2021-12-27 PROCEDURE — 250N000013 HC RX MED GY IP 250 OP 250 PS 637: Performed by: NURSE PRACTITIONER

## 2021-12-27 PROCEDURE — 99232 SBSQ HOSP IP/OBS MODERATE 35: CPT | Performed by: NURSE PRACTITIONER

## 2021-12-27 PROCEDURE — 124N000001 HC R&B MH

## 2021-12-27 PROCEDURE — 250N000013 HC RX MED GY IP 250 OP 250 PS 637: Performed by: STUDENT IN AN ORGANIZED HEALTH CARE EDUCATION/TRAINING PROGRAM

## 2021-12-27 RX ADMIN — LITHIUM CARBONATE 900 MG: 450 TABLET, EXTENDED RELEASE ORAL at 20:01

## 2021-12-27 RX ADMIN — AMANTADINE HYDROCHLORIDE 100 MG: 100 CAPSULE ORAL at 08:08

## 2021-12-27 RX ADMIN — PREGABALIN 75 MG: 75 CAPSULE ORAL at 14:10

## 2021-12-27 RX ADMIN — LORAZEPAM 2 MG: 1 TABLET ORAL at 21:05

## 2021-12-27 RX ADMIN — LORAZEPAM 2 MG: 1 TABLET ORAL at 07:48

## 2021-12-27 RX ADMIN — BUPROPION HYDROCHLORIDE 300 MG: 300 TABLET, EXTENDED RELEASE ORAL at 08:08

## 2021-12-27 RX ADMIN — LORAZEPAM 2 MG: 1 TABLET ORAL at 16:28

## 2021-12-27 RX ADMIN — AMANTADINE HYDROCHLORIDE 100 MG: 100 CAPSULE ORAL at 20:01

## 2021-12-27 RX ADMIN — LORAZEPAM 2 MG: 1 TABLET ORAL at 11:38

## 2021-12-27 RX ADMIN — OLANZAPINE 10 MG: 10 TABLET, FILM COATED ORAL at 20:01

## 2021-12-27 RX ADMIN — OLANZAPINE 10 MG: 10 TABLET, ORALLY DISINTEGRATING ORAL at 14:32

## 2021-12-27 RX ADMIN — PREGABALIN 75 MG: 75 CAPSULE ORAL at 20:01

## 2021-12-27 RX ADMIN — PREGABALIN 75 MG: 75 CAPSULE ORAL at 08:08

## 2021-12-27 ASSESSMENT — ACTIVITIES OF DAILY LIVING (ADL)
LAUNDRY: UNABLE TO COMPLETE
DRESS: SCRUBS (BEHAVIORAL HEALTH)
HYGIENE/GROOMING: INDEPENDENT
ORAL_HYGIENE: INDEPENDENT

## 2021-12-27 NOTE — PLAN OF CARE
Problem: Behavior Regulation Impairment (Psychotic Signs/Symptoms)  Goal: Improved Behavioral Control (Psychotic Signs/Symptoms)  Description: Patient will be able to have a reality based conversation.     Outcome: Improving     Problem: Behavioral Health Plan of Care  Goal: Patient-Specific Goal (Individualization)  Description: Patient will be free from self harm or injury  Patient will eat at least 50% of meals  Patient will attend at least 50% of groups when able.       Outcome: Improving   Patient has been isolative and withdrawn to room much of the day.  States that he is bored and doesn't feel like going to groups today.  He is eating all of his meals.  Slow to respond to questions but does answer.  Denies any mental health concerns at this time but his affect is flat and blunted.  No of complaints of pain.  VS WNL. Face to face end of shift report communicated to evening shift RN.     Shantal Real RN  12/27/2021  12:19 PM         Have him come in at 3:15 today

## 2021-12-27 NOTE — PLAN OF CARE
Face to face report received. Pt. Observed.     Problem: Behavioral Health Plan of Care  Goal: Patient-Specific Goal (Individualization)  Description: Patient will be free from self harm or injury  Patient will eat at least 50% of meals  Patient will attend at least 50% of groups when able.   Outcome: No Change    Pt has been in bed with eyes closed and regular respirations x 7 hours this noc shift. 15 minute and PRN checks all night. No complaints offered. Will continue to monitor.        Face to face end of shift report communicated to oncoming RN.     Liat Lai RN  12/27/2021

## 2021-12-27 NOTE — PLAN OF CARE
Pt had their confinement court this afternoon. The  had ordered a confinement to [place] until their next hearing on January 4th @ 8:15 AM. Pt is asking why he can not be released. Explained to pt that they are confined here until their court date and the team would have to come up with a safe discharge.

## 2021-12-27 NOTE — PROGRESS NOTES
"CLINICAL NUTRITION SERVICES  -  REASSESSMENT NOTE      Steven JOS Carter     31 yom admitted for catatonia. Pt has a hx of methamphetamine use, alcohol use disorder, schizoaffective disorder, TBI at age of 3. Noted weight loss in the past 2 months- 18lbs. Noted 2lb weight gain in the past week. Intake is adequate. Pt drinking Ensure with meals.    Diet Order: Regular, kitchen sending Ensure  Intake: 11 meals with % intake since last RD review (only 1 meal with 25% intake)      Height: 6' 0\"  Weight: 214 lbs 11.2 oz  Body mass index is 29.12 kg/m .  Weight Status:  Overweight BMI 25-29.9  Weight History:  Max IBW- 83.9kg       Wt Readings from Last 10 Encounters:   12/16/21 96.4 kg (212 lb 9.6 oz)   12/14/21 97.7 kg (215 lb 4.8 oz)   12/01/21 97.8 kg (215 lb 11.2 oz)   10/31/21 105.1 kg (231 lb 11.2 oz)   03/27/21 105.2 kg (232 lb)   11/30/15 96.6 kg (213 lb)   08/18/14 94.5 kg (208 lb 6.4 oz)   08/15/14 93.4 kg (206 lb)   07/19/14 102.1 kg (225 lb)      83.9kg- max ibw  Estimated Energy Needs: 1776-7929 kcals (25-30 Kcal/Kg)   Estimated Protein Needs:  grams protein (1-1.2 g pro/Kg)     Malnutrition Diagnosis: severe malnutrition- improving   In Context of:  Chronic illness or disease  Environmental or social circumstances       NUTRITION RECOMMENDATIONS  - Encourage intake with meals  - Continue to send strawberry Ensure on meal trays. Pt to note on meal ticket if he wants a change in flavor.  - Monitor weight     MONITORING AND EVALUATION:  RD will monitor intake, weight, labs     "

## 2021-12-27 NOTE — PROGRESS NOTES
"[facility]  Psychiatric Progress Note      Impression:    Mr. Carter is a 31 year old male with a PMH of schizoaffective disorder, depressive type, catatonia, severe methamphetamine use disorder, alcohol use disorder, and significant TBI at the age of 3 who presented with depression with catatonia being off of Ativan after recently being discharged. This is the patient's 3rd hospitalization in the past roughly two months with substance use complicating his course.      The patient's catatonia has improved with resumption of Ativan. Attempting to see if the patient can be maintained on alternatives given the risks with his substance use disorders, thus Amantadine is being used. Will address patient's depression also.      Interim History:   Steven had his confinement hearing this afternoon and has been increasingly agitated since. He continues to feel that he did nothing wrong and that he should not be here in the hospital. \"It's bullshit I'm even here\". He does go back to his room and can be heard yelling to himself. He states he does not need to go back to  treatment and instead would prefer to discharge to [place] and work with outpatient services. He states he has been more than 50 days clean, so shouldn't need to go to treatment. SW did tell him that we will likely follow recommendations for Rule 25.       Educated regarding medication indications, risks, benefits, side effects, contraindications and possible interactions. Verbally expressed understanding.        Diagnoses:   1. Schizoaffective disorder, depressive type, multiple episodes, currently in acute episode, with catatonia   2. 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 Methamphetamine use disorder, severe  4. Alcohol use disorder, moderate      Attestation:  Patient has been seen and evaluated by me,  Christelle Contreras NP      The patient's care was discussed with the treatment team and chart notes were " reviewed.         Medications:     Current Facility-Administered Medications Ordered in Epic   Medication Dose Route Frequency Last Rate Last Admin     acetaminophen (TYLENOL) tablet 650 mg  650 mg Oral Q4H PRN   650 mg at 12/17/21 2319     amantadine (SYMMETREL) capsule 100 mg  100 mg Oral BID   100 mg at 12/25/21 0809     buPROPion (WELLBUTRIN XL) 24 hr tablet 300 mg  300 mg Oral Daily   300 mg at 12/25/21 0809     diphenhydrAMINE (BENADRYL) capsule 50 mg  50 mg Oral At Bedtime PRN   50 mg at 12/23/21 2130     lithium (ESKALITH CR/LITHOBID) CR tablet 900 mg  900 mg Oral At Bedtime   900 mg at 12/24/21 2016     LORazepam (ATIVAN) tablet 2 mg  2 mg Oral 4x Daily   2 mg at 12/25/21 1145     OLANZapine zydis (zyPREXA) ODT tab 10 mg  10 mg Oral TID PRN   10 mg at 12/25/21 1042    Or     OLANZapine (zyPREXA) injection 10 mg  10 mg Intramuscular TID PRN         OLANZapine (zyPREXA) tablet 10 mg  10 mg Oral At Bedtime   10 mg at 12/24/21 2016     pregabalin (LYRICA) capsule 50 mg  50 mg Oral TID   50 mg at 12/25/21 0809     vitamin D2 (ERGOCALCIFEROL) 22446 units (1250 mcg) capsule 50,000 Units  50,000 Units Oral Q7 Days   50,000 Units at 12/23/21 1221     Current Outpatient Medications Ordered in Epic   Medication     vitamin D2 (ERGOCALCIFEROL) 61805 units (1250 mcg) capsule            10 point ROS- chronic back pain       Allergies:     Allergies   Allergen Reactions     Pork Allergy             Psychiatric Examination:   /72   Pulse 95   Temp 99.3  F (37.4  C) (Temporal)   Resp 14   Ht 1.829 m (6')   Wt 97.4 kg (214 lb 11.2 oz)   SpO2 98%   BMI 29.12 kg/m    Weight is 214 lbs 11.2 oz  Body mass index is 29.12 kg/m .    Appearance: awake, alert, dressed in hospital scrubs  Attitude: cooperative though more irritable today  Eye Contact:  fair  Mood: irritable and upset after court  Affect:  intensity is flat  Speech: reduction of rate, normal rhythm  Psychomotor Behavior:  no evidence of tardive dyskinesia,  dystonia, or tics  Thought Process:  linear and goal oriented  Associations:  no loose associations  Thought Content:  no evidence of suicidal ideation or homicidal ideation and no evidence of psychotic thought, denies hallucinations  Insight:  fair  Judgment:  fair  Oriented to:  time, person, and place  Attention Span and Concentration:  intact  Recent and Remote Memory:  intact  Fund of Knowledge: appropriate for education  Muscle Strength and Tone: normal  Gait and Station: Normal           Labs:   No results found for this or any previous visit (from the past 24 hour(s)).      BEHAVIORAL TEAM DISCUSSION    Progress: Variable. Tolerating medication well, denies side effects. Did attend group yesterday though not today. Upset with commitment process.    Continued Stay Criteria/Rationale: Continued symptoms without sufficient improvement/resolution.    Medical/Physical: chronic back pain  Precautions:   Falls precaution?: No  Behavioral Orders   Procedures     Code 1 - Restrict to Unit     Routine Programming     As clinically indicated     Status 15     Every 15 minutes.     Plan:   Continue Wellbutrin  mg daily  Continue Lithium  mg at bedtime  Continue Zyprexa 10 mg at bedtime  Continue Symmetrel 100 mg BID  Continue Ativan 2 mg QID  Increase Lyrica to 75 mg TID for anxiety/pain. Patient taking 100 mg TID PTA  Will stop nicotine patch and lidocaine patch, has been refusing since admission      Rationale for change in precautions or plan:   Treat and manage symptoms      Participants: DAYSI Griffith, CNP,  Nursing, OT, SW

## 2021-12-28 PROCEDURE — 250N000013 HC RX MED GY IP 250 OP 250 PS 637: Performed by: NURSE PRACTITIONER

## 2021-12-28 PROCEDURE — 250N000013 HC RX MED GY IP 250 OP 250 PS 637: Performed by: STUDENT IN AN ORGANIZED HEALTH CARE EDUCATION/TRAINING PROGRAM

## 2021-12-28 PROCEDURE — 99232 SBSQ HOSP IP/OBS MODERATE 35: CPT | Performed by: NURSE PRACTITIONER

## 2021-12-28 PROCEDURE — 124N000001 HC R&B MH

## 2021-12-28 RX ADMIN — OLANZAPINE 10 MG: 10 TABLET, ORALLY DISINTEGRATING ORAL at 23:36

## 2021-12-28 RX ADMIN — LITHIUM CARBONATE 900 MG: 450 TABLET, EXTENDED RELEASE ORAL at 20:07

## 2021-12-28 RX ADMIN — PREGABALIN 75 MG: 75 CAPSULE ORAL at 14:06

## 2021-12-28 RX ADMIN — PREGABALIN 100 MG: 75 CAPSULE ORAL at 20:07

## 2021-12-28 RX ADMIN — BUPROPION HYDROCHLORIDE 300 MG: 300 TABLET, EXTENDED RELEASE ORAL at 08:06

## 2021-12-28 RX ADMIN — PREGABALIN 75 MG: 75 CAPSULE ORAL at 08:06

## 2021-12-28 RX ADMIN — AMANTADINE HYDROCHLORIDE 100 MG: 100 CAPSULE ORAL at 08:06

## 2021-12-28 RX ADMIN — LORAZEPAM 2 MG: 1 TABLET ORAL at 07:36

## 2021-12-28 RX ADMIN — LORAZEPAM 2 MG: 1 TABLET ORAL at 21:31

## 2021-12-28 RX ADMIN — LORAZEPAM 2 MG: 1 TABLET ORAL at 16:16

## 2021-12-28 RX ADMIN — AMANTADINE HYDROCHLORIDE 100 MG: 100 CAPSULE ORAL at 20:07

## 2021-12-28 RX ADMIN — OLANZAPINE 10 MG: 10 TABLET, FILM COATED ORAL at 20:07

## 2021-12-28 RX ADMIN — LORAZEPAM 2 MG: 1 TABLET ORAL at 11:32

## 2021-12-28 NOTE — PLAN OF CARE
"  Problem: Behavioral Health Plan of Care  Goal: Patient-Specific Goal (Individualization)  Description: Patient will be free from self harm or injury  Patient will eat at least 50% of meals  Patient will attend at least 50% of groups when able.       Note: Patient withdrawn to bedroom almost entire shift, only coming out to unit for meals and phone calls. Affect continues to appear flat/blunt and offers very little during conversation. Denies all assessment criteria, only stating \"I'm sick of being here\".   Declines numerous group encouragements throughout shift and appears to get frustrated with this writer at times but remains polite. Compliant with all scheduled medications. Laying in bed and appears to be sleeping by 2200. Continue to monitor at this time.     Problem: Behavior Regulation Impairment (Psychotic Signs/Symptoms)  Goal: Improved Behavioral Control (Psychotic Signs/Symptoms)  Description: Patient will be able to have a reality based conversation.     Note: Continue to monitor at this time.      Face to face end of shift report communicated to oncsalomón LARKIN.     Stephanie Pearson RN  12/28/2021  5:02 PM       "

## 2021-12-28 NOTE — PLAN OF CARE
"Face to face shift report received from TRAE Murguia.       Problem: Behavioral Health Plan of Care  Goal: Patient-Specific Goal (Individualization)  Description: Patient will be free from self harm or injury  Patient will eat at least 50% of meals  Patient will attend at least 50% of groups when able.       Outcome: Improving  Note: Calm, cooperative, and medication compliant. Denies thoughts of harming self or others. Reports depression and anxiety. Pt reports \"I need to talk to a therapist after I get out of here.\" Flat affect. Offers little during conversation. No reports of pain.        Problem: Behavior Regulation Impairment (Psychotic Signs/Symptoms)  Goal: Improved Behavioral Control (Psychotic Signs/Symptoms)  Description: Patient will be able to have a reality based conversation.     Outcome: Improving           Face to face end of shift report to be communicated to oncoming RN.     "

## 2021-12-28 NOTE — PLAN OF CARE
Face to face shift report received from nurse. Rounding completed, pt observed.    Problem: Behavioral Health Plan of Care  Goal: Patient-Specific Goal (Individualization)  Description: Patient will be free from self harm or injury  Patient will eat at least 50% of meals  Patient will attend at least 50% of groups when able.   Outcome: No Change  Note: Pt has been in bed with eyes closed and regular respirations observed all night. Will continue to monitor. Pt up at 0130 requesting a snack and returned to bed.     Face to face report will be communicated to oncoming RN.    Yoan Erickson RN  12/28/2021

## 2021-12-28 NOTE — PLAN OF CARE
Problem: Behavior Regulation Impairment (Psychotic Signs/Symptoms)  Goal: Improved Behavioral Control (Psychotic Signs/Symptoms)  Description: Patient will be able to have a reality based conversation.     Outcome: No Change   Patient has limited answers during nursing assessment.  Mostly answers yes or no and has a flat/blunted affect.       Problem: Behavioral Health Plan of Care  Goal: Patient-Specific Goal (Individualization)  Description: Patient will be free from self harm or injury  Patient will eat at least 50% of meals  Patient will attend at least 50% of groups when able.       Outcome: Improving  Note:      Patient has been isolative to his room but did come out to the lounge more then yesterday.  He looked into the group room a few times but did not go and participate in groups.  States he is frustrated with being here in the hospital.  Denies any mental health concerns at this time.  No complaints of pain.  VS WNL.  Face to face end of shift report communicated to evening shift RN.     Shantal Real RN  12/28/2021  12:30 PM

## 2021-12-28 NOTE — PROGRESS NOTES
"[facility]  Psychiatric Progress Note      Impression:    Mr. Carter is a 31 year old male with a PMH of schizoaffective disorder, depressive type, catatonia, severe methamphetamine use disorder, alcohol use disorder, and significant TBI at the age of 3 who presented with depression with catatonia being off of Ativan after recently being discharged. This is the patient's 3rd hospitalization in the past roughly two months with substance use complicating his course.      The patient's catatonia has improved with resumption of Ativan. Attempting to see if the patient can be maintained on alternatives given the risks with his substance use disorders, thus Amantadine is being used. Will address patient's depression also.      Interim History:   Steven is doing much better today. He apologized for his outburst after court yesterday, \"I was acting like a child would act. I need to take responsibility, it was my actions that got me here\". He states that if he needs to go to a placement at discharge that he would prefer to go to an MI/CD IRTS program. He has been compliant with medications. he does report that he may be experiencing a very slight tremor in his hands, questions if this may be due to Wellbutrin. He does extend his hands and there is a very slight tremor noted, discussed that we would continue to monitor this over the next few days as his mood does seem to have improved while taking the Wellbutrin. Also reviewed that plan will be to slowly taper down on Ativan while he is taking the Amantadine to see if this would be sufficient in treating catatonia, he does seem a bit worried about doing this. \"I will talk to Dr Timmons tomorrow, he's back right?\"       Educated regarding medication indications, risks, benefits, side effects, contraindications and possible interactions. Verbally expressed understanding.        Diagnoses:   1. Schizoaffective disorder, depressive type, multiple episodes, currently " in acute episode, with catatonia   2. 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 Methamphetamine use disorder, severe  4. Alcohol use disorder, moderate      Attestation:  Patient has been seen and evaluated by me,  Christelle Contreras NP      The patient's care was discussed with the treatment team and chart notes were reviewed.         Medications:     Current Facility-Administered Medications Ordered in Epic   Medication Dose Route Frequency Last Rate Last Admin     acetaminophen (TYLENOL) tablet 650 mg  650 mg Oral Q4H PRN   650 mg at 12/17/21 2319     amantadine (SYMMETREL) capsule 100 mg  100 mg Oral BID   100 mg at 12/25/21 0809     buPROPion (WELLBUTRIN XL) 24 hr tablet 300 mg  300 mg Oral Daily   300 mg at 12/25/21 0809     diphenhydrAMINE (BENADRYL) capsule 50 mg  50 mg Oral At Bedtime PRN   50 mg at 12/23/21 2130     lithium (ESKALITH CR/LITHOBID) CR tablet 900 mg  900 mg Oral At Bedtime   900 mg at 12/24/21 2016     LORazepam (ATIVAN) tablet 2 mg  2 mg Oral 4x Daily   2 mg at 12/25/21 1145     OLANZapine zydis (zyPREXA) ODT tab 10 mg  10 mg Oral TID PRN   10 mg at 12/25/21 1042    Or     OLANZapine (zyPREXA) injection 10 mg  10 mg Intramuscular TID PRN         OLANZapine (zyPREXA) tablet 10 mg  10 mg Oral At Bedtime   10 mg at 12/24/21 2016     pregabalin (LYRICA) capsule 50 mg  50 mg Oral TID   50 mg at 12/25/21 0809     vitamin D2 (ERGOCALCIFEROL) 48758 units (1250 mcg) capsule 50,000 Units  50,000 Units Oral Q7 Days   50,000 Units at 12/23/21 1221     Current Outpatient Medications Ordered in Epic   Medication     vitamin D2 (ERGOCALCIFEROL) 35626 units (1250 mcg) capsule            10 point ROS- chronic back pain       Allergies:     Allergies   Allergen Reactions     Pork Allergy             Psychiatric Examination:   /81   Pulse 102   Temp 98.6  F (37  C) (Temporal)   Resp 16   Ht 1.829 m (6')   Wt 97.4 kg (214 lb 11.2 oz)   SpO2 96%   BMI 29.12 kg/m     Weight is 214 lbs 11.2 oz  Body mass index is 29.12 kg/m .    Appearance: awake, alert, dressed in hospital scrubs  Attitude: cooperative, pleasant, apologetic  Eye Contact:  fair  Mood: much calmer today  Affect:  intensity is flat  Speech: monotone, normal rhythm  Psychomotor Behavior:  no evidence of tardive dyskinesia, dystonia, or tics  Thought Process:  linear and goal oriented  Associations:  no loose associations  Thought Content:  Denies SI, denies hallucinations  Insight:  fair  Judgment:  fair  Oriented to:  time, person, and place  Attention Span and Concentration:  intact  Recent and Remote Memory:  intact  Fund of Knowledge: appropriate for education  Muscle Strength and Tone: normal  Gait and Station: Normal           Labs:   No results found for this or any previous visit (from the past 24 hour(s)).      BEHAVIORAL TEAM DISCUSSION    Progress: Improoing. Apologetic today about his outburst yesterday after court. Much calmer, coming out of his room.    Continued Stay Criteria/Rationale: Continued symptoms without sufficient improvement/resolution.    Medical/Physical: chronic back pain  Precautions:   Falls precaution?: No  Behavioral Orders   Procedures     Code 1 - Restrict to Unit     Routine Programming     As clinically indicated     Status 15     Every 15 minutes.     Plan:   Continue Wellbutrin  mg daily  Continue Lithium  mg at bedtime  Continue Zyprexa 10 mg at bedtime  Continue Symmetrel 100 mg BID  Continue Ativan 2 mg QID- this will likely start to be tapered soon  Increase Lyrica to 100 mg TID for anxiety/pain (PTA dose)  Will stop nicotine patch and lidocaine patch, has been refusing since admission      Rationale for change in precautions or plan:   Treat and manage symptoms      Participants: Christelle Contreras, APRN, CNP,  Nursing, OT, SW

## 2021-12-29 ENCOUNTER — TRANSFERRED RECORDS (OUTPATIENT)
Dept: HEALTH INFORMATION MANAGEMENT | Facility: CLINIC | Age: 31
End: 2021-12-29
Payer: COMMERCIAL

## 2021-12-29 PROCEDURE — 250N000013 HC RX MED GY IP 250 OP 250 PS 637: Performed by: NURSE PRACTITIONER

## 2021-12-29 PROCEDURE — 99233 SBSQ HOSP IP/OBS HIGH 50: CPT | Performed by: NURSE PRACTITIONER

## 2021-12-29 PROCEDURE — 124N000001 HC R&B MH

## 2021-12-29 PROCEDURE — 250N000013 HC RX MED GY IP 250 OP 250 PS 637: Performed by: STUDENT IN AN ORGANIZED HEALTH CARE EDUCATION/TRAINING PROGRAM

## 2021-12-29 RX ORDER — RIVASTIGMINE TARTRATE 3 MG/1
3 CAPSULE ORAL AT BEDTIME
Status: DISCONTINUED | OUTPATIENT
Start: 2021-12-29 | End: 2021-12-31

## 2021-12-29 RX ORDER — LORAZEPAM 1 MG/1
2 TABLET ORAL 3 TIMES DAILY
Status: DISCONTINUED | OUTPATIENT
Start: 2021-12-29 | End: 2022-01-03

## 2021-12-29 RX ADMIN — PREGABALIN 100 MG: 75 CAPSULE ORAL at 20:06

## 2021-12-29 RX ADMIN — LORAZEPAM 2 MG: 1 TABLET ORAL at 11:51

## 2021-12-29 RX ADMIN — LITHIUM CARBONATE 900 MG: 450 TABLET, EXTENDED RELEASE ORAL at 20:06

## 2021-12-29 RX ADMIN — BUPROPION HYDROCHLORIDE 300 MG: 300 TABLET, EXTENDED RELEASE ORAL at 08:09

## 2021-12-29 RX ADMIN — LORAZEPAM 2 MG: 1 TABLET ORAL at 21:17

## 2021-12-29 RX ADMIN — PREGABALIN 100 MG: 75 CAPSULE ORAL at 14:11

## 2021-12-29 RX ADMIN — AMANTADINE HYDROCHLORIDE 100 MG: 100 CAPSULE ORAL at 08:09

## 2021-12-29 RX ADMIN — PREGABALIN 100 MG: 75 CAPSULE ORAL at 08:09

## 2021-12-29 RX ADMIN — OLANZAPINE 10 MG: 10 TABLET, FILM COATED ORAL at 20:06

## 2021-12-29 RX ADMIN — ACETAMINOPHEN 650 MG: 325 TABLET, FILM COATED ORAL at 17:09

## 2021-12-29 RX ADMIN — AMANTADINE HYDROCHLORIDE 100 MG: 100 CAPSULE ORAL at 20:06

## 2021-12-29 RX ADMIN — RIVASTIGMINE TARTRATE 3 MG: 3 CAPSULE ORAL at 20:06

## 2021-12-29 RX ADMIN — LORAZEPAM 2 MG: 1 TABLET ORAL at 16:28

## 2021-12-29 RX ADMIN — LORAZEPAM 2 MG: 1 TABLET ORAL at 08:09

## 2021-12-29 ASSESSMENT — ACTIVITIES OF DAILY LIVING (ADL)
ORAL_HYGIENE: INDEPENDENT
LAUNDRY: UNABLE TO COMPLETE
DRESS: SCRUBS (BEHAVIORAL HEALTH)
HYGIENE/GROOMING: INDEPENDENT
ORAL_HYGIENE: INDEPENDENT
HYGIENE/GROOMING: INDEPENDENT
LAUNDRY: UNABLE TO COMPLETE
DRESS: INDEPENDENT;SCRUBS (BEHAVIORAL HEALTH)

## 2021-12-29 NOTE — PLAN OF CARE
Face to face shift report received from nurse. Rounding completed, pt observed.    Problem: Behavioral Health Plan of Care  Goal: Patient-Specific Goal (Individualization)  Description: Patient will be free from self harm or injury  Patient will eat at least 50% of meals  Patient will attend at least 50% of groups when able.   Outcome: No Change  Note: Patient requested zyprexa at 2315 gave zyprexa 10mg at 2336. Pt has been in bed with eyes closed and regular respirations observed all night. Will continue to monitor.       Face to face report will be communicated to oncoming RN.    Yoan Erickson RN  12/29/2021

## 2021-12-29 NOTE — PLAN OF CARE
Face to face report received from Yoan LARKIN. Pt. Observed.     Problem: Behavioral Health Plan of Care  Goal: Patient-Specific Goal (Individualization)  Description: Patient will be free from self harm or injury  Patient will eat at least 50% of meals  Patient will attend at least 50% of groups when able.   Outcome: No Change  Note:     Pt. denies HI, SI, and pain. Pt. Is withdrawn and isolating to room out to lounge for meals. Pt. is in agreement to update saff to thoughts feelings of wanting to harm self ot others. Pt. coopertive with medications and nursing assessment. Pt. Encouraged to attend unit programing and shower. Pt. eating WDL. Pt. denies any issues with bowel and bladder.     Fax received from [facility] for Medical records. Per fax, no records in [facility] [place]'s system for requested time frame ([id number]).    Face to face end of shift report t be communicated to oncoming RN.     Liat Lai RN  12/29/2021

## 2021-12-29 NOTE — PROGRESS NOTES
"[facility]  Psychiatric Progress Note      Impression:    Mr. Carter is a 31 year old male with a PMH of schizoaffective disorder, depressive type, catatonia, severe methamphetamine use disorder, alcohol use disorder, and significant TBI at the age of 3 who presented with depression with catatonia being off of Ativan after recently being discharged. This is the patient's 3rd hospitalization in the past roughly two months with substance use complicating his course.      The patient's catatonia has improved with resumption of Ativan. Attempting to see if the patient can be maintained on alternatives given the risks with his substance use disorders, thus Amantadine is being used. Will address patient's depression also.      Interim History:     I have not seen hailey in over a week. His affect is slightly brighter and previous provider stated he has been attending some groups. He offers more in speech today. He trimmed his mustache and appears to have much better hygiene. He still believes the rivastigmaine that was stopped at the last hospitlization was beneficial and states he did not recall saying that medications was not beneficial and stated \"maybe I meant a different medications\". He states he wants to restart this medication and because he felt that his \"mind felt more clear on it\". He did not have side effects on it and I did go through interactions with medications and the interactions are minimal .     Educated regarding medication indications, risks, benefits, side effects, contraindications and possible interactions. Verbally expressed understanding.        Diagnoses:   1. Schizoaffective disorder, depressive type, multiple episodes, currently in acute episode, with catatonia   2. 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 Methamphetamine use disorder, severe  4. Alcohol use disorder, moderate      Attestation:  Patient has been seen and evaluated by me,  April " Dominga Heaton NP      The patient's care was discussed with the treatment team and chart notes were reviewed.         Medications:     Current Facility-Administered Medications Ordered in Epic   Medication Dose Route Frequency Last Rate Last Admin     acetaminophen (TYLENOL) tablet 650 mg  650 mg Oral Q4H PRN   650 mg at 12/17/21 2319     amantadine (SYMMETREL) capsule 100 mg  100 mg Oral BID   100 mg at 12/25/21 0809     buPROPion (WELLBUTRIN XL) 24 hr tablet 300 mg  300 mg Oral Daily   300 mg at 12/25/21 0809     diphenhydrAMINE (BENADRYL) capsule 50 mg  50 mg Oral At Bedtime PRN   50 mg at 12/23/21 2130     lithium (ESKALITH CR/LITHOBID) CR tablet 900 mg  900 mg Oral At Bedtime   900 mg at 12/24/21 2016     LORazepam (ATIVAN) tablet 2 mg  2 mg Oral 4x Daily   2 mg at 12/25/21 1145     OLANZapine zydis (zyPREXA) ODT tab 10 mg  10 mg Oral TID PRN   10 mg at 12/25/21 1042    Or     OLANZapine (zyPREXA) injection 10 mg  10 mg Intramuscular TID PRN         OLANZapine (zyPREXA) tablet 10 mg  10 mg Oral At Bedtime   10 mg at 12/24/21 2016     pregabalin (LYRICA) capsule 50 mg  50 mg Oral TID   50 mg at 12/25/21 0809     vitamin D2 (ERGOCALCIFEROL) 06808 units (1250 mcg) capsule 50,000 Units  50,000 Units Oral Q7 Days   50,000 Units at 12/23/21 1221     Current Outpatient Medications Ordered in Epic   Medication     vitamin D2 (ERGOCALCIFEROL) 94005 units (1250 mcg) capsule            10 point ROS- chronic back pain       Allergies:     Allergies   Allergen Reactions     Pork Allergy             Psychiatric Examination:   /92 (BP Location: Left arm)   Pulse 84   Temp 97.4  F (36.3  C) (Temporal)   Resp 16   Ht 1.829 m (6')   Wt 97.4 kg (214 lb 11.2 oz)   SpO2 98%   BMI 29.12 kg/m    Weight is 214 lbs 11.2 oz  Body mass index is 29.12 kg/m .    Appearance: awake, alert, dressed in hospital scrubs  Attitude: cooperative, pleasant, apologetic  Eye Contact:  fair  Mood: much calmer today  Affect:  intensity  is flat  Speech: monotone, normal rhythm  Psychomotor Behavior:  no evidence of tardive dyskinesia, dystonia, or tics  Thought Process:  linear and goal oriented  Associations:  no loose associations  Thought Content:  Denies SI, denies hallucinations  Insight:  fair  Judgment:  fair  Oriented to:  time, person, and place  Attention Span and Concentration:  intact  Recent and Remote Memory:  intact  Fund of Knowledge: appropriate for education  Muscle Strength and Tone: normal  Gait and Station: Normal           Labs:   No results found for this or any previous visit (from the past 24 hour(s)).      BEHAVIORAL TEAM DISCUSSION    Progress: Improoing minimally    Continued Stay Criteria/Rationale: Continued symptoms without sufficient improvement/resolution.    Medical/Physical: chronic back pain  Precautions:   Falls precaution?: No  Behavioral Orders   Procedures     Code 1 - Restrict to Unit     Routine Programming     As clinically indicated     Status 15     Every 15 minutes.     Plan:      Start rivastigmine 3 mg daily then increase to BID if insurance covers it. Hospital does not have 1.5 mg doses.   Continue Wellbutrin  mg daily  Continue Lithium  mg at bedtime  Continue Zyprexa 10 mg at bedtime  Continue Symmetrel 100 mg BID  Decrease Ativan 2 mg 3 times a day and 1.5 mg at 1130.  Taper slowly.  Increase Lyrica to 100 mg TID for anxiety/pain (PTA dose)  Will stop nicotine patch and lidocaine patch, has been refusing since admission      Rationale for change in precautions or plan:   Treat and manage symptoms      Participants: Ashely TAVAREZ, CNP,  Nursing, OT, SW

## 2021-12-30 LAB — SARS-COV-2 RNA RESP QL NAA+PROBE: NEGATIVE

## 2021-12-30 PROCEDURE — 250N000013 HC RX MED GY IP 250 OP 250 PS 637: Performed by: NURSE PRACTITIONER

## 2021-12-30 PROCEDURE — 250N000013 HC RX MED GY IP 250 OP 250 PS 637: Performed by: STUDENT IN AN ORGANIZED HEALTH CARE EDUCATION/TRAINING PROGRAM

## 2021-12-30 PROCEDURE — U0003 INFECTIOUS AGENT DETECTION BY NUCLEIC ACID (DNA OR RNA); SEVERE ACUTE RESPIRATORY SYNDROME CORONAVIRUS 2 (SARS-COV-2) (CORONAVIRUS DISEASE [COVID-19]), AMPLIFIED PROBE TECHNIQUE, MAKING USE OF HIGH THROUGHPUT TECHNOLOGIES AS DESCRIBED BY CMS-2020-01-R: HCPCS | Performed by: NURSE PRACTITIONER

## 2021-12-30 PROCEDURE — 99232 SBSQ HOSP IP/OBS MODERATE 35: CPT | Performed by: NURSE PRACTITIONER

## 2021-12-30 PROCEDURE — 124N000001 HC R&B MH

## 2021-12-30 RX ADMIN — BUPROPION HYDROCHLORIDE 300 MG: 300 TABLET, EXTENDED RELEASE ORAL at 08:03

## 2021-12-30 RX ADMIN — PREGABALIN 100 MG: 75 CAPSULE ORAL at 08:03

## 2021-12-30 RX ADMIN — AMANTADINE HYDROCHLORIDE 100 MG: 100 CAPSULE ORAL at 20:02

## 2021-12-30 RX ADMIN — LITHIUM CARBONATE 900 MG: 450 TABLET, EXTENDED RELEASE ORAL at 20:02

## 2021-12-30 RX ADMIN — PREGABALIN 100 MG: 75 CAPSULE ORAL at 20:02

## 2021-12-30 RX ADMIN — AMANTADINE HYDROCHLORIDE 100 MG: 100 CAPSULE ORAL at 08:03

## 2021-12-30 RX ADMIN — RIVASTIGMINE TARTRATE 3 MG: 3 CAPSULE ORAL at 20:02

## 2021-12-30 RX ADMIN — OLANZAPINE 10 MG: 10 TABLET, FILM COATED ORAL at 20:02

## 2021-12-30 RX ADMIN — LORAZEPAM 2 MG: 1 TABLET ORAL at 21:01

## 2021-12-30 RX ADMIN — LORAZEPAM 1.5 MG: 1 TABLET ORAL at 11:09

## 2021-12-30 RX ADMIN — LORAZEPAM 2 MG: 1 TABLET ORAL at 08:03

## 2021-12-30 RX ADMIN — PREGABALIN 100 MG: 75 CAPSULE ORAL at 13:32

## 2021-12-30 RX ADMIN — ERGOCALCIFEROL 50000 UNITS: 1.25 CAPSULE, LIQUID FILLED ORAL at 11:09

## 2021-12-30 RX ADMIN — LORAZEPAM 2 MG: 1 TABLET ORAL at 16:55

## 2021-12-30 ASSESSMENT — ACTIVITIES OF DAILY LIVING (ADL): HYGIENE/GROOMING: INDEPENDENT

## 2021-12-30 NOTE — PROGRESS NOTES
"[facility]  Psychiatric Progress Note      Impression:    Mr. Carter is a 31 year old male with a PMH of schizoaffective disorder, depressive type, catatonia, severe methamphetamine use disorder, alcohol use disorder, and significant TBI at the age of 3 who presented with depression with catatonia being off of Ativan after recently being discharged. This is the patient's 3rd hospitalization in the past roughly two months with substance use complicating his course.      The patient's catatonia has improved with resumption of Ativan. Attempting to see if the patient can be maintained on alternatives given the risks with his substance use disorders, thus Amantadine is being used. Will address patient's depression also.      Interim History:     Spoke with outpatient pharmacy yesterday and they do not believe there will be issues with his insurance covering rivistigmine.  Today he appeared even slightly brighter than yesterday. He initiates more and adds more to conversation. He is not asking to leave nor is he stating that he does not need to be here. He does state 'im going to go to some groups again today\". This shows significant improvement as I do not believe he has ever actively persured going to groups. No delusions or hallucinations present. Denies suicidal thoughts. Did not become upset at all about trying to get him to a lower dose of ativan.     Educated regarding medication indications, risks, benefits, side effects, contraindications and possible interactions. Verbally expressed understanding.        Diagnoses:   1. Schizoaffective disorder, depressive type, multiple episodes, currently in acute episode, with catatonia   2. 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 Methamphetamine use disorder, severe  4. Alcohol use disorder, moderate      Attestation:  Patient has been seen and evaluated by me,  Ashely Heaton NP      The patient's care was " discussed with the treatment team and chart notes were reviewed.         Medications:     Current Facility-Administered Medications Ordered in Epic   Medication Dose Route Frequency Last Rate Last Admin     acetaminophen (TYLENOL) tablet 650 mg  650 mg Oral Q4H PRN   650 mg at 12/17/21 2319     amantadine (SYMMETREL) capsule 100 mg  100 mg Oral BID   100 mg at 12/25/21 0809     buPROPion (WELLBUTRIN XL) 24 hr tablet 300 mg  300 mg Oral Daily   300 mg at 12/25/21 0809     diphenhydrAMINE (BENADRYL) capsule 50 mg  50 mg Oral At Bedtime PRN   50 mg at 12/23/21 2130     lithium (ESKALITH CR/LITHOBID) CR tablet 900 mg  900 mg Oral At Bedtime   900 mg at 12/24/21 2016     LORazepam (ATIVAN) tablet 2 mg  2 mg Oral 4x Daily   2 mg at 12/25/21 1145     OLANZapine zydis (zyPREXA) ODT tab 10 mg  10 mg Oral TID PRN   10 mg at 12/25/21 1042    Or     OLANZapine (zyPREXA) injection 10 mg  10 mg Intramuscular TID PRN         OLANZapine (zyPREXA) tablet 10 mg  10 mg Oral At Bedtime   10 mg at 12/24/21 2016     pregabalin (LYRICA) capsule 50 mg  50 mg Oral TID   50 mg at 12/25/21 0809     vitamin D2 (ERGOCALCIFEROL) 40586 units (1250 mcg) capsule 50,000 Units  50,000 Units Oral Q7 Days   50,000 Units at 12/23/21 1221     Current Outpatient Medications Ordered in Epic   Medication     vitamin D2 (ERGOCALCIFEROL) 73026 units (1250 mcg) capsule            10 point ROS- chronic back pain       Allergies:     Allergies   Allergen Reactions     Pork Allergy             Psychiatric Examination:   /76 (BP Location: Left arm)   Pulse 87   Temp 97.1  F (36.2  C) (Temporal)   Resp 16   Ht 1.829 m (6')   Wt 97.4 kg (214 lb 11.2 oz)   SpO2 95%   BMI 29.12 kg/m    Weight is 214 lbs 11.2 oz  Body mass index is 29.12 kg/m .    Appearance: awake, alert, dressed in hospital scrubs  Attitude: cooperative, pleasant, apologetic  Eye Contact:  fair  Mood: much calmer today  Affect:  intensity is flat  Speech: monotone, normal  rhythm  Psychomotor Behavior:  no evidence of tardive dyskinesia, dystonia, or tics  Thought Process:  linear and goal oriented  Associations:  no loose associations  Thought Content:  Denies SI, denies hallucinations  Insight:  fair  Judgment:  fair  Oriented to:  time, person, and place  Attention Span and Concentration:  intact  Recent and Remote Memory:  intact  Fund of Knowledge: appropriate for education  Muscle Strength and Tone: normal  Gait and Station: Normal           Labs:   No results found for this or any previous visit (from the past 24 hour(s)).      BEHAVIORAL TEAM DISCUSSION    Progress: Improoing minimally    Continued Stay Criteria/Rationale: Continued symptoms without sufficient improvement/resolution.    Medical/Physical: chronic back pain  Precautions:   Falls precaution?: No  Behavioral Orders   Procedures     Code 1 - Restrict to Unit     Routine Programming     As clinically indicated     Status 15     Every 15 minutes.     Plan:      Start rivastigmine 3 mg daily then increase to BID if insurance covers it. Hospital does not have 1.5 mg doses.   Continue Wellbutrin  mg daily  Continue Lithium  mg at bedtime  Continue Zyprexa 10 mg at bedtime  Continue Symmetrel 100 mg BID  Decrease Ativan 2 mg 3 times a day and 1.5 mg at 1130.  Taper slowly.  Increase Lyrica to 100 mg TID for anxiety/pain (PTA dose)  Will stop nicotine patch and lidocaine patch, has been refusing since admission      Rationale for change in precautions or plan:   Treat and manage symptoms      Participants: Ashely TAVAREZ, CNP,  Nursing, OT, SW

## 2021-12-30 NOTE — PLAN OF CARE
Face to face report received from Yoan LARKIN. Pt. Observed.     Problem: Behavioral Health Plan of Care  Goal: Patient-Specific Goal (Individualization)  Description: Patient will be free from self harm or injury  Patient will eat at least 50% of meals  Patient will attend at least 50% of groups when able.   Outcome: No Change  Note:   Pt. Continues to colin all criteria, HI, SI, anxiety, depression, and pain. Pt. Is withdrawn and isolating to room out to lounge for meals. Pt. Asks about new medication. Pt. Updated on current time and dose per his request. Pt. is in agreement to update saff to thoughts feelings of wanting to harm self ot others. Pt. coopertive with medications and nursing assessment. Pt. Encouraged to attend unit programing and shower. Pt. eating WDL. Pt. denies any issues with bowel and bladder.     Pt. Faxed copy of his screening paperwork. Paperwork provided to pt. Pt. Reporting things in his screening were not correct. This write updated pt. To call his . KENDRA Pham was updated with pt. Questions.      Face to face end of shift report to be communicated to oncoming RN.     Liat Lai RN  12/30/2021

## 2021-12-30 NOTE — PLAN OF CARE
Face to face shift report received from nurse. Rounding completed, pt observed.    Problem: Behavioral Health Plan of Care  Goal: Patient-Specific Goal (Individualization)  Description: Patient will be free from self harm or injury  Patient will eat at least 50% of meals  Patient will attend at least 50% of groups when able.   Outcome: No Change   Pt has been in bed with eyes closed and regular respirations observed all night. Will continue to monitor. Patient was up once during the shift and got a snack and returned to bed.    Face to face report will be communicated to oncoming RN.    Yoan Erickson RN  12/30/2021

## 2021-12-30 NOTE — PLAN OF CARE
"  Problem: Behavioral Health Plan of Care  Goal: Patient-Specific Goal (Individualization)  Description: Patient will be free from self harm or injury  Patient will eat at least 50% of meals  Patient will attend at least 50% of groups when able.   Outcome: Improving  Note: Shift Summery:     Pt in bed much of the shift. Pt refused to attend groups, but did eat dinner. Pt walked into the group after dinner but only stayed a few minutes. Pt states that he has anxiety around people he doesn't know. Pt denies pain, hallucinations, SI, HI. Pt states that his depression is getting a little bit better. Pt asked what needs to change to get his life back on track. Pt stated \"wait 6 years to get my gets to move in with me\". Pt did state that he may call them tomorrow.      Problem: Behavior Regulation Impairment (Psychotic Signs/Symptoms)  Goal: Improved Behavioral Control (Psychotic Signs/Symptoms)  Description: Patient will be able to have a reality based conversation.     Outcome: Improving       Face to face end of shift report communicated to night shift RN.     Sarahi Hui RN  12/30/2021  4:26 PM       "

## 2021-12-30 NOTE — PLAN OF CARE
"Met 1:1 with patient to discuss [facility] needs. Patient was adamant he did not want to go to inpatient treatment again. He stated he wanted to go to [place] and did not need treatment, just therapy and med management. He reported \"having some cravings but not bad\".    Spoke with patient about his treatment history and periods of sobriety. Patient reported previous inpatient treatments and reported he learned he needs to control his behavior. He stated he struggles with being impulsive, which leads to use. Spoke about how he plans to remain sober, and he stated he just will.    Patient was engaged in the conversation for approximately 15 minutes then didn't want to talk anymore.  "

## 2021-12-30 NOTE — PLAN OF CARE
Problem: Behavior Regulation Impairment (Psychotic Signs/Symptoms)  Goal: Improved Behavioral Control (Psychotic Signs/Symptoms)  Description: Patient will be able to have a reality based conversation.     Outcome: Improving   Patient is able to have a reality based conversation with staff.  No delusional or paranoid statements noted.       Problem: Behavioral Health Plan of Care  Goal: Patient-Specific Goal (Individualization)  Description: Patient will be free from self harm or injury  Patient will eat at least 50% of meals  Patient will attend at least 50% of groups when able.       Outcome: Improving  Note:      Patient has been calm, cooperative, and medication complaint this shift.  He was isolative and withdrawn to his room much of the evening.  Did come out to the [facility] area and attended 1 group this afternoon.  Affect is flat and blunted.  Complained of tooth pain and received 650 mg Tylenol at 1709 with good relief of symptoms.  VS WNL.  Face to face end of shift report communicated to night shift RN.     Shantal Real RN  12/29/2021  8:42 PM

## 2021-12-30 NOTE — PROGRESS NOTES
"CLINICAL NUTRITION SERVICES  -  REASSESSMENT NOTE    Stevenhever Carter     31 yom admitted for catatonia. Pt has a hx of methamphetamine use, alcohol use disorder, schizoaffective disorder, TBI at age of 3. Noted weight loss in the past 2 months- 18lbs. No new measured weight since last review. Intake has declined a bit in the last day. Noted pt has some tooth pain yesterday that could be contributing to the decline in intake.    Diet Order: Regular- kitchen sending Ensure  Intake: 9 meals with % intake    Height: 6' 0\"  Weight: 214 lbs 11.2 oz  Body mass index is 29.12 kg/m .  Weight Status:  Overweight BMI 25-29.9  Weight History:  Max IBW- 83.9kg         Wt Readings from Last 10 Encounters:   12/16/21 96.4 kg (212 lb 9.6 oz)   12/14/21 97.7 kg (215 lb 4.8 oz)   12/01/21 97.8 kg (215 lb 11.2 oz)   10/31/21 105.1 kg (231 lb 11.2 oz)   03/27/21 105.2 kg (232 lb)   11/30/15 96.6 kg (213 lb)   08/18/14 94.5 kg (208 lb 6.4 oz)   08/15/14 93.4 kg (206 lb)   07/19/14 102.1 kg (225 lb)      83.9kg- max ibw  Estimated Energy Needs: 7702-9260 kcals (25-30 Kcal/Kg)   Estimated Protein Needs:  grams protein (1-1.2 g pro/Kg)     Malnutrition Diagnosis: severe malnutrition- improving   In Context of:  Chronic illness or disease  Environmental or social circumstances       NUTRITION RECOMMENDATIONS  - Continue to encourage intake with meals and snacks  - Continue to send strawberry Ensure on meal trays.    - Monitor weight     MONITORING AND EVALUATION:  RD will monitor intake, weight, labs          "

## 2021-12-31 PROCEDURE — 250N000013 HC RX MED GY IP 250 OP 250 PS 637: Performed by: NURSE PRACTITIONER

## 2021-12-31 PROCEDURE — 99232 SBSQ HOSP IP/OBS MODERATE 35: CPT | Performed by: NURSE PRACTITIONER

## 2021-12-31 PROCEDURE — 250N000013 HC RX MED GY IP 250 OP 250 PS 637: Performed by: STUDENT IN AN ORGANIZED HEALTH CARE EDUCATION/TRAINING PROGRAM

## 2021-12-31 PROCEDURE — 124N000001 HC R&B MH

## 2021-12-31 RX ORDER — RIVASTIGMINE TARTRATE 3 MG/1
3 CAPSULE ORAL 2 TIMES DAILY
Status: DISCONTINUED | OUTPATIENT
Start: 2021-12-31 | End: 2022-01-11

## 2021-12-31 RX ADMIN — BUPROPION HYDROCHLORIDE 300 MG: 300 TABLET, EXTENDED RELEASE ORAL at 08:23

## 2021-12-31 RX ADMIN — LORAZEPAM 2 MG: 1 TABLET ORAL at 15:54

## 2021-12-31 RX ADMIN — RIVASTIGMINE TARTRATE 3 MG: 3 CAPSULE ORAL at 20:11

## 2021-12-31 RX ADMIN — LORAZEPAM 1.5 MG: 1 TABLET ORAL at 11:29

## 2021-12-31 RX ADMIN — PREGABALIN 100 MG: 75 CAPSULE ORAL at 13:34

## 2021-12-31 RX ADMIN — AMANTADINE HYDROCHLORIDE 100 MG: 100 CAPSULE ORAL at 20:11

## 2021-12-31 RX ADMIN — LORAZEPAM 2 MG: 1 TABLET ORAL at 07:02

## 2021-12-31 RX ADMIN — LORAZEPAM 2 MG: 1 TABLET ORAL at 21:15

## 2021-12-31 RX ADMIN — OLANZAPINE 10 MG: 10 TABLET, FILM COATED ORAL at 20:11

## 2021-12-31 RX ADMIN — AMANTADINE HYDROCHLORIDE 100 MG: 100 CAPSULE ORAL at 08:23

## 2021-12-31 RX ADMIN — PREGABALIN 100 MG: 75 CAPSULE ORAL at 08:23

## 2021-12-31 RX ADMIN — PREGABALIN 100 MG: 75 CAPSULE ORAL at 20:11

## 2021-12-31 RX ADMIN — LITHIUM CARBONATE 900 MG: 450 TABLET, EXTENDED RELEASE ORAL at 20:11

## 2021-12-31 ASSESSMENT — ACTIVITIES OF DAILY LIVING (ADL)
LAUNDRY: UNABLE TO COMPLETE
ORAL_HYGIENE: INDEPENDENT
HYGIENE/GROOMING: INDEPENDENT
DRESS: SCRUBS (BEHAVIORAL HEALTH)

## 2021-12-31 NOTE — PLAN OF CARE
Problem: Behavioral Health Plan of Care  Goal: Patient-Specific Goal (Individualization)  Description: Patient will be free from self harm or injury  Patient will eat at least 50% of meals  Patient will attend at least 50% of groups when able.       Outcome: No Change     Patient in bed and appears to be asleep. Eyes closed, non labored respirations noted throughout the shift. 15 minute safety and PRN checks.   Patient slept about 7 hours and continues to be asleep at this time.     0619: Pt up in lounge drinking coffee. Received shower supplies.     Face to face report given with opportunity to observe patient.    Report will be given to TRAE LANGSTON.     Jade Shaikh RN   12/31/2021  6:48 AM

## 2021-12-31 NOTE — PLAN OF CARE
"Face to face shift report received from TRAE Hansen.       Problem: Behavioral Health Plan of Care  Goal: Patient-Specific Goal (Individualization)  Description: Patient will be free from self harm or injury  Patient will eat at least 50% of meals  Patient will attend at least 50% of groups when able.       Outcome: Improving  Note: Calm, cooperative, and medication compliant. Denies thoughts of harming self or others. Reports depression, but reports this is \"better than it was.\" Denies anxiety. Withdrawn to room. Flat affect. Offers little during conversation. No reports of pain. Attended group this shift.        Problem: Behavior Regulation Impairment (Psychotic Signs/Symptoms)  Goal: Improved Behavioral Control (Psychotic Signs/Symptoms)  Description: Patient will be able to have a reality based conversation.     Outcome: Improving           Face to face end of shift report to be communicated to oncoming RN.     "

## 2021-12-31 NOTE — PROGRESS NOTES
"[facility]  Psychiatric Progress Note      Impression:    Mr. Carter is a 31 year old male with a PMH of schizoaffective disorder, depressive type, catatonia, severe methamphetamine use disorder, alcohol use disorder, and significant TBI at the age of 3 who presented with depression with catatonia being off of Ativan after recently being discharged. This is the patient's 3rd hospitalization in the past roughly two months with substance use complicating his course.      The patient's catatonia has improved with resumption of Ativan. Attempting to see if the patient can be maintained on alternatives given the risks with his substance use disorders.       Interim History:     I found Steven bedresting this afternoon. He tells me his mood is \"a little worse today,\" but he denies anxiety, suicidal or homicidal ideation and any hallucinations or delusions. He describes his sleep as \"ok\" and his appetite as \"alright.\" He denies any noticeable change in his catatonia symptoms with the recent decrease in Ativan, but does ask to have his rivastigmine increased as he has found this helpful with his cognition.     Will add additional dose of rivastigmine 3 mg tomorrow AM. Patient denies any side effects from his current dose and agrees to monitor for and report any adverse effects going forward.     Educated regarding medication indications, risks, benefits, side effects, contraindications and possible interactions. Verbally expressed understanding.        Diagnoses:   1. Schizoaffective disorder, depressive type, multiple episodes, currently in acute episode, with catatonia   2. 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 Methamphetamine use disorder, severe  4. Alcohol use disorder, moderate      Attestation:  Patient has been seen and evaluated by me,  DAYSI Correa CNP      The patient's care was discussed with the treatment team and chart notes were reviewed.         " Medications:     Current Facility-Administered Medications Ordered in Epic   Medication Dose Route Frequency Last Rate Last Admin     acetaminophen (TYLENOL) tablet 650 mg  650 mg Oral Q4H PRN   650 mg at 12/17/21 2319     amantadine (SYMMETREL) capsule 100 mg  100 mg Oral BID   100 mg at 12/25/21 0809     buPROPion (WELLBUTRIN XL) 24 hr tablet 300 mg  300 mg Oral Daily   300 mg at 12/25/21 0809     diphenhydrAMINE (BENADRYL) capsule 50 mg  50 mg Oral At Bedtime PRN   50 mg at 12/23/21 2130     lithium (ESKALITH CR/LITHOBID) CR tablet 900 mg  900 mg Oral At Bedtime   900 mg at 12/24/21 2016     LORazepam (ATIVAN) tablet 2 mg  2 mg Oral 4x Daily   2 mg at 12/25/21 1145     OLANZapine zydis (zyPREXA) ODT tab 10 mg  10 mg Oral TID PRN   10 mg at 12/25/21 1042    Or     OLANZapine (zyPREXA) injection 10 mg  10 mg Intramuscular TID PRN         OLANZapine (zyPREXA) tablet 10 mg  10 mg Oral At Bedtime   10 mg at 12/24/21 2016     pregabalin (LYRICA) capsule 50 mg  50 mg Oral TID   50 mg at 12/25/21 0809     vitamin D2 (ERGOCALCIFEROL) 23986 units (1250 mcg) capsule 50,000 Units  50,000 Units Oral Q7 Days   50,000 Units at 12/23/21 1221     Current Outpatient Medications Ordered in Epic   Medication     vitamin D2 (ERGOCALCIFEROL) 86029 units (1250 mcg) capsule            10 point ROS- chronic back pain       Allergies:     Allergies   Allergen Reactions     Pork Allergy             Psychiatric Examination:   /69 (BP Location: Right arm)   Pulse 72   Temp 97.3  F (36.3  C) (Temporal)   Resp 18   Ht 1.829 m (6')   Wt 97.4 kg (214 lb 11.2 oz)   SpO2 98%   BMI 29.12 kg/m    Weight is 214 lbs 11.2 oz  Body mass index is 29.12 kg/m .    Appearance: awake, alert, dressed in hospital scrubs  Attitude: cooperative, pleasant  Eye Contact:  fair  Mood: appears slightly dysphoric  Affect:  intensity is flat  Speech: monotone, normal rhythm  Psychomotor Behavior:  no evidence of tardive dyskinesia, dystonia, or  tics  Thought Process:  linear and goal oriented  Associations:  no loose associations  Thought Content:  Denies SI, denies hallucinations  Insight:  fair  Judgment:  fair  Oriented to:  time, person, and place  Attention Span and Concentration:  intact  Recent and Remote Memory:  intact  Fund of Knowledge: appropriate for education  Muscle Strength and Tone: normal  Gait and Station: Normal           Labs:     Results for orders placed or performed during the hospital encounter of 12/16/21 (from the past 24 hour(s))   Asymptomatic COVID-19 Virus (Coronavirus) by PCR Nasopharyngeal    Specimen: Nasopharyngeal; Swab   Result Value Ref Range    SARS CoV2 PCR Negative Negative    Narrative    Testing was performed using the Xpert Xpress SARS-CoV-2 Assay on the   Cepheid Gene-Xpert Instrument Systems. Additional information about   this Emergency Use Authorization (EUA) assay can be found via the Lab   Guide. This test should be ordered for the detection of SARS-CoV-2 in   individuals who meet SARS-CoV-2 clinical and/or epidemiological   criteria. Test performance is unknown in asymptomatic patients. This   test is for in vitro diagnostic use under the FDA EUA for   laboratories certified under CLIA to perform high complexity testing.   This test has not been FDA cleared or approved. A negative result   does not rule out the presence of PCR inhibitors in the specimen or   target RNA in concentration below the limit of detection for the   assay. The possibility of a false negative should be considered if   the patient's recent exposure or clinical presentation suggests   COVID-19. This test was validated by [facility] laboratory. This laboratory is certified under the Clinical Laboratory Improve  ment Amendments (CLIA) as qualified to perform high complexity testing.         BEHAVIORAL TEAM DISCUSSION    Progress: mood is still up and down. Ativan and amantadine has helped somewhat to improve his  catatonia and he has perceived some benefit in his cognition with the addition of rivastigmine    Continued Stay Criteria/Rationale: Continued symptoms with minimal improvement/resolution.    Medical/Physical: chronic back pain  Precautions:   Falls precaution?: No  Behavioral Orders   Procedures     Code 1 - Restrict to Unit     Routine Programming     As clinically indicated     Status 15     Every 15 minutes.     Plan:      Increase rivastigmine to 3 mg BID starting tomorrow AM  Continue Wellbutrin  mg daily  Continue Lithium  mg at bedtime  Continue Zyprexa 10 mg at bedtime  Continue Symmetrel 100 mg BID  Continue Ativan 2 mg 3 times a day and 1.5 mg at 1130.  Taper slowly.  Continue Lyrica 100 mg TID for anxiety/pain (PTA dose)    Rationale for change in precautions or plan:   Treat and manage symptoms    Participants: Lesley TAVAREZ, CNP, Nursing, Dr. David Timmons

## 2022-01-01 PROCEDURE — 250N000013 HC RX MED GY IP 250 OP 250 PS 637: Performed by: STUDENT IN AN ORGANIZED HEALTH CARE EDUCATION/TRAINING PROGRAM

## 2022-01-01 PROCEDURE — 250N000013 HC RX MED GY IP 250 OP 250 PS 637: Performed by: NURSE PRACTITIONER

## 2022-01-01 PROCEDURE — 124N000001 HC R&B MH

## 2022-01-01 PROCEDURE — 99232 SBSQ HOSP IP/OBS MODERATE 35: CPT | Performed by: NURSE PRACTITIONER

## 2022-01-01 RX ADMIN — PREGABALIN 100 MG: 75 CAPSULE ORAL at 08:09

## 2022-01-01 RX ADMIN — RIVASTIGMINE TARTRATE 3 MG: 3 CAPSULE ORAL at 20:35

## 2022-01-01 RX ADMIN — PREGABALIN 100 MG: 75 CAPSULE ORAL at 13:33

## 2022-01-01 RX ADMIN — LORAZEPAM 2 MG: 1 TABLET ORAL at 06:38

## 2022-01-01 RX ADMIN — AMANTADINE HYDROCHLORIDE 100 MG: 100 CAPSULE ORAL at 08:09

## 2022-01-01 RX ADMIN — LITHIUM CARBONATE 900 MG: 450 TABLET, EXTENDED RELEASE ORAL at 20:35

## 2022-01-01 RX ADMIN — RIVASTIGMINE TARTRATE 3 MG: 3 CAPSULE ORAL at 08:09

## 2022-01-01 RX ADMIN — LORAZEPAM 2 MG: 1 TABLET ORAL at 21:13

## 2022-01-01 RX ADMIN — LORAZEPAM 2 MG: 1 TABLET ORAL at 15:37

## 2022-01-01 RX ADMIN — BUPROPION HYDROCHLORIDE 300 MG: 300 TABLET, EXTENDED RELEASE ORAL at 08:09

## 2022-01-01 RX ADMIN — OLANZAPINE 10 MG: 10 TABLET, FILM COATED ORAL at 20:35

## 2022-01-01 RX ADMIN — PREGABALIN 100 MG: 75 CAPSULE ORAL at 20:35

## 2022-01-01 RX ADMIN — AMANTADINE HYDROCHLORIDE 100 MG: 100 CAPSULE ORAL at 20:35

## 2022-01-01 RX ADMIN — LORAZEPAM 1.5 MG: 1 TABLET ORAL at 11:38

## 2022-01-01 ASSESSMENT — ACTIVITIES OF DAILY LIVING (ADL)
DRESS: INDEPENDENT
ORAL_HYGIENE: INDEPENDENT
HYGIENE/GROOMING: INDEPENDENT
LAUNDRY: UNABLE TO COMPLETE
HYGIENE/GROOMING: INDEPENDENT
ORAL_HYGIENE: INDEPENDENT
LAUNDRY: UNABLE TO COMPLETE
DRESS: INDEPENDENT;SCRUBS (BEHAVIORAL HEALTH)

## 2022-01-01 NOTE — PLAN OF CARE
Face to face end of shift report received from Jade GARCIA RN.  Pt observed in [facility].       Problem: Behavioral Health Plan of Care  Goal: Patient-Specific Goal (Individualization)  Description: Patient will be free from self harm or injury  Patient will eat at least 50% of meals  Patient will attend at least 50% of groups when able.   Outcome: Improving  Note:   Pt is pleasant and cooperative with writer.  States he is doing well today.  Denies SI, HI, pain, anxiety, depression, and hallucinations.  States his goal is to call his children today. Pt did attend group this shift.  Cooperative with all scheduled medications.  Makes needs known.      Problem: Behavior Regulation Impairment (Psychotic Signs/Symptoms)  Goal: Improved Behavioral Control (Psychotic Signs/Symptoms)  Description: Patient will be able to have a reality based conversation.   Outcome: Improving   Pt is able to have a reality based conversation.     Face to face end of shift report communicated to oncoming RN.     Prisca Erickson RN  1/1/2022

## 2022-01-01 NOTE — PROGRESS NOTES
[facility]  Psychiatric Progress Note      Impression:    Mr. Carter is a 31 year old male with a PMH of schizoaffective disorder, depressive type, catatonia, severe methamphetamine use disorder, alcohol use disorder, and significant TBI at the age of 3 who presented with depression with catatonia being off of Ativan after recently being discharged. This is the patient's 3rd hospitalization in the past roughly two months with substance use complicating his course.      The patient's catatonia has improved with resumption of Ativan. Attempting to see if the patient can be maintained on alternatives given the risks with his substance use disorders.       Interim History:     I found Steven bedresting this afternoon. He notes an improved mood and denies any symptoms from the rivastigmine increase yesterday. He otherwise denies any suicidal/homicidal ideation, hallucinations or delusions, and indicated he is sleeping and eating well.     Educated regarding medication indications, risks, benefits, side effects, contraindications and possible interactions. Verbally expressed understanding.        Diagnoses:   1. Schizoaffective disorder, depressive type, multiple episodes, currently in acute episode, with catatonia   2. 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 Methamphetamine use disorder, severe  4. Alcohol use disorder, moderate      Attestation:  Patient has been seen and evaluated by me,  DAYSI Correa CNP      The patient's care was discussed with the treatment team and chart notes were reviewed.         Medications:     Current Facility-Administered Medications Ordered in Epic   Medication Dose Route Frequency Last Rate Last Admin     acetaminophen (TYLENOL) tablet 650 mg  650 mg Oral Q4H PRN   650 mg at 12/17/21 8489     amantadine (SYMMETREL) capsule 100 mg  100 mg Oral BID   100 mg at 12/25/21 0809     buPROPion (WELLBUTRIN XL) 24 hr tablet 300 mg  300  mg Oral Daily   300 mg at 12/25/21 0809     diphenhydrAMINE (BENADRYL) capsule 50 mg  50 mg Oral At Bedtime PRN   50 mg at 12/23/21 2130     lithium (ESKALITH CR/LITHOBID) CR tablet 900 mg  900 mg Oral At Bedtime   900 mg at 12/24/21 2016     LORazepam (ATIVAN) tablet 2 mg  2 mg Oral 4x Daily   2 mg at 12/25/21 1145     OLANZapine zydis (zyPREXA) ODT tab 10 mg  10 mg Oral TID PRN   10 mg at 12/25/21 1042    Or     OLANZapine (zyPREXA) injection 10 mg  10 mg Intramuscular TID PRN         OLANZapine (zyPREXA) tablet 10 mg  10 mg Oral At Bedtime   10 mg at 12/24/21 2016     pregabalin (LYRICA) capsule 50 mg  50 mg Oral TID   50 mg at 12/25/21 0809     vitamin D2 (ERGOCALCIFEROL) 00287 units (1250 mcg) capsule 50,000 Units  50,000 Units Oral Q7 Days   50,000 Units at 12/23/21 1221     Current Outpatient Medications Ordered in Epic   Medication     vitamin D2 (ERGOCALCIFEROL) 37838 units (1250 mcg) capsule            10 point ROS- chronic back pain       Allergies:     Allergies   Allergen Reactions     Pork Allergy             Psychiatric Examination:   /64 (BP Location: Left arm)   Pulse 92   Temp 97.9  F (36.6  C) (Temporal)   Resp 16   Ht 1.829 m (6')   Wt 97.4 kg (214 lb 11.2 oz)   SpO2 97%   BMI 29.12 kg/m    Weight is 214 lbs 11.2 oz  Body mass index is 29.12 kg/m .    Appearance: awake, alert, dressed in hospital scrubs  Attitude: cooperative, pleasant  Eye Contact:  fair  Mood: appears brighter today  Affect:  intensity is flat  Speech: monotone, normal rhythm  Psychomotor Behavior:  no evidence of tardive dyskinesia, dystonia, or tics  Thought Process:  linear and goal oriented  Associations:  no loose associations  Thought Content:  Denies SI, denies hallucinations  Insight:  fair  Judgment:  fair  Oriented to:  time, person, and place  Attention Span and Concentration:  intact  Recent and Remote Memory:  intact  Fund of Knowledge: appropriate for education  Muscle Strength and Tone: normal  Gait  and Station: Normal           Labs:     No results found for this or any previous visit (from the past 24 hour(s)).      BEHAVIORAL TEAM DISCUSSION    Progress: mood is still up and down. Ativan and amantadine has helped somewhat to improve his catatonia and he has perceived some benefit in his cognition with the added dose of rivastigmine    Continued Stay Criteria/Rationale: Continued symptoms with minimal improvement/resolution.    Medical/Physical: chronic back pain  Precautions:   Falls precaution?: No  Behavioral Orders   Procedures     Code 1 - Restrict to Unit     Routine Programming     As clinically indicated     Status 15     Every 15 minutes.     Plan:      Continue rivastigmine 3 mg BID   Continue Wellbutrin  mg daily  Continue Lithium  mg at bedtime  Continue Zyprexa 10 mg at bedtime  Continue Symmetrel 100 mg BID  Continue Ativan 2 mg 3 times a day and 1.5 mg at 1130.  Taper slowly.  Continue Lyrica 100 mg TID for anxiety/pain (PTA dose)    Rationale for change in precautions or plan:   Treat and manage symptoms    Participants: Lesley TAVAREZ, CNP, Nursing

## 2022-01-01 NOTE — PLAN OF CARE
"  Problem: Behavioral Health Plan of Care  Goal: Patient-Specific Goal (Individualization)  Description: Patient will be free from self harm or injury  Patient will eat at least 50% of meals  Patient will attend at least 50% of groups when able.       Outcome: Improving  Note:   Pt denies anxiety, pain, AH/VH, and SI/HI. Depression 4/10. Pt is isolative and stays in room. 1630 Pt was yelling and swearing in room. Pt told this writer \"everything is fine\". Pt stopped yelling and swearing. Pt ate 100% of dinner in lounge. Pt affect is hypoactive.          Problem: Behavior Regulation Impairment (Psychotic Signs/Symptoms)  Goal: Improved Behavioral Control (Psychotic Signs/Symptoms)  Description: Patient will be able to have a reality based conversation.     Outcome: Improving     Face to face report will be communicated to oncoming RN.    Danica Hawkins RN  12/31/2021  6:01 PM    "

## 2022-01-01 NOTE — PLAN OF CARE
"  Problem: Behavioral Health Plan of Care  Goal: Patient-Specific Goal (Individualization)  Description: Patient will be free from self harm or injury  Patient will eat at least 50% of meals  Patient will attend at least 50% of groups when able.     Outcome: Improving  Pt denies anxiety, pain, AH/VH, and SI/HI. Depression 3/10. Pt is isolative and stays in room. Pt told this writer \" I'm doing good today\".  Pt ate 100% of dinner in lounge. Pt affect is hypoactive. Pt has no concerns with sleep.      Problem: Behavior Regulation Impairment (Psychotic Signs/Symptoms)  Goal: Improved Behavioral Control (Psychotic Signs/Symptoms)  Description: Patient will be able to have a reality based conversation.     Outcome: Improving    Face to face report will be communicated to oncoming RN.    Danica Hawkins RN  1/1/2022       "

## 2022-01-01 NOTE — PLAN OF CARE
Problem: Behavioral Health Plan of Care  Goal: Patient-Specific Goal (Individualization)  Description: Patient will be free from self harm or injury  Patient will eat at least 50% of meals  Patient will attend at least 50% of groups when able.       Outcome: No Change     0001: Patient in bed and appears to be asleep. Eyes closed, non-labored respirations noted. Pt in side lying position.   0605: Patient in bed and appeared to be asleep most of shift. Patient slept about 7 hours.     Face to face report given with opportunity to observe patient.    Report will be given to TRAE LANGSTON.     Jade Shaikh RN   1/1/2022  6:34 AM

## 2022-01-02 PROCEDURE — 250N000013 HC RX MED GY IP 250 OP 250 PS 637: Performed by: NURSE PRACTITIONER

## 2022-01-02 PROCEDURE — 124N000001 HC R&B MH

## 2022-01-02 PROCEDURE — 250N000013 HC RX MED GY IP 250 OP 250 PS 637: Performed by: STUDENT IN AN ORGANIZED HEALTH CARE EDUCATION/TRAINING PROGRAM

## 2022-01-02 RX ADMIN — LORAZEPAM 2 MG: 1 TABLET ORAL at 06:45

## 2022-01-02 RX ADMIN — LORAZEPAM 2 MG: 1 TABLET ORAL at 15:32

## 2022-01-02 RX ADMIN — AMANTADINE HYDROCHLORIDE 100 MG: 100 CAPSULE ORAL at 21:04

## 2022-01-02 RX ADMIN — AMANTADINE HYDROCHLORIDE 100 MG: 100 CAPSULE ORAL at 08:06

## 2022-01-02 RX ADMIN — LITHIUM CARBONATE 900 MG: 450 TABLET, EXTENDED RELEASE ORAL at 21:04

## 2022-01-02 RX ADMIN — PREGABALIN 100 MG: 75 CAPSULE ORAL at 21:04

## 2022-01-02 RX ADMIN — PREGABALIN 100 MG: 75 CAPSULE ORAL at 13:35

## 2022-01-02 RX ADMIN — LORAZEPAM 2 MG: 1 TABLET ORAL at 21:04

## 2022-01-02 RX ADMIN — RIVASTIGMINE TARTRATE 3 MG: 3 CAPSULE ORAL at 08:06

## 2022-01-02 RX ADMIN — LORAZEPAM 1.5 MG: 1 TABLET ORAL at 11:50

## 2022-01-02 RX ADMIN — OLANZAPINE 10 MG: 10 TABLET, FILM COATED ORAL at 21:04

## 2022-01-02 RX ADMIN — BUPROPION HYDROCHLORIDE 300 MG: 300 TABLET, EXTENDED RELEASE ORAL at 08:06

## 2022-01-02 RX ADMIN — PREGABALIN 100 MG: 75 CAPSULE ORAL at 08:06

## 2022-01-02 RX ADMIN — RIVASTIGMINE TARTRATE 3 MG: 3 CAPSULE ORAL at 21:04

## 2022-01-02 ASSESSMENT — ACTIVITIES OF DAILY LIVING (ADL)
HYGIENE/GROOMING: INDEPENDENT
LAUNDRY: UNABLE TO COMPLETE
ORAL_HYGIENE: INDEPENDENT
HYGIENE/GROOMING: INDEPENDENT
DRESS: INDEPENDENT;SCRUBS (BEHAVIORAL HEALTH)
ORAL_HYGIENE: INDEPENDENT
DRESS: SCRUBS (BEHAVIORAL HEALTH)
LAUNDRY: UNABLE TO COMPLETE

## 2022-01-02 NOTE — PLAN OF CARE
Problem: Behavioral Health Plan of Care  Goal: Patient-Specific Goal (Individualization)  Description: Patient will be free from self harm or injury  Patient will eat at least 50% of meals  Patient will attend at least 50% of groups when able.   Outcome: No Change     Problem: Behavior Regulation Impairment (Psychotic Signs/Symptoms)  Goal: Improved Behavioral Control (Psychotic Signs/Symptoms)  Description: Patient will be able to have a reality based conversation.   Outcome: No Change     Face to face shift report received from TRAE Mishra. Rounding completed, pt observed laying in bed, appeared to be sleeping.    Patient appeared to be sleeping for approximately 6 hours this shift.    Patient had no reported hallucinations, or self harm this shift.     Patient took scheduled medication as ordered.     Face to face report will be communicated to oncoming RN.    Caryl Palmer RN  1/2/2022  6:31 AM

## 2022-01-02 NOTE — PLAN OF CARE
Face to face end of shift report received from Caryl GARCIA RN.  Pt observed in [facility].       Problem: Behavioral Health Plan of Care  Goal: Patient-Specific Goal (Individualization)  Description: Patient will be free from self harm or injury  Patient will eat at least 50% of meals  Patient will attend at least 50% of groups when able.   Outcome: Improving  Note:   Pt has been up in the lounge. Pt is pleasant and cooperative with writer.  Pt denies SI, HI, pain, depression, anxiety, and hallucinations. States he slept well last night. Pt has been isolative to his room this shift.  He did not attend groups this shift.  Cooperative with all scheduled medications. Makes needs known.       Problem: Behavior Regulation Impairment (Psychotic Signs/Symptoms)  Goal: Improved Behavioral Control (Psychotic Signs/Symptoms)  Description: Patient will be able to have a reality based conversation.     Outcome: Improving     Face to face end of shift report communicated to oncoming RN.     Prisca Erickson RN  1/2/2022

## 2022-01-03 LAB — TSH SERPL DL<=0.005 MIU/L-ACNC: 3.05 MU/L (ref 0.4–4)

## 2022-01-03 PROCEDURE — 250N000013 HC RX MED GY IP 250 OP 250 PS 637: Performed by: STUDENT IN AN ORGANIZED HEALTH CARE EDUCATION/TRAINING PROGRAM

## 2022-01-03 PROCEDURE — 250N000013 HC RX MED GY IP 250 OP 250 PS 637: Performed by: NURSE PRACTITIONER

## 2022-01-03 PROCEDURE — 36415 COLL VENOUS BLD VENIPUNCTURE: CPT | Performed by: NURSE PRACTITIONER

## 2022-01-03 PROCEDURE — 124N000001 HC R&B MH

## 2022-01-03 PROCEDURE — 99233 SBSQ HOSP IP/OBS HIGH 50: CPT | Performed by: NURSE PRACTITIONER

## 2022-01-03 PROCEDURE — 86803 HEPATITIS C AB TEST: CPT | Performed by: NURSE PRACTITIONER

## 2022-01-03 PROCEDURE — 84443 ASSAY THYROID STIM HORMONE: CPT | Performed by: NURSE PRACTITIONER

## 2022-01-03 PROCEDURE — 87389 HIV-1 AG W/HIV-1&-2 AB AG IA: CPT | Performed by: NURSE PRACTITIONER

## 2022-01-03 RX ORDER — LORAZEPAM 1 MG/1
2 TABLET ORAL 2 TIMES DAILY
Status: DISCONTINUED | OUTPATIENT
Start: 2022-01-03 | End: 2022-01-05

## 2022-01-03 RX ADMIN — AMANTADINE HYDROCHLORIDE 100 MG: 100 CAPSULE ORAL at 20:06

## 2022-01-03 RX ADMIN — PREGABALIN 100 MG: 75 CAPSULE ORAL at 20:06

## 2022-01-03 RX ADMIN — BUPROPION HYDROCHLORIDE 300 MG: 300 TABLET, EXTENDED RELEASE ORAL at 08:26

## 2022-01-03 RX ADMIN — AMANTADINE HYDROCHLORIDE 100 MG: 100 CAPSULE ORAL at 08:30

## 2022-01-03 RX ADMIN — PREGABALIN 100 MG: 75 CAPSULE ORAL at 08:26

## 2022-01-03 RX ADMIN — RIVASTIGMINE TARTRATE 3 MG: 3 CAPSULE ORAL at 20:06

## 2022-01-03 RX ADMIN — RIVASTIGMINE TARTRATE 3 MG: 3 CAPSULE ORAL at 08:26

## 2022-01-03 RX ADMIN — LORAZEPAM 1.5 MG: 1 TABLET ORAL at 21:18

## 2022-01-03 RX ADMIN — PREGABALIN 100 MG: 75 CAPSULE ORAL at 14:05

## 2022-01-03 RX ADMIN — LORAZEPAM 1.5 MG: 1 TABLET ORAL at 11:41

## 2022-01-03 RX ADMIN — OLANZAPINE 12.5 MG: 2.5 TABLET, FILM COATED ORAL at 20:06

## 2022-01-03 RX ADMIN — LORAZEPAM 2 MG: 1 TABLET ORAL at 15:47

## 2022-01-03 RX ADMIN — LORAZEPAM 2 MG: 1 TABLET ORAL at 06:58

## 2022-01-03 RX ADMIN — LITHIUM CARBONATE 900 MG: 450 TABLET, EXTENDED RELEASE ORAL at 20:06

## 2022-01-03 ASSESSMENT — ACTIVITIES OF DAILY LIVING (ADL)
DRESS: INDEPENDENT
ORAL_HYGIENE: WITH SUPERVISION
HYGIENE/GROOMING: INDEPENDENT

## 2022-01-03 NOTE — PLAN OF CARE
"  Problem: Behavioral Health Plan of Care  Goal: Patient-Specific Goal (Individualization)  Description: Patient will be free from self harm or injury  Patient will eat at least 50% of meals  Patient will attend at least 50% of groups when able.       Outcome: Improving  Note:   Pt denies pain, AH/VH, and SI/HI. Depression 3/10 and anxiety 2/10. Pt is isolative and stays in room. Pt told this writer \" I'm doing well today\".  Pt ate 100% of dinner in lounge. Pt affect is hypoactive. Pt has no concerns with sleep. Takes mediations as prescribed.        Problem: Behavior Regulation Impairment (Psychotic Signs/Symptoms)  Goal: Improved Behavioral Control (Psychotic Signs/Symptoms)  Description: Patient will be able to have a reality based conversation.     Outcome: Improving     Face to face report will be communicated to oncoming RN.    Danica Hawkins RN  1/2/2022  "

## 2022-01-03 NOTE — PLAN OF CARE
Problem: Behavioral Health Plan of Care  Goal: Patient-Specific Goal (Individualization)  Description: Patient will be free from self harm or injury  Patient will eat at least 50% of meals  Patient will attend at least 50% of groups when able.       Outcome: Improving  Note:     Patient slept about 5.5 hours this shift. Difficulty falling asleep initially.15 minute safety and PRN checks done throughout the shift. Position changes noted.     Face to face report given with opportunity to observe patient.    Report given to TRAE LANGSTON.     Jade Shaikh RN   1/3/2022  7:03 AM

## 2022-01-03 NOTE — PLAN OF CARE
Face to face shift report received from Jade GARCIA RN. Rounding completed, pt observed.    Problem: Behavioral Health Plan of Care  Goal: Patient-Specific Goal (Individualization)  Description: Patient will be free from self harm or injury  Patient will eat at least 50% of meals  Patient will attend at least 50% of groups when able.       Outcome: Improving  Note: Shift Summery:     Pt is reserved but sociable , denies suicidology, Attempting groups, eating meals in the lounge, Pt states he feels better on medications. Pt states no other concerns.          Problem: Behavior Regulation Impairment (Psychotic Signs/Symptoms)  Goal: Improved Behavioral Control (Psychotic Signs/Symptoms)  Description: Patient will be able to have a reality based conversation.     Outcome: Improving   /Face to face report will be communicated to oncoming RN.    Alma Delia Garsia RN  1/3/2022

## 2022-01-03 NOTE — PLAN OF CARE
"Face to face end of shift report communicated to oncoming shift.     Sobeida Benton RN  1/3/2022  11:02 PM    Problem: Behavioral Health Plan of Care  Goal: Patient-Specific Goal (Individualization)  Description: Patient will be free from self harm or injury  Patient will eat at least 50% of meals  Patient will attend at least 50% of groups when able.       Outcome: No Change  Note: Shift Summery:      1543: Patient at nurses window requesting \"can I have some Zyprexa now?\"  \"I'm just feeling anxious\"    Offered patient scheduled Ativan at this time, educated if not beneficial to let this writer know.      1715:  Patient requesting PRN dose of Zyprexa at this time reporting \"the Ativan didn't really help\"  Educated patient that if he takes the PRN dose at this time his HS dose would be given closer 2100, patient states \"well I will just wait then and take my bedtime one at eight\"      Patient spends time sitting in lounge watching tv, patient eats nothing for supper, patient does not attend group this shift.     Patient denies SI, HI, AH and VH, endorses anxiety.     2140:  Patient yelling in room, patient throws an item, heard item hitting the wall.  Offered patient PRN medication at this time and asked if there was anything patient needed, patient replied \"I was just having a bad dream is all\"     Face to face start of shift report received from RN.  Patient in room at this time.          "

## 2022-01-03 NOTE — PROGRESS NOTES
"[facility]  Psychiatric Progress Note      Impression:    Mr. Carter is a 31 year old male with a PMH of schizoaffective disorder, depressive type, catatonia, severe methamphetamine use disorder, alcohol use disorder, and significant TBI at the age of 3 who presented with depression with catatonia being off of Ativan after recently being discharged. This is the patient's 3rd hospitalization in the past roughly two months with substance use complicating his course.      The patient's catatonia has improved with resumption of Ativan. Attempting to see if the patient can be maintained on alternatives given the risks with his substance use disorders, thus Amantadine is being used. Will address patient's depression also.      Interim History:     Steven is still relatively flat in affect and monotone though his speech is no longer delayed as it was.  Today he asks me if he is going to have to go to chemical dependency treatment or if he could possibly go to assisted living such as [facility].  I am unsure if any programs are willing to take patients on benzodiazepines.  At the last hospitalization I did taper him off of all benzodiazepines and he decompensated within 2 weeks.  I do believe he was stable on 1 mg of Ativan twice a day in the past though when catatonia returned his dose had to be increased significantly and we are now working on tapering the dose back down.  I did tell him today we are tapering his dose again and he had no complaints about this whatsoever.  His eye contact is much improved.  He is still makes random strange statements such as \"ill be fine. itll be ok, I dont need anything\". In the past he would ask to talk to me about something then when I would return to his room he would state in a worried tone\"nevermind. Ill be ok. Its nothing\". About 10 minutes after I saw him he asked staff if he could \"talk to April for just 5 minutes. It will be real quick\". I went back to his room and " "he told me \"I do hear voices... Only sometimes. I think I do but I cant tell what they are saying.\" I have asked him many times if he had heard voices other than when he was high on meth and he tells me no or \"I dont think so\". I had told him in the past that this was very important for me to know so I can get a more clear understanding for diagnosis and treatment. He has always been extremely ambivalent. 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 2009 Nov 23. Catatonia in the DSM--Shall We Move or Not?    Https://www.ncbi.nlm.nih.gov/pmc/articles/IIX4470585/.            Educated regarding medication indications, risks, benefits, side effects, contraindications and possible interactions. Verbally expressed understanding.        Diagnoses:   1. Schizoaffective disorder, depressive type, multiple episodes, currently in acute episode, with catatonia   2. 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 Methamphetamine use disorder, severe  4. Alcohol use disorder, moderate      Attestation:  Patient has been seen and evaluated by me,  Ashely Heaton NP      The patient's care was discussed with the treatment team and chart notes were reviewed.         Medications:     Current Facility-Administered Medications Ordered in Epic   Medication Dose Route Frequency Last Rate Last Admin     acetaminophen (TYLENOL) tablet 650 mg  650 mg Oral Q4H PRN   650 mg at 12/17/21 7079     amantadine (SYMMETREL) capsule 100 mg  100 mg Oral BID   100 mg at 12/25/21 0809     buPROPion (WELLBUTRIN XL) 24 hr tablet 300 mg  300 mg Oral Daily   300 mg at 12/25/21 0809     diphenhydrAMINE (BENADRYL) capsule 50 mg  50 mg Oral At Bedtime PRN   50 mg at " 12/23/21 2130     lithium (ESKALITH CR/LITHOBID) CR tablet 900 mg  900 mg Oral At Bedtime   900 mg at 12/24/21 2016     LORazepam (ATIVAN) tablet 2 mg  2 mg Oral 4x Daily   2 mg at 12/25/21 1145     OLANZapine zydis (zyPREXA) ODT tab 10 mg  10 mg Oral TID PRN   10 mg at 12/25/21 1042    Or     OLANZapine (zyPREXA) injection 10 mg  10 mg Intramuscular TID PRN         OLANZapine (zyPREXA) tablet 10 mg  10 mg Oral At Bedtime   10 mg at 12/24/21 2016     pregabalin (LYRICA) capsule 50 mg  50 mg Oral TID   50 mg at 12/25/21 0809     vitamin D2 (ERGOCALCIFEROL) 03391 units (1250 mcg) capsule 50,000 Units  50,000 Units Oral Q7 Days   50,000 Units at 12/23/21 1221     Current Outpatient Medications Ordered in Epic   Medication     vitamin D2 (ERGOCALCIFEROL) 05606 units (1250 mcg) capsule            10 point ROS- chronic back pain       Allergies:     Allergies   Allergen Reactions     Pork Allergy             Psychiatric Examination:   /80 (BP Location: Right arm)   Pulse 86   Temp 97.1  F (36.2  C) (Temporal)   Resp 18   Ht 1.829 m (6')   Wt 97.4 kg (214 lb 11.2 oz)   SpO2 97%   BMI 29.12 kg/m    Weight is 214 lbs 11.2 oz  Body mass index is 29.12 kg/m .    Appearance: awake, alert, dressed in hospital scrubs  Attitude: cooperative, pleasant, apologetic  Eye Contact:  fair  Mood: much calmer today  Affect:  intensity is flat  Speech: monotone, normal rhythm  Psychomotor Behavior:  no evidence of tardive dyskinesia, dystonia, or tics  Thought Process:  linear and goal oriented  Associations:  no loose associations  Thought Content:  Denies SI, denies hallucinations  Insight:  fair  Judgment:  fair  Oriented to:  time, person, and place  Attention Span and Concentration:  intact  Recent and Remote Memory:  intact  Fund of Knowledge: appropriate for education  Muscle Strength and Tone: normal  Gait and Station: Normal           Labs:   No results found for this or any previous visit (from the past 24  hour(s)).      BEHAVIORAL TEAM DISCUSSION    Progress: Improoing minimally    Continued Stay Criteria/Rationale: Continued symptoms without sufficient improvement/resolution.    Medical/Physical: chronic back pain  Precautions:   Falls precaution?: No  Behavioral Orders   Procedures     Code 1 - Restrict to Unit     Routine Programming     As clinically indicated     Status 15     Every 15 minutes.     Plan:      Decrease ativan 1.5 mg bid and 2 mg bid  Continue Wellbutrin  mg daily  Continue Lithium  mg at bedtime  Continue Zyprexa 10 mg at bedtime  Continue Symmetrel 100 mg BID  Continue rivistigmine 3 mg bid  Increase Lyrica to 100 mg TID for anxiety/pain (PTA dose)  Will stop nicotine patch and lidocaine patch, has been refusing since admission      Rationale for change in precautions or plan:   Treat and manage symptoms      Participants: Ashely TAVAREZ CNP,  Nursing, OT, SW

## 2022-01-04 LAB
HCV AB SERPL QL IA: NONREACTIVE
HIV 1+2 AB+HIV1 P24 AG SERPL QL IA: NONREACTIVE

## 2022-01-04 PROCEDURE — 250N000013 HC RX MED GY IP 250 OP 250 PS 637: Performed by: STUDENT IN AN ORGANIZED HEALTH CARE EDUCATION/TRAINING PROGRAM

## 2022-01-04 PROCEDURE — 250N000013 HC RX MED GY IP 250 OP 250 PS 637: Performed by: NURSE PRACTITIONER

## 2022-01-04 PROCEDURE — 124N000001 HC R&B MH

## 2022-01-04 PROCEDURE — 124N000004

## 2022-01-04 PROCEDURE — 99233 SBSQ HOSP IP/OBS HIGH 50: CPT | Performed by: NURSE PRACTITIONER

## 2022-01-04 RX ADMIN — PREGABALIN 100 MG: 75 CAPSULE ORAL at 20:17

## 2022-01-04 RX ADMIN — LORAZEPAM 1.5 MG: 1 TABLET ORAL at 21:02

## 2022-01-04 RX ADMIN — AMANTADINE HYDROCHLORIDE 100 MG: 100 CAPSULE ORAL at 08:14

## 2022-01-04 RX ADMIN — BUPROPION HYDROCHLORIDE 300 MG: 300 TABLET, EXTENDED RELEASE ORAL at 08:14

## 2022-01-04 RX ADMIN — OLANZAPINE 10 MG: 10 TABLET, ORALLY DISINTEGRATING ORAL at 23:00

## 2022-01-04 RX ADMIN — RIVASTIGMINE TARTRATE 3 MG: 3 CAPSULE ORAL at 08:14

## 2022-01-04 RX ADMIN — AMANTADINE HYDROCHLORIDE 100 MG: 100 CAPSULE ORAL at 20:17

## 2022-01-04 RX ADMIN — LITHIUM CARBONATE 900 MG: 450 TABLET, EXTENDED RELEASE ORAL at 20:17

## 2022-01-04 RX ADMIN — LORAZEPAM 2 MG: 1 TABLET ORAL at 15:50

## 2022-01-04 RX ADMIN — RIVASTIGMINE TARTRATE 3 MG: 3 CAPSULE ORAL at 20:17

## 2022-01-04 RX ADMIN — PREGABALIN 100 MG: 75 CAPSULE ORAL at 08:14

## 2022-01-04 RX ADMIN — LORAZEPAM 1.5 MG: 1 TABLET ORAL at 11:15

## 2022-01-04 RX ADMIN — LORAZEPAM 2 MG: 1 TABLET ORAL at 07:44

## 2022-01-04 RX ADMIN — PREGABALIN 100 MG: 75 CAPSULE ORAL at 14:06

## 2022-01-04 RX ADMIN — OLANZAPINE 12.5 MG: 2.5 TABLET, FILM COATED ORAL at 20:17

## 2022-01-04 NOTE — PLAN OF CARE
Pt had his commitment court this morning with [facility]. The matter was taken under advisement.

## 2022-01-04 NOTE — PLAN OF CARE
Received Report From Liat MEJIA RN  Observed Pt. Lying in bed.     Problem: Behavioral Health Plan of Care  Goal: Patient-Specific Goal (Individualization)  Description: Patient will be free from self harm or injury  Patient will eat at least 50% of meals  Patient will attend at least 50% of groups when able.       Outcome: Improving  Note: Shift Summery:    Spoke to the Pt. Pt had a court hearing today and is awaiting results. Pt. is engaging in conversation and says he will go to groups today . No suicidology, pt is laying ion bed calm.       Floor staff reported a loud noise coming from the Pt. Room and Pt. talking to himself. Spoke with Pt Pt denies hitting his head or talking to himself    Offered  to give  PRN Zyprexa 10Mg. Pt Refused stating he bumped his head bending over in the shower. Denies any self aggression/Harm at this time.  Pt. has no visible injury to the head.        Problem: Behavior Regulation Impairment (Psychotic Signs/Symptoms)  Goal: Improved Behavioral Control (Psychotic Signs/Symptoms)  Description: Patient will be able to have a reality based conversation.     Outcome: Improving   Face to face report will be communicated to oncoming RN.    Trinidad Hurley RN  1/4/2022  7:56 AM

## 2022-01-04 NOTE — PROGRESS NOTES
"CLINICAL NUTRITION SERVICES  -  REASSESSMENT NOTE    Steven Carter     31 yom admitted for catatonia. Pt has a hx of methamphetamine use, alcohol use disorder, schizoaffective disorder, TBI at age of 3. Noted weight loss in the past 2 months- 18lbs. No new measured weight since 12/25/21. Intake is variable.    Diet Order: Regular  Intake:  15 meals with 0-100% intake (average intake 73%)      Height: 6' 0\"  Weight: 214 lbs 11.2 oz  Body mass index is 29.12 kg/m .  Weight Status:  Overweight BMI 25-29.9  Weight History:  Max IBW- 83.9kg   Wt Readings from Last 10 Encounters:   12/16/21 96.4 kg (212 lb 9.6 oz)   12/14/21 97.7 kg (215 lb 4.8 oz)   12/01/21 97.8 kg (215 lb 11.2 oz)   10/31/21 105.1 kg (231 lb 11.2 oz)   03/27/21 105.2 kg (232 lb)   11/30/15 96.6 kg (213 lb)   08/18/14 94.5 kg (208 lb 6.4 oz)   08/15/14 93.4 kg (206 lb)   07/19/14 102.1 kg (225 lb)      83.9kg- max ibw  Estimated Energy Needs: 7897-4338 kcals (25-30 Kcal/Kg)   Estimated Protein Needs:  grams protein (1-1.2 g pro/Kg)     Malnutrition Diagnosis: severe malnutrition- improving   In Context of:  Chronic illness or disease  Environmental or social circumstances        NUTRITION RECOMMENDATIONS  - Continue to encourage intake with meals and snacks  - Continue to send Ensure on meal trays.    - Monitor weight     MONITORING AND EVALUATION:  RD will monitor intake, weight, labs        "

## 2022-01-04 NOTE — PROGRESS NOTES
"[facility]  Psychiatric Progress Note      Impression:    Mr. Carter is a 31 year old male with a PMH of schizoaffective disorder, depressive type, catatonia, severe methamphetamine use disorder, alcohol use disorder, and significant TBI at the age of 3 who presented with depression with catatonia being off of Ativan after recently being discharged. This is the patient's 3rd hospitalization in the past roughly two months with substance use complicating his course.      The patient's catatonia has improved with resumption of Ativan. Attempting to see if the patient can be maintained on alternatives given the risks with his substance use disorders, thus Amantadine is being used. Will address patient's depression also.      Interim History:     zyprexa was increased yesterday.   tsh normal waiting for hep c and hiv. Today he had court and the  has taken the decision under advisement. I assume that a full commitment would be ordered. Today he approached me and told me \"I am really depressed. I just feel like I have nothing to look forward to and I have had thoughts of using today.\" he states he feels his depression has been increasing over the past three days. He states he is \"still having a very hard time around others. I dont feel like talking and I feel awkward and do not know what to say.  He does feel he thinks the \"enron\" is helping his mental clarity. I did tell him it was exelon not enron. I did notice the  Most improvement after wellbutrin was initiated and the improvements could be from the wellbutrin. He denies suicidal thoughts. I did ask him if he has had cognitive testing in the past and he does not believe he has. He would benefit from neuropsych testing.         Educated regarding medication indications, risks, benefits, side effects, contraindications and possible interactions. Verbally expressed understanding.        Diagnoses:   1. Schizoaffective disorder, depressive type, " multiple episodes, currently in acute episode, with catatonia   2. 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 Methamphetamine use disorder, severe  4. Alcohol use disorder, moderate      Attestation:  Patient has been seen and evaluated by me,  Ashely Heaton NP      The patient's care was discussed with the treatment team and chart notes were reviewed.         Medications:     Current Facility-Administered Medications Ordered in Epic   Medication Dose Route Frequency Last Rate Last Admin     acetaminophen (TYLENOL) tablet 650 mg  650 mg Oral Q4H PRN   650 mg at 12/17/21 2319     amantadine (SYMMETREL) capsule 100 mg  100 mg Oral BID   100 mg at 12/25/21 0809     buPROPion (WELLBUTRIN XL) 24 hr tablet 300 mg  300 mg Oral Daily   300 mg at 12/25/21 0809     diphenhydrAMINE (BENADRYL) capsule 50 mg  50 mg Oral At Bedtime PRN   50 mg at 12/23/21 2130     lithium (ESKALITH CR/LITHOBID) CR tablet 900 mg  900 mg Oral At Bedtime   900 mg at 12/24/21 2016     LORazepam (ATIVAN) tablet 2 mg  2 mg Oral 4x Daily   2 mg at 12/25/21 1145     OLANZapine zydis (zyPREXA) ODT tab 10 mg  10 mg Oral TID PRN   10 mg at 12/25/21 1042    Or     OLANZapine (zyPREXA) injection 10 mg  10 mg Intramuscular TID PRN         OLANZapine (zyPREXA) tablet 10 mg  10 mg Oral At Bedtime   10 mg at 12/24/21 2016     pregabalin (LYRICA) capsule 50 mg  50 mg Oral TID   50 mg at 12/25/21 0809     vitamin D2 (ERGOCALCIFEROL) 34318 units (1250 mcg) capsule 50,000 Units  50,000 Units Oral Q7 Days   50,000 Units at 12/23/21 1221     Current Outpatient Medications Ordered in Epic   Medication     vitamin D2 (ERGOCALCIFEROL) 25952 units (1250 mcg) capsule            10 point ROS- chronic back pain       Allergies:     Allergies   Allergen Reactions     Pork Allergy             Psychiatric Examination:   /76   Pulse 77   Temp 97.8  F (36.6  C) (Temporal)   Resp 16   Ht 1.829 m (6')   Wt 97.4 kg (214 lb 11.2  oz)   SpO2 97%   BMI 29.12 kg/m    Weight is 214 lbs 11.2 oz  Body mass index is 29.12 kg/m .    Appearance: awake, alert, dressed in hospital scrubs  Attitude: cooperative, pleasant, apologetic  Eye Contact:  fair  Mood: slightly anxious and depressed today  Affect:  intensity is flat  Speech: monotone, normal rhythm  Psychomotor Behavior:  no evidence of tardive dyskinesia, dystonia, or tics  Thought Process:  linear and goal oriented  Associations:  no loose associations  Thought Content:  Denies SI, denies hallucinations  Insight:  fair  Judgment:  fair  Oriented to:  time, person, and place  Attention Span and Concentration:  intact  Recent and Remote Memory:  intact  Fund of Knowledge: appropriate for education  Muscle Strength and Tone: normal  Gait and Station: Normal           Labs:     Results for orders placed or performed during the hospital encounter of 12/16/21 (from the past 24 hour(s))   TSH with free T4 reflex   Result Value Ref Range    TSH 3.05 0.40 - 4.00 mU/L         BEHAVIORAL TEAM DISCUSSION    Progress: Improving though reports increased depression/anxiety symptoms.     Continued Stay Criteria/Rationale: need to target depressive symptoms.     Medical/Physical: chronic back pain  Precautions:   Falls precaution?: No  Behavioral Orders   Procedures     Code 1 - Restrict to Unit     Routine Programming     As clinically indicated     Status 15     Every 15 minutes.     Plan:      No changes today  Ot eval ordered for cognition /executive functioning.   zyprexa was increased to 12.5 on 1/3/21  Decrease ativan 1.5 mg bid and 2 mg bid should decrease again 1/5 or 1/6  Continue Wellbutrin  mg daily  Continue Lithium  mg at bedtime  Continue Symmetrel 100 mg BID  Continue rivistigmine 3 mg bid  Increase Lyrica to 100 mg TID for anxiety/pain (PTA dose)  Will stop nicotine patch and lidocaine patch, has been refusing since admission      Rationale for change in precautions or plan:    Treat and manage symptoms      Participants: Ashely TAVAREZ, CNP,  Nursing, OT, SW

## 2022-01-05 ENCOUNTER — APPOINTMENT (OUTPATIENT)
Dept: OCCUPATIONAL THERAPY | Facility: HOSPITAL | Age: 32
End: 2022-01-05
Attending: NURSE PRACTITIONER
Payer: COMMERCIAL

## 2022-01-05 ENCOUNTER — MEDICAL CORRESPONDENCE (OUTPATIENT)
Dept: HEALTH INFORMATION MANAGEMENT | Facility: HOSPITAL | Age: 32
End: 2022-01-05

## 2022-01-05 PROCEDURE — 250N000013 HC RX MED GY IP 250 OP 250 PS 637: Performed by: NURSE PRACTITIONER

## 2022-01-05 PROCEDURE — 99233 SBSQ HOSP IP/OBS HIGH 50: CPT | Performed by: NURSE PRACTITIONER

## 2022-01-05 PROCEDURE — 124N000001 HC R&B MH

## 2022-01-05 PROCEDURE — 124N000004

## 2022-01-05 PROCEDURE — 250N000013 HC RX MED GY IP 250 OP 250 PS 637: Performed by: STUDENT IN AN ORGANIZED HEALTH CARE EDUCATION/TRAINING PROGRAM

## 2022-01-05 PROCEDURE — 97165 OT EVAL LOW COMPLEX 30 MIN: CPT | Mod: GO

## 2022-01-05 RX ADMIN — LORAZEPAM 1.5 MG: 0.5 TABLET ORAL at 20:20

## 2022-01-05 RX ADMIN — AMANTADINE HYDROCHLORIDE 100 MG: 100 CAPSULE ORAL at 20:19

## 2022-01-05 RX ADMIN — LORAZEPAM 2 MG: 1 TABLET ORAL at 08:00

## 2022-01-05 RX ADMIN — PREGABALIN 100 MG: 75 CAPSULE ORAL at 13:44

## 2022-01-05 RX ADMIN — PREGABALIN 100 MG: 75 CAPSULE ORAL at 20:20

## 2022-01-05 RX ADMIN — OLANZAPINE 12.5 MG: 2.5 TABLET, FILM COATED ORAL at 20:20

## 2022-01-05 RX ADMIN — AMANTADINE HYDROCHLORIDE 100 MG: 100 CAPSULE ORAL at 08:00

## 2022-01-05 RX ADMIN — RIVASTIGMINE TARTRATE 3 MG: 3 CAPSULE ORAL at 20:20

## 2022-01-05 RX ADMIN — LORAZEPAM 1.5 MG: 1 TABLET ORAL at 11:42

## 2022-01-05 RX ADMIN — LORAZEPAM 1.5 MG: 0.5 TABLET ORAL at 16:32

## 2022-01-05 RX ADMIN — RIVASTIGMINE TARTRATE 3 MG: 3 CAPSULE ORAL at 08:00

## 2022-01-05 RX ADMIN — BUPROPION HYDROCHLORIDE 300 MG: 300 TABLET, EXTENDED RELEASE ORAL at 08:00

## 2022-01-05 RX ADMIN — LITHIUM CARBONATE 900 MG: 450 TABLET, EXTENDED RELEASE ORAL at 20:20

## 2022-01-05 RX ADMIN — PREGABALIN 100 MG: 75 CAPSULE ORAL at 07:59

## 2022-01-05 ASSESSMENT — ACTIVITIES OF DAILY LIVING (ADL)
DRESS: SCRUBS (BEHAVIORAL HEALTH);INDEPENDENT
HYGIENE/GROOMING: INDEPENDENT
ORAL_HYGIENE: INDEPENDENT
HYGIENE/GROOMING: INDEPENDENT
LAUNDRY: UNABLE TO COMPLETE
ORAL_HYGIENE: INDEPENDENT
DRESS: INDEPENDENT

## 2022-01-05 NOTE — PLAN OF CARE
Face to face report received from Bri RN. Pt. Observed.     Problem: Behavioral Health Plan of Care  Goal: Patient-Specific Goal (Individualization)  Description: Patient will be free from self harm or injury  Patient will eat at least 50% of meals  Patient will attend at least 50% of groups when able.       Outcome: No Change  Note: Pt has been in bed with eyes closed and regular respirations for a total of 7 hours. Pt. having difficulty falling asleep. Out to [facility] x 1 this shift. 15 minute and PRN checks all night. No complaints offered. Will continue to monitor.       Face to face end of shift report to be communicated to oncoming RN.     Liat Lai RN  1/5/2022

## 2022-01-05 NOTE — PLAN OF CARE
Face to face shift report received from Liat CORDOVA RN. Rounding completed, pt observed.    Problem: Behavioral Health Plan of Care  Goal: Patient-Specific Goal (Individualization)  Description: Patient will be free from self harm or injury  Patient will eat at least 50% of meals  Patient will attend at least 50% of groups when able.       Outcome: Improving  Note: Shift Summery:  Patient was awake laying in bed at the start of this shift.  Patient was compliant with scheduled medications.  Declined breakfast but states that he does not always eat breakfast.  Denies pain or unwanted side effects. Expresses frustration over not getting a decision yet about his civil commitment hearing from yesterday.  Patient was encouraged to attend groups.  Patient did shower this shift. No self talk noted this shift.    Problem: Behavior Regulation Impairment (Psychotic Signs/Symptoms)  Goal: Improved Behavioral Control (Psychotic Signs/Symptoms)  Description: Patient will be able to have a reality based conversation.     Outcome: Improving   Face to face report will be communicated to oncoming RN.    Alma Delia Garsia RN  1/5/2022

## 2022-01-05 NOTE — PLAN OF CARE
"Face to face end of shift report communicated to oncoming shift.     Sobeida Benton RN  1/4/2022  11:09 PM    Problem: Behavioral Health Plan of Care  Goal: Patient-Specific Goal (Individualization)  Description: Patient will be free from self harm or injury  Patient will eat at least 50% of meals  Patient will attend at least 50% of groups when able.       Outcome: No Change  Note: Shift Summery:      Patient up on unit in lounge watching tv.  Calm and cooperative with nursing assessment and cares.  Patient reports \"I'm doing good\"  Patient engages in conversation with this writer, has a difficult time maintaining, but is able to keep conversation for few sentences.    Patient does not attend groups. Patient eats 25% of supper.      Patient takes medications as prescribed.      PRN:  Zyprexa 10 mg @ 2300 for agitation \"I just can't sleep, I feel so anxious and agitated\"    Face to face start of shift report received from RN.  Patient in lounge at this time.        "
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
## 2022-01-06 LAB — SARS-COV-2 RNA RESP QL NAA+PROBE: NEGATIVE

## 2022-01-06 PROCEDURE — 250N000013 HC RX MED GY IP 250 OP 250 PS 637: Performed by: NURSE PRACTITIONER

## 2022-01-06 PROCEDURE — U0003 INFECTIOUS AGENT DETECTION BY NUCLEIC ACID (DNA OR RNA); SEVERE ACUTE RESPIRATORY SYNDROME CORONAVIRUS 2 (SARS-COV-2) (CORONAVIRUS DISEASE [COVID-19]), AMPLIFIED PROBE TECHNIQUE, MAKING USE OF HIGH THROUGHPUT TECHNOLOGIES AS DESCRIBED BY CMS-2020-01-R: HCPCS | Performed by: NURSE PRACTITIONER

## 2022-01-06 PROCEDURE — 250N000013 HC RX MED GY IP 250 OP 250 PS 637: Performed by: STUDENT IN AN ORGANIZED HEALTH CARE EDUCATION/TRAINING PROGRAM

## 2022-01-06 PROCEDURE — 124N000004

## 2022-01-06 RX ADMIN — LORAZEPAM 1.5 MG: 0.5 TABLET ORAL at 07:15

## 2022-01-06 RX ADMIN — PREGABALIN 100 MG: 75 CAPSULE ORAL at 14:08

## 2022-01-06 RX ADMIN — LITHIUM CARBONATE 900 MG: 450 TABLET, EXTENDED RELEASE ORAL at 20:04

## 2022-01-06 RX ADMIN — LORAZEPAM 1.5 MG: 0.5 TABLET ORAL at 16:43

## 2022-01-06 RX ADMIN — RIVASTIGMINE TARTRATE 3 MG: 3 CAPSULE ORAL at 08:34

## 2022-01-06 RX ADMIN — BUPROPION HYDROCHLORIDE 300 MG: 300 TABLET, EXTENDED RELEASE ORAL at 08:34

## 2022-01-06 RX ADMIN — LORAZEPAM 1.5 MG: 0.5 TABLET ORAL at 20:04

## 2022-01-06 RX ADMIN — OLANZAPINE 12.5 MG: 2.5 TABLET, FILM COATED ORAL at 20:04

## 2022-01-06 RX ADMIN — PREGABALIN 100 MG: 75 CAPSULE ORAL at 08:34

## 2022-01-06 RX ADMIN — AMANTADINE HYDROCHLORIDE 100 MG: 100 CAPSULE ORAL at 08:34

## 2022-01-06 RX ADMIN — LORAZEPAM 1.5 MG: 0.5 TABLET ORAL at 11:31

## 2022-01-06 RX ADMIN — AMANTADINE HYDROCHLORIDE 100 MG: 100 CAPSULE ORAL at 20:04

## 2022-01-06 RX ADMIN — RIVASTIGMINE TARTRATE 3 MG: 3 CAPSULE ORAL at 20:04

## 2022-01-06 RX ADMIN — PREGABALIN 100 MG: 75 CAPSULE ORAL at 20:04

## 2022-01-06 RX ADMIN — ERGOCALCIFEROL 50000 UNITS: 1.25 CAPSULE, LIQUID FILLED ORAL at 11:56

## 2022-01-06 ASSESSMENT — ACTIVITIES OF DAILY LIVING (ADL)
ADLS_ACUITY_SCORE: 7
ADLS_ACUITY_SCORE: 7
ORAL_HYGIENE: INDEPENDENT
HYGIENE/GROOMING: INDEPENDENT
ADLS_ACUITY_SCORE: 7
HYGIENE/GROOMING: INDEPENDENT
ADLS_ACUITY_SCORE: 7
ORAL_HYGIENE: INDEPENDENT
ADLS_ACUITY_SCORE: 7
DRESS: INDEPENDENT
DRESS: SCRUBS (BEHAVIORAL HEALTH);INDEPENDENT

## 2022-01-06 NOTE — PLAN OF CARE
Problem: Behavioral Health Plan of Care  Goal: Patient-Specific Goal (Individualization)  Description: Patient will be free from self harm or injury  Patient will eat at least 50% of meals  Patient will attend at least 50% of groups when able.   Outcome: Improving  Note: Shift Summery:      Face to face end of shift report received from evening shift RN. Rounding completed. Pt observed in own bed, with eyes closed, in left side-lying position, and with rested breathing. Pt up for refreshments frequently throughout the night. Pt denies pain and other discomforts. Pt accepts and receives this shift's scheduled medication. Will continue to monitor. Face to face end of shift report to be given to day shift RN.     Pt slept approximately 3 hours.      Problem: Behavior Regulation Impairment (Psychotic Signs/Symptoms)  Goal: Improved Behavioral Control (Psychotic Signs/Symptoms)  Description: Patient will be able to have a reality based conversation.   Outcome: Improving

## 2022-01-06 NOTE — PLAN OF CARE
Problem: Behavioral Health Plan of Care  Goal: Patient-Specific Goal (Individualization)  Description: Patient will be free from self harm or injury  Patient will eat at least 50% of meals  Patient will attend at least 50% of groups when able.       Outcome: Improving  Note: Shift Summery:       Problem: Behavior Regulation Impairment (Psychotic Signs/Symptoms)  Goal: Improved Behavioral Control (Psychotic Signs/Symptoms)  Description: Patient will be able to have a reality based conversation.     Outcome: Improving       Pt in bed most of the shift. Pt up for his ativan at 1630 and to eat dinner. Pt denies pain, SI, HI and hallucinations. Pt reports depression at 4/10 and anxiety at 5/10. Pt declined programming this shift.

## 2022-01-06 NOTE — PLAN OF CARE
Called Court Admin. They state they sent an email out on 1/4. No email received on this end. Requested they resend and gave this writers email. They state the  ordered a full commitment.     Spoke to pt and let him know he received a full commitment and that discharge will be to either an IRTS or Treatment. Pt is more in favor with an IRTS. Let pt know as soon as the paperwork came in that this writer would make a copy and give to pt.     Gave pt copy of court paper work, put original in paper chart.     Sent more referrals to [facility] and [facility].

## 2022-01-07 ENCOUNTER — APPOINTMENT (OUTPATIENT)
Dept: OCCUPATIONAL THERAPY | Facility: HOSPITAL | Age: 32
End: 2022-01-07
Attending: STUDENT IN AN ORGANIZED HEALTH CARE EDUCATION/TRAINING PROGRAM
Payer: COMMERCIAL

## 2022-01-07 PROCEDURE — 250N000013 HC RX MED GY IP 250 OP 250 PS 637: Performed by: STUDENT IN AN ORGANIZED HEALTH CARE EDUCATION/TRAINING PROGRAM

## 2022-01-07 PROCEDURE — 99232 SBSQ HOSP IP/OBS MODERATE 35: CPT | Performed by: NURSE PRACTITIONER

## 2022-01-07 PROCEDURE — 250N000013 HC RX MED GY IP 250 OP 250 PS 637: Performed by: NURSE PRACTITIONER

## 2022-01-07 PROCEDURE — 97535 SELF CARE MNGMENT TRAINING: CPT | Mod: GO

## 2022-01-07 PROCEDURE — 124N000004

## 2022-01-07 RX ORDER — LORAZEPAM 1 MG/1
1 TABLET ORAL 2 TIMES DAILY
Status: DISCONTINUED | OUTPATIENT
Start: 2022-01-07 | End: 2022-01-08

## 2022-01-07 RX ORDER — MUSCLE RUB CREAM 100; 150 MG/G; MG/G
CREAM TOPICAL EVERY 6 HOURS PRN
Status: DISCONTINUED | OUTPATIENT
Start: 2022-01-07 | End: 2022-03-08 | Stop reason: HOSPADM

## 2022-01-07 RX ORDER — METAXALONE 800 MG/1
800 TABLET ORAL 3 TIMES DAILY PRN
Status: DISCONTINUED | OUTPATIENT
Start: 2022-01-07 | End: 2022-01-12

## 2022-01-07 RX ADMIN — OLANZAPINE 12.5 MG: 2.5 TABLET, FILM COATED ORAL at 20:04

## 2022-01-07 RX ADMIN — PREGABALIN 100 MG: 75 CAPSULE ORAL at 13:24

## 2022-01-07 RX ADMIN — RIVASTIGMINE TARTRATE 3 MG: 3 CAPSULE ORAL at 08:38

## 2022-01-07 RX ADMIN — AMANTADINE HYDROCHLORIDE 100 MG: 100 CAPSULE ORAL at 20:03

## 2022-01-07 RX ADMIN — LORAZEPAM 1 MG: 1 TABLET ORAL at 16:47

## 2022-01-07 RX ADMIN — LORAZEPAM 1.5 MG: 0.5 TABLET ORAL at 20:03

## 2022-01-07 RX ADMIN — AMANTADINE HYDROCHLORIDE 100 MG: 100 CAPSULE ORAL at 08:38

## 2022-01-07 RX ADMIN — LITHIUM CARBONATE 900 MG: 450 TABLET, EXTENDED RELEASE ORAL at 20:03

## 2022-01-07 RX ADMIN — RIVASTIGMINE TARTRATE 3 MG: 3 CAPSULE ORAL at 20:03

## 2022-01-07 RX ADMIN — LORAZEPAM 1.5 MG: 0.5 TABLET ORAL at 06:57

## 2022-01-07 RX ADMIN — PREGABALIN 100 MG: 75 CAPSULE ORAL at 08:38

## 2022-01-07 RX ADMIN — LORAZEPAM 1.5 MG: 0.5 TABLET ORAL at 11:29

## 2022-01-07 RX ADMIN — BUPROPION HYDROCHLORIDE 300 MG: 300 TABLET, EXTENDED RELEASE ORAL at 08:38

## 2022-01-07 RX ADMIN — PREGABALIN 100 MG: 75 CAPSULE ORAL at 20:04

## 2022-01-07 ASSESSMENT — ACTIVITIES OF DAILY LIVING (ADL)
DRESS: SCRUBS (BEHAVIORAL HEALTH);INDEPENDENT
ADLS_ACUITY_SCORE: 7
DRESS: INDEPENDENT
ADLS_ACUITY_SCORE: 7
HYGIENE/GROOMING: INDEPENDENT
ADLS_ACUITY_SCORE: 7
HYGIENE/GROOMING: INDEPENDENT
ADLS_ACUITY_SCORE: 7
ORAL_HYGIENE: INDEPENDENT
LAUNDRY: UNABLE TO COMPLETE
ADLS_ACUITY_SCORE: 7
ORAL_HYGIENE: INDEPENDENT

## 2022-01-07 NOTE — PROGRESS NOTES
"CLINICAL NUTRITION SERVICES  -  REASSESSMENT NOTE    Steven Carter     31 yom admitted for catatonia. Pt has a hx of methamphetamine use, alcohol use disorder, schizoaffective disorder, TBI at age of 3. Noted weight loss in the past 2 months- 18lbs. Still no new weight documented since 12/25/21. Intake continues to be variable, average intake a bit less than last review.     Diet Order: Regular, Ensure  Intake: 8 meals with 0-100% intake (average intake 53% the last couple days)      Height: 6' 0\"  Weight: 214 lbs 11.2 oz  Body mass index is 29.12 kg/m .  Weight Status:  Overweight BMI 25-29.9  Weight History:  Max IBW- 83.9kg   Wt Readings from Last 10 Encounters:   12/16/21 96.4 kg (212 lb 9.6 oz)   12/14/21 97.7 kg (215 lb 4.8 oz)   12/01/21 97.8 kg (215 lb 11.2 oz)   10/31/21 105.1 kg (231 lb 11.2 oz)   03/27/21 105.2 kg (232 lb)   11/30/15 96.6 kg (213 lb)   08/18/14 94.5 kg (208 lb 6.4 oz)   08/15/14 93.4 kg (206 lb)   07/19/14 102.1 kg (225 lb)      83.9kg- max ibw  Estimated Energy Needs: 2034-7624 kcals (25-30 Kcal/Kg)   Estimated Protein Needs:  grams protein (1-1.2 g pro/Kg)     Malnutrition Diagnosis: severe malnutrition- improving   In Context of:  Chronic illness or disease  Environmental or social circumstances        NUTRITION RECOMMENDATIONS  - Continue to encourage intake with meals and snacks  - Continue to send Ensure on meal trays.    - Monitor weight     MONITORING AND EVALUATION:  RD will monitor intake, weight, labs           " Patient's father was calling again wanting to know if it is okay to come in this afternoon.

## 2022-01-07 NOTE — PLAN OF CARE
Face to face shift report received from Svetlana GARCIA RN. Rounding completed, pt observed.    Problem: Behavioral Health Plan of Care  Goal: Patient-Specific Goal (Individualization)  Description: Patient will be free from self harm or injury  Patient will eat at least 50% of meals  Patient will attend at least 50% of groups when able.       Outcome: Improving  Note: Shift Summery:  Patient was awake in his room at the start of this shift. Patient was compliant with scheduled medications.  Denies pain or unwanted side effects.  Affect is flat but patient is cooperative.  Isolative so encouraged to attend groups.     Problem: Behavior Regulation Impairment (Psychotic Signs/Symptoms)  Goal: Improved Behavioral Control (Psychotic Signs/Symptoms)  Description: Patient will be able to have a reality based conversation.     Outcome: Improving   Face to face report will be communicated to oncoming RN.    Alma Delia Garsia RN  1/7/2022

## 2022-01-07 NOTE — PLAN OF CARE
"Holly will be calling to do a screening @ 2:00 PM    Holly called back. \"the pt lasted only 1/3 of the interview and decided to be done\". Holly says if the pt changes their mind that they will do another screening.     Spoke to pt and asked how the screening went. Pt states he did not want to go there. Reminded pt that there are few options for IRTS up here and to keep an open mind because he could stay here longer. Pt responded with \"just discharge me to a shelter,I have been here for 10 days and I'm fine, the catatonia is gone\". Pt goes on to state \"If you guys are sick of me just discharge me\". Explained to pt that wasn't the case and that the team does not deem discharging to a shelter safe for him. Pt states \" Well you're just keeping me here waiting for what?\" Proceeded to explain to pt that his plan is an IRTS and that he had an interview with [facility] who was interested in him but he declined and told l them no, so there were options to get out of the hospital. Pt was quiet and did not respond.   "

## 2022-01-07 NOTE — PROGRESS NOTES
"[facility]  Psychiatric Progress Note      Impression:    Mr. Carter is a 31 year old male with a PMH of schizoaffective disorder, depressive type, catatonia, severe methamphetamine use disorder, alcohol use disorder, and significant TBI at the age of 3 who presented with depression with catatonia being off of Ativan after recently being discharged. This is the patient's 3rd hospitalization in the past roughly two months with substance use complicating his course.      The patient's catatonia has improved with resumption of Ativan. Attempting to see if the patient can be maintained on alternatives given the risks with his substance use disorders, thus Amantadine is being used. Will address patient's depression also.      Interim History:   Steven is resting in bed when I see him today. Affect remains flat though patient reports \"better\". Seemed to tolerate decrease in Ativan with no reemergence of catatonic symptoms, will continue to taper down with target of 1 mg BID or TID. Will need to ensure that he continues to eat well and is communicative with staff. He denies needing anything today and reports no questions. He did receive a full commitment and current plan will be for IRTS program. Referrals are being sent out. Does appear that he slept well last night.      Educated regarding medication indications, risks, benefits, side effects, contraindications and possible interactions. Verbally expressed understanding.        Diagnoses:   1. Schizoaffective disorder, depressive type, multiple episodes, currently in acute episode, with catatonia   2. 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 Methamphetamine use disorder, severe  4. Alcohol use disorder, moderate      Attestation:  Patient has been seen and evaluated by me,  Christelle Contreras NP      The patient's care was discussed with the treatment team and chart notes were reviewed.         Medications:     Current " "Facility-Administered Medications Ordered in Epic   Medication Dose Route Frequency Last Rate Last Admin     acetaminophen (TYLENOL) tablet 650 mg  650 mg Oral Q4H PRN   650 mg at 12/17/21 2319     amantadine (SYMMETREL) capsule 100 mg  100 mg Oral BID   100 mg at 12/25/21 0809     buPROPion (WELLBUTRIN XL) 24 hr tablet 300 mg  300 mg Oral Daily   300 mg at 12/25/21 0809     diphenhydrAMINE (BENADRYL) capsule 50 mg  50 mg Oral At Bedtime PRN   50 mg at 12/23/21 2130     lithium (ESKALITH CR/LITHOBID) CR tablet 900 mg  900 mg Oral At Bedtime   900 mg at 12/24/21 2016     LORazepam (ATIVAN) tablet 2 mg  2 mg Oral 4x Daily   2 mg at 12/25/21 1145     OLANZapine zydis (zyPREXA) ODT tab 10 mg  10 mg Oral TID PRN   10 mg at 12/25/21 1042    Or     OLANZapine (zyPREXA) injection 10 mg  10 mg Intramuscular TID PRN         OLANZapine (zyPREXA) tablet 10 mg  10 mg Oral At Bedtime   10 mg at 12/24/21 2016     pregabalin (LYRICA) capsule 50 mg  50 mg Oral TID   50 mg at 12/25/21 0809     vitamin D2 (ERGOCALCIFEROL) 15363 units (1250 mcg) capsule 50,000 Units  50,000 Units Oral Q7 Days   50,000 Units at 12/23/21 1221     Current Outpatient Medications Ordered in Epic   Medication     vitamin D2 (ERGOCALCIFEROL) 85579 units (1250 mcg) capsule            10 point ROS- chronic back pain       Allergies:     Allergies   Allergen Reactions     Pork Allergy             Psychiatric Examination:   /72   Pulse 85   Temp 99.3  F (37.4  C) (Tympanic)   Resp 16   Ht 1.829 m (6')   Wt 97.4 kg (214 lb 11.2 oz)   SpO2 97%   BMI 29.12 kg/m    Weight is 214 lbs 11.2 oz  Body mass index is 29.12 kg/m .    Appearance: awake, alert, dressed in hospital scrubs, casually groomed  Attitude: cooperative, pleasant  Eye Contact:  fair  Mood: \"okay\"  Affect: flat  Speech: monotone, normal rhythm  Psychomotor Behavior:  no evidence of tardive dyskinesia, dystonia, or tics  Thought Process:  linear and goal oriented  Associations:  no loose " "associations  Thought Content:  Denies SI, denies hallucinations  Insight:  fair  Judgment:  fair  Oriented to:  time, person, and place  Attention Span and Concentration:  intact  Recent and Remote Memory:  intact  Fund of Knowledge: appropriate for education  Muscle Strength and Tone: normal  Gait and Station: Normal           Labs:     No results found for this or any previous visit (from the past 24 hour(s)).      BEHAVIORAL TEAM DISCUSSION    Progress: Improving. Reports feeling \"okay\". No decompensation noted with decrease in Ativan dose.    Continued Stay Criteria/Rationale: need to target depressive symptoms. Continue taper down on Ativan and assess response.    Medical/Physical: chronic back pain  Precautions:   Falls precaution?: No  Behavioral Orders   Procedures     Code 1 - Restrict to Unit     Routine Programming     As clinically indicated     Status 15     Every 15 minutes.     Plan:      Decrease scheduled Ativan by 1 mg- monitor for emerging catatonia symptoms  Continue Zyprexa 12.5 mg at bedtime  Continue Wellbutrin  mg daily  Continue Lithium  mg at bedtime  Continue Symmetrel 100 mg BID  Continue Rivistigmine 3 mg BID  Continue Lyrica 100 mg TID   Is now on MI/CD commitment      Rationale for change in precautions or plan:   Monitor for emerging catatonia symptoms with decrease in Ativan      Participants: Christelle TAVAREZ, CNP,  Nursing, OT, SW, Dr Timmons  "

## 2022-01-07 NOTE — PROGRESS NOTES
01/07/22 1500   Signing Clinician's Name / Credentials   Signing clinician's name / credentials Michelle GANNON OTR/L   OT: Cognitive   Minutes of Treatment 23 Minutes   Symptoms Noted During/After Treatment None   Treatment Detail Completed the ACL with a score of 4.4/5.8. CLs level 4.4/5.8 indicates that the person may live with someone who does a daily check on the environment, removing any safety hazards and solving problems when minor changes in the home occur. May be alone for part of the day with a procedure for obtaining help by phone or from a neighbor. May have a daily allowance and go to familiar places in the neighborhood. 34% minimum cognitive assistance is required to setup new activities and clean up after routine activities.    OT Discharge Planning    OT Discharge Recommendation (DC Rec)   (IRTS vs treatment. )   OT Rationale for DC Rec per treatment team.    OT Brief overview of current status  Patient flat. Did initially decline but decided to complete ACLs instead of OT coming back next week. Patient did not agree with what the recommendations were based off of his score.    Discharge Planner OT   Patient plan for discharge: IRTS vs treatment  Current status: Patient flat affect. Did decline initially but changed his mind.   Barriers to return to prior living situation: ACLs level 4.4/5.8 indicates that the person may live with someone who does a daily check on the environment, removing any safety hazards and solving problems when minor changes in the home occur. May be alone for part of the day with a procedure for obtaining help by phone or from a neighbor. May have a daily allowance and go to familiar places in the neighborhood. 34% minimum cognitive assistance is required to setup new activities and clean up after routine activities.   The following are ways of preventing common safety problems. The person is at greater than average risk for having these problems at this time:  Prevent poor  "compliance with taking medication by handing measured liquids and pills to the person or by setting up a medication box marked for the day and time. Do not leave alone to supervise the care of a child or a pet. Do not leave alone to prepare hot food that could burn or catch on fire. If a stove is used, check to be sure that the stove is turned off and move anything flammable away from the heat. Supply with daily spending money and manage all other finances. Restrict access to driving a motor vehicle. Supervise when showing how to do a new activity, one step at a time. Do not depend on signs or posted notes as reminders.     Patient did not agree with recommendations based on his score. He notes \"I will be fine\".     Recommendations for discharge: Per treatment team IRTS vs treatment. After this point patient would benefit from a structured living environiment I.e. group home, board and lodge, apartment with several community services I.e. ACT team, CM, Dispill, [facility] worker, MOMs meals/meals on wheels or other easy meal preparation I.e. microwave meals or simple preparation foods.   Rationale for recommendations: Patients current level of function, testing scores, and clinical judgement.        Entered by: Michelle Jackson 01/07/2022 3:49 PM       "

## 2022-01-07 NOTE — PLAN OF CARE
Problem: Behavioral Health Plan of Care  Goal: Patient-Specific Goal (Individualization)  Description: Patient will be free from self harm or injury  Patient will eat at least 50% of meals  Patient will attend at least 50% of groups when able.   Outcome: Improving  Note: Shift [place]y:     Face to face end of shift report received from evening shift RN. Rounding completed. Pt observed in own bed, with eyes closed, in left side-lying position, and with rested breathing. Will continue to monitor. Face to face end of shift report to be given to day shift RN.     Pt slept approximately 7 hours.      Problem: Behavior Regulation Impairment (Psychotic Signs/Symptoms)  Goal: Improved Behavioral Control (Psychotic Signs/Symptoms)  Description: Patient will be able to have a reality based conversation.   Outcome: Improving

## 2022-01-07 NOTE — PLAN OF CARE
"Problem: Behavioral Health Plan of Care  Goal: Patient-Specific Goal (Individualization)  Description: Patient will be free from self harm or injury  Patient will eat at least 50% of meals  Patient will attend at least 50% of groups when able.   1/6/2022 2129 by Le Pinedo RN  Outcome: Improving  Note: Pt had a blunted, flat affect. He was isolative and withdrawn to his room tonight.  Pt expressed frustration about what he deems as \"lies\" in his commitment paperwork. Pt asked several staff when he can discharge. Pt was informed that his plan is to go to either an [facility] or a [facility]. Encouraged pt to participate in group programming, but he declined. He ate 100% of his supper tonight. He was compliant with his scheduled medications.    Face to face end of shift report communicated to oncoming RN.   Problem: Behavior Regulation Impairment (Psychotic Signs/Symptoms)  Goal: Improved Behavioral Control (Psychotic Signs/Symptoms)  Description: Patient will be able to have a reality based conversation.   Outcome: Improving     "

## 2022-01-08 PROCEDURE — 99232 SBSQ HOSP IP/OBS MODERATE 35: CPT | Performed by: NURSE PRACTITIONER

## 2022-01-08 PROCEDURE — 250N000013 HC RX MED GY IP 250 OP 250 PS 637: Performed by: NURSE PRACTITIONER

## 2022-01-08 PROCEDURE — 250N000013 HC RX MED GY IP 250 OP 250 PS 637: Performed by: STUDENT IN AN ORGANIZED HEALTH CARE EDUCATION/TRAINING PROGRAM

## 2022-01-08 PROCEDURE — 124N000004

## 2022-01-08 RX ADMIN — LORAZEPAM 1.5 MG: 0.5 TABLET ORAL at 16:10

## 2022-01-08 RX ADMIN — AMANTADINE HYDROCHLORIDE 100 MG: 100 CAPSULE ORAL at 20:18

## 2022-01-08 RX ADMIN — PREGABALIN 100 MG: 75 CAPSULE ORAL at 13:34

## 2022-01-08 RX ADMIN — LORAZEPAM 1 MG: 1 TABLET ORAL at 08:15

## 2022-01-08 RX ADMIN — BUPROPION HYDROCHLORIDE 300 MG: 300 TABLET, EXTENDED RELEASE ORAL at 08:15

## 2022-01-08 RX ADMIN — LITHIUM CARBONATE 900 MG: 450 TABLET, EXTENDED RELEASE ORAL at 20:18

## 2022-01-08 RX ADMIN — AMANTADINE HYDROCHLORIDE 100 MG: 100 CAPSULE ORAL at 08:15

## 2022-01-08 RX ADMIN — OLANZAPINE 12.5 MG: 2.5 TABLET, FILM COATED ORAL at 20:18

## 2022-01-08 RX ADMIN — PREGABALIN 100 MG: 75 CAPSULE ORAL at 08:15

## 2022-01-08 RX ADMIN — LORAZEPAM 1.5 MG: 0.5 TABLET ORAL at 11:42

## 2022-01-08 RX ADMIN — LORAZEPAM 1.5 MG: 0.5 TABLET ORAL at 20:18

## 2022-01-08 RX ADMIN — PREGABALIN 100 MG: 75 CAPSULE ORAL at 20:18

## 2022-01-08 RX ADMIN — RIVASTIGMINE TARTRATE 3 MG: 3 CAPSULE ORAL at 20:18

## 2022-01-08 RX ADMIN — RIVASTIGMINE TARTRATE 3 MG: 3 CAPSULE ORAL at 08:15

## 2022-01-08 ASSESSMENT — ACTIVITIES OF DAILY LIVING (ADL)
ADLS_ACUITY_SCORE: 7
HYGIENE/GROOMING: INDEPENDENT
ADLS_ACUITY_SCORE: 7
DRESS: SCRUBS (BEHAVIORAL HEALTH)
ADLS_ACUITY_SCORE: 7
ORAL_HYGIENE: INDEPENDENT
ORAL_HYGIENE: INDEPENDENT
ADLS_ACUITY_SCORE: 7
LAUNDRY: UNABLE TO COMPLETE
LAUNDRY: UNABLE TO COMPLETE
ADLS_ACUITY_SCORE: 7
HYGIENE/GROOMING: INDEPENDENT
ADLS_ACUITY_SCORE: 7
DRESS: SCRUBS (BEHAVIORAL HEALTH);INDEPENDENT

## 2022-01-08 ASSESSMENT — MIFFLIN-ST. JEOR: SCORE: 1945.53

## 2022-01-08 NOTE — PLAN OF CARE
"  Problem: Behavioral Health Plan of Care  Goal: Patient-Specific Goal (Individualization)  Description: Patient will be free from self harm or injury  Patient will eat at least 50% of meals  Patient will attend at least 50% of groups when able.       Outcome: No Change  Note: Patient had a noticeable bilateral upper extremity tremor at start of shift.  This decreased throughout he shift, though it was still noticeable at HS medication pass.  He states it \"comes and goes\".  He denies nausea, vomiting, diarrhea, or vision changes.  No noted change in cognition.  This tremor can be felt while palpating tendons in antecubital area. Patient agrees to notify staff immediately of wrosening tremor or development of any other symptoms.  Last Lithium level (drawn 12/5/21) was 0.7. No muscle rigidity or cogwheeling noted. Sticky note left for provider.     Patient is isolative to his room most of this shift.  He was out in the lounge after all of his peers were in bed for the night.  He is cooperative with medications and assessment.  Polite during interactions.  Patient refused dinner meal. His affect is flat.     Problem: Behavior Regulation Impairment (Psychotic Signs/Symptoms)  Goal: Improved Behavioral Control (Psychotic Signs/Symptoms)  Description: Patient will be able to have a reality based conversation.     Outcome: Improving  Note: Patient is able to hold reality based conversation.      "

## 2022-01-08 NOTE — PLAN OF CARE
Face to face report received from Alice LARKIN. Pt. Observed.     Problem: Behavioral Health Plan of Care  Goal: Patient-Specific Goal (Individualization)  Description: Patient will be free from self harm or injury  Patient will eat at least 50% of meals  Patient will attend at least 50% of groups when able.       Outcome: No Change  Note: Pt has been in bed with eyes closed and regular respirations x 7 hours this noc shift. 15 minute and PRN checks all night. No complaints offered. Will continue to monitor.         Face to face end of shift report to be communicated to oncoming RN.     Liat Lai RN  1/8/2022

## 2022-01-08 NOTE — PLAN OF CARE
"  Problem: Behavioral Health Plan of Care  Goal: Patient-Specific Goal (Individualization)  Description: Patient will be free from self harm or injury  Patient will eat at least 50% of meals  Patient will attend at least 50% of groups when able.     Patient is isolative and withdrawn, spending time in his room or in the lounge by himself. Affect is flat, mood is irritable. He asks for his meds, but doesn't engage in any conversation. Eye contact is minimal. Later he asks to speak to his provider, when told that the provider will be made aware, he asks \"like a week ago when I asked to talk to the MD?\". Has been appropriate with staff and peers.   He does have bilateral hand tremors, states \"it's from this morning, I had too much coffee and tea, it's fine\".  Patient took his Ativan before lunch, went to his room, and came back to the nurse's station a short time later, angrily states \"what the fuck is your problem with me? I can hear you talking about me, saying 'we want him the fuck out of here'\". He is waiting to speak to his provider.   Face to face end of shift report communicated to oncoming RN.     Shruthi Sepulveda RN  1/8/2022  2:48 PM    Outcome: No Change    Problem: Behavior Regulation Impairment (Psychotic Signs/Symptoms)  Goal: Improved Behavioral Control (Psychotic Signs/Symptoms)  Description: Patient will be able to have a reality based conversation.     Patient does not engage in much conversation.   Outcome: No Change          "

## 2022-01-09 LAB
ALBUMIN SERPL-MCNC: 4.5 G/DL (ref 3.4–5)
ALP SERPL-CCNC: 71 U/L (ref 40–150)
ALT SERPL W P-5'-P-CCNC: 31 U/L (ref 0–70)
ANION GAP SERPL CALCULATED.3IONS-SCNC: 6 MMOL/L (ref 3–14)
AST SERPL W P-5'-P-CCNC: 8 U/L (ref 0–45)
BASOPHILS # BLD AUTO: 0 10E3/UL (ref 0–0.2)
BASOPHILS NFR BLD AUTO: 0 %
BILIRUB SERPL-MCNC: 0.7 MG/DL (ref 0.2–1.3)
BUN SERPL-MCNC: 10 MG/DL (ref 7–30)
CALCIUM SERPL-MCNC: 9.9 MG/DL (ref 8.5–10.1)
CHLORIDE BLD-SCNC: 108 MMOL/L (ref 94–109)
CO2 SERPL-SCNC: 24 MMOL/L (ref 20–32)
CREAT SERPL-MCNC: 1.13 MG/DL (ref 0.66–1.25)
EOSINOPHIL # BLD AUTO: 0.1 10E3/UL (ref 0–0.7)
EOSINOPHIL NFR BLD AUTO: 1 %
ERYTHROCYTE [DISTWIDTH] IN BLOOD BY AUTOMATED COUNT: 12.1 % (ref 10–15)
GFR SERPL CREATININE-BSD FRML MDRD: 88 ML/MIN/1.73M2
GLUCOSE BLD-MCNC: 105 MG/DL (ref 70–99)
HCT VFR BLD AUTO: 49.1 % (ref 40–53)
HGB BLD-MCNC: 17 G/DL (ref 13.3–17.7)
IMM GRANULOCYTES # BLD: 0 10E3/UL
IMM GRANULOCYTES NFR BLD: 0 %
LYMPHOCYTES # BLD AUTO: 1.8 10E3/UL (ref 0.8–5.3)
LYMPHOCYTES NFR BLD AUTO: 24 %
MCH RBC QN AUTO: 29.6 PG (ref 26.5–33)
MCHC RBC AUTO-ENTMCNC: 34.6 G/DL (ref 31.5–36.5)
MCV RBC AUTO: 85 FL (ref 78–100)
MONOCYTES # BLD AUTO: 0.7 10E3/UL (ref 0–1.3)
MONOCYTES NFR BLD AUTO: 9 %
NEUTROPHILS # BLD AUTO: 4.9 10E3/UL (ref 1.6–8.3)
NEUTROPHILS NFR BLD AUTO: 66 %
NRBC # BLD AUTO: 0 10E3/UL
NRBC BLD AUTO-RTO: 0 /100
PLATELET # BLD AUTO: 281 10E3/UL (ref 150–450)
POTASSIUM BLD-SCNC: 3.7 MMOL/L (ref 3.4–5.3)
PROT SERPL-MCNC: 7.4 G/DL (ref 6.8–8.8)
RBC # BLD AUTO: 5.75 10E6/UL (ref 4.4–5.9)
SODIUM SERPL-SCNC: 138 MMOL/L (ref 133–144)
WBC # BLD AUTO: 7.5 10E3/UL (ref 4–11)

## 2022-01-09 PROCEDURE — 99232 SBSQ HOSP IP/OBS MODERATE 35: CPT | Performed by: NURSE PRACTITIONER

## 2022-01-09 PROCEDURE — 36415 COLL VENOUS BLD VENIPUNCTURE: CPT | Performed by: NURSE PRACTITIONER

## 2022-01-09 PROCEDURE — 250N000013 HC RX MED GY IP 250 OP 250 PS 637: Performed by: STUDENT IN AN ORGANIZED HEALTH CARE EDUCATION/TRAINING PROGRAM

## 2022-01-09 PROCEDURE — 80053 COMPREHEN METABOLIC PANEL: CPT | Performed by: NURSE PRACTITIONER

## 2022-01-09 PROCEDURE — 999N000157 HC STATISTIC RCP TIME EA 10 MIN

## 2022-01-09 PROCEDURE — 82040 ASSAY OF SERUM ALBUMIN: CPT | Performed by: NURSE PRACTITIONER

## 2022-01-09 PROCEDURE — 85025 COMPLETE CBC W/AUTO DIFF WBC: CPT | Performed by: NURSE PRACTITIONER

## 2022-01-09 PROCEDURE — 250N000013 HC RX MED GY IP 250 OP 250 PS 637: Performed by: NURSE PRACTITIONER

## 2022-01-09 PROCEDURE — 124N000004

## 2022-01-09 RX ADMIN — LORAZEPAM 1.5 MG: 0.5 TABLET ORAL at 08:06

## 2022-01-09 RX ADMIN — METAXALONE 800 MG: 800 TABLET ORAL at 12:00

## 2022-01-09 RX ADMIN — AMANTADINE HYDROCHLORIDE 100 MG: 100 CAPSULE ORAL at 08:06

## 2022-01-09 RX ADMIN — LORAZEPAM 1.5 MG: 0.5 TABLET ORAL at 12:00

## 2022-01-09 RX ADMIN — RIVASTIGMINE TARTRATE 3 MG: 3 CAPSULE ORAL at 08:06

## 2022-01-09 RX ADMIN — LORAZEPAM 1.5 MG: 0.5 TABLET ORAL at 16:40

## 2022-01-09 RX ADMIN — PREGABALIN 100 MG: 75 CAPSULE ORAL at 13:43

## 2022-01-09 RX ADMIN — ACETAMINOPHEN 650 MG: 325 TABLET, FILM COATED ORAL at 16:39

## 2022-01-09 RX ADMIN — BUPROPION HYDROCHLORIDE 300 MG: 300 TABLET, EXTENDED RELEASE ORAL at 08:06

## 2022-01-09 RX ADMIN — PREGABALIN 100 MG: 75 CAPSULE ORAL at 08:06

## 2022-01-09 ASSESSMENT — ACTIVITIES OF DAILY LIVING (ADL)
ADLS_ACUITY_SCORE: 7
ORAL_HYGIENE: INDEPENDENT
ADLS_ACUITY_SCORE: 7
ADLS_ACUITY_SCORE: 7
ORAL_HYGIENE: INDEPENDENT
LAUNDRY: UNABLE TO COMPLETE
ADLS_ACUITY_SCORE: 7
DRESS: SCRUBS (BEHAVIORAL HEALTH);INDEPENDENT
HYGIENE/GROOMING: INDEPENDENT
ADLS_ACUITY_SCORE: 7
LAUNDRY: UNABLE TO COMPLETE
ADLS_ACUITY_SCORE: 7
DRESS: SCRUBS (BEHAVIORAL HEALTH)
ADLS_ACUITY_SCORE: 7
HYGIENE/GROOMING: INDEPENDENT
ADLS_ACUITY_SCORE: 7

## 2022-01-09 NOTE — PLAN OF CARE
"  Problem: Behavioral Health Plan of Care  Goal: Patient-Specific Goal (Individualization)  Description: Patient will be free from self harm or injury  Patient will eat at least 50% of meals  Patient will attend at least 50% of groups when able.       Note: Pt is isolative to room and withdrawn with minimal response to questions writer has asked.  Pt only drank ensure for dinner.  Writer went in to ask him if he'd like another ensure?  Pt replied \"i'm fasting.\"  When asked how long he usually fasts for, he didn't give a definitive answer.  Pt was sitting on floor.  He has yelled in room  twice.  The second time he yelled \"I'm not supposed to be here.\"  Writer will try again to give him prn olanzapine.  Pt declined Olanzapine stating to writer that he is fine.   staff went in to talk to him.   staff came back and reported that he thinks we're talking about him.      Pt would go from his room to [facility] this evening.  He came up and requested his HS medication.  After this pt laid down in bed.  He declined having HS snack.       Problem: Behavior Regulation Impairment (Psychotic Signs/Symptoms)  Goal: Improved Behavioral Control (Psychotic Signs/Symptoms)  Description: Patient will be able to have a reality based conversation.     Note: Pt is able to make needs known, however he is paranoid.       "

## 2022-01-09 NOTE — PLAN OF CARE
"  Problem: Behavioral Health Plan of Care  Goal: Patient-Specific Goal (Individualization)  Description: Patient will be free from self harm or injury  Patient will eat at least 50% of meals  Patient will attend at least 50% of groups when able.       Outcome: Improving  Note: 15:35: Received end of shift report from TRAE Hawkins. Pt out in [facility] upon arrival--     16:40: Pt withdrawn, flat affect, depressed mood, notable bilateral hand tremor, denies all mental criteria, states, \"I'm fine.\" Appears almost tearful. PRN acetaminophen given for mild lower back pain/preventative measures.      17:30: Pt reluctant to eat dinner, did consume 100% of supplement Ensure.    18:20: Pt came up to nurses station, demanding to UA, \"I need to go to the ER!\" \"i'm having chest pains.\" \"I have the right to go to the emergency room.\" \"I need to see the doctor!!\"  Bilateral upper extremity tremors noted, staring off into space, pt directed to go to room for vitals, pt compliant with request, slammed door, loud bang. Upon assessment, punched via [place], hole in wall, \"What?\" \"You're not going to put me in the back?\" \"That's destruction of property right there.\"     18:35: Pt compliant with  to -ICU    18:50: NP NOC on-call notified, awaiting lab orders.     19:15: Refuses EKG, states, \"It was just heartburn, I'm fine.\" \"no\" \"i'm going against medical advise.\"     19:45: Extensive encouragment needed for lab draw; catatonic like behavior, intense staring off into space, frequent mind changing.    22:00: Pt displaying s/s of sleep @ present, laying in bed. Pt requesting HS medications, then declining, pt again requested all medications at 21:40. Which pt complied with administration. Pt sitting in corner of room staring off into space, limited food et oral intake. Paranoid activity; UA presented letter pt wrote stating \"I die from witchcraft.\" \"love you, I will always be with.\"     Face to face end of shift " report to be  communicated to on-coming NOC staff.     Connie Wharton RN  1/9/2022  11:21 PM         Problem: Behavior Regulation Impairment (Psychotic Signs/Symptoms)  Goal: Improved Behavioral Control (Psychotic Signs/Symptoms)  Description: Patient will be able to have a reality based conversation.     Outcome: Improving

## 2022-01-09 NOTE — PROGRESS NOTES
[facility]  Psychiatric Progress Note      Impression:    Mr. Carter is a 31 year old male with a PMH of schizoaffective disorder, depressive type, catatonia, severe methamphetamine use disorder, alcohol use disorder, and significant TBI at the age of 3 who presented with depression with catatonia being off of Ativan after recently being discharged. This is the patient's 3rd hospitalization in the past roughly two months with substance use complicating his course.      The patient's catatonia has improved with resumption of Ativan. Attempting to see if the patient can be maintained on alternatives given the risks with his substance use disorders, thus Amantadine is being used. Will address patient's depression also.      Interim History:   Steven is sitting on his bed when I go to see him today. He reports that his thoughts seem to be racing more and that he noticed this yesterday. He appears a bit more on edge today. There has been some noted periods of irritability . We reviewed recent medication changes, most recent being decrease in Ativan dosing. I did also observe that he did not eat any of his lunch today and was told that he only ate 25% of breakfast. Questioned whether the increase in Wellbutrin could be causing some increased irritability, though he denies this is the case and feels that this has been helpful for his mood. Also discussed that a side effect of Rivastigmine is loss of appetite and weight loss, which could also be why he has not been eating as well. Will need to review medications with him to determine what adjustments need to be made moving forward.    Educated regarding medication indications, risks, benefits, side effects, contraindications and possible interactions. Verbally expressed understanding.        Diagnoses:   1. Schizoaffective disorder, depressive type, multiple episodes, currently in acute episode, with catatonia   2. TBI with LOC and coma at age 3 with  significant rehabilitation required  3. Methamphetamine use disorder, severe  4. Alcohol use disorder, moderate      Attestation:  Patient has been seen and evaluated by me,  Christelle Contreras NP      The patient's care was discussed with the treatment team and chart notes were reviewed.         Medications:     Current Facility-Administered Medications Ordered in Epic   Medication Dose Route Frequency Last Rate Last Admin     acetaminophen (TYLENOL) tablet 650 mg  650 mg Oral Q4H PRN   650 mg at 12/17/21 2319     amantadine (SYMMETREL) capsule 100 mg  100 mg Oral BID   100 mg at 12/25/21 0809     buPROPion (WELLBUTRIN XL) 24 hr tablet 300 mg  300 mg Oral Daily   300 mg at 12/25/21 0809     diphenhydrAMINE (BENADRYL) capsule 50 mg  50 mg Oral At Bedtime PRN   50 mg at 12/23/21 2130     lithium (ESKALITH CR/LITHOBID) CR tablet 900 mg  900 mg Oral At Bedtime   900 mg at 12/24/21 2016     LORazepam (ATIVAN) tablet 2 mg  2 mg Oral 4x Daily   2 mg at 12/25/21 1145     OLANZapine zydis (zyPREXA) ODT tab 10 mg  10 mg Oral TID PRN   10 mg at 12/25/21 1042    Or     OLANZapine (zyPREXA) injection 10 mg  10 mg Intramuscular TID PRN         OLANZapine (zyPREXA) tablet 10 mg  10 mg Oral At Bedtime   10 mg at 12/24/21 2016     pregabalin (LYRICA) capsule 50 mg  50 mg Oral TID   50 mg at 12/25/21 0809     vitamin D2 (ERGOCALCIFEROL) 52375 units (1250 mcg) capsule 50,000 Units  50,000 Units Oral Q7 Days   50,000 Units at 12/23/21 1221     Current Outpatient Medications Ordered in Epic   Medication     vitamin D2 (ERGOCALCIFEROL) 16143 units (1250 mcg) capsule            10 point ROS- chronic back pain       Allergies:     Allergies   Allergen Reactions     Pork Allergy             Psychiatric Examination:   /70   Pulse 88   Temp 96.8  F (36  C) (Temporal)   Resp 12   Ht 1.829 m (6')   Wt 96.3 kg (212 lb 3.2 oz)   SpO2 100%   BMI 28.78 kg/m    Weight is 212 lbs 3.2 oz  Body mass index is 28.78 kg/m .    Appearance:  "awake, alert, dressed in hospital scrubs, casually groomed  Attitude: cooperative  Eye Contact:  fair  Mood: \"okay\", seems anxious  Affect: flat  Speech: monotone, normal rhythm  Psychomotor Behavior:  no evidence of tardive dyskinesia, dystonia, or tics  Thought Process:  linear and goal oriented  Associations:  no loose associations  Thought Content:  Denies SI, denies hallucinations  Insight:  fair  Judgment:  fair  Oriented to:  time, person, and place  Attention Span and Concentration:  intact  Recent and Remote Memory:  intact  Fund of Knowledge: appropriate for education  Muscle Strength and Tone: normal  Gait and Station: Normal           Labs:     No results found for this or any previous visit (from the past 24 hour(s)).      BEHAVIORAL TEAM DISCUSSION    Progress: Gradual. Reports feeling \"okay\". Appetite has been decreasing and he reports increased racing thoughts.    Continued Stay Criteria/Rationale: need to target depressive symptoms. Continue taper of Ativan and assess response.    Medical/Physical: chronic back pain  Precautions:   Falls precaution?: No  Behavioral Orders   Procedures     Code 1 - Restrict to Unit     Routine Programming     As clinically indicated     Status 15     Every 15 minutes.     Plan:      Will keep scheduled Ativan at 1.5 mg for today-  Continue Zyprexa 12.5 mg at bedtime  Continue Wellbutrin  mg daily  Continue Lithium  mg at bedtime  Continue Symmetrel 100 mg BID  Continue Rivistigmine 3 mg BID  Continue Lyrica 100 mg TID   Is now on MI/CD commitment      Rationale for change in precautions or plan:   Monitor for emerging catatonia symptoms with decrease in Ativan      Participants: Christelle TAVAREZ, CNP,  Nursing  "

## 2022-01-09 NOTE — PLAN OF CARE
"  Problem: Behavioral Health Plan of Care  Goal: Patient-Specific Goal (Individualization)  Description: Patient will be free from self harm or injury  Patient will eat at least 50% of meals  Patient will attend at least 50% of groups when able.     Patient is isolative and withdrawn, spending time in his room. He does come out to the lounge for coffee. Affect is flat, mood is calm. He doesn't answer assessment questions, just states he's fine. He takes his medications as prescribed. States he wants to speak to his provider \"for a few minutes\". He is requesting to speak to the medical NP regarding back pain, he does not wish to elaborate with writer. Has been appropriate with staff and peers.  1200-requested and accepted Skelaxin 800 mg for back pain. He did not eat lunch, states \"I fast, I'm fine\".  Face to face end of shift report communicated to oncoming RN.     Shruthi Sepulveda RN  1/9/2022  2:20 PM    Outcome: No Change    Problem: Behavior Regulation Impairment (Psychotic Signs/Symptoms)  Goal: Improved Behavioral Control (Psychotic Signs/Symptoms)  Description: Patient will be able to have a reality based conversation.     Patient is able to have a short reality based conversation.   Outcome: No Change          "

## 2022-01-09 NOTE — PLAN OF CARE
Face to face report received from Aishwarya LARKIN. Pt. Observed.     Problem: Behavioral Health Plan of Care  Goal: Patient-Specific Goal (Individualization)  Description: Patient will be free from self harm or injury  Patient will eat at least 50% of meals  Patient will attend at least 50% of groups when able.       Outcome: No Change  Note: Pt has been in bed with eyes closed and regular respirations x 7 hours this noc shift. 15 minute and PRN checks all night. No complaints offered. Will continue to monitor.         Face to face end of shift report to be communicated to oncoming RN.     Liat Lai RN  1/9/2022

## 2022-01-10 PROCEDURE — 99233 SBSQ HOSP IP/OBS HIGH 50: CPT | Performed by: NURSE PRACTITIONER

## 2022-01-10 PROCEDURE — 250N000011 HC RX IP 250 OP 636: Performed by: STUDENT IN AN ORGANIZED HEALTH CARE EDUCATION/TRAINING PROGRAM

## 2022-01-10 PROCEDURE — 124N000004

## 2022-01-10 PROCEDURE — 250N000013 HC RX MED GY IP 250 OP 250 PS 637: Performed by: NURSE PRACTITIONER

## 2022-01-10 PROCEDURE — 250N000013 HC RX MED GY IP 250 OP 250 PS 637: Performed by: STUDENT IN AN ORGANIZED HEALTH CARE EDUCATION/TRAINING PROGRAM

## 2022-01-10 RX ADMIN — OLANZAPINE 10 MG: 10 INJECTION, POWDER, LYOPHILIZED, FOR SOLUTION INTRAMUSCULAR at 07:51

## 2022-01-10 RX ADMIN — AMANTADINE HYDROCHLORIDE 100 MG: 100 CAPSULE ORAL at 20:29

## 2022-01-10 RX ADMIN — BUPROPION HYDROCHLORIDE 300 MG: 300 TABLET, EXTENDED RELEASE ORAL at 08:33

## 2022-01-10 RX ADMIN — PREGABALIN 100 MG: 75 CAPSULE ORAL at 13:21

## 2022-01-10 RX ADMIN — LORAZEPAM 1.5 MG: 0.5 TABLET ORAL at 20:29

## 2022-01-10 RX ADMIN — AMANTADINE HYDROCHLORIDE 100 MG: 100 CAPSULE ORAL at 08:33

## 2022-01-10 RX ADMIN — LITHIUM CARBONATE 900 MG: 450 TABLET, EXTENDED RELEASE ORAL at 20:29

## 2022-01-10 RX ADMIN — LORAZEPAM 1.5 MG: 0.5 TABLET ORAL at 16:35

## 2022-01-10 RX ADMIN — PREGABALIN 100 MG: 75 CAPSULE ORAL at 08:33

## 2022-01-10 RX ADMIN — LORAZEPAM 1.5 MG: 0.5 TABLET ORAL at 11:37

## 2022-01-10 RX ADMIN — PREGABALIN 100 MG: 75 CAPSULE ORAL at 20:29

## 2022-01-10 ASSESSMENT — ACTIVITIES OF DAILY LIVING (ADL)
ADLS_ACUITY_SCORE: 7
LAUNDRY: UNABLE TO COMPLETE
ADLS_ACUITY_SCORE: 7
DRESS: INDEPENDENT;SCRUBS (BEHAVIORAL HEALTH)
ADLS_ACUITY_SCORE: 7
ORAL_HYGIENE: INDEPENDENT
ADLS_ACUITY_SCORE: 7
HYGIENE/GROOMING: INDEPENDENT
ADLS_ACUITY_SCORE: 7

## 2022-01-10 NOTE — PLAN OF CARE
WILLIAM BRANDT RN  1/10/2022  7:38 AM  Face to face shift report received from TRAE Roth. Rounding completed, pt observed.     1425-Intake and output ordered.  Pt drank 2 large glasses of crystal light and ate a blueberry 1/3 of his burger.  Sitting on edge of bed.       1332-Christelle NP went to evaluate pt.  He did not want to talk at that time.  Christelle left ICu and within 2 minutes pt was requesting to speak to Christelle.  She went back into [facility] room and evaluated pt.  Pt did not eat lunch.     1152-Did not eat breakfast.  Compliant with scheduled 1100 ativan.  Denies anxiety, depression, SI, HI, hallucinations, and pain at this time.  Lying in bed awake.  Tremors noted in arms.   0906-Pt complaint with morning meds.  Very dismissive and ignoring staff.  Laying in bed.    0810-Pt requesting to wean and demanding to see SONIDO Bourgeois.  Pt got upset when RN tod him she was not his nurse and would get his nurse.  Writer offered his morning ativan, in which pt stared straight and ignored me.  Pt asked if he was going to take his ativan or refuse- pt did not acknowledge.  Ativan not given.  Pt started pounding on nurses station window and yelling.  De-escalation techniques unsuccessful and pt continued to pound on window.  Code green called @ 0747 .  Nurse and security offered zyprexa injection which pt refused.  Code 21 called @ 0802.  Pt then agreed to injection.  Zyprexa 10mg IM given.  Pt tolerated well.         Problem: Behavioral Health Plan of Care  Goal: Patient-Specific Goal (Individualization)  Description: Patient will be free from self harm or injury  Patient will eat at least 50% of meals  Patient will attend at least 50% of groups when able.   Patient placed in MH-ICU on 01/09/2022 r/t poor impulse control (punched hole in bedroom wall).   Outcome: No Change     Problem: Behavior Regulation Impairment (Psychotic Signs/Symptoms)  Goal: Improved Behavioral Control (Psychotic Signs/Symptoms)  Description:  Patient will be able to have a reality based conversation.   Outcome: No Change     Problem: Violence Risk or Actual  Goal: Anger and Impulse Control  Outcome: No Change     Face to face end of shift report communicated to oncoming shift RN.

## 2022-01-10 NOTE — PLAN OF CARE
Spoke with pt this afternoon, pt states he would like to try more IRTS and if they don't take him around here then he will be open to [facility]. Let pt know this writer would keep sending referrals out.

## 2022-01-10 NOTE — PROGRESS NOTES
"[facility]  Psychiatric Progress Note      Impression:    Mr. Carter is a 31 year old male with a PMH of schizoaffective disorder, depressive type, catatonia, severe methamphetamine use disorder, alcohol use disorder, and significant TBI at the age of 3 who presented with depression with catatonia being off of Ativan after recently being discharged. This is the patient's 3rd hospitalization in the past roughly two months with substance use complicating his course.      The patient's catatonia has improved with resumption of Ativan. Attempting to see if the patient can be maintained on alternatives given the risks with his substance use disorders, thus Amantadine is being used. Will address patient's depression also.      Interim History:   Steven was moved to a room in the MHICU last night due to agitated behavior. Yesterday evening he had demanded to see a doctor in the ED, when it was explained that this was not possible he got upset, slammed his door, and punched a hole in the wall. He did voluntarily transfer to MHICU, did not require any hands on. Once calm he did allow staff to check his vitals, which were unremarkable. He refused an EKG. He did allow some baseline labs- CBC and CMP were unremarkable. Lithium level had been ordered for this morning, though is appears that he refused this.     Today he initially states that he does not want to talk or discuss anything. A few minutes after I leave, he is then requesting to speak with me again. He states that yesterday he \"just got frustrated. It's fine\". He asks to have the Exelon restarted, this was held last night due to concern for side effects. Did attempt to discuss that since this was added and increased, there has noticeably been a decrease in his appetite, he did not eat breakfast and so far has eaten very little for lunch. He is unable to explain why he has not been eating. Also discussed that his irritability seems to be increasing the " "past few days, though he denies that he is feeling more irritable. He states \"April said it would help with my TBI and it is\". Reviewed that we need to weigh risks and benefits of some medications, he replies \"I don't see any risks\".    I did review his chart further and it does appear that his mental health symptoms worsened after he sustained another TBI in March 2020. He apparently overdosed on medication and was brought to the ED. At that time he was unresponsive, had 3 brief seizures, and went into cardiac arrest. He was transferred to the ICU and again was in cardiac arrest and had to be resuscitated. Notes indicate that mother had him \"set up for organ donation\" though he did survive. His  and  indicate that he had \"not been the same since\". He communicated minimally with people and was not eating and these symptoms were more profound following that hospitalization.     Educated regarding medication indications, risks, benefits, side effects, contraindications and possible interactions. Verbally expressed understanding.        Diagnoses:   1. Schizoaffective disorder, depressive type, multiple episodes, currently in acute episode, with catatonia   2. 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 Methamphetamine use disorder, severe  4. Alcohol use disorder, moderate      Attestation:  Patient has been seen and evaluated by me,  Christelle Contreras NP      The patient's care was discussed with the treatment team and chart notes were reviewed.         Medications:     Current Facility-Administered Medications Ordered in Epic   Medication Dose Route Frequency Last Rate Last Admin     acetaminophen (TYLENOL) tablet 650 mg  650 mg Oral Q4H PRN   650 mg at 12/17/21 9319     amantadine (SYMMETREL) capsule 100 mg  100 mg Oral BID   100 mg at 12/25/21 0809     buPROPion (WELLBUTRIN XL) 24 hr tablet 300 mg  300 mg Oral Daily   300 mg at 12/25/21 0809     " "diphenhydrAMINE (BENADRYL) capsule 50 mg  50 mg Oral At Bedtime PRN   50 mg at 12/23/21 2130     lithium (ESKALITH CR/LITHOBID) CR tablet 900 mg  900 mg Oral At Bedtime   900 mg at 12/24/21 2016     LORazepam (ATIVAN) tablet 2 mg  2 mg Oral 4x Daily   2 mg at 12/25/21 1145     OLANZapine zydis (zyPREXA) ODT tab 10 mg  10 mg Oral TID PRN   10 mg at 12/25/21 1042    Or     OLANZapine (zyPREXA) injection 10 mg  10 mg Intramuscular TID PRN         OLANZapine (zyPREXA) tablet 10 mg  10 mg Oral At Bedtime   10 mg at 12/24/21 2016     pregabalin (LYRICA) capsule 50 mg  50 mg Oral TID   50 mg at 12/25/21 0809     vitamin D2 (ERGOCALCIFEROL) 71085 units (1250 mcg) capsule 50,000 Units  50,000 Units Oral Q7 Days   50,000 Units at 12/23/21 1221     Current Outpatient Medications Ordered in Epic   Medication     vitamin D2 (ERGOCALCIFEROL) 09852 units (1250 mcg) capsule            10 point ROS- chronic back pain       Allergies:     Allergies   Allergen Reactions     Pork Allergy             Psychiatric Examination:   /62   Pulse 72   Temp 98.9  F (37.2  C) (Temporal)   Resp 18   Ht 1.829 m (6')   Wt 96.3 kg (212 lb 3.2 oz)   SpO2 97%   BMI 28.78 kg/m    Weight is 212 lbs 3.2 oz  Body mass index is 28.78 kg/m .    Appearance: sitting at edge of bed, casually groomed, awake and alert  Attitude: cooperative though guarded  Eye Contact: limited, makes intermittent eye contact  Mood: \"okay\", seems anxious  Affect: flat  Speech: monotone, normal rhythm  Psychomotor Behavior:  no evidence of tardive dyskinesia, dystonia, or tics  Thought Process:  linear and goal oriented  Associations:  no loose associations  Thought Content:  Denies SI, denies hallucinations or paranoia  Insight:  fair  Judgment:  fair, impulsive  Oriented to:  time, person, and place  Attention Span and Concentration:  intact  Recent and Remote Memory:  intact  Fund of Knowledge: appropriate for education  Muscle Strength and Tone: normal  Gait and " Station: Normal           Labs:     Results for orders placed or performed during the hospital encounter of 12/16/21 (from the past 24 hour(s))   CBC with Platelets & Differential    Narrative    The following orders were created for panel order CBC with Platelets & Differential.  Procedure                               Abnormality         Status                     ---------                               -----------         ------                     CBC with platelets and d...[[id number]]                      Final result                 Please view results for these tests on the individual orders.   Comprehensive metabolic panel   Result Value Ref Range    Sodium 138 133 - 144 mmol/L    Potassium 3.7 3.4 - 5.3 mmol/L    Chloride 108 94 - 109 mmol/L    Carbon Dioxide (CO2) 24 20 - 32 mmol/L    Anion Gap 6 3 - 14 mmol/L    Urea Nitrogen 10 7 - 30 mg/dL    Creatinine 1.13 0.66 - 1.25 mg/dL    Calcium 9.9 8.5 - 10.1 mg/dL    Glucose 105 (H) 70 - 99 mg/dL    Alkaline Phosphatase 71 40 - 150 U/L    AST 8 0 - 45 U/L    ALT 31 0 - 70 U/L    Protein Total 7.4 6.8 - 8.8 g/dL    Albumin 4.5 3.4 - 5.0 g/dL    Bilirubin Total 0.7 0.2 - 1.3 mg/dL    GFR Estimate 88 >60 mL/min/1.73m2   CBC with platelets and differential   Result Value Ref Range    WBC Count 7.5 4.0 - 11.0 10e3/uL    RBC Count 5.75 4.40 - 5.90 10e6/uL    Hemoglobin 17.0 13.3 - 17.7 g/dL    Hematocrit 49.1 40.0 - 53.0 %    MCV 85 78 - 100 fL    MCH 29.6 26.5 - 33.0 pg    MCHC 34.6 31.5 - 36.5 g/dL    RDW 12.1 10.0 - 15.0 %    Platelet Count 281 150 - 450 10e3/uL    % Neutrophils 66 %    % Lymphocytes 24 %    % Monocytes 9 %    % Eosinophils 1 %    % Basophils 0 %    % Immature Granulocytes 0 %    NRBCs per 100 WBC 0 <1 /100    Absolute Neutrophils 4.9 1.6 - 8.3 10e3/uL    Absolute Lymphocytes 1.8 0.8 - 5.3 10e3/uL    Absolute Monocytes 0.7 0.0 - 1.3 10e3/uL    Absolute Eosinophils 0.1 0.0 - 0.7 10e3/uL    Absolute Basophils 0.0 0.0 - 0.2 10e3/uL    Absolute  "Immature Granulocytes 0.0 <=0.4 10e3/uL    Absolute NRBCs 0.0 10e3/uL         BEHAVIORAL TEAM DISCUSSION    Progress: Gradual. Some increase in agitation noted last few days. Appetite has been decreasing and he states he is \"fasting\".    Continued Stay Criteria/Rationale: treat and manage symptoms. Adjust medications as needed.    Medical/Physical: chronic back pain, loss of appetite  Precautions:   Falls precaution?: No  Behavioral Orders   Procedures     Code 1 - Restrict to Unit     Routine Programming     As clinically indicated     Status 15     Every 15 minutes.     Plan:    CMP/CBC are unremarkable. Pt refused Lithium level this AM and refused lithium dose last night  EKG ordered though pt refused  Will keep scheduled Ativan at 1.5 mg due to some decompensation in mental status  Continue Zyprexa 12.5 mg at bedtime  Continue Wellbutrin  mg daily  Continue Lithium  mg at bedtime  Continue Symmetrel 100 mg BID  Continue Rivistigmine 3 mg BID- hold at this time and evaluate behavior (could be causing decreased appetite/agitation?)  Continue Lyrica 100 mg TID- consider switch back to Gabapentin, which pt had previously been taking, pt did bring this up  Is now on MI/CD commitment  Will add order to monitor intake      Rationale for change in precautions or plan:   Monitor for emerging catatonia symptoms and mental decompensation      Participants: Christelle TAVAREZ, CNP,  Nursing, OT, SW  "

## 2022-01-10 NOTE — PLAN OF CARE
Face to face report received from Connie LARKIN. Pt. Observed.     Problem: Behavioral Health Plan of Care  Goal: Patient-Specific Goal (Individualization)  Description: Patient will be free from self harm or injury  Patient will eat at least 50% of meals  Patient will attend at least 50% of groups when able.   Patient placed in MH-ICU on 01/09/2022 r/t poor impulse control (punched hole in bedroom wall).       Outcome: No Change  Note: Pt has been in bed with eyes closed and regular respirations x 7 hours this noc shift. 15 minute and PRN checks all night. No complaints offered. Will continue to monitor.         Face to face end of shift report to be communicated to oncoming RN.     Liat Lai RN  1/10/2022

## 2022-01-10 NOTE — PROGRESS NOTES
"[facility]  Psychiatric Progress Note      Impression:    Mr. Carter is a 31 year old male with a PMH of schizoaffective disorder, depressive type, catatonia, severe methamphetamine use disorder, alcohol use disorder, and significant TBI at the age of 3 who presented with depression with catatonia being off of Ativan after recently being discharged. This is the patient's 3rd hospitalization in the past roughly two months with substance use complicating his course.      The patient's catatonia has improved with resumption of Ativan. Attempting to see if the patient can be maintained on alternatives given the risks with his substance use disorders, thus Amantadine is being used. Will address patient's depression also.      Interim History:   Steven is in his room when I see him today. Affect is flat. He reports that he is feeling \"okay\". States that he is sleeping well. He has a difficult time elaborating when answering my questions, stating \"I don't know\" or \"I guess\". Did review that the Ativan will stay at 1.5 mg today, he is unable to state whether he has noticed a decrease in racing thoughts or irritability. He has a difficult time describing how the medications are helping him, though states \"they are working\". When asked why he has not been eating very well he pauses and then replies \"I'm fasting\". He does report chronic back pain, he had previously refused the scheduled lidoderm patch for his low back. Encouraged him to try utilizing Tylenol, muscle rub, Skelaxin for back pain, to which he states \"okay\". Discussed that we can have the medical NP meet with him on Tuesday if these interventions are not helpful. He is polite throughout our conversation, does remain isolative to his room.     Educated regarding medication indications, risks, benefits, side effects, contraindications and possible interactions. Verbally expressed understanding.        Diagnoses:   1. Schizoaffective disorder, " depressive type, multiple episodes, currently in acute episode, with catatonia   2. 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 Methamphetamine use disorder, severe  4. Alcohol use disorder, moderate      Attestation:  Patient has been seen and evaluated by me,  Christelle Contreras, SONIDO      The patient's care was discussed with the treatment team and chart notes were reviewed.         Medications:     Current Facility-Administered Medications Ordered in Epic   Medication Dose Route Frequency Last Rate Last Admin     acetaminophen (TYLENOL) tablet 650 mg  650 mg Oral Q4H PRN   650 mg at 12/17/21 2319     amantadine (SYMMETREL) capsule 100 mg  100 mg Oral BID   100 mg at 12/25/21 0809     buPROPion (WELLBUTRIN XL) 24 hr tablet 300 mg  300 mg Oral Daily   300 mg at 12/25/21 0809     diphenhydrAMINE (BENADRYL) capsule 50 mg  50 mg Oral At Bedtime PRN   50 mg at 12/23/21 2130     lithium (ESKALITH CR/LITHOBID) CR tablet 900 mg  900 mg Oral At Bedtime   900 mg at 12/24/21 2016     LORazepam (ATIVAN) tablet 2 mg  2 mg Oral 4x Daily   2 mg at 12/25/21 1145     OLANZapine zydis (zyPREXA) ODT tab 10 mg  10 mg Oral TID PRN   10 mg at 12/25/21 1042    Or     OLANZapine (zyPREXA) injection 10 mg  10 mg Intramuscular TID PRN         OLANZapine (zyPREXA) tablet 10 mg  10 mg Oral At Bedtime   10 mg at 12/24/21 2016     pregabalin (LYRICA) capsule 50 mg  50 mg Oral TID   50 mg at 12/25/21 0809     vitamin D2 (ERGOCALCIFEROL) 26656 units (1250 mcg) capsule 50,000 Units  50,000 Units Oral Q7 Days   50,000 Units at 12/23/21 1221     Current Outpatient Medications Ordered in Epic   Medication     vitamin D2 (ERGOCALCIFEROL) 93410 units (1250 mcg) capsule            10 point ROS- chronic back pain       Allergies:     Allergies   Allergen Reactions     Pork Allergy             Psychiatric Examination:   /87   Pulse 87   Temp 98.7  F (37.1  C) (Temporal)   Resp 14   Ht 1.829 m (6')   Wt 96.3 kg (212  "lb 3.2 oz)   SpO2 98%   BMI 28.78 kg/m    Weight is 212 lbs 3.2 oz  Body mass index is 28.78 kg/m .    Appearance: awake, alert, dressed in hospital scrubs, casually groomed  Attitude: cooperative, guarded  Eye Contact: limited  Mood: \"okay\", seems anxious  Affect: flat  Speech: monotone, normal rhythm  Psychomotor Behavior:  no evidence of tardive dyskinesia, dystonia, or tics  Thought Process:  linear and goal oriented  Associations:  no loose associations  Thought Content:  Denies SI, denies hallucinations  Insight:  fair  Judgment:  fair  Oriented to:  time, person, and place  Attention Span and Concentration:  intact  Recent and Remote Memory:  intact  Fund of Knowledge: appropriate for education  Muscle Strength and Tone: normal  Gait and Station: Normal           Labs:     Results for orders placed or performed during the hospital encounter of 12/16/21 (from the past 24 hour(s))   CBC with Platelets & Differential    Narrative    The following orders were created for panel order CBC with Platelets & Differential.  Procedure                               Abnormality         Status                     ---------                               -----------         ------                     CBC with platelets and d...[[id number]]                      Final result                 Please view results for these tests on the individual orders.   Comprehensive metabolic panel   Result Value Ref Range    Sodium 138 133 - 144 mmol/L    Potassium 3.7 3.4 - 5.3 mmol/L    Chloride 108 94 - 109 mmol/L    Carbon Dioxide (CO2) 24 20 - 32 mmol/L    Anion Gap 6 3 - 14 mmol/L    Urea Nitrogen 10 7 - 30 mg/dL    Creatinine 1.13 0.66 - 1.25 mg/dL    Calcium 9.9 8.5 - 10.1 mg/dL    Glucose 105 (H) 70 - 99 mg/dL    Alkaline Phosphatase 71 40 - 150 U/L    AST 8 0 - 45 U/L    ALT 31 0 - 70 U/L    Protein Total 7.4 6.8 - 8.8 g/dL    Albumin 4.5 3.4 - 5.0 g/dL    Bilirubin Total 0.7 0.2 - 1.3 mg/dL    GFR Estimate 88 >60 mL/min/1.73m2 " "  CBC with platelets and differential   Result Value Ref Range    WBC Count 7.5 4.0 - 11.0 10e3/uL    RBC Count 5.75 4.40 - 5.90 10e6/uL    Hemoglobin 17.0 13.3 - 17.7 g/dL    Hematocrit 49.1 40.0 - 53.0 %    MCV 85 78 - 100 fL    MCH 29.6 26.5 - 33.0 pg    MCHC 34.6 31.5 - 36.5 g/dL    RDW 12.1 10.0 - 15.0 %    Platelet Count 281 150 - 450 10e3/uL    % Neutrophils 66 %    % Lymphocytes 24 %    % Monocytes 9 %    % Eosinophils 1 %    % Basophils 0 %    % Immature Granulocytes 0 %    NRBCs per 100 WBC 0 <1 /100    Absolute Neutrophils 4.9 1.6 - 8.3 10e3/uL    Absolute Lymphocytes 1.8 0.8 - 5.3 10e3/uL    Absolute Monocytes 0.7 0.0 - 1.3 10e3/uL    Absolute Eosinophils 0.1 0.0 - 0.7 10e3/uL    Absolute Basophils 0.0 0.0 - 0.2 10e3/uL    Absolute Immature Granulocytes 0.0 <=0.4 10e3/uL    Absolute NRBCs 0.0 10e3/uL         BEHAVIORAL TEAM DISCUSSION    Progress: Gradual. Reports feeling \"okay\". Appetite has been decreasing and he states he is \"fasting\".    Continued Stay Criteria/Rationale: need to target depressive symptoms. Continue taper of Ativan and assess response.    Medical/Physical: chronic back pain  Precautions:   Falls precaution?: No  Behavioral Orders   Procedures     Code 1 - Restrict to Unit     Routine Programming     As clinically indicated     Status 15     Every 15 minutes.     Plan:      Will keep scheduled Ativan at 1.5 mg due to some decompensation in mental status  Continue Zyprexa 12.5 mg at bedtime  Continue Wellbutrin  mg daily  Continue Lithium  mg at bedtime  Continue Symmetrel 100 mg BID  Continue Rivistigmine 3 mg BID- consider stopping. (decreased appetite/agitation?)  Continue Lyrica 100 mg TID   Is now on MI/CD commitment      Rationale for change in precautions or plan:   Monitor for emerging catatonia symptoms and mental decompensation      Participants: Christelle Contreras APRN, CNP,  Nursing  "

## 2022-01-11 PROCEDURE — 124N000004

## 2022-01-11 PROCEDURE — 250N000013 HC RX MED GY IP 250 OP 250 PS 637: Performed by: NURSE PRACTITIONER

## 2022-01-11 PROCEDURE — 250N000013 HC RX MED GY IP 250 OP 250 PS 637: Performed by: STUDENT IN AN ORGANIZED HEALTH CARE EDUCATION/TRAINING PROGRAM

## 2022-01-11 PROCEDURE — 99233 SBSQ HOSP IP/OBS HIGH 50: CPT | Mod: 95 | Performed by: STUDENT IN AN ORGANIZED HEALTH CARE EDUCATION/TRAINING PROGRAM

## 2022-01-11 RX ORDER — LORAZEPAM 1 MG/1
1 TABLET ORAL DAILY
Status: DISCONTINUED | OUTPATIENT
Start: 2022-01-12 | End: 2022-01-14

## 2022-01-11 RX ORDER — LORAZEPAM 1 MG/1
2 TABLET ORAL 2 TIMES DAILY
Status: DISCONTINUED | OUTPATIENT
Start: 2022-01-12 | End: 2022-01-14

## 2022-01-11 RX ADMIN — PREGABALIN 100 MG: 75 CAPSULE ORAL at 20:19

## 2022-01-11 RX ADMIN — AMANTADINE HYDROCHLORIDE 100 MG: 100 CAPSULE ORAL at 08:22

## 2022-01-11 RX ADMIN — PREGABALIN 100 MG: 75 CAPSULE ORAL at 08:22

## 2022-01-11 RX ADMIN — OLANZAPINE 12.5 MG: 2.5 TABLET, FILM COATED ORAL at 20:19

## 2022-01-11 RX ADMIN — BUPROPION HYDROCHLORIDE 300 MG: 300 TABLET, EXTENDED RELEASE ORAL at 08:22

## 2022-01-11 RX ADMIN — LITHIUM CARBONATE 900 MG: 450 TABLET, EXTENDED RELEASE ORAL at 20:19

## 2022-01-11 RX ADMIN — LORAZEPAM 1.5 MG: 0.5 TABLET ORAL at 12:56

## 2022-01-11 RX ADMIN — PREGABALIN 100 MG: 75 CAPSULE ORAL at 13:18

## 2022-01-11 RX ADMIN — LORAZEPAM 1.5 MG: 0.5 TABLET ORAL at 08:22

## 2022-01-11 RX ADMIN — LORAZEPAM 1.5 MG: 0.5 TABLET ORAL at 15:58

## 2022-01-11 RX ADMIN — AMANTADINE HYDROCHLORIDE 100 MG: 100 CAPSULE ORAL at 20:19

## 2022-01-11 RX ADMIN — LORAZEPAM 1.5 MG: 0.5 TABLET ORAL at 20:19

## 2022-01-11 ASSESSMENT — ACTIVITIES OF DAILY LIVING (ADL)
ADLS_ACUITY_SCORE: 7
ORAL_HYGIENE: INDEPENDENT
ADLS_ACUITY_SCORE: 7
HYGIENE/GROOMING: INDEPENDENT
ADLS_ACUITY_SCORE: 7
ORAL_HYGIENE: INDEPENDENT
ADLS_ACUITY_SCORE: 7
DRESS: INDEPENDENT
ADLS_ACUITY_SCORE: 7
HYGIENE/GROOMING: INDEPENDENT
ADLS_ACUITY_SCORE: 7
LAUNDRY: UNABLE TO COMPLETE
ADLS_ACUITY_SCORE: 7
DRESS: SCRUBS (BEHAVIORAL HEALTH);INDEPENDENT
ADLS_ACUITY_SCORE: 7
ADLS_ACUITY_SCORE: 7

## 2022-01-11 NOTE — PROGRESS NOTES
"[facility]  -  REASSESSMENT NOTE    Steven Carter    31 yom admitted for catatonia. Pt has a hx of methamphetamine use, alcohol use disorder, schizoaffective disorder, TBI at age of 3. 2lb weight loss since admission. Intake continues to be variable. More meals with 0-25% intake since last review, intake seems to be improving yesterday/today.    Diet Order: Regular, Ensure  Intake: 12 meals with 0-100% intake.     Height: 6' 0\"  Weight: 212 lbs 3.2 oz  Body mass index is 28.78 kg/m .  Weight Status:  Overweight BMI 25-29.9  Weight History:  Max IBW- 83.9kg   Wt Readings from Last 10 Encounters:   12/16/21 96.4 kg (212 lb 9.6 oz)   12/14/21 97.7 kg (215 lb 4.8 oz)   12/01/21 97.8 kg (215 lb 11.2 oz)   10/31/21 105.1 kg (231 lb 11.2 oz)   03/27/21 105.2 kg (232 lb)   11/30/15 96.6 kg (213 lb)   08/18/14 94.5 kg (208 lb 6.4 oz)   08/15/14 93.4 kg (206 lb)   07/19/14 102.1 kg (225 lb)      83.9kg- max ibw  Estimated Energy Needs: 1774-7034 kcals (25-30 Kcal/Kg)   Estimated Protein Needs:  grams protein (1-1.2 g pro/Kg)     Malnutrition Diagnosis: severe malnutrition   In Context of:  Chronic illness or disease, Environmental or social circumstances     NUTRITION RECOMMENDATIONS  - Continue to encourage intake with meals and snacks  - Continue to send Ensure on meal trays.    - Monitor weight     MONITORING AND EVALUATION:  RD will monitor intake, weight, labs               "

## 2022-01-11 NOTE — PLAN OF CARE
"Face to face shift report received from TRAE Barba.       Problem: Behavioral Health Plan of Care  Goal: Patient-Specific Goal (Individualization)  Description: Patient will be free from self harm or injury  Patient will eat at least 50% of meals  Patient will attend at least 50% of groups when able.   Patient placed in MH-ICU on 01/09/2022 r/t poor impulse control (punched hole in bedroom wall).     Outcome: No Change  Note: Remains in MHICU due to poor impulse control, possibility of unpredictable behaviors. Calm and cooperative. Declined HS Zyprexa, stating \"no I don't need that.\" Denies mental health issues, \"I'm okay.\" Flat affect. Appears preoccupied. Pt observed to be lying on floor in room staring at wall. No reports of pain. Asked multiple times for Exelon, when told that is not currently scheduled pt stated \"okay.\" Pt also requested to be discharged tonight, but when told current discharge plan pt stated \"okay.\"  Ate 50% of dinner and 100% of HS snack. 360ml fluid intake.        Problem: Behavior Regulation Impairment (Psychotic Signs/Symptoms)  Goal: Improved Behavioral Control (Psychotic Signs/Symptoms)  Description: Patient will be able to have a reality based conversation.   Outcome: Improving       Problem: Violence Risk or Actual  Goal: Anger and Impulse Control  Outcome: Improving   Free from violent or threatening outbursts.       Face to face end of shift report to be communicated to oncoming RN.     "

## 2022-01-11 NOTE — PROGRESS NOTES
[facility] Progress Note     Assessment     Mr. Carter is a 31 year old male with a PMH of schizoaffective disorder, depressive type, catatonia, severe methamphetamine use disorder, alcohol use disorder, and significant TBI at the age of 3 who presented with depression with catatonia being off of Ativan after recently being discharged. This is the patient's 3rd hospitalization in the past roughly two months with substance use complicating his course.     The patient's catatonia improved with resumption of Ativan. Also, amantadine has helped. Tapering down given concerns with misuse in the past, while using amantadine and exploring Namenda as alternative means for long-term management. Wellbutrin was increased to further address depression. Zyprexa increased to further help with depression and any element of psychosis. Exelon has been used off-label for cognition related to TBI's. Held due to concerns for contributing to agitation with monitoring occurring. Might switch to Namenda.     Patient has SPMI with him having deficits in his occupational and social functioning. He has had significant CNS insults with him having multiple TBIs, significant substance abuse over years, and near fatal cardiac arrest from overdose (? Anoxic injury, ) that have likely led to neuropsychiatric impact making it more challenging for him to recover. His  and  note he has not been the same since.    Educated regarding medication indications, risks, benefits, side effects, contraindications and possible interactions. Verbally expressed understanding.      Diagnoses     1. Schizoaffective disorder, depressive type, multiple episodes, currently in acute episode, with catatonia   2. 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 Methamphetamine use disorder, severe  4. Alcohol use disorder, moderate     Plan     Unit 5  Legal Status: Committed     Safety Assessment:   "  Behavioral Orders   Procedures     Code 1 - Restrict to Unit     Routine Programming     As clinically indicated     Status 15     Every 15 minutes.      Medications:     Outpatient medications continued/changed:     Wellbutrin  mg daily -> 300 mg on 12/24  Lithium 900 mg at bedtime  Zyprexa 10 mg BID prn - > 10 mg at bedtime -> 12.5 mg at bedtime on 1/3  Lyrica 100 mg TID (initially held but resumed due to benefit for anxiety and pain)  Vitamin D 50,000 international unit(s) weekly    New medications initiated:     Ativan 2 mg QID -> 1.5 mg QID on 1/5 -> 1/2/2 mg on 1/12  Amantadine 100 mg BID  Exelon 3 mg BID (being held, likely discontinue)  Standard unit PRNs    Programming: Patient will be treated in a therapeutic milieu with appropriate individual and group therapies. Education will be provided on diagnoses, medications, and treatments.     Medical diagnoses:      #. Concern for poor intake  - I/Os resumed again   - Nutrition following   - Monitoring     Consult: Nutrition  Labs: None    Anticipated LOS: 2-4 weeks  Dispo: IRTS vs [facility]     Interim History     The patient notes that his appetite has returned.  He notes that he was fasting yesterday.  He notes he was trying to \"clear his mind.\"The patient notes that he is no longer fastings when to eat today.  The patient also notes that he is willing to take his medications that he was refusing the other day.  He does not have a good reason for refusing this and notes that he would like to continue them.  He consents again a lithium level today with him being concerned about a tremor and previous concern that the patient could have a supratherapeutic level contributing to his agitation.    The patient reports that he is tired today.  Discussed how Ativan could be contributing.  He consents to reducing his Ativan with further goal to reduce this medication while monitoring for worsening of catatonia.    The patient notes he is tolerating his medications " "well otherwise.  He is focused on getting back on Exelon.  He notes that it helped with his thinking.  Discussed with the patient the limitations of this medications.  Noted that we would monitor his current state and consider resumption or switching to Namenda to further assist with catatonia.    The patient denies any new physical concerns today.  No safety concerns.     Medications       amantadine  100 mg Oral BID     buPROPion  300 mg Oral Daily     lithium ER  900 mg Oral At Bedtime     LORazepam  1.5 mg Oral 4x Daily     OLANZapine  12.5 mg Oral At Bedtime     pregabalin  100 mg Oral TID     [Held by provider] rivastigmine  3 mg Oral BID     vitamin D2  50,000 Units Oral Q7 Days        Allergies     Allergies   Allergen Reactions     Pork Allergy         Psychiatric Examination     /76 (BP Location: Right arm)   Pulse 77   Temp 96.9  F (36.1  C) (Temporal)   Resp 18   Ht 1.829 m (6')   Wt 96.3 kg (212 lb 3.2 oz)   SpO2 99%   BMI 28.78 kg/m    Weight is 212 lbs 3.2 oz  Body mass index is 28.78 kg/m .    Appearance: Alert, oriented, dressed in hospital scrubs, long beard  Attitude: Cooperative   Eye Contact: Fair  Mood: \"Ok\"  Affect: Flat, mood congruent  Speech: Reduction in rate. Normal rhythm   Psychomotor Behavior: No tremor, rigidity, akathisia, or psychomotor retardation. Recent withdrawal, but no clear signs of catatonia today  Thought Process: Logical, goal directed   Associations: No loose associations   Thought Content: Denies SI. No SIB. Denies A/V hallucinations. No evidence of delusional thought.  Insight: Adequate   Judgment: Adequate  Oriented to: Person, place, and time  Attention Span and Concentration: Intact  Recent and Remote Memory: Intact  Language: English with appropriate syntax and vocabulary  Fund of Knowledge: Average  Muscle Strength and Tone: Grossly normal  Gait and Station: Grossly normal    Pineda-[place] Catatonia Rating Scale   Severity Score (Number of points for " items 1 -23) _______1___   Screening Score (Presence or absence of items 1 - 14) ______1_____   Time: 11:00 AM    1. Immobility/stupor: Extreme hypoactivity, immobile, minimally responsive to stimuli.   0 - Absent.   1 - Sits abnormally still, may interact briefly.   2 - Virtually no interaction with external world.   3 - Stuporous, non-reactive to painful stimuli.   2. Mutism: Verbally unresponsive or minimally responsive.   0 = Absent.   1 = Verbally unresponsive to majority of questions; incomprehensible whisper.   2 = Speaks less than 20 words/5mins.   3 = No speech.   3. = Staring: Fixed gaze, little or no visual scanning of environment, decreased blinking.   0 = Absent.   1 = Poor eye contact, repeatedly gazes less than 20 s between shifting of attention; decreased blinking.   2 = Gaze held longer than 20 s, occasionally shifts attention.   3 = Fixed gaze, non-reactive.   4. Posturing/catalepsy: Spontaneous maintenance of posture (s), including mundane (e.g. sitting or standing for long periods without reacting).   0 = Absent.   1 = Less than I min.   2 Greater than one minute, less than 15 min.   3 Bizarre posture, or mundane maintained more than 15 min.   5. Grimacing: Maintenance of odd facial expressions.   0 = Absent.   1 = Less than a7poxbjed.   2 = Less than 1 min.   3 = Bizarre expression(s) or maintained more than 1 min.   6. Echopraxia/echolalia: Mimicking of examiner's movements (echopraxia) or speech (echolalia).   0 = Absent   1 = Occasional.   2 = Frequent.   3 = Constant   7. Stereotypy: Repetitive, non-goal-directed motor activity (e.g. finger-play, repeatedly touching, patting or rubbing self); abnormality not inherent in act but in its frequency.   0 - Absent   1 - Occasional.   2 - Frequent.   3 - Constant.   8. Mannerisms: Odd, purposeful movements (hopping or walking tiptoe, saluting passers-by or exaggerated caricatures of mundane movements); abnormality inherent in act itself.   0 -  Absent   1 - Occasional.   2 - Frequent.   3 - Constant.   9. Stereotyped & meaningless repetition of words & phrases (verbigeration): Repetition of phrases or sentences (like a scratched records).   0 - Absent.   1 - Occasional.   2 - Frequent, difficult to interrupt.   3 - Constant.   10. Rigidity: Maintenance of a rigid position despite efforts to be moved (exclude if cog-wheeling or tremor present)   0 = Absent.   1 = Mild resistance.   2 = Moderate.   3 = Severe, cannot be repostured.   11. Negativism: Apparently motiveless resistance to instructions or attempts to move/examine patients. Contrary behavior, does exact opposite of instruction.   0 - Absent   1 - Mild resistance and/or occasionally contrary.   2 - Moderate resistance and/or frequently contrary.   3 - Severe resistance and/or continually contrary.   12. Waxy flexibility: During repositioning of patient, patient offers initial resistance before allowing him/herself to be repositioned, similar to that of a bending candle. (also defined as slow resistance to movement as the patient allows the examiner to place his/her extremities in unusual positions. The limb may remain in the position in which they are placed or not)   0 - Absent   3 - Present.   13. Withdrawal: Refusal to eat, drink and/or make eye contact.   0 = Absent.   1 = Minimal oral intake/interaction for less than 1 day.   2 = Minimal oral intake/interaction for more than 1 day.   3 = No oral intake/interaction for 1 day or more.   14. Excitement: Extreme hyperactivity, constant motor unrest which is apparently non-purposeful.   Not to be attributed to akathisia or goal-directed agitation.   1 - Excessive motion, intermittent.   2 - Constant motion, hyperkinetic without rest periods.   3 - Full-blown catatonic excitement, endless frenzied motor activity.   ------------End of Screening Items-------------   15. Impulsivity: Patient suddenly engages in inappropriate behavior (e.g. runs down  hallway, starts screaming or takes off clothes) without provocation. Afterwards can give no, or only a facile explanation.   0 - Absent.   1 - Occasional.   2 - Frequent.   3 - Constant or not redirectable.   16. Automatic obedience: Exaggerated cooperation with examiner's request or spontaneous continuation of movement requested.   0 = Absent.   1 = Occasional   2 = Frequent   3 = Constant.   17. Passive Obedience (mitgehen): Patient raises arm in response to light pressure of finger, despite instructions to the contrary.   0 = Absent.   3 = Present.   18. Muscle Resistance (gegenhalten): Involuntary resistance to passive movement of a limb to a new position. Resistance increases with the speed of the movement.   0 - Absent   3 - Present.   19. Motorically Stuck (ambitendency): Patient appears stuck in indecisive, hesitant motor movements.   0 - Absent.   3 = Present.   20. Grasp reflex: Striking the patient's open palm with two extended fingers of the examiner's hand results in automatic closure of patients hand.   0 = Absent   3 = Present   21. Perseveration: Repeatedly returns to same topic or persists with the same movements.   0 = Absent.   3 = Present.   22. Combativeness: Belligerence or aggression, Usually in an undirected manner, without explanation.   0 = Absent   1 = Occasionally strikes out, low potential for injury.   2 = Frequently strikes out, moderate potential for injury.   3 = Serious danger to others.   23. Autonomic abnormality: Abnormality of body temperature (fever), blood pressure, pulse, respiratory rate, inappropriate sweating, flushing.   0 = Absent   1 = Abnormality of one parameter (exclude pre-existing hypertension).   2 = Abnormality of two parameters.   3 = Abnormality of three or more parameters.      Labs     No results found for this or any previous visit (from the past 24 hour(s)).     Attestation      Patient has been seen and evaluated by:    David Timmons DO,  MA  Psychiatrist    VIDEO VISIT    Patient has given verbal consent for video visit?: Yes     Video- Visit Details  Type of service:  video visit for mental health treatment.  Time of service:    Date:  01/11/2022    Video Start Time: 1100AM      Video End Time: 1130AM    Reason for video visit: COVID-19 [place]  Originating Site (patient location):  [facility]  Distant Site (provider location):  Remote location  Mode of Communication:  Video Conference via [employer]

## 2022-01-11 NOTE — PLAN OF CARE
Face to face shift report received from Joon HARRIS RN. Rounding completed, pt observed.    Problem: Behavioral Health Plan of Care  Goal: Patient-Specific Goal (Individualization)  Description: Patient will be free from self harm or injury  Patient will eat at least 50% of meals  Patient will attend at least 50% of groups when able.   Patient may wean to the open unit as long as his behavior is appropriate.      Outcome: Improving  Note: Shift Summery:  Patient was in a room in the MHICU at the start of this shift.  Patient was cooperative with assessment although used brief answers and minimizes symptoms when asked about them. Patient has not eaten anything this shift and minimal fluid intake seen.  Patient was moved to open unit for unit needs and patient agreed to be safe on open unit.  Patient did shower and change scrubs after moving to room 508.  Patient denies pain. Patient was compliant with medications.  Offered Ensure at evening meal and at HS snack but declined.  Strongly encouraged to eat as this will delay him being discharged when placement is found. Patient agreed to inform staff if feeling agitated.    Problem: Behavior Regulation Impairment (Psychotic Signs/Symptoms)  Goal: Improved Behavioral Control (Psychotic Signs/Symptoms)  Description: Patient will be able to have a reality based conversation.     Outcome: No Change     Problem: Violence Risk or Actual  Goal: Anger and Impulse Control  Description: Pt will be able to control his anger during stay  Pt will ask for PRN medications as needed to control his impulses    Outcome: Improving   Face to face report will be communicated to oncoming RN.    Alma Delia Garsia RN  1/11/2022

## 2022-01-11 NOTE — PLAN OF CARE
"Problem: Violence Risk or Actual  Goal: Anger and Impulse Control  Description: Pt will be able to control his anger during stay  Pt will ask for PRN medications as needed to control his impulses    Outcome: Improving    Pt was calm and in control of his anger     Problem: Behavior Regulation Impairment (Psychotic Signs/Symptoms)  Goal: Improved Behavioral Control (Psychotic Signs/Symptoms)  Description: Patient will be able to have a reality based conversation.     Outcome: Improving    Pt was able to ask for his needs but did not cooperate in the nursing assessment beyond head shakes     Problem: Behavioral Health Plan of Care  Goal: Patient-Specific Goal (Individualization)  Description: Patient will be free from self harm or injury  Patient will eat at least 50% of meals  Patient will attend at least 50% of groups when able.   Patient placed in -ICU on 01/09/2022 r/t poor impulse control (punched hole in bedroom wall).       Outcome: Improving  Note: Shift Summery:      Patient's Stated Goal for Shift:  \"Pt did not \"    Goal Status:  In Process    0730 Face to face rounding complete.  Pt introduced to nursing for the shift.    Pt was withdrawn to his bed in his room all shift. He did shower and sat on the edge of his bed. He would not engage in conversation other than simple requests and yes/no head shakes. He had good fluid and some food intake this shift.  Pt declined offers to wean to the dayroom and group.     1500 Face to face end of shift report communicated to Evening shift RN's along with Pt's fall risk.     Joon Funk RN  1/11/2022          "

## 2022-01-11 NOTE — PLAN OF CARE
Face to face shift report received from Nevaeh GARCIA RN. Rounding completed, pt observed.    Problem: Behavioral Health Plan of Care  Goal: Patient-Specific Goal (Individualization)  Description: Patient will be free from self harm or injury  Patient will eat at least 50% of meals  Patient will attend at least 50% of groups when able.   Patient placed in MH-ICU on 01/09/2022 r/t poor impulse control (punched hole in bedroom wall).       Outcome: Improving  Note: Shift Summery:  Patient was in bed at the start of the shift.  Patient's respirations are visible and regular. Position changes noted.  Awake off and on during the night.No noted use of the bathroom this shift.  No intake this shift.    Problem: Behavior Regulation Impairment (Psychotic Signs/Symptoms)  Goal: Improved Behavioral Control (Psychotic Signs/Symptoms)  Description: Patient will be able to have a reality based conversation.     Outcome: No Change   Face to face report will be communicated to oncoming RN.    Alma Delia Garsia RN  1/10/2022

## 2022-01-11 NOTE — PLAN OF CARE
Pt's plan is to still go to an IRTS. With the pt in the MHICU pt will need to be on the open unit again with 3 days of good notes before attempting to send referrals again.

## 2022-01-12 LAB — LITHIUM SERPL-SCNC: 0.8 MMOL/L

## 2022-01-12 PROCEDURE — 124N000004

## 2022-01-12 PROCEDURE — 250N000013 HC RX MED GY IP 250 OP 250 PS 637: Performed by: NURSE PRACTITIONER

## 2022-01-12 PROCEDURE — 36415 COLL VENOUS BLD VENIPUNCTURE: CPT | Performed by: STUDENT IN AN ORGANIZED HEALTH CARE EDUCATION/TRAINING PROGRAM

## 2022-01-12 PROCEDURE — 250N000013 HC RX MED GY IP 250 OP 250 PS 637: Performed by: STUDENT IN AN ORGANIZED HEALTH CARE EDUCATION/TRAINING PROGRAM

## 2022-01-12 PROCEDURE — 80178 ASSAY OF LITHIUM: CPT | Performed by: STUDENT IN AN ORGANIZED HEALTH CARE EDUCATION/TRAINING PROGRAM

## 2022-01-12 PROCEDURE — 99233 SBSQ HOSP IP/OBS HIGH 50: CPT | Mod: 95 | Performed by: STUDENT IN AN ORGANIZED HEALTH CARE EDUCATION/TRAINING PROGRAM

## 2022-01-12 RX ORDER — POLYETHYLENE GLYCOL 3350 17 G/17G
17 POWDER, FOR SOLUTION ORAL DAILY PRN
Status: DISCONTINUED | OUTPATIENT
Start: 2022-01-12 | End: 2022-03-08 | Stop reason: HOSPADM

## 2022-01-12 RX ORDER — METHOCARBAMOL 500 MG/1
1000 TABLET, FILM COATED ORAL 3 TIMES DAILY PRN
Status: DISCONTINUED | OUTPATIENT
Start: 2022-01-12 | End: 2022-02-02

## 2022-01-12 RX ORDER — LANOLIN ALCOHOL/MO/W.PET/CERES
3 CREAM (GRAM) TOPICAL
Status: DISCONTINUED | OUTPATIENT
Start: 2022-01-12 | End: 2022-03-08 | Stop reason: HOSPADM

## 2022-01-12 RX ADMIN — LORAZEPAM 1 MG: 1 TABLET ORAL at 08:37

## 2022-01-12 RX ADMIN — PREGABALIN 100 MG: 75 CAPSULE ORAL at 08:37

## 2022-01-12 RX ADMIN — ACETAMINOPHEN 650 MG: 325 TABLET, FILM COATED ORAL at 09:58

## 2022-01-12 RX ADMIN — OLANZAPINE 12.5 MG: 2.5 TABLET, FILM COATED ORAL at 20:04

## 2022-01-12 RX ADMIN — LORAZEPAM 2 MG: 1 TABLET ORAL at 20:04

## 2022-01-12 RX ADMIN — PREGABALIN 100 MG: 75 CAPSULE ORAL at 20:04

## 2022-01-12 RX ADMIN — METHOCARBAMOL 1000 MG: 500 TABLET, FILM COATED ORAL at 20:10

## 2022-01-12 RX ADMIN — AMANTADINE HYDROCHLORIDE 100 MG: 100 CAPSULE ORAL at 08:37

## 2022-01-12 RX ADMIN — LITHIUM CARBONATE 900 MG: 450 TABLET, EXTENDED RELEASE ORAL at 20:04

## 2022-01-12 RX ADMIN — METAXALONE 800 MG: 800 TABLET ORAL at 09:58

## 2022-01-12 RX ADMIN — LORAZEPAM 2 MG: 1 TABLET ORAL at 13:00

## 2022-01-12 RX ADMIN — AMANTADINE HYDROCHLORIDE 100 MG: 100 CAPSULE ORAL at 20:04

## 2022-01-12 RX ADMIN — PREGABALIN 100 MG: 75 CAPSULE ORAL at 14:13

## 2022-01-12 RX ADMIN — BUPROPION HYDROCHLORIDE 300 MG: 300 TABLET, EXTENDED RELEASE ORAL at 08:37

## 2022-01-12 ASSESSMENT — ACTIVITIES OF DAILY LIVING (ADL)
LAUNDRY: UNABLE TO COMPLETE
ADLS_ACUITY_SCORE: 7
ADLS_ACUITY_SCORE: 7
ORAL_HYGIENE: INDEPENDENT
ADLS_ACUITY_SCORE: 7
HYGIENE/GROOMING: INDEPENDENT
ADLS_ACUITY_SCORE: 7
DRESS: SCRUBS (BEHAVIORAL HEALTH);INDEPENDENT
ADLS_ACUITY_SCORE: 7
DRESS: INDEPENDENT
HYGIENE/GROOMING: INDEPENDENT
ADLS_ACUITY_SCORE: 7
ADLS_ACUITY_SCORE: 7
ORAL_HYGIENE: INDEPENDENT

## 2022-01-12 NOTE — PLAN OF CARE
Problem: Behavioral Health Plan of Care  Goal: Patient-Specific Goal (Individualization)  Description: Patient will be free from self harm or injury  Patient will eat at least 50% of meals  Patient will attend at least 50% of groups when able.   Patient may wean to the open unit as long as his behavior is appropriate.  Outcome: Improving  Note: Shift Summery:      Face to face end of shift report received from evening shift RN. Rounding completed. Pt observed in own bed, with eyes closed, in left side-lying position, and with rested breathing. 0030 Pt up to request and receive a snack; Pt returns to bed. Will continue to monitor. Face to face end of shift report to be given to day shift RN.     Pt slept approximately 6 hours.      Problem: Behavior Regulation Impairment (Psychotic Signs/Symptoms)  Goal: Improved Behavioral Control (Psychotic Signs/Symptoms)  Description: Patient will be able to have a reality based conversation.   Outcome: Improving -- Pt slept majority of this shift; No additional concerns noted at this time.      Problem: Violence Risk or Actual  Goal: Anger and Impulse Control  Description: Pt will be able to control his anger during stay  Pt will ask for PRN medications as needed to control his impulses  Outcome: Improving -- Pt remains free of aggressive outbursts throughout the duration of this shift.

## 2022-01-12 NOTE — PLAN OF CARE
Face to face shift report received from Joon HARRIS RN. Rounding completed, pt observed.    Problem: Behavioral Health Plan of Care  Goal: Patient-Specific Goal (Individualization)  Description: Patient will be free from self harm or injury  Patient will eat at least 50% of meals  Patient will attend at least 50% of groups when able.   Patient may wean to the open unit as long as his behavior is appropriate.      Outcome: Improving  Note: Shift Summery:  Patient was awake in his room at the start of this shift.  Patient remains guarded and denies pain or hallucinations.  No self talk heard or observed this shift.  Nurse encouraged patient to eat and take in fluids and to try to come out of his room more as that is what he needs to do to be able to go to an IRTS program.  Patient did take tray in lounge.  Ate about 50% and drank juice.  Discussed current medication regime with patient and he did state an understanding.  Patient can make his needs known at times.         Problem: Behavior Regulation Impairment (Psychotic Signs/Symptoms)  Goal: Improved Behavioral Control (Psychotic Signs/Symptoms)  Description: Patient will be able to have a reality based conversation.     Outcome: Improving   Face to face report will be communicated to oncoming RN.    Alma Delia Garsia RN  1/12/2022

## 2022-01-12 NOTE — PLAN OF CARE
Spoke to pt this morning, pt was found sitting in the lounge. Pt states they are ok and requested to do the screening with [facility] again. Let pt know there was another place interested in doing a screening and that he could screen for both, but pt denied and just wanted [facility]. Let pt know this writer would contact them and set up a screening soon. '    Emailed Patrizia from the formerly [facility], pt has Rajwinder Andrews as a  and Patrizia stated she put the pt on the [facility] lists.

## 2022-01-12 NOTE — PLAN OF CARE
"Problem: Violence Risk or Actual  Goal: Anger and Impulse Control  Description: Pt will be able to control his anger during stay  Pt will ask for PRN medications as needed to control his impulses    Outcome: Improving    Pt has been calm and controlled all shift.  Pt agreed to ask for PRN medications if needed.     Problem: Behavior Regulation Impairment (Psychotic Signs/Symptoms)  Goal: Improved Behavioral Control (Psychotic Signs/Symptoms)  Description: Patient will be able to have a reality based conversation.     Outcome: Improving    Pt remains delusional, guarded, and withdrawn     Problem: Behavioral Health Plan of Care  Goal: Patient-Specific Goal (Individualization)  Description: Patient will be free from self harm or injury  Patient will eat at least 50% of meals  Patient will attend at least 50% of groups when able.   Patient may wean to the open unit as long as his behavior is appropriate.    Outcome: Improving  Note: Shift Summery:      Patient's Stated Goal for Shift:  \"Pt did not state a goal\"    Goal Status:  In Process    0730 Face to face rounding complete.  Pt introduced to nursing for the shift.    Pt was up and withdrawn to his room almost all shift.  Pt sat on the edge of his bed or lay on his bed all day.  Pt's speech was a bit more fluid today though remains very superficial.  He answered questions with yes/no though.  He was very reluctant to get his blood drawn for a Lithium level even after extensive teaching. He agreed and then declined four times.  Pt did finally allow the blood draw after NP April went with the lab person.    Pt's affect is very flat and he appears preoccupied.  Pt denied hearing voices.  He did not interact with peers and did not attend any groups.    1500 Face to face end of shift report communicated to Evening Shift RN's along with Pt's fall risk.     Joon Funk RN  1/12/2022       "

## 2022-01-12 NOTE — PROGRESS NOTES
[facility] Psychiatric Progress Note     Assessment     Mr. Carter is a 31 year old male with a PMH of schizoaffective disorder, depressive type, catatonia, severe methamphetamine use disorder, alcohol use disorder, and significant TBI at the age of 3 who presented with depression with catatonia being off of Ativan after recently being discharged. This is the patient's 3rd hospitalization in the past roughly two months with substance use complicating his course.     The patient's catatonia improved with resumption of Ativan. Also, amantadine has helped. Tapering down given concerns with misuse in the past, while using amantadine and exploring Namenda as alternative means for long-term management. Wellbutrin was increased to further address depression. Zyprexa increased to further help with depression and any element of psychosis. Exelon has been used off-label for cognition related to TBI's. Held due to concerns for contributing to agitation with monitoring occurring. Might switch to Namenda.     Patient has SPMI with him having deficits in his occupational and social functioning. He has had significant CNS insults with him having multiple TBIs, significant substance abuse over years, and near fatal cardiac arrest from overdose (? Anoxic injury, ) that have likely led to neuropsychiatric impact making it more challenging for him to recover. His  and  note he has not been the same since. Patient did score a 4.4/5.8 on the ACL, which indicates the patient would need some level of support and could not be alone the whole day safely, needing assistance with such stuff as solving problems, removing any safety hazards. He would likely benefit from assistance with home medication management.    Educated regarding medication indications, risks, benefits, side effects, contraindications and possible interactions. Verbally expressed understanding.      Diagnoses     1. Schizoaffective  disorder, depressive type, multiple episodes, currently in acute episode, with catatonia   2. 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 Methamphetamine use disorder, severe  4. Alcohol use disorder, moderate     Plan     Unit 5  Legal Status: Committed     Safety Assessment:    Behavioral Orders   Procedures     Code 1 - Restrict to Unit     Routine Programming     As clinically indicated     Status 15     Every 15 minutes.      Medications:     Outpatient medications continued/changed:     Wellbutrin  mg daily -> 300 mg on 12/24  Lithium 900 mg at bedtime  Zyprexa 10 mg BID prn - > 10 mg at bedtime -> 12.5 mg at bedtime on 1/3  Lyrica 100 mg TID (initially held but resumed due to benefit for anxiety and pain)  Vitamin D 50,000 international unit(s) weekly    New medications initiated:     Ativan 2 mg QID -> 1.5 mg QID on 1/5 -> 1/2/2 mg on 1/12  Amantadine 100 mg BID  Standard unit PRNs    New medications stopped    Exelon 3 mg BID off label for cognition from TBI due to concerns for agitation with plans to possibly switch to Namenda     Programming: Patient will be treated in a therapeutic milieu with appropriate individual and group therapies. Education will be provided on diagnoses, medications, and treatments.     Medical diagnoses:      #. Concern for poor intake  - I/Os resumed again   - Nutrition following   - Monitoring     #. Intention tremor bilaterally   - Secondary to lithium  - Monitor  - Conservative management    Consult: Nutrition  Labs: Li 0.8 on 1/12    Anticipated LOS: 2-4 weeks  Dispo: IRTS vs [facility]     Interim History     The patient notes that he feels a little better today. Feels like depression has improved a little. He notes that Wellbutrin has helped. He is hopeful about an IRTS facility. He denies psychotic symptoms. The patient was internally preoccupied while trying to get a lab today, refusing it multiple times. He has no explanation why he did  "this. He had some Ensure this morning and was encouraged to eat. He notes that he is willing to get lab draws. His level was reassuring. No signs of toxicity. Patient does have a mild positional tremor bilaterally. No new physical concerns otherwise.     Medications       amantadine  100 mg Oral BID     buPROPion  300 mg Oral Daily     lithium ER  900 mg Oral At Bedtime     LORazepam  1 mg Oral Daily     LORazepam  2 mg Oral BID     OLANZapine  12.5 mg Oral At Bedtime     pregabalin  100 mg Oral TID     vitamin D2  50,000 Units Oral Q7 Days        Allergies     Allergies   Allergen Reactions     Pork Allergy         Psychiatric Examination     /84 (BP Location: Right arm)   Pulse 82   Temp 97.5  F (36.4  C) (Temporal)   Resp 14   Ht 1.829 m (6')   Wt 96.3 kg (212 lb 3.2 oz)   SpO2 98%   BMI 28.78 kg/m    Weight is 212 lbs 3.2 oz  Body mass index is 28.78 kg/m .    Appearance: Alert, oriented, dressed in hospital scrubs, long beard  Attitude: Cooperative   Eye Contact: Fair  Mood: \"Better\"  Affect: Flat, mood congruent  Speech: Reduction in rate. Normal rhythm   Psychomotor Behavior: No tremor, rigidity, akathisia, or psychomotor retardation. Withdrawal  Thought Process: Logical, goal directed   Associations: No loose associations   Thought Content: Denies SI. No SIB. Denies A/V hallucinations. No evidence of delusional thought.  Insight: Adequate   Judgment: Adequate  Oriented to: Person, place, and time  Attention Span and Concentration: Intact  Recent and Remote Memory: Intact  Language: English with appropriate syntax and vocabulary  Fund of Knowledge: Average  Muscle Strength and Tone: Grossly normal  Gait and Station: Grossly normal    Pineda-Orville Catatonia Rating Scale   Severity Score (Number of points for items 1 -23) _______1___   Screening Score (Presence or absence of items 1 - 14) ______1_____   Time: 11:00 AM on 1/11/2022     Labs     Recent Results (from the past 24 hour(s))   Lithium " level    Collection Time: 01/12/22 11:08 AM   Result Value Ref Range    Lithium 0.8   mmol/L        Attestation      Patient has been seen and evaluated by:    David Timmons DO, MA  Psychiatrist    VIDEO VISIT    Patient has given verbal consent for video visit?: Yes     Video- Visit Details  Type of service:  video visit for mental health treatment.  Time of service:    Date:  01/12/2022    Video Start Time: 200PM      Video End Time: 215PM    Reason for video visit: COVID-19 [place]  Originating Site (patient location):  [facility]  Distant Site (provider location):  Remote location  Mode of Communication:  Video Conference via [employer]

## 2022-01-13 PROCEDURE — 250N000013 HC RX MED GY IP 250 OP 250 PS 637: Performed by: STUDENT IN AN ORGANIZED HEALTH CARE EDUCATION/TRAINING PROGRAM

## 2022-01-13 PROCEDURE — 250N000013 HC RX MED GY IP 250 OP 250 PS 637: Performed by: NURSE PRACTITIONER

## 2022-01-13 PROCEDURE — 99233 SBSQ HOSP IP/OBS HIGH 50: CPT | Mod: 95 | Performed by: STUDENT IN AN ORGANIZED HEALTH CARE EDUCATION/TRAINING PROGRAM

## 2022-01-13 PROCEDURE — 124N000004

## 2022-01-13 RX ADMIN — ACETAMINOPHEN 650 MG: 325 TABLET, FILM COATED ORAL at 21:36

## 2022-01-13 RX ADMIN — OLANZAPINE 12.5 MG: 2.5 TABLET, FILM COATED ORAL at 20:36

## 2022-01-13 RX ADMIN — AMANTADINE HYDROCHLORIDE 100 MG: 100 CAPSULE ORAL at 08:21

## 2022-01-13 RX ADMIN — LORAZEPAM 2 MG: 1 TABLET ORAL at 13:54

## 2022-01-13 RX ADMIN — METHOCARBAMOL 1000 MG: 500 TABLET, FILM COATED ORAL at 08:20

## 2022-01-13 RX ADMIN — BUPROPION HYDROCHLORIDE 300 MG: 300 TABLET, EXTENDED RELEASE ORAL at 08:21

## 2022-01-13 RX ADMIN — ERGOCALCIFEROL 50000 UNITS: 1.25 CAPSULE, LIQUID FILLED ORAL at 12:20

## 2022-01-13 RX ADMIN — AMANTADINE HYDROCHLORIDE 100 MG: 100 CAPSULE ORAL at 20:36

## 2022-01-13 RX ADMIN — LORAZEPAM 1 MG: 1 TABLET ORAL at 08:21

## 2022-01-13 RX ADMIN — LITHIUM CARBONATE 900 MG: 450 TABLET, EXTENDED RELEASE ORAL at 20:36

## 2022-01-13 RX ADMIN — PREGABALIN 100 MG: 75 CAPSULE ORAL at 20:37

## 2022-01-13 RX ADMIN — METHOCARBAMOL 1000 MG: 500 TABLET, FILM COATED ORAL at 20:36

## 2022-01-13 RX ADMIN — LORAZEPAM 2 MG: 1 TABLET ORAL at 20:36

## 2022-01-13 RX ADMIN — PREGABALIN 100 MG: 75 CAPSULE ORAL at 13:54

## 2022-01-13 RX ADMIN — PREGABALIN 100 MG: 75 CAPSULE ORAL at 08:21

## 2022-01-13 ASSESSMENT — ACTIVITIES OF DAILY LIVING (ADL)
ADLS_ACUITY_SCORE: 7
HYGIENE/GROOMING: INDEPENDENT
LAUNDRY: UNABLE TO COMPLETE
ADLS_ACUITY_SCORE: 7
DRESS: SCRUBS (BEHAVIORAL HEALTH);INDEPENDENT
DRESS: SCRUBS (BEHAVIORAL HEALTH)
ADLS_ACUITY_SCORE: 7
ORAL_HYGIENE: INDEPENDENT
ADLS_ACUITY_SCORE: 7
ADLS_ACUITY_SCORE: 7
HYGIENE/GROOMING: INDEPENDENT
ADLS_ACUITY_SCORE: 7
ORAL_HYGIENE: INDEPENDENT
ADLS_ACUITY_SCORE: 7

## 2022-01-13 NOTE — PLAN OF CARE
1:1 time with the patient.  No changes made to the discharge plan at this time.   See the AVS for follow up appointments and recommendations.

## 2022-01-13 NOTE — PLAN OF CARE
"Problem: Violence Risk or Actual  Goal: Anger and Impulse Control  Description: Pt will be able to control his anger during stay  Pt will ask for PRN medications as needed to control his impulses    Outcome: Improving    Pt has been calm and in control of his anger all shift     Problem: Behavior Regulation Impairment (Psychotic Signs/Symptoms)  Goal: Improved Behavioral Control (Psychotic Signs/Symptoms)  Description: Patient will be able to have a reality based conversation.     Outcome: Improving    Is speaking more fluidly and is making his needs known.  His thinking is concrete but logical    Problem: Behavioral Health Plan of Care  Goal: Patient-Specific Goal (Individualization)  Description: Patient will be free from self harm or injury  Patient will eat at least 50% of meals  Patient will attend at least 50% of groups when able.   Patient may wean to the open unit as long as his behavior is appropriate.      Outcome: Improving  Note: Shift Summery:      Patient's Stated Goal for Shift:  \"Pt did not state a goal\"    Goal Status:  In Process    0730 Face to face rounding complete.  Pt introduced to nursing for the shift.    Pt was up all shift but withdrawn to his room mostly.  He complained of back pain and was given Skelaxin with his AM medications and reported that it helped a great deal.  Pt also took a shower this morning which also helped his back.  Pt had good fluid intake of 960 of coffee today.  He only ate 25% of breakfast and no lunch.  Pt did come out and walked around the dayroom some but does not interact with peers or attend group. His speech is much more smooth today with little hesitation.  His affect is still very flat and sullen.    1500 Face to face end of shift report communicated to Evening shift RN's along with Pt's fall risk.     Joon Funk RN  1/13/2022       "

## 2022-01-13 NOTE — PROGRESS NOTES
[facility] Psychiatric Progress Note     Assessment     Mr. Carter is a 31 year old male with a PMH of schizoaffective disorder, depressive type, catatonia, severe methamphetamine use disorder, alcohol use disorder, and significant TBI at the age of 3 who presented with depression with catatonia being off of Ativan after recently being discharged. This is the patient's 3rd hospitalization in the past roughly two months with substance use complicating his course.     The patient's catatonia improved with resumption of Ativan. Also, amantadine has helped. Tapering down given concerns with misuse in the past, while using amantadine and exploring Namenda as alternative means for long-term management. Wellbutrin was increased to further address depression. Zyprexa increased to further help with depression and any element of psychosis. Exelon has been used off-label for cognition related to TBI's. Held due to concerns for contributing to agitation with monitoring occurring. Might switch to Namenda.     Patient has SPMI with him having deficits in his occupational and social functioning. He has had significant CNS insults with him having multiple TBIs, significant substance abuse over years, and near fatal cardiac arrest from overdose (? Anoxic injury, ) that have likely led to neuropsychiatric impact making it more challenging for him to recover. His  and  note he has not been the same since. Patient did score a 4.4/5.8 on the ACL, which indicates the patient would need some level of support and could not be alone the whole day safely, needing assistance with such stuff as solving problems, removing any safety hazards. He would likely benefit from assistance with home medication management.    Educated regarding medication indications, risks, benefits, side effects, contraindications and possible interactions. Verbally expressed understanding.      Diagnoses     1. Schizoaffective  disorder, depressive type, multiple episodes, currently in acute episode, with catatonia   2. 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 Methamphetamine use disorder, severe  4. Alcohol use disorder, moderate     Plan     Unit 5  Legal Status: Committed     Safety Assessment:    Behavioral Orders   Procedures     Code 1 - Restrict to Unit     Routine Programming     As clinically indicated     Status 15     Every 15 minutes.      Medications:     Outpatient medications continued/changed:     Wellbutrin  mg daily -> 300 mg on 12/24  Lithium 900 mg at bedtime  Zyprexa 10 mg BID prn - > 10 mg at bedtime -> 12.5 mg at bedtime on 1/3  Lyrica 100 mg TID (initially held but resumed due to benefit for anxiety and pain)  Vitamin D 50,000 international unit(s) weekly    New medications initiated:     Ativan 2 mg QID -> 1.5 mg QID on 1/5 -> 1/2/2 mg on 1/12   Amantadine 100 mg BID  Standard unit PRNs    New medications stopped    Exelon 3 mg BID off label for cognition from TBI due to concerns for agitation with plans to possibly switch to Namenda     Programming: Patient will be treated in a therapeutic milieu with appropriate individual and group therapies. Education will be provided on diagnoses, medications, and treatments.     Medical diagnoses:      #. Concern for poor intake  - I/Os resumed again   - Nutrition following   - Monitoring     #. Intention tremor bilaterally   - Secondary to lithium  - Monitor  - Conservative management    #. Muscle spasms  - Robaxin 1,000 mg TID prn   - Considering alternatives that have less CNS sedation    Consult: Nutrition  Labs: Li 0.8 on 1/12    Anticipated LOS: 2-4 weeks  Dispo: IRTS vs [facility]     Interim History     Staff report the patient has been more internally preoccupied.  There has been some concern the patient has been hallucinating.  He might have an element of ambitendency.  The patient did have some back pain yesterday and his muscle  "relaxant was changed to methocarbamol.    The patient notes he is doing fairly well today.  He denies having any psychotic symptoms.  As the patient multiple times given concerns from staff.  He denies having any fears or worry.  He denies hearing auditory hallucinations.  He denies paranoia.  Does not mention content suggestive of this.  The patient does have some poverty of thought.    The patient noted his sedation has improved some.  He notes that he is eating today.  He does not plan to fast.    Patient denies any acute medical concerns today.  Encouraged the patient to participate in group.     Medications       amantadine  100 mg Oral BID     buPROPion  300 mg Oral Daily     lithium ER  900 mg Oral At Bedtime     LORazepam  1 mg Oral Daily     LORazepam  2 mg Oral BID     OLANZapine  12.5 mg Oral At Bedtime     pregabalin  100 mg Oral TID     vitamin D2  50,000 Units Oral Q7 Days        Allergies     Allergies   Allergen Reactions     Pork Allergy         Psychiatric Examination     /83 (BP Location: Right arm)   Pulse 114   Temp 98.9  F (37.2  C) (Temporal)   Resp 16   Ht 1.829 m (6')   Wt 96.3 kg (212 lb 3.2 oz)   SpO2 98%   BMI 28.78 kg/m    Weight is 212 lbs 3.2 oz  Body mass index is 28.78 kg/m .    Appearance: Alert, oriented, dressed in hospital scrubs, long beard  Attitude: Cooperative   Eye Contact: Fair  Mood: \"Better\"  Affect: Flat, mood congruent  Speech: Reduction in rate. Normal rhythm   Psychomotor Behavior: No tremor, rigidity, akathisia, or psychomotor retardation. Withdrawal, possible ambitendency   Thought Process: Logical, concrete   Associations: No loose associations   Thought Content: Denies SI. No SIB. AH at times. Possible paranoia. Poverty of thought.  Insight: Adequate   Judgment: Adequate  Oriented to: Person, place, and time  Attention Span and Concentration: Intact  Recent and Remote Memory: Intact  Language: English with appropriate syntax and vocabulary  Fund of " Knowledge: Average  Muscle Strength and Tone: Grossly normal  Gait and Station: Grossly normal    Pineda-Orville Catatonia Rating Scale   Severity Score (Number of points for items 1 -23) _______1___   Screening Score (Presence or absence of items 1 - 14) ______1_____   Time: 11:00 AM on 1/11/2022     Labs     Recent Results (from the past 24 hour(s))   Lithium level    Collection Time: 01/12/22 11:08 AM   Result Value Ref Range    Lithium 0.8   mmol/L        Attestation      Patient has been seen and evaluated by:    David Timmons DO, MA  Psychiatrist    VIDEO VISIT    Patient has given verbal consent for video visit?: Yes     Video- Visit Details  Type of service:  video visit for mental health treatment.  Time of service:    Date:  01/13/2022    Video Start Time: 1200PM      Video End Time: 1215PM    Reason for video visit: COVID-19 [place]  Originating Site (patient location):  [facility]  Distant Site (provider location):  Remote location  Mode of Communication:  Video Conference via [employer]

## 2022-01-13 NOTE — PLAN OF CARE
Problem: Behavioral Health Plan of Care  Goal: Patient-Specific Goal (Individualization)  Description: Patient will be free from self harm or injury  Patient will eat at least 50% of meals  Patient will attend at least 50% of groups when able.   Patient may wean to the open unit as long as his behavior is appropriate.      Outcome: Improving     Face to face shift report received from RN. Rounding completed, pt observed.Client rested in room for 6.5 hours with eyes closed and respirations noted. No signs of distress. Face to face report will be communicated to oncoming RN.    Didier Brooks RN  1/13/2022  6:28 AM

## 2022-01-14 LAB — SARS-COV-2 RNA RESP QL NAA+PROBE: NEGATIVE

## 2022-01-14 PROCEDURE — 250N000013 HC RX MED GY IP 250 OP 250 PS 637: Performed by: NURSE PRACTITIONER

## 2022-01-14 PROCEDURE — 99232 SBSQ HOSP IP/OBS MODERATE 35: CPT | Performed by: NURSE PRACTITIONER

## 2022-01-14 PROCEDURE — 99232 SBSQ HOSP IP/OBS MODERATE 35: CPT | Mod: 95 | Performed by: STUDENT IN AN ORGANIZED HEALTH CARE EDUCATION/TRAINING PROGRAM

## 2022-01-14 PROCEDURE — 250N000013 HC RX MED GY IP 250 OP 250 PS 637: Performed by: STUDENT IN AN ORGANIZED HEALTH CARE EDUCATION/TRAINING PROGRAM

## 2022-01-14 PROCEDURE — 124N000004

## 2022-01-14 PROCEDURE — U0005 INFEC AGEN DETEC AMPLI PROBE: HCPCS | Performed by: STUDENT IN AN ORGANIZED HEALTH CARE EDUCATION/TRAINING PROGRAM

## 2022-01-14 RX ORDER — LORAZEPAM 1 MG/1
2 TABLET ORAL AT BEDTIME
Status: DISCONTINUED | OUTPATIENT
Start: 2022-01-14 | End: 2022-01-17

## 2022-01-14 RX ORDER — LORAZEPAM 1 MG/1
1 TABLET ORAL 2 TIMES DAILY
Status: DISCONTINUED | OUTPATIENT
Start: 2022-01-15 | End: 2022-01-17

## 2022-01-14 RX ADMIN — ACETAMINOPHEN 650 MG: 325 TABLET, FILM COATED ORAL at 11:20

## 2022-01-14 RX ADMIN — METHOCARBAMOL 1000 MG: 500 TABLET, FILM COATED ORAL at 08:23

## 2022-01-14 RX ADMIN — LITHIUM CARBONATE 900 MG: 450 TABLET, EXTENDED RELEASE ORAL at 20:28

## 2022-01-14 RX ADMIN — PREGABALIN 100 MG: 75 CAPSULE ORAL at 13:18

## 2022-01-14 RX ADMIN — AMANTADINE HYDROCHLORIDE 100 MG: 100 CAPSULE ORAL at 20:28

## 2022-01-14 RX ADMIN — LORAZEPAM 1 MG: 1 TABLET ORAL at 08:23

## 2022-01-14 RX ADMIN — LORAZEPAM 2 MG: 1 TABLET ORAL at 20:28

## 2022-01-14 RX ADMIN — PREGABALIN 100 MG: 75 CAPSULE ORAL at 08:23

## 2022-01-14 RX ADMIN — METHOCARBAMOL 1000 MG: 500 TABLET, FILM COATED ORAL at 20:32

## 2022-01-14 RX ADMIN — OLANZAPINE 12.5 MG: 2.5 TABLET, FILM COATED ORAL at 20:28

## 2022-01-14 RX ADMIN — LORAZEPAM 2 MG: 1 TABLET ORAL at 13:18

## 2022-01-14 RX ADMIN — PREGABALIN 100 MG: 75 CAPSULE ORAL at 20:28

## 2022-01-14 RX ADMIN — BUPROPION HYDROCHLORIDE 300 MG: 300 TABLET, EXTENDED RELEASE ORAL at 08:23

## 2022-01-14 RX ADMIN — AMANTADINE HYDROCHLORIDE 100 MG: 100 CAPSULE ORAL at 08:23

## 2022-01-14 ASSESSMENT — ACTIVITIES OF DAILY LIVING (ADL)
ADLS_ACUITY_SCORE: 7
HYGIENE/GROOMING: INDEPENDENT
ADLS_ACUITY_SCORE: 7
ORAL_HYGIENE: INDEPENDENT
ADLS_ACUITY_SCORE: 7
LAUNDRY: UNABLE TO COMPLETE
ADLS_ACUITY_SCORE: 7
ORAL_HYGIENE: INDEPENDENT
DRESS: SCRUBS (BEHAVIORAL HEALTH);INDEPENDENT
ADLS_ACUITY_SCORE: 7
ADLS_ACUITY_SCORE: 7
HYGIENE/GROOMING: INDEPENDENT
ADLS_ACUITY_SCORE: 7
ADLS_ACUITY_SCORE: 7
DRESS: SCRUBS (BEHAVIORAL HEALTH);INDEPENDENT
ADLS_ACUITY_SCORE: 7

## 2022-01-14 NOTE — PLAN OF CARE
Problem: Behavioral Health Plan of Care  Goal: Patient-Specific Goal (Individualization)  Description: Patient will be free from self harm or injury  Patient will eat at least 50% of meals  Patient will attend at least 50% of groups when able.   Patient may wean to the open unit as long as his behavior is appropriate.    Outcome: Improving  Note:     A&O, VSS, denies pain. Denies all criteria including: SI, SIB, HI or hallucinations---appears responding to internal stimuli.   Isolative and withdrawn-keeps to room.   Speech is spontaneous-speaking in complete sentences though difficult to engage in conversation. Polite with staff this shift.   Eats 25% dinner fluid intake approx-500 ml.       Problem: Behavior Regulation Impairment (Psychotic Signs/Symptoms)  Goal: Improved Behavioral Control (Psychotic Signs/Symptoms)  Description: Patient will be able to have a reality based conversation.     Outcome: Improving     Problem: Violence Risk or Actual  Goal: Anger and Impulse Control  Description: Pt will be able to control his anger during stay  Pt will ask for PRN medications as needed to control his impulses    Outcome: Improving    Face to face end of shift report communicated to Maria Esther Bridges RN.     Caryl Villanueva RN  1/13/2022  7:35 PM

## 2022-01-14 NOTE — PLAN OF CARE
Problem: Behavioral Health Plan of Care  Goal: Patient-Specific Goal (Individualization)  Description: Patient will be free from self harm or injury  Patient will eat at least 50% of meals  Patient will attend at least 50% of groups when able.         1/13/2022 2332 by Kecia Eisenberg, RN  Outcome: No Change  Note:        Problem: Violence Risk or Actual  Goal: Anger and Impulse Control  Description: Pt will be able to control his anger during stay  Pt will ask for PRN medications as needed to control his impulses    Outcome: No Change     Face to face shift report received from Maria Esther LARKIN. Rounding completed, pt observed.     Pt appeared to be sleeping most of this shift. Pt did not have any noted episodes of violence this shift.    Face to face report will be communicated to oncoming RN.    Kecia Eisenberg RN  1/14/2022  5:55 AM

## 2022-01-14 NOTE — PROGRESS NOTES
"[facility]    Medical Services Progress Note    Date of Service (when I saw the patient): 01/14/2022    Assessment & Plan      Principal Problem:    Catatonia     Active Medical Problems:  Chronic Back Pain- pt complains of chronic back pain on past admissions. He does not report any worsening back pain. He is guarded during assessment. Tylenol and ibuprofen ordered prn. He does shake his head yes that he would like to try Lidoderm patches.   1/14/22- lidoderm patches discontinued. Pt is prescribed robaxin by [facility]  Pt denies any pain currently and reports the muscle relaxer is helping with his pain.      Vitamin D deficiency- Vitamin D level in the ED on 12/15/21 was low at 16. Will start Vitamin D 50,000 units every week for 8 weeks.      ED course- ovid negative, UDS negative, TSH wnl, CBC and CMP unremarkable, Vitamin D level is low at 16, Hepatitis C, HIV and QuantiferonTB are all negative      Severe Malnutrition- RD is following pt and making recommendations. Per RD   \"Malnutrition Diagnosis: severe malnutrition   In Context of:  Chronic illness or disease, Environmental or social circumstances   NUTRITION RECOMMENDATIONS  - Continue to encourage intake with meals and snacks  - Continue to send Ensure on meal trays.    - Monitor weight   MONITORING AND EVALUATION:  RD will monitor intake, weight, labs\"       Chart reviewed. Vitals stable. Discussed pt with Dr. Timmons and nursing. No acute medical complaints reported. Pt denies any complaints.      Pt medically stable, no acute medical concerns. Chronic medical problems stable. Will sign off. Please consult for any new medical issues or concerns.     Code Status: Full Code.    Harmony Samuel CNP        -Data reviewed today: I reviewed all new labs and imaging results over the last 24 hours.     Physical Exam   Temp: 99.1  F (37.3  C) Temp src: Temporal BP: 137/88 Pulse: 78   Resp: 16 SpO2: 98 % O2 Device: None (Room air)    Vitals:    12/16/21 1005 " 12/25/21 0700 01/08/22 0800   Weight: 96.4 kg (212 lb 9.6 oz) 97.4 kg (214 lb 11.2 oz) 96.3 kg (212 lb 3.2 oz)     Vital Signs with Ranges  Temp:  [99.1  F (37.3  C)] 99.1  F (37.3  C)  Pulse:  [78] 78  Resp:  [16] 16  BP: (137)/(88) 137/88  SpO2:  [98 %] 98 %  I/O last 3 completed shifts:  In: 1940 [P.O.:1940]  Out: -     Constitutional: awake, alert, cooperative, no apparent distress, and appears stated age, vitals stable  Respiratory: No increased work of breathing, good air exchange, clear to auscultation bilaterally, no crackles or wheezing  Cardiovascular: Normal apical impulse, regular rate and rhythm, normal S1 and S2, no S3 or S4, and no murmur noted  Neurologic: Awake, alert, oriented to name, place   Neuropsychiatric: General: flat affect, quiet,  and normal eye contact    Medications     amantadine  100 mg Oral BID     buPROPion  300 mg Oral Daily     lithium ER  900 mg Oral At Bedtime     LORazepam  1 mg Oral Daily     LORazepam  2 mg Oral BID     OLANZapine  12.5 mg Oral At Bedtime     pregabalin  100 mg Oral TID     vitamin D2  50,000 Units Oral Q7 Days       Data   Recent Labs   Lab 01/09/22 1949   WBC 7.5   HGB 17.0   MCV 85         POTASSIUM 3.7   CHLORIDE 108   CO2 24   BUN 10   CR 1.13   ANIONGAP 6   NIKKI 9.9   *   ALBUMIN 4.5   PROTTOTAL 7.4   BILITOTAL 0.7   ALKPHOS 71   ALT 31   AST 8       No results found for this or any previous visit (from the past 24 hour(s)).

## 2022-01-14 NOTE — PLAN OF CARE
"Spoke to pt this morning, pt states they want to go to a [facility] and \"get my time done\". Explained to pt that they could be sitting here for a while waiting to get in, but that they were already on the list. Pt state he does not care and is ok with staying here until then. Pt started to form tears and walked away. This writer called out asking if he was ok, pt states he's fine.   "

## 2022-01-14 NOTE — PROGRESS NOTES
[facility] Psychiatric Progress Note     Assessment     Mr. Carter is a 31 year old male with a PMH of schizoaffective disorder, depressive type, catatonia, severe methamphetamine use disorder, alcohol use disorder, and significant TBI at the age of 3 who presented with depression with catatonia being off of Ativan after recently being discharged. This is the patient's 3rd hospitalization in the past roughly two months with substance use complicating his course.     The patient's catatonia improved with resumption of Ativan. Also, amantadine has helped. Tapering down given concerns with misuse in the past, while using amantadine and exploring Namenda as alternative means for long-term management. Wellbutrin was increased to further address depression. Zyprexa increased to further help with depression and any element of psychosis. Exelon has been used off-label for cognition related to TBI's. Held due to concerns for contributing to agitation with monitoring occurring. Might switch to Namenda.     Patient has SPMI with him having deficits in his occupational and social functioning. He has had significant CNS insults with him having multiple TBIs, significant substance abuse over years, and near fatal cardiac arrest from overdose (? Anoxic injury) that have likely led to neuropsychiatric impact making it more challenging for him to recover. His  and  note he has not been the same since. Patient did score a 4.4/5.8 on the ACL, which indicates the patient would need some level of support and could not be alone the whole day safely, needing assistance with such stuff as solving problems, removing any safety hazards. He would likely benefit from assistance with home medication management.    Educated regarding medication indications, risks, benefits, side effects, contraindications and possible interactions. Verbally expressed understanding.      Diagnoses     1. Schizoaffective  disorder, depressive type, multiple episodes, currently in acute episode, with catatonia   2. 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 Methamphetamine use disorder, severe  4. Alcohol use disorder, moderate     Plan     Unit 5  Legal Status: Committed     Safety Assessment:    Behavioral Orders   Procedures     Code 1 - Restrict to Unit     Routine Programming     As clinically indicated     Status 15     Every 15 minutes.      Medications:     Outpatient medications continued/changed:     Wellbutrin  mg daily -> 300 mg on 12/24  Lithium 900 mg at bedtime  Zyprexa 10 mg BID prn - > 10 mg at bedtime -> 12.5 mg at bedtime on 1/3  Lyrica 100 mg TID (initially held but resumed due to benefit for anxiety and pain)  Vitamin D 50,000 international unit(s) weekly    New medications initiated:     Ativan 2 mg QID -> 1.5 mg QID on 1/5 -> 1/2/2 mg on 1/12 -> 1/1/2 mg on 1/15  Amantadine 100 mg BID  Standard unit PRNs    New medications stopped    Exelon 3 mg BID off label for cognition from TBI due to concerns for agitation with plans to possibly switch to Namenda     Programming: Patient will be treated in a therapeutic milieu with appropriate individual and group therapies. Education will be provided on diagnoses, medications, and treatments.     Medical diagnoses:      #. Concern for poor intake  - I/Os resumed again   - Nutrition following   - Monitoring     #. Intention tremor bilaterally   - Secondary to lithium  - Monitor  - Conservative management    #. Muscle spasms  - Robaxin 1,000 mg TID prn   - Considering alternatives that have less CNS sedation    Consult: Nutrition  Labs: Li 0.8 on 1/12    Anticipated LOS: 2-4 weeks  Dispo: IRTS vs [facility]     Interim History     Staff report the patient has been more engaged today.  He attended a group.  He has been more expressive in his speech.    The patient attended group.  Encouraged the patient to continue doing so.  He notes he would  "like to try to go to group today.  He notes he ate a full meal this morning.  He notes feeling a little bit down but otherwise feels similar.  Denies problems with his medications.  Willing to consider going down on his Ativan.    Patient has no new concerns entering the weekend.  Robaxin is helping a little.  Open to exploring alternatives.  He does not feel oversedated from it.     Medications       amantadine  100 mg Oral BID     buPROPion  300 mg Oral Daily     lithium ER  900 mg Oral At Bedtime     LORazepam  1 mg Oral Daily     LORazepam  2 mg Oral BID     OLANZapine  12.5 mg Oral At Bedtime     pregabalin  100 mg Oral TID     vitamin D2  50,000 Units Oral Q7 Days        Allergies     Allergies   Allergen Reactions     Pork Allergy         Psychiatric Examination     /88 (BP Location: Right arm)   Pulse 78   Temp 99.1  F (37.3  C) (Temporal)   Resp 16   Ht 1.829 m (6')   Wt 96.3 kg (212 lb 3.2 oz)   SpO2 98%   BMI 28.78 kg/m    Weight is 212 lbs 3.2 oz  Body mass index is 28.78 kg/m .    Appearance: Alert, oriented, dressed in hospital scrubs, long beard  Attitude: Cooperative   Eye Contact: Fair  Mood: \"Better\"  Affect: Flat, mood congruent  Speech: Reduction in rate. Normal rhythm   Psychomotor Behavior: No tremor, rigidity, akathisia, or psychomotor retardation. Withdrawal, possible ambitendency   Thought Process: Logical, concrete   Associations: No loose associations   Thought Content: Denies SI. No SIB. AH at times. Possible paranoia. Poverty of thought.  Insight: Adequate   Judgment: Adequate  Oriented to: Person, place, and time  Attention Span and Concentration: Intact  Recent and Remote Memory: Intact  Language: English with appropriate syntax and vocabulary  Fund of Knowledge: Average  Muscle Strength and Tone: Grossly normal  Gait and Station: Grossly normal    Pineda-[place] Catatonia Rating Scale   Severity Score (Number of points for items 1 -23) _______1___   Screening Score " (Presence or absence of items 1 - 14) ______1_____   Time: 11:00 AM on 1/11/2022     Labs     No results found for this or any previous visit (from the past 24 hour(s)).     Attestation      Patient has been seen and evaluated by:    David Timmons DO, MA  Psychiatrist    VIDEO VISIT    Patient has given verbal consent for video visit?: Yes     Video- Visit Details  Type of service:  video visit for mental health treatment.  Time of service:    Date:  01/14/2022    Video Start Time: 100PM      Video End Time: 115PM    Reason for video visit: COVID-19 [place]  Originating Site (patient location):  [facility]  Distant Site (provider location):  Remote location  Mode of Communication:  Video Conference via [facility]

## 2022-01-14 NOTE — PLAN OF CARE
"Problem: Violence Risk or Actual  Goal: Anger and Impulse Control  Description: Pt will be able to control his anger during stay  Pt will ask for PRN medications as needed to control his impulses    Outcome: Improving    Pt was calm and in control of his impulses     Problem: Behavior Regulation Impairment (Psychotic Signs/Symptoms)  Goal: Improved Behavioral Control (Psychotic Signs/Symptoms)  Description: Patient will be able to have a reality based conversation.     Outcome: Improving    Pt denies hallucinations though appears      Problem: Behavioral Health Plan of Care  Goal: Patient-Specific Goal (Individualization)  Description: Patient will be free from self harm or injury  Patient will eat at least 50% of meals  Patient will attend at least 50% of groups when able.     Outcome: Improving  Note: Shift Summery:      Patient's Stated Goal for Shift:  \"Pt did not have a goal\"    Goal Status:  In Process    0730 Face to face rounding complete.  Pt introduced to nursing for there shift.    Pt was up this morning and attended group.  He ate most of his breakfast and half of his lunch.  PT drank 960 ml in.  His speech is significantly more fluid with very little hesitation.  He told me that he is thinking a lot about his future and what he is going to do when he is off commitment. He had some back pain and had some relief with Robaxin and Tylenol.  Pt was encouraged to walk and shower which he did. He was offered an ice pack but declined.     1500 Face to face end of shift report communicated to Evening shift RN's along with Pt's fall risk.      Joon Funk RN  1/14/2022       "

## 2022-01-14 NOTE — PLAN OF CARE
"Writer received PT on second half of shift. PT isolative and withdrawn to room throughout shift.     PT in bed when I arrived, and only came out to request HS medications.    PT stated his day was \"Ok, still here\"    PT received Robaxin for back pain PRN   PT received Tylenol 650mg for headache.     Writer gave PT a sleep kit.     Writer visualized PT drinking tea and water when I spoke with him.     PT has flat affect.     Face to face end of shift report communicated to oncoming RN     Maria Esther Bridges RN  1/13/2022  9:56 PM            "

## 2022-01-14 NOTE — PROGRESS NOTES
"CLINICAL NUTRITION SERVICES  -  REASSESSMENT NOTE    Steven Carter     31 yom admitted for catatonia. Pt has a hx of methamphetamine use, alcohol use disorder, schizoaffective disorder, TBI at age of 3.  Intake continues to be variable. Poor appetite more often. 2lb weight loss since admission. No new weight since last review.    Diet Order: Regular, Ensure  Intake: 8 meals with 0-100% intake      Height: 6' 0\"  Weight: 212 lbs 3.2 oz  Body mass index is 28.78 kg/m .  Weight Status:  Overweight BMI 25-29.9  Weight History:  Max IBW- 83.9kg   Wt Readings from Last 10 Encounters:   12/16/21 96.4 kg (212 lb 9.6 oz)   12/14/21 97.7 kg (215 lb 4.8 oz)   12/01/21 97.8 kg (215 lb 11.2 oz)   10/31/21 105.1 kg (231 lb 11.2 oz)   03/27/21 105.2 kg (232 lb)   11/30/15 96.6 kg (213 lb)   08/18/14 94.5 kg (208 lb 6.4 oz)   08/15/14 93.4 kg (206 lb)   07/19/14 102.1 kg (225 lb)       83.9kg- max ibw  Estimated Energy Needs: 1987-9113 kcals (25-30 Kcal/Kg)   Estimated Protein Needs:  grams protein (1-1.2 g pro/Kg)     Malnutrition Diagnosis: severe malnutrition   In Context of:  Chronic illness or disease, Environmental or social circumstances     NUTRITION RECOMMENDATIONS  - Continue to encourage intake with meals and snacks  - Continue to send Ensure on meal trays.    - Monitor weight     MONITORING AND EVALUATION:  RD will monitor intake, weight, labs             "

## 2022-01-15 PROCEDURE — 250N000013 HC RX MED GY IP 250 OP 250 PS 637: Performed by: NURSE PRACTITIONER

## 2022-01-15 PROCEDURE — 99233 SBSQ HOSP IP/OBS HIGH 50: CPT | Performed by: NURSE PRACTITIONER

## 2022-01-15 PROCEDURE — 250N000013 HC RX MED GY IP 250 OP 250 PS 637: Performed by: STUDENT IN AN ORGANIZED HEALTH CARE EDUCATION/TRAINING PROGRAM

## 2022-01-15 PROCEDURE — 124N000004

## 2022-01-15 RX ADMIN — LORAZEPAM 2 MG: 1 TABLET ORAL at 20:07

## 2022-01-15 RX ADMIN — LORAZEPAM 1 MG: 1 TABLET ORAL at 08:04

## 2022-01-15 RX ADMIN — METHOCARBAMOL 1000 MG: 500 TABLET, FILM COATED ORAL at 08:04

## 2022-01-15 RX ADMIN — METHOCARBAMOL 1000 MG: 500 TABLET, FILM COATED ORAL at 20:07

## 2022-01-15 RX ADMIN — AMANTADINE HYDROCHLORIDE 100 MG: 100 CAPSULE ORAL at 08:03

## 2022-01-15 RX ADMIN — PREGABALIN 100 MG: 75 CAPSULE ORAL at 13:03

## 2022-01-15 RX ADMIN — AMANTADINE HYDROCHLORIDE 100 MG: 100 CAPSULE ORAL at 20:07

## 2022-01-15 RX ADMIN — BUPROPION HYDROCHLORIDE 300 MG: 300 TABLET, EXTENDED RELEASE ORAL at 08:03

## 2022-01-15 RX ADMIN — OLANZAPINE 12.5 MG: 2.5 TABLET, FILM COATED ORAL at 20:07

## 2022-01-15 RX ADMIN — PREGABALIN 100 MG: 75 CAPSULE ORAL at 20:07

## 2022-01-15 RX ADMIN — LITHIUM CARBONATE 900 MG: 450 TABLET, EXTENDED RELEASE ORAL at 20:07

## 2022-01-15 RX ADMIN — LORAZEPAM 1 MG: 1 TABLET ORAL at 13:03

## 2022-01-15 RX ADMIN — PREGABALIN 100 MG: 75 CAPSULE ORAL at 08:04

## 2022-01-15 ASSESSMENT — ACTIVITIES OF DAILY LIVING (ADL)
ADLS_ACUITY_SCORE: 7
LAUNDRY: UNABLE TO COMPLETE
ORAL_HYGIENE: INDEPENDENT
ADLS_ACUITY_SCORE: 7
HYGIENE/GROOMING: INDEPENDENT
ADLS_ACUITY_SCORE: 7
DRESS: SCRUBS (BEHAVIORAL HEALTH);INDEPENDENT
ADLS_ACUITY_SCORE: 7
DRESS: SCRUBS (BEHAVIORAL HEALTH)
ADLS_ACUITY_SCORE: 7
ORAL_HYGIENE: INDEPENDENT
ADLS_ACUITY_SCORE: 7
HYGIENE/GROOMING: INDEPENDENT
ADLS_ACUITY_SCORE: 7
ADLS_ACUITY_SCORE: 7

## 2022-01-15 ASSESSMENT — MIFFLIN-ST. JEOR: SCORE: 1971.84

## 2022-01-15 NOTE — PLAN OF CARE
Pt told me that the overdose he had a couple of years ago was a suicide attempt and not to get high.

## 2022-01-15 NOTE — PLAN OF CARE
"Problem: Behavioral Health Plan of Care  Goal: Patient-Specific Goal (Individualization)  Description: Patient will be free from self harm or injury  Patient will eat at least 50% of meals  Patient will attend at least 50% of groups when able.   Outcome: No Change  Note: Pt verbalized that he is \"fine.\" As writer spoke with pt further, he voiced that he is not in agreement with his plan of care. He expressed feelings of frustration and powerlessness. He stated \"I shouldn't even fucking be here. It's bullshit.\" He would not answer further assessment questions. He stated \"I don't want to talk.\" Pt ate supper in the lounge. He has otherwise been isolative and withdrawn to his room.     Face to face end of shift report communicated to oncoming RN.     Problem: Violence Risk or Actual  Goal: Anger and Impulse Control  Description: Pt will be able to control his anger during stay  Pt will ask for PRN medications as needed to control his impulses  Outcome: Improving  Note: Pt remained free from anger outbursts and impulsive behavior. He did not receive any PRN medication.      "

## 2022-01-15 NOTE — PLAN OF CARE
Problem: Behavioral Health Plan of Care  Goal: Patient-Specific Goal (Individualization)  Description: Patient will be free from self harm or injury  Patient will eat at least 50% of meals  Patient will attend at least 50% of groups when able.         Outcome: No Change  Note:        Problem: Violence Risk or Actual  Goal: Anger and Impulse Control  Description: Pt will be able to control his anger during stay  Pt will ask for PRN medications as needed to control his impulses    Outcome: No Change     Face to face shift report received from Le LARKIN. Rounding completed, pt observed.     Pt appeared to sleep most of this shift shift. Pt did not have any noted episodes of violence this shift.    Face to face report will be communicated to oncoming RN.    Kecia Eisenberg RN  1/15/2022  6:09 AM

## 2022-01-15 NOTE — PROGRESS NOTES
[facility] Psychiatric Progress Note     Assessment     Mr. Carter is a 31 year old male with a PMH of schizoaffective disorder, depressive type, catatonia, severe methamphetamine use disorder, alcohol use disorder, and significant TBI at the age of 3 who presented with depression with catatonia being off of Ativan after recently being discharged. This is the patient's 3rd hospitalization in the past roughly two months with substance use complicating his course.     The patient's catatonia improved with resumption of Ativan. Also, amantadine has helped. Tapering down given concerns with misuse in the past, while using amantadine and exploring Namenda as alternative means for long-term management. Wellbutrin was increased to further address depression. Zyprexa increased to further help with depression and any element of psychosis. Exelon has been used off-label for cognition related to TBI's. Held due to concerns for contributing to agitation with monitoring occurring. Might switch to Namenda.     Patient has SPMI with him having deficits in his occupational and social functioning. He has had significant CNS insults with him having multiple TBIs, significant substance abuse over years, and near fatal cardiac arrest from overdose (? Anoxic injury) that have likely led to neuropsychiatric impact making it more challenging for him to recover. His  and  note he has not been the same since. Patient did score a 4.4/5.8 on the ACL, which indicates the patient would need some level of support and could not be alone the whole day safely, needing assistance with such stuff as solving problems, removing any safety hazards. He would likely benefit from assistance with home medication management.    Educated regarding medication indications, risks, benefits, side effects, contraindications and possible interactions. Verbally expressed understanding.      Diagnoses     1. Schizoaffective  disorder, depressive type, multiple episodes, currently in acute episode, with catatonia   2. 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 Methamphetamine use disorder, severe  4. Alcohol use disorder, moderate     Plan     Unit 5  Legal Status: Committed     Safety Assessment:    Behavioral Orders   Procedures     Code 1 - Restrict to Unit     Routine Programming     As clinically indicated     Status 15     Every 15 minutes.      Medications:     Outpatient medications continued/changed:     Wellbutrin  mg daily -> 300 mg on 12/24  Lithium 900 mg at bedtime  Zyprexa 10 mg BID prn - > 10 mg at bedtime -> 12.5 mg at bedtime on 1/3  Lyrica 100 mg TID (initially held but resumed due to benefit for anxiety and pain)  Vitamin D 50,000 international unit(s) weekly    New medications initiated:     Ativan 2 mg QID -> 1.5 mg QID on 1/5 -> 1/2/2 mg on 1/12 -> 1/1/2 mg on 1/15  Amantadine 100 mg BID  Standard unit PRNs    New medications stopped    Exelon 3 mg BID off label for cognition from TBI due to concerns for agitation with plans to possibly switch to Namenda     Programming: Patient will be treated in a therapeutic milieu with appropriate individual and group therapies. Education will be provided on diagnoses, medications, and treatments.     Medical diagnoses:      #. Concern for poor intake  - I/Os resumed again   - Nutrition following   - Monitoring     #. Intention tremor bilaterally   - Secondary to lithium  - Monitor  - Conservative management    #. Muscle spasms  - Robaxin 1,000 mg TID prn   - Considering alternatives that have less CNS sedation    Consult: Nutrition  Labs: Li 0.8 on 1/12    Anticipated LOS: 2-4 weeks  Dispo: IRTS vs [facility]     Interim History     Steven goes back and forth from stating he does not need to be here to feeling he wants the help and still needs the help with medication management for mood symptoms. He states he is only depressed because he is  "here though his affect remains blunted. I have seem him smile and have more affect in the past. Last admission his affect was brighter than this at discharge. I spoke with him about punching a hole in the wall last week and asked why he did this. He stated that he did this because he had chest pain and wanted to go to the ER. He stated \"it ended up being heart burn\". He states \"the anger came and went quickly\". He asks a few questions about how much longer he is going to be here and questions about where he could possibly go. He briefly interrupted me and appeared very bothered that he interrupted me and politely said \"I really am sorry for interrupting you\".  He frequentl, almost excessively apologizes and I told him I was concerned about how much he did this as the apologies are not really warranted as the interruption was miniscule and not really interruptive at all. He does state that he feels guilty for asking questions and that is why he apologizes. \"doesn't want to be a bother to people\".      Medications       amantadine  100 mg Oral BID     buPROPion  300 mg Oral Daily     lithium ER  900 mg Oral At Bedtime     LORazepam  1 mg Oral BID     LORazepam  2 mg Oral At Bedtime     OLANZapine  12.5 mg Oral At Bedtime     pregabalin  100 mg Oral TID     vitamin D2  50,000 Units Oral Q7 Days        Allergies     Allergies   Allergen Reactions     Pork Allergy         Psychiatric Examination     /76 (BP Location: Right arm)   Pulse 72   Temp 97.7  F (36.5  C) (Temporal)   Resp 16   Ht 1.829 m (6')   Wt 98.9 kg (218 lb)   SpO2 98%   BMI 29.57 kg/m    Weight is 218 lbs 0 oz  Body mass index is 29.57 kg/m .  tion: Intact  MSE/PSYCH  PSYCHIATRIC EXAM  /76 (BP Location: Right arm)   Pulse 72   Temp 97.7  F (36.5  C) (Temporal)   Resp 16   Ht 1.829 m (6')   Wt 98.9 kg (218 lb)   SpO2 98%   BMI 29.57 kg/m    -Appearance/Behavior: flat apathetic  -Motor: intact  -Gait: intact  -Abnormal involuntary " movements: none  -Mood:restricted  -Affect: sad  -Speech: monotone mild alogia  -Thought process/associations: poverty of thought.   -Thought content: no delusions or hallucinations  -Perceptual disturbances: No hallucinations..              -Suicidal/Homicidal Ideation: none  -Judgment: limited  -Insight: limited  *Orientation: time, place and person.  *Memory: intact  *Attention: fair  *Language: fluent, no aphasias, able to repeat phrases and name objects. Vocab intact.  *Fund of information: appropriate for education  *Cognitive functioning estimate: 0 - independent.         Labs     Recent Results (from the past 24 hour(s))   Asymptomatic COVID-19 Virus (Coronavirus) by PCR Nasopharyngeal    Collection Time: 01/14/22  2:42 PM    Specimen: Nasopharyngeal; Swab   Result Value Ref Range    SARS CoV2 PCR Negative Negative        Attestation    April nenita cnp, nursing.

## 2022-01-15 NOTE — PLAN OF CARE
SHIFT SUMMARY:  Flat affect. Denies pain. Remained in his room most of his shift, laying in bed. Calm and cooperative. Texted his provider, per his request - he refused to disclose to this nurse why he wanted to see her again today.     Problem: Behavioral Health Plan of Care  Goal: Patient-Specific Goal (Individualization)  Description: Patient will be free from self harm or injury  Patient will eat at least 50% of meals  Patient will attend at least 50% of groups when able.         Outcome: Improving  Note:     Goal: Absence of New-Onset Illness or Injury  Outcome: Improving     Problem: Behavior Regulation Impairment (Psychotic Signs/Symptoms)  Goal: Improved Behavioral Control (Psychotic Signs/Symptoms)  Description: Patient will be able to have a reality based conversation.     Outcome: Improving     Problem: Violence Risk or Actual  Goal: Anger and Impulse Control  Description: Pt will be able to control his anger during stay  Pt will ask for PRN medications as needed to control his impulses    Outcome: Improving     Problem: Behavioral Health Plan of Care  Goal: Absence of New-Onset Illness or Injury  Outcome: Improving

## 2022-01-16 PROCEDURE — 250N000013 HC RX MED GY IP 250 OP 250 PS 637: Performed by: STUDENT IN AN ORGANIZED HEALTH CARE EDUCATION/TRAINING PROGRAM

## 2022-01-16 PROCEDURE — 250N000013 HC RX MED GY IP 250 OP 250 PS 637: Performed by: NURSE PRACTITIONER

## 2022-01-16 PROCEDURE — 124N000004

## 2022-01-16 RX ADMIN — PREGABALIN 100 MG: 75 CAPSULE ORAL at 20:55

## 2022-01-16 RX ADMIN — METHOCARBAMOL 1000 MG: 500 TABLET, FILM COATED ORAL at 08:40

## 2022-01-16 RX ADMIN — METHOCARBAMOL 1000 MG: 500 TABLET, FILM COATED ORAL at 13:51

## 2022-01-16 RX ADMIN — PREGABALIN 100 MG: 75 CAPSULE ORAL at 13:51

## 2022-01-16 RX ADMIN — PREGABALIN 100 MG: 75 CAPSULE ORAL at 08:39

## 2022-01-16 RX ADMIN — BUPROPION HYDROCHLORIDE 300 MG: 300 TABLET, EXTENDED RELEASE ORAL at 08:39

## 2022-01-16 RX ADMIN — AMANTADINE HYDROCHLORIDE 100 MG: 100 CAPSULE ORAL at 08:39

## 2022-01-16 RX ADMIN — LORAZEPAM 1 MG: 1 TABLET ORAL at 13:51

## 2022-01-16 RX ADMIN — AMANTADINE HYDROCHLORIDE 100 MG: 100 CAPSULE ORAL at 20:55

## 2022-01-16 RX ADMIN — LITHIUM CARBONATE 900 MG: 450 TABLET, EXTENDED RELEASE ORAL at 20:56

## 2022-01-16 RX ADMIN — METHOCARBAMOL 1000 MG: 500 TABLET, FILM COATED ORAL at 20:55

## 2022-01-16 RX ADMIN — OLANZAPINE 12.5 MG: 2.5 TABLET, FILM COATED ORAL at 20:55

## 2022-01-16 RX ADMIN — LORAZEPAM 2 MG: 1 TABLET ORAL at 20:55

## 2022-01-16 RX ADMIN — LORAZEPAM 1 MG: 1 TABLET ORAL at 08:39

## 2022-01-16 ASSESSMENT — ACTIVITIES OF DAILY LIVING (ADL)
LAUNDRY: UNABLE TO COMPLETE
ADLS_ACUITY_SCORE: 7
DRESS: SCRUBS (BEHAVIORAL HEALTH)
DRESS: SCRUBS (BEHAVIORAL HEALTH);INDEPENDENT
ORAL_HYGIENE: INDEPENDENT
HYGIENE/GROOMING: INDEPENDENT
ORAL_HYGIENE: INDEPENDENT
ADLS_ACUITY_SCORE: 7
HYGIENE/GROOMING: INDEPENDENT
ADLS_ACUITY_SCORE: 7

## 2022-01-16 NOTE — PLAN OF CARE
"SHIFT SUMMARY:  Anxiety and depression rated \"around a 2 or 3\". At 08:40, administered PRN PO methocarbamol 1,000 mg for back spasms - \"somewhat\" effective. Refused pain medications. Denies auditory or visual hallucinations, and suicidal or homicidal ideation. Approached writer and asked \"Can I discharge?\".  Informed him his discharge plan was [facility] vs IR, which he accepted it calmly and said \"OK, thank you.\"      Problem: Behavioral Health Plan of Care  Goal: Patient-Specific Goal (Individualization)  Description: Patient will be free from self harm or injury  Patient will eat at least 50% of meals  Patient will attend at least 50% of groups when able.         Outcome: No Change     Problem: Behavioral Health Plan of Care  Goal: Absence of New-Onset Illness or Injury  Outcome: Improving     Problem: Behavior Regulation Impairment (Psychotic Signs/Symptoms)  Goal: Improved Behavioral Control (Psychotic Signs/Symptoms)  Description: Patient will be able to have a reality based conversation.     Outcome: Improving     Problem: Violence Risk or Actual  Goal: Anger and Impulse Control  Description: Pt will be able to control his anger during stay  Pt will ask for PRN medications as needed to control his impulses    Outcome: Improving     "

## 2022-01-16 NOTE — PLAN OF CARE
Problem: Behavioral Health Plan of Care  Goal: Patient-Specific Goal (Individualization)  Description: Patient will be free from self harm or injury  Patient will eat at least 50% of meals  Patient will attend at least 50% of groups when able.         Outcome: No Change     Problem: Violence Risk or Actual  Goal: Anger and Impulse Control  Description: Pt will be able to control his anger during stay  Pt will ask for PRN medications as needed to control his impulses    Outcome: No Change     Face to face shift report received from Le LARKIN. Rounding completed, pt observed.     Pt appeared to be sleeping most of this shift. Pt did not have any noted episodes of violence this shift.    Face to face report will be communicated to oncsalomón LARKIN.    Kecia Eisenberg RN  1/16/2022  5:54 AM

## 2022-01-16 NOTE — PLAN OF CARE
"Problem: Behavioral Health Plan of Care  Goal: Patient-Specific Goal (Individualization)  Description: Patient will be free from self harm or injury  Patient will eat at least 50% of meals  Patient will attend at least 50% of groups when able.   Outcome: Improving  Note: Pt had a flat affect and depressed mood. He denied suicidal ideation. When pt was asked about past suicide attempts, he stated \"three years ago, I overdosed on 90 propranolol and 30 citalopram.\" Pt spent the majority of the shift just sitting on his bed. He refused supper, but had an intake of 460 mL. He ate 100% of snack and had an intake of another 120 mL. He was compliant with his scheduled medications. 2007 - Pt received 1,000 mg of PRN Robaxin for muscle spasms. Pt verbalized that he is \"just waiting to go to a [facility].\"    Face to face end of shift report communicated to oncoming RN.      Problem: Violence Risk or Actual  Goal: Anger and Impulse Control  Description: Pt will be able to control his anger during stay  Pt will ask for PRN medications as needed to control his impulses  Outcome: Improving  Note: Pt remained free from anger outbursts and impulsive behavior.      "

## 2022-01-17 ENCOUNTER — APPOINTMENT (OUTPATIENT)
Dept: MRI IMAGING | Facility: HOSPITAL | Age: 32
End: 2022-01-17
Attending: STUDENT IN AN ORGANIZED HEALTH CARE EDUCATION/TRAINING PROGRAM
Payer: COMMERCIAL

## 2022-01-17 PROCEDURE — 250N000013 HC RX MED GY IP 250 OP 250 PS 637: Performed by: NURSE PRACTITIONER

## 2022-01-17 PROCEDURE — 250N000013 HC RX MED GY IP 250 OP 250 PS 637: Performed by: STUDENT IN AN ORGANIZED HEALTH CARE EDUCATION/TRAINING PROGRAM

## 2022-01-17 PROCEDURE — 124N000004

## 2022-01-17 PROCEDURE — 70551 MRI BRAIN STEM W/O DYE: CPT

## 2022-01-17 PROCEDURE — 99233 SBSQ HOSP IP/OBS HIGH 50: CPT | Mod: 95 | Performed by: STUDENT IN AN ORGANIZED HEALTH CARE EDUCATION/TRAINING PROGRAM

## 2022-01-17 RX ORDER — LORAZEPAM 1 MG/1
1 TABLET ORAL 3 TIMES DAILY
Status: DISCONTINUED | OUTPATIENT
Start: 2022-01-17 | End: 2022-01-26

## 2022-01-17 RX ADMIN — PREGABALIN 25 MG: 75 CAPSULE ORAL at 14:59

## 2022-01-17 RX ADMIN — PREGABALIN 100 MG: 75 CAPSULE ORAL at 20:16

## 2022-01-17 RX ADMIN — OLANZAPINE 12.5 MG: 2.5 TABLET, FILM COATED ORAL at 20:17

## 2022-01-17 RX ADMIN — AMANTADINE HYDROCHLORIDE 100 MG: 100 CAPSULE ORAL at 08:31

## 2022-01-17 RX ADMIN — AMANTADINE HYDROCHLORIDE 100 MG: 100 CAPSULE ORAL at 20:17

## 2022-01-17 RX ADMIN — LITHIUM CARBONATE 900 MG: 450 TABLET, EXTENDED RELEASE ORAL at 20:17

## 2022-01-17 RX ADMIN — BUPROPION HYDROCHLORIDE 300 MG: 300 TABLET, EXTENDED RELEASE ORAL at 08:31

## 2022-01-17 RX ADMIN — LORAZEPAM 1 MG: 1 TABLET ORAL at 08:31

## 2022-01-17 RX ADMIN — LORAZEPAM 1 MG: 1 TABLET ORAL at 20:17

## 2022-01-17 RX ADMIN — PREGABALIN 100 MG: 75 CAPSULE ORAL at 08:31

## 2022-01-17 RX ADMIN — LORAZEPAM 1 MG: 1 TABLET ORAL at 15:00

## 2022-01-17 ASSESSMENT — ACTIVITIES OF DAILY LIVING (ADL)
ADLS_ACUITY_SCORE: 7
ADLS_ACUITY_SCORE: 7
HYGIENE/GROOMING: INDEPENDENT
ADLS_ACUITY_SCORE: 7
ORAL_HYGIENE: INDEPENDENT
DRESS: SCRUBS (BEHAVIORAL HEALTH)
ADLS_ACUITY_SCORE: 7

## 2022-01-17 NOTE — PLAN OF CARE
"Problem: Behavioral Health Plan of Care  Goal: Patient-Specific Goal (Individualization)  Description: Patient will be free from self harm or injury  Patient will eat at least 50% of meals  Patient will attend at least 50% of groups when able.   1/16/2022 2207 by Le Pinedo, RN  Outcome: Improving  Note: Pt had a flat affect and depressed mood. He denied suicidal and homicidal ideation. He has been isolative and withdrawn to his room all shift. He ate 25% of supper and had an intake of 500 mL. He was compliant with his scheduled medications. 2055 - He requested and received 1,000 mg of Robaxin for muscle spasms. He endorsed back pain rated \"3/10\".     Face to face end of shift report communicated to oncoming RN.      Problem: Violence Risk or Actual  Goal: Anger and Impulse Control  Description: Pt will be able to control his anger during stay  Pt will ask for PRN medications as needed to control his impulses    Outcome: Improving  Note: Pt remained free from anger outbursts and impulsive behavior tonight.      "

## 2022-01-17 NOTE — PLAN OF CARE
Problem: Behavioral Health Plan of Care  Goal: Patient-Specific Goal (Individualization)  Description: Patient will be free from self harm or injury  Patient will eat at least 50% of meals  Patient will attend at least 50% of groups when able.         Outcome: No Change  Note:        Problem: Violence Risk or Actual  Goal: Anger and Impulse Control  Description: Pt will be able to control his anger during stay  Pt will ask for PRN medications as needed to control his impulses    Outcome: No Change     Face to face shift report received from Le LARKIN. Rounding completed, pt observed.     Pt appeared to sleep most of this shift. Pt did not have any noted episodes of violence this shift.    Face to face report will be communicated to oncsalomón LARKIN.    Kecia Eisenberg RN  1/17/2022  6:18 AM

## 2022-01-17 NOTE — PLAN OF CARE
"Pt states they would like to try the [facility] screening again. Let pt know this writer would call and see if they can schedule another screening.     Left message for Marvin at [facility] for pt's screening.     Pt stopped this writer in the hallway and states \"I want to go to a CB instead\". Asked pt why they have been changing their mind so often. Pt states \"danielle I want my time done and [facility] is more MI/CD and I want [facility]\".     "

## 2022-01-17 NOTE — PLAN OF CARE
Face to face shift report received from TRAE uHtson.       Problem: Behavioral Health Plan of Care  Goal: Patient-Specific Goal (Individualization)  Description: Patient will be free from self harm or injury  Patient will eat at least 50% of meals  Patient will attend one group every day.    Outcome: Improving  Note: Calm and cooperative. Medication compliant. Denies mental health issues. Appears preoccupied. Pleasant and offers more in conversation. Less flat than he has been. Withdrawn to room this shift. [facility] completed this shift, pt was escorted to [facility] with security personnel and unit staff. No issues during transfer to and from [facility]. No reports of pain.        Problem: Behavior Regulation Impairment (Psychotic Signs/Symptoms)  Goal: Improved Behavioral Control (Psychotic Signs/Symptoms)  Description: Patient will be able to have a reality based conversation.     Outcome: Improving       Problem: Violence Risk or Actual  Goal: Anger and Impulse Control  Description: Pt will be able to control his anger during stay  Pt will ask for PRN medications as needed to control his impulses    Outcome: Improving   Free from violent or threatening outbursts this shift.       Face to face end of shift report to be communicated to oncoming RN.

## 2022-01-17 NOTE — PROGRESS NOTES
[facility] Psychiatric Progress Note     Assessment     Mr. Carter is a 31 year old male with a PMH of schizoaffective disorder, depressive type, catatonia, severe methamphetamine use disorder, alcohol use disorder, and significant TBI at the age of 3 who presented with depression with catatonia being off of Ativan after recently being discharged. This is the patient's 3rd hospitalization in the past roughly two months with substance use complicating his course.     The patient's catatonia improved with resumption of Ativan. Also, amantadine has helped. Tapering down given concerns with misuse in the past, while using amantadine and exploring Namenda as alternative means for long-term management. Wellbutrin was increased to further address depression. Zyprexa increased to further help with depression and any element of psychosis. Exelon has been used off-label for cognition related to TBI's. Held due to concerns for contributing to agitation with monitoring occurring. Might switch to Namenda.     Patient has SPMI with him having deficits in his occupational and social functioning. He has had significant CNS insults with him having multiple TBIs, significant substance abuse over years, and near fatal cardiac arrest from overdose (? Anoxic injury) that have likely led to neuropsychiatric impact making it more challenging for him to recover. His  and  note he has not been the same since. Patient did score a 4.4/5.8 on the ACL, which indicates the patient would need some level of support and could not be alone the whole day safely, needing assistance with such stuff as solving problems, removing any safety hazards. He would likely benefit from assistance with home medication management.    Today: Patient is showing some improvement from last week. Less withdrawn and demonstrating less ambitendency. He is attending groups now and is encouraged to do so. Fatigue is improving with  reduction of Ativan, which will be continued with final possible decrease today. Will order MRI to further evaluate for any possible medical causes of neuropsychiatric symptoms, including psychosis.    Educated regarding medication indications, risks, benefits, side effects, contraindications and possible interactions. Verbally expressed understanding.      Diagnoses     1. Schizoaffective disorder, depressive type, multiple episodes, currently in acute episode, with catatonia   2. 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 Methamphetamine use disorder, severe  4. Alcohol use disorder, moderate     Plan     Unit 5  Legal Status: Committed     Safety Assessment:    Behavioral Orders   Procedures     Code 1 - Restrict to Unit     Routine Programming     As clinically indicated     Status 15     Every 15 minutes.      Medications:     Outpatient medications continued/changed:     Wellbutrin  mg daily -> 300 mg on 12/24  Lithium 900 mg at bedtime  Zyprexa 10 mg BID prn - > 10 mg at bedtime -> 12.5 mg at bedtime on 1/3  Lyrica 100 mg TID (initially held but resumed due to benefit for anxiety and pain)  Vitamin D 50,000 international unit(s) weekly    New medications initiated:     Ativan 2 mg QID -> 1.5 mg QID on 1/5 -> 1/2/2 mg on 1/12 -> 1/1/2 mg on 1/15 -> 1 mg TID on 1/17  Amantadine 100 mg BID  Standard unit PRNs    New medications stopped    Exelon 3 mg BID off label for cognition from TBI due to concerns for agitation with plans to possibly switch to Namenda     Programming: Patient will be treated in a therapeutic milieu with appropriate individual and group therapies. Education will be provided on diagnoses, medications, and treatments.     Medical diagnoses:      #. Concern for poor intake  - I/Os resumed again   - Nutrition following   - Monitoring     #. Intention tremor bilaterally   - Secondary to lithium  - Monitor  - Conservative management    #. Muscle spasms  -  "Robaxin 1,000 mg TID prn   - Considering alternatives that have less CNS sedation    Consult: Nutrition  Labs: Li 0.8 on 1/12  Imaging: Order MRI    Anticipated LOS: 2-4 weeks  Dispo: [facility] vs [facility]     Interim History     Patient reports being 75/100 today. He notes he still feels \"terribly depressed.\" When clarifying, he notes a lack of enjoyment and drive. Discussed negative symptoms and how these can seem like depression. Discussed limited available options for this. Encouraged the patient to be active and attend groups. Notes he is less tired. He consents to reducing Ativan. He denies any new physical concerns. He hopes to be able to discharge soon. He notes he didn't eat much for breakfast today. He notes this helps him focus.     Patient consents to an MRI. He denies having claustrophobia. He notes he has no metal implants in his head. No cochlear implants.     Medications       amantadine  100 mg Oral BID     buPROPion  300 mg Oral Daily     lithium ER  900 mg Oral At Bedtime     LORazepam  1 mg Oral BID     LORazepam  2 mg Oral At Bedtime     OLANZapine  12.5 mg Oral At Bedtime     pregabalin  100 mg Oral TID     vitamin D2  50,000 Units Oral Q7 Days        Allergies     Allergies   Allergen Reactions     Pork Allergy         Psychiatric Examination     /78 (BP Location: Right arm)   Pulse 80   Temp 98.4  F (36.9  C) (Temporal)   Resp 16   Ht 1.829 m (6')   Wt 98.9 kg (218 lb)   SpO2 98%   BMI 29.57 kg/m    Weight is 218 lbs 0 oz  Body mass index is 29.57 kg/m .    Appearance: Alert, oriented, dressed in hospital scrubs, long beard  Attitude: Cooperative   Eye Contact: Fair  Mood: \"Terribly depressed, but 75/100\"  Affect: Flat, mood congruent  Speech: Reduction in rate. Normal rhythm   Psychomotor Behavior: No tremor, rigidity, akathisia, or psychomotor retardation. Withdrawal, possible ambitendency   Thought Process: Logical, concrete   Associations: No loose associations   Thought Content: " Denies SI. No SIB. AH at times. Paranoia at times. Poverty of thought.  Insight: Adequate   Judgment: Adequate  Oriented to: Person, place, and time  Attention Span and Concentration: Intact  Recent and Remote Memory: Intact  Language: English with appropriate syntax and vocabulary  Fund of Knowledge: Average  Muscle Strength and Tone: Grossly normal  Gait and Station: Grossly normal    Pineda-Orville Catatonia Rating Scale   Severity Score (Number of points for items 1 -23) _______1___   Screening Score (Presence or absence of items 1 - 14) ______1_____   Time: 11:00 AM on 1/11/2022     Labs     No results found for this or any previous visit (from the past 24 hour(s)).     Attestation      Patient has been seen and evaluated by:    David Timmons DO, MA  Psychiatrist    VIDEO VISIT    Patient has given verbal consent for video visit?: Yes     Video- Visit Details  Type of service:  video visit for mental health treatment.  Time of service:    Date:  01/17/2022    Video Start Time: 1100AM      Video End Time: 1115AM    Reason for video visit: COVID-19 [place]  Originating Site (patient location):  [facility]  Distant Site (provider location):  Remote location  Mode of Communication:  Video Conference via [employer]

## 2022-01-17 NOTE — PLAN OF CARE
Problem: Behavioral Health Plan of Care  Goal: Patient-Specific Goal (Individualization)  Description: Patient will be free from self harm or injury  Patient will eat at least 50% of meals  Attend one group daily.    Outcome: Improving  Note:     A&O, VSS, denies pain this shift.   Affect is less tense and speech is spontaneous. Answering nursing assessment questions with full sentences. Lets needs be known.   Attends and participates in group today.      Problem: Behavior Regulation Impairment (Psychotic Signs/Symptoms)  Goal: Improved Behavioral Control (Psychotic Signs/Symptoms)  Description: Patient will be able to have a reality based conversation.     Outcome: Improving     Problem: Violence Risk or Actual  Goal: Anger and Impulse Control  Description: Pt will be able to control his anger during stay  Pt will ask for PRN medications as needed to control his impulses    Outcome: Improving    Face to face end of shift report communicated to oncoming shift.     Caryl Villanueva RN  1/17/2022  8:59 AM

## 2022-01-18 PROCEDURE — 250N000013 HC RX MED GY IP 250 OP 250 PS 637: Performed by: NURSE PRACTITIONER

## 2022-01-18 PROCEDURE — 99233 SBSQ HOSP IP/OBS HIGH 50: CPT | Mod: 95 | Performed by: STUDENT IN AN ORGANIZED HEALTH CARE EDUCATION/TRAINING PROGRAM

## 2022-01-18 PROCEDURE — 124N000004

## 2022-01-18 PROCEDURE — 250N000013 HC RX MED GY IP 250 OP 250 PS 637: Performed by: STUDENT IN AN ORGANIZED HEALTH CARE EDUCATION/TRAINING PROGRAM

## 2022-01-18 RX ADMIN — LORAZEPAM 1 MG: 1 TABLET ORAL at 13:26

## 2022-01-18 RX ADMIN — METHOCARBAMOL 1000 MG: 500 TABLET, FILM COATED ORAL at 08:20

## 2022-01-18 RX ADMIN — PREGABALIN 100 MG: 75 CAPSULE ORAL at 08:17

## 2022-01-18 RX ADMIN — AMANTADINE HYDROCHLORIDE 100 MG: 100 CAPSULE ORAL at 08:17

## 2022-01-18 RX ADMIN — LORAZEPAM 1 MG: 1 TABLET ORAL at 08:17

## 2022-01-18 RX ADMIN — BUPROPION HYDROCHLORIDE 300 MG: 300 TABLET, EXTENDED RELEASE ORAL at 08:17

## 2022-01-18 RX ADMIN — PREGABALIN 100 MG: 75 CAPSULE ORAL at 13:26

## 2022-01-18 ASSESSMENT — ACTIVITIES OF DAILY LIVING (ADL)
ADLS_ACUITY_SCORE: 7
DRESS: SCRUBS (BEHAVIORAL HEALTH);INDEPENDENT
ADLS_ACUITY_SCORE: 7
ORAL_HYGIENE: INDEPENDENT
ADLS_ACUITY_SCORE: 7
ORAL_HYGIENE: INDEPENDENT
ADLS_ACUITY_SCORE: 7
ADLS_ACUITY_SCORE: 7
HYGIENE/GROOMING: INDEPENDENT
DRESS: INDEPENDENT
ADLS_ACUITY_SCORE: 7
ADLS_ACUITY_SCORE: 7
HYGIENE/GROOMING: INDEPENDENT
ADLS_ACUITY_SCORE: 7

## 2022-01-18 NOTE — PROGRESS NOTES
"CLINICAL NUTRITION SERVICES  -  REASSESSMENT NOTE    Steven Carter     31 yom admitted for catatonia. Pt has a hx of methamphetamine use, alcohol use disorder, schizoaffective disorder, TBI at age of 3. Intake continues to be variable, appears suboptimal overall.  However, weight is up 6lbs since last review.  Overall weight has been fairly stable throughout this admission.     Diet Order: Regular  Intake: 9 meals with 0-100% intake      Height: 6' 0\"  Weight: 218 lbs 0 oz  Body mass index is 29.57 kg/m .  Weight Status:  Overweight BMI 25-29.9  Weight History:  Max IBW- 83.9kg , admit weight 96.4kg (212 lb 9.6 oz)  Wt Readings from Last 10 Encounters:   12/16/21 96.4 kg (212 lb 9.6 oz)   12/14/21 97.7 kg (215 lb 4.8 oz)   12/01/21 97.8 kg (215 lb 11.2 oz)   10/31/21 105.1 kg (231 lb 11.2 oz)   03/27/21 105.2 kg (232 lb)   11/30/15 96.6 kg (213 lb)   08/18/14 94.5 kg (208 lb 6.4 oz)   08/15/14 93.4 kg (206 lb)   07/19/14 102.1 kg (225 lb)         83.9kg- max ibw  Estimated Energy Needs: 0065-7338 kcals (25-30 Kcal/Kg)   Estimated Protein Needs:  grams protein (1-1.2 g pro/Kg)     Malnutrition Diagnosis: severe malnutrition   In Context of:  Chronic illness or disease, Environmental or social circumstances     NUTRITION RECOMMENDATIONS  - Continue to encourage intake with meals and snacks  - Continue to send Ensure on meal trays.    - Monitor weight     MONITORING AND EVALUATION:  RD will monitor intake, weight, labs        "

## 2022-01-18 NOTE — PLAN OF CARE
Face to face shift report received from Kecia SONG RN. Rounding completed, pt observed.    Problem: Behavioral Health Plan of Care  Goal: Patient-Specific Goal (Individualization)  Description: Patient will be free from self harm or injury  Patient will eat at least 50% of meals  Patient will attend one group every day.        Outcome: Improving  Note: Shift Summery:  Patient was awake in his room at the start of this shift.  Patient out of room and had meal tray in lounge.  Eating and drinking adequate amounts this shift.  Patient compliant with scheduled medications and requested Robaxin at 0820.for c/o back pain.  Patient has been in the group room this shift; listens but does not interact/participate much.      Problem: Behavior Regulation Impairment (Psychotic Signs/Symptoms)  Goal: Improved Behavioral Control (Psychotic Signs/Symptoms)  Description: Patient will be able to have a reality based conversation.     Outcome: Improving     Problem: Violence Risk or Actual  Goal: Anger and Impulse Control  Description: Pt will be able to control his anger during stay  Pt will ask for PRN medications as needed to control his impulses    Outcome: Improving   Face to face report will be communicated to oncoming RN.    Alma Delia Garsia RN  1/18/2022

## 2022-01-18 NOTE — PLAN OF CARE
Problem: Behavioral Health Plan of Care  Goal: Patient-Specific Goal (Individualization)  Description: Patient will be free from self harm or injury  Patient will eat at least 50% of meals  Patient will attend one group every day.        Outcome: No Change  Note:        Problem: Behavior Regulation Impairment (Psychotic Signs/Symptoms)  Goal: Improved Behavioral Control (Psychotic Signs/Symptoms)  Description: Patient will be able to have a reality based conversation.     Outcome: No Change     Problem: Violence Risk or Actual  Goal: Anger and Impulse Control  Description: Pt will be able to control his anger during stay  Pt will ask for PRN medications as needed to control his impulses    Outcome: No Change     Face to face shift report received from Nevaeh LARKIN. Rounding completed, pt observed.     Pt appeared to be sleeping most of this shift. Pt did not have any noted episodes of violence this shift.    Face to face report will be communicated to oncoming RN.    Kecia Eisenberg RN  1/18/2022  6:18 AM

## 2022-01-18 NOTE — PROGRESS NOTES
[facility] Psychiatric Progress Note     Assessment     Mr. Carter is a 31 year old male with a PMH of schizoaffective disorder, depressive type, catatonia, severe methamphetamine use disorder, alcohol use disorder, and significant TBI at the age of 3 who presented with depression with catatonia being off of Ativan after recently being discharged. This is the patient's 3rd hospitalization in the past roughly two months with substance use complicating his course.     The patient's catatonia improved with resumption of Ativan. Also, amantadine has helped. Tapering down given concerns with misuse in the past, while using amantadine and exploring Namenda as alternative means for long-term management. Wellbutrin was increased to further address depression. Zyprexa increased to further help with depression and any element of psychosis. Exelon has been used off-label for cognition related to TBI's. Held due to concerns for contributing to agitation with monitoring occurring. Might switch to Namenda.     Patient has SPMI with him having deficits in his occupational and social functioning. He has had significant CNS insults with him having multiple TBIs, significant substance abuse over years, and near fatal cardiac arrest from overdose (? Anoxic injury) that have likely led to neuropsychiatric impact making it more challenging for him to recover. His  and  note he has not been the same since. Patient did score a 4.4/5.8 on the ACL, which indicates the patient would need some level of support and could not be alone the whole day safely, needing assistance with such stuff as solving problems, removing any safety hazards. He would likely benefit from assistance with home medication management.    Today: Patient is showing some improvement from last week. Less withdrawn and demonstrating less ambitendency. He is attending groups now and is encouraged to do so. Fatigue is improving with  reduction of Ativan. MRI showed findings consistent with prior TBIs, which the patient has had. He has notable encephalomalacia in his left temporal/occiptal region, along with left anterior frontal lobe. These findings could explain some of his neuropsychiatric symptoms, like reduction in cognitive function and mood/motivational problems. This might also explain the challenge in improvement in his clinical state.    Educated regarding medication indications, risks, benefits, side effects, contraindications and possible interactions. Verbally expressed understanding.      Diagnoses     1. Schizoaffective disorder, depressive type, multiple episodes, currently in acute episode, with catatonia   2. 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 Methamphetamine use disorder, severe  4. Alcohol use disorder, moderate     Plan     Unit 5  Legal Status: Committed     Safety Assessment:    Behavioral Orders   Procedures     Code 1 - Restrict to Unit     Routine Programming     As clinically indicated     Status 15     Every 15 minutes.      Medications:     Outpatient medications continued/changed:     Wellbutrin  mg daily -> 300 mg on 12/24  Lithium 900 mg at bedtime  Zyprexa 10 mg BID prn - > 10 mg at bedtime -> 12.5 mg at bedtime on 1/3  Lyrica 100 mg TID (initially held but resumed due to benefit for anxiety and pain)  Vitamin D 50,000 international unit(s) weekly    New medications initiated:     Ativan 2 mg QID -> 1.5 mg QID on 1/5 -> 1/2/2 mg on 1/12 -> 1/1/2 mg on 1/15 -> 1 mg TID on 1/17  Amantadine 100 mg BID  Standard unit PRNs    New medications stopped    Exelon 3 mg BID off label for cognition from TBI due to concerns for agitation with plans to possibly switch to Namenda     Programming: Patient will be treated in a therapeutic milieu with appropriate individual and group therapies. Education will be provided on diagnoses, medications, and treatments.     Medical diagnoses:  "     #. Concern for poor intake  - I/Os resumed again   - Nutrition following   - Monitoring     #. Intention tremor bilaterally   - Secondary to lithium  - Monitor  - Conservative management    #. Muscle spasms  - Robaxin 1,000 mg TID prn   - Considering alternatives that have less CNS sedation    #. Multiple TBIs  - MRI findings consistent with encephalomalacia in left temporal-occipital region and left anterior frontal lobe.  - Recommend neuropsychological testing as outpatient     Consult: Nutrition  Labs: Li 0.8 on 1/12  Imaging: MRI recently    Anticipated LOS: 2-4 weeks  Dispo: [facility] vs [facility]     Interim History     The patient notes that he feels better on the lowered dose of Ativan. He notes more energy. He denies problems with making decisions. He denies any problems with his current medications.    Discussed findings of the patient's MRI with him not being markedly affected by the results. He did not have any questions. Showed him direct findings on MRI.    He denies any problems making decisions. Willing to continue going to group. No worsening of catatonia.     Medications       amantadine  100 mg Oral BID     buPROPion  300 mg Oral Daily     lithium ER  900 mg Oral At Bedtime     LORazepam  1 mg Oral TID     OLANZapine  12.5 mg Oral At Bedtime     pregabalin  100 mg Oral TID     vitamin D2  50,000 Units Oral Q7 Days        Allergies     Allergies   Allergen Reactions     Pork Allergy         Psychiatric Examination     /77 (BP Location: Right arm, Patient Position: Sitting)   Pulse 77   Temp 98.4  F (36.9  C) (Temporal)   Resp 14   Ht 1.829 m (6')   Wt 98.9 kg (218 lb)   SpO2 99%   BMI 29.57 kg/m    Weight is 218 lbs 0 oz  Body mass index is 29.57 kg/m .    Appearance: Alert, oriented, dressed in hospital scrubs, long beard  Attitude: Cooperative   Eye Contact: Fair  Mood: \"Aight\"  Affect: Flat, mood congruent  Speech: Reduction in rate. Normal rhythm   Psychomotor Behavior: No tremor, " rigidity, akathisia, or psychomotor retardation. Withdrawal, ambitendency   Thought Process: Logical, concrete   Associations: No loose associations   Thought Content: Denies SI. No SIB. AH at times. Paranoia at times. Poverty of thought.  Insight: Adequate   Judgment: Adequate  Oriented to: Person, place, and time  Attention Span and Concentration: Intact  Recent and Remote Memory: Intact  Language: English with appropriate syntax and vocabulary  Fund of Knowledge: Average  Muscle Strength and Tone: Grossly normal  Gait and Station: Grossly normal    [place] Catatonia Rating Scale   Severity Score (Number of points for items 1 -23) _______1___   Screening Score (Presence or absence of items 1 - 14) ______1_____   Time: 11:00 AM on 1/11/2022   Rater: Dr. Timmons    [facility] Catatonia Rating Scale   Severity Score (Number of points for items 1 -23) ____4______   Screening Score (Presence or absence of items 1 - 14) ___1________   Time: 1145AM on 1/18  Rater: Dr. Timmons    1. Immobility/stupor: Extreme hypoactivity, immobile, minimally responsive to stimuli.   0 - Absent.   1 - Sits abnormally still, may interact briefly.   2 - Virtually no interaction with external world.   3 - Stuporous, non-reactive to painful stimuli.   2. Mutism: Verbally unresponsive or minimally responsive.   0 = Absent.   1 = Verbally unresponsive to majority of questions; incomprehensible whisper.   2 = Speaks less than 20 words/5mins.   3 = No speech.   3. = Staring: Fixed gaze, little or no visual scanning of environment, decreased blinking.   0 = Absent.   1 = Poor eye contact, repeatedly gazes less than 20 s between shifting of attention; decreased blinking.   2 = Gaze held longer than 20 s, occasionally shifts attention.   3 = Fixed gaze, non-reactive.   4. Posturing/catalepsy: Spontaneous maintenance of posture (s), including mundane (e.g. sitting or standing for long periods without reacting).   0 = Absent.   1 = Less than I  min.   2 Greater than one minute, less than 15 min.   3 Bizarre posture, or mundane maintained more than 15 min.   5. Grimacing: Maintenance of odd facial expressions.   0 = Absent.   1 = Less than [id number].   2 = Less than 1 min.   3 = Bizarre expression(s) or maintained more than 1 min.   6. Echopraxia/echolalia: Mimicking of examiner's movements (echopraxia) or speech (echolalia).   0 = Absent   1 = Occasional.   2 = Frequent.   3 = Constant   7. Stereotypy: Repetitive, non-goal-directed motor activity (e.g. finger-play, repeatedly touching, patting or rubbing self); abnormality not inherent in act but in its frequency.   0 - Absent   1 - Occasional.   2 - Frequent.   3 - Constant.   8. Mannerisms: Odd, purposeful movements (hopping or walking tiptoe, saluting passers-by or exaggerated caricatures of mundane movements); abnormality inherent in act itself.   0 - Absent   1 - Occasional.   2 - Frequent.   3 - Constant.   9. Stereotyped & meaningless repetition of words & phrases (verbigeration): Repetition of phrases or sentences (like a scratched records).   0 - Absent.   1 - Occasional.   2 - Frequent, difficult to interrupt.   3 - Constant.   10. Rigidity: Maintenance of a rigid position despite efforts to be moved (exclude if cog-wheeling or tremor present)   0 = Absent.   1 = Mild resistance.   2 = Moderate.   3 = Severe, cannot be repostured.   11. Negativism: Apparently motiveless resistance to instructions or attempts to move/examine patients. Contrary behavior, does exact opposite of instruction.   0 - Absent   1 - Mild resistance and/or occasionally contrary.   2 - Moderate resistance and/or frequently contrary.   3 - Severe resistance and/or continually contrary.   12. Waxy flexibility: During repositioning of patient, patient offers initial resistance before allowing him/herself to be repositioned, similar to that of a bending candle. (also defined as slow resistance to movement as the patient allows  the examiner to place his/her extremities in unusual positions. The limb may remain in the position in which they are placed or not)   0 - Absent   3 - Present.   13. Withdrawal: Refusal to eat, drink and/or make eye contact.   0 = Absent.   1 = Minimal oral intake/interaction for less than 1 day.   2 = Minimal oral intake/interaction for more than 1 day.   3 = No oral intake/interaction for 1 day or more.   14. Excitement: Extreme hyperactivity, constant motor unrest which is apparently non-purposeful.   Not to be attributed to akathisia or goal-directed agitation.   1 - Excessive motion, intermittent.   2 - Constant motion, hyperkinetic without rest periods.   3 - Full-blown catatonic excitement, endless frenzied motor activity.   ------------End of Screening Items-------------   15. Impulsivity: Patient suddenly engages in inappropriate behavior (e.g. runs down hallway, starts screaming or takes off clothes) without provocation. Afterwards can give no, or only a facile explanation.   0 - Absent.   1 - Occasional.   2 - Frequent.   3 - Constant or not redirectable.   16. Automatic obedience: Exaggerated cooperation with examiner's request or spontaneous continuation of movement requested.   0 = Absent.   1 = Occasional   2 = Frequent   3 = Constant.   17. Passive Obedience (mitgehen): Patient raises arm in response to light pressure of finger, despite instructions to the contrary.   0 = Absent.   3 = Present.   18. Muscle Resistance (gegenhalten): Involuntary resistance to passive movement of a limb to a new position. Resistance increases with the speed of the movement.   0 - Absent   3 - Present.   19. Motorically Stuck (ambitendency): Patient appears stuck in indecisive, hesitant motor movements.   0 - Absent.   3 = Present.   20. Grasp reflex: Striking the patient's open palm with two extended fingers of the examiner's hand results in automatic closure of patients hand.   0 = Absent   3 = Present   21.  Perseveration: Repeatedly returns to same topic or persists with the same movements.   0 = Absent.   3 = Present.   22. Combativeness: Belligerence or aggression, Usually in an undirected manner, without explanation.   0 = Absent   1 = Occasionally strikes out, low potential for injury.   2 = Frequently strikes out, moderate potential for injury.   3 = Serious danger to others.   23. Autonomic abnormality: Abnormality of body temperature (fever), blood pressure, pulse, respiratory rate, inappropriate sweating, flushing.   0 = Absent   1 = Abnormality of one parameter (exclude pre-existing hypertension).   2 = Abnormality of two parameters.   3 = Abnormality of three or more parameters.        Labs     No results found for this or any previous visit (from the past 24 hour(s)).       Treatment Team Care Plan     BEHAVIORAL TEAM DISCUSSION    Progress: Improving    Continued Stay Criteria/Rationale: Continued symptoms without sufficient improvement/resolution. Also, safe discharge planning.    Medical/Physical: See above    Precautions: See above.     Plan: Continue inpatient care with unit support and medication management    Rationale for change in precautions or plan: NA due to no change    Participants: David Timmons DO, Nursing, SW, OT    The patient's care was discussed with the treatment team and chart notes were reviewed.      Attestation      Patient has been seen and evaluated by:    David Timmons DO, MA  Psychiatrist    VIDEO VISIT    Patient has given verbal consent for video visit?: Yes     Video- Visit Details  Type of service:  video visit for mental health treatment.  Time of service:    Date:  01/18/2022    Video Start Time: 1145AM      Video End Time: 1200PM    Reason for video visit: COVID-19 [place]  Originating Site (patient location):  [facility]  Distant Site (provider location):  Remote location  Mode of Communication:  Video Conference via  [employer]

## 2022-01-19 PROCEDURE — 250N000013 HC RX MED GY IP 250 OP 250 PS 637: Performed by: NURSE PRACTITIONER

## 2022-01-19 PROCEDURE — 99232 SBSQ HOSP IP/OBS MODERATE 35: CPT | Mod: 95 | Performed by: STUDENT IN AN ORGANIZED HEALTH CARE EDUCATION/TRAINING PROGRAM

## 2022-01-19 PROCEDURE — 124N000004

## 2022-01-19 PROCEDURE — 250N000013 HC RX MED GY IP 250 OP 250 PS 637: Performed by: STUDENT IN AN ORGANIZED HEALTH CARE EDUCATION/TRAINING PROGRAM

## 2022-01-19 RX ADMIN — LITHIUM CARBONATE 900 MG: 450 TABLET, EXTENDED RELEASE ORAL at 20:04

## 2022-01-19 RX ADMIN — LORAZEPAM 1 MG: 1 TABLET ORAL at 09:43

## 2022-01-19 RX ADMIN — BUPROPION HYDROCHLORIDE 300 MG: 300 TABLET, EXTENDED RELEASE ORAL at 09:44

## 2022-01-19 RX ADMIN — AMANTADINE HYDROCHLORIDE 100 MG: 100 CAPSULE ORAL at 09:45

## 2022-01-19 RX ADMIN — PREGABALIN 100 MG: 75 CAPSULE ORAL at 09:44

## 2022-01-19 RX ADMIN — METHOCARBAMOL 1000 MG: 500 TABLET, FILM COATED ORAL at 20:04

## 2022-01-19 RX ADMIN — PREGABALIN 100 MG: 75 CAPSULE ORAL at 20:04

## 2022-01-19 RX ADMIN — LORAZEPAM 1 MG: 1 TABLET ORAL at 20:04

## 2022-01-19 RX ADMIN — AMANTADINE HYDROCHLORIDE 100 MG: 100 CAPSULE ORAL at 20:04

## 2022-01-19 RX ADMIN — PREGABALIN 100 MG: 75 CAPSULE ORAL at 13:22

## 2022-01-19 RX ADMIN — LORAZEPAM 1 MG: 1 TABLET ORAL at 13:22

## 2022-01-19 RX ADMIN — METHOCARBAMOL 1000 MG: 500 TABLET, FILM COATED ORAL at 09:45

## 2022-01-19 ASSESSMENT — ACTIVITIES OF DAILY LIVING (ADL)
ADLS_ACUITY_SCORE: 7
DRESS: SCRUBS (BEHAVIORAL HEALTH);INDEPENDENT
ORAL_HYGIENE: INDEPENDENT
ADLS_ACUITY_SCORE: 7
HYGIENE/GROOMING: INDEPENDENT
ADLS_ACUITY_SCORE: 7

## 2022-01-19 NOTE — PLAN OF CARE
Face to face shift report received from Nurse. Rounding completed, pt observed.       Problem: Behavioral Health Plan of Care  Goal: Patient-Specific Goal (Individualization)  Description: Patient will be free from self harm or injury  Patient will eat at least 50% of meals  Patient will attend one group every day.        Outcome: Improving  Note:     0750 Observed Pt. Laying in bed / Eyes Closed / Noted Breathing without difficulty.     0913 Pt Refused Medications and was advised to think about taking them for his benefit.    0945 Pt Decided to take medications today and asked for them.     Pt has not been in groups has been isolating to  His room.     1326 Pt in room and  compliant to medications.    Pt did not eat breakfast or lunch today, but has been drinking fluids.      Face to face report will be communicated to oncoming RN.    Trinidad Hurley RN  1/19/2022  7:50 AM

## 2022-01-19 NOTE — PROGRESS NOTES
[facility] Psychiatric Progress Note     Assessment     Mr. Carter is a 31 year old male with a PMH of schizoaffective disorder, depressive type, catatonia, severe methamphetamine use disorder, alcohol use disorder, and significant TBI at the age of 3 who presented with depression with catatonia being off of Ativan after recently being discharged. This is the patient's 3rd hospitalization in the past roughly two months with substance use complicating his course.     The patient's catatonia improved with resumption of Ativan. Also, amantadine has helped. Tapering down given concerns with misuse in the past, while using amantadine and exploring Namenda as alternative means for long-term management. Wellbutrin was increased to further address depression. Zyprexa increased to further help with depression and any element of psychosis. Exelon has been used off-label for cognition related to TBI's. Held due to concerns for contributing to agitation with monitoring occurring. Might switch to Namenda.     Patient has SPMI with him having deficits in his occupational and social functioning. He has had significant CNS insults with him having multiple TBIs, significant substance abuse over years, and near fatal cardiac arrest from overdose (? Anoxic injury) that have likely led to neuropsychiatric impact making it more challenging for him to recover. His  and  note he has not been the same since. Patient did score a 4.4/5.8 on the ACL, which indicates the patient would need some level of support and could not be alone the whole day safely, needing assistance with such stuff as solving problems, removing any safety hazards. He would likely benefit from assistance with home medication management.    Today: Patient is showing some improvement from last week. Less withdrawn and demonstrating less ambitendency. He is attending groups now and is encouraged to do so. Fatigue is improving with  reduction of Ativan. MRI showed findings consistent with prior TBIs, which the patient has had. He has notable encephalomalacia in his left temporal/occiptal region, along with left anterior frontal lobe. These findings could explain some of his neuropsychiatric symptoms, like reduction in cognitive function and mood/motivational problems. This might also explain the challenge in large improvement in his clinical state. Recommend TBI programming be explored for discharge planning. Encouraging the patient to stay cognitively engaged with reading, puzzles, and groups.    Educated regarding medication indications, risks, benefits, side effects, contraindications and possible interactions. Verbally expressed understanding.      Diagnoses     1. Schizoaffective disorder, depressive type, multiple episodes, currently in acute episode, with catatonia   2. 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 Methamphetamine use disorder, severe  4. Alcohol use disorder, moderate     Plan     Unit 5  Legal Status: Committed     Safety Assessment:    Behavioral Orders   Procedures     Code 1 - Restrict to Unit     Routine Programming     As clinically indicated     Status 15     Every 15 minutes.      Medications:     Outpatient medications continued/changed:     Wellbutrin  mg daily -> 300 mg on 12/24  Lithium 900 mg at bedtime  Zyprexa 10 mg BID prn - > 10 mg at bedtime -> 12.5 mg at bedtime on 1/3  Lyrica 100 mg TID (initially held but resumed due to benefit for anxiety and pain)  Vitamin D 50,000 international unit(s) weekly    New medications initiated:     Ativan 2 mg QID -> 1.5 mg QID on 1/5 -> 1/2/2 mg on 1/12 -> 1/1/2 mg on 1/15 -> 1 mg TID on 1/17  Amantadine 100 mg BID  Standard unit PRNs    New medications stopped    Exelon 3 mg BID off label for cognition from TBI due to concerns for agitation with plans to possibly switch to Namenda     Programming: Patient will be treated in a  therapeutic milieu with appropriate individual and group therapies. Education will be provided on diagnoses, medications, and treatments.     Medical diagnoses:      #. Severe malnutrition  - Nutrition following   - Gaining weight   - Ensure on meal trays   - Monitoring     #. Intention tremor bilaterally   - Secondary to lithium  - Monitor  - Conservative management    #. Muscle spasms  - Robaxin 1,000 mg TID prn for short term use. Will stop in next couple of days.    #. Multiple TBIs  - MRI findings consistent with encephalomalacia in left temporal-occipital region and left anterior frontal lobe.  - Recommend neuropsychological testing as outpatient and possible TBI focused IR/[facility] facility.    Consult: Nutrition  Labs: Li 0.8 on 1/12  Imaging: MRI recently    Anticipated LOS: 2-4 weeks  Dispo: IRTS vs [facility]     Interim History     The patient notes he feels similar. No worsening. Tolerating medications well. He did not take his medications last night for an unknown reason. He did take them this morning. He will miss breakfast some days, but otherwise eats meals. He has been hydrating.    The patient denies having any psychotic symptoms. Struggles with depression and negative symptoms. Better than upon admission, but no large improvement.    Patient requested to go to a crisis shelter but then agreed with plan to go to IR or [facility]. He cannot decide between the two.     Medications       amantadine  100 mg Oral BID     buPROPion  300 mg Oral Daily     lithium ER  900 mg Oral At Bedtime     LORazepam  1 mg Oral TID     OLANZapine  12.5 mg Oral At Bedtime     pregabalin  100 mg Oral TID     vitamin D2  50,000 Units Oral Q7 Days        Allergies     Allergies   Allergen Reactions     Pork Allergy         Psychiatric Examination     /74 (BP Location: Left arm)   Pulse 71   Temp 98.6  F (37  C) (Temporal)   Resp 18   Ht 1.829 m (6')   Wt 98.9 kg (218 lb)   SpO2 98%   BMI 29.57 kg/m    Weight is 218 lbs 0  "oz  Body mass index is 29.57 kg/m .    Appearance: Alert, oriented, dressed in hospital scrubs, long beard  Attitude: Cooperative   Eye Contact: Fair  Mood: \"Fine\"  Affect: Flat, mood congruent  Speech: Reduction in rate. Normal rhythm   Psychomotor Behavior: No tremor, rigidity, akathisia, or psychomotor retardation. Withdrawal, ambitendency   Thought Process: Logical, concrete   Associations: No loose associations   Thought Content: Denies SI. No SIB. AH and paranoia at times, improved. Poverty of thought.  Insight: Adequate   Judgment: Adequate  Oriented to: Person, place, and time  Attention Span and Concentration: Intact  Recent and Remote Memory: Intact  Language: English with appropriate syntax and vocabulary  Fund of Knowledge: Average  Muscle Strength and Tone: Grossly normal  Gait and Station: Grossly normal    [place] Catatonia Rating Scale   Severity Score (Number of points for items 1 -23) _______1___   Screening Score (Presence or absence of items 1 - 14) ______1_____   Time: 11:00 AM on 1/11/2022   Rater: Dr. Timmons    [place] Catatonia Rating Scale   Severity Score (Number of points for items 1 -23) ____4______   Screening Score (Presence or absence of items 1 - 14) ___1________   Time: 1145AM on 1/18  Rater: Dr. Timmons     Labs     No results found for this or any previous visit (from the past 24 hour(s)).     Attestation      Patient has been seen and evaluated by:    David Timmons DO, MA  Psychiatrist    VIDEO VISIT    Patient has given verbal consent for video visit?: Yes     Video- Visit Details  Type of service:  video visit for mental health treatment.  Time of service:    Date:  01/19/2022    Video Start Time: 1100AM      Video End Time: 1115AM    Reason for video visit: COVID-19 [place]  Originating Site (patient location):  [facility]  Distant Site (provider location):  Remote location  Mode of Communication:  Video Conference via " [employer]

## 2022-01-19 NOTE — PLAN OF CARE
Emailed Keila TAPIA) about looking into TBI programs or homes.     Holly called and will do an interview screening with pt Friday 1/21 @ 11AM.

## 2022-01-19 NOTE — PLAN OF CARE
Problem: Behavioral Health Plan of Care  Goal: Patient-Specific Goal (Individualization)  Description: Patient will be free from self harm or injury  Patient will eat at least 50% of meals  Patient will attend one group every day.        Outcome: No Change  Note:        Problem: Behavior Regulation Impairment (Psychotic Signs/Symptoms)  Goal: Improved Behavioral Control (Psychotic Signs/Symptoms)  Description: Patient will be able to have a reality based conversation.     Outcome: No Change     Problem: Violence Risk or Actual  Goal: Anger and Impulse Control  Description: Pt will be able to control his anger during stay  Pt will ask for PRN medications as needed to control his impulses    Outcome: No Change     Face to face shift report received from Le LARKIN. Rounding completed, pt observed.     Pt appeared to sleep most of this shift. Pt did not have any noted episodes of violence this shift.    Face to face report will be communicated to oncoming RN.    Kecia Eisenberg RN  1/19/2022  5:43 AM

## 2022-01-19 NOTE — PLAN OF CARE
"Problem: Behavioral Health Plan of Care  Goal: Patient-Specific Goal (Individualization)  Description: Patient will be free from self harm or injury  Patient will eat at least 50% of meals  Patient will attend one group every day.  Outcome: No Change  Note: Pt had a flat affect. He has been isolative and withdrawn to his room all shift. He was encouraged to participate in group programming, but declined. For supper, he did not drink anything and only took a couple of bites of his dinner roll. He has been observed filling up his big styrofoam cup with water a couple of times this shift. He endorsed depression. He denied suicidal ideation. 2009 - When pt was approached with his bedtime medications, he stated \"I'm not taking my medications.\" Affect was angry. When pt was asked why he didn't want to take his medications, he repeated \"I'm not taking my medications\" again. He would not answer further questions. Writer exited pt's room.     Face to face end of shift report communicated to oncoming RN.     Problem: Violence Risk or Actual  Goal: Anger and Impulse Control  Description: Pt will be able to control his anger during stay  Pt will ask for PRN medications as needed to control his impulses  Outcome: Improving  Note: Pt had controlled behavior.      "

## 2022-01-20 PROCEDURE — 250N000013 HC RX MED GY IP 250 OP 250 PS 637: Performed by: NURSE PRACTITIONER

## 2022-01-20 PROCEDURE — 124N000004

## 2022-01-20 PROCEDURE — 999N000209 HC STATISTIC OT OUTPT VISIT

## 2022-01-20 PROCEDURE — 250N000013 HC RX MED GY IP 250 OP 250 PS 637: Performed by: STUDENT IN AN ORGANIZED HEALTH CARE EDUCATION/TRAINING PROGRAM

## 2022-01-20 PROCEDURE — 99232 SBSQ HOSP IP/OBS MODERATE 35: CPT | Mod: 95 | Performed by: STUDENT IN AN ORGANIZED HEALTH CARE EDUCATION/TRAINING PROGRAM

## 2022-01-20 RX ORDER — OLANZAPINE 10 MG/1
10 TABLET ORAL AT BEDTIME
Status: DISCONTINUED | OUTPATIENT
Start: 2022-01-20 | End: 2022-01-24

## 2022-01-20 RX ORDER — LIDOCAINE 4 G/G
1 PATCH TOPICAL
Status: DISCONTINUED | OUTPATIENT
Start: 2022-01-20 | End: 2022-03-08 | Stop reason: HOSPADM

## 2022-01-20 RX ORDER — ACETAMINOPHEN 325 MG/1
975 TABLET ORAL EVERY 6 HOURS PRN
Status: DISCONTINUED | OUTPATIENT
Start: 2022-01-20 | End: 2022-03-08 | Stop reason: HOSPADM

## 2022-01-20 RX ADMIN — METHOCARBAMOL 1000 MG: 500 TABLET, FILM COATED ORAL at 09:15

## 2022-01-20 RX ADMIN — LITHIUM CARBONATE 900 MG: 450 TABLET, EXTENDED RELEASE ORAL at 20:35

## 2022-01-20 RX ADMIN — METHOCARBAMOL 1000 MG: 500 TABLET, FILM COATED ORAL at 20:36

## 2022-01-20 RX ADMIN — AMANTADINE HYDROCHLORIDE 100 MG: 100 CAPSULE ORAL at 08:18

## 2022-01-20 RX ADMIN — PREGABALIN 100 MG: 75 CAPSULE ORAL at 13:12

## 2022-01-20 RX ADMIN — LORAZEPAM 1 MG: 1 TABLET ORAL at 08:18

## 2022-01-20 RX ADMIN — ACETAMINOPHEN 650 MG: 325 TABLET, FILM COATED ORAL at 09:15

## 2022-01-20 RX ADMIN — PREGABALIN 100 MG: 75 CAPSULE ORAL at 08:18

## 2022-01-20 RX ADMIN — LORAZEPAM 1 MG: 1 TABLET ORAL at 13:13

## 2022-01-20 RX ADMIN — BUPROPION HYDROCHLORIDE 300 MG: 300 TABLET, EXTENDED RELEASE ORAL at 08:18

## 2022-01-20 RX ADMIN — PREGABALIN 100 MG: 75 CAPSULE ORAL at 20:35

## 2022-01-20 RX ADMIN — ERGOCALCIFEROL 50000 UNITS: 1.25 CAPSULE, LIQUID FILLED ORAL at 11:47

## 2022-01-20 RX ADMIN — AMANTADINE HYDROCHLORIDE 100 MG: 100 CAPSULE ORAL at 20:35

## 2022-01-20 ASSESSMENT — ACTIVITIES OF DAILY LIVING (ADL)
ORAL_HYGIENE: INDEPENDENT
ADLS_ACUITY_SCORE: 7
DRESS: SCRUBS (BEHAVIORAL HEALTH);INDEPENDENT
ADLS_ACUITY_SCORE: 7
ADLS_ACUITY_SCORE: 7
HYGIENE/GROOMING: INDEPENDENT
ADLS_ACUITY_SCORE: 7
ADLS_ACUITY_SCORE: 7

## 2022-01-20 NOTE — PLAN OF CARE
"Problem: Behavioral Health Plan of Care  Goal: Patient-Specific Goal (Individualization)  Description: Patient will be free from self harm or injury  Patient will eat at least 50% of meals  Patient will attend one group every day.  Outcome: Improving  Note: Pt had a flat affect. He has been isolative and withdrawn to his room all shift. Encouraged group programming, but pt declined. He ate 75% of his supper tonight. He endorsed depression. He denied anxiety, hallucinations, homicidal and suicidal ideation. He was compliant with his scheduled medications aside from his Zyprexa. He stated \"I don't want it. It makes me too groggy.\"     Face to face end of shift report communicated to oncoming RN.      Problem: Violence Risk or Actual  Goal: Anger and Impulse Control  Description: Pt will be able to control his anger during stay  Pt will ask for PRN medications as needed to control his impulses  Outcome: Improving  Note: Pt did not have any anger outbursts tonight. He had controlled behavior.      "

## 2022-01-20 NOTE — PROGRESS NOTES
OT attempted to meet with patient today to complete AMMON or CPT. Patient declined to work with OT and wanted to let his provider know that he would like to 'clean slate' his medications.   OT to attempt again later this week or next week.

## 2022-01-20 NOTE — PLAN OF CARE
Problem: Behavioral Health Plan of Care  Goal: Patient-Specific Goal (Individualization)  Description: Patient will be free from self harm or injury  Patient will eat at least 50% of meals  Patient will attend one group every day.        Outcome: No Change     Problem: Behavior Regulation Impairment (Psychotic Signs/Symptoms)  Goal: Improved Behavioral Control (Psychotic Signs/Symptoms)  Description: Patient will be able to have a reality based conversation.     Outcome: No Change     Problem: Violence Risk or Actual  Goal: Anger and Impulse Control  Description: Pt will be able to control his anger during stay  Pt will ask for PRN medications as needed to control his impulses    Outcome: No Change     Face to face shift report received from Le LARKIN. Rounding completed, pt observed.     Pt appeared to sleep most of this shift. Pt did not have any noted episodes of violence this shift.    Face to face report will be communicated to oncsalomón LARKIN.    Kecia Eisenberg RN  1/20/2022  6:22 AM

## 2022-01-20 NOTE — PROGRESS NOTES
[facility] Psychiatric Progress Note     Assessment     Mr. Carter is a 31 year old male with a PMH of schizoaffective disorder, depressive type, catatonia, severe methamphetamine use disorder, alcohol use disorder, and significant TBI at the age of 3 who presented with depression with catatonia being off of Ativan after recently being discharged. This is the patient's 3rd hospitalization in the past roughly two months with substance use complicating his course.     The patient's catatonia improved with resumption of Ativan. Also, amantadine has helped. Tapering down given concerns with misuse in the past, while using amantadine and exploring Namenda as alternative means for long-term management. Wellbutrin was increased to further address depression. Zyprexa increased to further help with depression and any element of psychosis. Exelon has been used off-label for cognition related to TBI's. Held due to concerns for contributing to agitation with monitoring occurring. Might switch to Namenda.     Patient has SPMI with him having deficits in his occupational and social functioning. He has had significant CNS insults with him having multiple TBIs, significant substance abuse over years, and near fatal cardiac arrest from overdose (? Anoxic injury) that have likely led to neuropsychiatric impact making it more challenging for him to recover. His  and  note he has not been the same since. Patient did score a 4.4/5.8 on the ACL, which indicates the patient would need some level of support and could not be alone the whole day safely, needing assistance with such stuff as solving problems, removing any safety hazards. He would likely benefit from assistance with home medication management.    Today: Patient is showing some improvement from last week. Less withdrawn and demonstrating less ambitendency. He is attending groups now and is encouraged to do so. Fatigue is improving with  reduction of Ativan. MRI showed findings consistent with prior TBIs, which the patient has had. He has notable encephalomalacia in his left temporal/occiptal region, along with left anterior frontal lobe. These findings could explain some of his neuropsychiatric symptoms, like reduction in cognitive function and mood/motivational problems. This might also explain the challenge in large improvement in his clinical state. Recommend TBI programming be explored for discharge planning. Encouraging the patient to stay cognitively engaged with reading, puzzles, and groups.    Educated regarding medication indications, risks, benefits, side effects, contraindications and possible interactions. Verbally expressed understanding.      Diagnoses     1. Schizoaffective disorder, depressive type, multiple episodes, currently in acute episode, with catatonia   2. 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 Methamphetamine use disorder, severe  4. Alcohol use disorder, moderate     Plan     Unit 5  Legal Status: Committed     Safety Assessment:    Behavioral Orders   Procedures     Code 1 - Restrict to Unit     Routine Programming     As clinically indicated     Status 15     Every 15 minutes.      Medications:     Outpatient medications continued/changed:     Wellbutrin  mg daily -> 300 mg on 12/24  Lithium 900 mg at bedtime  Zyprexa 10 mg BID prn - > 10 mg at bedtime -> 12.5 mg at bedtime on 1/3 -> Zyprexa 10 mg on 1/20 due to sedation  Lyrica 100 mg TID (initially held but resumed due to benefit for anxiety and pain)  Vitamin D 50,000 international unit(s) weekly    New medications initiated:     Ativan 2 mg QID -> 1.5 mg QID on 1/5 -> 1/2/2 mg on 1/12 -> 1/1/2 mg on 1/15 -> 1 mg TID on 1/17  Amantadine 100 mg BID  Standard unit PRNs    New medications stopped    Exelon 3 mg BID off label for cognition from TBI due to concerns for agitation with plans to possibly switch to Namenda  "    Programming: Patient will be treated in a therapeutic milieu with appropriate individual and group therapies. Education will be provided on diagnoses, medications, and treatments.     Medical diagnoses:      #. Severe malnutrition  - Nutrition following   - Gaining weight   - Ensure on meal trays   - Monitoring     #. Intention tremor bilaterally   - Secondary to lithium  - Monitor  - Conservative management    #. Chronic low back pain  #. Muscle spasms   - Robaxin 1,000 mg TID prn for short term use. Will stop in next couple of days.  - Lidocaine patches prn  - Icy-hot prn   - APAP prn, increased to 1,000 mg   - Recommend referral to PT and PMR on outpatient basis     #. Multiple TBIs  - MRI findings consistent with encephalomalacia in left temporal-occipital region and left anterior frontal lobe.  - Recommend neuropsychological testing as outpatient and possible TBI focused [facility] facility.  - OT to further evaluate    Consult: Nutrition  Labs: Li 0.8 on 1/12  Imaging: MRI recently    Anticipated LOS: 2-4 weeks  Dispo: [facility] vs [facility]     Interim History     The patient notes he feels similar. No worsening. He notes that he stopped his medications recently to \"gain clarity.\" He is not able to explain much of what this means. He then resumed taking his medication because he feels like he should. He notes that he would like to reduce Zyprexa given feeling \"groggy.\" He is not sure if this medication has helped. Instructed patient that reducing it may help with fatigue without worsening psychosis or mood. Patient consents to this change. He is interested in possibly lowering the dose further, which will be considered.     He also notes that he continues to have chronic back pain. It is located in his lower back. Not sure if he injured it. Denies symptoms consistent with neuropathy, weakness, saddle anesthesia, or loss of bowel or bladder. Consents to increasing APAP. Willing to try lidocaine patches again. " "Informed him recommend more short term use of muscle relaxant. Also, made aware that recommend PT and outpatient PMR.    He denies any other current physical concerns. Notes that depression is still problematic, namely anhedonia, which may represent component of negative symptoms.     Medications       amantadine  100 mg Oral BID     buPROPion  300 mg Oral Daily     lithium ER  900 mg Oral At Bedtime     LORazepam  1 mg Oral TID     OLANZapine  10 mg Oral At Bedtime     pregabalin  100 mg Oral TID     vitamin D2  50,000 Units Oral Q7 Days        Allergies     Allergies   Allergen Reactions     Pork Allergy         Psychiatric Examination     /79 (BP Location: Right arm)   Pulse 84   Temp 97.2  F (36.2  C) (Temporal)   Resp 16   Ht 1.829 m (6')   Wt 98.9 kg (218 lb)   SpO2 98%   BMI 29.57 kg/m    Weight is 218 lbs 0 oz  Body mass index is 29.57 kg/m .    Appearance: Alert, oriented, dressed in hospital scrubs, long beard  Attitude: Cooperative   Eye Contact: Fair  Mood: \"Fine\"  Affect: Flat, mood congruent  Speech: Reduction in rate. Normal rhythm   Psychomotor Behavior: No tremor, rigidity, akathisia, or psychomotor retardation. Withdrawal, ambitendency   Thought Process: Logical, concrete   Associations: No loose associations   Thought Content: Denies SI. No SIB. AH and paranoia at times, improved. Poverty of thought.  Insight: Adequate   Judgment: Adequate  Oriented to: Person, place, and time  Attention Span and Concentration: Intact  Recent and Remote Memory: Intact  Language: English with appropriate syntax and vocabulary  Fund of Knowledge: Average  Muscle Strength and Tone: Grossly normal  Gait and Station: Grossly normal    [place] Catatonia Rating Scale   Severity Score (Number of points for items 1 -23) _______1___   Screening Score (Presence or absence of items 1 - 14) ______1_____   Time: 11:00 AM on 1/11/2022   Rater: Dr. Shanelle Schuler Catatonia Rating Scale   Severity Score " (Number of points for items 1 -23) ____4______   Screening Score (Presence or absence of items 1 - 14) ___1________   Time: 1145AM on 1/18  Rater: Dr. Timmons     Labs     No results found for this or any previous visit (from the past 24 hour(s)).     Attestation      Patient has been seen and evaluated by:    David Timmons DO, MA  Psychiatrist    VIDEO VISIT    Patient has given verbal consent for video visit?: Yes     Video- Visit Details  Type of service:  video visit for mental health treatment.  Time of service:    Date:  01/20/2022    Video Start Time: 1230PM      Video End Time: 1245PM    Reason for video visit: COVID-19 [place]  Originating Site (patient location):  [facility]  Distant Site (provider location):  Remote location  Mode of Communication:  Video Conference via [employer]

## 2022-01-20 NOTE — PLAN OF CARE
Face to face shift report received from Nurse. Rounding completed, pt observed.       Problem: Behavioral Health Plan of Care  Goal: Patient-Specific Goal (Individualization)  Description: Patient will be free from self harm or injury  Patient will eat at least 50% of meals  Patient will attend one group every day.        Outcome: Improving  Note:  Pt Denies SI, HI,  Hallucinations, endorses depression and anxiety do the the length of hospitalization and holds do to his conditions. Also does not have any self harm or injury.     Pt Requested Tylenol and methocarbamol for back pain 0915     Pt Attended and participated In 2 groups today.     Pt Declined his OT apointment    Pt is medication compliant     Nuno DARBY from [facility] will call and follow  up for appointments.         Face to face report will be communicated to oncoming RN.    Trinidad Hurley RN  1/20/2022  7:33 AM

## 2022-01-21 PROCEDURE — 99232 SBSQ HOSP IP/OBS MODERATE 35: CPT | Mod: 95 | Performed by: STUDENT IN AN ORGANIZED HEALTH CARE EDUCATION/TRAINING PROGRAM

## 2022-01-21 PROCEDURE — 124N000004

## 2022-01-21 PROCEDURE — 250N000013 HC RX MED GY IP 250 OP 250 PS 637: Performed by: NURSE PRACTITIONER

## 2022-01-21 PROCEDURE — 250N000013 HC RX MED GY IP 250 OP 250 PS 637: Performed by: STUDENT IN AN ORGANIZED HEALTH CARE EDUCATION/TRAINING PROGRAM

## 2022-01-21 RX ADMIN — AMANTADINE HYDROCHLORIDE 100 MG: 100 CAPSULE ORAL at 20:51

## 2022-01-21 RX ADMIN — ACETAMINOPHEN 975 MG: 325 TABLET, FILM COATED ORAL at 12:46

## 2022-01-21 RX ADMIN — OLANZAPINE 10 MG: 10 TABLET, ORALLY DISINTEGRATING ORAL at 05:20

## 2022-01-21 RX ADMIN — PREGABALIN 100 MG: 75 CAPSULE ORAL at 13:21

## 2022-01-21 RX ADMIN — PREGABALIN 100 MG: 75 CAPSULE ORAL at 20:50

## 2022-01-21 RX ADMIN — LITHIUM CARBONATE 900 MG: 450 TABLET, EXTENDED RELEASE ORAL at 20:50

## 2022-01-21 RX ADMIN — LORAZEPAM 1 MG: 1 TABLET ORAL at 00:51

## 2022-01-21 RX ADMIN — ACETAMINOPHEN 975 MG: 325 TABLET, FILM COATED ORAL at 00:51

## 2022-01-21 RX ADMIN — LORAZEPAM 1 MG: 1 TABLET ORAL at 08:08

## 2022-01-21 RX ADMIN — BUPROPION HYDROCHLORIDE 300 MG: 300 TABLET, EXTENDED RELEASE ORAL at 08:08

## 2022-01-21 RX ADMIN — PREGABALIN 100 MG: 75 CAPSULE ORAL at 08:08

## 2022-01-21 RX ADMIN — OLANZAPINE 10 MG: 10 TABLET, FILM COATED ORAL at 22:50

## 2022-01-21 RX ADMIN — AMANTADINE HYDROCHLORIDE 100 MG: 100 CAPSULE ORAL at 08:09

## 2022-01-21 RX ADMIN — LORAZEPAM 1 MG: 1 TABLET ORAL at 13:22

## 2022-01-21 ASSESSMENT — ACTIVITIES OF DAILY LIVING (ADL)
ADLS_ACUITY_SCORE: 7
ORAL_HYGIENE: INDEPENDENT
ADLS_ACUITY_SCORE: 7
HYGIENE/GROOMING: INDEPENDENT
ADLS_ACUITY_SCORE: 7
ADLS_ACUITY_SCORE: 7
LAUNDRY: UNABLE TO COMPLETE
ADLS_ACUITY_SCORE: 7
ORAL_HYGIENE: INDEPENDENT
LAUNDRY: UNABLE TO COMPLETE
ADLS_ACUITY_SCORE: 7
DRESS: SCRUBS (BEHAVIORAL HEALTH)
ADLS_ACUITY_SCORE: 7
HYGIENE/GROOMING: INDEPENDENT
ADLS_ACUITY_SCORE: 7
DRESS: SCRUBS (BEHAVIORAL HEALTH);INDEPENDENT
ADLS_ACUITY_SCORE: 7

## 2022-01-21 NOTE — PLAN OF CARE
Pt had their screening for [facility] this morning. Pt was unable to complete but would like to try again.     This writer will reschedule with Holly.

## 2022-01-21 NOTE — PLAN OF CARE
Face to face shift report received from Nurse. Rounding completed, pt observed.       Problem: Behavioral Health Plan of Care  Goal: Patient-Specific Goal (Individualization)  Description: Patient will be free from self harm or injury  Patient will eat at least 50% of meals  Patient will attend one group every day.    Observed Pt. Laying in bed / Eyes Closed / Noted Breathing without difficulty.     Pt spoke to [facility] on the phone     Pt up and walking around today.        Outcome: Improving  Note:       Pt Denies SI, HI,  Hallucinations, depression and anxiety (do the the length of hospitalization and holds do to his conditions). Also dos not have any self harm or injury. Pt Mood withdrawn.    Pt has not attended and participated any Groups so far today.    Pt. Has attended  And participated in one group today.     Face to face report will be communicated to oncoming RN.    Trinidad Hurley RN  1/21/2022  7:22 AM

## 2022-01-21 NOTE — PROGRESS NOTES
"CLINICAL NUTRITION SERVICES  -  REASSESSMENT NOTE    Stevenhever Carter     31 yom admitted for catatonia. Pt has a hx of methamphetamine use, alcohol use disorder, schizoaffective disorder, TBI at age of 3. Intake continues to be variable, appears suboptimal overall. )% intake for more meals the past couple days. Said he was fasting last evening when he didn't eat supper, but did have a snack later. Appears to be taking in adequate fluids. No new weight since last review    Diet Order: Regular, kitchen still sending Ensure  Intake: 8 meals with 0-75% intake since last review (4 meals with 0% intake)      Height: 6' 0\"  Weight: 218 lbs 0 oz  Body mass index is 29.57 kg/m .  Weight Status:  Overweight BMI 25-29.9  Weight History:  Max IBW- 83.9kg , admit weight 96.4kg (212 lb 9.6 oz)  Wt Readings from Last 10 Encounters:   12/16/21 96.4 kg (212 lb 9.6 oz)   12/14/21 97.7 kg (215 lb 4.8 oz)   12/01/21 97.8 kg (215 lb 11.2 oz)   10/31/21 105.1 kg (231 lb 11.2 oz)   03/27/21 105.2 kg (232 lb)   11/30/15 96.6 kg (213 lb)   08/18/14 94.5 kg (208 lb 6.4 oz)   08/15/14 93.4 kg (206 lb)   07/19/14 102.1 kg (225 lb)      83.9kg- max ibw  Estimated Energy Needs: 6009-9987 kcals (25-30 Kcal/Kg)   Estimated Protein Needs:  grams protein (1-1.2 g pro/Kg)     Malnutrition Diagnosis: severe malnutrition   In Context of:  Chronic illness or disease, Environmental or social circumstances     NUTRITION RECOMMENDATIONS  - Continue to encourage intake with meals and snacks  - Continue to send Ensure on meal trays.    - Monitor weight     MONITORING AND EVALUATION:  RD will monitor intake, weight, labs         "

## 2022-01-21 NOTE — PROGRESS NOTES
[facility] Psychiatric Progress Note     Assessment     Mr. Carter is a 31 year old male with a PMH of schizoaffective disorder, depressive type, catatonia, severe methamphetamine use disorder, alcohol use disorder, and significant TBI at the age of 3 who presented with depression with catatonia being off of Ativan after recently being discharged. This is the patient's 3rd hospitalization in the past roughly two months with substance use complicating his course.     The patient's catatonia improved with resumption of Ativan. Also, amantadine has helped. Tapering down given concerns with misuse in the past, while using amantadine and exploring Namenda as alternative means for long-term management. Wellbutrin was increased to further address depression. Zyprexa increased to further help with depression and any element of psychosis. Exelon has been used off-label for cognition related to TBI's. Held due to concerns for contributing to agitation with monitoring occurring. Might switch to Namenda.     Patient has SPMI with him having deficits in his occupational and social functioning. He has had significant CNS insults with him having multiple TBIs, significant substance abuse over years, and near fatal cardiac arrest from overdose (? Anoxic injury) that have likely led to neuropsychiatric impact making it more challenging for him to recover. His  and  note he has not been the same since. Patient did score a 4.4/5.8 on the ACL, which indicates the patient would need some level of support and could not be alone the whole day safely, needing assistance with such stuff as solving problems, removing any safety hazards. He would likely benefit from assistance with home medication management.    Today: Patient is showing some improvement from last week. Less withdrawn and demonstrating less ambitendency. He is attending groups now and is encouraged to do so. Fatigue is improving with  reduction of Ativan. MRI showed findings consistent with prior TBIs, which the patient has had. He has notable encephalomalacia in his left temporal/occiptal region, along with left anterior frontal lobe. These findings could explain some of his neuropsychiatric symptoms, like reduction in cognitive function and mood/motivational problems. This might also explain the challenge in large improvement in his clinical state. Recommend TBI programming be explored for discharge planning. Encouraging the patient to stay cognitively engaged with reading, puzzles, and groups. Encouraging the patient to continue using medications with plan to trial Namenda off-label on Monday.    Educated regarding medication indications, risks, benefits, side effects, contraindications and possible interactions. Verbally expressed understanding.      Diagnoses     1. Schizoaffective disorder, depressive type, multiple episodes, currently in acute episode, with catatonia   2. 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 Methamphetamine use disorder, severe  4. Alcohol use disorder, moderate     Plan     Unit 5  Legal Status: Committed     Safety Assessment:    Behavioral Orders   Procedures     Code 1 - Restrict to Unit     Routine Programming     As clinically indicated     Status 15     Every 15 minutes.      Medications:     Outpatient medications continued/changed:     Wellbutrin  mg daily -> 300 mg on 12/24  Lithium 900 mg at bedtime  Zyprexa 10 mg BID prn - > 10 mg at bedtime -> 12.5 mg at bedtime on 1/3 -> Zyprexa 10 mg on 1/20 due to sedation  Lyrica 100 mg TID (initially held but resumed due to benefit for anxiety and pain)  Vitamin D 50,000 international unit(s) weekly    New medications initiated:     Ativan 2 mg QID -> 1.5 mg QID on 1/5 -> 1/2/2 mg on 1/12 -> 1/1/2 mg on 1/15 -> 1 mg TID on 1/17  Amantadine 100 mg BID  Standard unit PRNs    New medications stopped    Exelon 3 mg BID off label  "for cognition from TBI due to concerns for agitation with plans to possibly switch to Namenda     Programming: Patient will be treated in a therapeutic milieu with appropriate individual and group therapies. Education will be provided on diagnoses, medications, and treatments.     Medical diagnoses:      #. Severe malnutrition  - Nutrition following   - Gaining weight   - Ensure on meal trays   - Monitoring     #. Intention tremor bilaterally   - Secondary to lithium  - Monitor  - Conservative management    #. Chronic low back pain  #. Muscle spasms   - Robaxin 1,000 mg TID prn for short term use. Will stop in next couple of days.  - Lidocaine patches prn  - Icy-hot prn   - APAP prn, increased to 1,000 mg   - Recommend referral to PT and PMR on outpatient basis     #. Multiple TBIs  - MRI findings consistent with encephalomalacia in left temporal-occipital region and left anterior frontal lobe.  - Recommend neuropsychological testing as outpatient and possible TBI focused [facility] facility.  - OT to further evaluate    Consult: Nutrition  Labs: Li 0.8 on 1/12  Imaging: MRI recently    Anticipated LOS: 2-4 weeks  Dispo: [facility] vs [facility]     Interim History     The patient notes he feels similar. No worsening. He notes that he has been refusing his medications at times because \"he feels he will get out of the hospital sooner.\" Attempted to understand this rationale. Explained to the patient that not taking medication will likely make the hospitalization longer. He understands and will continue them. Will continue Zyprexa at 10 mg. Does not feel as sedated. No new physical concerns. Will monitor back pain. No concerns entering the weekend. Encouraging to move and be cognitively active.     Medications       amantadine  100 mg Oral BID     buPROPion  300 mg Oral Daily     lithium ER  900 mg Oral At Bedtime     LORazepam  1 mg Oral TID     OLANZapine  10 mg Oral At Bedtime     pregabalin  100 mg Oral TID     vitamin D2  " "50,000 Units Oral Q7 Days        Allergies     Allergies   Allergen Reactions     Pork Allergy         Psychiatric Examination     /79 (BP Location: Left arm)   Pulse 60   Temp 98  F (36.7  C) (Temporal)   Resp 16   Ht 1.829 m (6')   Wt 98.9 kg (218 lb)   SpO2 95%   BMI 29.57 kg/m    Weight is 218 lbs 0 oz  Body mass index is 29.57 kg/m .    Appearance: Alert, oriented, dressed in hospital scrubs, long beard  Attitude: Cooperative   Eye Contact: Fair  Mood: \"Ok\"  Affect: Flat, mood congruent  Speech: Reduction in rate. Normal rhythm   Psychomotor Behavior: No tremor, rigidity, akathisia, or psychomotor retardation. Withdrawal, ambitendency   Thought Process: Logical, concrete   Associations: No loose associations   Thought Content: Denies SI. No SIB. AH and paranoia at times, improved. Poverty of thought.  Insight: Adequate   Judgment: Adequate  Oriented to: Person, place, and time  Attention Span and Concentration: Intact  Recent and Remote Memory: Intact  Language: English with appropriate syntax and vocabulary  Fund of Knowledge: Average  Muscle Strength and Tone: Grossly normal  Gait and Station: Grossly normal    Pineda-[place]ville Catatonia Rating Scale   Severity Score (Number of points for items 1 -23) _______1___   Screening Score (Presence or absence of items 1 - 14) ______1_____   Time: 11:00 AM on 1/11/2022   Rater: Dr. Timmons    Pineda-[place] Catatonia Rating Scale   Severity Score (Number of points for items 1 -23) ____4______   Screening Score (Presence or absence of items 1 - 14) ___1________   Time: 1145AM on 1/18  Rater: Dr. Timmons     Labs     No results found for this or any previous visit (from the past 24 hour(s)).     Attestation      Patient has been seen and evaluated by:    David Timmons DO, MA  Psychiatrist    VIDEO VISIT    Patient has given verbal consent for video visit?: Yes     Video- Visit Details  Type of service:  video visit for mental health treatment.  Time of " service:    Date:  01/21/2022    Video Start Time: 1145PM      Video End Time: 1200PM    Reason for video visit: COVID-19 [place]  Originating Site (patient location):  [facility]  Distant Site (provider location):  Remote location  Mode of Communication:  Video Conference via [employer]

## 2022-01-21 NOTE — PROGRESS NOTES
"Spoke with pt about completing AMMON or CPT with OT today, pt declines.  Explained reasoning for OT order.  When asked pt why he did not want to complete assessment pt states \"I dont know\" and lays back down in bed.  Will continue to attempt as able.    "

## 2022-01-21 NOTE — PLAN OF CARE
"Problem: Behavioral Health Plan of Care  Goal: Patient-Specific Goal (Individualization)  Description: Patient will be free from self harm or injury  Patient will eat at least 50% of meals  Patient will attend one group every day.  Outcome: No Change  Note: Pt observed sitting on the bed or floor propped up against the bed prior to supper. He did not participate in any group programming, but did sit at a table in the lounge to watch TV on 3 different occasions tonight. Pt informed writer that he wants to go off of his current medication regimen because \"they are not helping.\" When pt was asked about areas he continues to struggle with or symptoms he is experiencing, he stated \"I don't know. I'll just talk to Dr. Timmons about it.\" He endorsed depression; however, denied anxiety, hallucinations, homicidal and suicidal ideation. Pt refused supper tonight and verbalized that he is \"fasting.\" He has had adequate fluid intake throughout the shift. He did eat a snack. He showered tonight. He refused his scheduled Ativan and Zyprexa tonight.     2036 - He requested and received 1,000 mg PRN Robaxin for back pain.     Face to face end of shift report communicated to oncoming RN.     Problem: Violence Risk or Actual  Goal: Anger and Impulse Control  Description: Pt will be able to control his anger during stay  Pt will ask for PRN medications as needed to control his impulses  Outcome: Improving  Note: Pt had controlled behavior.      "

## 2022-01-21 NOTE — PLAN OF CARE
Problem: Behavioral Health Plan of Care  Goal: Patient-Specific Goal (Individualization)  Description: Patient will be free from self harm or injury  Patient will eat at least 50% of meals  Patient will attend one group every day.        Outcome: No Change     Problem: Violence Risk or Actual  Goal: Anger and Impulse Control  Description: Pt will be able to control his anger during stay  Pt will ask for PRN medications as needed to control his impulses    Outcome: No Change     Face to face shift report received from Le LARKIN. Rounding completed, pt observed.     Pt awake at beginning of this shift. Pt spends time pacing. Pt to nurses station twice asking for his Ativan that he refused at bedtime but then decides he doesn't want it and walks away. Pt back to nurses station and given Tylenol 975 mg for back pain at which time writer was able to get pt to take the missed dose of Ativan 1mg- given at 0051. Pt appeared to sleep from 0115 rounds to 0300 rounds. Pt given Zyprexa 10 mg at 0520.    Face to face report will be communicated to oncoming RN.    Kecia Eisenberg RN  1/21/2022  6:01 AM

## 2022-01-22 LAB — SARS-COV-2 RNA RESP QL NAA+PROBE: NEGATIVE

## 2022-01-22 PROCEDURE — 250N000013 HC RX MED GY IP 250 OP 250 PS 637: Performed by: NURSE PRACTITIONER

## 2022-01-22 PROCEDURE — 250N000013 HC RX MED GY IP 250 OP 250 PS 637: Performed by: STUDENT IN AN ORGANIZED HEALTH CARE EDUCATION/TRAINING PROGRAM

## 2022-01-22 PROCEDURE — U0003 INFECTIOUS AGENT DETECTION BY NUCLEIC ACID (DNA OR RNA); SEVERE ACUTE RESPIRATORY SYNDROME CORONAVIRUS 2 (SARS-COV-2) (CORONAVIRUS DISEASE [COVID-19]), AMPLIFIED PROBE TECHNIQUE, MAKING USE OF HIGH THROUGHPUT TECHNOLOGIES AS DESCRIBED BY CMS-2020-01-R: HCPCS | Performed by: NURSE PRACTITIONER

## 2022-01-22 PROCEDURE — 124N000004

## 2022-01-22 RX ADMIN — LITHIUM CARBONATE 900 MG: 450 TABLET, EXTENDED RELEASE ORAL at 21:59

## 2022-01-22 RX ADMIN — PREGABALIN 100 MG: 75 CAPSULE ORAL at 14:59

## 2022-01-22 RX ADMIN — ACETAMINOPHEN 975 MG: 325 TABLET, FILM COATED ORAL at 11:33

## 2022-01-22 RX ADMIN — BUPROPION HYDROCHLORIDE 300 MG: 300 TABLET, EXTENDED RELEASE ORAL at 08:19

## 2022-01-22 RX ADMIN — LORAZEPAM 1 MG: 1 TABLET ORAL at 14:59

## 2022-01-22 RX ADMIN — PREGABALIN 100 MG: 75 CAPSULE ORAL at 08:19

## 2022-01-22 RX ADMIN — AMANTADINE HYDROCHLORIDE 100 MG: 100 CAPSULE ORAL at 08:21

## 2022-01-22 RX ADMIN — AMANTADINE HYDROCHLORIDE 100 MG: 100 CAPSULE ORAL at 21:59

## 2022-01-22 RX ADMIN — PREGABALIN 100 MG: 75 CAPSULE ORAL at 21:59

## 2022-01-22 RX ADMIN — LORAZEPAM 1 MG: 1 TABLET ORAL at 08:21

## 2022-01-22 RX ADMIN — LORAZEPAM 1 MG: 1 TABLET ORAL at 21:59

## 2022-01-22 ASSESSMENT — ACTIVITIES OF DAILY LIVING (ADL)
ADLS_ACUITY_SCORE: 7
ADLS_ACUITY_SCORE: 7
HYGIENE/GROOMING: INDEPENDENT
ADLS_ACUITY_SCORE: 7
ADLS_ACUITY_SCORE: 7
HYGIENE/GROOMING: INDEPENDENT
ADLS_ACUITY_SCORE: 7
DRESS: SCRUBS (BEHAVIORAL HEALTH);INDEPENDENT
ADLS_ACUITY_SCORE: 7
LAUNDRY: UNABLE TO COMPLETE
LAUNDRY: UNABLE TO COMPLETE
DRESS: SCRUBS (BEHAVIORAL HEALTH);INDEPENDENT
ADLS_ACUITY_SCORE: 7
ORAL_HYGIENE: INDEPENDENT
ADLS_ACUITY_SCORE: 7
ORAL_HYGIENE: INDEPENDENT

## 2022-01-22 NOTE — PLAN OF CARE
Problem: Behavioral Health Plan of Care  Goal: Patient-Specific Goal (Individualization)  Description: Patient will be free from self harm or injury  Patient will eat at least 50% of meals  Patient will attend one group every day.        Outcome: No Change  Note:        Problem: Behavior Regulation Impairment (Psychotic Signs/Symptoms)  Goal: Improved Behavioral Control (Psychotic Signs/Symptoms)  Description: Patient will be able to have a reality based conversation.     Outcome: No Change     Problem: Violence Risk or Actual  Goal: Anger and Impulse Control  Description: Pt will be able to control his anger during stay  Pt will ask for PRN medications as needed to control his impulses    Outcome: No Change     Face to face shift report received from Alice LARKIN. Rounding completed, pt observed.     Pt appeared to sleep most of this night shift.    Writer continued cares of pt for day shift. Pt compliant with morning medications this shift. Pt refused afternoon medications. Pt denies SI and has not had any noted episodes of self harm this shift. Pt continues to be isolative and withdrawn. Pt has not had any noted episodes of violence this shift. Encouraged pt to attend groups today. Pt allowed repeat covid swab to be collected this shift.    Face to face report will be communicated to oncoming RN.    Kecia Eisenberg RN  1/22/2022  1:30 PM

## 2022-01-22 NOTE — PLAN OF CARE
"  Problem: Behavioral Health Plan of Care  Goal: Patient-Specific Goal (Individualization)  Description: Patient will be free from self harm or injury  Patient will eat at least 50% of meals  Patient will attend one group every day.        Outcome: No Change  Note: Patient is isolative and withdrawn to his room most of this shift. He has been sitting in his room on the floor for most of the 15 minute safety checks.  He refused dinner.  He only took the beverages off of his tray.  He did eat HS snack (salami sandwich, chips, 6 packs of pepe crackers, 2 packs of saltines, 2 gape juices).  Patient requested his HS medication at 2050.  He refused scheduled olanzapine and lorazepam.  He was somewhat irritable when staff asked why he was refusing some of his medication.  \"Because I just don't want to take them.\" He took the rest of his HS scheduled medications.  His affect is flat.  He did not attend group this shift.   2250:  Patient at nurse's station window asking for HS scheduled olanzapine.  Asked patient if he would also like the HS scheduled lorazepam.  He agreed and refused lorazepam 4 times until he eventually refused to take it. He did take scheduled olanzapine.        Problem: Behavior Regulation Impairment (Psychotic Signs/Symptoms)  Goal: Improved Behavioral Control (Psychotic Signs/Symptoms)  Description: Patient will be able to have a reality based conversation.     Outcome: No Change  Note: Patient offers short responses to assessment questions.       Problem: Violence Risk or Actual  Goal: Anger and Impulse Control  Description: Pt will be able to control his anger during stay  Pt will ask for PRN medications as needed to control his impulses    Outcome: No Change  Note: Patient had no noted anger outbursts this shift.  He is withdrawn and appears irritable.     "

## 2022-01-23 PROCEDURE — 250N000013 HC RX MED GY IP 250 OP 250 PS 637: Performed by: NURSE PRACTITIONER

## 2022-01-23 PROCEDURE — 124N000004

## 2022-01-23 PROCEDURE — 250N000013 HC RX MED GY IP 250 OP 250 PS 637: Performed by: STUDENT IN AN ORGANIZED HEALTH CARE EDUCATION/TRAINING PROGRAM

## 2022-01-23 RX ADMIN — PREGABALIN 100 MG: 75 CAPSULE ORAL at 14:08

## 2022-01-23 RX ADMIN — ACETAMINOPHEN 975 MG: 325 TABLET, FILM COATED ORAL at 07:23

## 2022-01-23 RX ADMIN — PREGABALIN 100 MG: 75 CAPSULE ORAL at 21:24

## 2022-01-23 RX ADMIN — LORAZEPAM 1 MG: 1 TABLET ORAL at 21:25

## 2022-01-23 RX ADMIN — AMANTADINE HYDROCHLORIDE 100 MG: 100 CAPSULE ORAL at 08:24

## 2022-01-23 RX ADMIN — PREGABALIN 100 MG: 75 CAPSULE ORAL at 08:24

## 2022-01-23 RX ADMIN — LORAZEPAM 1 MG: 1 TABLET ORAL at 14:09

## 2022-01-23 RX ADMIN — BUPROPION HYDROCHLORIDE 300 MG: 300 TABLET, EXTENDED RELEASE ORAL at 08:24

## 2022-01-23 RX ADMIN — LORAZEPAM 1 MG: 1 TABLET ORAL at 08:24

## 2022-01-23 RX ADMIN — LITHIUM CARBONATE 900 MG: 450 TABLET, EXTENDED RELEASE ORAL at 21:25

## 2022-01-23 RX ADMIN — AMANTADINE HYDROCHLORIDE 100 MG: 100 CAPSULE ORAL at 21:25

## 2022-01-23 ASSESSMENT — ACTIVITIES OF DAILY LIVING (ADL)
DRESS: INDEPENDENT;SCRUBS (BEHAVIORAL HEALTH)
ADLS_ACUITY_SCORE: 7
HYGIENE/GROOMING: INDEPENDENT
DRESS: SCRUBS (BEHAVIORAL HEALTH);INDEPENDENT
ADLS_ACUITY_SCORE: 7
LAUNDRY: UNABLE TO COMPLETE
ADLS_ACUITY_SCORE: 7
LAUNDRY: UNABLE TO COMPLETE
ADLS_ACUITY_SCORE: 7
ADLS_ACUITY_SCORE: 7
HYGIENE/GROOMING: INDEPENDENT
ADLS_ACUITY_SCORE: 7
ORAL_HYGIENE: INDEPENDENT
ADLS_ACUITY_SCORE: 7
ORAL_HYGIENE: INDEPENDENT
ADLS_ACUITY_SCORE: 7

## 2022-01-23 ASSESSMENT — MIFFLIN-ST. JEOR: SCORE: 1949.62

## 2022-01-23 NOTE — PLAN OF CARE
"WILLIAM BRANDT RN  1/23/2022  7:43 AM  Face to face shift report received from TRAE Mcdonnell. Rounding completed, pt observed.    1420-Isolative and withdrawn to room.  Only ate a few bites of his brownie for lunch.  Flat affect.     0852-Did not sleep last night and denies being tired.  Flat affect, withdrawn, and isolative to room.  Admits his depression is high.  Wants to talk with provider about \"starting with a clean slate\" with his meds.  Denies anxiety, SI, HI, and hallucinations.  Received tylenol for back pain @ 0725.  Pain is currently a 4/10.  After nurse left room, another staff member walked by pt's room and he was crying.  When approached by staff, pt denies crying.  Encouraged pt to talk with staff if he wants to.                Problem: Behavioral Health Plan of Care  Goal: Patient-Specific Goal (Individualization)  Description: Patient will be free from self harm or injury  Patient will eat at least 50% of meals  Patient will attend one group every day.  Outcome: No Change     Problem: Behavior Regulation Impairment (Psychotic Signs/Symptoms)  Goal: Improved Behavioral Control (Psychotic Signs/Symptoms)  Description: Patient will be able to have a reality based conversation.   Outcome: No Change     Problem: Violence Risk or Actual  Goal: Anger and Impulse Control  Description: Pt will be able to control his anger during stay  Pt will ask for PRN medications as needed to control his impulses  Outcome: Improving     Face to face end [facility]            "

## 2022-01-23 NOTE — PLAN OF CARE
Problem: Behavioral Health Plan of Care  Goal: Patient-Specific Goal (Individualization)  Description: Patient will be free from self harm or injury  Patient will eat at least 50% of meals  Patient will attend one group every day.        Outcome: No Change  Note: Patient denies SI, HI, hallucinations. He is isolative and withdrawn to his room this shift.  His affect is flat.  He appears anxious and depressed when talking to staff.    Patient asked if he set up a virtual visit for tonight, which he did not.  He asked to then set one up.  When staff attempted to ask questions necessary to set up the visit, patient became anxious and decided not to et up visit.  This writer talked to patient about the process of setting up virtual visits. He agreed and set up a visit with Christiano tomorrow at 1800.   Patient ate 25% of dinner meal. He ate a sandwich, a cheese stick and grapes.  He has been drinking water this shift.   Patient refused HS scheduled Zyprexa.  He was compliant with all other scheduled HS medications.      Problem: Behavior Regulation Impairment (Psychotic Signs/Symptoms)  Goal: Improved Behavioral Control (Psychotic Signs/Symptoms)  Description: Patient will be able to have a reality based conversation.     Outcome: No Change     Problem: Violence Risk or Actual  Goal: Anger and Impulse Control  Description: Pt will be able to control his anger during stay  Pt will ask for PRN medications as needed to control his impulses    Outcome: Improving  Note: Patient has no noted anger outbursts this shift.

## 2022-01-23 NOTE — PLAN OF CARE
Problem: Behavioral Health Plan of Care  Goal: Patient-Specific Goal (Individualization)  Description: Patient will be free from self harm or injury  Patient will eat at least 50% of meals  Patient will attend one group every day.        Outcome: No Change  Note:        Problem: Behavior Regulation Impairment (Psychotic Signs/Symptoms)  Goal: Improved Behavioral Control (Psychotic Signs/Symptoms)  Description: Patient will be able to have a reality based conversation.     Outcome: No Change     Problem: Violence Risk or Actual  Goal: Anger and Impulse Control  Description: Pt will be able to control his anger during stay  Pt will ask for PRN medications as needed to control his impulses    Outcome: No Change     Face to face shift report received from Alice LARKIN. Rounding completed, pt observed.     Pt appeared to be awake most of the nights. Pt did not have any noted episodes of violence this shift.    Face to face report will be communicated to oncoming RN.    Kecia Eisenberg RN  1/23/2022  6:30 AM

## 2022-01-23 NOTE — PLAN OF CARE
Problem: Behavioral Health Plan of Care  Goal: Patient-Specific Goal (Individualization)  Description: Patient will be free from self harm or injury  Patient will eat at least 50% of meals  Patient will attend one group every day.        Outcome: No Change  Note: Patient has been withdrawn and isolative to his room again this evening.  He ate 1/2 of his soup but refused the remainder of his dinner.  Patient did eat HS snack- peanut butter and jelly sandwich, chips, and 2 apple juices.  He has been out to the lounge to get water a few times this shift.  His affect is flat.  Encourage patient to attend groups and to spend some time in the lounge. He gives minimal answers to assessment questions.  Patient denies SI, HI, hallucinations, pain.    Patient refused scheduled HS Zyprexa.  He did take the rest of his HS medications.      Problem: Behavior Regulation Impairment (Psychotic Signs/Symptoms)  Goal: Improved Behavioral Control (Psychotic Signs/Symptoms)  Description: Patient will be able to have a reality based conversation.     Outcome: No Change  Note: Patient offers little information during assessment.      Problem: Violence Risk or Actual  Goal: Anger and Impulse Control  Description: Pt will be able to control his anger during stay  Pt will ask for PRN medications as needed to control his impulses    Outcome: Improving  Note: Patient had no noted anger outbursts this shift.

## 2022-01-24 PROCEDURE — 250N000013 HC RX MED GY IP 250 OP 250 PS 637: Performed by: STUDENT IN AN ORGANIZED HEALTH CARE EDUCATION/TRAINING PROGRAM

## 2022-01-24 PROCEDURE — 250N000013 HC RX MED GY IP 250 OP 250 PS 637: Performed by: NURSE PRACTITIONER

## 2022-01-24 PROCEDURE — 99232 SBSQ HOSP IP/OBS MODERATE 35: CPT | Mod: 95 | Performed by: STUDENT IN AN ORGANIZED HEALTH CARE EDUCATION/TRAINING PROGRAM

## 2022-01-24 PROCEDURE — 124N000004

## 2022-01-24 RX ORDER — OLANZAPINE 7.5 MG/1
7.5 TABLET, FILM COATED ORAL AT BEDTIME
Status: DISCONTINUED | OUTPATIENT
Start: 2022-01-24 | End: 2022-02-09 | Stop reason: SINTOL

## 2022-01-24 RX ORDER — MEMANTINE HYDROCHLORIDE 5 MG/1
5 TABLET ORAL DAILY
Status: DISCONTINUED | OUTPATIENT
Start: 2022-01-24 | End: 2022-01-25

## 2022-01-24 RX ADMIN — ACETAMINOPHEN 975 MG: 325 TABLET, FILM COATED ORAL at 00:36

## 2022-01-24 RX ADMIN — POLYETHYLENE GLYCOL 3350 17 G: 17 POWDER, FOR SOLUTION ORAL at 22:52

## 2022-01-24 RX ADMIN — AMANTADINE HYDROCHLORIDE 100 MG: 100 CAPSULE ORAL at 20:37

## 2022-01-24 RX ADMIN — LORAZEPAM 1 MG: 1 TABLET ORAL at 15:18

## 2022-01-24 RX ADMIN — METHOCARBAMOL 1000 MG: 500 TABLET, FILM COATED ORAL at 20:37

## 2022-01-24 RX ADMIN — POLYETHYLENE GLYCOL 3350 17 G: 17 POWDER, FOR SOLUTION ORAL at 09:59

## 2022-01-24 RX ADMIN — MEMANTINE 5 MG: 5 TABLET ORAL at 15:18

## 2022-01-24 RX ADMIN — BUPROPION HYDROCHLORIDE 300 MG: 300 TABLET, EXTENDED RELEASE ORAL at 08:37

## 2022-01-24 RX ADMIN — LORAZEPAM 1 MG: 1 TABLET ORAL at 20:37

## 2022-01-24 RX ADMIN — AMANTADINE HYDROCHLORIDE 100 MG: 100 CAPSULE ORAL at 08:37

## 2022-01-24 RX ADMIN — PREGABALIN 100 MG: 75 CAPSULE ORAL at 15:19

## 2022-01-24 RX ADMIN — LORAZEPAM 1 MG: 1 TABLET ORAL at 08:38

## 2022-01-24 RX ADMIN — PREGABALIN 100 MG: 75 CAPSULE ORAL at 08:37

## 2022-01-24 RX ADMIN — LITHIUM CARBONATE 900 MG: 450 TABLET, EXTENDED RELEASE ORAL at 20:37

## 2022-01-24 RX ADMIN — PREGABALIN 100 MG: 75 CAPSULE ORAL at 20:37

## 2022-01-24 ASSESSMENT — ACTIVITIES OF DAILY LIVING (ADL)
ADLS_ACUITY_SCORE: 7
ADLS_ACUITY_SCORE: 7
DRESS: INDEPENDENT
ADLS_ACUITY_SCORE: 7
ADLS_ACUITY_SCORE: 7
HYGIENE/GROOMING: INDEPENDENT
ADLS_ACUITY_SCORE: 7
ORAL_HYGIENE: INDEPENDENT
HYGIENE/GROOMING: INDEPENDENT
ADLS_ACUITY_SCORE: 7
ORAL_HYGIENE: INDEPENDENT
ADLS_ACUITY_SCORE: 7
DRESS: SCRUBS (BEHAVIORAL HEALTH)
ADLS_ACUITY_SCORE: 7
ADLS_ACUITY_SCORE: 7
LAUNDRY: UNABLE TO COMPLETE

## 2022-01-24 NOTE — PROGRESS NOTES
"[facility] Psychiatric Progress Note     Assessment     Mr. Carter is a 31 year old male with a PMH of schizoaffective disorder, depressive type, catatonia, severe methamphetamine use disorder, alcohol use disorder, and significant TBI at the age of 3 who presented with depression with catatonia being off of Ativan after recently being discharged. This is the patient's 3rd hospitalization in the past roughly two months with substance use complicating his course.     The patient's catatonia improved with resumption of Ativan. Also, amantadine has helped. Tapering down given concerns with misuse in the past, while using amantadine and exploring Namenda as alternative means for long-term management. Wellbutrin was increased to further address depression. Zyprexa increased to further help with depression and any element of psychosis. Exelon has been used off-label for cognition related to TBI's. Held due to concerns for contributing to agitation with monitoring occurring. Might switch to Namenda.     Patient has SPMI with him having deficits in his occupational and social functioning. He has had significant CNS insults with him having multiple TBIs, significant substance abuse over years, and near fatal cardiac arrest from overdose (? Anoxic injury) that have likely led to neuropsychiatric impact making it more challenging for him to recover. His  and  note he has not been the same since. Patient did score a 4.4/5.8 on the ACL, which indicates the patient would need some level of support and could not be alone the whole day safely, needing assistance with such stuff as solving problems, removing any safety hazards. He would likely benefit from assistance with home medication management.    Today: Patient is intermittently not taking medications to \"see what works.\" Encouraged him to continue taking them. Will lower Zyprexa to 7.5 mg per request as way to encourage continued use. Also, " will trial Namenda off-label to address catatonia, cognition, negative symptoms, depression, and chronic pain. Patient is indecisive which can be be a sign of ambitendency, depression, and/or negative symptoms. This has made placement challenging, as patient has a hard time committing to [facility] vs [facility]. Anticipate discharge to [facility].    Educated regarding medication indications, risks, benefits, side effects, contraindications and possible interactions. Verbally expressed understanding.      Diagnoses     1. Schizoaffective disorder, depressive type, multiple episodes, currently in acute episode, with catatonia   2. 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 Methamphetamine use disorder, severe  4. Alcohol use disorder, moderate     Plan     Unit 5  Legal Status: Committed     Safety Assessment:    Behavioral Orders   Procedures     Code 1 - Restrict to Unit     Routine Programming     As clinically indicated     Status 15     Every 15 minutes.      Medications:     Outpatient medications continued/changed:     Wellbutrin  mg daily -> 300 mg on 12/24  Lithium 900 mg at bedtime  Zyprexa 10 mg BID prn - > 10 mg at bedtime -> 12.5 mg at bedtime on 1/3 -> Zyprexa 10 mg on 1/20 due to sedation -> 7.5 mg on 1/24 due to patient preference  Lyrica 100 mg TID (initially held but resumed due to benefit for anxiety and pain)  Vitamin D 50,000 international unit(s) weekly    New medications initiated:     Ativan 2 mg QID -> 1.5 mg QID on 1/5 -> 1/2/2 mg on 1/12 -> 1/1/2 mg on 1/15 -> 1 mg TID on 1/17  Amantadine 100 mg BID  Start Namenda 5 mg daily with plan to go to 5 mg BID to 10 mg BID.  Standard unit PRNs    New medications stopped    Exelon 3 mg BID off label for cognition from TBI due to concerns for agitation with plans to possibly switch to Namenda     Programming: Patient will be treated in a therapeutic milieu with appropriate individual and group therapies. Education will be  provided on diagnoses, medications, and treatments.     Medical diagnoses:      #. Severe malnutrition  - Nutrition following   - Gaining weight   - Ensure on meal trays   - Monitoring     #. Intention tremor bilaterally   - Secondary to lithium  - Monitor  - Conservative management    #. Chronic low back pain  #. Muscle spasms   - Robaxin 1,000 mg TID prn for short term use. Will stop in next couple of days.  - Lidocaine patches prn  - Icy-hot prn   - APAP prn, increased to 1,000 mg   - Recommend referral to PT and PMR on outpatient basis     #. Multiple TBIs  - MRI findings consistent with encephalomalacia in left temporal-occipital region and left anterior frontal lobe.  - Recommend neuropsychological testing as outpatient and possible TBI focused IR/[facility] facility.  - OT to further evaluate    Consult: Nutrition  Labs: Li 0.8 on 1/12  Imaging: MRI recently    Anticipated LOS: 2-4 weeks  Dispo: Likely [facility]     Interim History     The patient notes that he feels similar. He does not have good rationale for wanting to go off of Zyprexa, noting that he will continue taking it at 7.5 mg for now. Discussed the importance of this medication and recommendation to continue for psychotic symptoms. He will consider this. Patient is interested in further treatments with Namenda discussed off-label. Patient consents after discussing B/R/SE/alternatives, including antidepressants, sedation, GI side effects, confusion, dizziness, and agitation.    The patient denies any current physical concerns. He notes he would like to leave today but then discussed understanding he is probably going to an IR or [facility]. Patient now would like to go to [facility].     Medications       amantadine  100 mg Oral BID     buPROPion  300 mg Oral Daily     lithium ER  900 mg Oral At Bedtime     LORazepam  1 mg Oral TID     memantine  5 mg Oral Daily     OLANZapine  7.5 mg Oral At Bedtime     pregabalin  100 mg Oral TID     vitamin D2  50,000 Units  "Oral Q7 Days        Allergies     Allergies   Allergen Reactions     Pork Allergy         Psychiatric Examination     /93   Pulse 84   Temp 98.6  F (37  C) (Tympanic)   Resp 16   Ht 1.829 m (6')   Wt 96.7 kg (213 lb 1.6 oz)   SpO2 98%   BMI 28.90 kg/m    Weight is 213 lbs 1.6 oz  Body mass index is 28.9 kg/m .    Appearance: Alert, oriented, dressed in hospital scrubs, long beard  Attitude: Cooperative   Eye Contact: Fair  Mood: \"Fine\"  Affect: Flat, mood congruent  Speech: Reduction in rate. Normal rhythm   Psychomotor Behavior: No tremor, rigidity, akathisia, or psychomotor retardation. Withdrawal, ambitendency   Thought Process: Logical, concrete   Associations: No loose associations   Thought Content: Denies SI. No SIB. AH and paranoia at times, improved. Poverty of thought.  Insight: Adequate   Judgment: Adequate  Oriented to: Person, place, and time  Attention Span and Concentration: Intact  Recent and Remote Memory: Intact  Language: English with appropriate syntax and vocabulary  Fund of Knowledge: Average  Muscle Strength and Tone: Grossly normal  Gait and Station: Grossly normal    Pineda-Orville Catatonia Rating Scale   Severity Score (Number of points for items 1 -23) _______1___   Screening Score (Presence or absence of items 1 - 14) ______1_____   Time: 11:00 AM on 1/11/2022   Rater: Dr. Timmons    Pineda-Orville Catatonia Rating Scale   Severity Score (Number of points for items 1 -23) ____4______   Screening Score (Presence or absence of items 1 - 14) ___1________   Time: 1145AM on 1/18  Rater: Dr. Timmons     Labs     No results found for this or any previous visit (from the past 24 hour(s)).     Attestation      Patient has been seen and evaluated by:    David Timmons DO, MA  Psychiatrist    VIDEO VISIT    Patient has given verbal consent for video visit?: Yes     Video- Visit Details  Type of service:  video visit for mental health treatment.  Time of service:    Date:  01/24/2022    Video " Start Time: 1100AM      Video End Time: 1115AM    Reason for video visit: COVID-19 [place]  Originating Site (patient location):  [facility]  Distant Site (provider location):  Remote location  Mode of Communication:  Video Conference via [employer]

## 2022-01-24 NOTE — PLAN OF CARE
Problem: Behavioral Health Plan of Care  Goal: Patient-Specific Goal (Individualization)  Description: Patient will be free from self harm or injury  Patient will eat at least 50% of meals  Patient will attend one group every day.        Outcome: Improving     Problem: Violence Risk or Actual  Goal: Anger and Impulse Control  Description: Pt will be able to control his anger during stay  Pt will ask for PRN medications as needed to control his impulses    Outcome: Improving     Face to face shift report received from RN. Rounding completed, pt observed. Client rested in room for 4 hours with eyes closed and respirations noted. Client displayed no verbal or physical aggression this shift. Face to face report will be communicated to oncoming RN.    Didier Brooks RN  1/24/2022  6:23 AM

## 2022-01-24 NOTE — PLAN OF CARE
1518:  Patient at nurse's station window asking for the 1400 medications that he refused earlier.  Administered Namenda 5 mg po, Lyrica 100 mg po, and Ativan 1 mg po.

## 2022-01-24 NOTE — PLAN OF CARE
"  Problem: Violence Risk or Actual  Goal: Anger and Impulse Control  Description: Pt will be able to control his anger during stay  Pt will ask for PRN medications as needed to control his impulses    Outcome: Improving   Patient has been in control of violence  and aggression this shift.         Problem: Behavioral Health Plan of Care  Goal: Patient-Specific Goal (Individualization)  Description: Patient will be free from self harm or injury  Patient will eat at least 50% of meals  Patient will attend one group every day.        Outcome: Improving  Note:      Patient has been calm and  cooperative.  He was isolative to his room but did come out to the lounge for water and to get meal trays.  Patient took am medication but refused scheduled 1400 medications.  States \"I don't think I need those.\"  Affect is blunted and flat.  No complaints of pain.  Vs WNL.  Face to face end of shift report communicated to evening shift RN.     Shantal Real RN  1/24/2022  2:09 PM          "

## 2022-01-24 NOTE — PLAN OF CARE
"Face to face shift report received from Shantal ALCARAZ RN. Rounding completed, pt observed.    Problem: Behavioral Health Plan of Care  Goal: Patient-Specific Goal (Individualization)  Description: Patient will be free from self harm or injury  Patient will eat at least 50% of meals  Patient will attend one group every day.        Outcome: No Change  Note: Shift Summery:  Patient was awake in his room at the start of this shift.  Patient has remained withdrawn to his room and spent most of the shift in there.  Patient asks to be discharged which he has done pretty much everyday.  Encouraged patient to take his scheduled medications as if he is inconsistent with taking scheduled medications that he will be hospitalized longer. Took his meal tray but returned it without eating but did take the Ensure.  Patient has been seen getting fluids for himself.  Denies pain or unwanted side effects.  2115;  Patient refused to take scheduled Zyprexa 7.5 mg po stating \"I don't need that\".  Continued to refuse even after much prompting then began writing something in his journal while nurse still in room.  Asked patient to think about it and to let nurse know if he changed his mind.  States that he \"feels too tired in the morning\".  When taking the Zyprexa.  Problem: Behavior Regulation Impairment (Psychotic Signs/Symptoms)  Goal: Improved Behavioral Control (Psychotic Signs/Symptoms)  Description: Patient will be able to have a reality based conversation.     Outcome: Improving     Problem: Violence Risk or Actual  Goal: Anger and Impulse Control  Description: Pt will be able to control his anger during stay  Pt will ask for PRN medications as needed to control his impulses    Outcome: Improving   Face to face report will be communicated to oncoming RN.    Alma Delia Garsia RN  1/24/2022    "

## 2022-01-25 PROCEDURE — 124N000004

## 2022-01-25 PROCEDURE — 250N000013 HC RX MED GY IP 250 OP 250 PS 637: Performed by: STUDENT IN AN ORGANIZED HEALTH CARE EDUCATION/TRAINING PROGRAM

## 2022-01-25 PROCEDURE — 250N000013 HC RX MED GY IP 250 OP 250 PS 637: Performed by: NURSE PRACTITIONER

## 2022-01-25 PROCEDURE — 99232 SBSQ HOSP IP/OBS MODERATE 35: CPT | Performed by: STUDENT IN AN ORGANIZED HEALTH CARE EDUCATION/TRAINING PROGRAM

## 2022-01-25 RX ORDER — MEMANTINE HYDROCHLORIDE 5 MG/1
5 TABLET ORAL 2 TIMES DAILY
Status: DISCONTINUED | OUTPATIENT
Start: 2022-01-25 | End: 2022-01-31

## 2022-01-25 RX ADMIN — AMANTADINE HYDROCHLORIDE 100 MG: 100 CAPSULE ORAL at 10:40

## 2022-01-25 RX ADMIN — PREGABALIN 100 MG: 75 CAPSULE ORAL at 10:41

## 2022-01-25 RX ADMIN — PREGABALIN 100 MG: 75 CAPSULE ORAL at 14:51

## 2022-01-25 RX ADMIN — LORAZEPAM 1 MG: 1 TABLET ORAL at 14:52

## 2022-01-25 RX ADMIN — MEMANTINE 5 MG: 5 TABLET ORAL at 10:41

## 2022-01-25 RX ADMIN — BUPROPION HYDROCHLORIDE 300 MG: 300 TABLET, EXTENDED RELEASE ORAL at 10:40

## 2022-01-25 RX ADMIN — LORAZEPAM 1 MG: 1 TABLET ORAL at 10:40

## 2022-01-25 ASSESSMENT — ACTIVITIES OF DAILY LIVING (ADL)
ADLS_ACUITY_SCORE: 7
ADLS_ACUITY_SCORE: 7
ORAL_HYGIENE: INDEPENDENT
ADLS_ACUITY_SCORE: 7
DRESS: INDEPENDENT
ADLS_ACUITY_SCORE: 7
HYGIENE/GROOMING: INDEPENDENT
ADLS_ACUITY_SCORE: 7
LAUNDRY: UNABLE TO COMPLETE
ADLS_ACUITY_SCORE: 7
DRESS: SCRUBS (BEHAVIORAL HEALTH)
ADLS_ACUITY_SCORE: 7
ORAL_HYGIENE: INDEPENDENT
ADLS_ACUITY_SCORE: 7
ADLS_ACUITY_SCORE: 7
HYGIENE/GROOMING: INDEPENDENT
ADLS_ACUITY_SCORE: 7

## 2022-01-25 NOTE — PROGRESS NOTES
"CLINICAL NUTRITION SERVICES  -  REASSESSMENT NOTE    Stevenhever Carter     31 yom admitted for catatonia. Pt has a hx of methamphetamine use, alcohol use disorder, schizoaffective disorder, TBI at age of 3. Intake continues to be variable, appears suboptimal overall. Most meals with 25% intake or less. Weight is back down a few lbs since the last time he was weighed. Weight still within the overweight BMI range.    Diet Order:  Intake: 11 meals with 0-100% intake. Mostly 0-25% intake.      Height: 6' 0\"  Weight: 213 lbs 1.6 oz  Body mass index is 28.9 kg/m .  Weight Status:  Overweight BMI 25-29.9  Weight History:  Max IBW- 83.9kg , admit weight 96.4kg (212 lb 9.6 oz)  Wt Readings from Last 10 Encounters:   12/16/21 96.4 kg (212 lb 9.6 oz)   12/14/21 97.7 kg (215 lb 4.8 oz)   12/01/21 97.8 kg (215 lb 11.2 oz)   10/31/21 105.1 kg (231 lb 11.2 oz)   03/27/21 105.2 kg (232 lb)   11/30/15 96.6 kg (213 lb)   08/18/14 94.5 kg (208 lb 6.4 oz)   08/15/14 93.4 kg (206 lb)   07/19/14 102.1 kg (225 lb)      83.9kg- max ibw  Estimated Energy Needs: 6511-5787 kcals (25-30 Kcal/Kg)   Estimated Protein Needs:  grams protein (1-1.2 g pro/Kg)     Malnutrition Diagnosis: severe malnutrition   In Context of:  Chronic illness or disease, Environmental or social circumstances     NUTRITION RECOMMENDATIONS  - Continue to encourage intake with meals, snacks, supplements  - Monitor weight     MONITORING AND EVALUATION:  RD will monitor intake, weight, labs          "

## 2022-01-25 NOTE — PLAN OF CARE
"  Problem: Violence Risk or Actual  Goal: Anger and Impulse Control  Description: Pt will be able to control his anger during stay  Pt will ask for PRN medications as needed to control his impulses    Outcome: Improving   Patient has been in control of violence and aggression this shift.        Problem: Behavior Regulation Impairment (Psychotic Signs/Symptoms)  Goal: Improved Behavioral Control (Psychotic Signs/Symptoms)  Description: Patient will be able to have a reality based conversation.     Outcome: Improving   Patient is unable to have a linear conversation with staff.  He asked multiple times this shift if he will be discharging.  Spent time with patient discussing medications and offering education.  Patient is struggling to understand teaching and just replies \"ok.\"      Problem: Behavioral Health Plan of Care  Goal: Patient-Specific Goal (Individualization)  Description: Patient will be free from self harm or injury  Patient will eat at least 50% of meals  Patient will attend one group every day.        Outcome: Improving   Patient has been calm, cooperative, and medication compliant this shift.  He initially refused his am medications but came back and asked to take them an hour and a half later.  He was withdrawn to his room much of the morning but did come sit in the lounge for a while when peers were in group.  Affect is blunted and flat.  No complaints of pain.  VS WNL.  Face to face end of shift report communicated to evening shift RN.     Shantal Real RN  1/25/2022  11:42 AM       "

## 2022-01-25 NOTE — PLAN OF CARE
"Face to face shift report received from Shantal ALCARAZ RN. Rounding completed, pt observed.    Problem: Behavioral Health Plan of Care  Goal: Patient-Specific Goal (Individualization)  Description: Patient will be free from self harm or injury  Patient will eat at least 50% of meals  Patient will attend one group every day.        Outcome: No Change  Note: Shift Summery:  Patient was awake sitting on the floor next to his bed at the start of this shift,  Patient declined meal tray.  He did have 2 bottles of Ensure in room with one empty and the other partially gone.  Patient states he \"is fasting\".  States it has been 2 days since he has eaten.  Observed getting coffee and water this shift.  Refused his scheduled HS medications stating \"I don't need them\".  Strong encouraged to take the meds and asked to thinks about it and let nurse know. Patient stated he would think about it. No violence or aggression noted.    Problem: Behavior Regulation Impairment (Psychotic Signs/Symptoms)  Goal: Improved Behavioral Control (Psychotic Signs/Symptoms)  Description: Patient will be able to have a reality based conversation.     Outcome: No Change     Problem: Violence Risk or Actual  Goal: Anger and Impulse Control  Description: Pt will be able to control his anger during stay  Pt will ask for PRN medications as needed to control his impulses    Outcome: Improving   Face to face report will be communicated to oncoming RN.    Alma Delia Garsia RN  1/25/2022      "

## 2022-01-25 NOTE — PLAN OF CARE
Called [facility] for update on pt. [facility] states the pt is not a priority and for the people on that list they are still working on getting people in from [employer] [facility].

## 2022-01-25 NOTE — PLAN OF CARE
Care taken over from Didier CORDOVA RN at 0115  Problem: Behavioral Health Plan of Care  Goal: Patient-Specific Goal (Individualization)  Description: Patient will be free from self harm or injury  Patient will eat at least 50% of meals  Patient will attend one group every day.        1/25/2022 0117 by Alma Delia Garsia, RN  Outcome: No Change  Note: Shift Summery:  Patient in bed at the start of this shift. Awake since 0030.  Did walk in hallway a couple times then has been standing in his bathroom but denies needs at this time. Up and in lounge a couple times for water. Observed lying on his floor at one point during the night but denied anything wrong. Back onto bed but did not appear to sleep at all.  Eyes closed at times during rounds but open on the next.         Face to face report will be communicated to oncoming RN.    Alma Delia Garsia, TRAE  1/25/2022

## 2022-01-25 NOTE — PLAN OF CARE
Problem: Behavioral Health Plan of Care  Goal: Patient-Specific Goal (Individualization)  Description: Patient will be free from self harm or injury  Patient will eat at least 50% of meals  Patient will attend one group every day.        Outcome: Improving     Face to face shift report received from RN. Rounding completed, pt observed.

## 2022-01-25 NOTE — PROGRESS NOTES
"[facility] Psychiatric Progress Note     Assessment     Mr. Carter is a 31 year old male with a PMH of schizoaffective disorder, depressive type, catatonia, severe methamphetamine use disorder, alcohol use disorder, and significant TBI at the age of 3 who presented with depression with catatonia being off of Ativan after recently being discharged. This is the patient's 3rd hospitalization in the past roughly two months with substance use complicating his course.     The patient's catatonia improved with resumption of Ativan. Also, amantadine has helped. Tapering down given concerns with misuse in the past, while using amantadine and exploring Namenda as alternative means for long-term management. Wellbutrin was increased to further address depression. Zyprexa increased to further help with depression and any element of psychosis. Exelon has been used off-label for cognition related to TBI's. Held due to concerns for contributing to agitation with monitoring occurring. Might switch to Namenda.     Patient has SPMI with him having deficits in his occupational and social functioning. He has had significant CNS insults with him having multiple TBIs, significant substance abuse over years, and near fatal cardiac arrest from overdose (? Anoxic injury) that have likely led to neuropsychiatric impact making it more challenging for him to recover. His  and  note he has not been the same since. Patient did score a 4.4/5.8 on the ACL, which indicates the patient would need some level of support and could not be alone the whole day safely, needing assistance with such stuff as solving problems, removing any safety hazards. He would likely benefit from assistance with home medication management.    Today: Patient is intermittently not taking medications to \"see what works.\" Encouraged him to continue taking them. Will lower Zyprexa to 7.5 mg per request as way to encourage continued use. Also, " will trial Namenda off-label to address catatonia, cognition, negative symptoms, depression, and chronic pain. Patient is indecisive which can be be a sign of ambitendency, depression, and/or negative symptoms. He also has has been eating less. His ambitendency makes placement challenging, as patient has a hard time committing to IRTS vs [facility]. Anticipate discharge to [facility].    Educated regarding medication indications, risks, benefits, side effects, contraindications and possible interactions. Verbally expressed understanding.      Diagnoses     1. Schizoaffective disorder, depressive type, multiple episodes, currently in acute episode, with catatonia   2. 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 Methamphetamine use disorder, severe  4. Alcohol use disorder, moderate     Plan     Unit 5  Legal Status: Committed     Safety Assessment:    Behavioral Orders   Procedures     Code 1 - Restrict to Unit     Routine Programming     As clinically indicated     Status 15     Every 15 minutes.      Medications:     Outpatient medications continued/changed:     Wellbutrin  mg daily -> 300 mg on 12/24  Lithium 900 mg at bedtime  Zyprexa 10 mg BID prn - > 10 mg at bedtime -> 12.5 mg at bedtime on 1/3 -> Zyprexa 10 mg on 1/20 due to sedation -> 7.5 mg on 1/24 due to patient preference  Lyrica 100 mg TID (initially held but resumed due to benefit for anxiety and pain)  Vitamin D 50,000 international unit(s) weekly    New medications initiated:     Ativan 2 mg QID -> 1.5 mg QID on 1/5 -> 1/2/2 mg on 1/12 -> 1/1/2 mg on 1/15 -> 1 mg TID on 1/17  Amantadine 100 mg BID  Namenda 5 mg daily -> 5 mg BID on 1/25  Standard unit PRNs    New medications stopped    Exelon 3 mg BID off label for cognition from TBI due to concerns for agitation with plans to possibly switch to Namenda     Programming: Patient will be treated in a therapeutic milieu with appropriate individual and group therapies. Education  "will be provided on diagnoses, medications, and treatments.     Medical diagnoses:      #. Severe malnutrition  - Nutrition following   - Gaining weight   - Ensure on meal trays   - Monitoring     #. Intention tremor bilaterally   - Secondary to lithium  - Monitor  - Conservative management    #. Chronic low back pain  #. Muscle spasms   - Robaxin 1,000 mg TID prn for short term use. Will stop eventually  - Lidocaine patches prn  - Icy-hot prn   - APAP prn, increased to 1,000 mg   - Recommend referral to PT and PMR on outpatient basis     #. Multiple TBIs  - MRI findings consistent with encephalomalacia in left temporal-occipital region and left anterior frontal lobe.  - Recommend neuropsychological testing as outpatient and possible TBI focused IRTS/[facility] facility.  - OT to further evaluate    Consult: Nutrition  Labs: Li 0.8 on 1/12  Imaging: MRI recently    Anticipated LOS: 2-4 weeks  Dispo: Likely [facility]     Interim History     The patient notes that he feels \"60/40\" today. He notes that his mood is similar. He didn't take medications this morning originally because \"he thinks it might get him out of here sooner.\" Discussed how this is inaccurate. The patient also notes that he has not been using Zyprexa because he wants to use it \"prn.\" Discussed how this could lead to worsening of depression and psychosis, with patient then deciding it is fine to use. Will keep scheduled with anticipation that the patient might not use. Will consider alternatives.    Patient denies any diarrhea or GI side effects from Namenda. He would like to continue using. He consents to further increase.    He hopes to be able to leave soon. No acute physical concerns.     Medications       amantadine  100 mg Oral BID     buPROPion  300 mg Oral Daily     lithium ER  900 mg Oral At Bedtime     LORazepam  1 mg Oral TID     memantine  5 mg Oral Daily     OLANZapine  7.5 mg Oral At Bedtime     pregabalin  100 mg Oral TID     vitamin D2  50,000 " "Units Oral Q7 Days        Allergies     Allergies   Allergen Reactions     Pork Allergy         Psychiatric Examination     /81 (BP Location: Left arm)   Pulse 92   Temp 97.8  F (36.6  C) (Temporal)   Resp 16   Ht 1.829 m (6')   Wt 96.7 kg (213 lb 1.6 oz)   SpO2 99%   BMI 28.90 kg/m    Weight is 213 lbs 1.6 oz  Body mass index is 28.9 kg/m .    Appearance: Alert, oriented, dressed in hospital scrubs, long beard  Attitude: Cooperative   Eye Contact: Fair  Mood: \"Ok\"  Affect: Flat, mood congruent  Speech: Reduction in rate. Normal rhythm   Psychomotor Behavior: No tremor, rigidity, akathisia, or psychomotor retardation. Withdrawal, ambitendency   Thought Process: Logical, concrete   Associations: No loose associations   Thought Content: Denies SI. No SIB. AH and paranoia at times, improved. Poverty of thought.  Insight: Adequate   Judgment: Adequate  Oriented to: Person, place, and time  Attention Span and Concentration: Intact  Recent and Remote Memory: Intact  Language: English with appropriate syntax and vocabulary  Fund of Knowledge: Average  Muscle Strength and Tone: Grossly normal  Gait and Station: Grossly normal    [place] Catatonia Rating Scale   Severity Score (Number of points for items 1 -23) _______1___   Screening Score (Presence or absence of items 1 - 14) ______1_____   Time: 11:00 AM on 1/11/2022   Rater: Dr. Timmons    [place] Catatonia Rating Scale   Severity Score (Number of points for items 1 -23) ____4______   Screening Score (Presence or absence of items 1 - 14) ___1________   Time: 1145AM on 1/18  Rater: Dr. Timmons     Labs     No results found for this or any previous visit (from the past 24 hour(s)).     Attestation      Patient has been seen and evaluated by:    David Timmons DO, MA  Psychiatrist                                 "

## 2022-01-26 PROCEDURE — 124N000004

## 2022-01-26 PROCEDURE — 999N000209 HC STATISTIC OT OUTPT VISIT

## 2022-01-26 PROCEDURE — 250N000013 HC RX MED GY IP 250 OP 250 PS 637: Performed by: NURSE PRACTITIONER

## 2022-01-26 PROCEDURE — 250N000013 HC RX MED GY IP 250 OP 250 PS 637: Performed by: STUDENT IN AN ORGANIZED HEALTH CARE EDUCATION/TRAINING PROGRAM

## 2022-01-26 PROCEDURE — 99233 SBSQ HOSP IP/OBS HIGH 50: CPT | Performed by: STUDENT IN AN ORGANIZED HEALTH CARE EDUCATION/TRAINING PROGRAM

## 2022-01-26 RX ADMIN — LITHIUM CARBONATE 900 MG: 450 TABLET, EXTENDED RELEASE ORAL at 20:19

## 2022-01-26 RX ADMIN — LORAZEPAM 1 MG: 1 TABLET ORAL at 13:12

## 2022-01-26 RX ADMIN — MEMANTINE 5 MG: 5 TABLET ORAL at 20:20

## 2022-01-26 RX ADMIN — PREGABALIN 100 MG: 75 CAPSULE ORAL at 20:19

## 2022-01-26 RX ADMIN — AMANTADINE HYDROCHLORIDE 100 MG: 100 CAPSULE ORAL at 20:20

## 2022-01-26 RX ADMIN — PREGABALIN 100 MG: 75 CAPSULE ORAL at 13:12

## 2022-01-26 RX ADMIN — OLANZAPINE 7.5 MG: 7.5 TABLET ORAL at 20:20

## 2022-01-26 RX ADMIN — LORAZEPAM 1.5 MG: 1 TABLET ORAL at 20:19

## 2022-01-26 ASSESSMENT — ACTIVITIES OF DAILY LIVING (ADL)
ADLS_ACUITY_SCORE: 7
ORAL_HYGIENE: INDEPENDENT
ADLS_ACUITY_SCORE: 7
DRESS: SCRUBS (BEHAVIORAL HEALTH)
ADLS_ACUITY_SCORE: 7
ADLS_ACUITY_SCORE: 7
LAUNDRY: UNABLE TO COMPLETE
HYGIENE/GROOMING: INDEPENDENT
ADLS_ACUITY_SCORE: 7

## 2022-01-26 NOTE — PROGRESS NOTES
OT attempted x 3 to meet with patient today to attempt to complete AMMON. Patient did note today to check in with him later and on 3rd attempt stated that he will work with OT tomorrow after breakfast. OT will attempt again tomorrow and if patient declines will place patient on hold.

## 2022-01-26 NOTE — PLAN OF CARE
Face to face report received from Alma Delia LARKIN. Pt. Observed.     Problem: Behavioral Health Plan of Care  Goal: Patient-Specific Goal (Individualization)  Description: Patient will be free from self harm or injury  Patient will eat at least 50% of meals  Patient will attend one group every day.      Outcome: No Change   Pt has been in bed with eyes closed and regular respirations for a total of 2.5 hours this noc shift. Pt. Unable to stay asleep. 15 minute and PRN checks all night. No complaints offered. Will continue to monitor.    Face to face end of shift report to be communicated to oncoming RN.     Liat Lai RN  1/26/2022

## 2022-01-26 NOTE — PLAN OF CARE
"  Problem: Behavioral Health Plan of Care  Goal: Patient-Specific Goal (Individualization)  Description: Patient will be free from self harm or injury  Patient will eat at least 50% of meals  Patient will attend one group every day.        Note: Patient laying in bed awake and resting almost entire shift. Refuses scheduled medications and declines breakfast, despite staff encouragements.   Patient appears disheveled and room is untidy, refusing numerous shower encouragements.   Patient also refuses lunch this shift. Initially takes meal tray but then quickly brings it back. Reports being \"a little hungry\" but states, \"In can wait until dinner\".   Only coming out to lounge for a short period this afternoon. Denies all assessment criteria and asks writer when he is able to discharge. Writer again encouraged patient to eat, take medications and talk to provider but patient walked away without responding.        Problem: Behavior Regulation Impairment (Psychotic Signs/Symptoms)  Goal: Improved Behavioral Control (Psychotic Signs/Symptoms)  Description: Patient will be able to have a reality based conversation.     Note: Continue to monitor at this time.      Problem: Violence Risk or Actual  Goal: Anger and Impulse Control  Description: Pt will be able to control his anger during stay  Pt will ask for PRN medications as needed to control his impulses    Note: Continue to monitor at this time.      Face to face end of shift report communicated to oncsalomón LARKIN.     Stephanie Pearson RN  1/26/2022  8:06 AM       "

## 2022-01-27 PROCEDURE — 99232 SBSQ HOSP IP/OBS MODERATE 35: CPT | Performed by: STUDENT IN AN ORGANIZED HEALTH CARE EDUCATION/TRAINING PROGRAM

## 2022-01-27 PROCEDURE — 250N000013 HC RX MED GY IP 250 OP 250 PS 637: Performed by: STUDENT IN AN ORGANIZED HEALTH CARE EDUCATION/TRAINING PROGRAM

## 2022-01-27 PROCEDURE — 124N000004

## 2022-01-27 PROCEDURE — 999N000209 HC STATISTIC OT OUTPT VISIT

## 2022-01-27 PROCEDURE — 250N000013 HC RX MED GY IP 250 OP 250 PS 637: Performed by: NURSE PRACTITIONER

## 2022-01-27 RX ADMIN — PREGABALIN 100 MG: 75 CAPSULE ORAL at 20:01

## 2022-01-27 RX ADMIN — OLANZAPINE 7.5 MG: 7.5 TABLET ORAL at 20:01

## 2022-01-27 RX ADMIN — AMANTADINE HYDROCHLORIDE 100 MG: 100 CAPSULE ORAL at 09:13

## 2022-01-27 RX ADMIN — LITHIUM CARBONATE 900 MG: 450 TABLET, EXTENDED RELEASE ORAL at 20:01

## 2022-01-27 RX ADMIN — LORAZEPAM 1.5 MG: 1 TABLET ORAL at 20:01

## 2022-01-27 RX ADMIN — PREGABALIN 100 MG: 75 CAPSULE ORAL at 13:22

## 2022-01-27 RX ADMIN — LORAZEPAM 1.5 MG: 1 TABLET ORAL at 13:22

## 2022-01-27 RX ADMIN — PREGABALIN 100 MG: 75 CAPSULE ORAL at 09:13

## 2022-01-27 RX ADMIN — AMANTADINE HYDROCHLORIDE 100 MG: 100 CAPSULE ORAL at 20:01

## 2022-01-27 RX ADMIN — BUPROPION HYDROCHLORIDE 300 MG: 300 TABLET, EXTENDED RELEASE ORAL at 09:13

## 2022-01-27 RX ADMIN — MEMANTINE 5 MG: 5 TABLET ORAL at 20:01

## 2022-01-27 RX ADMIN — LORAZEPAM 1.5 MG: 1 TABLET ORAL at 09:13

## 2022-01-27 RX ADMIN — MEMANTINE 5 MG: 5 TABLET ORAL at 09:13

## 2022-01-27 RX ADMIN — ERGOCALCIFEROL 50000 UNITS: 1.25 CAPSULE, LIQUID FILLED ORAL at 13:22

## 2022-01-27 ASSESSMENT — ACTIVITIES OF DAILY LIVING (ADL)
ADLS_ACUITY_SCORE: 7
LAUNDRY: UNABLE TO COMPLETE
ADLS_ACUITY_SCORE: 7
HYGIENE/GROOMING: INDEPENDENT
ADLS_ACUITY_SCORE: 7
ORAL_HYGIENE: INDEPENDENT
ADLS_ACUITY_SCORE: 7
LAUNDRY: UNABLE TO COMPLETE
ORAL_HYGIENE: PROMPTS
ADLS_ACUITY_SCORE: 7
HYGIENE/GROOMING: INDEPENDENT
ADLS_ACUITY_SCORE: 7
ADLS_ACUITY_SCORE: 7
DRESS: SCRUBS (BEHAVIORAL HEALTH);INDEPENDENT
ADLS_ACUITY_SCORE: 7
DRESS: SCRUBS (BEHAVIORAL HEALTH)
ADLS_ACUITY_SCORE: 7
ADLS_ACUITY_SCORE: 7

## 2022-01-27 NOTE — PLAN OF CARE
"SHIFT NOTE: [id number]    Problem: Violence Risk or Actual  Goal: Anger and Impulse Control  Description: Pt will be able to control his anger during stay  Pt will ask for PRN medications as needed to control his impulses    Outcome: Improving   Patient has been in control of violence and aggression this shift.       Problem: Behavior Regulation Impairment (Psychotic Signs/Symptoms)  Goal: Improved Behavioral Control (Psychotic Signs/Symptoms)  Description: Patient will be able to have a reality based conversation.     Outcome: No Change   Patient is unable to have a linear conversation with staff.  He answers questions minimally and asked multiple times  \"can I discharge today.\"        Problem: Behavioral Health Plan of Care  Goal: Patient-Specific Goal (Individualization)  Description: Patient will be free from self harm or injury  Patient will eat at least 50% of meals  Patient will attend one group every day.        Outcome: Improving  Note:      Patient has been calm, cooperative, and medication compliant this shift.  He was isolative to his room much of the evening.  Did come out to the lounge briefly but does not socialize with peers.  Patient refused supper but did drink his ensure and ate 100% of snack.  Affect is blunted and flat.  No complaints of pain.  VS WNL. Patient slept through the night. Regular position changes and respirations noted. Face to face end of shift report communicated to day shift RN.     Shantal Real RN  1/26/2022  10:21 PM      "

## 2022-01-27 NOTE — PROGRESS NOTES
OT met with patient this am as patient had agreed to complete the Lj. Upon arrival patient wished to know what the test entailed, explained. Patient asked if he had to complete this test. Did explain it was his choice. Patient declined. OT to place patient on a hold.

## 2022-01-27 NOTE — PROGRESS NOTES
"[facility] Psychiatric Progress Note     Assessment     Mr. Carter is a 31 year old male with a PMH of schizoaffective disorder, depressive type, catatonia, severe methamphetamine use disorder, alcohol use disorder, and significant TBI at the age of 3 who presented with depression with catatonia being off of Ativan after recently being discharged. This is the patient's 3rd hospitalization in the past roughly two months with substance use complicating his course.     The patient's catatonia improved with resumption of Ativan. Also, amantadine has helped. Tapering down given concerns with misuse in the past, while using amantadine and exploring Namenda as alternative means for long-term management. Wellbutrin was increased to further address depression. Zyprexa increased to further help with depression and any element of psychosis. Exelon has been used off-label for cognition related to TBI's. Held due to concerns for contributing to agitation with monitoring occurring. Might switch to Namenda.     Patient has SPMI with him having deficits in his occupational and social functioning. He has had significant CNS insults with him having multiple TBIs, significant substance abuse over years, and near fatal cardiac arrest from overdose (? Anoxic injury) that have likely led to neuropsychiatric impact making it more challenging for him to recover. His  and  note he has not been the same since. Patient did score a 4.4/5.8 on the ACL, which indicates the patient would need some level of support and could not be alone the whole day safely, needing assistance with such stuff as solving problems, removing any safety hazards. He would likely benefit from assistance with home medication management.    Today: Patient is continuing to refuse medications at times to \"gain clarity.\" He is starting to eat less, have more immobility, and have more ambitendency. This started with him stopping Zyprexa " first. It is possible that lowering his Ativan over the last two weeks possibly contributed. Will increase again to gain further control of catatonia. Encouraging patient to continue medications, despite any notions he is having regarding them and false assumption that will help him get discharged. If patient stops medication all together, he will decompensate and possibly need emergency medications with Sarah likely being necessary. Also, will consider ECT for the patient.    Educated regarding medication indications, risks, benefits, side effects, contraindications and possible interactions. Verbally expressed understanding.      Diagnoses     1. Schizoaffective disorder, depressive type, multiple episodes, currently in acute episode, with catatonia   2. 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 Methamphetamine use disorder, severe  4. Alcohol use disorder, moderate     Plan     Unit 5  Legal Status: Committed     Safety Assessment:    Behavioral Orders   Procedures     Code 1 - Restrict to Unit     Routine Programming     As clinically indicated     Status 15     Every 15 minutes.      Medications:     Outpatient medications continued/changed:     Wellbutrin  mg daily -> 300 mg on 12/24  Lithium 900 mg at bedtime  Zyprexa 10 mg BID prn - > 10 mg at bedtime -> 12.5 mg at bedtime on 1/3 -> Zyprexa 10 mg on 1/20 due to sedation -> 7.5 mg on 1/24 due to patient preference  Lyrica 100 mg TID (initially held but resumed due to benefit for anxiety and pain)  Vitamin D 50,000 international unit(s) weekly    New medications initiated:     Ativan 2 mg QID -> 1.5 mg QID on 1/5 -> 1/2/2 mg on 1/12 -> 1/1/2 mg on 1/15 -> 1 mg TID on 1/17 -> 1.5 mg TID on 1/26  Amantadine 100 mg BID  Namenda 5 mg daily -> 5 mg BID on 1/25  Standard unit PRNs    New medications stopped    Exelon 3 mg BID off label for cognition from TBI due to concerns for agitation with plans to possibly switch to  Namenda     Programming: Patient will be treated in a therapeutic milieu with appropriate individual and group therapies. Education will be provided on diagnoses, medications, and treatments.     Medical diagnoses:      #. Severe malnutrition  - Nutrition following   - Gaining weight   - Ensure on meal trays   - Monitoring, restart IOs    #. Intention tremor bilaterally   - Secondary to lithium  - Monitor  - Conservative management    #. Chronic low back pain  #. Muscle spasms   - Robaxin 1,000 mg TID prn for short term use. Will stop eventually  - Lidocaine patches prn  - Icy-hot prn   - APAP prn, increased to 1,000 mg   - Recommend referral to PT and PMR on outpatient basis     #. Multiple TBIs  - MRI findings consistent with encephalomalacia in left temporal-occipital region and left anterior frontal lobe.  - Recommend neuropsychological testing as outpatient and possible TBI focused IRTS/[facility] facility.  - OT to further evaluate    Consult: Nutrition  Labs: Li 0.8 on 1/12  Imaging: MRI recently    Anticipated LOS: 2-4 weeks  Dispo: Likely [facility]     Interim History     The patient did take his medications overnight. Nursing staff report the patient has been less conversational. He has been isolating to room, sitting on bed frequently.    The patient was found resting in his bed. He reports doing alright. He notes being tired. Encouraged the patient to continue taking his medications. He said that he will this morning. No problems with them. Patient denies any physical concerns.    He hopes to be able to leave soon. He has irrational ideas regarding when and how he can leave, in addition to where he will go.     Medications       amantadine  100 mg Oral BID     buPROPion  300 mg Oral Daily     lithium ER  900 mg Oral At Bedtime     LORazepam  1.5 mg Oral TID     memantine  5 mg Oral BID     OLANZapine  7.5 mg Oral At Bedtime     pregabalin  100 mg Oral TID     vitamin D2  50,000 Units Oral Q7 Days        Allergies  "    Allergies   Allergen Reactions     Pork Allergy         Psychiatric Examination     /76   Pulse 100   Temp 98.6  F (37  C) (Temporal)   Resp 14   Ht 1.829 m (6')   Wt 96.7 kg (213 lb 1.6 oz)   SpO2 100%   BMI 28.90 kg/m    Weight is 213 lbs 1.6 oz  Body mass index is 28.9 kg/m .    Appearance: Alert, oriented, dressed in hospital scrubs, long beard  Attitude: Cooperative   Eye Contact: Fair  Mood: \"alright\"  Affect: Flat, mood congruent  Speech: Reduction in rate. Normal rhythm   Psychomotor Behavior: No tremor, rigidity, akathisia, or psychomotor retardation. Withdrawal, ambitendency, immobility   Thought Process: Linear, concrete, irrational at times   Associations: No loose associations   Thought Content: Denies SI. No SIB. AH and paranoia at times, improved. Poverty of thought.  Insight: Limited  Judgment: Limited  Oriented to: Person, place, and time  Attention Span and Concentration: Intact  Recent and Remote Memory: Intact  Language: English with appropriate syntax and vocabulary  Fund of Knowledge: Average  Muscle Strength and Tone: Grossly normal  Gait and Station: Grossly normal    [place] Catatonia Rating Scale   Severity Score (Number of points for items 1 -23) _______1___   Screening Score (Presence or absence of items 1 - 14) ______1_____   Time: 11:00 AM on 1/11/2022   Rater: Dr. Shanelle Schuler Catatonia Rating Scale   Severity Score (Number of points for items 1 -23) ____4______   Screening Score (Presence or absence of items 1 - 14) ___1________   Time: 1145AM on 1/18  Rater: Dr. Shanelle Schuler Catatonia Rating Scale   Severity Score (Number of points for items 1 -23) ____7______   Screening Score (Presence or absence of items 1 - 14) ____4_______   Time: 500 PM on 1/26  Rater: Dr. Timmons       Labs     No results found for this or any previous visit (from the past 24 hour(s)).     Attestation      Patient has been seen and evaluated by:    David Timmons DO, " MA  Psychiatrist

## 2022-01-27 NOTE — PROGRESS NOTES
"[facility] Psychiatric Progress Note     Assessment     Mr. Carter is a 31 year old male with a PMH of schizoaffective disorder, depressive type, catatonia, severe methamphetamine use disorder, alcohol use disorder, and significant TBI at the age of 3 who presented with depression with catatonia being off of Ativan after recently being discharged. This is the patient's 3rd hospitalization in the past roughly two months with substance use complicating his course.     The patient's catatonia improved with resumption of Ativan. Also, amantadine has helped. Tapering down given concerns with misuse in the past, while using amantadine and exploring Namenda as alternative means for long-term management. Wellbutrin was increased to further address depression. Zyprexa increased to further help with depression and any element of psychosis. Exelon has been used off-label for cognition related to TBI's. Held due to concerns for contributing to agitation with monitoring occurring. Might switch to Namenda.     Patient has SPMI with him having deficits in his occupational and social functioning. He has had significant CNS insults with him having multiple TBIs, significant substance abuse over years, and near fatal cardiac arrest from overdose (? Anoxic injury) that have likely led to neuropsychiatric impact making it more challenging for him to recover. His  and  note he has not been the same since. Patient did score a 4.4/5.8 on the ACL, which indicates the patient would need some level of support and could not be alone the whole day safely, needing assistance with such stuff as solving problems, removing any safety hazards. He would likely benefit from assistance with home medication management.    Today: Patient is continuing to refuse medications at times to \"gain clarity.\" He is starting to eat less, have more immobility, and have more ambitendency. This started with him stopping Zyprexa " first. It is possible that lowering his Ativan over the last two weeks possibly contributed. Will increase again to gain further control of catatonia. Encouraging patient to continue medications, despite any notions he is having regarding them and false assumption that will help him get discharged. If patient stops medication all together, he will decompensate and possibly need emergency medications with Sarah likely being necessary. Also, will consider ECT for the patient.    Educated regarding medication indications, risks, benefits, side effects, contraindications and possible interactions. Verbally expressed understanding.      Diagnoses     1. Schizoaffective disorder, depressive type, multiple episodes, currently in acute episode, with catatonia   2. 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 Methamphetamine use disorder, severe  4. Alcohol use disorder, moderate     Plan     Unit 5  Legal Status: Committed     Safety Assessment:    Behavioral Orders   Procedures     Code 1 - Restrict to Unit     Routine Programming     As clinically indicated     Status 15     Every 15 minutes.      Medications:     Outpatient medications continued/changed:     Wellbutrin  mg daily -> 300 mg on 12/24  Lithium 900 mg at bedtime  Zyprexa 10 mg BID prn - > 10 mg at bedtime -> 12.5 mg at bedtime on 1/3 -> Zyprexa 10 mg on 1/20 due to sedation -> 7.5 mg on 1/24 due to patient preference  Lyrica 100 mg TID (initially held but resumed due to benefit for anxiety and pain)  Vitamin D 50,000 international unit(s) weekly    New medications initiated:     Ativan 2 mg QID -> 1.5 mg QID on 1/5 -> 1/2/2 mg on 1/12 -> 1/1/2 mg on 1/15 -> 1 mg TID on 1/17 -> 1.5 mg TID on 1/26  Amantadine 100 mg BID  Namenda 5 mg daily -> 5 mg BID on 1/25  Standard unit PRNs    New medications stopped    Exelon 3 mg BID off label for cognition from TBI due to concerns for agitation with plans to possibly switch to  "Namenda     Programming: Patient will be treated in a therapeutic milieu with appropriate individual and group therapies. Education will be provided on diagnoses, medications, and treatments.     Medical diagnoses:      #. Severe malnutrition  - Nutrition following   - Gaining weight   - Ensure on meal trays   - Monitoring, restart IOs    #. Intention tremor bilaterally   - Secondary to lithium  - Monitor  - Conservative management    #. Chronic low back pain  #. Muscle spasms   - Robaxin 1,000 mg TID prn for short term use. Will stop eventually  - Lidocaine patches prn  - Icy-hot prn   - APAP prn, increased to 1,000 mg   - Recommend referral to PT and PMR on outpatient basis     #. Multiple TBIs  - MRI findings consistent with encephalomalacia in left temporal-occipital region and left anterior frontal lobe.  - Recommend neuropsychological testing as outpatient and possible TBI focused IRTS/[facility] facility.  - OT to further evaluate    Consult: Nutrition  Labs: Li 0.8 on 1/12  Imaging: MRI recently    Anticipated LOS: 2-4 weeks  Dispo: Likely [facility]     Interim History     The patient notes that he refused medications to \"gain clarity.\" He also notes that he is not eating as much to \"fast.\" He was found in his room sitting in his bed, being more immobile. He has been eating less than 25% of his meals at times. He denies any physical symptoms.    Patient is not having any problems with his medications that could be contributing. He notes that he is tolerating them well. He said that he will continue taking them but then has a hard time committing when the time comes.    He hopes to be able to leave soon. No acute physical concerns.     Medications       amantadine  100 mg Oral BID     buPROPion  300 mg Oral Daily     lithium ER  900 mg Oral At Bedtime     LORazepam  1 mg Oral TID     memantine  5 mg Oral BID     OLANZapine  7.5 mg Oral At Bedtime     pregabalin  100 mg Oral TID     vitamin D2  50,000 Units Oral Q7 Days " "       Allergies     Allergies   Allergen Reactions     Pork Allergy         Psychiatric Examination     /76   Pulse 100   Temp 98.6  F (37  C) (Temporal)   Resp 14   Ht 1.829 m (6')   Wt 96.7 kg (213 lb 1.6 oz)   SpO2 100%   BMI 28.90 kg/m    Weight is 213 lbs 1.6 oz  Body mass index is 28.9 kg/m .    Appearance: Alert, oriented, dressed in hospital scrubs, long beard  Attitude: Cooperative   Eye Contact: Fair  Mood: \"Fine\"  Affect: Flat, mood congruent  Speech: Reduction in rate. Normal rhythm   Psychomotor Behavior: No tremor, rigidity, akathisia, or psychomotor retardation. Withdrawal, ambitendency, immobility   Thought Process: Logical, concrete   Associations: No loose associations   Thought Content: Denies SI. No SIB. AH and paranoia at times, improved. Poverty of thought.  Insight: Adequate   Judgment: Adequate  Oriented to: Person, place, and time  Attention Span and Concentration: Intact  Recent and Remote Memory: Intact  Language: English with appropriate syntax and vocabulary  Fund of Knowledge: Average  Muscle Strength and Tone: Grossly normal  Gait and Station: Grossly normal    Pineda-[place] Catatonia Rating Scale   Severity Score (Number of points for items 1 -23) _______1___   Screening Score (Presence or absence of items 1 - 14) ______1_____   Time: 11:00 AM on 1/11/2022   Rater: Dr. Shanelle Schuler Catatonia Rating Scale   Severity Score (Number of points for items 1 -23) ____4______   Screening Score (Presence or absence of items 1 - 14) ___1________   Time: 1145AM on 1/18  Rater: Dr. Shanelle Schuler Catatonia Rating Scale   Severity Score (Number of points for items 1 -23) ____7______   Screening Score (Presence or absence of items 1 - 14) ____4_______   Time: 500 PM on 1/26  Rater: Dr. Timmons    1. Immobility/stupor: Extreme hypoactivity, immobile, minimally responsive to stimuli.   0 - Absent.   1 - Sits abnormally still, may interact briefly.   2 - Virtually no " interaction with external world.   3 - Stuporous, non-reactive to painful stimuli.   2. Mutism: Verbally unresponsive or minimally responsive.   0 = Absent.   1 = Verbally unresponsive to majority of questions; incomprehensible whisper.   2 = Speaks less than 20 words/5mins.   3 = No speech.   3. = Staring: Fixed gaze, little or no visual scanning of environment, decreased blinking.   0 = Absent.   1 = Poor eye contact, repeatedly gazes less than 20 s between shifting of attention; decreased blinking.   2 = Gaze held longer than 20 s, occasionally shifts attention.   3 = Fixed gaze, non-reactive.   4. Posturing/catalepsy: Spontaneous maintenance of posture (s), including mundane (e.g. sitting or standing for long periods without reacting).   0 = Absent.   1 = Less than I min.   2 Greater than one minute, less than 15 min.   3 Bizarre posture, or mundane maintained more than 15 min.   5. Grimacing: Maintenance of odd facial expressions.   0 = Absent.   1 = Less than [id number].   2 = Less than 1 min.   3 = Bizarre expression(s) or maintained more than 1 min.   6. Echopraxia/echolalia: Mimicking of examiner's movements (echopraxia) or speech (echolalia).   0 = Absent   1 = Occasional.   2 = Frequent.   3 = Constant   7. Stereotypy: Repetitive, non-goal-directed motor activity (e.g. finger-play, repeatedly touching, patting or rubbing self); abnormality not inherent in act but in its frequency.   0 - Absent   1 - Occasional.   2 - Frequent.   3 - Constant.   8. Mannerisms: Odd, purposeful movements (hopping or walking tiptoe, saluting passers-by or exaggerated caricatures of mundane movements); abnormality inherent in act itself.   0 - Absent   1 - Occasional.   2 - Frequent.   3 - Constant.   9. Stereotyped & meaningless repetition of words & phrases (verbigeration): Repetition of phrases or sentences (like a scratched records).   0 - Absent.   1 - Occasional.   2 - Frequent, difficult to interrupt.   3 - Constant.    10. Rigidity: Maintenance of a rigid position despite efforts to be moved (exclude if cog-wheeling or tremor present)   0 = Absent.   1 = Mild resistance.   2 = Moderate.   3 = Severe, cannot be repostured.   11. Negativism: Apparently motiveless resistance to instructions or attempts to move/examine patients. Contrary behavior, does exact opposite of instruction.   0 - Absent   1 - Mild resistance and/or occasionally contrary.   2 - Moderate resistance and/or frequently contrary.   3 - Severe resistance and/or continually contrary.   12. Waxy flexibility: During repositioning of patient, patient offers initial resistance before allowing him/herself to be repositioned, similar to that of a bending candle. (also defined as slow resistance to movement as the patient allows the examiner to place his/her extremities in unusual positions. The limb may remain in the position in which they are placed or not)   0 - Absent   3 - Present.   13. Withdrawal: Refusal to eat, drink and/or make eye contact.   0 = Absent.   1 = Minimal oral intake/interaction for less than 1 day.   2 = Minimal oral intake/interaction for more than 1 day.   3 = No oral intake/interaction for 1 day or more.   14. Excitement: Extreme hyperactivity, constant motor unrest which is apparently non-purposeful.   Not to be attributed to akathisia or goal-directed agitation.   1 - Excessive motion, intermittent.   2 - Constant motion, hyperkinetic without rest periods.   3 - Full-blown catatonic excitement, endless frenzied motor activity.   ------------End of Screening Items-------------   15. Impulsivity: Patient suddenly engages in inappropriate behavior (e.g. runs down hallway, starts screaming or takes off clothes) without provocation. Afterwards can give no, or only a facile explanation.   0 - Absent.   1 - Occasional.   2 - Frequent.   3 - Constant or not redirectable.   16. Automatic obedience: Exaggerated cooperation with examiner's request or  spontaneous continuation of movement requested.   0 = Absent.   1 = Occasional   2 = Frequent   3 = Constant.   17. Passive Obedience (mitgehen): Patient raises arm in response to light pressure of finger, despite instructions to the contrary.   0 = Absent.   3 = Present.   18. Muscle Resistance (gegenhalten): Involuntary resistance to passive movement of a limb to a new position. Resistance increases with the speed of the movement.   0 - Absent   3 - Present.   19. Motorically Stuck (ambitendency): Patient appears stuck in indecisive, hesitant motor movements.   0 - Absent.   3 = Present.   20. Grasp reflex: Striking the patient's open palm with two extended fingers of the examiner's hand results in automatic closure of patients hand.   0 = Absent   3 = Present   21. Perseveration: Repeatedly returns to same topic or persists with the same movements.   0 = Absent.   3 = Present.   22. Combativeness: Belligerence or aggression, Usually in an undirected manner, without explanation.   0 = Absent   1 = Occasionally strikes out, low potential for injury.   2 = Frequently strikes out, moderate potential for injury.   3 = Serious danger to others.   23. Autonomic abnormality: Abnormality of body temperature (fever), blood pressure, pulse, respiratory rate, inappropriate sweating, flushing.   0 = Absent   1 = Abnormality of one parameter (exclude pre-existing hypertension).   2 = Abnormality of two parameters.   3 = Abnormality of three or more parameters.      Labs     No results found for this or any previous visit (from the past 24 hour(s)).     Attestation      Patient has been seen and evaluated by:    David Timmons DO, MA  Psychiatrist

## 2022-01-27 NOTE — PLAN OF CARE
Problem: Behavioral Health Plan of Care  Goal: Patient-Specific Goal (Individualization)  Description: Patient will be free from self harm or injury  Patient will eat at least 50% of meals  Patient will attend one group every day.        Outcome: No Change  Note: Patient is awake and in his room at start of shift.    Patient is isolative to his room most of the shift.  He went into one group, signed the paper as if he were in group, and immediately left the group room.  Patient asked for and immediately declined AM medication multiple times.  This writer approached patient in his room with am medications.  Patient then agreed to and took his scheduled medication.    His affect is flat.  He appears depressed and anxious.    Patient ate 1 piece of french toast for breakfast.  He took 1 coffee, 1 juice, and his ensure to his room.    Patient ate 50% of lunch.          Problem: Behavior Regulation Impairment (Psychotic Signs/Symptoms)  Goal: Improved Behavioral Control (Psychotic Signs/Symptoms)  Description: Patient will be able to have a reality based conversation.     Outcome: No Change     Problem: Violence Risk or Actual  Goal: Anger and Impulse Control  Description: Pt will be able to control his anger during stay  Pt will ask for PRN medications as needed to control his impulses    Outcome: Improving  Note: Patient had no noted anger outbursts this shift.

## 2022-01-28 PROCEDURE — 124N000004

## 2022-01-28 PROCEDURE — 250N000013 HC RX MED GY IP 250 OP 250 PS 637: Performed by: STUDENT IN AN ORGANIZED HEALTH CARE EDUCATION/TRAINING PROGRAM

## 2022-01-28 PROCEDURE — 99232 SBSQ HOSP IP/OBS MODERATE 35: CPT | Mod: 95 | Performed by: STUDENT IN AN ORGANIZED HEALTH CARE EDUCATION/TRAINING PROGRAM

## 2022-01-28 PROCEDURE — 250N000013 HC RX MED GY IP 250 OP 250 PS 637: Performed by: NURSE PRACTITIONER

## 2022-01-28 RX ADMIN — MEMANTINE 5 MG: 5 TABLET ORAL at 20:17

## 2022-01-28 RX ADMIN — PREGABALIN 100 MG: 75 CAPSULE ORAL at 13:10

## 2022-01-28 RX ADMIN — LITHIUM CARBONATE 900 MG: 450 TABLET, EXTENDED RELEASE ORAL at 20:17

## 2022-01-28 RX ADMIN — AMANTADINE HYDROCHLORIDE 100 MG: 100 CAPSULE ORAL at 08:20

## 2022-01-28 RX ADMIN — ACETAMINOPHEN 975 MG: 325 TABLET, FILM COATED ORAL at 10:16

## 2022-01-28 RX ADMIN — BUPROPION HYDROCHLORIDE 300 MG: 300 TABLET, EXTENDED RELEASE ORAL at 08:20

## 2022-01-28 RX ADMIN — LORAZEPAM 1.5 MG: 1 TABLET ORAL at 13:10

## 2022-01-28 RX ADMIN — PREGABALIN 100 MG: 75 CAPSULE ORAL at 08:20

## 2022-01-28 RX ADMIN — PREGABALIN 100 MG: 75 CAPSULE ORAL at 20:17

## 2022-01-28 RX ADMIN — LORAZEPAM 1.5 MG: 1 TABLET ORAL at 20:17

## 2022-01-28 RX ADMIN — METHOCARBAMOL 1000 MG: 500 TABLET, FILM COATED ORAL at 10:16

## 2022-01-28 RX ADMIN — METHOCARBAMOL 1000 MG: 500 TABLET, FILM COATED ORAL at 20:17

## 2022-01-28 RX ADMIN — LORAZEPAM 1.5 MG: 1 TABLET ORAL at 08:19

## 2022-01-28 RX ADMIN — MEMANTINE 5 MG: 5 TABLET ORAL at 08:20

## 2022-01-28 RX ADMIN — AMANTADINE HYDROCHLORIDE 100 MG: 100 CAPSULE ORAL at 20:17

## 2022-01-28 ASSESSMENT — ACTIVITIES OF DAILY LIVING (ADL)
ADLS_ACUITY_SCORE: 7
DRESS: SCRUBS (BEHAVIORAL HEALTH)
LAUNDRY: UNABLE TO COMPLETE
ADLS_ACUITY_SCORE: 7
ORAL_HYGIENE: PROMPTS
ADLS_ACUITY_SCORE: 7
ADLS_ACUITY_SCORE: 7
HYGIENE/GROOMING: INDEPENDENT
ADLS_ACUITY_SCORE: 7

## 2022-01-28 NOTE — PLAN OF CARE
Problem: Behavioral Health Plan of Care  Goal: Patient-Specific Goal (Individualization)  Description: Patient will be free from self harm or injury  Patient will eat at least 50% of meals  Patient will attend one group every day.  Outcome: Improving  Note: Shift Summery:     Face to face end of shift report received from evening shift RN. Rounding completed. Pt observed in own bed, with eyes closed, in left side-lying position, and with rested breathing. Will continue to monitor. Face to face end of shift report to be given to day shift RN.     Pt slept approximately 7 hours.     Problem: Behavior Regulation Impairment (Psychotic Signs/Symptoms)  Goal: Improved Behavioral Control (Psychotic Signs/Symptoms)  Description: Patient will be able to have a reality based conversation.   Outcome: Improving -- Pt sleeps majority of this shift; No new concerns noted at this time.      Problem: Violence Risk or Actual  Goal: Anger and Impulse Control  Description: Pt will be able to control his anger during stay  Pt will ask for PRN medications as needed to control his impulses  Outcome: Improving -- Pt sleeps majority of this shift; No new concerns noted at this time.

## 2022-01-28 NOTE — PROGRESS NOTES
[facility] Psychiatric Progress Note     Assessment     Mr. Carter is a 31 year old male with a PMH of schizoaffective disorder, depressive type, catatonia, severe methamphetamine use disorder, alcohol use disorder, and significant TBI at the age of 3 who presented with depression with catatonia being off of Ativan after recently being discharged. This is the patient's 3rd hospitalization in the past roughly two months with substance use complicating his course.     The patient's catatonia improved with resumption of Ativan. Also, amantadine has helped. Tapering down given concerns with misuse in the past, while using amantadine and exploring Namenda as alternative means for long-term management. Wellbutrin was increased to further address depression. Zyprexa increased to further help with depression and any element of psychosis. Exelon has been used off-label for cognition related to TBI's. Held due to concerns for contributing to agitation with monitoring occurring. Might switch to Namenda.     Patient has SPMI with him having deficits in his occupational and social functioning. He has had significant CNS insults with him having multiple TBIs, significant substance abuse over years, and near fatal cardiac arrest from overdose (? Anoxic injury) that have likely led to neuropsychiatric impact making it more challenging for him to recover. His  and  note he has not been the same since. Patient did score a 4.4/5.8 on the ACL, which indicates the patient would need some level of support and could not be alone the whole day safely, needing assistance with such stuff as solving problems, removing any safety hazards. He would likely benefit from assistance with home medication management.    Today: Patient is more consistently taking medications now with more engagement and less withdrawal. Will continue current treatment with him being encouraged to continue taking medications and being  active. Will consider changes next week like going up in Namenda and possibly tapering off of Amantadine, given possible limited benefit.    Educated regarding medication indications, risks, benefits, side effects, contraindications and possible interactions. Verbally expressed understanding.      Diagnoses     1. Schizoaffective disorder, depressive type, multiple episodes, currently in acute episode, with catatonia   2. 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 Methamphetamine use disorder, severe  4. Alcohol use disorder, moderate     Plan     Unit 5  Legal Status: Committed     Safety Assessment:    Behavioral Orders   Procedures     Code 1 - Restrict to Unit     Routine Programming     As clinically indicated     Status 15     Every 15 minutes.      Medications:     Outpatient medications continued/changed:     Wellbutrin  mg daily -> 300 mg on 12/24  Lithium 900 mg at bedtime  Zyprexa 10 mg BID prn - > 10 mg at bedtime -> 12.5 mg at bedtime on 1/3 -> Zyprexa 10 mg on 1/20 due to sedation -> 7.5 mg on 1/24 due to patient preference  Lyrica 100 mg TID (initially held but resumed due to benefit for anxiety and pain)  Vitamin D 50,000 international unit(s) weekly    New medications initiated:     Ativan 2 mg QID -> 1.5 mg QID on 1/5 -> 1/2/2 mg on 1/12 -> 1/1/2 mg on 1/15 -> 1 mg TID on 1/17 -> 1.5 mg TID on 1/26  Amantadine 100 mg BID  Namenda 5 mg daily -> 5 mg BID on 1/25  Standard unit PRNs    New medications stopped    Exelon 3 mg BID off label for cognition from TBI due to concerns for agitation with plans to possibly switch to Namenda     Programming: Patient will be treated in a therapeutic milieu with appropriate individual and group therapies. Education will be provided on diagnoses, medications, and treatments.     Medical diagnoses:      #. Severe malnutrition  - Nutrition following   - Gaining weight   - Ensure on meal trays   - Monitoring, restart IOs    #.  "Intention tremor bilaterally   - Secondary to lithium  - Monitor  - Conservative management    #. Chronic low back pain  #. Muscle spasms   - Robaxin 1,000 mg TID prn for short term use. Will stop eventually  - Lidocaine patches prn  - Icy-hot prn   - APAP prn, increased to 1,000 mg   - Recommend referral to PT and PMR on outpatient basis     #. Multiple TBIs  - MRI findings consistent with encephalomalacia in left temporal-occipital region and left anterior frontal lobe.  - Recommend neuropsychological testing as outpatient and possible TBI focused IRTS/[facility] facility.  - OT to further evaluate    Consult: Nutrition  Labs: Li 0.8 on 1/12  Imaging: MRI recently    Anticipated LOS: 2- 6 weeks  Dispo: Likely [facility]     Interim History     The patient did take his medications overnight. He did get up to go to group today. He ate more than prior, eating 50% of meal.    The patient reports feeling similar. He continues to look disheveled but does not look as distressed as before. He will continue taking medications. No physical concerns. Encouraged him to reach out to children. No safety concerns.     Medications       amantadine  100 mg Oral BID     buPROPion  300 mg Oral Daily     lithium ER  900 mg Oral At Bedtime     LORazepam  1.5 mg Oral TID     memantine  5 mg Oral BID     OLANZapine  7.5 mg Oral At Bedtime     pregabalin  100 mg Oral TID     vitamin D2  50,000 Units Oral Q7 Days        Allergies     Allergies   Allergen Reactions     Pork Allergy         Psychiatric Examination     /67   Pulse 78   Temp 97.3  F (36.3  C) (Tympanic)   Resp 16   Ht 1.829 m (6')   Wt 96.7 kg (213 lb 1.6 oz)   SpO2 98%   BMI 28.90 kg/m    Weight is 213 lbs 1.6 oz  Body mass index is 28.9 kg/m .    Appearance: Alert, oriented, dressed in hospital scrubs, long beard  Attitude: Cooperative   Eye Contact: Fair  Mood: \"fine\"  Affect: Flat, mood congruent  Speech: Reduction in rate. Normal rhythm   Psychomotor Behavior: No " tremor, rigidity, akathisia, or psychomotor retardation. Withdrawal, ambitendency, immobility, improving some  Thought Process: Linear, concrete, irrational at times   Associations: No loose associations   Thought Content: Denies SI. No SIB. AH and paranoia at times, improved. Poverty of thought.  Insight: Limited  Judgment: Limited  Oriented to: Person, place, and time  Attention Span and Concentration: Intact  Recent and Remote Memory: Intact  Language: English with appropriate syntax and vocabulary  Fund of Knowledge: Average  Muscle Strength and Tone: Grossly normal  Gait and Station: Grossly normal    Pined[place] Catatonia Rating Scale   Severity Score (Number of points for items 1 -23) _______1___   Screening Score (Presence or absence of items 1 - 14) ______1_____   Time: 11:00 AM on 1/11/2022   Rater: Dr. Shanelle Schuler Catatonia Rating Scale   Severity Score (Number of points for items 1 -23) ____4______   Screening Score (Presence or absence of items 1 - 14) ___1________   Time: 1145AM on 1/18  Rater: Dr. Shanelle Schuler Catatonia Rating Scale   Severity Score (Number of points for items 1 -23) ____7______   Screening Score (Presence or absence of items 1 - 14) ____4_______   Time: 500 PM on 1/26  Rater: Dr. Timmons       Labs     No results found for this or any previous visit (from the past 24 hour(s)).     Attestation      Patient has been seen and evaluated by:    David Timmons DO, MA  Psychiatrist    VIDEO VISIT    Patient has given verbal consent for video visit?: Yes     Video- Visit Details  Type of service:  video visit for mental health treatment.  Time of service:    Date:  01/28/2022    Video Start Time: 11:00AM      Video End Time: 1115AM    Reason for video visit: COVID-19 [place]  Originating Site (patient location):  [facility]  Distant Site (provider location):  Remote location  Mode of Communication:  Video Conference via  [employer]

## 2022-01-28 NOTE — PLAN OF CARE
Problem: Behavioral Health Plan of Care  Goal: Patient-Specific Goal (Individualization)  Description: Patient will be free from self harm or injury  Patient will eat at least 50% of meals  Patient will attend one group every day.        Note: Patient up to unit for breakfast with staff encouragement, ate about 50% but doesn't appear to be taking in any fluids. Compliant with scheduled medications,only taking a sip of water with them.   Affect continues to be flat/blunt and observed staring into space off/on for short periods. Clear speech and polite during conversation, asking how this writer is doing today.   Continues to appear disheveled/untidy but he does clean room up after breakfast.   Laying in bed resting for majority of morning. Up to unit again for lunch, eating 75%. Patient does attend group this afternoon for almost an hour and does participate appropriately.   Laying back in bed resting towards end of shift. Continue to monitor at this time.        Problem: Behavior Regulation Impairment (Psychotic Signs/Symptoms)  Goal: Improved Behavioral Control (Psychotic Signs/Symptoms)  Description: Patient will be able to have a reality based conversation.     Note: Continue to monitor at this time.      Problem: Violence Risk or Actual  Goal: Anger and Impulse Control  Description: Pt will be able to control his anger during stay  Pt will ask for PRN medications as needed to control his impulses    Note: Continue to monitor at this time.      Face to face end of shift report communicated to oncsalomón LARKIN.     Stephanie Pearson RN  1/28/2022  9:13 AM

## 2022-01-28 NOTE — PLAN OF CARE
Problem: Violence Risk or Actual  Goal: Anger and Impulse Control  Description: Pt will be able to control his anger during stay  Pt will ask for PRN medications as needed to control his impulses    Outcome: Improving   Patient has been in control of violence and aggression this shift.        Problem: Behavior Regulation Impairment (Psychotic Signs/Symptoms)  Goal: Improved Behavioral Control (Psychotic Signs/Symptoms)  Description: Patient will be able to have a reality based conversation.     Outcome: No Change      Patient is unable to have a reality based conversation with staff.  He offers little and continues to ask if her can discharge today.  Provided education about [facility] list.       Problem: Behavior Regulation Impairment (Psychotic Signs/Symptoms)  Goal: Improved Behavioral Control (Psychotic Signs/Symptoms)  Description: Patient will be able to have a reality based conversation.     Outcome: No Change   Patient has been polite and cooperative.  Affect is blunted and flat.  He isolates to his room but did spend more time out in the lounge then previous days. Only ate about 25% of supper but drank ensure and ate 100% of evening snack.  He initially refused his Hs Zyprexa but then changed his mind and agreed to take it.  No complaints of pain.  VS WNL.  Face to face end of shift report communicated to night shift RN.     Shantal Real RN  1/27/2022  9:15 PM

## 2022-01-28 NOTE — PLAN OF CARE
Rajwinder Andrews (NIHARIKA) called and spoke to pt. 1:1 time with the patient.  No changes made to the discharge plan at this time.   See the AVS for follow up appointments and recommendations.

## 2022-01-28 NOTE — PROGRESS NOTES
"CLINICAL NUTRITION SERVICES  -  REASSESSMENT NOTE    Stevenhever Carter     31 yom admitted for catatonia. Pt has a hx of methamphetamine use, alcohol use disorder, schizoaffective disorder, TBI at age of 3. Intake continues to be variable, mostly inadequate intake. Pt refusing to take medications at times could be contributing to reducing intake. No new weight since last review.    Diet Order: Regular  Intake: 0-50% intake      Height: 6' 0\"  Weight: 213 lbs 1.6 oz  Body mass index is 28.9 kg/m .  Weight Status:  Overweight BMI 25-29.9  Weight History:  Max IBW- 83.9kg , admit weight 96.4kg (212 lb 9.6 oz)  Wt Readings from Last 10 Encounters:   12/16/21 96.4 kg (212 lb 9.6 oz)   12/14/21 97.7 kg (215 lb 4.8 oz)   12/01/21 97.8 kg (215 lb 11.2 oz)   10/31/21 105.1 kg (231 lb 11.2 oz)   03/27/21 105.2 kg (232 lb)   11/30/15 96.6 kg (213 lb)   08/18/14 94.5 kg (208 lb 6.4 oz)   08/15/14 93.4 kg (206 lb)   07/19/14 102.1 kg (225 lb)      83.9kg- max ibw  Estimated Energy Needs: 4222-2324 kcals (25-30 Kcal/Kg)   Estimated Protein Needs:  grams protein (1-1.2 g pro/Kg)     Malnutrition Diagnosis: severe malnutrition   In Context of:  Chronic illness or disease, Environmental or social circumstances     NUTRITION RECOMMENDATIONS  - Continue to encourage intake with meals, snacks, supplements  - Monitor weight     MONITORING AND EVALUATION:  RD will monitor intake, weight, labs         "

## 2022-01-29 PROCEDURE — 124N000004

## 2022-01-29 PROCEDURE — 250N000013 HC RX MED GY IP 250 OP 250 PS 637: Performed by: NURSE PRACTITIONER

## 2022-01-29 PROCEDURE — 99233 SBSQ HOSP IP/OBS HIGH 50: CPT | Performed by: NURSE PRACTITIONER

## 2022-01-29 PROCEDURE — 250N000013 HC RX MED GY IP 250 OP 250 PS 637: Performed by: STUDENT IN AN ORGANIZED HEALTH CARE EDUCATION/TRAINING PROGRAM

## 2022-01-29 RX ADMIN — MEMANTINE 5 MG: 5 TABLET ORAL at 08:45

## 2022-01-29 RX ADMIN — MEMANTINE 5 MG: 5 TABLET ORAL at 22:35

## 2022-01-29 RX ADMIN — AMANTADINE HYDROCHLORIDE 100 MG: 100 CAPSULE ORAL at 22:35

## 2022-01-29 RX ADMIN — BUPROPION HYDROCHLORIDE 300 MG: 300 TABLET, EXTENDED RELEASE ORAL at 08:45

## 2022-01-29 RX ADMIN — LITHIUM CARBONATE 900 MG: 450 TABLET, EXTENDED RELEASE ORAL at 22:35

## 2022-01-29 RX ADMIN — AMANTADINE HYDROCHLORIDE 100 MG: 100 CAPSULE ORAL at 08:45

## 2022-01-29 RX ADMIN — LORAZEPAM 1.5 MG: 1 TABLET ORAL at 22:35

## 2022-01-29 RX ADMIN — LORAZEPAM 1.5 MG: 1 TABLET ORAL at 08:45

## 2022-01-29 RX ADMIN — OLANZAPINE 7.5 MG: 7.5 TABLET ORAL at 22:35

## 2022-01-29 RX ADMIN — LORAZEPAM 1.5 MG: 1 TABLET ORAL at 14:05

## 2022-01-29 RX ADMIN — PREGABALIN 100 MG: 75 CAPSULE ORAL at 22:35

## 2022-01-29 RX ADMIN — PREGABALIN 100 MG: 75 CAPSULE ORAL at 14:05

## 2022-01-29 RX ADMIN — PREGABALIN 100 MG: 75 CAPSULE ORAL at 08:45

## 2022-01-29 ASSESSMENT — ACTIVITIES OF DAILY LIVING (ADL)
ADLS_ACUITY_SCORE: 7
ORAL_HYGIENE: PROMPTS
ADLS_ACUITY_SCORE: 7
ORAL_HYGIENE: PROMPTS
ADLS_ACUITY_SCORE: 7
ADLS_ACUITY_SCORE: 7
HYGIENE/GROOMING: INDEPENDENT
ADLS_ACUITY_SCORE: 7
LAUNDRY: UNABLE TO COMPLETE
DRESS: SCRUBS (BEHAVIORAL HEALTH)
ADLS_ACUITY_SCORE: 7
HYGIENE/GROOMING: INDEPENDENT
ADLS_ACUITY_SCORE: 7
LAUNDRY: UNABLE TO COMPLETE
DRESS: SCRUBS (BEHAVIORAL HEALTH)
ADLS_ACUITY_SCORE: 7

## 2022-01-29 NOTE — PLAN OF CARE
Face to face shift report received from nurse. Rounding completed, pt observed.    Problem: Behavioral Health Plan of Care  Goal: Patient-Specific Goal (Individualization)  Description: Patient will be free from self harm or injury  Patient will eat at least 50% of meals  Patient will attend one group every day.  Outcome: No Change  Note: Patient ate breakfast, patient refused morning medications right away but came up to the nursing station window 20 minutes later and asked for them and took all medications. Patient was up and took a shower. Patient ate meals and took 1400 medications without incident.    Problem: Behavior Regulation Impairment (Psychotic Signs/Symptoms)  Goal: Improved Behavioral Control (Psychotic Signs/Symptoms)  Description: Patient will be able to have a reality based conversation.   Outcome: No Change     Problem: Violence Risk or Actual  Goal: Anger and Impulse Control  Description: Pt will be able to control his anger during stay  Pt will ask for PRN medications as needed to control his impulses  Outcome: No Change     Face to face report will be communicated to oncoming RN.    Yoan Erickson RN  1/29/2022

## 2022-01-29 NOTE — PROGRESS NOTES
[facility] Psychiatric Progress Note     Assessment     Mr. Carter is a 31 year old male with a PMH of schizoaffective disorder, depressive type, catatonia, severe methamphetamine use disorder, alcohol use disorder, and significant TBI at the age of 3 who presented with depression with catatonia being off of Ativan after recently being discharged. This is the patient's 3rd hospitalization in the past roughly two months with substance use complicating his course.     The patient's catatonia improved with resumption of Ativan. Also, amantadine has helped. Tapering down given concerns with misuse in the past, while using amantadine and exploring Namenda as alternative means for long-term management. Wellbutrin was increased to further address depression. Zyprexa increased to further help with depression and any element of psychosis. Exelon has been used off-label for cognition related to TBI's. Held due to concerns for contributing to agitation with monitoring occurring. Might switch to Namenda.     Patient has SPMI with him having deficits in his occupational and social functioning. He has had significant CNS insults with him having multiple TBIs, significant substance abuse over years, and near fatal cardiac arrest from overdose (? Anoxic injury) that have likely led to neuropsychiatric impact making it more challenging for him to recover. His  and  note he has not been the same since. Patient did score a 4.4/5.8 on the ACL, which indicates the patient would need some level of support and could not be alone the whole day safely, needing assistance with such stuff as solving problems, removing any safety hazards. He would likely benefit from assistance with home medication management.    Today: he initially refused mediations thi morning though minutes later went to nursing station and asked for his medications. He is still very flat though responds appropriately to questions. He  "talks a bit more than he did with me in comparison to the last few times I have seen him. I frequently ask him about his family and if I can speak with them. He usually says no though today he stated that I could call his mother and gave me her phone number.  I spoke with his mother and length and she was very thankful that he has allowed us to speak wiht her.  Did speak with her about his catatonia and she states he has been \"in this state a few other times\". She states she has never seen him psychotic other than when he was high on methamphetamine believing a person was coming after his daughter through ceiling tiles. We discussed his mood and affect over the years and discussed the times he was doing well and what he was prescribed at the time. She states he did well on vyvnase and was able to work. 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 She states \"he wasn't smiling but he looked focused and stated he could think clearly\". She states  She has only seen him smile a few times since his brain injury. She had asked him about this and he told her \"it almost feels like it hurts to smile\". She believes he needs to be in a controlled environment and does not  Believe he will be able to live on his own and take care of himself which I did tell her I too agree with.     Educated regarding medication indications, risks, benefits, side effects, contraindications and possible interactions. Verbally expressed understanding.      Diagnoses     1. Schizoaffective disorder, depressive type, multiple episodes, currently in acute episode, with catatonia   2. 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 Methamphetamine use disorder, severe  4. Alcohol use disorder, moderate     Plan     Unit 5  Legal Status: Committed     Safety Assessment:    Behavioral Orders   Procedures     Code 1 - Restrict to Unit     Routine Programming     As clinically indicated     Status 15     Every 15 minutes.    "   Medications:     Outpatient medications continued/changed:       No medicaiton changes made today.   Wellbutrin  mg daily -> 300 mg on 12/24  Lithium 900 mg at bedtime  Zyprexa 10 mg BID prn - > 10 mg at bedtime -> 12.5 mg at bedtime on 1/3 -> Zyprexa 10 mg on 1/20 due to sedation -> 7.5 mg on 1/24 due to patient preference  Lyrica 100 mg TID (initially held but resumed due to benefit for anxiety and pain)  Vitamin D 50,000 international unit(s) weekly    New medications initiated:     Ativan 2 mg QID -> 1.5 mg QID on 1/5 -> 1/2/2 mg on 1/12 -> 1/1/2 mg on 1/15 -> 1 mg TID on 1/17 -> 1.5 mg TID on 1/26  Amantadine 100 mg BID  Namenda 5 mg daily -> 5 mg BID on 1/25  Standard unit PRNs    New medications stopped    Exelon 3 mg BID off label for cognition from TBI due to concerns for agitation with plans to possibly switch to Namenda     Programming: Patient will be treated in a therapeutic milieu with appropriate individual and group therapies. Education will be provided on diagnoses, medications, and treatments.     Medical diagnoses:      #. Severe malnutrition  - Nutrition following   - Gaining weight   - Ensure on meal trays   - Monitoring, restart IOs    #. Intention tremor bilaterally   - Secondary to lithium  - Monitor  - Conservative management    #. Chronic low back pain  #. Muscle spasms   - Robaxin 1,000 mg TID prn for short term use. Will stop eventually  - Lidocaine patches prn  - Icy-hot prn   - APAP prn, increased to 1,000 mg   - Recommend referral to PT and PMR on outpatient basis     #. Multiple TBIs  - MRI findings consistent with encephalomalacia in left temporal-occipital region and left anterior frontal lobe.  - Recommend neuropsychological testing as outpatient and possible TBI focused [facility] facility.  - OT to further evaluate    Consult: Nutrition  Labs: Li 0.8 on 1/12  Imaging: MRI recently    Anticipated LOS: 2- 6 weeks  Dispo: Likely [facility]         Medications       amantadine  100  "mg Oral BID     buPROPion  300 mg Oral Daily     lithium ER  900 mg Oral At Bedtime     LORazepam  1.5 mg Oral TID     memantine  5 mg Oral BID     OLANZapine  7.5 mg Oral At Bedtime     pregabalin  100 mg Oral TID     vitamin D2  50,000 Units Oral Q7 Days        Allergies     Allergies   Allergen Reactions     Pork Allergy         Psychiatric Examination     /74   Pulse 80   Temp 98.2  F (36.8  C) (Temporal)   Resp 16   Ht 1.829 m (6')   Wt 96.7 kg (213 lb 1.6 oz)   SpO2 100%   BMI 28.90 kg/m    Weight is 213 lbs 1.6 oz  Body mass index is 28.9 kg/m .    Appearance: Alert, oriented, dressed in hospital scrubs, long beard  Attitude: Cooperative   Eye Contact: Fair  Mood: \"fine\"  Affect: Flat, mood congruent  Speech: Reduction in rate. Normal rhythm   Psychomotor Behavior: No tremor, rigidity, akathisia, or psychomotor retardation. Withdrawal, ambitendency, immobility, improving some  Thought Process: Linear, concrete, irrational at times   Associations: No loose associations   Thought Content: Denies SI. No SIB. AH and paranoia at times, improved. Poverty of thought.  Insight: Limited  Judgment: Limited  Oriented to: Person, place, and time  Attention Span and Concentration: Intact  Recent and Remote Memory: Intact  Language: English with appropriate syntax and vocabulary  Fund of Knowledge: Average  Muscle Strength and Tone: Grossly normal  Gait and Station: Grossly normal    [place] Catatonia Rating Scale   Severity Score (Number of points for items 1 -23) _______1___   Screening Score (Presence or absence of items 1 - 14) ______1_____   Time: 11:00 AM on 1/11/2022   Rater: Dr. Shanelle Schuler Catatonia Rating Scale   Severity Score (Number of points for items 1 -23) ____4______   Screening Score (Presence or absence of items 1 - 14) ___1________   Time: 1145AM on 1/18  Rater: Dr. Shanelle Schuler Catatonia Rating Scale   Severity Score (Number of points for items 1 -23) ____7______ "   Screening Score (Presence or absence of items 1 - 14) ____4_______   Time: 500 PM on 1/26  Rater: Dr. Timmons       Labs     No results found for this or any previous visit (from the past 24 hour(s)).     Attestation        Ashely green, CNP

## 2022-01-29 NOTE — PLAN OF CARE
SHIFT NOTE: [id number]    Problem: Violence Risk or Actual  Goal: Anger and Impulse Control  Description: Pt will be able to control his anger during stay  Pt will ask for PRN medications as needed to control his impulses    Outcome: Improving   Patient has been in control of anger and aggression this shift.       Problem: Behavior Regulation Impairment (Psychotic Signs/Symptoms)  Goal: Improved Behavioral Control (Psychotic Signs/Symptoms)  Description: Patient will be able to have a reality based conversation.     Outcome: No Change   Patient is unable to have a reality based conversation.  He will only talk with this writer about when he will discharge but is polite with staff.        Problem: Behavioral Health Plan of Care  Goal: Patient-Specific Goal (Individualization)  Description: Patient will be free from self harm or injury  Patient will eat at least 50% of meals  Patient will attend one group every day.        Outcome: Improving   Patient has been calm and cooperative this shift.  He refused his HS Zyprexa but took all other medications.  [facility] and  did not attend groups.  Affect is blunted and flat.  He ate 25% of supper and had a snack before bed.  Slept through the night.  No complaints of pain.  VS WNL.  Face to face end of shift report communicated to day shift RN.     Shantal Real RN  1/28/2022  9:05 PM

## 2022-01-29 NOTE — PLAN OF CARE
Problem: Behavioral Health Plan of Care  Goal: Patient-Specific Goal (Individualization)  Description: Patient will be free from self harm or injury  Patient will eat at least 50% of meals  Patient will attend one group every day.        Outcome: No Change     Problem: Behavior Regulation Impairment (Psychotic Signs/Symptoms)  Goal: Improved Behavioral Control (Psychotic Signs/Symptoms)  Description: Patient will be able to have a reality based conversation.     Outcome: No Change  Pt dismissive.  Does not offer much for conversation.     Problem: Violence Risk or Actual  Goal: Anger and Impulse Control  Description: Pt will be able to control his anger during stay  Pt will ask for PRN medications as needed to control his impulses  Outcome: No Change  No anger noted this shift   Face to face shift report received from joseline ghotra. Rounding completed, pt observed.    Pt stayed in his room for the evening except at mealtimes to come out and get his food.  Pt's affect is flat and calm.   Pt refused all of his medications this evening.  But then, at 22:35, pt came out to request his HS meds.  Meds given.   Did not request any PRNs.    Face to face report will be communicated to alexa LARKIN.    Yuridia ARCHIBALD RN  1/29/2022  1

## 2022-01-30 PROCEDURE — 250N000013 HC RX MED GY IP 250 OP 250 PS 637: Performed by: NURSE PRACTITIONER

## 2022-01-30 PROCEDURE — 250N000013 HC RX MED GY IP 250 OP 250 PS 637: Performed by: STUDENT IN AN ORGANIZED HEALTH CARE EDUCATION/TRAINING PROGRAM

## 2022-01-30 PROCEDURE — 124N000004

## 2022-01-30 RX ADMIN — AMANTADINE HYDROCHLORIDE 100 MG: 100 CAPSULE ORAL at 08:39

## 2022-01-30 RX ADMIN — BUPROPION HYDROCHLORIDE 300 MG: 300 TABLET, EXTENDED RELEASE ORAL at 08:39

## 2022-01-30 RX ADMIN — MEMANTINE 5 MG: 5 TABLET ORAL at 08:39

## 2022-01-30 RX ADMIN — LORAZEPAM 1.5 MG: 1 TABLET ORAL at 08:39

## 2022-01-30 RX ADMIN — PREGABALIN 100 MG: 75 CAPSULE ORAL at 08:38

## 2022-01-30 ASSESSMENT — ACTIVITIES OF DAILY LIVING (ADL)
ADLS_ACUITY_SCORE: 7
LAUNDRY: UNABLE TO COMPLETE
ADLS_ACUITY_SCORE: 7
ADLS_ACUITY_SCORE: 7
ORAL_HYGIENE: PROMPTS
ORAL_HYGIENE: PROMPTS
HYGIENE/GROOMING: INDEPENDENT
DRESS: SCRUBS (BEHAVIORAL HEALTH)
DRESS: SCRUBS (BEHAVIORAL HEALTH)
ADLS_ACUITY_SCORE: 7
LAUNDRY: UNABLE TO COMPLETE
ADLS_ACUITY_SCORE: 7
HYGIENE/GROOMING: INDEPENDENT
ADLS_ACUITY_SCORE: 7
ADLS_ACUITY_SCORE: 7

## 2022-01-30 ASSESSMENT — MIFFLIN-ST. JEOR: SCORE: 1953.7

## 2022-01-30 NOTE — PLAN OF CARE
Face to face shift report received from nurse. Rounding completed, pt observed.    Problem: Behavioral Health Plan of Care  Goal: Patient-Specific Goal (Individualization)  Description: Patient will be free from self harm or injury  Patient will eat at least 50% of meals  Patient will attend one group every day.  Outcome: No Change  Note: Patient up and ate breakfast took all medications without issue and returned to bed. Patient was free from self harm and injury. Patient was free from behaviors. Patient refused 1400 ativan 1.5mg and lyrica 100mg. Patient was calm and cooperative throughout shift.    Problem: Behavior Regulation Impairment (Psychotic Signs/Symptoms)  Goal: Improved Behavioral Control (Psychotic Signs/Symptoms)  Description: Patient will be able to have a reality based conversation.   Outcome: Improving     Problem: Violence Risk or Actual  Goal: Anger and Impulse Control  Description: Pt will be able to control his anger during stay  Pt will ask for PRN medications as needed to control his impulses  Outcome: Improving     Face to face report will be communicated to oncoming RN.    Yoan Erickson RN  1/30/2022

## 2022-01-30 NOTE — PLAN OF CARE
Problem: Behavioral Health Plan of Care  Goal: Patient-Specific Goal (Individualization)  Description: Patient will be free from self harm or injury  Patient will eat at least 50% of meals  Patient will attend one group every day.        Outcome: No Change     Patient slept about 5 hours this shift. Continues to be laying in bed at this time. 15 minute safety and PRN checks done throughout the shift. Position changes noted. Able to make needs known, but did not have any requests. Refused AM vitals this morning. Steady gait. No falls.     End of shift report will be given to TRAE LANGSTON.

## 2022-01-30 NOTE — PLAN OF CARE
Problem: Behavioral Health Plan of Care  Goal: Patient-Specific Goal (Individualization)  Description: Patient will be free from self harm or injury  Patient will eat at least 50% of meals  Patient will attend one group every day.        Outcome: No Change     Problem: Behavior Regulation Impairment (Psychotic Signs/Symptoms)  Goal: Improved Behavioral Control (Psychotic Signs/Symptoms)  Description: Patient will be able to have a reality based conversation.     Outcome: Improving     Problem: Violence Risk or Actual  Goal: Anger and Impulse Control  Description: Pt will be able to control his anger during stay  Pt will ask for PRN medications as needed to control his impulses    Outcome: Improving   Face to face shift report received from joseline ghotra. Rounding completed, pt observed.  Pt's affect is flat.  Is calm and cooperative. Did come out on the unit for dinner and ate %75 of meal.  Refused HS meds and covid swab tonight.  Pt was free from injury to self or others and behaviors.    Face to face report will be communicated to oncsalomón RN.    Yuridia ARCHIBALD RN  1/30/2022

## 2022-01-31 PROCEDURE — 250N000013 HC RX MED GY IP 250 OP 250 PS 637: Performed by: NURSE PRACTITIONER

## 2022-01-31 PROCEDURE — 250N000013 HC RX MED GY IP 250 OP 250 PS 637: Performed by: STUDENT IN AN ORGANIZED HEALTH CARE EDUCATION/TRAINING PROGRAM

## 2022-01-31 PROCEDURE — 124N000004

## 2022-01-31 PROCEDURE — 99232 SBSQ HOSP IP/OBS MODERATE 35: CPT | Mod: 95 | Performed by: STUDENT IN AN ORGANIZED HEALTH CARE EDUCATION/TRAINING PROGRAM

## 2022-01-31 RX ORDER — MEMANTINE HYDROCHLORIDE 5 MG/1
10 TABLET ORAL 2 TIMES DAILY
Status: DISCONTINUED | OUTPATIENT
Start: 2022-01-31 | End: 2022-03-08 | Stop reason: HOSPADM

## 2022-01-31 RX ADMIN — PREGABALIN 100 MG: 75 CAPSULE ORAL at 09:28

## 2022-01-31 RX ADMIN — MEMANTINE 10 MG: 5 TABLET ORAL at 20:06

## 2022-01-31 RX ADMIN — LORAZEPAM 1.5 MG: 1 TABLET ORAL at 13:54

## 2022-01-31 RX ADMIN — LORAZEPAM 1.5 MG: 1 TABLET ORAL at 09:27

## 2022-01-31 RX ADMIN — AMANTADINE HYDROCHLORIDE 100 MG: 100 CAPSULE ORAL at 20:06

## 2022-01-31 RX ADMIN — PREGABALIN 100 MG: 75 CAPSULE ORAL at 13:55

## 2022-01-31 RX ADMIN — BUPROPION HYDROCHLORIDE 300 MG: 300 TABLET, EXTENDED RELEASE ORAL at 09:26

## 2022-01-31 RX ADMIN — MEMANTINE 5 MG: 5 TABLET ORAL at 09:27

## 2022-01-31 RX ADMIN — LORAZEPAM 1.5 MG: 1 TABLET ORAL at 20:06

## 2022-01-31 RX ADMIN — AMANTADINE HYDROCHLORIDE 100 MG: 100 CAPSULE ORAL at 09:28

## 2022-01-31 RX ADMIN — PREGABALIN 100 MG: 75 CAPSULE ORAL at 20:06

## 2022-01-31 RX ADMIN — LITHIUM CARBONATE 900 MG: 450 TABLET, EXTENDED RELEASE ORAL at 20:06

## 2022-01-31 ASSESSMENT — ACTIVITIES OF DAILY LIVING (ADL)
ADLS_ACUITY_SCORE: 7
ADLS_ACUITY_SCORE: 7
HYGIENE/GROOMING: INDEPENDENT
ADLS_ACUITY_SCORE: 7
ORAL_HYGIENE: INDEPENDENT
DRESS: SCRUBS (BEHAVIORAL HEALTH);INDEPENDENT
ADLS_ACUITY_SCORE: 7
LAUNDRY: UNABLE TO COMPLETE
ADLS_ACUITY_SCORE: 7
HYGIENE/GROOMING: INDEPENDENT
ADLS_ACUITY_SCORE: 7
LAUNDRY: UNABLE TO COMPLETE
ADLS_ACUITY_SCORE: 7
DRESS: INDEPENDENT;SCRUBS (BEHAVIORAL HEALTH)
ADLS_ACUITY_SCORE: 7
ORAL_HYGIENE: INDEPENDENT
ADLS_ACUITY_SCORE: 7

## 2022-01-31 NOTE — PLAN OF CARE
WILLIAM BRANDT RN  1/31/2022  7:40 AM  Face to face shift report received from TRAE Murguia. Rounding completed, pt observed.     1406-Pt has been in his room laying down most shift.  Compliant with afternoon meds.  Cooperative to staff but dismissive.  Ate 100% breakfast and only a few bites of lunch.   0930-Pt came up to nurses station and requested his morning meds.  Meds given.   0913-Pt ate breakfast but refused morning meds.  Denies SI, HI, depression, anxiety, hallucinations and pain.  Isolative to his room.  Flat affect and responds with short answers.        Problem: Behavioral Health Plan of Care  Goal: Patient-Specific Goal (Individualization)  Description: Patient will be free from self harm or injury  Patient will eat at least 50% of meals  Patient will attend one group every day.  Outcome: No Change     Problem: Behavior Regulation Impairment (Psychotic Signs/Symptoms)  Goal: Improved Behavioral Control (Psychotic Signs/Symptoms)  Description: Patient will be able to have a reality based conversation.   Outcome: Improving     Problem: Violence Risk or Actual  Goal: Anger and Impulse Control  Description: Pt will be able to control his anger during stay  Pt will ask for PRN medications as needed to control his impulses  Outcome: Improving     Face to face end of shift report communicated to oncoming shift RN.

## 2022-01-31 NOTE — PLAN OF CARE
Problem: Behavioral Health Plan of Care  Goal: Patient-Specific Goal (Individualization)  Description: Patient will be free from self harm or injury  Patient will eat at least 50% of meals  Patient will attend one group every day.    Outcome: Improving  Note: Shift Summery:       Pt in bed at the start of the shift. Pt does report pain in back and states that it is not new. Pt declines prn at this time stating he is taking lyrica. Pt denies anxiety,  SI, HI and hallucinations. Pt states that he asked about discharging today but his provider is making medication changes. Pt agrees that he still has some depression and is hopeful that it will get better so he can discharge soon.     [facility] but declined group. Pt requested his medications at 2000 but refused the zyprexa. Pt went to bed after taking his meds.     Problem: Behavior Regulation Impairment (Psychotic Signs/Symptoms)  Goal: Improved Behavioral Control (Psychotic Signs/Symptoms)  Description: Patient will be able to have a reality based conversation.     Outcome: Improving     Problem: Violence Risk or Actual  Goal: Anger and Impulse Control  Description: Pt will be able to control his anger during stay  Pt will ask for PRN medications as needed to control his impulses    Outcome: Improving     Face to face end of shift report communicated to night shift RN. Reported that pt is a risk for violence.     Sarahi Hui, RN  1/31/2022  3:39 PM

## 2022-01-31 NOTE — PLAN OF CARE
"Pt came to window requesting if he could discharge. Stated to pt that it would be a no. Asked pt if they wanted to discuss it. Pt responds with \"no\". Pt is irritable while saying this.     "

## 2022-01-31 NOTE — PLAN OF CARE
Patient has been asleep throughout the night.  Regular respirations and position changes noted.  Face to face end of shift report communicated to day shift RN.     Shantal Real RN  1/31/2022  6:27 AM

## 2022-01-31 NOTE — PROGRESS NOTES
[facility] Psychiatric Progress Note     Assessment     Mr. Carter is a 31 year old male with a PMH of schizoaffective disorder, depressive type, catatonia, severe methamphetamine use disorder, alcohol use disorder, and significant TBI at the age of 3 who presented with depression with catatonia being off of Ativan after recently being discharged. This is the patient's 3rd hospitalization in the past roughly two months with substance use complicating his course.     The patient's catatonia improved with resumption of Ativan. Also, amantadine has helped. Tapering down given concerns with misuse in the past, while using amantadine and exploring Namenda as alternative means for long-term management. Wellbutrin was increased to further address depression. Zyprexa increased to further help with depression and any element of psychosis. Exelon has been used off-label for cognition related to TBI's. Held due to concerns for contributing to agitation with monitoring occurring. Might switch to Namenda.     Patient has SPMI with him having deficits in his occupational and social functioning. He has had significant CNS insults with him having multiple TBIs, significant substance abuse over years, and near fatal cardiac arrest from overdose (? Anoxic injury) that have likely led to neuropsychiatric impact making it more challenging for him to recover. His  and  note he has not been the same since. Patient did score a 4.4/5.8 on the ACL, which indicates the patient would need some level of support and could not be alone the whole day safely, needing assistance with such stuff as solving problems, removing any safety hazards. He would likely benefit from assistance with home medication management.    Today: Patient continues to intermittently take medications. Will increase Namenda to see if this assists with catatonia, while likely tapering off of Amantadine given not sufficient benefit and  also. Might try different neuroleptic.    Educated regarding medication indications, risks, benefits, side effects, contraindications and possible interactions. Verbally expressed understanding.      Diagnoses     1. Schizoaffective disorder, depressive type, multiple episodes, currently in acute episode, with catatonia   2. 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 Methamphetamine use disorder, severe  4. Alcohol use disorder, moderate     Plan     Unit 5  Legal Status: Committed     Safety Assessment:    Behavioral Orders   Procedures     Code 1 - Restrict to Unit     Routine Programming     As clinically indicated     Status 15     Every 15 minutes.      Medications:     Outpatient medications continued/changed:     Wellbutrin  mg daily -> 300 mg on 12/24  Lithium 900 mg at bedtime  Zyprexa 10 mg BID prn - > 10 mg at bedtime -> 12.5 mg at bedtime on 1/3 -> Zyprexa 10 mg on 1/20 due to sedation -> 7.5 mg on 1/24 due to patient preference  Lyrica 100 mg TID (initially held but resumed due to benefit for anxiety and pain)  Vitamin D 50,000 international unit(s) weekly    New medications initiated:     Ativan 2 mg QID -> 1.5 mg QID on 1/5 -> 1/2/2 mg on 1/12 -> 1/1/2 mg on 1/15 -> 1 mg TID on 1/17 -> 1.5 mg TID on 1/26  Amantadine 100 mg BID  Namenda 5 mg daily -> 5 mg BID on 1/25 -> 10 mg BID on 1/31  Standard unit PRNs    New medications stopped    Exelon 3 mg BID off label for cognition from TBI due to concerns for agitation with plans to possibly switch to Namenda     Programming: Patient will be treated in a therapeutic milieu with appropriate individual and group therapies. Education will be provided on diagnoses, medications, and treatments.     Medical diagnoses:      #. Severe malnutrition  - Nutrition following   - Gaining weight   - Ensure on meal trays   - Monitoring, restart IOs    #. Intention tremor bilaterally   - Secondary to lithium  - Monitor  - Conservative  "management    #. Chronic low back pain  #. Muscle spasms   - Robaxin 1,000 mg TID prn for short term use. Will stop eventually  - Lidocaine patches prn  - Icy-hot prn   - APAP prn, increased to 1,000 mg   - Recommend referral to PT and PMR on outpatient basis     #. Multiple TBIs  - MRI findings consistent with encephalomalacia in left temporal-occipital region and left anterior frontal lobe.  - Recommend neuropsychological testing as outpatient and possible TBI focused IRTS/[facility] facility.  - OT to further evaluate    Consult: Nutrition  Labs: Li 0.8 on 1/12  Imaging: MRI recently    Anticipated LOS: 2- 6 weeks  Dispo: Likely [facility]     Interim History     The patient did take his medications this morning. He did eat his breakfast and has been eating more. Attended group today.    He denies any concerns. Continues to have false belief regarding stopping medications leading to him leaving earlier. Denies any psychotic symptoms. Continues to be depressed, but similar. Encouraged to eat and drink. Also, to take medications. No physical concerns. Feels safe in the hospital.     Medications       amantadine  100 mg Oral BID     buPROPion  300 mg Oral Daily     lithium ER  900 mg Oral At Bedtime     LORazepam  1.5 mg Oral TID     memantine  5 mg Oral BID     OLANZapine  7.5 mg Oral At Bedtime     pregabalin  100 mg Oral TID     vitamin D2  50,000 Units Oral Q7 Days        Allergies     Allergies   Allergen Reactions     Pork Allergy         Psychiatric Examination     /74   Pulse 84   Temp 98.7  F (37.1  C) (Temporal)   Resp 18   Ht 1.829 m (6')   Wt 97.1 kg (214 lb)   SpO2 98%   BMI 29.02 kg/m    Weight is 214 lbs 0 oz  Body mass index is 29.02 kg/m .    Appearance: Alert, oriented, dressed in hospital scrubs, long beard  Attitude: Cooperative   Eye Contact: Fair  Mood: \"Alright\"  Affect: Flat, mood congruent  Speech: Reduction in rate. Normal rhythm   Psychomotor Behavior: No tremor, rigidity, akathisia, or " psychomotor retardation. Withdrawal, ambitendency, immobility, improving some  Thought Process: Linear, concrete, irrational at times   Associations: No loose associations   Thought Content: Denies SI. No SIB. AH and paranoia at times, improved, none recently. Poverty of thought.  Insight: Limited  Judgment: Limited  Oriented to: Person, place, and time  Attention Span and Concentration: Intact  Recent and Remote Memory: Intact  Language: English with appropriate syntax and vocabulary  Fund of Knowledge: Average  Muscle Strength and Tone: Grossly normal  Gait and Station: Grossly normal    Pineda-[place]ville Catatonia Rating Scale   Severity Score (Number of points for items 1 -23) _______1___   Screening Score (Presence or absence of items 1 - 14) ______1_____   Time: 11:00 AM on 1/11/2022   Rater: Dr. Shanelle Schuler Catatonia Rating Scale   Severity Score (Number of points for items 1 -23) ____4______   Screening Score (Presence or absence of items 1 - 14) ___1________   Time: 1145AM on 1/18  Rater: Dr. Shanelle Schuler Catatonia Rating Scale   Severity Score (Number of points for items 1 -23) ____7______   Screening Score (Presence or absence of items 1 - 14) ____4_______   Time: 500 PM on 1/26  Rater: Dr. Timmons       Labs     No results found for this or any previous visit (from the past 24 hour(s)).     Attestation      Patient has been seen and evaluated by:    David Timmons DO, MA  Psychiatrist    VIDEO VISIT    Patient has given verbal consent for video visit?: Yes     Video- Visit Details  Type of service:  video visit for mental health treatment.  Time of service:    Date:  01/31/2022    Video Start Time: 130 PM      Video End Time: 145PM    Reason for video visit: COVID-19 [place]  Originating Site (patient location):  [facility]  Distant Site (provider location):  Remote location  Mode of Communication:  Video Conference via [email or web address]

## 2022-02-01 PROCEDURE — 250N000013 HC RX MED GY IP 250 OP 250 PS 637: Performed by: NURSE PRACTITIONER

## 2022-02-01 PROCEDURE — 99232 SBSQ HOSP IP/OBS MODERATE 35: CPT | Mod: 95 | Performed by: STUDENT IN AN ORGANIZED HEALTH CARE EDUCATION/TRAINING PROGRAM

## 2022-02-01 PROCEDURE — 250N000013 HC RX MED GY IP 250 OP 250 PS 637: Performed by: STUDENT IN AN ORGANIZED HEALTH CARE EDUCATION/TRAINING PROGRAM

## 2022-02-01 PROCEDURE — 124N000004

## 2022-02-01 RX ADMIN — PREGABALIN 100 MG: 75 CAPSULE ORAL at 14:09

## 2022-02-01 RX ADMIN — BUPROPION HYDROCHLORIDE 300 MG: 300 TABLET, EXTENDED RELEASE ORAL at 09:30

## 2022-02-01 RX ADMIN — METHOCARBAMOL 1000 MG: 500 TABLET, FILM COATED ORAL at 20:14

## 2022-02-01 RX ADMIN — LORAZEPAM 1.5 MG: 1 TABLET ORAL at 14:09

## 2022-02-01 RX ADMIN — LORAZEPAM 1.5 MG: 1 TABLET ORAL at 20:14

## 2022-02-01 RX ADMIN — ACETAMINOPHEN 975 MG: 325 TABLET, FILM COATED ORAL at 20:13

## 2022-02-01 RX ADMIN — METHOCARBAMOL 1000 MG: 500 TABLET, FILM COATED ORAL at 09:30

## 2022-02-01 RX ADMIN — MEMANTINE 10 MG: 5 TABLET ORAL at 20:13

## 2022-02-01 RX ADMIN — PREGABALIN 100 MG: 75 CAPSULE ORAL at 09:29

## 2022-02-01 RX ADMIN — MEMANTINE 10 MG: 5 TABLET ORAL at 09:30

## 2022-02-01 RX ADMIN — ACETAMINOPHEN 975 MG: 325 TABLET, FILM COATED ORAL at 09:30

## 2022-02-01 RX ADMIN — LORAZEPAM 1.5 MG: 1 TABLET ORAL at 09:29

## 2022-02-01 RX ADMIN — AMANTADINE HYDROCHLORIDE 100 MG: 100 CAPSULE ORAL at 09:30

## 2022-02-01 RX ADMIN — LITHIUM CARBONATE 900 MG: 450 TABLET, EXTENDED RELEASE ORAL at 20:14

## 2022-02-01 RX ADMIN — AMANTADINE HYDROCHLORIDE 100 MG: 100 CAPSULE ORAL at 20:14

## 2022-02-01 RX ADMIN — PREGABALIN 100 MG: 75 CAPSULE ORAL at 20:14

## 2022-02-01 ASSESSMENT — ACTIVITIES OF DAILY LIVING (ADL)
ADLS_ACUITY_SCORE: 7
LAUNDRY: UNABLE TO COMPLETE
ADLS_ACUITY_SCORE: 7
ADLS_ACUITY_SCORE: 7
LAUNDRY: UNABLE TO COMPLETE
HYGIENE/GROOMING: INDEPENDENT
ADLS_ACUITY_SCORE: 7
ADLS_ACUITY_SCORE: 7
ORAL_HYGIENE: INDEPENDENT
ADLS_ACUITY_SCORE: 7
ADLS_ACUITY_SCORE: 7
DRESS: INDEPENDENT
ADLS_ACUITY_SCORE: 7
DRESS: SCRUBS (BEHAVIORAL HEALTH);INDEPENDENT
ADLS_ACUITY_SCORE: 7
ORAL_HYGIENE: INDEPENDENT
ADLS_ACUITY_SCORE: 7
HYGIENE/GROOMING: INDEPENDENT
ADLS_ACUITY_SCORE: 7

## 2022-02-01 NOTE — PLAN OF CARE
Face to face end of shift report received from Trinidad ALCARAZ RN. Rounding completed. Patient observed.     Patient has been calm, cooperative this shift. His affect is flat and he is somewhat withdrawn. His speech is slow, but appropriate. He denies any SI/HI and AH/VH this morning. Allowed patient to seek out this writer when ready for morning medications. He took all medications willingly without issues. He states that he is experiencing pain in his lower back, rating it 4/10. Patient was given PRN Tylenol and Robaxin with some relief, rating 3/10. He requested to speak to provider regarding getting an X-Ray for his lower back. Questioned if patient would like any additional PRNs, but declined. Patient has attended to group this morning.     Problem: Behavioral Health Plan of Care  Goal: Patient-Specific Goal (Individualization)  Description: Patient will be free from self harm or injury  Patient will eat at least 50% of meals  Patient will attend one group every day.    Outcome: Improving  Note:     Patient has eaten 100 % of his meals this shift.   Patient has attended at least 1 group throughout the morning.     Problem: Behavior Regulation Impairment (Psychotic Signs/Symptoms)  Goal: Improved Behavioral Control (Psychotic Signs/Symptoms)  Description: Patient will be able to have a reality based conversation.     Outcome: Improving    Patient has been able to answer questions appropriately during assessment. Does not engage in full conversation with writer.      Problem: Violence Risk or Actual  Goal: Anger and Impulse Control  Description: Pt will be able to control his anger during stay  Pt will ask for PRN medications as needed to control his impulses    Outcome: Improving    Patient has not had any outbursts this shift and has not required any PRNs.

## 2022-02-01 NOTE — PLAN OF CARE
Face to face shift report received from Nurse. Rounding completed, pt observed.       Problem: Behavioral Health Plan of Care  Goal: Patient-Specific Goal (Individualization)  Description: Patient will be free from self harm or injury  Patient will eat at least 50% of meals  Patient will attend one group every day.        Outcome: Improving  Note:     Observed Pt. Laying in bed / Eyes Closed / Noted Breathing without difficulty.       Face to face report will be communicated to oncoming RN.    Trinidad Hurley RN  2/1/2022  12:12 AM

## 2022-02-01 NOTE — PROGRESS NOTES
[facility] Psychiatric Progress Note     Assessment     Mr. Carter is a 31 year old male with a PMH of schizoaffective disorder, depressive type, catatonia, severe methamphetamine use disorder, alcohol use disorder, and significant TBI at the age of 3 who presented with depression with catatonia being off of Ativan after recently being discharged. This is the patient's 3rd hospitalization in the past roughly two months with substance use complicating his course.     The patient's catatonia improved with resumption of Ativan. Also, amantadine has helped. Tapering down given concerns with misuse in the past, while using amantadine and exploring Namenda as alternative means for long-term management. Wellbutrin was increased to further address depression. Zyprexa increased to further help with depression and any element of psychosis. Exelon has been used off-label for cognition related to TBI's. Held due to concerns for contributing to agitation with monitoring occurring. Might switch to Namenda.     Patient has SPMI with him having deficits in his occupational and social functioning. He has had significant CNS insults with him having multiple TBIs, significant substance abuse over years, and near fatal cardiac arrest from overdose (? Anoxic injury) that have likely led to neuropsychiatric impact making it more challenging for him to recover. His  and  note he has not been the same since. Patient did score a 4.4/5.8 on the ACL, which indicates the patient would need some level of support and could not be alone the whole day safely, needing assistance with such stuff as solving problems, removing any safety hazards. He would likely benefit from assistance with home medication management.    Today: Patient continues to intermittently take medications. Will increase Namenda to see if this assists with catatonia, while likely tapering off of Amantadine given not sufficient benefit and  also. Might try different neuroleptic.    Educated regarding medication indications, risks, benefits, side effects, contraindications and possible interactions. Verbally expressed understanding.      Diagnoses     1. Schizoaffective disorder, depressive type, multiple episodes, currently in acute episode, with catatonia   2. 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 Methamphetamine use disorder, severe  4. Alcohol use disorder, moderate     Plan     Unit 5  Legal Status: Committed     Safety Assessment:    Behavioral Orders   Procedures     Code 1 - Restrict to Unit     Routine Programming     As clinically indicated     Status 15     Every 15 minutes.      Medications:     Outpatient medications continued/changed:     Wellbutrin  mg daily -> 300 mg on 12/24  Lithium 900 mg at bedtime  Zyprexa 10 mg BID prn - > 10 mg at bedtime -> 12.5 mg at bedtime on 1/3 -> Zyprexa 10 mg on 1/20 due to sedation -> 7.5 mg on 1/24 due to patient preference  Lyrica 100 mg TID (initially held but resumed due to benefit for anxiety and pain)  Vitamin D 50,000 international unit(s) weekly    New medications initiated:     Ativan 2 mg QID -> 1.5 mg QID on 1/5 -> 1/2/2 mg on 1/12 -> 1/1/2 mg on 1/15 -> 1 mg TID on 1/17 -> 1.5 mg TID on 1/26  Amantadine 100 mg BID  Namenda 5 mg daily -> 5 mg BID on 1/25 -> 10 mg BID on 1/31  Standard unit PRNs    New medications stopped    Exelon 3 mg BID off label for cognition from TBI due to concerns for agitation with plans to possibly switch to Namenda     Programming: Patient will be treated in a therapeutic milieu with appropriate individual and group therapies. Education will be provided on diagnoses, medications, and treatments.     Medical diagnoses:      #. Severe malnutrition  - Nutrition following   - Gaining weight   - Ensure on meal trays   - Monitoring, restarted IOs    #. Intention tremor bilaterally   - Secondary to lithium  - Monitor  -  "Conservative management    #. Chronic low back pain  #. Muscle spasms   - Robaxin 1,000 mg TID prn for short term use. Will stop eventually  - Lidocaine patches prn  - Icy-hot prn   - APAP prn, increased to 1,000 mg   - Recommend referral to PT and PMR on outpatient basis     #. Multiple TBIs  - MRI findings consistent with encephalomalacia in left temporal-occipital region and left anterior frontal lobe.  - Recommend neuropsychological testing as outpatient and possible TBI focused IRTS/[facility] facility.  - OT to further evaluate    Consult: Nutrition  Labs: Li 0.8 on 1/12  Imaging: MRI recently    Anticipated LOS: 2- 6 weeks  Dispo:[facility]     Interim History     The patient did take his medications this morning. He did eat his breakfast and has been eating more. Attended group today. Seems more active.    The patient denies any concerns today. He talked to his family yesterday, which he has not in a long time. He notes that \"they are doing well.\" Patient also notes that he attended groups. He is not sure what he learned.    Patient is eager to leave. He is not sure why he has to stay here. When asked what could help him leave, he said \"stopping medications.\" Explained to patient again that this will not help. Nursing has been providing patient more space to initiate taking medications, which has assisted.    Mr. Street has no other concerns today. HW is to determine one important thing he has learned recently from his experiences.     Medications       amantadine  100 mg Oral BID     buPROPion  300 mg Oral Daily     lithium ER  900 mg Oral At Bedtime     LORazepam  1.5 mg Oral TID     memantine  10 mg Oral BID     OLANZapine  7.5 mg Oral At Bedtime     pregabalin  100 mg Oral TID     vitamin D2  50,000 Units Oral Q7 Days        Allergies     Allergies   Allergen Reactions     Pork Allergy         Psychiatric Examination     /75   Pulse 86   Temp 98.2  F (36.8  C) (Temporal)   Resp 14   Ht 1.829 m (6')   Wt " "97.1 kg (214 lb)   SpO2 98%   BMI 29.02 kg/m    Weight is 214 lbs 0 oz  Body mass index is 29.02 kg/m .    Appearance: Alert, oriented, dressed in hospital scrubs, long beard  Attitude: Cooperative   Eye Contact: Fair  Mood: \"Alright\"  Affect: Flat, mood congruent  Speech: Reduction in rate. Normal rhythm   Psychomotor Behavior: No tremor, rigidity, akathisia, or psychomotor retardation. Withdrawal, ambitendency, immobility at times  Thought Process: Linear, concrete, irrational at times   Associations: No loose associations   Thought Content: Denies SI. No SIB. AH and paranoia at times, improved, none recently. Poverty of thought.  Insight: Limited  Judgment: Limited  Oriented to: Person, place, and time  Attention Span and Concentration: Intact  Recent and Remote Memory: Intact  Language: English with appropriate syntax and vocabulary  Fund of Knowledge: Average  Muscle Strength and Tone: Grossly normal  Gait and Station: Grossly normal    Pineda-[place] Catatonia Rating Scale   Severity Score (Number of points for items 1 -23) _______1___   Screening Score (Presence or absence of items 1 - 14) ______1_____   Time: 11:00 AM on 1/11/2022   Rater: Dr. Shanelle Schuler Catatonia Rating Scale   Severity Score (Number of points for items 1 -23) ____4______   Screening Score (Presence or absence of items 1 - 14) ___1________   Time: 1145AM on 1/18  Rater: Dr. Shanelle Schuler Catatonia Rating Scale   Severity Score (Number of points for items 1 -23) ____7______   Screening Score (Presence or absence of items 1 - 14) ____4_______   Time: 500 PM on 1/26  Rater: Dr. Timmons       Labs     No results found for this or any previous visit (from the past 24 hour(s)).       Treatment Team Care Plan     BEHAVIORAL TEAM DISCUSSION    Progress: Continued symptoms with some improvement    Continued Stay Criteria/Rationale: Safe discharge planning. Trying to arrange [facility].    Medical/Physical: See above    Precautions: See " above.     Plan: Continue inpatient care with unit support and medication management    Rationale for change in precautions or plan: NA due to no change    Participants: David Timmons DO, Nursing, SW, OT    The patient's care was discussed with the treatment team and chart notes were reviewed.    Attestation      Patient has been seen and evaluated by:    David Timmons DO, MA  Psychiatrist    VIDEO VISIT    Patient has given verbal consent for video visit?: Yes     Video- Visit Details  Type of service:  video visit for mental health treatment.  Time of service:    Date:  02/01/2022    Video Start Time: 1000 AM      Video End Time: 1015AM    Reason for video visit: COVID-19 [place]  Originating Site (patient location):  [facility]  Distant Site (provider location):  Remote location  Mode of Communication:  Video Conference via [email or web address]

## 2022-02-01 NOTE — PROGRESS NOTES
"CLINICAL NUTRITION SERVICES  -  REASSESSMENT NOTE      Steven Carter    31 yom admitted for catatonia. Pt has a hx of methamphetamine use, alcohol use disorder, schizoaffective disorder, TBI at age of 3. Intake has improved since last review, no meals with 0% intake. Overall intake is variable but improving. Weight gain of 1lb since last review. Weight has been stable this admission.    Diet Order: Regular  Intake: 12 meals with % intake    Height: 6' 0\"  Weight: 214 lbs 0 oz  Body mass index is 29.02 kg/m .  Weight Status:  Overweight BMI 25-29.9  Weight History:  Max IBW- 83.9kg , admit weight 96.4kg (212 lb 9.6 oz)  Wt Readings from Last 10 Encounters:   12/16/21 96.4 kg (212 lb 9.6 oz)   12/14/21 97.7 kg (215 lb 4.8 oz)   12/01/21 97.8 kg (215 lb 11.2 oz)   10/31/21 105.1 kg (231 lb 11.2 oz)   03/27/21 105.2 kg (232 lb)   11/30/15 96.6 kg (213 lb)   08/18/14 94.5 kg (208 lb 6.4 oz)   08/15/14 93.4 kg (206 lb)   07/19/14 102.1 kg (225 lb)      83.9kg- max ibw  Estimated Energy Needs: 0528-2061 kcals (25-30 Kcal/Kg)   Estimated Protein Needs:  grams protein (1-1.2 g pro/Kg)     Malnutrition Diagnosis: severe malnutrition - improving  In Context of:  Chronic illness or disease, Environmental or social circumstances     NUTRITION RECOMMENDATIONS  - Continue to encourage intake with meals, snacks, supplements  - Monitor weight      MONITORING AND EVALUATION:  RD will monitor intake, weight, labs         "

## 2022-02-01 NOTE — PLAN OF CARE
"  Problem: Behavioral Health Plan of Care  Goal: Patient-Specific Goal (Individualization)  Description: Patient will be free from self harm or injury  Patient will eat at least 50% of meals  Patient will attend one group every day.    Outcome: Improving  Note: Shift Summery:      Pt spent much of the shift in his room, pt did eat dinner in the lounge and walked into the group room for a few minutes. Pt reported pain in his back rated 3/10. Pt requested robaxin 1000mg and tylenol 975mg at 2014. Pt appears guarded during nursing assessment answering questions about anxiety and depression with \"not really\" and \"it's alright\". Pt denies SI, HI and hallucinations. Pt refused the zyprexa at bedtime this shift but took all other scheduled medications.     Pt ate 75% of his dinner, when asked why he is not eating all meals pt stated \"I am fasting\". Pt denies stomach issues and states he does it for personal benefit and that when at home he normally eats only one meal a day.      Problem: Behavior Regulation Impairment (Psychotic Signs/Symptoms)  Goal: Improved Behavioral Control (Psychotic Signs/Symptoms)  Description: Patient will be able to have a reality based conversation.     Outcome: Improving     Problem: Violence Risk or Actual  Goal: Anger and Impulse Control  Description: Pt will be able to control his anger during stay  Pt will ask for PRN medications as needed to control his impulses    Outcome: Improving    Face to face end of shift report communicated to night shift RN. Reported pt is a risk for violence.     Sarahi Hui, RN  2/1/2022  3:54 PM          "

## 2022-02-02 PROCEDURE — 250N000013 HC RX MED GY IP 250 OP 250 PS 637: Performed by: STUDENT IN AN ORGANIZED HEALTH CARE EDUCATION/TRAINING PROGRAM

## 2022-02-02 PROCEDURE — 250N000013 HC RX MED GY IP 250 OP 250 PS 637: Performed by: NURSE PRACTITIONER

## 2022-02-02 PROCEDURE — 124N000004

## 2022-02-02 PROCEDURE — 99233 SBSQ HOSP IP/OBS HIGH 50: CPT | Mod: 95 | Performed by: STUDENT IN AN ORGANIZED HEALTH CARE EDUCATION/TRAINING PROGRAM

## 2022-02-02 RX ADMIN — PREGABALIN 100 MG: 75 CAPSULE ORAL at 13:36

## 2022-02-02 RX ADMIN — MEMANTINE 10 MG: 5 TABLET ORAL at 08:26

## 2022-02-02 RX ADMIN — PREGABALIN 100 MG: 75 CAPSULE ORAL at 20:59

## 2022-02-02 RX ADMIN — BUPROPION HYDROCHLORIDE 300 MG: 300 TABLET, EXTENDED RELEASE ORAL at 08:26

## 2022-02-02 RX ADMIN — LORAZEPAM 1.5 MG: 1 TABLET ORAL at 13:36

## 2022-02-02 RX ADMIN — OLANZAPINE 7.5 MG: 7.5 TABLET ORAL at 20:59

## 2022-02-02 RX ADMIN — LORAZEPAM 1.5 MG: 1 TABLET ORAL at 08:26

## 2022-02-02 RX ADMIN — AMANTADINE HYDROCHLORIDE 100 MG: 100 CAPSULE ORAL at 08:26

## 2022-02-02 RX ADMIN — LORAZEPAM 1.5 MG: 1 TABLET ORAL at 20:59

## 2022-02-02 RX ADMIN — PREGABALIN 100 MG: 75 CAPSULE ORAL at 08:26

## 2022-02-02 RX ADMIN — METHOCARBAMOL 1000 MG: 500 TABLET, FILM COATED ORAL at 08:29

## 2022-02-02 RX ADMIN — MEMANTINE 10 MG: 5 TABLET ORAL at 20:59

## 2022-02-02 RX ADMIN — LITHIUM CARBONATE 900 MG: 450 TABLET, EXTENDED RELEASE ORAL at 20:59

## 2022-02-02 RX ADMIN — AMANTADINE HYDROCHLORIDE 100 MG: 100 CAPSULE ORAL at 20:59

## 2022-02-02 ASSESSMENT — ACTIVITIES OF DAILY LIVING (ADL)
ADLS_ACUITY_SCORE: 7
HYGIENE/GROOMING: INDEPENDENT
DRESS: INDEPENDENT
ADLS_ACUITY_SCORE: 7
ORAL_HYGIENE: INDEPENDENT
ADLS_ACUITY_SCORE: 7
HYGIENE/GROOMING: INDEPENDENT
ADLS_ACUITY_SCORE: 7
LAUNDRY: UNABLE TO COMPLETE
DRESS: SCRUBS (BEHAVIORAL HEALTH);INDEPENDENT
ADLS_ACUITY_SCORE: 7
ORAL_HYGIENE: INDEPENDENT
ADLS_ACUITY_SCORE: 7

## 2022-02-02 NOTE — PROGRESS NOTES
[facility] Psychiatric Progress Note     Assessment     Mr. Carter is a 31 year old male with a PMH of schizoaffective disorder, depressive type, catatonia, severe methamphetamine use disorder, alcohol use disorder, and significant TBI at the age of 3 who presented with depression with catatonia being off of Ativan after recently being discharged. This is the patient's 3rd hospitalization in the past roughly two months with substance use complicating his course.     The patient's catatonia and depression have been challenging to treat. His depression improved a little with increasing Wellbutrin and possibly from scheduling Zyprexa. His catatonia has improved some with Ativan, Amantadine, and recently Namenda, although he continues to have ambitendency and withdrawal at times. His Ativan was tapered down due to side effects (sedation) and also in preparation to potentially discharge given his history of misuse of substances. The amount had to be increased to 1.5 mg TID given that catatonia emerged more with the patient refusing his medications without reason and then taking (further sign of ambitendency in decision making). He has irrational beliefs that stopping his medications will lead to discharge. When he refuses medications his withdrawal worsens leading to poor intake with I/O monitoring when needed. Utilizing behavioral approaches to help with medication adherence. Offered ECT as an option to address his depression and catatonia with him pursuing. Monitoring to see if Patterson-Hagen should be pursued.    Patient has SPMI with him having deficits in his occupational and social functioning. He has had significant CNS insults with him having multiple TBIs, significant substance abuse over years, and near fatal cardiac arrest from overdose (? Anoxic injury) that have likely led to neuropsychiatric impact making it more challenging for him to recover. His  and  note he has not  been the same since. Patient did score a 4.4/5.8 on the ACL, which indicates the patient would need some level of support and could not be alone the whole day safely, needing assistance with such stuff as solving problems, removing any safety hazards. The patient is not safe to discharge to live on his own with him likely benefiting the most from supportive housing like a group home with possibly even a legal guardian to manage financial and health concerns.    Today: Patient appears mildly improved from the past few days. He has been more active and has been taking medications. Discussed recommendation for ECT as an option with patient refusing. Patient would like to simplify his medications with discussion of Amantadine being tapered off of, now that Namenda was started. Will also consider pursuing additional options for depression like increasing Wellbutrin, adding T3 hormone, or switching neuroleptic given inconsistent use. Stopped prn muscle relaxant to simplify regimen and reduce CNS sedating medications, as not helping back pain.    Educated regarding medication indications, risks, benefits, side effects, contraindications and possible interactions. Verbally expressed understanding.      Diagnoses     1. Schizoaffective disorder, depressive type, multiple episodes, currently in acute episode, with catatonia   2. 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 Methamphetamine use disorder, severe  4. Alcohol use disorder, moderate     Plan     Unit 5  Legal Status: Committed     Safety Assessment:    Behavioral Orders   Procedures     Code 1 - Restrict to Unit     Routine Programming     As clinically indicated     Status 15     Every 15 minutes.      Medications:     Outpatient medications continued/changed:     Wellbutrin  mg daily -> 300 mg on 12/24  Lithium 900 mg at bedtime  Zyprexa 10 mg BID prn - > 10 mg at bedtime -> 12.5 mg at bedtime on 1/3 -> Zyprexa 10 mg on 1/20  due to sedation -> 7.5 mg on 1/24 due to patient preference  Lyrica 100 mg TID (initially held but resumed due to benefit for anxiety and pain)  Vitamin D 50,000 international unit(s) weekly    New medications initiated:     Ativan 2 mg QID -> 1.5 mg QID on 1/5 -> 1/2/2 mg on 1/12 -> 1/1/2 mg on 1/15 -> 1 mg TID on 1/17 -> 1.5 mg TID on 1/26  Amantadine 100 mg BID  Namenda 5 mg daily -> 5 mg BID on 1/25 -> 10 mg BID on 1/31  Standard unit PRNs    New medications stopped    Exelon 3 mg BID off label for cognition from TBI due to concerns for agitation with plans to possibly switch to Namenda     Programming: Patient will be treated in a therapeutic milieu with appropriate individual and group therapies. Education will be provided on diagnoses, medications, and treatments.     Medical diagnoses:      #. Severe malnutrition  - Nutrition following   - Gaining weight   - Ensure on meal trays   - Monitoring, restarted IOs    #. Intention tremor bilaterally   - Secondary to lithium  - Monitor  - Conservative management    #. Chronic low back pain  #. Muscle spasms   - Robaxin 1,000 mg TID prn. Stopped on 2/2  - Lidocaine patches prn  - Icy-hot prn   - APAP prn, increased to 1,000 mg   - Recommend referral to PT and PMR on outpatient basis     #. Multiple TBIs  - MRI findings consistent with encephalomalacia in left temporal-occipital region and left anterior frontal lobe.  - Recommend neuropsychological testing as outpatient and possible TBI focused IRTS/[facility] facility.  - OT to further evaluate    Consult: Nutrition  Labs: Li 0.8 on 1/12  Imaging: MRI recently    Anticipated LOS: 2- 6 weeks  Dispo:[facility]     Interim History     The patient has been more active. He went to group and participated. He ate breakfast this morning and took morning medications. He has been refusing Zyprexa the past few nights. He has only taken 3 times in the past 7 days.    Patient continues to have poor understanding why he continues to be in the  "hospital. Discussed again how he cannot leave due to their being no bed at [facility].     Patient notes he continues to be depressed. Denies any SI. Does not have a good explanation for why not taking Zyprexa. Notes that he feels like they don't work and thinks that maybe if he stops one he will do better.    Patient is not interested in ECT. He notes he would prefer medications.    Patient discussed how he would like to stop Ativan because he would feel calmer. Encouraged him to continue given this is inaccurate and he will feel more anxious and decline like was said last week.     Patient agreed to continue taking medications, noting he would like to simplify them. Discussed tapering down on Amantadine in next couple of days after Namenda in system more.     Medications       amantadine  100 mg Oral BID     buPROPion  300 mg Oral Daily     lithium ER  900 mg Oral At Bedtime     LORazepam  1.5 mg Oral TID     memantine  10 mg Oral BID     OLANZapine  7.5 mg Oral At Bedtime     pregabalin  100 mg Oral TID     vitamin D2  50,000 Units Oral Q7 Days        Allergies     Allergies   Allergen Reactions     Pork Allergy         Psychiatric Examination     /75   Pulse 77   Temp 97.1  F (36.2  C) (Temporal)   Resp 16   Ht 1.829 m (6')   Wt 97.1 kg (214 lb)   SpO2 98%   BMI 29.02 kg/m    Weight is 214 lbs 0 oz  Body mass index is 29.02 kg/m .    Appearance: Alert, oriented, dressed in hospital scrubs, long beard  Attitude: Cooperative   Eye Contact: Fair  Mood: \"Alright\"  Affect: Flat, mood congruent  Speech: Reduction in rate. Normal rhythm   Psychomotor Behavior: No tremor, rigidity, akathisia, or psychomotor retardation. Withdrawal, ambitendency, immobility at times  Thought Process: Linear, concrete, irrational at times   Associations: No loose associations   Thought Content: Denies SI. No SIB. AH and paranoia at times, improved, none recently. Poverty of thought.  Insight: Limited  Judgment: Limited  Oriented " to: Person, place, and time  Attention Span and Concentration: Intact  Recent and Remote Memory: Intact  Language: English with appropriate syntax and vocabulary  Fund of Knowledge: Average  Muscle Strength and Tone: Grossly normal  Gait and Station: Grossly normal    Pineda-Orville Catatonia Rating Scale   Severity Score (Number of points for items 1 -23) _______1___   Screening Score (Presence or absence of items 1 - 14) ______1_____   Time: 11:00 AM on 1/11/2022   Rater: Dr. Shanelle Schuler Catatonia Rating Scale   Severity Score (Number of points for items 1 -23) ____4______   Screening Score (Presence or absence of items 1 - 14) ___1________   Time: 1145AM on 1/18  Rater: Dr. Shanelle Schuler Catatonia Rating Scale   Severity Score (Number of points for items 1 -23) ____7______   Screening Score (Presence or absence of items 1 - 14) ____4_______   Time: 500 PM on 1/26  Rater: Dr. Timmons       Labs     No results found for this or any previous visit (from the past 24 hour(s)).     Attestation      Patient has been seen and evaluated by:    David Timmons DO, MA  Psychiatrist    VIDEO VISIT    Patient has given verbal consent for video visit?: Yes     Video- Visit Details  Type of service:  video visit for mental health treatment.  Time of service:    Date:  02/02/2022    Video Start Time: 1000 AM      Video End Time: 1030AM    Reason for video visit: COVID-19 [place]  Originating Site (patient location):  [facility]  Distant Site (provider location):  Remote location  Mode of Communication:  Video Conference via [employer]

## 2022-02-02 NOTE — PLAN OF CARE
Pt comes to window and asked this writer if they can discharge today. Let pt know they can not due to no bed availability at the [facility]. Pt walks away.

## 2022-02-02 NOTE — PLAN OF CARE
Face to face shift report received from Nurse. Rounding completed, pt observed.       Problem: Behavioral Health Plan of Care  Goal: Patient-Specific Goal (Individualization)  Description: Patient will be free from self harm or injury  Patient will eat at least 50% of meals  Patient will attend one group every day.        Outcome: Improving  Note:     Starting shift I went to check on Pt. Observed Pt. Laying in bed / Eyes Closed / Noted Breathing without difficulty.     Pt slept at least 7 Hr, 0 Falls, 0 report of pain.    Face to face report will be communicated to oncoming RN.    Trinidad Hurley RN  2/1/2022  11:26 PM

## 2022-02-02 NOTE — PLAN OF CARE
"Face to face shift report received from Trinidad ALCARAZ RN. Rounding completed, pt observed.    Problem: Behavioral Health Plan of Care  Goal: Patient-Specific Goal (Individualization)  Description: Patient will be free from self harm or injury  Patient will eat at least 50% of meals  Patient will attend one group every day.        Outcome: Improving  Note: Shift Summery:  Patient was awake in his room at the start of this shift.  Did eat breakfast tray in lounge and ate all.  Nurse asked patient to let nurse know when he was ready for scheduled AM medications and pateint to nurses station within 30 minutes to request his meds.  While nurse was opening the medication packages, patient stated \"I don't need the Ativan\".  Nurse informed the patient that the doctor still wants him to take it and continued to open the packages. Put all in a med cup and handed to patient.  Patient took all without any further issues.  Patient did admit to back pain; offered and accepted Robaxin at 0829 with \"some\" relief.  Attended and participated in community group this AM. Mood is calm and affect is flat.         Problem: Behavior Regulation Impairment (Psychotic Signs/Symptoms)  Goal: Improved Behavioral Control (Psychotic Signs/Symptoms)  Description: Patient will be able to have a reality based conversation.     Outcome: Improving     Problem: Violence Risk or Actual  Goal: Anger and Impulse Control  Description: Pt will be able to control his anger during stay  Pt will ask for PRN medications as needed to control his impulses    2/2/2022 0738 by Alma Delia Garsia RN  Outcome: Improving   Face to face report will be communicated to oncoming RN.    Alma Delia Garsia RN  2/2/2022      "

## 2022-02-03 PROCEDURE — 250N000013 HC RX MED GY IP 250 OP 250 PS 637: Performed by: STUDENT IN AN ORGANIZED HEALTH CARE EDUCATION/TRAINING PROGRAM

## 2022-02-03 PROCEDURE — 124N000004

## 2022-02-03 PROCEDURE — 99233 SBSQ HOSP IP/OBS HIGH 50: CPT | Mod: 95 | Performed by: STUDENT IN AN ORGANIZED HEALTH CARE EDUCATION/TRAINING PROGRAM

## 2022-02-03 PROCEDURE — 250N000013 HC RX MED GY IP 250 OP 250 PS 637: Performed by: NURSE PRACTITIONER

## 2022-02-03 PROCEDURE — 99232 SBSQ HOSP IP/OBS MODERATE 35: CPT | Performed by: NURSE PRACTITIONER

## 2022-02-03 RX ORDER — SULINDAC 150 MG/1
150 TABLET ORAL 2 TIMES DAILY
Status: DISCONTINUED | OUTPATIENT
Start: 2022-02-03 | End: 2022-03-07

## 2022-02-03 RX ORDER — AMANTADINE HYDROCHLORIDE 100 MG/1
100 CAPSULE, GELATIN COATED ORAL 2 TIMES DAILY
Status: COMPLETED | OUTPATIENT
Start: 2022-02-03 | End: 2022-02-03

## 2022-02-03 RX ORDER — AMANTADINE HYDROCHLORIDE 100 MG/1
100 CAPSULE, GELATIN COATED ORAL DAILY
Status: DISCONTINUED | OUTPATIENT
Start: 2022-02-04 | End: 2022-02-09

## 2022-02-03 RX ADMIN — LORAZEPAM 1.5 MG: 1 TABLET ORAL at 14:51

## 2022-02-03 RX ADMIN — LORAZEPAM 1.5 MG: 1 TABLET ORAL at 20:37

## 2022-02-03 RX ADMIN — AMANTADINE HYDROCHLORIDE 100 MG: 100 CAPSULE ORAL at 20:37

## 2022-02-03 RX ADMIN — PREGABALIN 100 MG: 75 CAPSULE ORAL at 20:40

## 2022-02-03 RX ADMIN — MEMANTINE 10 MG: 5 TABLET ORAL at 08:30

## 2022-02-03 RX ADMIN — PREGABALIN 100 MG: 75 CAPSULE ORAL at 08:30

## 2022-02-03 RX ADMIN — LITHIUM CARBONATE 900 MG: 450 TABLET, EXTENDED RELEASE ORAL at 20:37

## 2022-02-03 RX ADMIN — PREGABALIN 100 MG: 75 CAPSULE ORAL at 14:51

## 2022-02-03 RX ADMIN — LORAZEPAM 1.5 MG: 1 TABLET ORAL at 08:30

## 2022-02-03 RX ADMIN — MEMANTINE 10 MG: 5 TABLET ORAL at 20:38

## 2022-02-03 RX ADMIN — SULINDAC 150 MG: 150 TABLET ORAL at 20:40

## 2022-02-03 RX ADMIN — BUPROPION HYDROCHLORIDE 300 MG: 300 TABLET, EXTENDED RELEASE ORAL at 08:30

## 2022-02-03 RX ADMIN — AMANTADINE HYDROCHLORIDE 100 MG: 100 CAPSULE ORAL at 08:30

## 2022-02-03 RX ADMIN — ERGOCALCIFEROL 50000 UNITS: 1.25 CAPSULE, LIQUID FILLED ORAL at 08:33

## 2022-02-03 ASSESSMENT — ACTIVITIES OF DAILY LIVING (ADL)
HYGIENE/GROOMING: INDEPENDENT
ADLS_ACUITY_SCORE: 7
ORAL_HYGIENE: INDEPENDENT
ADLS_ACUITY_SCORE: 7

## 2022-02-03 NOTE — PROGRESS NOTES
"[facility]    Medical Services Progress Note    Date of Service (when I saw the patient): 02/03/2022    Assessment & Plan      Principal Problem:    Catatonia     Active Medical Problems:  Chronic Back Pain- pt complains of chronic back pain on past admissions. He does not report any worsening back pain. He is guarded during assessment. Tylenol and ibuprofen ordered prn. He does shake his head yes that he would like to try Lidoderm patches.   1/14/22- lidoderm patches discontinued. Pt is prescribed robaxin by [facility]  Pt denies any pain currently and reports the muscle relaxer is helping with his pain.   2/3/22- discussed pt with Dr. Timmons today. Pt is complaining of chronic low back pain. Robaxin was discontinued by Dr. Timmons. Ordered sulindac per recommendation from Dr. Timmons as this does not interfere with Lithium as much. Discussed plan with pt and he is agreeable to trying this. Dr. Timmons also reported pt has complained of a \"lump\" in his back and would like provider to assess. Pt points to an area to the left side of his mid back area. A small area soft to touch that moves easily. This appears to be a small lipoma. Pt denies pain on palpation.      Vitamin D deficiency- Vitamin D level in the ED on 12/15/21 was low at 16. Will start Vitamin D 50,000 units every week for 8 weeks.      ED course- ovid negative, UDS negative, TSH wnl, CBC and CMP unremarkable, Vitamin D level is low at 16, Hepatitis C, HIV and QuantiferonTB are all negative      Severe Malnutrition- RD is following pt and making recommendations. Per RD   \"Malnutrition Diagnosis: severe malnutrition   In Context of:  Chronic illness or disease, Environmental or social circumstances   NUTRITION RECOMMENDATIONS  - Continue to encourage intake with meals and snacks  - Continue to send Ensure on meal trays.    - Monitor weight   MONITORING AND EVALUATION:  RD will monitor intake, weight, labs\"       Chart reviewed. Vitals stable. " Discussed pt with Dr. Timmons and nursing. No acute medical complaints reported. Pt denies any complaints.      Pt medically stable, no acute medical concerns. Chronic medical problems stable. Will sign off. Please consult for any new medical issues or concerns.     Code Status: Full Code.    Harmony Samuel CNP        -Data reviewed today: I reviewed all new labs and imaging results over the last 24 hours.     Physical Exam   Temp: 98.8  F (37.1  C) Temp src: Tympanic BP: 121/77 Pulse: 88   Resp: 16 SpO2: 99 % O2 Device: None (Room air)    Vitals:    01/15/22 0700 01/23/22 0700 01/30/22 1200   Weight: 98.9 kg (218 lb) 96.7 kg (213 lb 1.6 oz) 97.1 kg (214 lb)     Vital Signs with Ranges  Temp:  [98.8  F (37.1  C)] 98.8  F (37.1  C)  Pulse:  [88] 88  Resp:  [16] 16  BP: (121)/(77) 121/77  SpO2:  [99 %] 99 %  No intake/output data recorded.    Constitutional: awake, alert, cooperative, no apparent distress, and appears stated age, vitals stable  Respiratory: No increased work of breathing, good air exchange, clear to auscultation bilaterally, no crackles or wheezing  Cardiovascular: Normal apical impulse, regular rate and rhythm, normal S1 and S2, no S3 or S4, and no murmur noted  Neurologic: Awake, alert, oriented to name, place   Neuropsychiatric: General: flat affect, quiet,  and normal eye contact    Medications     amantadine  100 mg Oral BID     buPROPion  300 mg Oral Daily     lithium ER  900 mg Oral At Bedtime     LORazepam  1.5 mg Oral TID     memantine  10 mg Oral BID     OLANZapine  7.5 mg Oral At Bedtime     pregabalin  100 mg Oral TID     sulindac  150 mg Oral BID     vitamin D2  50,000 Units Oral Q7 Days       Data   No lab results found in last 7 days.    No results found for this or any previous visit (from the past 24 hour(s)).

## 2022-02-03 NOTE — PLAN OF CARE
Problem: Behavioral Health Plan of Care  Goal: Patient-Specific Goal (Individualization)  Description: Patient will be free from self harm or injury  Patient will eat at least 50% of meals  Patient will attend one group every day.        Note: Pt was isolative to room this evening.  He paced the hallway for a hour this evening shift.  Pt denied criteria. Writer went into room to administer HS medication he was sitting on floor staring at bathroom door.  When asked what he was doing, he said he was thinking.  When asked what he was thinking about , he said how he's going to discharge.  He said he wants to just discharge and not go to [facility].  Pt asked if he could not take his Zyprexa tonight and was told the wants him to take it and then said how about just not for tonight.  He was told wants him to take it.  Pt was compliant and took medication.  He then got hungry and ate half of a sandwich and 480 ml of fluids.         Problem: Behavior Regulation Impairment (Psychotic Signs/Symptoms)  Goal: Improved Behavioral Control (Psychotic Signs/Symptoms)  Description: Patient will be able to have a reality based conversation.     Note: Pt is able to make needs known.       Problem: Violence Risk or Actual  Goal: Anger and Impulse Control  Description: Pt will be able to control his anger during stay  Pt will ask for PRN medications as needed to control his impulses    Note: Pt was not angry this evening shift.

## 2022-02-03 NOTE — PLAN OF CARE
"  Problem: Behavioral Health Plan of Care  Goal: Patient-Specific Goal (Individualization)  Description: Patient will be free from self harm or injury  Patient will eat at least 50% of meals  Patient will attend one group every day.        Outcome: Improving  Note:        Problem: Behavior Regulation Impairment (Psychotic Signs/Symptoms)  Goal: Improved Behavioral Control (Psychotic Signs/Symptoms)  Description: Patient will be able to have a reality based conversation.     Outcome: Improving   Pt pleasant and cooperative . Presently sitting on his floor after his shower. Polite with writer. Still questions why he can't leave- states he feels he does not need any further care and doesn't even know why he had to stay here so long. Pt was able to have a lengthy conversation expressing his feelings. Attended group. Eating dinner amongst others in the unge, ate only 25% but did drink 50% of Ensure .   2043- pt refused to take his Zyprexa tonite- stated \"I don't need it\". Tried to talk pt into taking it but he stated \"I'm not jarvised - I don't have to take it\".  Face to face end of shift report communicated to TRAE Brown RN  2/3/2022  5:04 PM       "

## 2022-02-03 NOTE — PLAN OF CARE
"Report received. PT in bed.     PT withdrawn in room throughout most of shift. PT did leave room a few times to request new scrubs and linens. PT does not have conversation with other patients and did not attend any groups this shift.     PT told me he would like to discharge, he feels he has spent enough time in a mental health environment waiting for placement which seems like is never going to happen. PT reported he feels very frustrated and tired of being here waiting for long. \"Why cant being here count, I have been here for how many days? I hope that they count this as something.\"    PT denies SI/HI and hallucinations.   PT reports anxiety and depression from being here so long.     PT ate his entire breakfast but did not eat lunch. PT took all medications this shift.     Face to face end of shift report communicated to oncoming RN    Maria Esther Bridges RN  2/3/2022  2:57 PM                   Problem: Behavioral Health Plan of Care  Goal: Patient-Specific Goal (Individualization)  Description: Patient will be free from self harm or injury  Patient will eat at least 50% of meals  Patient will attend one group every day.        Outcome: Improving  Note:        Problem: Behavior Regulation Impairment (Psychotic Signs/Symptoms)  Goal: Improved Behavioral Control (Psychotic Signs/Symptoms)  Description: Patient will be able to have a reality based conversation.     Outcome: Improving     Problem: Violence Risk or Actual  Goal: Anger and Impulse Control  Description: Pt will be able to control his anger during stay  Pt will ask for PRN medications as needed to control his impulses    Outcome: Improving     "

## 2022-02-03 NOTE — PROGRESS NOTES
[facility] Psychiatric Progress Note     Assessment     Mr. Carter is a 31 year old male with a PMH of schizoaffective disorder, depressive type, catatonia, severe methamphetamine use disorder, alcohol use disorder, and significant TBI at the age of 3 who presented with depression with catatonia being off of Ativan after recently being discharged. This is the patient's 3rd hospitalization in the past roughly two months with substance use complicating his course.     The patient's catatonia and depression have been challenging to treat. His depression improved a little with increasing Wellbutrin and possibly from scheduling Zyprexa. His catatonia has improved some with Ativan, Amantadine, and recently Namenda, although he continues to have ambitendency and withdrawal at times. His Ativan was tapered down due to side effects (sedation) and also in preparation to potentially discharge given his history of misuse of substances. The amount had to be increased to 1.5 mg TID given that catatonia emerged more with the patient refusing his medications without reason and then taking (further sign of ambitendency in decision making). He has irrational belief that stopping his medications will lead to discharge. When he refuses medications his withdrawal worsens leading to poor intake with I/O monitoring when needed. Utilizing behavioral approaches to help with medication adherence. Offered ECT as an option to address his depression and catatonia with him pursuing. Monitoring to see if Patterson-Hagen should be pursued.    Patient has SPMI with him having deficits in his occupational and social functioning. He has had significant CNS insults with him having multiple TBIs, significant substance abuse over years, and near fatal cardiac arrest from overdose (? Anoxic injury) that have likely led to neuropsychiatric impact making it more challenging for him to recover. His  and  note he has not been  the same since his near fatal overdose. Patient did score a 4.4/5.8 on the ACL, which indicates the patient would need some level of support and could not be alone the whole day safely, needing assistance with such stuff as solving problems, removing any safety hazards. The patient is not safe to discharge to live on his own with him likely benefiting the most from supportive housing eventually like a group home with possibly even a legal guardian to manage financial and health concerns.    Today: Patient appears mildly improved from the past few days. He has been more active and has been taking medications. He decompensates when he does not. The patient exercised some today, which he has not done since I have seen him. He is more conversational. He would like to reduce the amount of medications he is on with amantadine being tapered down as previously planned since unclear if sufficient benefit. Will explore cross-titrating Zyprexa to other neuroleptic like Rexulti or Vraylar to further target depression. The patient's positive symptoms are minimal with his schizoaffective disorder being more significant for mood element. Will explore possibly increasing Wellbutrin too, as this seemed beneficial when previously done.    Educated regarding medication indications, risks, benefits, side effects, contraindications and possible interactions. Verbally expressed understanding.      Diagnoses     1. Schizoaffective disorder, depressive type, multiple episodes, currently in acute episode, with catatonia   2. 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 Methamphetamine use disorder, severe  4. Alcohol use disorder, moderate     Plan     Unit 5  Legal Status: Committed     Safety Assessment:    Behavioral Orders   Procedures     Code 1 - Restrict to Unit     Routine Programming     As clinically indicated     Status 15     Every 15 minutes.      Medications:     Outpatient medications  continued/changed:     Wellbutrin  mg daily -> 300 mg on 12/24  Lithium 900 mg at bedtime  Zyprexa 10 mg BID prn - > 10 mg at bedtime -> 12.5 mg at bedtime on 1/3 -> Zyprexa 10 mg on 1/20 due to sedation -> 7.5 mg on 1/24 due to patient preference  Lyrica 100 mg TID (initially held but resumed due to benefit for anxiety and pain)  Vitamin D 50,000 international unit(s) weekly    New medications initiated:     Ativan 2 mg QID -> 1.5 mg QID on 1/5 -> 1/2/2 mg on 1/12 -> 1/1/2 mg on 1/15 -> 1 mg TID on 1/17 -> 1.5 mg TID on 1/26  Amantadine 100 mg BID -> 100 mg daily (likely will stop on 2/8)  Namenda 5 mg daily -> 5 mg BID on 1/25 -> 10 mg BID on 1/31  Standard unit PRNs    New medications stopped    Exelon 3 mg BID off label for cognition from TBI due to concerns for agitation with plans to possibly switch to Namenda     Programming: Patient will be treated in a therapeutic milieu with appropriate individual and group therapies. Education will be provided on diagnoses, medications, and treatments.     Medical diagnoses:      #. Severe malnutrition  - Nutrition following   - Gaining weight   - Ensure on meal trays   - Monitoring, restarted IOs    #. Intention tremor bilaterally   - Secondary to lithium  - Monitor  - Conservative management    #. Chronic low back pain  #. Muscle spasms   - Robaxin 1,000 mg TID prn. Stopped on 2/2  - Sulindac 150 mg BID for temporary use  - Lidocaine patches prn  - Icy-hot prn   - APAP prn, increased to 1,000 mg   - Recommend referral to PT and PMR on outpatient basis     #. Multiple TBIs  - MRI findings consistent with encephalomalacia in left temporal-occipital region and left anterior frontal lobe.  - Recommend neuropsychological testing as outpatient and possible TBI focused IRTS/[facility] facility.  - OT to further evaluate    Consult: Nutrition  Labs: Li 0.8 on 1/12  Imaging: MRI recently    Anticipated LOS: 2- 6 weeks  Dispo:[facility]     Interim History     The patient has been more  "active. He did push ups this morning. He took Zyprexa last night. He took his morning medications.    Patient is more conversational today. He hopes to leave soon. He notes being more tired from taking Zyprexa last night. He notes that he is tolerating his medications, although has concerns. Discussed tapering off of Amantadine with the patient agreeing, given wanting to simplify his medications while watching for worsening of catatonia.    He denies any safety concerns. No psychotic symptoms.     He does note continued chronic back pain. NP will see.     Medications       amantadine  100 mg Oral BID     [START ON 2/4/2022] amantadine  100 mg Oral Daily     buPROPion  300 mg Oral Daily     lithium ER  900 mg Oral At Bedtime     LORazepam  1.5 mg Oral TID     memantine  10 mg Oral BID     OLANZapine  7.5 mg Oral At Bedtime     pregabalin  100 mg Oral TID     sulindac  150 mg Oral BID     vitamin D2  50,000 Units Oral Q7 Days        Allergies     Allergies   Allergen Reactions     Pork Allergy         Psychiatric Examination     /77   Pulse 88   Temp 98.8  F (37.1  C) (Tympanic)   Resp 16   Ht 1.829 m (6')   Wt 97.1 kg (214 lb)   SpO2 99%   BMI 29.02 kg/m    Weight is 214 lbs 0 oz  Body mass index is 29.02 kg/m .    Appearance: Alert, oriented, dressed in hospital scrubs, long beard  Attitude: Cooperative   Eye Contact: Fair  Mood: \"Ok\"  Affect: Flat, mood congruent  Speech: Reduction in rate. Normal rhythm   Psychomotor Behavior: No tremor, rigidity, akathisia, or psychomotor retardation. Withdrawal, ambitendency, immobility at times  Thought Process: Linear, concrete, irrational at times   Associations: No loose associations   Thought Content: Denies SI. No SIB. AH and paranoia at times, improved, none recently. Poverty of thought.  Insight: Limited  Judgment: Limited  Oriented to: Person, place, and time  Attention Span and Concentration: Intact  Recent and Remote Memory: Intact  Language: English with " appropriate syntax and vocabulary  Fund of Knowledge: Average  Muscle Strength and Tone: Grossly normal  Gait and Station: Grossly normal    Pineda-[place] Catatonia Rating Scale   Severity Score (Number of points for items 1 -23) _______1___   Screening Score (Presence or absence of items 1 - 14) ______1_____   Time: 11:00 AM on 1/11/2022   Rater: Dr. Shanelle Schuler Catatonia Rating Scale   Severity Score (Number of points for items 1 -23) ____4______   Screening Score (Presence or absence of items 1 - 14) ___1________   Time: 1145AM on 1/18  Rater: Dr. Shanelle Schuler Catatonia Rating Scale   Severity Score (Number of points for items 1 -23) ____7______   Screening Score (Presence or absence of items 1 - 14) ____4_______   Time: 500 PM on 1/26  Rater: Dr. Timmons       Labs     No results found for this or any previous visit (from the past 24 hour(s)).     Attestation      Patient has been seen and evaluated by:    David Timmons DO, MA  Psychiatrist    VIDEO VISIT    Patient has given verbal consent for video visit?: Yes     Video- Visit Details  Type of service:  video visit for mental health treatment.  Time of service:    Date:  02/03/2022    Video Start Time:130PM      Video End Time: 200PM    Reason for video visit: COVID-19 [place]  Originating Site (patient location):  [facility]  Distant Site (provider location):  Remote location  Mode of Communication:  Video Conference via [employer]

## 2022-02-03 NOTE — PLAN OF CARE
Face to face shift report received from Nurse. Rounding completed, pt observed.         Problem: Behavioral Health Plan of Care  Goal: Patient-Specific Goal (Individualization)  Description: Patient will be free from self harm or injury  Patient will eat at least 50% of meals  Patient will attend one group every day.        Outcome: Improving  Note:          Observed Pt. Laying in bed / Eyes Closed / Noted Breathing without difficulty.     Face to face report will be communicated to oncoming RN.    Trinidad Hurley RN  2/2/2022  11:53 PM

## 2022-02-04 PROCEDURE — 124N000004

## 2022-02-04 PROCEDURE — 250N000013 HC RX MED GY IP 250 OP 250 PS 637: Performed by: STUDENT IN AN ORGANIZED HEALTH CARE EDUCATION/TRAINING PROGRAM

## 2022-02-04 PROCEDURE — 250N000013 HC RX MED GY IP 250 OP 250 PS 637: Performed by: NURSE PRACTITIONER

## 2022-02-04 PROCEDURE — 99232 SBSQ HOSP IP/OBS MODERATE 35: CPT | Performed by: NURSE PRACTITIONER

## 2022-02-04 RX ADMIN — MEMANTINE 10 MG: 5 TABLET ORAL at 20:36

## 2022-02-04 RX ADMIN — PREGABALIN 100 MG: 75 CAPSULE ORAL at 20:36

## 2022-02-04 RX ADMIN — PREGABALIN 100 MG: 75 CAPSULE ORAL at 13:19

## 2022-02-04 RX ADMIN — SULINDAC 150 MG: 150 TABLET ORAL at 20:36

## 2022-02-04 RX ADMIN — MEMANTINE 10 MG: 5 TABLET ORAL at 08:08

## 2022-02-04 RX ADMIN — LITHIUM CARBONATE 900 MG: 450 TABLET, EXTENDED RELEASE ORAL at 20:36

## 2022-02-04 RX ADMIN — LORAZEPAM 1.5 MG: 1 TABLET ORAL at 13:19

## 2022-02-04 RX ADMIN — BUPROPION HYDROCHLORIDE 300 MG: 300 TABLET, EXTENDED RELEASE ORAL at 08:08

## 2022-02-04 RX ADMIN — SULINDAC 150 MG: 150 TABLET ORAL at 08:08

## 2022-02-04 RX ADMIN — LORAZEPAM 1.5 MG: 1 TABLET ORAL at 08:08

## 2022-02-04 RX ADMIN — PREGABALIN 100 MG: 75 CAPSULE ORAL at 08:08

## 2022-02-04 RX ADMIN — AMANTADINE HYDROCHLORIDE 100 MG: 100 CAPSULE ORAL at 08:08

## 2022-02-04 RX ADMIN — OLANZAPINE 7.5 MG: 7.5 TABLET ORAL at 20:36

## 2022-02-04 ASSESSMENT — ACTIVITIES OF DAILY LIVING (ADL)
ADLS_ACUITY_SCORE: 7
ORAL_HYGIENE: INDEPENDENT
ADLS_ACUITY_SCORE: 7
ORAL_HYGIENE: INDEPENDENT
ADLS_ACUITY_SCORE: 7
DRESS: INDEPENDENT
ADLS_ACUITY_SCORE: 7
HYGIENE/GROOMING: INDEPENDENT
ADLS_ACUITY_SCORE: 7
ADLS_ACUITY_SCORE: 7
HYGIENE/GROOMING: INDEPENDENT
ADLS_ACUITY_SCORE: 7

## 2022-02-04 NOTE — PLAN OF CARE
Face to face shift report received from Denise ESTES RN. Rounding completed, pt observed.    Problem: Behavioral Health Plan of Care  Goal: Patient-Specific Goal (Individualization)  Description: Patient will be free from self harm or injury  Patient will eat at least 50% of meals  Patient will attend one group every day.  Monitor food intake.  Outcome: Improving  Note: Shift Summery:  Patient awake in bed at the start of this shift.  Up for water at 0120.  Resting in bed but not sleeping.  Slept about 1 hour.     Problem: Behavior Regulation Impairment (Psychotic Signs/Symptoms)  Goal: Improved Behavioral Control (Psychotic Signs/Symptoms)  Description: Patient will be able to have a reality based conversation.     Outcome: No Change     Problem: Violence Risk or Actual  Goal: Anger and Impulse Control  Description: Pt will be able to control his anger during stay  Pt will ask for PRN medications as needed to control his impulses    Outcome: Improving      Alma Delia Garsia RN  2/3/2022

## 2022-02-04 NOTE — PLAN OF CARE
Care continued for next shift.  Patient observed awake in his room.  Problem: Behavioral Health Plan of Care  Goal: Patient-Specific Goal (Individualization)  Description: Patient will be free from self harm or injury  Patient will eat at least 50% of meals  Patient will attend one group every day.    Monitor food intake.    2/4/2022 0718 by Alma Delia Garsia RN  Outcome: No Change  Note: Shift Summery:  Patient was awake in his room at the start of this shift.  Patient ate breakfast in lounge.  Came to nurses station to request his scheduled medcations.  Compliant with taking all.  Asked nurse how she was after he was asked how he was doing today.  Conversation less forced.  Informed of new medication for pain that is scheduled BID.  Patient does endorse pain in lower back and in left leg.  No further complaints by mid morning.  Encouraged to attend groups when offered.  No aggression noted.      Problem: Behavior Regulation Impairment (Psychotic Signs/Symptoms)  Goal: Improved Behavioral Control (Psychotic Signs/Symptoms)  Description: Patient will be able to have a reality based conversation.     2/4/2022 0718 by Alma Delia Garsia RN  Outcome: No Change     Problem: Violence Risk or Actual  Goal: Anger and Impulse Control  Description: Pt will be able to control his anger during stay  Pt will ask for PRN medications as needed to control his impulses    2/4/2022 0718 by Alma Delia Garsia RN  Outcome: Improving   Face to face report will be communicated to oncoming RN.    Alma Delia Garsia RN  2/4/2022

## 2022-02-04 NOTE — PROGRESS NOTES
"CLINICAL NUTRITION SERVICES  -  ASSESSMENT NOTE    Setven Carter      31 yom admitted for catatonia. Pt has a hx of methamphetamine use, alcohol use disorder, schizoaffective disorder, TBI at age of 3. Intake declined somewhat since last review, a few meals at 0% intake. Overall suboptimal intake, but maintaining weight.    Diet Order:  Intake: 8 meals with 0-100% intake     Height: 6' 0\"  Weight: 214 lbs 0 oz  Body mass index is 29.02 kg/m .  Weight Status:  Overweight BMI 25-29.9  Weight History:  Max IBW- 83.9kg , admit weight 96.4kg (212 lb 9.6 oz)  Wt Readings from Last 10 Encounters:   12/16/21 96.4 kg (212 lb 9.6 oz)   12/14/21 97.7 kg (215 lb 4.8 oz)   12/01/21 97.8 kg (215 lb 11.2 oz)   10/31/21 105.1 kg (231 lb 11.2 oz)   03/27/21 105.2 kg (232 lb)   11/30/15 96.6 kg (213 lb)   08/18/14 94.5 kg (208 lb 6.4 oz)   08/15/14 93.4 kg (206 lb)   07/19/14 102.1 kg (225 lb)     83.9kg- max ibw  Estimated Energy Needs: 1305-2944 kcals (25-30 Kcal/Kg)   Estimated Protein Needs:  grams protein (1-1.2 g pro/Kg)     Malnutrition Diagnosis: severe malnutrition - improving. Maintaining weight.  In Context of:  Chronic illness or disease, Environmental or social circumstances     NUTRITION RECOMMENDATIONS  - Continue to encourage intake with meals, snacks, supplements  - Monitor weight      MONITORING AND EVALUATION:  RD will monitor intake, weight, labs  "

## 2022-02-04 NOTE — PROGRESS NOTES
[facility] Psychiatric Progress Note     Assessment     Mr. Carter is a 31 year old male with a PMH of schizoaffective disorder, depressive type, catatonia, severe methamphetamine use disorder, alcohol use disorder, and significant TBI at the age of 3 who presented with depression with catatonia being off of Ativan after recently being discharged. This is the patient's 3rd hospitalization in the past roughly two months with substance use complicating his course.     The patient's catatonia and depression have been challenging to treat. His depression improved a little with increasing Wellbutrin and possibly from scheduling Zyprexa. His catatonia has improved some with Ativan, Amantadine, and recently Namenda, although he continues to have ambitendency and withdrawal at times. His Ativan was tapered down due to side effects (sedation) and also in preparation to potentially discharge given his history of misuse of substances. The amount had to be increased to 1.5 mg TID given that catatonia emerged more with the patient refusing his medications without reason and then taking (further sign of ambitendency in decision making). He has irrational belief that stopping his medications will lead to discharge. When he refuses medications his withdrawal worsens leading to poor intake with I/O monitoring when needed. Utilizing behavioral approaches to help with medication adherence. Offered ECT as an option to address his depression and catatonia with him pursuing. Monitoring to see if Patterson-Hagen should be pursued.    Patient has SPMI with him having deficits in his occupational and social functioning. He has had significant CNS insults with him having multiple TBIs, significant substance abuse over years, and near fatal cardiac arrest from overdose (? Anoxic injury) that have likely led to neuropsychiatric impact making it more challenging for him to recover. His  and  note he has not been  the same since his near fatal overdose. Patient did score a 4.4/5.8 on the ACL, which indicates the patient would need some level of support and could not be alone the whole day safely, needing assistance with such stuff as solving problems, removing any safety hazards. The patient is not safe to discharge to live on his own with him likely benefiting the most from supportive housing eventually like a group home with possibly even a legal guardian to manage financial and health concerns.    Today: Patient appears mildly improved from the past few days. He has been more active and has been taking medications. He decompensates when he does not. The patient exercised some today, which he has not done since I have seen him. He is more conversational. He would like to reduce the amount of medications he is on with amantadine being tapered down as previously planned since unclear if sufficient benefit. Will explore cross-titrating Zyprexa to other neuroleptic like Rexulti or Vraylar to further target depression. The patient's positive symptoms are minimal with his schizoaffective disorder being more significant for mood element. Will explore possibly increasing Wellbutrin too, as this seemed beneficial when previously done.    Educated regarding medication indications, risks, benefits, side effects, contraindications and possible interactions. Verbally expressed understanding.      Diagnoses     1. Schizoaffective disorder, depressive type, multiple episodes, currently in acute episode, with catatonia   2. 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 Methamphetamine use disorder, severe  4. Alcohol use disorder, moderate     Plan     Unit 5  Legal Status: Committed     Safety Assessment:    Behavioral Orders   Procedures     Code 1 - Restrict to Unit     Routine Programming     As clinically indicated     Status 15     Every 15 minutes.      Medications:     Outpatient medications  continued/changed:     Wellbutrin  mg daily -> 300 mg on 12/24  Lithium 900 mg at bedtime  Zyprexa 10 mg BID prn - > 10 mg at bedtime -> 12.5 mg at bedtime on 1/3 -> Zyprexa 10 mg on 1/20 due to sedation -> 7.5 mg on 1/24 due to patient preference  Lyrica 100 mg TID (initially held but resumed due to benefit for anxiety and pain)  Vitamin D 50,000 international unit(s) weekly    New medications initiated:     Ativan 2 mg QID -> 1.5 mg QID on 1/5 -> 1/2/2 mg on 1/12 -> 1/1/2 mg on 1/15 -> 1 mg TID on 1/17 -> 1.5 mg TID on 1/26  Amantadine 100 mg BID -> 100 mg daily (likely will stop on 2/8)  Namenda 5 mg daily -> 5 mg BID on 1/25 -> 10 mg BID on 1/31  Standard unit PRNs    New medications stopped    Exelon 3 mg BID off label for cognition from TBI due to concerns for agitation with plans to possibly switch to Namenda     Programming: Patient will be treated in a therapeutic milieu with appropriate individual and group therapies. Education will be provided on diagnoses, medications, and treatments.     Medical diagnoses:      #. Severe malnutrition  - Nutrition following   - Gaining weight   - Ensure on meal trays   - Monitoring, restarted IOs    #. Intention tremor bilaterally   - Secondary to lithium  - Monitor  - Conservative management    #. Chronic low back pain  #. Muscle spasms   - Robaxin 1,000 mg TID prn. Stopped on 2/2  - Sulindac 150 mg BID for temporary use  - Lidocaine patches prn  - Icy-hot prn   - APAP prn, increased to 1,000 mg   - Recommend referral to PT and PMR on outpatient basis     #. Multiple TBIs  - MRI findings consistent with encephalomalacia in left temporal-occipital region and left anterior frontal lobe.  - Recommend neuropsychological testing as outpatient and possible TBI focused IRTS/[facility] facility.  - OT to further evaluate    Consult: Nutrition  Labs: Li 0.8 on 1/12  Imaging: MRI recently    Anticipated LOS: 2- 6 weeks  Dispo:[facility]     Interim History   I found patient in bed  "resting this am when we spoke. Overall he is more active doing exercises in his room. He refused his zyprexa last evening, he did take his morning medications. I introduced myself to pt-he asked appropriate questions as to how long I would be seeing him, if I was covering for Dr. Timmons and a few others, this is the most I have spoke with him.     Steven notes his back pain is tolerable, this is a chronic issue. Encouraged gentle stretching which he was open to trying. No signs of worsening catatonia.     Denies any medication side effects, no psychotic symptoms. No safety concerns.     Medications       amantadine  100 mg Oral Daily     buPROPion  300 mg Oral Daily     lithium ER  900 mg Oral At Bedtime     LORazepam  1.5 mg Oral TID     memantine  10 mg Oral BID     OLANZapine  7.5 mg Oral At Bedtime     pregabalin  100 mg Oral TID     sulindac  150 mg Oral BID     vitamin D2  50,000 Units Oral Q7 Days        Allergies     Allergies   Allergen Reactions     Pork Allergy         Psychiatric Examination     /73   Pulse 80   Temp 98.5  F (36.9  C) (Tympanic)   Resp 16   Ht 1.829 m (6')   Wt 97.1 kg (214 lb)   SpO2 96%   BMI 29.02 kg/m    Weight is 214 lbs 0 oz  Body mass index is 29.02 kg/m .    Appearance: Alert, oriented, dressed in hospital scrubs, long beard  Attitude: Cooperative   Eye Contact: Fair  Mood: \"not bad\"  Affect: Flat, mood congruent  Speech: Reduction in rate. Normal rhythm   Psychomotor Behavior: No tremor, rigidity, akathisia, or psychomotor retardation. Withdrawal, immobility at times  Thought Process: Linear, concrete, irrational at times   Associations: No loose associations   Thought Content: Denies SI. No SIB. AH and paranoia at times, improved, none recently. Poverty of thought.  Insight: Limited  Judgment: Limited  Oriented to: Person, place, and time  Attention Span and Concentration: Intact  Recent and Remote Memory: Intact  Language: English with appropriate syntax and " vocabulary  Fund of Knowledge: Average  Muscle Strength and Tone: Grossly normal  Gait and Station: Grossly normal         Labs     No results found for this or any previous visit (from the past 24 hour(s)).     Attestation      Ary TAVAREZ, CNP

## 2022-02-04 NOTE — PLAN OF CARE
"  Problem: Behavioral Health Plan of Care  Goal: Patient-Specific Goal (Individualization)  Description: Patient will be free from self harm or injury  Patient will eat at least 50% of meals  Patient will attend one group every day.    Monitor food intake.    Outcome: Improving  Note:        Problem: Behavior Regulation Impairment (Psychotic Signs/Symptoms)  Goal: Improved Behavioral Control (Psychotic Signs/Symptoms)  Description: Patient will be able to have a reality based conversation.     Outcome: Improving   Pt pleasant and cooperative - sitting on the floor in his room. More social with staff, started asking writer questions as how the day was and how writer is doing. States he is doing ok but \"waiting to go\". Talked of how his beard is growing and asked if it's    Face to face end of shift report communicated to TRAE Brown RN  2/4/2022  3:49 PM      allowed to use scissors- declined offer for electric razor. No requests or complaints at this time.   "

## 2022-02-04 NOTE — PLAN OF CARE
Spoke to pt this afternoon. Pt was found sitting on the floor staring at the wall. Pt states they are doing good and requested to leave. Explained to pt that they would be going to a [facility] from here and that we are waiting for a bed to open up. Pt continued to stare at the wall and provided no conversation to this writer. Encouraged pt to keep up the good work and hopefully the team would hear something soon.

## 2022-02-05 PROCEDURE — 99232 SBSQ HOSP IP/OBS MODERATE 35: CPT | Performed by: NURSE PRACTITIONER

## 2022-02-05 PROCEDURE — 250N000013 HC RX MED GY IP 250 OP 250 PS 637: Performed by: NURSE PRACTITIONER

## 2022-02-05 PROCEDURE — 124N000004

## 2022-02-05 PROCEDURE — 250N000013 HC RX MED GY IP 250 OP 250 PS 637: Performed by: STUDENT IN AN ORGANIZED HEALTH CARE EDUCATION/TRAINING PROGRAM

## 2022-02-05 RX ADMIN — SULINDAC 150 MG: 150 TABLET ORAL at 08:15

## 2022-02-05 RX ADMIN — MEMANTINE 10 MG: 5 TABLET ORAL at 08:15

## 2022-02-05 RX ADMIN — PREGABALIN 100 MG: 75 CAPSULE ORAL at 08:15

## 2022-02-05 RX ADMIN — MEMANTINE 10 MG: 5 TABLET ORAL at 20:14

## 2022-02-05 RX ADMIN — PREGABALIN 100 MG: 75 CAPSULE ORAL at 20:17

## 2022-02-05 RX ADMIN — BUPROPION HYDROCHLORIDE 300 MG: 300 TABLET, EXTENDED RELEASE ORAL at 08:15

## 2022-02-05 RX ADMIN — LORAZEPAM 1.5 MG: 1 TABLET ORAL at 13:12

## 2022-02-05 RX ADMIN — LORAZEPAM 1.5 MG: 1 TABLET ORAL at 20:14

## 2022-02-05 RX ADMIN — AMANTADINE HYDROCHLORIDE 100 MG: 100 CAPSULE ORAL at 08:15

## 2022-02-05 RX ADMIN — PREGABALIN 100 MG: 75 CAPSULE ORAL at 13:12

## 2022-02-05 RX ADMIN — SULINDAC 150 MG: 150 TABLET ORAL at 20:15

## 2022-02-05 RX ADMIN — LORAZEPAM 1.5 MG: 1 TABLET ORAL at 08:15

## 2022-02-05 RX ADMIN — LITHIUM CARBONATE 900 MG: 450 TABLET, EXTENDED RELEASE ORAL at 20:14

## 2022-02-05 ASSESSMENT — ACTIVITIES OF DAILY LIVING (ADL)
ADLS_ACUITY_SCORE: 7

## 2022-02-05 NOTE — PLAN OF CARE
Face to face end of shift report will be communicated to oncoming RN.    Problem: Behavioral Health Plan of Care  Goal: Patient-Specific Goal (Individualization)  Description: Patient will be free from self harm or injury  Patient will eat at least 50% of meals  Patient will attend one group every day.    Monitor food intake.    Outcome: No Change     Problem: Behavior Regulation Impairment (Psychotic Signs/Symptoms)  Goal: Improved Behavioral Control (Psychotic Signs/Symptoms)  Description: Patient will be able to have a reality based conversation.     Outcome: No Change     Problem: Violence Risk or Actual  Goal: Anger and Impulse Control  Description: Pt will be able to control his anger during stay  Pt will ask for PRN medications as needed to control his impulses    Outcome: No Change   Face to face end of shift report obtained from TRAE Archibald. Pt observed resting in bed.   Pt appears to be sleeping in bed with eyes closed, having regular respirations, and position changes. 15 minutes and PRN safety checks completed with no noted complains.No delusional comments or violent behaviors noted or reported so far this shift.   0600-Pt appeared to had slept all night.

## 2022-02-05 NOTE — PLAN OF CARE
"Face to face end of shift report communicated to oncoming shift.     Sobeida Benton RN  2/5/2022  11:12 PM    Problem: Behavioral Health Plan of Care  Goal: Patient-Specific Goal (Individualization)  Description: Patient will be free from self harm or injury  Patient will eat at least 50% of meals  Patient will attend one group every day.    Monitor food intake.    Outcome: No Change  Note: Shift Summery:      Patient out on unit to get breakfast tray, patient does not eat any food, drinks one juice.  Returns to bed.  Patient takes am medications without problem, encouraged patient to drink fluids, Patient reports \"I just want to get out of here\"  Informed patient not eating or drinking will increase his stay.  \"I just don't feel like eating\"      Patient does not attend groups this shift.  Patient drinks 240 mL juice and 1 cup of coffee.  Patient questions \"Can I speak to Veronica, I'd like to leave now\"      Continued care for afternoon shift.    Encouraged patient to come out to lounge for meal time, staff member sits with patient briefly while eating, patient eats 100% of supper out in the lounge.   Patient out in lounge and eats snack this shift.   Patient goes into group for 5 minutes, leaves after.     Patient takes HS medications, refuses Zyprexa \"I don't want that\"      Patient continues to express he is \"ready to leave\"      Face to face start of shift report received from RN.  Patient in room at this time.       "

## 2022-02-05 NOTE — PROGRESS NOTES
[facility] Psychiatric Progress Note     Assessment     Mr. Carter is a 31 year old male with a PMH of schizoaffective disorder, depressive type, catatonia, severe methamphetamine use disorder, alcohol use disorder, and significant TBI at the age of 3 who presented with depression with catatonia being off of Ativan after recently being discharged. This is the patient's 3rd hospitalization in the past roughly two months with substance use complicating his course.     The patient's catatonia and depression have been challenging to treat. His depression improved a little with increasing Wellbutrin and possibly from scheduling Zyprexa. His catatonia has improved some with Ativan, Amantadine, and recently Namenda, although he continues to have ambitendency and withdrawal at times. His Ativan was tapered down due to side effects (sedation) and also in preparation to potentially discharge given his history of misuse of substances. The amount had to be increased to 1.5 mg TID given that catatonia emerged more with the patient refusing his medications without reason and then taking (further sign of ambitendency in decision making). He has irrational belief that stopping his medications will lead to discharge. When he refuses medications his withdrawal worsens leading to poor intake with I/O monitoring when needed. Utilizing behavioral approaches to help with medication adherence. Offered ECT as an option to address his depression and catatonia with him pursuing. Monitoring to see if Patterson-Hagen should be pursued.    Patient has SPMI with him having deficits in his occupational and social functioning. He has had significant CNS insults with him having multiple TBIs, significant substance abuse over years, and near fatal cardiac arrest from overdose (? Anoxic injury) that have likely led to neuropsychiatric impact making it more challenging for him to recover. His  and  note he has not been  the same since his near fatal overdose. Patient did score a 4.4/5.8 on the ACL, which indicates the patient would need some level of support and could not be alone the whole day safely, needing assistance with such stuff as solving problems, removing any safety hazards. The patient is not safe to discharge to live on his own with him likely benefiting the most from supportive housing eventually like a group home with possibly even a legal guardian to manage financial and health concerns.    Today: Patient appears mildly improved from the past few days. He has been more active and has been taking medications. He decompensates when he does not. The patient exercised some today, which he has not done since I have seen him. He is more conversational. He would like to reduce the amount of medications he is on with amantadine being tapered down as previously planned since unclear if sufficient benefit. Will explore cross-titrating Zyprexa to other neuroleptic like Rexulti or Vraylar to further target depression. The patient's positive symptoms are minimal with his schizoaffective disorder being more significant for mood element. Will explore possibly increasing Wellbutrin too, as this seemed beneficial when previously done.    Educated regarding medication indications, risks, benefits, side effects, contraindications and possible interactions. Verbally expressed understanding.      Diagnoses     1. Schizoaffective disorder, depressive type, multiple episodes, currently in acute episode, with catatonia   2. 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 Methamphetamine use disorder, severe  4. Alcohol use disorder, moderate     Plan     Unit 5  Legal Status: Committed     Safety Assessment:    Behavioral Orders   Procedures     Code 1 - Restrict to Unit     Routine Programming     As clinically indicated     Status 15     Every 15 minutes.      Medications:     Outpatient medications  continued/changed:     Wellbutrin  mg daily -> 300 mg on 12/24  Lithium 900 mg at bedtime  Zyprexa 10 mg BID prn - > 10 mg at bedtime -> 12.5 mg at bedtime on 1/3 -> Zyprexa 10 mg on 1/20 due to sedation -> 7.5 mg on 1/24 due to patient preference  Lyrica 100 mg TID (initially held but resumed due to benefit for anxiety and pain)  Vitamin D 50,000 international unit(s) weekly    New medications initiated:     Ativan 2 mg QID -> 1.5 mg QID on 1/5 -> 1/2/2 mg on 1/12 -> 1/1/2 mg on 1/15 -> 1 mg TID on 1/17 -> 1.5 mg TID on 1/26  Amantadine 100 mg BID -> 100 mg daily (likely will stop on 2/8)  Namenda 5 mg daily -> 5 mg BID on 1/25 -> 10 mg BID on 1/31  Standard unit PRNs    New medications stopped    Exelon 3 mg BID off label for cognition from TBI due to concerns for agitation with plans to possibly switch to Namenda     Programming: Patient will be treated in a therapeutic milieu with appropriate individual and group therapies. Education will be provided on diagnoses, medications, and treatments.     Medical diagnoses:      #. Severe malnutrition  - Nutrition following   - Gaining weight   - Ensure on meal trays   - Monitoring, restarted IOs    #. Intention tremor bilaterally   - Secondary to lithium  - Monitor  - Conservative management    #. Chronic low back pain  #. Muscle spasms   - Robaxin 1,000 mg TID prn. Stopped on 2/2  - Sulindac 150 mg BID for temporary use  - Lidocaine patches prn  - Icy-hot prn   - APAP prn, increased to 1,000 mg   - Recommend referral to PT and PMR on outpatient basis     #. Multiple TBIs  - MRI findings consistent with encephalomalacia in left temporal-occipital region and left anterior frontal lobe.  - Recommend neuropsychological testing as outpatient and possible TBI focused IRTS/[facility] facility.  - OT to further evaluate    Consult: Nutrition  Labs: Li 0.8 on 1/12  Imaging: MRI recently    Anticipated LOS: 2- 6 weeks  Dispo:[facility]     Interim History   I found patient in bed  "resting this am when we spoke. Today Steven told me he was not exercising and didn't feel like it. He did take his morning medications. He asked me questions about the road conditions and how cold it was. Any attempts I made to redirect to his mental health he answered with a question related to weather or another topic.   Finally he asked what I wanted to know,he reports his mood is alittle better. I asked if he would be interested in exploring a cross taper of his medication, which he was not-I asked him just to consider this and speak with Dr. Timmons about it. He will consider.    Steven notes his back pain is tolerable, this is a chronic issue. Encouraged gentle stretching which he was open to trying. No signs of worsening catatonia.     Denies any medication side effects, no psychotic symptoms. No safety concerns.     Medications       amantadine  100 mg Oral Daily     buPROPion  300 mg Oral Daily     lithium ER  900 mg Oral At Bedtime     LORazepam  1.5 mg Oral TID     memantine  10 mg Oral BID     OLANZapine  7.5 mg Oral At Bedtime     pregabalin  100 mg Oral TID     sulindac  150 mg Oral BID     vitamin D2  50,000 Units Oral Q7 Days        Allergies     Allergies   Allergen Reactions     Pork Allergy         Psychiatric Examination     /72   Pulse 73   Temp 97.2  F (36.2  C) (Temporal)   Resp 18   Ht 1.829 m (6')   Wt 97.1 kg (214 lb)   SpO2 99%   BMI 29.02 kg/m    Weight is 214 lbs 0 oz  Body mass index is 29.02 kg/m .    Appearance: Alert, oriented, dressed in hospital scrubs, long beard  Attitude: Cooperative   Eye Contact: Fair  Mood: \"not bad\"  Affect: Flat, mood congruent  Speech: Reduction in rate. Normal rhythm   Psychomotor Behavior: No tremor, rigidity, akathisia, or psychomotor retardation. Withdrawal, immobility at times  Thought Process: Linear, concrete, irrational at times   Associations: No loose associations   Thought Content: Denies SI. No SIB. AH and paranoia at times, improved, " none recently. Poverty of thought.  Insight: Limited  Judgment: Limited  Oriented to: Person, place, and time  Attention Span and Concentration: Intact  Recent and Remote Memory: Intact  Language: English with appropriate syntax and vocabulary  Fund of Knowledge: Average  Muscle Strength and Tone: Grossly normal  Gait and Station: Grossly normal         Labs     No results found for this or any previous visit (from the past 24 hour(s)).     Attestation      Ary TAVAREZ, CNP

## 2022-02-06 PROCEDURE — 250N000013 HC RX MED GY IP 250 OP 250 PS 637: Performed by: STUDENT IN AN ORGANIZED HEALTH CARE EDUCATION/TRAINING PROGRAM

## 2022-02-06 PROCEDURE — 124N000004

## 2022-02-06 PROCEDURE — 250N000013 HC RX MED GY IP 250 OP 250 PS 637: Performed by: NURSE PRACTITIONER

## 2022-02-06 PROCEDURE — 99231 SBSQ HOSP IP/OBS SF/LOW 25: CPT | Performed by: NURSE PRACTITIONER

## 2022-02-06 RX ADMIN — AMANTADINE HYDROCHLORIDE 100 MG: 100 CAPSULE ORAL at 08:30

## 2022-02-06 RX ADMIN — LORAZEPAM 1.5 MG: 1 TABLET ORAL at 20:22

## 2022-02-06 RX ADMIN — SULINDAC 150 MG: 150 TABLET ORAL at 08:30

## 2022-02-06 RX ADMIN — PREGABALIN 100 MG: 75 CAPSULE ORAL at 13:47

## 2022-02-06 RX ADMIN — MEMANTINE 10 MG: 5 TABLET ORAL at 20:22

## 2022-02-06 RX ADMIN — LITHIUM CARBONATE 900 MG: 450 TABLET, EXTENDED RELEASE ORAL at 20:22

## 2022-02-06 RX ADMIN — PREGABALIN 100 MG: 75 CAPSULE ORAL at 08:30

## 2022-02-06 RX ADMIN — LORAZEPAM 1.5 MG: 1 TABLET ORAL at 08:30

## 2022-02-06 RX ADMIN — PREGABALIN 100 MG: 75 CAPSULE ORAL at 20:22

## 2022-02-06 RX ADMIN — LORAZEPAM 1.5 MG: 1 TABLET ORAL at 13:47

## 2022-02-06 RX ADMIN — BUPROPION HYDROCHLORIDE 300 MG: 300 TABLET, EXTENDED RELEASE ORAL at 08:30

## 2022-02-06 RX ADMIN — MEMANTINE 10 MG: 5 TABLET ORAL at 08:30

## 2022-02-06 RX ADMIN — SULINDAC 150 MG: 150 TABLET ORAL at 20:22

## 2022-02-06 ASSESSMENT — ACTIVITIES OF DAILY LIVING (ADL)
ADLS_ACUITY_SCORE: 7

## 2022-02-06 ASSESSMENT — MIFFLIN-ST. JEOR: SCORE: 1963.68

## 2022-02-06 NOTE — PLAN OF CARE
Problem: Behavioral Health Plan of Care  Goal: Patient-Specific Goal (Individualization)  Description: Patient will be free from self harm or injury  Patient will eat at least 50% of meals  Patient will attend one group every day.    Monitor food intake.    Outcome: Improving  Note: Report received from Gladys Rounding complete. Pt observed sitting on the floor in his room. Pt denied all criteria and agreed to let this writer know if there's anything he needs. He is tired of being here but otherwise has no complaints. Pt is pleasant and cooperative with this writer.     Pt ate 100% of his dinner    Pt still requesting to leave and is upset that he is still here. Pt refused HS dose of zyprexa. He did not attend any groups this shift and instead isolated to his room. He was heard to yell out a couple times this shift and when writer would go and ask if everything was ok he would say yes and as if nothing had happened.     Continued care from evening shift    Face to face end of shift report communicated to oncoming RN.    Farida KIMBLE RN  February 6, 2022  5:05 PM          Problem: Behavior Regulation Impairment (Psychotic Signs/Symptoms)  Goal: Improved Behavioral Control (Psychotic Signs/Symptoms)  Description: Patient will be able to have a reality based conversation.     Outcome: Improving  Note: No issues or concerns noted. Pt is able to easily make his needs known and maintains linear reality based conversations.      Problem: Violence Risk or Actual  Goal: Anger and Impulse Control  Description: Pt will be able to control his anger during stay  Pt will ask for PRN medications as needed to control his impulses    Outcome: Improving  Note: Pt has been pleasant and cooperative. No issues or concerns noted at this time.

## 2022-02-06 NOTE — PLAN OF CARE
"Face to face end of shift report communicated to oncoming shift.     Sobeida Benton RN  2/6/2022  3:06 PM    Problem: Behavioral Health Plan of Care  Goal: Patient-Specific Goal (Individualization)  Description: Patient will be free from self harm or injury  Patient will eat at least 50% of meals  Patient will attend one group every day.    Monitor food intake.    Outcome: No Change  Note: Shift Summery:     Patient out in lounge for breakfast, patient eats 100%.  Patient takes medications as prescribed.  Patient reports \"I'm ok\"  Asked patient how he slept, patient reports \"ok\"     Encouraged patient to attend groups and be out on the unit.    Patient attended group for approx. 30 min this shift.     Patient remains free from self harm this shift.  Patient requests to go home.  \"I just really hope I get to go home from here\"    Face to face start of shift report received from RN.  Patient in bed at this time.        "

## 2022-02-06 NOTE — PLAN OF CARE
Face to face end of shift report will be communicated to oncoming RN.     Problem: Behavioral Health Plan of Care  Goal: Patient-Specific Goal (Individualization)  Description: Patient will be free from self harm or injury  Patient will eat at least 50% of meals  Patient will attend one group every day.    Monitor food intake.    Outcome: No Change     Problem: Behavior Regulation Impairment (Psychotic Signs/Symptoms)  Goal: Improved Behavioral Control (Psychotic Signs/Symptoms)  Description: Patient will be able to have a reality based conversation.     Outcome: No Change     Problem: Violence Risk or Actual  Goal: Anger and Impulse Control  Description: Pt will be able to control his anger during stay  Pt will ask for PRN medications as needed to control his impulses    Outcome: No Change   Face to face end of shift report obtained from TRAE Vidales. Pt observed awake in room.  2330-Pt eating crackers in lobby that he requested. Pt polite to staff.   0100-Pt appears to be sleeping.  0600-Pt appeared to had slept 6.5 hours.

## 2022-02-06 NOTE — PROGRESS NOTES
[facility] Psychiatric Progress Note     Assessment     Mr. Carter is a 31 year old male with a PMH of schizoaffective disorder, depressive type, catatonia, severe methamphetamine use disorder, alcohol use disorder, and significant TBI at the age of 3 who presented with depression with catatonia being off of Ativan after recently being discharged. This is the patient's 3rd hospitalization in the past roughly two months with substance use complicating his course.     The patient's catatonia and depression have been challenging to treat. His depression improved a little with increasing Wellbutrin and possibly from scheduling Zyprexa. His catatonia has improved some with Ativan, Amantadine, and recently Namenda, although he continues to have ambitendency and withdrawal at times. His Ativan was tapered down due to side effects (sedation) and also in preparation to potentially discharge given his history of misuse of substances. The amount had to be increased to 1.5 mg TID given that catatonia emerged more with the patient refusing his medications without reason and then taking (further sign of ambitendency in decision making). He has irrational belief that stopping his medications will lead to discharge. When he refuses medications his withdrawal worsens leading to poor intake with I/O monitoring when needed. Utilizing behavioral approaches to help with medication adherence. Offered ECT as an option to address his depression and catatonia with him pursuing. Monitoring to see if Patterson-Hagen should be pursued.    Patient has SPMI with him having deficits in his occupational and social functioning. He has had significant CNS insults with him having multiple TBIs, significant substance abuse over years, and near fatal cardiac arrest from overdose (? Anoxic injury) that have likely led to neuropsychiatric impact making it more challenging for him to recover. His  and  note he has not been  the same since his near fatal overdose. Patient did score a 4.4/5.8 on the ACL, which indicates the patient would need some level of support and could not be alone the whole day safely, needing assistance with such stuff as solving problems, removing any safety hazards. The patient is not safe to discharge to live on his own with him likely benefiting the most from supportive housing eventually like a group home with possibly even a legal guardian to manage financial and health concerns.    Today: Patient appears mildly improved from the past few days. He has been more active and has been taking medications. He decompensates when he does not. The patient exercised some today, which he has not done since I have seen him. He is more conversational. He would like to reduce the amount of medications he is on with amantadine being tapered down as previously planned since unclear if sufficient benefit. Will explore cross-titrating Zyprexa to other neuroleptic like Rexulti or Vraylar to further target depression. The patient's positive symptoms are minimal with his schizoaffective disorder being more significant for mood element. Will explore possibly increasing Wellbutrin too, as this seemed beneficial when previously done.    Educated regarding medication indications, risks, benefits, side effects, contraindications and possible interactions. Verbally expressed understanding.      Diagnoses     1. Schizoaffective disorder, depressive type, multiple episodes, currently in acute episode, with catatonia   2. 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 Methamphetamine use disorder, severe  4. Alcohol use disorder, moderate     Plan     Unit 5  Legal Status: Committed     Safety Assessment:    Behavioral Orders   Procedures     Code 1 - Restrict to Unit     Routine Programming     As clinically indicated     Status 15     Every 15 minutes.      Medications:     Outpatient medications  continued/changed:     Wellbutrin  mg daily -> 300 mg on 12/24  Lithium 900 mg at bedtime  Zyprexa 10 mg BID prn - > 10 mg at bedtime -> 12.5 mg at bedtime on 1/3 -> Zyprexa 10 mg on 1/20 due to sedation -> 7.5 mg on 1/24 due to patient preference  Lyrica 100 mg TID (initially held but resumed due to benefit for anxiety and pain)  Vitamin D 50,000 international unit(s) weekly    New medications initiated:     Ativan 2 mg QID -> 1.5 mg QID on 1/5 -> 1/2/2 mg on 1/12 -> 1/1/2 mg on 1/15 -> 1 mg TID on 1/17 -> 1.5 mg TID on 1/26  Amantadine 100 mg BID -> 100 mg daily (likely will stop on 2/8)  Namenda 5 mg daily -> 5 mg BID on 1/25 -> 10 mg BID on 1/31  Standard unit PRNs    New medications stopped    Exelon 3 mg BID off label for cognition from TBI due to concerns for agitation with plans to possibly switch to Namenda     Programming: Patient will be treated in a therapeutic milieu with appropriate individual and group therapies. Education will be provided on diagnoses, medications, and treatments.     Medical diagnoses:      #. Severe malnutrition  - Nutrition following   - Gaining weight   - Ensure on meal trays   - Monitoring, restarted IOs    #. Intention tremor bilaterally   - Secondary to lithium  - Monitor  - Conservative management    #. Chronic low back pain  #. Muscle spasms   - Robaxin 1,000 mg TID prn. Stopped on 2/2  - Sulindac 150 mg BID for temporary use  - Lidocaine patches prn  - Icy-hot prn   - APAP prn, increased to 1,000 mg   - Recommend referral to PT and PMR on outpatient basis     #. Multiple TBIs  - MRI findings consistent with encephalomalacia in left temporal-occipital region and left anterior frontal lobe.  - Recommend neuropsychological testing as outpatient and possible TBI focused IRTS/[place] facility.  - OT to further evaluate    Consult: Nutrition  Labs: Li 0.8 on 1/12  Imaging: MRI recently    Anticipated LOS: 2- 6 weeks  Dispo:[place]     Interim History   I found patient in bed  "resting this am when we spoke. Today Steven told me he wanted to go home. He was waiting until 10 am for group and did not feel like talking. I asked if he thought about meds we discussed yesterday, he added he will speak with Dr. Timmons about that tomorrow before he goes home.    Steven made no mention of his back pain.      Denies any medication side effects, no psychotic symptoms. No safety concerns.     Medications       amantadine  100 mg Oral Daily     buPROPion  300 mg Oral Daily     lithium ER  900 mg Oral At Bedtime     LORazepam  1.5 mg Oral TID     memantine  10 mg Oral BID     OLANZapine  7.5 mg Oral At Bedtime     pregabalin  100 mg Oral TID     sulindac  150 mg Oral BID     vitamin D2  50,000 Units Oral Q7 Days        Allergies     Allergies   Allergen Reactions     Pork Allergy         Psychiatric Examination     /76   Pulse 77   Temp 97.7  F (36.5  C) (Temporal)   Resp 18   Ht 1.829 m (6')   Wt 98.1 kg (216 lb 3.2 oz)   SpO2 99%   BMI 29.32 kg/m    Weight is 216 lbs 3.2 oz  Body mass index is 29.32 kg/m .    Appearance: Alert, oriented, dressed in hospital scrubs, long beard  Attitude: Cooperative   Eye Contact: Fair  Mood: \"not bad\"  Affect: Flat, mood congruent  Speech: Reduction in rate. Normal rhythm   Psychomotor Behavior: No tremor, rigidity, akathisia, or psychomotor retardation. Withdrawal, immobility at times  Thought Process: Linear, concrete, irrational at times   Associations: No loose associations   Thought Content: Denies SI. No SIB. AH and paranoia at times, improved, none recently. Poverty of thought.  Insight: Limited  Judgment: Limited  Oriented to: Person, place, and time  Attention Span and Concentration: Intact  Recent and Remote Memory: Intact  Language: English with appropriate syntax and vocabulary  Fund of Knowledge: Average  Muscle Strength and Tone: Grossly normal  Gait and Station: Grossly normal         Labs     No results found for this or any previous visit " (from the past 24 hour(s)).     Attestation      Ary Palmer APRLUZ MARIA, CNP

## 2022-02-07 PROCEDURE — 99233 SBSQ HOSP IP/OBS HIGH 50: CPT | Mod: 95 | Performed by: STUDENT IN AN ORGANIZED HEALTH CARE EDUCATION/TRAINING PROGRAM

## 2022-02-07 PROCEDURE — 250N000013 HC RX MED GY IP 250 OP 250 PS 637: Performed by: STUDENT IN AN ORGANIZED HEALTH CARE EDUCATION/TRAINING PROGRAM

## 2022-02-07 PROCEDURE — 250N000013 HC RX MED GY IP 250 OP 250 PS 637: Performed by: NURSE PRACTITIONER

## 2022-02-07 PROCEDURE — 124N000004

## 2022-02-07 RX ADMIN — PREGABALIN 100 MG: 75 CAPSULE ORAL at 08:28

## 2022-02-07 RX ADMIN — LORAZEPAM 1.5 MG: 1 TABLET ORAL at 22:35

## 2022-02-07 RX ADMIN — MEMANTINE 10 MG: 5 TABLET ORAL at 08:28

## 2022-02-07 RX ADMIN — LORAZEPAM 1.5 MG: 1 TABLET ORAL at 14:28

## 2022-02-07 RX ADMIN — PREGABALIN 100 MG: 75 CAPSULE ORAL at 22:36

## 2022-02-07 RX ADMIN — AMANTADINE HYDROCHLORIDE 100 MG: 100 CAPSULE ORAL at 08:28

## 2022-02-07 RX ADMIN — LORAZEPAM 1.5 MG: 1 TABLET ORAL at 08:28

## 2022-02-07 RX ADMIN — BUPROPION HYDROCHLORIDE 300 MG: 300 TABLET, EXTENDED RELEASE ORAL at 08:28

## 2022-02-07 RX ADMIN — PREGABALIN 100 MG: 75 CAPSULE ORAL at 14:28

## 2022-02-07 ASSESSMENT — ACTIVITIES OF DAILY LIVING (ADL)
ADLS_ACUITY_SCORE: 7
HYGIENE/GROOMING: INDEPENDENT
ADLS_ACUITY_SCORE: 7
ADLS_ACUITY_SCORE: 7
ORAL_HYGIENE: INDEPENDENT
ADLS_ACUITY_SCORE: 7
LAUNDRY: UNABLE TO COMPLETE
DRESS: SCRUBS (BEHAVIORAL HEALTH);INDEPENDENT
ADLS_ACUITY_SCORE: 7
HYGIENE/GROOMING: INDEPENDENT
ORAL_HYGIENE: INDEPENDENT

## 2022-02-07 NOTE — PROGRESS NOTES
[facility] Psychiatric Progress Note     Assessment     Mr. Carter is a 31 year old male with a PMH of schizoaffective disorder, depressive type, catatonia, severe methamphetamine use disorder, alcohol use disorder, and significant TBI at the age of 3 who presented with depression with catatonia being off of Ativan after recently being discharged. This is the patient's 3rd hospitalization in the past roughly two months with substance use complicating his course.     The patient's catatonia and depression have been challenging to treat. His depression improved a little with increasing Wellbutrin and possibly from scheduling Zyprexa. His catatonia has improved some with Ativan, Amantadine, and recently Namenda, although he continues to have ambitendency and withdrawal at times. His Ativan was tapered down due to side effects (sedation) and also in preparation to potentially discharge given his history of misuse of substances. The amount had to be increased to 1.5 mg TID given that catatonia emerged more with the patient refusing his medications without reason and then taking (further sign of ambitendency in decision making). He has irrational belief that stopping his medications will lead to discharge. When he refuses medications his withdrawal worsens leading to poor intake with I/O monitoring when needed. Utilizing behavioral approaches to help with medication adherence. Offered ECT as an option to address his depression and catatonia with him pursuing. Monitoring to see if Patterson-Hagen should be pursued.    Patient has SPMI with him having deficits in his occupational and social functioning. He has had significant CNS insults with him having multiple TBIs, significant substance abuse over years, and near fatal cardiac arrest from overdose (? Anoxic injury) that have likely led to neuropsychiatric impact making it more challenging for him to recover. His  and  note he has not been  the same since his near fatal overdose. Patient did score a 4.4/5.8 on the ACL, which indicates the patient would need some level of support and could not be alone the whole day safely, needing assistance with such stuff as solving problems, removing any safety hazards. The patient is not safe to discharge to live on his own with him likely benefiting the most from supportive housing eventually like a group home with possibly even a legal guardian to manage financial and health concerns.    Today: Patient was tense overnight, yelling in his room. He was angry this morning while discussing how he continues to be in the hospital. This is expected given his duration of him being here. He feels like nothing is helping. Patient's sense of his abilities are higher than reality with him not having good insight into how he is unable to care for himself well without a structured environment right now. Will taper off of Amantadine in upcoming days. Likely will need to stop Zyprexa given patient not wanting to take. Can explore alternative like Rexulti or Vraylar, although this is dependent on the patient being willing. His psychotic symptoms are minimal with his schizoaffective disorder having a higher mood component.    Educated regarding medication indications, risks, benefits, side effects, contraindications and possible interactions. Verbally expressed understanding.      Diagnoses     1. Schizoaffective disorder, depressive type, multiple episodes, currently in acute episode, with catatonia   2. 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 Methamphetamine use disorder, severe  4. Alcohol use disorder, moderate     Plan     Unit 5  Legal Status: Committed     Safety Assessment:    Behavioral Orders   Procedures     Code 1 - Restrict to Unit     Routine Programming     As clinically indicated     Status 15     Every 15 minutes.      Medications:     Outpatient medications continued/changed:      Wellbutrin  mg daily -> 300 mg on 12/24  Lithium 900 mg at bedtime  Zyprexa 10 mg BID prn - > 10 mg at bedtime -> 12.5 mg at bedtime on 1/3 -> Zyprexa 10 mg on 1/20 due to sedation -> 7.5 mg on 1/24 due to patient preference  Lyrica 100 mg TID (initially held but resumed due to benefit for anxiety and pain)  Vitamin D 50,000 international unit(s) weekly    New medications initiated:     Ativan 2 mg QID -> 1.5 mg QID on 1/5 -> 1/2/2 mg on 1/12 -> 1/1/2 mg on 1/15 -> 1 mg TID on 1/17 -> 1.5 mg TID on 1/26  Amantadine 100 mg BID -> 100 mg daily (likely will stop on 2/8)  Namenda 5 mg daily -> 5 mg BID on 1/25 -> 10 mg BID on 1/31  Standard unit PRNs    New medications stopped    Exelon 3 mg BID off label for cognition from TBI due to concerns for agitation with plans to possibly switch to Namenda     Programming: Patient will be treated in a therapeutic milieu with appropriate individual and group therapies. Education will be provided on diagnoses, medications, and treatments.     Medical diagnoses:      #. Severe malnutrition  - Nutrition following   - Gaining weight   - Ensure on meal trays   - Monitoring, restarted IOs    #. Intention tremor bilaterally   - Secondary to lithium  - Monitor  - Conservative management    #. Chronic low back pain  #. Muscle spasms   - Robaxin 1,000 mg TID prn. Stopped on 2/2  - Sulindac 150 mg BID for temporary use  - Lidocaine patches prn  - Icy-hot prn   - APAP prn, increased to 1,000 mg   - Recommend referral to PT and PMR on outpatient basis     #. Multiple TBIs  - MRI findings consistent with encephalomalacia in left temporal-occipital region and left anterior frontal lobe.  - Recommend neuropsychological testing as outpatient and possible TBI focused IRTS/[facility] facility.  - OT to further evaluate    Consult: Nutrition  Labs: Li 0.8 on 1/12  Imaging: MRI recently    Anticipated LOS: 2- 6 weeks  Dispo:[facility]     Interim History     The patient refused Zyprexa at times over  "the weekend. He behaved similarly, although he has angry yesterday evening yelling.    Patient found in his room. He notes that he wants to leave. He feels like he should be able to. Does not understand that he needs a high level of support at this point. He feels like he can resume his life just fine and be able to work and eventually regain custody of his children. He is frustrated with being in the hospital. He notes that he feels like this is a \"big mistake.\" He has no plan to hurt anyone or harm himself.     Patient does not notice any changes with his medications. He wants to do a \"medication washout.\" Encouraged against this. Patient then requested for conversation to end.     Medications       amantadine  100 mg Oral Daily     buPROPion  300 mg Oral Daily     lithium ER  900 mg Oral At Bedtime     LORazepam  1.5 mg Oral TID     memantine  10 mg Oral BID     OLANZapine  7.5 mg Oral At Bedtime     pregabalin  100 mg Oral TID     sulindac  150 mg Oral BID     vitamin D2  50,000 Units Oral Q7 Days        Allergies     Allergies   Allergen Reactions     Pork Allergy         Psychiatric Examination     /79   Pulse 61   Temp 98.6  F (37  C) (Temporal)   Resp 18   Ht 1.829 m (6')   Wt 98.1 kg (216 lb 3.2 oz)   SpO2 99%   BMI 29.32 kg/m    Weight is 216 lbs 3.2 oz  Body mass index is 29.32 kg/m .    Appearance: Alert, oriented, dressed in hospital scrubs, long beard  Attitude: Cooperative   Eye Contact: Fair  Mood: \"angry\"  Affect: Flat, mood congruent  Speech: Loud tone, yelling. Normal rhythm   Psychomotor Behavior: No tremor, rigidity, akathisia, or psychomotor retardation. Withdrawal, ambitendency, immobility at times  Thought Process: Linear, concrete, irrational at times   Associations: No loose associations   Thought Content: Denies SI. No SIB. AH and paranoia at times, improved, none recently. Poverty of thought.  Insight: Limited  Judgment: Limited  Oriented to: Person, place, and " time  Attention Span and Concentration: Intact  Recent and Remote Memory: Intact  Language: English with appropriate syntax and vocabulary  Fund of Knowledge: Average  Muscle Strength and Tone: Grossly normal  Gait and Station: Grossly normal    Pineda-Orville Catatonia Rating Scale   Severity Score (Number of points for items 1 -23) _______1___   Screening Score (Presence or absence of items 1 - 14) ______1_____   Time: 11:00 AM on 1/11/2022   Rater: Dr. Shanelle Schuler Catatonia Rating Scale   Severity Score (Number of points for items 1 -23) ____4______   Screening Score (Presence or absence of items 1 - 14) ___1________   Time: 1145AM on 1/18  Rater: Dr. Shanelle Schuler Catatonia Rating Scale   Severity Score (Number of points for items 1 -23) ____7______   Screening Score (Presence or absence of items 1 - 14) ____4_______   Time: 500 PM on 1/26  Rater: Dr. Timmons       Labs     No results found for this or any previous visit (from the past 24 hour(s)).       Treatment Team Care Plan     BEHAVIORAL TEAM DISCUSSION    Progress: Continued symptoms with some improvement    Continued Stay Criteria/Rationale: Safe discharge planning    Medical/Physical: See above    Precautions: See above.     Plan: Continue inpatient care with unit support and medication management    Rationale for change in precautions or plan: NA due to no change    Participants: David Timmons DO, Nursing, SW, OT    The patient's care was discussed with the treatment team and chart notes were reviewed.      Attestation      Patient has been seen and evaluated by:    David Timmons DO, MA  Psychiatrist    VIDEO VISIT    Patient has given verbal consent for video visit?: Yes     Video- Visit Details  Type of service:  video visit for mental health treatment.  Time of service:    Date:  02/07/2022    Video Start Time:1130AM      Video End Time: 1145AM    Reason for video visit: COVID-19 [place]  Originating  Site (patient location):  [facility]  Distant Site (provider location):  Remote location  Mode of Communication:  Video Conference via [employer]

## 2022-02-07 NOTE — PLAN OF CARE
"  Problem: Behavioral Health Plan of Care  Goal: Patient-Specific Goal (Individualization)  Description: Patient will be free from self harm or injury  Patient will eat at least 50% of meals  Patient will attend one group every day.    Monitor food intake.    Outcome: Improving  Note: Shift Summery:    Pt in bed at the start of the shift. Pt got up for morning medications. Pt did not take the sulindac, states it doesn't work. Pt denies anxiety, SI, HI and hallucinations. Pt didn't deny depression or pain but answered \"i'm OK\" when asked. Pt did state that he does have back pain and believes his depression is the same as it has been. Pt went to the morning group but didn't attend any other programming.         Problem: Behavior Regulation Impairment (Psychotic Signs/Symptoms)  Goal: Improved Behavioral Control (Psychotic Signs/Symptoms)  Description: Patient will be able to have a reality based conversation.      Outcome: No Change     Problem: Violence Risk or Actual  Goal: Anger and Impulse Control  Description: Pt will be able to control his anger during stay  Pt will ask for PRN medications as needed to control his impulses  "

## 2022-02-07 NOTE — PLAN OF CARE
Problem: Behavioral Health Plan of Care  Goal: Patient-Specific Goal (Individualization)  Description: Patient will be free from self harm or injury  Patient will eat at least 50% of meals  Patient will attend one group every day.    Monitor food intake.    Outcome: Improving  Note:   Pt resting in his room - calm and cooperative. States some depression, no anxiety or hallucinations. Denies suicidal thoughts or plan. Waiting to go - feels he's ready to go. Remains isolative and withdrawn from peers. Will speak to staff when questioned and occasionally asks how writer is doing. Affect bland and flat. Came out to use the [employer] phone but returned to his room. Also comes out for meals. Generally spends most of his time in his room. No requests or complaints  2054- pt requested night medications but refused to take them     2236- pt came up to writer and wanted only his Lyrica and Ativan at this time.   Face to face end of shift report communicated to TRAE Brown RN  2/7/2022  4:23 PM

## 2022-02-07 NOTE — PLAN OF CARE
Face to face end of shift report will be communicated to oncoming RN.     Problem: Behavioral Health Plan of Care  Goal: Patient-Specific Goal (Individualization)  Description: Patient will be free from self harm or injury  Patient will eat at least 50% of meals  Patient will attend one group every day.    Monitor food intake.    Outcome: No Change     Problem: Behavior Regulation Impairment (Psychotic Signs/Symptoms)  Goal: Improved Behavioral Control (Psychotic Signs/Symptoms)  Description: Patient will be able to have a reality based conversation.     Outcome: No Change     Problem: Violence Risk or Actual  Goal: Anger and Impulse Control  Description: Pt will be able to control his anger during stay  Pt will ask for PRN medications as needed to control his impulses    Outcome: No Change   Face to face end of shift report obtained from TRAE Iqbal. Pt observed in room.  2330-Pt visible upset, tense, yelling in room. Pt offered melatonin and Zyprexa 10 mg ODT, but patient declined. Pt apologized for yelling. Ask patient if needed anything, pt politely said no. Pt noted to be internally tormented.  0300-Pt appears to be sleeping.   0600-Pt appeared to had slept 2 hours. Noted patient talking to self in room at times, appearing to be responding. Pt came to get water once. Pt remains polite with staff.

## 2022-02-08 PROCEDURE — 250N000013 HC RX MED GY IP 250 OP 250 PS 637: Performed by: STUDENT IN AN ORGANIZED HEALTH CARE EDUCATION/TRAINING PROGRAM

## 2022-02-08 PROCEDURE — 250N000013 HC RX MED GY IP 250 OP 250 PS 637: Performed by: NURSE PRACTITIONER

## 2022-02-08 PROCEDURE — 124N000004

## 2022-02-08 RX ADMIN — PREGABALIN 100 MG: 75 CAPSULE ORAL at 13:51

## 2022-02-08 RX ADMIN — SULINDAC 150 MG: 150 TABLET ORAL at 08:22

## 2022-02-08 RX ADMIN — BUPROPION HYDROCHLORIDE 300 MG: 300 TABLET, EXTENDED RELEASE ORAL at 08:22

## 2022-02-08 RX ADMIN — LORAZEPAM 1.5 MG: 1 TABLET ORAL at 08:22

## 2022-02-08 RX ADMIN — LITHIUM CARBONATE 900 MG: 450 TABLET, EXTENDED RELEASE ORAL at 20:24

## 2022-02-08 RX ADMIN — SULINDAC 150 MG: 150 TABLET ORAL at 20:23

## 2022-02-08 RX ADMIN — PREGABALIN 100 MG: 75 CAPSULE ORAL at 20:23

## 2022-02-08 RX ADMIN — LORAZEPAM 1.5 MG: 1 TABLET ORAL at 20:23

## 2022-02-08 RX ADMIN — MEMANTINE 10 MG: 5 TABLET ORAL at 08:22

## 2022-02-08 RX ADMIN — MEMANTINE 10 MG: 5 TABLET ORAL at 20:23

## 2022-02-08 RX ADMIN — AMANTADINE HYDROCHLORIDE 100 MG: 100 CAPSULE ORAL at 08:22

## 2022-02-08 RX ADMIN — PREGABALIN 100 MG: 75 CAPSULE ORAL at 08:21

## 2022-02-08 RX ADMIN — LORAZEPAM 1.5 MG: 1 TABLET ORAL at 13:51

## 2022-02-08 ASSESSMENT — ACTIVITIES OF DAILY LIVING (ADL)
ADLS_ACUITY_SCORE: 7
ORAL_HYGIENE: INDEPENDENT
ADLS_ACUITY_SCORE: 7
DRESS: INDEPENDENT;SCRUBS (BEHAVIORAL HEALTH)
ADLS_ACUITY_SCORE: 7
HYGIENE/GROOMING: INDEPENDENT
ADLS_ACUITY_SCORE: 7
ADLS_ACUITY_SCORE: 7
ORAL_HYGIENE: INDEPENDENT
ADLS_ACUITY_SCORE: 7
HYGIENE/GROOMING: INDEPENDENT
LAUNDRY: UNABLE TO COMPLETE
ADLS_ACUITY_SCORE: 7

## 2022-02-08 NOTE — PROGRESS NOTES
"CLINICAL NUTRITION SERVICES  -  REASSESSMENT NOTE    Steven Carter    31 yom admitted for catatonia. Pt has a hx of methamphetamine use, alcohol use disorder, schizoaffective disorder, TBI at age of 3. Intake is variable. Weight is up 2lbs since he was last weighed.    Diet Order: Regular  Intake: 11 meals with 0-100% intake      Height: 6' 0\"  Weight: 216 lbs 3.2 oz  Body mass index is 29.32 kg/m .  Weight Status:  Overweight BMI 25-29.9  Weight History:  Max IBW- 83.9kg , admit weight 96.4kg (212 lb 9.6 oz)  Wt Readings from Last 10 Encounters:   12/16/21 96.4 kg (212 lb 9.6 oz)   12/14/21 97.7 kg (215 lb 4.8 oz)   12/01/21 97.8 kg (215 lb 11.2 oz)   10/31/21 105.1 kg (231 lb 11.2 oz)   03/27/21 105.2 kg (232 lb)   11/30/15 96.6 kg (213 lb)   08/18/14 94.5 kg (208 lb 6.4 oz)   08/15/14 93.4 kg (206 lb)   07/19/14 102.1 kg (225 lb)      83.9kg- max ibw  Estimated Energy Needs: 5414-6464 kcals (25-30 Kcal/Kg)   Estimated Protein Needs:  grams protein (1-1.2 g pro/Kg)     Malnutrition Diagnosis: severe malnutrition - improving. Weight gain    NUTRITION RECOMMENDATIONS  - Continue to encourage intake with meals, snacks, supplements  - Monitor weight      MONITORING AND EVALUATION:  RD will monitor intake, weight, labs        "

## 2022-02-08 NOTE — PLAN OF CARE
"WILLIAM BRANDT RN  2/8/2022  7:39 AM  Face to face shift report received from TRAE Jean Baptiste. Rounding completed, pt observed.     1442-Isolative and withdrawn in room this shift.  Ate chicken strips for lunch.  Hardin yelling in nurses station from pt's room \"Shut the fuck up\".  Pt sitting on edge of bed, denies that he yelled.  Compliant with meds this shift.   1226-Hardin a pound from pt's room (as a punch to the wall).  Pt stated he \"fell against wall\" and denies injuries.  No noted injuries to hands.     1000-Ate blueberry muffin for breakfast.  Isolative and withdrawn to room.  Denies, anxiety, depression, SI, Hi, hallucinations, and pain.  Answers questions with short vague answers.      Problem: Behavioral Health Plan of Care  Goal: Patient-Specific Goal (Individualization)  Description: Patient will be free from self harm or injury  Patient will eat at least 50% of meals  Patient will attend one group every day.  Monitor food intake.  Outcome: No Change      Problem: Behavior Regulation Impairment (Psychotic Signs/Symptoms)  Goal: Improved Behavioral Control (Psychotic Signs/Symptoms)  Description: Patient will be able to have a reality based conversation.   Outcome: No Change     Problem: Violence Risk or Actual  Goal: Anger and Impulse Control  Description: Pt will be able to control his anger during stay  Pt will ask for PRN medications as needed to control his impulses  Outcome: Improving     Face to face end of shift report communicated to oncoming shift RN.            "

## 2022-02-08 NOTE — PLAN OF CARE
Spoke with pt this morning, pt was found laying in bed. Pt asks if he can leave. Explained to pt that the options right now are either a [facility] or an IRTS if he was willing to do the screening. Pt did not respond or offer anymore conversation.

## 2022-02-08 NOTE — PLAN OF CARE
Face to face end of shift report will be communicated to oncoming RN.    Problem: Behavioral Health Plan of Care  Goal: Patient-Specific Goal (Individualization)  Description: Patient will be free from self harm or injury  Patient will eat at least 50% of meals  Patient will attend one group every day.    Monitor food intake.    Outcome: No Change     Problem: Behavior Regulation Impairment (Psychotic Signs/Symptoms)  Goal: Improved Behavioral Control (Psychotic Signs/Symptoms)  Description: Patient will be able to have a reality based conversation.     Outcome: No Change     Problem: Violence Risk or Actual  Goal: Anger and Impulse Control  Description: Pt will be able to control his anger during stay  Pt will ask for PRN medications as needed to control his impulses    Outcome: No Change   Face to face end of shift report obtained from TRAE Walker. Pt observed awake in room.   0010-Pt noted walking in slow pace and obtaining water from water machine, drinking some and returning to bed. No complains or requests.   0215-Pt appears to be sleeping.   0600-Pt appeared to had slept 3-4 hours on and off. At times difficult to determine if sleeping as patient lays down with eyes closed. Pt had no complains. No yelling heard so far this shift.

## 2022-02-08 NOTE — PLAN OF CARE
"  Problem: Behavioral Health Plan of Care  Goal: Patient-Specific Goal (Individualization)  Description: Patient will be free from self harm or injury  Patient will eat at least 50% of meals  Patient will attend one group every day.    Monitor food intake.    Outcome: No Change  Note:   pt sitting calmly on his bed just looking forward. Asked writer how I was doing upon entering the room .States frustration with being here so long . Declined any prn medications- states the Zyprexa only makes him tired. Cooperative with assessment. Does carry on a conversation  more with staff but no interaction with peers. Mainly isolates to room, but does come out for meals which he is eating better.  2026- pt requested all but Zyprexa medications tonight- took them willingly  2300- pt heard to be yelling out in his room. Apologized to writer for his outbursts but states my sschizoaffective disorder is acting up- they don't have me on the right meds and I'm frustrated \". When mentioned that he didn't take his Zyprexa tonite, pt stated \"it doesn't work for me anyway\". When asked what would work for him - he said \"Ativan 20 mg but I'm ok- I'm sorry. Don't worry about me\".      .    Face to face end of shift report communicated to TRAE Brown RN  2/8/2022  4:19 PM                     "

## 2022-02-09 PROCEDURE — 99232 SBSQ HOSP IP/OBS MODERATE 35: CPT | Performed by: NURSE PRACTITIONER

## 2022-02-09 PROCEDURE — 250N000013 HC RX MED GY IP 250 OP 250 PS 637: Performed by: STUDENT IN AN ORGANIZED HEALTH CARE EDUCATION/TRAINING PROGRAM

## 2022-02-09 PROCEDURE — 124N000004

## 2022-02-09 PROCEDURE — 250N000013 HC RX MED GY IP 250 OP 250 PS 637: Performed by: NURSE PRACTITIONER

## 2022-02-09 RX ADMIN — LORAZEPAM 1.5 MG: 1 TABLET ORAL at 08:16

## 2022-02-09 RX ADMIN — LITHIUM CARBONATE 900 MG: 450 TABLET, EXTENDED RELEASE ORAL at 20:20

## 2022-02-09 RX ADMIN — LORAZEPAM 1.5 MG: 1 TABLET ORAL at 13:40

## 2022-02-09 RX ADMIN — LORAZEPAM 1.5 MG: 1 TABLET ORAL at 20:20

## 2022-02-09 RX ADMIN — SULINDAC 150 MG: 150 TABLET ORAL at 08:16

## 2022-02-09 RX ADMIN — PREGABALIN 100 MG: 75 CAPSULE ORAL at 08:16

## 2022-02-09 RX ADMIN — MEMANTINE 10 MG: 5 TABLET ORAL at 20:20

## 2022-02-09 RX ADMIN — BUPROPION HYDROCHLORIDE 300 MG: 300 TABLET, EXTENDED RELEASE ORAL at 08:16

## 2022-02-09 RX ADMIN — AMANTADINE HYDROCHLORIDE 100 MG: 100 CAPSULE ORAL at 08:16

## 2022-02-09 RX ADMIN — PREGABALIN 100 MG: 75 CAPSULE ORAL at 13:41

## 2022-02-09 RX ADMIN — PREGABALIN 100 MG: 75 CAPSULE ORAL at 20:20

## 2022-02-09 RX ADMIN — SULINDAC 150 MG: 150 TABLET ORAL at 20:20

## 2022-02-09 RX ADMIN — MEMANTINE 10 MG: 5 TABLET ORAL at 08:16

## 2022-02-09 ASSESSMENT — ACTIVITIES OF DAILY LIVING (ADL)
ADLS_ACUITY_SCORE: 7
HYGIENE/GROOMING: INDEPENDENT
ADLS_ACUITY_SCORE: 7
ORAL_HYGIENE: INDEPENDENT
ADLS_ACUITY_SCORE: 7
LAUNDRY: UNABLE TO COMPLETE
ADLS_ACUITY_SCORE: 7
HYGIENE/GROOMING: INDEPENDENT
ADLS_ACUITY_SCORE: 7
DRESS: INDEPENDENT;SCRUBS (BEHAVIORAL HEALTH)
ADLS_ACUITY_SCORE: 7

## 2022-02-09 NOTE — PROGRESS NOTES
[facility] Psychiatric Progress Note     Assessment     Mr. Carter is a 31 year old male with a PMH of schizoaffective disorder, depressive type, catatonia, severe methamphetamine use disorder, alcohol use disorder, and significant TBI at the age of 3 who presented with depression with catatonia being off of Ativan after recently being discharged. This is the patient's 3rd hospitalization in the past roughly two months with substance use complicating his course.     The patient's catatonia and depression have been challenging to treat. His depression improved a little with increasing Wellbutrin and possibly from scheduling Zyprexa. His catatonia has improved some with Ativan, Amantadine, and recently Namenda, although he continues to have ambitendency and withdrawal at times. His Ativan was tapered down due to side effects (sedation) and also in preparation to potentially discharge given his history of misuse of substances. The amount had to be increased to 1.5 mg TID given that catatonia emerged more with the patient refusing his medications without reason and then taking (further sign of ambitendency in decision making). He has irrational belief that stopping his medications will lead to discharge. When he refuses medications his withdrawal worsens leading to poor intake with I/O monitoring when needed. Utilizing behavioral approaches to help with medication adherence. Offered ECT as an option to address his depression and catatonia with him pursuing. Monitoring to see if Patterson-Hagen should be pursued.    Patient has SPMI with him having deficits in his occupational and social functioning. He has had significant CNS insults with him having multiple TBIs, significant substance abuse over years, and near fatal cardiac arrest from overdose (? Anoxic injury) that have likely led to neuropsychiatric impact making it more challenging for him to recover. His  and  note he has not been  "the same since his near fatal overdose. Patient did score a 4.4/5.8 on the ACL, which indicates the patient would need some level of support and could not be alone the whole day safely, needing assistance with such stuff as solving problems, removing any safety hazards. The patient is not safe to discharge to live on his own with him likely benefiting the most from supportive housing eventually like a group home with possibly even a legal guardian to manage financial and health concerns.    Today:     Steven has not been taking his Zyprexa for approximately 5 days and prior to that he had been skipping doses as well.  I asked him why he stopped taking it and he states that he does not like the way it makes him feel.  It makes him feel too tired and he does not feel that it is helpful for anything.  He denies that he is having any hallucinations.  He no longer thinks that something is medically wrong with him.  I did tell him that I read notes that indicate he was talking to himself in his room.  He states that \"I may have been half-asleep been talking or talking in my sleep\" he adamantly denies hallucinations and I do not see any present delusions.  I asked if he is feeling depressed and he states \"yeah\".  He denied that he was having suicidal thoughts and I then asked what symptoms of depression he was feeling and he said \"I feel flat\".  I did tell him that I would agree that he appears very flat and blunted and that it did not look like he enjoyed much of anything or had the ability to do enjoy much of anything.  He then stated \"there is not a lot to do here\".  He does state that he will continue to stay on the other medications that are prescribed but he does not want to restart any antipsychotic and states that they make him too tired and do not help him.  I did ask him if he felt an increase in Wellbutrin would be beneficial and he stated he was not sure.  I did tell him that I noticed that when the Wellbutrin " "was started he did have a bit of a lift in his mood and there was some improvement noted.  He does ask if he can \"just go to the [employer] instead of going to the [facility]\".  The last time he was there he left and used methamphetamine and did not return.    Educated regarding medication indications, risks, benefits, side effects, contraindications and possible interactions. Verbally expressed understanding.      Diagnoses     1. Schizoaffective disorder, depressive type, multiple episodes, currently in acute episode, with catatonia   2. 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 Methamphetamine use disorder, severe  4. Alcohol use disorder, moderate     Plan     Unit 5  Legal Status: Committed     Safety Assessment:    Behavioral Orders   Procedures     Code 1 - Restrict to Unit     Routine Programming     As clinically indicated     Status 15     Every 15 minutes.      Medications:     Outpatient medications continued/changed:     Today(2-9-2022) amantadine will be discontinued  Considering increase in Wellbutrin to 450 mg  Consider Strattera vs desipramine.     Wellbutrin  mg daily -> 300 mg on 12/24  Lithium 900 mg at bedtime  Zyprexa 10 mg BID prn - > 10 mg at bedtime -> 12.5 mg at bedtime on 1/3 -> Zyprexa 10 mg on 1/20 due to sedation -> 7.5 mg on 1/24 due to patient preference  Lyrica 100 mg TID (initially held but resumed due to benefit for anxiety and pain)  Vitamin D 50,000 international unit(s) weekly    New medications initiated:     Ativan 2 mg QID -> 1.5 mg QID on 1/5 -> 1/2/2 mg on 1/12 -> 1/1/2 mg on 1/15 -> 1 mg TID on 1/17 -> 1.5 mg TID on 1/26  Amantadine 100 mg BID -> 100 mg daily (likely will stop on 2/8)  Namenda 5 mg daily -> 5 mg BID on 1/25 -> 10 mg BID on 1/31  Standard unit PRNs    New medications stopped    Exelon 3 mg BID off label for cognition from TBI due to concerns for agitation with plans to possibly switch to Namenda     Programming: Patient " "will be treated in a therapeutic milieu with appropriate individual and group therapies. Education will be provided on diagnoses, medications, and treatments.     Medical diagnoses:      #. Severe malnutrition  - Nutrition following   - Gaining weight   - Ensure on meal trays   - Monitoring, restarted IOs    #. Intention tremor bilaterally   - Secondary to lithium  - Monitor  - Conservative management    #. Chronic low back pain  #. Muscle spasms   - Robaxin 1,000 mg TID prn. Stopped on 2/2  - Sulindac 150 mg BID for temporary use  - Lidocaine patches prn  - Icy-hot prn   - APAP prn, increased to 1,000 mg   - Recommend referral to PT and PMR on outpatient basis     #. Multiple TBIs  - MRI findings consistent with encephalomalacia in left temporal-occipital region and left anterior frontal lobe.  - Recommend neuropsychological testing as outpatient and possible TBI focused IRTS/[facility] facility.  - OT to further evaluate    Consult: Nutrition  Labs: Li 0.8 on 1/12  Imaging: MRI recently    Anticipated LOS: 2- 6 weeks  Dispo:[facility]     Interim History     The patient refused Zyprexa at times over the weekend. He behaved similarly, although he has angry yesterday evening yelling.    Patient found in his room. He notes that he wants to leave. He feels like he should be able to. Does not understand that he needs a high level of support at this point. He feels like he can resume his life just fine and be able to work and eventually regain custody of his children. He is frustrated with being in the hospital. He notes that he feels like this is a \"big mistake.\" He has no plan to hurt anyone or harm himself.     Patient does not notice any changes with his medications. He wants to do a \"medication washout.\" Encouraged against this. Patient then requested for conversation to end.     Medications       buPROPion  300 mg Oral Daily     lithium ER  900 mg Oral At Bedtime     LORazepam  1.5 mg Oral TID     memantine  10 mg Oral BID " "    pregabalin  100 mg Oral TID     sulindac  150 mg Oral BID     vitamin D2  50,000 Units Oral Q7 Days        Allergies     Allergies   Allergen Reactions     Pork Allergy         Psychiatric Examination     /79   Pulse 76   Temp 98  F (36.7  C) (Temporal)   Resp 14   Ht 1.829 m (6')   Wt 98.1 kg (216 lb 3.2 oz)   SpO2 98%   BMI 29.32 kg/m    Weight is 216 lbs 3.2 oz  Body mass index is 29.32 kg/m .    Appearance: Alert, oriented, dressed in hospital scrubs, long beard  Attitude: Cooperative   Eye Contact: Fair  Mood: \"angry\"  Affect: Flat, mood congruent  Speech: Loud tone, yelling. Normal rhythm   Psychomotor Behavior: No tremor, rigidity, akathisia, or psychomotor retardation. Withdrawal, ambitendency, immobility at times  Thought Process: Linear, concrete, irrational at times   Associations: No loose associations   Thought Content: Denies SI. No SIB. AH and paranoia at times, improved, none recently. Poverty of thought.  Insight: Limited  Judgment: Limited  Oriented to: Person, place, and time  Attention Span and Concentration: Intact  Recent and Remote Memory: Intact  Language: English with appropriate syntax and vocabulary  Fund of Knowledge: Average  Muscle Strength and Tone: Grossly normal  Gait and Station: Grossly normal    Ganga Catatonia Rating Scale   Severity Score (Number of points for items 1 -23) _______1___   Screening Score (Presence or absence of items 1 - 14) ______1_____   Time: 11:00 AM on 1/11/2022   Rater: Dr. Shanelle Schuler Catatonia Rating Scale   Severity Score (Number of points for items 1 -23) ____4______   Screening Score (Presence or absence of items 1 - 14) ___1________   Time: 1145AM on 1/18  Rater: Dr. Shanelle Schuler Catatonia Rating Scale   Severity Score (Number of points for items 1 -23) ____7______   Screening Score (Presence or absence of items 1 - 14) ____4_______   Time: 500 PM on 1/26  Rater: Dr. Timmons       Labs     No results found " for this or any previous visit (from the past 24 hour(s)).       Treatment Team Care Plan     BEHAVIORAL TEAM DISCUSSION    Progress: Continued symptoms with some improvement    Continued Stay Criteria/Rationale: Safe discharge planning.  Needs to go [id number] to structured placement    Medical/Physical: See above    Precautions: See above.     Plan: Continue inpatient care with unit support and medication management    Rationale for change in precautions or plan: NA due to no change    Participants: Ashely Timmons, , Nursing, SW, OT    The patient's care was discussed with the treatment team and chart notes were reviewed.      Attestation      DAYSI Hernadez CNP

## 2022-02-09 NOTE — PLAN OF CARE
WILLIAM BRANDT RN  2/9/2022  7:37 AM  Face to face shift report received from TRAE Jean Baptiste. Rounding completed, pt observed.     1405-Isolated and withdrawn to his room majority of the shift.  Currently in lounge.  Did not attend groups today.  Compliant with meds and cooperative with staff.   0917-Reports sleeping well.  Ate in lounge 25%- picked at muffin and hashbrowns.  Denies SI, HI, hallucinations, and pain.  Endorses anxiety and depression.  Pt eager to be discharged.       Problem: Behavioral Health Plan of Care  Goal: Patient-Specific Goal (Individualization)  Description: Patient will be free from self harm or injury  Patient will eat at least 50% of meals  Patient will attend one group every day.  Monitor food intake.  Outcome: No Change     Problem: Behavior Regulation Impairment (Psychotic Signs/Symptoms)  Goal: Improved Behavioral Control (Psychotic Signs/Symptoms)  Description: Patient will be able to have a reality based conversation.   Outcome: No Change     Problem: Violence Risk or Actual  Goal: Anger and Impulse Control  Description: Pt will be able to control his anger during stay  Pt will ask for PRN medications as needed to control his impulses  Outcome: No Change     Face to face end of shift report communicated to oncoming shift RN.

## 2022-02-09 NOTE — PLAN OF CARE
"  Problem: Behavioral Health Plan of Care  Goal: Patient-Specific Goal (Individualization)  Description: Patient will be free from self harm or injury  Patient will eat at least 50% of meals  Patient will attend one group every day.    Monitor food intake.    Outcome: No Change  Note:        Problem: Behavior Regulation Impairment (Psychotic Signs/Symptoms)  Goal: Improved Behavioral Control (Psychotic Signs/Symptoms)  Description: Patient will be able to have a reality based conversation.     Outcome: No Change   Pt presently walking in the hallway - cooperative with assessment. Remains blunt and flat but does occasionally yells out when alone in his room. States frustration but also claims he's \"ok\". Will express feelings to writer at times but doesn't feel like it today.  Diastolic blood pressure somewhat elevated at 111 but is refusing recheck- states \"I'm ok\".  Pt did not eat dinner but is eating a snack and willingly took all p.m medications  Face to face end of shift report communicated to TRAE.     Denise Brown RN  2/9/2022  4:46 PM       "

## 2022-02-09 NOTE — PLAN OF CARE
"Pt came up to door and asks this writer if he would be able to discharge to a group home. Told pt his discharge will be to a [facility] or [facility] program. \"Last time I was committed I got to go to [facility]\" Stated again that pt would be going to [facility] or IRTS. Pt left window.    "

## 2022-02-09 NOTE — PLAN OF CARE
Face to face end of shift report will be communicated to oncoming RN.     Problem: Behavioral Health Plan of Care  Goal: Patient-Specific Goal (Individualization)  Description: Patient will be free from self harm or injury  Patient will eat at least 50% of meals  Patient will attend one group every day.    Monitor food intake.    Outcome: No Change     Problem: Behavior Regulation Impairment (Psychotic Signs/Symptoms)  Goal: Improved Behavioral Control (Psychotic Signs/Symptoms)  Description: Patient will be able to have a reality based conversation.     Outcome: No Change     Problem: Violence Risk or Actual  Goal: Anger and Impulse Control  Description: Pt will be able to control his anger during stay  Pt will ask for PRN medications as needed to control his impulses    Outcome: No Change   Face to face end of shift report obtained from TRAE Walker. Pt observed awake in bed.  0000-Pt has been talking to self in room, at times loudly. Staff asked if needed anything, pt declined.   0600-Pt stayed laying in bed almost all night. Unsure of amount of sleep, as pt lays in bed motionless with eyes closed. Pt has heard talking to self in room at times.

## 2022-02-10 PROCEDURE — 250N000013 HC RX MED GY IP 250 OP 250 PS 637: Performed by: STUDENT IN AN ORGANIZED HEALTH CARE EDUCATION/TRAINING PROGRAM

## 2022-02-10 PROCEDURE — 250N000013 HC RX MED GY IP 250 OP 250 PS 637: Performed by: NURSE PRACTITIONER

## 2022-02-10 PROCEDURE — 124N000004

## 2022-02-10 PROCEDURE — 99233 SBSQ HOSP IP/OBS HIGH 50: CPT | Performed by: STUDENT IN AN ORGANIZED HEALTH CARE EDUCATION/TRAINING PROGRAM

## 2022-02-10 RX ADMIN — MEMANTINE 10 MG: 5 TABLET ORAL at 20:13

## 2022-02-10 RX ADMIN — LORAZEPAM 1.5 MG: 1 TABLET ORAL at 20:13

## 2022-02-10 RX ADMIN — LORAZEPAM 1.5 MG: 1 TABLET ORAL at 14:44

## 2022-02-10 RX ADMIN — SULINDAC 150 MG: 150 TABLET ORAL at 20:14

## 2022-02-10 RX ADMIN — PREGABALIN 100 MG: 75 CAPSULE ORAL at 20:13

## 2022-02-10 RX ADMIN — LITHIUM CARBONATE 900 MG: 450 TABLET, EXTENDED RELEASE ORAL at 20:13

## 2022-02-10 RX ADMIN — PREGABALIN 100 MG: 75 CAPSULE ORAL at 14:44

## 2022-02-10 ASSESSMENT — ACTIVITIES OF DAILY LIVING (ADL)
ADLS_ACUITY_SCORE: 7
DRESS: INDEPENDENT
ADLS_ACUITY_SCORE: 7
HYGIENE/GROOMING: INDEPENDENT
DRESS: INDEPENDENT
ADLS_ACUITY_SCORE: 7
ADLS_ACUITY_SCORE: 7
ORAL_HYGIENE: INDEPENDENT
ADLS_ACUITY_SCORE: 7
LAUNDRY: UNABLE TO COMPLETE
ADLS_ACUITY_SCORE: 7
ADLS_ACUITY_SCORE: 7
HYGIENE/GROOMING: INDEPENDENT
ADLS_ACUITY_SCORE: 7
ORAL_HYGIENE: INDEPENDENT
ADLS_ACUITY_SCORE: 7

## 2022-02-10 NOTE — PLAN OF CARE
Face to face end of shift report received from Ita HARRIS RN. Rounding completed. Patient observed in bed.     Patient has been withdrawn, isolative to his room. He has not had attended any groups this shift. He participates minimally in assessment and responds with yes/no answers. Patient refused all morning medications this shift. Patient has not eaten breakfast or lunch today. Encouraged continuously to drink fluids throughout the shift, but he has had minimal intake. Patient has not had any outbursts this shift. He has not been observed talking to himself and has not hit any walls. Patient was brought shower supplies and he got up right away to shower.   Patient sitting in lounge this afternoon. Up to nurse's station requesting 1400 medications-- he initially refused when offered. Administered scheduled Ativan and Lyrica. Pt has no other requests at this time.       Problem: Behavioral Health Plan of Care  Goal: Patient-Specific Goal (Individualization)  Description: Patient will be free from self harm or injury  Patient will eat at least 50% of meals  Patient will attend one group every day.    Monitor food intake.    Outcome: Declining     Problem: Behavior Regulation Impairment (Psychotic Signs/Symptoms)  Goal: Improved Behavioral Control (Psychotic Signs/Symptoms)  Description: Patient will be able to have a reality based conversation.     Outcome: No Change     Problem: Violence Risk or Actual  Goal: Anger and Impulse Control  Description: Pt will be able to control his anger during stay  Pt will ask for PRN medications as needed to control his impulses    Intervention: Promote Self-Control  Recent Flowsheet Documentation  Taken 2/10/2022 0900 by Almita Aguilar RN  Supportive Measures:    verbalization of feelings encouraged    self-care encouraged     Almita Aguilar RN  2/10/2022  1:20 PM

## 2022-02-10 NOTE — PROGRESS NOTES
[facility] Psychiatric Progress Note     Assessment     Mr. Carter is a 31 year old male with a PMH of schizoaffective disorder, depressive type, catatonia, severe methamphetamine use disorder, alcohol use disorder, and significant TBI at the age of 3 who presented with depression with catatonia being off of Ativan after recently being discharged. This is the patient's 3rd hospitalization in the past roughly two months with substance use complicating his course.     The patient's catatonia and depression have been challenging to treat. His depression improved a little with increasing Wellbutrin and possibly from scheduling Zyprexa. His catatonia has improved some with Ativan, Amantadine, and recently Namenda, although he continues to have ambitendency and withdrawal at times. His Ativan was tapered down due to side effects (sedation) and also in preparation to potentially discharge given his history of misuse of substances. The amount had to be increased to 1.5 mg TID given that catatonia emerged more with the patient refusing his medications without reason and then taking (further sign of ambitendency in decision making). He has irrational belief that stopping his medications will lead to discharge. When he refuses medications his withdrawal worsens leading to poor intake with I/O monitoring when needed. Utilizing behavioral approaches to help with medication adherence. Offered ECT as an option to address his depression and catatonia with him pursuing. Monitoring to see if Patterson-Hagen should be pursued.    Patient has SPMI with him having deficits in his occupational and social functioning. He has had significant CNS insults with him having multiple TBIs, significant substance abuse over years, and near fatal cardiac arrest from overdose (? Anoxic injury) that have likely led to neuropsychiatric impact making it more challenging for him to recover. His  and  note he has not been  the same since his near fatal overdose. Patient did score a 4.4/5.8 on the ACL, which indicates the patient would need some level of support and could not be alone the whole day safely, needing assistance with such stuff as solving problems, removing any safety hazards. The patient is not safe to discharge to live on his own with him likely benefiting the most from supportive housing eventually like a group home with possibly even a legal guardian to manage financial and health concerns.    Today: Patient refused to take Zyprexa with it being stopped as a result. Patient has been intermittently speaking to himself and yelling at times in his room, otherwise he appears similar. He denies any psychotic symptoms with it not being clear if he is responding to internal stimuli or venting frustration. This might also be impulsivity from catatonia. No clear worsening of catatonia with elimination of Amantadine otherwsise. Will monitor closely though. Recommending he try another neuroleptic like Rexulti to target depression and psychosis with him not interested. Monitoring closely to determine if there is any noticeable difference on a neuroleptic to determine if filing for Sarah is needed.    Educated regarding medication indications, risks, benefits, side effects, contraindications and possible interactions. Verbally expressed understanding.      Diagnoses     1. Schizoaffective disorder, depressive type, multiple episodes, currently in acute episode, with catatonia   2. 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 Methamphetamine use disorder, severe  4. Alcohol use disorder, moderate     Plan     Unit 5  Legal Status: Committed     Safety Assessment:    Behavioral Orders   Procedures     Code 1 - Restrict to Unit     Routine Programming     As clinically indicated     Status 15     Every 15 minutes.      Medications:     Outpatient medications continued/changed:     Wellbutrin  mg  daily -> 300 mg on 12/24  Lithium 900 mg at bedtime  Zyprexa 10 mg BID prn - > 10 mg at bedtime -> 12.5 mg at bedtime on 1/3 -> Zyprexa 10 mg on 1/20 due to sedation -> 7.5 mg on 1/24 due to patient preference  Lyrica 100 mg TID (initially held but resumed due to benefit for anxiety and pain)  Vitamin D 50,000 international unit(s) weekly    New medications initiated:     Ativan 2 mg QID -> 1.5 mg QID on 1/5 -> 1/2/2 mg on 1/12 -> 1/1/2 mg on 1/15 -> 1 mg TID on 1/17 -> 1.5 mg TID on 1/26    Namenda 5 mg daily -> 5 mg BID on 1/25 -> 10 mg BID on 1/31  Standard unit PRNs    New medications stopped    Exelon 3 mg BID off label for cognition from TBI due to concerns for agitation with plans to possibly switch to Namenda   Amantadine 100 mg BID on 2/9 due to limited efficacy for his catatonia     Programming: Patient will be treated in a therapeutic milieu with appropriate individual and group therapies. Education will be provided on diagnoses, medications, and treatments.     Medical diagnoses:      #. Severe malnutrition  - Nutrition following   - Gaining weight   - Ensure on meal trays   - Monitoring, intermittent IOs    #. Intention tremor bilaterally   - Secondary to lithium  - Monitor  - Conservative management    #. Chronic low back pain  #. Muscle spasms   - Robaxin 1,000 mg TID prn. Stopped on 2/2  - Sulindac 150 mg BID for temporary use  - Lidocaine patches prn  - Icy-hot prn   - APAP prn, increased to 1,000 mg   - Recommend referral to PT and PMR on outpatient basis     #. Multiple TBIs  - MRI findings consistent with encephalomalacia in left temporal-occipital region and left anterior frontal lobe.  - Recommend neuropsychological testing as outpatient and possible TBI focused IRTS/[facility] facility.  - OT to further evaluate    Consult: Nutrition  Labs: Li 0.8 on 1/12  Imaging: MRI recently    Anticipated LOS: 2- 6 weeks  Dispo:[facility]     Interim History     The patient denies any difference after stopping Zyprexa  "and Amantadine. He notes that his mood is the same. Denies any psychosis. Explains his yelling at night as karthikeyan likely sleeping. Records do not suggest this. He notes that he feels the same overall. He might be a little less tired. He prefers not to take Zyprexa. He is not interested in another neuroleptic after recommending Rexulti. He is fine with continuing to discuss this.    Patient plans to walk and possibly go to group today. No concerns to address. No physical issues.     Medications       buPROPion  300 mg Oral Daily     lithium ER  900 mg Oral At Bedtime     LORazepam  1.5 mg Oral TID     memantine  10 mg Oral BID     pregabalin  100 mg Oral TID     sulindac  150 mg Oral BID     vitamin D2  50,000 Units Oral Q7 Days        Allergies     Allergies   Allergen Reactions     Pork Allergy         Psychiatric Examination     /81 (BP Location: Right arm)   Pulse 91   Temp (!) 96  F (35.6  C) (Temporal)   Resp 16   Ht 1.829 m (6')   Wt 98.1 kg (216 lb 3.2 oz)   SpO2 94%   BMI 29.32 kg/m    Weight is 216 lbs 3.2 oz  Body mass index is 29.32 kg/m .    Appearance: Alert, oriented, dressed in hospital scrubs, long beard  Attitude: Cooperative   Eye Contact: Fair  Mood: \"Fine\"  Affect: Flat, mood congruent  Speech: Regular rate, soft tone. Normal rhythm   Psychomotor Behavior: No tremor, rigidity, akathisia, or psychomotor retardation. Ambitendency and immobility at times  Thought Process: Linear, concrete, irrational at times   Associations: No loose associations   Thought Content: Denies SI. No SIB. AH and paranoia at times, improved, none recently. Poverty of thought.  Insight: Limited  Judgment: Limited  Oriented to: Person, place, and time  Attention Span and Concentration: Intact  Recent and Remote Memory: Intact  Language: English with appropriate syntax and vocabulary  Fund of Knowledge: Average  Muscle Strength and Tone: Grossly normal  Gait and Station: Grossly normal    Pineda-[place] Catatonia " Rating Scale   Severity Score (Number of points for items 1 -23) _______1___   Screening Score (Presence or absence of items 1 - 14) ______1_____   Time: 11:00 AM on 1/11/2022   Rater: Dr. Shanelle Schuler Catatonia Rating Scale   Severity Score (Number of points for items 1 -23) ____4______   Screening Score (Presence or absence of items 1 - 14) ___1________   Time: 1145AM on 1/18  Rater: Dr. Shanelle Schuler Catatonia Rating Scale   Severity Score (Number of points for items 1 -23) ____7______   Screening Score (Presence or absence of items 1 - 14) ____4_______   Time: 500 PM on 1/26  Rater: Dr. Shanelle Schuler Catatonia Rating Scale   Severity Score (Number of points for items 1 -23) ___5_______   Screening Score (Presence or absence of items 1 - 14) ____1_______   Time: 121 PM on 2/10  Rater: Dr. Timmons    1. Immobility/stupor: Extreme hypoactivity, immobile, minimally responsive to stimuli.   0 - Absent.   1 - Sits abnormally still, may interact briefly.   2 - Virtually no interaction with external world.   3 - Stuporous, non-reactive to painful stimuli.   2. Mutism: Verbally unresponsive or minimally responsive.   0 = Absent.   1 = Verbally unresponsive to majority of questions; incomprehensible whisper.   2 = Speaks less than 20 words/5mins.   3 = No speech.   3. = Staring: Fixed gaze, little or no visual scanning of environment, decreased blinking.   0 = Absent.   1 = Poor eye contact, repeatedly gazes less than 20 s between shifting of attention; decreased blinking.   2 = Gaze held longer than 20 s, occasionally shifts attention.   3 = Fixed gaze, non-reactive.   4. Posturing/catalepsy: Spontaneous maintenance of posture (s), including mundane (e.g. sitting or standing for long periods without reacting).   0 = Absent.   1 = Less than I min.   2 Greater than one minute, less than 15 min.   3 Bizarre posture, or mundane maintained more than 15 min.   5. Grimacing: Maintenance of odd facial  expressions.   0 = Absent.   1 = Less than [id number].   2 = Less than 1 min.   3 = Bizarre expression(s) or maintained more than 1 min.   6. Echopraxia/echolalia: Mimicking of examiner's movements (echopraxia) or speech (echolalia).   0 = Absent   1 = Occasional.   2 = Frequent.   3 = Constant   7. Stereotypy: Repetitive, non-goal-directed motor activity (e.g. finger-play, repeatedly touching, patting or rubbing self); abnormality not inherent in act but in its frequency.   0 - Absent   1 - Occasional.   2 - Frequent.   3 - Constant.   8. Mannerisms: Odd, purposeful movements (hopping or walking tiptoe, saluting passers-by or exaggerated caricatures of mundane movements); abnormality inherent in act itself.   0 - Absent   1 - Occasional.   2 - Frequent.   3 - Constant.   9. Stereotyped & meaningless repetition of words & phrases (verbigeration): Repetition of phrases or sentences (like a scratched records).   0 - Absent.   1 - Occasional.   2 - Frequent, difficult to interrupt.   3 - Constant.   10. Rigidity: Maintenance of a rigid position despite efforts to be moved (exclude if cog-wheeling or tremor present)   0 = Absent.   1 = Mild resistance.   2 = Moderate.   3 = Severe, cannot be repostured.   11. Negativism: Apparently motiveless resistance to instructions or attempts to move/examine patients. Contrary behavior, does exact opposite of instruction.   0 - Absent   1 - Mild resistance and/or occasionally contrary.   2 - Moderate resistance and/or frequently contrary.   3 - Severe resistance and/or continually contrary.   12. Waxy flexibility: During repositioning of patient, patient offers initial resistance before allowing him/herself to be repositioned, similar to that of a bending candle. (also defined as slow resistance to movement as the patient allows the examiner to place his/her extremities in unusual positions. The limb may remain in the position in which they are placed or not)   0 - Absent   3 -  Present.   13. Withdrawal: Refusal to eat, drink and/or make eye contact.   0 = Absent.   1 = Minimal oral intake/interaction for less than 1 day.   2 = Minimal oral intake/interaction for more than 1 day.   3 = No oral intake/interaction for 1 day or more.   14. Excitement: Extreme hyperactivity, constant motor unrest which is apparently non-purposeful.   Not to be attributed to akathisia or goal-directed agitation.   1 - Excessive motion, intermittent.   2 - Constant motion, hyperkinetic without rest periods.   3 - Full-blown catatonic excitement, endless frenzied motor activity.   ------------End of Screening Items-------------   15. Impulsivity: Patient suddenly engages in inappropriate behavior (e.g. runs down hallway, starts screaming or takes off clothes) without provocation. Afterwards can give no, or only a facile explanation.   0 - Absent.   1 - Occasional.   2 - Frequent.   3 - Constant or not redirectable.   16. Automatic obedience: Exaggerated cooperation with examiner's request or spontaneous continuation of movement requested.   0 = Absent.   1 = Occasional   2 = Frequent   3 = Constant.   17. Passive Obedience (mitgehen): Patient raises arm in response to light pressure of finger, despite instructions to the contrary.   0 = Absent.   3 = Present.   18. Muscle Resistance (gegenhalten): Involuntary resistance to passive movement of a limb to a new position. Resistance increases with the speed of the movement.   0 - Absent   3 - Present.   19. Motorically Stuck (ambitendency): Patient appears stuck in indecisive, hesitant motor movements.   0 - Absent.   3 = Present.   20. Grasp reflex: Striking the patient's open palm with two extended fingers of the examiner's hand results in automatic closure of patients hand.   0 = Absent   3 = Present   21. Perseveration: Repeatedly returns to same topic or persists with the same movements.   0 = Absent.   3 = Present.   22. Combativeness: Belligerence or  aggression, Usually in an undirected manner, without explanation.   0 = Absent   1 = Occasionally strikes out, low potential for injury.   2 = Frequently strikes out, moderate potential for injury.   3 = Serious danger to others.   23. Autonomic abnormality: Abnormality of body temperature (fever), blood pressure, pulse, respiratory rate, inappropriate sweating, flushing.   0 = Absent   1 = Abnormality of one parameter (exclude pre-existing hypertension).   2 = Abnormality of two parameters.   3 = Abnormality of three or more parameters.      Labs     No results found for this or any previous visit (from the past 24 hour(s)).     Attestation      Patient has been seen and evaluated by:    David Timmons DO, MA  Psychiatrist

## 2022-02-10 NOTE — PLAN OF CARE
Face to face end of shift report will be communicated to oncoming RN.     Problem: Behavioral Health Plan of Care  Goal: Patient-Specific Goal (Individualization)  Description: Patient will be free from self harm or injury  Patient will eat at least 50% of meals  Patient will attend one group every day.    Monitor food intake.    Outcome: No Change     Problem: Behavior Regulation Impairment (Psychotic Signs/Symptoms)  Goal: Improved Behavioral Control (Psychotic Signs/Symptoms)  Description: Patient will be able to have a reality based conversation.     Outcome: No Change     Problem: Violence Risk or Actual  Goal: Anger and Impulse Control  Description: Pt will be able to control his anger during stay  Pt will ask for PRN medications as needed to control his impulses    Outcome: No Change   Face to face end of shift report obtained from TRAE Walker. Pt observed resting in bed.  Pt appeared to had slept 6.5 hours. No yelling or talking heard this shift. Pt had no complains.

## 2022-02-10 NOTE — PLAN OF CARE
Face to face shift report received from Almita BERGERON RN. Rounding completed, pt observed.    Problem: Behavioral Health Plan of Care  Goal: Patient-Specific Goal (Individualization)  Description: Patient will be free from self harm or injury  Patient will eat at least 50% of meals  Patient will attend one group every day.    Monitor food intake.  Address and document any self talk or yelling in room    Outcome: No Change  Note: Shift Summery:  Patient was awake in his room at the start of this shift.  Patient denies pain or unwanted side effects at start of the shift.  Patient has a flat affect but uses god eye contact during interaction with nurse.Patient in lounge for evening meal.  Came to nurse station to request his scheduled HS medications.  Took all without issue. Mood is quiet and calm.  No self talk noted this shift.  Problem: Behavior Regulation Impairment (Psychotic Signs/Symptoms)  Goal: Improved Behavioral Control (Psychotic Signs/Symptoms)  Description: Patient will be able to have a reality based conversation.     Outcome: No Change     Problem: Violence Risk or Actual  Goal: Anger and Impulse Control  Description: Pt will be able to control his anger during stay  Pt will ask for PRN medications as needed to control his impulses    Outcome: Improving   Face to face report will be communicated to oncoming RN.    Alma Delia Garisa RN  2/10/2022

## 2022-02-11 PROCEDURE — 250N000013 HC RX MED GY IP 250 OP 250 PS 637: Performed by: STUDENT IN AN ORGANIZED HEALTH CARE EDUCATION/TRAINING PROGRAM

## 2022-02-11 PROCEDURE — 250N000013 HC RX MED GY IP 250 OP 250 PS 637: Performed by: NURSE PRACTITIONER

## 2022-02-11 PROCEDURE — 99232 SBSQ HOSP IP/OBS MODERATE 35: CPT | Mod: 95 | Performed by: STUDENT IN AN ORGANIZED HEALTH CARE EDUCATION/TRAINING PROGRAM

## 2022-02-11 PROCEDURE — 124N000004

## 2022-02-11 RX ADMIN — BUPROPION HYDROCHLORIDE 300 MG: 300 TABLET, EXTENDED RELEASE ORAL at 08:51

## 2022-02-11 RX ADMIN — SULINDAC 150 MG: 150 TABLET ORAL at 08:50

## 2022-02-11 RX ADMIN — PREGABALIN 100 MG: 75 CAPSULE ORAL at 13:44

## 2022-02-11 RX ADMIN — LORAZEPAM 1.5 MG: 1 TABLET ORAL at 08:50

## 2022-02-11 RX ADMIN — MEMANTINE 10 MG: 5 TABLET ORAL at 08:50

## 2022-02-11 RX ADMIN — LORAZEPAM 1.5 MG: 1 TABLET ORAL at 13:44

## 2022-02-11 RX ADMIN — PREGABALIN 100 MG: 75 CAPSULE ORAL at 08:50

## 2022-02-11 ASSESSMENT — ACTIVITIES OF DAILY LIVING (ADL)
ORAL_HYGIENE: INDEPENDENT
ADLS_ACUITY_SCORE: 7
ORAL_HYGIENE: INDEPENDENT
ADLS_ACUITY_SCORE: 7
HYGIENE/GROOMING: INDEPENDENT
ADLS_ACUITY_SCORE: 7
ADLS_ACUITY_SCORE: 7
DRESS: SCRUBS (BEHAVIORAL HEALTH);INDEPENDENT
HYGIENE/GROOMING: INDEPENDENT
ADLS_ACUITY_SCORE: 7
DRESS: SCRUBS (BEHAVIORAL HEALTH)
LAUNDRY: UNABLE TO COMPLETE

## 2022-02-11 NOTE — PLAN OF CARE
Face to face end of shift report will be communicated to oncoming RN.    Problem: Behavioral Health Plan of Care  Goal: Patient-Specific Goal (Individualization)  Description: Patient will be free from self harm or injury  Patient will eat at least 50% of meals  Patient will attend one group every day.    Monitor food intake.  Address and document any self talk or yelling in room  Outcome: No Change     Problem: Behavior Regulation Impairment (Psychotic Signs/Symptoms)  Goal: Improved Behavioral Control (Psychotic Signs/Symptoms)  Description: Patient will be able to have a reality based conversation.     Outcome: No Change     Problem: Violence Risk or Actual  Goal: Anger and Impulse Control  Description: Pt will be able to control his anger during stay  Pt will ask for PRN medications as needed to control his impulses    Outcome: No Change   Face to face end of shift report obtained from TRAE Flannery. Pt observed awake in bed.  0028-Pt walked to nurse station, pleasantly asked for snack and return to room.   0600-Pt appeared to had slept 4 hours so far this shift. No yelling or self talk heard or reported so far this shift.

## 2022-02-11 NOTE — PLAN OF CARE
Problem: Behavioral Health Plan of Care  Goal: Patient-Specific Goal (Individualization)  Description: Patient will be free from self harm or injury  Patient will eat at least 50% of meals  Patient will attend one group every day.  Monitor food intake.  Address and document any self talk or yelling in room  Outcome: Improving  Note: Shift Summery:    Face to face end of shift report received from night shift RN. Rounding completed. Pt observed in their own bed, with eyes closed, in left side-lying position, and with rested breathing. Pt declines eating breakfast. Pt denies pain, SI, HI, and hallucinations. Pt accepts and receives this shift's scheduled medications. Pt rests in bed frequently throughout the duration of this shift. Pt quiet and offers minimal response to questions. Pt declines eating lunch. In afternoon, Pt observed in lobby quietly drinking coffee; Pt denies needs or discomforts. No self talk noted throughout the duration of this shift. Will continue to monitor. Face to face end of shift report to be given to evening shift RN.       Problem: Behavior Regulation Impairment (Psychotic Signs/Symptoms)  Goal: Improved Behavioral Control (Psychotic Signs/Symptoms)  Description: Patient will be able to have a reality based conversation.   Outcome: No change     Problem: Violence Risk or Actual  Goal: Anger and Impulse Control  Description: Pt will be able to control his anger during stay  Pt will ask for PRN medications as needed to control his impulses  Outcome: Improving -- Pt remains free of aggressive outbursts throughout the duration of this shift.

## 2022-02-11 NOTE — PLAN OF CARE
Spoke with pt this afternoon- He asks if we can get the discharge process started and if he can go to a [facility] in [place]. Reminded pt that the discharge plan is a [facility] and he is already on the wait list, but there are no openings.

## 2022-02-11 NOTE — PROGRESS NOTES
[facility] Psychiatric Progress Note     Assessment     Mr. Carter is a 31 year old male with a PMH of schizoaffective disorder, depressive type, catatonia, severe methamphetamine use disorder, alcohol use disorder, and significant TBI at the age of 3 who presented with depression with catatonia being off of Ativan after recently being discharged. This is the patient's 3rd hospitalization in the past roughly two months with substance use complicating his course.     The patient's catatonia and depression have been challenging to treat. His depression improved a little with increasing Wellbutrin and possibly from scheduling Zyprexa. His catatonia has improved some with Ativan, Amantadine, and recently Namenda, although he continues to have ambitendency and withdrawal at times. His Ativan was tapered down due to side effects (sedation) and also in preparation to potentially discharge given his history of misuse of substances. The amount had to be increased to 1.5 mg TID given that catatonia emerged more with the patient refusing his medications without reason and then taking (further sign of ambitendency in decision making). He has irrational belief that stopping his medications will lead to discharge. When he refuses medications his withdrawal worsens leading to poor intake with I/O monitoring when needed. Utilizing behavioral approaches to help with medication adherence. Offered ECT as an option to address his depression and catatonia with him pursuing. Monitoring to see if Patterson-Hagen should be pursued.    Patient has SPMI with him having deficits in his occupational and social functioning. He has had significant CNS insults with him having multiple TBIs, significant substance abuse over years, and near fatal cardiac arrest from overdose (? Anoxic injury) that have likely led to neuropsychiatric impact making it more challenging for him to recover. His  and  note he has not been  the same since his near fatal overdose. Patient did score a 4.4/5.8 on the ACL, which indicates the patient would need some level of support and could not be alone the whole day safely, needing assistance with such stuff as solving problems, removing any safety hazards. The patient is not safe to discharge to live on his own with him likely benefiting the most from supportive housing eventually like a group home with possibly even a legal guardian to manage financial and health concerns.    Today: Patient refused to take Zyprexa with it being stopped as a result. Patient has been intermittently speaking to himself and yelling at times in his room, otherwise he appears similar. He denies any psychotic symptoms with it not being clear if he is responding to internal stimuli or venting frustration. This might also be impulsivity from catatonia. No clear worsening of catatonia with elimination of Amantadine otherwsise. Will monitor closely though. Recommending he try another neuroleptic like Rexulti to target depression and psychosis with him not interested. Monitoring closely to determine if there is any noticeable difference on a neuroleptic to determine if filing for Sarah is needed.    Educated regarding medication indications, risks, benefits, side effects, contraindications and possible interactions. Verbally expressed understanding.      Diagnoses     1. Schizoaffective disorder, depressive type, multiple episodes, currently in acute episode, with catatonia   2. 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 Methamphetamine use disorder, severe  4. Alcohol use disorder, moderate     Plan     Unit 5  Legal Status: Committed     Safety Assessment:    Behavioral Orders   Procedures     Code 1 - Restrict to Unit     Routine Programming     As clinically indicated     Status 15     Every 15 minutes.      Medications:     Outpatient medications continued/changed:     Wellbutrin  mg  daily -> 300 mg on 12/24  Lithium 900 mg at bedtime  Zyprexa 10 mg BID prn - > 10 mg at bedtime -> 12.5 mg at bedtime on 1/3 -> Zyprexa 10 mg on 1/20 due to sedation -> 7.5 mg on 1/24 due to patient preference  Lyrica 100 mg TID (initially held but resumed due to benefit for anxiety and pain)  Vitamin D 50,000 international unit(s) weekly    New medications initiated:     Ativan 2 mg QID -> 1.5 mg QID on 1/5 -> 1/2/2 mg on 1/12 -> 1/1/2 mg on 1/15 -> 1 mg TID on 1/17 -> 1.5 mg TID on 1/26    Namenda 5 mg daily -> 5 mg BID on 1/25 -> 10 mg BID on 1/31  Standard unit PRNs    New medications stopped    Exelon 3 mg BID off label for cognition from TBI due to concerns for agitation with plans to possibly switch to Namenda   Amantadine 100 mg BID on 2/9 due to limited efficacy for his catatonia     Programming: Patient will be treated in a therapeutic milieu with appropriate individual and group therapies. Education will be provided on diagnoses, medications, and treatments.     Medical diagnoses:      #. Severe malnutrition  - Nutrition following   - Gaining weight   - Ensure on meal trays   - Monitoring, intermittent IOs    #. Intention tremor bilaterally   - Secondary to lithium  - Monitor  - Conservative management    #. Chronic low back pain  #. Muscle spasms   - Robaxin 1,000 mg TID prn. Stopped on 2/2  - Sulindac 150 mg BID for temporary use  - Lidocaine patches prn  - Icy-hot prn   - APAP prn, increased to 1,000 mg   - Recommend referral to PT and PMR on outpatient basis     #. Multiple TBIs  - MRI findings consistent with encephalomalacia in left temporal-occipital region and left anterior frontal lobe.  - Recommend neuropsychological testing as outpatient and possible TBI focused IRTS/[facility] facility.  - OT to further evaluate    Consult: Nutrition  Labs: Li 0.8 on 1/12  Imaging: MRI recently    Anticipated LOS: 2- 6 weeks  Dispo:[facility]     Interim History     The patient notes he feels similar.  He denies any  "psychotic symptoms.  He does not notice any difference off Zyprexa.  He does not notice any difference off of amantadine.  He denies any safety concerns.    Patient is understanding medication changes currently.  He hopes he can leave soon.  [facility].    He denies any physical concerns.  He notes he is trying to walk.  He took a shower yesterday.     Medications       buPROPion  300 mg Oral Daily     lithium ER  900 mg Oral At Bedtime     LORazepam  1.5 mg Oral TID     memantine  10 mg Oral BID     pregabalin  100 mg Oral TID     sulindac  150 mg Oral BID     vitamin D2  50,000 Units Oral Q7 Days        Allergies     Allergies   Allergen Reactions     Pork Allergy         Psychiatric Examination     /76 (BP Location: Right arm)   Pulse 85   Temp 97.4  F (36.3  C) (Temporal)   Resp 16   Ht 1.829 m (6')   Wt 98.1 kg (216 lb 3.2 oz)   SpO2 99%   BMI 29.32 kg/m    Weight is 216 lbs 3.2 oz  Body mass index is 29.32 kg/m .    Appearance: Alert, oriented, dressed in hospital scrubs, long beard  Attitude: Cooperative   Eye Contact: Fair  Mood: \"Ok\"  Affect: Flat, mood congruent  Speech: Regular rate, soft tone. Normal rhythm   Psychomotor Behavior: No tremor, rigidity, akathisia, or psychomotor retardation. Ambitendency and immobility at times  Thought Process: Linear, concrete, irrational at times   Associations: No loose associations   Thought Content: Denies SI. No SIB. AH and paranoia at times, improved, none recently. Poverty of thought.  Insight: Limited  Judgment: Limited  Oriented to: Person, place, and time  Attention Span and Concentration: Intact  Recent and Remote Memory: Intact  Language: English with appropriate syntax and vocabulary  Fund of Knowledge: Average  Muscle Strength and Tone: Grossly normal  Gait and Station: Grossly normal    P[place] Catatonia Rating Scale   Severity Score (Number of points for items 1 -23) _______1___   Screening Score (Presence or " absence of items 1 - 14) ______1_____   Time: 11:00 AM on 1/11/2022   Rater: Dr. Shanelle Schuler Catatonia Rating Scale   Severity Score (Number of points for items 1 -23) ____4______   Screening Score (Presence or absence of items 1 - 14) ___1________   Time: 1145AM on 1/18  Rater: Dr. Shanelle Schuler Catatonia Rating Scale   Severity Score (Number of points for items 1 -23) ____7______   Screening Score (Presence or absence of items 1 - 14) ____4_______   Time: 500 PM on 1/26  Rater: Dr. Shanelle Schuler Catatonia Rating Scale   Severity Score (Number of points for items 1 -23) ___5_______   Screening Score (Presence or absence of items 1 - 14) ____1_______   Time: 121 PM on 2/10  Rater: Dr. Timmons     Labs     No results found for this or any previous visit (from the past 24 hour(s)).     Attestation      Patient has been seen and evaluated by:    David Timmons DO, MA  Psychiatrist    VIDEO VISIT    Patient has given verbal consent for video visit?: Yes     Video- Visit Details  Type of service:  video visit for mental health treatment.  Time of service:    Date:  02/11/2022    Video Start Time: 1045 AM      Video End Time: 1100 AM    Reason for video visit: COVID-19 [place]  Originating Site (patient location):  [facility]  Distant Site (provider location):  Remote location  Mode of Communication:  Video Conference via [employer]

## 2022-02-12 PROCEDURE — 124N000004

## 2022-02-12 PROCEDURE — 250N000013 HC RX MED GY IP 250 OP 250 PS 637: Performed by: NURSE PRACTITIONER

## 2022-02-12 PROCEDURE — 250N000013 HC RX MED GY IP 250 OP 250 PS 637: Performed by: STUDENT IN AN ORGANIZED HEALTH CARE EDUCATION/TRAINING PROGRAM

## 2022-02-12 RX ADMIN — PREGABALIN 100 MG: 75 CAPSULE ORAL at 20:12

## 2022-02-12 RX ADMIN — LORAZEPAM 1.5 MG: 1 TABLET ORAL at 08:37

## 2022-02-12 RX ADMIN — SULINDAC 150 MG: 150 TABLET ORAL at 20:12

## 2022-02-12 RX ADMIN — LORAZEPAM 1.5 MG: 1 TABLET ORAL at 13:09

## 2022-02-12 RX ADMIN — PREGABALIN 100 MG: 75 CAPSULE ORAL at 08:37

## 2022-02-12 RX ADMIN — LORAZEPAM 1.5 MG: 1 TABLET ORAL at 20:12

## 2022-02-12 RX ADMIN — PREGABALIN 100 MG: 75 CAPSULE ORAL at 13:09

## 2022-02-12 RX ADMIN — MEMANTINE 10 MG: 5 TABLET ORAL at 20:11

## 2022-02-12 RX ADMIN — MEMANTINE 10 MG: 5 TABLET ORAL at 08:36

## 2022-02-12 RX ADMIN — LITHIUM CARBONATE 900 MG: 450 TABLET, EXTENDED RELEASE ORAL at 20:11

## 2022-02-12 RX ADMIN — SULINDAC 150 MG: 150 TABLET ORAL at 08:37

## 2022-02-12 RX ADMIN — BUPROPION HYDROCHLORIDE 300 MG: 300 TABLET, EXTENDED RELEASE ORAL at 08:36

## 2022-02-12 ASSESSMENT — ACTIVITIES OF DAILY LIVING (ADL)
ADLS_ACUITY_SCORE: 7
HYGIENE/GROOMING: INDEPENDENT
ADLS_ACUITY_SCORE: 7
DRESS: SCRUBS (BEHAVIORAL HEALTH);INDEPENDENT
ADLS_ACUITY_SCORE: 7
ORAL_HYGIENE: INDEPENDENT
ADLS_ACUITY_SCORE: 7
ORAL_HYGIENE: INDEPENDENT
ADLS_ACUITY_SCORE: 7
ADLS_ACUITY_SCORE: 7
HYGIENE/GROOMING: INDEPENDENT
ADLS_ACUITY_SCORE: 7
DRESS: SCRUBS (BEHAVIORAL HEALTH)

## 2022-02-12 NOTE — PLAN OF CARE
"Problem: Behavioral Health Plan of Care  Goal: Patient-Specific Goal (Individualization)  Description: Patient will be free from self harm or injury  Patient will eat at least 50% of meals  Patient will attend one group every day.  Monitor food intake.  Address and document any self talk or yelling in room  Outcome: No Change  Note: Pt was pleasant and cooperative tonight. He ate supper in the lounge and had an intake of 100%. He paced in the hallway throughout the shift. Pt did not engage in any self talk/yelling tonight. He refused all of his bedtime medications. When asked why he was refusing, he stated \"I just want a blank slate.\" Pt was unable to explain reasoning. He denied adverse medication effects. Discussed the importance of not abruptly stopping medications. Pt stated \"I'll be alright tonight. I don't need them.\"     Face to face end of shift report communicated to oncoming RN.     Problem: Violence Risk or Actual  Goal: Anger and Impulse Control  Description: Pt will be able to control his anger during stay  Pt will ask for PRN medications as needed to control his impulses  Outcome: Improving  Note: Pt had controlled behavior.      "

## 2022-02-12 NOTE — PLAN OF CARE
Face to face shift report received from nurse. Rounding completed, pt observed.    Problem: Behavioral Health Plan of Care  Goal: Patient-Specific Goal (Individualization)  Description: Patient will be free from self harm or injury  Patient will eat at least 50% of meals  Patient will attend one group every day.    Monitor food intake.  Address and document any self talk or yelling in room  Outcome: No Change  Note: Pt has been in bed with eyes closed and regular respirations observed all night. Will continue to monitor. Patient slept 5 hours. Patient wasn't observed of hallucinating.    Problem: Behavior Regulation Impairment (Psychotic Signs/Symptoms)  Goal: Improved Behavioral Control (Psychotic Signs/Symptoms)  Description: Patient will be able to have a reality based conversation.   Outcome: No Change     Face to face report will be communicated to oncoming RN.    Yoan Erickson RN  2/12/2022

## 2022-02-12 NOTE — PLAN OF CARE
Problem: Behavioral Health Plan of Care  Goal: Patient-Specific Goal (Individualization)  Description: Patient will be free from self harm or injury  Patient will eat at least 50% of meals  Patient will attend one group every day.    Monitor food intake.  Address and document any self talk or yelling in room  Outcome: Improving  Note:      Pt. Was up and ate breakfast in dayroom, appetite is good, taking prescribed medications, is able to make needs be know, affect is flat and blunted, denies hallucination, suicidal ideation and pain, will continue to monitor.    Face to face end of shift report will be communicated to oncoming afternoon shift RN.     Farida Saenz, TRAE  2/12/2022  11:06 AM

## 2022-02-13 PROCEDURE — 250N000013 HC RX MED GY IP 250 OP 250 PS 637: Performed by: NURSE PRACTITIONER

## 2022-02-13 PROCEDURE — 124N000004

## 2022-02-13 PROCEDURE — 250N000013 HC RX MED GY IP 250 OP 250 PS 637: Performed by: STUDENT IN AN ORGANIZED HEALTH CARE EDUCATION/TRAINING PROGRAM

## 2022-02-13 RX ADMIN — MEMANTINE 10 MG: 5 TABLET ORAL at 08:16

## 2022-02-13 RX ADMIN — LITHIUM CARBONATE 900 MG: 450 TABLET, EXTENDED RELEASE ORAL at 20:59

## 2022-02-13 RX ADMIN — SULINDAC 150 MG: 150 TABLET ORAL at 20:59

## 2022-02-13 RX ADMIN — MEMANTINE 10 MG: 5 TABLET ORAL at 20:59

## 2022-02-13 RX ADMIN — LORAZEPAM 1.5 MG: 1 TABLET ORAL at 20:59

## 2022-02-13 RX ADMIN — LORAZEPAM 1.5 MG: 1 TABLET ORAL at 13:19

## 2022-02-13 RX ADMIN — SULINDAC 150 MG: 150 TABLET ORAL at 08:16

## 2022-02-13 RX ADMIN — BUPROPION HYDROCHLORIDE 300 MG: 300 TABLET, EXTENDED RELEASE ORAL at 08:16

## 2022-02-13 RX ADMIN — LORAZEPAM 1.5 MG: 1 TABLET ORAL at 08:16

## 2022-02-13 RX ADMIN — PREGABALIN 100 MG: 75 CAPSULE ORAL at 13:19

## 2022-02-13 RX ADMIN — PREGABALIN 100 MG: 75 CAPSULE ORAL at 08:16

## 2022-02-13 RX ADMIN — PREGABALIN 100 MG: 75 CAPSULE ORAL at 20:59

## 2022-02-13 ASSESSMENT — ACTIVITIES OF DAILY LIVING (ADL)
ORAL_HYGIENE: INDEPENDENT
ADLS_ACUITY_SCORE: 7
ADLS_ACUITY_SCORE: 7
HYGIENE/GROOMING: INDEPENDENT
ADLS_ACUITY_SCORE: 7
HYGIENE/GROOMING: INDEPENDENT
DRESS: SCRUBS (BEHAVIORAL HEALTH)
DRESS: SCRUBS (BEHAVIORAL HEALTH);INDEPENDENT
ADLS_ACUITY_SCORE: 7
ORAL_HYGIENE: INDEPENDENT
ADLS_ACUITY_SCORE: 7

## 2022-02-13 ASSESSMENT — MIFFLIN-ST. JEOR: SCORE: 1955.51

## 2022-02-13 NOTE — PLAN OF CARE
Face to face shift report received from nurse. Rounding completed, pt observed.    Problem: Behavioral Health Plan of Care  Goal: Patient-Specific Goal (Individualization)  Description: Patient will be free from self harm or injury  Patient will eat at least 50% of meals  Patient will attend one group every day.  Monitor food intake.  Address and document any self talk or yelling in room  Outcome: No Change  Pt has been in bed with eyes closed and regular respirations observed all night. Will continue to monitor. Patient slept 4 hours.     Problem: Behavior Regulation Impairment (Psychotic Signs/Symptoms)  Goal: Improved Behavioral Control (Psychotic Signs/Symptoms)  Description: Patient will be able to have a reality based conversation.   Outcome: No Change     Face to face report will be communicated to oncoming RN.    Yoan Erickson RN  2/13/2022

## 2022-02-13 NOTE — PLAN OF CARE
Problem: Behavioral Health Plan of Care  Goal: Patient-Specific Goal (Individualization)  Description: Patient will be free from self harm or injury  Patient will eat at least 50% of meals  Patient will attend one group every day.  Monitor food intake.  Address and document any self talk or yelling in room  Outcome: No Change  Note: Pt had a blunted, flat affect. He ate 50% of his supper. He has been observed spending more time out of his room. He paced in the hallway for a while and sat at a table by himself in the lounge watching TV. Pt was not heard or seen talking to himself tonight. He was compliant with his scheduled medications.     Face to face end of shift report communicated to oncoming RN.     Problem: Violence Risk or Actual  Goal: Anger and Impulse Control  Description: Pt will be able to control his anger during stay  Pt will ask for PRN medications as needed to control his impulses  Outcome: Improving  Note: Pt had controlled behavior. He did not receive any PRN medications tonight.

## 2022-02-13 NOTE — PLAN OF CARE
Problem: Behavioral Health Plan of Care  Goal: Patient-Specific Goal (Individualization)  Description: Patient will be free from self harm or injury  Patient will eat at least 50% of meals  Patient will attend one group every day.    Monitor food intake.  Address and document any self talk or yelling in room  Outcome: No Change  Note:   Pt. Has been up on unit, attended group therapy, has been ambulating in the halls, taking prescribed medications, appetite is fair, has been free from self harm, denies hallucinations and suicidal ideation, slept 4 hours last night, affect is flat and blunted, will continue to monitor.    Face to face end of shift report will be communicated to oncoming afternoon shift RN.     Farida Saenz RN  2/13/2022  11:54 AM

## 2022-02-14 PROCEDURE — 250N000013 HC RX MED GY IP 250 OP 250 PS 637: Performed by: STUDENT IN AN ORGANIZED HEALTH CARE EDUCATION/TRAINING PROGRAM

## 2022-02-14 PROCEDURE — 250N000013 HC RX MED GY IP 250 OP 250 PS 637: Performed by: NURSE PRACTITIONER

## 2022-02-14 PROCEDURE — 124N000004

## 2022-02-14 PROCEDURE — 99232 SBSQ HOSP IP/OBS MODERATE 35: CPT | Mod: 95 | Performed by: STUDENT IN AN ORGANIZED HEALTH CARE EDUCATION/TRAINING PROGRAM

## 2022-02-14 RX ADMIN — SULINDAC 150 MG: 150 TABLET ORAL at 09:58

## 2022-02-14 RX ADMIN — PREGABALIN 100 MG: 75 CAPSULE ORAL at 14:35

## 2022-02-14 RX ADMIN — PREGABALIN 100 MG: 75 CAPSULE ORAL at 09:58

## 2022-02-14 RX ADMIN — LORAZEPAM 1.5 MG: 1 TABLET ORAL at 09:58

## 2022-02-14 RX ADMIN — LORAZEPAM 1.5 MG: 1 TABLET ORAL at 14:35

## 2022-02-14 RX ADMIN — BUPROPION HYDROCHLORIDE 300 MG: 300 TABLET, EXTENDED RELEASE ORAL at 09:58

## 2022-02-14 RX ADMIN — MEMANTINE 10 MG: 5 TABLET ORAL at 09:58

## 2022-02-14 ASSESSMENT — ACTIVITIES OF DAILY LIVING (ADL)
ADLS_ACUITY_SCORE: 7
LAUNDRY: UNABLE TO COMPLETE
ADLS_ACUITY_SCORE: 7
HYGIENE/GROOMING: INDEPENDENT
ADLS_ACUITY_SCORE: 7
DRESS: INDEPENDENT
ADLS_ACUITY_SCORE: 7
ORAL_HYGIENE: INDEPENDENT
ADLS_ACUITY_SCORE: 7
ORAL_HYGIENE: INDEPENDENT
ADLS_ACUITY_SCORE: 7
ADLS_ACUITY_SCORE: 7
DRESS: SCRUBS (BEHAVIORAL HEALTH);INDEPENDENT
ADLS_ACUITY_SCORE: 7
HYGIENE/GROOMING: INDEPENDENT
LAUNDRY: UNABLE TO COMPLETE

## 2022-02-14 NOTE — PLAN OF CARE
Face to face end of shift report will be communicated to oncoming RN.    Problem: Behavioral Health Plan of Care  Goal: Patient-Specific Goal (Individualization)  Description: Patient will be free from self harm or injury  Patient will eat at least 50% of meals  Patient will attend one group every day.    Monitor food intake.  Address and document any self talk or yelling in room  Outcome: No Change     Problem: Behavior Regulation Impairment (Psychotic Signs/Symptoms)  Goal: Improved Behavioral Control (Psychotic Signs/Symptoms)  Description: Patient will be able to have a reality based conversation.     Outcome: No Change     Problem: Violence Risk or Actual  Goal: Anger and Impulse Control  Description: Pt will be able to control his anger during stay  Pt will ask for PRN medications as needed to control his impulses    Outcome: No Change   Face to face end of shift report obtained from TRAE cash. Pt observed walking hallways.   0600-Pt walked in hallways and stayed up awake in room late last night. Pt had snack and laid down with no noted behaviors or self talking. Pt appeared to had slept  Only 2.5 hours so far this shift.

## 2022-02-14 NOTE — PLAN OF CARE
Spoke with pt this afternoon. Pt asking if there has been any movement on the [facility] list. Let pt know this writer would call and get an update.

## 2022-02-14 NOTE — PLAN OF CARE
Problem: Behavioral Health Plan of Care  Goal: Patient-Specific Goal (Individualization)  Description: Patient will be free from self harm or injury  Patient will eat at least 50% of meals  Patient will attend one group every day.    Monitor food intake.  Address and document any self talk or yelling in room  Outcome: Improving  Note: Shift Summery:      Pt sleeping at the start of the shift. Pt denied all symptoms of pain, anxiety, depression, SI, HI and hallucinations. Pt stayed in bed this morning stating that he wanted to nap but will go to a group at least once today. Pt walked in the halls in the afternoon, pt talked to nursing while walking. Pt does state that he has lower back pain but that it is getting better. Pt states other symptoms are manageable.      Problem: Behavior Regulation Impairment (Psychotic Signs/Symptoms)  Goal: Improved Behavioral Control (Psychotic Signs/Symptoms)  Description: Patient will be able to have a reality based conversation.     Outcome: Improving     Problem: Violence Risk or Actual  Goal: Anger and Impulse Control  Description: Pt will be able to control his anger during stay  Pt will ask for PRN medications as needed to control his impulses    Outcome: Improving     Face to face end of shift report communicated to evening shift RN. Reported that pt is a risk for violence.     Sarahi Hui, RN  2/14/2022  7:35 AM

## 2022-02-14 NOTE — PROGRESS NOTES
[facility] Psychiatric Progress Note     Assessment     Mr. Carter is a 31 year old male with a PMH of schizoaffective disorder, depressive type, catatonia, severe methamphetamine use disorder, alcohol use disorder, and significant TBI at the age of 3 who presented with depression with catatonia being off of Ativan after recently being discharged. This is the patient's 3rd hospitalization in the past roughly two months with substance use complicating his course.     The patient's catatonia and depression have been challenging to treat. His depression improved a little with increasing Wellbutrin and possibly from scheduling Zyprexa. His catatonia has improved some with Ativan, Amantadine, and recently Namenda, although he continues to have ambitendency and withdrawal at times. His Ativan was tapered down due to side effects (sedation) and also in preparation to potentially discharge given his history of misuse of substances. The amount had to be increased to 1.5 mg TID given that catatonia emerged more with the patient refusing his medications without reason and then taking (further sign of ambitendency in decision making). He has irrational belief that stopping his medications will lead to discharge. When he refuses medications his withdrawal worsens leading to poor intake with I/O monitoring when needed. Utilizing behavioral approaches to help with medication adherence. Offered ECT as an option to address his depression and catatonia with him pursuing. Monitoring to see if Patterson-Hagen should be pursued.    Patient has SPMI with him having deficits in his occupational and social functioning. He has had significant CNS insults with him having multiple TBIs, significant substance abuse over years, and near fatal cardiac arrest from overdose (? Anoxic injury) that have likely led to neuropsychiatric impact making it more challenging for him to recover. His  and  note he has not been  the same since his near fatal overdose. Patient did score a 4.4/5.8 on the ACL, which indicates the patient would need some level of support and could not be alone the whole day safely, needing assistance with such stuff as solving problems, removing any safety hazards. The patient is not safe to discharge to live on his own with him likely benefiting the most from supportive housing eventually like a group home with possibly even a legal guardian to manage financial and health concerns.    Today: Patient's depression appears to be worsening with removal of Zyprexa. He is not interested in another neuroleptic and is not displaying any signs of psychosis. Will increase Wellbutrin to address, being aware of increased risk of seizure. Will continue to advise re-initiation of a neuroleptic. Patient did have some improvement with Wellbutrin increase prior, hence this decision. He has been eating and drinking lately.    Educated regarding medication indications, risks, benefits, side effects, contraindications and possible interactions. Verbally expressed understanding.      Diagnoses     1. Schizoaffective disorder, depressive type, multiple episodes, currently in acute episode, with catatonia   2. 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 Methamphetamine use disorder, severe  4. Alcohol use disorder, moderate     Plan     Unit 5  Legal Status: Committed     Safety Assessment:    Behavioral Orders   Procedures     Code 1 - Restrict to Unit     Routine Programming     As clinically indicated     Status 15     Every 15 minutes.      Medications:     Outpatient medications continued/changed:     Wellbutrin  mg daily -> 300 mg on 12/24 -> 450 mg on 2/14  Lithium 900 mg at bedtime  Lyrica 100 mg TID (initially held but resumed due to benefit for anxiety and pain)  Vitamin D 50,000 international unit(s) weekly    New medications initiated:     Ativan 2 mg QID -> 1.5 mg QID on 1/5 ->  1/2/2 mg on 1/12 -> 1/1/2 mg on 1/15 -> 1 mg TID on 1/17 -> 1.5 mg TID on 1/26  Namenda 5 mg daily -> 5 mg BID on 1/25 -> 10 mg BID on 1/31  Standard unit PRNs    New medications stopped    Exelon 3 mg BID off label for cognition from TBI due to concerns for agitation with plans to possibly switch to Namenda   Amantadine 100 mg BID on 2/9 due to limited efficacy for his catatonia   Zyprexa, prn upon admission, scheduled 7.5 up to 12.5 mg at bedtime due to patient preference    Programming: Patient will be treated in a therapeutic milieu with appropriate individual and group therapies. Education will be provided on diagnoses, medications, and treatments.     Medical diagnoses:      #. Severe malnutrition  - Nutrition following   - Gaining weight   - Ensure on meal trays   - Monitoring, intermittent IOs    #. Intention tremor bilaterally   - Secondary to lithium  - Monitor  - Conservative management    #. Chronic low back pain  #. Muscle spasms   - Robaxin 1,000 mg TID prn. Stopped on 2/2  - Sulindac 150 mg BID for temporary use  - Lidocaine patches prn  - Icy-hot prn   - APAP prn, increased to 1,000 mg   - Recommend referral to PT and PMR on outpatient basis     #. Multiple TBIs  - MRI findings consistent with encephalomalacia in left temporal-occipital region and left anterior frontal lobe.  - Recommend neuropsychological testing as outpatient and possible TBI focused IRTS/[facility] facility.  - OT to further evaluate    Consult: Nutrition  Labs: Li 0.8 on 1/12  Imaging: MRI recently    Anticipated LOS: 2- 6 weeks  Dispo:[facility]     Interim History     The patient notes that he feels a little worse today.  He describes his mood being 4 out of 10 whereas before it was 6 out of 10.  He notes that he thinks his depression may be worse.  Discussed with the patient how stopping Zyprexa has likely led to this.  Patient is not interested in going going back on Zyprexa or another neuroleptic.  Recommended he do this in the future  "given the possible benefit.    Patient is interested in increasing his Wellbutrin to address his depression.  Patient consents have discussing the risk of increased possibility of a seizure, along with GI effects and off chance that it could worsen his psychosis.    Patient denies any physical concerns today.  He plans to go to 1-2 groups.  He plans to take a shower.     Medications       buPROPion  300 mg Oral Daily     lithium ER  900 mg Oral At Bedtime     LORazepam  1.5 mg Oral TID     memantine  10 mg Oral BID     pregabalin  100 mg Oral TID     sulindac  150 mg Oral BID     vitamin D2  50,000 Units Oral Q7 Days        Allergies     Allergies   Allergen Reactions     Pork Allergy         Psychiatric Examination     /81 (BP Location: Right arm)   Pulse 80   Temp 97.5  F (36.4  C) (Temporal)   Resp 16   Ht 1.829 m (6')   Wt 97.3 kg (214 lb 6.4 oz)   SpO2 99%   BMI 29.08 kg/m    Weight is 214 lbs 6.4 oz  Body mass index is 29.08 kg/m .    Appearance: Alert, oriented, dressed in hospital scrubs, long beard  Attitude: Cooperative   Eye Contact: Fair  Mood: \"Aight\"  Affect: Flat, mood congruent  Speech: Regular rate, soft tone. Normal rhythm   Psychomotor Behavior: No tremor, rigidity, akathisia, or psychomotor retardation. Ambitendency and immobility at times  Thought Process: Linear, concrete, irrational at times   Associations: No loose associations   Thought Content: Denies SI. No SIB. AH and paranoia at times, improved, none recently. Poverty of thought.  Insight: Limited  Judgment: Limited  Oriented to: Person, place, and time  Attention Span and Concentration: Intact  Recent and Remote Memory: Intact  Language: English with appropriate syntax and vocabulary  Fund of Knowledge: Average  Muscle Strength and Tone: Grossly normal  Gait and Station: Grossly normal    P[place] Catatonia Rating Scale   Severity Score (Number of points for items 1 -23) _______1___   Screening Score (Presence or " absence of items 1 - 14) ______1_____   Time: 11:00 AM on 1/11/2022   Rater: Dr. Shanelle Schuler Catatonia Rating Scale   Severity Score (Number of points for items 1 -23) ____4______   Screening Score (Presence or absence of items 1 - 14) ___1________   Time: 1145AM on 1/18  Rater: Dr. Shanelle Schuler Catatonia Rating Scale   Severity Score (Number of points for items 1 -23) ____7______   Screening Score (Presence or absence of items 1 - 14) ____4_______   Time: 500 PM on 1/26  Rater: Dr. Shanelle Schuler Catatonia Rating Scale   Severity Score (Number of points for items 1 -23) ___5_______   Screening Score (Presence or absence of items 1 - 14) ____1_______   Time: 121 PM on 2/10  Rater: Dr. Timmons     Labs     No results found for this or any previous visit (from the past 24 hour(s)).     Attestation      Patient has been seen and evaluated by:    David Timmons DO, MA  Psychiatrist    VIDEO VISIT    Patient has given verbal consent for video visit?: Yes     Video- Visit Details  Type of service:  video visit for mental health treatment.  Time of service:    Date:  02/14/2022    Video Start Time: 1100 AM      Video End Time: 1115 AM    Reason for video visit: COVID-19 [place]  Originating Site (patient location):  [facility]  Distant Site (provider location):  Remote location  Mode of Communication:  Video Conference via PowerPlan

## 2022-02-14 NOTE — PLAN OF CARE
Problem: Behavioral Health Plan of Care  Goal: Patient-Specific Goal (Individualization)  Description: Patient will be free from self harm or injury  Patient will eat at least 50% of meals  Patient will attend one group every day.  Monitor food intake.  Address and document any self talk or yelling in room  Outcome: Improving  Note: Pt had a flat affect. He spent the majority of the shift out of his room pacing in the hallway and sitting in the lounge. At times, pt was cursing under his breath as he paced in the hallway. He expressed frustration about length of hospitalization and would like to know if he is open to all [facility] throughout the state and where he is on the wait list. Pt refused supper tonight, but he did eat HS snack. He was compliant with his scheduled medications.     Face to face end of shift report communicated to oncoming RN.      Problem: Violence Risk or Actual  Goal: Anger and Impulse Control  Description: Pt will be able to control his anger during stay  Pt will ask for PRN medications as needed to control his impulses  Outcome: Improving  Note: Pt had controlled behavior. He did not receive any PRN medications.

## 2022-02-15 PROCEDURE — 250N000013 HC RX MED GY IP 250 OP 250 PS 637: Performed by: NURSE PRACTITIONER

## 2022-02-15 PROCEDURE — 124N000004

## 2022-02-15 PROCEDURE — 99232 SBSQ HOSP IP/OBS MODERATE 35: CPT | Mod: 95 | Performed by: STUDENT IN AN ORGANIZED HEALTH CARE EDUCATION/TRAINING PROGRAM

## 2022-02-15 PROCEDURE — 250N000013 HC RX MED GY IP 250 OP 250 PS 637: Performed by: STUDENT IN AN ORGANIZED HEALTH CARE EDUCATION/TRAINING PROGRAM

## 2022-02-15 RX ADMIN — MEMANTINE 10 MG: 5 TABLET ORAL at 20:42

## 2022-02-15 RX ADMIN — PREGABALIN 100 MG: 75 CAPSULE ORAL at 20:42

## 2022-02-15 RX ADMIN — SULINDAC 150 MG: 150 TABLET ORAL at 20:42

## 2022-02-15 RX ADMIN — LORAZEPAM 1.5 MG: 1 TABLET ORAL at 20:42

## 2022-02-15 RX ADMIN — LITHIUM CARBONATE 900 MG: 450 TABLET, EXTENDED RELEASE ORAL at 20:42

## 2022-02-15 RX ADMIN — LORAZEPAM 1.5 MG: 1 TABLET ORAL at 14:07

## 2022-02-15 RX ADMIN — PREGABALIN 100 MG: 75 CAPSULE ORAL at 14:08

## 2022-02-15 ASSESSMENT — ACTIVITIES OF DAILY LIVING (ADL)
ADLS_ACUITY_SCORE: 7
DRESS: INDEPENDENT
ADLS_ACUITY_SCORE: 7
ORAL_HYGIENE: INDEPENDENT
ADLS_ACUITY_SCORE: 7
HYGIENE/GROOMING: INDEPENDENT
ADLS_ACUITY_SCORE: 7

## 2022-02-15 NOTE — PROGRESS NOTES
"CLINICAL NUTRITION SERVICES  -  REASSESSMENT NOTE    Steven Carter     31 yom admitted for catatonia. Pt has a hx of methamphetamine use, alcohol use disorder, schizoaffective disorder, TBI at age of 3. Intake continues to be variable, consuming 0-100% of meals. Weight is back down 2lbs since last review. Still 2lbs up from admit weight.     Diet Order: Regular  Intake: 20 meals with 0-100% intake    Height: 6' 0\"  Weight: 214 lbs 6.4 oz  Body mass index is 29.08 kg/m .  Weight Status:  Overweight BMI 25-29.9  Weight History:  Max IBW- 83.9kg , admit weight 96.4kg (212 lb 9.6 oz)  Wt Readings from Last 10 Encounters:   12/16/21 96.4 kg (212 lb 9.6 oz)   12/14/21 97.7 kg (215 lb 4.8 oz)   12/01/21 97.8 kg (215 lb 11.2 oz)   10/31/21 105.1 kg (231 lb 11.2 oz)   03/27/21 105.2 kg (232 lb)   11/30/15 96.6 kg (213 lb)   08/18/14 94.5 kg (208 lb 6.4 oz)   08/15/14 93.4 kg (206 lb)   07/19/14 102.1 kg (225 lb)      83.9kg- max ibw  Estimated Energy Needs: 0203-0097 kcals (25-30 Kcal/Kg)   Estimated Protein Needs:  grams protein (1-1.2 g pro/Kg)     Malnutrition Diagnosis: severe malnutrition - improving. Weight gain     NUTRITION RECOMMENDATIONS  - Continue to encourage intake with meals, snacks, supplements  - Monitor weight      MONITORING AND EVALUATION:  RD will monitor intake, weight, labs            "

## 2022-02-15 NOTE — PLAN OF CARE
Problem: Behavioral Health Plan of Care  Goal: Patient-Specific Goal (Individualization)  Description: Patient will be free from self harm or injury  Patient will eat at least 50% of meals  Patient will attend one group every day.    Monitor food intake.  Address and document any self talk or yelling in room  Outcome: Declining  Note: Report received from Almita. Rounding complete. Pt observed pacing the halls. Pt declined any offered interventions to help him sleep. He appeared to be responding to internal stimuli at times as he was seen mumbling to himself and making odd faces as if in disagreement with something he was hearing. He did request and get 4 packs of saltine crackers. He denies any needs or complaints at this time.      Problem: Behavior Regulation Impairment (Psychotic Signs/Symptoms)  Goal: Improved Behavioral Control (Psychotic Signs/Symptoms)  Description: Patient will be able to have a reality based conversation.     Outcome: Declining  Note: Pt does appear to be responding to internal stimuli although this is difficult to confirm at this time. See above notation. He has remained for the most part quiet while pacing the unit and is only heard mumbling to himself at times.     Problem: Violence Risk or Actual  Goal: Anger and Impulse Control  Description: Pt will be able to control his anger during stay  Pt will ask for PRN medications as needed to control his impulses    Outcome: Declining  Note: Pt has been pleasant and cooperative with this writer this shift. He always says please and thank you as well.   15 minute and PRN checks all night. No complaints offered. Will continue to monitor.    Pt slept approx  0  hours this NOC shift and instead spent the majority of the night pacing. He continued to decline any offers for prn's or anything else.    Face to face end of shift report communicated to oncsalomón LARKIN.    Farida KIMBLE RN  February 15, 2022  1:47 AM   '

## 2022-02-15 NOTE — PLAN OF CARE
"Face to face shift report received from Farida SALVADOR RN. Rounding completed, pt observed pacing slowly in hallway.    Problem: Behavioral Health Plan of Care  Goal: Patient-Specific Goal (Individualization)  Description: Patient will be free from self harm or injury  Patient will eat at least 50% of meals  Patient will attend one group every day.    Monitor food intake.  Address and document any self talk or yelling in room  Outcome: No Change   Shift Summary: Patient was up walking slowly in hallway at the start of this shift.  Patient was observed giggling to self when nurse passed him in the hallway but he immediately ceased when nurse directly looked at him.  Patient politely declined his scheduled medications stating that he did not need them.  Encouraged patient strongly to take them but he continued to decline.  Denies that he is having any side effects and again states \"I just don't need them\".  Patient denies that pain is bad enough to take anything for it.  Patient has eaten some of his meals today.  Adequate amounts of fluids being observed.  Patient has laid down to rest briefly but is back up pacing slowly in hallway.  No deliberate interaction with peers observed. No aggression noted.    1400:  Patient resting in bed and appeared asleep.  Offered and declined his scheduled 1400 medications.  1410:  Patient came to nurses station and requested his 1400 meds.  Administered and cooperative with mouth check.    Problem: Behavior Regulation Impairment (Psychotic Signs/Symptoms)  Goal: Improved Behavioral Control (Psychotic Signs/Symptoms)  Description: Patient will be able to have a reality based conversation.     Outcome: No Change     Problem: Violence Risk or Actual  Goal: Anger and Impulse Control  Description: Pt will be able to control his anger during stay  Pt will ask for PRN medications as needed to control his impulses    Outcome: Improving   Face to face report will be communicated to oncoming " RN.    Alma Delia Garsia RN  2/15/2022

## 2022-02-15 NOTE — PLAN OF CARE
"Face to face end of shift report received from Shima ALEXANDRA RN. Rounding completed. Patient observed in hallway.     Patient has been calm, cooperative this shift. Pt has been withdrawn, isolates to himself for the whole shift. He has been walking the hallways for most of the shift. He was observed via cameras standing in place for an extended period of time. Staff emilee to see if he was okay and he stated \"I'm just standing here thinking\" and walked away. He denied any SI/HI, AH/VH, or any pain. He appears depressed. States that he doesn't understand why he needs to go to a [facility] and wishes that he could just leave here. Pt did not eat any of his dinner, but observed having water/lemonade with him while walking the halls. He did ask staff for a snack and also accepted a snack from the [facility]. Patient asked if he would like his HS meds and he said yes and that he would come to the med room window. Pt walked up to window then declined medications. Pt requested snack and went to bedroom. Attempted to pass medications again, but he declined.       Problem: Behavioral Health Plan of Care  Goal: Patient-Specific Goal (Individualization)  Description: Patient will be free from self harm or injury  Patient will eat at least 50% of meals  Patient will attend one group every day.    Monitor food intake.  Address and document any self talk or yelling in room  Outcome: Improving  Note:        Problem: Behavior Regulation Impairment (Psychotic Signs/Symptoms)  Goal: Improved Behavioral Control (Psychotic Signs/Symptoms)  Description: Patient will be able to have a reality based conversation.     Outcome: Improving     Problem: Violence Risk or Actual  Goal: Anger and Impulse Control  Description: Pt will be able to control his anger during stay  Pt will ask for PRN medications as needed to control his impulses    Outcome: Improving       Almita Aguilar RN  2/14/2022  6:32 PM    "

## 2022-02-15 NOTE — PLAN OF CARE
Opened mail with pt this morning, pt received a letter and picture from his mother.     Called [facility] this morning to get update on pt's status. [facility] states there are 63 priority patients and once they get placed then they start working on October commitments. Pt was committed in January.

## 2022-02-16 PROCEDURE — 250N000013 HC RX MED GY IP 250 OP 250 PS 637: Performed by: NURSE PRACTITIONER

## 2022-02-16 PROCEDURE — 999N000209 HC STATISTIC OT OUTPT VISIT

## 2022-02-16 PROCEDURE — 99232 SBSQ HOSP IP/OBS MODERATE 35: CPT | Mod: 95 | Performed by: STUDENT IN AN ORGANIZED HEALTH CARE EDUCATION/TRAINING PROGRAM

## 2022-02-16 PROCEDURE — 124N000004

## 2022-02-16 PROCEDURE — 250N000013 HC RX MED GY IP 250 OP 250 PS 637: Performed by: STUDENT IN AN ORGANIZED HEALTH CARE EDUCATION/TRAINING PROGRAM

## 2022-02-16 RX ADMIN — MEMANTINE 10 MG: 5 TABLET ORAL at 20:42

## 2022-02-16 RX ADMIN — LITHIUM CARBONATE 900 MG: 450 TABLET, EXTENDED RELEASE ORAL at 20:42

## 2022-02-16 RX ADMIN — LORAZEPAM 1.5 MG: 1 TABLET ORAL at 20:42

## 2022-02-16 RX ADMIN — PREGABALIN 100 MG: 75 CAPSULE ORAL at 20:42

## 2022-02-16 RX ADMIN — PREGABALIN 100 MG: 75 CAPSULE ORAL at 17:07

## 2022-02-16 RX ADMIN — SULINDAC 150 MG: 150 TABLET ORAL at 20:42

## 2022-02-16 RX ADMIN — LORAZEPAM 1.5 MG: 1 TABLET ORAL at 17:06

## 2022-02-16 ASSESSMENT — ACTIVITIES OF DAILY LIVING (ADL)
ADLS_ACUITY_SCORE: 7
HYGIENE/GROOMING: INDEPENDENT
ADLS_ACUITY_SCORE: 7
DRESS: INDEPENDENT
ADLS_ACUITY_SCORE: 7
ADLS_ACUITY_SCORE: 7
DRESS: INDEPENDENT
ADLS_ACUITY_SCORE: 7
ORAL_HYGIENE: INDEPENDENT
ADLS_ACUITY_SCORE: 7
ADLS_ACUITY_SCORE: 7
ORAL_HYGIENE: INDEPENDENT
ADLS_ACUITY_SCORE: 7
LAUNDRY: UNABLE TO COMPLETE
ADLS_ACUITY_SCORE: 7
ADLS_ACUITY_SCORE: 7
HYGIENE/GROOMING: INDEPENDENT
ADLS_ACUITY_SCORE: 7

## 2022-02-16 NOTE — PLAN OF CARE
"Face to face end of shift report received from Alma Delia ALCARAZ RN. Rounding completed and patient observed pacing in the pacheco. No requests at this time.     19:00 Update: Patient denied all symptoms but he was dismissive. He denied pain. He paced in the hallways. Patient came up to the nurses station and appeared to want something or starting to ask for something. He paused for awhile and staff asked him how to help him. He looked blank and then said \"no, nothing\" and walked away.     20:42 Update: Patient requested to take his HS meds. He denied pain at this time. He denied needing any PRNs. He took meds without complaint. He answered questions but did not offer any extra conversation. Affect is blunted and flat. He allowed mouth checks.     Face to face end of shift report communicated to oncsalomón RN.     Problem: Behavioral Health Plan of Care  Goal: Patient-Specific Goal (Individualization)  Description: Patient will be free from self harm or injury  Patient will eat at least 50% of meals  Patient will attend one group every day.    Monitor food intake.  Address and document any self talk or yelling in room  Outcome: No Change     Problem: Behavior Regulation Impairment (Psychotic Signs/Symptoms)  Goal: Improved Behavioral Control (Psychotic Signs/Symptoms)  Description: Patient will be able to have a reality based conversation.     Outcome: No Change     Problem: Violence Risk or Actual  Goal: Anger and Impulse Control  Description: Pt will be able to control his anger during stay  Pt will ask for PRN medications as needed to control his impulses    Outcome: Improving     "

## 2022-02-16 NOTE — PLAN OF CARE
Face to face shift report received from Farida SALVADOR RN. Rounding completed, pt observed.    Problem: Behavioral Health Plan of Care  Goal: Patient-Specific Goal (Individualization)  Description: Patient will be free from self harm or injury  Patient will eat at least 50% of meals  Patient will attend one group every day.    Monitor food intake.  Address and document any self talk or yelling in room  Outcome: Ongoing, Progressing   Shift Summary:   Patient was awake and lying on his bed at the start of this shift.  Patient states he did not feel rested as did not sleep well.  Patient denies pain and suicidal thoughts.  Refused his scheduled AM medications.  Encouraged patient to come find nurse if he changes his mind about taking them.  Eating some of meals.  He has not been observed with any self talk.  Gait is steady and balanced.  No aggression.  1433:  Patient refused 1400 scheduled medications.  Encouraged to take them and almost agreed but did then refuse.    Problem: Behavior Regulation Impairment (Psychotic Signs/Symptoms)  Goal: Improved Behavioral Control (Psychotic Signs/Symptoms)  Description: Patient will be able to have a reality based conversation.     Outcome: Ongoing, Progressing     Problem: Violence Risk or Actual  Goal: Anger and Impulse Control  Description: Pt will be able to control his anger during stay  Pt will ask for PRN medications as needed to control his impulses    Outcome: Ongoing, Progressing        Face to face report will be communicated to oncoming RN.    Alma Delia Garsia RN  2/16/2022

## 2022-02-16 NOTE — PROGRESS NOTES
Chart reviewed. Vitals stable. No complaints noted.     Pt medically stable, no acute medical concerns. Chronic medical problems stable. Will sign off. Please consult for any new medical issues or concerns.

## 2022-02-16 NOTE — PROGRESS NOTES
[facility] Psychiatric Progress Note     Assessment     Mr. Carter is a 31 year old male with a PMH of schizoaffective disorder, depressive type, catatonia, severe methamphetamine use disorder, alcohol use disorder, and significant TBI at the age of 3 who presented with depression with catatonia being off of Ativan after recently being discharged. This is the patient's 3rd hospitalization in the past roughly two months with substance use complicating his course.     The patient's catatonia and depression have been challenging to treat. His depression improved a little with increasing Wellbutrin and possibly from scheduling Zyprexa. His catatonia has improved some with Ativan, Amantadine, and recently Namenda, although he continues to have ambitendency and withdrawal at times. His Ativan was tapered down due to side effects (sedation) and also in preparation to potentially discharge given his history of misuse of substances. The amount had to be increased to 1.5 mg TID given that catatonia emerged more with the patient refusing his medications without reason and then taking (further sign of ambitendency in decision making). He has irrational belief that stopping his medications will lead to discharge. When he refuses medications his withdrawal worsens leading to poor intake with I/O monitoring when needed. Utilizing behavioral approaches to help with medication adherence. Offered ECT as an option to address his depression and catatonia with him pursuing. Monitoring to see if Patterson-Hagen should be pursued.    Patient has SPMI with him having deficits in his occupational and social functioning. He has had significant CNS insults with him having multiple TBIs, significant substance abuse over years, and near fatal cardiac arrest from overdose (? Anoxic injury) that have likely led to neuropsychiatric impact making it more challenging for him to recover. His  and  note he has not been  the same since his near fatal overdose. Patient did score a 4.4/5.8 on the ACL, which indicates the patient would need some level of support and could not be alone the whole day safely, needing assistance with such stuff as solving problems, removing any safety hazards. The patient is not safe to discharge to live on his own with him likely benefiting the most from supportive housing eventually like a group home with possibly even a legal guardian to manage financial and health concerns.    Today: Patient's depression appears to be worsening with removal of Zyprexa. He is not interested in another neuroleptic and is showing minimal signs of psychosis (talked to self recently briefly and yells at times, otherwise denies symptoms). Will increase Wellbutrin to address, being aware of increased risk of seizure. Will continue to advise re-initiation of a neuroleptic. Patient did have some improvement with Wellbutrin increase prior, hence this decision. He has been eating and drinking lately. Patient has been declining his medications again with martinez warning about decompensation. Hopefully, patient will understand and adhere. Switching disposition to home with services vs home with ACT team vs adult foster care given that [facility] wait list is so long and patient could be in hospital for many more months.    Educated regarding medication indications, risks, benefits, side effects, contraindications and possible interactions. Verbally expressed understanding.      Diagnoses     1. Schizoaffective disorder, depressive type, multiple episodes, currently in acute episode, with catatonia   2. 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 Methamphetamine use disorder, severe  4. Alcohol use disorder, moderate     Plan     Unit 5  Legal Status: Committed, considering Sarah     Safety Assessment:    Behavioral Orders   Procedures     Code 1 - Restrict to Unit     Routine Programming     As clinically  indicated     Status 15     Every 15 minutes.      Medications:     Outpatient medications continued/changed:     Wellbutrin  mg daily -> 300 mg on 12/24 -> 450 mg on 2/14  Lithium 900 mg at bedtime  Lyrica 100 mg TID (initially held but resumed due to benefit for anxiety and pain)  Vitamin D 50,000 international unit(s) weekly    New medications initiated:     Ativan 2 mg QID -> 1.5 mg QID on 1/5 -> 1/2/2 mg on 1/12 -> 1/1/2 mg on 1/15 -> 1 mg TID on 1/17 -> 1.5 mg TID on 1/26  Namenda 5 mg daily -> 5 mg BID on 1/25 -> 10 mg BID on 1/31  Standard unit PRNs    New medications stopped    Exelon 3 mg BID off label for cognition from TBI due to concerns for agitation with plans to possibly switch to Namenda   Amantadine 100 mg BID on 2/9 due to limited efficacy for his catatonia   Zyprexa, prn upon admission, scheduled 7.5 up to 12.5 mg at bedtime due to patient preference    Programming: Patient will be treated in a therapeutic milieu with appropriate individual and group therapies. Education will be provided on diagnoses, medications, and treatments.     Medical diagnoses:      #. Severe malnutrition  - Nutrition following   - Gaining weight   - Ensure on meal trays   - Monitoring, intermittent IOs    #. Intention tremor bilaterally   - Secondary to lithium  - Monitor  - Conservative management    #. Chronic low back pain  #. Muscle spasms   - Robaxin 1,000 mg TID prn. Stopped on 2/2  - Sulindac 150 mg BID for temporary use  - Lidocaine patches prn  - Icy-hot prn   - APAP prn, increased to 1,000 mg   - Recommend referral to PT and PMR on outpatient basis     #. Multiple TBIs  - MRI findings consistent with encephalomalacia in left temporal-occipital region and left anterior frontal lobe.  - Recommend neuropsychological testing as outpatient and possible TBI focused IRTS/[facility] facility.  - OT to further evaluate    Consult: Nutrition  Labs: Li 0.8 on 1/12  Imaging: MRI recently    Anticipated LOS: 2- 6  "weeks  Dispo: ACT, home with [facility] worker, versus adult foster care     Interim History     The patient notes that he feels similar.  Denies any improvement in symptoms.  Continues to think that medications do not help him.  He is willing to continue taking them.  Discussed with the patient his great risk of decompensation and longer hospitalization if he continues to intermittently refuse medications. Patient said he will take medications.    Patient notes that his back pain is better.  He notes that sulindac is helping.  He would like to continue taking this medication.  He is not taking as needed medication.    Patient denies any psychosis or anxiety.     Medications       buPROPion  450 mg Oral Daily     lithium ER  900 mg Oral At Bedtime     LORazepam  1.5 mg Oral TID     memantine  10 mg Oral BID     pregabalin  100 mg Oral TID     sulindac  150 mg Oral BID     vitamin D2  50,000 Units Oral Q7 Days        Allergies     Allergies   Allergen Reactions     Pork Allergy         Psychiatric Examination     /72   Pulse 84   Temp 97.6  F (36.4  C) (Temporal)   Resp 16   Ht 1.829 m (6')   Wt 97.3 kg (214 lb 6.4 oz)   SpO2 100%   BMI 29.08 kg/m    Weight is 214 lbs 6.4 oz  Body mass index is 29.08 kg/m .    Appearance: Alert, oriented, dressed in hospital scrubs, long beard  Attitude: Cooperative   Eye Contact: Fair  Mood: \"Fine\"  Affect: Flat, mood congruent  Speech: Regular rate, soft tone. Normal rhythm   Psychomotor Behavior: No tremor, rigidity, akathisia, or psychomotor retardation. Ambitendency and immobility at times  Thought Process: Linear, concrete, irrational at times   Associations: No loose associations   Thought Content: Denies SI. No SIB. AH and paranoia at times, improved, none recently. Poverty of thought.   Insight: Limited  Judgment: Limited  Oriented to: Person, place, and time  Attention Span and Concentration: Intact  Recent and Remote Memory: Intact  Language: English with " appropriate syntax and vocabulary  Fund of Knowledge: Low-Average  Muscle Strength and Tone: Grossly normal  Gait and Station: Grossly normal    [place] Catatonia Rating Scale   Severity Score (Number of points for items 1 -23) _______1___   Screening Score (Presence or absence of items 1 - 14) ______1_____   Time: 11:00 AM on 1/11/2022   Rater: Dr. Shanelle Schuler Catatonia Rating Scale   Severity Score (Number of points for items 1 -23) ____4______   Screening Score (Presence or absence of items 1 - 14) ___1________   Time: 1145AM on 1/18  Rater: Dr. Shanelle Schuler Catatonia Rating Scale   Severity Score (Number of points for items 1 -23) ____7______   Screening Score (Presence or absence of items 1 - 14) ____4_______   Time: 500 PM on 1/26  Rater: Dr. Shanelle Schuler Catatonia Rating Scale   Severity Score (Number of points for items 1 -23) ___5_______   Screening Score (Presence or absence of items 1 - 14) ____1_______   Time: 121 PM on 2/10  Rater: Dr. Timmons     Labs     No results found for this or any previous visit (from the past 24 hour(s)).       Attestation      Patient has been seen and evaluated by:    David Timmons DO, MA  Psychiatrist    VIDEO VISIT    Patient has given verbal consent for video visit?: Yes     Video- Visit Details  Type of service:  video visit for mental health treatment.  Time of service:    Date:  02/16/2022    Video Start Time: 1145 AM      Video End Time: 1200PM    Reason for video visit: COVID-19 [place]  Originating Site (patient location):  [facility]  Distant Site (provider location):  Remote location  Mode of Communication:  Video Conference via [employer]

## 2022-02-16 NOTE — PLAN OF CARE
Emailed Rajwinder leary) and requested a meeting regarding pt's discharge plan.

## 2022-02-16 NOTE — PLAN OF CARE
Occupational Therapy Discharge Summary    Reason for therapy discharge:    Patient has had multiple refusals to continue to evaluate functional cognition and OT to discharge at this time.     Progress towards therapy goal(s). See goals on Care Plan in Epic electronic health record for goal details.  Goals partially met.  Barriers to achieving goals:   limited tolerance for therapy. Patient did complete the following testing with OT but declined to complete any other formal testing:     Completed the ACL with a score of 4.4/5.8. ACLs level 4.4/5.8 indicates that the person may live with someone who does a daily check on the environment, removing any safety hazards and solving problems when minor changes in the home occur. May be alone for part of the day with a procedure for obtaining help by phone or from a neighbor. May have a daily allowance and go to familiar places in the neighborhood. 34% minimum cognitive assistance is required to setup new activities and clean up after routine activities.     MOCA version 7.1 completed with a score of 20/30 which indicates less than normal cognition. Visuospatial/ Executive Functionin/5  Trail making (alternating letters and numbers): 0/1  Shape copy: 0/1  Clock drawin/3  Naming: 3/3  Immediate Recall (not scored): na  Patient accurately verbalized 5/5 non-related words  Attention:  6  Number repetition: 2/2  Number reversal: 0/1  Sustained attention: Patient identifying 7 /11 targets with x5 false positives  Serial subtraction: Patient completed serial 7 subtractions 4/5 calculations  Language: 3/3  Sentence repetition: 2/2 when asked to repeat a sentence word-for-word  Divergent naming: Patient named 17 items beginning with the letter /s/ within one minute  Abstract Thinkin/2  Similarities between 2 items (abstract thinking): 0/2  Delayed Recall: 2/5  Patient recalled 2/5 words after approx 5 minute delay without cues.  Orientation:   Patient oriented to  month, year, day, place and city,but was not oriented to date.     TOTAL SCORE:   20/30  When comparing score from 2020 MOCA : improvement in attention, language, delayed recall, and orientation. Slight decrease in visuspatial/executive functioning.         Therapy recommendation(s):    No further therapy is recommended.  Patient could benefit from further testing whether it is with OT or neuropsych to pinpoint specific areas of deficit. This would allow for targeted supports and promotion of areas that patient does well. That being said it is unlikely that this would change the recommendation for supported living like a TBI home or Adult foster care etc. Patient would likely benefit the most from a small setting with targeted individual care planning.

## 2022-02-16 NOTE — PLAN OF CARE
Problem: Behavioral Health Plan of Care  Goal: Patient-Specific Goal (Individualization)  Description: Patient will be free from self harm or injury  Patient will eat at least 50% of meals  Patient will attend one group every day.    Monitor food intake.  Address and document any self talk or yelling in room  Outcome: Declining  Note: Report received from Felix. Rounding complete. Pt observed pacing the halls and sitting in the lounge for the majority of this shift. He declined PRN melatonin for sleep and has been pleasant and polite this shift but does have a look of frustration on his face. Pt has continuously denied any needs or complaints tonight.     Pt declined any food but did snack on saltines and drink water off and on this shift.     He has not been seen or heard talking to self tonight although his affect is becoming more and more blunted and flat with periods of looking angry or frustrated. He does however remain polite and cooperative with this writer at all interactions.     Pt has been in bed with eyes closed and regular respirations off and on all NOC shift, but not asleep. 15 minute and PRN checks all night. No complaints offered. Will continue to monitor.    Pt slept approx  0  hours this NOC shift.    Face to face end of shift report communicated to oncoming RN.    Farida KIMBLE RN  February 16, 2022  4:27 AM          Problem: Behavior Regulation Impairment (Psychotic Signs/Symptoms)  Goal: Improved Behavioral Control (Psychotic Signs/Symptoms)  Description: Patient will be able to have a reality based conversation.     Outcome: Declining Pt is able to make his needs easily known     Problem: Violence Risk or Actual  Goal: Anger and Impulse Control  Description: Pt will be able to control his anger during stay  Pt will ask for PRN medications as needed to control his impulses    Outcome: Declining

## 2022-02-16 NOTE — PLAN OF CARE
Problem: Behavioral Health Plan of Care  Goal: Patient-Specific Goal (Individualization)  Description: Patient will be free from self harm or injury  Patient will eat at least 50% of meals  Patient will attend one group every day.    Monitor food intake.  Address and document any self talk or yelling in room  Outcome: Ongoing, Progressing  Note: Pt denies pain, anxiety, depression, SI/HI and AH/VH. Pt is walking halls. Pt is using appropriate behavior with staff and peers. Pt is withdrawn but answers all assessment questions.     Problem: Behavior Regulation Impairment (Psychotic Signs/Symptoms)  Goal: Improved Behavioral Control (Psychotic Signs/Symptoms)  Description: Patient will be able to have a reality based conversation.     Outcome: Ongoing, Progressing     Problem: Violence Risk or Actual  Goal: Anger and Impulse Control  Description: Pt will be able to control his anger during stay  Pt will ask for PRN medications as needed to control his impulses    Outcome: Ongoing, Progressing    Face to face report will be communicated to oncoming RN.    Danica Hawkins RN  2/16/2022

## 2022-02-17 PROCEDURE — 250N000013 HC RX MED GY IP 250 OP 250 PS 637: Performed by: NURSE PRACTITIONER

## 2022-02-17 PROCEDURE — 99232 SBSQ HOSP IP/OBS MODERATE 35: CPT | Mod: 95 | Performed by: STUDENT IN AN ORGANIZED HEALTH CARE EDUCATION/TRAINING PROGRAM

## 2022-02-17 PROCEDURE — 250N000013 HC RX MED GY IP 250 OP 250 PS 637: Performed by: STUDENT IN AN ORGANIZED HEALTH CARE EDUCATION/TRAINING PROGRAM

## 2022-02-17 PROCEDURE — 124N000004

## 2022-02-17 RX ADMIN — MEMANTINE 10 MG: 5 TABLET ORAL at 12:11

## 2022-02-17 RX ADMIN — MEMANTINE 10 MG: 5 TABLET ORAL at 20:47

## 2022-02-17 RX ADMIN — PREGABALIN 100 MG: 75 CAPSULE ORAL at 17:00

## 2022-02-17 RX ADMIN — ERGOCALCIFEROL 50000 UNITS: 1.25 CAPSULE, LIQUID FILLED ORAL at 12:10

## 2022-02-17 RX ADMIN — SULINDAC 150 MG: 150 TABLET ORAL at 20:47

## 2022-02-17 RX ADMIN — PREGABALIN 100 MG: 75 CAPSULE ORAL at 20:47

## 2022-02-17 RX ADMIN — LORAZEPAM 1.5 MG: 1 TABLET ORAL at 20:47

## 2022-02-17 RX ADMIN — SULINDAC 150 MG: 150 TABLET ORAL at 12:11

## 2022-02-17 RX ADMIN — LORAZEPAM 1.5 MG: 1 TABLET ORAL at 12:11

## 2022-02-17 RX ADMIN — PREGABALIN 100 MG: 75 CAPSULE ORAL at 12:12

## 2022-02-17 RX ADMIN — BUPROPION HYDROCHLORIDE 450 MG: 300 TABLET, EXTENDED RELEASE ORAL at 12:10

## 2022-02-17 RX ADMIN — LITHIUM CARBONATE 900 MG: 450 TABLET, EXTENDED RELEASE ORAL at 20:48

## 2022-02-17 RX ADMIN — LORAZEPAM 1.5 MG: 1 TABLET ORAL at 16:59

## 2022-02-17 ASSESSMENT — ACTIVITIES OF DAILY LIVING (ADL)
DRESS: INDEPENDENT
LAUNDRY: UNABLE TO COMPLETE
ADLS_ACUITY_SCORE: 7
ADLS_ACUITY_SCORE: 7
HYGIENE/GROOMING: INDEPENDENT
ADLS_ACUITY_SCORE: 7
ORAL_HYGIENE: INDEPENDENT
ADLS_ACUITY_SCORE: 7

## 2022-02-17 NOTE — PLAN OF CARE
Had a meeting with Rajwinder TAPIA), Supervisor, and this writer regarding pt and possible discharge plans. When pt first arrived they were stabilizing and the plan was an IRTS. Pt was not able to complete IRTS intake screening x3. Pt was also placed on [facility] list. Due to recent findings of pt's placement on the list, we had a meeting discussing other options. Rajwinder will be calling [facility] to start a CADI waiver for pt. The plan will be to look into a TBI placement or possible [facility].

## 2022-02-17 NOTE — PLAN OF CARE
"Face to face shift report received from Farida SALVADOR RN.       Problem: Behavioral Health Plan of Care  Goal: Patient-Specific Goal (Individualization)  Description: Patient will be free from self harm or injury  Patient will eat at least 50% of meals  Patient will attend one group every day.    Monitor food intake.  Address and document any self talk or yelling in room  Outcome: Ongoing, Not Progressing   Flat affect. Declined all AM medications, stating \"I'm not taking those today. I'll be fine.\" Denies mental health issues, however appears preoccupied during conversation. Flat affect. Denies pain.  1215: Pt up to nurse's station requesting AM medications, given at this time. 1400 scheduled Lyrica and Ativan held due to closeness of administrations.     Problem: Behavior Regulation Impairment (Psychotic Signs/Symptoms)  Goal: Improved Behavioral Control (Psychotic Signs/Symptoms)  Description: Patient will be able to have a reality based conversation.     Outcome: Ongoing, Progressing       Problem: Violence Risk or Actual  Goal: Anger and Impulse Control  Description: Pt will be able to control his anger during stay  Pt will ask for PRN medications as needed to control his impulses    Outcome: Ongoing, Progressing   Free from violent or threatening behaviors this shift.       Face to face end of shift report to be communicated to oncoming RN.       "

## 2022-02-17 NOTE — PLAN OF CARE
Problem: Behavioral Health Plan of Care  Goal: Patient-Specific Goal (Individualization)  Description: Patient will be free from self harm or injury  Patient will eat at least 50% of meals  Patient will attend one group every day.    Monitor food intake.  Address and document any self talk or yelling in room  Outcome: Ongoing, Progressing  Note:   Pt denies pain, anxiety, depression, SI/HI and AH/VH. Pt is walking halls. Pt is using appropriate behavior with staff and peers. Pt is withdrawn but answers all assessment questions.      Problem: Behavior Regulation Impairment (Psychotic Signs/Symptoms)  Goal: Improved Behavioral Control (Psychotic Signs/Symptoms)  Description: Patient will be able to have a reality based conversation.     Outcome: Ongoing, Progressing     Problem: Violence Risk or Actual  Goal: Anger and Impulse Control  Description: Pt will be able to control his anger during stay  Pt will ask for PRN medications as needed to control his impulses    Outcome: Ongoing, Progressing    Face to face report will be communicated to oncoming RN.    Danica Hawkins RN  2/17/2022

## 2022-02-17 NOTE — PROGRESS NOTES
[facility] Psychiatric Progress Note     Assessment     Mr. Carter is a 31 year old male with a PMH of schizoaffective disorder, depressive type, catatonia, severe methamphetamine use disorder, alcohol use disorder, and significant TBI at the age of 3 who presented with depression with catatonia being off of Ativan after recently being discharged. This is the patient's 3rd hospitalization in the past roughly two months with substance use complicating his course.     The patient's catatonia and depression have been challenging to treat. His depression improved a little with increasing Wellbutrin and possibly from scheduling Zyprexa. His catatonia has improved some with Ativan, Amantadine, and recently Namenda, although he continues to have ambitendency and withdrawal at times. His Ativan was tapered down due to side effects (sedation) and also in preparation to potentially discharge given his history of misuse of substances. The amount had to be increased to 1.5 mg TID given that catatonia emerged more with the patient refusing his medications without reason and then taking (further sign of ambitendency in decision making). He has irrational belief that stopping his medications will lead to discharge. When he refuses medications his withdrawal worsens leading to poor intake with I/O monitoring when needed. Utilizing behavioral approaches to help with medication adherence. Offered ECT as an option to address his depression and catatonia with him pursuing. Monitoring to see if Patterson-Hagen should be pursued.    Patient has SPMI with him having deficits in his occupational and social functioning. He has had significant CNS insults with him having multiple TBIs, significant substance abuse over years, and near fatal cardiac arrest from overdose (? Anoxic injury) that have likely led to neuropsychiatric impact making it more challenging for him to recover. His  and  note he has not been  the same since his near fatal overdose. Patient did score a 4.4/5.8 on the ACL, which indicates the patient would need some level of support and could not be alone the whole day safely, needing assistance with such stuff as solving problems, removing any safety hazards. The patient is not safe to discharge to live on his own with him likely benefiting the most from supportive housing eventually like a group home with possibly even a legal guardian to manage financial and health concerns.    Today: Patient's depression appears to be worsening with removal of Zyprexa. He is not interested in another neuroleptic and is showing minimal signs of psychosis (talked to self recently briefly and yells at times, otherwise denies symptoms). Will increase Wellbutrin to address, being aware of increased risk of seizure. Will continue to advise re-initiation of a neuroleptic. Patient did have some improvement with Wellbutrin increase prior, hence this decision. He has been eating and drinking lately. Patient has been declining his medications again with martinez warning about decompensation. Hopefully, patient will understand and adhere. Switching disposition to home with services vs home with ACT team vs adult foster care given that [facility] wait list is so long and patient could be in hospital for many more months.    Educated regarding medication indications, risks, benefits, side effects, contraindications and possible interactions. Verbally expressed understanding.      Diagnoses     1. Schizoaffective disorder, depressive type, multiple episodes, currently in acute episode, with catatonia   2. 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 Methamphetamine use disorder, severe  4. Alcohol use disorder, moderate     Plan     Unit 5  Legal Status: Committed, considering Sarah     Safety Assessment:    Behavioral Orders   Procedures     Code 1 - Restrict to Unit     Routine Programming     As clinically  indicated     Status 15     Every 15 minutes.      Medications:     Outpatient medications continued/changed:     Wellbutrin  mg daily -> 300 mg on 12/24 -> 450 mg on 2/14  Lithium 900 mg at bedtime  Lyrica 100 mg TID (initially held but resumed due to benefit for anxiety and pain)  Vitamin D 50,000 international unit(s) weekly    New medications initiated:     Ativan 2 mg QID -> 1.5 mg QID on 1/5 -> 1/2/2 mg on 1/12 -> 1/1/2 mg on 1/15 -> 1 mg TID on 1/17 -> 1.5 mg TID on 1/26  Namenda 5 mg daily -> 5 mg BID on 1/25 -> 10 mg BID on 1/31  Standard unit PRNs    New medications stopped    Exelon 3 mg BID off label for cognition from TBI due to concerns for agitation with plans to possibly switch to Namenda   Amantadine 100 mg BID on 2/9 due to limited efficacy for his catatonia   Zyprexa, prn upon admission, scheduled 7.5 up to 12.5 mg at bedtime due to patient preference    Programming: Patient will be treated in a therapeutic milieu with appropriate individual and group therapies. Education will be provided on diagnoses, medications, and treatments.     Medical diagnoses:      #. Severe malnutrition  - Nutrition following   - Gaining weight   - Ensure on meal trays   - Monitoring, intermittent IOs    #. Intention tremor bilaterally   - Secondary to lithium  - Monitor  - Conservative management    #. Chronic low back pain, iomproved  #. Muscle spasms, improved  - Robaxin 1,000 mg TID prn. Stopped on 2/2  - Sulindac 150 mg BID for temporary use  - Lidocaine patches prn  - Icy-hot prn   - APAP prn, increased to 1,000 mg   - Recommend referral to PT and PMR on outpatient basis     #. Multiple TBIs  - MRI findings consistent with encephalomalacia in left temporal-occipital region and left anterior frontal lobe.  - Recommend neuropsychological testing as outpatient and possible TBI focused IRTS/[facility] facility.  - OT to further evaluate    Consult: Nutrition  Labs: Li 0.8 on 1/12  Imaging: MRI recently    Anticipated  "LOS: 2- 6 weeks  Dispo: ACT, home with [employer] worker, versus adult foster care     Interim History     The patient notes that he feels the same today.  He denies any worsening of depression.  Does not report any psychotic symptoms.  He does not seem to understand that not taking his medications could lead to decompensation.  Discussed with the patient the importance of taking his medication especially given the severity of his disease course.  Discussed how this could delay his discharge planning.  Discussed how Sarah is being considered.  Patient is in agreement with taking medications at this point.  If patient continues to not take medications will pursue Sarah as an option.  Patient is aware.    Patient denies any physical concerns.  He notes his back pain is improved.  He notes he is sleeping fine.  He denies any chest pain, diarrhea, nausea, vomiting.     Medications       buPROPion  450 mg Oral Daily     lithium ER  900 mg Oral At Bedtime     LORazepam  1.5 mg Oral TID     memantine  10 mg Oral BID     pregabalin  100 mg Oral TID     sulindac  150 mg Oral BID     vitamin D2  50,000 Units Oral Q7 Days        Allergies     Allergies   Allergen Reactions     Pork Allergy         Psychiatric Examination     /73   Pulse 69   Temp 97.8  F (36.6  C) (Tympanic)   Resp 14   Ht 1.829 m (6')   Wt 97.3 kg (214 lb 6.4 oz)   SpO2 99%   BMI 29.08 kg/m    Weight is 214 lbs 6.4 oz  Body mass index is 29.08 kg/m .    Appearance: Alert, oriented, dressed in hospital scrubs, long beard  Attitude: Cooperative   Eye Contact: Fair  Mood: \"Ok\"  Affect: Flat, mood congruent  Speech: Regular rate, soft tone. Normal rhythm   Psychomotor Behavior: No tremor, rigidity, akathisia, or psychomotor retardation. Ambitendency and immobility at times  Thought Process: Linear, concrete, irrational at times   Associations: No loose associations   Thought Content: Denies SI. No SIB. AH and paranoia at times, improved, none recently. " Poverty of thought.   Insight: Limited  Judgment: Limited  Oriented to: Person, place, and time  Attention Span and Concentration: Intact  Recent and Remote Memory: Intact  Language: English with appropriate syntax and vocabulary  Fund of Knowledge: Low-Average  Muscle Strength and Tone: Grossly normal  Gait and Station: Grossly normal    [place] Catatonia Rating Scale   Severity Score (Number of points for items 1 -23) _______1___   Screening Score (Presence or absence of items 1 - 14) ______1_____   Time: 11:00 AM on 1/11/2022   Rater: Dr. Shanelle Schuler Catatonia Rating Scale   Severity Score (Number of points for items 1 -23) ____4______   Screening Score (Presence or absence of items 1 - 14) ___1________   Time: 1145AM on 1/18  Rater: Dr. Shanelle Schuler Catatonia Rating Scale   Severity Score (Number of points for items 1 -23) ____7______   Screening Score (Presence or absence of items 1 - 14) ____4_______   Time: 500 PM on 1/26  Rater: Dr. Shanelle Schuler Catatonia Rating Scale   Severity Score (Number of points for items 1 -23) ___5_______   Screening Score (Presence or absence of items 1 - 14) ____1_______   Time: 121 PM on 2/10  Rater: Dr. Timmons     Labs     No results found for this or any previous visit (from the past 24 hour(s)).       Attestation      Patient has been seen and evaluated by:    David Timmons DO, MA  Psychiatrist    VIDEO VISIT    Patient has given verbal consent for video visit?: Yes     Video- Visit Details  Type of service:  video visit for mental health treatment.  Time of service:    Date:  02/17/2022    Video Start Time: 1230PM      Video End Time: 1245PM    Reason for video visit: COVID-19 [place]  Originating Site (patient location):  [facility]  Distant Site (provider location):  Remote location  Mode of Communication:  Video Conference via [employer]

## 2022-02-18 PROCEDURE — 124N000004

## 2022-02-18 PROCEDURE — 250N000013 HC RX MED GY IP 250 OP 250 PS 637: Performed by: NURSE PRACTITIONER

## 2022-02-18 PROCEDURE — 250N000013 HC RX MED GY IP 250 OP 250 PS 637: Performed by: STUDENT IN AN ORGANIZED HEALTH CARE EDUCATION/TRAINING PROGRAM

## 2022-02-18 PROCEDURE — 99232 SBSQ HOSP IP/OBS MODERATE 35: CPT | Mod: 95 | Performed by: STUDENT IN AN ORGANIZED HEALTH CARE EDUCATION/TRAINING PROGRAM

## 2022-02-18 RX ADMIN — SULINDAC 150 MG: 150 TABLET ORAL at 20:40

## 2022-02-18 RX ADMIN — PREGABALIN 100 MG: 75 CAPSULE ORAL at 20:40

## 2022-02-18 RX ADMIN — MEMANTINE 10 MG: 5 TABLET ORAL at 08:05

## 2022-02-18 RX ADMIN — LORAZEPAM 1.5 MG: 1 TABLET ORAL at 20:40

## 2022-02-18 RX ADMIN — SULINDAC 150 MG: 150 TABLET ORAL at 08:05

## 2022-02-18 RX ADMIN — PREGABALIN 100 MG: 75 CAPSULE ORAL at 08:05

## 2022-02-18 RX ADMIN — BUPROPION HYDROCHLORIDE 450 MG: 300 TABLET, EXTENDED RELEASE ORAL at 08:05

## 2022-02-18 RX ADMIN — LORAZEPAM 1.5 MG: 1 TABLET ORAL at 13:15

## 2022-02-18 RX ADMIN — MEMANTINE 10 MG: 5 TABLET ORAL at 20:40

## 2022-02-18 RX ADMIN — LORAZEPAM 1.5 MG: 1 TABLET ORAL at 08:05

## 2022-02-18 RX ADMIN — PREGABALIN 100 MG: 75 CAPSULE ORAL at 13:15

## 2022-02-18 RX ADMIN — LITHIUM CARBONATE 900 MG: 450 TABLET, EXTENDED RELEASE ORAL at 20:40

## 2022-02-18 ASSESSMENT — ACTIVITIES OF DAILY LIVING (ADL)
ADLS_ACUITY_SCORE: 7

## 2022-02-18 NOTE — PLAN OF CARE
Problem: Behavioral Health Plan of Care  Goal: Patient-Specific Goal (Individualization)  Description: Patient will be free from self harm or injury  Patient will eat at least 50% of meals  Patient will attend one group every day.    Monitor food intake.  Address and document any self talk or yelling in room  Outcome: Ongoing, Progressing    Report received from Danica. Rounding complete. Pt observed sleeping in supine  position with regular and unlabored respirations.Pt has been in bed with eyes closed and regular respirations. 15 minute and PRN checks all night. No complaints offered. Will continue to monitor.    Pt slept approx  6.5  hours this NOC shift.    Problem: Behavior Regulation Impairment (Psychotic Signs/Symptoms)  Goal: Improved Behavioral Control (Psychotic Signs/Symptoms)  Description: Patient will be able to have a reality based conversation.     Outcome: Ongoing, Progressing     Problem: Violence Risk or Actual  Goal: Anger and Impulse Control  Description: Pt will be able to control his anger during stay  Pt will ask for PRN medications as needed to control his impulses    Unable to assess due to pt sleeping. No issues or concerns noted at this time.    Outcome: Ongoing, Progressing     Unable to assess due to pt sleeping. No issues or concerns noted at this time.          Face to face end of shift report communicated to oncsalomón LARKIN.    Farida KIMBLE RN  February 18, 2022  1:23 AM

## 2022-02-18 NOTE — PROGRESS NOTES
[facility] Psychiatric Progress Note     Assessment     Mr. Carter is a 31 year old male with a PMH of schizoaffective disorder, depressive type, catatonia, severe methamphetamine use disorder, alcohol use disorder, and significant TBI at the age of 3 who presented with depression with catatonia being off of Ativan after recently being discharged. This is the patient's 3rd hospitalization in the past roughly two months with substance use complicating his course.     The patient's catatonia and depression have been challenging to treat. His depression improved a little with increasing Wellbutrin and possibly from scheduling Zyprexa. His catatonia has improved some with Ativan, Amantadine, and recently Namenda, although he continues to have ambitendency and withdrawal at times. His Ativan was tapered down due to side effects (sedation) and also in preparation to potentially discharge given his history of misuse of substances. The amount had to be increased to 1.5 mg TID given that catatonia emerged more with the patient refusing his medications without reason and then taking (further sign of ambitendency in decision making). He has irrational belief that stopping his medications will lead to discharge. When he refuses medications his withdrawal worsens leading to poor intake with I/O monitoring when needed. Utilizing behavioral approaches to help with medication adherence. Offered ECT as an option to address his depression and catatonia with him pursuing. Monitoring to see if Patterson-Hagen should be pursued.    Patient has SPMI with him having deficits in his occupational and social functioning. He has had significant CNS insults with him having multiple TBIs, significant substance abuse over years, and near fatal cardiac arrest from overdose (? Anoxic injury) that have likely led to neuropsychiatric impact making it more challenging for him to recover. His  and  note he has not been  the same since his near fatal overdose. Patient did score a 4.4/5.8 on the ACL, which indicates the patient would need some level of support and could not be alone the whole day safely, needing assistance with such stuff as solving problems, removing any safety hazards. The patient is not safe to discharge to live on his own with him likely benefiting the most from supportive housing eventually like a group home with possibly even a legal guardian to manage financial and health concerns.    Today: Patient's depression appears to be worsening with removal of Zyprexa. He is not interested in another neuroleptic and is showing minimal signs of psychosis (talked to self recently briefly and yells at times, otherwise denies symptoms). Will increase Wellbutrin to address, being aware of increased risk of seizure. Will continue to advise re-initiation of a neuroleptic. Patient did have some improvement with Wellbutrin increase prior, hence this decision. He has been eating and drinking.    Patient made aware yesterday that a Sarah will be filled if patient continues to decline medications, given he decompensates. Patient agreed to take medications with this being monitored. Low threshold to file. Also, will consider Patterson Hagen if needed, given low likelihood of medications being effective with multiple trials failing to lead to any sustained improvement. It is unclear though if any medications or treatments will be able to significantly help given the nature of his brain damage, co-morbid conditions, and limited to no treatments for symptoms such as negative symptoms.    Switching disposition to home with services vs home with ACT team vs adult foster care given that [facility] wait list is so long and patient could be in hospital for many more months.    Educated regarding medication indications, risks, benefits, side effects, contraindications and possible interactions. Verbally expressed understanding.      Diagnoses      1. Schizoaffective disorder, depressive type, multiple episodes, currently in acute episode, with catatonia   2. 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 Methamphetamine use disorder, severe  4. Alcohol use disorder, moderate     Plan     Unit 5  Legal Status: Committed, considering Sraah     Safety Assessment:    Behavioral Orders   Procedures     Code 1 - Restrict to Unit     Routine Programming     As clinically indicated     Status 15     Every 15 minutes.      Medications:     Outpatient medications continued/changed:     Wellbutrin  mg daily -> 300 mg on 12/24 -> 450 mg on 2/14  Lithium 900 mg at bedtime  Lyrica 100 mg TID (initially held but resumed due to benefit for anxiety and pain)  Vitamin D 50,000 international unit(s) weekly    New medications initiated:     Ativan 2 mg QID -> 1.5 mg QID on 1/5 -> 1/2/2 mg on 1/12 -> 1/1/2 mg on 1/15 -> 1 mg TID on 1/17 -> 1.5 mg TID on 1/26  Namenda 5 mg daily -> 5 mg BID on 1/25 -> 10 mg BID on 1/31  Standard unit PRNs    New medications stopped    Exelon 3 mg BID off label for cognition from TBI due to concerns for agitation with plans to possibly switch to Namenda   Amantadine 100 mg BID on 2/9 due to limited efficacy for his catatonia   Zyprexa, prn upon admission, scheduled 7.5 up to 12.5 mg at bedtime due to patient preference    Programming: Patient will be treated in a therapeutic milieu with appropriate individual and group therapies. Education will be provided on diagnoses, medications, and treatments.     Medical diagnoses:      #. Severe malnutrition  - Nutrition following   - Gaining weight   - Ensure on meal trays   - Monitoring, intermittent IOs    #. Intention tremor bilaterally   - Secondary to lithium  - Monitor  - Conservative management    #. Chronic low back pain, iomproved  #. Muscle spasms, improved  - Robaxin 1,000 mg TID prn. Stopped on 2/2  - Sulindac 150 mg BID for temporary use  - Lidocaine  "patches prn  - Icy-hot prn   - APAP prn, increased to 1,000 mg   - Recommend referral to PT and PMR on outpatient basis     #. Multiple TBIs  - MRI findings consistent with encephalomalacia in left temporal-occipital region and left anterior frontal lobe.  - Recommend neuropsychological testing as outpatient and possible TBI focused IRTS/[facility] facility.  - OT to further evaluate    Consult: Nutrition  Labs: Li 0.8 on 1/12  Imaging: MRI recently    Anticipated LOS: 2- 6 weeks  Dispo: ACT, home with [employer] worker, versus adult foster care     Interim History     Patient denies any concerns today. Notes that he would still like help with trouble focusing, apathy, and negative symptoms. Informed patient will consider options.    Patient is willing to take his medications. He notes that he has stopped due to feeling like they are not helping. Trying to get patient stabilized on medications as best as able. He will continue taking. Discussed next option is a Sarah.    Patient denies any physical concerns. No dizziness or headache. Hopes to discharge soon. Discussed options.     Medications       buPROPion  450 mg Oral Daily     lithium ER  900 mg Oral At Bedtime     LORazepam  1.5 mg Oral TID     memantine  10 mg Oral BID     pregabalin  100 mg Oral TID     sulindac  150 mg Oral BID     vitamin D2  50,000 Units Oral Q7 Days        Allergies     Allergies   Allergen Reactions     Pork Allergy         Psychiatric Examination     /77 (BP Location: Right arm)   Pulse 72   Temp 97  F (36.1  C) (Temporal)   Resp 18   Ht 1.829 m (6')   Wt 97.3 kg (214 lb 6.4 oz)   SpO2 100%   BMI 29.08 kg/m    Weight is 214 lbs 6.4 oz  Body mass index is 29.08 kg/m .    Appearance: Alert, oriented, dressed in hospital scrubs, long beard  Attitude: Cooperative   Eye Contact: Fair  Mood: \"Fine\"  Affect: Flat, mood congruent  Speech: Regular rate, soft tone. Normal rhythm   Psychomotor Behavior: No tremor, rigidity, akathisia, or " psychomotor retardation. Ambitendency and immobility at times  Thought Process: Linear, concrete, irrational at times   Associations: No loose associations   Thought Content: Denies SI. No SIB. AH and paranoia at times, improved, none recently. Poverty of thought.   Insight: Limited  Judgment: Limited  Oriented to: Person, place, and time  Attention Span and Concentration: Intact  Recent and Remote Memory: Intact  Language: English with appropriate syntax and vocabulary  Fund of Knowledge: Low-Average  Muscle Strength and Tone: Grossly normal  Gait and Station: Grossly normal    [place] Catatonia Rating Scale   Severity Score (Number of points for items 1 -23) _______1___   Screening Score (Presence or absence of items 1 - 14) ______1_____   Time: 11:00 AM on 1/11/2022   Rater: Dr. Shanelle Schuler Catatonia Rating Scale   Severity Score (Number of points for items 1 -23) ____4______   Screening Score (Presence or absence of items 1 - 14) ___1________   Time: 1145AM on 1/18  Rater: Dr. Shanelle Schuler Catatonia Rating Scale   Severity Score (Number of points for items 1 -23) ____7______   Screening Score (Presence or absence of items 1 - 14) ____4_______   Time: 500 PM on 1/26  Rater: Dr. Shanelle Schuler Catatonia Rating Scale   Severity Score (Number of points for items 1 -23) ___5_______   Screening Score (Presence or absence of items 1 - 14) ____1_______   Time: 121 PM on 2/10  Rater: Dr. Timmons     Labs     No results found for this or any previous visit (from the past 24 hour(s)).       Attestation      Patient has been seen and evaluated by:    David Timmons DO, MA  Psychiatrist    VIDEO VISIT    Patient has given verbal consent for video visit?: Yes     Video- Visit Details  Type of service:  video visit for mental health treatment.  Time of service:    Date:  02/18/2022    Video Start Time: 1200PM      Video End Time: 1215PM    Reason for video visit: COVID-19 [place]  Originating Site (patient location):  [facility]  Distant Site (provider location):  Remote location  Mode of Communication:  Video Conference via [employer]

## 2022-02-18 NOTE — PLAN OF CARE
"Face to face shift report received from Farida SALVADOR RN.       Problem: Behavioral Health Plan of Care  Goal: Patient-Specific Goal (Individualization)  Description: Patient will be free from self harm or injury  Patient will eat at least 50% of meals  Patient will attend one group every day.    Monitor food intake.  Address and document any self talk or yelling in room  Outcome: Ongoing, Progressing  Note: Calm and cooperative. Medication compliant. When asked about mood pt reports \"I just feel flat.\" During administration of 1400 medications pt reports \"these don't even work, but I have to take them or that doctor will put a Sarah on me. That just gets me that Risperidone, I don't want to have to take that.\" Writer encouraged pt to remain medication compliant, pt stated \"yeah I will.\" Pacing in hallways frequently this afternoon. No reports of pain.        Problem: Violence Risk or Actual  Goal: Anger and Impulse Control  Description: Pt will be able to control his anger during stay  Pt will ask for PRN medications as needed to control his impulses    Outcome: Ongoing, Progressing       Problem: Behavior Regulation Impairment (Psychotic Signs/Symptoms)  Goal: Improved Behavioral Control (Psychotic Signs/Symptoms)  Description: Patient will be able to have a reality based conversation.     Outcome: Ongoing, Not Progressing         Face to face end of shift report to be communicated to oncoming RN.       "

## 2022-02-19 PROCEDURE — 250N000013 HC RX MED GY IP 250 OP 250 PS 637: Performed by: NURSE PRACTITIONER

## 2022-02-19 PROCEDURE — 124N000004

## 2022-02-19 PROCEDURE — 99232 SBSQ HOSP IP/OBS MODERATE 35: CPT | Performed by: NURSE PRACTITIONER

## 2022-02-19 PROCEDURE — 250N000013 HC RX MED GY IP 250 OP 250 PS 637: Performed by: STUDENT IN AN ORGANIZED HEALTH CARE EDUCATION/TRAINING PROGRAM

## 2022-02-19 RX ADMIN — LORAZEPAM 1.5 MG: 1 TABLET ORAL at 14:06

## 2022-02-19 RX ADMIN — PREGABALIN 100 MG: 75 CAPSULE ORAL at 20:50

## 2022-02-19 RX ADMIN — LITHIUM CARBONATE 900 MG: 450 TABLET, EXTENDED RELEASE ORAL at 20:49

## 2022-02-19 RX ADMIN — MEMANTINE 10 MG: 5 TABLET ORAL at 08:47

## 2022-02-19 RX ADMIN — SULINDAC 150 MG: 150 TABLET ORAL at 20:50

## 2022-02-19 RX ADMIN — PREGABALIN 100 MG: 75 CAPSULE ORAL at 14:06

## 2022-02-19 RX ADMIN — LORAZEPAM 1.5 MG: 1 TABLET ORAL at 20:50

## 2022-02-19 RX ADMIN — BUPROPION HYDROCHLORIDE 450 MG: 300 TABLET, EXTENDED RELEASE ORAL at 08:47

## 2022-02-19 RX ADMIN — PREGABALIN 100 MG: 75 CAPSULE ORAL at 08:47

## 2022-02-19 RX ADMIN — LORAZEPAM 1.5 MG: 1 TABLET ORAL at 08:47

## 2022-02-19 RX ADMIN — MEMANTINE 10 MG: 5 TABLET ORAL at 20:50

## 2022-02-19 RX ADMIN — SULINDAC 150 MG: 150 TABLET ORAL at 08:47

## 2022-02-19 RX ADMIN — DIPHENHYDRAMINE HCL 50 MG: 50 CAPSULE ORAL at 20:50

## 2022-02-19 ASSESSMENT — ACTIVITIES OF DAILY LIVING (ADL)
ADLS_ACUITY_SCORE: 7
ORAL_HYGIENE: INDEPENDENT
ADLS_ACUITY_SCORE: 7
HYGIENE/GROOMING: INDEPENDENT
ADLS_ACUITY_SCORE: 7
DRESS: INDEPENDENT
ADLS_ACUITY_SCORE: 7
LAUNDRY: UNABLE TO COMPLETE

## 2022-02-19 NOTE — PLAN OF CARE
"Face to face end of shift report received from Nevaeh GARCIA RN. Rounding completed and patient observed pacing in the halls. No request at this time.       Goal Outcome Evaluation:    21:00 Update: Patient has been pacing in the hallways almost the entire shift. At times he would stop and stand still in the hallway. He would stand without movement for 5-10 minutes. At one point, this writer saw him talking to himself and nodding as if he was motioning in conversation. He was dismissive. He would complain about something but then say \"it's fine. Nevermind.\" Patient was seen sitting on the floor in the pacheco for about an hour. He ate 100%. Patient took his meds without complaint. He denied pain.     Face to face end of shift report communicated to oncoming RN.    Plan of Care Reviewed With: patient       Problem: Violence Risk or Actual  Goal: Anger and Impulse Control  Description: Pt will be able to control his anger during stay  Pt will ask for PRN medications as needed to control his impulses    Outcome: Ongoing, Progressing     Problem: Behavioral Health Plan of Care  Goal: Patient-Specific Goal (Individualization)  Description: Patient will be free from self harm or injury  Patient will eat at least 50% of meals  Patient will attend one group every day.    Monitor food intake.  Address and document any self talk or yelling in room  Outcome: Ongoing, Not Progressing     Problem: Behavior Regulation Impairment (Psychotic Signs/Symptoms)  Goal: Improved Behavioral Control (Psychotic Signs/Symptoms)  Description: Patient will be able to have a reality based conversation.     Outcome: Ongoing, Not Progressing                "

## 2022-02-19 NOTE — PLAN OF CARE
Problem: Behavioral Health Plan of Care  Goal: Patient-Specific Goal (Individualization)  Description: Patient will be free from self harm or injury  Patient will eat at least 50% of meals  Patient will attend one group every day.    Monitor food intake.  Address and document any self talk or yelling in room  Outcome: Ongoing, Progressing  Note: Report received from Felix. Rounding complete. Pt observed sleeping in supine position with regular and unlabored respirations.    Pt has been in bed with eyes closed and regular respirations. 15 minute and PRN checks all night. No complaints offered. Will continue to monitor.    Pt slept approx  7.5  hours this NOC shift.    Face to face end of shift report communicated to oncoming RN.    Farida KIMBLE RN  February 19, 2022  4:37 AM          Problem: Behavior Regulation Impairment (Psychotic Signs/Symptoms)  Goal: Improved Behavioral Control (Psychotic Signs/Symptoms)  Description: Patient will be able to have a reality based conversation.     Outcome: Ongoing, Progressing  Note: Unable to assess due to pt sleeping. No issues or concerns noted at this time.       Problem: Violence Risk or Actual  Goal: Anger and Impulse Control  Description: Pt will be able to control his anger during stay  Pt will ask for PRN medications as needed to control his impulses    Outcome: Ongoing, Progressing  Note: Unable to assess due to pt sleeping. No issues or concerns noted at this time.

## 2022-02-19 NOTE — PLAN OF CARE
"Face to face end of shift report received from Farida SALVADOR RN. Rounding completed. Patient observed in bed, awake.     Patient has been calm, cooperative this shift. He has been withdrawn, isolative. He states that he is \"Not good\", but declined needing anything at this time. States he just wants to leave here and has been here too long. Denies any SI/HI or AH/VH. Does not appear to be responding. Denied pain. He has taken medications as prescribed. He has not attended any groups this shift. He showered this shift. Behaviors have been appropriate. Paced the hallways for a lot of the day. No concerns/requests at this time.       Problem: Behavioral Health Plan of Care  Goal: Patient-Specific Goal (Individualization)  Description: Patient will be free from self harm or injury  Patient will eat at least 50% of meals  Patient will attend one group every day.    Monitor food intake.  Address and document any self talk or yelling in room  Outcome: Ongoing, Progressing  Note:        Problem: Behavior Regulation Impairment (Psychotic Signs/Symptoms)  Goal: Improved Behavioral Control (Psychotic Signs/Symptoms)  Description: Patient will be able to have a reality based conversation.     Outcome: Ongoing, Progressing     Problem: Violence Risk or Actual  Goal: Anger and Impulse Control  Description: Pt will be able to control his anger during stay  Pt will ask for PRN medications as needed to control his impulses    Outcome: Ongoing, Progressing       Almita Aguilar RN  2/19/2022  12:55 PM    "

## 2022-02-20 PROCEDURE — 250N000013 HC RX MED GY IP 250 OP 250 PS 637: Performed by: STUDENT IN AN ORGANIZED HEALTH CARE EDUCATION/TRAINING PROGRAM

## 2022-02-20 PROCEDURE — 250N000013 HC RX MED GY IP 250 OP 250 PS 637: Performed by: NURSE PRACTITIONER

## 2022-02-20 PROCEDURE — 124N000004

## 2022-02-20 RX ADMIN — LORAZEPAM 1.5 MG: 1 TABLET ORAL at 13:28

## 2022-02-20 RX ADMIN — SULINDAC 150 MG: 150 TABLET ORAL at 20:30

## 2022-02-20 RX ADMIN — BUPROPION HYDROCHLORIDE 450 MG: 300 TABLET, EXTENDED RELEASE ORAL at 08:54

## 2022-02-20 RX ADMIN — LORAZEPAM 1.5 MG: 1 TABLET ORAL at 08:54

## 2022-02-20 RX ADMIN — MEMANTINE 10 MG: 5 TABLET ORAL at 20:30

## 2022-02-20 RX ADMIN — PREGABALIN 100 MG: 75 CAPSULE ORAL at 08:54

## 2022-02-20 RX ADMIN — SULINDAC 150 MG: 150 TABLET ORAL at 08:54

## 2022-02-20 RX ADMIN — PREGABALIN 100 MG: 75 CAPSULE ORAL at 20:28

## 2022-02-20 RX ADMIN — PREGABALIN 100 MG: 75 CAPSULE ORAL at 13:28

## 2022-02-20 RX ADMIN — LORAZEPAM 1.5 MG: 1 TABLET ORAL at 20:28

## 2022-02-20 RX ADMIN — DIPHENHYDRAMINE HCL 50 MG: 50 CAPSULE ORAL at 20:28

## 2022-02-20 RX ADMIN — LITHIUM CARBONATE 900 MG: 450 TABLET, EXTENDED RELEASE ORAL at 20:30

## 2022-02-20 RX ADMIN — MEMANTINE 10 MG: 5 TABLET ORAL at 08:54

## 2022-02-20 ASSESSMENT — ACTIVITIES OF DAILY LIVING (ADL)
ADLS_ACUITY_SCORE: 7
ADLS_ACUITY_SCORE: 7
LAUNDRY: UNABLE TO COMPLETE
DRESS: INDEPENDENT
ADLS_ACUITY_SCORE: 7
HYGIENE/GROOMING: INDEPENDENT
ADLS_ACUITY_SCORE: 7
ORAL_HYGIENE: INDEPENDENT
ADLS_ACUITY_SCORE: 7

## 2022-02-20 NOTE — PROGRESS NOTES
[facility] Psychiatric Progress Note     Assessment     Mr. Carter is a 31 year old male with a PMH of schizoaffective disorder, depressive type, catatonia, severe methamphetamine use disorder, alcohol use disorder, and significant TBI at the age of 3 who presented with depression with catatonia being off of Ativan after recently being discharged. This is the patient's 3rd hospitalization in the past roughly two months with substance use complicating his course.     The patient's catatonia and depression have been challenging to treat. His depression improved a little with increasing Wellbutrin and possibly from scheduling Zyprexa. His catatonia has improved some with Ativan, Amantadine, and recently Namenda, although he continues to have ambitendency and withdrawal at times. His Ativan was tapered down due to side effects (sedation) and also in preparation to potentially discharge given his history of misuse of substances. The amount had to be increased to 1.5 mg TID given that catatonia emerged more with the patient refusing his medications without reason and then taking (further sign of ambitendency in decision making). He has irrational belief that stopping his medications will lead to discharge. When he refuses medications his withdrawal worsens leading to poor intake with I/O monitoring when needed. Utilizing behavioral approaches to help with medication adherence. Offered ECT as an option to address his depression and catatonia with him pursuing. Monitoring to see if Patterson-Hagen should be pursued.    Patient has SPMI with him having deficits in his occupational and social functioning. He has had significant CNS insults with him having multiple TBIs, significant substance abuse over years, and near fatal cardiac arrest from overdose (? Anoxic injury) that have likely led to neuropsychiatric impact making it more challenging for him to recover. His  and  note he has not been  the same since his near fatal overdose. Patient did score a 4.4/5.8 on the ACL, which indicates the patient would need some level of support and could not be alone the whole day safely, needing assistance with such stuff as solving problems, removing any safety hazards. The patient is not safe to discharge to live on his own with him likely benefiting the most from supportive housing eventually like a group home with possibly even a legal guardian to manage financial and health concerns.    Today: Patient's depression appears to be worsening with removal of Zyprexa. He is not interested in another neuroleptic and is showing minimal signs of psychosis (talked to self recently briefly and yells at times, otherwise denies symptoms). Will increase Wellbutrin to address, being aware of increased risk of seizure. Will continue to advise re-initiation of a neuroleptic. Patient did have some improvement with Wellbutrin increase prior, hence this decision. He has been eating and drinking.    Patient made aware yesterday that a Sarah will be filled if patient continues to decline medications, given he decompensates. Patient agreed to take medications with this being monitored. Low threshold to file. Also, will consider Patterson Hagen if needed, given low likelihood of medications being effective with multiple trials failing to lead to any sustained improvement. It is unclear though if any medications or treatments will be able to significantly help given the nature of his brain damage, co-morbid conditions, and limited to no treatments for symptoms such as negative symptoms.    Switching disposition to home with services vs home with ACT team vs adult foster care given that [facility] wait list is so long and patient could be in hospital for many more months.    Educated regarding medication indications, risks, benefits, side effects, contraindications and possible interactions. Verbally expressed understanding.      Diagnoses      1. Schizoaffective disorder, depressive type, multiple episodes, currently in acute episode, with catatonia   2. 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 Methamphetamine use disorder, severe  4. Alcohol use disorder, moderate     Plan     Unit 5  Legal Status: Committed, considering Sarah     Safety Assessment:    Behavioral Orders   Procedures     Code 1 - Restrict to Unit     Routine Programming     As clinically indicated     Status 15     Every 15 minutes.      Medications:     Outpatient medications continued/changed:     Wellbutrin  mg daily -> 300 mg on 12/24 -> 450 mg on 2/14  Lithium 900 mg at bedtime  Lyrica 100 mg TID (initially held but resumed due to benefit for anxiety and pain)  Vitamin D 50,000 international unit(s) weekly    New medications initiated:     Ativan 2 mg QID -> 1.5 mg QID on 1/5 -> 1/2/2 mg on 1/12 -> 1/1/2 mg on 1/15 -> 1 mg TID on 1/17 -> 1.5 mg TID on 1/26  Namenda 5 mg daily -> 5 mg BID on 1/25 -> 10 mg BID on 1/31  Standard unit PRNs    New medications stopped    Exelon 3 mg BID off label for cognition from TBI due to concerns for agitation with plans to possibly switch to Namenda   Amantadine 100 mg BID on 2/9 due to limited efficacy for his catatonia   Zyprexa, prn upon admission, scheduled 7.5 up to 12.5 mg at bedtime due to patient preference    Programming: Patient will be treated in a therapeutic milieu with appropriate individual and group therapies. Education will be provided on diagnoses, medications, and treatments.     Medical diagnoses:      #. Severe malnutrition  - Nutrition following   - Gaining weight   - Ensure on meal trays   - Monitoring, intermittent IOs    #. Intention tremor bilaterally   - Secondary to lithium  - Monitor  - Conservative management    #. Chronic low back pain, iomproved  #. Muscle spasms, improved  - Robaxin 1,000 mg TID prn. Stopped on 2/2  - Sulindac 150 mg BID for temporary use  - Lidocaine  "patches prn  - Icy-hot prn   - APAP prn, increased to 1,000 mg   - Recommend referral to PT and PMR on outpatient basis     #. Multiple TBIs  - MRI findings consistent with encephalomalacia in left temporal-occipital region and left anterior frontal lobe.  - Recommend neuropsychological testing as outpatient and possible TBI focused IRTS/[facility] facility.  - OT to further evaluate    Consult: Nutrition  Labs: Li 0.8 on 1/12  Imaging: MRI recently    Anticipated LOS: 2- 6 weeks  Dispo: ACT, home with [employer] worker, versus adult foster care     Interim History     Steven is up walking in the halls when I see him today, requests to speak with me. His affect is very flat. He reports that he is \"okay\" today. He asks \"can you tell Dr. Timmons that I don't need to be on medication? I don't need them. He said he'd put a ledbetter on me. Will you let him know?\" He denies any side effects from medications. Did recommend that he continue to take them as staff do notice changes in mood and behavior when he does not take them. He then replies \"okay\". He was compliant with medication this morning.      Medications       buPROPion  450 mg Oral Daily     lithium ER  900 mg Oral At Bedtime     LORazepam  1.5 mg Oral TID     memantine  10 mg Oral BID     pregabalin  100 mg Oral TID     sulindac  150 mg Oral BID     vitamin D2  50,000 Units Oral Q7 Days        Allergies     Allergies   Allergen Reactions     Pork Allergy         Psychiatric Examination     /70   Pulse 75   Temp 98.4  F (36.9  C) (Temporal)   Resp 17   Ht 1.829 m (6')   Wt 96.4 kg (212 lb 9.6 oz)   SpO2 100%   BMI 28.83 kg/m    Weight is 212 lbs 9.6 oz  Body mass index is 28.83 kg/m .    Appearance: Alert, oriented, dressed in hospital scrubs, long beard  Attitude: Cooperative   Eye Contact: Fair  Mood: \"okay\"  Affect: Flat, mood congruent  Speech: Regular rate, soft tone. Normal rhythm   Psychomotor Behavior: No tremor, rigidity, akathisia, or psychomotor " retardation. Ambitendency and immobility at times  Thought Process: Linear, concrete, irrational at times   Associations: No loose associations   Thought Content: Denies SI. No SIB. Denies hallucinations  Insight: Limited  Judgment: Limited  Oriented to: Person, place, and time  Attention Span and Concentration: Intact  Recent and Remote Memory: Intact  Language: English with appropriate syntax and vocabulary  Fund of Knowledge: Low-Average  Muscle Strength and Tone: Grossly normal  Gait and Station: Grossly normal     Labs     No results found for this or any previous visit (from the past 24 hour(s)).       Attestation      Patient has been seen and evaluated by:    Christelle Contreras, CNP

## 2022-02-20 NOTE — PLAN OF CARE
Face to face end of shift report received from Almita BERGERON RN. Rounding completed and patient observed in the hallway. No requests at this time.     Goal Outcome Evaluation:    18:00 Update: Patient talked to this writer more than previous. He said he wants to talk to the provider about discharging tomorrow. He has been pacing and sitting in the pacheco for at least 6 hours now. Patient denied all criteria and denied pain. He is clean and appropriately dressed. He is isolative and withdrawn.     20:30 Update: Patient requested 50mg Benadryl at this time. He said he was having both anxiety and itching.     Face to face end of shift report communicated to oncoming RN.       Plan of Care Reviewed With: patient       Problem: Behavioral Health Plan of Care  Goal: Patient-Specific Goal (Individualization)  Description: Patient will be free from self harm or injury  Patient will eat at least 50% of meals  Patient will attend one group every day.    Monitor food intake.  Address and document any self talk or yelling in room  Outcome: Ongoing, Progressing     Problem: Behavior Regulation Impairment (Psychotic Signs/Symptoms)  Goal: Improved Behavioral Control (Psychotic Signs/Symptoms)  Description: Patient will be able to have a reality based conversation.     Outcome: Ongoing, Progressing     Problem: Violence Risk or Actual  Goal: Anger and Impulse Control  Description: Pt will be able to control his anger during stay  Pt will ask for PRN medications as needed to control his impulses    Outcome: Ongoing, Progressing

## 2022-02-20 NOTE — PLAN OF CARE
"Face to face end of shift report received from Farida SALVADOR RN. Rounding completed. Patient observed in [facility].     Patient has been withdrawn, isolative to his room for most of this shift. He does walk the halls a little in the afternoon. Denies SI/HI and AH/VH. Denies pain. Reports doing \"better\" today though appears to be depressed still. Behaviors remain appropriate- calm, cooperative. No yelling outbursts today. He has taken all medications as prescribed. He has not gone to any groups. Ate 25 % of breakfast and 100 % of lunch. He is able to make needs known. No concerns at this time.     Problem: Behavioral Health Plan of Care  Goal: Patient-Specific Goal (Individualization)  Description: Patient will be free from self harm or injury  Patient will eat at least 50% of meals  Patient will attend one group every day.    Monitor food intake.  Address and document any self talk or yelling in room  Outcome: Ongoing, Progressing  Note:        Problem: Behavior Regulation Impairment (Psychotic Signs/Symptoms)  Goal: Improved Behavioral Control (Psychotic Signs/Symptoms)  Description: Patient will be able to have a reality based conversation.     Outcome: Ongoing, Progressing     Problem: Violence Risk or Actual  Goal: Anger and Impulse Control  Description: Pt will be able to control his anger during stay  Pt will ask for PRN medications as needed to control his impulses    Outcome: Ongoing, Progressing       Almita Aguilar RN  2/20/2022  1:11 PM    "

## 2022-02-20 NOTE — PLAN OF CARE
Problem: Behavioral Health Plan of Care  Goal: Patient-Specific Goal (Individualization)  Description: Patient will be free from self harm or injury  Patient will eat at least 50% of meals  Patient will attend one group every day.    Monitor food intake.  Address and document any self talk or yelling in room  Outcome: Ongoing, Progressing  Note: Report received from Felix. Rounding complete. Pt observed sleeping in left side lying position with regular and unlabored respirations.    Pt has been in bed with eyes closed and regular respirations. 15 minute and PRN checks all night. No complaints offered. Will continue to monitor.    Pt slept approx 7.5   hours this NOC shift.    Face to face end of shift report communicated to oncoming RN.    Farida KIMBLE RN  February 20, 2022  2:16 AM          Problem: Behavior Regulation Impairment (Psychotic Signs/Symptoms)  Goal: Improved Behavioral Control (Psychotic Signs/Symptoms)  Description: Patient will be able to have a reality based conversation.     Outcome: Ongoing, Progressing  Note: Unable to assess due to pt sleeping. No issues or concerns noted at this time.       Problem: Violence Risk or Actual  Goal: Anger and Impulse Control  Description: Pt will be able to control his anger during stay  Pt will ask for PRN medications as needed to control his impulses    Outcome: Ongoing, Progressing  Note: Unable to assess due to pt sleeping. No issues or concerns noted at this time.

## 2022-02-20 NOTE — PLAN OF CARE
"Face to face end of shift report received from Almita BERGERON RN. Rounding completed and patient observed in the hallway, sitting on the floor. No requests at this time.      Goal Outcome Evaluation:    19:00 Update: Patient has been dismissive and said he's \"fine.\"  He was heard yelling in the hallway at nothing staff could see. When asked why he denies it happened. He paces in the hallway nearly the entire shift.     20:50 Update: Patient requested 50mg Benadryl at bedtime. He is polite but makes very little eye contact. He is disheveled but his scrubs are clean. He doesn't attend groups and doesn't interact with peers unless he has to. He denied pain.     Face to face end of shift report communicated to oncsalomón RN.     Plan of Care Reviewed With: patient     Problem: Violence Risk or Actual  Goal: Anger and Impulse Control  Description: Pt will be able to control his anger during stay  Pt will ask for PRN medications as needed to control his impulses    Outcome: Ongoing, Progressing     Problem: Behavioral Health Plan of Care  Goal: Patient-Specific Goal (Individualization)  Description: Patient will be free from self harm or injury  Patient will eat at least 50% of meals  Patient will attend one group every day.    Monitor food intake.  Address and document any self talk or yelling in room  Outcome: Ongoing, Not Progressing     Problem: Behavior Regulation Impairment (Psychotic Signs/Symptoms)  Goal: Improved Behavioral Control (Psychotic Signs/Symptoms)  Description: Patient will be able to have a reality based conversation.     Outcome: Ongoing, Not Progressing                       "

## 2022-02-21 LAB — SARS-COV-2 RNA RESP QL NAA+PROBE: NEGATIVE

## 2022-02-21 PROCEDURE — 250N000013 HC RX MED GY IP 250 OP 250 PS 637: Performed by: STUDENT IN AN ORGANIZED HEALTH CARE EDUCATION/TRAINING PROGRAM

## 2022-02-21 PROCEDURE — 250N000013 HC RX MED GY IP 250 OP 250 PS 637: Performed by: NURSE PRACTITIONER

## 2022-02-21 PROCEDURE — 99232 SBSQ HOSP IP/OBS MODERATE 35: CPT | Mod: 95 | Performed by: STUDENT IN AN ORGANIZED HEALTH CARE EDUCATION/TRAINING PROGRAM

## 2022-02-21 PROCEDURE — 124N000004

## 2022-02-21 PROCEDURE — U0005 INFEC AGEN DETEC AMPLI PROBE: HCPCS | Performed by: STUDENT IN AN ORGANIZED HEALTH CARE EDUCATION/TRAINING PROGRAM

## 2022-02-21 RX ADMIN — LORAZEPAM 1.5 MG: 1 TABLET ORAL at 20:27

## 2022-02-21 RX ADMIN — SULINDAC 150 MG: 150 TABLET ORAL at 20:34

## 2022-02-21 RX ADMIN — LORAZEPAM 1.5 MG: 1 TABLET ORAL at 13:32

## 2022-02-21 RX ADMIN — LORAZEPAM 1.5 MG: 1 TABLET ORAL at 08:31

## 2022-02-21 RX ADMIN — LITHIUM CARBONATE 900 MG: 450 TABLET, EXTENDED RELEASE ORAL at 20:28

## 2022-02-21 RX ADMIN — MODAFINIL 50 MG: 100 TABLET ORAL at 18:04

## 2022-02-21 RX ADMIN — PREGABALIN 100 MG: 75 CAPSULE ORAL at 13:32

## 2022-02-21 RX ADMIN — MEMANTINE 10 MG: 5 TABLET ORAL at 08:30

## 2022-02-21 RX ADMIN — SULINDAC 150 MG: 150 TABLET ORAL at 08:30

## 2022-02-21 RX ADMIN — BUPROPION HYDROCHLORIDE 450 MG: 300 TABLET, EXTENDED RELEASE ORAL at 08:30

## 2022-02-21 RX ADMIN — PREGABALIN 100 MG: 75 CAPSULE ORAL at 08:30

## 2022-02-21 RX ADMIN — MEMANTINE 10 MG: 5 TABLET ORAL at 20:34

## 2022-02-21 RX ADMIN — PREGABALIN 100 MG: 75 CAPSULE ORAL at 20:28

## 2022-02-21 ASSESSMENT — ACTIVITIES OF DAILY LIVING (ADL)
ADLS_ACUITY_SCORE: 7

## 2022-02-21 NOTE — PROGRESS NOTES
[facility] Psychiatric Progress Note     Assessment     Mr. Carter is a 31 year old male with a PMH of schizoaffective disorder, depressive type, catatonia, severe methamphetamine use disorder, alcohol use disorder, and significant TBI at the age of 3 who presented with depression with catatonia being off of Ativan after recently being discharged. This is the patient's 3rd hospitalization in the past roughly two months with substance use complicating his course.     The patient's catatonia and depression have been challenging to treat. His depression improved a little with increasing Wellbutrin and possibly from scheduling Zyprexa. His catatonia has improved some with Ativan, Amantadine, and recently Namenda, although he continues to have ambitendency and withdrawal at times. His Ativan was tapered down due to side effects (sedation) and also in preparation to potentially discharge given his history of misuse of substances. The amount had to be increased to 1.5 mg TID given that catatonia emerged more with the patient refusing his medications without reason and then taking (further sign of ambitendency in decision making). He has irrational belief that stopping his medications will lead to discharge. When he refuses medications his withdrawal worsens leading to poor intake with I/O monitoring when needed. Utilizing behavioral approaches to help with medication adherence. Offered ECT as an option to address his depression and catatonia with him pursuing. Monitoring to see if Patterson-Hagen should be pursued.    Patient has SPMI with him having deficits in his occupational and social functioning. He has had significant CNS insults with him having multiple TBIs, significant substance abuse over years, and near fatal cardiac arrest from overdose (? Anoxic injury) that have likely led to neuropsychiatric impact making it more challenging for him to recover. His  and  note he has not been  the same since his near fatal overdose. Patient did score a 4.4/5.8 on the ACL, which indicates the patient would need some level of support and could not be alone the whole day safely, needing assistance with such stuff as solving problems, removing any safety hazards. The patient is not safe to discharge to live on his own with him likely benefiting the most from supportive housing eventually like a group home with possibly even a legal guardian to manage financial and health concerns.    Today: Patient has been agreeing to take his medications after discussing next treatment options. He is more conversational and less disheveled as anticipated. Discussed treatment options for negative symptoms with their being limited options. He notes he has done well on stimulants before. Will start Modafinil off-label for negative symptoms/anhedonia in depression cautiously first re-starting Zyprexa given risk of worsening psychosis. Discussed multiple options like recommending Rexulti, Vraylar, or Clozapine with the patient refusing. He is most interested in Zyprexa. He agrees to at least take a couple mg. Given his psychotic symptoms are less significant, he might not see any worsening. Will monitor closely.    Educated regarding medication indications, risks, benefits, side effects, contraindications and possible interactions. Verbally expressed understanding.      Diagnoses     1. Schizoaffective disorder, depressive type, multiple episodes, currently in acute episode, with catatonia   2. 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 Methamphetamine use disorder, severe  4. Alcohol use disorder, moderate     Plan     Unit 5  Legal Status: Committed, considering Sarah     Safety Assessment:    Behavioral Orders   Procedures     Code 1 - Restrict to Unit     Routine Programming     As clinically indicated     Status 15     Every 15 minutes.      Medications:     Outpatient medications  continued/changed:     Wellbutrin  mg daily -> 300 mg on 12/24 -> 450 mg on 2/14  Lithium 900 mg at bedtime  Lyrica 100 mg TID (initially held but resumed due to benefit for anxiety and pain)  Vitamin D 50,000 international unit(s) weekly    New medications initiated:     Re-start Zyprexa at 2.5 mg at bedtime on 2/21  Will start Modafinil 50 mg daily in next couple of days with goal dose of 100-200 mg  Ativan 2 mg QID -> 1.5 mg QID on 1/5 -> 1/2/2 mg on 1/12 -> 1/1/2 mg on 1/15 -> 1 mg TID on 1/17 -> 1.5 mg TID on 1/26  Namenda 5 mg daily -> 5 mg BID on 1/25 -> 10 mg BID on 1/31  Standard unit PRNs    New medications stopped    Exelon 3 mg BID off label for cognition from TBI due to concerns for agitation with plans to possibly switch to Namenda   Amantadine 100 mg BID on 2/9 due to limited efficacy for his catatonia   Zyprexa, prn upon admission, scheduled 7.5 up to 12.5 mg at bedtime due to patient preference    Programming: Patient will be treated in a therapeutic milieu with appropriate individual and group therapies. Education will be provided on diagnoses, medications, and treatments.     Medical diagnoses:      #. Severe malnutrition  - Nutrition following   - Gaining weight   - Ensure on meal trays   - Monitoring, intermittent IOs    #. Intention tremor bilaterally   - Secondary to lithium  - Monitor  - Conservative management    #. Chronic low back pain, iomproved  #. Muscle spasms, improved  - Robaxin 1,000 mg TID prn. Stopped on 2/2  - Sulindac 150 mg BID for temporary use  - Lidocaine patches prn  - Icy-hot prn   - APAP prn, increased to 1,000 mg   - Recommend referral to PT and PMR on outpatient basis     #. Multiple TBIs  - MRI findings consistent with encephalomalacia in left temporal-occipital region and left anterior frontal lobe.  - Recommend neuropsychological testing as outpatient and possible TBI focused IRTS/[facility] facility.  - OT to further evaluate    Consult: Nutrition  Labs: Li 0.8 on  "1/12  Imaging: MRI recently    Anticipated LOS: 2- 6 weeks  Dispo: ACT, home with ARMS worker, IRTS vs adult foster care     Interim History     Patient notes that he feels pretty similar. Staff note that the has been taking his medication, has been more conversation, and has been looking at people in the eyes. He seems like he is in a better state.     Patient notes he feels similar. He is interested in other options to help. He notes that he does not feel depressed today but has apathy and amotivation. He consents to starting modafinil off-label after first resuming Zyprexa after discussing B/R/SE, including worsening psychosis, dizziness, and mood changes. Patient is most interested in Zyprexa. He consents to using again as done previously.    Patient denies any physical concerns. He is willing to stay in the hospital.      Medications       buPROPion  450 mg Oral Daily     lithium ER  900 mg Oral At Bedtime     LORazepam  1.5 mg Oral TID     memantine  10 mg Oral BID     pregabalin  100 mg Oral TID     sulindac  150 mg Oral BID     vitamin D2  50,000 Units Oral Q7 Days        Allergies     Allergies   Allergen Reactions     Pork Allergy         Psychiatric Examination     /88   Pulse 82   Temp 97.3  F (36.3  C) (Tympanic)   Resp 14   Ht 1.829 m (6')   Wt 96.4 kg (212 lb 9.6 oz)   SpO2 99%   BMI 28.83 kg/m    Weight is 212 lbs 9.6 oz  Body mass index is 28.83 kg/m .    Appearance: Alert, oriented, dressed in hospital scrubs, long beard  Attitude: Cooperative   Eye Contact: Fair  Mood: \"Ok\"  Affect: Flat, mood congruent  Speech: Regular rate, soft tone. Normal rhythm   Psychomotor Behavior: No tremor, rigidity, akathisia, or psychomotor retardation. Ambitendency and immobility at times  Thought Process: Linear, concrete, irrational at times   Associations: No loose associations   Thought Content: Denies SI. No SIB. AH and paranoia at times, improved, none recently. Poverty of thought.   Insight: " Limited  Judgment: Limited  Oriented to: Person, place, and time  Attention Span and Concentration: Intact  Recent and Remote Memory: Intact  Language: English with appropriate syntax and vocabulary  Fund of Knowledge: Low-Average  Muscle Strength and Tone: Grossly normal  Gait and Station: Grossly normal    Ganga Catatonia Rating Scale   Severity Score (Number of points for items 1 -23) _______1___   Screening Score (Presence or absence of items 1 - 14) ______1_____   Time: 11:00 AM on 1/11/2022   Rater: Dr. Shanelle Schuler Catatonia Rating Scale   Severity Score (Number of points for items 1 -23) ____4______   Screening Score (Presence or absence of items 1 - 14) ___1________   Time: 1145AM on 1/18  Rater: Dr. Shanelle Schuler Catatonia Rating Scale   Severity Score (Number of points for items 1 -23) ____7______   Screening Score (Presence or absence of items 1 - 14) ____4_______   Time: 500 PM on 1/26  Rater: Dr. Shanelle Schuler Catatonia Rating Scale   Severity Score (Number of points for items 1 -23) ___5_______   Screening Score (Presence or absence of items 1 - 14) ____1_______   Time: 121 PM on 2/10  Rater: Dr. Shanelle Goldsmith     Recent Results (from the past 24 hour(s))   Asymptomatic COVID-19 Virus (Coronavirus) by PCR Nasopharyngeal    Collection Time: 02/21/22  1:37 PM    Specimen: Nasopharyngeal; Swab   Result Value Ref Range    SARS CoV2 PCR Negative Negative        Attestation      Patient has been seen and evaluated by:    David Timmons DO, MA  Psychiatrist    VIDEO VISIT    Patient has given verbal consent for video visit?: Yes     Video- Visit Details  Type of service:  video visit for mental health treatment.  Time of service:    Date:  02/21/2022    Video Start Time: 200PM      Video End Time: 215PM    Reason for video visit: COVID[place]19 [place]  Originating Site (patient location):  [facility]  Distant Site (provider location):  Remote  location  Mode of Communication:  Video Conference via Voxie

## 2022-02-21 NOTE — PLAN OF CARE
Face to face report received from Farida SALVADOR RN. Pt. Observed.     Problem: Behavioral Health Plan of Care  Goal: Patient-Specific Goal (Individualization)  Description: Patient will be free from self harm or injury  Patient will eat at least 50% of meals  Patient will attend one group every day.    Monitor food intake.  Address and document any self talk or yelling in room  Outcome: Ongoing, Progressing    Pt. Denies HI, SI, anxiety, depression, hallucinations and pain this a.m. Pt reporting it is to early to know how he is feeling. He will update this writer if anything changes. Pt. Isolating to room this a.m. out for breakfast. Pt. Encouraged to attend unit programing. Pt. In agreement to update staff to thoughts feelings of wanting to harm self or others. Pt. Cooperative with medications and nursing assessment. Pt. Eating WDL 75% of breakfast. Pt. Denies any issus with bowel and bladder.      Face to face end of shift report to be communicated to oncoming RN.     Liat Lai RN  2/21/2022

## 2022-02-21 NOTE — PLAN OF CARE
Problem: Behavioral Health Plan of Care  Goal: Patient-Specific Goal (Individualization)  Description: Patient will be free from self harm or injury  Patient will eat at least 50% of meals  Patient will attend one group every day.    Monitor food intake.  Address and document any self talk or yelling in room  Outcome: Ongoing, Progressing  Note: Report received from Felix. Rounding complete. Pt observed sleeping in supine position with regular and unlabored respirations.    Pt has been in bed with eyes closed and regular respirations. 15 minute and PRN checks all night. No complaints offered. Will continue to monitor.    Pt slept approx  8  hours this NOC shift.    Face to face end of shift report communicated to oncoming RN.    Farida KIBMLE RN  February 21, 2022  2:02 AM          Problem: Behavior Regulation Impairment (Psychotic Signs/Symptoms)  Goal: Improved Behavioral Control (Psychotic Signs/Symptoms)  Description: Patient will be able to have a reality based conversation.     Outcome: Ongoing, Progressing  Note: Unable to assess due to pt sleeping. No issues or concerns noted at this time.       Problem: Violence Risk or Actual  Goal: Anger and Impulse Control  Description: Pt will be able to control his anger during stay  Pt will ask for PRN medications as needed to control his impulses    Outcome: Ongoing, Progressing  Note: Unable to assess due to pt sleeping. No issues or concerns noted at this time.

## 2022-02-21 NOTE — PLAN OF CARE
"Spoke with pt this morning, pt was found walking the hallways. Pt asks this writer if he can discharge to his apartment. Explained to pt that this was not an option at this time and that the [facility] is looking into getting him a CADI waiver. Pt denies wanting this due to \"signing his life away\". Explained to pt what the CADI would do for him. Pt still unsure about this and asked instead to go to [facility] or a homeless shelter. Reminded pt a homeless shelter is not a safe option, pt argued that others get to go to homeless shelters. Reminded pt that he is on a civil commitment and everyone is on a different journey. Pt walked away.                     "

## 2022-02-21 NOTE — PLAN OF CARE
Face to face shift report received from Nurse. Rounding completed, pt observed.   4:06 PM         Problem: Behavioral Health Plan of Care  Goal: Patient-Specific Goal (Individualization)  Description: Patient will be free from self harm or injury  Patient will eat at least 50% of meals  Patient will attend one group every day.    Monitor food intake.  Address and document any self talk or yelling in room  Outcome: Ongoing, Progressing  Note:      Goal Outcome Evaluation:    Plan of Care Reviewed With: patient     Pt. Observed sitting on the floor in the hallway. Its been stated that's what he likes to do now from staff.     Talked to Pt. Pt. Interactive and talkative when spoken to.     Pt in the lounge watching TV, He was compliant with afternoon medications.  Attended 0 groups / Consumed all meals     Face to face report will be communicated to oncoming RN.    Trinidad Hurley RN  2/21/2022  4:09 PM

## 2022-02-22 PROCEDURE — 250N000013 HC RX MED GY IP 250 OP 250 PS 637: Performed by: NURSE PRACTITIONER

## 2022-02-22 PROCEDURE — 124N000004

## 2022-02-22 PROCEDURE — 99232 SBSQ HOSP IP/OBS MODERATE 35: CPT | Performed by: STUDENT IN AN ORGANIZED HEALTH CARE EDUCATION/TRAINING PROGRAM

## 2022-02-22 PROCEDURE — 250N000013 HC RX MED GY IP 250 OP 250 PS 637: Performed by: STUDENT IN AN ORGANIZED HEALTH CARE EDUCATION/TRAINING PROGRAM

## 2022-02-22 RX ADMIN — SULINDAC 150 MG: 150 TABLET ORAL at 08:51

## 2022-02-22 RX ADMIN — LORAZEPAM 1.5 MG: 1 TABLET ORAL at 14:10

## 2022-02-22 RX ADMIN — PREGABALIN 100 MG: 75 CAPSULE ORAL at 08:51

## 2022-02-22 RX ADMIN — MEMANTINE 10 MG: 5 TABLET ORAL at 20:17

## 2022-02-22 RX ADMIN — LITHIUM CARBONATE 900 MG: 450 TABLET, EXTENDED RELEASE ORAL at 20:17

## 2022-02-22 RX ADMIN — SULINDAC 150 MG: 150 TABLET ORAL at 20:18

## 2022-02-22 RX ADMIN — PREGABALIN 100 MG: 75 CAPSULE ORAL at 14:10

## 2022-02-22 RX ADMIN — BUPROPION HYDROCHLORIDE 450 MG: 300 TABLET, EXTENDED RELEASE ORAL at 08:51

## 2022-02-22 RX ADMIN — MODAFINIL 50 MG: 100 TABLET ORAL at 08:51

## 2022-02-22 RX ADMIN — MEMANTINE 10 MG: 5 TABLET ORAL at 08:51

## 2022-02-22 RX ADMIN — PREGABALIN 100 MG: 75 CAPSULE ORAL at 20:17

## 2022-02-22 RX ADMIN — LORAZEPAM 1.5 MG: 1 TABLET ORAL at 08:51

## 2022-02-22 ASSESSMENT — ACTIVITIES OF DAILY LIVING (ADL)
ADLS_ACUITY_SCORE: 7
DRESS: SCRUBS (BEHAVIORAL HEALTH);INDEPENDENT
ADLS_ACUITY_SCORE: 7
ORAL_HYGIENE: INDEPENDENT
ADLS_ACUITY_SCORE: 7
LAUNDRY: UNABLE TO COMPLETE
ADLS_ACUITY_SCORE: 7
ORAL_HYGIENE: INDEPENDENT
HYGIENE/GROOMING: INDEPENDENT
ADLS_ACUITY_SCORE: 7
HYGIENE/GROOMING: INDEPENDENT
LAUNDRY: UNABLE TO COMPLETE
ADLS_ACUITY_SCORE: 7
DRESS: INDEPENDENT;SCRUBS (BEHAVIORAL HEALTH)

## 2022-02-22 NOTE — PLAN OF CARE
"  Problem: Behavioral Health Plan of Care  Goal: Patient-Specific Goal (Individualization)  Description: Patient will be free from self harm or injury  Patient will eat at least 50% of meals  Patient will attend one group every day.    Monitor food intake.  Address and document any self talk or yelling in room  Outcome: Ongoing, Progressing  Note: Shift Summery:        Pt in lounge at the start of the shift, pt at table with his head down on the table. Nursing asked pt if he was feeling alright. Pt did not talk at this time as if sleeping. As soon as nursing walked away pt got up and appeared to be tearful. Nursing came back to talk to pt, pt stated \"I'm ok, I'm gonna go to bed\".    0030-pt in back hallway, standing with eyes closed. Nursing went to talk to pt, pt stated \"i'm going to bed\" although pt was in the hallway opposite his room. Nursing asked pt if he wanted to walk for awhile, pt said \"no, i'm gonna go to sleep\". Pt offered prn for sleep but declined.    Pt woke up once during the night shift and requested pepe salter, pt went back to bed as soon as he finished. Pt slept about 3 hours.        Problem: Behavior Regulation Impairment (Psychotic Signs/Symptoms)  Goal: Improved Behavioral Control (Psychotic Signs/Symptoms)  Description: Patient will be able to have a reality based conversation.     Outcome: Ongoing, Progressing     Problem: Violence Risk or Actual  Goal: Anger and Impulse Control  Description: Pt will be able to control his anger during stay  Pt will ask for PRN medications as needed to control his impulses    Outcome: Ongoing, Progressing   Goal Outcome Evaluation:    Plan of Care Reviewed With: patient        Face to face end of shift report communicated to day shift RN. Reported pt is a risk for violence.     Sarahi Hui, RN  2/22/2022  3:29 AM                   "

## 2022-02-22 NOTE — PLAN OF CARE
"  Problem: Behavioral Health Plan of Care  Goal: Patient-Specific Goal (Individualization)  Description: Patient will be free from self harm or injury  Patient will eat at least 50% of meals  Patient will attend one group every day.    Monitor food intake.  Address and document any self talk or yelling in room  Outcome: Ongoing, Progressing  Note: Shift Summery:     Pt in his room sitting up on his bed at the start of the shift. Pt denies all symptoms of pain, anxiety, SI, HI and hallucinations. Pt reports his depression is mild and states he will be \"OK\". Pt continues to be frustrated about the waiting list for placement but agreed to attend a group this shift.     Pt ate dinner this evening in the lounge, ate snack in the lounge, pt sits in the lounge by himself, doesn't initiate conversations with peers but will answer and talk to peers if spoken to.     Pt took medications at 2000 and wanted to go to sleep after, pt refused to take his ativan stating \"I don't think I need that tonight\".        Problem: Behavior Regulation Impairment (Psychotic Signs/Symptoms)  Goal: Improved Behavioral Control (Psychotic Signs/Symptoms)  Description: Patient will be able to have a reality based conversation.     Outcome: Ongoing, Progressing     Problem: Violence Risk or Actual  Goal: Anger and Impulse Control  Description: Pt will be able to control his anger during stay  Pt will ask for PRN medications as needed to control his impulses    Outcome: Ongoing, Progressing   Goal Outcome Evaluation:    Plan of Care Reviewed With: patient     Face to face end of shift report communicated to night shift RN. Reported that pt is a risk for violence.     Sarahi Hui RN  2/22/2022  4:06 PM                         "

## 2022-02-22 NOTE — PLAN OF CARE
Face to face report received from Shima RN. Pt. Observed.    Problem: Behavioral Health Plan of Care  Goal: Patient-Specific Goal (Individualization)  Description: Patient will be free from self harm or injury  Patient will eat at least 50% of meals  Patient will attend one group every day.    Monitor food intake.  Address and document any self talk or yelling in room  Outcome: Ongoing, Progressing  Pt. Denies HI, SI, anxiety, depression, hallucinations and pain this a.m. Pt. Isolating to room this a.m. out for breakfast. Pt. Encouraged to attend ,not attending, unit programing. Pt. In agreement to update staff to thoughts feelings of wanting to harm self or others. Pt. Cooperative with medications and nursing assessment. Pt. Eating WDL. Pt. Denies any issus with bowel and bladder.       Face to face end of shift report to be communicated to oncoming RN.     Liat Lai RN  2/22/2022

## 2022-02-23 PROCEDURE — 250N000013 HC RX MED GY IP 250 OP 250 PS 637: Performed by: STUDENT IN AN ORGANIZED HEALTH CARE EDUCATION/TRAINING PROGRAM

## 2022-02-23 PROCEDURE — 250N000013 HC RX MED GY IP 250 OP 250 PS 637: Performed by: NURSE PRACTITIONER

## 2022-02-23 PROCEDURE — 99232 SBSQ HOSP IP/OBS MODERATE 35: CPT | Performed by: STUDENT IN AN ORGANIZED HEALTH CARE EDUCATION/TRAINING PROGRAM

## 2022-02-23 PROCEDURE — 124N000004

## 2022-02-23 RX ADMIN — LITHIUM CARBONATE 900 MG: 450 TABLET, EXTENDED RELEASE ORAL at 20:28

## 2022-02-23 RX ADMIN — MODAFINIL 50 MG: 100 TABLET ORAL at 08:34

## 2022-02-23 RX ADMIN — SULINDAC 150 MG: 150 TABLET ORAL at 08:32

## 2022-02-23 RX ADMIN — SULINDAC 150 MG: 150 TABLET ORAL at 20:27

## 2022-02-23 RX ADMIN — PREGABALIN 100 MG: 75 CAPSULE ORAL at 14:27

## 2022-02-23 RX ADMIN — MEMANTINE 10 MG: 5 TABLET ORAL at 08:33

## 2022-02-23 RX ADMIN — MEMANTINE 10 MG: 5 TABLET ORAL at 20:28

## 2022-02-23 RX ADMIN — BUPROPION HYDROCHLORIDE 450 MG: 300 TABLET, EXTENDED RELEASE ORAL at 08:32

## 2022-02-23 RX ADMIN — LORAZEPAM 1.5 MG: 1 TABLET ORAL at 14:26

## 2022-02-23 RX ADMIN — PREGABALIN 100 MG: 75 CAPSULE ORAL at 08:33

## 2022-02-23 RX ADMIN — DIPHENHYDRAMINE HCL 50 MG: 50 CAPSULE ORAL at 20:28

## 2022-02-23 RX ADMIN — PREGABALIN 100 MG: 75 CAPSULE ORAL at 20:27

## 2022-02-23 RX ADMIN — LORAZEPAM 1.5 MG: 1 TABLET ORAL at 20:27

## 2022-02-23 RX ADMIN — LORAZEPAM 1.5 MG: 1 TABLET ORAL at 08:32

## 2022-02-23 ASSESSMENT — ACTIVITIES OF DAILY LIVING (ADL)
ORAL_HYGIENE: INDEPENDENT
ADLS_ACUITY_SCORE: 7
HYGIENE/GROOMING: INDEPENDENT
ADLS_ACUITY_SCORE: 7
DRESS: SCRUBS (BEHAVIORAL HEALTH);INDEPENDENT
ADLS_ACUITY_SCORE: 7
LAUNDRY: UNABLE TO COMPLETE
ADLS_ACUITY_SCORE: 7

## 2022-02-23 NOTE — PLAN OF CARE
"Face to face end of shift report communicated to oncoming shift.     Sobeida Benton RN  2/23/2022  3:04 PM    Problem: Behavioral Health Plan of Care  Goal: Patient-Specific Goal (Individualization)  Description: Patient will be free from self harm or injury  Patient will eat at least 50% of meals  Patient will attend one group every day.    Monitor food intake.  Address and document any self talk or yelling in room  Outcome: Ongoing, Not Progressing  Note: Shift Summery:      Patient up on unit to receive meal tray.  Patient states \"I'm fine\"  Patient takes all medications as prescribed this shift.    Patient denies mental health criteria.  \"I'm fine\"    Patient does not attend groups.  Patient eats 50% of breakfast and 25% of lunch.     Patient remains free from self harm/injury.    No self talk or yelling this shift.      Goal Outcome Evaluation:    Plan of Care Reviewed With: patient                     "

## 2022-02-23 NOTE — PLAN OF CARE
Face to face end of shift report will be communicated to oncoming RN.     Problem: Behavioral Health Plan of Care  Goal: Patient-Specific Goal (Individualization)  Description: Patient will be free from self harm or injury  Patient will eat at least 50% of meals  Patient will attend one group every day.    Monitor food intake.  Address and document any self talk or yelling in room  Outcome: Ongoing, Progressing     Problem: Behavior Regulation Impairment (Psychotic Signs/Symptoms)  Goal: Improved Behavioral Control (Psychotic Signs/Symptoms)  Description: Patient will be able to have a reality based conversation.     Outcome: Ongoing, Progressing     Problem: Violence Risk or Actual  Goal: Anger and Impulse Control  Description: Pt will be able to control his anger during stay  Pt will ask for PRN medications as needed to control his impulses    Outcome: Ongoing, Progressing   Goal Outcome Evaluation:    Plan of Care Reviewed With: patient   Face to face end of shift report obtained from TRAE Mcclain. Pt observed awake in room.  0015-Pt sitting awake in bed. No talking to self heard. Previously patient was walking the hallways. No complains or requests so far this shift.   0600-Pt appeared to had falling asleep at 0215. Pt appeared to had slept 3.5 hours so far this shift. Pt had no complains, or was heard talking to self in room.

## 2022-02-23 NOTE — PLAN OF CARE
Pt has video call with  at 1 PM. Supervisor has a meeting Thursday with the [facility] regarding pt.

## 2022-02-23 NOTE — PROGRESS NOTES
[facility] Psychiatric Progress Note     Assessment     Mr. Carter is a 31 year old male with a PMH of schizoaffective disorder, depressive type, catatonia, severe methamphetamine use disorder, alcohol use disorder, and significant TBI at the age of 3 who presented with depression with catatonia being off of Ativan after recently being discharged. This is the patient's 3rd hospitalization in the past roughly two months with substance use complicating his course.     The patient's catatonia and depression have been challenging to treat. His depression improved a little with increasing Wellbutrin and possibly from scheduling Zyprexa. His catatonia has improved some with Ativan, Amantadine, and recently Namenda, although he continues to have ambitendency and withdrawal at times. His Ativan was tapered down due to side effects (sedation) and also in preparation to potentially discharge given his history of misuse of substances. The amount had to be increased to 1.5 mg TID given that catatonia emerged more with the patient refusing his medications without reason and then taking (further sign of ambitendency in decision making). He has irrational belief that stopping his medications will lead to discharge. When he refuses medications his withdrawal worsens leading to poor intake with I/O monitoring when needed. Utilizing behavioral approaches to help with medication adherence. Offered ECT as an option to address his depression and catatonia with him pursuing. Monitoring to see if Patterson-Hagen should be pursued.    Patient has SPMI with him having deficits in his occupational and social functioning. He has had significant CNS insults with him having multiple TBIs, significant substance abuse over years, and near fatal cardiac arrest from overdose (? Anoxic injury) that have likely led to neuropsychiatric impact making it more challenging for him to recover. His  and  note he has not been  the same since his near fatal overdose. Patient did score a 4.4/5.8 on the ACL, which indicates the patient would need some level of support and could not be alone the whole day safely, needing assistance with such stuff as solving problems, removing any safety hazards. The patient is not safe to discharge to live on his own with him likely benefiting the most from supportive housing eventually like a group home with possibly even a legal guardian to manage financial and health concerns.    Today: Patient has been agreeing to take his medications after discussing next treatment options. He is more conversational and less disheveled as anticipated. Discussed treatment options for negative symptoms with their being limited options. He notes he has done well on stimulants before. Will start Modafinil off-label for negative symptoms/anhedonia in depression cautiously first re-starting Zyprexa given risk of worsening psychosis. Discussed multiple options like recommending Rexulti, Vraylar, or Clozapine with the patient refusing. He is most interested in Zyprexa. He agrees to at least take a couple mg. Given his psychotic symptoms are less significant, he might not see any worsening. Will monitor closely.    Educated regarding medication indications, risks, benefits, side effects, contraindications and possible interactions. Verbally expressed understanding.      Diagnoses     1. Schizoaffective disorder, depressive type, multiple episodes, currently in acute episode, with catatonia   2. 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 Methamphetamine use disorder, severe  4. Alcohol use disorder, moderate     Plan     Unit 5  Legal Status: Committed, considering Sarah     Safety Assessment:    Behavioral Orders   Procedures     Code 1 - Restrict to Unit     Routine Programming     As clinically indicated     Status 15     Every 15 minutes.      Medications:     Outpatient medications  continued/changed:     Wellbutrin  mg daily -> 300 mg on 12/24 -> 450 mg on 2/14  Lithium 900 mg at bedtime  Lyrica 100 mg TID (initially held but resumed due to benefit for anxiety and pain)  Vitamin D 50,000 international unit(s) weekly    New medications initiated:     Continue Zyprexa at 2.5 mg at bedtime  Continue Modafinil 50 mg daily   Ativan 2 mg QID -> 1.5 mg QID on 1/5 -> 1/2/2 mg on 1/12 -> 1/1/2 mg on 1/15 -> 1 mg TID on 1/17 -> 1.5 mg TID on 1/26  Namenda 5 mg daily -> 5 mg BID on 1/25 -> 10 mg BID on 1/31  Standard unit PRNs    New medications stopped    Exelon 3 mg BID off label for cognition from TBI due to concerns for agitation with plans to possibly switch to Namenda   Amantadine 100 mg BID on 2/9 due to limited efficacy for his catatonia   Zyprexa, prn upon admission, scheduled 7.5 up to 12.5 mg at bedtime due to patient preference    Programming: Patient will be treated in a therapeutic milieu with appropriate individual and group therapies. Education will be provided on diagnoses, medications, and treatments.     Medical diagnoses:      #. Severe malnutrition  - Nutrition following   - Gaining weight   - Ensure on meal trays   - Monitoring, intermittent IOs    #. Intention tremor bilaterally   - Secondary to lithium  - Monitor  - Conservative management    #. Chronic low back pain, iomproved  #. Muscle spasms, improved  - Robaxin 1,000 mg TID prn. Stopped on 2/2  - Sulindac 150 mg BID for temporary use  - Lidocaine patches prn  - Icy-hot prn   - APAP prn, increased to 1,000 mg   - Recommend referral to PT and PMR on outpatient basis     #. Multiple TBIs  - MRI findings consistent with encephalomalacia in left temporal-occipital region and left anterior frontal lobe.  - Recommend neuropsychological testing as outpatient and possible TBI focused IRTS/CBH facility.  - OT to further evaluate    Consult: Nutrition  Labs: Li 0.8 on 1/12  Imaging: MRI recently    Anticipated LOS: 2- 6  "weeks  Dispo: IRTS     Interim History     Patient notes he feels similar. Denies any problems with Zyprexa. Taking medications. Missed one dose of Ativan last night, stating he got it too late. Planning on taking medications.     Patient denies any change with taking Modafinil at this point. Willing to continue taking.    Patient is not thinking about anything in particular except getting out of the hospital.    Patient denies any physical concerns.  Back pain has gotten better.     Medications       buPROPion  450 mg Oral Daily     lithium ER  900 mg Oral At Bedtime     LORazepam  1.5 mg Oral TID     memantine  10 mg Oral BID     modafinil  50 mg Oral Daily     pregabalin  100 mg Oral TID     sulindac  150 mg Oral BID     vitamin D2  50,000 Units Oral Q7 Days        Allergies     Allergies   Allergen Reactions     Pork Allergy         Psychiatric Examination     /84   Pulse 81   Temp 98  F (36.7  C) (Tympanic)   Resp 16   Ht 1.829 m (6')   Wt 96.4 kg (212 lb 9.6 oz)   SpO2 99%   BMI 28.83 kg/m    Weight is 212 lbs 9.6 oz  Body mass index is 28.83 kg/m .    Appearance: Alert, oriented, dressed in hospital scrubs, long beard  Attitude: Cooperative   Eye Contact: Fair  Mood: \"Fine\"  Affect: Flat, mood congruent  Speech: Regular rate, soft tone. Normal rhythm   Psychomotor Behavior: No tremor, rigidity, akathisia, or psychomotor retardation. Ambitendency and immobility at times  Thought Process: Linear, concrete, irrational at times   Associations: No loose associations   Thought Content: Denies SI. No SIB. AH and paranoia at times, improved, none recently. Poverty of thought.   Insight: Limited  Judgment: Limited  Oriented to: Person, place, and time  Attention Span and Concentration: Intact  Recent and Remote Memory: Impairments in recent recall and remote memory encoding  Language: English with appropriate syntax and vocabulary  Fund of Knowledge: Low-Average  Muscle Strength and Tone: Grossly " normal  Gait and Station: Grossly normal    Pineda-Or[place] Catatonia Rating Scale   Severity Score (Number of points for items 1 -23) _______1___   Screening Score (Presence or absence of items 1 - 14) ______1_____   Time: 11:00 AM on 1/11/2022   Rater: Dr. Shanelle Schuler Catatonia Rating Scale   Severity Score (Number of points for items 1 -23) ____4______   Screening Score (Presence or absence of items 1 - 14) ___1________   Time: 1145AM on 1/18  Rater: Dr. Shanelle Schuler Catatonia Rating Scale   Severity Score (Number of points for items 1 -23) ____7______   Screening Score (Presence or absence of items 1 - 14) ____4_______   Time: 500 PM on 1/26  Rater: Dr. Shanelle Schuler Catatonia Rating Scale   Severity Score (Number of points for items 1 -23) ___5_______   Screening Score (Presence or absence of items 1 - 14) ____1_______   Time: 121 PM on 2/10  Rater: Dr. Shanelle Schuler Catatonia Rating Scale   Severity Score (Number of points for items 1 -23) ___1_______   Screening Score (Presence or absence of items 1 - 14) ____1_______   Time: 100 PM on 2/23  Rater: Dr. Timmons    1. Immobility/stupor: Extreme hypoactivity, immobile, minimally responsive to stimuli.   0 - Absent.   1 - Sits abnormally still, may interact briefly.   2 - Virtually no interaction with external world.   3 - Stuporous, non-reactive to painful stimuli.   2. Mutism: Verbally unresponsive or minimally responsive.   0 = Absent.   1 = Verbally unresponsive to majority of questions; incomprehensible whisper.   2 = Speaks less than 20 words/5mins.   3 = No speech.   3. = Staring: Fixed gaze, little or no visual scanning of environment, decreased blinking.   0 = Absent.   1 = Poor eye contact, repeatedly gazes less than 20 s between shifting of attention; decreased blinking.   2 = Gaze held longer than 20 s, occasionally shifts attention.   3 = Fixed gaze, non-reactive.   4. Posturing/catalepsy: Spontaneous maintenance of  posture (s), including mundane (e.g. sitting or standing for long periods without reacting).   0 = Absent.   1 = Less than I min.   2 Greater than one minute, less than 15 min.   3 Bizarre posture, or mundane maintained more than 15 min.   5. Grimacing: Maintenance of odd facial expressions.   0 = Absent.   1 = Less than [id number].   2 = Less than 1 min.   3 = Bizarre expression(s) or maintained more than 1 min.   6. Echopraxia/echolalia: Mimicking of examiner's movements (echopraxia) or speech (echolalia).   0 = Absent   1 = Occasional.   2 = Frequent.   3 = Constant   7. Stereotypy: Repetitive, non-goal-directed motor activity (e.g. finger-play, repeatedly touching, patting or rubbing self); abnormality not inherent in act but in its frequency.   0 - Absent   1 - Occasional.   2 - Frequent.   3 - Constant.   8. Mannerisms: Odd, purposeful movements (hopping or walking tiptoe, saluting passers-by or exaggerated caricatures of mundane movements); abnormality inherent in act itself.   0 - Absent   1 - Occasional.   2 - Frequent.   3 - Constant.   9. Stereotyped & meaningless repetition of words & phrases (verbigeration): Repetition of phrases or sentences (like a scratched records).   0 - Absent.   1 - Occasional.   2 - Frequent, difficult to interrupt.   3 - Constant.   10. Rigidity: Maintenance of a rigid position despite efforts to be moved (exclude if cog-wheeling or tremor present)   0 = Absent.   1 = Mild resistance.   2 = Moderate.   3 = Severe, cannot be repostured.   11. Negativism: Apparently motiveless resistance to instructions or attempts to move/examine patients. Contrary behavior, does exact opposite of instruction.   0 - Absent   1 - Mild resistance and/or occasionally contrary.   2 - Moderate resistance and/or frequently contrary.   3 - Severe resistance and/or continually contrary.   12. Waxy flexibility: During repositioning of patient, patient offers initial resistance before allowing him/herself  to be repositioned, similar to that of a bending candle. (also defined as slow resistance to movement as the patient allows the examiner to place his/her extremities in unusual positions. The limb may remain in the position in which they are placed or not)   0 - Absent   3 - Present.   13. Withdrawal: Refusal to eat, drink and/or make eye contact.   0 = Absent.   1 = Minimal oral intake/interaction for less than 1 day.   2 = Minimal oral intake/interaction for more than 1 day.   3 = No oral intake/interaction for 1 day or more.   14. Excitement: Extreme hyperactivity, constant motor unrest which is apparently non-purposeful.   Not to be attributed to akathisia or goal-directed agitation.   1 - Excessive motion, intermittent.   2 - Constant motion, hyperkinetic without rest periods.   3 - Full-blown catatonic excitement, endless frenzied motor activity.   ------------End of Screening Items-------------   15. Impulsivity: Patient suddenly engages in inappropriate behavior (e.g. runs down hallway, starts screaming or takes off clothes) without provocation. Afterwards can give no, or only a facile explanation.   0 - Absent.   1 - Occasional.   2 - Frequent.   3 - Constant or not redirectable.   16. Automatic obedience: Exaggerated cooperation with examiner's request or spontaneous continuation of movement requested.   0 = Absent.   1 = Occasional   2 = Frequent   3 = Constant.   17. Passive Obedience (mitgehen): Patient raises arm in response to light pressure of finger, despite instructions to the contrary.   0 = Absent.   3 = Present.   18. Muscle Resistance (gegenhalten): Involuntary resistance to passive movement of a limb to a new position. Resistance increases with the speed of the movement.   0 - Absent   3 - Present.   19. Motorically Stuck (ambitendency): Patient appears stuck in indecisive, hesitant motor movements.   0 - Absent.   3 = Present.   20. Grasp reflex: Striking the patient's open palm with two  extended fingers of the examiner's hand results in automatic closure of patients hand.   0 = Absent   3 = Present   21. Perseveration: Repeatedly returns to same topic or persists with the same movements.   0 = Absent.   3 = Present.   22. Combativeness: Belligerence or aggression, Usually in an undirected manner, without explanation.   0 = Absent   1 = Occasionally strikes out, low potential for injury.   2 = Frequently strikes out, moderate potential for injury.   3 = Serious danger to others.   23. Autonomic abnormality: Abnormality of body temperature (fever), blood pressure, pulse, respiratory rate, inappropriate sweating, flushing.   0 = Absent   1 = Abnormality of one parameter (exclude pre-existing hypertension).   2 = Abnormality of two parameters.   3 = Abnormality of three or more parameters.      Labs     No results found for this or any previous visit (from the past 24 hour(s)).     Attestation      Patient has been seen and evaluated by:    David Timmons DO, MA  Psychiatrist

## 2022-02-23 NOTE — PROGRESS NOTES
[facility] Psychiatric Progress Note     Assessment     Mr. Carter is a 31 year old male with a PMH of schizoaffective disorder, depressive type, catatonia, severe methamphetamine use disorder, alcohol use disorder, and significant TBI at the age of 3 who presented with depression with catatonia being off of Ativan after recently being discharged. This is the patient's 3rd hospitalization in the past roughly two months with substance use complicating his course.     The patient's catatonia and depression have been challenging to treat. His depression improved a little with increasing Wellbutrin and possibly from scheduling Zyprexa. His catatonia has improved some with Ativan, Amantadine, and recently Namenda, although he continues to have ambitendency and withdrawal at times. His Ativan was tapered down due to side effects (sedation) and also in preparation to potentially discharge given his history of misuse of substances. The amount had to be increased to 1.5 mg TID given that catatonia emerged more with the patient refusing his medications without reason and then taking (further sign of ambitendency in decision making). He has irrational belief that stopping his medications will lead to discharge. When he refuses medications his withdrawal worsens leading to poor intake with I/O monitoring when needed. Utilizing behavioral approaches to help with medication adherence. Offered ECT as an option to address his depression and catatonia with him pursuing. Monitoring to see if Patterson-Hagen should be pursued.    Patient has SPMI with him having deficits in his occupational and social functioning. He has had significant CNS insults with him having multiple TBIs, significant substance abuse over years, and near fatal cardiac arrest from overdose (? Anoxic injury) that have likely led to neuropsychiatric impact making it more challenging for him to recover. His  and  note he has not been  the same since his near fatal overdose. Patient did score a 4.4/5.8 on the ACL, which indicates the patient would need some level of support and could not be alone the whole day safely, needing assistance with such stuff as solving problems, removing any safety hazards. The patient is not safe to discharge to live on his own with him likely benefiting the most from supportive housing eventually like a group home with possibly even a legal guardian to manage financial and health concerns.    Today: Patient has been agreeing to take his medications after discussing next treatment options. He is more conversational and less disheveled as anticipated. Discussed treatment options for negative symptoms with their being limited options. He notes he has done well on stimulants before. Will start Modafinil off-label for negative symptoms/anhedonia in depression cautiously first re-starting Zyprexa given risk of worsening psychosis. Discussed multiple options like recommending Rexulti, Vraylar, or Clozapine with the patient refusing. He is most interested in Zyprexa. He agrees to at least take a couple mg. Given his psychotic symptoms are less significant, he might not see any worsening. Will monitor closely.    Educated regarding medication indications, risks, benefits, side effects, contraindications and possible interactions. Verbally expressed understanding.      Diagnoses     1. Schizoaffective disorder, depressive type, multiple episodes, currently in acute episode, with catatonia   2. 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 Methamphetamine use disorder, severe  4. Alcohol use disorder, moderate     Plan     Unit 5  Legal Status: Committed, considering Sarah     Safety Assessment:    Behavioral Orders   Procedures     Code 1 - Restrict to Unit     Routine Programming     As clinically indicated     Status 15     Every 15 minutes.      Medications:     Outpatient medications  continued/changed:     Wellbutrin  mg daily -> 300 mg on 12/24 -> 450 mg on 2/14  Lithium 900 mg at bedtime  Lyrica 100 mg TID (initially held but resumed due to benefit for anxiety and pain)  Vitamin D 50,000 international unit(s) weekly    New medications initiated:     Continue Zyprexa at 2.5 mg at bedtime on 2/21  Will start Modafinil 50 mg daily in next couple of days with goal dose of 100-200 mg  Ativan 2 mg QID -> 1.5 mg QID on 1/5 -> 1/2/2 mg on 1/12 -> 1/1/2 mg on 1/15 -> 1 mg TID on 1/17 -> 1.5 mg TID on 1/26  Namenda 5 mg daily -> 5 mg BID on 1/25 -> 10 mg BID on 1/31  Standard unit PRNs    New medications stopped    Exelon 3 mg BID off label for cognition from TBI due to concerns for agitation with plans to possibly switch to Namenda   Amantadine 100 mg BID on 2/9 due to limited efficacy for his catatonia   Zyprexa, prn upon admission, scheduled 7.5 up to 12.5 mg at bedtime due to patient preference    Programming: Patient will be treated in a therapeutic milieu with appropriate individual and group therapies. Education will be provided on diagnoses, medications, and treatments.     Medical diagnoses:      #. Severe malnutrition  - Nutrition following   - Gaining weight   - Ensure on meal trays   - Monitoring, intermittent IOs    #. Intention tremor bilaterally   - Secondary to lithium  - Monitor  - Conservative management    #. Chronic low back pain, iomproved  #. Muscle spasms, improved  - Robaxin 1,000 mg TID prn. Stopped on 2/2  - Sulindac 150 mg BID for temporary use  - Lidocaine patches prn  - Icy-hot prn   - APAP prn, increased to 1,000 mg   - Recommend referral to PT and PMR on outpatient basis     #. Multiple TBIs  - MRI findings consistent with encephalomalacia in left temporal-occipital region and left anterior frontal lobe.  - Recommend neuropsychological testing as outpatient and possible TBI focused IRTS/CBH facility.  - OT to further evaluate    Consult: Nutrition  Labs: Li 0.8 on  "1/12  Imaging: MRI recently    Anticipated LOS: 2- 6 weeks  Dispo: ACT, home with [employer] worker, IRTS vs adult foster care     Interim History     Patient notes he is doing fine today. Willing to take medications. Is interested in Modafinil. Discussed discharge planning. Denies psychosis. Notes continued apathy. More verbal today.    Patient denies any physical concerns. He is willing to stay in the hospital.      Medications       buPROPion  450 mg Oral Daily     lithium ER  900 mg Oral At Bedtime     LORazepam  1.5 mg Oral TID     memantine  10 mg Oral BID     modafinil  50 mg Oral Daily     pregabalin  100 mg Oral TID     sulindac  150 mg Oral BID     vitamin D2  50,000 Units Oral Q7 Days        Allergies     Allergies   Allergen Reactions     Pork Allergy         Psychiatric Examination     /84   Pulse 81   Temp 98  F (36.7  C) (Tympanic)   Resp 16   Ht 1.829 m (6')   Wt 96.4 kg (212 lb 9.6 oz)   SpO2 99%   BMI 28.83 kg/m    Weight is 212 lbs 9.6 oz  Body mass index is 28.83 kg/m .    Appearance: Alert, oriented, dressed in hospital scrubs, long beard  Attitude: Cooperative   Eye Contact: Fair  Mood: \"Ok\"  Affect: Flat, mood congruent  Speech: Regular rate, soft tone. Normal rhythm   Psychomotor Behavior: No tremor, rigidity, akathisia, or psychomotor retardation. Ambitendency and immobility at times  Thought Process: Linear, concrete, irrational at times   Associations: No loose associations   Thought Content: Denies SI. No SIB. AH and paranoia at times, improved, none recently. Poverty of thought.   Insight: Limited  Judgment: Limited  Oriented to: Person, place, and time  Attention Span and Concentration: Intact  Recent and Remote Memory: Intact  Language: English with appropriate syntax and vocabulary  Fund of Knowledge: Low-Average  Muscle Strength and Tone: Grossly normal  Gait and Station: Grossly normal    Pineda-[place] Catatonia Rating Scale   Severity Score (Number of points for items 1 -23) " _______1___   Screening Score (Presence or absence of items 1 - 14) ______1_____   Time: 11:00 AM on 1/11/2022   Rater: Dr. Shanelle Schuler Catatonia Rating Scale   Severity Score (Number of points for items 1 -23) ____4______   Screening Score (Presence or absence of items 1 - 14) ___1________   Time: 1145AM on 1/18  Rater: Dr. Shanelle Schuler Catatonia Rating Scale   Severity Score (Number of points for items 1 -23) ____7______   Screening Score (Presence or absence of items 1 - 14) ____4_______   Time: 500 PM on 1/26  Rater: Dr. Shanelle Schuler Catatonia Rating Scale   Severity Score (Number of points for items 1 -23) ___5_______   Screening Score (Presence or absence of items 1 - 14) ____1_______   Time: 121 PM on 2/10  Rater: Dr. Timmons     Labs     No results found for this or any previous visit (from the past 24 hour(s)).     Attestation      Patient has been seen and evaluated by:    David Timmons DO, MA  Psychiatrist

## 2022-02-23 NOTE — PLAN OF CARE
"  Problem: Behavioral Health Plan of Care  Goal: Patient-Specific Goal (Individualization)  Description: Patient will be free from self harm or injury  Patient will eat at least 50% of meals  Patient will attend one group every day.    Monitor food intake.  Address and document any self talk or yelling in room  Outcome: Ongoing, Not Progressing  Note: 15:35: Received end of shift report from TRAE Vidales. Blunted affect, depressed mood ambulating in hallways upon arrival.     21:00: Pt ambulates/paces hallways, offers little conversation, states, \"I fine.\" \"everything is good.\"  withdrawn activity, depressed affect. Compliant with HS medications, PRN benadryl given per pt request for sleep. No group participation this shift.    Face to face end of shift report to be communicated to on-coming PM staff.     Connie Wharton RN  2/23/2022  9:33 PM             Problem: Behavior Regulation Impairment (Psychotic Signs/Symptoms)  Goal: Improved Behavioral Control (Psychotic Signs/Symptoms)  Description: Patient will be able to have a reality based conversation.     Outcome: Ongoing, Progressing     Problem: Violence Risk or Actual  Goal: Anger and Impulse Control  Description: Pt will be able to control his anger during stay  Pt will ask for PRN medications as needed to control his impulses    Outcome: Ongoing, Progressing   Goal Outcome Evaluation:    Plan of Care Reviewed With: patient                     "

## 2022-02-24 PROCEDURE — 124N000004

## 2022-02-24 PROCEDURE — 250N000013 HC RX MED GY IP 250 OP 250 PS 637: Performed by: NURSE PRACTITIONER

## 2022-02-24 PROCEDURE — 250N000013 HC RX MED GY IP 250 OP 250 PS 637: Performed by: STUDENT IN AN ORGANIZED HEALTH CARE EDUCATION/TRAINING PROGRAM

## 2022-02-24 PROCEDURE — 99232 SBSQ HOSP IP/OBS MODERATE 35: CPT | Mod: 95 | Performed by: STUDENT IN AN ORGANIZED HEALTH CARE EDUCATION/TRAINING PROGRAM

## 2022-02-24 RX ORDER — MODAFINIL 100 MG/1
100 TABLET ORAL DAILY
Status: DISCONTINUED | OUTPATIENT
Start: 2022-02-25 | End: 2022-02-28

## 2022-02-24 RX ADMIN — PREGABALIN 100 MG: 75 CAPSULE ORAL at 13:11

## 2022-02-24 RX ADMIN — MEMANTINE 10 MG: 5 TABLET ORAL at 20:07

## 2022-02-24 RX ADMIN — LORAZEPAM 1.5 MG: 1 TABLET ORAL at 20:07

## 2022-02-24 RX ADMIN — LITHIUM CARBONATE 900 MG: 450 TABLET, EXTENDED RELEASE ORAL at 20:07

## 2022-02-24 RX ADMIN — LORAZEPAM 1.5 MG: 1 TABLET ORAL at 13:11

## 2022-02-24 RX ADMIN — ERGOCALCIFEROL 50000 UNITS: 1.25 CAPSULE, LIQUID FILLED ORAL at 08:52

## 2022-02-24 RX ADMIN — BUPROPION HYDROCHLORIDE 450 MG: 300 TABLET, EXTENDED RELEASE ORAL at 08:47

## 2022-02-24 RX ADMIN — PREGABALIN 100 MG: 75 CAPSULE ORAL at 20:07

## 2022-02-24 RX ADMIN — PREGABALIN 100 MG: 75 CAPSULE ORAL at 08:47

## 2022-02-24 RX ADMIN — MEMANTINE 10 MG: 5 TABLET ORAL at 08:48

## 2022-02-24 RX ADMIN — SULINDAC 150 MG: 150 TABLET ORAL at 08:47

## 2022-02-24 RX ADMIN — LORAZEPAM 1.5 MG: 1 TABLET ORAL at 08:48

## 2022-02-24 RX ADMIN — SULINDAC 150 MG: 150 TABLET ORAL at 20:07

## 2022-02-24 RX ADMIN — MODAFINIL 50 MG: 100 TABLET ORAL at 08:47

## 2022-02-24 ASSESSMENT — ACTIVITIES OF DAILY LIVING (ADL)
ADLS_ACUITY_SCORE: 7
ORAL_HYGIENE: INDEPENDENT
ADLS_ACUITY_SCORE: 7
LAUNDRY: UNABLE TO COMPLETE
ADLS_ACUITY_SCORE: 7
HYGIENE/GROOMING: INDEPENDENT
DRESS: SCRUBS (BEHAVIORAL HEALTH);INDEPENDENT

## 2022-02-24 NOTE — PLAN OF CARE
"  Problem: Behavioral Health Plan of Care  Goal: Patient-Specific Goal (Individualization)  Description: Patient will be free from self harm or injury  Patient will eat at least 50% of meals  Patient will attend one group every day.    Monitor food intake.  Address and document any self talk or yelling in room  Outcome: Ongoing, Progressing  Note: Shift Summery:      Pt pacing in halls most of the shift. Pt spends much of the shift pacing in the back hallway, pt stops at times and stands in one spot for a few minutes then continues walking. Pt denies all symptoms of pain, SI, HI and hallucinations. When asked about anxiety and depression pt stated \"I'm alright\". Pt med compliant with evening medications.      Problem: Behavior Regulation Impairment (Psychotic Signs/Symptoms)  Goal: Improved Behavioral Control (Psychotic Signs/Symptoms)  Description: Patient will be able to have a reality based conversation.     Outcome: Ongoing, Progressing     Problem: Violence Risk or Actual  Goal: Anger and Impulse Control  Description: Pt will be able to control his anger during stay  Pt will ask for PRN medications as needed to control his impulses    Outcome: Ongoing, Progressing   Goal Outcome Evaluation:    Plan of Care Reviewed With: patient       Face to face end of shift report communicated to night shift RN. Reported that pt is a risk for violence.     Sarahi Hui, RN  2/24/2022  4:27 PM                       "

## 2022-02-24 NOTE — PLAN OF CARE
Attended meeting regarding pt with the [facility]. Caryl is suggesting a board and lodge while pt waits for CADI services. Pt has meeting at 2 PM with  to discuss this option.      contacted [facility] and they are willing to accept pt back. Coordinating with [facility]. Waiting to tell patient until discharge date is set in place. CADI will be put in place while pt is at [facility] for future placements.

## 2022-02-24 NOTE — PLAN OF CARE
Face to face report received from Connie LARKIN. Pt. Observed.     Problem: Behavioral Health Plan of Care  Goal: Patient-Specific Goal (Individualization)  Description: Patient will be free from self harm or injury  Patient will eat at least 50% of meals  Patient will attend one group every day.    Monitor food intake.  Address and document any self talk or yelling in room  Outcome: Ongoing, Progressing  Note: Pt has been in bed with eyes closed and regular respirations x 6 hours this noc shift. 15 minute and PRN checks all night. No complaints offered. Will continue to monitor.         Face to face end of shift report to be communicated to oncoming RN.     Liat Lai RN  2/24/2022

## 2022-02-24 NOTE — PROGRESS NOTES
"CLINICAL NUTRITION SERVICES  -  REASSESSMENT NOTE    Steven Carter     31 yom admitted for catatonia. Pt has a hx of methamphetamine use, alcohol use disorder, schizoaffective disorder, TBI at age of 3. Intake continues to be variable. A bit improved, no 0% intake documented. Mostly % intake. Weight is down 2lbs since last review. Back down to admit weight.    Diet Order: Regular  Intake: 24 meals with % intake      Height: 6' 0\"  Weight: 212 lbs 9.6 oz  Body mass index is 28.83 kg/m .  Weight Status:  Overweight BMI 25-29.9  Weight History:  Max IBW- 83.9kg , admit weight 96.4kg (212 lb 9.6 oz)  Wt Readings from Last 10 Encounters:   12/16/21 96.4 kg (212 lb 9.6 oz)   12/14/21 97.7 kg (215 lb 4.8 oz)   12/01/21 97.8 kg (215 lb 11.2 oz)   10/31/21 105.1 kg (231 lb 11.2 oz)   03/27/21 105.2 kg (232 lb)   11/30/15 96.6 kg (213 lb)   08/18/14 94.5 kg (208 lb 6.4 oz)   08/15/14 93.4 kg (206 lb)   07/19/14 102.1 kg (225 lb)      83.9kg- max ibw  Estimated Energy Needs: 8621-7529 kcals (25-30 Kcal/Kg)   Estimated Protein Needs:  grams protein (1-1.2 g pro/Kg)     Malnutrition Diagnosis: severe malnutrition - improving. Weight gain     NUTRITION RECOMMENDATIONS  - Continue to encourage intake with meals, snacks, supplements  - Monitor weight      MONITORING AND EVALUATION:  RD will monitor intake, weight, labs                "

## 2022-02-24 NOTE — PROGRESS NOTES
[facility] Progress Note     Assessment     Mr. Carter is a 31 year old male with a PMH of schizoaffective disorder, depressive type, catatonia, severe methamphetamine use disorder, alcohol use disorder, and significant TBI at the age of 3 who presented with depression with catatonia being off of Ativan after recently being discharged. This is the patient's 3rd hospitalization in the past roughly two months with substance use complicating his course.     The patient's catatonia and depression have been challenging to treat. His depression improved a little with increasing Wellbutrin and possibly from scheduling Zyprexa. His catatonia has improved some with Ativan, Amantadine, and recently Namenda, although he continues to have ambitendency and withdrawal at times. His Ativan was tapered down due to side effects (sedation) and also in preparation to potentially discharge given his history of misuse of substances. The amount had to be increased to 1.5 mg TID given that catatonia emerged more with the patient refusing his medications without reason and then taking (further sign of ambitendency in decision making). He has irrational belief that stopping his medications will lead to discharge. When he refuses medications his withdrawal worsens leading to poor intake with I/O monitoring when needed. Utilizing behavioral approaches to help with medication adherence. Offered ECT as an option to address his depression and catatonia with him pursuing. Monitoring to see if Patterson-Hagen should be pursued.    Patient has SPMI with him having deficits in his occupational and social functioning. He has had significant CNS insults with him having multiple TBIs, significant substance abuse over years, and near fatal cardiac arrest from overdose (? Anoxic injury) that have likely led to neuropsychiatric impact making it more challenging for him to recover. His  and  note he has not been  the same since his near fatal overdose. Patient did score a 4.4/5.8 on the ACL, which indicates the patient would need some level of support and could not be alone the whole day safely, needing assistance with such stuff as solving problems, removing any safety hazards. The patient is not safe to discharge to live on his own with him likely benefiting the most from supportive housing eventually like a group home with possibly even a legal guardian to manage financial and health concerns.    Today: Patient has been agreeing to take his medications after discussing next treatment options. He is more conversational and less disheveled as anticipated. Discussed treatment options for negative symptoms with their being limited options. He notes he has done well on stimulants before. Will start Modafinil off-label for negative symptoms/anhedonia in depression cautiously first re-starting Zyprexa given risk of worsening psychosis. Discussed multiple options like recommending Rexulti, Vraylar, or Clozapine with the patient refusing. He is most interested in Zyprexa. He agrees to at least take a couple mg. Given his psychotic symptoms are less significant, he might not see any worsening. Will monitor closely. So far, no worsening of psychosis or improvement seen. Will increase to 100 mg daily tomorrow.    Educated regarding medication indications, risks, benefits, side effects, contraindications and possible interactions. Verbally expressed understanding.      Diagnoses     1. Schizoaffective disorder, depressive type, multiple episodes, currently in acute episode, with catatonia   2. 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 Methamphetamine use disorder, severe  4. Alcohol use disorder, moderate  5. 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      Medications:     Outpatient medications continued/changed:     Wellbutrin  mg daily -> 300 mg on 12/24 -> 450 mg on 2/14  Lithium 900 mg at bedtime  Lyrica 100 mg TID (initially held but resumed due to benefit for anxiety and pain)  Vitamin D 50,000 international unit(s) weekly    New medications initiated:     Continue Zyprexa at 2.5 mg at bedtime  Increase Modafinil 50 mg daily - > 100 mg daily on 2/25  Ativan 2 mg QID -> 1.5 mg QID on 1/5 -> 1/2/2 mg on 1/12 -> 1/1/2 mg on 1/15 -> 1 mg TID on 1/17 -> 1.5 mg TID on 1/26  Namenda 5 mg daily -> 5 mg BID on 1/25 -> 10 mg BID on 1/31  Standard unit PRNs    New medications stopped    Exelon 3 mg BID off label for cognition from TBI due to concerns for agitation with plans to possibly switch to Namenda   Amantadine 100 mg BID on 2/9 due to limited efficacy for his catatonia   Zyprexa, prn upon admission, scheduled 7.5 up to 12.5 mg at bedtime due to patient preference    Programming: Patient will be treated in a therapeutic milieu with appropriate individual and group therapies. Education will be provided on diagnoses, medications, and treatments.     Medical diagnoses:      #. Severe malnutrition  - Nutrition following   - Gained weight, but back to admission weight (eating % of meals)  - Ensure on meal trays   - Monitoring, intermittent IOs    #. Intention tremor bilaterally   - Secondary to lithium  - Monitor  - Conservative management    #. Chronic low back pain, improved  #. Muscle spasms, improved  - Robaxin 1,000 mg TID prn. Stopped on 2/2  - Sulindac 150 mg BID for temporary use  - Lidocaine patches prn  - Icy-hot prn   - APAP prn, increased to 1,000 mg   - Recommend referral to PT and PMR on outpatient basis     #. Multiple TBIs  - MRI findings consistent with encephalomalacia in left temporal-occipital region and  "left anterior frontal lobe.  - Recommend neuropsychological testing as outpatient and possible TBI focused IRTS/[facility] facility.  - OT to further evaluate if able. So far, patient has refused.    Consult: Nutrition, OT  Labs: Li 0.8 on 1/12  Imaging: MRI    Anticipated LOS: 2- 6 weeks  Dispo: IRTS. Encouraging patient to fill out CADI waiver given high level of needs with him likely benefiting the most from a group gonzalo setting.     Interim History     Patient notes that he feels alright today as he typically says. He denies any psychosis. No evidence of delusional thought. Patient has irrational beliefs at times like stopping medications will lead to him getting better or discharging, otherwise no evidence of paranoia. He continues with similar routine.    Patient does not notice any difference from starting Modafinil. He consents to increasing to 100 mg to see if this helps. Denies any side effects.    Encouraged the patient to apply for the CADI waiver. He continues to think that a shelter would be a better option. Explained the dangers of this and how he will likely return to the hospital.    Patient denies any safety concerns. Focused on eventual discharge.     Medications       buPROPion  450 mg Oral Daily     lithium ER  900 mg Oral At Bedtime     LORazepam  1.5 mg Oral TID     memantine  10 mg Oral BID     modafinil  50 mg Oral Daily     pregabalin  100 mg Oral TID     sulindac  150 mg Oral BID     vitamin D2  50,000 Units Oral Q7 Days        Allergies     Allergies   Allergen Reactions     Pork Allergy         Psychiatric Examination     /75 (BP Location: Right arm)   Pulse 87   Temp 97.5  F (36.4  C) (Temporal)   Resp 16   Ht 1.829 m (6')   Wt 96.4 kg (212 lb 9.6 oz)   SpO2 98%   BMI 28.83 kg/m    Weight is 212 lbs 9.6 oz  Body mass index is 28.83 kg/m .    Appearance: Alert, oriented, dressed in hospital scrubs, long beard  Attitude: Cooperative   Eye Contact: Fair  Mood: \"Alright\"  Affect: " Flat, mood congruent  Speech: Regular rate, soft tone. Normal rhythm   Psychomotor Behavior: No tremor, rigidity, akathisia, or psychomotor retardation. Ambitendency and immobility at times  Thought Process: Linear, concrete, irrational at times   Associations: No loose associations    Thought Content: Denies SI. No SIB. AH and paranoia at times, improved, none recently. Poverty of thought.   Insight: Limited  Judgment: Limited  Oriented to: Person, place, and time  Attention Span and Concentration: Intact  Recent and Remote Memory: Impairments in recent recall and remote memory encoding  Language: English with appropriate syntax and vocabulary  Fund of Knowledge: Low-Average  Muscle Strength and Tone: Grossly normal  Gait and Station: Grossly normal    Ganga Catatonia Rating Scale   Severity Score (Number of points for items 1 -23) _______1___   Screening Score (Presence or absence of items 1 - 14) ______1_____   Time: 11:00 AM on 1/11/2022   Rater: Dr. Shanelle Schuler Catatonia Rating Scale   Severity Score (Number of points for items 1 -23) ____4______   Screening Score (Presence or absence of items 1 - 14) ___1________   Time: 1145AM on 1/18  Rater: Dr. Shanelle Schuler Catatonia Rating Scale   Severity Score (Number of points for items 1 -23) ____7______   Screening Score (Presence or absence of items 1 - 14) ____4_______   Time: 500 PM on 1/26  Rater: Dr. Shanelle Schuler Catatonia Rating Scale   Severity Score (Number of points for items 1 -23) ___5_______   Screening Score (Presence or absence of items 1 - 14) ____1_______   Time: 121 PM on 2/10  Rater: Dr. Shanelle Schulre Catatonia Rating Scale   Severity Score (Number of points for items 1 -23) ___1_______   Screening Score (Presence or absence of items 1 - 14) ____1_______   Time: 100 PM on 2/23  Rater: Dr. Timmons     Labs     No results found for this or any previous visit (from the past 24 hour(s)).     Attestation       Patient has been seen and evaluated by:    David Timmons DO, MA  Psychiatrist    VIDEO VISIT    Patient has given verbal consent for video visit?: Yes     Video- Visit Details  Type of service:  video visit for mental health treatment.  Time of service:    Date:  02/24/2022    Video Start Time: 945AM      Video End Time: 1000AM    Reason for video visit: COVID-19 [place]  Originating Site (patient location):  [facility]  Distant Site (provider location):  Remote location  Mode of Communication:  Video Conference via [employer]

## 2022-02-24 NOTE — PLAN OF CARE
Problem: Behavioral Health Plan of Care  Goal: Patient-Specific Goal (Individualization)  Description: Patient will be free from self harm or injury  Patient will eat at least 50% of meals  Patient will attend one group every day.    Monitor food intake.  Address and document any self talk or yelling in room  Outcome: Ongoing, Not Progressing  Note:   Pt remains withdrawn to others but occasionally will come out to the lounge. Medication compliant. Calm and cooperative. Affect flat and blunted. No questions regarding discharge as of yet. Denies discomfort. Did not attend group.      Problem: Behavior Regulation Impairment (Psychotic Signs/Symptoms)  Goal: Improved Behavioral Control (Psychotic Signs/Symptoms)  Description: Patient will be able to have a reality based conversation.     Outcome: Ongoing, Not Progressing     Problem: Violence Risk or Actual  Goal: Anger and Impulse Control  Description: Pt will be able to control his anger during stay  Pt will ask for PRN medications as needed to control his impulses    Outcome: Met   Goal Outcome Evaluation:    Plan of Care Reviewed With: patient      Face to face end of shift report communicated to TRAE Brown RN  2/24/2022  9:51 AM

## 2022-02-25 PROCEDURE — 124N000004

## 2022-02-25 PROCEDURE — 250N000013 HC RX MED GY IP 250 OP 250 PS 637: Performed by: NURSE PRACTITIONER

## 2022-02-25 PROCEDURE — 99232 SBSQ HOSP IP/OBS MODERATE 35: CPT | Mod: 95 | Performed by: STUDENT IN AN ORGANIZED HEALTH CARE EDUCATION/TRAINING PROGRAM

## 2022-02-25 PROCEDURE — 250N000013 HC RX MED GY IP 250 OP 250 PS 637: Performed by: STUDENT IN AN ORGANIZED HEALTH CARE EDUCATION/TRAINING PROGRAM

## 2022-02-25 RX ADMIN — MEMANTINE 10 MG: 5 TABLET ORAL at 20:24

## 2022-02-25 RX ADMIN — MODAFINIL 100 MG: 100 TABLET ORAL at 08:25

## 2022-02-25 RX ADMIN — LORAZEPAM 1.5 MG: 1 TABLET ORAL at 20:24

## 2022-02-25 RX ADMIN — SULINDAC 150 MG: 150 TABLET ORAL at 20:24

## 2022-02-25 RX ADMIN — MEMANTINE 10 MG: 5 TABLET ORAL at 08:25

## 2022-02-25 RX ADMIN — PREGABALIN 100 MG: 75 CAPSULE ORAL at 13:41

## 2022-02-25 RX ADMIN — PREGABALIN 100 MG: 75 CAPSULE ORAL at 20:23

## 2022-02-25 RX ADMIN — PREGABALIN 100 MG: 75 CAPSULE ORAL at 08:25

## 2022-02-25 RX ADMIN — SULINDAC 150 MG: 150 TABLET ORAL at 08:25

## 2022-02-25 RX ADMIN — LORAZEPAM 1.5 MG: 1 TABLET ORAL at 13:40

## 2022-02-25 RX ADMIN — LORAZEPAM 1.5 MG: 1 TABLET ORAL at 08:25

## 2022-02-25 RX ADMIN — LITHIUM CARBONATE 900 MG: 450 TABLET, EXTENDED RELEASE ORAL at 20:24

## 2022-02-25 RX ADMIN — BUPROPION HYDROCHLORIDE 450 MG: 300 TABLET, EXTENDED RELEASE ORAL at 08:25

## 2022-02-25 ASSESSMENT — ACTIVITIES OF DAILY LIVING (ADL)
ADLS_ACUITY_SCORE: 7
ADLS_ACUITY_SCORE: 7
DRESS: SCRUBS (BEHAVIORAL HEALTH);INDEPENDENT
ADLS_ACUITY_SCORE: 7
ORAL_HYGIENE: INDEPENDENT
LAUNDRY: UNABLE TO COMPLETE
ADLS_ACUITY_SCORE: 7
HYGIENE/GROOMING: INDEPENDENT
ADLS_ACUITY_SCORE: 7

## 2022-02-25 NOTE — PLAN OF CARE
"  Problem: Behavioral Health Plan of Care  Goal: Patient-Specific Goal (Individualization)  Description: Patient will be free from self harm or injury  Patient will eat at least 50% of meals  Patient will attend one group every day.    Monitor food intake.  Address and document any self talk or yelling in room  Outcome: Ongoing, Progressing  Note:        Problem: Behavior Regulation Impairment (Psychotic Signs/Symptoms)  Goal: Improved Behavioral Control (Psychotic Signs/Symptoms)  Description: Patient will be able to have a reality based conversation.     Outcome: Ongoing, Progressing     Problem: Violence Risk or Actual  Goal: Anger and Impulse Control  Description: Pt will be able to control his anger during stay  Pt will ask for PRN medications as needed to control his impulses    Outcome: Met   Goal Outcome Evaluation:    Plan of Care Reviewed With: patient      Pt remains flat and blunted. Calm and cooperative with assessment. Medication compliant - at first pt stated he didn't want his Ativan but few seconds later he said \"yeah I'll take it\". Offers no additional conversation . No anger or violence noted.   1345- pt has been sitting on the floor in the hallway for quite awhile. Attempted to talk to pt about what he's thinking or interests in life. Pt did say short answers to various questions and thanked writer for talking with him. Pt stated he used to enjoy doing things and going places but does not now. Wants nothing but to be discharged out of here -even to a shelter if need be. Did tell writer he enjoys the smell of cedar oil- asked if he'd like some but pt stated \" I'm ok \"  Face to face end of shift report communicated to TRAE Brown RN  2/25/2022  8:54 AM                    "

## 2022-02-25 NOTE — PROGRESS NOTES
[facility] Psychiatric Progress Note     Assessment     Mr. Carter is a 31 year old male with a PMH of schizoaffective disorder, depressive type, catatonia, severe methamphetamine use disorder, alcohol use disorder, and significant TBI at the age of 3 who presented with depression with catatonia being off of Ativan after recently being discharged. This is the patient's 3rd hospitalization in the past roughly two months with substance use complicating his course.     The patient's catatonia and depression have been challenging to treat. His depression improved a little with increasing Wellbutrin and possibly from scheduling Zyprexa. His catatonia has improved some with Ativan, Amantadine, and recently Namenda, although he continues to have ambitendency and withdrawal at times. His Ativan was tapered down due to side effects (sedation) and also in preparation to potentially discharge given his history of misuse of substances. The amount had to be increased to 1.5 mg TID given that catatonia emerged more with the patient refusing his medications without reason and then taking (further sign of ambitendency in decision making). He has irrational belief that stopping his medications will lead to discharge. When he refuses medications his withdrawal worsens leading to poor intake with I/O monitoring when needed. Utilizing behavioral approaches to help with medication adherence. Offered ECT as an option to address his depression and catatonia with him pursuing. Monitoring to see if Patterson-Hagen should be pursued.    Patient has SPMI with him having deficits in his occupational and social functioning. He has had significant CNS insults with him having multiple TBIs, significant substance abuse over years, and near fatal cardiac arrest from overdose (? Anoxic injury) that have likely led to neuropsychiatric impact making it more challenging for him to recover. His  and  note he has not been  the same since his near fatal overdose. Patient did score a 4.4/5.8 on the ACL, which indicates the patient would need some level of support and could not be alone the whole day safely, needing assistance with such stuff as solving problems, removing any safety hazards. The patient is not safe to discharge to live on his own with him likely benefiting the most from supportive housing eventually like a group home with possibly even a legal guardian to manage financial and health concerns.    Today: Patient appears more stable and does better on medications rather than off. Modafinil started off-label without clear benefit yet. Will provide more time. No side effects seen. While the patient has had multiple medication changes recently, he is stable enough for a board and lodge given the extra support. [facility] being explored as discharge option. Will continue trying to improve negative symptoms and depression as best able.    Educated regarding medication indications, risks, benefits, side effects, contraindications and possible interactions. Verbally expressed understanding.      Diagnoses     1. Schizoaffective disorder, depressive type, multiple episodes, currently in acute episode, with catatonia   2. 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 Methamphetamine use disorder, severe  4. Alcohol use disorder, moderate  5. 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      Medications:     Outpatient medications continued/changed:     Wellbutrin  mg daily -> 300 mg on 12/24 -> 450 mg on 2/14  Lithium 900 mg at bedtime  Lyrica 100 mg TID (initially held but resumed due to benefit for anxiety and  pain)  Vitamin D 50,000 international unit(s) weekly    New medications initiated:     Zyprexa at 2.5 mg at bedtime  Modafinil 100 mg as of 2/25  Ativan 2 mg QID -> 1.5 mg QID on 1/5 -> 1/2/2 mg on 1/12 -> 1/1/2 mg on 1/15 -> 1 mg TID on 1/17 -> 1.5 mg TID on 1/26  Namenda 5 mg daily -> 5 mg BID on 1/25 -> 10 mg BID on 1/31  Standard unit PRNs    New medications stopped    Exelon 3 mg BID off label for cognition from TBI due to concerns for agitation with plans to possibly switch to Namenda   Amantadine 100 mg BID on 2/9 due to limited efficacy for his catatonia   Zyprexa, prn upon admission, scheduled 7.5 up to 12.5 mg at bedtime due to patient preference. Restarted above    Programming: Patient will be treated in a therapeutic milieu with appropriate individual and group therapies. Education will be provided on diagnoses, medications, and treatments.     Medical diagnoses:      #. Severe malnutrition, improved  - Nutrition following   - Gained weight, but back to admission weight (eating % of meals)  - Ensure on meal trays   - Monitoring, intermittent IOs    #. Intention tremor bilaterally   - Secondary to lithium  - Monitor  - Conservative management    #. Chronic low back pain, improved  #. Muscle spasms, improved  - Robaxin 1,000 mg TID prn. Stopped on 2/2  - Sulindac 150 mg BID for temporary use  - Lidocaine patches prn  - Icy-hot prn   - APAP prn, increased to 1,000 mg   - Recommend referral to PT and PMR on outpatient basis     #. Multiple TBIs  - MRI findings consistent with encephalomalacia in left temporal-occipital region and left anterior frontal lobe.  - Recommend neuropsychological testing as outpatient and possible TBI focused IRTS/CBH facility.  - OT to further evaluate if able. So far, patient has refused.    Consult: Nutrition, OT  Labs: Li 0.8 on 1/12  Imaging: MRI    Anticipated LOS: 2- 6 weeks  Dispo: [facility] while CADI services are awaiting.     Interim History     Patient notes that  "he is doing fine today. He denies any depression, but then notes he is not sure. He notes that he does not feel any different from Modafinil. No side effects reported. No psychosis. No signs of sarah.    Patient is interested in any options that allow discharge. He denies any physical concerns. Plans to walk around today and maybe go to group. No agitation or irritability.     Medications       buPROPion  450 mg Oral Daily     lithium ER  900 mg Oral At Bedtime     LORazepam  1.5 mg Oral TID     memantine  10 mg Oral BID     modafinil  100 mg Oral Daily     pregabalin  100 mg Oral TID     sulindac  150 mg Oral BID     vitamin D2  50,000 Units Oral Q7 Days        Allergies     Allergies   Allergen Reactions     Pork Allergy         Psychiatric Examination     /80 (BP Location: Right arm)   Pulse 60   Temp 96.8  F (36  C) (Temporal)   Resp 16   Ht 1.829 m (6')   Wt 96.4 kg (212 lb 9.6 oz)   SpO2 99%   BMI 28.83 kg/m    Weight is 212 lbs 9.6 oz  Body mass index is 28.83 kg/m .    Appearance: Alert, oriented, dressed in hospital scrubs, long beard  Attitude: Cooperative   Eye Contact: Fair  Mood: \"Alright\"  Affect: Flat, mood congruent  Speech: Regular rate, soft tone. Normal rhythm   Psychomotor Behavior: No tremor, rigidity, akathisia, or psychomotor retardation. Ambitendency and immobility at times, improved  Thought Process: Linear, concrete, irrational at times   Associations: No loose associations    Thought Content: Denies SI. No SIB. AH and paranoia at times, improved, none recently. Poverty of thought.   Insight: Limited  Judgment: Limited  Oriented to: Person, place, and time  Attention Span and Concentration: Intact  Recent and Remote Memory: Impairments in recent recall and remote memory encoding  Language: English with appropriate syntax and vocabulary  Fund of Knowledge: Low-Average  Muscle Strength and Tone: Grossly normal  Gait and Station: Grossly normal    [place] Catatonia Rating " Scale   Severity Score (Number of points for items 1 -23) _______1___   Screening Score (Presence or absence of items 1 - 14) ______1_____   Time: 11:00 AM on 1/11/2022   Rater: Dr. Shanelle Schuler Catatonia Rating Scale   Severity Score (Number of points for items 1 -23) ____4______   Screening Score (Presence or absence of items 1 - 14) ___1________   Time: 1145AM on 1/18  Rater: Dr. Shanelle Schuler Catatonia Rating Scale   Severity Score (Number of points for items 1 -23) ____7______   Screening Score (Presence or absence of items 1 - 14) ____4_______   Time: 500 PM on 1/26  Rater: Dr. Shanelle Schuler Catatonia Rating Scale   Severity Score (Number of points for items 1 -23) ___5_______   Screening Score (Presence or absence of items 1 - 14) ____1_______   Time: 121 PM on 2/10  Rater: Dr. Shanelle Schuler Catatonia Rating Scale   Severity Score (Number of points for items 1 -23) ___1_______   Screening Score (Presence or absence of items 1 - 14) ____1_______   Time: 100 PM on 2/23  Rater: Dr. Timmons     Labs     No results found for this or any previous visit (from the past 24 hour(s)).     Attestation      Patient has been seen and evaluated by:    David Timmons DO, MA  Psychiatrist    VIDEO VISIT    Patient has given verbal consent for video visit?: Yes     Video- Visit Details  Type of service:  video visit for mental health treatment.  Time of service:    Date:  02/25/2022    Video Start Time: 1030AM      Video End Time: 1045AM    Reason for video visit: COVID-19 [place]  Originating Site (patient location):  [facility]  Distant Site (provider location):  Remote location  Mode of Communication:  Video Conference via [employer]

## 2022-02-25 NOTE — PLAN OF CARE
"  Problem: Behavioral Health Plan of Care  Goal: Patient-Specific Goal (Individualization)  Description: Patient will be free from self harm or injury  Patient will eat at least 50% of meals  Patient will attend one group every day.    Monitor food intake.  Address and document any self talk or yelling in room  Outcome: Ongoing, Progressing  Note: Shift Summery:       Pt pacing in the hallways this shift. Pt agreed to walk with nursing, cooperative with nursing assessment however pt answered all questions about pain, depression, anxiety, SI, HI and hallucinations with \"no\". Pt states he is frustrated with having to wait for discharge. Pt doesn't attend groups. Attempted to find something that pt could get interested in to pass the time, pt walked away and stated \"Are we done\".      Problem: Behavior Regulation Impairment (Psychotic Signs/Symptoms)  Goal: Improved Behavioral Control (Psychotic Signs/Symptoms)  Description: Patient will be able to have a reality based conversation.     Outcome: Ongoing, Progressing     Problem: Violence Risk or Actual  Goal: Anger and Impulse Control  Description: Pt will be able to control his anger during stay  Pt will ask for PRN medications as needed to control his impulses    Outcome: Ongoing, Progressing   Goal Outcome Evaluation:    Plan of Care Reviewed With: patient        Face to face end of shift report communicated to night shift RN. Reported that pt is a risk for violence.     Sarahi Hui RN  2/25/2022  4:11 PM                    "

## 2022-02-25 NOTE — PLAN OF CARE
Face to face report received from Shima RN. Pt. Observed.     Problem: Behavioral Health Plan of Care  Goal: Patient-Specific Goal (Individualization)  Description: Patient will be free from self harm or injury  Patient will eat at least 50% of meals  Patient will attend one group every day.    Monitor food intake.  Address and document any self talk or yelling in room  Outcome: Ongoing, Progressing  Note: Pt has been in bed with eyes closed and regular respirations. 15 minute and PRN checks all night. No complaints offered. Will continue to monitor.         Pt has been in bed with eyes closed and regular respirations for a total of 4 hours this noc shift. 15 minute and PRN checks all night. No complaints offered. Will continue to monitor.    Face to face end of shift report to be communicated to oncoming RN.     Liat Lai RN  2/25/2022  2:50 AM

## 2022-02-26 PROCEDURE — 99232 SBSQ HOSP IP/OBS MODERATE 35: CPT | Performed by: NURSE PRACTITIONER

## 2022-02-26 PROCEDURE — 124N000004

## 2022-02-26 PROCEDURE — 250N000013 HC RX MED GY IP 250 OP 250 PS 637: Performed by: NURSE PRACTITIONER

## 2022-02-26 PROCEDURE — 250N000013 HC RX MED GY IP 250 OP 250 PS 637: Performed by: STUDENT IN AN ORGANIZED HEALTH CARE EDUCATION/TRAINING PROGRAM

## 2022-02-26 RX ADMIN — LORAZEPAM 1.5 MG: 1 TABLET ORAL at 09:11

## 2022-02-26 RX ADMIN — BUPROPION HYDROCHLORIDE 450 MG: 300 TABLET, EXTENDED RELEASE ORAL at 09:10

## 2022-02-26 RX ADMIN — LORAZEPAM 1.5 MG: 1 TABLET ORAL at 20:32

## 2022-02-26 RX ADMIN — MODAFINIL 100 MG: 100 TABLET ORAL at 09:10

## 2022-02-26 RX ADMIN — LORAZEPAM 1.5 MG: 1 TABLET ORAL at 13:45

## 2022-02-26 RX ADMIN — PREGABALIN 100 MG: 75 CAPSULE ORAL at 13:45

## 2022-02-26 RX ADMIN — MEMANTINE 10 MG: 5 TABLET ORAL at 09:11

## 2022-02-26 RX ADMIN — SULINDAC 150 MG: 150 TABLET ORAL at 09:11

## 2022-02-26 RX ADMIN — MEMANTINE 10 MG: 5 TABLET ORAL at 20:32

## 2022-02-26 RX ADMIN — PREGABALIN 100 MG: 75 CAPSULE ORAL at 09:10

## 2022-02-26 RX ADMIN — PREGABALIN 100 MG: 75 CAPSULE ORAL at 20:32

## 2022-02-26 RX ADMIN — SULINDAC 150 MG: 150 TABLET ORAL at 20:33

## 2022-02-26 RX ADMIN — LITHIUM CARBONATE 900 MG: 450 TABLET, EXTENDED RELEASE ORAL at 20:33

## 2022-02-26 ASSESSMENT — ACTIVITIES OF DAILY LIVING (ADL)
ADLS_ACUITY_SCORE: 7

## 2022-02-26 NOTE — PLAN OF CARE
Face to face end of shift report has been received from evening shift rn with opportunity to observe pt. Pt sitting at edge of bed awake in room.     Problem: Behavioral Health Plan of Care  Goal: Patient-Specific Goal (Individualization)  Description: Patient will be free from self harm or injury  Patient will eat at least 50% of meals  Patient will attend one group every day.    Monitor food intake.  Address and document any self talk or yelling in room  Outcome: Ongoing, Progressing   Goal Outcome Evaluation:    Plan of Care Reviewed With: patient     06:00 - Pt was awake most of night, was either in bed awake on checks or out walking in [place] or sitting in lounge. Pt denied wanting or needing any prn for anxiety or sleep. Would stop in hallway and stand still and stare at wall or at blank tv screen in lounge. Finally went to his room and had eyes closed after 4:30 am.  Has been safe on 15 minute checks. Face to face end of shift report will be communicated to day shift rn.

## 2022-02-26 NOTE — PLAN OF CARE
Face to face shift report received from nurse. Rounding completed, pt observed.    Problem: Behavioral Health Plan of Care  Goal: Patient-Specific Goal (Individualization)  Description: Patient will be free from self harm or injury  Patient will eat at least 50% of meals  Patient will attend one group every day.  Monitor food intake.  Address and document any self talk or yelling in room  Outcome: Ongoing, Not Progressing  Note: Patient refused morning medications right away write told him that writer ill be back in 10 minutes and ask again, patient said ok. Writer asks again after 10 minutes patient took all medications. Patient laying in bed. At lunch patient was taking the tray out and putting it back in the lunch cart. Staff noticed him acting oddly at the time like he was hearing voices.     Patient care continued into evening shift.     Patient again was messing with his tray and appeared responding to internal stimuli at supper. Patient was medication compliant. Patient was free from injury and outbursts.     Problem: Violence Risk or Actual  Goal: Anger and Impulse Control  Description: Pt will be able to control his anger during stay  Pt will ask for PRN medications as needed to control his impulses  Outcome: Ongoing, Progressing     Problem: Behavior Regulation Impairment (Psychotic Signs/Symptoms)  Goal: Improved Behavioral Control (Psychotic Signs/Symptoms)  Description: Patient will be able to have a reality based conversation.   Outcome: Ongoing, Progressing     Face to face report will be communicated to oncoming RN.    Yoan Erickson RN  2/26/2022

## 2022-02-26 NOTE — PROGRESS NOTES
[facility] Psychiatric Progress Note     Assessment     Mr. Carter is a 31 year old male with a PMH of schizoaffective disorder, depressive type, catatonia, severe methamphetamine use disorder, alcohol use disorder, and significant TBI at the age of 3 who presented with depression with catatonia being off of Ativan after recently being discharged. This is the patient's 3rd hospitalization in the past roughly two months with substance use complicating his course.     The patient's catatonia and depression have been challenging to treat. His depression improved a little with increasing Wellbutrin and possibly from scheduling Zyprexa. His catatonia has improved some with Ativan, Amantadine, and recently Namenda, although he continues to have ambitendency and withdrawal at times. His Ativan was tapered down due to side effects (sedation) and also in preparation to potentially discharge given his history of misuse of substances. The amount had to be increased to 1.5 mg TID given that catatonia emerged more with the patient refusing his medications without reason and then taking (further sign of ambitendency in decision making). He has irrational belief that stopping his medications will lead to discharge. When he refuses medications his withdrawal worsens leading to poor intake with I/O monitoring when needed. Utilizing behavioral approaches to help with medication adherence. Offered ECT as an option to address his depression and catatonia with him pursuing. Monitoring to see if Patterson-Hagen should be pursued.    Patient has SPMI with him having deficits in his occupational and social functioning. He has had significant CNS insults with him having multiple TBIs, significant substance abuse over years, and near fatal cardiac arrest from overdose (? Anoxic injury) that have likely led to neuropsychiatric impact making it more challenging for him to recover. His  and  note he has not been  the same since his near fatal overdose. Patient did score a 4.4/5.8 on the ACL, which indicates the patient would need some level of support and could not be alone the whole day safely, needing assistance with such stuff as solving problems, removing any safety hazards. The patient is not safe to discharge to live on his own with him likely benefiting the most from supportive housing eventually like a group home with possibly even a legal guardian to manage financial and health concerns.    Today: Patient appears more stable and does better on medications rather than off. Modafinil started off-label without clear benefit yet. Will provide more time. No side effects seen. While the patient has had multiple medication changes recently, he is stable enough for a board and lodge given the extra support. [facility] being explored as discharge option. Will continue trying to improve negative symptoms and depression as best able.    Educated regarding medication indications, risks, benefits, side effects, contraindications and possible interactions. Verbally expressed understanding.      Diagnoses     1. Schizoaffective disorder, depressive type, multiple episodes, currently in acute episode, with catatonia   2. 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 Methamphetamine use disorder, severe  4. Alcohol use disorder, moderate  5. 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      Medications:     Outpatient medications continued/changed:     Wellbutrin  mg daily -> 300 mg on 12/24 -> 450 mg on 2/14  Lithium 900 mg at bedtime  Lyrica 100 mg TID (initially held but resumed due to benefit for anxiety and  pain)  Vitamin D 50,000 international unit(s) weekly    New medications initiated:     Zyprexa at 2.5 mg at bedtime  Modafinil 100 mg as of 2/25  Ativan 2 mg QID -> 1.5 mg QID on 1/5 -> 1/2/2 mg on 1/12 -> 1/1/2 mg on 1/15 -> 1 mg TID on 1/17 -> 1.5 mg TID on 1/26  Namenda 5 mg daily -> 5 mg BID on 1/25 -> 10 mg BID on 1/31  Standard unit PRNs    New medications stopped    Exelon 3 mg BID off label for cognition from TBI due to concerns for agitation with plans to possibly switch to Namenda   Amantadine 100 mg BID on 2/9 due to limited efficacy for his catatonia   Zyprexa, prn upon admission, scheduled 7.5 up to 12.5 mg at bedtime due to patient preference. Restarted above    Programming: Patient will be treated in a therapeutic milieu with appropriate individual and group therapies. Education will be provided on diagnoses, medications, and treatments.     Medical diagnoses:      #. Severe malnutrition, improved  - Nutrition following   - Gained weight, but back to admission weight (eating % of meals)  - Ensure on meal trays   - Monitoring, intermittent IOs    #. Intention tremor bilaterally   - Secondary to lithium  - Monitor  - Conservative management    #. Chronic low back pain, improved  #. Muscle spasms, improved  - Robaxin 1,000 mg TID prn. Stopped on 2/2  - Sulindac 150 mg BID for temporary use  - Lidocaine patches prn  - Icy-hot prn   - APAP prn, increased to 1,000 mg   - Recommend referral to PT and PMR on outpatient basis     #. Multiple TBIs  - MRI findings consistent with encephalomalacia in left temporal-occipital region and left anterior frontal lobe.  - Recommend neuropsychological testing as outpatient and possible TBI focused IRTS/CBH facility.  - OT to further evaluate if able. So far, patient has refused.    Consult: Nutrition, OT  Labs: Li 0.8 on 1/12  Imaging: MRI    Anticipated LOS: 2- 6 weeks  Dispo: [facility] while CADI services are awaiting.     Interim History     Steven matos  "sleep well last night. He paced the halls and would stare at wall or blank tv screen. He was very tired this morning and slept until about 1100. He did not appear preoccupied. He states he is not sure why he didn't sleep well. He states he is not bothered by having a roommate. I do believe that this had something to do with it. He said \"I may have had too much caffiene yesterday. I did tell him that I saw he was started on modafinil and he states \"whats that for\". I told him it was a stimulant medication to help with thought process/motivation and he said \"I dont feel anything\". He doesn't appear to have cleared in thoughts or quicker thought process.      Medications       buPROPion  450 mg Oral Daily     lithium ER  900 mg Oral At Bedtime     LORazepam  1.5 mg Oral TID     memantine  10 mg Oral BID     modafinil  100 mg Oral Daily     pregabalin  100 mg Oral TID     sulindac  150 mg Oral BID     vitamin D2  50,000 Units Oral Q7 Days        Allergies     Allergies   Allergen Reactions     Pork Allergy         Psychiatric Examination     /77   Pulse 92   Temp 97.4  F (36.3  C) (Tympanic)   Resp 18   Ht 1.829 m (6')   Wt 96.4 kg (212 lb 9.6 oz)   SpO2 98%   BMI 28.83 kg/m    Weight is 212 lbs 9.6 oz  Body mass index is 28.83 kg/m .    Appearance: Alert, oriented, dressed in hospital scrubs, long beard  Attitude: Cooperative   Eye Contact: Fair  Mood: \"Alright\"  Affect: Flat, mood congruent  Speech: Regular rate, soft tone. Normal rhythm   Psychomotor Behavior: No tremor, rigidity, akathisia, or psychomotor retardation. Ambitendency and immobility at times, improved  Thought Process: Linear, concrete, irrational at times   Associations: No loose associations    Thought Content: Denies SI. No SIB. AH and paranoia at times, improved, none recently. Poverty of thought.   Insight: Limited  Judgment: Limited  Oriented to: Person, place, and time  Attention Span and Concentration: Intact  Recent and Remote " Memory: Impairments in recent recall and remote memory encoding  Language: English with appropriate syntax and vocabulary  Fund of Knowledge: Low-Average  Muscle Strength and Tone: Grossly normal  Gait and Station: Grossly normal    [place] Catatonia Rating Scale   Severity Score (Number of points for items 1 -23) _______1___   Screening Score (Presence or absence of items 1 - 14) ______1_____   Time: 11:00 AM on 1/11/2022   Rater: Dr. Shanelle Schuler Catatonia Rating Scale   Severity Score (Number of points for items 1 -23) ____4______   Screening Score (Presence or absence of items 1 - 14) ___1________   Time: 1145AM on 1/18  Rater: Dr. Shanelle Schuler Catatonia Rating Scale   Severity Score (Number of points for items 1 -23) ____7______   Screening Score (Presence or absence of items 1 - 14) ____4_______   Time: 500 PM on 1/26  Rater: Dr. Shanelle Schuler Catatonia Rating Scale   Severity Score (Number of points for items 1 -23) ___5_______   Screening Score (Presence or absence of items 1 - 14) ____1_______   Time: 121 PM on 2/10  Rater: Dr. Shanelle Schuler Catatonia Rating Scale   Severity Score (Number of points for items 1 -23) ___1_______   Screening Score (Presence or absence of items 1 - 14) ____1_______   Time: 100 PM on 2/23  Rater: Dr. Timmons     Labs     No results found for this or any previous visit (from the past 24 hour(s)).     Attestation        Ashely Heaton , CNP

## 2022-02-27 PROCEDURE — 250N000013 HC RX MED GY IP 250 OP 250 PS 637: Performed by: NURSE PRACTITIONER

## 2022-02-27 PROCEDURE — 250N000013 HC RX MED GY IP 250 OP 250 PS 637: Performed by: STUDENT IN AN ORGANIZED HEALTH CARE EDUCATION/TRAINING PROGRAM

## 2022-02-27 PROCEDURE — 124N000004

## 2022-02-27 RX ADMIN — PREGABALIN 100 MG: 75 CAPSULE ORAL at 13:04

## 2022-02-27 RX ADMIN — PREGABALIN 100 MG: 75 CAPSULE ORAL at 08:09

## 2022-02-27 RX ADMIN — SULINDAC 150 MG: 150 TABLET ORAL at 20:34

## 2022-02-27 RX ADMIN — PREGABALIN 100 MG: 75 CAPSULE ORAL at 20:34

## 2022-02-27 RX ADMIN — SULINDAC 150 MG: 150 TABLET ORAL at 08:09

## 2022-02-27 RX ADMIN — MEMANTINE 10 MG: 5 TABLET ORAL at 08:09

## 2022-02-27 RX ADMIN — LITHIUM CARBONATE 900 MG: 450 TABLET, EXTENDED RELEASE ORAL at 20:34

## 2022-02-27 RX ADMIN — MODAFINIL 100 MG: 100 TABLET ORAL at 08:09

## 2022-02-27 RX ADMIN — BUPROPION HYDROCHLORIDE 450 MG: 300 TABLET, EXTENDED RELEASE ORAL at 08:09

## 2022-02-27 RX ADMIN — MEMANTINE 10 MG: 5 TABLET ORAL at 20:34

## 2022-02-27 RX ADMIN — LORAZEPAM 1.5 MG: 1 TABLET ORAL at 08:09

## 2022-02-27 RX ADMIN — LORAZEPAM 1.5 MG: 1 TABLET ORAL at 13:04

## 2022-02-27 ASSESSMENT — ACTIVITIES OF DAILY LIVING (ADL)
ADLS_ACUITY_SCORE: 7
DRESS: SCRUBS (BEHAVIORAL HEALTH);INDEPENDENT
ADLS_ACUITY_SCORE: 7
DRESS: SCRUBS (BEHAVIORAL HEALTH)
LAUNDRY: UNABLE TO COMPLETE
LAUNDRY: UNABLE TO COMPLETE
ADLS_ACUITY_SCORE: 7
ORAL_HYGIENE: INDEPENDENT
HYGIENE/GROOMING: INDEPENDENT
ADLS_ACUITY_SCORE: 7
HYGIENE/GROOMING: INDEPENDENT
ADLS_ACUITY_SCORE: 7
ORAL_HYGIENE: INDEPENDENT
ADLS_ACUITY_SCORE: 7

## 2022-02-27 NOTE — PLAN OF CARE
Problem: Behavioral Health Plan of Care  Goal: Patient-Specific Goal (Individualization)  Description: Patient will be free from self harm or injury  Patient will eat at least 50% of meals  Patient will attend one group every day.    Monitor food intake.  Address and document any self talk or yelling in room  Outcome: Ongoing, Progressing  Note:  Pt denies pain, anxiety, depression, SI/HI, and AH/VH. Pt is isolative and withdrawn, comes out to get water. Pt uses appropriate behavior with staff and peers. Pt refused Ativan 1.5mg at 2037.        Problem: Behavior Regulation Impairment (Psychotic Signs/Symptoms)  Goal: Improved Behavioral Control (Psychotic Signs/Symptoms)  Description: Patient will be able to have a reality based conversation.     Outcome: Ongoing, Progressing     Problem: Violence Risk or Actual  Goal: Anger and Impulse Control  Description: Pt will be able to control his anger during stay  Pt will ask for PRN medications as needed to control his impulses    Outcome: Ongoing, Progressing    Face to face report will be communicated to oncoming RN.    Danica Hawkins RN  2/27/2022

## 2022-02-27 NOTE — PLAN OF CARE
Problem: Behavioral Health Plan of Care  Goal: Patient-Specific Goal (Individualization)  Description: Patient will be free from self harm or injury  Patient will eat at least 50% of meals  Patient will attend one group every day.    Monitor food intake.  Address and document any self talk or yelling in room  Outcome: Ongoing, Progressing  Note:        Problem: Behavior Regulation Impairment (Psychotic Signs/Symptoms)  Goal: Improved Behavioral Control (Psychotic Signs/Symptoms)  Description: Patient will be able to have a reality based conversation.     Outcome: Ongoing, Progressing     Problem: Violence Risk or Actual  Goal: Anger and Impulse Control  Description: Pt will be able to control his anger during stay  Pt will ask for PRN medications as needed to control his impulses    Outcome: Ongoing, Progressing   Goal Outcome Evaluation:    Face to face shift report received from Yoan LARKIN. Rounding completed, pt observed.     Pt awake at the beginning of this shift. Pt does walk in hallways and will sit in the hallway for periods of time. Pt appears to sleep after 0345 rounds. Pt did not have any noted episodes of violence this shift.    Writer continued cares of pt for the day shift.     Pt compliant with medications this shift. Pt denies hallucinations at this time. Pt does continue to be isolative and withdrawn from peers. Pt does walk in hallways at times. Pt did not have any noted episodes of violence.     Face to face report will be communicated to oncsalomón LARKIN.    Kecia Eisenberg RN  2/27/2022  12:22 PM

## 2022-02-28 LAB — SARS-COV-2 RNA RESP QL NAA+PROBE: NEGATIVE

## 2022-02-28 PROCEDURE — 250N000013 HC RX MED GY IP 250 OP 250 PS 637: Performed by: NURSE PRACTITIONER

## 2022-02-28 PROCEDURE — 99232 SBSQ HOSP IP/OBS MODERATE 35: CPT | Mod: 95 | Performed by: STUDENT IN AN ORGANIZED HEALTH CARE EDUCATION/TRAINING PROGRAM

## 2022-02-28 PROCEDURE — 124N000004

## 2022-02-28 PROCEDURE — 250N000013 HC RX MED GY IP 250 OP 250 PS 637: Performed by: STUDENT IN AN ORGANIZED HEALTH CARE EDUCATION/TRAINING PROGRAM

## 2022-02-28 PROCEDURE — U0003 INFECTIOUS AGENT DETECTION BY NUCLEIC ACID (DNA OR RNA); SEVERE ACUTE RESPIRATORY SYNDROME CORONAVIRUS 2 (SARS-COV-2) (CORONAVIRUS DISEASE [COVID-19]), AMPLIFIED PROBE TECHNIQUE, MAKING USE OF HIGH THROUGHPUT TECHNOLOGIES AS DESCRIBED BY CMS-2020-01-R: HCPCS | Performed by: STUDENT IN AN ORGANIZED HEALTH CARE EDUCATION/TRAINING PROGRAM

## 2022-02-28 RX ADMIN — SULINDAC 150 MG: 150 TABLET ORAL at 08:06

## 2022-02-28 RX ADMIN — MEMANTINE 10 MG: 5 TABLET ORAL at 08:06

## 2022-02-28 RX ADMIN — MODAFINIL 100 MG: 100 TABLET ORAL at 08:06

## 2022-02-28 RX ADMIN — MEMANTINE 10 MG: 5 TABLET ORAL at 20:18

## 2022-02-28 RX ADMIN — SULINDAC 150 MG: 150 TABLET ORAL at 20:18

## 2022-02-28 RX ADMIN — PREGABALIN 100 MG: 75 CAPSULE ORAL at 14:05

## 2022-02-28 RX ADMIN — LORAZEPAM 1.5 MG: 1 TABLET ORAL at 20:19

## 2022-02-28 RX ADMIN — LITHIUM CARBONATE 900 MG: 450 TABLET, EXTENDED RELEASE ORAL at 20:18

## 2022-02-28 RX ADMIN — BUPROPION HYDROCHLORIDE 450 MG: 300 TABLET, EXTENDED RELEASE ORAL at 08:06

## 2022-02-28 RX ADMIN — PREGABALIN 100 MG: 75 CAPSULE ORAL at 08:06

## 2022-02-28 RX ADMIN — PREGABALIN 100 MG: 75 CAPSULE ORAL at 20:18

## 2022-02-28 ASSESSMENT — ACTIVITIES OF DAILY LIVING (ADL)
ADLS_ACUITY_SCORE: 7

## 2022-02-28 NOTE — PLAN OF CARE
"Face to face end of shift report received from Kecia SONG RN. Rounding completed and patient observed lying in bed with eyes closed, breathing regular and unlabored.     Goal Outcome Evaluation:    09:15 Update: Patient ate breakfast in the lounge but declined to attend groups. He denied pain. He said \"I'm fine\" to all mental health criteria. He denied having any side effects due to med changes. When asked if he slept okay, he said \"I'm fine.\" Patient is isolative and withdrawn. Patient refused Ativan.     14:00 Update: Patient refused Ativan.    Face to face end of shift report communicated to oncoming RN.    Problem: Behavioral Health Plan of Care  Goal: Patient-Specific Goal (Individualization)  Description: Patient will be free from self harm or injury  Patient will eat at least 50% of meals  Patient will attend one group every day.    Monitor food intake.  Address and document any self talk or yelling in room  Outcome: Ongoing, Not Progressing       Plan of Care Reviewed With: patient        Problem: Behavior Regulation Impairment (Psychotic Signs/Symptoms)  Goal: Improved Behavioral Control (Psychotic Signs/Symptoms)  Description: Patient will be able to have a reality based conversation.     Outcome: Ongoing, Progressing     Problem: Violence Risk or Actual  Goal: Anger and Impulse Control  Description: Pt will be able to control his anger during stay  Pt will ask for PRN medications as needed to control his impulses    Outcome: Ongoing, Progressing                  "

## 2022-02-28 NOTE — PROGRESS NOTES
[facility] Psychiatric Progress Note     Assessment     Mr. Carter is a 31 year old male with a PMH of schizoaffective disorder, depressive type, catatonia, severe methamphetamine use disorder, alcohol use disorder, and significant TBI at the age of 3 who presented with depression with catatonia being off of Ativan after recently being discharged. This is the patient's 3rd hospitalization in the past roughly two months with substance use complicating his course.     The patient's catatonia and depression have been challenging to treat. His depression improved a little with increasing Wellbutrin and possibly from scheduling Zyprexa. His catatonia has improved some with Ativan, Amantadine, and recently Namenda, although he continues to have ambitendency and withdrawal at times. His Ativan was tapered down due to side effects (sedation) and also in preparation to potentially discharge given his history of misuse of substances. The amount had to be increased to 1.5 mg TID given that catatonia emerged more with the patient refusing his medications without reason and then taking (further sign of ambitendency in decision making). He has irrational belief that stopping his medications will lead to discharge. When he refuses medications his withdrawal worsens leading to poor intake with I/O monitoring when needed. Utilizing behavioral approaches to help with medication adherence. Offered ECT as an option to address his depression and catatonia with him pursuing. Monitoring to see if Patterson-Hagen should be pursued.    Patient has SPMI with him having deficits in his occupational and social functioning. He has had significant CNS insults with him having multiple TBIs, significant substance abuse over years, and near fatal cardiac arrest from overdose (? Anoxic injury) that have likely led to neuropsychiatric impact making it more challenging for him to recover. His  and  note he has not been  the same since his near fatal overdose. Patient did score a 4.4/5.8 on the ACL, which indicates the patient would need some level of support and could not be alone the whole day safely, needing assistance with such stuff as solving problems, removing any safety hazards. The patient is not safe to discharge to live on his own with him likely benefiting the most from supportive housing eventually like a group home with possibly even a legal guardian to manage financial and health concerns.    Today: Patient appears more stable and does better on medications rather than off. Modafinil started off-label without clear benefit yet. No side effects. Will increase tomorrow to determine if effective. Patient has been taking medications with him missing Ativan last night and this morning with him instructed to take and him agreeing.    Educated regarding medication indications, risks, benefits, side effects, contraindications and possible interactions. Verbally expressed understanding.      Diagnoses     1. Schizoaffective disorder, depressive type, multiple episodes, currently in acute episode, with catatonia   2. 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 Multiple other TBIs  3. 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 History of multiple cardiac arrests (2012 and 2020) after overdose  5. Methamphetamine use disorder, severe  6. Alcohol use disorder, moderate     Plan     Unit 5  Legal Status: Committed    Safety Assessment:    Behavioral Orders   Procedures     Code 1 - Restrict to Unit     Routine Programming     As clinically indicated     Status 15     Every 15 minutes.      Medications:     Outpatient medications continued/changed:     Wellbutrin  mg daily -> 300 mg on 12/24 -> 450 mg on 2/14  Lithium 900 mg at bedtime  Lyrica 100 mg TID (initially held but resumed due to benefit for anxiety and pain)  Vitamin D 50,000 international  unit(s) weekly    New medications initiated:     Zyprexa at 2.5 mg at bedtime  Modafinil 100 mg as of 2/25 -> 150 mg on 2/29  Ativan 2 mg QID -> 1.5 mg QID on 1/5 -> 1/2/2 mg on 1/12 -> 1/1/2 mg on 1/15 -> 1 mg TID on 1/17 -> 1.5 mg TID on 1/26  Namenda 5 mg daily -> 5 mg BID on 1/25 -> 10 mg BID on 1/31  Standard unit PRNs    New medications stopped    Exelon 3 mg BID off label for cognition from TBI due to concerns for agitation with plans to possibly switch to Namenda   Amantadine 100 mg BID on 2/9 due to limited efficacy for his catatonia   Zyprexa, prn upon admission, scheduled 7.5 up to 12.5 mg at bedtime due to patient preference. Restarted above    Programming: Patient will be treated in a therapeutic milieu with appropriate individual and group therapies. Education will be provided on diagnoses, medications, and treatments.     Medical diagnoses:      #. Severe malnutrition, improved  - Nutrition following   - Gained weight, but back to admission weight (eating % of meals)  - Ensure on meal trays   - Monitoring, intermittent IOs    #. Intention tremor bilaterally   - Secondary to lithium  - Monitor  - Conservative management    #. Chronic low back pain, improved  #. Muscle spasms, improved  - Robaxin 1,000 mg TID prn. Stopped on 2/2  - Sulindac 150 mg BID for temporary use  - Lidocaine patches prn  - Icy-hot prn   - APAP prn, increased to 1,000 mg   - Recommend referral to PT and PMR on outpatient basis     #. Multiple TBIs  - MRI findings consistent with encephalomalacia in left temporal-occipital region and left anterior frontal lobe.  - Recommend neuropsychological testing as outpatient and possible TBI focused IRTS/CBH facility.  - OT to further evaluate if able. So far, patient has refused.    Consult: Nutrition, OT  Labs: Li 0.8 on 1/12  Imaging: MRI    Anticipated LOS: 2- 6 weeks  [facility] while [facility] are awaiting.     Interim History     Patient notes that he is doing fine  "today. He denies any depression. No psychosis. Stopped Ativan briefly due to feeling like it is not working. Encouraged to continue using. He notes he will. Denies any physical concerns. No improvement from Modafinil. Consents to continued use and an increase. He has no other concerns today. No signs of mood destabilization or improvement in negative symptoms/apathy.     Medications       buPROPion  450 mg Oral Daily     lithium ER  900 mg Oral At Bedtime     LORazepam  1.5 mg Oral TID     memantine  10 mg Oral BID     modafinil  100 mg Oral Daily     pregabalin  100 mg Oral TID     sulindac  150 mg Oral BID     vitamin D2  50,000 Units Oral Q7 Days        Allergies     Allergies   Allergen Reactions     Pork Allergy         Psychiatric Examination     /66 (BP Location: Right arm)   Pulse 73   Temp 97.5  F (36.4  C) (Tympanic)   Resp 14   Ht 1.829 m (6')   Wt 96.4 kg (212 lb 9.6 oz)   SpO2 99%   BMI 28.83 kg/m    Weight is 212 lbs 9.6 oz  Body mass index is 28.83 kg/m .    Appearance: Alert, oriented, dressed in hospital scrubs, long beard  Attitude: Cooperative   Eye Contact: Fair  Mood: \"60/40\"  Affect: Flat, mood congruent  Speech: Regular rate, soft tone. Normal rhythm   Psychomotor Behavior: No tremor, rigidity, akathisia, or psychomotor retardation. Ambitendency and immobility at times, improved  Thought Process: Linear, concrete, irrational at times   Associations: No loose associations    Thought Content: Denies SI. No SIB. AH and paranoia at times, improved, none recently. Poverty of thought.   Insight: Limited  Judgment: Limited  Oriented to: Person, place, and time  Attention Span and Concentration: Intact  Recent and Remote Memory: Impairments in recent recall and remote memory encoding  Language: English with appropriate syntax and vocabulary  Fund of Knowledge: Low-Average  Muscle Strength and Tone: Grossly normal  Gait and Station: Grossly normal    [place] Catatonia Rating Scale "   Severity Score (Number of points for items 1 -23) _______1___   Screening Score (Presence or absence of items 1 - 14) ______1_____   Time: 11:00 AM on 1/11/2022   Rater: Dr. Shanelle Schuler Catatonia Rating Scale   Severity Score (Number of points for items 1 -23) ____4______   Screening Score (Presence or absence of items 1 - 14) ___1________   Time: 1145AM on 1/18  Rater: Dr. Shanelle Schuler Catatonia Rating Scale   Severity Score (Number of points for items 1 -23) ____7______   Screening Score (Presence or absence of items 1 - 14) ____4_______   Time: 500 PM on 1/26  Rater: Dr. Shanelle Schuler Catatonia Rating Scale   Severity Score (Number of points for items 1 -23) ___5_______   Screening Score (Presence or absence of items 1 - 14) ____1_______   Time: 121 PM on 2/10  Rater: Dr. Shanelle Schuler Catatonia Rating Scale   Severity Score (Number of points for items 1 -23) ___1_______   Screening Score (Presence or absence of items 1 - 14) ____1_______   Time: 100 PM on 2/23  Rater: Dr. Timmons     Labs     No results found for this or any previous visit (from the past 24 hour(s)).       Treatment Team Care Plan     BEHAVIORAL TEAM DISCUSSION    Progress: Some improvement    Continued Stay Criteria/Rationale: Safe discharge planning    Medical/Physical: See above    Precautions: See above.     Plan: Continue inpatient care with unit support and medication management    Rationale for change in precautions or plan: NA due to no change    Participants: David Timmons DO, Nursing, SW, OT    The patient's care was discussed with the treatment team and chart notes were reviewed.      Attestation      Patient has been seen and evaluated by:    David Timmons DO, MA  Psychiatrist    VIDEO VISIT    Patient has given verbal consent for video visit?: Yes     Video- Visit Details  Type of service:  video visit for mental health treatment.  Time of service:    Date:  02/28/2022    Video Start Time:  1130AM      Video End Time: 1145AM    Reason for video visit: COVID[place]19 [place]  Originating Site (patient location):  [facility]  Distant Site (provider location):  Remote location  Mode of Communication:  Video Conference via [employer]

## 2022-02-28 NOTE — PLAN OF CARE
"Face to face shift report received from TRAE Rankin.       Problem: Behavioral Health Plan of Care  Goal: Patient-Specific Goal (Individualization)  Description: Patient will be free from self harm or injury  Patient will eat at least 50% of meals  Patient will attend one group every day.    Monitor food intake.  Address and document any self talk or yelling in room  Outcome: Ongoing, Progressing   Calm and cooperative. Medication compliant. Denies mental health issues. Offers little during conversation. Flat affect. Pacing in hallways for quite some time after dinner. Pt observed multiple times to stop abruptly while pacing and blink eyes rapidly. Writer approached pt and questioned about these actions. Pt stated \"I'm fine. I'm just thinking. I'm fine.\" Writer attempted to engage pt further in conversation at this time, pt walked away from writer. No reports of pain.   COVID swab obtained per unit policy. Result: NEGATIVE.     Problem: Behavior Regulation Impairment (Psychotic Signs/Symptoms)  Goal: Improved Behavioral Control (Psychotic Signs/Symptoms)  Description: Patient will be able to have a reality based conversation.     Outcome: Ongoing, Progressing       Problem: Violence Risk or Actual  Goal: Anger and Impulse Control  Description: Pt will be able to control his anger during stay  Pt will ask for PRN medications as needed to control his impulses    Outcome: Ongoing, Progressing   Free from violent or threatening behaviors.     Face to face end of shift report to be communicated to oncoming RN.     "

## 2022-02-28 NOTE — PLAN OF CARE
Called [facility], director had a family emergency and would not be in today regarding pt referral. Another coordinator stated this writer could send another referral to them and that they would contact this writer later today.

## 2022-02-28 NOTE — PLAN OF CARE
Problem: Behavioral Health Plan of Care  Goal: Patient-Specific Goal (Individualization)  Description: Patient will be free from self harm or injury  Patient will eat at least 50% of meals  Patient will attend one group every day.    Monitor food intake.  Address and document any self talk or yelling in room  Outcome: Ongoing, Progressing  Note:        Problem: Behavior Regulation Impairment (Psychotic Signs/Symptoms)  Goal: Improved Behavioral Control (Psychotic Signs/Symptoms)  Description: Patient will be able to have a reality based conversation.     Outcome: Ongoing, Progressing     Problem: Violence Risk or Actual  Goal: Anger and Impulse Control  Description: Pt will be able to control his anger during stay  Pt will ask for PRN medications as needed to control his impulses    Outcome: Ongoing, Progressing   Goal Outcome Evaluation:    Face to face shift report received from Danica LARKIN. Rounding completed, pt observed.     Pt awake at the beginning of this shift. Pt spent time walking and sitting in hallways. Pt appeared to be sleeping after 0245 rounds.    Face to face report will be communicated to oncoming RN.    Kecia Eisenberg RN  2/28/2022  6:15 AM

## 2022-03-01 PROCEDURE — 250N000013 HC RX MED GY IP 250 OP 250 PS 637: Performed by: NURSE PRACTITIONER

## 2022-03-01 PROCEDURE — 99232 SBSQ HOSP IP/OBS MODERATE 35: CPT | Performed by: STUDENT IN AN ORGANIZED HEALTH CARE EDUCATION/TRAINING PROGRAM

## 2022-03-01 PROCEDURE — 124N000004

## 2022-03-01 PROCEDURE — 250N000013 HC RX MED GY IP 250 OP 250 PS 637: Performed by: STUDENT IN AN ORGANIZED HEALTH CARE EDUCATION/TRAINING PROGRAM

## 2022-03-01 RX ADMIN — BUPROPION HYDROCHLORIDE 450 MG: 300 TABLET, EXTENDED RELEASE ORAL at 08:49

## 2022-03-01 RX ADMIN — MEMANTINE 10 MG: 5 TABLET ORAL at 20:19

## 2022-03-01 RX ADMIN — PREGABALIN 100 MG: 75 CAPSULE ORAL at 13:18

## 2022-03-01 RX ADMIN — MEMANTINE 10 MG: 5 TABLET ORAL at 08:53

## 2022-03-01 RX ADMIN — MODAFINIL 150 MG: 100 TABLET ORAL at 08:51

## 2022-03-01 RX ADMIN — LORAZEPAM 1.5 MG: 1 TABLET ORAL at 20:18

## 2022-03-01 RX ADMIN — PREGABALIN 100 MG: 75 CAPSULE ORAL at 20:19

## 2022-03-01 RX ADMIN — SULINDAC 150 MG: 150 TABLET ORAL at 08:51

## 2022-03-01 RX ADMIN — LORAZEPAM 1.5 MG: 1 TABLET ORAL at 08:50

## 2022-03-01 RX ADMIN — SULINDAC 150 MG: 150 TABLET ORAL at 20:18

## 2022-03-01 RX ADMIN — LORAZEPAM 1.5 MG: 1 TABLET ORAL at 13:17

## 2022-03-01 RX ADMIN — LITHIUM CARBONATE 900 MG: 450 TABLET, EXTENDED RELEASE ORAL at 20:19

## 2022-03-01 RX ADMIN — PREGABALIN 100 MG: 75 CAPSULE ORAL at 08:52

## 2022-03-01 ASSESSMENT — ACTIVITIES OF DAILY LIVING (ADL)
ADLS_ACUITY_SCORE: 7

## 2022-03-01 NOTE — PLAN OF CARE
"Called [facility] to verify they received the referral. Ciara states she had but was not sure if pt would be a good fit. \"I have to wait for CJ to get this and look it over\".         "

## 2022-03-01 NOTE — PLAN OF CARE
Face to face shift report received from TRAE Abdi.       Problem: Behavioral Health Plan of Care  Goal: Patient-Specific Goal (Individualization)  Description: Patient will be free from self harm or injury  Patient will eat at least 50% of meals  Patient will attend one group every day.    Monitor food intake.  Address and document any self talk or yelling in room  Outcome: Ongoing, Progressing  Note:   Calm and cooperative. Medication compliant. Denies mental health issues, but appears preoccupied. Flat affect. Offers little during conversation. Pacing in hallways frequently. No reports of pain.        Problem: Behavior Regulation Impairment (Psychotic Signs/Symptoms)  Goal: Improved Behavioral Control (Psychotic Signs/Symptoms)  Description: Patient will be able to have a reality based conversation.     Outcome: Ongoing, Progressing         Face to face end of shift report to be communicated to oncoming RN.

## 2022-03-01 NOTE — PLAN OF CARE
Face to face shift report received from Nurse. Rounding completed, pt observed.     Goal Outcome Evaluation:    Problem: Behavioral Health Plan of Care  Goal: Patient-Specific Goal (Individualization)  Description: Patient will be free from self harm or injury  Patient will eat at least 50% of meals  Patient will attend one group every day.    Monitor food intake.  Address and document any self talk or yelling in room  Outcome: Ongoing, Progressing  Note:     Goal: Adheres to Safety Considerations for Self and Others  Intervention: Develop and Maintain Individualized Safety Plan  Recent Flowsheet Documentation  Taken 3/1/2022 0900 by Trinidad Hurley RN  Safety Measures: environmental rounds completed  Goal: Absence of New-Onset Illness or Injury  Intervention: Identify and Manage Fall Risk  Recent Flowsheet Documentation  Taken 3/1/2022 0900 by Trinidad Hurley RN  Safety Measures: environmental rounds completed     Pt. Observed this morning walking hallways.   Pt requested medication and took all medications  Pt Attending groups today      Face to face report will be communicated to oncoming RN.    Trinidad Hurley RN  3/1/2022  10:41 AM

## 2022-03-01 NOTE — PLAN OF CARE
Face to face end of shift report will be communicated to oncoming RN.     Problem: Behavioral Health Plan of Care  Goal: Patient-Specific Goal (Individualization)  Description: Patient will be free from self harm or injury  Patient will eat at least 50% of meals  Patient will attend one group every day.    Monitor food intake.  Address and document any self talk or yelling in room  Outcome: Ongoing, Progressing     Problem: Behavior Regulation Impairment (Psychotic Signs/Symptoms)  Goal: Improved Behavioral Control (Psychotic Signs/Symptoms)  Description: Patient will be able to have a reality based conversation.     Outcome: Ongoing, Progressing     Problem: Violence Risk or Actual  Goal: Anger and Impulse Control  Description: Pt will be able to control his anger during stay  Pt will ask for PRN medications as needed to control his impulses    Outcome: Ongoing, Progressing   Goal Outcome Evaluation:      Face to face end of shift report obtained from TRAE Herring. Pt observed pacing halls.  2350-Pt politely asked for snack. Snack provided.   0200- Pt awake until now. Pt paced hallways and stayed awake in room. No talking to self heard or reported.   0600-Pt appeared to had slept 4 hours so far this shift.

## 2022-03-02 PROCEDURE — 250N000013 HC RX MED GY IP 250 OP 250 PS 637: Performed by: NURSE PRACTITIONER

## 2022-03-02 PROCEDURE — 250N000013 HC RX MED GY IP 250 OP 250 PS 637: Performed by: STUDENT IN AN ORGANIZED HEALTH CARE EDUCATION/TRAINING PROGRAM

## 2022-03-02 PROCEDURE — 124N000004

## 2022-03-02 PROCEDURE — 99232 SBSQ HOSP IP/OBS MODERATE 35: CPT | Performed by: STUDENT IN AN ORGANIZED HEALTH CARE EDUCATION/TRAINING PROGRAM

## 2022-03-02 RX ADMIN — MEMANTINE 10 MG: 5 TABLET ORAL at 20:23

## 2022-03-02 RX ADMIN — LORAZEPAM 1.5 MG: 1 TABLET ORAL at 09:09

## 2022-03-02 RX ADMIN — LITHIUM CARBONATE 900 MG: 450 TABLET, EXTENDED RELEASE ORAL at 20:23

## 2022-03-02 RX ADMIN — PREGABALIN 100 MG: 75 CAPSULE ORAL at 20:23

## 2022-03-02 RX ADMIN — LORAZEPAM 1.5 MG: 1 TABLET ORAL at 13:21

## 2022-03-02 RX ADMIN — PREGABALIN 100 MG: 75 CAPSULE ORAL at 09:08

## 2022-03-02 RX ADMIN — LORAZEPAM 1.5 MG: 1 TABLET ORAL at 20:23

## 2022-03-02 RX ADMIN — PREGABALIN 100 MG: 75 CAPSULE ORAL at 13:21

## 2022-03-02 RX ADMIN — MODAFINIL 150 MG: 100 TABLET ORAL at 09:09

## 2022-03-02 RX ADMIN — SULINDAC 150 MG: 150 TABLET ORAL at 20:23

## 2022-03-02 RX ADMIN — SULINDAC 150 MG: 150 TABLET ORAL at 09:11

## 2022-03-02 RX ADMIN — BUPROPION HYDROCHLORIDE 450 MG: 300 TABLET, EXTENDED RELEASE ORAL at 09:10

## 2022-03-02 ASSESSMENT — ACTIVITIES OF DAILY LIVING (ADL)
ADLS_ACUITY_SCORE: 7
DRESS: SCRUBS (BEHAVIORAL HEALTH);INDEPENDENT
ORAL_HYGIENE: INDEPENDENT
ADLS_ACUITY_SCORE: 7
HYGIENE/GROOMING: INDEPENDENT
ADLS_ACUITY_SCORE: 7
HYGIENE/GROOMING: INDEPENDENT

## 2022-03-02 NOTE — PLAN OF CARE
Pt had their Mnchoice assessment this afternoon. This writer sat in to observe pt. Pt was calm and cooperative, pt stated he did not want to sign the Mnchoice but was willing to talk and sign an VAMSI. Pt minimized most questions and towards the end became short with the . After the interview pt declined having questions or concerns.

## 2022-03-02 NOTE — PLAN OF CARE
Face to face shift report received from Nurse. Rounding completed, pt observed.       Problem: Behavioral Health Plan of Care  Goal: Patient-Specific Goal (Individualization)  Description: Patient will be free from self harm or injury  Patient will eat at least 50% of meals  Patient will attend one group every day.    Monitor food intake.  Address and document any self talk or yelling in room  Outcome: Ongoing, Progressing  Note:        Goal Outcome Evaluation:      Pt. Observed in own room sleeping. Breathing without difficulty.     Pt. Refused namenda  Today     Pt. Eating 50% meals and  attending groups.    Pt observed usually walking the hallways and going back into his own room the majority of the time today.          Face to face report will be communicated to oncoming RN.    Trinidad Hurley RN  3/2/2022  2:55 PM

## 2022-03-02 NOTE — PROGRESS NOTES
[facility] Psychiatric Progress Note     Assessment     Mr. Carter is a 31 year old male with a PMH of schizoaffective disorder, depressive type, catatonia, severe methamphetamine use disorder, alcohol use disorder, and significant TBI at the age of 3 who presented with depression with catatonia being off of Ativan after recently being discharged. This is the patient's 3rd hospitalization in the past roughly two months with substance use complicating his course.     The patient's catatonia and depression have been challenging to treat. His depression improved a little with increasing Wellbutrin and possibly from scheduling Zyprexa. His catatonia has improved some with Ativan, Amantadine, and recently Namenda, although he continues to have ambitendency and withdrawal at times. His Ativan was tapered down due to side effects (sedation) and also in preparation to potentially discharge given his history of misuse of substances. The amount had to be increased to 1.5 mg TID given that catatonia emerged more with the patient refusing his medications without reason and then taking (further sign of ambitendency in decision making). He has irrational belief that stopping his medications will lead to discharge. When he refuses medications his withdrawal worsens leading to poor intake with I/O monitoring when needed. Utilizing behavioral approaches to help with medication adherence. Offered ECT as an option to address his depression and catatonia with him pursuing. Monitoring to see if Patterson-Hagen should be pursued.    Patient has SPMI with him having deficits in his occupational and social functioning. He has had significant CNS insults with him having multiple TBIs, significant substance abuse over years, and near fatal cardiac arrest from overdose (? Anoxic injury) that have likely led to neuropsychiatric impact making it more challenging for him to recover. His  and  note he has not been  the same since his near fatal overdose. Patient did score a 4.4/5.8 on the ACL, which indicates the patient would need some level of support and could not be alone the whole day safely, needing assistance with such stuff as solving problems, removing any safety hazards. The patient is not safe to discharge to live on his own with him likely benefiting the most from supportive housing eventually like a group home with possibly even a legal guardian to manage financial and health concerns.    Today: Patient appears more stable and does better on medications rather than off. Modafinil started off-label without clear benefit yet. No side effects. Increased dose, awaiting any benefit. Possible small improvement in mood.    Educated regarding medication indications, risks, benefits, side effects, contraindications and possible interactions. Verbally expressed understanding.      Diagnoses     1. Schizoaffective disorder, depressive type, multiple episodes, currently in acute episode, with catatonia   2. 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 Multiple other TBIs  3. 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 History of multiple cardiac arrests (2012 and 2020) after overdose  5. Methamphetamine use disorder, severe  6. Alcohol use disorder, moderate     Plan     Unit 5  Legal Status: Committed    Safety Assessment:    Behavioral Orders   Procedures     Code 1 - Restrict to Unit     Routine Programming     As clinically indicated     Status 15     Every 15 minutes.      Medications:     Outpatient medications continued/changed:     Wellbutrin  mg daily -> 300 mg on 12/24 -> 450 mg on 2/14  Lithium 900 mg at bedtime  Lyrica 100 mg TID (initially held but resumed due to benefit for anxiety and pain)  Vitamin D 50,000 international unit(s) weekly    New medications initiated:     Zyprexa at 2.5 mg at bedtime  Modafinil 100 mg as of 2/25 -> 150 mg  "on 2/29  Ativan 2 mg QID -> 1.5 mg QID on 1/5 -> 1/2/2 mg on 1/12 -> 1/1/2 mg on 1/15 -> 1 mg TID on 1/17 -> 1.5 mg TID on 1/26  Namenda 5 mg daily -> 5 mg BID on 1/25 -> 10 mg BID on 1/31  Standard unit PRNs    New medications stopped    Exelon 3 mg BID off label for cognition from TBI due to concerns for agitation with plans to possibly switch to Namenda   Amantadine 100 mg BID on 2/9 due to limited efficacy for his catatonia   Zyprexa, prn upon admission, scheduled 7.5 up to 12.5 mg at bedtime due to patient preference. Restarted above    Programming: Patient will be treated in a therapeutic milieu with appropriate individual and group therapies. Education will be provided on diagnoses, medications, and treatments.     Medical diagnoses:      #. Severe malnutrition, improved  - Nutrition following   - Gained weight, but back to admission weight (eating % of meals)  - Ensure on meal trays   - Monitoring, intermittent IOs    #. Intention tremor bilaterally   - Secondary to lithium  - Monitor  - Conservative management    #. Chronic low back pain, improved  #. Muscle spasms, improved  - Robaxin 1,000 mg TID prn. Stopped on 2/2  - Sulindac 150 mg BID for temporary use  - Lidocaine patches prn  - Icy-hot prn   - APAP prn, increased to 1,000 mg   - Recommend referral to PT and PMR on outpatient basis     #. Multiple TBIs  - MRI findings consistent with encephalomalacia in left temporal-occipital region and left anterior frontal lobe.  - Recommend neuropsychological testing as outpatient and possible TBI focused IRTS/[facility] facility.  - OT to further evaluate if able. So far, patient has refused.    Consult: Nutrition, OT  Labs: Li 0.8 on 1/12  Imaging: MRI    Anticipated LOS: 2- 6 weeks  Dispo: [facility] while [facility]I services are awaiting.     Interim History     Patient notes he feel similar today. He is \"70/30 instead of 60/40.\" He does not notice any improvement with Modafinil but is willing to continue. No " "psychosis or signs of hypomania. Encouraged the patient to attend groups and also do some physical activity. Patient will try to do a push up.    He denies any safety concerns. He is hoping to go to [employer].     Medications       buPROPion  450 mg Oral Daily     lithium ER  900 mg Oral At Bedtime     LORazepam  1.5 mg Oral TID     memantine  10 mg Oral BID     modafinil  150 mg Oral Daily     pregabalin  100 mg Oral TID     sulindac  150 mg Oral BID     vitamin D2  50,000 Units Oral Q7 Days        Allergies     Allergies   Allergen Reactions     Pork Allergy         Psychiatric Examination     /86   Pulse 64   Temp 98.3  F (36.8  C) (Tympanic)   Resp 16   Ht 1.829 m (6')   Wt 96.4 kg (212 lb 9.6 oz)   SpO2 94%   BMI 28.83 kg/m    Weight is 212 lbs 9.6 oz  Body mass index is 28.83 kg/m .    Appearance: Alert, oriented, dressed in hospital scrubs, long beard  Attitude: Cooperative   Eye Contact: Fair  Mood: \"70/30\"  Affect: Flat, mood congruent  Speech: Regular rate, soft tone. Normal rhythm   Psychomotor Behavior: No tremor, rigidity, akathisia, or psychomotor retardation. Ambitendency and immobility at times, improved  Thought Process: Linear, concrete, irrational at times   Associations: No loose associations    Thought Content: Denies SI. No SIB. AH and paranoia at times, improved, none recently. Poverty of thought.   Insight: Limited  Judgment: Limited  Oriented to: Person, place, and time  Attention Span and Concentration: Intact  Recent and Remote Memory: Impairments in recent recall and remote memory encoding  Language: English with appropriate syntax and vocabulary  Fund of Knowledge: Low-Average  Muscle Strength and Tone: Grossly normal  Gait and Station: Grossly normal    P[place] Catatonia Rating Scale   Severity Score (Number of points for items 1 -23) _______1___   Screening Score (Presence or absence of items 1 - 14) ______1_____   Time: 11:00 AM on 1/11/2022   Rater: Dr." Shanelle Schuler Catatonia Rating Scale   Severity Score (Number of points for items 1 -23) ____4______   Screening Score (Presence or absence of items 1 - 14) ___1________   Time: 1145AM on 1/18  Rater: Dr. Shanelle Schuler Catatonia Rating Scale   Severity Score (Number of points for items 1 -23) ____7______   Screening Score (Presence or absence of items 1 - 14) ____4_______   Time: 500 PM on 1/26  Rater: Dr. Shanelle Schuler Catatonia Rating Scale   Severity Score (Number of points for items 1 -23) ___5_______   Screening Score (Presence or absence of items 1 - 14) ____1_______   Time: 121 PM on 2/10  Rater: Dr. Shanelle Schuler Catatonia Rating Scale   Severity Score (Number of points for items 1 -23) ___1_______   Screening Score (Presence or absence of items 1 - 14) ____1_______   Time: 100 PM on 2/23  Rater: Dr. Shanelle Goldsmith     Recent Results (from the past 24 hour(s))   Asymptomatic COVID-19 Virus (Coronavirus) by PCR Nasopharyngeal    Collection Time: 02/28/22  8:23 PM    Specimen: Nasopharyngeal; Swab   Result Value Ref Range    SARS CoV2 PCR Negative Negative        Attestation      Patient has been seen and evaluated by:    David Timmons DO, MA  Psychiatrist

## 2022-03-02 NOTE — PROGRESS NOTES
Chart reviewed. Vitals stable. No acute medical complaints noted.     Pt medically stable, no acute medical concerns. Chronic medical problems stable. Will sign off. Please consult for any new medical issues or concerns.

## 2022-03-02 NOTE — PROGRESS NOTES
"CLINICAL NUTRITION SERVICES  -  ASSESSMENT NOTE    Steven Carter     31 yom admitted for catatonia. Pt has a hx of methamphetamine use, alcohol use disorder, schizoaffective disorder, TBI at age of 3. Intake continues to be variable, intake may be a bit less than last review. No new weight.    Diet Order: Regular  Intake: 19 melas with 0-100% intake      Height: 6' 0\"  Weight: 212 lbs 9.6 oz  Body mass index is 28.83 kg/m .  Weight Status:  Overweight BMI 25-29.9  Weight History:  Max IBW- 83.9kg , admit weight 96.4kg (212 lb 9.6 oz)  Wt Readings from Last 10 Encounters:   12/16/21 96.4 kg (212 lb 9.6 oz)   12/14/21 97.7 kg (215 lb 4.8 oz)   12/01/21 97.8 kg (215 lb 11.2 oz)   10/31/21 105.1 kg (231 lb 11.2 oz)   03/27/21 105.2 kg (232 lb)   11/30/15 96.6 kg (213 lb)   08/18/14 94.5 kg (208 lb 6.4 oz)   08/15/14 93.4 kg (206 lb)   07/19/14 102.1 kg (225 lb)      83.9kg- max ibw  Estimated Energy Needs: 3538-7929 kcals (25-30 Kcal/Kg)   Estimated Protein Needs:  grams protein (1-1.2 g pro/Kg)     Malnutrition Diagnosis: severe malnutrition - improving. Weight stable     NUTRITION RECOMMENDATIONS  - Continue to encourage intake with meals, snacks, supplements  - Monitor weight      MONITORING AND EVALUATION:  RD will monitor intake, weight, labs                "

## 2022-03-02 NOTE — PLAN OF CARE
Face to face end of shift report will be communicated to alexa RN.     Problem: Behavioral Health Plan of Care  Goal: Patient-Specific Goal (Individualization)  Description: Patient will be free from self harm or injury  Patient will eat at least 50% of meals  Patient will attend one group every day.    Monitor food intake.  Address and document any self talk or yelling in room  Outcome: Ongoing, Progressing     Problem: Behavior Regulation Impairment (Psychotic Signs/Symptoms)  Goal: Improved Behavioral Control (Psychotic Signs/Symptoms)  Description: Patient will be able to have a reality based conversation.     Outcome: Ongoing, Progressing     Problem: Violence Risk or Actual  Goal: Anger and Impulse Control  Description: Pt will be able to control his anger during stay  Pt will ask for PRN medications as needed to control his impulses    Outcome: Ongoing, Progressing   Goal Outcome Evaluation:      Face to face end of shift report obtained from Nevaeh LARKIN. Pt observed awake resting in bed.    0100-Pt has been pacing hallway on and off and returning to bed a couple of times so far this shift with no complains.  15 minutes and PRN safety checks completed with no noted complains. No violent or delusional comments noted or reported so far this shift.  0600-Pt appeared to had slept 5 -5.5 hours so far this shift. NO yelling or talking to self noted or reported. Pt did had small snack in the middle of the night.

## 2022-03-03 PROCEDURE — 250N000013 HC RX MED GY IP 250 OP 250 PS 637: Performed by: NURSE PRACTITIONER

## 2022-03-03 PROCEDURE — 124N000004

## 2022-03-03 PROCEDURE — 250N000013 HC RX MED GY IP 250 OP 250 PS 637: Performed by: STUDENT IN AN ORGANIZED HEALTH CARE EDUCATION/TRAINING PROGRAM

## 2022-03-03 PROCEDURE — 99232 SBSQ HOSP IP/OBS MODERATE 35: CPT | Performed by: STUDENT IN AN ORGANIZED HEALTH CARE EDUCATION/TRAINING PROGRAM

## 2022-03-03 RX ADMIN — MEMANTINE 10 MG: 5 TABLET ORAL at 20:59

## 2022-03-03 RX ADMIN — LORAZEPAM 1.5 MG: 1 TABLET ORAL at 20:59

## 2022-03-03 RX ADMIN — PREGABALIN 100 MG: 75 CAPSULE ORAL at 20:59

## 2022-03-03 RX ADMIN — LITHIUM CARBONATE 900 MG: 450 TABLET, EXTENDED RELEASE ORAL at 20:59

## 2022-03-03 RX ADMIN — SULINDAC 150 MG: 150 TABLET ORAL at 20:59

## 2022-03-03 RX ADMIN — MODAFINIL 150 MG: 100 TABLET ORAL at 08:35

## 2022-03-03 RX ADMIN — BUPROPION HYDROCHLORIDE 450 MG: 300 TABLET, EXTENDED RELEASE ORAL at 08:35

## 2022-03-03 RX ADMIN — LORAZEPAM 1.5 MG: 1 TABLET ORAL at 08:34

## 2022-03-03 RX ADMIN — DIPHENHYDRAMINE HCL 50 MG: 50 CAPSULE ORAL at 20:51

## 2022-03-03 RX ADMIN — PREGABALIN 100 MG: 75 CAPSULE ORAL at 13:21

## 2022-03-03 RX ADMIN — LORAZEPAM 1.5 MG: 1 TABLET ORAL at 13:21

## 2022-03-03 RX ADMIN — SULINDAC 150 MG: 150 TABLET ORAL at 08:35

## 2022-03-03 RX ADMIN — MEMANTINE 10 MG: 5 TABLET ORAL at 08:34

## 2022-03-03 RX ADMIN — PREGABALIN 100 MG: 75 CAPSULE ORAL at 08:35

## 2022-03-03 ASSESSMENT — ACTIVITIES OF DAILY LIVING (ADL)
ORAL_HYGIENE: INDEPENDENT
ADLS_ACUITY_SCORE: 7
ADLS_ACUITY_SCORE: 7
ORAL_HYGIENE: INDEPENDENT
DRESS: SCRUBS (BEHAVIORAL HEALTH);INDEPENDENT
HYGIENE/GROOMING: INDEPENDENT
ADLS_ACUITY_SCORE: 7
DRESS: SCRUBS (BEHAVIORAL HEALTH);INDEPENDENT
ADLS_ACUITY_SCORE: 7
HYGIENE/GROOMING: INDEPENDENT
ADLS_ACUITY_SCORE: 7
LAUNDRY: UNABLE TO COMPLETE
ADLS_ACUITY_SCORE: 7

## 2022-03-03 NOTE — PLAN OF CARE
Emailed Nikole regarding pt's funding. Pt's [facility] funds have  and will have to fill out another CAF and have an interview scheduled. Gave pt CAF and explained why he will need to fill it out, pt was cooperative and states he will fill it out.

## 2022-03-03 NOTE — PLAN OF CARE
Problem: Behavioral Health Plan of Care  Goal: Patient-Specific Goal (Individualization)  Description: Patient will be free from self harm or injury  Patient will eat at least 50% of meals  Patient will attend one group every day.    Monitor food intake.  Address and document any self talk or yelling in room  Outcome: Ongoing, Progressing  Note:        Problem: Violence Risk or Actual  Goal: Anger and Impulse Control  Description: Pt will be able to control his anger during stay  Pt will ask for PRN medications as needed to control his impulses    Outcome: Ongoing, Progressing   Goal Outcome Evaluation:    Face to face shift report received from Caryl LARKIN. Rounding completed, pt observed.     Pt pleasant and cooperative with nursing assessment. Pt compliant with morning medications except when went back to give the Vit D2, pt says just forget about that one. Pt does engage in conversation more today than previous days. Encouraged pt to attend groups. Pt did not have any noted episodes of violence this shift. Pt provided with shower supplies this shift. Pt does lay down after lunch.    Face to face report will be communicated to oncsalomón LARKIN.    Kecia Eisenberg RN  3/3/2022  1:03 PM

## 2022-03-03 NOTE — PLAN OF CARE
"Problem: Behavioral Health Plan of Care  Goal: Patient-Specific Goal (Individualization)  Description: Patient will be free from self harm or injury  Patient will eat at least 50% of meals  Patient will attend one group every day.  Monitor food intake.  Address and document any self talk or yelling in room  Outcome: Ongoing, Progressing  Note: Pt had a blunted, flat affect and calm mood. He spent the majority of the shift pacing in the hallway or sitting in the lounge at a table by himself. He informed writer that he might go to \"[employer] again\". He talked of when he was there last year and stated \"I got bored, so I just left.\" He ate 25% of his supper tonight. He was compliant with his scheduled medications. 2255 - Pt had one episode of yelling \"what the fuck should I do?\" while standing in the hallway by himself. Pt returned to his room afterwards.      Face to face end of shift report communicated to oncoming RN.     Problem: Behavior Regulation Impairment (Psychotic Signs/Symptoms)  Goal: Improved Behavioral Control (Psychotic Signs/Symptoms)  Description: Patient will be able to have a reality based conversation.   Note: Pt was able to maintain a reality based conversation.      Problem: Violence Risk or Actual  Goal: Anger and Impulse Control  Description: Pt will be able to control his anger during stay  Pt will ask for PRN medications as needed to control his impulses  Outcome: Ongoing, Progressing  Note: Pt remained free form anger outbursts and impulsive behavior.      Goal Outcome Evaluation:    Plan of Care Reviewed With: patient                 "

## 2022-03-03 NOTE — PROGRESS NOTES
[facility] Psychiatric Progress Note     Assessment     Mr. Carter is a 31 year old male with a PMH of schizoaffective disorder, depressive type, catatonia, severe methamphetamine use disorder, alcohol use disorder, and significant TBI at the age of 3 who presented with depression with catatonia being off of Ativan after recently being discharged. This is the patient's 3rd hospitalization in the past roughly two months with substance use complicating his course.     The patient's catatonia and depression have been challenging to treat. His depression improved a little with increasing Wellbutrin and possibly from scheduling Zyprexa. His catatonia has improved some with Ativan, Amantadine, and recently Namenda, although he continues to have ambitendency and withdrawal at times. His Ativan was tapered down due to side effects (sedation) and also in preparation to potentially discharge given his history of misuse of substances. The amount had to be increased to 1.5 mg TID given that catatonia emerged more with the patient refusing his medications without reason and then taking (further sign of ambitendency in decision making). He has irrational belief that stopping his medications will lead to discharge. When he refuses medications his withdrawal worsens leading to poor intake with I/O monitoring when needed. Utilizing behavioral approaches to help with medication adherence. Offered ECT as an option to address his depression and catatonia with him pursuing. Monitoring to see if Patterson-Hagen should be pursued.    Patient has SPMI with him having deficits in his occupational and social functioning. He has had significant CNS insults with him having multiple TBIs, significant substance abuse over years, and near fatal cardiac arrest from overdose (? Anoxic injury) that have likely led to neuropsychiatric impact making it more challenging for him to recover. His  and  note he has not been  the same since his near fatal overdose. Patient did score a 4.4/5.8 on the ACL, which indicates the patient would need some level of support and could not be alone the whole day safely, needing assistance with such stuff as solving problems, removing any safety hazards. The patient is not safe to discharge to live on his own with him likely benefiting the most from supportive housing eventually like a group home with possibly even a legal guardian to manage financial and health concerns.    Today: Patient appears more stable and does better on medications rather than off. Modafinil started off-label without clear benefit yet. No side effects. Increased dose, awaiting any benefit. Possible small improvement in mood. Intermittently will stop a med for a day.    Educated regarding medication indications, risks, benefits, side effects, contraindications and possible interactions. Verbally expressed understanding.      Diagnoses     1. Schizoaffective disorder, depressive type, multiple episodes, currently in acute episode, with catatonia   2. 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 Multiple other TBIs  3. 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 History of multiple cardiac arrests (2012 and 2020) after overdose  5. Methamphetamine use disorder, severe  6. Alcohol use disorder, moderate     Plan     Unit 5  Legal Status: Committed    Safety Assessment:    Behavioral Orders   Procedures     Code 1 - Restrict to Unit     Routine Programming     As clinically indicated     Status 15     Every 15 minutes.      Medications:     Outpatient medications continued/changed:     Wellbutrin  mg daily -> 300 mg on 12/24 -> 450 mg on 2/14  Lithium 900 mg at bedtime  Lyrica 100 mg TID (initially held but resumed due to benefit for anxiety and pain)  Vitamin D 50,000 international unit(s) weekly    New medications initiated:     Zyprexa at 2.5 mg at  bedtime  Modafinil 100 mg as of 2/25 -> 150 mg on 2/29  Ativan 2 mg QID -> 1.5 mg QID on 1/5 -> 1/2/2 mg on 1/12 -> 1/1/2 mg on 1/15 -> 1 mg TID on 1/17 -> 1.5 mg TID on 1/26  Namenda 5 mg daily -> 5 mg BID on 1/25 -> 10 mg BID on 1/31  Standard unit PRNs    New medications stopped    Exelon 3 mg BID off label for cognition from TBI due to concerns for agitation with plans to possibly switch to Namenda   Amantadine 100 mg BID on 2/9 due to limited efficacy for his catatonia   Zyprexa, prn upon admission, scheduled 7.5 up to 12.5 mg at bedtime due to patient preference. Restarted above    Programming: Patient will be treated in a therapeutic milieu with appropriate individual and group therapies. Education will be provided on diagnoses, medications, and treatments.     Medical diagnoses:      #. Severe malnutrition, improved  - Nutrition following   - Gained weight, but back to admission weight (eating % of meals)  - Ensure on meal trays   - Monitoring, intermittent IOs    #. Intention tremor bilaterally   - Secondary to lithium  - Monitor  - Conservative management    #. Chronic low back pain, improved  #. Muscle spasms, improved  - Robaxin 1,000 mg TID prn. Stopped on 2/2  - Sulindac 150 mg BID for temporary use  - Lidocaine patches prn  - Icy-hot prn   - APAP prn, increased to 1,000 mg   - Recommend referral to PT and PMR on outpatient basis     #. Multiple TBIs  - MRI findings consistent with encephalomalacia in left temporal-occipital region and left anterior frontal lobe.  - Recommend neuropsychological testing as outpatient and possible TBI focused IRTS/CBH facility.  - OT to further evaluate if able. So far, patient has refused.    Consult: Nutrition, OT  Labs: Li 0.8 on 1/12  Imaging: MRI    Anticipated LOS: 2- 6 weeks  Dispo: [facility] while CADI services are awaiting.     Interim History     Patient notes he feels alright today. He is willing to go through the CADI process. He hopes to discharge  "soon. No problems with medications. No safety concerns. No physical concerns. Plans to go to group today. No benefit in his view with Modafinil yet. No psychosis or hypomania/sarah.     Medications       buPROPion  450 mg Oral Daily     lithium ER  900 mg Oral At Bedtime     LORazepam  1.5 mg Oral TID     memantine  10 mg Oral BID     modafinil  150 mg Oral Daily     pregabalin  100 mg Oral TID     sulindac  150 mg Oral BID     vitamin D2  50,000 Units Oral Q7 Days        Allergies     Allergies   Allergen Reactions     Pork Allergy         Psychiatric Examination     /60   Pulse 77   Temp 97.8  F (36.6  C) (Tympanic)   Resp 16   Ht 1.829 m (6')   Wt 96.4 kg (212 lb 9.6 oz)   SpO2 96%   BMI 28.83 kg/m    Weight is 212 lbs 9.6 oz  Body mass index is 28.83 kg/m .    Appearance: Alert, oriented, dressed in hospital scrubs, long beard  Attitude: Cooperative   Eye Contact: Fair  Mood: \"Aight\"  Affect: Flat, mood congruent  Speech: Regular rate, soft tone. Normal rhythm   Psychomotor Behavior: No tremor, rigidity, akathisia, or psychomotor retardation. Ambitendency and immobility at times, improved  Thought Process: Linear, concrete, irrational at times   Associations: No loose associations    Thought Content: Denies SI. No SIB. AH and paranoia at times, improved, none recently. Poverty of thought.   Insight: Limited  Judgment: Limited  Oriented to: Person, place, and time  Attention Span and Concentration: Intact  Recent and Remote Memory: Impairments in recent recall and remote memory encoding  Language: English with appropriate syntax and vocabulary  Fund of Knowledge: Low-Average  Muscle Strength and Tone: Grossly normal  Gait and Station: Grossly normal    [place] Catatonia Rating Scale   Severity Score (Number of points for items 1 -23) _______1___   Screening Score (Presence or absence of items 1 - 14) ______1_____   Time: 11:00 AM on 1/11/2022   Rater: Dr. Shanelle Schuler Catatonia Rating " Scale   Severity Score (Number of points for items 1 -23) ____4______   Screening Score (Presence or absence of items 1 - 14) ___1________   Time: 1145AM on 1/18  Rater: Dr. Shanelle Schuler Catatonia Rating Scale   Severity Score (Number of points for items 1 -23) ____7______   Screening Score (Presence or absence of items 1 - 14) ____4_______   Time: 500 PM on 1/26  Rater: Dr. Shanelle Schuler Catatonia Rating Scale   Severity Score (Number of points for items 1 -23) ___5_______   Screening Score (Presence or absence of items 1 - 14) ____1_______   Time: 121 PM on 2/10  Rater: Dr. Shanelle Schuler Catatonia Rating Scale   Severity Score (Number of points for items 1 -23) ___1_______   Screening Score (Presence or absence of items 1 - 14) ____1_______   Time: 100 PM on 2/23  Rater: Dr. Shanelle Schuler Catatonia Rating Scale   Severity Score (Number of points for items 1 -23) ___0_______   Screening Score (Presence or absence of items 1 - 14) __0_________   Time: 10 AM on 3/3  Rater: Dr. Timmons    1. Immobility/stupor: Extreme hypoactivity, immobile, minimally responsive to stimuli.   0 - Absent.   1 - Sits abnormally still, may interact briefly.   2 - Virtually no interaction with external world.   3 - Stuporous, non-reactive to painful stimuli.   2. Mutism: Verbally unresponsive or minimally responsive.   0 = Absent.   1 = Verbally unresponsive to majority of questions; incomprehensible whisper.   2 = Speaks less than 20 words/5mins.   3 = No speech.   3. = Staring: Fixed gaze, little or no visual scanning of environment, decreased blinking.   0 = Absent.   1 = Poor eye contact, repeatedly gazes less than 20 s between shifting of attention; decreased blinking.   2 = Gaze held longer than 20 s, occasionally shifts attention.   3 = Fixed gaze, non-reactive.   4. Posturing/catalepsy: Spontaneous maintenance of posture (s), including mundane (e.g. sitting or standing for long periods without  reacting).   0 = Absent.   1 = Less than I min.   2 Greater than one minute, less than 15 min.   3 Bizarre posture, or mundane maintained more than 15 min.   5. Grimacing: Maintenance of odd facial expressions.   0 = Absent.   1 = Less than [id number].   2 = Less than 1 min.   3 = Bizarre expression(s) or maintained more than 1 min.   6. Echopraxia/echolalia: Mimicking of examiner's movements (echopraxia) or speech (echolalia).   0 = Absent   1 = Occasional.   2 = Frequent.   3 = Constant   7. Stereotypy: Repetitive, non-goal-directed motor activity (e.g. finger-play, repeatedly touching, patting or rubbing self); abnormality not inherent in act but in its frequency.   0 - Absent   1 - Occasional.   2 - Frequent.   3 - Constant.   8. Mannerisms: Odd, purposeful movements (hopping or walking tiptoe, saluting passers-by or exaggerated caricatures of mundane movements); abnormality inherent in act itself.   0 - Absent   1 - Occasional.   2 - Frequent.   3 - Constant.   9. Stereotyped & meaningless repetition of words & phrases (verbigeration): Repetition of phrases or sentences (like a scratched records).   0 - Absent.   1 - Occasional.   2 - Frequent, difficult to interrupt.   3 - Constant.   10. Rigidity: Maintenance of a rigid position despite efforts to be moved (exclude if cog-wheeling or tremor present)   0 = Absent.   1 = Mild resistance.   2 = Moderate.   3 = Severe, cannot be repostured.   11. Negativism: Apparently motiveless resistance to instructions or attempts to move/examine patients. Contrary behavior, does exact opposite of instruction.   0 - Absent   1 - Mild resistance and/or occasionally contrary.   2 - Moderate resistance and/or frequently contrary.   3 - Severe resistance and/or continually contrary.   12. Waxy flexibility: During repositioning of patient, patient offers initial resistance before allowing him/herself to be repositioned, similar to that of a bending candle. (also defined as slow  resistance to movement as the patient allows the examiner to place his/her extremities in unusual positions. The limb may remain in the position in which they are placed or not)   0 - Absent   3 - Present.   13. Withdrawal: Refusal to eat, drink and/or make eye contact.   0 = Absent.   1 = Minimal oral intake/interaction for less than 1 day.   2 = Minimal oral intake/interaction for more than 1 day.   3 = No oral intake/interaction for 1 day or more.   14. Excitement: Extreme hyperactivity, constant motor unrest which is apparently non-purposeful.   Not to be attributed to akathisia or goal-directed agitation.   1 - Excessive motion, intermittent.   2 - Constant motion, hyperkinetic without rest periods.   3 - Full-blown catatonic excitement, endless frenzied motor activity.   ------------End of Screening Items-------------   15. Impulsivity: Patient suddenly engages in inappropriate behavior (e.g. runs down hallway, starts screaming or takes off clothes) without provocation. Afterwards can give no, or only a facile explanation.   0 - Absent.   1 - Occasional.   2 - Frequent.   3 - Constant or not redirectable.   16. Automatic obedience: Exaggerated cooperation with examiner's request or spontaneous continuation of movement requested.   0 = Absent.   1 = Occasional   2 = Frequent   3 = Constant.   17. Passive Obedience (mitgehen): Patient raises arm in response to light pressure of finger, despite instructions to the contrary.   0 = Absent.   3 = Present.   18. Muscle Resistance (gegenhalten): Involuntary resistance to passive movement of a limb to a new position. Resistance increases with the speed of the movement.   0 - Absent   3 - Present.   19. Motorically Stuck (ambitendency): Patient appears stuck in indecisive, hesitant motor movements.   0 - Absent.   3 = Present.   20. Grasp reflex: Striking the patient's open palm with two extended fingers of the examiner's hand results in automatic closure of patients  hand.   0 = Absent   3 = Present   21. Perseveration: Repeatedly returns to same topic or persists with the same movements.   0 = Absent.   3 = Present.   22. Combativeness: Belligerence or aggression, Usually in an undirected manner, without explanation.   0 = Absent   1 = Occasionally strikes out, low potential for injury.   2 = Frequently strikes out, moderate potential for injury.   3 = Serious danger to others.   23. Autonomic abnormality: Abnormality of body temperature (fever), blood pressure, pulse, respiratory rate, inappropriate sweating, flushing.   0 = Absent   1 = Abnormality of one parameter (exclude pre-existing hypertension).   2 = Abnormality of two parameters.   3 = Abnormality of three or more parameters.        Labs     No results found for this or any previous visit (from the past 24 hour(s)).     Attestation      Patient has been seen and evaluated by:    David Timmons DO, MA  Psychiatrist

## 2022-03-03 NOTE — PROGRESS NOTES
[facility] Psychiatric Progress Note     Assessment     Mr. Carter is a 31 year old male with a PMH of schizoaffective disorder, depressive type, catatonia, severe methamphetamine use disorder, alcohol use disorder, and significant TBI at the age of 3 who presented with depression with catatonia being off of Ativan after recently being discharged. This is the patient's 3rd hospitalization in the past roughly two months with substance use complicating his course.     The patient's catatonia and depression have been challenging to treat. His depression improved a little with increasing Wellbutrin and possibly from scheduling Zyprexa. His catatonia has improved some with Ativan, Amantadine, and recently Namenda, although he continues to have ambitendency and withdrawal at times. His Ativan was tapered down due to side effects (sedation) and also in preparation to potentially discharge given his history of misuse of substances. The amount had to be increased to 1.5 mg TID given that catatonia emerged more with the patient refusing his medications without reason and then taking (further sign of ambitendency in decision making). He has irrational belief that stopping his medications will lead to discharge. When he refuses medications his withdrawal worsens leading to poor intake with I/O monitoring when needed. Utilizing behavioral approaches to help with medication adherence. Offered ECT as an option to address his depression and catatonia with him pursuing. Monitoring to see if Patterson-Hagen should be pursued.    Patient has SPMI with him having deficits in his occupational and social functioning. He has had significant CNS insults with him having multiple TBIs, significant substance abuse over years, and near fatal cardiac arrest from overdose (? Anoxic injury) that have likely led to neuropsychiatric impact making it more challenging for him to recover. His  and  note he has not been  the same since his near fatal overdose. Patient did score a 4.4/5.8 on the ACL, which indicates the patient would need some level of support and could not be alone the whole day safely, needing assistance with such stuff as solving problems, removing any safety hazards. The patient is not safe to discharge to live on his own with him likely benefiting the most from supportive housing eventually like a group home with possibly even a legal guardian to manage financial and health concerns.    Today: Patient appears more stable and does better on medications rather than off. Modafinil started off-label without clear benefit yet. No side effects. Increased dose, awaiting any benefit. Possible small improvement in mood. Intermittently will stop a med for a day.    Educated regarding medication indications, risks, benefits, side effects, contraindications and possible interactions. Verbally expressed understanding.      Diagnoses     1. Schizoaffective disorder, depressive type, multiple episodes, currently in acute episode, with catatonia   2. 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 Multiple other TBIs  3. 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 History of multiple cardiac arrests (2012 and 2020) after overdose  5. Methamphetamine use disorder, severe  6. Alcohol use disorder, moderate     Plan     Unit 5  Legal Status: Committed    Safety Assessment:    Behavioral Orders   Procedures     Code 1 - Restrict to Unit     Routine Programming     As clinically indicated     Status 15     Every 15 minutes.      Medications:     Outpatient medications continued/changed:     Wellbutrin  mg daily -> 300 mg on 12/24 -> 450 mg on 2/14  Lithium 900 mg at bedtime  Lyrica 100 mg TID (initially held but resumed due to benefit for anxiety and pain)  Vitamin D 50,000 international unit(s) weekly    New medications initiated:     Zyprexa at 2.5 mg at  bedtime  Modafinil 100 mg as of 2/25 -> 150 mg on 2/29  Ativan 2 mg QID -> 1.5 mg QID on 1/5 -> 1/2/2 mg on 1/12 -> 1/1/2 mg on 1/15 -> 1 mg TID on 1/17 -> 1.5 mg TID on 1/26  Namenda 5 mg daily -> 5 mg BID on 1/25 -> 10 mg BID on 1/31  Standard unit PRNs    New medications stopped    Exelon 3 mg BID off label for cognition from TBI due to concerns for agitation with plans to possibly switch to Namenda   Amantadine 100 mg BID on 2/9 due to limited efficacy for his catatonia   Zyprexa, prn upon admission, scheduled 7.5 up to 12.5 mg at bedtime due to patient preference. Restarted above    Programming: Patient will be treated in a therapeutic milieu with appropriate individual and group therapies. Education will be provided on diagnoses, medications, and treatments.     Medical diagnoses:      #. Severe malnutrition, improved  - Nutrition following   - Gained weight, but back to admission weight (eating % of meals)  - Ensure on meal trays   - Monitoring, intermittent IOs    #. Intention tremor bilaterally   - Secondary to lithium  - Monitor  - Conservative management    #. Chronic low back pain, improved  #. Muscle spasms, improved  - Robaxin 1,000 mg TID prn. Stopped on 2/2  - Sulindac 150 mg BID for temporary use  - Lidocaine patches prn  - Icy-hot prn   - APAP prn, increased to 1,000 mg   - Recommend referral to PT and PMR on outpatient basis     #. Multiple TBIs  - MRI findings consistent with encephalomalacia in left temporal-occipital region and left anterior frontal lobe.  - Recommend neuropsychological testing as outpatient and possible TBI focused IRTS/CBH facility.  - OT to further evaluate if able. So far, patient has refused.    Consult: Nutrition, OT  Labs: Li 0.8 on 1/12  Imaging: MRI    Anticipated LOS: 2- 6 weeks  Dispo: [facility] while CADI services are awaiting.     Interim History     Patient notes he feel similar today. He is unsure if there is any difference on Modafinil. Is rigid with his  "thought about a CADI waiver. He notes that does not want to sign it because he thinks it is \"not necessary and means he is signing his rights away.\" Patient denies any paranoia or psychosis. No mood destabilization. Patient to attend groups.     Medications       buPROPion  450 mg Oral Daily     lithium ER  900 mg Oral At Bedtime     LORazepam  1.5 mg Oral TID     memantine  10 mg Oral BID     modafinil  150 mg Oral Daily     pregabalin  100 mg Oral TID     sulindac  150 mg Oral BID     vitamin D2  50,000 Units Oral Q7 Days        Allergies     Allergies   Allergen Reactions     Pork Allergy         Psychiatric Examination     /83   Pulse 93   Temp 98  F (36.7  C) (Tympanic)   Resp 16   Ht 1.829 m (6')   Wt 96.4 kg (212 lb 9.6 oz)   SpO2 96%   BMI 28.83 kg/m    Weight is 212 lbs 9.6 oz  Body mass index is 28.83 kg/m .    Appearance: Alert, oriented, dressed in hospital scrubs, long beard  Attitude: Cooperative   Eye Contact: Fair  Mood: \"Alright\"  Affect: Flat, mood congruent  Speech: Regular rate, soft tone. Normal rhythm   Psychomotor Behavior: No tremor, rigidity, akathisia, or psychomotor retardation. Ambitendency and immobility at times, improved  Thought Process: Linear, concrete, irrational at times   Associations: No loose associations    Thought Content: Denies SI. No SIB. AH and paranoia at times, improved, none recently. Poverty of thought.   Insight: Limited  Judgment: Limited  Oriented to: Person, place, and time  Attention Span and Concentration: Intact  Recent and Remote Memory: Impairments in recent recall and remote memory encoding  Language: English with appropriate syntax and vocabulary  Fund of Knowledge: Low-Average  Muscle Strength and Tone: Grossly normal  Gait and Station: Grossly normal    [place] Catatonia Rating Scale   Severity Score (Number of points for items 1 -23) _______1___   Screening Score (Presence or absence of items 1 - 14) ______1_____   Time: 11:00 AM on " 1/11/2022   Rater: Dr. Shanelle Schuler Catatonia Rating Scale   Severity Score (Number of points for items 1 -23) ____4______   Screening Score (Presence or absence of items 1 - 14) ___1________   Time: 1145AM on 1/18  Rater: Dr. Shanelle Schuler Catatonia Rating Scale   Severity Score (Number of points for items 1 -23) ____7______   Screening Score (Presence or absence of items 1 - 14) ____4_______   Time: 500 PM on 1/26  Rater: Dr. Shanelle Schuler Catatonia Rating Scale   Severity Score (Number of points for items 1 -23) ___5_______   Screening Score (Presence or absence of items 1 - 14) ____1_______   Time: 121 PM on 2/10  Rater: Dr. Shanelle Schuler Catatonia Rating Scale   Severity Score (Number of points for items 1 -23) ___1_______   Screening Score (Presence or absence of items 1 - 14) ____1_______   Time: 100 PM on 2/23  Rater: Dr. Timmons     Labs     No results found for this or any previous visit (from the past 24 hour(s)).     Attestation      Patient has been seen and evaluated by:    David Timmons DO, MA  Psychiatrist

## 2022-03-03 NOTE — PLAN OF CARE
Problem: Behavioral Health Plan of Care  Goal: Patient-Specific Goal (Individualization)  Description: Patient will be free from self harm or injury  Patient will eat at least 50% of meals  Patient will attend one group every day.  Monitor food intake.  Address and document any self talk or yelling in room  Outcome: Ongoing, Not Progressing     Problem: Behavior Regulation Impairment (Psychotic Signs/Symptoms)  Goal: Improved Behavioral Control (Psychotic Signs/Symptoms)  Description: Patient will be able to have a reality based conversation.   Outcome: Ongoing, Not Progressing     Problem: Violence Risk or Actual  Goal: Anger and Impulse Control  Description: Pt will be able to control his anger during stay  Pt will ask for PRN medications as needed to control his impulses  Outcome: Ongoing, Not Progressing     Face to face shift report received from Le KIMBLE RN. Rounding completed, pt observed laying in bed.    Patient appeared to be sleeping for approximately 5.25 hours since 2345.    Patient had no reported or observed self harm, anger or talking/yelling to self this shift.      Face to face report will be communicated to oncoming RN.    Caryl Palmer RN  3/3/2022  6:27 AM

## 2022-03-04 PROCEDURE — 99232 SBSQ HOSP IP/OBS MODERATE 35: CPT | Mod: 95 | Performed by: STUDENT IN AN ORGANIZED HEALTH CARE EDUCATION/TRAINING PROGRAM

## 2022-03-04 PROCEDURE — 250N000013 HC RX MED GY IP 250 OP 250 PS 637: Performed by: NURSE PRACTITIONER

## 2022-03-04 PROCEDURE — 124N000004

## 2022-03-04 PROCEDURE — 250N000013 HC RX MED GY IP 250 OP 250 PS 637: Performed by: STUDENT IN AN ORGANIZED HEALTH CARE EDUCATION/TRAINING PROGRAM

## 2022-03-04 RX ORDER — MODAFINIL 100 MG/1
200 TABLET ORAL DAILY
Status: DISCONTINUED | OUTPATIENT
Start: 2022-03-05 | End: 2022-03-07

## 2022-03-04 RX ADMIN — LORAZEPAM 1.5 MG: 1 TABLET ORAL at 13:16

## 2022-03-04 RX ADMIN — LITHIUM CARBONATE 900 MG: 450 TABLET, EXTENDED RELEASE ORAL at 20:16

## 2022-03-04 RX ADMIN — LORAZEPAM 1.5 MG: 1 TABLET ORAL at 20:16

## 2022-03-04 RX ADMIN — PREGABALIN 100 MG: 75 CAPSULE ORAL at 08:25

## 2022-03-04 RX ADMIN — PREGABALIN 100 MG: 75 CAPSULE ORAL at 13:16

## 2022-03-04 RX ADMIN — MODAFINIL 150 MG: 100 TABLET ORAL at 08:25

## 2022-03-04 RX ADMIN — LORAZEPAM 1.5 MG: 1 TABLET ORAL at 08:25

## 2022-03-04 RX ADMIN — MEMANTINE 10 MG: 5 TABLET ORAL at 08:25

## 2022-03-04 RX ADMIN — BUPROPION HYDROCHLORIDE 450 MG: 300 TABLET, EXTENDED RELEASE ORAL at 08:25

## 2022-03-04 RX ADMIN — PREGABALIN 100 MG: 75 CAPSULE ORAL at 20:16

## 2022-03-04 RX ADMIN — MEMANTINE 10 MG: 5 TABLET ORAL at 20:16

## 2022-03-04 RX ADMIN — SULINDAC 150 MG: 150 TABLET ORAL at 20:16

## 2022-03-04 RX ADMIN — SULINDAC 150 MG: 150 TABLET ORAL at 08:25

## 2022-03-04 ASSESSMENT — ACTIVITIES OF DAILY LIVING (ADL)
ADLS_ACUITY_SCORE: 7
LAUNDRY: UNABLE TO COMPLETE
ADLS_ACUITY_SCORE: 7
ORAL_HYGIENE: INDEPENDENT
ADLS_ACUITY_SCORE: 7
ADLS_ACUITY_SCORE: 7
HYGIENE/GROOMING: INDEPENDENT
DRESS: SCRUBS (BEHAVIORAL HEALTH);INDEPENDENT
ADLS_ACUITY_SCORE: 7

## 2022-03-04 NOTE — PLAN OF CARE
Pt accepted to [facility] for Tuesday 3/8. Nursing and Provider notified. Pt will leave with meds from [facility].    Scheduled ride with [employer] for 9:00 AM (noa) they may be late.     Went over PD with pt and emailed copy to Rajwinder Andrews.

## 2022-03-04 NOTE — PLAN OF CARE
Problem: Behavioral Health Plan of Care  Goal: Patient-Specific Goal (Individualization)  Description: Patient will be free from self harm or injury  Patient will eat at least 50% of meals  Patient will attend one group every day.  Monitor food intake.  Address and document any self talk or yelling in room  Outcome: Ongoing, Progressing  Note: Pt had a blunted, flat affect and calm mood. He endorsed some depression and frustration  re: prolonged hospitalization. He denied hallucinations and suicidal ideation. No noted self talk tonight. Pt was quiet and withdrawn. He spent some time pacing in the hallway. He ate 50% of supper and was compliant with his scheduled medications.     Face to face end of shift report communicated to oncoming RN.     Problem: Behavior Regulation Impairment (Psychotic Signs/Symptoms)  Goal: Improved Behavioral Control (Psychotic Signs/Symptoms)  Description: Patient will be able to have a reality based conversation.     Outcome: Ongoing, Progressing  Note: Pt offered minimal conversation tonight. Responses to questions were appropriate.      Problem: Violence Risk or Actual  Goal: Anger and Impulse Control  Description: Pt will be able to control his anger during stay  Pt will ask for PRN medications as needed to control his impulses    Outcome: Ongoing, Progressing  Note: Pt remained free from anger outbursts and impulsive behavior tonight.      Goal Outcome Evaluation:    Plan of Care Reviewed With: patient

## 2022-03-04 NOTE — PROGRESS NOTES
[facility] Psychiatric Progress Note     Assessment     Mr. Carter is a 31 year old male with a PMH of schizoaffective disorder, depressive type, catatonia, severe methamphetamine use disorder, alcohol use disorder, and significant TBI at the age of 3 who presented with depression with catatonia being off of Ativan after recently being discharged. This is the patient's 3rd hospitalization in the past roughly two months with substance use complicating his course.     The patient's catatonia and depression have been challenging to treat. His depression improved a little with increasing Wellbutrin and possibly from scheduling Zyprexa. His catatonia has improved some with Ativan, Amantadine, and recently Namenda, although he continues to have ambitendency and withdrawal at times. His Ativan was tapered down due to side effects (sedation) and also in preparation to potentially discharge given his history of misuse of substances. The amount had to be increased to 1.5 mg TID given that catatonia emerged more with the patient refusing his medications without reason and then taking (further sign of ambitendency in decision making). He has irrational belief that stopping his medications will lead to discharge. When he refuses medications his withdrawal worsens leading to poor intake with I/O monitoring when needed. Utilizing behavioral approaches to help with medication adherence. Offered ECT as an option to address his depression and catatonia with him pursuing. Monitoring to see if Patterson-Hagen should be pursued.    Patient has SPMI with him having deficits in his occupational and social functioning. He has had significant CNS insults with him having multiple TBIs, significant substance abuse over years, and near fatal cardiac arrest from overdose (? Anoxic injury) that have likely led to neuropsychiatric impact making it more challenging for him to recover. His  and  note he has not been  the same since his near fatal overdose. Patient did score a 4.4/5.8 on the ACL, which indicates the patient would need some level of support and could not be alone the whole day safely, needing assistance with such stuff as solving problems, removing any safety hazards. The patient is not safe to discharge to live on his own with him likely benefiting the most from supportive housing eventually like a group home with possibly even a legal guardian to manage financial and health concerns.    Today: Patient appears more stable and does better on medications rather than off. Catatonia has resolved while on Ativan with most recent BF score of 0. Modafinil started off-label without clear benefit yet. No side effects. Increasing dose, awaiting any benefit. Possible small improvement in mood. Intermittently will stop a med for a day. Plan for discharge on Tuesday to [facility] given stability and safe discharge planning.     Educated regarding medication indications, risks, benefits, side effects, contraindications and possible interactions. Verbally expressed understanding.      Diagnoses     1. Schizoaffective disorder, depressive type, multiple episodes, currently in acute episode, with catatonia   2. 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 Multiple other TBIs  3. 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 History of multiple cardiac arrests (2012 and 2020) after overdose  5. Methamphetamine use disorder, severe  6. Alcohol use disorder, moderate     Plan     Unit 5  Legal Status: Committed    Safety Assessment:    Behavioral Orders   Procedures     Code 1 - Restrict to Unit     Routine Programming     As clinically indicated     Status 15     Every 15 minutes.      Medications:     Outpatient medications continued/changed:     Wellbutrin  mg daily -> 300 mg on 12/24 -> 450 mg on 2/14  Lithium 900 mg at bedtime  Lyrica 100 mg TID  (initially held but resumed due to benefit for anxiety and pain)  Vitamin D 50,000 international unit(s) weekly    New medications initiated:     Zyprexa at 2.5 mg at bedtime   Modafinil 100 mg as of 2/25 -> 150 mg on 2/29 -> 200 mg on 3/4  Ativan 2 mg QID -> 1.5 mg QID on 1/5 -> 1/2/2 mg on 1/12 -> 1/1/2 mg on 1/15 -> 1 mg TID on 1/17 -> 1.5 mg TID on 1/26  Namenda 5 mg daily -> 5 mg BID on 1/25 -> 10 mg BID on 1/31  Standard unit PRNs    New medications stopped    Exelon 3 mg BID off label for cognition from TBI due to concerns for agitation with plans to possibly switch to Namenda   Amantadine 100 mg BID on 2/9 due to limited efficacy for his catatonia   Zyprexa, prn upon admission, scheduled 7.5 up to 12.5 mg at bedtime due to patient preference. Restarted above    Programming: Patient will be treated in a therapeutic milieu with appropriate individual and group therapies. Education will be provided on diagnoses, medications, and treatments.     Medical diagnoses:      #. Severe malnutrition, improved  - Nutrition following   - Gained weight, but back to admission weight (eating % of meals)  - Ensure on meal trays   - Monitoring, intermittent IOs    #. Intention tremor bilaterally   - Secondary to lithium  - Monitor  - Conservative management    #. Chronic low back pain, improved  #. Muscle spasms, improved  - Robaxin 1,000 mg TID prn. Stopped on 2/2  - Sulindac 150 mg BID for temporary use  - Lidocaine patches prn  - Icy-hot prn   - APAP prn, increased to 1,000 mg   - Recommend referral to PT and PMR on outpatient basis     #. Multiple TBIs  - MRI findings consistent with encephalomalacia in left temporal-occipital region and left anterior frontal lobe.  - Recommend neuropsychological testing as outpatient and possible TBI focused IRTS/CBH facility.  - OT to further evaluate if able. So far, patient has refused.    Consult: Nutrition, OT  Labs: Li 0.8 on 1/12  Imaging: MRI    Anticipated LOS:  "Tuesday  Dispo: [facility] while [facility] services are awaiting.     Interim History     Patient notes he feels alright today. He denies problems with medications. He consents to one last increase of Modafinil to see if it works. Patient denies any improvement recently.    He is looking forward to discharging soon. No safety concerns. No dizziness. Plans to go to group. Might try to do a push up.     Medications       buPROPion  450 mg Oral Daily     lithium ER  900 mg Oral At Bedtime     LORazepam  1.5 mg Oral TID     memantine  10 mg Oral BID     modafinil  150 mg Oral Daily     pregabalin  100 mg Oral TID     sulindac  150 mg Oral BID     vitamin D2  50,000 Units Oral Q7 Days        Allergies     Allergies   Allergen Reactions     Pork Allergy         Psychiatric Examination     BP 98/59   Pulse 77   Temp 98.9  F (37.2  C) (Temporal)   Resp 14   Ht 1.829 m (6')   Wt 96.4 kg (212 lb 9.6 oz)   SpO2 98%   BMI 28.83 kg/m    Weight is 212 lbs 9.6 oz  Body mass index is 28.83 kg/m .    Appearance: Alert, oriented, dressed in hospital scrubs, long beard  Attitude: Cooperative   Eye Contact: Fair  Mood: \"Fine\"  Affect: Flat, mood congruent  Speech: Regular rate, soft tone. Normal rhythm   Psychomotor Behavior: No tremor, rigidity, akathisia, or psychomotor retardation. Ambitendency and immobility at times, improved  Thought Process: Linear, concrete, irrational at times   Associations: No loose associations    Thought Content: Denies SI. No SIB. AH and paranoia at times, improved, none recently. Poverty of thought.   Insight: Limited  Judgment: Limited  Oriented to: Person, place, and time  Attention Span and Concentration: Intact  Recent and Remote Memory: Impairments in recent recall and remote memory encoding  Language: English with appropriate syntax and vocabulary  Fund of Knowledge: Low-Average  Muscle Strength and Tone: Grossly normal  Gait and Station: Grossly normal    [place] Catatonia Rating Scale "   Severity Score (Number of points for items 1 -23) _______1___   Screening Score (Presence or absence of items 1 - 14) ______1_____   Time: 11:00 AM on 1/11/2022   Rater: Dr. Shanelle Schuler Catatonia Rating Scale   Severity Score (Number of points for items 1 -23) ____4______   Screening Score (Presence or absence of items 1 - 14) ___1________   Time: 1145AM on 1/18  Rater: Dr. Shanelle Schuler Catatonia Rating Scale   Severity Score (Number of points for items 1 -23) ____7______   Screening Score (Presence or absence of items 1 - 14) ____4_______   Time: 500 PM on 1/26  Rater: Dr. Shanelle Schuler Catatonia Rating Scale   Severity Score (Number of points for items 1 -23) ___5_______   Screening Score (Presence or absence of items 1 - 14) ____1_______   Time: 121 PM on 2/10  Rater: Dr. Shanelle Schuler Catatonia Rating Scale   Severity Score (Number of points for items 1 -23) ___1_______   Screening Score (Presence or absence of items 1 - 14) ____1_______   Time: 100 PM on 2/23  Rater: Dr. Shanelle Schuler Catatonia Rating Scale   Severity Score (Number of points for items 1 -23) ___0_______   Screening Score (Presence or absence of items 1 - 14) __0_________   Time: 10 AM on 3/3  Rater: Dr. Timmons       Labs     No results found for this or any previous visit (from the past 24 hour(s)).     Attestation      Patient has been seen and evaluated by:    David Timmons DO, MA  Psychiatrist    VIDEO VISIT    Patient has given verbal consent for video visit?: Yes     Video- Visit Details  Type of service:  video visit for mental health treatment.  Time of service:    Date:  03/04/2022    Video Start Time: 1230PM      Video End Time: 100PM    Reason for video visit: COVID-19 [place]  Originating Site (patient location):  [facility]  Distant Site (provider location):  Remote location  Mode of Communication:  Video Conference via  [employer]

## 2022-03-04 NOTE — PLAN OF CARE
Problem: Behavioral Health Plan of Care  Goal: Patient-Specific Goal (Individualization)  Description: Patient will be free from self harm or injury  Patient will eat at least 50% of meals  Patient will attend one group every day.    Monitor food intake.  Address and document any self talk or yelling in room  Outcome: Ongoing, Progressing     Problem: Violence Risk or Actual  Goal: Anger and Impulse Control  Description: Pt will be able to control his anger during stay  Pt will ask for PRN medications as needed to control his impulses    Outcome: Ongoing, Progressing   Goal Outcome Evaluation:    Face to face shift report received from Le LARKIN. Rounding completed, pt observed.     Pt appeared to sleep most of the NOC shift.    Writer continued cares of pt for the day shift.Pt compliant with medications this shift. Encourage pt to attend groups this shift. Pt continues to be isolative and withdrawn from peers. Pt does go to lobby to eat his meals.  Pt has not had any noted episodes of violence this shift.    Face to face report will be communicated to oncoming RN.    Kecia Eisenberg RN  3/4/2022  1:41 PM

## 2022-03-05 PROCEDURE — 250N000013 HC RX MED GY IP 250 OP 250 PS 637: Performed by: NURSE PRACTITIONER

## 2022-03-05 PROCEDURE — 124N000004

## 2022-03-05 PROCEDURE — 250N000013 HC RX MED GY IP 250 OP 250 PS 637: Performed by: STUDENT IN AN ORGANIZED HEALTH CARE EDUCATION/TRAINING PROGRAM

## 2022-03-05 RX ADMIN — LORAZEPAM 1.5 MG: 1 TABLET ORAL at 20:12

## 2022-03-05 RX ADMIN — BUPROPION HYDROCHLORIDE 450 MG: 300 TABLET, EXTENDED RELEASE ORAL at 08:17

## 2022-03-05 RX ADMIN — PREGABALIN 100 MG: 75 CAPSULE ORAL at 13:32

## 2022-03-05 RX ADMIN — PREGABALIN 100 MG: 75 CAPSULE ORAL at 20:12

## 2022-03-05 RX ADMIN — LORAZEPAM 1.5 MG: 1 TABLET ORAL at 13:32

## 2022-03-05 RX ADMIN — SULINDAC 150 MG: 150 TABLET ORAL at 20:12

## 2022-03-05 RX ADMIN — MEMANTINE 10 MG: 5 TABLET ORAL at 08:17

## 2022-03-05 RX ADMIN — LORAZEPAM 1.5 MG: 1 TABLET ORAL at 08:18

## 2022-03-05 RX ADMIN — MEMANTINE 10 MG: 5 TABLET ORAL at 20:12

## 2022-03-05 RX ADMIN — SULINDAC 150 MG: 150 TABLET ORAL at 08:17

## 2022-03-05 RX ADMIN — PREGABALIN 100 MG: 75 CAPSULE ORAL at 08:17

## 2022-03-05 RX ADMIN — LITHIUM CARBONATE 900 MG: 450 TABLET, EXTENDED RELEASE ORAL at 20:12

## 2022-03-05 RX ADMIN — MODAFINIL 200 MG: 100 TABLET ORAL at 08:25

## 2022-03-05 ASSESSMENT — ACTIVITIES OF DAILY LIVING (ADL)
ADLS_ACUITY_SCORE: 7

## 2022-03-05 NOTE — PLAN OF CARE
Face to face end of shift report will be communicated to oncoming RN.     Problem: Behavioral Health Plan of Care  Goal: Patient-Specific Goal (Individualization)  Description: Patient will be free from self harm or injury  Patient will eat at least 50% of meals  Patient will attend one group every day.    Monitor food intake.  Address and document any self talk or yelling in room  Outcome: Ongoing, Progressing     Problem: Behavior Regulation Impairment (Psychotic Signs/Symptoms)  Goal: Improved Behavioral Control (Psychotic Signs/Symptoms)  Description: Patient will be able to have a reality based conversation.     Outcome: Ongoing, Progressing     Problem: Violence Risk or Actual  Goal: Anger and Impulse Control  Description: Pt will be able to control his anger during stay  Pt will ask for PRN medications as needed to control his impulses    Outcome: Ongoing, Progressing   Goal Outcome Evaluation:      Face to face end of shift report obtained from TRAE Haji. Pt observed resting in bed.  Pt appears to be sleeping in bed with eyes closed, having regular respirations, and position changes. 15 minutes and PRN safety checks completed with no noted complains. No violent behavior or delusional comments noted or reported so far this shift.   0600-Pt appeared to had slept all night.

## 2022-03-05 NOTE — PLAN OF CARE
Face to face end of shift report received from Kecia SONG RN. Rounding completed and patient observed in the lounge. No requests at this time.     Goal Outcome Evaluation:    20:30 Update: Patient has appeared calm and cooperative. He is pleasant but quiet and withdrawn. He took meds without complaint and is compliant with mouth checks. Patient denied all mental health criteria. He said he is looking forward to discharge. His affect is blunted and flat and he gives no extra information. He spent most of his time in the day room or walking in the halls. Patient denied needing any PRNs and denied pain.     Face to face end of shift report communicated to oncoming RN.     Plan of Care Reviewed With: patient       Problem: Behavior Regulation Impairment (Psychotic Signs/Symptoms)  Goal: Improved Behavioral Control (Psychotic Signs/Symptoms)  Description: Patient will be able to have a reality based conversation.     Outcome: Ongoing, Progressing     Problem: Violence Risk or Actual  Goal: Anger and Impulse Control  Description: Pt will be able to control his anger during stay  Pt will ask for PRN medications as needed to control his impulses    Outcome: Ongoing, Progressing       Problem: Behavioral Health Plan of Care  Goal: Patient-Specific Goal (Individualization)  Description: Patient will be free from self harm or injury  Patient will eat at least 50% of meals  Patient will attend one group every day.    Monitor food intake.  Address and document any self talk or yelling in room  Outcome: Ongoing, Not Progressing

## 2022-03-05 NOTE — PLAN OF CARE
"Face to face end of shift report received from . RN. Rounding completed and patient observed in his room sitting on the bed. No requests at this time.    Goal Outcome Evaluation:    12:30 Update: Patient has appeared calm and cooperative. He is pleasant but quiet and withdrawn. He took meds without complaint and is compliant with mouth checks. Patient denied all mental health criteria. He did talk a little more to this writer than previous. His affect is blunted and flat and he gives no extra information. This writer forgot patient's Provigil during morning med pass. Updated patient I would go and get it and be right back with the med. He said \"no\" firmly. He said he takes it later in the day so he'll \"just take it then.\" Tried to convince him the importance of taking it and he declined it again. Shortly after, this writer brought him the med in a cup and handed it to him. He took the med, said \"thank you,\" and did not question it. He spent most of the morning in his room but did attend 1 group. Patient denied needing any PRNs and denied pain.     14:15 Update: Patient was in the bathroom when this writer attempted to pass 14:00 scheduled meds. Patient was prompted to come to the nurses station when done to get his Lyrica and Ativan. Patient acknowledged the information but did not follow directions. Patient was observed to have a tremor when taking his meds. It was strong enough that patient appeared to have trouble taking the med cup.     15:15 Update: Face to face end of shift report communicated to the charge nurse. This writer will continue to provide nursing services for patient throughout the next shift. Rounding completed and patient observed.    20:00 Update: Patient paced in the halls but he came out to the day room for dinner. A female peer talked to him rapidly during dinner so patient did not eat but got up and left the day room. Later, he saw this peer asking for assistance and he came to the nurses " station to alert staff and advocate for this peer. Patient took meds without complaint. He was compliant with mouth checks.     Face to face end of shift report communicated to oncoming RN.    Plan of Care Reviewed With: patient       Problem: Behavioral Health Plan of Care  Goal: Patient-Specific Goal (Individualization)  Description: Patient will be free from self harm or injury  Patient will eat at least 50% of meals  Patient will attend one group every day.    Monitor food intake.  Address and document any self talk or yelling in room  Outcome: Ongoing, Progressing     Problem: Behavior Regulation Impairment (Psychotic Signs/Symptoms)  Goal: Improved Behavioral Control (Psychotic Signs/Symptoms)  Description: Patient will be able to have a reality based conversation.     Outcome: Ongoing, Progressing     Problem: Violence Risk or Actual  Goal: Anger and Impulse Control  Description: Pt will be able to control his anger during stay  Pt will ask for PRN medications as needed to control his impulses    Outcome: Ongoing, Progressing

## 2022-03-06 PROCEDURE — 124N000004

## 2022-03-06 PROCEDURE — 250N000013 HC RX MED GY IP 250 OP 250 PS 637: Performed by: NURSE PRACTITIONER

## 2022-03-06 PROCEDURE — 250N000013 HC RX MED GY IP 250 OP 250 PS 637: Performed by: STUDENT IN AN ORGANIZED HEALTH CARE EDUCATION/TRAINING PROGRAM

## 2022-03-06 RX ADMIN — LORAZEPAM 1.5 MG: 1 TABLET ORAL at 08:22

## 2022-03-06 RX ADMIN — LORAZEPAM 1.5 MG: 1 TABLET ORAL at 20:13

## 2022-03-06 RX ADMIN — MODAFINIL 200 MG: 100 TABLET ORAL at 08:21

## 2022-03-06 RX ADMIN — PREGABALIN 100 MG: 75 CAPSULE ORAL at 08:22

## 2022-03-06 RX ADMIN — PREGABALIN 100 MG: 75 CAPSULE ORAL at 20:13

## 2022-03-06 RX ADMIN — SULINDAC 150 MG: 150 TABLET ORAL at 08:22

## 2022-03-06 RX ADMIN — LORAZEPAM 1.5 MG: 1 TABLET ORAL at 13:08

## 2022-03-06 RX ADMIN — MEMANTINE 10 MG: 5 TABLET ORAL at 20:13

## 2022-03-06 RX ADMIN — BUPROPION HYDROCHLORIDE 450 MG: 300 TABLET, EXTENDED RELEASE ORAL at 08:21

## 2022-03-06 RX ADMIN — SULINDAC 150 MG: 150 TABLET ORAL at 20:13

## 2022-03-06 RX ADMIN — LITHIUM CARBONATE 900 MG: 450 TABLET, EXTENDED RELEASE ORAL at 20:13

## 2022-03-06 RX ADMIN — PREGABALIN 100 MG: 75 CAPSULE ORAL at 13:08

## 2022-03-06 RX ADMIN — MEMANTINE 10 MG: 5 TABLET ORAL at 08:22

## 2022-03-06 ASSESSMENT — ACTIVITIES OF DAILY LIVING (ADL)
ADLS_ACUITY_SCORE: 7

## 2022-03-06 NOTE — PLAN OF CARE
Face to face end of shift report will be communicated to oncoming RN.     Problem: Behavioral Health Plan of Care  Goal: Patient-Specific Goal (Individualization)  Description: Patient will be free from self harm or injury  Patient will eat at least 50% of meals  Patient will attend one group every day.    Monitor food intake.  Address and document any self talk or yelling in room  Outcome: Ongoing, Progressing     Problem: Behavior Regulation Impairment (Psychotic Signs/Symptoms)  Goal: Improved Behavioral Control (Psychotic Signs/Symptoms)  Description: Patient will be able to have a reality based conversation.     Outcome: Ongoing, Progressing     Problem: Violence Risk or Actual  Goal: Anger and Impulse Control  Description: Pt will be able to control his anger during stay  Pt will ask for PRN medications as needed to control his impulses    Outcome: Ongoing, Progressing       Face to face end of shift report obtained from TRAE Washington. Pt observed resting in bed.  Pt appears to be sleeping in bed with eyes closed, having regular respirations, and position changes. 15 minutes and PRN safety checks completed with no noted complains. No violent or delusional comments noted or reported so far this shift.   0600-Pt appeared to had slept all night.

## 2022-03-06 NOTE — PLAN OF CARE
"Face to face end of shift report received from . RN. Rounding completed and patient observed in his room. No requests at this time.       Goal Outcome Evaluation:    12:30 Update: Patient has appeared calm and cooperative. He is pleasant but quiet and withdrawn. He took meds without complaint and is compliant with mouth checks. Patient denied all mental health criteria. He always says \"I'm fine.\" His affect is blunted and flat and he gives no extra information. He did attend 1 group. Patient denied needing any PRNs and denied pain. He is disheveled but is clean. He showers daily. Patient ate 25% of breakfast and 0% of lunch.     15:15 Update: Face to face end of shift report communicated to the charge nurse. This writer will continue to provide nursing services for patient throughout the next shift. Rounding completed and patient observed.     20:00 Update: Patient declined to eat dinner but did not say why. A staff member had the idea that since patient had not ordered his meal but was assigned a \" select\" he probably wouldn't eat it. Patient then agreed to eat a chicken sandwich but did not want anything else. He ate two bites of it. Patient ate very little snack. Patient took meds without complaint. He was compliant with mouth checks.      Face to face end of shift report communicated to oncoming RN.       Plan of Care Reviewed With: patient       Problem: Behavioral Health Plan of Care  Goal: Patient-Specific Goal (Individualization)  Description: Patient will be free from self harm or injury  Patient will eat at least 50% of meals  Patient will attend one group every day.    Monitor food intake.  Address and document any self talk or yelling in room  Outcome: Ongoing, Progressing     Problem: Behavior Regulation Impairment (Psychotic Signs/Symptoms)  Goal: Improved Behavioral Control (Psychotic Signs/Symptoms)  Description: Patient will be able to have a reality based conversation.     Outcome: Ongoing, " Progressing     Problem: Violence Risk or Actual  Goal: Anger and Impulse Control  Description: Pt will be able to control his anger during stay  Pt will ask for PRN medications as needed to control his impulses    Outcome: Ongoing, Progressing

## 2022-03-07 LAB
HOLD SPECIMEN: NORMAL
HOLD SPECIMEN: NORMAL

## 2022-03-07 PROCEDURE — 250N000013 HC RX MED GY IP 250 OP 250 PS 637: Performed by: NURSE PRACTITIONER

## 2022-03-07 PROCEDURE — 82306 VITAMIN D 25 HYDROXY: CPT | Performed by: STUDENT IN AN ORGANIZED HEALTH CARE EDUCATION/TRAINING PROGRAM

## 2022-03-07 PROCEDURE — 124N000004

## 2022-03-07 PROCEDURE — 99232 SBSQ HOSP IP/OBS MODERATE 35: CPT | Mod: 95 | Performed by: STUDENT IN AN ORGANIZED HEALTH CARE EDUCATION/TRAINING PROGRAM

## 2022-03-07 PROCEDURE — 36415 COLL VENOUS BLD VENIPUNCTURE: CPT | Performed by: STUDENT IN AN ORGANIZED HEALTH CARE EDUCATION/TRAINING PROGRAM

## 2022-03-07 PROCEDURE — 250N000013 HC RX MED GY IP 250 OP 250 PS 637: Performed by: STUDENT IN AN ORGANIZED HEALTH CARE EDUCATION/TRAINING PROGRAM

## 2022-03-07 RX ORDER — SULINDAC 150 MG/1
150 TABLET ORAL 2 TIMES DAILY PRN
Qty: 60 TABLET | Refills: 1 | Status: SHIPPED | OUTPATIENT
Start: 2022-03-07 | End: 2022-03-07

## 2022-03-07 RX ORDER — LORAZEPAM 1 MG/1
1.5 TABLET ORAL 3 TIMES DAILY
Qty: 270 TABLET | Refills: 1 | Status: SHIPPED | OUTPATIENT
Start: 2022-03-07 | End: 2022-07-18

## 2022-03-07 RX ORDER — BUPROPION HYDROCHLORIDE 450 MG/1
450 TABLET, FILM COATED, EXTENDED RELEASE ORAL DAILY
Qty: 60 TABLET | Refills: 1 | Status: ON HOLD | OUTPATIENT
Start: 2022-03-07 | End: 2022-07-27

## 2022-03-07 RX ORDER — LITHIUM CARBONATE 450 MG
900 TABLET, EXTENDED RELEASE ORAL AT BEDTIME
Qty: 120 TABLET | Refills: 1 | Status: SHIPPED | OUTPATIENT
Start: 2022-03-07 | End: 2022-03-07

## 2022-03-07 RX ORDER — BUPROPION HYDROCHLORIDE 450 MG/1
150 TABLET, FILM COATED, EXTENDED RELEASE ORAL DAILY
Qty: 60 TABLET | Refills: 1 | Status: SHIPPED | OUTPATIENT
Start: 2022-03-07 | End: 2022-03-07

## 2022-03-07 RX ORDER — LORAZEPAM 1 MG/1
1.5 TABLET ORAL 3 TIMES DAILY
Qty: 270 TABLET | Refills: 1 | Status: SHIPPED | OUTPATIENT
Start: 2022-03-07 | End: 2022-03-07

## 2022-03-07 RX ORDER — SULINDAC 150 MG/1
150 TABLET ORAL 2 TIMES DAILY PRN
Qty: 60 TABLET | Refills: 1 | Status: SHIPPED | OUTPATIENT
Start: 2022-03-07 | End: 2022-07-18

## 2022-03-07 RX ORDER — PREGABALIN 100 MG/1
100 CAPSULE ORAL 3 TIMES DAILY
Qty: 180 CAPSULE | Refills: 1 | Status: ON HOLD | OUTPATIENT
Start: 2022-03-07 | End: 2022-09-01

## 2022-03-07 RX ORDER — PREGABALIN 100 MG/1
100 CAPSULE ORAL 3 TIMES DAILY
Qty: 180 CAPSULE | Refills: 1 | Status: SHIPPED | OUTPATIENT
Start: 2022-03-07 | End: 2022-03-07

## 2022-03-07 RX ORDER — SULINDAC 150 MG/1
150 TABLET ORAL 2 TIMES DAILY PRN
Status: DISCONTINUED | OUTPATIENT
Start: 2022-03-07 | End: 2022-03-08 | Stop reason: HOSPADM

## 2022-03-07 RX ORDER — MEMANTINE HYDROCHLORIDE 10 MG/1
10 TABLET ORAL 2 TIMES DAILY
Qty: 120 TABLET | Refills: 1 | Status: ON HOLD | OUTPATIENT
Start: 2022-03-07 | End: 2022-09-01

## 2022-03-07 RX ORDER — MEMANTINE HYDROCHLORIDE 10 MG/1
10 TABLET ORAL 2 TIMES DAILY
Qty: 120 TABLET | Refills: 1 | Status: SHIPPED | OUTPATIENT
Start: 2022-03-07 | End: 2022-03-07

## 2022-03-07 RX ORDER — LITHIUM CARBONATE 450 MG
900 TABLET, EXTENDED RELEASE ORAL AT BEDTIME
Qty: 120 TABLET | Refills: 1 | Status: ON HOLD | OUTPATIENT
Start: 2022-03-07 | End: 2022-09-01

## 2022-03-07 RX ADMIN — MODAFINIL 200 MG: 100 TABLET ORAL at 08:16

## 2022-03-07 RX ADMIN — PREGABALIN 100 MG: 75 CAPSULE ORAL at 20:53

## 2022-03-07 RX ADMIN — SULINDAC 150 MG: 150 TABLET ORAL at 08:17

## 2022-03-07 RX ADMIN — PREGABALIN 100 MG: 75 CAPSULE ORAL at 08:16

## 2022-03-07 RX ADMIN — LORAZEPAM 1.5 MG: 1 TABLET ORAL at 13:51

## 2022-03-07 RX ADMIN — PREGABALIN 100 MG: 75 CAPSULE ORAL at 13:51

## 2022-03-07 RX ADMIN — LITHIUM CARBONATE 900 MG: 450 TABLET, EXTENDED RELEASE ORAL at 20:53

## 2022-03-07 RX ADMIN — LORAZEPAM 1.5 MG: 1 TABLET ORAL at 08:17

## 2022-03-07 RX ADMIN — BUPROPION HYDROCHLORIDE 450 MG: 300 TABLET, EXTENDED RELEASE ORAL at 08:16

## 2022-03-07 RX ADMIN — LORAZEPAM 1.5 MG: 1 TABLET ORAL at 20:53

## 2022-03-07 RX ADMIN — MEMANTINE 10 MG: 5 TABLET ORAL at 08:17

## 2022-03-07 RX ADMIN — MEMANTINE 10 MG: 5 TABLET ORAL at 20:53

## 2022-03-07 ASSESSMENT — ACTIVITIES OF DAILY LIVING (ADL)
HYGIENE/GROOMING: INDEPENDENT
ADLS_ACUITY_SCORE: 7
ORAL_HYGIENE: INDEPENDENT
ADLS_ACUITY_SCORE: 7
DRESS: SCRUBS (BEHAVIORAL HEALTH)
ADLS_ACUITY_SCORE: 7

## 2022-03-07 NOTE — PLAN OF CARE
Face to face end of shift report will be communicated to oncoming RN.     Problem: Behavioral Health Plan of Care  Goal: Patient-Specific Goal (Individualization)  Description: Patient will be free from self harm or injury  Patient will eat at least 50% of meals  Patient will attend one group every day.    Monitor food intake.  Address and document any self talk or yelling in room  Outcome: Ongoing, Progressing     Problem: Behavior Regulation Impairment (Psychotic Signs/Symptoms)  Goal: Improved Behavioral Control (Psychotic Signs/Symptoms)  Description: Patient will be able to have a reality based conversation.     Outcome: Ongoing, Progressing     Problem: Violence Risk or Actual  Goal: Anger and Impulse Control  Description: Pt will be able to control his anger during stay  Pt will ask for PRN medications as needed to control his impulses    Outcome: Ongoing, Progressing   Goal Outcome Evaluation:      Face to face end of shift report obtained from [place] [place]. Pt observed resting in bed.  Pt appears to be sleeping in bed with eyes closed, having regular respirations, and position changes. 15 minutes and PRN safety checks completed with no noted complains. No violent or delusional comments noted or reported so far this shift.   0600-Pt appeared to had slept all night.

## 2022-03-07 NOTE — PLAN OF CARE
"1600 Writer spoke with patient this shift about the broken plastic knives he had on day shift. PT stated \"that was just a mistake\" PT agreed to be safe on unit. PT encouraged to speak with me if he was feeling suicidal or any urge to self harm himself. PT stated \"I am not going to hurt myself I am leaving tomorrow.\" PT reports he is ready to leave. PT denies SI or SIB thoughts at this time.                         "

## 2022-03-07 NOTE — PLAN OF CARE
Spoke with Susana Murphy. Will  patient at noon tomorrow 3/7. Patient's nurse has been updated of this change.

## 2022-03-07 NOTE — PLAN OF CARE
Problem: Behavioral Health Plan of Care  Goal: Patient-Specific Goal (Individualization)  Description: Patient will be free from self harm or injury  Patient will eat at least 50% of meals  Patient will attend one group every day.    Monitor food intake.  Address and document any self talk or yelling in room  Outcome: Ongoing, Progressing  Note:      A&O, VSS-bp on lowr end-(taken while resting in bed)non-symptomatic, denies pain,   Affect remains flat though speech more spontaneous, tells this writer he is discharging tomorrow and feels ready to go.   Tells this writer he feels his medications are working for him.  Denies all criteria including: SI, SIB, HI or hallucinations.  Cooperative with medications and lab draw this afternoon.   Found with contraband (see notification note).   notable tremor-reported to Dr. Timmons.     Caryl Villanueva RN  3/7/2022  9:10 AM        Problem: Behavior Regulation Impairment (Psychotic Signs/Symptoms)  Goal: Improved Behavioral Control (Psychotic Signs/Symptoms)  Description: Patient will be able to have a reality based conversation.     Outcome: Ongoing, Progressing     Problem: Violence Risk or Actual  Goal: Anger and Impulse Control  Description: Pt will be able to control his anger during stay  Pt will ask for PRN medications as needed to control his impulses    Outcome: Ongoing, Progressing   Goal Outcome Evaluation:    Plan of Care Reviewed With: patient        Face to face end of shift report communicated to oncoming shift     Caryl Villanueva RN  3/7/2022  9:10 AM

## 2022-03-07 NOTE — PLAN OF CARE
Patient was found in his room, sitting cross legged on the floor, meditating. Patient states that he is doing well and is eager to go to [facility] tomorrow. No other questions or concerns at this time.    Checked with patient to see if he has completed his screeners for is diagnostic assessment. Patient has not completed them but states that he will complete them by the end of today. Patient denied having assistance in filling them out. Will check with him in the morning to check on completion.

## 2022-03-07 NOTE — PROVIDER NOTIFICATION
"Dr. Timmons notified pt was found with plastic knife that he had broken into pieces and was hiding in his room. Pt initially denies having all the pieces but upon having pt turn pockets inside-out, pt attempts to \"palm' two small sharp pieces which pt gives to this writer.   This writer asks pt what he ws doing with pieces of knife-pt states \"I don't know.\" this writer asks pt is he is having thoughts of SI-pt states \"no, I'm fine.\" this writer tells pt about concern for his safety, pt states \"no you aren't, It doesn't matter anymore anyway.\"  Pt continues to state \"I'm fine.\"  "

## 2022-03-07 NOTE — PROGRESS NOTES
[facility] Psychiatric Progress Note     Assessment     Mr. Carter is a 31 year old male with a PMH of schizoaffective disorder, depressive type, catatonia, severe methamphetamine use disorder, alcohol use disorder, and significant TBI at the age of 3 who presented with depression with catatonia being off of Ativan after recently being discharged. This is the patient's 3rd hospitalization in the past roughly two months with substance use complicating his course.     The patient's catatonia and depression have been challenging to treat. His depression improved a little with increasing Wellbutrin and possibly from scheduling Zyprexa. His catatonia has improved some with Ativan, Amantadine, and recently Namenda, although he continues to have ambitendency and withdrawal at times. His Ativan was tapered down due to side effects (sedation) and also in preparation to potentially discharge given his history of misuse of substances. The amount had to be increased to 1.5 mg TID given that catatonia emerged more with the patient refusing his medications without reason and then taking (further sign of ambitendency in decision making). He has irrational belief that stopping his medications will lead to discharge. When he refuses medications his withdrawal worsens leading to poor intake with I/O monitoring when needed. Utilizing behavioral approaches to help with medication adherence. Offered ECT as an option to address his depression and catatonia with him pursuing. Monitoring to see if Patterson-Hagen should be pursued.    Patient has SPMI with him having deficits in his occupational and social functioning. He has had significant CNS insults with him having multiple TBIs, significant substance abuse over years, and near fatal cardiac arrest from overdose (? Anoxic injury) that have likely led to neuropsychiatric impact making it more challenging for him to recover. His  and  note he has not been  the same since his near fatal overdose. Patient did score a 4.4/5.8 on the ACL, which indicates the patient would need some level of support and could not be alone the whole day safely, needing assistance with such stuff as solving problems, removing any safety hazards. The patient is not safe to discharge to live on his own with him likely benefiting the most from supportive housing eventually like a group home with possibly even a legal guardian to manage financial and health concerns.    Today: Patient appears at recent baseline and does better on medications rather than off. Catatonia has resolved while on Ativan with most recent BF score of 0. Modafinil started off-label without clear benefit even at 200 mg. As a result, will stop. Discharge tomorrow to [facility].    Educated regarding medication indications, risks, benefits, side effects, contraindications and possible interactions. Verbally expressed understanding.      Diagnoses     1. Schizoaffective disorder, depressive type, multiple episodes, currently in acute episode, with catatonia   2. 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 Multiple other TBIs  3. 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 History of multiple cardiac arrests (2012 and 2020) after overdose  5. Methamphetamine use disorder, severe  6. Alcohol use disorder, moderate     Plan     Unit 5  Legal Status: Committed    Safety Assessment:    Behavioral Orders   Procedures     Code 1 - Restrict to Unit     Routine Programming     As clinically indicated     Status 15     Every 15 minutes.      Medications:     Outpatient medications continued/changed:     Wellbutrin  mg daily -> 300 mg on 12/24 -> 450 mg on 2/14  Lithium 900 mg at bedtime  Lyrica 100 mg TID (initially held but resumed due to benefit for anxiety and pain)  Vitamin D 50,000 international unit(s) weekly    New medications initiated:     Ativan 2 mg  QID -> 1.5 mg QID on 1/5 -> 1/2/2 mg on 1/12 -> 1/1/2 mg on 1/15 -> 1 mg TID on 1/17 -> 1.5 mg TID on 1/26  Namenda 5 mg daily -> 5 mg BID on 1/25 -> 10 mg BID on 1/31  Standard unit PRNs    New medications stopped    Exelon 3 mg BID off label for cognition from TBI due to concerns for agitation with plans to possibly switch to Namenda   Amantadine 100 mg BID on 2/9 due to limited efficacy for his catatonia   Modafinil 200 mg off label for negative symptoms and depression without benefit leading to being stopped   Zyprexa, prn upon admission, scheduled 7.5 up to 12.5 mg at bedtime due to patient preference.    Programming: Patient will be treated in a therapeutic milieu with appropriate individual and group therapies. Education will be provided on diagnoses, medications, and treatments.     Medical diagnoses:      #. Severe malnutrition, improved  - Nutrition following   - Gained weight, but back to admission weight (eating % of meals)  - Ensure on meal trays   - Monitoring, intermittent IOs    #. Intention tremor bilaterally   - Secondary to lithium  - Monitor  - Conservative management    #. Chronic low back pain, improved  #. Muscle spasms, improved  - Robaxin 1,000 mg TID prn. Stopped on 2/2  - Sulindac 150 mg BID switched to prn  - Lidocaine patches prn  - Icy-hot prn   - APAP prn, increased to 1,000 mg   - Recommend referral to PT and PMR on outpatient basis     #. Multiple TBIs  - MRI findings consistent with encephalomalacia in left temporal-occipital region and left anterior frontal lobe.  - Recommend neuropsychological testing as outpatient and possible TBI focused IRTS/CBH facility.  - OT to further evaluate if able. So far, patient has refused.    #. Vitamin D deficiency  - 50,000 international unit(s) every 7 days with 10 doses given  - Level today    Consult: Nutrition, OT  Labs: Li 0.8 on 1/12  Imaging: MRI    Anticipated LOS: Tuesday  Dispo: [facility] while [facility] services are awaiting.      "Interim History     Patient notes that he is \"alright\" today. He notes that he feels ready for discharge tomorrow. Denies any psychosis. Feels like mood is alright. He does not notice any improvement on Modafinil and would prefer to stop. Will stop as a result.    Patient denies any physical concerns like headache, chest pain, abdominal pain, SOB, constipation, or diarrhea. Denies SI or HI. Denies any problems with his psychiatric medications he has been taking.    Patient notes his back pain has improved. He would prefer to take his Sulindac prn and not scheduled.     Medications       buPROPion  450 mg Oral Daily     lithium ER  900 mg Oral At Bedtime     LORazepam  1.5 mg Oral TID     memantine  10 mg Oral BID     pregabalin  100 mg Oral TID     vitamin D2  50,000 Units Oral Q7 Days        Allergies     Allergies   Allergen Reactions     Pork Allergy         Psychiatric Examination     BP 98/67   Pulse 91   Temp 97.8  F (36.6  C) (Temporal)   Resp 16   Ht 1.829 m (6')   Wt 93 kg (205 lb 1.6 oz)   SpO2 97%   BMI 27.82 kg/m    Weight is 205 lbs 1.6 oz  Body mass index is 27.82 kg/m .    Appearance: Alert, oriented, dressed in hospital scrubs, long beard  Attitude: Cooperative   Eye Contact: Fair  Mood: \"Alright\"  Affect: Flat, mood congruent  Speech: Regular rate, soft tone. Normal rhythm   Psychomotor Behavior: No tremor, rigidity, akathisia, or psychomotor retardation. Ambitendency and immobility at times, improved  Thought Process: Linear, concrete, irrational at times   Associations: No loose associations    Thought Content: Denies SI. No SIB. AH and paranoia at times, improved, none recently. Poverty of thought.   Insight: Limited  Judgment: Limited  Oriented to: Person, place, and time  Attention Span and Concentration: Intact  Recent and Remote Memory: Impairments in recent recall and remote memory encoding  Language: English with appropriate syntax and vocabulary  Fund of Knowledge: " Low-Average  Muscle Strength and Tone: Grossly normal  Gait and Station: Grossly normal    Ganga Catatonia Rating Scale   Severity Score (Number of points for items 1 -23) _______1___   Screening Score (Presence or absence of items 1 - 14) ______1_____   Time: 11:00 AM on 1/11/2022   Rater: Dr. Shanelle Schuler Catatonia Rating Scale   Severity Score (Number of points for items 1 -23) ____4______   Screening Score (Presence or absence of items 1 - 14) ___1________   Time: 1145AM on 1/18  Rater: Dr. Shanelle Schuler Catatonia Rating Scale   Severity Score (Number of points for items 1 -23) ____7______   Screening Score (Presence or absence of items 1 - 14) ____4_______   Time: 500 PM on 1/26  Rater: Dr. Shanelle Schuler Catatonia Rating Scale   Severity Score (Number of points for items 1 -23) ___5_______   Screening Score (Presence or absence of items 1 - 14) ____1_______   Time: 121 PM on 2/10  Rater: Dr. Shanelle Schuler Catatonia Rating Scale   Severity Score (Number of points for items 1 -23) ___1_______   Screening Score (Presence or absence of items 1 - 14) ____1_______   Time: 100 PM on 2/23  Rater: Dr. Shanelle Schuler Catatonia Rating Scale   Severity Score (Number of points for items 1 -23) ___0_______   Screening Score (Presence or absence of items 1 - 14) __0_________   Time: 10 AM on 3/3  Rater: Dr. Timmons       Labs     No results found for this or any previous visit (from the past 24 hour(s)).     Attestation      Patient has been seen and evaluated by:    David Timmons DO, MA  Psychiatrist    VIDEO VISIT    Patient has given verbal consent for video visit?: Yes     Video- Visit Details  Type of service:  video visit for mental health treatment.  Time of service:    Date:  03/07/2022    Video Start Time: 1145AM      Video End Time: 1200PM    Reason for video visit: COVID-19 [place]  Originating Site (patient location):  [facility]  Distant Site (provider location):  Remote location  Mode of Communication:  Video Conference via [employer]

## 2022-03-08 VITALS
HEART RATE: 83 BPM | RESPIRATION RATE: 14 BRPM | DIASTOLIC BLOOD PRESSURE: 76 MMHG | OXYGEN SATURATION: 98 % | BODY MASS INDEX: 27.78 KG/M2 | HEIGHT: 72 IN | WEIGHT: 205.1 LBS | TEMPERATURE: 97.2 F | SYSTOLIC BLOOD PRESSURE: 108 MMHG

## 2022-03-08 PROCEDURE — 250N000013 HC RX MED GY IP 250 OP 250 PS 637: Performed by: STUDENT IN AN ORGANIZED HEALTH CARE EDUCATION/TRAINING PROGRAM

## 2022-03-08 PROCEDURE — 250N000013 HC RX MED GY IP 250 OP 250 PS 637: Performed by: NURSE PRACTITIONER

## 2022-03-08 PROCEDURE — 99239 HOSP IP/OBS DSCHRG MGMT >30: CPT | Mod: 95 | Performed by: STUDENT IN AN ORGANIZED HEALTH CARE EDUCATION/TRAINING PROGRAM

## 2022-03-08 RX ADMIN — MEMANTINE 10 MG: 5 TABLET ORAL at 09:06

## 2022-03-08 RX ADMIN — LORAZEPAM 1.5 MG: 1 TABLET ORAL at 09:06

## 2022-03-08 RX ADMIN — BUPROPION HYDROCHLORIDE 450 MG: 300 TABLET, EXTENDED RELEASE ORAL at 09:06

## 2022-03-08 RX ADMIN — PREGABALIN 100 MG: 75 CAPSULE ORAL at 09:06

## 2022-03-08 ASSESSMENT — ACTIVITIES OF DAILY LIVING (ADL)
HYGIENE/GROOMING: INDEPENDENT
ADLS_ACUITY_SCORE: 7
DRESS: SCRUBS (BEHAVIORAL HEALTH)
ADLS_ACUITY_SCORE: 7
ORAL_HYGIENE: INDEPENDENT

## 2022-03-08 NOTE — PLAN OF CARE
PT denied SI/HI and hallucinations. PT appropriate throughout shifts and remained safe.   PT reports he is looking forward to leaving tomorrow.   PT remained isolative and withdrawn to room throughout majority of shift.   PT reports he wants to try being back out in the world again.       Face to face end of shift report communicated to oncoming RN    Maria Esther Bridges RN  3/7/2022  10:53 PM               Problem: Behavioral Health Plan of Care  Goal: Patient-Specific Goal (Individualization)  Description: Patient will be free from self harm or injury  Patient will eat at least 50% of meals  Patient will attend one group every day.    Monitor food intake.  Address and document any self talk or yelling in room  Outcome: Ongoing, Progressing     Problem: Behavioral Health Plan of Care  Goal: Optimized Coping Skills in Response to Life Stressors  Outcome: Ongoing, Progressing     Problem: Behavior Regulation Impairment (Psychotic Signs/Symptoms)  Goal: Improved Behavioral Control (Psychotic Signs/Symptoms)  Description: Patient will be able to have a reality based conversation.     Outcome: Ongoing, Progressing     Problem: Violence Risk or Actual  Goal: Anger and Impulse Control  Description: Pt will be able to control his anger during stay  Pt will ask for PRN medications as needed to control his impulses    Outcome: Ongoing, Progressing   Goal Outcome Evaluation:

## 2022-03-08 NOTE — PLAN OF CARE
Discharge Note    Patient Discharged to [facility] on 3/8/2022 12:10 PM via Taxi accompanied by Unit assistant walks to taxi.     Patient informed of discharge instructions in AVS. patient verbalizes understanding and denies having any questions pertaining to AVS. Patient stable at time of discharge. Patient denies SI, HI, and thoughts of self harm at time of discharge. All personal belongings returned to patient. Discharge prescriptions sent to [facility] and picked up via electronic communication. Discharge intstructions discussed with pt including medications.     Caryl Villanueva RN  3/8/2022  12:22 PM      Problem: Behavioral Health Plan of Care  Goal: Plan of Care Review  Outcome: Adequate for Care Transition  Flowsheets (Taken 3/8/2022 0900)  Plan of Care Reviewed With: patient  Patient Agreement with Plan of Care: agrees  Goal: Patient-Specific Goal (Individualization)  Description: Patient will be free from self harm or injury  Patient will eat at least 50% of meals  Patient will attend one group every day.    Monitor food intake.  Address and document any self talk or yelling in room  Outcome: Adequate for Care Transition  Note:     A&O, VSS, denies pain. Denies all criteria including: SI, SIB, HI or hallucinations.  Continues with flat affect, answers with short y/n answers.  Avoids eye contact-withdrawn and hypoactive. Little to no interactions with peers noted.  Cooperative with medications.   Medications picked up from [facility] pharmacy for discharge and sent with pt  Goal: Adheres to Safety Considerations for Self and Others  Outcome: Adequate for Care Transition  Intervention: Develop and Maintain Individualized Safety Plan  Recent Flowsheet Documentation  Taken 3/8/2022 0900 by Caryl Villanueva RN  Safety Measures:    environmental rounds completed    safety rounds completed  Goal: Absence of New-Onset Illness or Injury  Outcome: Adequate for Care Transition  Intervention:  Identify and Manage Fall Risk  Recent Flowsheet Documentation  Taken 3/8/2022 0900 by Caryl Villanueva RN  Safety Measures:    environmental rounds completed    safety rounds completed  Goal: Optimized Coping Skills in Response to Life Stressors  Outcome: Adequate for Care Transition  Goal: Develops/Participates in Therapeutic [place] to Support Successful Transition  Outcome: Adequate for Care Transition  Intervention: Foster Therapeutic [place]  Recent Flowsheet Documentation  Taken 3/8/2022 0900 by Caryl Villanueva RN  Trust Relationship/Rapport:    care explained    choices provided    questions answered    questions encouraged    reassurance provided    thoughts/feelings acknowledged     Problem: Behavior Regulation Impairment (Psychotic Signs/Symptoms)  Goal: Improved Behavioral Control (Psychotic Signs/Symptoms)  Description: Patient will be able to have a reality based conversation.     Outcome: Adequate for Care Transition     Problem: Violence Risk or Actual  Goal: Anger and Impulse Control  Description: Pt will be able to control his anger during stay  Pt will ask for PRN medications as needed to control his impulses    Outcome: Adequate for Care Transition   Goal Outcome Evaluation:

## 2022-03-08 NOTE — DISCHARGE SUMMARY
[facility] Discharge Summary    Steven Carter MRN# [id number]   Age: 31 year old YOB: 1990     Date of Admission:  12/16/2021  Date of Discharge:  3/8/2022  Admitting Physician:  Ashely Heaton NP  Discharge Physician:  David Timmons DO         Event Leading to Hospitalization:     This is a 31 year old yo male with likely schizoaffective disorder, depressed type who has decompensated significantly after I discontinued his ativan at his last hospital stay. He was voluntary at that time and we discharged him to treatment where he quickly declined which lead to mutism and refusal of food for days. He has now started showing improvement with catatonic symptoms with ativan though still is extremely sad appearing.     See Admission note by Ashely Heaton NP for additional details.          Diagnoses:      1. Schizoaffective disorder, depressive type, multiple episodes, currently in acute episode, with catatonia   2. 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 Multiple other TBIs  3. 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 History of multiple cardiac arrests (2012 and 2020) after overdose  5. Methamphetamine use disorder, severe  6. Alcohol use disorder, moderate         Consults:     1. OT  2. Nutrition          Hospital Course:     Psychiatric Course:    Patient was admitted to unit 5 on a voluntary basis. Petition for commitment was filed with the patient being placed on commitment. The patient was placed under 15 minute checks to ensure patient safety. The patient participated in unit programming and groups as able with limited participation.    PTA medications of Lithium, Lyrica (held briefly), and Vitamin D were resumed. The patient had numerous medication changes as documented below. The patient had a prolonged hospitalization given limited ability to establish significant improvement, in addition  to challenge in finding appropriate placement. Multiple different treatment options were pursued and considered, including ECT with the patient not being interested. Please see diagnostic assessment for further information pertaining to why.     Outpatient medications changed:    Wellbutrin  mg daily increased to 450 mg for depression  Zyprexa 10 mg mg BID prn agitation changed to scheduled up to 12.5 mg. Stopped due to patient preference and limited efficacy.     New medications tried and stopped     Exelon 3 mg BID off label for cognition from TBI. Stopped due to causing agitation.  Amantadine 100 mg BID off label for catatonia. Stopped due to no efficacy.  Modafinil 200 mg off label for negative symptoms and depression. Stopped due to no efficacy    New medications initiated successfully :      Ativan 1.5 mg TID off-label for catatonia   Namenda 10 mg BID off label for catatonia and cognition    With the following changes above, the patient's catatonia improved and he had some improvement in his depression. He continued to have negative symptoms with multiple off-label approaches being tried without improvement. The patient's psychosis improved with hospitalization with him not having any signs at discharge despite not being on a neuroleptic. His schizoaffective disorder is more prominently mood related. In addition, there is a substance related component. Patient preferred to not have a neuroleptic scheduled or be on one prn. He was observed on this a neuroleptic without any significant benefit in his mood or negative symptoms to warrant pursuing a Sarah.    Given improvement in the patient's clinical state with his catatonia and depression being treated, Steven Carter was discharged to [facility]. At the time of discharge, Steven Carter was determined to not be a danger to self or others. The patient was found the day prior to hospitalization with a broken knife but this was noted to be accidental  and there were not signs of the patient having SI or planning to hurt himself. He was forward thinking. At the current time of discharge, the patient does not meet criteria for involuntary hospitalization. On the day of discharge, the patient reports that they do not have suicidal or homicidal ideation. Steps taken to minimize risk include: assessing patient s behavior and thought process daily during hospital stay, discharging patient with adequate plan for follow up for mental and physical health and discussing safety plan of returning to the hospital should the patient ever have thoughts of harming themselves or others. Therefore, based on all available evidence including the factors cited above, the patient does not appear to be at imminent risk for self-harm, and is appropriate for outpatient level of care. However, if patient uses substances or is medication non-adherent, their risk of decompensation, catatonia, depression, and SI will be elevated. This was discussed with the patient.    Diagnostic Assessment:    Mr. Carter is a 31 year old male with a PMH of schizoaffective disorder, depressive type, catatonia, severe methamphetamine use disorder, alcohol use disorder, and significant TBI at the age of 3 who presented with depression with catatonia being off of Ativan after recently being discharged. This is the patient's 3rd hospitalization in the past roughly two months with substance use complicating his course.     Patient has SPMI with him having deficits in his occupational and social functioning. He has had significant CNS insults with him having multiple TBIs, significant substance abuse over years, and near fatal cardiac arrest from overdose (? Anoxic injury) that have likely led to neuropsychiatric impact making it more challenging for him to recover. His  and  note he has not been the same since his near fatal overdose. Patient did score a 4.4/5.8 on the ACL, which  indicates the patient would need some level of support and could not be alone the whole day safely, needing assistance with such stuff as solving problems, removing any safety hazards. In addition, he has had low MoCA scores with his being 20/30 in February, 2022.     The patient has profound negative symptoms (apathy, amotivation, anhedonia) that have been challenging to treat while hospitalized. As noted on MRI, he has encephalomalacia in left temporal-occipital region and left anterior frontal lobe, which can impact executive functioning and motivational and mood circuitry. Unclear how this encephalomalacia appeared, but suspect TBIs most likely. In addition, he has had neurotoxic contributions from substance use, near fatal cardiac arrests, and having significant mental illness with thought and mood disorder. While the patient's catatonia have been treatable to an extend, his negative symptoms will be more challenging given his neurocognitive deficits with there being limited other treatment options to assist and limited data, namely ECT and some other medication options like Clozapine, stimulants, and other off-label agents. He warrants continued efforts, although outcomes will likely not exceed much of what has been seen while in the hospital.    Medical Course:    Patient was medically cleared for admission to the unit. The following medical issues arose. The patient was medically stable at the time of discharge.    #. Severe malnutrition, improved  - Nutrition consulted   - Gained weight, but back to admission weight (eating % of meals)  - Ensure on meal trays      #. Intention tremor, bilaterally   - Secondary to lithium  - Monitor  - Conservative management     #. Chronic low back pain, improved  #. Muscle spasms, improved  - Robaxin 1,000 mg TID prn. Stopped on 2/2  - Sulindac 150 mg BID switched to prn prior to discharge  - Lidocaine patches prn  - Icy-hot prn   - APAP prn, increased to 1,000 mg  "  - Can pursue PT on outpatient basis if symptoms return     #. Multiple TBIs  - MRI findings consistent with encephalomalacia in left temporal-occipital region and left anterior frontal lobe.  - Recommend neuropsychological testing as outpatient  - MoCA of 20/30 on 2/16/2022. Previous MoCA of 13/30 in June, 2020    #. Vitamin D deficiency  - 50,000 international unit(s) every 7 days with 10 doses given  - Level pending. Will send replacement if needed         Discharge Medications:     Discharge Medication List as of 3/8/2022 10:38 AM      CONTINUE these medications which have CHANGED    Details   buPROPion HCl ER, XL, 450 MG TB24 Take 450 mg by mouth daily, Disp-60 tablet, R-1, E-Prescribe      lithium (ESKALITH CR/LITHOBID) 450 MG CR tablet Take 2 tablets (900 mg) by mouth At Bedtime, Disp-120 tablet, R-1, E-Prescribe      LORazepam (ATIVAN) 1 MG tablet Take 1.5 tablets (1.5 mg) by mouth 3 times daily, Disp-270 tablet, R-1, E-Prescribe      memantine (NAMENDA) 10 MG tablet Take 1 tablet (10 mg) by mouth 2 times daily, Disp-120 tablet, R-1, E-Prescribe      pregabalin (LYRICA) 100 MG capsule Take 1 capsule (100 mg) by mouth 3 times daily, Disp-180 capsule, R-1, E-Prescribe      sulindac (CLINORIL) 150 MG tablet Take 1 tablet (150 mg) by mouth 2 times daily as needed (Moderate pain.), Disp-60 tablet, R-1, E-Prescribe         STOP taking these medications       OLANZapine (ZYPREXA) 10 MG tablet Comments:   Reason for Stopping:         vitamin D2 (ERGOCALCIFEROL) 45653 units (1250 mcg) capsule Comments:   Reason for Stopping:                  Psychiatric Examination:     Temp: 97.2  F (36.2  C) Temp src: Temporal BP: 108/76 Pulse: 83   Resp: 14 SpO2: 98 % O2 Device: None (Room air)      Appearance: Alert, oriented, dressed in hospital scrubs, long beard  Attitude: Cooperative   Eye Contact: Fair  Mood: \"Alright\"  Affect: Flat, mood congruent  Speech: Regular rate, soft tone. Normal rhythm   Psychomotor Behavior: No " tremor, rigidity, akathisia, or psychomotor retardation. No signs of catatonia.  Thought Process: Linear, concrete, irrational at times   Associations: No loose associations    Thought Content: Denies SI. No SIB. No AVH or delusional thought. Poverty of thought present  Insight: Limited  Judgment: Limited  Oriented to: Person, place, and time  Attention Span and Concentration: Intact  Recent and Remote Memory: Impairments in recent recall and remote memory encoding  Language: English with appropriate syntax and vocabulary  Fund of Knowledge: Low-Average  Muscle Strength and Tone: Grossly normal  Gait and Station: Grossly normal         Discharge Plan:     Patient is being discharged to home with the following appointments as detailed below. Recommendation for outpatient provider in Discharge Instructions.       Reason for your hospital stay    Catatonia occurring in the context of medication non-adherence and depression.     Activity    Your activity upon discharge: activity as tolerated.     Discharge Instructions    Recommend the patient stay on Ativan for 3-6 months prior to slow tapering with monitoring for signs of catatonia (withdrawal, ambitendency, and immobility most prominent in patient). He may need long-term use of Ativan as a result. In addition, recommend keeping him on his current regimen at least 3-6 months prior to any changes. The patient decompensates quickly if not taking his medications. Given the severity of his brain injuries, CD use, and psychiatric issues, his baseline level of functioning is low with it being challenging to treat his negative symptoms (apathy, anhedonia, amotivation, flat affect). Please encourage the patient to use take his medications. In addition, if the patient stops eating, please ensure he is taking Ativan as this can be a sign of his catatonia relapsing, as happened prior.     Follow-up and recommended labs and tests    See SW Recommendations for outpatient follow-up  appointments. Recommend standard lithium labs in 3 months (BMP, TSH, and Li) with this to follow every 6 months.     Diet    Follow this diet upon discharge: Orders Placed This Encounter      Regular Diet Adult             Discharge Services Provided:     50 minutes spent on discharge services, including:  Final examination of patient.  Review and discussion of Hospital stay.  Instructions for continued outpatient care/goals.  Preparation of discharge records.  Preparation of medications refills and new prescriptions.  Preparation of Applicable referral forms.      Attestation     Patient has been seen and evaluated by:    David Timmons DO, MA  Psychiatrist     VIDEO VISIT    Patient has given verbal consent for video visit?: Yes     Video- Visit Details  Type of service:  video visit for mental health treatment.  Time of service:    Date:  03/08/2022    Video Start Time:  730 AM      Video End Time: 800 AM    Reason for video visit: COVID-19 [place]  Originating Site (patient location):  [facility]  Distant Site (provider location):  Remote location  Mode of Communication:  Video Conference via [employer]         Appendix A: All Labs This Admission:        Results for orders placed or performed during the hospital encounter of 12/16/21   MR Brain w/o Contrast     Status: None    Narrative    EXAM:  MR BRAIN W/O CONTRAST    HISTORY:  Psychosis; 32 y/o, evaluating for medical causes of  psychosis and neuropsychiatric findings.. .    TECHNIQUE:  Sagittal T1, axial T1, T2, FLAIR, diffusion, and gradient  noncontrast imaging of the whole brain was performed.    COMPARISON:  None.     FINDINGS:    Chronic appearing encephalomalacia is present within the left temporal  occipital region as well as the left anterior frontal lobe and right  pars bracket.     The brain volume is otherwise preserved. No abnormal extra-axial  collection, hydrocephalus, mass effect or midline shift is seen.  The  basal cisterns are preserved.    No restricted diffusion is present. The major intracranial vascular  flow voids are preserved.    The T1 marrow signal is unremarkable.       Impression    IMPRESSION: Findings of patchy peripheral encephalomalacia are highly  suggestive of prior traumatic brain injury. Correlate with any prior  imaging of the head.    COLLIN DELONG MD         SYSTEM ID:  [id number]   Asymptomatic COVID-19 Virus (Coronavirus) by PCR Nose     Status: Normal    Specimen: Nose; Swab   Result Value Ref Range    SARS CoV2 PCR Negative Negative    Narrative    Testing was performed using the Xpert Xpress SARS-CoV-2 Assay on the   Cepheid Gene-Xpert Instrument Systems. Additional information about   this Emergency Use Authorization (EUA) assay can be found via the Lab   Guide. This test should be ordered for the detection of SARS-CoV-2 in   individuals who meet SARS-CoV-2 clinical and/or epidemiological   criteria. Test performance is unknown in asymptomatic patients. This   test is for in vitro diagnostic use under the FDA EUA for   laboratories certified under CLIA to perform high complexity testing.   This test has not been FDA cleared or approved. A negative result   does not rule out the presence of PCR inhibitors in the specimen or   target RNA in concentration below the limit of detection for the   assay. The possibility of a false negative should be considered if   the patient's recent exposure or clinical presentation suggests   COVID-19. This test was validated by [facility]. This laboratory is certified under the Clinical Laboratory Improve  ment Amendments (CLIA) as qualified to perform high complexity testing.   Blood metal panel     Status: None   Result Value Ref Range    Arsenic <10.0 <=12.0 ug/L    Lead Venous Blood <2.0 <=4.9 ug/dL    [place] 5.3 <=10.0 ug/L   Asymptomatic COVID-19 Virus (Coronavirus) by PCR Nasopharyngeal     Status: Normal     Specimen: Nasopharyngeal; Swab   Result Value Ref Range    SARS CoV2 PCR Negative Negative    Narrative    Testing was performed using the [employer] Xpress SARS-CoV-2 Assay on the   [employer]. Additional information about   this Emergency Use Authorization (EUA) assay can be found via the Lab   Guide. This test should be ordered for the detection of SARS-CoV-2 in   individuals who meet SARS-CoV-2 clinical and/or epidemiological   criteria. Test performance is unknown in asymptomatic patients. This   test is for in vitro diagnostic use under the FDA EUA for   laboratories certified under CLIA to perform high complexity testing.   This test has not been FDA cleared or approved. A negative result   does not rule out the presence of PCR inhibitors in the specimen or   target RNA in concentration below the limit of detection for the   assay. The possibility of a false negative should be considered if   the patient's recent exposure or clinical presentation suggests   COVID-19. This test was validated by [facility]. This laboratory is certified under the Clinical Laboratory Improve  ment Amendments (CLIA) as qualified to perform high complexity testing.   TSH with free T4 reflex     Status: Normal   Result Value Ref Range    TSH 3.05 0.40 - 4.00 mU/L   Hepatitis C antibody     Status: Normal   Result Value Ref Range    Hepatitis C Antibody Nonreactive Nonreactive    Narrative    Assay performance characteristics have not been established for newborns, infants, and children.   HIV Antigen Antibody Combo     Status: Normal   Result Value Ref Range    HIV Antigen Antibody Combo Nonreactive Nonreactive   Asymptomatic COVID-19 Virus (Coronavirus) by PCR Nasopharyngeal     Status: Normal    Specimen: Nasopharyngeal; Swab   Result Value Ref Range    SARS CoV2 PCR Negative Negative    Narrative    Testing was performed using the Xpert Xpress SARS-CoV-2 Assay on the   [employer]  Gene-Xpert Instrument Systems. Additional information about   this Emergency Use Authorization (EUA) assay can be found via the Lab   Guide. This test should be ordered for the detection of SARS-CoV-2 in   individuals who meet SARS-CoV-2 clinical and/or epidemiological   criteria. Test performance is unknown in asymptomatic patients. This   test is for in vitro diagnostic use under the FDA EUA for   laboratories certified under CLIA to perform high complexity testing.   This test has not been FDA cleared or approved. A negative result   does not rule out the presence of PCR inhibitors in the specimen or   target RNA in concentration below the limit of detection for the   assay. The possibility of a false negative should be considered if   the patient's recent exposure or clinical presentation suggests   COVID-19. This test was validated by [facility]. This laboratory is certified under the Clinical Laboratory Improve  ment Amendments (CLIA) as qualified to perform high complexity testing.   Comprehensive metabolic panel     Status: Abnormal   Result Value Ref Range    Sodium 138 133 - 144 mmol/L    Potassium 3.7 3.4 - 5.3 mmol/L    Chloride 108 94 - 109 mmol/L    Carbon Dioxide (CO2) 24 20 - 32 mmol/L    Anion Gap 6 3 - 14 mmol/L    Urea Nitrogen 10 7 - 30 mg/dL    Creatinine 1.13 0.66 - 1.25 mg/dL    Calcium 9.9 8.5 - 10.1 mg/dL    Glucose 105 (H) 70 - 99 mg/dL    Alkaline Phosphatase 71 40 - 150 U/L    AST 8 0 - 45 U/L    ALT 31 0 - 70 U/L    Protein Total 7.4 6.8 - 8.8 g/dL    Albumin 4.5 3.4 - 5.0 g/dL    Bilirubin Total 0.7 0.2 - 1.3 mg/dL    GFR Estimate 88 >60 mL/min/1.73m2   CBC with platelets and differential     Status: None   Result Value Ref Range    WBC Count 7.5 4.0 - 11.0 10e3/uL    RBC Count 5.75 4.40 - 5.90 10e6/uL    Hemoglobin 17.0 13.3 - 17.7 g/dL    Hematocrit 49.1 40.0 - 53.0 %    MCV 85 78 - 100 fL    MCH 29.6 26.5 - 33.0 pg    MCHC 34.6 31.5 - 36.5 g/dL    RDW  12.1 10.0 - 15.0 %    Platelet Count 281 150 - 450 10e3/uL    % Neutrophils 66 %    % Lymphocytes 24 %    % Monocytes 9 %    % Eosinophils 1 %    % Basophils 0 %    % Immature Granulocytes 0 %    NRBCs per 100 WBC 0 <1 /100    Absolute Neutrophils 4.9 1.6 - 8.3 10e3/uL    Absolute Lymphocytes 1.8 0.8 - 5.3 10e3/uL    Absolute Monocytes 0.7 0.0 - 1.3 10e3/uL    Absolute Eosinophils 0.1 0.0 - 0.7 10e3/uL    Absolute Basophils 0.0 0.0 - 0.2 10e3/uL    Absolute Immature Granulocytes 0.0 <=0.4 10e3/uL    Absolute NRBCs 0.0 10e3/uL   Lithium level     Status: Normal   Result Value Ref Range    Lithium 0.8   mmol/L   Asymptomatic COVID-19 Virus (Coronavirus) by PCR Nasopharyngeal     Status: Normal    Specimen: Nasopharyngeal; Swab   Result Value Ref Range    SARS CoV2 PCR Negative Negative    Narrative    Testing was performed using the [employer] Xpress SARS-CoV-2 Assay on the   Cepheid Gene-Xpert Instrument Systems. Additional information about   this Emergency Use Authorization (EUA) assay can be found via the Lab   Guide. This test should be ordered for the detection of SARS-CoV-2 in   individuals who meet SARS-CoV-2 clinical and/or epidemiological   criteria. Test performance is unknown in asymptomatic patients. This   test is for in vitro diagnostic use under the FDA EUA for   laboratories certified under CLIA to perform high complexity testing.   This test has not been FDA cleared or approved. A negative result   does not rule out the presence of PCR inhibitors in the specimen or   target RNA in concentration below the limit of detection for the   assay. The possibility of a false negative should be considered if   the patient's recent exposure or clinical presentation suggests   COVID-19. This test was validated by [facility]. This laboratory is certified under the Clinical Laboratory Improve  ment Amendments (CLIA) as qualified to perform high complexity testing.   Asymptomatic COVID-19  Virus (Coronavirus) by PCR Nasopharyngeal     Status: Normal    Specimen: Nasopharyngeal; Swab   Result Value Ref Range    SARS CoV2 PCR Negative Negative    Narrative    Testing was performed using the Xpert Xpress SARS-CoV-2 Assay on the   Cepheid Gene-Xpert Instrument Systems. Additional information about   this Emergency Use Authorization (EUA) assay can be found via the Lab   Guide. This test should be ordered for the detection of SARS-CoV-2 in   individuals who meet SARS-CoV-2 clinical and/or epidemiological   criteria. Test performance is unknown in asymptomatic patients. This   test is for in vitro diagnostic use under the FDA EUA for   laboratories certified under CLIA to perform high complexity testing.   This test has not been FDA cleared or approved. A negative result   does not rule out the presence of PCR inhibitors in the specimen or   target RNA in concentration below the limit of detection for the   assay. The possibility of a false negative should be considered if   the patient's recent exposure or clinical presentation suggests   COVID-19. This test was validated by [facility]. This laboratory is certified under the Clinical Laboratory Improve  ment Amendments (CLIA) as qualified to perform high complexity testing.   Asymptomatic COVID-19 Virus (Coronavirus) by PCR Nasopharyngeal     Status: Normal    Specimen: Nasopharyngeal; Swab   Result Value Ref Range    SARS CoV2 PCR Negative Negative    Narrative    Testing was performed using the Xpert Xpress SARS-CoV-2 Assay on the   [employer] Systems. Additional information about   this Emergency Use Authorization (EUA) assay can be found via the Lab   Guide. This test should be ordered for the detection of SARS-CoV-2 in   individuals who meet SARS-CoV-2 clinical and/or epidemiological   criteria. Test performance is unknown in asymptomatic patients. This   test is for in vitro diagnostic use under the  FDA EUA for   laboratories certified under CLIA to perform high complexity testing.   This test has not been FDA cleared or approved. A negative result   does not rule out the presence of PCR inhibitors in the specimen or   target RNA in concentration below the limit of detection for the   assay. The possibility of a false negative should be considered if   the patient's recent exposure or clinical presentation suggests   COVID-19. This test was validated by [facility]. This laboratory is certified under the Clinical Laboratory Improve  ment Amendments (CLIA) as qualified to perform high complexity testing.   Asymptomatic COVID-19 Virus (Coronavirus) by PCR Nasopharyngeal     Status: Normal    Specimen: Nasopharyngeal; Swab   Result Value Ref Range    SARS CoV2 PCR Negative Negative    Narrative    Testing was performed using the [employer] Xpress SARS-CoV-2 Assay on the   [employer]. Additional information about   this Emergency Use Authorization (EUA) assay can be found via the Lab   Guide. This test should be ordered for the detection of SARS-CoV-2 in   individuals who meet SARS-CoV-2 clinical and/or epidemiological   criteria. Test performance is unknown in asymptomatic patients. This   test is for in vitro diagnostic use under the FDA EUA for   laboratories certified under CLIA to perform high complexity testing.   This test has not been FDA cleared or approved. A negative result   does not rule out the presence of PCR inhibitors in the specimen or   target RNA in concentration below the limit of detection for the   assay. The possibility of a false negative should be considered if   the patient's recent exposure or clinical presentation suggests   COVID-19. This test was validated by [facility]. This laboratory is certified under the Clinical Laboratory Improve  ment Amendments (CLIA) as qualified to perform high complexity  testing.   CBC with Platelets & Differential     Status: None    Narrative    The following orders were created for panel order CBC with Platelets & Differential.  Procedure                               Abnormality         Status                     ---------                               -----------         ------                     CBC with platelets and d...[[id number]]                      Final result                 Please view results for these tests on the individual orders.

## 2022-03-08 NOTE — PLAN OF CARE
Face to face end of shift report will be communicated to oncoming RN.     Problem: Behavioral Health Plan of Care  Goal: Patient-Specific Goal (Individualization)  Description: Patient will be free from self harm or injury  Patient will eat at least 50% of meals  Patient will attend one group every day.    Monitor food intake.  Address and document any self talk or yelling in room  Outcome: Ongoing, Progressing     Problem: Behavior Regulation Impairment (Psychotic Signs/Symptoms)  Goal: Improved Behavioral Control (Psychotic Signs/Symptoms)  Description: Patient will be able to have a reality based conversation.     Outcome: Ongoing, Progressing     Problem: Violence Risk or Actual  Goal: Anger and Impulse Control  Description: Pt will be able to control his anger during stay  Pt will ask for PRN medications as needed to control his impulses    Outcome: Ongoing, Progressing    Face to face end of shift report obtained from TRAE Mayo. Pt observed resting in bed.  Pt appears to be sleeping in bed with eyes closed, having regular respirations, and position changes. 15 minutes and PRN safety checks completed with no noted complains. No violent behaviors noted or reported so far this shift.     0600-Pt appeared to had slept all night.

## 2022-03-09 LAB — DEPRECATED CALCIDIOL+CALCIFEROL SERPL-MC: 19 UG/L (ref 20–75)

## 2022-03-10 NOTE — DISCHARGE INSTRUCTIONS
Behavioral Discharge Planning and Instructions    Summary: Pt arrived from [facility] treatment. [facility]. 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 ACLs level 4.4/5.8 indicates that the person may live with someone who does a daily check on the environment, removing any safety hazards and solving problems when minor changes in the home occur. May be alone for part of the day with a procedure for obtaining help by phone or from a neighbor. May have a daily allowance and go to familiar places in the neighborhood. 34% minimum cognitive assistance is required to setup new activities and clean up after routine activities.      MOCA version 7.1 completed with a score of 20/30 which indicates less than normal cognition. Visuospatial/ Executive Functionin/5  Trail making (alternating letters and numbers): 0/1  Shape copy: 0/1  Clock drawin/3  Naming: 3/3  Immediate Recall (not scored): na  Patient accurately verbalized 5/5 non-related words  Attention:  5/6  Number repetition: 2/2  Number reversal: 0/1  Sustained attention: Patient identifying 7 /11 targets with x5 false positives  Serial subtraction: Patient completed serial 7 subtractions 4/5 calculations  Language: 3/3  Sentence repetition: 2/2 when asked to repeat a sentence word-for-word  Divergent naming: Patient named 17 items beginning with the letter /s/ within one minute  Abstract Thinkin/2  Similarities between 2 items (abstract thinking): 0/2  Delayed Recall: 2/5  Patient recalled 2/5 words after approx 5 minute delay without  cues.  Orientation: 5/6  Patient oriented to month, year, day, place and city,but was not oriented to date.     TOTAL SCORE:   20/30  When comparing score from 2020 MOCA : improvement in attention, language, delayed recall, and orientation. Slight decrease in visuspatial/executive functioning.     Patient could benefit from further testing whether it is with OT or neuropsych to pinpoint specific areas of deficit, patient declined further testing during this hospital stay. This would allow for targeted supports and promotion of areas that patient does well. That being said it is unlikely that this would change the recommendation for supported living like a TBI home or Adult foster care etc. Patient would likely benefit the most from a small setting with targeted individual care planning.     Health Care Follow-up:     [facility]   Christy Trujillo and Mg Hendrix  [address] [address]  Phone:[phone]    Fax:[phone]    [facility]  Case Management- Keila Vol-   [address] [place] [address]  Phone: [phone] Fax - [phone]    [facility]- Referral Sent 2/24  [address] [place] [address]   Phone: [phone]  Fax: [phone]      Attend all scheduled appointments with your outpatient providers. Call at least 24 hours in advance if you need to reschedule an appointment to ensure continued access to your outpatient providers.     Major Treatments, Procedures and Findings:  You were provided with: a psychiatric assessment, assessed for medical stability, medication evaluation and/or management, group therapy, family therapy, individual therapy, CD evaluation/assessment, milieu management and medical interventions    Symptoms to Report: feeling more aggressive, increased confusion, losing more sleep, mood getting worse or thoughts of suicide    Early warning signs can include: increased depression or anxiety sleep disturbances  "increased thoughts or behaviors of suicide or self-harm  increased unusual thinking, such as paranoia or hearing voices    Safety and Wellness:  Take all medicines as directed.  Make no changes unless your doctor suggests them.      Follow treatment recommendations.  Refrain from alcohol and non-prescribed drugs.  Ask your support system to help you reduce your access to items that could harm yourself or others. Items could include:  Firearms  Medicines (both prescribed and over-the-counter)  Knives and other sharp objects  Ropes and like materials  Car keys  If there is a concern for safety, call 911. If there is a concern for safety, call 911.    Resources:   Crisis Intervention: 725.906.8332 or 001-266-7935 (TTY: 225.851.3680).  Call anytime for help.  National [place] on Mental Illness (www.[place].nichelle.org): 367.990.6038 or 261-859-9975.  [place] Association for Children's Mental Health (www.mac.org): 941.566.2892.  Alcoholics Anonymous (www.alcoholics-anonymous.org): Check your phone book for your local chapter.  Suicide Awareness Voices of Education (SAVE) (www.save.org): 583-553-KYJM (6692)  National Suicide Prevention Line (www.mentalhealthmn.org): 167-557-GABY (8035)  Mental Health Consumer/Survivor Network of [place] (www.mhcsn.net): 697.591.2462 or 458-712-3321  Mental Health Association of [place] (www.mentalhealth.org): 614.921.8366 or 766-768-6660  Self- Management and Recovery Training., SMART-- Toll free: 666.877.8984  www.[employer].Medallia  Text 4 Life: txt \"LIFE\" to 41150 for immediate support and crisis intervention  Crisis text line: Text \"[place]\" to 277770. Free, confidential, 24/7.  Crisis Intervention: 347.491.4326 or 986-542-6447. Call anytime for help.     General Medication Instructions:   See your medication sheet(s) for instructions.   Take all medicines as directed.  Make no changes unless your doctor suggests them.   Go to all your doctor visits.  Be sure to have all your required lab tests. This way, your " medicines can be refilled on time.  Do not use any drugs not prescribed by your doctor.  Avoid alcohol.    Advance Directives:   Scanned document on file with [facility]? No scanned doc  Is document scanned? Pt states no documents  Honoring Choices Your Rights Handout: Informed and given  Was more information offered? Pt declined    The Treatment team has appreciated the opportunity to work with you. If you have any questions or concerns about your recent admission, you can contact the unit which can receive your call 24 hours a day, 7 days a week. They will be able to get in touch with a Provider if needed. The unit number is [phone] .

## 2022-06-20 ENCOUNTER — TRANSFERRED RECORDS (OUTPATIENT)
Dept: HEALTH INFORMATION MANAGEMENT | Facility: HOSPITAL | Age: 32
End: 2022-06-20

## 2022-07-05 ENCOUNTER — MEDICAL CORRESPONDENCE (OUTPATIENT)
Dept: HEALTH INFORMATION MANAGEMENT | Facility: HOSPITAL | Age: 32
End: 2022-07-05

## 2022-07-18 ENCOUNTER — HOSPITAL ENCOUNTER (INPATIENT)
Facility: HOSPITAL | Age: 32
LOS: 46 days | Discharge: NURSING, REHAB OR LONG-TERM CARE | End: 2022-09-02
Attending: NURSE PRACTITIONER | Admitting: STUDENT IN AN ORGANIZED HEALTH CARE EDUCATION/TRAINING PROGRAM
Payer: COMMERCIAL

## 2022-07-18 DIAGNOSIS — F31.9 BIPOLAR 1 DISORDER (H): Chronic | ICD-10-CM

## 2022-07-18 DIAGNOSIS — F41.9 ANXIETY DISORDER, UNSPECIFIED TYPE: ICD-10-CM

## 2022-07-18 DIAGNOSIS — F98.8 ATTENTION DEFICIT DISORDER, UNSPECIFIED HYPERACTIVITY PRESENCE: ICD-10-CM

## 2022-07-18 DIAGNOSIS — K59.03 DRUG-INDUCED CONSTIPATION: ICD-10-CM

## 2022-07-18 DIAGNOSIS — F41.8 DEPRESSION WITH ANXIETY: ICD-10-CM

## 2022-07-18 DIAGNOSIS — F25.0 SCHIZOAFFECTIVE DISORDER, BIPOLAR TYPE (H): Primary | Chronic | ICD-10-CM

## 2022-07-18 DIAGNOSIS — T14.91XA SUICIDAL BEHAVIOR WITH ATTEMPTED SELF-INJURY (H): ICD-10-CM

## 2022-07-18 DIAGNOSIS — F25.1 SCHIZOAFFECTIVE DISORDER, DEPRESSIVE TYPE (H): ICD-10-CM

## 2022-07-18 DIAGNOSIS — F10.10 ALCOHOL ABUSE: ICD-10-CM

## 2022-07-18 DIAGNOSIS — E56.9 VITAMIN DEFICIENCY: ICD-10-CM

## 2022-07-18 DIAGNOSIS — F06.1 CATATONIA: Chronic | ICD-10-CM

## 2022-07-18 DIAGNOSIS — M54.9 BACK PAIN WITHOUT SCIATICA: ICD-10-CM

## 2022-07-18 LAB
ALBUMIN SERPL-MCNC: 3.6 G/DL (ref 3.4–5)
ALP SERPL-CCNC: 66 U/L (ref 40–150)
ALT SERPL W P-5'-P-CCNC: 57 U/L (ref 0–70)
ANION GAP SERPL CALCULATED.3IONS-SCNC: 4 MMOL/L (ref 3–14)
AST SERPL W P-5'-P-CCNC: 26 U/L (ref 0–45)
BASOPHILS # BLD AUTO: 0 10E3/UL (ref 0–0.2)
BASOPHILS NFR BLD AUTO: 0 %
BILIRUB SERPL-MCNC: 0.6 MG/DL (ref 0.2–1.3)
BUN SERPL-MCNC: 5 MG/DL (ref 7–30)
CALCIUM SERPL-MCNC: 8.5 MG/DL (ref 8.5–10.1)
CHLORIDE BLD-SCNC: 111 MMOL/L (ref 94–109)
CK SERPL-CCNC: 529 U/L (ref 30–300)
CO2 SERPL-SCNC: 26 MMOL/L (ref 20–32)
CREAT SERPL-MCNC: 0.88 MG/DL (ref 0.66–1.25)
EOSINOPHIL # BLD AUTO: 0.1 10E3/UL (ref 0–0.7)
EOSINOPHIL NFR BLD AUTO: 1 %
ERYTHROCYTE [DISTWIDTH] IN BLOOD BY AUTOMATED COUNT: 12.2 % (ref 10–15)
ETHANOL SERPL-MCNC: <0.01 G/DL
GFR SERPL CREATININE-BSD FRML MDRD: >90 ML/MIN/1.73M2
GLUCOSE BLD-MCNC: 83 MG/DL (ref 70–99)
HCT VFR BLD AUTO: 43.4 % (ref 40–53)
HGB BLD-MCNC: 14.6 G/DL (ref 13.3–17.7)
HOLD SPECIMEN: NORMAL
IMM GRANULOCYTES # BLD: 0 10E3/UL
IMM GRANULOCYTES NFR BLD: 0 %
LYMPHOCYTES # BLD AUTO: 1.3 10E3/UL (ref 0.8–5.3)
LYMPHOCYTES NFR BLD AUTO: 23 %
MAGNESIUM SERPL-MCNC: 1.9 MG/DL (ref 1.6–2.3)
MCH RBC QN AUTO: 30 PG (ref 26.5–33)
MCHC RBC AUTO-ENTMCNC: 33.6 G/DL (ref 31.5–36.5)
MCV RBC AUTO: 89 FL (ref 78–100)
MONOCYTES # BLD AUTO: 0.4 10E3/UL (ref 0–1.3)
MONOCYTES NFR BLD AUTO: 7 %
NEUTROPHILS # BLD AUTO: 4 10E3/UL (ref 1.6–8.3)
NEUTROPHILS NFR BLD AUTO: 69 %
NRBC # BLD AUTO: 0 10E3/UL
NRBC BLD AUTO-RTO: 0 /100
PLATELET # BLD AUTO: 238 10E3/UL (ref 150–450)
POTASSIUM BLD-SCNC: 3.6 MMOL/L (ref 3.4–5.3)
PROT SERPL-MCNC: 5.9 G/DL (ref 6.8–8.8)
RBC # BLD AUTO: 4.86 10E6/UL (ref 4.4–5.9)
SARS-COV-2 RNA RESP QL NAA+PROBE: NEGATIVE
SODIUM SERPL-SCNC: 141 MMOL/L (ref 133–144)
TSH SERPL DL<=0.005 MIU/L-ACNC: 1.51 MU/L (ref 0.4–4)
WBC # BLD AUTO: 5.8 10E3/UL (ref 4–11)

## 2022-07-18 PROCEDURE — 36415 COLL VENOUS BLD VENIPUNCTURE: CPT | Performed by: NURSE PRACTITIONER

## 2022-07-18 PROCEDURE — 83735 ASSAY OF MAGNESIUM: CPT | Performed by: NURSE PRACTITIONER

## 2022-07-18 PROCEDURE — 82550 ASSAY OF CK (CPK): CPT | Performed by: NURSE PRACTITIONER

## 2022-07-18 PROCEDURE — 250N000013 HC RX MED GY IP 250 OP 250 PS 637: Performed by: NURSE PRACTITIONER

## 2022-07-18 PROCEDURE — 99285 EMERGENCY DEPT VISIT HI MDM: CPT

## 2022-07-18 PROCEDURE — 82077 ASSAY SPEC XCP UR&BREATH IA: CPT | Performed by: NURSE PRACTITIONER

## 2022-07-18 PROCEDURE — 124N000001 HC R&B MH

## 2022-07-18 PROCEDURE — 99285 EMERGENCY DEPT VISIT HI MDM: CPT | Performed by: NURSE PRACTITIONER

## 2022-07-18 PROCEDURE — U0003 INFECTIOUS AGENT DETECTION BY NUCLEIC ACID (DNA OR RNA); SEVERE ACUTE RESPIRATORY SYNDROME CORONAVIRUS 2 (SARS-COV-2) (CORONAVIRUS DISEASE [COVID-19]), AMPLIFIED PROBE TECHNIQUE, MAKING USE OF HIGH THROUGHPUT TECHNOLOGIES AS DESCRIBED BY CMS-2020-01-R: HCPCS | Performed by: NURSE PRACTITIONER

## 2022-07-18 PROCEDURE — 99223 1ST HOSP IP/OBS HIGH 75: CPT | Mod: AI | Performed by: NURSE PRACTITIONER

## 2022-07-18 PROCEDURE — 84443 ASSAY THYROID STIM HORMONE: CPT | Performed by: NURSE PRACTITIONER

## 2022-07-18 PROCEDURE — C9803 HOPD COVID-19 SPEC COLLECT: HCPCS

## 2022-07-18 PROCEDURE — 85025 COMPLETE CBC W/AUTO DIFF WBC: CPT | Performed by: NURSE PRACTITIONER

## 2022-07-18 PROCEDURE — 80053 COMPREHEN METABOLIC PANEL: CPT | Performed by: NURSE PRACTITIONER

## 2022-07-18 RX ORDER — ACETAMINOPHEN 325 MG/1
650 TABLET ORAL EVERY 4 HOURS PRN
Status: DISCONTINUED | OUTPATIENT
Start: 2022-07-18 | End: 2022-09-02 | Stop reason: HOSPADM

## 2022-07-18 RX ORDER — LITHIUM CARBONATE 450 MG
900 TABLET, EXTENDED RELEASE ORAL AT BEDTIME
Status: DISCONTINUED | OUTPATIENT
Start: 2022-07-18 | End: 2022-07-27

## 2022-07-18 RX ORDER — MAGNESIUM HYDROXIDE/ALUMINUM HYDROXICE/SIMETHICONE 120; 1200; 1200 MG/30ML; MG/30ML; MG/30ML
30 SUSPENSION ORAL EVERY 4 HOURS PRN
Status: DISCONTINUED | OUTPATIENT
Start: 2022-07-18 | End: 2022-09-02 | Stop reason: HOSPADM

## 2022-07-18 RX ORDER — OLANZAPINE 10 MG/1
10 TABLET ORAL 3 TIMES DAILY PRN
Status: DISCONTINUED | OUTPATIENT
Start: 2022-07-18 | End: 2022-08-10

## 2022-07-18 RX ORDER — HYDROXYZINE HYDROCHLORIDE 25 MG/1
25 TABLET, FILM COATED ORAL EVERY 4 HOURS PRN
Status: DISCONTINUED | OUTPATIENT
Start: 2022-07-18 | End: 2022-08-10

## 2022-07-18 RX ORDER — MEMANTINE HYDROCHLORIDE 10 MG/1
10 TABLET ORAL 2 TIMES DAILY
Status: DISCONTINUED | OUTPATIENT
Start: 2022-07-18 | End: 2022-09-02 | Stop reason: HOSPADM

## 2022-07-18 RX ORDER — CLOZAPINE 25 MG/1
12.5 TABLET ORAL AT BEDTIME
Status: DISCONTINUED | OUTPATIENT
Start: 2022-07-18 | End: 2022-07-20

## 2022-07-18 RX ORDER — OLANZAPINE 10 MG/2ML
10 INJECTION, POWDER, FOR SOLUTION INTRAMUSCULAR 3 TIMES DAILY PRN
Status: DISCONTINUED | OUTPATIENT
Start: 2022-07-18 | End: 2022-08-10

## 2022-07-18 RX ORDER — BUPROPION HYDROCHLORIDE 150 MG/1
150 TABLET ORAL DAILY
Status: DISCONTINUED | OUTPATIENT
Start: 2022-07-18 | End: 2022-07-18

## 2022-07-18 RX ORDER — CLONAZEPAM 0.5 MG/1
0.5 TABLET ORAL 2 TIMES DAILY PRN
Status: DISCONTINUED | OUTPATIENT
Start: 2022-07-18 | End: 2022-07-24

## 2022-07-18 RX ORDER — ARIPIPRAZOLE 10 MG/1
10 TABLET ORAL EVERY MORNING
Status: ON HOLD | COMMUNITY
End: 2022-09-01

## 2022-07-18 RX ORDER — CLONAZEPAM 1 MG/1
1 TABLET ORAL 3 TIMES DAILY
Status: DISCONTINUED | OUTPATIENT
Start: 2022-07-18 | End: 2022-07-18

## 2022-07-18 RX ORDER — CLONAZEPAM 0.5 MG/1
0.5 TABLET ORAL 3 TIMES DAILY PRN
Status: ON HOLD | COMMUNITY
End: 2022-09-01

## 2022-07-18 RX ORDER — CLONAZEPAM 1 MG/1
1 TABLET ORAL 3 TIMES DAILY
Status: DISCONTINUED | OUTPATIENT
Start: 2022-07-18 | End: 2022-07-23

## 2022-07-18 RX ADMIN — PREGABALIN 100 MG: 25 CAPSULE ORAL at 21:00

## 2022-07-18 RX ADMIN — PREGABALIN 100 MG: 25 CAPSULE ORAL at 16:37

## 2022-07-18 RX ADMIN — CLONAZEPAM 1 MG: 1 TABLET ORAL at 21:00

## 2022-07-18 RX ADMIN — MEMANTINE 10 MG: 10 TABLET ORAL at 20:59

## 2022-07-18 RX ADMIN — LITHIUM CARBONATE 900 MG: 450 TABLET, EXTENDED RELEASE ORAL at 20:59

## 2022-07-18 RX ADMIN — CLONAZEPAM 1 MG: 1 TABLET ORAL at 16:37

## 2022-07-18 ASSESSMENT — ACTIVITIES OF DAILY LIVING (ADL)
CONCENTRATING,_REMEMBERING_OR_MAKING_DECISIONS_DIFFICULTY: YES
TOILETING_ISSUES: NO
ADLS_ACUITY_SCORE: 57
FALL_HISTORY_WITHIN_LAST_SIX_MONTHS: NO
DIFFICULTY_EATING/SWALLOWING: NO
ADLS_ACUITY_SCORE: 57
WALKING_OR_CLIMBING_STAIRS_DIFFICULTY: NO
ADLS_ACUITY_SCORE: 37
ADLS_ACUITY_SCORE: 41
WEAR_GLASSES_OR_BLIND: NO
DOING_ERRANDS_INDEPENDENTLY_DIFFICULTY: NO
ADLS_ACUITY_SCORE: 57
CHANGE_IN_FUNCTIONAL_STATUS_SINCE_ONSET_OF_CURRENT_ILLNESS/INJURY: YES
ADLS_ACUITY_SCORE: 41
DRESSING/BATHING_DIFFICULTY: NO

## 2022-07-18 NOTE — ED PROVIDER NOTES
History     Chief Complaint   Patient presents with     Psychiatric Evaluation     HPI  Steven Carter is a 31 year old male with history of bipolar disorder, suicidal behavior with attempted self injury, catatonia, schizoaffective disorder, psychosis, ADD, depression, anxiety, polysubstance abuse currently on commitment and living at [facility] in [facility] who presents ambulatory for evaluation. Psychiatric provider Ashely Heaton CNP did call to let us know she is aware he is coming in and she will come evaluate in ED, will need admission.     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, subsequent encounter 09/01/2020     Priority: Medium     Added automatically from request for surgery [id number]       Psychosis (H) 06/23/2020     Priority: Medium     Seizure (H) 04/19/2016     Priority: Medium     ADD (attention deficit disorder) 12/02/2015     Priority: Medium     Methamphetamine abuse (H) 12/02/2015     Priority: Medium     Suicidal behavior 07/23/2014     Priority: Medium     Moderate dextromethorphan use disorder (H) 07/20/2014     Priority: Medium     Paranoia (H) 07/20/2014     Priority: Medium     Depression with anxiety 03/02/2009     Priority: Medium     Overview:   IMO Update 10/11       Alcohol abuse 02/02/2009     Priority: Medium     Overview:   IMO Update 10/11       Polysubstance abuse (H) 02/02/2009      Priority: Medium     Tobacco use disorder 11/01/2008     Priority: Medium     Anxiety disorder 11/01/2008     Priority: Medium     Formatting of this note might be different from the original.  Diagnosed Rosalba Padilla          Past Medical History:    No past medical history on file.    Past Surgical History:    Past Surgical History:   Procedure Laterality Date     COLONOSCOPY - HIM SCAN N/A 12/03/2020    Procedure:  Colonoscopy diagnostic with random biopsies;  Surgeon:  Jenise GOLDMAN MD;  Location:  [facility] OR       Family History:    No family history on file.    Social History:  Marital Status:  Single [1]  Social History     Tobacco Use     Smoking status: Current Every Day Smoker     Packs/day: 0.50     Years: 9.00     Pack years: 4.50     Smokeless tobacco: Current User     Types: Chew   Vaping Use     Vaping Use: Never used   Substance Use Topics     Alcohol use: No     Comment: daily to occasionally per pt.     Drug use: Yes     Types: Other     Comment: reports hx of use but none recently        Medications:    No current outpatient medications on file.        Review of Systems   Constitutional: Negative for appetite change, chills, fatigue and fever.   HENT: Negative for congestion, ear pain, rhinorrhea and sore throat.    Eyes: Negative for visual disturbance.   Respiratory: Negative for cough and shortness of breath.    Cardiovascular: Negative for chest pain.   Gastrointestinal: Negative for abdominal pain, diarrhea, nausea and vomiting.   Genitourinary: Negative for difficulty urinating.   Musculoskeletal: Negative for arthralgias and myalgias.   Skin: Negative for rash.   Neurological: Negative for dizziness, light-headedness and headaches.   Psychiatric/Behavioral: Negative for self-injury and suicidal ideas.       Physical Exam   BP: 132/81  Pulse: 113  Temp: 98  F (36.7  C)  Resp: 18  Weight: 75.6 kg (166 lb 9.6 oz)  SpO2: 95 %      Physical Exam  Constitutional:       Comments: Disheveled    HENT:      Head: Normocephalic and atraumatic.      Nose: Nose normal.      Mouth/Throat:      Lips: Pink.      Mouth: Mucous membranes are dry.   Eyes:      General: Lids are normal.      Extraocular Movements: Extraocular movements intact.      Conjunctiva/sclera: Conjunctivae normal.   Cardiovascular:      Rate and Rhythm: Normal rate and regular rhythm.      Heart sounds: S1 normal and S2 normal. No murmur heard.    No friction rub. No gallop.   Pulmonary:      Effort: Pulmonary effort is normal.      Breath sounds: Normal breath sounds.   Musculoskeletal:      Comments: FROM of upper and lower extremities   Skin:     General: Skin is warm and dry.      Coloration: Skin is not pale.   Neurological:      Mental Status: He is alert and oriented to person, place, and time.      Gait: Gait is intact.   Psychiatric:         Mood and Affect: Mood is not anxious. Affect is flat. Affect is not angry.         Speech: Speech normal. Speech is not rapid and pressured, slurred or tangential.         Behavior: Behavior is slowed and withdrawn. Behavior is not agitated or aggressive. Behavior is cooperative.         Thought Content: Thought content does not include homicidal or suicidal ideation. Thought content does not include suicidal plan.         ED Course     Mental Health Risk Assessment      PSS-3    Date and Time Over the past 2 weeks have you felt down, depressed, or hopeless? Over the past 2 weeks have you had thoughts of killing yourself? Have you ever attempted to kill yourself? When did this last happen? User   07/18/22 1223 yes no yes more than 6 months ago AP      C-SSRS ([place])    Date and Time Q1 Wished to be Dead (Past Month) Q2 Suicidal Thoughts (Past Month) Q3 Suicidal Thought Method Q4 Suicidal Intent without Specific Plan Q5 Suicide Intent with Specific Plan Q6 Suicide Behavior (Lifetime) Within the Past 3 Months? RETIRED: Level of Risk per Screen Screening Not Complete User   07/18/22 1900 yes yes  no yes no yes -- -- -- ARG                Item Assessment   Suicidal Ideation Wish to be dead   Plan denies   Intent denies   Suicidal or self-harm behaviors none   Risk Factors Previous psychiatric diagnosis and treatments, Hopelessness, Major depressive episode, Highly impulsive behavior, Substance abuse or dependence, Agitation or severe anxiety and Aggressive behavior towards others   Protective Factors            ED Course as of 07/20/22 1058   Mon Jul 18, 2022   1245 April Florina, psychiatric NP, in department. Consulted with her. Patient has a commitment and she will speak to his . She will place him on a 72 hour hold.     Monica Mckeon CNP on 7/18/2022 at 12:49 PM       Procedures    Results for orders placed or performed during the hospital encounter of 07/18/22   Asymptomatic COVID-19 Virus (Coronavirus) by PCR Nose     Status: Normal    Specimen: Nose; Swab   Result Value Ref Range    SARS CoV2 PCR Negative Negative    Narrative    Testing was performed using the Xpert Xpress SARS-CoV-2 Assay on the   [employer]. Additional information about   this Emergency Use Authorization (EUA) assay can be found via the Lab   Guide. This test should be ordered for the detection of SARS-CoV-2 in   individuals who meet SARS-CoV-2 clinical and/or epidemiological   criteria. Test performance is unknown in asymptomatic patients. This   test is for in vitro diagnostic use under the FDA EUA for   laboratories certified under CLIA to perform high complexity testing.   This test has not been FDA cleared or approved. A negative result   does not rule out the presence of PCR inhibitors in the specimen or   target RNA in concentration below the limit of detection for the   assay. The possibility of a false negative should be considered if   the patient's recent exposure or clinical presentation suggests   COVID-19. This test was validated by [facility].  This laboratory is certified under the Clinical Laboratory Improve  ment Amendments (CLIA) as qualified to perform high complexity testing.   Comprehensive metabolic panel     Status: Abnormal   Result Value Ref Range    Sodium 141 133 - 144 mmol/L    Potassium 3.6 3.4 - 5.3 mmol/L    Chloride 111 (H) 94 - 109 mmol/L    Carbon Dioxide (CO2) 26 20 - 32 mmol/L    Anion Gap 4 3 - 14 mmol/L    Urea Nitrogen 5 (L) 7 - 30 mg/dL    Creatinine 0.88 0.66 - 1.25 mg/dL    Calcium 8.5 8.5 - 10.1 mg/dL    Glucose 83 70 - 99 mg/dL    Alkaline Phosphatase 66 40 - 150 U/L    AST 26 0 - 45 U/L    ALT 57 0 - 70 U/L    Protein Total 5.9 (L) 6.8 - 8.8 g/dL    Albumin 3.6 3.4 - 5.0 g/dL    Bilirubin Total 0.6 0.2 - 1.3 mg/dL    GFR Estimate >90 >60 mL/min/1.73m2   TSH with free T4 reflex     Status: Normal   Result Value Ref Range    TSH 1.51 0.40 - 4.00 mU/L   Ethyl Alcohol Level     Status: Normal   Result Value Ref Range    Alcohol ethyl <0.01 <=0.01 g/dL   CBC with platelets and differential     Status: None   Result Value Ref Range    WBC Count 5.8 4.0 - 11.0 10e3/uL    RBC Count 4.86 4.40 - 5.90 10e6/uL    Hemoglobin 14.6 13.3 - 17.7 g/dL    Hematocrit 43.4 40.0 - 53.0 %    MCV 89 78 - 100 fL    MCH 30.0 26.5 - 33.0 pg    MCHC 33.6 31.5 - 36.5 g/dL    RDW 12.2 10.0 - 15.0 %    Platelet Count 238 150 - 450 10e3/uL    % Neutrophils 69 %    % Lymphocytes 23 %    % Monocytes 7 %    % Eosinophils 1 %    % Basophils 0 %    % Immature Granulocytes 0 %    NRBCs per 100 WBC 0 <1 /100    Absolute Neutrophils 4.0 1.6 - 8.3 10e3/uL    Absolute Lymphocytes 1.3 0.8 - 5.3 10e3/uL    Absolute Monocytes 0.4 0.0 - 1.3 10e3/uL    Absolute Eosinophils 0.1 0.0 - 0.7 10e3/uL    Absolute Basophils 0.0 0.0 - 0.2 10e3/uL    Absolute Immature Granulocytes 0.0 <=0.4 10e3/uL    Absolute NRBCs 0.0 10e3/uL   Magnesium     Status: Normal   Result Value Ref Range    Magnesium 1.9 1.6 - 2.3 mg/dL   CK total     Status: Abnormal   Result Value Ref Range      (H) 30 - 300 U/L   Extra Tube     Status: None    Narrative    The following orders were created for panel order Extra Tube.  Procedure                               Abnormality         Status                     ---------                               -----------         ------                     Extra Blue Top Tube[[id number]]                              Final result               Extra Red Top Tube[[id number]]                               Final result               Extra Green Top (Lithium...[[id number]]                      Final result                 Please view results for these tests on the individual orders.   Extra Blue Top Tube     Status: None   Result Value Ref Range    Hold Specimen jic    Extra Red Top Tube     Status: None   Result Value Ref Range    Hold Specimen JIC    Extra Green Top (Lithium Heparin) ON ICE     Status: None   Result Value Ref Range    Hold Specimen JIC    Lithium level     Status: Normal   Result Value Ref Range    Lithium 0.6   mmol/L   CBC with platelets differential     Status: None    Narrative    The following orders were created for panel order CBC with platelets differential.  Procedure                               Abnormality         Status                     ---------                               -----------         ------                     CBC with platelets and d...[[id number]]                      Final result                 Please view results for these tests on the individual orders.       No results found for this or any previous visit (from the past 24 hour(s)).    Medications   acetaminophen (TYLENOL) tablet 650 mg (has no administration in time range)   alum & mag hydroxide-simethicone (MAALOX) suspension 30 mL (has no administration in time range)   OLANZapine (zyPREXA) tablet 10 mg (has no administration in time range)     Or   OLANZapine (zyPREXA) injection 10 mg (has no administration in time range)   hydrOXYzine (ATARAX) tablet 25 mg (has no  administration in time range)   clonazePAM (klonoPIN) tablet 0.5 mg (has no administration in time range)   lithium (ESKALITH CR/LITHOBID) CR tablet 900 mg (900 mg Oral Given 7/19/22 2031)   memantine (NAMENDA) tablet 10 mg (10 mg Oral Given 7/20/22 0812)   pregabalin (LYRICA) capsule 100 mg (100 mg Oral Given 7/20/22 0812)   clonazePAM (klonoPIN) tablet 1 mg (1 mg Oral Given 7/20/22 0619)   cloZAPine (CLOZARIL) half-tab 12.5 mg (12.5 mg Oral Given 7/19/22 2032)   multivitamin w/minerals (THERA-VIT-M) tablet 1 tablet (1 tablet Oral Given 7/20/22 0812)       Assessments & Plan (with Medical Decision Making)     I have reviewed the nursing notes.    I have reviewed the findings, diagnosis, plan and need for follow up with the patient.        Current Discharge Medication List          Final diagnoses:   Catatonia   Schizoaffective disorder, bipolar type (H)   Attention deficit disorder, unspecified hyperactivity presence   Alcohol abuse   Depression with anxiety   Bipolar 1 disorder (H)       7/18/2022   [facility]     Monica Mckeon, CNP  07/20/22 4531

## 2022-07-18 NOTE — PROGRESS NOTES
07/18/22 1446   Patient Belongings   Did you bring any home meds/supplements to the hospital?  No   Patient Belongings locker;other (see comments)   Patient Belongings Put in Hospital Secure Location (Security or Locker, etc.) cash/credit card   Belongings Search Yes   Clothing Search Yes   Second Staff Yael GUNDERSON   Comment caceres boxers, meme athletic shorts, 3 tshirts, navy blue Josef sweatshirt, a lighter, black socks, playing cards, black tshirt, grey sweatpants, Mets black sweatshirt, grey t shirt, grey striped sweatshirt, navy blue tshirt, navy blue boxers, 4 pairs of black socks, 1 pair of white socks, blue boxers, dark green/blue backpack, blue tenner shoes       List items sent to safe: 1 $20 bill, 1 $5 bill, 4 $1 bill ($29 cash total), 1 [place] EBT card, black wallet with misc cards, [place] EBT, exp. [place], ucare cards.    Addendum- black nike baseball cap, black sweatpants, dolphin puzzle, black wallet with misc cards, [place] EBT, exp. DL, ucare cards.    All other belongings put in assigned cubby in belongings room.       I have reviewed my belongings list on admission and verify that it is correct.     Patient signature_______________________________    Second staff witness (if patient unable to sign) ______________________________       I have received all my belongings at discharge.    Patient signature________________________________    Karen   7/18/2022  2:52 PM

## 2022-07-18 NOTE — ED NOTES
Pt was changed in ER Rm 8 by ER staff into paper scrubs with socks. 3 bags placed in the Locker A ( 1 back pack, and 2 personal belongings bags)

## 2022-07-18 NOTE — ED NOTES
Pt ambulated to room 8. Pt is quiet, delayed, disheveled . States he doesn't know why he is here.   Denies pain, states he is on medication Wellbutrin and clonazepam but they are PRN and he hasnt been taking them because he does need them. Denies [pain, anxiety, depression, SI/HI.  Admits to having some beers a few days ago, denies other drugs.  Denies voices.   Refuses any water, food, snacks, warm blankets, or other comforts offered.   Changed into scrubs and belongings locked up, cooperative.
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
## 2022-07-18 NOTE — PLAN OF CARE
Problem: Behavioral Health Plan of Care  Goal: Patient-Specific Goal (Individualization)  Description: Patient will report at least 6-8 hours of sleep each night.   Patient will complete all ADL's independently while on the unit.   Patient will be compliant with treatment team recommendations.   7/18/22- Patient unable to wean at this time d/t history of aggression on previous admissions.   Outcome: Ongoing, Not Progressing  Note: 15:40: Received end of shift report from TRAE Jenkins. Pt transferring out of -ICU to open unit. Withdrawn activity.     18:00: compliant with klonopin et lyrica administration prior to dinner, encouraged to try take a few bites. Did come out, ate 100%, mild confusion noted, almost entered peers room, did realize it wasn't his (previous room last admission) Depressed mood, blunted/flat affect.     21:25: Held scheduled clozeril 12.5 mg r/t not being enrolled in REMs program per Dawn in pharmacy. Pt isolates self in room, withdrawn activity, sad affect, mood is depressed. Compliant with HS medication administration. Fluids provided at bedside, denies any need or concerns @ this time.     Face to face end of shift report to be communicated to on-coming [facility] staff.     Connie Wharton RN  7/18/2022  10:42 PM                 Problem: Thought Process Alteration  Goal: Optimal Thought Clarity  Description: Patient will hold reality based conversations while on the unit.     Outcome: Ongoing, Not Progressing   Goal Outcome Evaluation:

## 2022-07-19 LAB — LITHIUM SERPL-SCNC: 0.6 MMOL/L

## 2022-07-19 PROCEDURE — 250N000013 HC RX MED GY IP 250 OP 250 PS 637: Performed by: NURSE PRACTITIONER

## 2022-07-19 PROCEDURE — 82306 VITAMIN D 25 HYDROXY: CPT | Performed by: NURSE PRACTITIONER

## 2022-07-19 PROCEDURE — 80178 ASSAY OF LITHIUM: CPT | Performed by: NURSE PRACTITIONER

## 2022-07-19 PROCEDURE — 124N000001 HC R&B MH

## 2022-07-19 PROCEDURE — 99221 1ST HOSP IP/OBS SF/LOW 40: CPT | Performed by: NURSE PRACTITIONER

## 2022-07-19 PROCEDURE — 36415 COLL VENOUS BLD VENIPUNCTURE: CPT | Performed by: NURSE PRACTITIONER

## 2022-07-19 RX ORDER — MULTIPLE VITAMINS W/ MINERALS TAB 9MG-400MCG
1 TAB ORAL DAILY
Status: DISCONTINUED | OUTPATIENT
Start: 2022-07-19 | End: 2022-09-02 | Stop reason: HOSPADM

## 2022-07-19 RX ADMIN — PREGABALIN 100 MG: 25 CAPSULE ORAL at 14:06

## 2022-07-19 RX ADMIN — CLONAZEPAM 1 MG: 1 TABLET ORAL at 06:44

## 2022-07-19 RX ADMIN — PREGABALIN 100 MG: 25 CAPSULE ORAL at 08:20

## 2022-07-19 RX ADMIN — CLOZAPINE 12.5 MG: 25 TABLET ORAL at 20:32

## 2022-07-19 RX ADMIN — MEMANTINE 10 MG: 10 TABLET ORAL at 20:32

## 2022-07-19 RX ADMIN — CLONAZEPAM 1 MG: 1 TABLET ORAL at 15:47

## 2022-07-19 RX ADMIN — LITHIUM CARBONATE 900 MG: 450 TABLET, EXTENDED RELEASE ORAL at 20:31

## 2022-07-19 RX ADMIN — PREGABALIN 100 MG: 25 CAPSULE ORAL at 20:32

## 2022-07-19 RX ADMIN — MULTIPLE VITAMINS W/ MINERALS TAB 1 TABLET: TAB at 12:15

## 2022-07-19 RX ADMIN — MEMANTINE 10 MG: 10 TABLET ORAL at 08:20

## 2022-07-19 RX ADMIN — CLONAZEPAM 1 MG: 1 TABLET ORAL at 12:15

## 2022-07-19 ASSESSMENT — ACTIVITIES OF DAILY LIVING (ADL)
DRESS: SCRUBS (BEHAVIORAL HEALTH)
ADLS_ACUITY_SCORE: 51
ADLS_ACUITY_SCORE: 41
LAUNDRY: UNABLE TO COMPLETE
ADLS_ACUITY_SCORE: 41
ADLS_ACUITY_SCORE: 51
ORAL_HYGIENE: INDEPENDENT;PROMPTS
ADLS_ACUITY_SCORE: 51
ADLS_ACUITY_SCORE: 41
DRESS: INDEPENDENT;SCRUBS (BEHAVIORAL HEALTH)
ADLS_ACUITY_SCORE: 41
HYGIENE/GROOMING: INDEPENDENT;PROMPTS
ORAL_HYGIENE: INDEPENDENT
HYGIENE/GROOMING: INDEPENDENT
ADLS_ACUITY_SCORE: 41

## 2022-07-19 NOTE — PLAN OF CARE
Face to face shift report received from Connie HARRIS RN. Rounding completed, pt observed.    Problem: Behavioral Health Plan of Care  Goal: Patient-Specific Goal (Individualization)  Description: Patient will report at least 6-8 hours of sleep each night.   Patient will complete all ADL's independently while on the unit.   Patient will be compliant with treatment team recommendations.   7/18/22- Patient unable to wean at this time d/t history of aggression on previous admissions.   Outcome: Ongoing, Progressing  Note: Shift Summary:  Patient awake in his room at the start of this shift.  In bed then and appears asleep.  Eyes are closed.  Respirations are visible and regular. Position changes noted.       Face to face report will be communicated to oncoming RN.    Alma Delia Garsia RN  7/19/2022

## 2022-07-19 NOTE — PLAN OF CARE
WILLIAM BRANDT RN  7/19/2022  7:35 AM  Face to face shift report received from TRAE Flannery. Rounding completed, pt observed.     0948-Pt ate 100% of breakfast in lounge.  Went right back to bed.  Withdrawn and isolative to room.  Unkept and unclean.  Denies SI, HI, hallucinations, anxiety, and pain.  Pt depressed and sad.   1420-Pt ate 100% for both meals.  Withdrawn and isolative to his room sleeping most of shift.  Compliant with meds.  Cooperative with staff.       Problem: Behavioral Health Plan of Care  Goal: Patient-Specific Goal (Individualization)  Description: Patient will report at least 6-8 hours of sleep each night.   Patient will complete all ADL's independently while on the unit.   Patient will be compliant with treatment team recommendations.   Outcome: Ongoing, Progressing     Problem: Thought Process Alteration  Goal: Optimal Thought Clarity  Description: Patient will hold reality based conversations while on the unit.   Outcome: Ongoing, Progressing     Problem: Suicide Risk  Goal: Absence of Self-Harm  Outcome: Ongoing, Progressing   Goal Outcome Evaluation:  Face to face end of shift report communicated to oncoming shift RN.

## 2022-07-19 NOTE — PROGRESS NOTES
CLINICAL NUTRITION SERVICES  -  ASSESSMENT NOTE    Steven Carter : Admission Nutrition Risk Screen - 34+lb weight loss, reduced oral intake and Provider Order - weight loss    31 yom admitted for catatonia. Pt has a medical hx including schizoaffective disorder, Bipolar 1 disorder, ADD, alcohol abuse, polysubstance abuse.  Noted weight loss of 43lb in the past 4 months, 53lb loss in the past 7 months. Very poor intake with catatonia, no other specific reason. Has not been completing other self cares such as showering. Noted pt has consumed all of the 2 meals documented this admission.     Diet Order: Regular, Ensure with meals  Intake: 100% x 2 meals documented this admission    Height: Data Unavailable  Weight: 162 lbs 0 oz  Body mass index is 21.97 kg/m .  Weight Status:  Normal BMI  Weight History: 21% weight loss in the past 4 months, 24.6% weight loss in the past 7 months  Wt Readings from Last 10 Encounters:   07/18/22 73.5 kg (162 lb)   03/05/22 93 kg (205 lb 1.6 oz)   12/14/21 97.7 kg (215 lb 4.8 oz)   12/01/21 97.8 kg (215 lb 11.2 oz)   10/31/21 105.1 kg (231 lb 11.2 oz)   03/27/21 105.2 kg (232 lb)   11/30/15 96.6 kg (213 lb)   08/18/14 94.5 kg (208 lb 6.4 oz)   08/15/14 93.4 kg (206 lb)   07/19/14 102.1 kg (225 lb)        Weight used to calculate needs: 77.6kg - ibw  Estimated Energy Needs: 9331-0381 kcals (25-30 Kcal/Kg)   Estimated Protein Needs:  grams protein (1-1.5 g pro/Kg)  Estimated Fluid Needs: 0691-5100  mL (1 mL/Kcal)    MALNUTRITION:  % Weight Loss:  > 10% in 6 months (severe malnutrition)  % Intake:  </= 50% for >/= 1 month (severe malnutrition)  Muscle and Subcutaneous Fat Loss:  Cachetic    Malnutrition Diagnosis: Severe malnutrition  In Context of:  Chronic illness or disease  Environmental or social circumstances    NUTRITION DIAGNOSIS:  Malnutrition related to minimal energy intake  as evidenced by 21% weight loss in 4 months    NUTRITION RECOMMENDATIONS  - Monitor electrolytes  for refeeding syndrome  - Encourage intake during meal and snack times.  - continue with current interventions- Ensure, multivitamin    MONITORING AND EVALUATION:  RD will monitor intake, weight, labs

## 2022-07-19 NOTE — H&P
[facility]    History and Physical  Medical Services       Date of Admission:  7/18/2022  Date of Service (when I saw the patient): 07/19/22    Assessment & Plan     Principal Problem:    Catatonia  Active Problems:    Schizoaffective disorder, bipolar type (H)    ADD (attention deficit disorder)    Alcohol abuse    Depression with anxiety    Bipolar 1 disorder (H)    Attention deficit disorder, unspecified hyperactivity presence    Severe Malnutrition- pt weight has gone from 205lbs on 3/5/22 to 162 lb. Protein is low at 5.9. Albumin is on the low side of normal at 3.6. Will add ensure to meal trays. Multivitamin daily. RD consult order placed. Nursing to encourage po fluid intake.     Pt medically stable, no acute medical concerns. Chronic medical problems stable. Will sign off. Please consult for any new medical issues or concerns.       Code Status: Full Code    Harmony Samuel CNP    Primary Care Physician   Physician No Ref-Primary    Chief Complaint   Psych evaluation     History is obtained from the patient and medical chart     History of Present Illness   (per ED) Steven Carter is a 31 year old male with history of bipolar disorder, suicidal behavior with attempted self injury, catatonia, schizoaffective disorder, psychosis, ADD, depression, anxiety, polysubstance abuse currently on commitment and living at [facility].     Past Medical History    I have reviewed this patient's medical history and updated it with pertinent information if needed.   No past medical history on file.    Past Surgical History   I have reviewed this patient's surgical history and updated it with pertinent information if needed.  Past Surgical History:   Procedure Laterality Date     COLONOSCOPY - HIM SCAN N/A 12/03/2020    Procedure:  Colonoscopy diagnostic with random biopsies;  Surgeon:  Jenise GOLDMAN MD;  Location:  [facility] OR       Prior to Admission Medications   Prior to Admission Medications   Prescriptions  Last Dose Informant Patient Reported? Taking?   ARIPiprazole (ABILIFY) 10 MG tablet 7/18/2022 at 0800  Yes Yes   Sig: Take 10 mg by mouth daily   buPROPion HCl ER, XL, 450 MG TB24 7/18/2022 at 0800  No Yes   Sig: Take 450 mg by mouth daily   clonazePAM (KLONOPIN) 0.5 MG tablet 7/17/2022 at 0815  Yes Yes   Sig: Take 0.5 mg by mouth 3 times daily as needed for anxiety   lithium (ESKALITH CR/LITHOBID) 450 MG CR tablet 7/17/2022 at 2000  No Yes   Sig: Take 2 tablets (900 mg) by mouth At Bedtime   memantine (NAMENDA) 10 MG tablet 7/18/2022 at 2000  No Yes   Sig: Take 1 tablet (10 mg) by mouth 2 times daily   pregabalin (LYRICA) 100 MG capsule 7/18/2022 at 0800  No Yes   Sig: Take 1 capsule (100 mg) by mouth 3 times daily      Facility-Administered Medications: None     Allergies   Allergies   Allergen Reactions     Pork Allergy        Social History   I have reviewed this patient's social history and updated it with pertinent information if needed. Steven Carter  reports that he has been smoking. He has a 4.50 pack-year smoking history. His smokeless tobacco use includes chew. He reports current drug use. Drug: Other. He reports that he does not drink alcohol.    Family History   I have reviewed this patient's family history and updated it with pertinent information if needed.   No family history on file.    Review of Systems   CONSTITUTIONAL:  negative  EYES:  negative  HEENT:  negative  RESPIRATORY:  negative  CARDIOVASCULAR:  negative  GASTROINTESTINAL:  negative  GENITOURINARY:  negative  INTEGUMENT/BREAST:  negative  HEMATOLOGIC/LYMPHATIC:  negative  ALLERGIC/IMMUNOLOGIC:  negative  ENDOCRINE:  negative  MUSCULOSKELETAL:  negative  NEUROLOGICAL:  negative    Physical Exam   Temp: 98.4  F (36.9  C) Temp src: Temporal BP: 110/59 Pulse: 89   Resp: 14 SpO2: 98 % O2 Device: None (Room air)    Vital Signs with Ranges  Temp:  [97.3  F (36.3  C)-99.1  F (37.3  C)] 98.4  F (36.9  C)  Pulse:  [] 89  Resp:  [14-18]  14  BP: (105-152)/(59-87) 110/59  SpO2:  [96 %-99 %] 98 %  162 lbs 0 oz    Constitutional: awake, alert, cooperative, no apparent distress, and appears stated age, vitals stable, disheveled, malnurished  Eyes: Lids and lashes normal, pupils equal, round and reactive to light, extra ocular muscles intact, sclera clear, conjunctiva normal  ENT: Normocephalic, without obvious abnormality, atraumatic, external ears without lesions, oral pharynx with moist mucous membranes, no erythema or exudates  Hematologic / Lymphatic: no cervical lymphadenopathy  Respiratory: No increased work of breathing, good air exchange, clear to auscultation bilaterally, no crackles or wheezing  Cardiovascular: Normal apical impulse, regular rate and rhythm, normal S1 and S2, no S3 or S4, and no murmur noted  GI: No scars, normal bowel sounds, soft, non-distended, non-tender, no masses palpated, no hepatosplenomegally  Genitounirinary: deferred  Skin: normal skin color, texture, turgor, no redness, warmth, or swelling and no rashes  Musculoskeletal: There is no redness, warmth, or swelling of the joints.  Full range of motion noted.    Neurologic: Awake, alert, oriented to name, place  Neuropsychiatric: General: restricted, flat, poor eye contact    Data   Data reviewed today:   Recent Labs   Lab 07/18/22  1323   WBC 5.8   HGB 14.6   MCV 89         POTASSIUM 3.6   CHLORIDE 111*   CO2 26   BUN 5*   CR 0.88   ANIONGAP 4   NIKKI 8.5   GLC 83   ALBUMIN 3.6   PROTTOTAL 5.9*   BILITOTAL 0.6   ALKPHOS 66   ALT 57   AST 26       No results found for this or any previous visit (from the past 24 hour(s)).

## 2022-07-19 NOTE — PLAN OF CARE
Spoke with pt this morning. Pt was sitting in lounge eating breakfast. Let pt know this writer would be working with him again during this admission and that if he had any questions to not hesitate to ask. Pt nodded and continued eating.

## 2022-07-19 NOTE — PHARMACY
Pharmacy Clozapine Initial Note    Patient's Name: Steven Carter  Patient's : 1990    Recent ANC Value(s) for last 7 days:  Recent labs: (last 7 days)     22  1323   ANEUTAUTO 4.0       Is the patient enrolled in the cloZAPine REMS program? Yes  Ordering prescriber: Ashely Heaton NP  Is this provider certified in the cloZAPine REMS program? Yes  A REMS Dispense Authorization was obtained from the clozapine REMS program? Yes  Is the ANC from within the last 7 days within recommended limits? Yes  Does the patient have any signs or symptoms of infection, including fever? No    REMS Patient ID: [id number]  Patient RDA: [id number]      Plan:  1. Initiate clozapine therapy at 12.5 mg PO at bedtime.  2. A WBC with differential will be ordered at least weekly, next due 2022. ANC values will be entered into the REMS program.    Sobeida Howe RPH

## 2022-07-20 LAB
ALBUMIN SERPL-MCNC: 3.2 G/DL (ref 3.4–5)
ALP SERPL-CCNC: 82 U/L (ref 40–150)
ALT SERPL W P-5'-P-CCNC: 46 U/L (ref 0–70)
ANION GAP SERPL CALCULATED.3IONS-SCNC: 3 MMOL/L (ref 3–14)
AST SERPL W P-5'-P-CCNC: 19 U/L (ref 0–45)
BILIRUB SERPL-MCNC: 0.3 MG/DL (ref 0.2–1.3)
BUN SERPL-MCNC: 8 MG/DL (ref 7–30)
CALCIUM SERPL-MCNC: 8.3 MG/DL (ref 8.5–10.1)
CHLORIDE BLD-SCNC: 109 MMOL/L (ref 94–109)
CO2 SERPL-SCNC: 30 MMOL/L (ref 20–32)
CREAT SERPL-MCNC: 0.96 MG/DL (ref 0.66–1.25)
GFR SERPL CREATININE-BSD FRML MDRD: >90 ML/MIN/1.73M2
GLUCOSE BLD-MCNC: 85 MG/DL (ref 70–99)
HOLD SPECIMEN: NORMAL
HOLD SPECIMEN: NORMAL
MAGNESIUM SERPL-MCNC: 2 MG/DL (ref 1.6–2.3)
PHOSPHATE SERPL-MCNC: 2.5 MG/DL (ref 2.5–4.5)
POTASSIUM BLD-SCNC: 4.2 MMOL/L (ref 3.4–5.3)
PROT SERPL-MCNC: 5.7 G/DL (ref 6.8–8.8)
SODIUM SERPL-SCNC: 142 MMOL/L (ref 133–144)

## 2022-07-20 PROCEDURE — 83735 ASSAY OF MAGNESIUM: CPT | Performed by: NURSE PRACTITIONER

## 2022-07-20 PROCEDURE — 84100 ASSAY OF PHOSPHORUS: CPT | Performed by: NURSE PRACTITIONER

## 2022-07-20 PROCEDURE — 99232 SBSQ HOSP IP/OBS MODERATE 35: CPT | Performed by: NURSE PRACTITIONER

## 2022-07-20 PROCEDURE — 250N000013 HC RX MED GY IP 250 OP 250 PS 637: Performed by: NURSE PRACTITIONER

## 2022-07-20 PROCEDURE — 36415 COLL VENOUS BLD VENIPUNCTURE: CPT | Performed by: NURSE PRACTITIONER

## 2022-07-20 PROCEDURE — 124N000001 HC R&B MH

## 2022-07-20 PROCEDURE — 99233 SBSQ HOSP IP/OBS HIGH 50: CPT | Performed by: NURSE PRACTITIONER

## 2022-07-20 PROCEDURE — 80053 COMPREHEN METABOLIC PANEL: CPT | Performed by: NURSE PRACTITIONER

## 2022-07-20 RX ORDER — CLOZAPINE 25 MG/1
25 TABLET ORAL AT BEDTIME
Status: DISCONTINUED | OUTPATIENT
Start: 2022-07-20 | End: 2022-07-23

## 2022-07-20 RX ORDER — SULINDAC 150 MG/1
150 TABLET ORAL 2 TIMES DAILY PRN
Status: DISCONTINUED | OUTPATIENT
Start: 2022-07-20 | End: 2022-09-02 | Stop reason: HOSPADM

## 2022-07-20 RX ADMIN — CLONAZEPAM 1 MG: 1 TABLET ORAL at 06:19

## 2022-07-20 RX ADMIN — PREGABALIN 100 MG: 25 CAPSULE ORAL at 13:54

## 2022-07-20 RX ADMIN — LITHIUM CARBONATE 900 MG: 450 TABLET, EXTENDED RELEASE ORAL at 20:25

## 2022-07-20 RX ADMIN — CLOZAPINE 25 MG: 25 TABLET ORAL at 20:25

## 2022-07-20 RX ADMIN — CLONAZEPAM 1 MG: 1 TABLET ORAL at 15:48

## 2022-07-20 RX ADMIN — MULTIPLE VITAMINS W/ MINERALS TAB 1 TABLET: TAB at 08:12

## 2022-07-20 RX ADMIN — MEMANTINE 10 MG: 10 TABLET ORAL at 20:25

## 2022-07-20 RX ADMIN — ACETAMINOPHEN 325MG 650 MG: 325 TABLET ORAL at 13:54

## 2022-07-20 RX ADMIN — CLONAZEPAM 1 MG: 1 TABLET ORAL at 12:10

## 2022-07-20 RX ADMIN — PREGABALIN 100 MG: 25 CAPSULE ORAL at 08:12

## 2022-07-20 RX ADMIN — PREGABALIN 100 MG: 25 CAPSULE ORAL at 20:25

## 2022-07-20 RX ADMIN — MEMANTINE 10 MG: 10 TABLET ORAL at 08:12

## 2022-07-20 ASSESSMENT — ACTIVITIES OF DAILY LIVING (ADL)
ADLS_ACUITY_SCORE: 41
DRESS: INDEPENDENT;SCRUBS (BEHAVIORAL HEALTH)
DRESS: SCRUBS (BEHAVIORAL HEALTH);INDEPENDENT
LAUNDRY: UNABLE TO COMPLETE
ORAL_HYGIENE: INDEPENDENT
LAUNDRY: UNABLE TO COMPLETE
ADLS_ACUITY_SCORE: 41
HYGIENE/GROOMING: INDEPENDENT
ADLS_ACUITY_SCORE: 41
HYGIENE/GROOMING: INDEPENDENT
ORAL_HYGIENE: INDEPENDENT
ADLS_ACUITY_SCORE: 41

## 2022-07-20 NOTE — PLAN OF CARE
WILLIAM BRANDT RN  7/20/2022  7:43 AM  Face to face shift report received from TRAE Jean Baptiste. Rounding completed, pt observed.       1403-Pt who311% at meals in lounge.  Went back to room and slept after breakfast until lunch.  After lunch pt attended group.  Denies SI, HI, and hallucinations.  Endorses anxiety and depression.  Tylenol given @1354 for generalized back pain.  Pt showered.  Remains flat and blunted.          Problem: Behavioral Health Plan of Care  Goal: Patient-Specific Goal (Individualization)  Description: Patient will report at least 6-8 hours of sleep each night.   Patient will complete all ADL's independently while on the unit.   Patient will be compliant with treatment team recommendations.   Outcome: Ongoing, Progressing     Problem: Thought Process Alteration  Goal: Optimal Thought Clarity  Description: Patient will hold reality based conversations while on the unit.     Outcome: Ongoing, Progressing     Problem: Suicide Risk  Goal: Absence of Self-Harm  Outcome: Ongoing, Progressing     Problem: Suicidal Behavior  Goal: Suicidal Behavior is Absent or Managed  Outcome: Ongoing, Progressing   Goal Outcome Evaluation:    Plan of Care Reviewed With: patient   Face to face end of shift report communicated to oncoming shift RN.

## 2022-07-20 NOTE — PLAN OF CARE
Problem: Behavioral Health Plan of Care  Goal: Patient-Specific Goal (Individualization)  Description: Patient will report at least 6-8 hours of sleep each night.   Patient will complete all ADL's independently while on the unit.   Patient will be compliant with treatment team recommendations.   Outcome: Ongoing, Progressing  Note:     1900 Patient awake and up on the unit for supper meal. Patient has a sad flat affect. Patient states he feels he is alright and is able to answer questions reliably. Patient denies having any current need or issue. Patient steady on his feet.     2200 Patient resting in bed and states he feels tired. Patient denies auditory hallucinations and depression. Patient ate well at snack time and accepted his HS medications. Patient requested additional juice and then went to bed.    Face to face end of shift report communicated to 11-7 shift RN.     Le Bermudez RN  7/19/2022  10:18 PM           Problem: Thought Process Alteration  Goal: Optimal Thought Clarity  Description: Patient will hold reality based conversations while on the unit.     Outcome: Ongoing, Progressing     Problem: Suicide Risk  Goal: Absence of Self-Harm  Outcome: Ongoing, Progressing     Problem: Suicidal Behavior  Goal: Suicidal Behavior is Absent or Managed  Outcome: Ongoing, Progressing   Goal Outcome Evaluation:    Plan of Care Reviewed With: patient

## 2022-07-20 NOTE — PROGRESS NOTES
[facility] PSYCHIATRY  PROGRESS NOTE     SUBJECTIVE     Steven is resting in bed when I go to see him today. He is very soft spoken, offers little spontaneous conversation though does answer yes/no to questions. He denied noticing any side effects from Clozapine which was started last night. Will increase to 25 mg tonight. Will need to monitor closely given concurrent use of benzodiazepines for treatment of catatonia. Per nursing he has been eating well at meals and ensure has been ordered on meal trays. Does come out to the lounge at meal times though spends much of the rest of the day in his room. He has been cooperative, no agitated or aggressive behavior noted.        MEDICATIONS   Scheduled Meds:    clonazePAM  1 mg Oral TID     cloZAPine  12.5 mg Oral At Bedtime     lithium  900 mg Oral At Bedtime     memantine  10 mg Oral BID     multivitamin w/minerals  1 tablet Oral Daily     pregabalin  100 mg Oral TID     PRN Meds:.acetaminophen, alum & mag hydroxide-simethicone, clonazePAM, hydrOXYzine, OLANZapine **OR** OLANZapine, sulindac     ALLERGIES   Allergies   Allergen Reactions     Pork Allergy         MENTAL STATUS EXAM   Vitals: BP 98/54   Pulse 92   Temp 97.6  F (36.4  C) (Temporal)   Resp 16   Wt 73.5 kg (162 lb)   SpO2 97%   BMI 21.97 kg/m      Appearance:  Extremely disheveled, poorly groomed, cachetic  Attitude:  cooperative and guarded  Eye Contact:  limited  Mood:  restricted  Affect:  intensity is flat  Speech:  Paucity of speech, monotone, soft spoken  Psychomotor Behavior:  no evidence of tardive dyskinesia, dystonia, or tics  Thought Process:  evidence of thought blocking present  Associations:  Difficult to assess, none noted today  Thought Content:  no evidence of suicidal ideation or homicidal ideation and no evidence of psychotic thought, denies hallucinations  Insight:  limited  Judgment:  limited  Oriented to:  time, person, and place  Attention Span and Concentration:   limited  Recent and Remote Memory:  limited  Fund of Knowledge: delayed  Muscle Strength and Tone: normal  Gait and Station: not observed, pt in bed       LABS   No results found for this or any previous visit (from the past 24 hour(s)).      IMPRESSION     This is a 31 year old male with a PMH of traumatic brain injury with coma and significant brain damage as evidenced in MRI as well as multiple episodes of catatonia which can lead to food refusal.  He appears to have lost a very large amount of weight since his last hospitalization and is cachectic though his labs are unremarkable.  It is unclear when the lorazepam was discontinued and why it was discontinued.  He does state he feels Klonopin to be more beneficial.  It is imperative we restart benzodiazepines due to his catatonia as his food intake is minimal and he has lost a large amount of weight.  He also has unprovoked aggression at times likely due to catatonia versus psychosis.  I did speak with him about starting clozapineFor psychosis/catatonia.  I explained that it was an antipsychotic that required weekly white blood cell count to monitor for side effects of neutropenia and explained what this was.  He did sign the neuroleptic consent.  He does admit that he would like to feel better and that he feels extremely flat and emotionless.       DIAGNOSES     1. Schizoaffective disorder, depressive type, multiple episodes, currently in acute episode, with catatonia   2. 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 Multiple other TBIs  3. 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 Methamphetamine use disorder, moderate, early remission  5. Alcohol use disorder, moderate  6. Severe malnutrition       PLAN     Location: Unit 5  Legal Status: Voluntary    Safety Assessment:    Behavioral Orders   Procedures     Code 1 - Restrict to Unit     Routine Programming     As clinically indicated      Status 15     Every 15 minutes.      PTA medications continued/changed:     -Stopped Wellbutrin due to starting Clozaril. Both decrease seizure threshhold  -Stopped Abilify (not effective)  -Clonazepam will be started at 1 mg 3 times daily.  May need to increase until catatonia has improved some.  He does believe Klonopin has been more effective than Ativan, we will switch to Ativan if clonazepam is not effective.    New medications tried and stopped:     -None    New medications initiated:     -Clozapine 12.5 mg at bedtime on 7/19. Increase to 25 mg at bedtime on 7/20.    Today's Changes:    -Increase Clozapine  -Likely will need to file petition for commitment again      Programming: Patient will be treated in a therapeutic milieu with appropriate individual and group therapies. Education will be provided on diagnoses, medications, and treatments.     Medical diagnoses:  Per medicine    Consult: Nutrition  Tests: Lithium level on 7/19 was 0.6, weekly CBC    Anticipated LOS: likely 3-4 weeks for stabilization  Disposition: TBI IRTS [facility] ?       TREATMENT TEAM CARE PLAN     Progress: Continued symptoms.    Continued Stay Criteria/Rationale: Continued symptoms without sufficient improvement/resolution.    Medical/Physical: See above.    Precautions: See above.     Plan: Continue inpatient care with unit support and medication management.    Rationale for change in precautions or plan: NA due to no change.    Participants: Christelle Contreras NP, Nursing, SW, OT.    The patient's care was discussed with the treatment team and chart notes were reviewed.       ATTESTATION      Christelle Contreras NP

## 2022-07-20 NOTE — PLAN OF CARE
SHIFT SUMMARY:  Calm and cooperative. Denies suicidal ideation, auditory hallucinations and pain. At the start of the shift, in his room, eating a bag of chips. Slow to respond, but answers appropriately. Blunted, and flat.       Problem: Behavioral Health Plan of Care  Goal: Patient-Specific Goal (Individualization)  Description: Patient will report at least 6-8 hours of sleep each night.   Patient will complete all ADL's independently while on the unit.   Patient will be compliant with treatment team recommendations.   Outcome: Ongoing, Progressing  Goal: Absence of New-Onset Illness or Injury  Outcome: Ongoing, Progressing     Problem: Thought Process Alteration  Goal: Optimal Thought Clarity  Description: Patient will hold reality based conversations while on the unit.     Outcome: Ongoing, Progressing     Problem: Suicide Risk  Goal: Absence of Self-Harm  Outcome: Ongoing, Progressing     Problem: Suicidal Behavior  Goal: Suicidal Behavior is Absent or Managed  Outcome: Ongoing, Progressing   Goal Outcome Evaluation:

## 2022-07-20 NOTE — PROGRESS NOTES
[facility]    Medical Services Progress Note    Date of Service (when I saw the patient): 07/20/2022    Assessment & Plan     Principal Problem:    Catatonia  Active Problems:    Schizoaffective disorder, bipolar type (H)    ADD (attention deficit disorder)    Alcohol abuse    Depression with anxiety    Bipolar 1 disorder (H)    Attention deficit disorder, unspecified hyperactivity presence     Severe Malnutrition- pt weight has gone from 205lbs on 3/5/22 to 162 lb. Protein is low at 5.9. Albumin is on the low side of normal at 3.6. Will add ensure to meal trays. Multivitamin daily. RD consult order placed. Nursing to encourage po fluid intake.   7/20- RD seen pt yesterday. Per RD note.    NUTRITION DIAGNOSIS:  Malnutrition related to minimal energy intake  as evidenced by 21% weight loss in 4 months  NUTRITION RECOMMENDATIONS  - Monitor electrolytes for refeeding syndrome  - Encourage intake during meal and snack times.  - continue with current interventions- Ensure, multivitamin  MONITORING AND EVALUATION:  RD will monitor intake, weight, labs    CMP, phosphorus, Mag ordered for today and Friday. Nursing reports pt is eating and drinking well.  Pt denies any GI issues.      Chronic low back pain- states low back pain is consistent with his chronic low back pain and states he takes lyrica for this. He declines Lidoderm patches and muscle rub. He would like tylenol. Denies any other complaints. Nursing aware he would like tylenol.        Code Status: Full Code.    Harmony Samuel CNP      -Data reviewed today: I reviewed all new labs and imaging results over the last 24 hours.     Physical Exam   Temp: 98.1  F (36.7  C) Temp src: Temporal BP: 104/73 Pulse: 60   Resp: 20 SpO2: 98 % O2 Device: None (Room air)    Vitals:    07/18/22 1223 07/18/22 1350   Weight: 75.6 kg (166 lb 9.6 oz) 73.5 kg (162 lb)     Vital Signs with Ranges  Temp:  [97.6  F (36.4  C)-98.1  F (36.7  C)] 98.1  F (36.7  C)  Pulse:  [60-75]  60  Resp:  [16-20] 20  BP: ()/(56-73) 104/73  SpO2:  [97 %-98 %] 98 %  No intake/output data recorded.    Constitutional: awake, alert, cooperative, no apparent distress, and appears stated age, vitals stable    Respiratory: No increased work of breathing, good air exchange, clear to auscultation bilaterally, no crackles or wheezing  Cardiovascular: Normal apical impulse, regular rate and rhythm, normal S1 and S2, no S3 or S4, and no murmur noted  GI: No scars, normal bowel sounds, soft, non-distended, non-tender, no masses palpated, no hepatosplenomegally  Neuropsychiatric: General: restricted, flat,  and poor eye contact    Medications     clonazePAM  1 mg Oral TID     cloZAPine  12.5 mg Oral At Bedtime     lithium  900 mg Oral At Bedtime     memantine  10 mg Oral BID     multivitamin w/minerals  1 tablet Oral Daily     pregabalin  100 mg Oral TID       Data   Recent Labs   Lab 07/18/22  1323   WBC 5.8   HGB 14.6   MCV 89         POTASSIUM 3.6   CHLORIDE 111*   CO2 26   BUN 5*   CR 0.88   ANIONGAP 4   NIKKI 8.5   GLC 83   ALBUMIN 3.6   PROTTOTAL 5.9*   BILITOTAL 0.6   ALKPHOS 66   ALT 57   AST 26       No results found for this or any previous visit (from the past 24 hour(s)).

## 2022-07-20 NOTE — PLAN OF CARE
Face to face end of shift report will be communicated to oncoming RN.    Problem: Behavioral Health Plan of Care  Goal: Patient-Specific Goal (Individualization)  Description: Patient will report at least 6-8 hours of sleep each night.   Patient will complete all ADL's independently while on the unit.   Patient will be compliant with treatment team recommendations.   Outcome: Ongoing, Progressing     Problem: Thought Process Alteration  Goal: Optimal Thought Clarity  Description: Patient will hold reality based conversations while on the unit.     Outcome: Ongoing, Progressing     Problem: Suicide Risk  Goal: Absence of Self-Harm  Outcome: Ongoing, Progressing     Problem: Suicidal Behavior  Goal: Suicidal Behavior is Absent or Managed  Outcome: Ongoing, Progressing   Goal Outcome Evaluation:  Face to face end of shift report obtained from TRAE Lujan. Pt observed resting in bed.  Pt appears to be sleeping in bed with eyes closed, having regular respirations, and position changes. 15 minutes and PRN safety checks completed with no noted complains. No self harm, delusional comments, or bizarre behaviors noted or reported so far this shift.  0600-Pt appeared to had slept all night.

## 2022-07-21 LAB
ALBUMIN UR-MCNC: NEGATIVE MG/DL
AMPHETAMINES UR QL: NOT DETECTED
APPEARANCE UR: CLEAR
BARBITURATES UR QL SCN: NOT DETECTED
BENZODIAZ UR QL SCN: NOT DETECTED
BILIRUB UR QL STRIP: NEGATIVE
BUPRENORPHINE UR QL: NOT DETECTED
CANNABINOIDS UR QL: NOT DETECTED
COCAINE UR QL SCN: NOT DETECTED
COLOR UR AUTO: ABNORMAL
D-METHAMPHET UR QL: NOT DETECTED
DEPRECATED CALCIDIOL+CALCIFEROL SERPL-MC: 21 UG/L (ref 20–75)
GLUCOSE UR STRIP-MCNC: NEGATIVE MG/DL
HGB UR QL STRIP: NEGATIVE
KETONES UR STRIP-MCNC: NEGATIVE MG/DL
LEUKOCYTE ESTERASE UR QL STRIP: NEGATIVE
METHADONE UR QL SCN: NOT DETECTED
MUCOUS THREADS #/AREA URNS LPF: PRESENT /LPF
NITRATE UR QL: NEGATIVE
OPIATES UR QL SCN: NOT DETECTED
OXYCODONE UR QL SCN: NOT DETECTED
PCP UR QL SCN: NOT DETECTED
PH UR STRIP: 8 [PH] (ref 4.7–8)
PROPOXYPH UR QL: NOT DETECTED
RBC URINE: 1 /HPF
SP GR UR STRIP: 1.01 (ref 1–1.03)
SQUAMOUS EPITHELIAL: 0 /HPF
TRICYCLICS UR QL SCN: NOT DETECTED
UROBILINOGEN UR STRIP-MCNC: NORMAL MG/DL
WBC URINE: 2 /HPF

## 2022-07-21 PROCEDURE — 81001 URINALYSIS AUTO W/SCOPE: CPT | Performed by: NURSE PRACTITIONER

## 2022-07-21 PROCEDURE — 250N000013 HC RX MED GY IP 250 OP 250 PS 637: Performed by: NURSE PRACTITIONER

## 2022-07-21 PROCEDURE — 124N000001 HC R&B MH

## 2022-07-21 PROCEDURE — 99233 SBSQ HOSP IP/OBS HIGH 50: CPT | Performed by: NURSE PRACTITIONER

## 2022-07-21 PROCEDURE — 99231 SBSQ HOSP IP/OBS SF/LOW 25: CPT | Performed by: NURSE PRACTITIONER

## 2022-07-21 PROCEDURE — 80306 DRUG TEST PRSMV INSTRMNT: CPT | Performed by: NURSE PRACTITIONER

## 2022-07-21 RX ADMIN — CLONAZEPAM 1 MG: 1 TABLET ORAL at 06:27

## 2022-07-21 RX ADMIN — CLONAZEPAM 1 MG: 1 TABLET ORAL at 12:07

## 2022-07-21 RX ADMIN — MEMANTINE 10 MG: 10 TABLET ORAL at 20:12

## 2022-07-21 RX ADMIN — MULTIPLE VITAMINS W/ MINERALS TAB 1 TABLET: TAB at 08:06

## 2022-07-21 RX ADMIN — MEMANTINE 10 MG: 10 TABLET ORAL at 08:06

## 2022-07-21 RX ADMIN — CLONAZEPAM 1 MG: 1 TABLET ORAL at 15:58

## 2022-07-21 RX ADMIN — LITHIUM CARBONATE 900 MG: 450 TABLET, EXTENDED RELEASE ORAL at 20:12

## 2022-07-21 RX ADMIN — PREGABALIN 100 MG: 25 CAPSULE ORAL at 14:58

## 2022-07-21 RX ADMIN — CLOZAPINE 25 MG: 25 TABLET ORAL at 20:12

## 2022-07-21 RX ADMIN — PREGABALIN 100 MG: 25 CAPSULE ORAL at 08:07

## 2022-07-21 RX ADMIN — PREGABALIN 100 MG: 25 CAPSULE ORAL at 20:12

## 2022-07-21 ASSESSMENT — ENCOUNTER SYMPTOMS
LIGHT-HEADEDNESS: 0
SHORTNESS OF BREATH: 0
MYALGIAS: 0
DIARRHEA: 0
ARTHRALGIAS: 0
FEVER: 0
RHINORRHEA: 0
ABDOMINAL PAIN: 0
DIZZINESS: 0
VOMITING: 0
HEADACHES: 0
FATIGUE: 0
NAUSEA: 0
DIFFICULTY URINATING: 0
APPETITE CHANGE: 0
CHILLS: 0
COUGH: 0
SORE THROAT: 0

## 2022-07-21 ASSESSMENT — ACTIVITIES OF DAILY LIVING (ADL)
ADLS_ACUITY_SCORE: 41
ORAL_HYGIENE: INDEPENDENT
DRESS: SCRUBS (BEHAVIORAL HEALTH)
ADLS_ACUITY_SCORE: 41
HYGIENE/GROOMING: INDEPENDENT
HYGIENE/GROOMING: INDEPENDENT
ADLS_ACUITY_SCORE: 41
LAUNDRY: UNABLE TO COMPLETE
ORAL_HYGIENE: INDEPENDENT
DRESS: SCRUBS (BEHAVIORAL HEALTH);INDEPENDENT

## 2022-07-21 NOTE — PLAN OF CARE
Problem: Behavioral Health Plan of Care  Goal: Patient-Specific Goal (Individualization)  Description: Patient will report at least 6-8 hours of sleep each night.   Patient will complete all ADL's independently while on the unit.   Patient will be compliant with treatment team recommendations.   Outcome: Ongoing, Not Progressing  Note: Patient is awake and in the lounge at start of shift.  He is disheveled and untidy.  He is withdrawn from others most of this shift.  He is in the lounge for meals and for a few hours this morning.  After lunch he walked the hallway by himself. At times while walking the hallway, patient blinks often/rapidly.  Minimal eye contact.  His affect is flat.  He is cooperative with assessment.  Denies SI, HI, hallucinations, pain, depression, anxiety.  He answers assessment questions in one or two word answers. He offers minimal additional information during assessment. Patient signed VAMSI for Nuno SHAH, CNP at [facility] in [place].  Release faxed to [facility].  Fax received from [facility]-placed in paper chart.  Urine sample collected for urinalysis and urine drug screen at 1245.    Patient ate 100% of breakfast and lunch.  Weight this morning was 170.7 lbs.  Problem: Thought Process Alteration  Goal: Optimal Thought Clarity  Description: Patient will hold reality based conversations while on the unit.     Outcome: Ongoing, Progressing  Note: Patient is able to hold reality based conversation.  His answers to assessment questions are short.     Problem: Suicide Risk  Goal: Absence of Self-Harm  Outcome: Ongoing, Progressing  Note: Patient denies SI and had no noted self harm this shift.       Problem: Suicidal Behavior  Goal: Suicidal Behavior is Absent or Managed  Outcome: Ongoing, Progressing   Goal Outcome Evaluation:

## 2022-07-21 NOTE — PLAN OF CARE
Face to face end of shift report will be communicated to oncoming RN.  Problem: Behavioral Health Plan of Care  Goal: Patient-Specific Goal (Individualization)  Description: Patient will report at least 6-8 hours of sleep each night.   Patient will complete all ADL's independently while on the unit.   Patient will be compliant with treatment team recommendations.   Outcome: Ongoing, Progressing     Problem: Thought Process Alteration  Goal: Optimal Thought Clarity  Description: Patient will hold reality based conversations while on the unit.     Outcome: Ongoing, Progressing     Problem: Suicide Risk  Goal: Absence of Self-Harm  Outcome: Ongoing, Progressing     Problem: Suicidal Behavior  Goal: Suicidal Behavior is Absent or Managed  Outcome: Ongoing, Progressing   Goal Outcome Evaluation:  Face to face end of shift report obtained from TRAE Haji. Pt observed resting in bed.  At around 0200, pt walked to [employer] asking for something to eat. Pt sat in lobby ate snacks and returned to bed with no further requests.   0400-Pt appears to be sleeping in bed with eyes closed, having regular respirations, and position changes. 15 minutes and PRN safety checks completed with no noted complains. No self harm, delusional comments, or bizarre behaviors noted or reported so far this shift.  0600-Pt appeared to had slept 6 hours.

## 2022-07-21 NOTE — PROGRESS NOTES
"[facility] PSYCHIATRY  PROGRESS NOTE     SUBJECTIVE     Steven is resting in bed when I go to see him today, though does sit up to speak with me. He did shower yesterday and after lunch yesterday he did go to group. He has been compliant with all prescribed medications. He states that he prefers the Clonazepam to the Lorazepam as it \"lasts longer\". No side effects reported since starting Clozapine. He reports feeling that his depression is \"a little better\". He denies any suicidal thoughts or thoughts of self-harm. His appetite has been good here in the hospital, has been receiving the Clonazepam prior to meal time, was weighed again this AM and has gained about 8 pounds since admission. He did state that he liked it at [facility] though he does not think that he will be able to return there. \"I could just go to the [facility] shelter\". Reviewed that we want to see him feeling better and back on medications before we discuss further discharge planning. Would likely benefit from TBI group home or some sort of structured and supportive living environment. SW to contact [facility] today.       MEDICATIONS   Scheduled Meds:    clonazePAM  1 mg Oral TID     cloZAPine  25 mg Oral At Bedtime     lithium  900 mg Oral At Bedtime     memantine  10 mg Oral BID     multivitamin w/minerals  1 tablet Oral Daily     pregabalin  100 mg Oral TID     PRN Meds:.acetaminophen, alum & mag hydroxide-simethicone, clonazePAM, hydrOXYzine, OLANZapine **OR** OLANZapine, sulindac     ALLERGIES   Allergies   Allergen Reactions     Pork Allergy         MENTAL STATUS EXAM   Vitals: /62   Pulse 62   Temp 97.7  F (36.5  C) (Temporal)   Resp 16   Wt 73.5 kg (162 lb)   SpO2 98%   BMI 21.97 kg/m      Appearance:  Has showered though hair and beard are scraggly and long. Awake, alert, dressed in hospital scrubs  Attitude:  cooperative and guarded  Eye Contact: little better today  Mood:  restricted  Affect:  intensity is " flat  Speech:  Paucity of speech, monotone, soft spoken  Psychomotor Behavior:  no evidence of tardive dyskinesia, dystonia, or tics  Thought Process:  Difficult to assess, some thought blocking noted  Associations: none noted  Thought Content:  no evidence of suicidal ideation or homicidal ideation and no evidence of psychotic thought, denies hallucinations  Insight:  limited  Judgment:  limited  Oriented to:  time, person, and place  Attention Span and Concentration:  limited  Recent and Remote Memory:  limited  Fund of Knowledge: delayed  Muscle Strength and Tone: normal  Gait and Station: not observed, pt in bed       LABS   Recent Results (from the past 24 hour(s))   Magnesium    Collection Time: 07/20/22  1:20 PM   Result Value Ref Range    Magnesium 2.0 1.6 - 2.3 mg/dL   Phosphorus    Collection Time: 07/20/22  1:20 PM   Result Value Ref Range    Phosphorus 2.5 2.5 - 4.5 mg/dL   Comprehensive metabolic panel    Collection Time: 07/20/22  1:20 PM   Result Value Ref Range    Sodium 142 133 - 144 mmol/L    Potassium 4.2 3.4 - 5.3 mmol/L    Chloride 109 94 - 109 mmol/L    Carbon Dioxide (CO2) 30 20 - 32 mmol/L    Anion Gap 3 3 - 14 mmol/L    Urea Nitrogen 8 7 - 30 mg/dL    Creatinine 0.96 0.66 - 1.25 mg/dL    Calcium 8.3 (L) 8.5 - 10.1 mg/dL    Glucose 85 70 - 99 mg/dL    Alkaline Phosphatase 82 40 - 150 U/L    AST 19 0 - 45 U/L    ALT 46 0 - 70 U/L    Protein Total 5.7 (L) 6.8 - 8.8 g/dL    Albumin 3.2 (L) 3.4 - 5.0 g/dL    Bilirubin Total 0.3 0.2 - 1.3 mg/dL    GFR Estimate >90 >60 mL/min/1.73m2   Extra Red Top Tube    Collection Time: 07/20/22  1:20 PM   Result Value Ref Range    Hold Specimen JIC    Extra Purple Top Tube    Collection Time: 07/20/22  1:20 PM   Result Value Ref Range    Hold Specimen JIC          IMPRESSION     This is a 31 year old male with a PMH of traumatic brain injury with coma and significant brain damage as evidenced in MRI as well as multiple episodes of catatonia which can lead to  food refusal.  He appears to have lost a very large amount of weight since his last hospitalization and is cachectic though his labs are unremarkable.  It is unclear when the lorazepam was discontinued and why it was discontinued.  He does state he feels Klonopin to be more beneficial.  It is imperative we restart benzodiazepines due to his catatonia as his food intake is minimal and he has lost a large amount of weight.  He also has unprovoked aggression at times likely due to catatonia versus psychosis.  I did speak with him about starting clozapineFor psychosis/catatonia.  I explained that it was an antipsychotic that required weekly white blood cell count to monitor for side effects of neutropenia and explained what this was.  He did sign the neuroleptic consent.  He does admit that he would like to feel better and that he feels extremely flat and emotionless.       DIAGNOSES     1. Schizoaffective disorder, depressive type, multiple episodes, currently in acute episode, with catatonia   2. 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 Multiple other TBIs  3. 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 Methamphetamine use disorder, moderate, early remission  5. Alcohol use disorder, moderate  6. Severe malnutrition       PLAN     Location: Unit 5  Legal Status: Voluntary    Safety Assessment:    Behavioral Orders   Procedures     Code 1 - Restrict to Unit     Routine Programming     As clinically indicated     Status 15     Every 15 minutes.      PTA medications continued/changed:     -Stopped Wellbutrin due to starting Clozaril. Both decrease seizure threshhold  -Stopped Abilify (not effective)  -Namenda 10 mg BID  -Clonazepam will be started at 1 mg 3 times daily.  May need to increase until catatonia has improved some.  He does believe Klonopin has been more effective than Ativan, we will switch to Ativan if clonazepam is not  effective.    New medications tried and stopped:     -None    New medications initiated:     -Clozapine 12.5 mg at bedtime on 7/19. Increase to 25 mg at bedtime on 7/20, increase again tomorrow    Today's Changes:    -Increase Clozapine as tolerated. Monitor closely for oversedation given concurrent use of benzos. Currently scheduled 5 hours after last Clonazepam dose.  -SW to check on [employer] funding      Programming: Patient will be treated in a therapeutic milieu with appropriate individual and group therapies. Education will be provided on diagnoses, medications, and treatments.     Medical diagnoses:  Per medicine. Monitoring electrolytes for refeeding syndrome.    Consult: Nutrition  Tests: Lithium level on 7/19 was 0.6, weekly CBC. Vit D pending    Anticipated LOS: likely 3-4 weeks for stabilization  Disposition: TBI GH, IRTS, MSHS ?       ATTESTATION      Christelle Contreras NP

## 2022-07-21 NOTE — PROGRESS NOTES
[facility]    Medical Services Progress Note    Date of Service (when I saw the patient): 07/21/2022    Assessment & Plan     Principal Problem:    Catatonia  Active Problems:    Schizoaffective disorder, bipolar type (H)    ADD (attention deficit disorder)    Alcohol abuse    Depression with anxiety    Bipolar 1 disorder (H)    Attention deficit disorder, unspecified hyperactivity presence     Severe Malnutrition- pt weight has gone from 205lbs on 3/5/22 to 162 lb. Protein is low at 5.9. Albumin is on the low side of normal at 3.6. Will add ensure to meal trays. Multivitamin daily. RD consult order placed. Nursing to encourage po fluid intake.   7/20- RD seen pt yesterday. Per RD note.    NUTRITION DIAGNOSIS:  Malnutrition related to minimal energy intake  as evidenced by 21% weight loss in 4 months  NUTRITION RECOMMENDATIONS  - Monitor electrolytes for refeeding syndrome  - Encourage intake during meal and snack times.  - continue with current interventions- Ensure, multivitamin  MONITORING AND EVALUATION:  RD will monitor intake, weight, labs    CMP, phosphorus, Mag ordered for today and Friday. Nursing reports pt is eating and drinking well.  Pt denies any GI issues.     7/21/22- pt is eating and drinking. Nursing reports he was weighed this morning and he weighed 170 lbs. Which is up from his admission weight of 162 lbs. Electrolytes wnl. Protein low at 5.7 and albumin low at 3.2. Phosphorus and magnesium both wnl. Labs ordered for tomorrow. Pmhnp aware to review labs and consult medical if indicated.      Chronic low back pain- states low back pain is consistent with his chronic low back pain and states he takes lyrica for this. He declines Lidoderm patches and muscle rub. He would like tylenol. Denies any other complaints. Nursing aware he would like tylenol.   7/21/22- denies pain.        Code Status: Full Code.    Harmony Samuel CNP      -Data reviewed today: I reviewed all new labs and imaging  results over the last 24 hours.     Physical Exam   Temp: 97.4  F (36.3  C) Temp src: Tympanic BP: 99/55 Pulse: 99   Resp: 16 SpO2: 98 % O2 Device: None (Room air)    Vitals:    07/18/22 1223 07/18/22 1350   Weight: 75.6 kg (166 lb 9.6 oz) 73.5 kg (162 lb)     Vital Signs with Ranges  Temp:  [97.4  F (36.3  C)-98  F (36.7  C)] 97.4  F (36.3  C)  Pulse:  [62-99] 99  Resp:  [16] 16  BP: ()/(55-73) 99/55  SpO2:  [98 %] 98 %  No intake/output data recorded.    Constitutional: awake, alert, cooperative, no apparent distress, and appears stated age, vitals stable    Respiratory: No increased work of breathing, good air exchange, clear to auscultation bilaterally, no crackles or wheezing  Cardiovascular: Normal apical impulse, regular rate and rhythm, normal S1 and S2, no S3 or S4, and no murmur noted  GI: No scars, normal bowel sounds, soft, non-distended, non-tender, no masses palpated, no hepatosplenomegally  Neuropsychiatric: General: restricted, flat,  and poor eye contact    Medications     clonazePAM  1 mg Oral TID     cloZAPine  25 mg Oral At Bedtime     lithium  900 mg Oral At Bedtime     memantine  10 mg Oral BID     multivitamin w/minerals  1 tablet Oral Daily     pregabalin  100 mg Oral TID       Data   Recent Labs   Lab 07/20/22  1320 07/18/22  1323   WBC  --  5.8   HGB  --  14.6   MCV  --  89   PLT  --  238    141   POTASSIUM 4.2 3.6   CHLORIDE 109 111*   CO2 30 26   BUN 8 5*   CR 0.96 0.88   ANIONGAP 3 4   NIKKI 8.3* 8.5   GLC 85 83   ALBUMIN 3.2* 3.6   PROTTOTAL 5.7* 5.9*   BILITOTAL 0.3 0.6   ALKPHOS 82 66   ALT 46 57   AST 19 26       No results found for this or any previous visit (from the past 24 hour(s)).

## 2022-07-21 NOTE — PLAN OF CARE
Problem: Behavioral Health Plan of Care  Goal: Patient-Specific Goal (Individualization)  Description: Patient will report at least 6-8 hours of sleep each night.   Patient will complete all ADL's independently while on the unit.   Patient will be compliant with treatment team recommendations.   Outcome: Ongoing, Progressing   Goal Outcome Evaluation:    Plan of Care Reviewed With: patient     1600 Patient states remains in room. Eye contact is fair. Patient answers questions but doesn't elaborate on any one topic. Patient denies auditory hallucinations, but admits to having some anxiety and depression. Took scheduled Klonopin medication.    2100 Patient approached nurses station to get his HS medications around 8PM. Patient is quiet but answers questions appropriately. Patient given HS snack and went to bed.    Face to face end of shift report communicated to 11-7 shift RN.     Le Bermudez RN  7/21/2022  10:39 PM          Problem: Thought Process Alteration  Goal: Optimal Thought Clarity  Description: Patient will hold reality based conversations while on the unit.     Outcome: Ongoing, Progressing     Problem: Suicide Risk  Goal: Absence of Self-Harm  Outcome: Ongoing, Not Progressing     Problem: Suicidal Behavior  Goal: Suicidal Behavior is Absent or Managed  Outcome: Ongoing, Not Progressing

## 2022-07-22 LAB
ALBUMIN SERPL-MCNC: 2.9 G/DL (ref 3.4–5)
ALP SERPL-CCNC: 59 U/L (ref 40–150)
ALT SERPL W P-5'-P-CCNC: 32 U/L (ref 0–70)
ANION GAP SERPL CALCULATED.3IONS-SCNC: 4 MMOL/L (ref 3–14)
AST SERPL W P-5'-P-CCNC: 14 U/L (ref 0–45)
BILIRUB SERPL-MCNC: 0.2 MG/DL (ref 0.2–1.3)
BUN SERPL-MCNC: 10 MG/DL (ref 7–30)
CALCIUM SERPL-MCNC: 8.5 MG/DL (ref 8.5–10.1)
CHLORIDE BLD-SCNC: 109 MMOL/L (ref 94–109)
CO2 SERPL-SCNC: 31 MMOL/L (ref 20–32)
CREAT SERPL-MCNC: 0.86 MG/DL (ref 0.66–1.25)
GFR SERPL CREATININE-BSD FRML MDRD: >90 ML/MIN/1.73M2
GLUCOSE BLD-MCNC: 69 MG/DL (ref 70–99)
HOLD SPECIMEN: NORMAL
HOLD SPECIMEN: NORMAL
MAGNESIUM SERPL-MCNC: 2.1 MG/DL (ref 1.6–2.3)
PHOSPHATE SERPL-MCNC: 2.8 MG/DL (ref 2.5–4.5)
POTASSIUM BLD-SCNC: 4.1 MMOL/L (ref 3.4–5.3)
PROT SERPL-MCNC: 5.4 G/DL (ref 6.8–8.8)
SODIUM SERPL-SCNC: 144 MMOL/L (ref 133–144)

## 2022-07-22 PROCEDURE — 250N000013 HC RX MED GY IP 250 OP 250 PS 637: Performed by: NURSE PRACTITIONER

## 2022-07-22 PROCEDURE — 84100 ASSAY OF PHOSPHORUS: CPT | Performed by: NURSE PRACTITIONER

## 2022-07-22 PROCEDURE — 83735 ASSAY OF MAGNESIUM: CPT | Performed by: NURSE PRACTITIONER

## 2022-07-22 PROCEDURE — 99232 SBSQ HOSP IP/OBS MODERATE 35: CPT | Performed by: NURSE PRACTITIONER

## 2022-07-22 PROCEDURE — 80053 COMPREHEN METABOLIC PANEL: CPT | Performed by: NURSE PRACTITIONER

## 2022-07-22 PROCEDURE — 124N000001 HC R&B MH

## 2022-07-22 PROCEDURE — 36415 COLL VENOUS BLD VENIPUNCTURE: CPT | Performed by: NURSE PRACTITIONER

## 2022-07-22 RX ADMIN — LITHIUM CARBONATE 900 MG: 450 TABLET, EXTENDED RELEASE ORAL at 20:26

## 2022-07-22 RX ADMIN — CLOZAPINE 25 MG: 25 TABLET ORAL at 20:26

## 2022-07-22 RX ADMIN — PREGABALIN 100 MG: 25 CAPSULE ORAL at 13:42

## 2022-07-22 RX ADMIN — CLONAZEPAM 1 MG: 1 TABLET ORAL at 06:34

## 2022-07-22 RX ADMIN — MEMANTINE 10 MG: 10 TABLET ORAL at 08:14

## 2022-07-22 RX ADMIN — MEMANTINE 10 MG: 10 TABLET ORAL at 20:26

## 2022-07-22 RX ADMIN — CLONAZEPAM 1 MG: 1 TABLET ORAL at 12:04

## 2022-07-22 RX ADMIN — PREGABALIN 100 MG: 25 CAPSULE ORAL at 20:26

## 2022-07-22 RX ADMIN — MULTIPLE VITAMINS W/ MINERALS TAB 1 TABLET: TAB at 08:14

## 2022-07-22 RX ADMIN — CLONAZEPAM 1 MG: 1 TABLET ORAL at 17:15

## 2022-07-22 RX ADMIN — PREGABALIN 100 MG: 25 CAPSULE ORAL at 08:14

## 2022-07-22 ASSESSMENT — ACTIVITIES OF DAILY LIVING (ADL)
ADLS_ACUITY_SCORE: 41
HYGIENE/GROOMING: INDEPENDENT
LAUNDRY: UNABLE TO COMPLETE
DRESS: SCRUBS (BEHAVIORAL HEALTH)
ADLS_ACUITY_SCORE: 41
ORAL_HYGIENE: INDEPENDENT
ADLS_ACUITY_SCORE: 41

## 2022-07-22 NOTE — PROGRESS NOTES
CLINICAL NUTRITION SERVICES  -  REASSESSMENT NOTE    Steven Carter    31 yom admitted for catatonia. Pt has a medical hx including schizoaffective disorder, Bipolar 1 disorder, ADD, alcohol abuse, polysubstance abuse.  Noted weight loss of 43lb in the past 4 months, 53lb loss in the past 7 months related to poor intake with catatonia. Per chart review, intake has been adequate. Weight gain since admission. Electrolytes appear stable. Multivitamin ordered daily.     Diet Order: Regular, Ensure  Intake: 10 meals with 100% intake     Height: Data Unavailable  Weight: 170 lbs 11.2 oz  Body mass index is 23.15 kg/m .  Weight Status:  Normal BMI  Weight History: 21% weight loss in the past 4 months, 24.6% weight loss in the past 7 months  Wt Readings from Last 10 Encounters:   07/18/22 73.5 kg (162 lb)   03/05/22 93 kg (205 lb 1.6 oz)   12/14/21 97.7 kg (215 lb 4.8 oz)   12/01/21 97.8 kg (215 lb 11.2 oz)   10/31/21 105.1 kg (231 lb 11.2 oz)   03/27/21 105.2 kg (232 lb)   11/30/15 96.6 kg (213 lb)   08/18/14 94.5 kg (208 lb 6.4 oz)   08/15/14 93.4 kg (206 lb)   07/19/14 102.1 kg (225 lb)         Weight used to calculate needs: 77.6kg - ibw  Estimated Energy Needs: 6212-3658 kcals (25-30 Kcal/Kg)   Estimated Protein Needs:  grams protein (1-1.5 g pro/Kg)  Estimated Fluid Needs: 0226-5087  mL (1 mL/Kcal)     Malnutrition Diagnosis: Severe malnutrition- expected to improve with adequate intake  In Context of:  Chronic illness or disease, Environmental or social circumstances     NUTRITION RECOMMENDATIONS  - Encourage intake during meal and snack times as needed    MONITORING AND EVALUATION:  RD will monitor intake, weight, labs
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
## 2022-07-22 NOTE — PLAN OF CARE
Spoke with pt this afternoon, pt was found sitting in his room. Pt offers little to no conversation and only speaks when answering questions. Answers involve just yes and no's, though he is able to follow conversation. Pt has no questions or concerns at this time.

## 2022-07-22 NOTE — PLAN OF CARE
Problem: Behavioral Health Plan of Care  Goal: Patient-Specific Goal (Individualization)  Description: Patient will report at least 6-8 hours of sleep each night.   Patient will complete all ADL's independently while on the unit.   Patient will be compliant with treatment team recommendations.   Outcome: Ongoing, Progressing   Goal Outcome Evaluation:      Face to face shift report received from RN. Rounding completed, pt observed.Client rested in room for 7 hours with eyes closed and respirations noted. No signs of distress. Face to face report will be communicated to oncoming RN.    Didier Brooks RN  7/22/2022  6:29 AM

## 2022-07-22 NOTE — PLAN OF CARE
Problem: Thought Process Alteration  Goal: Optimal Thought Clarity  Description: Patient will hold reality based conversations while on the unit.     Outcome: Ongoing, Progressing     Patient is unable to have a linear conversation with staff.  He will answer questions but is slow to respond and often stares off into space.        Problem: Behavioral Health Plan of Care  Goal: Patient-Specific Goal (Individualization)  Description: Patient will report at least 6-8 hours of sleep each night.   Patient will complete all ADL's independently while on the unit.   Patient will be compliant with treatment team recommendations.   Outcome: Ongoing, Progressing   Patient has been withdrawn to his room much of the day.  He does come out for meals but then lays in bed staring at the ceiling.  He took all medications as prescribed.  Denies any mental health concerns but is willing to follow treatment team recommendations.  Ate 100% of meals.  No complaints of pain.  Vs WNL.  Face to face end of shift report communicated to evening shift RN.     Shantal Real RN  7/22/2022  12:33 PM

## 2022-07-22 NOTE — PROGRESS NOTES
"[facility] PSYCHIATRY  PROGRESS NOTE     SUBJECTIVE     Steven is sitting up in the lounge when I see him today. He appears anxious though he denies this. He does make eye contact more frequently than previous days, though tends to look at the table when answering questions. He reports feeling \"fine\". He gives minimal responses to questions, though does answer them appropriately. He continues to have a good appetite, has gained weight since admission. Discussed increasing Clozapine tonight, however in review later his BPs have been on the low end. Will order orthostatic BP and pulse and review results tomorrow. Is also taking scheduled Clonazepam for catatonia with meals to improve food and fluid intake. Will consider switching back to Ativan, which is what he had been taking during his previous hospital stay. He denies having any questions or concerns. Was observed to sit in the lounge for a period of time though does not interact with peers,       MEDICATIONS   Scheduled Meds:    clonazePAM  1 mg Oral TID     cloZAPine  25 mg Oral At Bedtime     lithium  900 mg Oral At Bedtime     memantine  10 mg Oral BID     multivitamin w/minerals  1 tablet Oral Daily     pregabalin  100 mg Oral TID     PRN Meds:.acetaminophen, alum & mag hydroxide-simethicone, clonazePAM, hydrOXYzine, OLANZapine **OR** OLANZapine, sulindac     ALLERGIES   Allergies   Allergen Reactions     Pork Allergy         MENTAL STATUS EXAM   Vitals: BP 98/74 (BP Location: Left arm)   Pulse 99   Temp 99.3  F (37.4  C) (Temporal)   Resp 18   Wt 77.4 kg (170 lb 11.2 oz)   SpO2 97%   BMI 23.15 kg/m      Appearance:  Has showered though hair and beard are scraggly and long. Awake, alert, dressed in hospital scrubs  Attitude:  cooperative and guarded  Eye Contact: little better today  Mood:  restricted  Affect:  intensity is flat  Speech:  Paucity of speech, monotone, soft spoken  Psychomotor Behavior:  no evidence of tardive dyskinesia, dystonia, or " tics  Thought Process:  Difficult to assess, some thought blocking noted  Associations: none noted  Thought Content:  no evidence of suicidal ideation or homicidal ideation and no evidence of psychotic thought, denies hallucinations  Insight:  limited  Judgment:  limited  Oriented to:  time, person, and place  Attention Span and Concentration:  limited  Recent and Remote Memory:  limited  Fund of Knowledge: delayed  Muscle Strength and Tone: normal  Gait and Station: not observed, pt in bed       LABS   Recent Results (from the past 24 hour(s))   Comprehensive metabolic panel    Collection Time: 07/22/22  9:02 AM   Result Value Ref Range    Sodium 144 133 - 144 mmol/L    Potassium 4.1 3.4 - 5.3 mmol/L    Chloride 109 94 - 109 mmol/L    Carbon Dioxide (CO2) 31 20 - 32 mmol/L    Anion Gap 4 3 - 14 mmol/L    Urea Nitrogen 10 7 - 30 mg/dL    Creatinine 0.86 0.66 - 1.25 mg/dL    Calcium 8.5 8.5 - 10.1 mg/dL    Glucose 69 (L) 70 - 99 mg/dL    Alkaline Phosphatase 59 40 - 150 U/L    AST 14 0 - 45 U/L    ALT 32 0 - 70 U/L    Protein Total 5.4 (L) 6.8 - 8.8 g/dL    Albumin 2.9 (L) 3.4 - 5.0 g/dL    Bilirubin Total 0.2 0.2 - 1.3 mg/dL    GFR Estimate >90 >60 mL/min/1.73m2   Phosphorus    Collection Time: 07/22/22  9:02 AM   Result Value Ref Range    Phosphorus 2.8 2.5 - 4.5 mg/dL   Magnesium    Collection Time: 07/22/22  9:02 AM   Result Value Ref Range    Magnesium 2.1 1.6 - 2.3 mg/dL   Extra Red Top Tube    Collection Time: 07/22/22  9:02 AM   Result Value Ref Range    Hold Specimen JIC    Extra Purple Top Tube    Collection Time: 07/22/22  9:02 AM   Result Value Ref Range    Hold Specimen JIC          IMPRESSION     This is a 31 year old male with a PMH of traumatic brain injury with coma and significant brain damage as evidenced in MRI as well as multiple episodes of catatonia which can lead to food refusal.  He appears to have lost a very large amount of weight since his last hospitalization and is cachectic though his  labs are unremarkable.  It is unclear when the lorazepam was discontinued and why it was discontinued.  He does state he feels Klonopin to be more beneficial.  It is imperative we restart benzodiazepines due to his catatonia as his food intake is minimal and he has lost a large amount of weight.  He also has unprovoked aggression at times likely due to catatonia versus psychosis.  I did speak with him about starting clozapine for psychosis/catatonia.  I explained that it was an antipsychotic that required weekly white blood cell count to monitor for side effects of neutropenia and explained what this was.  He did sign the neuroleptic consent.  He does admit that he would like to feel better and that he feels extremely flat and emotionless.       DIAGNOSES     1. Schizoaffective disorder, depressive type, multiple episodes, currently in acute episode, with catatonia   2. 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 Multiple other TBIs  3. 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 Methamphetamine use disorder, moderate, early remission  5. Alcohol use disorder, moderate  6. Severe malnutrition       PLAN     Location: Unit 5  Legal Status: Voluntary    Safety Assessment:    Behavioral Orders   Procedures     Code 1 - Restrict to Unit     Routine Programming     As clinically indicated     Status 15     Every 15 minutes.      PTA medications continued/changed:     -Stopped Wellbutrin due to starting Clozaril. Both decrease seizure threshhold  -Stopped Abilify (not effective)    -Namenda 10 mg BID  -Lyrica 100 mg TID  -Clonazepam will be started at 1 mg 3 times daily.  May need to increase until catatonia has improved some.  He does believe Klonopin has been more effective than Ativan, we will switch to Ativan if clonazepam is not effective.    New medications tried and stopped:     -None    New medications initiated:     -Clozapine 12.5 mg at  bedtime on 7/19. Increase to 25 mg at bedtime on 7/20, will continue titrating as able    Today's Changes:  -Please check orthostatic vitals daily  -Increase Clozapine as tolerated. Monitor closely for oversedation given concurrent use of benzos. Currently scheduled 5 hours after last Clonazepam dose.   -SW to check on [employer] funding      Programming: Patient will be treated in a therapeutic milieu with appropriate individual and group therapies. Education will be provided on diagnoses, medications, and treatments.     Medical diagnoses:  Per medicine. Monitoring electrolytes for refeeding syndrome.    Consult: Nutrition  Tests: Lithium level on 7/19 was 0.6, weekly CBC. Vit D pending    Anticipated LOS: likely 3-4 weeks for stabilization  Disposition: TBI GH, IRTS, MSHS ?       ATTESTATION      Christelle Contreras NP

## 2022-07-23 PROCEDURE — 124N000001 HC R&B MH

## 2022-07-23 PROCEDURE — 250N000013 HC RX MED GY IP 250 OP 250 PS 637: Performed by: NURSE PRACTITIONER

## 2022-07-23 PROCEDURE — 99233 SBSQ HOSP IP/OBS HIGH 50: CPT | Performed by: NURSE PRACTITIONER

## 2022-07-23 RX ORDER — CLOZAPINE 50 MG/1
50 TABLET ORAL AT BEDTIME
Status: DISCONTINUED | OUTPATIENT
Start: 2022-07-23 | End: 2022-07-24

## 2022-07-23 RX ORDER — LORAZEPAM 1 MG/1
1 TABLET ORAL 3 TIMES DAILY
Status: DISCONTINUED | OUTPATIENT
Start: 2022-07-23 | End: 2022-07-27

## 2022-07-23 RX ADMIN — PREGABALIN 100 MG: 25 CAPSULE ORAL at 08:52

## 2022-07-23 RX ADMIN — LITHIUM CARBONATE 900 MG: 450 TABLET, EXTENDED RELEASE ORAL at 20:15

## 2022-07-23 RX ADMIN — PREGABALIN 100 MG: 25 CAPSULE ORAL at 20:15

## 2022-07-23 RX ADMIN — PREGABALIN 100 MG: 25 CAPSULE ORAL at 13:49

## 2022-07-23 RX ADMIN — CLOZAPINE 50 MG: 50 TABLET ORAL at 20:15

## 2022-07-23 RX ADMIN — MEMANTINE 10 MG: 10 TABLET ORAL at 08:53

## 2022-07-23 RX ADMIN — CLONAZEPAM 0.5 MG: 0.5 TABLET ORAL at 20:15

## 2022-07-23 RX ADMIN — MULTIPLE VITAMINS W/ MINERALS TAB 1 TABLET: TAB at 08:51

## 2022-07-23 RX ADMIN — CLONAZEPAM 1 MG: 1 TABLET ORAL at 11:35

## 2022-07-23 RX ADMIN — LORAZEPAM 1 MG: 1 TABLET ORAL at 16:17

## 2022-07-23 RX ADMIN — CLONAZEPAM 1 MG: 1 TABLET ORAL at 06:29

## 2022-07-23 RX ADMIN — MEMANTINE 10 MG: 10 TABLET ORAL at 20:15

## 2022-07-23 ASSESSMENT — ACTIVITIES OF DAILY LIVING (ADL)
ADLS_ACUITY_SCORE: 41

## 2022-07-23 NOTE — PLAN OF CARE
Problem: Behavioral Health Plan of Care  Goal: Patient-Specific Goal (Individualization)  Description: Patient will report at least 6-8 hours of sleep each night.   Patient will complete all ADL's independently while on the unit.   Patient will be compliant with treatment team recommendations.   Outcome: Ongoing, Progressing  Note:     0100 Patient in bed with eyes closed and regular resps.    0630 Patient remains in bed with eyes closed for a total of 7 hours of sleep this shift.    Face to face end of shift report communicated to 7-3 shift RN.     Le Bermudez RN  7/23/2022  6:21 AM           Problem: Thought Process Alteration  Goal: Optimal Thought Clarity  Description: Patient will hold reality based conversations while on the unit.     Outcome: Ongoing, Progressing     Problem: Suicide Risk  Goal: Absence of Self-Harm  Outcome: Ongoing, Not Progressing     Problem: Suicidal Behavior  Goal: Suicidal Behavior is Absent or Managed  Outcome: Ongoing, Not Progressing   Goal Outcome Evaluation:

## 2022-07-23 NOTE — PLAN OF CARE
Face to face shift report received from Nurse. Rounding completed, pt observed.           Problem: Behavioral Health Plan of Care  Goal: Patient-Specific Goal (Individualization)  Description: Patient will report at least 6-8 hours of sleep each night.   Patient will complete all ADL's independently while on the unit.   Patient will be compliant with treatment team recommendations.   Outcome: Ongoing, Progressing     Problem: Thought Process Alteration  Goal: Optimal Thought Clarity  Description: Patient will hold reality based conversations while on the unit.     Outcome: Ongoing, Progressing     Problem: Suicide Risk  Goal: Absence of Self-Harm  Outcome: Ongoing, Progressing     Problem: Suicidal Behavior  Goal: Suicidal Behavior is Absent or Managed  Outcome: Ongoing, Progressing             Observed Pt. Laying in bed / Eyes Closed / Noted Breathing without difficulty.     Pt. Compliant with medications this morning. Pt. Answers questions and converses appropriately. However pt is somber and isolative to own room .      Pt. Reports he has attempted some groups today.     Face to face report will be communicated to oncoming RN.    Trinidad Hurley RN  7/23/2022  2:05 PM

## 2022-07-23 NOTE — PROGRESS NOTES
"[facility] PSYCHIATRY  PROGRESS NOTE     SUBJECTIVE     Steven is sitting up in the lounge when I see him today. Does not socialize with peers though will answer questions when addressed. Provides vague answers however, states that he feels \"fine\" and denies feeling depressed or anxious. He reports that his appetite is \"good\" and I believe he has been eating well at meals. He denies any side effects from Clozapine, states that it \"helps\". When asked what it is helping with he replies \"sleep\". Discussed increasing dose again tonight. BP has been running on the low end, though patient denies any dizziness or lightheadedness when standing. Will look at switching Klonopin to Ativan and increasing dose, will continue to monitor closely while taking Clozapine though dosing is spaced out.      MEDICATIONS   Scheduled Meds:    clonazePAM  1 mg Oral TID     cloZAPine  25 mg Oral At Bedtime     lithium  900 mg Oral At Bedtime     memantine  10 mg Oral BID     multivitamin w/minerals  1 tablet Oral Daily     pregabalin  100 mg Oral TID     PRN Meds:.acetaminophen, alum & mag hydroxide-simethicone, clonazePAM, hydrOXYzine, OLANZapine **OR** OLANZapine, sulindac     ALLERGIES   Allergies   Allergen Reactions     Pork Allergy         MENTAL STATUS EXAM   Vitals: BP 99/70 (BP Location: Right arm)   Pulse 91   Temp 97.2  F (36.2  C) (Temporal)   Resp 16   Wt 77.9 kg (171 lb 11.2 oz)   SpO2 99%   BMI 23.29 kg/m      Appearance:  Has showered though hair and beard are scraggly and long. Awake, alert, dressed in hospital scrubs  Attitude:  cooperative and guarded  Eye Contact: little better today  Mood:  restricted  Affect:  intensity is flat  Speech:  Paucity of speech, monotone, soft spoken  Psychomotor Behavior:  no evidence of tardive dyskinesia, dystonia, or tics  Thought Process:  Difficult to assess, some thought blocking noted, concrete  Associations: none noted  Thought Content:  no evidence of suicidal ideation or " homicidal ideation and no evidence of psychotic thought, denies hallucinations  Insight:  limited  Judgment:  limited  Oriented to:  time, person, and place  Attention Span and Concentration:  limited  Recent and Remote Memory:  limited  Fund of Knowledge: delayed  Muscle Strength and Tone: normal  Gait and Station: not observed       LABS   No results found for this or any previous visit (from the past 24 hour(s)).      IMPRESSION     This is a 31 year old male with a PMH of traumatic brain injury with coma and significant brain damage as evidenced in MRI as well as multiple episodes of catatonia which can lead to food refusal.  He appears to have lost a very large amount of weight since his last hospitalization and is cachectic though his labs are unremarkable.  It is unclear when the lorazepam was discontinued and why it was discontinued.  He does state he feels Klonopin to be more beneficial.  It is imperative we restart benzodiazepines due to his catatonia as his food intake is minimal and he has lost a large amount of weight.  He also has unprovoked aggression at times likely due to catatonia versus psychosis.  I did speak with him about starting clozapine for psychosis/catatonia.  I explained that it was an antipsychotic that required weekly white blood cell count to monitor for side effects of neutropenia and explained what this was.  He did sign the neuroleptic consent.  He does admit that he would like to feel better and that he feels extremely flat and emotionless.       DIAGNOSES     1. Schizoaffective disorder, depressive type, multiple episodes, currently in acute episode, with catatonia   2. 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 Multiple other TBIs  3. 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 Methamphetamine use disorder, moderate, early remission  5. Alcohol use disorder, moderate  6. Severe malnutrition       PLAN      Location: Unit 5  Legal Status: Voluntary    Safety Assessment:    Behavioral Orders   Procedures     Code 1 - Restrict to Unit     Routine Programming     As clinically indicated     Status 15     Every 15 minutes.      PTA medications continued/changed:     -Stopped Wellbutrin due to starting Clozaril. Both decrease seizure threshhold  -Stopped Abilify (not effective)    -Namenda 10 mg BID  -Lyrica 100 mg TID  -Clonazepam restarted at 1 mg TID- switched to Lorazepam 1 mg TID on 7/23- increase to 1.5 mg TID on 7/24    New medications tried and stopped:     -None    New medications initiated:     -Clozapine 12.5 mg at bedtime on 7/19. Increase to 25 mg at bedtime on 7/20, 50 mg on 7/23  -Start Vitamin D2 50,000 units weekly x 8 weeks    Today's Changes:  -Please check orthostatic vitals daily  -Increase Clozapine as tolerated. Monitor closely for oversedation given concurrent use of benzos. Currently scheduled 5 hours after last benzo dose.   -Start Vitamin D2 weekly x 8 weeks  -SW to check on [employer] funding      Programming: Patient will be treated in a therapeutic milieu with appropriate individual and group therapies. Education will be provided on diagnoses, medications, and treatments.     Medical diagnoses:  Per medicine. Monitoring electrolytes for refeeding syndrome.    Consult: Nutrition  Tests: Lithium level on 7/19 was 0.6, weekly CBC.     Anticipated LOS: likely 3-4 weeks for stabilization  Disposition: TBI GH, IRTS, MSHS ?       ATTESTATION      Christelle Contreras NP

## 2022-07-23 NOTE — PLAN OF CARE
Face to face end of shift report received from Shantal ALCARAZ RN. Rounding completed and patient observed pacing in the hallway. No requests at this time.     Goal Outcome Evaluation: Patient appeared calm. He was cooperative with taking meds. He denied all criteria and denied hallucinations. He paced in the pacheco most of the shift. He is disheveled and dirty. The scrubs he is wearing have food stains all over them and his beard is overgrown and has food in it. However, he talked to this writer more than on previous admits. He made fleeting eye contact. He denied pain.     Face to face end of shift report communicated to oncoming RN.       Problem: Behavioral Health Plan of Care  Goal: Patient-Specific Goal (Individualization)  Description: Patient will report at least 6-8 hours of sleep each night.   Patient will complete all ADL's independently while on the unit.   Patient will be compliant with treatment team recommendations.   Outcome: Ongoing, Not Progressing     Problem: Thought Process Alteration  Goal: Optimal Thought Clarity  Description: Patient will hold reality based conversations while on the unit.     Outcome: Ongoing, Not Progressing

## 2022-07-23 NOTE — PLAN OF CARE
Face to face end of shift report received from Trinidad ALCARAZ RN. Rounding completed and patient observed pacing in the pacheco. No requests at this time.      Goal Outcome Evaluation: Patient appeared calm. He was cooperative with taking meds. He denied all criteria and denied hallucinations. He attended one group. He is disheveled and dirty. The scrubs he is wearing have food stains all over them and his beard is overgrown and has food in it. Staff prompted him to shower and wash his hair. He said he would but he didn't. Patient has showered but hasn't washed his hair. He made fleeting eye contact. He denied pain.      Face to face end of shift report communicated to oncsalomón RN.    Face to face end of shift report communicated to  Problem: Behavioral Health Plan of Care  Goal: Patient-Specific Goal (Individualization)  Description: Patient will report at least 6-8 hours of sleep each night.   Patient will complete all ADL's independently while on the unit.   Patient will be compliant with treatment team recommendations.   Outcome: Ongoing, Progressing     Problem: Thought Process Alteration  Goal: Optimal Thought Clarity  Description: Patient will hold reality based conversations while on the unit.     Outcome: Ongoing, Progressing     Problem: Suicide Risk  Goal: Absence of Self-Harm  Outcome: Ongoing, Progressing

## 2022-07-24 PROCEDURE — 250N000013 HC RX MED GY IP 250 OP 250 PS 637: Performed by: NURSE PRACTITIONER

## 2022-07-24 PROCEDURE — 99232 SBSQ HOSP IP/OBS MODERATE 35: CPT | Performed by: NURSE PRACTITIONER

## 2022-07-24 PROCEDURE — 124N000001 HC R&B MH

## 2022-07-24 RX ORDER — LORAZEPAM 0.5 MG/1
0.5 TABLET ORAL
Status: DISCONTINUED | OUTPATIENT
Start: 2022-07-24 | End: 2022-07-25

## 2022-07-24 RX ORDER — ERGOCALCIFEROL 1.25 MG/1
50000 CAPSULE, LIQUID FILLED ORAL
Status: DISCONTINUED | OUTPATIENT
Start: 2022-07-24 | End: 2022-09-02 | Stop reason: HOSPADM

## 2022-07-24 RX ORDER — DOCUSATE SODIUM 100 MG/1
100 CAPSULE, LIQUID FILLED ORAL 2 TIMES DAILY PRN
Status: DISCONTINUED | OUTPATIENT
Start: 2022-07-24 | End: 2022-09-02 | Stop reason: HOSPADM

## 2022-07-24 RX ORDER — ERGOCALCIFEROL 1.25 MG/1
50000 CAPSULE, LIQUID FILLED ORAL
Status: DISCONTINUED | OUTPATIENT
Start: 2022-07-24 | End: 2022-07-24

## 2022-07-24 RX ADMIN — LORAZEPAM 1 MG: 1 TABLET ORAL at 16:13

## 2022-07-24 RX ADMIN — PREGABALIN 100 MG: 25 CAPSULE ORAL at 08:49

## 2022-07-24 RX ADMIN — MULTIPLE VITAMINS W/ MINERALS TAB 1 TABLET: TAB at 08:49

## 2022-07-24 RX ADMIN — MEMANTINE 10 MG: 10 TABLET ORAL at 20:08

## 2022-07-24 RX ADMIN — LITHIUM CARBONATE 900 MG: 450 TABLET, EXTENDED RELEASE ORAL at 20:09

## 2022-07-24 RX ADMIN — CLOZAPINE 75 MG: 25 TABLET ORAL at 20:08

## 2022-07-24 RX ADMIN — MEMANTINE 10 MG: 10 TABLET ORAL at 08:49

## 2022-07-24 RX ADMIN — PREGABALIN 100 MG: 25 CAPSULE ORAL at 14:13

## 2022-07-24 RX ADMIN — ERGOCALCIFEROL 50000 UNITS: 1.25 CAPSULE, LIQUID FILLED ORAL at 12:05

## 2022-07-24 RX ADMIN — LORAZEPAM 0.5 MG: 0.5 TABLET ORAL at 20:08

## 2022-07-24 RX ADMIN — LORAZEPAM 1 MG: 1 TABLET ORAL at 12:05

## 2022-07-24 RX ADMIN — LORAZEPAM 1 MG: 1 TABLET ORAL at 06:31

## 2022-07-24 RX ADMIN — PREGABALIN 100 MG: 25 CAPSULE ORAL at 20:07

## 2022-07-24 ASSESSMENT — ACTIVITIES OF DAILY LIVING (ADL)
ADLS_ACUITY_SCORE: 41
ADLS_ACUITY_SCORE: 52
HYGIENE/GROOMING: INDEPENDENT;PROMPTS
ADLS_ACUITY_SCORE: 52
ADLS_ACUITY_SCORE: 52
ADLS_ACUITY_SCORE: 41
ADLS_ACUITY_SCORE: 52
DRESS: SCRUBS (BEHAVIORAL HEALTH);INDEPENDENT
ADLS_ACUITY_SCORE: 41
ADLS_ACUITY_SCORE: 41
ADLS_ACUITY_SCORE: 52
ADLS_ACUITY_SCORE: 41
ORAL_HYGIENE: INDEPENDENT
ADLS_ACUITY_SCORE: 41
LAUNDRY: UNABLE TO COMPLETE
ADLS_ACUITY_SCORE: 52

## 2022-07-24 NOTE — PLAN OF CARE
Problem: Behavioral Health Plan of Care  Goal: Patient-Specific Goal (Individualization)  Description: Patient will report at least 6-8 hours of sleep each night.   Patient will complete all ADL's independently while on the unit.   Patient will be compliant with treatment team recommendations.   Outcome: Ongoing, Progressing  Note: Patient at beginning of shift noted to be in bed of room and up to lounge/dining room for breakfast and noon meal. No c/o pain noted. No SI or HI. Patient states anxiety and depression 0-3/10 on scale. No PRNs given or requested. Patient received scheduled Ativan at 1200. Patient did rest in bed of own room most of the shift. Patient currently sitting up in lounge sipping on a cup of coffee and watching television. Patient did shower after noon meal and changed into clean scrubs. No further complaints at this time. Will continue to support patient needs.     Face to face report given to oncoming nurse shift 3-11  Connie Handley RN Nursing Student  07/24/2022 [id number]     Problem: Thought Process Alteration  Goal: Optimal Thought Clarity  Description: Patient will hold reality based conversations while on the unit.     Outcome: Ongoing, Progressing     Problem: Suicide Risk  Goal: Absence of Self-Harm  Outcome: Ongoing, Progressing     Problem: Suicide Risk  Goal: Absence of Self-Harm  Intervention: Promote Psychosocial Wellbeing  Recent Flowsheet Documentation  Taken 7/24/2022 1200 by Connie Handley  Family/Support System Care:    self-care encouraged    support provided     Problem: Suicidal Behavior  Goal: Suicidal Behavior is Absent or Managed  Outcome: Ongoing, Progressing   Goal Outcome Evaluation:    Plan of Care Reviewed With: patient

## 2022-07-24 NOTE — PLAN OF CARE
Face to face end of shift report will be communicated to oncoming RN.    Problem: Behavioral Health Plan of Care  Goal: Patient-Specific Goal (Individualization)  Description: Patient will report at least 6-8 hours of sleep each night.   Patient will complete all ADL's independently while on the unit.   Patient will be compliant with treatment team recommendations.   Outcome: Ongoing, Progressing     Problem: Thought Process Alteration  Goal: Optimal Thought Clarity  Description: Patient will hold reality based conversations while on the unit.     Outcome: Ongoing, Progressing     Problem: Suicide Risk  Goal: Absence of Self-Harm  Outcome: Ongoing, Progressing     Problem: Suicidal Behavior  Goal: Suicidal Behavior is Absent or Managed  Outcome: Ongoing, Progressing   Goal Outcome Evaluation:  Face to face end of shift report obtained from TRAE [place]. Pt observed resting in bed.  Pt appears to be sleeping in bed with eyes closed, having regular respirations, and position changes. 15 minutes and PRN safety checks completed with no noted complains. No self harm, delusional comments, or bizarre behaviors noted or reported so far this shift.   0600-Pt appeared to had slept all night.

## 2022-07-24 NOTE — PROGRESS NOTES
"[facility] PSYCHIATRY  PROGRESS NOTE     SUBJECTIVE     Steven is resting in bed when I see him today. It appears that he showered this morning, appears less disheveled than admission. He continues to eat well at meals. He states that he is \"okay\" today. He does engage in more spontaneous conversation today. He tells me that he has two things that he needs to talk to me about and then goes on to explain what these things are. Yesterday he had just been replying with one word answers. Ativan does seem to be more effective than the Klonopin in this regard. He does ask about increasing the Clozaril again tonight, which we agree to do. Patient denies any constipation, dizziness, or sedation. He denies any suicidal thoughts, denies any hallucinations. He states that he is sleeping \"fine\". Has been compliant with all medications.     MEDICATIONS   Scheduled Meds:    cloZAPine  50 mg Oral At Bedtime     lithium  900 mg Oral At Bedtime     LORazepam  1 mg Oral TID     memantine  10 mg Oral BID     multivitamin w/minerals  1 tablet Oral Daily     pregabalin  100 mg Oral TID     vitamin D2  50,000 Units Oral Q7 Days     PRN Meds:.acetaminophen, alum & mag hydroxide-simethicone, clonazePAM, hydrOXYzine, OLANZapine **OR** OLANZapine, sulindac     ALLERGIES   Allergies   Allergen Reactions     Pork Allergy         MENTAL STATUS EXAM   Vitals: /62 (BP Location: Right arm)   Pulse 90   Temp 97.5  F (36.4  C) (Temporal)   Resp 18   Wt 77.9 kg (171 lb 11.2 oz)   SpO2 99%   BMI 23.29 kg/m      Appearance:  Has showered though hair and beard are scraggly and long. Awake, alert, dressed in hospital scrubs  Attitude:  cooperative and guarded  Eye Contact: little better today  Mood:  restricted  Affect:  intensity is flat  Speech:  Paucity of speech, monotone, soft spoken  Psychomotor Behavior:  no evidence of tardive dyskinesia, dystonia, or tics  Thought Process:  Difficult to assess, some thought blocking noted, " concrete  Associations: none noted  Thought Content:  no evidence of suicidal ideation or homicidal ideation and no evidence of psychotic thought, denies hallucinations  Insight:  limited  Judgment:  limited  Oriented to:  time, person, and place  Attention Span and Concentration:  limited  Recent and Remote Memory:  limited  Fund of Knowledge: delayed  Muscle Strength and Tone: normal  Gait and Station: not observed, resting in bed       LABS   No results found for this or any previous visit (from the past 24 hour(s)).      IMPRESSION     This is a 31 year old male with a PMH of traumatic brain injury with coma and significant brain damage as evidenced in MRI as well as multiple episodes of catatonia which can lead to food refusal.  He appears to have lost a very large amount of weight since his last hospitalization and is cachectic though his labs are unremarkable.  It is unclear when the lorazepam was discontinued and why it was discontinued.  He does state he feels Klonopin to be more beneficial.  It is imperative we restart benzodiazepines due to his catatonia as his food intake is minimal and he has lost a large amount of weight.  He also has unprovoked aggression at times likely due to catatonia versus psychosis.  I did speak with him about starting clozapine for psychosis/catatonia.  I explained that it was an antipsychotic that required weekly white blood cell count to monitor for side effects of neutropenia and explained what this was.  He did sign the neuroleptic consent.  He does admit that he would like to feel better and that he feels extremely flat and emotionless.       DIAGNOSES     1. Schizoaffective disorder, depressive type, multiple episodes, currently in acute episode, with catatonia   2. 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 Multiple other TBIs  3. 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 Methamphetamine use  disorder, moderate, early remission  5. Alcohol use disorder, moderate  6. Severe malnutrition       PLAN     Location: Unit 5  Legal Status: Voluntary    Safety Assessment:    Behavioral Orders   Procedures     Code 1 - Restrict to Unit     Routine Programming     As clinically indicated     Status 15     Every 15 minutes.      PTA medications continued/changed:     -Stopped Wellbutrin due to starting Clozaril. Both decrease seizure threshhold  -Stopped Abilify (not effective)    -Namenda 10 mg BID  -Lyrica 100 mg TID  -Clonazepam restarted at 1 mg TID- switched to Lorazepam 1 mg TID on 7/23    New medications tried and stopped:     -None    New medications initiated:     -Clozapine 12.5 mg at bedtime on 7/19. Increase to 25 mg at bedtime on 7/20, 50 mg on 7/23, 75 mg on 7/24  -Start Vitamin D2 50,000 units weekly x 8 weeks    Today's Changes:  -Please check orthostatic vitals daily  -Increase Clozapine as tolerated. Monitor closely for oversedation given concurrent use of benzos. Currently scheduled 5 hours after last benzo dose.   -SW to check on [employer] funding      Programming: Patient will be treated in a therapeutic milieu with appropriate individual and group therapies. Education will be provided on diagnoses, medications, and treatments.     Medical diagnoses:  Per medicine. Monitoring electrolytes for refeeding syndrome.    Consult: Nutrition  Tests: Lithium level on 7/19 was 0.6, weekly CBC.     Anticipated LOS: likely 3-4 weeks for stabilization  Disposition: TBI GH, IRTS, MSHS ?       ATTESTATION      Christelle Contreras NP

## 2022-07-25 LAB
BASOPHILS # BLD AUTO: 0 10E3/UL (ref 0–0.2)
BASOPHILS NFR BLD AUTO: 1 %
EOSINOPHIL # BLD AUTO: 0.2 10E3/UL (ref 0–0.7)
EOSINOPHIL NFR BLD AUTO: 3 %
ERYTHROCYTE [DISTWIDTH] IN BLOOD BY AUTOMATED COUNT: 13.1 % (ref 10–15)
GLUCOSE BLDC GLUCOMTR-MCNC: 101 MG/DL (ref 70–99)
HCT VFR BLD AUTO: 50.8 % (ref 40–53)
HGB BLD-MCNC: 15.8 G/DL (ref 13.3–17.7)
HOLD SPECIMEN: NORMAL
IMM GRANULOCYTES # BLD: 0.1 10E3/UL
IMM GRANULOCYTES NFR BLD: 1 %
LYMPHOCYTES # BLD AUTO: 2.3 10E3/UL (ref 0.8–5.3)
LYMPHOCYTES NFR BLD AUTO: 31 %
MCH RBC QN AUTO: 29.3 PG (ref 26.5–33)
MCHC RBC AUTO-ENTMCNC: 31.1 G/DL (ref 31.5–36.5)
MCV RBC AUTO: 94 FL (ref 78–100)
MONOCYTES # BLD AUTO: 0.5 10E3/UL (ref 0–1.3)
MONOCYTES NFR BLD AUTO: 7 %
NEUTROPHILS # BLD AUTO: 4.3 10E3/UL (ref 1.6–8.3)
NEUTROPHILS NFR BLD AUTO: 57 %
NRBC # BLD AUTO: 0 10E3/UL
NRBC BLD AUTO-RTO: 0 /100
PLATELET # BLD AUTO: 234 10E3/UL (ref 150–450)
RBC # BLD AUTO: 5.39 10E6/UL (ref 4.4–5.9)
WBC # BLD AUTO: 7.4 10E3/UL (ref 4–11)

## 2022-07-25 PROCEDURE — 124N000001 HC R&B MH

## 2022-07-25 PROCEDURE — 250N000013 HC RX MED GY IP 250 OP 250 PS 637: Performed by: NURSE PRACTITIONER

## 2022-07-25 PROCEDURE — 99232 SBSQ HOSP IP/OBS MODERATE 35: CPT | Performed by: NURSE PRACTITIONER

## 2022-07-25 PROCEDURE — 36415 COLL VENOUS BLD VENIPUNCTURE: CPT | Performed by: NURSE PRACTITIONER

## 2022-07-25 PROCEDURE — 85025 COMPLETE CBC W/AUTO DIFF WBC: CPT | Performed by: NURSE PRACTITIONER

## 2022-07-25 RX ADMIN — LORAZEPAM 1 MG: 1 TABLET ORAL at 09:17

## 2022-07-25 RX ADMIN — PREGABALIN 100 MG: 25 CAPSULE ORAL at 20:07

## 2022-07-25 RX ADMIN — PREGABALIN 100 MG: 25 CAPSULE ORAL at 14:12

## 2022-07-25 RX ADMIN — LORAZEPAM 1 MG: 1 TABLET ORAL at 17:27

## 2022-07-25 RX ADMIN — MEMANTINE 10 MG: 10 TABLET ORAL at 20:07

## 2022-07-25 RX ADMIN — CLOZAPINE 75 MG: 25 TABLET ORAL at 20:07

## 2022-07-25 RX ADMIN — PREGABALIN 100 MG: 25 CAPSULE ORAL at 08:20

## 2022-07-25 RX ADMIN — LITHIUM CARBONATE 900 MG: 450 TABLET, EXTENDED RELEASE ORAL at 20:07

## 2022-07-25 RX ADMIN — MULTIPLE VITAMINS W/ MINERALS TAB 1 TABLET: TAB at 08:20

## 2022-07-25 RX ADMIN — MEMANTINE 10 MG: 10 TABLET ORAL at 08:20

## 2022-07-25 RX ADMIN — LORAZEPAM 1 MG: 1 TABLET ORAL at 14:15

## 2022-07-25 ASSESSMENT — ACTIVITIES OF DAILY LIVING (ADL)
ADLS_ACUITY_SCORE: 42
ADLS_ACUITY_SCORE: 42
ADLS_ACUITY_SCORE: 52
HYGIENE/GROOMING: INDEPENDENT
DRESS: INDEPENDENT;SCRUBS (BEHAVIORAL HEALTH)
LAUNDRY: UNABLE TO COMPLETE
ADLS_ACUITY_SCORE: 42
DRESS: INDEPENDENT;SCRUBS (BEHAVIORAL HEALTH)
ADLS_ACUITY_SCORE: 42
ADLS_ACUITY_SCORE: 52
ORAL_HYGIENE: INDEPENDENT
ADLS_ACUITY_SCORE: 42
ADLS_ACUITY_SCORE: 42
HYGIENE/GROOMING: INDEPENDENT
ORAL_HYGIENE: INDEPENDENT
ADLS_ACUITY_SCORE: 52
ADLS_ACUITY_SCORE: 52

## 2022-07-25 NOTE — PROGRESS NOTES
"[facility] PSYCHIATRY  PROGRESS NOTE     SUBJECTIVE     Steven is resting in bed when I see him today. Discussed the Clozapine, which had increased last night. He notes that he woke up last night and felt really dizzy when he got up. Blood glucose was checked and unremarkable. Orthostatics were checked with 20 point drop in systolic upon standing. Did discuss getting up slowly, especially at night. He does state that he has been drinking fluids, appetite has been good. Has gained about 10 pounds since admission. He denies any hallucinations, denies SI. Denies anxiety and reports \"a little\" depression. We agree to wait one more day before further increasing Clozapine.      MEDICATIONS   Scheduled Meds:    cloZAPine  75 mg Oral At Bedtime     lithium  900 mg Oral At Bedtime     LORazepam  1 mg Oral TID     memantine  10 mg Oral BID     multivitamin w/minerals  1 tablet Oral Daily     pregabalin  100 mg Oral TID     vitamin D2  50,000 Units Oral Q7 Days     PRN Meds:.acetaminophen, alum & mag hydroxide-simethicone, docusate sodium, hydrOXYzine, OLANZapine **OR** OLANZapine, sulindac     ALLERGIES   Allergies   Allergen Reactions     Pork Allergy         MENTAL STATUS EXAM   Vitals: /62   Pulse 102   Temp 97.9  F (36.6  C) (Temporal)   Resp 16   Wt 77.9 kg (171 lb 11.2 oz)   SpO2 98%   BMI 23.29 kg/m      Appearance:  Has showered though hair and beard are scraggly and long. Awake, alert, dressed in hospital scrubs  Attitude:  cooperative and guarded  Eye Contact: little better today  Mood:  restricted  Affect:  intensity is flat  Speech: monotone, soft spoken, engaging in more conversation  Psychomotor Behavior:  no evidence of tardive dyskinesia, dystonia, or tics  Thought Process:  Difficult to assess, some thought blocking noted, concrete  Associations: none noted  Thought Content:  no evidence of suicidal ideation or homicidal ideation and no evidence of psychotic thought, denies " hallucinations  Insight:  limited  Judgment:  limited  Oriented to:  time, person, and place  Attention Span and Concentration:  limited  Recent and Remote Memory:  limited  Fund of Knowledge: delayed  Muscle Strength and Tone: normal  Gait and Station: not observed, resting in bed       LABS   Recent Results (from the past 24 hour(s))   Glucose by meter    Collection Time: 07/25/22 12:53 AM   Result Value Ref Range    GLUCOSE BY METER POCT 101 (H) 70 - 99 mg/dL   CBC with platelets and differential    Collection Time: 07/25/22  8:41 AM   Result Value Ref Range    WBC Count 7.4 4.0 - 11.0 10e3/uL    RBC Count 5.39 4.40 - 5.90 10e6/uL    Hemoglobin 15.8 13.3 - 17.7 g/dL    Hematocrit 50.8 40.0 - 53.0 %    MCV 94 78 - 100 fL    MCH 29.3 26.5 - 33.0 pg    MCHC 31.1 (L) 31.5 - 36.5 g/dL    RDW 13.1 10.0 - 15.0 %    Platelet Count 234 150 - 450 10e3/uL    % Neutrophils 57 %    % Lymphocytes 31 %    % Monocytes 7 %    % Eosinophils 3 %    % Basophils 1 %    % Immature Granulocytes 1 %    NRBCs per 100 WBC 0 <1 /100    Absolute Neutrophils 4.3 1.6 - 8.3 10e3/uL    Absolute Lymphocytes 2.3 0.8 - 5.3 10e3/uL    Absolute Monocytes 0.5 0.0 - 1.3 10e3/uL    Absolute Eosinophils 0.2 0.0 - 0.7 10e3/uL    Absolute Basophils 0.0 0.0 - 0.2 10e3/uL    Absolute Immature Granulocytes 0.1 <=0.4 10e3/uL    Absolute NRBCs 0.0 10e3/uL   Extra Red Top Tube    Collection Time: 07/25/22  8:45 AM   Result Value Ref Range    Hold Specimen JIC          IMPRESSION     This is a 31 year old male with a PMH of traumatic brain injury with coma and significant brain damage as evidenced in MRI as well as multiple episodes of catatonia which can lead to food refusal.  He appears to have lost a very large amount of weight since his last hospitalization and is cachectic though his labs are unremarkable.  It is unclear when the lorazepam was discontinued and why it was discontinued.  He does state he feels Klonopin to be more beneficial.  It is imperative  we restart benzodiazepines due to his catatonia as his food intake is minimal and he has lost a large amount of weight.  He also has unprovoked aggression at times likely due to catatonia versus psychosis.  I did speak with him about starting clozapine for psychosis/catatonia.  I explained that it was an antipsychotic that required weekly white blood cell count to monitor for side effects of neutropenia and explained what this was.  He did sign the neuroleptic consent.  He does admit that he would like to feel better and that he feels extremely flat and emotionless.       DIAGNOSES     1. Schizoaffective disorder, depressive type, multiple episodes, currently in acute episode, with catatonia   2. 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 Multiple other TBIs  3. 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 Methamphetamine use disorder, moderate, early remission  5. Alcohol use disorder, moderate  6. Severe malnutrition       PLAN     Location: Unit   Legal Status: Voluntary    Safety Assessment:    Behavioral Orders   Procedures     Code 1 - Restrict to Unit     Routine Programming     As clinically indicated     Status 15     Every 15 minutes.      PTA medications continued/changed:     -Stopped Wellbutrin due to starting Clozaril. Both decrease seizure threshhold  -Stopped Abilify (not effective)    -Namenda 10 mg BID  -Lyrica 100 mg TID  -Clonazepam restarted at 1 mg TID- switched to Lorazepam 1 mg TID on 7/23    New medications tried and stopped:     -None    New medications initiated:     -Clozapine 12.5 mg at bedtime on 7/19. Increase to 25 mg at bedtime on 7/20, 50 mg on 7/23, 75 mg on 7/24  -Start Vitamin D2 50,000 units weekly x 8 weeks- Sundays    Today's Changes:  -Please check orthostatic vitals daily  -Encourage fluids  -Increase Clozapine as tolerated. Monitor closely for oversedation given concurrent use of benzos. Currently  scheduled 5 hours after last benzo dose.   -SW to check on [employer] funding       Programming: Patient will be treated in a therapeutic milieu with appropriate individual and group therapies. Education will be provided on diagnoses, medications, and treatments.     Medical diagnoses:  Per medicine. Monitoring electrolytes for refeeding syndrome.    Consult: Nutrition  Tests: Lithium level on 7/19 was 0.6, weekly CBC.     Anticipated LOS: likely 3-4 weeks for stabilization  Disposition: TBI GH, IRTS, MSHS ?        ATTESTATION      Christelle Contreras NP

## 2022-07-25 NOTE — PLAN OF CARE
Problem: Behavioral Health Plan of Care  Goal: Patient-Specific Goal (Individualization)  Description: Patient will report at least 6-8 hours of sleep each night.   Patient will complete all ADL's independently while on the unit.   Patient will be compliant with treatment team recommendations.     Pt will eat at least 50% of meals and have enough fluid intake.  Outcome: Ongoing, Progressing     Problem: Thought Process Alteration  Goal: Optimal Thought Clarity  Description: Patient will hold reality based conversations while on the unit.     Outcome: Ongoing, Progressing     Problem: Suicide Risk  Goal: Absence of Self-Harm  Outcome: Ongoing, Progressing     Problem: Suicidal Behavior  Goal: Suicidal Behavior is Absent or Managed  Outcome: Ongoing, Progressing   Goal Outcome Evaluation:    Plan of Care Reviewed With: patient        Pt. Was up and ate supper in dayroom, compliant with treatment team recommendations, taking prescribed medications, answers questions, but does not initiate conversation, denies suicidal ideation, hallucinations and pain, endorses mild depression and anxiety, is able to make needs be known, is independent with ADL's, affect is flat and blunted, will continue to monitor progress.    Face to face end of shift report will be communicated to oncoming night shift RN.     Farida Saenz RN  7/25/2022  5:59 PM

## 2022-07-25 NOTE — PLAN OF CARE
Problem: Thought Process Alteration  Goal: Optimal Thought Clarity  Description: Patient will hold reality based conversations while on the unit.     7/25/2022 0906 by Joon Funk RN  Outcome: Ongoing, Progressing    Pt is able to have conversation that is concrete but organized     Problem: Behavioral Health Plan of Care  Goal: Patient-Specific Goal (Individualization)  Description: Patient will report at least 6-8 hours of sleep each night.   Patient will complete all ADL's independently while on the unit.   Patient will be compliant with treatment team recommendations.     Pt will eat at least 50% of meals and have enough fluid intake.  7/25/2022 0906 by Joon Funk RN  Outcome: Ongoing, Progressing    0730 Face to face rounding complete.  Pt introduced to nursing for the shift.    Pt was up for meals today and spent some time in the dayroom watching TV. He told me that he took his medications every once in a while at [employer]. He denied getting psychotic as a reason for not eating.  He says that he was just dieting.  Pt's affect is very flat.  Pt had low BP's this morning and again this afternoon and his Ativan was given late until he had more normal BP's.  [facility] but is open to other options if he can't.    1500 Face to face end of shift report communicated to evening shift RN's along with Pt's fall risk.     Joon Funk RN  7/25/2022

## 2022-07-25 NOTE — PLAN OF CARE
Face to face end of shift report received from Nursing student Connie. Rounding completed and patient observed sitting in the lounge. No requests at this time.       Goal Outcome Evaluation: Patient appeared calm. He was cooperative with taking meds. He denied all criteria and denied hallucinations. He attended one group. He showered and washed his hair. He still appears disheveled. He made fleeting eye contact. He denied pain. He ate well. He has gained 9 lbs since admit. Patient's affect is still flat.      Face to face end of shift report communicated to oncoming RN.     Face to face end of shift report communicated to  Problem: Behavioral Health Plan of Care  Goal: Patient-Specific Goal (Individualization)  Description: Patient will report at least 6-8 hours of sleep each night.   Patient will complete all ADL's independently while on the unit.   Patient will be compliant with treatment team recommendations.   Outcome: Ongoing, Progressing     Problem: Thought Process Alteration  Goal: Optimal Thought Clarity  Description: Patient will hold reality based conversations while on the unit.      Outcome: Ongoing, Progressing     Problem: Suicide Risk  Goal: Absence of Self-Harm  Outcome: Ongoing, ProgressingGoal Outcome Evaluation:

## 2022-07-25 NOTE — PHARMACY
Pharmacy Clozapine Continuation Note    Patient's Name: Steven Carter  Patient's : 1990    Current cloZAPine regimen: 75 mg at bedtime  Has there been a known interruption in therapy for greater than/equal to 48 hours which would warrant retitration No    Recent ANC Value(s) for last 30 days:  2022: Absolute Neutrophils 4.0 10e3/uL (Ref range: 1.6 - 8.3 10e3/uL)  2022: Absolute Neutrophils 4.3 10e3/uL (Ref range: 1.6 - 8.3 10e3/uL)    A REMS Dispense Authorization was obtained from the clozapine REMS prgram? Yes   A Dispense Rationale was needed to obtain the REMS Dispense Authorization? No   Is the ANC (if available) within recommended limits? Yes     Does the patient have any signs or symptoms of infection, including fever? No    REMS Patient ID: [id number]  Patient RDA: [id number]    Plan:  1. Continue clozapine therapy at 75 mg PO at bedtime.  2. A WBC with differential will be ordered at least weekly, NEXT DUE 2022. ANC values will be entered into the REMS program.    Angela Washington [facility]

## 2022-07-26 PROCEDURE — 250N000013 HC RX MED GY IP 250 OP 250 PS 637: Performed by: NURSE PRACTITIONER

## 2022-07-26 PROCEDURE — 99232 SBSQ HOSP IP/OBS MODERATE 35: CPT | Performed by: NURSE PRACTITIONER

## 2022-07-26 PROCEDURE — 124N000001 HC R&B MH

## 2022-07-26 RX ORDER — CLOZAPINE 100 MG/1
100 TABLET ORAL AT BEDTIME
Status: DISCONTINUED | OUTPATIENT
Start: 2022-07-26 | End: 2022-07-28

## 2022-07-26 RX ADMIN — LITHIUM CARBONATE 900 MG: 450 TABLET, EXTENDED RELEASE ORAL at 20:11

## 2022-07-26 RX ADMIN — MULTIPLE VITAMINS W/ MINERALS TAB 1 TABLET: TAB at 08:12

## 2022-07-26 RX ADMIN — PREGABALIN 100 MG: 25 CAPSULE ORAL at 08:12

## 2022-07-26 RX ADMIN — LORAZEPAM 1 MG: 1 TABLET ORAL at 15:50

## 2022-07-26 RX ADMIN — MEMANTINE 10 MG: 10 TABLET ORAL at 08:12

## 2022-07-26 RX ADMIN — CLOZAPINE 100 MG: 100 TABLET ORAL at 20:11

## 2022-07-26 RX ADMIN — LORAZEPAM 1 MG: 1 TABLET ORAL at 12:05

## 2022-07-26 RX ADMIN — PREGABALIN 100 MG: 25 CAPSULE ORAL at 14:01

## 2022-07-26 RX ADMIN — MEMANTINE 10 MG: 10 TABLET ORAL at 20:11

## 2022-07-26 RX ADMIN — PREGABALIN 100 MG: 25 CAPSULE ORAL at 20:11

## 2022-07-26 RX ADMIN — LORAZEPAM 1 MG: 1 TABLET ORAL at 06:30

## 2022-07-26 ASSESSMENT — ACTIVITIES OF DAILY LIVING (ADL)
ORAL_HYGIENE: INDEPENDENT
ADLS_ACUITY_SCORE: 42
ADLS_ACUITY_SCORE: 42
DRESS: SCRUBS (BEHAVIORAL HEALTH)
ADLS_ACUITY_SCORE: 42
HYGIENE/GROOMING: INDEPENDENT
ADLS_ACUITY_SCORE: 42
ADLS_ACUITY_SCORE: 42
LAUNDRY: UNABLE TO COMPLETE
ADLS_ACUITY_SCORE: 42
DRESS: SCRUBS (BEHAVIORAL HEALTH)
ADLS_ACUITY_SCORE: 42
ADLS_ACUITY_SCORE: 42
ORAL_HYGIENE: INDEPENDENT
ADLS_ACUITY_SCORE: 42
ADLS_ACUITY_SCORE: 42
HYGIENE/GROOMING: INDEPENDENT
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
## 2022-07-26 NOTE — PLAN OF CARE
Problem: Behavioral Health Plan of Care  Goal: Patient-Specific Goal (Individualization)  Description: Patient will report at least 6-8 hours of sleep each night.   Patient will complete all ADL's independently while on the unit.   Patient will be compliant with treatment team recommendations.     Pt will eat at least 50% of meals and have enough fluid intake.  Outcome: Ongoing, Progressing     Problem: Thought Process Alteration  Goal: Optimal Thought Clarity  Description: Patient will hold reality based conversations while on the unit.     Outcome: Ongoing, Progressing     Problem: Suicide Risk  Goal: Absence of Self-Harm  Outcome: Ongoing, Progressing     Problem: Suicidal Behavior  Goal: Suicidal Behavior is Absent or Managed  Outcome: Ongoing, Progressing   Goal Outcome Evaluation:    Plan of Care Reviewed With: patient      Pt. Has been up and about on unit, spending time in dayroom watching tv, is cooperative with taking prescribed medications and with treatment team recommendations, endorses mild depression, denies suicidal ideation and hallucinations, is independent with ADL's, slept 6.5 hours last night, eating at least 75% of meals, will continue to monitor progress.    Face to face end of shift report will be communicated to oncoming night shift RN.     Farida Saenz RN  7/26/2022  6:04 PM

## 2022-07-26 NOTE — PROGRESS NOTES
"[facility] PSYCHIATRY  PROGRESS NOTE     SUBJECTIVE     Steven is up in the lounge when I see him today. He does not initiate conversation though does answer questions asked of him. He has attended a few groups today. Affect is flat, patient reports feeling \"okay\". Sleeping well. He denies any dizziness last night or today. Will increase Clozapine to 100 mg at bedtime, continue to monitor orthostatics. BP better today. Has been compliant with all medications. His appetite has been good, eating 100% of meals. Showered today though still appears disheveled.     MEDICATIONS   Scheduled Meds:    cloZAPine  100 mg Oral At Bedtime     lithium  900 mg Oral At Bedtime     LORazepam  1 mg Oral TID     memantine  10 mg Oral BID     multivitamin w/minerals  1 tablet Oral Daily     pregabalin  100 mg Oral TID     vitamin D2  50,000 Units Oral Q7 Days     PRN Meds:.acetaminophen, alum & mag hydroxide-simethicone, docusate sodium, hydrOXYzine, OLANZapine **OR** OLANZapine, sulindac     ALLERGIES   Allergies   Allergen Reactions     Pork Allergy         MENTAL STATUS EXAM   Vitals: /76 (BP Location: Left arm)   Pulse 95   Temp 98.6  F (37  C) (Temporal)   Resp 16   Wt 77.9 kg (171 lb 11.2 oz)   SpO2 95%   BMI 23.29 kg/m      Appearance:  Has showered though hair and beard are scraggly and long. Awake, alert, dressed in hospital scrubs  Attitude:  cooperative and guarded  Eye Contact: limited  Mood:  restricted  Affect:  intensity is flat  Speech: monotone, soft spoken, engaging in more conversation  Psychomotor Behavior:  no evidence of tardive dyskinesia, dystonia, or tics  Thought Process:  Seems linear, concrete  Associations: none noted  Thought Content:  no evidence of suicidal ideation or homicidal ideation and no evidence of psychotic thought, denies hallucinations  Insight:  limited  Judgment:  limited  Oriented to:  time, person, and place  Attention Span and Concentration:  limited  Recent and Remote " Memory:  limited  Fund of Knowledge: delayed  Muscle Strength and Tone: normal  Gait and Station: not observed, resting in bed       LABS   No results found for this or any previous visit (from the past 24 hour(s)).      IMPRESSION     This is a 31 year old male with a PMH of traumatic brain injury with coma and significant brain damage as evidenced in MRI as well as multiple episodes of catatonia which can lead to food refusal.  He appears to have lost a very large amount of weight since his last hospitalization and is cachectic though his labs are unremarkable.  It is unclear when the lorazepam was discontinued and why it was discontinued.  He does state he feels Klonopin to be more beneficial.  It is imperative we restart benzodiazepines due to his catatonia as his food intake is minimal and he has lost a large amount of weight.  He also has unprovoked aggression at times likely due to catatonia versus psychosis.  I did speak with him about starting clozapine for psychosis/catatonia.  I explained that it was an antipsychotic that required weekly white blood cell count to monitor for side effects of neutropenia and explained what this was.  He did sign the neuroleptic consent.  He does admit that he would like to feel better and that he feels extremely flat and emotionless.       DIAGNOSES     1. Schizoaffective disorder, depressive type, multiple episodes, currently in acute episode, with catatonia   2. 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 Multiple other TBIs  3. 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 Methamphetamine use disorder, moderate, early remission  5. Alcohol use disorder, moderate  6. Severe malnutrition       PLAN     Location: Unit 5  Legal Status: Voluntary    Safety Assessment:    Behavioral Orders   Procedures     Code 1 - Restrict to Unit     Routine Programming     As clinically indicated     Status 15     Every 15  minutes.      PTA medications continued/changed:     -Stopped Wellbutrin due to starting Clozaril. Both decrease seizure threshhold  -Stopped Abilify (not effective)    -Namenda 10 mg BID  -Lyrica 100 mg TID  -Clonazepam restarted at 1 mg TID- switched to Lorazepam 1 mg TID on 7/23    New medications tried and stopped:     -None    New medications initiated:     -Clozapine 12.5 mg at bedtime on 7/19.-> Increase to 25 mg at bedtime on 7/20 -> 50 mg on 7/23 -> 75 mg on 7/24 ->100 mg on 7/26  -Start Vitamin D2 50,000 units weekly x 8 weeks- Sundays    Today's Changes:  -Please check orthostatic vitals daily  -Encourage fluids  -Increase Clozapine as tolerated. Monitor closely for oversedation given concurrent use of benzos. Currently scheduled 5 hours after last benzo dose.       Programming: Patient will be treated in a therapeutic milieu with appropriate individual and group therapies. Education will be provided on diagnoses, medications, and treatments.     Medical diagnoses:  Per medicine. Monitoring electrolytes for refeeding syndrome.    Consult: Nutrition  Tests: Lithium level on 7/19 was 0.6, weekly CBC.     Anticipated LOS: likely 3-4 weeks for stabilization  Disposition: TBI GH, B&L?       ATTESTATION      Christelle Contreras NP       TREATMENT TEAM CARE PLAN     Progress: Continued symptoms.     Continued Stay Criteria/Rationale: Continued symptoms without sufficient improvement/resolution. Titrating Clozapine.    Medical/Physical: See above.    Precautions: See above.     Plan: Continue inpatient care with unit support and medication management.    Rationale for change in precautions or plan: NA due to no change.    Participants: Christelle Contreras NP, Nursing, SW, OT.    The patient's care was discussed with the treatment team and chart notes were reviewed.

## 2022-07-26 NOTE — PLAN OF CARE
Problem: Thought Process Alteration  Goal: Optimal Thought Clarity  Description: Patient will hold reality based conversations while on the unit.     Outcome: Ongoing, Progressing     Patient is bale to have a linear, reality based conversation with staff.  No delusional or paranoid statements noted.       Problem: Behavioral Health Plan of Care  Goal: Patient-Specific Goal (Individualization)  Description: Patient will report at least 6-8 hours of sleep each night.   Patient will complete all ADL's independently while on the unit.   Patient will be compliant with treatment team recommendations.     Pt will eat at least 50% of meals and have enough fluid intake.  Outcome: Ongoing, Progressing  Patient has been calm, cooperative, and medication complaint this shift.  He attended a few groups this morning and was out in the lounge much of the day.  Ate 100% of meals.  Affect is blunted and flat but he answers questions appropriately.  No complaints of pain.  VS WNL.  Orthostatic BP completed  and are also WNL.  Face to face end of shift report communicated to evening shift RN.     Shantal Real RN  7/26/2022  2:24 PM

## 2022-07-26 NOTE — PLAN OF CARE
Problem: Behavioral Health Plan of Care  Goal: Patient-Specific Goal (Individualization)  Description: Patient will report at least 6-8 hours of sleep each night.   Patient will complete all ADL's independently while on the unit.   Patient will be compliant with treatment team recommendations.     Pt will eat at least 50% of meals and have enough fluid intake.  Outcome: Ongoing, Progressing   Goal Outcome Evaluation:    Face to face shift report received from RN. Rounding completed, pt observed..Client rested in room for 7 hours with eyes closed and respirations noted. No signs of distress. Face to face report will be communicated to oncoming RN.    Didier Brooks RN  7/26/2022  6:24 AM

## 2022-07-27 PROCEDURE — 250N000013 HC RX MED GY IP 250 OP 250 PS 637: Performed by: NURSE PRACTITIONER

## 2022-07-27 PROCEDURE — 99233 SBSQ HOSP IP/OBS HIGH 50: CPT | Performed by: NURSE PRACTITIONER

## 2022-07-27 PROCEDURE — 124N000001 HC R&B MH

## 2022-07-27 RX ORDER — CHOLECALCIFEROL (VITAMIN D3) 25 MCG
1 TABLET ORAL EVERY MORNING
Status: ON HOLD | COMMUNITY
Start: 2022-07-01 | End: 2022-09-01

## 2022-07-27 RX ORDER — BUPROPION HYDROCHLORIDE 300 MG/1
300 TABLET ORAL EVERY MORNING
Status: ON HOLD | COMMUNITY
Start: 2022-07-06 | End: 2022-09-01

## 2022-07-27 RX ORDER — BUPROPION HYDROCHLORIDE 150 MG/1
150 TABLET ORAL EVERY MORNING
Status: ON HOLD | COMMUNITY
Start: 2022-07-06 | End: 2022-09-01

## 2022-07-27 RX ORDER — LORAZEPAM 1 MG/1
1 TABLET ORAL 2 TIMES DAILY
Status: DISCONTINUED | OUTPATIENT
Start: 2022-07-28 | End: 2022-08-10

## 2022-07-27 RX ORDER — PREGABALIN 75 MG/1
75 CAPSULE ORAL 3 TIMES DAILY
Status: DISCONTINUED | OUTPATIENT
Start: 2022-07-27 | End: 2022-08-05

## 2022-07-27 RX ORDER — LORAZEPAM 0.5 MG/1
0.5 TABLET ORAL DAILY
Status: DISCONTINUED | OUTPATIENT
Start: 2022-07-27 | End: 2022-08-10

## 2022-07-27 RX ADMIN — MEMANTINE 10 MG: 10 TABLET ORAL at 08:22

## 2022-07-27 RX ADMIN — MEMANTINE 10 MG: 10 TABLET ORAL at 20:24

## 2022-07-27 RX ADMIN — PREGABALIN 75 MG: 75 CAPSULE ORAL at 13:27

## 2022-07-27 RX ADMIN — PREGABALIN 100 MG: 25 CAPSULE ORAL at 08:23

## 2022-07-27 RX ADMIN — MULTIPLE VITAMINS W/ MINERALS TAB 1 TABLET: TAB at 08:22

## 2022-07-27 RX ADMIN — LITHIUM CARBONATE 750 MG: 450 TABLET, EXTENDED RELEASE ORAL at 20:23

## 2022-07-27 RX ADMIN — LORAZEPAM 1 MG: 1 TABLET ORAL at 12:02

## 2022-07-27 RX ADMIN — LORAZEPAM 0.5 MG: 0.5 TABLET ORAL at 17:05

## 2022-07-27 RX ADMIN — CLOZAPINE 100 MG: 100 TABLET ORAL at 20:23

## 2022-07-27 RX ADMIN — LORAZEPAM 1 MG: 1 TABLET ORAL at 06:17

## 2022-07-27 RX ADMIN — PREGABALIN 75 MG: 75 CAPSULE ORAL at 20:24

## 2022-07-27 ASSESSMENT — ACTIVITIES OF DAILY LIVING (ADL)
ADLS_ACUITY_SCORE: 42
ORAL_HYGIENE: INDEPENDENT
ADLS_ACUITY_SCORE: 42
ADLS_ACUITY_SCORE: 42
HYGIENE/GROOMING: INDEPENDENT
ADLS_ACUITY_SCORE: 42
ADLS_ACUITY_SCORE: 42
DRESS: SCRUBS (BEHAVIORAL HEALTH)
ADLS_ACUITY_SCORE: 42

## 2022-07-27 NOTE — PLAN OF CARE
Problem: Behavioral Health Plan of Care  Goal: Patient-Specific Goal (Individualization)  Description: Patient will report at least 6-8 hours of sleep each night.   Patient will complete all ADL's independently while on the unit.   Patient will be compliant with treatment team recommendations.     Pt will eat at least 50% of meals and have enough fluid intake.  Outcome: Ongoing, Progressing     Problem: Thought Process Alteration  Goal: Optimal Thought Clarity  Description: Patient will hold reality based conversations while on the unit.     Outcome: Ongoing, Progressing     Problem: Suicide Risk  Goal: Absence of Self-Harm  Outcome: Ongoing, Progressing     Problem: Suicidal Behavior  Goal: Suicidal Behavior is Absent or Managed  Outcome: Ongoing, Progressing   Goal Outcome Evaluation:    Plan of Care Reviewed With: patient        Pt. Has been up and about on unit, denies depression, suicidal ideation and hallucinations, taking prescribed medications, is able to make needs be known, appetite is good, eating at least 75% of meals, cooperative with treatment team recommendations, attending group therapy at beginning of shift, is independent with ADL's, will continue to monitor progress.    Face to face end of shift report will be communicated to oncoming night shift RN.     Farida Saenz RN  7/27/2022  6:08 PM

## 2022-07-27 NOTE — PLAN OF CARE
Problem: Thought Process Alteration  Goal: Optimal Thought Clarity  Description: Patient will hold reality based conversations while on the unit.     Outcome: Ongoing, Progressing    Pt's thinking is mostly logical except for his belief of dieting for his weight loss     Problem: Behavioral Health Plan of Care  Goal: Patient-Specific Goal (Individualization)  Description: Patient will report at least 6-8 hours of sleep each night.   Patient will complete all ADL's independently while on the unit.   Patient will be compliant with treatment team recommendations.     Pt will eat at least 50% of meals and have enough fluid intake.  Outcome: Ongoing, Progressing  Flowsheets (Taken 7/27/2022 1303)  Patient Vulnerabilities:    history of unsuccessful treatment    lacks insight into illness    poor impulse control    poor physical health    limited social skills   Goal Outcome Evaluation:    Plan of Care Reviewed With: patient     0730 Face to face rounding complete.  Pt introduced to nursing for the shift.    Pt has been up and active this shift and attended some of the groups. He has a fairly bright affect and is hopeful about going to some where like [facility]. He denied having any hallucinations and does not appear preoccupied or have any catatonic states.  Pt does believe that he wears 3X scrubs when he is really 1X.     1500 Face to face end of shift report communicated to Evening shift RN's along with Pt's fall risk..      Joon Funk RN  7/27/2022

## 2022-07-27 NOTE — PROGRESS NOTES
"[facility] PSYCHIATRY  PROGRESS NOTE     SUBJECTIVE     Steven is much easier to engage in conversation than when I last had seen him.  He is talking at a regular rate and tone and offers quite a bit in conversation in comparison to most times I have ever seen him.  His insight is still limited.  He tells me \"I did not even really need to come here.  There was nothing wrong.  I was just dieting\".  In the past when he has been off of benzodiazepines he has quit eating and stated he is \"fasting\".  He does state that he feels he needs to stay around 160 pounds though he is 6 feet tall.  He does not have a historical eating disorder or body dysmorphia that is known and I do believe there is likely more of a psychotic component to this.  He continues to state that he is willing to stay here voluntarily though is asking \"can I come off of the Ativan and Clozaril now\".  He tells me he wants to come off of them because he is getting too dizzy at night when he is standing up. I did tell him that I would not want to stop the clozaril or ativan. Explained that he appears much better today than I have seen him I did tell him I would lower the nighttime Ativan and make sure the dose of Ativan was hours apart from his clozapine dose to prevent the dizziness and he did agree to this.  He also signed a release so I could speak with his mother.  He states he has been trying to call her and has been unable to get a hold of her.     MEDICATIONS   Scheduled Meds:    cloZAPine  100 mg Oral At Bedtime     lithium  750 mg Oral At Bedtime     LORazepam  0.5 mg Oral Daily     [START ON 7/28/2022] LORazepam  1 mg Oral BID     memantine  10 mg Oral BID     multivitamin w/minerals  1 tablet Oral Daily     pregabalin  75 mg Oral TID     vitamin D2  50,000 Units Oral Q7 Days     PRN Meds:.acetaminophen, alum & mag hydroxide-simethicone, docusate sodium, hydrOXYzine, OLANZapine **OR** OLANZapine, sulindac     ALLERGIES   Allergies   Allergen " Reactions     Pork Allergy         MENTAL STATUS EXAM   Vitals: BP 95/63 (BP Location: Right arm)   Pulse 89   Temp 98  F (36.7  C) (Temporal)   Resp 16   Wt 77.9 kg (171 lb 11.2 oz)   SpO2 98%   BMI 23.29 kg/m      Appearance:  Has showered though hair and beard are scraggly and long. Awake, alert, dressed in hospital scrubs  Attitude:  cooperative and guarded  Eye Contact: Improving  Mood:  restricted  Affect:  intensity is flat  Speech: monotone, soft spoken, engaging in more conversation  Psychomotor Behavior:  no evidence of tardive dyskinesia, dystonia, or tics does have significant tremor likely lithium induced  Thought Process:  Seems linear, concrete  Associations: none noted  Thought Content:  no evidence of suicidal ideation or homicidal ideation and no evidence of psychotic thought, denies hallucinations  Insight:  limited  Judgment:  limited  Oriented to:  time, person, and place  Attention Span and Concentration:  limited  Recent and Remote Memory:  limited  Fund of Knowledge: delayed  Muscle Strength and Tone: normal  Gait and Station: not observed, resting in bed       LABS   No results found for this or any previous visit (from the past 24 hour(s)).      IMPRESSION     This is a 31 year old male with a PMH of traumatic brain injury with coma and significant brain damage as evidenced in MRI as well as multiple episodes of catatonia which can lead to food refusal.  He appears to have lost a very large amount of weight since his last hospitalization and is cachectic though his labs are unremarkable.  It is unclear when the lorazepam was discontinued and why it was discontinued.  He does state he feels Klonopin to be more beneficial.  It is imperative we restart benzodiazepines due to his catatonia as his food intake is minimal and he has lost a large amount of weight.  He also has unprovoked aggression at times likely due to catatonia versus psychosis.  I did speak with him about starting  clozapine for psychosis/catatonia.  I explained that it was an antipsychotic that required weekly white blood cell count to monitor for side effects of neutropenia and explained what this was.  He did sign the neuroleptic consent.  He does admit that he would like to feel better and that he feels extremely flat and emotionless.       DIAGNOSES     1. Schizoaffective disorder, depressive type, multiple episodes, currently in acute episode, with catatonia   2. 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 Multiple other TBIs  3. 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 Methamphetamine use disorder, moderate, early remission  5. Alcohol use disorder, moderate  6. Severe malnutrition       PLAN     Location: Unit 5  Legal Status: Voluntary    Safety Assessment:    Behavioral Orders   Procedures     Code 1 - Restrict to Unit     Routine Programming     As clinically indicated     Status 15     Every 15 minutes.      PTA medications continued/changed:     -Stopped Wellbutrin due to starting Clozaril. Both decrease seizure threshhold  -Stopped Abilify (not effective)    -Namenda 10 mg BID  -Lyrica 100 mg TID  -Clonazepam restarted at 1 mg TID- switched to Lorazepam 1 mg TID on 7/23    New medications tried and stopped:     -None    New medications initiated:     -Clozapine 12.5 mg at bedtime on 7/19.-> Increase to 25 mg at bedtime on 7/20 -> 50 mg on 7/23 -> 75 mg on 7/24 ->100 mg on 7/26  -Start Vitamin D2 50,000 units weekly x 8 weeks- Sundays    Today's Changes:  Continue to check orthostatic vitals daily  -Encourage fluids  -Decrease nighttime Ativan and change time so it is hours apart from clozapine dose  -Decrease lithium to 750 mg nightly due to significant tremor and inability to take Inderal due to hypotension      Programming: Patient will be treated in a therapeutic milieu with appropriate individual and group therapies. Education will be  provided on diagnoses, medications, and treatments.     Medical diagnoses:  Per medicine. Monitoring electrolytes for refeeding syndrome.    Consult: Nutrition  Tests: Lithium level on 7/19 was 0.6, weekly CBC.     Anticipated LOS: likely 3-4 weeks for stabilization  Disposition: TBI GH, B&L?       ATTESTATION      Ashely Heaton NP         TREATMENT TEAM CARE PLAN     Progress: Continued symptoms.     Continued Stay Criteria/Rationale: Continued symptoms without sufficient improvement/resolution. Titrating Clozapine.    Medical/Physical: See above.    Precautions: See above.     Plan: Continue inpatient care with unit support and medication management.    Rationale for change in precautions or plan: NA due to no change.    Participants: Ashely Heaton NP  , Nursing, SW, OT.    The patient's care was discussed with the treatment team and chart notes were reviewed.

## 2022-07-27 NOTE — CARE PLAN
Prior to Admission Medication Reconciliation:     Medications added:   [] None  [x] As listed below:    Vit d    Medications deleted:   [x] None  [] As listed below:      Medications marked for review/removal by attending:  [x] None  [] As listed below:    Changes made to existing medications:   [] None  [] Updated strengths and frequencies to most current.   [x] As listed below:    Updated wellbutrin to how pt receives from pharmacy    Pertinent notes/medications patient takes different than prescribed:     none    Last times/dates taken verified with patient:  [] Yes- completed myself  [] Prepared PTA medlist for review only. (will not be available to review personally)  [] Did not review with patient. Rx verification only. Review completed by nursing.    [x] Nurse completed no changes made (double checked entries): pt admitted 9 days ago, double check complete  [] Unable to review with patient at this time:    Allergies listed at another location:  []Allergies match allergies listed in Epic  []Other allergies listed:    Allergy review:    [x]Did not review: reviewed by nursing  []Did not review: pt unable at this time  []Verified current existing allergies or NKDA: no changes made   []Patient confirmed current existing allergies and new allergies added:    Medication reconciliation sources:   []Patient  []Patient family member/emergency contact: **  []. [facility] Report Review  []Epic Chart Review  []Care Everywhere review  []Pharmacy med list/phone call: **  [x]Outside meds dispense report: see below  [x]Nursing home or Assisted Living MAR: [facility] in physical chart  [x][facility]    Pharmacy desired at discharge: [facility]    Is patient on coumadin?   [x]No  []Yes    Requests for consultation by provider or pharmacist:   [] Patient understands why all of their meds were prescribed and how to take them. No questions.   [x]  Managing party has no questions.   [] Patient/ managing party has questions about the following:  [] Did not review with patient. Cannot assess.     Fill dates and reported compliancy:  [x] Fill dates coincide with reported compliancy for all/most maintenance meds.   [] Not applicable. Patient is not taking any maintenance medications at this time.   [] Fill dates do not coincide with compliancy with maintenance meds. See notes in PTA medlist and in comments.    [] Fill dates do not coincide with the following medications but pt reports compliancy:  [] Did not review with patient. Cannot assess.     Historian accuracy:  [] Excellent- alert and oriented, understands why meds were prescribed and how to take, able to answer specifics  [] Good- alert and oriented, understands why meds were prescribed and how to take, some confusion   [] Fair- alert and oriented, doesn't know medications without list, cannot answer specifics about medications, but has a decent process for which to take at home  [] Poor- does not know medications, may not have a process to take at home, may be cognitively unable to review at this time  [x]Medication management done by family member or facility, no concerns about historian accuracy.   [] Did not review with patient. Cannot assess.     Medication Management:  [] Manages meds independently  [] Family member/ other party manages meds/assists:  [x] Meds managed by staff at facility  [] Meds set up by home care, family/other party helps administer  [] Meds set up by home care, self administers  [] Did not review with patient. Cannot assess.     Other medications aside from PTA:  [x] Denies taking any medications aside from those listed in PTA meds  [] Reports taking another medication(s) but cannot recall the name(s)  [] Refuses to say.  [] Did not review with patient. Cannot assess.     Comments: none      Cynthia Nguyen on 7/27/2022 at 7:58 AM       Notifying appropriate party of  changes/additions/discrepancies:  [x]No pertinent changes made, notification not necessary.   [] Notified attending provider via text page/phone call  [] Notified attending provider in person  [] Notified pharmacy  [] Notified nurse  [] Medications have not been reconciled by a provider yet, notification not necessary  [] Pt is not admitted to floor yet, PTA meds completed before admission.     Medications Prior to Admission   Medication Sig Dispense Refill Last Dose     ARIPiprazole (ABILIFY) 10 MG tablet Take 10 mg by mouth every morning   7/18/2022 at 0800     clonazePAM (KLONOPIN) 0.5 MG tablet Take 0.5 mg by mouth 3 times daily as needed for anxiety   7/17/2022 at 0815     lithium (ESKALITH CR/LITHOBID) 450 MG CR tablet Take 2 tablets (900 mg) by mouth At Bedtime 120 tablet 1 7/17/2022 at 2000     memantine (NAMENDA) 10 MG tablet Take 1 tablet (10 mg) by mouth 2 times daily 120 tablet 1 7/18/2022 at 2000     pregabalin (LYRICA) 100 MG capsule Take 1 capsule (100 mg) by mouth 3 times daily 180 capsule 1 7/18/2022 at 0800     buPROPion (WELLBUTRIN XL) 150 MG 24 hr tablet Take 150 mg by mouth every morning . Take along with a 300 mg tablet for a total daily dose of 450 mg.   7/18/2022 at 0800     buPROPion (WELLBUTRIN XL) 300 MG 24 hr tablet Take 300 mg by mouth every morning . Take along with a 150 mg tablet for a total daily dose of 450 mg.   7/18/2022 at 0800     VITAMIN D3 25 MCG (1000 UT) tablet Take 1 tablet by mouth every morning   7/18/2022 at 0800             Medication Dispense History (from 7/27/2021 to 7/27/2022)  Expand All  Collapse All    ARIPiprazole     Dispensed Days Supply Quantity Provider Pharmacy   ARIPIPRAZOLE 10 MG TABS 06/20/2022 30 30 Units Nuno Hendrix APRN CNP [facility] [place], [facility]...   ARIPIPRAZOLE 5 MG TABS 06/13/2022 30 30 Units Nuno Hendrix APRN CNP [facility] [place], [facility]...        Cholecalciferol     Dispensed Days Supply Quantity Provider Pharmacy   VITAMIN D3 25 MCG  TABLET 07/01/2022 30 30 tablet Darlin Allen MD [facility] [place]...   VITAMIN D3 25 MCG TABLET 06/03/2022 30 30 tablet Darlin Allen MD [facility] [place]...   VITAMIN D3 25 MCG TABLET 05/04/2022 30 30 tablet Darlin Allen MD [facility] [place]...        Ergocalciferol     Dispensed Days Supply Quantity Provider Pharmacy   VITAMIN D 1.25MG CAPSULE 12/17/2021 28 4 Units BLAYNE GLASS Pharmacy...        Gabapentin     Dispensed Days Supply Quantity Provider Pharmacy   GABAPENTIN 300MG CAPSULE 12/27/2021 30 270 Units KAREN PRITCHARD Pharmacy...   GABAPENTIN 300 MG CAPSULE 11/03/2021 30 270 capsule ETHAN LUCIANO Pharmacy...   GABAPENTIN 300MG CAPSULE 11/03/2021 30 270 Units ETHAN LUCIANO #61 - Fa...   GABAPENTIN 300MG CAPSULE 10/12/2021 30 270 Units KAREN PRITCHARD #38 - Vi...   GABAPENTIN 300MG    CAP 09/15/2021 30 270 Units Karen Pritchard APRN CNP [facility]37 ...   GABAPENTIN 300MG    CAP 08/21/2021 30 270 Units KAREN PRITCHARD Pharmacy [facility]37 ...        LORazepam     Dispensed Days Supply Quantity Provider Pharmacy   LORAZEPAM 1 MG TABLET 05/05/2022 30 135 Units Rebman, David Gaetano, DO [facility] [place], M...   LORAZEPAM 1 MG TABLET 04/04/2022 30 135 Units Rebman, David Gaetano, DO [facility] [place], M...   LORAZEPAM 1 MG TABLET 03/07/2022 30 135 Units David Timmons DO [facility]   LORAZEPAM 1MG TABLET 11/01/2021 30 90 Units ETHAN LUCIANO Pharmacy...        [place] Carbonate     Dispensed Days Supply Quantity Provider Pharmacy   LITHIUM  MG TABLET 07/06/2022 60 120 Units Karen Pritchard APRN CNP [facility] [place], [facility]..[place]   LITHIUM  MG TABLET 04/26/2022 60 120 Units David Timmons DO [facility] [place]   LITHIUM CARBONATE  MG TB 03/07/2022 60 120 Units David Timmons DO [facility]...   LITHIUM CARBONATE 450MG ER TAB 12/08/2021 30 60 Units  ETHAN LUCIANO Pharmacy...   LITHIUM CARBONATE 450MG ER TAB 11/01/2021 30 60 Units ETHAN LUCIANO Pharmacy...        Memantine HCl     Dispensed Days Supply Quantity Provider Pharmacy   MEMANTINE HCL 10 MG TABS 07/06/2022 60 120 Units Karen Pritchard APRN CNP [facility] [place], M...   MEMANTINE HCL 10 MG TABS 04/26/2022 60 120 Units Rebman, David Gaetano, DO [facility] [place], M...   MEMANTINE HCL 10 MG TABLET 03/07/2022 60 120 Units David Timmons DO [facility]...        OLANZapine     Dispensed Days Supply Quantity Provider Pharmacy   OLANZAPINE 10 MG TAB 06/06/2022 30 30 Units Karen Pritchard APRN CNP [facility] [place], M...   OLANZAPINE 10MG TABLET 12/08/2021 16 32 Units ETHAN LUCIANO Pharmacy...   OLANZAPINE 5MG TABLET 11/16/2021 30 90 Units KAREN PRITCHARD Pharmacy...   OLANZAPINE 5MG TABLET [facility]02/2021 20 [facility]        Penicillin V Potassium     Dispensed Days Supply Quantity Provider Pharmacy   PENICILLN VK 500MG TAB 10/11/2021 10 40 Units WILI NEWTON Pharmacy [id number] ...        Pregabalin     Dispensed Days Supply Quantity Provider Pharmacy   PREGABALIN 100 MG CAPS 07/08/2022 30 90 Units Karen Pritchard APRN CNP [facility] [place], M...   PREGABALIN 100 MG CAPS 06/06/2022 30 90 Units Rebman, David Gaetano, DO [facility] [place], M...   PREGABALIN 100 MG CAPS 05/05/2022 30 90 Units Rebman, David Gaetano, DO [facility] [place], M...   PREGABALIN 100 MG CAPS 04/04/2022 30 90 Units Rebman, David Gaetano, DO [facility] [place], M...   PREGABALIN 100 MG CAPSULE 03/07/2022 30 90 Units David Timmons DO [facility]...   PREGABALIN 100MG CAPSULE 12/08/2021 30 90 Units ETHAN LUCIANO [facility]...        Sulindac     Dispensed Days Supply Quantity Provider Pharmacy   SULINDAC 150 MG TABLET 03/07/2022 30 60 tablet David Timmons, DO [facility]...         Other     Dispensed Days Supply Quantity Provider Pharmacy   VITAMIN D3 25 MCG (1000 UT) 07/01/2022 30 30 Units Darlin Allen MD [facility] [place]...   VITAMIN D3 25 MCG (1000 UT) 06/03/2022 30 30 Units Darlin Allen MD [facility] [place]...   VITAMIN D3 25 MCG (1000 UT) 05/04/2022 30 30 Units Darlin Allen MD [facility] [place] M...        buPROPion HCl     Dispensed Days Supply Quantity Provider Pharmacy   BUPROPION HCL ER (XL) 300 M 07/06/2022 60 60 Units Karen Pritchard APRN CNP [facility] [place], M...   BUPROPION HCL XL 150MG TAB 07/06/2022 60 60 Units Karen Pritchard APRN CNP [facility] [place], M...   BUPROPION HCL ER (XL) 300 M 04/26/2022 60 60 Units Rebman, David Gaetano, DO [facility] [place], M...   BUPROPION HCL  MG TAB 04/26/2022 60 60 Units Rebman, David Gaetano, DO [facility] [place] M...   BUPROPION HCL  MG TABLET 03/07/2022 60 60 Units David Timmons DO [facility]...   BUPROPION HCL  MG TABLET 03/07/2022 60 60 Units David Timmons DO [facility]...   BUPROPION 150MG ER (XL) TABLET 12/08/2021 30 30 Units ETHAN LUCIANO [facility]...        clonazePAM     Dispensed Days Supply Quantity Provider Pharmacy   CLONAZEPAM 0.5 MG TAB 06/20/2022 30 90 Units Karen Pritchard APRN CNP [facility] [place] M...   CLONAZEPAM 1 MG TABLET 06/06/2022 30 90 Units Karen Pritchard APRN CNP [facility] [place]...   CLONAZEPAM 1 MG TABLET 05/09/2022 30 90 Units Karen Pritchard APRN CNP [facility] [place] M...   CLONAZEPAM 1MG TABLET 11/15/2021 30 90 Units KAREN PRITCHARD [facility]...   CLONAZEPAM 1MG      TAB 10/16/2021 30 90 Units Karen Pritchard APRN [facility] ...   CLONAZEPAM 1MG      TAB 09/15/2021 30 90 Units Karen Pritchard APRN [facility]37 ...   CLONAZEPAM 1MG      TAB 09/09/2021 30 90 Units Karen Pritchard APRN [facility]37 ...   CLONAZEPAM 0.5MG    TAB 08/12/2021 30 90  Units NUNO PRITCHARD [facility]37 ...        lamoTRIgine     Dispensed Days Supply Quantity Provider Pharmacy   LAMOTRIGINE 200MG TABLET 11/15/2021 30 30 [facility]   LAMOTRIGINE 200MG TABLET 10/18/2021 30 30 Units NUNO PRITCHARD #38 - Vi...   LamoTRIgine 200MG   TAB 09/15/2021 30 30 Units Nuno Pritchard APRN [facility]37 ...   LamoTRIgine 200MG   TAB 08/18/2021 30 30 Units NUNO PRITCHARD [facility]37 ...        risperiDONE     Dispensed Days Supply Quantity Provider Pharmacy   RISPERIDONE 2MG TABLET 11/15/2021 30 30 Units NUNO PRITCHARD Pharmacy...   RisperiDONE 2MG     TAB 10/05/2021 30 30 Units Nuno Pritchard APRN [facility]37 ...   RisperiDONE 2MG     TAB 09/09/2021 30 30 Units Nuno Pritchard APRN [facility]37 ...   RisperiDONE 3MG     TAB 08/21/2021 30 30 Units NUNO PRITCHARD [facility]37 ...        Disclaimer    Certain dispenses may not be available or accurate in this report, including over-the-counter medications, low cost prescriptions, prescriptions paid for by the patient or non-participating sources, or errors in insurance claims information. The provider should independently verify medication history with the patient.    External Sources

## 2022-07-27 NOTE — PLAN OF CARE
Problem: Behavioral Health Plan of Care  Goal: Patient-Specific Goal (Individualization)  Description: Patient will report at least 6-8 hours of sleep each night.   Patient will complete all ADL's independently while on the unit.   Patient will be compliant with treatment team recommendations.     Pt will eat at least 50% of meals and have enough fluid intake.  Outcome: Ongoing, Progressing   Goal Outcome Evaluation:      Face to face shift report received from RN. Rounding completed, pt observed.Client rested in room for 6 hours with eyes closed and respirations noted. No signs of distress.Face to face report will be communicated to oncoming RN.    Didier Brooks RN  7/27/2022  6:12 AM

## 2022-07-27 NOTE — PLAN OF CARE
Faxed a referral to [facility]. Spoke with pt and let him know is case konrad had spoke to [facility] and that this writer had sent the referral. Let him know this writer would be gone until Monday and will follow up with [facility] then. Pt is ok with this and has no further questions or concerns.

## 2022-07-28 PROCEDURE — 250N000013 HC RX MED GY IP 250 OP 250 PS 637: Performed by: NURSE PRACTITIONER

## 2022-07-28 PROCEDURE — 124N000001 HC R&B MH

## 2022-07-28 PROCEDURE — 99233 SBSQ HOSP IP/OBS HIGH 50: CPT | Performed by: NURSE PRACTITIONER

## 2022-07-28 RX ADMIN — LORAZEPAM 0.5 MG: 0.5 TABLET ORAL at 17:04

## 2022-07-28 RX ADMIN — MEMANTINE 10 MG: 10 TABLET ORAL at 20:35

## 2022-07-28 RX ADMIN — PREGABALIN 75 MG: 75 CAPSULE ORAL at 20:35

## 2022-07-28 RX ADMIN — CLOZAPINE 125 MG: 100 TABLET ORAL at 20:35

## 2022-07-28 RX ADMIN — LORAZEPAM 1 MG: 1 TABLET ORAL at 13:08

## 2022-07-28 RX ADMIN — LITHIUM CARBONATE 750 MG: 450 TABLET, EXTENDED RELEASE ORAL at 20:35

## 2022-07-28 RX ADMIN — PREGABALIN 75 MG: 75 CAPSULE ORAL at 08:36

## 2022-07-28 RX ADMIN — MEMANTINE 10 MG: 10 TABLET ORAL at 08:36

## 2022-07-28 RX ADMIN — MULTIPLE VITAMINS W/ MINERALS TAB 1 TABLET: TAB at 08:36

## 2022-07-28 RX ADMIN — PREGABALIN 75 MG: 75 CAPSULE ORAL at 13:08

## 2022-07-28 RX ADMIN — LORAZEPAM 1 MG: 1 TABLET ORAL at 08:36

## 2022-07-28 ASSESSMENT — ACTIVITIES OF DAILY LIVING (ADL)
ADLS_ACUITY_SCORE: 42

## 2022-07-28 NOTE — PLAN OF CARE
Face to face end of shift report communicated to oncoming shift.     Sobeida Benton RN  7/28/2022  11:04 PM  Face to face end of shift report communicated to oncoming shift.     Sobeida Benton RN  7/28/2022  11:09 PM    Problem: Behavioral Health Plan of Care  Goal: Patient-Specific Goal (Individualization)  Description: Patient will report at least 6-8 hours of sleep each night.   Patient will complete all ADL's independently while on the unit.   Patient will be compliant with treatment team recommendations.     Pt will eat at least 50% of meals and have enough fluid intake.  Outcome: Ongoing, Progressing  Note: Patient up on unit walking halls, cooperative and polite with nursing assessment and cares.    Patient exhibits full range affect with conversation.    Patient takes medications as prescribed.   Requests double portion for dinner, eats 100%.      Patient completes ADL's independently.   Patient asks what PRN medications he has available.  Says he does not want any was just wondering what was available.              Goal Outcome Evaluation:    Plan of Care Reviewed With: patient

## 2022-07-28 NOTE — PLAN OF CARE
"Problem: Thought Process Alteration  Goal: Optimal Thought Clarity  Description: Patient will hold reality based conversations while on the unit.     Outcome: Ongoing, Progressing    Pt's thinking is logical and organized     Problem: Behavioral Health Plan of Care  Goal: Patient-Specific Goal (Individualization)  Description: Patient will report at least 6-8 hours of sleep each night.   Patient will complete all ADL's independently while on the unit.   Patient will be compliant with treatment team recommendations.     Pt will eat at least 50% of meals and have enough fluid intake.  Outcome: Ongoing, Progressing  Flowsheets (Taken 7/28/2022 1332)  Patient Vulnerabilities: history of unsuccessful treatment  Patient Personal Strengths: interests/hobbies   Goal Outcome Evaluation:    Plan of Care Reviewed With: patient     0730 Face to face rounding complete.  Pt introduced to nursing for the shift.    Pt was up some of the shift. He attended the morning group with Dayna and smiled when I talked about Dayna's group being the \"[employer]\". Pt spent some time in there dayroom watching TV some.  He discussed playing tumpet, obo, and guitar in the past.  Pt was encouraged to explore these hobbies again.  Pt does have a significant tremor and almost spilled water while taking his afternoon medications.      1500 Face to face end of shift report communicated to RN's along with Pt's fall risk..     Joon Funk RN  7/28/2022  1:28 PM                "

## 2022-07-28 NOTE — PLAN OF CARE
Face to face report received from Farida LARKIN. Pt. Observed.     Problem: Behavioral Health Plan of Care  Goal: Patient-Specific Goal (Individualization)  Description: Patient will report at least 6-8 hours of sleep each night.   Patient will complete all ADL's independently while on the unit.   Patient will be compliant with treatment team recommendations.     Pt will eat at least 50% of meals and have enough fluid intake.  Outcome: Ongoing, Progressing  Note: Pt has been in bed with eyes closed and regular respirations x 7 hours this noc shift. 15 minute and PRN checks all night. No complaints offered. Will continue to monitor.       Face to face end of shift report communicated to oncoming RN.     Liat Lai RN  7/28/2022  6:22 AM

## 2022-07-28 NOTE — PROGRESS NOTES
[facility] PSYCHIATRY  PROGRESS NOTE     SUBJECTIVE   He is more talkative than I have ever seen him in the past and offers more in conversation and answers questions appropriately.  I tried to get more information about how he did in school after his brain injury at the age of 3.  He states that he was in special education in second grade and did get extra help but only for 1 year and was also in speech therapy.  He states he got very good grades throughout high school and graduated with a 3.4 GPA with no special education.  He states that group home through his senior year he started using drugs and drinking heavily.  He states his dad had cancer and was terminal which led to his substance use as a senior.  He states that he excelled in band and played the trumpet and was quite talented musically.  Today nursing staff did tell me that they did see him smile at one point which I have not yet seen during this admission and have actually only seen in once in the past 3 years I have known him.  He appears to be slowly improving though his insight is still limited most of the time.  He states that he is no longer feeling dizzy when he stands up and has orthostatic blood pressures were unremarkable yesterday.  His nighttime dose of Ativan was lowered yesterday which she had no complaints of and appears to be doing quite well still.  He continues to deny having hallucinations though when I spoke with his provider this morning, Sorin sosa recently told the provider that they had seen him responding to hallucinations while they were watching him alone in his room.  Yesterday we did lower his lithium to 750 mg nightly due to significant tremor and his inability to tolerate propranolol due to hypotension.  His tremor is still present though did not expect it to be decreased yet.  If it does not decrease over the next few days will lower the lithium to 600 mg       MEDICATIONS   Scheduled Meds:    cloZAPine  100 mg Oral At  Bedtime     lithium  750 mg Oral At Bedtime     LORazepam  0.5 mg Oral Daily     LORazepam  1 mg Oral BID     memantine  10 mg Oral BID     multivitamin w/minerals  1 tablet Oral Daily     pregabalin  75 mg Oral TID     vitamin D2  50,000 Units Oral Q7 Days     PRN Meds:.acetaminophen, alum & mag hydroxide-simethicone, docusate sodium, hydrOXYzine, OLANZapine **OR** OLANZapine, sulindac     ALLERGIES   Allergies   Allergen Reactions     Pork Allergy         MENTAL STATUS EXAM   Vitals: /65 (BP Location: Left arm)   Pulse 71   Temp 96.9  F (36.1  C) (Temporal)   Resp 16   Wt 77.9 kg (171 lb 11.2 oz)   SpO2 100%   BMI 23.29 kg/m      Appearance:  Has showered though hair and beard are scraggly and long. Awake, alert, dressed in hospital scrubs  Attitude:  cooperative and guarded  Eye Contact: Improving  Mood:  restricted  Affect:  intensity is flat  Speech: monotone, soft spoken, engaging in more conversation  Psychomotor Behavior:  no evidence of tardive dyskinesia, dystonia, or tics does have significant tremor likely lithium induced  Thought Process:  Seems linear, concrete  Associations: none noted  Thought Content:  no evidence of suicidal ideation or homicidal ideation and no evidence of psychotic thought, denies hallucinations  Insight:  limited  Judgment:  limited  Oriented to:  time, person, and place  Attention Span and Concentration:  limited  Recent and Remote Memory:  limited  Fund of Knowledge: delayed  Muscle Strength and Tone: normal  Gait and Station: not observed, resting in bed       LABS   No results found for this or any previous visit (from the past 24 hour(s)).      IMPRESSION     This is a 31 year old male with a PMH of traumatic brain injury with coma and significant brain damage as evidenced in MRI as well as multiple episodes of catatonia which can lead to food refusal.  He appears to have lost a very large amount of weight since his last hospitalization and is cachectic though  his labs are unremarkable.  It is unclear when the lorazepam was discontinued and why it was discontinued.  He does state he feels Klonopin to be more beneficial.  It is imperative we restart benzodiazepines due to his catatonia as his food intake is minimal and he has lost a large amount of weight.  He also has unprovoked aggression at times likely due to catatonia versus psychosis.  I did speak with him about starting clozapine for psychosis/catatonia.  I explained that it was an antipsychotic that required weekly white blood cell count to monitor for side effects of neutropenia and explained what this was.  He did sign the neuroleptic consent.  He does admit that he would like to feel better and that he feels extremely flat and emotionless.       DIAGNOSES     1. Schizoaffective disorder, depressive type, multiple episodes, currently in acute episode, with catatonia   2. 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 Multiple other TBIs  3. 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 Methamphetamine use disorder, moderate, early remission  5. Alcohol use disorder, moderate  6. Severe malnutrition       PLAN     Location: Unit 5  Legal Status: Voluntary    Safety Assessment:    Behavioral Orders   Procedures     Code 1 - Restrict to Unit     Routine Programming     As clinically indicated     Status 15     Every 15 minutes.      PTA medications continued/changed:     -Stopped Wellbutrin due to starting Clozaril. Both decrease seizure threshhold  -Stopped Abilify (not effective)    -Namenda 10 mg BID  -Lyrica 100 mg TID  -Clonazepam restarted at 1 mg TID- switched to Lorazepam 1 mg TID on 7/23    New medications tried and stopped:     -None    New medications initiated:     -Clozapine 12.5 mg at bedtime on 7/19.-> Increase to 25 mg at bedtime on 7/20 -> 50 mg on 7/23 -> 75 mg on 7/24 ->100 mg on 7/26  -Start Vitamin D2 50,000 units weekly x 8  weeks- Sundays    Today's Changes:    -Continue to check orthostatic vitals daily  -Increase clozapine dose to 125 mg  -Likely decrease Lyrica to 50 mg 3 times a day in the next few days      Programming: Patient will be treated in a therapeutic milieu with appropriate individual and group therapies. Education will be provided on diagnoses, medications, and treatments.     Medical diagnoses:  Per medicine. Monitoring electrolytes for refeeding syndrome.    Consult: Nutrition    Tests: Weekly CBC  ANC on 7/25 was 4.3                Anticipated LOS: likely 3-4 weeks for stabilization  Disposition: TBI GH, B&L?       ATTESTATION      Ashely Heaton NP         TREATMENT TEAM CARE PLAN     Progress: Continued symptoms.     Continued Stay Criteria/Rationale: Continued symptoms without sufficient improvement/resolution. Titrating Clozapine.    Medical/Physical: See above.    Precautions: See above.     Plan: Continue inpatient care with unit support and medication management.    Rationale for change in precautions or plan: NA due to no change.    Participants: Ashely Heaton NP  , Nursing, SW, OT.    The patient's care was discussed with the treatment team and chart notes were reviewed.

## 2022-07-29 PROCEDURE — 99233 SBSQ HOSP IP/OBS HIGH 50: CPT | Performed by: NURSE PRACTITIONER

## 2022-07-29 PROCEDURE — 250N000013 HC RX MED GY IP 250 OP 250 PS 637: Performed by: NURSE PRACTITIONER

## 2022-07-29 PROCEDURE — 124N000001 HC R&B MH

## 2022-07-29 RX ORDER — LITHIUM CARBONATE 300 MG/1
600 TABLET, FILM COATED, EXTENDED RELEASE ORAL AT BEDTIME
Status: DISCONTINUED | OUTPATIENT
Start: 2022-07-29 | End: 2022-07-31

## 2022-07-29 RX ADMIN — MEMANTINE 10 MG: 10 TABLET ORAL at 08:09

## 2022-07-29 RX ADMIN — PREGABALIN 75 MG: 75 CAPSULE ORAL at 13:38

## 2022-07-29 RX ADMIN — PREGABALIN 75 MG: 75 CAPSULE ORAL at 08:10

## 2022-07-29 RX ADMIN — MULTIPLE VITAMINS W/ MINERALS TAB 1 TABLET: TAB at 08:10

## 2022-07-29 RX ADMIN — LITHIUM CARBONATE 600 MG: 300 TABLET, EXTENDED RELEASE ORAL at 20:37

## 2022-07-29 RX ADMIN — CLOZAPINE 125 MG: 100 TABLET ORAL at 20:37

## 2022-07-29 RX ADMIN — MEMANTINE 10 MG: 10 TABLET ORAL at 20:37

## 2022-07-29 RX ADMIN — LORAZEPAM 0.5 MG: 0.5 TABLET ORAL at 16:34

## 2022-07-29 RX ADMIN — LORAZEPAM 1 MG: 1 TABLET ORAL at 13:37

## 2022-07-29 RX ADMIN — OLANZAPINE 10 MG: 10 TABLET ORAL at 20:41

## 2022-07-29 RX ADMIN — LORAZEPAM 1 MG: 1 TABLET ORAL at 08:10

## 2022-07-29 RX ADMIN — PREGABALIN 75 MG: 75 CAPSULE ORAL at 20:37

## 2022-07-29 ASSESSMENT — ACTIVITIES OF DAILY LIVING (ADL)
LAUNDRY: UNABLE TO COMPLETE
DRESS: SCRUBS (BEHAVIORAL HEALTH)
HYGIENE/GROOMING: INDEPENDENT
ADLS_ACUITY_SCORE: 42
ORAL_HYGIENE: INDEPENDENT
ADLS_ACUITY_SCORE: 42

## 2022-07-29 NOTE — PROGRESS NOTES
"[facility] PSYCHIATRY  PROGRESS NOTE     SUBJECTIVE   I met with pt this afternoon in his room, he interacted throughout our conversation. He was able to list his medications and dosages accurately and did so after I asked if his tremor improved with the reduction in his Lithobid. He identified the previous dose, reduced dose of 750 mg noting his tremor is about 60 % better and requested a decrease to 600 mg which I agreed to do. He then listed off his other medications and asked if he could increase his Clozaril tomorrow which I agreed to do if he tolerated the decrease in lithium. He denies AH/VH. His response to wanting to increase the Clozaril was he hasn't felt this good in a long while and would like to improve upon it if possible. He denies any light headedness when standing, reports he is sleeping good. I shared with him how great it was to see him doing this well \"You have no idea what its like\" and then smiled.        MEDICATIONS   Scheduled Meds:    cloZAPine  125 mg Oral At Bedtime     lithium  600 mg Oral At Bedtime     LORazepam  0.5 mg Oral Daily     LORazepam  1 mg Oral BID     memantine  10 mg Oral BID     multivitamin w/minerals  1 tablet Oral Daily     pregabalin  75 mg Oral TID     vitamin D2  50,000 Units Oral Q7 Days     PRN Meds:.acetaminophen, alum & mag hydroxide-simethicone, docusate sodium, hydrOXYzine, OLANZapine **OR** OLANZapine, sulindac     ALLERGIES   Allergies   Allergen Reactions     Pork Allergy         MENTAL STATUS EXAM   Vitals: /62   Pulse 87   Temp 97.5  F (36.4  C) (Tympanic)   Resp 16   Wt 77.9 kg (171 lb 11.2 oz)   SpO2 98%   BMI 23.29 kg/m      Appearance: Hair and beard are over grown-pt is clean/showered. Awake, alert, dressed in hospital scrubs  Attitude:  cooperative and guarded  Eye Contact: Improving  Mood:  Restricted \"better\"  Affect:  intensity is flat  Speech: monotone, soft spoken, engaging in more conversation  Psychomotor Behavior:  no " evidence of tardive dyskinesia, dystonia, or tics does have significant tremor likely lithium induced  Thought Process:  Seems linear, concrete  Associations: none noted  Thought Content:  no evidence of suicidal ideation or homicidal ideation and no evidence of psychotic thought, denies hallucinations  Insight:  limited  Judgment:  limited  Oriented to:  time, person, and place  Attention Span and Concentration:  limited  Recent and Remote Memory:  limited  Fund of Knowledge: delayed  Muscle Strength and Tone: normal  Gait and Station: not observed, resting in bed       LABS   No results found for this or any previous visit (from the past 24 hour(s)).      IMPRESSION     This is a 31 year old male with a PMH of traumatic brain injury with coma and significant brain damage as evidenced in MRI as well as multiple episodes of catatonia which can lead to food refusal.  He appears to have lost a very large amount of weight since his last hospitalization and is cachectic though his labs are unremarkable.  It is unclear when the lorazepam was discontinued and why it was discontinued.  He does state he feels Klonopin to be more beneficial.  It is imperative we restart benzodiazepines due to his catatonia as his food intake is minimal and he has lost a large amount of weight.  He also has unprovoked aggression at times likely due to catatonia versus psychosis.  I did speak with him about starting clozapine for psychosis/catatonia.  I explained that it was an antipsychotic that required weekly white blood cell count to monitor for side effects of neutropenia and explained what this was.  He did sign the neuroleptic consent.  He does admit that he would like to feel better and that he feels extremely flat and emotionless.       DIAGNOSES     1. Schizoaffective disorder, depressive type, multiple episodes, currently in acute episode, with catatonia   2. 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  Multiple other TBIs  3. 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 Methamphetamine use disorder, moderate, early remission  5. Alcohol use disorder, moderate  6. Severe malnutrition       PLAN     Location: Unit 5  Legal Status: Voluntary    Safety Assessment:    Behavioral Orders   Procedures     Code 1 - Restrict to Unit     Routine Programming     As clinically indicated     Status 15     Every 15 minutes.      PTA medications continued/changed:     -Stopped Wellbutrin due to starting Clozaril. Both decrease seizure threshhold  -Stopped Abilify (not effective)    -Namenda 10 mg BID  -Lyrica 100 mg TID  -Clonazepam restarted at 1 mg TID- switched to Lorazepam 1 mg TID on 7/23    New medications tried and stopped:     -None    New medications initiated:     -Clozapine 12.5 mg at bedtime on 7/19.-> Increase to 25 mg at bedtime on 7/20 -> 50 mg on 7/23 -> 75 mg on 7/24 ->100 mg on 7/26  -Start Vitamin D2 50,000 units weekly x 8 weeks- Sundays    Today's Changes:    Decrease lithium to 600 mg at 2100      Programming: Patient will be treated in a therapeutic milieu with appropriate individual and group therapies. Education will be provided on diagnoses, medications, and treatments.     Medical diagnoses:  Per medicine. Monitoring electrolytes for refeeding syndrome.    Consult: Nutrition    Tests: Weekly CBC  ANC on 7/25 was 4.3                Anticipated LOS: likely 3-4 weeks for stabilization  Disposition: TBI GH, B&L?       ATTESTATION      Ary Palmer NP         TREATMENT TEAM CARE PLAN     Progress: Continued symptoms.     Continued Stay Criteria/Rationale: Continued symptoms without sufficient improvement/resolution. Titrating Clozapine.    Medical/Physical: See above.    Precautions: See above.     Plan: Continue inpatient care with unit support and medication management.    Rationale for change in precautions or plan: NA due to no change.    Participants: Ary  Spencer NP  , Nursing, SW, OT.    The patient's care was discussed with the treatment team and chart notes were reviewed.

## 2022-07-29 NOTE — PLAN OF CARE
Face to face report received from Sobeida LARKIN. Pt. Observed.     Problem: Behavioral Health Plan of Care  Goal: Patient-Specific Goal (Individualization)  Description: Patient will report at least 6-8 hours of sleep each night.   Patient will complete all ADL's independently while on the unit.   Patient will be compliant with treatment team recommendations.     Pt will eat at least 50% of meals and have enough fluid intake.  Outcome: Ongoing, Progressing  Pt has been in bed with eyes closed and regular respirations x 7 hours this noc shift. 15 minute and PRN checks all night. No complaints offered. Will continue to monitor.       Face to face end of shift report communicated to oncoming RN.     Liat Lai RN  7/29/2022  6:48 AM

## 2022-07-29 NOTE — PROGRESS NOTES
CLINICAL NUTRITION SERVICES  -  REASSESSMENT NOTE    Steven Carter     31 yom admitted for catatonia. Pt has a medical hx including schizoaffective disorder, Bipolar 1 disorder, ADD, alcohol abuse, polysubstance abuse.  Noted weight loss of 43lb in the past 4 months, 53lb loss in the past 7 months related to poor intake with catatonia. Adequate intake. No new weight since last review. Overall 9lb weight gain since admission.    Diet Order: Regular  Intake: 20 meals with 100% intake    Height: Data Unavailable  Weight: 171 lbs 11.2 oz  Body mass index is 23.29 kg/m .  Weight Status:  Normal BMI  Weight History: 21% weight loss in the past 4 months, 24.6% weight loss in the past 7 months  Wt Readings from Last 10 Encounters:   07/18/22 73.5 kg (162 lb)   03/05/22 93 kg (205 lb 1.6 oz)   12/14/21 97.7 kg (215 lb 4.8 oz)   12/01/21 97.8 kg (215 lb 11.2 oz)   10/31/21 105.1 kg (231 lb 11.2 oz)   03/27/21 105.2 kg (232 lb)   11/30/15 96.6 kg (213 lb)   08/18/14 94.5 kg (208 lb 6.4 oz)   08/15/14 93.4 kg (206 lb)   07/19/14 102.1 kg (225 lb)         Weight used to calculate needs: 77.6kg - ibw  Estimated Energy Needs: 1534-3029 kcals (25-30 Kcal/Kg)   Estimated Protein Needs:  grams protein (1-1.5 g pro/Kg)  Estimated Fluid Needs: 0612-2974  mL (1 mL/Kcal)     Malnutrition Diagnosis: Severe malnutrition- improving     NUTRITION RECOMMENDATIONS  - Encourage intake during meal and snack times as needed     MONITORING AND EVALUATION:  RD will monitor as needed

## 2022-07-29 NOTE — PLAN OF CARE
Problem: Thought Process Alteration  Goal: Optimal Thought Clarity  Description: Patient will hold reality based conversations while on the unit.     Outcome: Ongoing, Progressing    Pt's thinking is logical and organized     Problem: Behavioral Health Plan of Care  Goal: Patient-Specific Goal (Individualization)  Description: Patient will report at least 6-8 hours of sleep each night.   Patient will complete all ADL's independently while on the unit.   Patient will be compliant with treatment team recommendations.     Pt will eat at least 50% of meals and have enough fluid intake.  Outcome: Ongoing, Progressing  Flowsheets (Taken 7/29/2022 1216)  Patient Vulnerabilities:    history of unsuccessful treatment    limited ability to read/write   Goal Outcome Evaluation:    0730 Face to face rounding complete.  Pt introduced to nursing for the shift.    Pt was up for meals this morning and napped between breakfast and lunch.  Pt talked to me about his medications and his discharge plan.  Pt was insightful and remembered teaching that I gave him yesterday.  Pt's tremor has reduced a significant amount from yesterday.  Pt's affect continues to be fairly bright and he is attending some groups. He is also showering daily which he has never done during previous admissions.        1500 Face to face end of shift report communicated to evening shift RN's along with Pt's fall risk.     Joon Funk RN  7/29/2022

## 2022-07-29 NOTE — PLAN OF CARE
"  Problem: Behavioral Health Plan of Care  Goal: Patient-Specific Goal (Individualization)  Description: Patient will report at least 6-8 hours of sleep each night.   Patient will complete all ADL's independently while on the unit.   Patient will be compliant with treatment team recommendations.     Pt will eat at least 50% of meals and have enough fluid intake.  Outcome: Ongoing, Progressing     Problem: Thought Process Alteration  Goal: Optimal Thought Clarity  Description: Patient will hold reality based conversations while on the unit.     Outcome: Ongoing, Progressing     Problem: Suicide Risk  Goal: Absence of Self-Harm  Outcome: Ongoing, Progressing     Problem: Suicidal Behavior  Goal: Suicidal Behavior is Absent or Managed  Outcome: Ongoing, Progressing     Pt denies pain, depression, HA/VH, and SI/HI. Pt states \" I have a little anxiety\". Pt is out in lounge with peers. Pt is using appropriate behavior with staff and peers. Pt is attending groups. Pt is medication compliant. Pt ate 100% of dinner. Pt reports \"My tremors are a little bit better\". PRN Zyprexa 10mg given at 2041 for agitation Per Pt request.      Face to face report will be communicated to oncoming RN.    Danica Hawkins RN  7/29/2022                "

## 2022-07-30 PROCEDURE — 124N000001 HC R&B MH

## 2022-07-30 PROCEDURE — 250N000013 HC RX MED GY IP 250 OP 250 PS 637: Performed by: NURSE PRACTITIONER

## 2022-07-30 PROCEDURE — 99233 SBSQ HOSP IP/OBS HIGH 50: CPT | Performed by: NURSE PRACTITIONER

## 2022-07-30 RX ADMIN — MULTIPLE VITAMINS W/ MINERALS TAB 1 TABLET: TAB at 08:39

## 2022-07-30 RX ADMIN — CLOZAPINE 150 MG: 100 TABLET ORAL at 20:22

## 2022-07-30 RX ADMIN — LORAZEPAM 0.5 MG: 0.5 TABLET ORAL at 16:31

## 2022-07-30 RX ADMIN — PREGABALIN 75 MG: 75 CAPSULE ORAL at 20:22

## 2022-07-30 RX ADMIN — PREGABALIN 75 MG: 75 CAPSULE ORAL at 13:43

## 2022-07-30 RX ADMIN — MEMANTINE 10 MG: 10 TABLET ORAL at 20:23

## 2022-07-30 RX ADMIN — LORAZEPAM 1 MG: 1 TABLET ORAL at 13:43

## 2022-07-30 RX ADMIN — LORAZEPAM 1 MG: 1 TABLET ORAL at 08:39

## 2022-07-30 RX ADMIN — MEMANTINE 10 MG: 10 TABLET ORAL at 08:38

## 2022-07-30 RX ADMIN — OLANZAPINE 10 MG: 10 TABLET ORAL at 20:25

## 2022-07-30 RX ADMIN — LITHIUM CARBONATE 600 MG: 300 TABLET, EXTENDED RELEASE ORAL at 20:22

## 2022-07-30 RX ADMIN — PREGABALIN 75 MG: 75 CAPSULE ORAL at 08:38

## 2022-07-30 ASSESSMENT — ACTIVITIES OF DAILY LIVING (ADL)
ORAL_HYGIENE: INDEPENDENT
ADLS_ACUITY_SCORE: 42
HYGIENE/GROOMING: INDEPENDENT
ADLS_ACUITY_SCORE: 42
DRESS: SCRUBS (BEHAVIORAL HEALTH)
ADLS_ACUITY_SCORE: 42
HYGIENE/GROOMING: INDEPENDENT
ADLS_ACUITY_SCORE: 42
DRESS: SCRUBS (BEHAVIORAL HEALTH);INDEPENDENT
LAUNDRY: UNABLE TO COMPLETE
ADLS_ACUITY_SCORE: 42
ORAL_HYGIENE: INDEPENDENT
ADLS_ACUITY_SCORE: 42
LAUNDRY: UNABLE TO COMPLETE
ADLS_ACUITY_SCORE: 42

## 2022-07-30 NOTE — PROGRESS NOTES
"[facility] PSYCHIATRY  PROGRESS NOTE     SUBJECTIVE   I met with pt this morning in his room, he was carrying out his clothes following his shower. He is showering daily without prompts from staff. He held out his hands to show me his tremor which had reduced from earlier in the week-Steven believes he needs \"a few more days at 600 mg before I think we need to consider any changes\". This represents a major positive change in Steven as previously he rarely showed emotion. He is interjecting thoughts spontaneously which are well thought and accurate.  Steven did ask to have his Clozaril increased to 150 mg which I agreed to do, \"I would like to leave next week sometime and need to be alittle better\". Steven is spending the majority of his day in the dayroom.     MEDICATIONS   Scheduled Meds:    cloZAPine  150 mg Oral At Bedtime     lithium  600 mg Oral At Bedtime     LORazepam  0.5 mg Oral Daily     LORazepam  1 mg Oral BID     memantine  10 mg Oral BID     multivitamin w/minerals  1 tablet Oral Daily     pregabalin  75 mg Oral TID     vitamin D2  50,000 Units Oral Q7 Days     PRN Meds:.acetaminophen, alum & mag hydroxide-simethicone, docusate sodium, hydrOXYzine, OLANZapine **OR** OLANZapine, sulindac     ALLERGIES   Allergies   Allergen Reactions     Pork Allergy         MENTAL STATUS EXAM   Vitals: /57   Pulse 88   Temp 98.6  F (37  C) (Tympanic)   Resp 16   Wt 80.5 kg (177 lb 6.4 oz)   SpO2 98%   BMI 24.06 kg/m      Appearance: Hair and beard are over grown-pt is clean/showered. Awake, alert, dressed in hospital scrubs  Attitude:  cooperative and guarded  Eye Contact: Improving  Mood:  Restricted \"better\"  Affect:  intensity is flat, beginning to show some emotion.  Speech: monotone, soft spoken, engaging in more conversation  Psychomotor Behavior:  no evidence of tardive dyskinesia, dystonia, or tics does have significant tremor likely lithium induced  Thought Process:  Seems linear, " concrete  Associations: none noted  Thought Content:  no evidence of suicidal ideation or homicidal ideation and no evidence of psychotic thought, denies hallucinations  Insight:  limited  Judgment:  limited  Oriented to:  time, person, and place  Attention Span and Concentration:  limited  Recent and Remote Memory:  limited  Fund of Knowledge: delayed  Muscle Strength and Tone: normal  Gait and Station: gait normal       LABS   No results found for this or any previous visit (from the past 24 hour(s)).      IMPRESSION     This is a 31 year old male with a PMH of traumatic brain injury with coma and significant brain damage as evidenced in MRI as well as multiple episodes of catatonia which can lead to food refusal.  He appears to have lost a very large amount of weight since his last hospitalization and is cachectic though his labs are unremarkable.  It is unclear when the lorazepam was discontinued and why it was discontinued.  He does state he feels Klonopin to be more beneficial.  It is imperative we restart benzodiazepines due to his catatonia as his food intake is minimal and he has lost a large amount of weight.  He also has unprovoked aggression at times likely due to catatonia versus psychosis.  I did speak with him about starting clozapine for psychosis/catatonia.  I explained that it was an antipsychotic that required weekly white blood cell count to monitor for side effects of neutropenia and explained what this was.  He did sign the neuroleptic consent.  He does admit that he would like to feel better and that he feels extremely flat and emotionless.       DIAGNOSES     1. Schizoaffective disorder, depressive type, multiple episodes, currently in acute episode, with catatonia   2. 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 Multiple other TBIs  3. 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 Methamphetamine use disorder, moderate,  early remission  5. Alcohol use disorder, moderate  6. Severe malnutrition       PLAN     Location: Unit 5  Legal Status: Voluntary    Safety Assessment:    Behavioral Orders   Procedures     Code 1 - Restrict to Unit     Routine Programming     As clinically indicated     Status 15     Every 15 minutes.      PTA medications continued/changed:     -Stopped Wellbutrin due to starting Clozaril. Both decrease seizure threshhold  -Stopped Abilify (not effective)    -Namenda 10 mg BID  -Lyrica 100 mg TID  -Clonazepam restarted at 1 mg TID- switched to Lorazepam 1 mg TID on 7/23    New medications tried and stopped:     -None    New medications initiated:     -Clozapine 12.5 mg at bedtime on 7/19.-> Increase to 25 mg at bedtime on 7/20 -> 50 mg on 7/23 -> 75 mg on 7/24 ->100 mg on 7/26 -> 125 mg on 7/28 -> 150 mg on 7/30  -Start Vitamin D2 50,000 units weekly x 8 weeks- Sundays    Today's Changes:    Clozaril increased from 125 mg to 150 mg        Programming: Patient will be treated in a therapeutic milieu with appropriate individual and group therapies. Education will be provided on diagnoses, medications, and treatments.     Medical diagnoses:  Per medicine. Monitoring electrolytes for refeeding syndrome.    Consult: Nutrition    Tests: Weekly CBC  ANC on 7/25 was 4.3                Anticipated LOS: likely 3-4 weeks for stabilization  Disposition: TBI GH, B&L?       ATTESTATION      Ary Palmer NP         TREATMENT TEAM CARE PLAN     Progress: Continued symptoms.     Continued Stay Criteria/Rationale: Continued symptoms without sufficient improvement/resolution. Titrating Clozapine.    Medical/Physical: See above.    Precautions: See above.     Plan: Continue inpatient care with unit support and medication management.    Rationale for change in precautions or plan: NA due to no change.    Participants: Ary Palmer NP  , Nursing, SW, OT.    The patient's care was discussed with the treatment team and chart notes were  reviewed.

## 2022-07-30 NOTE — PLAN OF CARE
Problem: Thought Process Alteration  Goal: Optimal Thought Clarity  Description: Patient will hold reality based conversations while on the unit.     Outcome: Ongoing, Progressing    Pt had logical organized thinking all shift     Problem: Behavioral Health Plan of Care  Goal: Patient-Specific Goal (Individualization)  Description: Patient will report at least 6-8 hours of sleep each night.   Patient will complete all ADL's independently while on the unit.   Patient will be compliant with treatment team recommendations.     Pt will eat at least 50% of meals and have enough fluid intake.  Outcome: Ongoing, Progressing   Goal Outcome Evaluation:    Plan of Care Reviewed With: patient     0730 Face to face rounding complete.  Pt introduced to nursing for the shift.    Pt was up and active this shift attending the available groups and spent time watching TV in the dayroom.  PT showered this morning asking for supplies right after breakfast.  Pt's affect has continued to get brighter some every day this week. He is hopeful to go sometime next week top [place].  Pt's tremor is still present slightly in the morning and a bit more significant by afternoon.  Pt told me that it is not a big deal and he will see if the lowering of the Lithium will help more.     1500 Face to face end of shift report communicated to evening shift RN's along with Pt's fall risk.      Joon Funk, TRAE  7/30/2022

## 2022-07-30 NOTE — PLAN OF CARE
Problem: Behavioral Health Plan of Care  Goal: Patient-Specific Goal (Individualization)  Description: Patient will report at least 6-8 hours of sleep each night.   Patient will complete all ADL's independently while on the unit.   Patient will be compliant with treatment team recommendations.     Pt will eat at least 50% of meals and have enough fluid intake.  Outcome: Ongoing, Progressing     Problem: Thought Process Alteration  Goal: Optimal Thought Clarity  Description: Patient will hold reality based conversations while on the unit.     Outcome: Ongoing, Progressing     Problem: Suicide Risk  Goal: Absence of Self-Harm  Outcome: Ongoing, Progressing      Pt denies pain, depression, HA/VH, and SI/HI. Pt continues with some anxiety. Pt is out in lounge with peers. Pt is using appropriate behavior with staff and peers. Pt is attending groups. Pt is medication compliant. Pt ate 100% of dinner in lounge. Pt is medication compliant and VSS. Pt's hand tremors are more significant than yesterday. PRN Zyprexa 10mg given at 2025 per Pt request.      Face to face report will be communicated to oncoming RN.    Danica Hawkins RN  7/30/2022

## 2022-07-30 NOTE — PLAN OF CARE
Problem: Behavioral Health Plan of Care  Goal: Patient-Specific Goal (Individualization)  Description: Patient will report at least 6-8 hours of sleep each night.   Patient will complete all ADL's independently while on the unit.   Patient will be compliant with treatment team recommendations.     Pt will eat at least 50% of meals and have enough fluid intake.  Outcome: Ongoing, Progressing   Goal Outcome Evaluation:      Face to face shift report received from RN. Rounding completed, pt observed. Client rested in room for 7 hours with eyes closed and respirations noted.Face to face report will be communicated to oncoming RN.    Didier Brooks RN  7/30/2022  6:16 AM

## 2022-07-31 PROCEDURE — 250N000013 HC RX MED GY IP 250 OP 250 PS 637: Performed by: NURSE PRACTITIONER

## 2022-07-31 PROCEDURE — 99233 SBSQ HOSP IP/OBS HIGH 50: CPT | Performed by: NURSE PRACTITIONER

## 2022-07-31 PROCEDURE — 124N000001 HC R&B MH

## 2022-07-31 RX ORDER — LITHIUM CARBONATE 450 MG
450 TABLET, EXTENDED RELEASE ORAL AT BEDTIME
Status: DISCONTINUED | OUTPATIENT
Start: 2022-07-31 | End: 2022-09-02 | Stop reason: HOSPADM

## 2022-07-31 RX ADMIN — PREGABALIN 75 MG: 75 CAPSULE ORAL at 20:21

## 2022-07-31 RX ADMIN — MEMANTINE 10 MG: 10 TABLET ORAL at 08:58

## 2022-07-31 RX ADMIN — LORAZEPAM 1 MG: 1 TABLET ORAL at 08:58

## 2022-07-31 RX ADMIN — LITHIUM CARBONATE 450 MG: 450 TABLET, EXTENDED RELEASE ORAL at 20:21

## 2022-07-31 RX ADMIN — OLANZAPINE 10 MG: 10 TABLET ORAL at 20:21

## 2022-07-31 RX ADMIN — LORAZEPAM 1 MG: 1 TABLET ORAL at 13:56

## 2022-07-31 RX ADMIN — MEMANTINE 10 MG: 10 TABLET ORAL at 20:21

## 2022-07-31 RX ADMIN — PREGABALIN 75 MG: 75 CAPSULE ORAL at 08:58

## 2022-07-31 RX ADMIN — PREGABALIN 75 MG: 75 CAPSULE ORAL at 13:56

## 2022-07-31 RX ADMIN — LORAZEPAM 0.5 MG: 0.5 TABLET ORAL at 16:29

## 2022-07-31 RX ADMIN — ERGOCALCIFEROL 50000 UNITS: 1.25 CAPSULE, LIQUID FILLED ORAL at 13:56

## 2022-07-31 RX ADMIN — CLOZAPINE 175 MG: 100 TABLET ORAL at 20:21

## 2022-07-31 RX ADMIN — MULTIPLE VITAMINS W/ MINERALS TAB 1 TABLET: TAB at 08:58

## 2022-07-31 ASSESSMENT — ACTIVITIES OF DAILY LIVING (ADL)
ADLS_ACUITY_SCORE: 42
ADLS_ACUITY_SCORE: 42
LAUNDRY: UNABLE TO COMPLETE
ADLS_ACUITY_SCORE: 42
HYGIENE/GROOMING: INDEPENDENT
ORAL_HYGIENE: INDEPENDENT
ADLS_ACUITY_SCORE: 42
ADLS_ACUITY_SCORE: 42
DRESS: SCRUBS (BEHAVIORAL HEALTH)
HYGIENE/GROOMING: INDEPENDENT
ADLS_ACUITY_SCORE: 42
DRESS: SCRUBS (BEHAVIORAL HEALTH);INDEPENDENT
LAUNDRY: UNABLE TO COMPLETE
ADLS_ACUITY_SCORE: 42
ORAL_HYGIENE: INDEPENDENT

## 2022-07-31 NOTE — PLAN OF CARE
"  Problem: Behavioral Health Plan of Care  Goal: Patient-Specific Goal (Individualization)  Description: Patient will report at least 6-8 hours of sleep each night.   Patient will complete all ADL's independently while on the unit.   Patient will be compliant with treatment team recommendations.     Pt will eat at least 50% of meals and have enough fluid intake.  Outcome: Ongoing, Progressing     Problem: Thought Process Alteration  Goal: Optimal Thought Clarity  Description: Patient will hold reality based conversations while on the unit.     Outcome: Ongoing, Progressing     Problem: Suicide Risk  Goal: Absence of Self-Harm  Outcome: Ongoing, Progressing     Problem: Suicidal Behavior  Goal: Suicidal Behavior is Absent or Managed  Outcome: Ongoing, Progressing        Pt denies pain, depression, HA/VH, and SI/HI. Pt continues with some anxiety. Pt is out in lounge with peers. Pt is using appropriate behavior with staff and peers. Pt is attending groups. Pt is medication compliant. Pt ate 100% of dinner in lounge. Pt is medication compliant and VSS. Pt's hand tremors are less than yesterday. 2050 Pt told this writer and staff \" I sometimes feel dizziness when I get out of bed at night\". This writer educated Pt to sit at bedside for a few moments before standing up. Pt given tap bell. PRN Zyprexa 10mg given at 2021 per Pt request.     Face to face report will be communicated to oncoming RN.    Danica Hawkins RN  7/31/2022           "

## 2022-07-31 NOTE — PLAN OF CARE
Problem: Suicide Risk  Goal: Absence of Self-Harm  Intervention: Promote Psychosocial Wellbeing  Recent Flowsheet Documentation  Taken 7/31/2022 1000 by Joon Funk RN  Supportive Measures:    active listening utilized    verbalization of feelings encouraged    self-responsibility promoted    self-reflection promoted    self-care encouraged    Pt denies SI and reports feeling pretty good now     Problem: Thought Process Alteration  Goal: Optimal Thought Clarity  Description: Patient will hold reality based conversations while on the unit.     Outcome: Ongoing, Progressing    Pt's thinking is logical, organized, and rational     Problem: Behavioral Health Plan of Care  Goal: Patient-Specific Goal (Individualization)  Description: Patient will report at least 6-8 hours of sleep each night.   Patient will complete all ADL's independently while on the unit.   Patient will be compliant with treatment team recommendations.     Pt will eat at least 50% of meals and have enough fluid intake.  Outcome: Ongoing, Progressing  Flowsheets (Taken 7/31/2022 1302)  Patient Vulnerabilities:    history of unsuccessful treatment    limited support system  Patient Personal Strengths: ability to maintain sobriety   Goal Outcome Evaluation:    Plan of Care Reviewed With: patient     0730 Face to face rounding complete.  Pt introduced to nursing for the shift.    Pt was up and active today attending the available groups.  Pt spent time watching TV in the dayroom mostly withdrawn to himself.  PT talked with me about trying college twice in the past but having to drop out due to his mental illness.  Pt also told me that his is not on Social Security disability and is not interested because he wants to work and take care of himself.  Pt shared that he is feeling really good now and wants to work on goals in his life.  Pt's affect is a still mostly flat, but he at times will smile some and has more inflection in his voice.  Pt again  showered today independently. He was encouraged to shave or trim his beard but said that he is not done growing it out.      1500 Face to face end of shift report communicated to Evening shift RN's along with Pt's fall risk.     Joon Funk RN  7/31/2022

## 2022-07-31 NOTE — PLAN OF CARE
Problem: Behavioral Health Plan of Care  Goal: Patient-Specific Goal (Individualization)  Description: Patient will report at least 6-8 hours of sleep each night.   Patient will complete all ADL's independently while on the unit.   Patient will be compliant with treatment team recommendations.     Pt will eat at least 50% of meals and have enough fluid intake.  Outcome: Ongoing, Progressing   Goal Outcome Evaluation:      Face to face shift report received from RN. Rounding completed, pt observed. Client rested in room for 7 hours with eyes closed and respirations noted. No signs of distress.Face to face report will be communicated to oncoming RN.    Didier Brooks RN  7/31/2022  6:41 AM

## 2022-07-31 NOTE — PROGRESS NOTES
"[facility] PSYCHIATRY  PROGRESS NOTE     SUBJECTIVE   I met with pt this morning in the group room. Steven held out his hand with noted tremor, although this has improved he would like to try reducing his lithium to 450 mg which is reasonable. He also requests his Clozaril to be increased to 175 mg, pt noted the correct dosages. I asked why he wanted to increase the Clozaril, \" I believe this improves my mood-but its not like a happy thing, its more of a mood lift. It is really hard to explain\". He is looking forward to Veronica's return and finding out about [facility]. Pt is out in the dayroom most of the shift. Converses with others.      MEDICATIONS   Scheduled Meds:    cloZAPine  150 mg Oral At Bedtime     lithium  600 mg Oral At Bedtime     LORazepam  0.5 mg Oral Daily     LORazepam  1 mg Oral BID     memantine  10 mg Oral BID     multivitamin w/minerals  1 tablet Oral Daily     pregabalin  75 mg Oral TID     vitamin D2  50,000 Units Oral Q7 Days     PRN Meds:.acetaminophen, alum & mag hydroxide-simethicone, docusate sodium, hydrOXYzine, OLANZapine **OR** OLANZapine, sulindac     ALLERGIES   Allergies   Allergen Reactions     Pork Allergy         MENTAL STATUS EXAM   Vitals: /79   Pulse 76   Temp 98.6  F (37  C) (Temporal)   Resp 16   Wt 80.5 kg (177 lb 6.4 oz)   SpO2 99%   BMI 24.06 kg/m      Appearance: Hair and beard are over grown-pt is clean/showered. Awake, alert, dressed in hospital scrubs  Attitude:  cooperative and guarded  Eye Contact: Improving  Mood:  Restricted \"better\"  Affect:  intensity is flat, beginning to show some emotion.  Speech: monotone, soft spoken, engaging in more conversation  Psychomotor Behavior:  no evidence of tardive dyskinesia, dystonia, or tics does have significant tremor likely lithium induced  Thought Process:  Seems linear, concrete  Associations: none noted  Thought Content:  no evidence of suicidal ideation or homicidal ideation and no evidence of psychotic " thought, denies hallucinations  Insight:  limited  Judgment:  limited  Oriented to:  time, person, and place  Attention Span and Concentration:  limited  Recent and Remote Memory:  limited  Fund of Knowledge: delayed  Muscle Strength and Tone: normal  Gait and Station: gait normal       LABS   No results found for this or any previous visit (from the past 24 hour(s)).      IMPRESSION     This is a 31 year old male with a PMH of traumatic brain injury with coma and significant brain damage as evidenced in MRI as well as multiple episodes of catatonia which can lead to food refusal.  He appears to have lost a very large amount of weight since his last hospitalization and is cachectic though his labs are unremarkable.  It is unclear when the lorazepam was discontinued and why it was discontinued.  He does state he feels Klonopin to be more beneficial.  It is imperative we restart benzodiazepines due to his catatonia as his food intake is minimal and he has lost a large amount of weight.  He also has unprovoked aggression at times likely due to catatonia versus psychosis.  I did speak with him about starting clozapine for psychosis/catatonia.  I explained that it was an antipsychotic that required weekly white blood cell count to monitor for side effects of neutropenia and explained what this was.  He did sign the neuroleptic consent.  He does admit that he would like to feel better and that he feels extremely flat and emotionless.       DIAGNOSES     1. Schizoaffective disorder, depressive type, multiple episodes, currently in acute episode, with catatonia   2. 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 Multiple other TBIs  3. 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 Methamphetamine use disorder, moderate, early remission  5. Alcohol use disorder, moderate  6. Severe malnutrition       PLAN     Location: Unit 5  Legal Status: Voluntary    Safety  Assessment:    Behavioral Orders   Procedures     Code 1 - Restrict to Unit     Routine Programming     As clinically indicated     Status 15     Every 15 minutes.      PTA medications continued/changed:     -Stopped Wellbutrin due to starting Clozaril. Both decrease seizure threshhold  -Stopped Abilify (not effective)    -Namenda 10 mg BID  -Lyrica 100 mg TID  -Clonazepam restarted at 1 mg TID- switched to Lorazepam 1 mg TID on 7/23    New medications tried and stopped:     -None    New medications initiated:     -Clozapine 12.5 mg at bedtime on 7/19.-> Increase to 25 mg at bedtime on 7/20 -> 50 mg on 7/23 -> 75 mg on 7/24 ->100 mg on 7/26 -> 125 mg on 7/28 -> 150 mg on 7/30 -> 175 mg on 7/31  -Start Vitamin D2 50,000 units weekly x 8 weeks- Sundays    Today's Changes:    Clozaril increased from 150 mg to 175 mg  Lithium 600 mg decreased to 450 mg        Programming: Patient will be treated in a therapeutic milieu with appropriate individual and group therapies. Education will be provided on diagnoses, medications, and treatments.     Medical diagnoses:  Per medicine. Monitoring electrolytes for refeeding syndrome.    Consult: Nutrition    Tests: Weekly CBC  ANC on 7/25 was 4.3                Anticipated LOS: likely 3-4 weeks for stabilization  Disposition: TBI GH, B&L?       ATTESTATION      Ary Palmer NP         TREATMENT TEAM CARE PLAN     Progress: Continued symptoms.     Continued Stay Criteria/Rationale: Continued symptoms without sufficient improvement/resolution. Titrating Clozapine.    Medical/Physical: See above.    Precautions: See above.     Plan: Continue inpatient care with unit support and medication management.    Rationale for change in precautions or plan: NA due to no change.    Participants: Ary Palmer NP  , Nursing, SW, OT.    The patient's care was discussed with the treatment team and chart notes were reviewed.

## 2022-08-01 LAB
ANION GAP SERPL CALCULATED.3IONS-SCNC: 3 MMOL/L (ref 3–14)
BASOPHILS # BLD AUTO: 0 10E3/UL (ref 0–0.2)
BASOPHILS NFR BLD AUTO: 0 %
BUN SERPL-MCNC: 19 MG/DL (ref 7–30)
CALCIUM SERPL-MCNC: 9 MG/DL (ref 8.5–10.1)
CHLORIDE BLD-SCNC: 106 MMOL/L (ref 94–109)
CK SERPL-CCNC: 55 U/L (ref 30–300)
CO2 SERPL-SCNC: 28 MMOL/L (ref 20–32)
CREAT SERPL-MCNC: 0.85 MG/DL (ref 0.66–1.25)
CRP SERPL-MCNC: 15.4 MG/L (ref 0–8)
EOSINOPHIL # BLD AUTO: 0.2 10E3/UL (ref 0–0.7)
EOSINOPHIL # BLD AUTO: 0.2 10E3/UL (ref 0–0.7)
EOSINOPHIL # BLD AUTO: 0.3 10E3/UL (ref 0–0.7)
EOSINOPHIL NFR BLD AUTO: 2 %
ERYTHROCYTE [DISTWIDTH] IN BLOOD BY AUTOMATED COUNT: 12.9 % (ref 10–15)
ERYTHROCYTE [DISTWIDTH] IN BLOOD BY AUTOMATED COUNT: 13 % (ref 10–15)
ERYTHROCYTE [DISTWIDTH] IN BLOOD BY AUTOMATED COUNT: 13 % (ref 10–15)
FLUAV RNA SPEC QL NAA+PROBE: NEGATIVE
FLUBV RNA RESP QL NAA+PROBE: NEGATIVE
GFR SERPL CREATININE-BSD FRML MDRD: >90 ML/MIN/1.73M2
GLUCOSE BLD-MCNC: 92 MG/DL (ref 70–99)
HCT VFR BLD AUTO: 44.2 % (ref 40–53)
HCT VFR BLD AUTO: 45.6 % (ref 40–53)
HCT VFR BLD AUTO: 46 % (ref 40–53)
HGB BLD-MCNC: 14.5 G/DL (ref 13.3–17.7)
HGB BLD-MCNC: 14.7 G/DL (ref 13.3–17.7)
HGB BLD-MCNC: 14.9 G/DL (ref 13.3–17.7)
HOLD SPECIMEN: NORMAL
IMM GRANULOCYTES # BLD: 0.1 10E3/UL
IMM GRANULOCYTES NFR BLD: 1 %
LYMPHOCYTES # BLD AUTO: 0.6 10E3/UL (ref 0.8–5.3)
LYMPHOCYTES # BLD AUTO: 0.6 10E3/UL (ref 0.8–5.3)
LYMPHOCYTES # BLD AUTO: 0.7 10E3/UL (ref 0.8–5.3)
LYMPHOCYTES NFR BLD AUTO: 5 %
LYMPHOCYTES NFR BLD AUTO: 5 %
LYMPHOCYTES NFR BLD AUTO: 7 %
MCH RBC QN AUTO: 29.7 PG (ref 26.5–33)
MCH RBC QN AUTO: 29.7 PG (ref 26.5–33)
MCH RBC QN AUTO: 29.9 PG (ref 26.5–33)
MCHC RBC AUTO-ENTMCNC: 32 G/DL (ref 31.5–36.5)
MCHC RBC AUTO-ENTMCNC: 32.7 G/DL (ref 31.5–36.5)
MCHC RBC AUTO-ENTMCNC: 32.8 G/DL (ref 31.5–36.5)
MCV RBC AUTO: 91 FL (ref 78–100)
MCV RBC AUTO: 92 FL (ref 78–100)
MCV RBC AUTO: 93 FL (ref 78–100)
MONOCYTES # BLD AUTO: 0.5 10E3/UL (ref 0–1.3)
MONOCYTES # BLD AUTO: 0.8 10E3/UL (ref 0–1.3)
MONOCYTES # BLD AUTO: 0.8 10E3/UL (ref 0–1.3)
MONOCYTES NFR BLD AUTO: 5 %
MONOCYTES NFR BLD AUTO: 7 %
MONOCYTES NFR BLD AUTO: 7 %
NEUTROPHILS # BLD AUTO: 8 10E3/UL (ref 1.6–8.3)
NEUTROPHILS # BLD AUTO: 9.7 10E3/UL (ref 1.6–8.3)
NEUTROPHILS # BLD AUTO: 9.8 10E3/UL (ref 1.6–8.3)
NEUTROPHILS NFR BLD AUTO: 85 %
NRBC # BLD AUTO: 0 10E3/UL
NRBC BLD AUTO-RTO: 0 /100
PLATELET # BLD AUTO: 238 10E3/UL (ref 150–450)
PLATELET # BLD AUTO: 241 10E3/UL (ref 150–450)
PLATELET # BLD AUTO: 244 10E3/UL (ref 150–450)
POTASSIUM BLD-SCNC: 3.9 MMOL/L (ref 3.4–5.3)
RBC # BLD AUTO: 4.88 10E6/UL (ref 4.4–5.9)
RBC # BLD AUTO: 4.95 10E6/UL (ref 4.4–5.9)
RBC # BLD AUTO: 4.98 10E6/UL (ref 4.4–5.9)
RSV RNA SPEC NAA+PROBE: NEGATIVE
SARS-COV-2 RNA RESP QL NAA+PROBE: NEGATIVE
SODIUM SERPL-SCNC: 137 MMOL/L (ref 133–144)
TROPONIN I SERPL HS-MCNC: 4 NG/L
WBC # BLD AUTO: 11.4 10E3/UL (ref 4–11)
WBC # BLD AUTO: 11.5 10E3/UL (ref 4–11)
WBC # BLD AUTO: 9.4 10E3/UL (ref 4–11)

## 2022-08-01 PROCEDURE — 250N000013 HC RX MED GY IP 250 OP 250 PS 637: Performed by: NURSE PRACTITIONER

## 2022-08-01 PROCEDURE — 82550 ASSAY OF CK (CPK): CPT | Performed by: PSYCHIATRY & NEUROLOGY

## 2022-08-01 PROCEDURE — 36415 COLL VENOUS BLD VENIPUNCTURE: CPT | Performed by: PSYCHIATRY & NEUROLOGY

## 2022-08-01 PROCEDURE — 250N000013 HC RX MED GY IP 250 OP 250 PS 637: Performed by: PSYCHIATRY & NEUROLOGY

## 2022-08-01 PROCEDURE — 93010 ELECTROCARDIOGRAM REPORT: CPT | Performed by: INTERNAL MEDICINE

## 2022-08-01 PROCEDURE — 85025 COMPLETE CBC W/AUTO DIFF WBC: CPT | Performed by: NURSE PRACTITIONER

## 2022-08-01 PROCEDURE — 86140 C-REACTIVE PROTEIN: CPT | Performed by: PSYCHIATRY & NEUROLOGY

## 2022-08-01 PROCEDURE — 36415 COLL VENOUS BLD VENIPUNCTURE: CPT | Performed by: NURSE PRACTITIONER

## 2022-08-01 PROCEDURE — 93005 ELECTROCARDIOGRAM TRACING: CPT

## 2022-08-01 PROCEDURE — 80048 BASIC METABOLIC PNL TOTAL CA: CPT | Performed by: PSYCHIATRY & NEUROLOGY

## 2022-08-01 PROCEDURE — 99233 SBSQ HOSP IP/OBS HIGH 50: CPT | Mod: 95 | Performed by: PSYCHIATRY & NEUROLOGY

## 2022-08-01 PROCEDURE — 84484 ASSAY OF TROPONIN QUANT: CPT | Performed by: PSYCHIATRY & NEUROLOGY

## 2022-08-01 PROCEDURE — 87637 SARSCOV2&INF A&B&RSV AMP PRB: CPT | Performed by: PSYCHIATRY & NEUROLOGY

## 2022-08-01 PROCEDURE — 85025 COMPLETE CBC W/AUTO DIFF WBC: CPT | Performed by: PSYCHIATRY & NEUROLOGY

## 2022-08-01 PROCEDURE — 124N000001 HC R&B MH

## 2022-08-01 RX ORDER — CLOZAPINE 100 MG/1
200 TABLET ORAL AT BEDTIME
Status: DISCONTINUED | OUTPATIENT
Start: 2022-08-01 | End: 2022-08-02

## 2022-08-01 RX ADMIN — LITHIUM CARBONATE 450 MG: 450 TABLET, EXTENDED RELEASE ORAL at 20:32

## 2022-08-01 RX ADMIN — MEMANTINE 10 MG: 10 TABLET ORAL at 20:32

## 2022-08-01 RX ADMIN — MEMANTINE 10 MG: 10 TABLET ORAL at 08:15

## 2022-08-01 RX ADMIN — LORAZEPAM 0.5 MG: 0.5 TABLET ORAL at 16:53

## 2022-08-01 RX ADMIN — MULTIPLE VITAMINS W/ MINERALS TAB 1 TABLET: TAB at 08:15

## 2022-08-01 RX ADMIN — HYDROXYZINE HYDROCHLORIDE 25 MG: 25 TABLET, FILM COATED ORAL at 19:02

## 2022-08-01 RX ADMIN — LORAZEPAM 1 MG: 1 TABLET ORAL at 08:15

## 2022-08-01 RX ADMIN — LORAZEPAM 1 MG: 1 TABLET ORAL at 12:39

## 2022-08-01 RX ADMIN — ACETAMINOPHEN 325MG 650 MG: 325 TABLET ORAL at 16:49

## 2022-08-01 RX ADMIN — PREGABALIN 75 MG: 75 CAPSULE ORAL at 13:14

## 2022-08-01 RX ADMIN — PREGABALIN 75 MG: 75 CAPSULE ORAL at 08:15

## 2022-08-01 RX ADMIN — CLOZAPINE 200 MG: 100 TABLET ORAL at 20:32

## 2022-08-01 RX ADMIN — PREGABALIN 75 MG: 75 CAPSULE ORAL at 20:32

## 2022-08-01 ASSESSMENT — ACTIVITIES OF DAILY LIVING (ADL)
HYGIENE/GROOMING: INDEPENDENT
ADLS_ACUITY_SCORE: 42
ADLS_ACUITY_SCORE: 42
LAUNDRY: UNABLE TO COMPLETE
ADLS_ACUITY_SCORE: 42
ADLS_ACUITY_SCORE: 42
HYGIENE/GROOMING: INDEPENDENT
ADLS_ACUITY_SCORE: 42
DRESS: SCRUBS (BEHAVIORAL HEALTH)
ADLS_ACUITY_SCORE: 42
ADLS_ACUITY_SCORE: 42
ORAL_HYGIENE: INDEPENDENT
ORAL_HYGIENE: INDEPENDENT
ADLS_ACUITY_SCORE: 42
LAUNDRY: UNABLE TO COMPLETE
ADLS_ACUITY_SCORE: 42
DRESS: INDEPENDENT;SCRUBS (BEHAVIORAL HEALTH)
ADLS_ACUITY_SCORE: 42

## 2022-08-01 NOTE — PLAN OF CARE
Problem: Behavioral Health Plan of Care  Goal: Patient-Specific Goal (Individualization)  Description: Patient will report at least 6-8 hours of sleep each night.   Patient will complete all ADL's independently while on the unit.   Patient will be compliant with treatment team recommendations.     Pt will eat at least 50% of meals and have enough fluid intake.  Outcome: Ongoing, Progressing   Goal Outcome Evaluation:      Face to face shift report received from RN. Rounding completed, pt observed.Client rested in room for 7 hours with eyes closed and respirations noted. No signs of distress.Face to face report will be communicated to oncoming RN.    Didier Brooks RN  8/1/2022  6:34 AM

## 2022-08-01 NOTE — PROGRESS NOTES
"    [facility] PSYCHIATRY  -  PROGRESS NOTE     ID   Name: Steven Carter  MRN#: [id number]     SUBJECTIVE   Prior to interviewing the patient, I met with nursing and reviewed patient's clinical condition. We discussed clinical care both before and after the interview. I have reviewed the patient's clinical course by review of records including previous notes, labs, and vital signs.     Per nursing, the patient had the following behavioral events over the last 24-hours: none. He has been attending groups and interacting with peers.     On psychiatric interview, Steven is met within his room. He states today he is \"40/60\". When asked to explain what this means he states \"40 bad and 60 good\".  He states when he was admitted it was \"90 bad 10 good\" and thus he views his hospitalization as helpful. When I asked him how he feels about this change, he states \"I am not really sure because the change has been gradual, so that is good\". He is not able to share why he took evening olanzapine last night. We discussed simplifying his regimen such that he uses clozapine as his primary antipsychotic. He agrees to increase to 200 mg at bedtime starting tonight.  He denies any safety concerns, denies SI, no plan, no intent. He states he has been able to interact with his peers much better compared to admission.   He reports he would like to go to [place] post-discharge. We discussed the importance of keeping an open mind should this not be a possibility for him.        MEDICATIONS   Scheduled Meds:    cloZAPine  175 mg Oral At Bedtime     lithium  450 mg Oral At Bedtime     LORazepam  0.5 mg Oral Daily     LORazepam  1 mg Oral BID     memantine  10 mg Oral BID     multivitamin w/minerals  1 tablet Oral Daily     pregabalin  75 mg Oral TID     vitamin D2  50,000 Units Oral Q7 Days     PRN Meds:.acetaminophen, alum & mag hydroxide-simethicone, docusate sodium, hydrOXYzine, OLANZapine **OR** OLANZapine, sulindac     ALLERGIES " "  Allergies   Allergen Reactions     Pork Allergy         VITALS   Vitals: /67 (BP Location: Right arm)   Pulse 86   Temp 97.3  F (36.3  C) (Temporal)   Resp 16   Wt 80.5 kg (177 lb 6.4 oz)   SpO2 99%   BMI 24.06 kg/m       MENTAL STATUS EXAM     Appearance: Hair and beard are over grown-pt is clean/showered. Awake, alert, dressed in hospital scrubs  Attitude:  cooperative and guarded  Eye Contact: Improving  Mood:  \"pretty good\"  Affect:  intensity remains blunted, likely at baseline  Speech: monotone, soft spoken, engaging in more conversation  Psychomotor Behavior:  no evidence of tardive dyskinesia, dystonia, or tics does have significant tremor likely lithium induced  Thought Process:  Seems linear, concrete  Associations: none noted  Thought Content:  no evidence of suicidal ideation or homicidal ideation and no evidence of psychotic thought, denies hallucinations  Insight:  limited  Judgment:  limited  Oriented to:  time, person, and place  Attention Span and Concentration:  limited  Recent and Remote Memory:  limited  Fund of Knowledge: delayed  Muscle Strength and Tone: normal  Gait and Station: gait normal          LABS   No results found for this or any previous visit (from the past 24 hour(s)).      ASSESSMENT   This is a 31 year old male with a PMH of traumatic brain injury with coma and significant brain damage as evidenced in MRI as well as multiple episodes of catatonia which can lead to food refusal.  He appears to have lost a very large amount of weight since his last hospitalization and is cachectic though his labs are unremarkable.  It is unclear when the lorazepam was discontinued and why it was discontinued. He has been tolerating re-initaition of lorazepam, less signs of catatonia. He was started on clozapine titration.     Daily Progress: will increase clozapine to 200 mg at bedtime starting tonight. Would like to discontinue evening olanzapine and focus on clozapine monotherapy.  " No changes to lithium today.           DIAGNOSTIC FORMULATION   1. Schizoaffective disorder, depressive type, multiple episodes, currently in acute episode, with catatonia   2. 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 Multiple other TBIs  3. 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 Methamphetamine use disorder, moderate, early remission  5. Alcohol use disorder, moderate  6. Severe malnutrition     PLAN     Location: Unit 5  Legal Status: None    Safety Assessment:    Behavioral Orders   Procedures     Code 1 - Restrict to Unit     Routine Programming     As clinically indicated     Status 15     Every 15 minutes.      PTA medications continued/changed:      -Stopped Wellbutrin due to starting Clozaril. Both decrease seizure threshhold  -Stopped Abilify (not effective)     -Namenda 10 mg BID  -Lyrica 100 mg TID  -Clonazepam restarted at 1 mg TID- switched to Lorazepam 1 mg TID on 7/23     New medications tried and stopped:      -None     New medications initiated:      -Clozapine 12.5 mg at bedtime on 7/19.-> Increase to 25 mg at bedtime on 7/20 -> 50 mg on 7/23 -> 75 mg on 7/24 ->100 mg on 7/26 -> 125 mg on 7/28 -> 150 mg on 7/30 -> 175 mg on 7/31 --> 200 mg on 08/01  -Start Vitamin D2 50,000 units weekly x 8 weeks- Sundays     Today's Changes:     Clozaril increased from 175 mg to 200 mg    Programming: Patient will be treated in a therapeutic milieu with appropriate individual and group therapies. Education will be provided on diagnoses, medications, and treatments. Encouraged behavioral activation and participation in group programming.     Medical diagnoses:  Per medicine    Disposition: pending clinical course and stabilization on clozapine dose       TREATMENT TEAM CARE PLAN     Progress: Continued symptoms.    Continued Stay Criteria/Rationale: Continued symptoms without sufficient improvement/resolution.    Medical/Physical: See  above.    Precautions: See above.     Plan: Continue inpatient care with unit support and medication management.    Rationale for change in precautions or plan: NA due to no change.    Participants: Joshua Somers MD, Nursing, SW, OT.    The patient's care was discussed with the treatment team and chart notes were reviewed.       ATTESTATION    Joshua Somers MD  [facility]   Psychiatry    Telehealth video visit that took place via an interactive audio and video telecommunications system using secure connection. Patient identity was verified.  Patient verbalized agreement and understanding of test ordered, diagnosis, treatment plan, education, and side effects of medications, if prescribed.        Addendum 08/01/22: Dr. Somers contacted by nursing around 4:30PM that patient was having a fever (102), tachycardia (119) and nausea.  No other symptoms reported.  Given the clozapine initiation, will order CBC w/ diff, BNP, EKG, troponin, CRP and CK.  Will r/o NMS and myocarditis.      Updated (17:50): CBC unremarkable. EKG shows sinus tachycardia. CRP, CK, troponin pending. I have passed this on to overnight call.

## 2022-08-01 NOTE — PLAN OF CARE
Face to face report received from Didier RN. Pt. Observed.     Problem: Behavioral Health Plan of Care  Goal: Patient-Specific Goal (Individualization)  Description: Patient will report at least 6-8 hours of sleep each night.   Patient will complete all ADL's independently while on the unit.   Patient will be compliant with treatment team recommendations.     Pt will eat at least 50% of meals and have enough fluid intake.  Outcome: Ongoing, Progressing    Pt. Denies HI, SI, hallucinations and pain. Pt. Reports to mild anxiety and depression. Pt. In agreement to update staff to thoughts feelings of wanting to harm self or others. Pt. Cooperative with medications and nursing assessment. Pt. Out to lounge for meals. Pt. attending unit programing today. Pt. Eating WDL. Pt. Offers no c/o issues with bowel and bladder.      Face to face end of shift report communicated to oncoming RN.     Liat Lai RN  8/1/2022  12:13 PM

## 2022-08-01 NOTE — PHARMACY
Pharmacy Clozapine Continuation Note    Patient's Name: Steven Carter  Patient's : 1990    Current cloZAPine regimen: 200 mg at bedtime  Has there been a known interruption in therapy for greater than/equal to 48 hours which would warrant retitration No    Recent ANC Value(s) for last 30 days:  2022: Absolute Neutrophils 4.0 10e3/uL (Ref range: 1.6 - 8.3 10e3/uL)  2022: Absolute Neutrophils 4.3 10e3/uL (Ref range: 1.6 - 8.3 10e3/uL)  2022: Absolute Neutrophils 8.0 10e3/uL (Ref range: 1.6 - 8.3 10e3/uL)      A REMS Dispense Authorization was obtained from the clozapine REMS prgram? Yes   A Dispense Rationale was needed to obtain the REMS Dispense Authorization? No   Is the ANC (if available) within recommended limits? Yes  Does the patient have any signs or symptoms of infection, including fever? No     REMS Patient ID: [id number]  Patient RDA: [id number]    Plan:  1. Continue clozapine therapy at 200 mg PO at bedtime.  2. A WBC with differential will be ordered at least weekly, NEXT DUE 2022. ANC values will be entered into the REMS program.    Angela Washington [facility]

## 2022-08-01 NOTE — PLAN OF CARE
Face to face end of shift report received from Liat CORDOVA RN.  [place] [facility]       Problem: Behavioral Health Plan of Care  Goal: Patient-Specific Goal (Individualization)  Description: Patient will report at least 6-8 hours of sleep each night.   Patient will complete all ADL's independently while on the unit.   Patient will be compliant with treatment team recommendations.     Pt will eat at least 50% of meals and have enough fluid intake.  Outcome: Ongoing, Progressing   Pt is pleasant and cooperative with writer.  Pt admits to ongoing anxiety and depression.  Denies SI, HI, and hallucinations.  C/o low back pain but declines any intervention at this time.  35450- Pt's pulse was 122 and and temp was 102.04.  Pt also c/o feeling nauseous and is visible tremerlous.  He denied any SOB or respiatory symptoms. Writer did notify provider.  Provider order multiple labs (see flowsheet), EKG, covid test, and for pt's pulse and temperature to be take a half hour later.  1649- Pt pulse was 119 and temp 102.  Pt requested PRN tylenol 650 mg at this time. Pt did ask for his lyrica to be increased, MD notified of this request.  1902- Pt requested PRN atarax for anxiety.  Pt makes all needs known.      2300- Writer is continuing care for pt through night shift.  At beginning of the shift pt appeared to be sleeping with normal breathing pattern. 0028- Pt requested PRN tylenol 650 mg for 4/10 back pain.  Pt went back to bed after taking the PRN.   Pt appeared to sleep on and off during the night.  Pt appeared to sleep about 6 hours.  Pt makes needs known.  Will continue to monitor.        Problem: Thought Process Alteration  Goal: Optimal Thought Clarity  Description: Patient will hold reality based conversations while on the unit.     Outcome: Ongoing, Progressing     Problem: Suicide Risk  Goal: Absence of Self-Harm  Outcome: Ongoing, Progressing     Problem: Suicidal Behavior  Goal: Suicidal Behavior is  Absent or Managed  Outcome: Ongoing, Progressing   Goal Outcome Evaluation:           Face to face end of shift report communicated to oncoming RN.     Prisca Erickson RN  8/1/2022

## 2022-08-01 NOTE — PLAN OF CARE
Pt was denied from [facility]. Faxed referral to [facility]. Pt states if he can't get anything around here then he would like [facility] or [facility].

## 2022-08-02 LAB
BASOPHILS # BLD AUTO: 0 10E3/UL (ref 0–0.2)
BASOPHILS NFR BLD AUTO: 0 %
EOSINOPHIL # BLD AUTO: 0.3 10E3/UL (ref 0–0.7)
EOSINOPHIL NFR BLD AUTO: 2 %
ERYTHROCYTE [DISTWIDTH] IN BLOOD BY AUTOMATED COUNT: 13.1 % (ref 10–15)
FLUAV RNA SPEC QL NAA+PROBE: NEGATIVE
FLUBV RNA RESP QL NAA+PROBE: NEGATIVE
GROUP A STREP BY PCR: NOT DETECTED
HCT VFR BLD AUTO: 43.1 % (ref 40–53)
HGB BLD-MCNC: 14.1 G/DL (ref 13.3–17.7)
HOLD SPECIMEN: NORMAL
IMM GRANULOCYTES # BLD: 0.1 10E3/UL
IMM GRANULOCYTES NFR BLD: 0 %
LYMPHOCYTES # BLD AUTO: 0.6 10E3/UL (ref 0.8–5.3)
LYMPHOCYTES NFR BLD AUTO: 5 %
MCH RBC QN AUTO: 29.7 PG (ref 26.5–33)
MCHC RBC AUTO-ENTMCNC: 32.7 G/DL (ref 31.5–36.5)
MCV RBC AUTO: 91 FL (ref 78–100)
MONOCYTES # BLD AUTO: 0.9 10E3/UL (ref 0–1.3)
MONOCYTES NFR BLD AUTO: 7 %
NEUTROPHILS # BLD AUTO: 10.5 10E3/UL (ref 1.6–8.3)
NEUTROPHILS NFR BLD AUTO: 86 %
NRBC # BLD AUTO: 0 10E3/UL
NRBC BLD AUTO-RTO: 0 /100
PLATELET # BLD AUTO: 224 10E3/UL (ref 150–450)
RBC # BLD AUTO: 4.75 10E6/UL (ref 4.4–5.9)
RSV RNA SPEC NAA+PROBE: NEGATIVE
SARS-COV-2 RNA RESP QL NAA+PROBE: NEGATIVE
WBC # BLD AUTO: 12.3 10E3/UL (ref 4–11)

## 2022-08-02 PROCEDURE — 250N000013 HC RX MED GY IP 250 OP 250 PS 637: Performed by: NURSE PRACTITIONER

## 2022-08-02 PROCEDURE — 124N000001 HC R&B MH

## 2022-08-02 PROCEDURE — 250N000011 HC RX IP 250 OP 636: Performed by: NURSE PRACTITIONER

## 2022-08-02 PROCEDURE — 99233 SBSQ HOSP IP/OBS HIGH 50: CPT | Mod: 95 | Performed by: STUDENT IN AN ORGANIZED HEALTH CARE EDUCATION/TRAINING PROGRAM

## 2022-08-02 PROCEDURE — 87651 STREP A DNA AMP PROBE: CPT | Performed by: STUDENT IN AN ORGANIZED HEALTH CARE EDUCATION/TRAINING PROGRAM

## 2022-08-02 PROCEDURE — 99232 SBSQ HOSP IP/OBS MODERATE 35: CPT | Performed by: NURSE PRACTITIONER

## 2022-08-02 PROCEDURE — 250N000013 HC RX MED GY IP 250 OP 250 PS 637: Performed by: STUDENT IN AN ORGANIZED HEALTH CARE EDUCATION/TRAINING PROGRAM

## 2022-08-02 PROCEDURE — 36415 COLL VENOUS BLD VENIPUNCTURE: CPT | Performed by: NURSE PRACTITIONER

## 2022-08-02 PROCEDURE — 87637 SARSCOV2&INF A&B&RSV AMP PRB: CPT | Performed by: NURSE PRACTITIONER

## 2022-08-02 PROCEDURE — 85025 COMPLETE CBC W/AUTO DIFF WBC: CPT | Performed by: NURSE PRACTITIONER

## 2022-08-02 RX ORDER — ONDANSETRON 4 MG/1
4 TABLET, FILM COATED ORAL EVERY 6 HOURS PRN
Status: DISCONTINUED | OUTPATIENT
Start: 2022-08-02 | End: 2022-08-04

## 2022-08-02 RX ADMIN — PREGABALIN 75 MG: 75 CAPSULE ORAL at 13:31

## 2022-08-02 RX ADMIN — ACETAMINOPHEN 325MG 650 MG: 325 TABLET ORAL at 16:44

## 2022-08-02 RX ADMIN — MULTIPLE VITAMINS W/ MINERALS TAB 1 TABLET: TAB at 08:21

## 2022-08-02 RX ADMIN — PREGABALIN 75 MG: 75 CAPSULE ORAL at 08:21

## 2022-08-02 RX ADMIN — MEMANTINE 10 MG: 10 TABLET ORAL at 08:21

## 2022-08-02 RX ADMIN — CLOZAPINE 175 MG: 100 TABLET ORAL at 20:05

## 2022-08-02 RX ADMIN — ACETAMINOPHEN 325MG 650 MG: 325 TABLET ORAL at 11:53

## 2022-08-02 RX ADMIN — LORAZEPAM 0.5 MG: 0.5 TABLET ORAL at 16:44

## 2022-08-02 RX ADMIN — ONDANSETRON HYDROCHLORIDE 4 MG: 4 TABLET, FILM COATED ORAL at 13:31

## 2022-08-02 RX ADMIN — MEMANTINE 10 MG: 10 TABLET ORAL at 20:05

## 2022-08-02 RX ADMIN — ACETAMINOPHEN 325MG 650 MG: 325 TABLET ORAL at 00:28

## 2022-08-02 RX ADMIN — PREGABALIN 75 MG: 75 CAPSULE ORAL at 20:05

## 2022-08-02 RX ADMIN — LORAZEPAM 1 MG: 1 TABLET ORAL at 13:31

## 2022-08-02 RX ADMIN — ONDANSETRON HYDROCHLORIDE 4 MG: 4 TABLET, FILM COATED ORAL at 19:37

## 2022-08-02 RX ADMIN — LORAZEPAM 1 MG: 1 TABLET ORAL at 08:21

## 2022-08-02 RX ADMIN — LITHIUM CARBONATE 450 MG: 450 TABLET, EXTENDED RELEASE ORAL at 20:05

## 2022-08-02 ASSESSMENT — ACTIVITIES OF DAILY LIVING (ADL)
DRESS: SCRUBS (BEHAVIORAL HEALTH);INDEPENDENT
ADLS_ACUITY_SCORE: 42
ORAL_HYGIENE: INDEPENDENT
DRESS: INDEPENDENT;SCRUBS (BEHAVIORAL HEALTH)
ADLS_ACUITY_SCORE: 42
ADLS_ACUITY_SCORE: 42
HYGIENE/GROOMING: INDEPENDENT
ADLS_ACUITY_SCORE: 42
LAUNDRY: UNABLE TO COMPLETE
ORAL_HYGIENE: INDEPENDENT
ADLS_ACUITY_SCORE: 42
ADLS_ACUITY_SCORE: 42
HYGIENE/GROOMING: INDEPENDENT
ADLS_ACUITY_SCORE: 42

## 2022-08-02 NOTE — PLAN OF CARE
"Face to face report received from Prisca LARKIN. Pt. Observed.     Problem: Behavioral Health Plan of Care  Goal: Patient-Specific Goal (Individualization)  Description: Patient will report at least 6-8 hours of sleep each night.   Patient will complete all ADL's independently while on the unit.   Patient will be compliant with treatment team recommendations.     Pt will eat at least 50% of meals and have enough fluid intake.  Outcome: Ongoing, Progressing   Pt. Denies HI, SI, hallucinations and pain. Pt. Reports to mild anxiety and 5/10 depression. Pt. C/o cold like s/s \"My nose is stuffed, my body aches, and I feel nauseated\" Provider updated in treatment team. Pt. In agreement to update staff to thoughts feelings of wanting to harm self or others. Pt. Cooperative with medications and nursing assessment. Pt. Out to lounge for meals. Pt. attending unit programing today. Pt. Eating WDL. Pt. Offers no c/o issues with bowel and bladder.     Pt. Orthostatic B/P abnormal as noted in flow sheet. Pt. Reports to dizziness when standing. Has temp of 101.2 Provider called to update x 2 awaiting call back. Medical provider called x 1. Provider updated on pt. Temp and Pt. Encouraged to drink fluids. Tylenol 650 mg po administer @ 1153 for temp. With effective results. Pt. Temp 99.3. Per pt. Medical provider spoke to him.   1331 Pt. Administered Zofran 4 mg po with effective results. Pt. Reporting he has an appetite, requesting dinner.  Pt. Showering.      Face to face end of shift report communicated to oncoming RN.     Liat Lai RN  8/2/2022  11:40 AM       "

## 2022-08-02 NOTE — PROGRESS NOTES
"  [facility] PSYCHIATRY  -  PROGRESS NOTE     ID   Name: Steven Carter  MRN#: [id number]     SUBJECTIVE     Staff report that the patient had a fever yesterday. Tylenol has been helping. He has been taking medications. No events in last 24 hours.    Patient notes that he has general body aches. He does admit to some chest pain that started around the same time last night. He has denied this to other providers multiple times. He notes radiation to his legs, possibly arms. He then describes just having general aches. He notes feeling dizzy and nauseous. He denies headache, confusion, sore throat, rhinorrhea, SOB, vomiting, diarrhea, or constipation. No rashes.     Patient is willing to reduce his Clozapine tonight. He would prefer to stop it but is willing to continue after discussing large risk in him decompensating. He is willing to continue too in light of risk of myocarditis. He agrees to do an echo as soon as available.    He denies any problems with his other medications. No safety concerns.       MEDICATIONS   Scheduled Meds:    cloZAPine  175 mg Oral At Bedtime     lithium  450 mg Oral At Bedtime     LORazepam  0.5 mg Oral Daily     LORazepam  1 mg Oral BID     memantine  10 mg Oral BID     multivitamin w/minerals  1 tablet Oral Daily     pregabalin  75 mg Oral TID     vitamin D2  50,000 Units Oral Q7 Days     PRN Meds:.acetaminophen, alum & mag hydroxide-simethicone, docusate sodium, hydrOXYzine, OLANZapine **OR** OLANZapine, ondansetron, sulindac     ALLERGIES   Allergies   Allergen Reactions     Pork Allergy         VITALS   Vitals: /72   Pulse 91   Temp 98.8  F (37.1  C) (Temporal)   Resp 16   Wt 80.5 kg (177 lb 6.4 oz)   SpO2 97%   BMI 24.06 kg/m       MENTAL STATUS EXAM     Appearance: Hair and beard are over grown-pt is clean/showered. Awake, alert, dressed in hospital scrubs  Attitude:  cooperative and guarded  Eye Contact: Improving  Mood:  \"Ok\"  Affect:  intensity remains blunted, " likely at baseline  Speech: monotone, soft spoken, engaging in more conversation  Psychomotor Behavior:  no evidence of tardive dyskinesia, dystonia, or tics, lithium tremor improved  Thought Process:  Seems linear, concrete  Associations: none noted  Thought Content:  no evidence of suicidal ideation or homicidal ideation and no evidence of psychotic thought, denies hallucinations  Insight:  limited  Judgment:  limited  Oriented to:  time, person, and place  Attention Span and Concentration:  limited  Recent and Remote Memory:  limited  Fund of Knowledge: Low  Muscle Strength and Tone: normal  Gait and Station: gait normal        LABS   Recent Results (from the past 24 hour(s))   Basic metabolic panel    Collection Time: 08/01/22  4:57 PM   Result Value Ref Range    Sodium 137 133 - 144 mmol/L    Potassium 3.9 3.4 - 5.3 mmol/L    Chloride 106 94 - 109 mmol/L    Carbon Dioxide (CO2) 28 20 - 32 mmol/L    Anion Gap 3 3 - 14 mmol/L    Urea Nitrogen 19 7 - 30 mg/dL    Creatinine 0.85 0.66 - 1.25 mg/dL    Calcium 9.0 8.5 - 10.1 mg/dL    Glucose 92 70 - 99 mg/dL    GFR Estimate >90 >60 mL/min/1.73m2   CBC with platelets and differential    Collection Time: 08/01/22  4:57 PM   Result Value Ref Range    WBC Count 11.4 (H) 4.0 - 11.0 10e3/uL    RBC Count 4.98 4.40 - 5.90 10e6/uL    Hemoglobin 14.9 13.3 - 17.7 g/dL    Hematocrit 45.6 40.0 - 53.0 %    MCV 92 78 - 100 fL    MCH 29.9 26.5 - 33.0 pg    MCHC 32.7 31.5 - 36.5 g/dL    RDW 12.9 10.0 - 15.0 %    Platelet Count 241 150 - 450 10e3/uL    % Neutrophils 85 %    % Lymphocytes 5 %    % Monocytes 7 %    % Eosinophils 2 %    % Basophils 0 %    % Immature Granulocytes 1 %    NRBCs per 100 WBC 0 <1 /100    Absolute Neutrophils 9.7 (H) 1.6 - 8.3 10e3/uL    Absolute Lymphocytes 0.6 (L) 0.8 - 5.3 10e3/uL    Absolute Monocytes 0.8 0.0 - 1.3 10e3/uL    Absolute Eosinophils 0.3 0.0 - 0.7 10e3/uL    Absolute Basophils 0.0 0.0 - 0.2 10e3/uL    Absolute Immature Granulocytes 0.1 <=0.4  10e3/uL    Absolute NRBCs 0.0 10e3/uL   CK total    Collection Time: 08/01/22  4:57 PM   Result Value Ref Range    CK 55 30 - 300 U/L   Troponin I    Collection Time: 08/01/22  4:57 PM   Result Value Ref Range    Troponin I High Sensitivity 4 <79 ng/L   CRP inflammation    Collection Time: 08/01/22  4:57 PM   Result Value Ref Range    CRP Inflammation 15.4 (H) 0.0 - 8.0 mg/L   Symptomatic; Yes; 8/1/2022 Influenza A/B & SARS-CoV2 (COVID-19) Virus PCR Multiplex Nose    Collection Time: 08/01/22  5:05 PM    Specimen: Nose; Swab   Result Value Ref Range    Influenza A PCR Negative Negative    Influenza B PCR Negative Negative    RSV PCR Negative Negative    SARS CoV2 PCR Negative Negative   Extra Blue Top Tube    Collection Time: 08/01/22  5:07 PM   Result Value Ref Range    Hold Specimen JIC    Extra Red Top Tube    Collection Time: 08/01/22  5:07 PM   Result Value Ref Range    Hold Specimen JIC    Extra Green Top (Lithium Heparin) Tube    Collection Time: 08/01/22  5:07 PM   Result Value Ref Range    Hold Specimen JIC    Extra Green Top (Lithium Heparin) Tube    Collection Time: 08/01/22  5:07 PM   Result Value Ref Range    Hold Specimen JIC    Extra Purple Top Tube    Collection Time: 08/01/22  5:09 PM   Result Value Ref Range    Hold Specimen JIC    CBC with platelets and differential    Collection Time: 08/01/22  5:09 PM   Result Value Ref Range    WBC Count 11.5 (H) 4.0 - 11.0 10e3/uL    RBC Count 4.88 4.40 - 5.90 10e6/uL    Hemoglobin 14.5 13.3 - 17.7 g/dL    Hematocrit 44.2 40.0 - 53.0 %    MCV 91 78 - 100 fL    MCH 29.7 26.5 - 33.0 pg    MCHC 32.8 31.5 - 36.5 g/dL    RDW 13.0 10.0 - 15.0 %    Platelet Count 244 150 - 450 10e3/uL    % Neutrophils 85 %    % Lymphocytes 5 %    % Monocytes 7 %    % Eosinophils 2 %    % Basophils 0 %    % Immature Granulocytes 1 %    NRBCs per 100 WBC 0 <1 /100    Absolute Neutrophils 9.8 (H) 1.6 - 8.3 10e3/uL    Absolute Lymphocytes 0.6 (L) 0.8 - 5.3 10e3/uL    Absolute Monocytes  0.8 0.0 - 1.3 10e3/uL    Absolute Eosinophils 0.2 0.0 - 0.7 10e3/uL    Absolute Basophils 0.0 0.0 - 0.2 10e3/uL    Absolute Immature Granulocytes 0.1 <=0.4 10e3/uL    Absolute NRBCs 0.0 10e3/uL         ASSESSMENT   This is a 31 year old male with a PMH of schizoaffective disorder, polysubstance abuse, traumatic brain injury with coma, and a near fatal overdose with cardiac arrest who presented in a severe depressive episode with catatonia, occurring in the context of medication changes (lorazepam being discontinued) and the patient having severe malnutrition due to poor intake.     The patient has improved dramatically since starting Clozapine and resuming Ativan with his catatonia improving, his engagement with the world, speech, and ability to interact with peers. The patient has improved to the point that he would benefit from discharge to a facility as soon as available with his medication regimen to continue     Today: The patient is having general symptoms likely representing a viral illness, with myocarditis needing to be ruled out, with an echo pending. No concerning signs of symptoms to warrant diagnosis at this point or treatment. Higher clinical suspicion is a viral infection either URI or gastroenteritis. Patient should improve in next couple of days. Continuing Clozapine at this point given his improvement and also the low likelihood of having myocarditis to warrant stopping right now.       DIAGNOSTIC FORMULATION   1. Schizoaffective disorder, depressive type, multiple episodes, currently in acute episode, with catatonia   2. 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 Multiple other TBIs  3. 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 Methamphetamine use disorder, moderate, in early remission  5. Alcohol use disorder, moderate     PLAN     Location: Unit 5  Legal Status: Orders Placed This Encounter      Legal status  Voluntary    Safety Assessment:    Behavioral Orders   Procedures     Code 1 - Restrict to Unit     Routine Programming     As clinically indicated     Status 15     Every 15 minutes.     PTA medications stopped    -Wellbutrin due to starting Clozaril. Both decrease seizure threshhold  -Abilify due to not being effective    PTA medications continued/changed:      -Namenda 10 mg BID  -Lyrica 100 mg TID  -Clonazepam restarted at 1 mg TID- switched to Lorazepam 1 mg TID on 7/23. Further educed to 0.5 mg/1mg/1mg.     New medications tried and stopped:      -None     New medications initiated:      -Clozapine 12.5 mg at bedtime on 7/19.-> Increase to 25 mg at bedtime on 7/20 -> 50 mg on 7/23 -> 75 mg on 7/24 ->100 mg on 7/26 -> 125 mg on 7/28 -> 150 mg on 7/30 -> 175 mg on 7/31 --> 200 mg on 08/01 -> 175 mg on 8/2  -Vitamin D2 50,000 units weekly x 8 weeks     Today's Changes:     Clozaril reduced from 200 mg at bedtime to 175 mg     Programming: Patient will be treated in a therapeutic milieu with appropriate individual and group therapies. Education will be provided on diagnoses, medications, and treatments. Encouraged behavioral activation and participation in group programming.     Medical diagnoses:      #. Nausea  #. Fever  #. Fatigue  #. Malaise  #. Transient chest pain  - URI vs gastroenteritis vs myocarditis  - CRP mildly elevated. Mild leukocytosis. Normal troponin. Rest of labs reassuring. Negative COVID. EKG shows sinus tach, no new findings  - Analgesics for fever. Working  - Zofran prn  - Reducing Clozapine  - Echo ordered   - Medicine following    #. Severe malnutrition, improving  - Nutrition consulted  - 11 pound weight gain since admission    Consults: Nutrition  Tests: Repeat COVID, Echo    Disposition: Board and lodge         ATTESTATION    Dr. Timmons  Psychiatry    Telehealth video visit that took place via an interactive audio and video telecommunications system using secure connection. Patient  identity was verified.  Patient verbalized agreement and understanding of test ordered, diagnosis, treatment plan, education, and side effects of medications, if prescribed.

## 2022-08-02 NOTE — PLAN OF CARE
Problem: Behavioral Health Plan of Care  Goal: Patient-Specific Goal (Individualization)  Description: Patient will report at least 6-8 hours of sleep each night.   Patient will complete all ADL's independently while on the unit.   Patient will be compliant with treatment team recommendations.     Pt will eat at least 50% of meals and have enough fluid intake.  Outcome: Ongoing, Progressing     Problem: Thought Process Alteration  Goal: Optimal Thought Clarity  Description: Patient will hold reality based conversations while on the unit.     Outcome: Ongoing, Progressing     Problem: Suicide Risk  Goal: Absence of Self-Harm  Outcome: Ongoing, Progressing     Problem: Suicidal Behavior  Goal: Suicidal Behavior is Absent or Managed  Outcome: Ongoing, Progressing   Goal Outcome Evaluation:    Plan of Care Reviewed With: patient        Pt. Was up and ate supper in dayroom, appetite is good, taking prescribed medications, compliant with treatment team recommendations, is able to make needs be known, cooperative with answering questions, will continue to monitor progress.    Face to face end of shift report will be communicated to oncoming night shift RN.     Farida Saenz RN  8/2/2022  7:31 PM

## 2022-08-02 NOTE — PROGRESS NOTES
[facility]    Medical Services Progress Note    Date of Service (when I saw the patient): 08/02/2022    Assessment & Plan     Principal Problem:    Catatonia  Active Problems:    Schizoaffective disorder, bipolar type (H)    ADD (attention deficit disorder)    Alcohol abuse    Depression with anxiety    Bipolar 1 disorder (H)    Attention deficit disorder, unspecified hyperactivity presence     Severe Malnutrition- pt weight has gone from 205lbs on 3/5/22 to 162 lb. Protein is low at 5.9. Albumin is on the low side of normal at 3.6. Will add ensure to meal trays. Multivitamin daily. RD consult order placed. Nursing to encourage po fluid intake.   7/20- RD seen pt yesterday. Per RD note.    NUTRITION DIAGNOSIS:  Malnutrition related to minimal energy intake  as evidenced by 21% weight loss in 4 months  NUTRITION RECOMMENDATIONS  - Monitor electrolytes for refeeding syndrome  - Encourage intake during meal and snack times.  - continue with current interventions- Ensure, multivitamin  MONITORING AND EVALUATION:  RD will monitor intake, weight, labs    CMP, phosphorus, Mag ordered for today and Friday. Nursing reports pt is eating and drinking well.  Pt denies any GI issues.     7/21/22- pt is eating and drinking. Nursing reports he was weighed this morning and he weighed 170 lbs. Which is up from his admission weight of 162 lbs. Electrolytes wnl. Protein low at 5.7 and albumin low at 3.2. Phosphorus and magnesium both wnl. Labs ordered for tomorrow. Pmhnp aware to review labs and consult medical if indicated.      Chronic low back pain- states low back pain is consistent with his chronic low back pain and states he takes lyrica for this. He declines Lidoderm patches and muscle rub. He would like tylenol. Denies any other complaints. Nursing aware he would like tylenol.   7/21/22- denies pain.   8/2/22- denies pain    Nausea- pt reports nausea, denies vomiting or diarrhea. He had a fever for a few days, the  highest at 102.0. Tylenol is effective at bringing it down. Temperature this morning was 98.8. It is noted in nursing note pt complained of congestion and body aches. Pt denied runny nose, sore throat, reports he coughs occasionally but denies any other symptoms. Denies chest pain, sob.  Covid, RSV, influenza all negative yesterday. WBC is slightly elevated at 11.4.  Will repeat Covid today and check CBC. Zofran ordered prn. Increase fluids, Gatorade ordered on lunch and dinner trays, rest, tylenol prn for pain and fever. Instructed pt to report any new or worsening symptoms to nurse. Pt verbalized understanding.     Dr. Whitman Reviewed EKG from yesterday and he states normal. No signs of myocarditis. Pt is denying chest pain, sob. Covid, RSV, influenza negative. WBC is trending up from 11.4 to 11.5 to 12.3. Strep swab ordered.        Code Status: Full Code.    Harmony Samuel CNP      -Data reviewed today: I reviewed all new labs and imaging results over the last 24 hours.     Physical Exam   Temp: 98.8  F (37.1  C) Temp src: Temporal BP: 114/72 Pulse: 91   Resp: 16 SpO2: 97 % O2 Device: None (Room air)    Vitals:    07/21/22 0800 07/23/22 0800 07/30/22 0700   Weight: 77.4 kg (170 lb 11.2 oz) 77.9 kg (171 lb 11.2 oz) 80.5 kg (177 lb 6.4 oz)     Vital Signs with Ranges  Temp:  [98.8  F (37.1  C)-102.4  F (39.1  C)] 98.8  F (37.1  C)  Pulse:  [] 91  Resp:  [16-18] 16  BP: (105-114)/(61-72) 114/72  SpO2:  [97 %-98 %] 97 %  No intake/output data recorded.    Constitutional: awake, alert, cooperative, no apparent distress, and appears stated age, vitals stable    Respiratory: No increased work of breathing, good air exchange, clear to auscultation bilaterally, no crackles or wheezing  Cardiovascular: Normal apical impulse, regular rate and rhythm, normal S1 and S2, no S3 or S4, and no murmur noted  GI: No scars, normal bowel sounds, soft, non-distended, non-tender, no masses palpated, no  hepatosplenomegally  Neuropsychiatric: General: restricted, flat,  and good eye contact    Medications     cloZAPine  200 mg Oral At Bedtime     lithium  450 mg Oral At Bedtime     LORazepam  0.5 mg Oral Daily     LORazepam  1 mg Oral BID     memantine  10 mg Oral BID     multivitamin w/minerals  1 tablet Oral Daily     pregabalin  75 mg Oral TID     vitamin D2  50,000 Units Oral Q7 Days       Data   Recent Labs   Lab 08/01/22  1709 08/01/22  1657 08/01/22  0943   WBC 11.5* 11.4* 9.4   HGB 14.5 14.9 14.7   MCV 91 92 93    241 238   NA  --  137  --    POTASSIUM  --  3.9  --    CHLORIDE  --  106  --    CO2  --  28  --    BUN  --  19  --    CR  --  0.85  --    ANIONGAP  --  3  --    NIKKI  --  9.0  --    GLC  --  92  --        No results found for this or any previous visit (from the past 24 hour(s)).

## 2022-08-02 NOTE — PLAN OF CARE
Called and spoke with [facility] board and [facility]za[facility]. They state they have an opening and to go ahead and send a referral.

## 2022-08-03 PROCEDURE — 124N000001 HC R&B MH

## 2022-08-03 PROCEDURE — 250N000011 HC RX IP 250 OP 636: Performed by: NURSE PRACTITIONER

## 2022-08-03 PROCEDURE — 99233 SBSQ HOSP IP/OBS HIGH 50: CPT | Mod: 95 | Performed by: STUDENT IN AN ORGANIZED HEALTH CARE EDUCATION/TRAINING PROGRAM

## 2022-08-03 PROCEDURE — 250N000013 HC RX MED GY IP 250 OP 250 PS 637: Performed by: NURSE PRACTITIONER

## 2022-08-03 PROCEDURE — 250N000013 HC RX MED GY IP 250 OP 250 PS 637: Performed by: STUDENT IN AN ORGANIZED HEALTH CARE EDUCATION/TRAINING PROGRAM

## 2022-08-03 RX ADMIN — PREGABALIN 75 MG: 75 CAPSULE ORAL at 14:02

## 2022-08-03 RX ADMIN — LORAZEPAM 1 MG: 1 TABLET ORAL at 08:25

## 2022-08-03 RX ADMIN — MULTIPLE VITAMINS W/ MINERALS TAB 1 TABLET: TAB at 08:25

## 2022-08-03 RX ADMIN — MEMANTINE 10 MG: 10 TABLET ORAL at 20:01

## 2022-08-03 RX ADMIN — ONDANSETRON HYDROCHLORIDE 4 MG: 4 TABLET, FILM COATED ORAL at 11:21

## 2022-08-03 RX ADMIN — MEMANTINE 10 MG: 10 TABLET ORAL at 08:25

## 2022-08-03 RX ADMIN — LORAZEPAM 1 MG: 1 TABLET ORAL at 12:28

## 2022-08-03 RX ADMIN — ACETAMINOPHEN 325MG 650 MG: 325 TABLET ORAL at 12:28

## 2022-08-03 RX ADMIN — LITHIUM CARBONATE 450 MG: 450 TABLET, EXTENDED RELEASE ORAL at 20:02

## 2022-08-03 RX ADMIN — LORAZEPAM 0.5 MG: 0.5 TABLET ORAL at 17:02

## 2022-08-03 RX ADMIN — CLOZAPINE 150 MG: 50 TABLET ORAL at 20:01

## 2022-08-03 RX ADMIN — ONDANSETRON HYDROCHLORIDE 4 MG: 4 TABLET, FILM COATED ORAL at 17:03

## 2022-08-03 RX ADMIN — ACETAMINOPHEN 325MG 650 MG: 325 TABLET ORAL at 20:01

## 2022-08-03 RX ADMIN — PREGABALIN 75 MG: 75 CAPSULE ORAL at 20:01

## 2022-08-03 RX ADMIN — PREGABALIN 75 MG: 75 CAPSULE ORAL at 08:25

## 2022-08-03 ASSESSMENT — ACTIVITIES OF DAILY LIVING (ADL)
ADLS_ACUITY_SCORE: 42
ORAL_HYGIENE: INDEPENDENT
DRESS: INDEPENDENT
HYGIENE/GROOMING: INDEPENDENT
ADLS_ACUITY_SCORE: 42

## 2022-08-03 NOTE — PROGRESS NOTES
"  [facility] PSYCHIATRY  -  PROGRESS NOTE     ID   Name: Steven Carter  MRN#: [id number]     SUBJECTIVE     Patient has been afebrile. Staff note that he has used Zofran for nausea. He has not been mentioning chest pain.    The patient notes he continues to have body aches. He feels tired. He admits to continued chest pain, but things it might be body aches. He would still like to have an echo performed. He denies any SOB or worsening in his chest pain or radiation. He denies any change in vision.    The patient notes that he is having drooling form Clozapine. He would like to reduce the dose. He also had enuresis last night. He is not interested in treatments, but would prefer reduction of Clozapine. He didn't notice any worsening of thought or mood with the reduction yesterday. He consents to the reduction, being aware of risk of decompensation.       MEDICATIONS   Scheduled Meds:    cloZAPine  150 mg Oral At Bedtime     lithium  450 mg Oral At Bedtime     LORazepam  0.5 mg Oral Daily     LORazepam  1 mg Oral BID     memantine  10 mg Oral BID     multivitamin w/minerals  1 tablet Oral Daily     pregabalin  75 mg Oral TID     vitamin D2  50,000 Units Oral Q7 Days     PRN Meds:.acetaminophen, alum & mag hydroxide-simethicone, docusate sodium, hydrOXYzine, OLANZapine **OR** OLANZapine, ondansetron, sulindac     ALLERGIES   Allergies   Allergen Reactions     Pork Allergy         VITALS   Vitals: /69   Pulse 89   Temp 99.4  F (37.4  C) (Tympanic)   Resp 16   Wt 80.5 kg (177 lb 6.4 oz)   SpO2 99%   BMI 24.06 kg/m       MENTAL STATUS EXAM     Appearance: Hair and beard are over grown-pt is clean/showered. Awake, alert, dressed in hospital scrubs  Attitude:  cooperative and guarded  Eye Contact: Improving  Mood:  \"Ok\"  Affect:  intensity remains blunted, likely at baseline  Speech: monotone, soft spoken, engaging in more conversation  Psychomotor Behavior:  no evidence of tardive dyskinesia, dystonia, " or tics, lithium tremor improved  Thought Process:  Seems linear, concrete  Associations: none noted  Thought Content:  no evidence of suicidal ideation or homicidal ideation and no evidence of psychotic thought, denies hallucinations  Insight:  limited  Judgment:  limited  Oriented to:  time, person, and place  Attention Span and Concentration:  limited  Recent and Remote Memory:  limited  Fund of Knowledge: Low  Muscle Strength and Tone: normal  Gait and Station: gait normal        LABS   Recent Results (from the past 24 hour(s))   Group A Streptococcus PCR Throat Swab    Collection Time: 08/02/22  8:42 PM    Specimen: Throat; Swab   Result Value Ref Range    Group A strep by PCR Not Detected Not Detected         ASSESSMENT   This is a 31 year old male with a PMH of schizoaffective disorder, polysubstance abuse, traumatic brain injury with coma, and a near fatal overdose with cardiac arrest who presented in a severe depressive episode with catatonia, occurring in the context of medication changes (lorazepam being discontinued) and the patient having severe malnutrition due to poor intake.     The patient has improved dramatically since starting Clozapine and resuming Ativan with his catatonia improving, his engagement with the world, speech, and ability to interact with peers. The patient has improved to the point that he would benefit from discharge to a facility as soon as available with his medication regimen to continue     Today: The patient is having common side effects from Clozapine, namely drooling and possible enuresis last night. Will address with reduction of his Clozapine. His physical symptoms from his likely viral infection are better in terms of no fever with echo still being performed tomorrow.       DIAGNOSTIC FORMULATION   1. Schizoaffective disorder, depressive type, multiple episodes, currently in acute episode, with catatonia   2. TBI with LOC and coma at age 3 with significant rehabilitation  required. Multiple other TBIs  3. 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 Methamphetamine use disorder, moderate, in early remission  5. Alcohol use disorder, moderate     PLAN     Location: Unit 5  Legal Status: Orders Placed This Encounter      Legal status Voluntary    Safety Assessment:    Behavioral Orders   Procedures     Code 1 - Restrict to Unit     Routine Programming     As clinically indicated     Status 15     Every 15 minutes.     PTA medications stopped    -Wellbutrin due to starting Clozaril. Both decrease seizure threshhold  -Abilify due to not being effective    PTA medications continued/changed:      -Namenda 10 mg BID  -Lyrica 100 mg TID  -Clonazepam restarted at 1 mg TID- switched to Lorazepam 1 mg TID on 7/23. Further educed to 0.5 mg/1mg/1mg.     New medications tried and stopped:      -None     New medications initiated:      -Clozapine 12.5 mg at bedtime on 7/19.-> Increase to 25 mg at bedtime on 7/20 -> 50 mg on 7/23 -> 75 mg on 7/24 ->100 mg on 7/26 -> 125 mg on 7/28 -> 150 mg on 7/30 -> 175 mg on 7/31 --> 200 mg on 08/01 -> 175 mg on 8/2 -> 150 mg on 8/3  -Vitamin D2 50,000 units weekly x 8 weeks     Today's Changes:     Clozaril reduced from 175 mg at bedtime to 150    Programming: Patient will be treated in a therapeutic milieu with appropriate individual and group therapies. Education will be provided on diagnoses, medications, and treatments. Encouraged behavioral activation and participation in group programming.     Medical diagnoses:      #. Nausea  #. Fever, resolved  #. Fatigue  #. Malaise  #. Transient chest pain  - URI vs gastroenteritis vs myocarditis  - CRP mildly elevated. Mild leukocytosis. Normal troponin. Rest of labs reassuring. Negative COVID. EKG shows sinus tach, no new findings  - Analgesics for fever. Working  - Zofran prn  - Reducing Clozapine  - Echo ordered for tomorrow  - Medicine following    #. Severe malnutrition,  improving  - Nutrition consulted  - 11 pound weight gain since admission    #. Sialorrhea  #. Enuresis  - Secondary to Clozapine  - Reducing dose. Will add treatments as needed.    Consults: Nutrition  Tests: Repeat COVID, Echo    Disposition: Board and lodge         ATTESTATION    Dr. Timmons  Psychiatry    Telehealth video visit that took place via an interactive audio and video telecommunications system using secure connection. Patient identity was verified.  Patient verbalized agreement and understanding of test ordered, diagnosis, treatment plan, education, and side effects of medications, if prescribed.

## 2022-08-03 NOTE — PLAN OF CARE
Face to face end of shift report will be communicated to oncoming RN.     Problem: Behavioral Health Plan of Care  Goal: Patient-Specific Goal (Individualization)  Description: Patient will report at least 6-8 hours of sleep each night.   Patient will complete all ADL's independently while on the unit.   Patient will be compliant with treatment team recommendations.     Pt will eat at least 50% of meals and have enough fluid intake.  Outcome: Ongoing, Progressing     Problem: Thought Process Alteration  Goal: Optimal Thought Clarity  Description: Patient will hold reality based conversations while on the unit.     Outcome: Ongoing, Progressing     Problem: Suicide Risk  Goal: Absence of Self-Harm  Outcome: Ongoing, Progressing     Problem: Suicidal Behavior  Goal: Suicidal Behavior is Absent or Managed  Outcome: Ongoing, Progressing     Problem: Fall Injury Risk  Goal: Absence of Fall and Fall-Related Injury  Outcome: Ongoing, Progressing   Face to face end of shift report obtained from TRAE Iqbal. Pt observed resting in bed.  Pt appears to be sleeping in bed with eyes closed, having regular respirations, and position changes. 15 minutes and PRN safety checks completed with no noted complains. No s/s of discomfort, self harm, or falls noted or reported so far this shift.  0500-Pt got up and got water. Pt had no complains. Temp 98.8.  0600-Pt appeared to had slept 5 hours.  0615-T 99.6  changes in ortho BP and P noted. Pt cooperative. Pt had no c/o nausea.

## 2022-08-03 NOTE — PLAN OF CARE
Face to face shift report received from Ita HARRIS RN. Rounding completed, pt observed.    Problem: Behavioral Health Plan of Care  Goal: Patient-Specific Goal (Individualization)  Description: Patient will report at least 6-8 hours of sleep each night.   Patient will complete all ADL's independently while on the unit.   Patient will be compliant with treatment team recommendations.     Pt will eat at least 50% of meals and have enough fluid intake.  Outcome: Ongoing, Progressing  Note: Shift Summary:  Patient was resting in bed at the start of this shift. He was up for breakfast.  Compliant with medications.  Admits to generalized body aches.  Requested and given Tylenol 650 mg po at 1228 with decrease in aching.  Zofran 4 mg po before lunch as did admit to some nausea.  States that it helped him to eat better.Showered and attending group this afternoon.  Afebrile before lunch.  Gait is balanced and steady. Mild hand tremor noted.     Problem: Thought Process Alteration  Goal: Optimal Thought Clarity  Description: Patient will hold reality based conversations while on the unit.     Outcome: Ongoing, Progressing     Problem: Fall Injury Risk  Goal: Absence of Fall and Fall-Related Injury  Outcome: Ongoing, Progressing  Face to face report will be communicated to oncoming RN.    Alma Delia Garsia RN  8/3/2022

## 2022-08-03 NOTE — PLAN OF CARE
"Face to face end of shift report communicated to oncoming shift.     Sobeida Benton RN  8/3/2022  11:07 PM    Problem: Behavioral Health Plan of Care  Goal: Patient-Specific Goal (Individualization)  Description: Patient will report at least 6-8 hours of sleep each night.   Patient will complete all ADL's independently while on the unit.   Patient will be compliant with treatment team recommendations.     Pt will eat at least 50% of meals and have enough fluid intake.  Outcome: Ongoing, Progressing  Note: Patient up on unit for meal time, requests Zofran for nausea \"that really works well I want to keep taking that\"    Asked patient how he was feeling, \"I'm not feeling very good, but it is better than it was, I want the Clozaril to be lowered more, I noticed when I woke up during the night to talk it was very difficult\"  PRN:  Zofran 5 mg given @ 1703    Patient eats 100% of dinner.     Patient states \"I want that Zofran scheduled, it just really seems to help\"      Temp 99.7, lower than previously.   PRN:  Tylenol 650 mg given @ 2001    Patient eats snack, takes all medications as prescribed, lays down after snack.      Goal Outcome Evaluation:    Plan of Care Reviewed With: patient                 "

## 2022-08-04 ENCOUNTER — APPOINTMENT (OUTPATIENT)
Dept: CARDIOLOGY | Facility: HOSPITAL | Age: 32
End: 2022-08-04
Attending: STUDENT IN AN ORGANIZED HEALTH CARE EDUCATION/TRAINING PROGRAM
Payer: COMMERCIAL

## 2022-08-04 LAB — LVEF ECHO: NORMAL

## 2022-08-04 PROCEDURE — 93306 TTE W/DOPPLER COMPLETE: CPT

## 2022-08-04 PROCEDURE — 93306 TTE W/DOPPLER COMPLETE: CPT | Mod: 26 | Performed by: INTERNAL MEDICINE

## 2022-08-04 PROCEDURE — 250N000013 HC RX MED GY IP 250 OP 250 PS 637: Performed by: NURSE PRACTITIONER

## 2022-08-04 PROCEDURE — 250N000011 HC RX IP 250 OP 636: Performed by: NURSE PRACTITIONER

## 2022-08-04 PROCEDURE — 124N000001 HC R&B MH

## 2022-08-04 PROCEDURE — 250N000013 HC RX MED GY IP 250 OP 250 PS 637: Performed by: STUDENT IN AN ORGANIZED HEALTH CARE EDUCATION/TRAINING PROGRAM

## 2022-08-04 PROCEDURE — 99233 SBSQ HOSP IP/OBS HIGH 50: CPT | Mod: 95 | Performed by: STUDENT IN AN ORGANIZED HEALTH CARE EDUCATION/TRAINING PROGRAM

## 2022-08-04 RX ORDER — ONDANSETRON 4 MG/1
4 TABLET, FILM COATED ORAL
Status: DISCONTINUED | OUTPATIENT
Start: 2022-08-04 | End: 2022-08-11

## 2022-08-04 RX ADMIN — MEMANTINE 10 MG: 10 TABLET ORAL at 08:24

## 2022-08-04 RX ADMIN — ONDANSETRON HYDROCHLORIDE 4 MG: 4 TABLET, FILM COATED ORAL at 11:17

## 2022-08-04 RX ADMIN — ONDANSETRON HYDROCHLORIDE 4 MG: 4 TABLET, FILM COATED ORAL at 07:39

## 2022-08-04 RX ADMIN — ACETAMINOPHEN 325MG 650 MG: 325 TABLET ORAL at 07:39

## 2022-08-04 RX ADMIN — PREGABALIN 75 MG: 75 CAPSULE ORAL at 20:14

## 2022-08-04 RX ADMIN — MEMANTINE 10 MG: 10 TABLET ORAL at 20:09

## 2022-08-04 RX ADMIN — PREGABALIN 75 MG: 75 CAPSULE ORAL at 08:24

## 2022-08-04 RX ADMIN — LITHIUM CARBONATE 450 MG: 450 TABLET, EXTENDED RELEASE ORAL at 20:09

## 2022-08-04 RX ADMIN — MULTIPLE VITAMINS W/ MINERALS TAB 1 TABLET: TAB at 08:24

## 2022-08-04 RX ADMIN — ACETAMINOPHEN 325MG 650 MG: 325 TABLET ORAL at 13:00

## 2022-08-04 RX ADMIN — LORAZEPAM 1 MG: 1 TABLET ORAL at 12:32

## 2022-08-04 RX ADMIN — CLOZAPINE 150 MG: 50 TABLET ORAL at 20:09

## 2022-08-04 RX ADMIN — PREGABALIN 75 MG: 75 CAPSULE ORAL at 13:55

## 2022-08-04 RX ADMIN — LORAZEPAM 1 MG: 1 TABLET ORAL at 08:24

## 2022-08-04 RX ADMIN — LORAZEPAM 0.5 MG: 0.5 TABLET ORAL at 16:47

## 2022-08-04 RX ADMIN — ONDANSETRON HYDROCHLORIDE 4 MG: 4 TABLET, FILM COATED ORAL at 16:47

## 2022-08-04 ASSESSMENT — ACTIVITIES OF DAILY LIVING (ADL)
ADLS_ACUITY_SCORE: 42
DRESS: INDEPENDENT
ADLS_ACUITY_SCORE: 42
ORAL_HYGIENE: INDEPENDENT
ADLS_ACUITY_SCORE: 42
HYGIENE/GROOMING: INDEPENDENT
ADLS_ACUITY_SCORE: 42
ADLS_ACUITY_SCORE: 42

## 2022-08-04 NOTE — PROGRESS NOTES
"  [facility] PSYCHIATRY  -  PROGRESS NOTE     ID   Name: Steven Carter  MRN#: [id number]     SUBJECTIVE     Patient has been afebrile except for this morning briefly.  The patient has been calm and cooperative.  He has been taking his medications.  No concerns from staffing.    The patient notes he continues to have general body aches.  He is not sure if it is any better today.  He does continue to have headaches.  He notes that analgesics are helping some.  He denies any worsening of symptoms.  No shortness of breath.  No dizziness or confusion.    The patient notes he slept better last night.  He did not wake up with drooling.  He did not have any enuresis.  He would like to continue clozapine at the current dose.    He notes that his depression and motivation symptoms have improved by around 60%.  He is happy with his current improvement.  He is ready to discharge whenever housing is available.       MEDICATIONS   Scheduled Meds:    cloZAPine  150 mg Oral At Bedtime     lithium  450 mg Oral At Bedtime     LORazepam  0.5 mg Oral Daily     LORazepam  1 mg Oral BID     memantine  10 mg Oral BID     multivitamin w/minerals  1 tablet Oral Daily     ondansetron  4 mg Oral TID AC     pregabalin  75 mg Oral TID     vitamin D2  50,000 Units Oral Q7 Days     PRN Meds:.acetaminophen, alum & mag hydroxide-simethicone, docusate sodium, hydrOXYzine, OLANZapine **OR** OLANZapine, sulindac     ALLERGIES   Allergies   Allergen Reactions     Pork Allergy         VITALS   Vitals: /66   Pulse 88   Temp 98.9  F (37.2  C) (Temporal)   Resp 18   Wt 80.5 kg (177 lb 6.4 oz)   SpO2 98%   BMI 24.06 kg/m       MENTAL STATUS EXAM     Appearance: Hair and beard are over grown-pt is clean/showered. Awake, alert, dressed in hospital scrubs  Attitude:  cooperative   Eye Contact: Improving  Mood:  \"Fine\"  Affect:  intensity remains blunted, likely at baseline  Speech: monotone, soft spoken, engaging in more " conversation  Psychomotor Behavior:  no evidence of tardive dyskinesia, dystonia, or tics, lithium tremor improved  Thought Process:  [place]  Associations: none noted  Thought Content:  no evidence of suicidal ideation or homicidal ideation and no evidence of psychotic thought, denies hallucinations  Insight:  limited  Judgment:  limited  Oriented to:  time, person, and place  Attention Span and Concentration:  limited  Recent and Remote Memory:  limited  Fund of Knowledge: Low  Muscle Strength and Tone: normal  Gait and Station: normal        LABS   Recent Results (from the past 24 hour(s))   Echocardiogram Complete    Collection Time: 08/04/22  7:32 AM   Result Value Ref Range    LVEF  55-60%          ASSESSMENT   This is a 31 year old male with a PMH of schizoaffective disorder, polysubstance abuse, traumatic brain injury with coma, and a near fatal overdose with cardiac arrest who presented in a severe depressive episode with catatonia, occurring in the context of medication changes (lorazepam being discontinued) and the patient having severe malnutrition due to poor intake.     The patient has improved dramatically since starting Clozapine and resuming Ativan with his catatonia improving, his engagement with the world, speech, and ability to interact with peers. The patient has improved to the point that he would benefit from discharge to a facility as soon as available with his medication regimen to continue     Today: The patient had an echocardiogram this morning that was normal.  Myocarditis is ruled out. The patient's fever could be connected to Clozapine, with it being likely thought he is just having a viral URI. The patient is doing well with reduced dose of Clozapine in terms of side effects and symptom management. Will keep at 150 for now.       DIAGNOSTIC FORMULATION   1. Schizoaffective disorder, depressive type, multiple episodes, currently in acute episode, with catatonia   2. 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 Multiple other TBIs  3. 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 Methamphetamine use disorder, moderate, in early remission  5. Alcohol use disorder, moderate     PLAN     Location: Unit 5  Legal Status: Orders Placed This Encounter      Legal status Voluntary    Safety Assessment:    Behavioral Orders   Procedures     Code 1 - Restrict to Unit     Routine Programming     As clinically indicated     Status 15     Every 15 minutes.     PTA medications stopped    -Wellbutrin due to starting Clozaril. Both decrease seizure threshhold  -Abilify due to not being effective    PTA medications continued/changed:      -Namenda 10 mg BID  -Lyrica 100 mg TID  -Clonazepam restarted at 1 mg TID- switched to Lorazepam 1 mg TID on 7/23. Further educed to 0.5 mg/1mg/1mg.     New medications tried and stopped:      -None     New medications initiated:      -Clozapine 12.5 mg at bedtime on 7/19.-> Increase to 25 mg at bedtime on 7/20 -> 50 mg on 7/23 -> 75 mg on 7/24 ->100 mg on 7/26 -> 125 mg on 7/28 -> 150 mg on 7/30 -> 175 mg on 7/31 --> 200 mg on 08/01 -> 175 mg on 8/2 -> 150 mg on 8/3  -Vitamin D2 50,000 units weekly x 8 weeks     Today's Changes:     -None    Programming: Patient will be treated in a therapeutic milieu with appropriate individual and group therapies. Education will be provided on diagnoses, medications, and treatments. Encouraged behavioral activation and participation in group programming.     Medical diagnoses:      #. Nausea  #. Fever, resolved  #. Fatigue  #. Malaise  #. Transient chest pain  - Suspect URI. Myocarditis ruled out  - CRP mildly elevated. Mild leukocytosis. Normal troponin. Rest of labs reassuring. Negative COVID. EKG shows sinus tach, no new findings. Echo is normal.  - Analgesics for fever. Working  - Zofran prn  - Reduced Clozapine    #. Severe malnutrition, improving  - Nutrition  consulted  - 11 pound weight gain since admission    #. Sialorrhea  #. Enuresis  - Secondary to Clozapine  - Reducing dose. Will add treatments as needed.    Consults: Nutrition  Tests: None    Disposition: Board and lodge         ATTESTATION    Dr. Timmons  Psychiatry    Telehealth video visit that took place via an interactive audio and video telecommunications system using secure connection. Patient identity was verified.  Patient verbalized agreement and understanding of test ordered, diagnosis, treatment plan, education, and side effects of medications, if prescribed.

## 2022-08-04 NOTE — PLAN OF CARE
Face to face end of shift report will be communicated to oncoming RN.     Problem: Behavioral Health Plan of Care  Goal: Patient-Specific Goal (Individualization)  Description: Patient will report at least 6-8 hours of sleep each night.   Patient will complete all ADL's independently while on the unit.   Patient will be compliant with treatment team recommendations.     Pt will eat at least 50% of meals and have enough fluid intake.  Outcome: Ongoing, Progressing     Problem: Thought Process Alteration  Goal: Optimal Thought Clarity  Description: Patient will hold reality based conversations while on the unit.     Outcome: Ongoing, Progressing     Problem: Suicide Risk  Goal: Absence of Self-Harm  Outcome: Ongoing, Progressing     Problem: Suicidal Behavior  Goal: Suicidal Behavior is Absent or Managed  Outcome: Ongoing, Progressing     Problem: Fall Injury Risk  Goal: Absence of Fall and Fall-Related Injury  Outcome: Ongoing, Progressing   Goal Outcome Evaluation:   Face to face end of shift report obtained from TRAE Vidales. Pt observed resting in bed.  0020-Pt walked to nurse station asking for water. Pt steady, states he feels much better. T 99.3 at this time. Pt got water and return to bed.   0400-Pt appears to be sleeping in bed with eyes closed, having regular respirations, and position changes. 15 minutes and PRN safety checks completed with no noted complains. No s/s of discomfort, self harm, or falls noted or reported so far this shift.  0615-Pt appeared to had slept 6 hours. Pt temp 100.3 this morning. Pt denied having incontinent episode and declined acetaminophen for mild pain. Pt notified of echo scheduled this morning.   0711-Pt left unit in wheel chair accompanied by  staff and 2 security to imaging room 3 for scheduled echo.   0739- Pt c/o 4/10 generalized throbbing pain and nausea. Tylenol 650 mg and Zofran 4 mg given.

## 2022-08-04 NOTE — PLAN OF CARE
Face to face shift report received from Ita HARRIS RN. Rounding completed, pt observed.    Problem: Behavioral Health Plan of Care  Goal: Patient-Specific Goal (Individualization)  Description: Patient will report at least 6-8 hours of sleep each night.   Patient will complete all ADL's independently while on the unit.   Patient will be compliant with treatment team recommendations.     Pt will eat at least 50% of meals and have enough fluid intake.  Outcome: Ongoing, Progressing  Note: Shift Summary:  Patient up in lounge at the start of this shift.  Patient able to eat breakfast after taking PRN Zofran.  Hands are visibly tremulous.  Compliant with all medications.  Gait is balanced and steady. Patient can easily make his needs known to staff.  Patient showered after lunch.  Requested and given Tylenol 650 mg po again for generlized aches     Problem: Thought Process Alteration  Goal: Optimal Thought Clarity  Description: Patient will hold reality based conversations while on the unit.     Outcome: Ongoing, Progressing     Problem: Fall Injury Risk  Goal: Absence of Fall and Fall-Related Injury  Outcome: Ongoing, Progressing  Face to face report will be communicated to oncoming RN.    Alma Delia Garsia RN  8/4/2022

## 2022-08-05 LAB
BASOPHILS # BLD AUTO: 0 10E3/UL (ref 0–0.2)
BASOPHILS NFR BLD AUTO: 0 %
EOSINOPHIL # BLD AUTO: 0.5 10E3/UL (ref 0–0.7)
EOSINOPHIL NFR BLD AUTO: 6 %
ERYTHROCYTE [DISTWIDTH] IN BLOOD BY AUTOMATED COUNT: 13 % (ref 10–15)
HCT VFR BLD AUTO: 40.5 % (ref 40–53)
HGB BLD-MCNC: 13.4 G/DL (ref 13.3–17.7)
IMM GRANULOCYTES # BLD: 0 10E3/UL
IMM GRANULOCYTES NFR BLD: 1 %
LYMPHOCYTES # BLD AUTO: 0.9 10E3/UL (ref 0.8–5.3)
LYMPHOCYTES NFR BLD AUTO: 10 %
MCH RBC QN AUTO: 29.6 PG (ref 26.5–33)
MCHC RBC AUTO-ENTMCNC: 33.1 G/DL (ref 31.5–36.5)
MCV RBC AUTO: 90 FL (ref 78–100)
MONOCYTES # BLD AUTO: 0.8 10E3/UL (ref 0–1.3)
MONOCYTES NFR BLD AUTO: 9 %
NEUTROPHILS # BLD AUTO: 6.7 10E3/UL (ref 1.6–8.3)
NEUTROPHILS NFR BLD AUTO: 74 %
NRBC # BLD AUTO: 0 10E3/UL
NRBC BLD AUTO-RTO: 0 /100
PLATELET # BLD AUTO: 244 10E3/UL (ref 150–450)
RBC # BLD AUTO: 4.52 10E6/UL (ref 4.4–5.9)
WBC # BLD AUTO: 8.9 10E3/UL (ref 4–11)

## 2022-08-05 PROCEDURE — 99232 SBSQ HOSP IP/OBS MODERATE 35: CPT | Performed by: NURSE PRACTITIONER

## 2022-08-05 PROCEDURE — 250N000013 HC RX MED GY IP 250 OP 250 PS 637: Performed by: NURSE PRACTITIONER

## 2022-08-05 PROCEDURE — 85004 AUTOMATED DIFF WBC COUNT: CPT | Performed by: NURSE PRACTITIONER

## 2022-08-05 PROCEDURE — 99233 SBSQ HOSP IP/OBS HIGH 50: CPT | Mod: 95 | Performed by: PSYCHIATRY & NEUROLOGY

## 2022-08-05 PROCEDURE — 36415 COLL VENOUS BLD VENIPUNCTURE: CPT | Performed by: NURSE PRACTITIONER

## 2022-08-05 PROCEDURE — 250N000013 HC RX MED GY IP 250 OP 250 PS 637: Performed by: PSYCHIATRY & NEUROLOGY

## 2022-08-05 PROCEDURE — 124N000001 HC R&B MH

## 2022-08-05 PROCEDURE — 250N000011 HC RX IP 250 OP 636: Performed by: NURSE PRACTITIONER

## 2022-08-05 PROCEDURE — 250N000013 HC RX MED GY IP 250 OP 250 PS 637: Performed by: STUDENT IN AN ORGANIZED HEALTH CARE EDUCATION/TRAINING PROGRAM

## 2022-08-05 RX ADMIN — PREGABALIN 75 MG: 75 CAPSULE ORAL at 08:26

## 2022-08-05 RX ADMIN — LORAZEPAM 0.5 MG: 0.5 TABLET ORAL at 16:44

## 2022-08-05 RX ADMIN — LITHIUM CARBONATE 450 MG: 450 TABLET, EXTENDED RELEASE ORAL at 20:15

## 2022-08-05 RX ADMIN — MEMANTINE 10 MG: 10 TABLET ORAL at 08:26

## 2022-08-05 RX ADMIN — CLOZAPINE 150 MG: 50 TABLET ORAL at 20:15

## 2022-08-05 RX ADMIN — ACETAMINOPHEN 325MG 650 MG: 325 TABLET ORAL at 08:26

## 2022-08-05 RX ADMIN — LORAZEPAM 1 MG: 1 TABLET ORAL at 13:21

## 2022-08-05 RX ADMIN — ONDANSETRON HYDROCHLORIDE 4 MG: 4 TABLET, FILM COATED ORAL at 06:38

## 2022-08-05 RX ADMIN — MEMANTINE 10 MG: 10 TABLET ORAL at 20:15

## 2022-08-05 RX ADMIN — ONDANSETRON HYDROCHLORIDE 4 MG: 4 TABLET, FILM COATED ORAL at 16:44

## 2022-08-05 RX ADMIN — MULTIPLE VITAMINS W/ MINERALS TAB 1 TABLET: TAB at 08:26

## 2022-08-05 RX ADMIN — LORAZEPAM 1 MG: 1 TABLET ORAL at 08:26

## 2022-08-05 RX ADMIN — ONDANSETRON HYDROCHLORIDE 4 MG: 4 TABLET, FILM COATED ORAL at 11:26

## 2022-08-05 RX ADMIN — ACETAMINOPHEN 325MG 650 MG: 325 TABLET ORAL at 20:17

## 2022-08-05 RX ADMIN — PREGABALIN 100 MG: 25 CAPSULE ORAL at 20:15

## 2022-08-05 RX ADMIN — PREGABALIN 75 MG: 75 CAPSULE ORAL at 13:21

## 2022-08-05 ASSESSMENT — ACTIVITIES OF DAILY LIVING (ADL)
ADLS_ACUITY_SCORE: 42
ADLS_ACUITY_SCORE: 42
HYGIENE/GROOMING: INDEPENDENT
ADLS_ACUITY_SCORE: 42
ADLS_ACUITY_SCORE: 42
ORAL_HYGIENE: INDEPENDENT
ADLS_ACUITY_SCORE: 42
DRESS: INDEPENDENT
ADLS_ACUITY_SCORE: 42

## 2022-08-05 NOTE — PROGRESS NOTES
[facility]    Medical Services Progress Note    Date of Service (when I saw the patient): 08/05/2022    Assessment & Plan     Principal Problem:    Catatonia  Active Problems:    Schizoaffective disorder, bipolar type (H)    ADD (attention deficit disorder)    Alcohol abuse    Depression with anxiety    Bipolar 1 disorder (H)    Attention deficit disorder, unspecified hyperactivity presence     Severe Malnutrition- pt weight has gone from 205lbs on 3/5/22 to 162 lb. Protein is low at 5.9. Albumin is on the low side of normal at 3.6. Will add ensure to meal trays. Multivitamin daily. RD consult order placed. Nursing to encourage po fluid intake.   7/20- RD seen pt yesterday. Per RD note.    NUTRITION DIAGNOSIS:  Malnutrition related to minimal energy intake  as evidenced by 21% weight loss in 4 months  NUTRITION RECOMMENDATIONS  - Monitor electrolytes for refeeding syndrome  - Encourage intake during meal and snack times.  - continue with current interventions- Ensure, multivitamin  MONITORING AND EVALUATION:  RD will monitor intake, weight, labs    CMP, phosphorus, Mag ordered for today and Friday. Nursing reports pt is eating and drinking well.  Pt denies any GI issues.     7/21/22- pt is eating and drinking. Nursing reports he was weighed this morning and he weighed 170 lbs. Which is up from his admission weight of 162 lbs. Electrolytes wnl. Protein low at 5.7 and albumin low at 3.2. Phosphorus and magnesium both wnl. Labs ordered for tomorrow. Pmhnp aware to review labs and consult medical if indicated.      Chronic low back pain- states low back pain is consistent with his chronic low back pain and states he takes lyrica for this. He declines Lidoderm patches and muscle rub. He would like tylenol. Denies any other complaints. Nursing aware he would like tylenol.   7/21/22- denies pain.   8/2/22- denies pain  8/5/22- denies pain     Nausea- pt reports nausea, denies vomiting or diarrhea. He had a  fever for a few days, the highest at 102.0. Tylenol is effective at bringing it down. Temperature this morning was 98.8. It is noted in nursing note pt complained of congestion and body aches. Pt denied runny nose, sore throat, reports he coughs occasionally but denies any other symptoms. Denies chest pain, sob.  Covid, RSV, influenza all negative yesterday. WBC is slightly elevated at 11.4.  Will repeat Covid today and check CBC. Zofran ordered prn. Increase fluids, Gatorade ordered on lunch and dinner trays, rest, tylenol prn for pain and fever. Instructed pt to report any new or worsening symptoms to nurse. Pt verbalized understanding.   8/5/22- he reports he is still experiencing some nausea but that it is getting better. He states he Zofran is helping. Denies any vomiting, diarrhea. Continue with Zofran. Nursing to monitor and report new or worsening symptoms.     Generalized body aches- pt continues to reports generalized body aches, but states they its improving. He states its mainly a headache.  Denies chest pain, sob. CBC ordered and WBC is now normal down from 12.3. I suspect a viral infection, but improving. He reports he is eating all his meals and drinking plenty of fluids. He continues to get Gatorade at lunch and supper.  Continue supportive care. Analgesics, zofran for nausea, drink plenty of fluids. Nursing to monitor for new or worsening symptoms.     Dr. Whitman Reviewed EKG from yesterday and he states normal. No signs of myocarditis. Pt is denying chest pain, sob. Covid, RSV, influenza negative. WBC is trending up from 11.4 to 11.5 to 12.3. Strep swab ordered.  8/5/22- strep negative. Echo was done 8/4 and was unremarkable. EF 55-60%       Code Status: Full Code.    Harmony Samuel CNP      -Data reviewed today: I reviewed all new labs and imaging results over the last 24 hours.     Physical Exam   Temp: 98.9  F (37.2  C) Temp src: Tympanic BP: 101/53 Pulse: 98   Resp: 16 SpO2: 96 % O2 Device:  None (Room air)    Vitals:    07/21/22 0800 07/23/22 0800 07/30/22 0700   Weight: 77.4 kg (170 lb 11.2 oz) 77.9 kg (171 lb 11.2 oz) 80.5 kg (177 lb 6.4 oz)     Vital Signs with Ranges  Temp:  [97.3  F (36.3  C)-99.8  F (37.7  C)] 98.9  F (37.2  C)  Pulse:  [] 98  Resp:  [16-18] 16  BP: ()/(53-66) 101/53  SpO2:  [95 %-98 %] 96 %  No intake/output data recorded.    Constitutional: awake, alert, cooperative, no apparent distress, and appears stated age, vitals stable    Respiratory: No increased work of breathing, good air exchange, clear to auscultation bilaterally, no crackles or wheezing  Cardiovascular: Normal apical impulse, regular rate and rhythm, normal S1 and S2, no S3 or S4, and no murmur noted  GI: No scars, normal bowel sounds, soft, non-distended, non-tender, no masses palpated, no hepatosplenomegally  Neuropsychiatric: General: restricted, blunted,  and good eye contact    Medications     cloZAPine  150 mg Oral At Bedtime     lithium  450 mg Oral At Bedtime     LORazepam  0.5 mg Oral Daily     LORazepam  1 mg Oral BID     memantine  10 mg Oral BID     multivitamin w/minerals  1 tablet Oral Daily     ondansetron  4 mg Oral TID AC     pregabalin  75 mg Oral TID     vitamin D2  50,000 Units Oral Q7 Days       Data   Recent Labs   Lab 08/05/22  1122 08/02/22  1310 08/01/22  1709 08/01/22  1657   WBC 8.9 12.3* 11.5* 11.4*   HGB 13.4 14.1 14.5 14.9   MCV 90 91 91 92    224 244 241   NA  --   --   --  137   POTASSIUM  --   --   --  3.9   CHLORIDE  --   --   --  106   CO2  --   --   --  28   BUN  --   --   --  19   CR  --   --   --  0.85   ANIONGAP  --   --   --  3   NIKKI  --   --   --  9.0   GLC  --   --   --  92       No results found for this or any previous visit (from the past 24 hour(s)).

## 2022-08-05 NOTE — PLAN OF CARE
Spoke with Kaci Hanks to follow up - they are still reviewing the paperwork and will get back to us early next week to possibly do a phone screening with him.    Also sent referral information to [facility] in [place].

## 2022-08-05 NOTE — PLAN OF CARE
Problem: Behavioral Health Plan of Care  Goal: Patient-Specific Goal (Individualization)  Description: Patient will report at least 6-8 hours of sleep each night.   Patient will complete all ADL's independently while on the unit.   Patient will be compliant with treatment team recommendations.     Pt will eat at least 50% of meals and have enough fluid intake.  Outcome: Ongoing, Progressing     Problem: Fall Injury Risk  Goal: Absence of Fall and Fall-Related Injury  Outcome: Ongoing, Progressing   Goal Outcome Evaluation:      Face to face shift report received from RN. Rounding completed, pt observed.  Client rested in room for 6 hours with eyes closed and respirations noted. No signs of distress. Client had no falls this shift.Face to face report will be communicated to oncoming RN.    Didier Brooks RN  8/5/2022  6:15 AM

## 2022-08-05 NOTE — PLAN OF CARE
Face to face shift report received from Didier CORDOVA RN. Rounding completed, pt observed.    Problem: Behavioral Health Plan of Care  Goal: Patient-Specific Goal (Individualization)  Description: Patient will report at least 6-8 hours of sleep each night.   Patient will complete all ADL's independently while on the unit.   Patient will be compliant with treatment team recommendations.     Pt will eat at least 50% of meals and have enough fluid intake.  Outcome: Ongoing, Progressing  Note: Shift Summary:  Patient up in lounge shortly after the start of this shift.  Patient requested and given Tylenol 650 mg po along with scheduled AM meds for generalized aches with a decrease in symptoms reported.  Facial skin is less ashen but still pale.  Hands are moderately tremulous at start of this shift. Patient does sate that it is bothersome and at times needs to use both hands to hold cup.  Less noticeable after lunch but still visible.  Up and in lounge after lunch.  Cooperated with lab draw.  Steady on feet and able to make his needs known to staff.     Problem: Thought Process Alteration  Goal: Optimal Thought Clarity  Description: Patient will hold reality based conversations while on the unit.     Outcome: Ongoing, Progressing     Problem: Fall Injury Risk  Goal: Absence of Fall and Fall-Related Injury  Outcome: Ongoing, Progressing  Face to face report will be communicated to oncoming RN.    Alma Delia Garsia RN  8/5/2022

## 2022-08-05 NOTE — PLAN OF CARE
"Face to face end of shift report communicated to oncoming shift.     Sobeida Benton RN  8/4/2022  11:09 PM    Problem: Behavioral Health Plan of Care  Goal: Patient-Specific Goal (Individualization)  Description: Patient will report at least 6-8 hours of sleep each night.   Patient will complete all ADL's independently while on the unit.   Patient will be compliant with treatment team recommendations.     Pt will eat at least 50% of meals and have enough fluid intake.  Outcome: Ongoing, Progressing  Note: Patient up on unit for meals, patient eats 100%.  Patient polite and cooperative with nursing cares and assessment.  Patient attends all available groups.  Patient lets needs be known.      Patient reports to feeling \"ok, better today, I just get frustrated I feel like I have all these different providers that see me and they don't listen to me, the Zofran is helping so much and the Clozaril is not, I don't even notice when they decrease that, I just don't want to be on it, I think having so many different people making these decisions is why I can't get what I want or they aren't listening to me\"      Patient denies SI, HI, AH and VH.   Patient's gait is steady, remains free from falls this shift.        Goal Outcome Evaluation:    Plan of Care Reviewed With: patient                 "

## 2022-08-05 NOTE — PROGRESS NOTES
"  [facility]  -  PROGRESS NOTE     ID   Name: Steven Carter  MRN#: [id number]     SUBJECTIVE   Steven met within the milieu.  He states \"I am pretty good right now\". He states this morning he was having some nausea but right now that has gone away and he feels \"good\".  He requests that his lyrica be increase to 100 mg TID which was his previous dosing.  We discussed his echo and stated he felt relieved that there was nothing wrong with his heart. He has been compliant with his medications. He is looking forward to going soon and understands we are working hard on finding him a safe place to go. He denies SI, no plan, no intent.  States he is eating okay. We talked about the importance of clozapine and he agrees to continue to take it. He talks about his tremor- state he has had it for 4 months.       MEDICATIONS   Scheduled Meds:    cloZAPine  150 mg Oral At Bedtime     lithium  450 mg Oral At Bedtime     LORazepam  0.5 mg Oral Daily     LORazepam  1 mg Oral BID     memantine  10 mg Oral BID     multivitamin w/minerals  1 tablet Oral Daily     ondansetron  4 mg Oral TID AC     pregabalin  100 mg Oral TID     vitamin D2  50,000 Units Oral Q7 Days     PRN Meds:.acetaminophen, alum & mag hydroxide-simethicone, docusate sodium, hydrOXYzine, OLANZapine **OR** OLANZapine, sulindac     ALLERGIES   Allergies   Allergen Reactions     Pork Allergy         VITALS   Vitals: /53   Pulse 98   Temp 98.9  F (37.2  C) (Tympanic)   Resp 16   Wt 80.5 kg (177 lb 6.4 oz)   SpO2 96%   BMI 24.06 kg/m       MENTAL STATUS EXAM     Appearance: Hair and beard are over grown-pt is clean/showered. Awake, alert, dressed in hospital scrubs  Attitude:  cooperative   Eye Contact: Improving  Mood:  \"good\"  Affect:  intensity remains blunted, likely at baseline  Speech: monotone, soft spoken, engaging in more conversation  Psychomotor Behavior:  no evidence of tardive dyskinesia, dystonia, or tics, lithium tremor " improved  Thought Process:  [place]  Associations: none noted  Thought Content:  no evidence of suicidal ideation or homicidal ideation and no evidence of psychotic thought, denies hallucinations  Insight:  improving  Judgment:  limited  Oriented to:  time, person, and place  Attention Span and Concentration:  limited  Recent and Remote Memory:  limited  Fund of Knowledge: Low  Muscle Strength and Tone: normal  Gait and Station: normal        LABS   Recent Results (from the past 24 hour(s))   CBC with platelets and differential    Collection Time: 08/05/22 11:22 AM   Result Value Ref Range    WBC Count 8.9 4.0 - 11.0 10e3/uL    RBC Count 4.52 4.40 - 5.90 10e6/uL    Hemoglobin 13.4 13.3 - 17.7 g/dL    Hematocrit 40.5 40.0 - 53.0 %    MCV 90 78 - 100 fL    MCH 29.6 26.5 - 33.0 pg    MCHC 33.1 31.5 - 36.5 g/dL    RDW 13.0 10.0 - 15.0 %    Platelet Count 244 150 - 450 10e3/uL    % Neutrophils 74 %    % Lymphocytes 10 %    % Monocytes 9 %    % Eosinophils 6 %    % Basophils 0 %    % Immature Granulocytes 1 %    NRBCs per 100 WBC 0 <1 /100    Absolute Neutrophils 6.7 1.6 - 8.3 10e3/uL    Absolute Lymphocytes 0.9 0.8 - 5.3 10e3/uL    Absolute Monocytes 0.8 0.0 - 1.3 10e3/uL    Absolute Eosinophils 0.5 0.0 - 0.7 10e3/uL    Absolute Basophils 0.0 0.0 - 0.2 10e3/uL    Absolute Immature Granulocytes 0.0 <=0.4 10e3/uL    Absolute NRBCs 0.0 10e3/uL         ASSESSMENT   This is a 31 year old male with a PMH of schizoaffective disorder, polysubstance abuse, traumatic brain injury with coma, and a near fatal overdose with cardiac arrest who presented in a severe depressive episode with catatonia, occurring in the context of medication changes (lorazepam being discontinued) and the patient having severe malnutrition due to poor intake.     The patient has improved dramatically since starting Clozapine and resuming Ativan with his catatonia improving, his engagement with the world, speech, and ability to interact with peers. The  patient has improved to the point that he would benefit from discharge to a facility as soon as available with his medication regimen to continue     Today: remains afebrile. He is willing to continue clozapine. He is eating. He is likely nearing or at baseline. He is suitable for transfer once a safe disposition can be identified. Will increase lyrica back to PTA dosing.  He has a bit of an intention tremor, states he has had it for 4-5 months and that it is getting better. At this point, okay to continue to monitor - unclear what the cause is.        DIAGNOSTIC FORMULATION   1. Schizoaffective disorder, depressive type, multiple episodes, currently in acute episode, with catatonia   2. 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 Multiple other TBIs  3. 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 Methamphetamine use disorder, moderate, in early remission  5. Alcohol use disorder, moderate     PLAN     Location: Unit 5  Legal Status: Orders Placed This Encounter      Legal status Voluntary    Safety Assessment:    Behavioral Orders   Procedures     Code 1 - Restrict to Unit     Routine Programming     As clinically indicated     Status 15     Every 15 minutes.     PTA medications stopped    -Wellbutrin due to starting Clozaril. Both decrease seizure threshhold  -Abilify due to not being effective    PTA medications continued/changed:      -Namenda 10 mg BID  -Lyrica 100 mg TID  -Clonazepam restarted at 1 mg TID- switched to Lorazepam 1 mg TID on 7/23. Further educed to 0.5 mg/1mg/1mg.     New medications tried and stopped:      -None     New medications initiated:      -Clozapine 12.5 mg at bedtime on 7/19.-> Increase to 25 mg at bedtime on 7/20 -> 50 mg on 7/23 -> 75 mg on 7/24 ->100 mg on 7/26 -> 125 mg on 7/28 -> 150 mg on 7/30 -> 175 mg on 7/31 --> 200 mg on 08/01 -> 175 mg on 8/2 -> 150 mg on 8/3  -Vitamin D2 50,000 units weekly x 8  weeks     Today's Changes:     -increase lyrica back to 100 mg TID    Programming: Patient will be treated in a therapeutic milieu with appropriate individual and group therapies. Education will be provided on diagnoses, medications, and treatments. Encouraged behavioral activation and participation in group programming.     Medical diagnoses:      #. Nausea  #. Fever, resolved  #. Fatigue  #. Malaise  #. Transient chest pain  - Suspect URI. Myocarditis ruled out  - CRP mildly elevated. Mild leukocytosis. Normal troponin. Rest of labs reassuring. Negative COVID. EKG shows sinus tach, no new findings. Echo is normal.  - Analgesics for fever. Working  - Zofran prn  - Reduced Clozapine    #. Severe malnutrition, improving  - Nutrition consulted  - 11 pound weight gain since admission    #. Sialorrhea  #. Enuresis  - Secondary to Clozapine  - Reducing dose. Will add treatments as needed.    Consults: Nutrition  Tests: None    Disposition: Board and lodge         ATTESTATION    Joshua Somers MD  [facility]   Psychiatry    Telehealth video visit that took place via an interactive audio and video telecommunications system using secure connection. Patient identity was verified.  Patient verbalized agreement and understanding of test ordered, diagnosis, treatment plan, education, and side effects of medications, if prescribed.

## 2022-08-06 PROCEDURE — 250N000013 HC RX MED GY IP 250 OP 250 PS 637: Performed by: NURSE PRACTITIONER

## 2022-08-06 PROCEDURE — 250N000013 HC RX MED GY IP 250 OP 250 PS 637: Performed by: STUDENT IN AN ORGANIZED HEALTH CARE EDUCATION/TRAINING PROGRAM

## 2022-08-06 PROCEDURE — 99232 SBSQ HOSP IP/OBS MODERATE 35: CPT | Performed by: NURSE PRACTITIONER

## 2022-08-06 PROCEDURE — 250N000011 HC RX IP 250 OP 636: Performed by: NURSE PRACTITIONER

## 2022-08-06 PROCEDURE — 250N000013 HC RX MED GY IP 250 OP 250 PS 637: Performed by: PSYCHIATRY & NEUROLOGY

## 2022-08-06 PROCEDURE — 124N000001 HC R&B MH

## 2022-08-06 RX ADMIN — MEMANTINE 10 MG: 10 TABLET ORAL at 20:28

## 2022-08-06 RX ADMIN — MEMANTINE 10 MG: 10 TABLET ORAL at 08:12

## 2022-08-06 RX ADMIN — MULTIPLE VITAMINS W/ MINERALS TAB 1 TABLET: TAB at 08:11

## 2022-08-06 RX ADMIN — LORAZEPAM 1 MG: 1 TABLET ORAL at 13:39

## 2022-08-06 RX ADMIN — PREGABALIN 100 MG: 25 CAPSULE ORAL at 08:11

## 2022-08-06 RX ADMIN — ONDANSETRON HYDROCHLORIDE 4 MG: 4 TABLET, FILM COATED ORAL at 06:25

## 2022-08-06 RX ADMIN — CLOZAPINE 150 MG: 50 TABLET ORAL at 20:28

## 2022-08-06 RX ADMIN — PREGABALIN 100 MG: 25 CAPSULE ORAL at 13:39

## 2022-08-06 RX ADMIN — ONDANSETRON HYDROCHLORIDE 4 MG: 4 TABLET, FILM COATED ORAL at 10:32

## 2022-08-06 RX ADMIN — LORAZEPAM 1 MG: 1 TABLET ORAL at 08:10

## 2022-08-06 RX ADMIN — LITHIUM CARBONATE 450 MG: 450 TABLET, EXTENDED RELEASE ORAL at 20:28

## 2022-08-06 RX ADMIN — PREGABALIN 100 MG: 25 CAPSULE ORAL at 20:28

## 2022-08-06 RX ADMIN — LORAZEPAM 0.5 MG: 0.5 TABLET ORAL at 16:21

## 2022-08-06 RX ADMIN — ONDANSETRON HYDROCHLORIDE 4 MG: 4 TABLET, FILM COATED ORAL at 16:21

## 2022-08-06 RX ADMIN — HYDROXYZINE HYDROCHLORIDE 25 MG: 25 TABLET, FILM COATED ORAL at 19:23

## 2022-08-06 ASSESSMENT — ACTIVITIES OF DAILY LIVING (ADL)
ADLS_ACUITY_SCORE: 42

## 2022-08-06 NOTE — PLAN OF CARE
Problem: Behavioral Health Plan of Care  Goal: Patient-Specific Goal (Individualization)  Description: Patient will report at least 6-8 hours of sleep each night.   Patient will complete all ADL's independently while on the unit.   Patient will be compliant with treatment team recommendations.     Pt will eat at least 50% of meals and have enough fluid intake.  Outcome: Ongoing, Progressing     Problem: Thought Process Alteration  Goal: Optimal Thought Clarity  Description: Patient will hold reality based conversations while on the unit.     Outcome: Ongoing, Progressing     Problem: Suicide Risk  Goal: Absence of Self-Harm  Outcome: Ongoing, Progressing     Problem: Fall Injury Risk  Goal: Absence of Fall and Fall-Related Injury  Outcome: Ongoing, Progressing     Face to face shift report received from Bri LARKIN. Rounding completed, pt observed.     Pt appeared to sleep most of this shift. Pt did not have any noted episodes of self harm or falls this shift.    Face to face report will be communicated to oncoming RN.    Kecia Eisenberg RN  8/6/2022  6:03 AM

## 2022-08-06 NOTE — PLAN OF CARE
Problem: Behavioral Health Plan of Care  Goal: Patient-Specific Goal (Individualization)  Description: Patient will report at least 6-8 hours of sleep each night.   Patient will complete all ADL's independently while on the unit.   Patient will be compliant with treatment team recommendations.     Pt will eat at least 50% of meals and have enough fluid intake.  Outcome: Ongoing, Progressing  Note: Patient out on unit watching tv this shift, attends groups.  Patient takes all medications as prescribed.     Patient denies SI, HI, AH and VH.       Goal Outcome Evaluation:    Plan of Care Reviewed With: patient

## 2022-08-06 NOTE — PLAN OF CARE
Face to face shift report received from Nurse. Rounding completed, pt observed.           Problem: Behavioral Health Plan of Care  Goal: Patient-Specific Goal (Individualization)  Description: Patient will report at least 6-8 hours of sleep each night.   Patient will complete all ADL's independently while on the unit.   Patient will be compliant with treatment team recommendations.     Pt will eat at least 50% of meals and have enough fluid intake.  Outcome: Ongoing, Progressing     Problem: Suicide Risk  Goal: Absence of Self-Harm  Outcome: Ongoing, Progressing     Problem: Suicidal Behavior  Goal: Suicidal Behavior is Absent or Managed  Outcome: Ongoing, Progressing     Problem: Fall Injury Risk  Goal: Absence of Fall and Fall-Related Injury  Outcome: Ongoing, Progressing        Observed Pt. Laying in bed / Eyes Closed / Noted Breathing without difficulty.      Pt. Up this morning in the lounge eating. Pt. Compliant to medications.     Pt. attempting groups / eating meals this shift.    Face to face report will be communicated to oncoming RN.    Trinidad Hurley RN  8/6/2022  2:02 PM

## 2022-08-06 NOTE — PROGRESS NOTES
"  [facility] PSYCHIATRY  -  PROGRESS NOTE     ID   Name: Steven Carter  MRN#: [id number]     SUBJECTIVE   I met with Steven this am and let him know I was here for the weekend and to let me know if he needed anything. At that time he reported things were \"pretty OK\" and he would let me know. He later found me and asked if we could talk, he inquired about changing his Lyrica 100 TID  to 75 mg QID as he felt this may be more effective.Discussed given the action of the drug it probably would not make a difference, the greatest issue would also be quantity limit when he goes to get this refilled which he accepted noting that had happened to him before with other medications. He noted that he awakens in the am with his sheets damp and body clammy. I did tell him to remind this staff to turn the fan and heat off in his room at night. He asked my thoughts on him leaving, reassured him he will be going to a place which can support him and his staff is looking out for him. He also has a great relationship with his OP provider which is significant. He was also reminded of how well he is doing and if he continues taking his medications his future is full of potential opportunities. He thanked me for reminding him of this, noting he sometimes just needs reassurance.       MEDICATIONS   Scheduled Meds:    cloZAPine  150 mg Oral At Bedtime     lithium  450 mg Oral At Bedtime     LORazepam  0.5 mg Oral Daily     LORazepam  1 mg Oral BID     memantine  10 mg Oral BID     multivitamin w/minerals  1 tablet Oral Daily     ondansetron  4 mg Oral TID AC     pregabalin  100 mg Oral TID     vitamin D2  50,000 Units Oral Q7 Days     PRN Meds:.acetaminophen, alum & mag hydroxide-simethicone, docusate sodium, hydrOXYzine, OLANZapine **OR** OLANZapine, sulindac     ALLERGIES   Allergies   Allergen Reactions     Pork Allergy         VITALS   Vitals: BP 95/63   Pulse 92   Temp 97.7  F (36.5  C) (Temporal)   Resp 16   Wt 81.1 kg (178 " Get eye exam records "lb 14.4 oz)   SpO2 99%   BMI 24.26 kg/m       MENTAL STATUS EXAM     Appearance: Hair and beard are over grown-pt is clean/showered. Awake, alert, dressed in hospital scrubs  Attitude:  cooperative   Eye Contact: Improving  Mood:  \"good\"  Affect:  intensity remains blunted, likely at baseline  Speech: monotone, soft spoken, engaging in more conversation  Psychomotor Behavior:  no evidence of tardive dyskinesia, dystonia, or tics, lithium tremor improved  Thought Process:  [place]  Associations: none noted  Thought Content:  no evidence of suicidal ideation or homicidal ideation and no evidence of psychotic thought, denies hallucinations  Insight:  improving  Judgment:  limited  Oriented to:  time, person, and place  Attention Span and Concentration:  limited  Recent and Remote Memory:  limited  Fund of Knowledge: Low  Muscle Strength and Tone: normal  Gait and Station: normal        LABS   No results found for this or any previous visit (from the past 24 hour(s)).      ASSESSMENT   This is a 31 year old male with a PMH of schizoaffective disorder, polysubstance abuse, traumatic brain injury with coma, and a near fatal overdose with cardiac arrest who presented in a severe depressive episode with catatonia, occurring in the context of medication changes (lorazepam being discontinued) and the patient having severe malnutrition due to poor intake.     The patient has improved dramatically since starting Clozapine and resuming Ativan with his catatonia improving, his engagement with the world, speech, and ability to interact with peers. The patient has improved to the point that he would benefit from discharge to a facility as soon as available with his medication regimen to continue     Today: remains afebrile. He is willing to continue clozapine. He is eating. He is likely nearing or at baseline. He is suitable for transfer once a safe disposition can be identified. Will increase lyrica back to PTA dosing.  He has " a bit of an intention tremor, states he has had it for 4-5 months and that it is getting better. At this point, okay to continue to monitor - unclear what the cause is.        DIAGNOSTIC FORMULATION   1. Schizoaffective disorder, depressive type, multiple episodes, currently in acute episode, with catatonia   2. 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 Multiple other TBIs  3. 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 Methamphetamine use disorder, moderate, in early remission  5. Alcohol use disorder, moderate     PLAN     Location: Unit 5  Legal Status: Orders Placed This Encounter      Legal status Voluntary    Safety Assessment:    Behavioral Orders   Procedures     Code 1 - Restrict to Unit     Routine Programming     As clinically indicated     Status 15     Every 15 minutes.     PTA medications stopped    -Wellbutrin due to starting Clozaril. Both decrease seizure threshhold  -Abilify due to not being effective    PTA medications continued/changed:      -Namenda 10 mg BID  -Lyrica 100 mg TID  -Clonazepam restarted at 1 mg TID- switched to Lorazepam 1 mg TID on 7/23. Further educed to 0.5 mg/1mg/1mg.     New medications tried and stopped:      -None     New medications initiated:      -Clozapine 12.5 mg at bedtime on 7/19.-> Increase to 25 mg at bedtime on 7/20 -> 50 mg on 7/23 -> 75 mg on 7/24 ->100 mg on 7/26 -> 125 mg on 7/28 -> 150 mg on 7/30 -> 175 mg on 7/31 --> 200 mg on 08/01 -> 175 mg on 8/2 -> 150 mg on 8/3  -Vitamin D2 50,000 units weekly x 8 weeks     Today's Changes:     -increase lyrica back to 100 mg TID    Programming: Patient will be treated in a therapeutic milieu with appropriate individual and group therapies. Education will be provided on diagnoses, medications, and treatments. Encouraged behavioral activation and participation in group programming.     Medical diagnoses:      #. Nausea  #. Fever, resolved  #.  Fatigue  #. Malaise  #. Transient chest pain  - Suspect URI. Myocarditis ruled out  - CRP mildly elevated. Mild leukocytosis. Normal troponin. Rest of labs reassuring. Negative COVID. EKG shows sinus tach, no new findings. Echo is normal.  - Analgesics for fever. Working  - Zofran prn  - Reduced Clozapine    #. Severe malnutrition, improving  - Nutrition consulted  - 11 pound weight gain since admission    #. Sialorrhea  #. Enuresis  - Secondary to Clozapine  - Reducing dose. Will add treatments as needed.    Consults: Nutrition  Tests: None    Disposition: Board and lodge         ATTESTATION    Ary TAVAREZ, CNP

## 2022-08-06 NOTE — PLAN OF CARE
"Face to face end of shift report communicated to oncoming shift.     Sobeida Benton RN  8/5/2022  11:07 PM       Problem: Behavioral Health Plan of Care  Goal: Patient-Specific Goal (Individualization)  Description: Patient will report at least 6-8 hours of sleep each night.   Patient will complete all ADL's independently while on the unit.   Patient will be compliant with treatment team recommendations.     Pt will eat at least 50% of meals and have enough fluid intake.  8/5/2022 2258 by Sobeida Benton, RN  Outcome: Ongoing, Progressing  Note: Patient up at nurses window requesting to discuss medications with this writer.    \"I'm just wondering if I can have my Lyrica 4 times a day and increase my HS Ativan by 0.5 mg\"  Encouraged patient to discuss this with the provider he sees tomorrow, due to already meeting with provider today and med changes made.      Patient denies HI, SI, endorses depression and anxiety \"I'm just not sure where I will be going on discharge or if I'm ready\"    Patient attends all available groups, eats dinner in lounge with peers, remains in lounge watching tv.  Patient takes all medications as prescribed.  Polite and cooperative with nursing assessment and cares.      Goal Outcome Evaluation:    Plan of Care Reviewed With: patient                 "

## 2022-08-07 PROCEDURE — 250N000013 HC RX MED GY IP 250 OP 250 PS 637: Performed by: NURSE PRACTITIONER

## 2022-08-07 PROCEDURE — 250N000011 HC RX IP 250 OP 636: Performed by: NURSE PRACTITIONER

## 2022-08-07 PROCEDURE — 124N000001 HC R&B MH

## 2022-08-07 PROCEDURE — 250N000013 HC RX MED GY IP 250 OP 250 PS 637: Performed by: PSYCHIATRY & NEUROLOGY

## 2022-08-07 PROCEDURE — 250N000013 HC RX MED GY IP 250 OP 250 PS 637: Performed by: STUDENT IN AN ORGANIZED HEALTH CARE EDUCATION/TRAINING PROGRAM

## 2022-08-07 RX ADMIN — CLOZAPINE 150 MG: 50 TABLET ORAL at 20:30

## 2022-08-07 RX ADMIN — MEMANTINE 10 MG: 10 TABLET ORAL at 20:30

## 2022-08-07 RX ADMIN — LORAZEPAM 0.5 MG: 0.5 TABLET ORAL at 16:11

## 2022-08-07 RX ADMIN — PREGABALIN 100 MG: 25 CAPSULE ORAL at 08:09

## 2022-08-07 RX ADMIN — ERGOCALCIFEROL 50000 UNITS: 1.25 CAPSULE, LIQUID FILLED ORAL at 10:33

## 2022-08-07 RX ADMIN — LORAZEPAM 1 MG: 1 TABLET ORAL at 13:45

## 2022-08-07 RX ADMIN — PREGABALIN 100 MG: 25 CAPSULE ORAL at 20:29

## 2022-08-07 RX ADMIN — PREGABALIN 100 MG: 25 CAPSULE ORAL at 13:45

## 2022-08-07 RX ADMIN — LORAZEPAM 1 MG: 1 TABLET ORAL at 08:09

## 2022-08-07 RX ADMIN — MULTIPLE VITAMINS W/ MINERALS TAB 1 TABLET: TAB at 08:09

## 2022-08-07 RX ADMIN — MEMANTINE 10 MG: 10 TABLET ORAL at 08:10

## 2022-08-07 RX ADMIN — ONDANSETRON HYDROCHLORIDE 4 MG: 4 TABLET, FILM COATED ORAL at 10:34

## 2022-08-07 RX ADMIN — ONDANSETRON HYDROCHLORIDE 4 MG: 4 TABLET, FILM COATED ORAL at 06:30

## 2022-08-07 RX ADMIN — OLANZAPINE 10 MG: 10 TABLET ORAL at 19:21

## 2022-08-07 RX ADMIN — ONDANSETRON HYDROCHLORIDE 4 MG: 4 TABLET, FILM COATED ORAL at 16:11

## 2022-08-07 RX ADMIN — LITHIUM CARBONATE 450 MG: 450 TABLET, EXTENDED RELEASE ORAL at 20:30

## 2022-08-07 ASSESSMENT — ACTIVITIES OF DAILY LIVING (ADL)
ADLS_ACUITY_SCORE: 42

## 2022-08-07 NOTE — PLAN OF CARE
"Face to face end of shift report communicated to oncoming shift.     Sobeida Benton RN  8/6/2022  11:10 PM       Problem: Behavioral Health Plan of Care  Goal: Patient-Specific Goal (Individualization)  Description: Patient will report at least 6-8 hours of sleep each night.   Patient will complete all ADL's independently while on the unit.   Patient will be compliant with treatment team recommendations.     Pt will eat at least 50% of meals and have enough fluid intake.  Outcome: Ongoing, Progressing  Note: Patient up on unit for meals, patient eats 100%.  Patient denies mental health criteria.  Patient is pleasant and cooperative with nursing cares and assessment, patient lets needs be known.     Patient requests medication for anxiety, \"I'm just feeling a bit more anxious right now\"  \"I do plan to get drunk when I get out of here\"  \"but drunk just once and then I will be sober\"    Patient walks the halls.         Goal Outcome Evaluation:    Plan of Care Reviewed With: patient                 "

## 2022-08-07 NOTE — PLAN OF CARE
Face to face shift report received from Nurse. Rounding completed, pt observed.           Problem: Behavioral Health Plan of Care  Goal: Patient-Specific Goal (Individualization)  Description: Patient will report at least 6-8 hours of sleep each night.   Patient will complete all ADL's independently while on the unit.   Patient will be compliant with treatment team recommendations.     Pt will eat at least 50% of meals and have enough fluid intake.  Outcome: Ongoing, Progressing     Problem: Thought Process Alteration  Goal: Optimal Thought Clarity  Description: Patient will hold reality based conversations while on the unit.     Outcome: Ongoing, Progressing     Problem: Suicide Risk  Goal: Absence of Self-Harm  Outcome: Ongoing, Progressing     Problem: Suicidal Behavior  Goal: Suicidal Behavior is Absent or Managed  Outcome: Ongoing, Progressing     Problem: Fall Injury Risk  Goal: Absence of Fall and Fall-Related Injury  Outcome: Ongoing, Progressing        Observed Pt. Laying in bed / Eyes Closed / Noted Breathing without difficulty.      Pt. In lounge most of this shift. Pt. cooperative and polite/ compliant to medications.     Pt. Has not attended groups this shift, but does consume meals.     Face to face report will be communicated to oncoming RN.    Trinidad Hurley RN  8/7/2022  2:21 PM

## 2022-08-07 NOTE — PLAN OF CARE
Problem: Behavioral Health Plan of Care  Goal: Patient-Specific Goal (Individualization)  Description: Patient will report at least 6-8 hours of sleep each night.   Patient will complete all ADL's independently while on the unit.   Patient will be compliant with treatment team recommendations.     Pt will eat at least 50% of meals and have enough fluid intake.  Outcome: Ongoing, Progressing     Problem: Thought Process Alteration  Goal: Optimal Thought Clarity  Description: Patient will hold reality based conversations while on the unit.     Outcome: Ongoing, Progressing     Problem: Suicide Risk  Goal: Absence of Self-Harm  Outcome: Ongoing, Progressing     Problem: Fall Injury Risk  Goal: Absence of Fall and Fall-Related Injury  Outcome: Ongoing, Progressing     Face to face shift report received from Bri LARKIN. Rounding completed, pt observed.     Pt appeared to sleep most of this shift. Pt did not have any noted episodes of self harm or falls this shift.    Face to face report will be communicated to oncoming RN.    Kecia Eisenberg RN  8/7/2022  6:06 AM

## 2022-08-07 NOTE — PLAN OF CARE
"Face to face end of shift report communicated to oncoming shift.     Sobeida Benton RN  8/7/2022  11:01 PM    Problem: Behavioral Health Plan of Care  Goal: Patient-Specific Goal (Individualization)  Description: Patient will report at least 6-8 hours of sleep each night.   Patient will complete all ADL's independently while on the unit.   Patient will be compliant with treatment team recommendations.     Pt will eat at least 50% of meals and have enough fluid intake.  Outcome: Ongoing, Progressing  Note: Patient up on the unit, attends all groups as available, walks the halls.  Patient polite and cooperative with nursing cares and assessment.  Patient denies SI, HI, AH and VH.    Patient denies pain.  Patient requests extra food for dinner.      1922:  Zyprexa 10 mg given for \"I'm feeling fine physically, I just don't feel ok mentally, I feel like I've been going down hill all day\"     \"I just feel that I'm not being heard when I talk to staff about my plans\"   Assured patient he is being heard and that sometimes things are limited and options are few when there's certain criteria present.        Goal Outcome Evaluation:    Plan of Care Reviewed With: patient                 "

## 2022-08-08 LAB
BASOPHILS # BLD AUTO: 0 10E3/UL (ref 0–0.2)
BASOPHILS NFR BLD AUTO: 0 %
EOSINOPHIL # BLD AUTO: 1.4 10E3/UL (ref 0–0.7)
EOSINOPHIL NFR BLD AUTO: 12 %
ERYTHROCYTE [DISTWIDTH] IN BLOOD BY AUTOMATED COUNT: 12.5 % (ref 10–15)
HCT VFR BLD AUTO: 42.5 % (ref 40–53)
HGB BLD-MCNC: 13.9 G/DL (ref 13.3–17.7)
HOLD SPECIMEN: NORMAL
HOLD SPECIMEN: NORMAL
IMM GRANULOCYTES # BLD: 0.1 10E3/UL
IMM GRANULOCYTES NFR BLD: 1 %
LYMPHOCYTES # BLD AUTO: 1.4 10E3/UL (ref 0.8–5.3)
LYMPHOCYTES NFR BLD AUTO: 12 %
MCH RBC QN AUTO: 29.5 PG (ref 26.5–33)
MCHC RBC AUTO-ENTMCNC: 32.7 G/DL (ref 31.5–36.5)
MCV RBC AUTO: 90 FL (ref 78–100)
MONOCYTES # BLD AUTO: 0.8 10E3/UL (ref 0–1.3)
MONOCYTES NFR BLD AUTO: 7 %
NEUTROPHILS # BLD AUTO: 7.5 10E3/UL (ref 1.6–8.3)
NEUTROPHILS NFR BLD AUTO: 68 %
NRBC # BLD AUTO: 0 10E3/UL
NRBC BLD AUTO-RTO: 0 /100
PLATELET # BLD AUTO: 326 10E3/UL (ref 150–450)
RBC # BLD AUTO: 4.71 10E6/UL (ref 4.4–5.9)
WBC # BLD AUTO: 11.2 10E3/UL (ref 4–11)

## 2022-08-08 PROCEDURE — 80159 DRUG ASSAY CLOZAPINE: CPT | Performed by: STUDENT IN AN ORGANIZED HEALTH CARE EDUCATION/TRAINING PROGRAM

## 2022-08-08 PROCEDURE — 250N000013 HC RX MED GY IP 250 OP 250 PS 637: Performed by: STUDENT IN AN ORGANIZED HEALTH CARE EDUCATION/TRAINING PROGRAM

## 2022-08-08 PROCEDURE — 250N000013 HC RX MED GY IP 250 OP 250 PS 637: Performed by: NURSE PRACTITIONER

## 2022-08-08 PROCEDURE — 124N000001 HC R&B MH

## 2022-08-08 PROCEDURE — 250N000013 HC RX MED GY IP 250 OP 250 PS 637: Performed by: PSYCHIATRY & NEUROLOGY

## 2022-08-08 PROCEDURE — 250N000011 HC RX IP 250 OP 636: Performed by: NURSE PRACTITIONER

## 2022-08-08 PROCEDURE — 36415 COLL VENOUS BLD VENIPUNCTURE: CPT | Performed by: STUDENT IN AN ORGANIZED HEALTH CARE EDUCATION/TRAINING PROGRAM

## 2022-08-08 PROCEDURE — 85004 AUTOMATED DIFF WBC COUNT: CPT | Performed by: STUDENT IN AN ORGANIZED HEALTH CARE EDUCATION/TRAINING PROGRAM

## 2022-08-08 PROCEDURE — 99232 SBSQ HOSP IP/OBS MODERATE 35: CPT | Mod: 95 | Performed by: STUDENT IN AN ORGANIZED HEALTH CARE EDUCATION/TRAINING PROGRAM

## 2022-08-08 RX ADMIN — PREGABALIN 100 MG: 25 CAPSULE ORAL at 20:21

## 2022-08-08 RX ADMIN — LORAZEPAM 1 MG: 1 TABLET ORAL at 08:12

## 2022-08-08 RX ADMIN — LITHIUM CARBONATE 450 MG: 450 TABLET, EXTENDED RELEASE ORAL at 20:22

## 2022-08-08 RX ADMIN — MEMANTINE 10 MG: 10 TABLET ORAL at 08:12

## 2022-08-08 RX ADMIN — PREGABALIN 100 MG: 25 CAPSULE ORAL at 08:12

## 2022-08-08 RX ADMIN — HYDROXYZINE HYDROCHLORIDE 25 MG: 25 TABLET, FILM COATED ORAL at 20:22

## 2022-08-08 RX ADMIN — PREGABALIN 100 MG: 25 CAPSULE ORAL at 13:25

## 2022-08-08 RX ADMIN — ONDANSETRON HYDROCHLORIDE 4 MG: 4 TABLET, FILM COATED ORAL at 16:40

## 2022-08-08 RX ADMIN — ONDANSETRON HYDROCHLORIDE 4 MG: 4 TABLET, FILM COATED ORAL at 06:13

## 2022-08-08 RX ADMIN — LORAZEPAM 0.5 MG: 0.5 TABLET ORAL at 16:40

## 2022-08-08 RX ADMIN — CLOZAPINE 150 MG: 50 TABLET ORAL at 20:22

## 2022-08-08 RX ADMIN — MEMANTINE 10 MG: 10 TABLET ORAL at 20:22

## 2022-08-08 RX ADMIN — ACETAMINOPHEN 325MG 650 MG: 325 TABLET ORAL at 20:22

## 2022-08-08 RX ADMIN — ONDANSETRON HYDROCHLORIDE 4 MG: 4 TABLET, FILM COATED ORAL at 11:30

## 2022-08-08 RX ADMIN — LORAZEPAM 1 MG: 1 TABLET ORAL at 13:25

## 2022-08-08 RX ADMIN — MULTIPLE VITAMINS W/ MINERALS TAB 1 TABLET: TAB at 08:12

## 2022-08-08 ASSESSMENT — ACTIVITIES OF DAILY LIVING (ADL)
LAUNDRY: UNABLE TO COMPLETE
ADLS_ACUITY_SCORE: 42
HYGIENE/GROOMING: INDEPENDENT
HYGIENE/GROOMING: INDEPENDENT
ORAL_HYGIENE: INDEPENDENT
ADLS_ACUITY_SCORE: 42
DRESS: SCRUBS (BEHAVIORAL HEALTH);INDEPENDENT
ADLS_ACUITY_SCORE: 42
ADLS_ACUITY_SCORE: 42
DRESS: INDEPENDENT;SCRUBS (BEHAVIORAL HEALTH)
ADLS_ACUITY_SCORE: 42
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
## 2022-08-08 NOTE — PLAN OF CARE
Problem: Behavioral Health Plan of Care  Goal: Patient-Specific Goal (Individualization)  Description: Patient will report at least 6-8 hours of sleep each night.   Patient will complete all ADL's independently while on the unit.   Patient will be compliant with treatment team recommendations.     Pt will eat at least 50% of meals and have enough fluid intake.  Outcome: Ongoing, Progressing     Problem: Thought Process Alteration  Goal: Optimal Thought Clarity  Description: Patient will hold reality based conversations while on the unit.     Outcome: Ongoing, Progressing     Problem: Suicide Risk  Goal: Absence of Self-Harm  Outcome: Ongoing, Progressing     Problem: Fall Injury Risk  Goal: Absence of Fall and Fall-Related Injury  Outcome: Ongoing, Progressing     Face to face shift report received from Bri LARKIN. Rounding completed, pt observed.     Pt appeared to sleep most of the NOC shift.    Writer continued cares of pt for the day shift. Pt is pleasant with writer. Pt is compliant with medications this shift. Pt denies SI at this time and has not had any noted episodes of self harm. Pt does come to lobby for meals but continues to be withdrawn and isolative from peers. Encouraged pt to attend groups this shift. Pt did not have any noted falls this shift.     Face to face report will be communicated to oncoming RN.    Kecia Eisenberg RN  8/8/2022  1:20 PM

## 2022-08-08 NOTE — PHARMACY
Pharmacy Clozapine Continuation Note    Patient's Name: Steven Carter  Patient's : 1990    Current cloZAPine regimen: Clozapine 150 mg at bedtime  Has there been a known interruption in therapy for greater than/equal to 48 hours which would warrant retitration No    Recent ANC Value(s) for last 30 days:  2022: Absolute Neutrophils 4.0 10e3/uL (Ref range: 1.6 - 8.3 10e3/uL)  2022: Absolute Neutrophils 4.3 10e3/uL (Ref range: 1.6 - 8.3 10e3/uL)  2022: Absolute Neutrophils 8.0 10e3/uL (Ref range: 1.6 - 8.3 10e3/uL); Absolute Neutrophils 9.7 10e3/uL (H; Ref range: 1.6 - 8.3 10e3/uL); Absolute Neutrophils 9.8 10e3/uL (H; Ref range: 1.6 - 8.3 10e3/uL)  2022: Absolute Neutrophils 10.5 10e3/uL (H; Ref range: 1.6 - 8.3 10e3/uL)  2022: Absolute Neutrophils 6.7 10e3/uL (Ref range: 1.6 - 8.3 10e3/uL)  2022: Absolute Neutrophils 7.5 10e3/uL (Ref range: 1.6 - 8.3 10e3/uL)      A REMS Dispense Authorization was obtained from the clozapine REMS prgram? Yes   A Dispense Rationale was needed to obtain the REMS Dispense Authorization? No   Is the ANC (if available) within recommended limits? Yes  Does the patient have any signs or symptoms of infection, including fever? No    REMS Patient ID: [id number]  Patient RDA: [id number]    Plan:  1. Continue clozapine therapy at 150 mg PO at bedtime.  2. A WBC with differential will be ordered at least weekly, NEXT DUE 08/15/2022. ANC values will be entered into the REMS program.    Angela Washington [facility]

## 2022-08-09 PROCEDURE — 250N000013 HC RX MED GY IP 250 OP 250 PS 637: Performed by: NURSE PRACTITIONER

## 2022-08-09 PROCEDURE — 250N000011 HC RX IP 250 OP 636: Performed by: NURSE PRACTITIONER

## 2022-08-09 PROCEDURE — 250N000013 HC RX MED GY IP 250 OP 250 PS 637: Performed by: PSYCHIATRY & NEUROLOGY

## 2022-08-09 PROCEDURE — 250N000013 HC RX MED GY IP 250 OP 250 PS 637: Performed by: STUDENT IN AN ORGANIZED HEALTH CARE EDUCATION/TRAINING PROGRAM

## 2022-08-09 PROCEDURE — 124N000001 HC R&B MH

## 2022-08-09 PROCEDURE — 99232 SBSQ HOSP IP/OBS MODERATE 35: CPT | Mod: 95 | Performed by: PSYCHIATRY & NEUROLOGY

## 2022-08-09 RX ADMIN — ONDANSETRON HYDROCHLORIDE 4 MG: 4 TABLET, FILM COATED ORAL at 16:58

## 2022-08-09 RX ADMIN — MEMANTINE 10 MG: 10 TABLET ORAL at 20:30

## 2022-08-09 RX ADMIN — PREGABALIN 100 MG: 25 CAPSULE ORAL at 20:30

## 2022-08-09 RX ADMIN — MEMANTINE 10 MG: 10 TABLET ORAL at 08:19

## 2022-08-09 RX ADMIN — OLANZAPINE 10 MG: 10 TABLET ORAL at 20:37

## 2022-08-09 RX ADMIN — CLOZAPINE 150 MG: 50 TABLET ORAL at 20:29

## 2022-08-09 RX ADMIN — PREGABALIN 100 MG: 25 CAPSULE ORAL at 08:19

## 2022-08-09 RX ADMIN — MULTIPLE VITAMINS W/ MINERALS TAB 1 TABLET: TAB at 08:19

## 2022-08-09 RX ADMIN — LITHIUM CARBONATE 450 MG: 450 TABLET, EXTENDED RELEASE ORAL at 20:29

## 2022-08-09 RX ADMIN — ONDANSETRON HYDROCHLORIDE 4 MG: 4 TABLET, FILM COATED ORAL at 11:45

## 2022-08-09 RX ADMIN — LORAZEPAM 0.5 MG: 0.5 TABLET ORAL at 16:58

## 2022-08-09 RX ADMIN — ONDANSETRON HYDROCHLORIDE 4 MG: 4 TABLET, FILM COATED ORAL at 06:29

## 2022-08-09 RX ADMIN — LORAZEPAM 1 MG: 1 TABLET ORAL at 08:19

## 2022-08-09 RX ADMIN — LORAZEPAM 1 MG: 1 TABLET ORAL at 13:22

## 2022-08-09 RX ADMIN — SULINDAC 150 MG: 150 TABLET ORAL at 20:37

## 2022-08-09 RX ADMIN — PREGABALIN 100 MG: 25 CAPSULE ORAL at 13:22

## 2022-08-09 ASSESSMENT — ACTIVITIES OF DAILY LIVING (ADL)
ADLS_ACUITY_SCORE: 42
DRESS: SCRUBS (BEHAVIORAL HEALTH);INDEPENDENT
ORAL_HYGIENE: INDEPENDENT
ADLS_ACUITY_SCORE: 42
HYGIENE/GROOMING: INDEPENDENT
ADLS_ACUITY_SCORE: 42
LAUNDRY: UNABLE TO COMPLETE

## 2022-08-09 NOTE — PLAN OF CARE
Problem: Behavioral Health Plan of Care  Goal: Patient-Specific Goal (Individualization)  Description: Patient will report at least 6-8 hours of sleep each night.   Patient will complete all ADL's independently while on the unit.   Patient will be compliant with treatment team recommendations.     Pt will eat at least 50% of meals and have enough fluid intake.  Outcome: Ongoing, Progressing     Problem: Thought Process Alteration  Goal: Optimal Thought Clarity  Description: Patient will hold reality based conversations while on the unit.     Outcome: Ongoing, Progressing     Problem: Suicide Risk  Goal: Absence of Self-Harm  Outcome: Ongoing, Progressing     Problem: Fall Injury Risk  Goal: Absence of Fall and Fall-Related Injury  Outcome: Ongoing, Progressing     Face to face shift report received from Harriett LARKIN. Rounding completed, pt observed.     Pt appeared to sleep most of this shift. Pt did not have any noted episodes of self harm or falls this shift.    Face to face report will be communicated to oncoming RN.    Kecia Eisenberg RN  8/9/2022  6:11 AM

## 2022-08-09 NOTE — PLAN OF CARE
"Face to face report received from Kecia LARKIN. Pt. Observed.     Problem: Behavioral Health Plan of Care  Goal: Patient-Specific Goal (Individualization)  Description: Patient will report at least 6-8 hours of sleep each night.   Patient will complete all ADL's independently while on the unit.   Patient will be compliant with treatment team recommendations.     Pt will eat at least 50% of meals and have enough fluid intake.  Outcome: Ongoing, Progressing   Pt. Denies HI, SI, and hallucinations. Pt. Reporting ongoing mild anxiety and depression. Pt. Reporting chronic mild lower back pain, refusing PRN or any non-phagological intervention. Pt. Hand tremor very mild today. \"They're almost gone today.\" Pt. Cooperative with medications and nursing assessment. Pt. In agreement to update staff to thoughts feelings of wanting to harm self or others. Pt. Eating WDL, reporting scheduled antiemetic is working \"OK\" Pt. Denies any issues with bowel and bladder.   Problem: Fall Injury Risk  Goal: Absence of Fall and Fall-Related Injury  Outcome: Ongoing, Progressing  Pt. Free from falls this a.m.      Face to face end of shift report communicated to oncoming RN.     Liat Lai RN  8/9/2022    "
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
## 2022-08-09 NOTE — PLAN OF CARE
Problem: Behavioral Health Plan of Care  Goal: Patient-Specific Goal (Individualization)  Description: Patient will report at least 6-8 hours of sleep each night.   Patient will complete all ADL's independently while on the unit.   Patient will be compliant with treatment team recommendations.     Pt will eat at least 50% of meals and have enough fluid intake.  8/9/2022 1158 by Kecia Eisenberg RN  Outcome: Ongoing, Progressing     Problem: Thought Process Alteration  Goal: Optimal Thought Clarity  Description: Patient will hold reality based conversations while on the unit.     8/9/2022 1158 by Kecia Eisenberg RN  Outcome: Ongoing, Progressing     Problem: Suicidal Behavior  Goal: Suicidal Behavior is Absent or Managed  Outcome: Ongoing, Progressing     Problem: Fall Injury Risk  Goal: Absence of Fall and Fall-Related Injury  8/9/2022 1158 by Kecia Eisenberg RN  Outcome: Ongoing, Progressing     Face to face shift report received from Ira LARKIN. Rounding completed, pt observed.     Writer assumed cares of pt for the remainder of day shift. Pt did not have any noted episodes of self harm or falls this shift. No concerns noted.    Face to face report will be communicated to oncoming RN.    Kecia Eisenberg RN  8/9/2022  1:37 PM

## 2022-08-09 NOTE — PROGRESS NOTES
"  [facility] PSYCHIATRY  -  PROGRESS NOTE     ID   Name: Steven Carter  MRN#: [id number]     SUBJECTIVE     Staff report no concerns.    The patient notes he is doing fine today. No physical complaints today. He states he is have a hard time waiting for a place to be identified but trying to make the most of his stay.   He is void of suicidality. States he is not having any issues eating, drinking, voiding, stooling or ambulating.   We talked about the importance of medication adherence as well as the need for adequate nutrition.        MEDICATIONS   Scheduled Meds:    cloZAPine  150 mg Oral At Bedtime     lithium  450 mg Oral At Bedtime     LORazepam  0.5 mg Oral Daily     LORazepam  1 mg Oral BID     memantine  10 mg Oral BID     multivitamin w/minerals  1 tablet Oral Daily     ondansetron  4 mg Oral TID AC     pregabalin  100 mg Oral TID     vitamin D2  50,000 Units Oral Q7 Days     PRN Meds:.acetaminophen, alum & mag hydroxide-simethicone, docusate sodium, hydrOXYzine, OLANZapine **OR** OLANZapine, sulindac     ALLERGIES   Allergies   Allergen Reactions     Pork Allergy         VITALS   Vitals: /51   Pulse 86   Temp 97.6  F (36.4  C) (Temporal)   Resp 18   Wt 81.1 kg (178 lb 14.4 oz)   SpO2 97%   BMI 24.26 kg/m       MENTAL STATUS EXAM     Appearance: Hair and beard are over grown-pt is clean/showered. Awake, alert, dressed in hospital scrubs  Attitude:  cooperative   Eye Contact: Improving  Mood:  \"okay\"  Affect:  intensity remains blunted, likely at baseline  Speech: monotone, soft spoken, engaging in more conversation  Psychomotor Behavior:  no evidence of tardive dyskinesia, dystonia, or tics, lithium tremor improved  Thought Process:  [place]  Associations: none noted  Thought Content:  no evidence of suicidal ideation or homicidal ideation and no evidence of psychotic thought, denies hallucinations  Insight: Adequate  Judgment: Adequate  Oriented to:  time, person, and place  Attention " Span and Concentration:  limited  Recent and Remote Memory:  limited  Fund of Knowledge: Low  Muscle Strength and Tone: normal  Gait and Station: normal        LABS   Recent Results (from the past 24 hour(s))   CBC with platelets and differential    Collection Time: 08/08/22 12:30 PM   Result Value Ref Range    WBC Count 11.2 (H) 4.0 - 11.0 10e3/uL    RBC Count 4.71 4.40 - 5.90 10e6/uL    Hemoglobin 13.9 13.3 - 17.7 g/dL    Hematocrit 42.5 40.0 - 53.0 %    MCV 90 78 - 100 fL    MCH 29.5 26.5 - 33.0 pg    MCHC 32.7 31.5 - 36.5 g/dL    RDW 12.5 10.0 - 15.0 %    Platelet Count 326 150 - 450 10e3/uL    % Neutrophils 68 %    % Lymphocytes 12 %    % Monocytes 7 %    % Eosinophils 12 %    % Basophils 0 %    % Immature Granulocytes 1 %    NRBCs per 100 WBC 0 <1 /100    Absolute Neutrophils 7.5 1.6 - 8.3 10e3/uL    Absolute Lymphocytes 1.4 0.8 - 5.3 10e3/uL    Absolute Monocytes 0.8 0.0 - 1.3 10e3/uL    Absolute Eosinophils 1.4 (H) 0.0 - 0.7 10e3/uL    Absolute Basophils 0.0 0.0 - 0.2 10e3/uL    Absolute Immature Granulocytes 0.1 <=0.4 10e3/uL    Absolute NRBCs 0.0 10e3/uL   Extra Green Top (Lithium Heparin) Tube    Collection Time: 08/08/22 12:30 PM   Result Value Ref Range    Hold Specimen JIC    Extra Red Top Tube    Collection Time: 08/08/22 12:30 PM   Result Value Ref Range    Hold Specimen JIC          ASSESSMENT   This is a 31 year old male with a PMH of schizoaffective disorder, polysubstance abuse, traumatic brain injury with coma, and a near fatal overdose with cardiac arrest who presented in a severe depressive episode with catatonia, occurring in the context of medication changes (lorazepam being discontinued) and the patient having severe malnutrition due to poor intake.     The patient has improved dramatically since starting Clozapine and resuming Ativan with his catatonia improving, his engagement with the world, speech, and ability to interact with peers. The patient has improved to the point that he would  benefit from discharge to a facility as soon as available with his medication regimen to continue     Today: clozapine level has been ordered, we await results. He is asymptomatic today. Getting mildly frustrated by the wait to get him to a safe disposition, ready for discharge when safe place has been identified. Tolerating decrease in Ativan, should be change directions we will have a low threshold for adding back/increasing given the severity of his initial presentation associated with weight loss.        DIAGNOSTIC FORMULATION   1. Schizoaffective disorder, depressive type, multiple episodes, currently in acute episode, with catatonia   2. 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 Multiple other TBIs  3. 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 Methamphetamine use disorder, moderate, in early remission  5. Alcohol use disorder, moderate     PLAN     Location: Unit 5  Legal Status: Orders Placed This Encounter      Legal status Voluntary    Safety Assessment:    Behavioral Orders   Procedures     Code 1 - Restrict to Unit     Routine Programming     As clinically indicated     Status 15     Every 15 minutes.     PTA medications stopped    -Wellbutrin due to starting Clozaril. Both decrease seizure threshhold  -Abilify due to not being effective    PTA medications continued/changed:     -Lithium 900 mg at bedtime reduced to 450 mg at bedtime to address tremor with improvement  -Namenda 10 mg BID  -Lyrica 100 mg TID  -Clonazepam restarted at 1 mg TID- switched to Lorazepam 1 mg TID on 7/23. Further reduced to 0.5 mg/1mg/1mg.     New medications tried and stopped:      -None     New medications initiated:      -Clozapine 12.5 mg at bedtime on 7/19.-> Increase to 25 mg at bedtime on 7/20 -> 50 mg on 7/23 -> 75 mg on 7/24 ->100 mg on 7/26 -> 125 mg on 7/28 -> 150 mg on 7/30 -> 175 mg on 7/31 --> 200 mg on 08/01 -> 175 mg on 8/2 -> 150 mg on  8/3  -Vitamin D2 50,000 units weekly x 8 weeks     Today's Changes:     -None    Programming: Patient will be treated in a therapeutic milieu with appropriate individual and group therapies. Education will be provided on diagnoses, medications, and treatments. Encouraged behavioral activation and participation in group programming.     Medical diagnoses:      #. URI, resolving  - Suspect URI. Myocarditis ruled out  - CRP mildly elevated. Mild leukocytosis. Normal troponin. Rest of labs reassuring. Negative COVID. EKG shows sinus tach, no new findings. Echo is normal.  - Analgesics for fever. Working  - Zofran prn  - Reduced Clozapine    #. Severe malnutrition, improving  - Nutrition consulted  - 11 pound weight gain since admission    #. Sialorrhea, resolved  #. Enuresis, resolved  - Secondary to Clozapine  - Reduced dose with improvement    Consults: Nutrition  Tests: Clozapine    Disposition: Board and lodge       ATTESTATION    Joshua Somers MD  [facility]   Psychiatry    Telehealth video visit that took place via an interactive audio and video telecommunications system using secure connection. Patient identity was verified.  Patient verbalized agreement and understanding of test ordered, diagnosis, treatment plan, education, and side effects of medications, if prescribed.

## 2022-08-09 NOTE — PLAN OF CARE
"Spoke with pt today who [place] Let him know this writer would look into it.     Pt declined from [facility] due to \"mental health history\".     Lopez is still reviewing pt along with Kaci.      "

## 2022-08-10 PROCEDURE — 99233 SBSQ HOSP IP/OBS HIGH 50: CPT | Mod: 95 | Performed by: PSYCHIATRY & NEUROLOGY

## 2022-08-10 PROCEDURE — 250N000011 HC RX IP 250 OP 636: Performed by: NURSE PRACTITIONER

## 2022-08-10 PROCEDURE — 250N000013 HC RX MED GY IP 250 OP 250 PS 637: Performed by: NURSE PRACTITIONER

## 2022-08-10 PROCEDURE — 250N000013 HC RX MED GY IP 250 OP 250 PS 637: Performed by: PSYCHIATRY & NEUROLOGY

## 2022-08-10 PROCEDURE — 250N000013 HC RX MED GY IP 250 OP 250 PS 637: Performed by: STUDENT IN AN ORGANIZED HEALTH CARE EDUCATION/TRAINING PROGRAM

## 2022-08-10 PROCEDURE — 124N000001 HC R&B MH

## 2022-08-10 RX ORDER — LORAZEPAM 1 MG/1
1 TABLET ORAL 3 TIMES DAILY
Status: DISCONTINUED | OUTPATIENT
Start: 2022-08-10 | End: 2022-09-02 | Stop reason: HOSPADM

## 2022-08-10 RX ADMIN — MEMANTINE 10 MG: 10 TABLET ORAL at 08:22

## 2022-08-10 RX ADMIN — PREGABALIN 100 MG: 25 CAPSULE ORAL at 13:16

## 2022-08-10 RX ADMIN — LORAZEPAM 1 MG: 1 TABLET ORAL at 13:15

## 2022-08-10 RX ADMIN — MULTIPLE VITAMINS W/ MINERALS TAB 1 TABLET: TAB at 08:22

## 2022-08-10 RX ADMIN — PREGABALIN 100 MG: 25 CAPSULE ORAL at 08:22

## 2022-08-10 RX ADMIN — PREGABALIN 100 MG: 25 CAPSULE ORAL at 20:17

## 2022-08-10 RX ADMIN — MEMANTINE 10 MG: 10 TABLET ORAL at 20:17

## 2022-08-10 RX ADMIN — LORAZEPAM 1 MG: 1 TABLET ORAL at 08:22

## 2022-08-10 RX ADMIN — LORAZEPAM 1 MG: 1 TABLET ORAL at 20:17

## 2022-08-10 RX ADMIN — ONDANSETRON HYDROCHLORIDE 4 MG: 4 TABLET, FILM COATED ORAL at 11:52

## 2022-08-10 RX ADMIN — SULINDAC 150 MG: 150 TABLET ORAL at 16:51

## 2022-08-10 RX ADMIN — ONDANSETRON HYDROCHLORIDE 4 MG: 4 TABLET, FILM COATED ORAL at 06:39

## 2022-08-10 RX ADMIN — ONDANSETRON HYDROCHLORIDE 4 MG: 4 TABLET, FILM COATED ORAL at 16:48

## 2022-08-10 RX ADMIN — LITHIUM CARBONATE 450 MG: 450 TABLET, EXTENDED RELEASE ORAL at 20:17

## 2022-08-10 RX ADMIN — CLOZAPINE 150 MG: 50 TABLET ORAL at 20:17

## 2022-08-10 ASSESSMENT — ACTIVITIES OF DAILY LIVING (ADL)
HYGIENE/GROOMING: INDEPENDENT;PROMPTS
ADLS_ACUITY_SCORE: 42
ORAL_HYGIENE: INDEPENDENT
DRESS: SCRUBS (BEHAVIORAL HEALTH);INDEPENDENT
DRESS: INDEPENDENT;SCRUBS (BEHAVIORAL HEALTH)
LAUNDRY: UNABLE TO COMPLETE
ADLS_ACUITY_SCORE: 52
ORAL_HYGIENE: INDEPENDENT
ADLS_ACUITY_SCORE: 52
ADLS_ACUITY_SCORE: 42
ADLS_ACUITY_SCORE: 52
LAUNDRY: UNABLE TO COMPLETE
ADLS_ACUITY_SCORE: 42
HYGIENE/GROOMING: INDEPENDENT
ADLS_ACUITY_SCORE: 52

## 2022-08-10 NOTE — PROGRESS NOTES
"  [facility] PSYCHIATRY  -  PROGRESS NOTE     ID   Name: Steven Carter  MRN#: [id number]     SUBJECTIVE     Yesterday, patient requested to be taken off of clozaril and put on bupropion.  He makes a comment that if he were on wellbutrin it would be easier to be placed.  He is worried about weight gain. He would like his ativan increased.     The patient notes he is doing \"Good\", vocalizes he is \"hanging in there\". He reports he is having urinary incontinence at night when he takes the evenign doses of olanzapine and hydroxyzine.  He states he takes these doses for anxiety - he notes that on nights he does not take them he does not have urinary incontinence.  We also discussed bupropion initiation - he was previously on this - reported he would possibly like to retrial again in the future, but is okay with deferring for now. We discussed increasing his lorazepam to 1 mg TID and seeing how this has an overall effect on his anxiety, discussed concern for re-emergence of catatonia as well.     He shares today that it took him 4 months to grow out his beard - he is not interested in getting it trimmed/cut       MEDICATIONS   Scheduled Meds:    cloZAPine  150 mg Oral At Bedtime     lithium  450 mg Oral At Bedtime     LORazepam  0.5 mg Oral Daily     LORazepam  1 mg Oral BID     memantine  10 mg Oral BID     multivitamin w/minerals  1 tablet Oral Daily     ondansetron  4 mg Oral TID AC     pregabalin  100 mg Oral TID     vitamin D2  50,000 Units Oral Q7 Days     PRN Meds:.acetaminophen, alum & mag hydroxide-simethicone, docusate sodium, hydrOXYzine, OLANZapine **OR** OLANZapine, sulindac     ALLERGIES   Allergies   Allergen Reactions     Pork Allergy         VITALS   Vitals: /64   Pulse 82   Temp 98.1  F (36.7  C) (Tympanic)   Resp 16   Wt 81.1 kg (178 lb 14.4 oz)   SpO2 100%   BMI 24.26 kg/m       MENTAL STATUS EXAM     Appearance: Hair and beard are over grown-pt is clean/showered. Awake, alert, " "dressed in hospital scrubs  Attitude:  cooperative   Eye Contact: Improving  Mood:  \"good\"  Affect:  intensity remains blunted, likely at baseline  Speech: monotone, soft spoken, engaging in more conversation  Psychomotor Behavior:  no evidence of tardive dyskinesia, dystonia, or tics, lithium tremor improved  Thought Process:  [place]  Associations: none noted  Thought Content:  no evidence of suicidal ideation or homicidal ideation and no evidence of psychotic thought, denies hallucinations  Insight: Adequate  Judgment: Adequate  Oriented to:  time, person, and place  Attention Span and Concentration:  limited  Recent and Remote Memory:  limited  Fund of Knowledge: Low  Muscle Strength and Tone: normal  Gait and Station: normal        LABS   No results found for this or any previous visit (from the past 24 hour(s)).      ASSESSMENT   This is a 31 year old male with a PMH of schizoaffective disorder, polysubstance abuse, traumatic brain injury with coma, and a near fatal overdose with cardiac arrest who presented in a severe depressive episode with catatonia, occurring in the context of medication changes (lorazepam being discontinued) and the patient having severe malnutrition due to poor intake.     The patient has improved dramatically since starting Clozapine and resuming Ativan with his catatonia improving, his engagement with the world, speech, and ability to interact with peers. The patient has improved to the point that he would benefit from discharge to a facility as soon as available with his medication regimen to continue     Today: Doing better. He is stable for discharge. Urinary incontinence at night likely related to olanzapine - will discontinue this PRN.  Will increase his ativan back to 1 mg TID to see if this helps with the anxiety he is vocalizing. Hold off on initiating wellbutrin given that is can lower seizure threshold further in combination with clozapine - will need to continue to address " this with Steven.       DIAGNOSTIC FORMULATION   1. Schizoaffective disorder, depressive type, multiple episodes, currently in acute episode, with catatonia   2. 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 Multiple other TBIs  3. 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 Methamphetamine use disorder, moderate, in early remission  5. Alcohol use disorder, moderate     PLAN     Location: Unit 5  Legal Status: Orders Placed This Encounter      Legal status Voluntary    Safety Assessment:    Behavioral Orders   Procedures     Code 1 - Restrict to Unit     Routine Programming     As clinically indicated     Status 15     Every 15 minutes.     PTA medications stopped    -Wellbutrin due to starting Clozaril. Both decrease seizure threshhold  -Abilify due to not being effective    PTA medications continued/changed:     -Lithium 900 mg at bedtime reduced to 450 mg at bedtime to address tremor with improvement  -Namenda 10 mg BID  -Lyrica 100 mg TID  -Clonazepam restarted at 1 mg TID- switched to Lorazepam 1 mg TID on 7/23. Further reduced to 0.5 mg/1mg/1mg.     New medications tried and stopped:      -None     New medications initiated:      -Clozapine 12.5 mg at bedtime on 7/19.-> Increase to 25 mg at bedtime on 7/20 -> 50 mg on 7/23 -> 75 mg on 7/24 ->100 mg on 7/26 -> 125 mg on 7/28 -> 150 mg on 7/30 -> 175 mg on 7/31 --> 200 mg on 08/01 -> 175 mg on 8/2 -> 150 mg on 8/3  -Vitamin D2 50,000 units weekly x 8 weeks     Today's Changes:  -increase lorazepam to 1 mg TID    Programming: Patient will be treated in a therapeutic milieu with appropriate individual and group therapies. Education will be provided on diagnoses, medications, and treatments. Encouraged behavioral activation and participation in group programming.     Medical diagnoses:      #. URI, resolving  - Suspect URI. Myocarditis ruled out  - CRP mildly elevated. Mild leukocytosis. Normal  troponin. Rest of labs reassuring. Negative COVID. EKG shows sinus tach, no new findings. Echo is normal.  - Analgesics for fever. Working  - Zofran prn  - Reduced Clozapine    #. Severe malnutrition, improving  - Nutrition consulted  - 11 pound weight gain since admission    #. Sialorrhea, resolved  #. Enuresis, resolved  - Secondary to Clozapine  - Reduced dose with improvement    Consults: Nutrition  Tests: Clozapine    Disposition: [facility] and lodge       ATTESTATION    Joshua Somers MD  [facility]   Psychiatry    Telehealth video visit that took place via an interactive audio and video telecommunications system using secure connection. Patient identity was verified.  Patient verbalized agreement and understanding of test ordered, diagnosis, treatment plan, education, and side effects of medications, if prescribed.

## 2022-08-10 NOTE — PLAN OF CARE
Problem: Behavioral Health Plan of Care  Goal: Patient-Specific Goal (Individualization)  Description: Patient will report at least 6-8 hours of sleep each night.   Patient will complete all ADL's independently while on the unit.   Patient will be compliant with treatment team recommendations.     Pt will eat at least 50% of meals and have enough fluid intake.  Outcome: Ongoing, Progressing  Note: 07:35: Received end of shift report from TRAE Maloney et TRAE Josue. Pt displaying s/s of sleep upon arrival.     09:00:  Pt out for breakfast, denies need for prn pharmacological intervention at this time, continues withdrawn/isolative activity.     14:45: Less isolative post lunch, first increased dose of lorazepam given @ 13:15. Guarded/mistrustful demeanor, mood is calm, depressed/sad affect. No group participation. Adequate food et fluid intake.     Face to face end of shift report communicated to on-coming PM staff.     Connie Wharton RN  8/10/2022  2:47 PM             Problem: Suicide Risk  Goal: Absence of Self-Harm  Outcome: Ongoing, Progressing     Problem: Suicidal Behavior  Goal: Suicidal Behavior is Absent or Managed  Outcome: Ongoing, Progressing     Problem: Fall Injury Risk  Goal: Absence of Fall and Fall-Related Injury  Outcome: Ongoing, Progressing   Goal Outcome Evaluation:

## 2022-08-10 NOTE — PLAN OF CARE
Problem: Behavioral Health Plan of Care  Goal: Patient-Specific Goal (Individualization)  Description: Patient will report at least 6-8 hours of sleep each night.   Patient will complete all ADL's independently while on the unit.   Patient will be compliant with treatment team recommendations.     Pt will eat at least 50% of meals and have enough fluid intake.  Outcome: Ongoing, Progressing    Pt has been out in the lounge most of the shift. Pt requested to be taken off of clozaril and put on Wellbutrin. Pt also wants to have his ativan increased, pt is worried about weight gain. Pt also concerned that it is taking so long to get placed somewhere, pt thinks that if he was on Wellbutrin it would be easier to get a place to live. Pt also states he would be ok with going to the shelter [facility].           Problem: Thought Process Alteration  Goal: Optimal Thought Clarity  Description: Patient will hold reality based conversations while on the unit.     Outcome: Ongoing, Progressing     Problem: Suicide Risk  Goal: Absence of Self-Harm  Outcome: Ongoing, Progressing     Problem: Suicidal Behavior  Goal: Suicidal Behavior is Absent or Managed  Outcome: Ongoing, Progressing     Problem: Fall Injury Risk  Goal: Absence of Fall and Fall-Related Injury  Outcome: Ongoing, Progressing   Goal Outcome Evaluation:        Face to face end of shift report communicated to night shift RN. Reported that pt is a risk for suicide and falls.     Sarahi Hui, TRAE  8/9/2022  7:55 PM

## 2022-08-10 NOTE — PLAN OF CARE
Face to Face report received from TRAE Ronquillo.  Rounding completed. Patient observed in his own room.      Problem: Fall Injury Risk  Goal: Absence of Fall and Fall-Related Injury  Outcome: Ongoing, Progressing     Problem: Suicidal Behavior  Goal: Suicidal Behavior is Absent or Managed  Outcome: Ongoing, Progressing     Problem: Suicide Risk  Goal: Absence of Self-Harm  Outcome: Ongoing, Progressing     Problem: Thought Process Alteration  Goal: Optimal Thought Clarity  Description: Patient will hold reality based conversations while on the unit.     Outcome: Ongoing, Progressing     Problem: Behavioral Health Plan of Care  Goal: Patient-Specific Goal (Individualization)  Description: Patient will report at least 6-8 hours of sleep each night.   Patient will complete all ADL's independently while on the unit.   Patient will be compliant with treatment team recommendations.     Pt will eat at least 50% of meals and have enough fluid intake.  Outcome: Ongoing, Progressing          Patient observed resting in his own room for 7 hours with eyes closed & normal respirations noted.  No signs of distress.  Face to face end of shift report communicated to oncoming RN.     Amara Dial RN  8/10/2022  1:58 AM

## 2022-08-11 PROCEDURE — 250N000013 HC RX MED GY IP 250 OP 250 PS 637: Performed by: NURSE PRACTITIONER

## 2022-08-11 PROCEDURE — 99232 SBSQ HOSP IP/OBS MODERATE 35: CPT | Mod: 95 | Performed by: PSYCHIATRY & NEUROLOGY

## 2022-08-11 PROCEDURE — 250N000011 HC RX IP 250 OP 636: Performed by: NURSE PRACTITIONER

## 2022-08-11 PROCEDURE — 124N000001 HC R&B MH

## 2022-08-11 PROCEDURE — 250N000013 HC RX MED GY IP 250 OP 250 PS 637: Performed by: PSYCHIATRY & NEUROLOGY

## 2022-08-11 PROCEDURE — 250N000013 HC RX MED GY IP 250 OP 250 PS 637: Performed by: STUDENT IN AN ORGANIZED HEALTH CARE EDUCATION/TRAINING PROGRAM

## 2022-08-11 RX ORDER — ONDANSETRON 4 MG/1
4 TABLET, FILM COATED ORAL 3 TIMES DAILY PRN
Status: DISCONTINUED | OUTPATIENT
Start: 2022-08-11 | End: 2022-09-02 | Stop reason: HOSPADM

## 2022-08-11 RX ADMIN — PREGABALIN 100 MG: 25 CAPSULE ORAL at 20:28

## 2022-08-11 RX ADMIN — CLOZAPINE 150 MG: 50 TABLET ORAL at 20:27

## 2022-08-11 RX ADMIN — LORAZEPAM 1 MG: 1 TABLET ORAL at 13:12

## 2022-08-11 RX ADMIN — MEMANTINE 10 MG: 10 TABLET ORAL at 08:06

## 2022-08-11 RX ADMIN — PREGABALIN 100 MG: 25 CAPSULE ORAL at 13:12

## 2022-08-11 RX ADMIN — LITHIUM CARBONATE 450 MG: 450 TABLET, EXTENDED RELEASE ORAL at 20:28

## 2022-08-11 RX ADMIN — SULINDAC 150 MG: 150 TABLET ORAL at 15:21

## 2022-08-11 RX ADMIN — ONDANSETRON HYDROCHLORIDE 4 MG: 4 TABLET, FILM COATED ORAL at 06:41

## 2022-08-11 RX ADMIN — PREGABALIN 100 MG: 25 CAPSULE ORAL at 08:05

## 2022-08-11 RX ADMIN — MULTIPLE VITAMINS W/ MINERALS TAB 1 TABLET: TAB at 08:06

## 2022-08-11 RX ADMIN — LORAZEPAM 1 MG: 1 TABLET ORAL at 20:27

## 2022-08-11 RX ADMIN — LORAZEPAM 1 MG: 1 TABLET ORAL at 08:05

## 2022-08-11 RX ADMIN — MEMANTINE 10 MG: 10 TABLET ORAL at 20:28

## 2022-08-11 ASSESSMENT — ACTIVITIES OF DAILY LIVING (ADL)
HYGIENE/GROOMING: INDEPENDENT
ORAL_HYGIENE: INDEPENDENT
DRESS: INDEPENDENT;SCRUBS (BEHAVIORAL HEALTH)
ADLS_ACUITY_SCORE: 42
DRESS: INDEPENDENT;SCRUBS (BEHAVIORAL HEALTH)
ADLS_ACUITY_SCORE: 42
ORAL_HYGIENE: INDEPENDENT
ADLS_ACUITY_SCORE: 42
HYGIENE/GROOMING: INDEPENDENT
ADLS_ACUITY_SCORE: 42
LAUNDRY: UNABLE TO COMPLETE

## 2022-08-11 NOTE — PROGRESS NOTES
"  [facility] PSYCHIATRY  -  PROGRESS NOTE     ID   Name: Steven Carter  MRN#: [id number]     SUBJECTIVE   Today, I met with nursing, SW and OT and reviewed patient's clinical condition. We discussed clinical care both before and after the interview. I have reviewed the patient's clinical course by review of records including previous notes, labs, and vital signs.     On interview, Steven is met within his room. He reports he did not have urinary incontinence last night. We agreed to continue to discharge olanzapine and hydroxyzine. He continues to be frustrated with length it is taking to find placement. He reports ongoing difficulties with depression. Discussed adding back bupropion. Discussed the concern that both clozapine and bupropion have the potential to lower the seizure threshold. He denies history of seizure. He denies any physical complaints today.      MEDICATIONS   Scheduled Meds:    cloZAPine  150 mg Oral At Bedtime     lithium  450 mg Oral At Bedtime     LORazepam  1 mg Oral TID     memantine  10 mg Oral BID     multivitamin w/minerals  1 tablet Oral Daily     ondansetron  4 mg Oral TID AC     pregabalin  100 mg Oral TID     vitamin D2  50,000 Units Oral Q7 Days     PRN Meds:.acetaminophen, alum & mag hydroxide-simethicone, docusate sodium, sulindac     ALLERGIES   Allergies   Allergen Reactions     Pork Allergy         VITALS   Vitals: /61   Pulse 84   Temp 97.3  F (36.3  C) (Temporal)   Resp 18   Wt 81.1 kg (178 lb 14.4 oz)   SpO2 98%   BMI 24.26 kg/m       MENTAL STATUS EXAM     Appearance: Hair and beard are over grown-pt is clean/showered. Awake, alert, dressed in hospital scrubs  Attitude:  cooperative   Eye Contact: Improving  Mood:  \"okay\"  Affect:  intensity remains blunted, slightly more withdrawn today  Speech: monotone, soft spoken, engaging in more conversation  Psychomotor Behavior:  no evidence of tardive dyskinesia, dystonia, or tics, lithium tremor improved  Thought " Process:  [place]  Associations: none noted  Thought Content:  no evidence of suicidal ideation or homicidal ideation and no evidence of psychotic thought, denies hallucinations  Insight: Adequate  Judgment: Adequate  Oriented to:  time, person, and place  Attention Span and Concentration:  limited  Recent and Remote Memory:  limited  Fund of Knowledge: Low  Muscle Strength and Tone: normal  Gait and Station: normal        LABS   No results found for this or any previous visit (from the past 24 hour(s)).      ASSESSMENT   This is a 31 year old male with a PMH of schizoaffective disorder, polysubstance abuse, traumatic brain injury with coma, and a near fatal overdose with cardiac arrest who presented in a severe depressive episode with catatonia, occurring in the context of medication changes (lorazepam being discontinued) and the patient having severe malnutrition due to poor intake.     The patient has improved dramatically since starting Clozapine and resuming Ativan with his catatonia improving, his engagement with the world, speech, and ability to interact with peers. The patient has improved to the point that he would benefit from discharge to a facility as soon as available with his medication regimen to continue     Today: Steven has come along way during this hospitalization. He is no longer catatonic and he is beginning to eat consistently and gain weight. He requires lorazepam and we will work towards decreasing vs maintaining given the severity of his initial presentation. he is at high risk of decompensation if he does not have the necessary outpatient supports put in place. No longer having urinary incontinence. We discussed the risks, benefits and complications of adding bupropion, specifically with clozapine as it can lower the seizure threshold.  Will plan to add bupropion back tomorrow.       DIAGNOSTIC FORMULATION   1. Schizoaffective disorder, depressive type, multiple episodes, currently in  acute episode, with catatonia   2. 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 Multiple other TBIs  3. 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 Methamphetamine use disorder, moderate, in early remission  5. Alcohol use disorder, moderate     PLAN     Location: Unit 5  Legal Status: Orders Placed This Encounter      Legal status Voluntary    Safety Assessment:    Behavioral Orders   Procedures     Code 1 - Restrict to Unit     Routine Programming     As clinically indicated     Status 15     Every 15 minutes.     PTA medications stopped    -Wellbutrin due to starting Clozaril. Both decrease seizure threshhold  -Abilify due to not being effective    PTA medications continued/changed:     -Lithium 900 mg at bedtime reduced to 450 mg at bedtime to address tremor with improvement  -Namenda 10 mg BID  -Lyrica 100 mg TID  -Clonazepam restarted at 1 mg TID- switched to Lorazepam 1 mg TID on 7/23.   New medications tried and stopped:      -None     New medications initiated:      -Clozapine 12.5 mg at bedtime on 7/19.-> Increase to 25 mg at bedtime on 7/20 -> 50 mg on 7/23 -> 75 mg on 7/24 ->100 mg on 7/26 -> 125 mg on 7/28 -> 150 mg on 7/30 -> 175 mg on 7/31 --> 200 mg on 08/01 -> 175 mg on 8/2 -> 150 mg on 8/3  -Vitamin D2 50,000 units weekly x 8 weeks     Today's Changes:  -none, plan to add back bupropion tomorrow  -change Zofran to PRN    Programming: Patient will be treated in a therapeutic milieu with appropriate individual and group therapies. Education will be provided on diagnoses, medications, and treatments. Encouraged behavioral activation and participation in group programming.     Medical diagnoses:      #. URI, resolved  - Suspect URI. Myocarditis ruled out  - CRP mildly elevated. Mild leukocytosis. Normal troponin. Rest of labs reassuring. Negative COVID. EKG shows sinus tach, no new findings. Echo is normal.  - Analgesics for fever.  Working  - Zofran prn  - Reduced Clozapine    #. Severe malnutrition, improving  - Nutrition consulted  - 11 pound weight gain since admission    #. Sialorrhea, resolved  #. Enuresis, resolved  - Secondary to Clozapine  - Reduced dose with improvement    Consults: Nutrition  Tests: Clozapine    Disposition: [facility]       ATTESTATION    Joshua Somers MD  [facility]   Psychiatry    Telehealth video visit that took place via an interactive audio and video telecommunications system using secure connection. Patient identity was verified.  Patient verbalized agreement and understanding of test ordered, diagnosis, treatment plan, education, and side effects of medications, if prescribed.

## 2022-08-11 NOTE — PLAN OF CARE
Problem: Behavioral Health Plan of Care  Goal: Patient-Specific Goal (Individualization)  Description: Patient will report at least 6-8 hours of sleep each night.   Patient will complete all ADL's independently while on the unit.   Patient will be compliant with treatment team recommendations.     Pt will eat at least 50% of meals and have enough fluid intake.  Outcome: Ongoing, Progressing     Pt out in lounge most of the shift. Pt watched tv, isolated from peers. Pt reported pain at 5/10 in his lower back. Pt given sulindac 150mg at 1651. Pt reported high anxiety and depression. Pt denies SI, HI and hallucinations. Pt pleasant and cooperative with staff, ate dinner and snack in the lounge. Pt did complain that he wants to discuss his medications with the provider but they wake him up too early for him to have a good conversation, pt is concerned with his 16lb weight gain since admission.           Problem: Thought Process Alteration  Goal: Optimal Thought Clarity  Description: Patient will hold reality based conversations while on the unit.     Outcome: Ongoing, Progressing     Problem: Suicide Risk  Goal: Absence of Self-Harm  Outcome: Ongoing, Progressing     Problem: Suicidal Behavior  Goal: Suicidal Behavior is Absent or Managed  Outcome: Ongoing, Progressing     Problem: Fall Injury Risk  Goal: Absence of Fall and Fall-Related Injury  Outcome: Ongoing, Progressing   Goal Outcome Evaluation:           Face to face end of shift report communicated to night shift RN.     Sarahi Hui RN  8/10/2022  10:10 PM

## 2022-08-11 NOTE — PLAN OF CARE
Problem: Behavioral Health Plan of Care  Goal: Patient-Specific Goal (Individualization)  Description: Patient will report at least 6-8 hours of sleep each night.   Patient will complete all ADL's independently while on the unit.   Patient will be compliant with treatment team recommendations.     Pt will eat at least 50% of meals and have enough fluid intake.  Outcome: Ongoing, Progressing  Note:     0915 Patient up for breakfast and ate well. Patient denies nausea and denies that he has had nausea for some time. Patient denies constipation or other bowel issues. Patient denies pain except for low level back discomfort. Patient currently denies depression and thoughts of suicide. Patient denies auditory and visual hallucinations. Patient talks with a very soft voice and has good eye contact. Does not offer information but answers questions appropriately when asked.    1350 Patient approached writer and stated he wanted to talk with the Dr about reducing his Clozaril and lithium and increasing his Lyrica. Spoke with Dr. Somers and related information to patient.     Face to face end of shift report communicated to 3-11 shift RN.     Le Bermudez RN  8/11/2022  1:59 PM            Problem: Thought Process Alteration  Goal: Optimal Thought Clarity  Description: Patient will hold reality based conversations while on the unit.     Outcome: Ongoing, Not Progressing     Problem: Suicide Risk  Goal: Absence of Self-Harm  Outcome: Ongoing, Not Progressing     Problem: Suicidal Behavior  Goal: Suicidal Behavior is Absent or Managed  Outcome: Ongoing, Not Progressing     Problem: Fall Injury Risk  Goal: Absence of Fall and Fall-Related Injury  Outcome: Ongoing, Not Progressing      Goal Outcome Evaluation:    Plan of Care Reviewed With: patient

## 2022-08-11 NOTE — PLAN OF CARE
Spoke with pt this afternoon about starting a [facility] due to [employer] requiring [facility] services. Let pt know if [facility] is not completed than [facility] in [place] are the next option. Pt called [facility] and filled out required paper work.

## 2022-08-11 NOTE — PLAN OF CARE
Problem: Behavioral Health Plan of Care  Goal: Patient-Specific Goal (Individualization)  Description: Patient will report at least 6-8 hours of sleep each night.   Patient will complete all ADL's independently while on the unit.   Patient will be compliant with treatment team recommendations.     Pt will eat at least 50% of meals and have enough fluid intake.  Outcome: Ongoing, Progressing     Problem: Thought Process Alteration  Goal: Optimal Thought Clarity  Description: Patient will hold reality based conversations while on the unit.     Outcome: Ongoing, Progressing     Problem: Suicide Risk  Goal: Absence of Self-Harm  Outcome: Ongoing, Progressing     Problem: Fall Injury Risk  Goal: Absence of Fall and Fall-Related Injury  Outcome: Ongoing, Progressing     Face to face shift report received from Shima RN. Rounding completed, pt observed.     Pt appeared to sleep 6 hours this shift. Pt did not have any noted episodes of self harm or falls this shift.    Face to face report will be communicated to oncoming RN.    Kecia Eisenberg RN  8/11/2022  6:03 AM

## 2022-08-11 NOTE — PLAN OF CARE
Goal Outcome Evaluation:        1521- Pt requested and received Clinoril 150 mg po c/o 5/10 back pain

## 2022-08-12 LAB
CLOZAPINE SERPL-MCNC: <100 NG/ML
CLOZAPINE+NOR SERPL-MCNC: NORMAL NG/ML
CNO SERPL-MCNC: <100 NG/ML
NORCLOZAPINE SERPL-MCNC: <100 NG/ML

## 2022-08-12 PROCEDURE — 250N000013 HC RX MED GY IP 250 OP 250 PS 637: Performed by: NURSE PRACTITIONER

## 2022-08-12 PROCEDURE — 250N000013 HC RX MED GY IP 250 OP 250 PS 637: Performed by: STUDENT IN AN ORGANIZED HEALTH CARE EDUCATION/TRAINING PROGRAM

## 2022-08-12 PROCEDURE — 99232 SBSQ HOSP IP/OBS MODERATE 35: CPT | Mod: 95 | Performed by: PSYCHIATRY & NEUROLOGY

## 2022-08-12 PROCEDURE — 124N000001 HC R&B MH

## 2022-08-12 PROCEDURE — 250N000013 HC RX MED GY IP 250 OP 250 PS 637: Performed by: PSYCHIATRY & NEUROLOGY

## 2022-08-12 RX ORDER — BUPROPION HYDROCHLORIDE 150 MG/1
150 TABLET ORAL DAILY
Status: DISCONTINUED | OUTPATIENT
Start: 2022-08-12 | End: 2022-08-14

## 2022-08-12 RX ORDER — HYDROXYZINE HYDROCHLORIDE 25 MG/1
50 TABLET, FILM COATED ORAL 3 TIMES DAILY PRN
Status: DISCONTINUED | OUTPATIENT
Start: 2022-08-12 | End: 2022-08-14

## 2022-08-12 RX ADMIN — LITHIUM CARBONATE 450 MG: 450 TABLET, EXTENDED RELEASE ORAL at 20:07

## 2022-08-12 RX ADMIN — HYDROXYZINE HYDROCHLORIDE 50 MG: 25 TABLET, FILM COATED ORAL at 18:54

## 2022-08-12 RX ADMIN — BUPROPION HYDROCHLORIDE 150 MG: 150 TABLET, FILM COATED, EXTENDED RELEASE ORAL at 08:42

## 2022-08-12 RX ADMIN — MEMANTINE 10 MG: 10 TABLET ORAL at 20:06

## 2022-08-12 RX ADMIN — PREGABALIN 100 MG: 25 CAPSULE ORAL at 20:06

## 2022-08-12 RX ADMIN — MEMANTINE 10 MG: 10 TABLET ORAL at 08:42

## 2022-08-12 RX ADMIN — PREGABALIN 100 MG: 25 CAPSULE ORAL at 13:24

## 2022-08-12 RX ADMIN — CLOZAPINE 150 MG: 50 TABLET ORAL at 20:06

## 2022-08-12 RX ADMIN — LORAZEPAM 1 MG: 1 TABLET ORAL at 20:06

## 2022-08-12 RX ADMIN — PREGABALIN 100 MG: 25 CAPSULE ORAL at 08:42

## 2022-08-12 RX ADMIN — LORAZEPAM 1 MG: 1 TABLET ORAL at 13:24

## 2022-08-12 RX ADMIN — MULTIPLE VITAMINS W/ MINERALS TAB 1 TABLET: TAB at 08:42

## 2022-08-12 RX ADMIN — LORAZEPAM 1 MG: 1 TABLET ORAL at 08:42

## 2022-08-12 ASSESSMENT — ACTIVITIES OF DAILY LIVING (ADL)
HYGIENE/GROOMING: INDEPENDENT
ORAL_HYGIENE: INDEPENDENT
ADLS_ACUITY_SCORE: 42
LAUNDRY: UNABLE TO COMPLETE
ADLS_ACUITY_SCORE: 42
DRESS: SCRUBS (BEHAVIORAL HEALTH)
ADLS_ACUITY_SCORE: 42

## 2022-08-12 NOTE — PLAN OF CARE
"  Problem: Behavioral Health Plan of Care  Goal: Patient-Specific Goal (Individualization)  Description: Patient will report at least 6-8 hours of sleep each night.   Patient will complete all ADL's independently while on the unit.   Patient will be compliant with treatment team recommendations.     Pt will eat at least 50% of meals and have enough fluid intake.  Outcome: Ongoing, Progressing    Pt spent most of the shift in the lounge watching tv. Socialized with male peer, however peers stories of drug use and drinking appear to have triggered pt. Pt stated he is going to sleep outside for the first couple of days when he gets out, when asked why pt stated \"I plan on drinking\". Discussed the dangers of drinking while on his medications. Pt stated he doesn't plan on getting drunk.     Pt does report some relief with the sulinac but continues to report lower back pain. Pt reports anxiety and depression but denies SI, HI and hallucinations. Pt is quiet and withdrawn but does converse with staff, asking appropriate social questions \"how are you feeling today, and how's the day going\"?            Problem: Thought Process Alteration  Goal: Optimal Thought Clarity  Description: Patient will hold reality based conversations while on the unit.     Outcome: Ongoing, Progressing     Problem: Suicide Risk  Goal: Absence of Self-Harm  Outcome: Ongoing, Progressing     Problem: Suicidal Behavior  Goal: Suicidal Behavior is Absent or Managed  Outcome: Ongoing, Progressing     Problem: Fall Injury Risk  Goal: Absence of Fall and Fall-Related Injury  Outcome: Ongoing, Progressing   Goal Outcome Evaluation:        Face to face end of shift report communicated to night shift RN. Reported that pt is sa risk for suicide and falls.     Sarahi Hui RN  8/11/2022  7:50 PM                   " Order signed

## 2022-08-12 NOTE — PROGRESS NOTES
"  [facility] PSYCHIATRY  -  PROGRESS NOTE     ID   Name: Steven Carter  MRN#: [id number]     SUBJECTIVE   Today, I met with nursing, SW and OT and reviewed patient's clinical condition. We discussed clinical care both before and after the interview. I have reviewed the patient's clinical course by review of records including previous notes, labs, and vital signs.     On interview, Steven is met within his room. States he is \"good today\". Appreciative of starting back the bupropion. Discussed with his his comments about going out and drinking alcohol - he states he was \"bored\" and does not plan to do that.  He denies any physical complaints. Denies having any lightheadedness anymore. Denies SI.      MEDICATIONS   Scheduled Meds:    buPROPion  150 mg Oral Daily     cloZAPine  150 mg Oral At Bedtime     lithium  450 mg Oral At Bedtime     LORazepam  1 mg Oral TID     memantine  10 mg Oral BID     multivitamin w/minerals  1 tablet Oral Daily     pregabalin  100 mg Oral TID     vitamin D2  50,000 Units Oral Q7 Days     PRN Meds:.acetaminophen, alum & mag hydroxide-simethicone, docusate sodium, ondansetron, sulindac     ALLERGIES   Allergies   Allergen Reactions     Pork Allergy         VITALS   Vitals: /66   Pulse 79   Temp 97  F (36.1  C) (Temporal)   Resp 16   Wt 81.1 kg (178 lb 14.4 oz)   SpO2 99%   BMI 24.26 kg/m       MENTAL STATUS EXAM     Appearance: Hair and beard are over grown-pt is clean/showered. Awake, alert, dressed in hospital scrubs  Attitude:  cooperative   Eye Contact: Improving  Mood:  \"good\"  Affect:  intensity remains blunted, withdrawn  Speech: monotone, soft spoken, engaging in more conversation  Psychomotor Behavior:  no evidence of tardive dyskinesia, dystonia, or tics, lithium tremor improved  Thought Process:  [place]  Associations: none noted  Thought Content:  no evidence of suicidal ideation or homicidal ideation and no evidence of psychotic thought, denies " hallucinations  Insight: Adequate  Judgment: Adequate  Oriented to:  time, person, and place  Attention Span and Concentration:  limited  Recent and Remote Memory:  limited  Fund of Knowledge: Low  Muscle Strength and Tone: normal  Gait and Station: normal        LABS   No results found for this or any previous visit (from the past 24 hour(s)).      ASSESSMENT   This is a 31 year old male with a PMH of schizoaffective disorder, polysubstance abuse, traumatic brain injury with coma, and a near fatal overdose with cardiac arrest who presented in a severe depressive episode with catatonia, occurring in the context of medication changes (lorazepam being discontinued) and the patient having severe malnutrition due to poor intake.     The patient has improved dramatically since starting Clozapine and resuming Ativan with his catatonia improving, his engagement with the world, speech, and ability to interact with peers. The patient has improved to the point that he would benefit from discharge to a facility as soon as available with his medication regimen to continue     Today: initiate bupropion  mg q daily to target psychosis. Reviewed risk of lowering seizure threshold with combination of clozapine. Continues to get frustrated with wait to placement but is handling fairly well with reassurance.       DIAGNOSTIC FORMULATION   1. Schizoaffective disorder, depressive type, multiple episodes, currently in acute episode, with catatonia   2. 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 Multiple other TBIs  3. 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 Methamphetamine use disorder, moderate, in early remission  5. Alcohol use disorder, moderate     PLAN     Location: Unit 5  Legal Status: Orders Placed This Encounter      Legal status Voluntary    Safety Assessment:    Behavioral Orders   Procedures     Code 1 - Restrict to Unit     Routine  Programming     As clinically indicated     Status 15     Every 15 minutes.     PTA medications stopped    -Wellbutrin due to starting Clozaril. Both decrease seizure threshhold  -Abilify due to not being effective    PTA medications continued/changed:     -Lithium 900 mg at bedtime reduced to 450 mg at bedtime to address tremor with improvement  -Namenda 10 mg BID  -Lyrica 100 mg TID  -Clonazepam restarted at 1 mg TID- switched to Lorazepam 1 mg TID on 7/23.   New medications tried and stopped:      -None     New medications initiated:      -Clozapine 12.5 mg at bedtime on 7/19.-> Increase to 25 mg at bedtime on 7/20 -> 50 mg on 7/23 -> 75 mg on 7/24 ->100 mg on 7/26 -> 125 mg on 7/28 -> 150 mg on 7/30 -> 175 mg on 7/31 --> 200 mg on 08/01 -> 175 mg on 8/2 -> 150 mg on 8/3  -Vitamin D2 50,000 units weekly x 8 weeks     Today's Changes:  -initiate bupropion  mg q daily   -discontinue daily orthostatics    Programming: Patient will be treated in a therapeutic milieu with appropriate individual and group therapies. Education will be provided on diagnoses, medications, and treatments. Encouraged behavioral activation and participation in group programming.     Medical diagnoses:      #. URI, resolved  - Suspect URI. Myocarditis ruled out  - CRP mildly elevated. Mild leukocytosis. Normal troponin. Rest of labs reassuring. Negative COVID. EKG shows sinus tach, no new findings. Echo is normal.  - Analgesics for fever. Working  - Zofran prn  - Reduced Clozapine    #. Severe malnutrition, improving  - Nutrition consulted  - 11 pound weight gain since admission    #. Sialorrhea, resolved  #. Enuresis, resolved  - Secondary to Clozapine  - Reduced dose with improvement    Consults: Nutrition  Tests: Clozapine    Disposition: [facility] and lodge       ATTESTATION    Joshua Somers MD  [facility]   Psychiatry    Telehealth video visit that took place via an interactive audio and video telecommunications system using  secure connection. Patient identity was verified.  Patient verbalized agreement and understanding of test ordered, diagnosis, treatment plan, education, and side effects of medications, if prescribed.  Start: 11:00 end: 11:30

## 2022-08-12 NOTE — PLAN OF CARE
Problem: Behavioral Health Plan of Care  Goal: Patient-Specific Goal (Individualization)  Description: Patient will report at least 6-8 hours of sleep each night.   Patient will complete all ADL's independently while on the unit.   Patient will be compliant with treatment team recommendations.     Pt will eat at least 50% of meals and have enough fluid intake.  Outcome: Ongoing, Progressing     Problem: Thought Process Alteration  Goal: Optimal Thought Clarity  Description: Patient will hold reality based conversations while on the unit.     Outcome: Ongoing, Progressing     Problem: Suicide Risk  Goal: Absence of Self-Harm  Outcome: Ongoing, Progressing     Problem: Fall Injury Risk  Goal: Absence of Fall and Fall-Related Injury  Outcome: Ongoing, Progressing     Face to face shift report received from Shima RN. Rounding completed, pt observed.     Pt appeared to be sleeping most of this shift. Pt did not have any noted episodes of self harm or falls this shift.    Face to face report will be communicated to oncoming RN.    Kecia Eisenberg RN  8/12/2022  5:56 AM

## 2022-08-12 NOTE — PLAN OF CARE
"Face to face shift report received from TRAE Mcdonnell.       Problem: Behavioral Health Plan of Care  Goal: Patient-Specific Goal (Individualization)  Description: Patient will report at least 6-8 hours of sleep each night.   Patient will complete all ADL's independently while on the unit.   Patient will be compliant with treatment team recommendations.     Pt will eat at least 50% of meals and have enough fluid intake.  Outcome: Ongoing, Progressing   Calm and cooperative. Medication compliant. Flat affect. Denies mental health issues. Reports mood as \"good.\" Appears preoccupied. Withdrawn to room in AM but then in lounge area following dinner. No reports of pain. Pt questioned writer, \"can you ask the doctor about restarting Gabapentin?\" Writer questioned what this would be for, pt stated \"pain, but that was before I had the Lyrica. Never mind.\"    Problem: Thought Process Alteration  Goal: Optimal Thought Clarity  Description: Patient will hold reality based conversations while on the unit.   Outcome: Ongoing, Progressing       Problem: Suicide Risk  Goal: Absence of Self-Harm  Outcome: Ongoing, Progressing   Free from self harm or injury this shift.     Problem: Fall Injury Risk  Goal: Absence of Fall and Fall-Related Injury  Outcome: Ongoing, Progressing   Free from falls or injury this shift.       Face to face end of shift report to be communicated to oncoming RN.       "

## 2022-08-13 PROCEDURE — 250N000013 HC RX MED GY IP 250 OP 250 PS 637: Performed by: PSYCHIATRY & NEUROLOGY

## 2022-08-13 PROCEDURE — 99232 SBSQ HOSP IP/OBS MODERATE 35: CPT | Mod: 95 | Performed by: PSYCHIATRY & NEUROLOGY

## 2022-08-13 PROCEDURE — 124N000001 HC R&B MH

## 2022-08-13 PROCEDURE — 250N000013 HC RX MED GY IP 250 OP 250 PS 637: Performed by: NURSE PRACTITIONER

## 2022-08-13 PROCEDURE — 250N000013 HC RX MED GY IP 250 OP 250 PS 637: Performed by: STUDENT IN AN ORGANIZED HEALTH CARE EDUCATION/TRAINING PROGRAM

## 2022-08-13 RX ADMIN — PREGABALIN 100 MG: 25 CAPSULE ORAL at 13:35

## 2022-08-13 RX ADMIN — LITHIUM CARBONATE 450 MG: 450 TABLET, EXTENDED RELEASE ORAL at 20:17

## 2022-08-13 RX ADMIN — HYDROXYZINE HYDROCHLORIDE 50 MG: 25 TABLET, FILM COATED ORAL at 20:17

## 2022-08-13 RX ADMIN — SULINDAC 150 MG: 150 TABLET ORAL at 15:28

## 2022-08-13 RX ADMIN — MULTIPLE VITAMINS W/ MINERALS TAB 1 TABLET: TAB at 08:23

## 2022-08-13 RX ADMIN — CLOZAPINE 150 MG: 50 TABLET ORAL at 20:17

## 2022-08-13 RX ADMIN — BUPROPION HYDROCHLORIDE 150 MG: 150 TABLET, FILM COATED, EXTENDED RELEASE ORAL at 08:23

## 2022-08-13 RX ADMIN — PREGABALIN 100 MG: 25 CAPSULE ORAL at 20:17

## 2022-08-13 RX ADMIN — PREGABALIN 100 MG: 25 CAPSULE ORAL at 08:23

## 2022-08-13 RX ADMIN — LORAZEPAM 1 MG: 1 TABLET ORAL at 20:17

## 2022-08-13 RX ADMIN — MEMANTINE 10 MG: 10 TABLET ORAL at 08:23

## 2022-08-13 RX ADMIN — LORAZEPAM 1 MG: 1 TABLET ORAL at 13:35

## 2022-08-13 RX ADMIN — LORAZEPAM 1 MG: 1 TABLET ORAL at 08:23

## 2022-08-13 RX ADMIN — HYDROXYZINE HYDROCHLORIDE 50 MG: 25 TABLET, FILM COATED ORAL at 15:28

## 2022-08-13 RX ADMIN — MEMANTINE 10 MG: 10 TABLET ORAL at 20:17

## 2022-08-13 ASSESSMENT — ACTIVITIES OF DAILY LIVING (ADL)
ADLS_ACUITY_SCORE: 42
DRESS: SCRUBS (BEHAVIORAL HEALTH)
ADLS_ACUITY_SCORE: 42
HYGIENE/GROOMING: INDEPENDENT
ADLS_ACUITY_SCORE: 42
ORAL_HYGIENE: INDEPENDENT
LAUNDRY: UNABLE TO COMPLETE

## 2022-08-13 NOTE — PROGRESS NOTES
"  [facility] PSYCHIATRY  -  PROGRESS NOTE     ID   Name: Steven Carter  MRN#: [id number]     SUBJECTIVE   Today, I met with nursing, SW and OT and reviewed patient's clinical condition. We discussed clinical care both before and after the interview. I have reviewed the patient's clinical course by review of records including previous notes, labs, and vital signs.     On interview, Steven is met within his room. States he is \"okay\". He received hydroxyzine overnight. He states no episode of urinary incontinence. He is hopeful that this upcoming week he will have a place to go. Denies SI, no plan, no intent. No physical complaints. Reports he is tolerating wellbutrin.      MEDICATIONS   Scheduled Meds:    buPROPion  150 mg Oral Daily     cloZAPine  150 mg Oral At Bedtime     lithium  450 mg Oral At Bedtime     LORazepam  1 mg Oral TID     memantine  10 mg Oral BID     multivitamin w/minerals  1 tablet Oral Daily     pregabalin  100 mg Oral TID     vitamin D2  50,000 Units Oral Q7 Days     PRN Meds:.acetaminophen, alum & mag hydroxide-simethicone, docusate sodium, hydrOXYzine, ondansetron, sulindac     ALLERGIES   Allergies   Allergen Reactions     Pork Allergy         VITALS   Vitals: /63   Pulse 84   Temp 97.6  F (36.4  C) (Temporal)   Resp 18   Wt 81.1 kg (178 lb 14.4 oz)   SpO2 98%   BMI 24.26 kg/m       MENTAL STATUS EXAM     Appearance: Hair and beard are over grown-pt is clean/showered. Awake, alert, dressed in hospital scrubs  Attitude:  cooperative   Eye Contact: Improving  Mood:  \"okay\"  Affect:  intensity remains blunted, less withdrawn, at baseline  Speech: monotone, soft spoken, engaging in more conversation  Psychomotor Behavior:  no evidence of tardive dyskinesia, dystonia, or tics, lithium tremor improved  Thought Process:  [place]  Associations: none noted  Thought Content:  no evidence of suicidal ideation or homicidal ideation and no evidence of psychotic thought, denies " hallucinations  Insight: Adequate  Judgment: Adequate  Oriented to:  time, person, and place  Attention Span and Concentration:  limited  Recent and Remote Memory:  limited  Fund of Knowledge: Low  Muscle Strength and Tone: normal  Gait and Station: normal        LABS   No results found for this or any previous visit (from the past 24 hour(s)).      ASSESSMENT   This is a 31 year old male with a PMH of schizoaffective disorder, polysubstance abuse, traumatic brain injury with coma, and a near fatal overdose with cardiac arrest who presented in a severe depressive episode with catatonia, occurring in the context of medication changes (lorazepam being discontinued) and the patient having severe malnutrition due to poor intake.     The patient has improved dramatically since starting Clozapine and resuming Ativan with his catatonia improving, his engagement with the world, speech, and ability to interact with peers. The patient has improved to the point that he would benefit from discharge to a facility as soon as available with his medication regimen to continue     Today: no changes to plan. He is tolerating bupropion  mg, will consider optimizing to 300 mg this upcoming week.  We await placement.       DIAGNOSTIC FORMULATION   1. Schizoaffective disorder, depressive type, multiple episodes, currently in acute episode, with catatonia   2. 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 Multiple other TBIs  3. 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 Methamphetamine use disorder, moderate, in early remission  5. Alcohol use disorder, moderate     PLAN     Location: Unit 5  Legal Status: Orders Placed This Encounter      Legal status Voluntary    Safety Assessment:    Behavioral Orders   Procedures     Code 1 - Restrict to Unit     Routine Programming     As clinically indicated     Status 15     Every 15 minutes.     PTA medications  stopped    -Wellbutrin due to starting Clozaril. Both decrease seizure threshhold  -Abilify due to not being effective    PTA medications continued/changed:     -Lithium 900 mg at bedtime reduced to 450 mg at bedtime to address tremor with improvement  -Namenda 10 mg BID  -Lyrica 100 mg TID  -Clonazepam restarted at 1 mg TID- switched to Lorazepam 1 mg TID on 7/23.   New medications tried and stopped:      -None     New medications initiated:      -Clozapine 12.5 mg at bedtime on 7/19.-> Increase to 25 mg at bedtime on 7/20 -> 50 mg on 7/23 -> 75 mg on 7/24 ->100 mg on 7/26 -> 125 mg on 7/28 -> 150 mg on 7/30 -> 175 mg on 7/31 --> 200 mg on 08/01 -> 175 mg on 8/2 -> 150 mg on 8/3  -Vitamin D2 50,000 units weekly x 8 weeks     Today's Changes:  -none    Programming: Patient will be treated in a therapeutic milieu with appropriate individual and group therapies. Education will be provided on diagnoses, medications, and treatments. Encouraged behavioral activation and participation in group programming.     Medical diagnoses:      #. URI, resolved  - Suspect URI. Myocarditis ruled out  - CRP mildly elevated. Mild leukocytosis. Normal troponin. Rest of labs reassuring. Negative COVID. EKG shows sinus tach, no new findings. Echo is normal.  - Analgesics for fever. Working  - Zofran prn  - Reduced Clozapine    #. Severe malnutrition, improving  - Nutrition consulted  - 11 pound weight gain since admission    #. Sialorrhea, resolved  #. Enuresis, resolved  - Secondary to Clozapine  - Reduced dose with improvement    Consults: Nutrition  Tests: Clozapine    Disposition: Board and lodge       ATTESTATION    Joshua Somers MD  [facility]   Psychiatry    Telehealth video visit that took place via an interactive audio and video telecommunications system using secure connection. Patient identity was verified.  Patient verbalized agreement and understanding of test ordered, diagnosis, treatment plan, education, and side  effects of medications, if prescribed.  Start: 10:30, end: 10:45

## 2022-08-13 NOTE — PLAN OF CARE
SHIFT NOTE [id number]    Problem: Fall Injury Risk  Goal: Absence of Fall and Fall-Related Injury  Outcome: Ongoing, Progressing   Patient is balanced and steady on feet.  Free from falls this shift.       Problem: Thought Process Alteration  Goal: Optimal Thought Clarity  Description: Patient will hold reality based conversations while on the unit.     Outcome: Ongoing, Progressing     Patient is able to have a linear, reality based conversation with staff.  No delusional or paranoid statements noted.       Problem: Behavioral Health Plan of Care  Goal: Patient-Specific Goal (Individualization)  Description: Patient will report at least 6-8 hours of sleep each night.   Patient will complete all ADL's independently while on the unit.   Patient will be compliant with treatment team recommendations.     Pt will eat at least 50% of meals and have enough fluid intake.  Outcome: Ongoing, Progressing    Patient has been calm, cooperative, and medication complaint this shift.  He was out in the lounge much of the shift and attended a few groups.  Social with peers.  Reported feeling anxious and requested PRN medication.  Patient given 50 mg hydroxyzine at 1854.  He reported very little effect for the medication.  Affect is flat and blunted.  Using scheduled medications for chronic pain.  VS WNL.  Patient in bed by 2200 and remained asleep throughout the night. Face to face end of shift report communicated to day shift RN.     Shantal Real RN  8/13/2022  5:38 AM

## 2022-08-13 NOTE — PLAN OF CARE
"Face to face shift report received from TRAE Murguia.       Problem: Behavioral Health Plan of Care  Goal: Patient-Specific Goal (Individualization)  Description: Patient will report at least 6-8 hours of sleep each night.   Patient will complete all ADL's independently while on the unit.   Patient will be compliant with treatment team recommendations.     Pt will eat at least 50% of meals and have enough fluid intake.  Outcome: Ongoing, Progressing   Calm and cooperative. Medication compliant. Reports anxiety and depression due to \"I thought I would be discharged by now.\" Flat affect. Pleasant during conversation. No reports of pain.     Problem: Thought Process Alteration  Goal: Optimal Thought Clarity  Description: Patient will hold reality based conversations while on the unit.   Outcome: Ongoing, Progressing       Problem: Suicidal Behavior  Goal: Suicidal Behavior is Absent or Managed  Outcome: Ongoing, Progressing   Free from self harm or injury this shift.     Problem: Fall Injury Risk  Goal: Absence of Fall and Fall-Related Injury  Outcome: Ongoing, Progressing   Free from falls or injury this shift.     Face to face end of shift report to be communicated to oncoming RN.       "

## 2022-08-14 PROCEDURE — 124N000001 HC R&B MH

## 2022-08-14 PROCEDURE — 250N000013 HC RX MED GY IP 250 OP 250 PS 637: Performed by: NURSE PRACTITIONER

## 2022-08-14 PROCEDURE — 99232 SBSQ HOSP IP/OBS MODERATE 35: CPT | Mod: 95 | Performed by: PSYCHIATRY & NEUROLOGY

## 2022-08-14 PROCEDURE — 250N000013 HC RX MED GY IP 250 OP 250 PS 637: Performed by: STUDENT IN AN ORGANIZED HEALTH CARE EDUCATION/TRAINING PROGRAM

## 2022-08-14 PROCEDURE — 250N000013 HC RX MED GY IP 250 OP 250 PS 637: Performed by: PSYCHIATRY & NEUROLOGY

## 2022-08-14 RX ORDER — HYDROXYZINE HYDROCHLORIDE 25 MG/1
50 TABLET, FILM COATED ORAL EVERY 4 HOURS PRN
Status: DISCONTINUED | OUTPATIENT
Start: 2022-08-14 | End: 2022-09-02 | Stop reason: HOSPADM

## 2022-08-14 RX ORDER — BUPROPION HYDROCHLORIDE 300 MG/1
300 TABLET ORAL DAILY
Status: DISCONTINUED | OUTPATIENT
Start: 2022-08-15 | End: 2022-08-18

## 2022-08-14 RX ADMIN — MULTIPLE VITAMINS W/ MINERALS TAB 1 TABLET: TAB at 08:20

## 2022-08-14 RX ADMIN — LORAZEPAM 1 MG: 1 TABLET ORAL at 20:06

## 2022-08-14 RX ADMIN — LORAZEPAM 1 MG: 1 TABLET ORAL at 13:04

## 2022-08-14 RX ADMIN — LORAZEPAM 1 MG: 1 TABLET ORAL at 08:20

## 2022-08-14 RX ADMIN — HYDROXYZINE HYDROCHLORIDE 50 MG: 25 TABLET, FILM COATED ORAL at 18:32

## 2022-08-14 RX ADMIN — MEMANTINE 10 MG: 10 TABLET ORAL at 20:07

## 2022-08-14 RX ADMIN — LITHIUM CARBONATE 450 MG: 450 TABLET, EXTENDED RELEASE ORAL at 20:07

## 2022-08-14 RX ADMIN — MEMANTINE 10 MG: 10 TABLET ORAL at 08:20

## 2022-08-14 RX ADMIN — HYDROXYZINE HYDROCHLORIDE 50 MG: 25 TABLET, FILM COATED ORAL at 14:28

## 2022-08-14 RX ADMIN — PREGABALIN 100 MG: 25 CAPSULE ORAL at 08:20

## 2022-08-14 RX ADMIN — CLOZAPINE 150 MG: 50 TABLET ORAL at 20:06

## 2022-08-14 RX ADMIN — SULINDAC 150 MG: 150 TABLET ORAL at 20:08

## 2022-08-14 RX ADMIN — HYDROXYZINE HYDROCHLORIDE 50 MG: 25 TABLET, FILM COATED ORAL at 10:21

## 2022-08-14 RX ADMIN — ERGOCALCIFEROL 50000 UNITS: 1.25 CAPSULE, LIQUID FILLED ORAL at 13:25

## 2022-08-14 RX ADMIN — PREGABALIN 100 MG: 25 CAPSULE ORAL at 13:04

## 2022-08-14 RX ADMIN — PREGABALIN 100 MG: 25 CAPSULE ORAL at 20:06

## 2022-08-14 RX ADMIN — BUPROPION HYDROCHLORIDE 150 MG: 150 TABLET, FILM COATED, EXTENDED RELEASE ORAL at 08:20

## 2022-08-14 ASSESSMENT — ACTIVITIES OF DAILY LIVING (ADL)
ADLS_ACUITY_SCORE: 42
DRESS: SCRUBS (BEHAVIORAL HEALTH)
ADLS_ACUITY_SCORE: 42
HYGIENE/GROOMING: INDEPENDENT
ORAL_HYGIENE: INDEPENDENT
ADLS_ACUITY_SCORE: 42
LAUNDRY: UNABLE TO COMPLETE

## 2022-08-14 NOTE — PLAN OF CARE
Face to face shift report received from TRAE Murguia.       Problem: Behavioral Health Plan of Care  Goal: Patient-Specific Goal (Individualization)  Description: Patient will report at least 6-8 hours of sleep each night.   Patient will complete all ADL's independently while on the unit.   Patient will be compliant with treatment team recommendations.     Pt will eat at least 50% of meals and have enough fluid intake.  Outcome: Ongoing, Progressing  Calm and cooperative. Medication compliant. Flat affect. Pleasant during conversation. Disheveled. Reports anxiety due to length of hospitalization. Spends time in lounge area, watching television. No reports of pain.     1021: Requested and received Hydroxyzine 50 mg for anxiety.   1430: Requested and received Hydroxyzine 50 mg for anxiety.  Problem: Thought Process Alteration  Goal: Optimal Thought Clarity  Description: Patient will hold reality based conversations while on the unit.   Outcome: Ongoing, Progressing        Problem: Fall Injury Risk  Goal: Absence of Fall and Fall-Related Injury  Outcome: Ongoing, Progressing         Face to face end of shift report to be communicated to oncoming RN.

## 2022-08-14 NOTE — PROGRESS NOTES
"  [facility] PSYCHIATRY  -  PROGRESS NOTE     ID   Name: Steven Carter  MRN#: [id number]     SUBJECTIVE   Today, I met with nursing, SW and OT and reviewed patient's clinical condition. We discussed clinical care both before and after the interview. I have reviewed the patient's clinical course by review of records including previous notes, labs, and vital signs.     On interview, Steven is met within his room. States he is \"good\".  No complaints at all today. Would like to increase wellbutrin and is in agreement to increase to 300 mg starting tomorrow. Void of safety concerns. Reports he will attempt to continue to attend groups. Hopeful he will find a place to go this upcoming week.      MEDICATIONS   Scheduled Meds:    buPROPion  150 mg Oral Daily     cloZAPine  150 mg Oral At Bedtime     lithium  450 mg Oral At Bedtime     LORazepam  1 mg Oral TID     memantine  10 mg Oral BID     multivitamin w/minerals  1 tablet Oral Daily     pregabalin  100 mg Oral TID     vitamin D2  50,000 Units Oral Q7 Days     PRN Meds:.acetaminophen, alum & mag hydroxide-simethicone, docusate sodium, hydrOXYzine, ondansetron, sulindac     ALLERGIES   Allergies   Allergen Reactions     Pork Allergy         VITALS   Vitals: /63   Pulse 88   Temp 97.6  F (36.4  C) (Temporal)   Resp 16   Wt 85.7 kg (189 lb)   SpO2 98%   BMI 25.63 kg/m       MENTAL STATUS EXAM     Appearance: Hair and beard are over grown-pt is clean/showered. Awake, alert, dressed in hospital scrubs  Attitude:  cooperative   Eye Contact: Improving  Mood:  \"good\"  Affect:  intensity remains blunted, less withdrawn, at baseline  Speech: monotone, soft spoken, engaging in more conversation  Psychomotor Behavior:  no evidence of tardive dyskinesia, dystonia, or tics, lithium tremor improved  Thought Process:  [place]  Associations: none noted  Thought Content:  no evidence of suicidal ideation or homicidal ideation and no evidence of psychotic thought, denies " hallucinations  Insight: Adequate  Judgment: Adequate  Oriented to:  time, person, and place  Attention Span and Concentration:  limited  Recent and Remote Memory:  limited  Fund of Knowledge: Low  Muscle Strength and Tone: normal  Gait and Station: normal        LABS   No results found for this or any previous visit (from the past 24 hour(s)).      ASSESSMENT   This is a 31 year old male with a PMH of schizoaffective disorder, polysubstance abuse, traumatic brain injury with coma, and a near fatal overdose with cardiac arrest who presented in a severe depressive episode with catatonia, occurring in the context of medication changes (lorazepam being discontinued) and the patient having severe malnutrition due to poor intake.     The patient has improved dramatically since starting Clozapine and resuming Ativan with his catatonia improving, his engagement with the world, speech, and ability to interact with peers. The patient has improved to the point that he would benefit from discharge to a facility as soon as available with his medication regimen to continue     Today: continue treatment plan without alteration. Will plan to increase wellbutrin to 300 mg q daily starting tomorrow.       DIAGNOSTIC FORMULATION   1. Schizoaffective disorder, depressive type, multiple episodes, currently in acute episode, with catatonia   2. 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 Multiple other TBIs  3. 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 Methamphetamine use disorder, moderate, in early remission  5. Alcohol use disorder, moderate     PLAN     Location: Unit 5  Legal Status: Orders Placed This Encounter      Legal status Voluntary    Safety Assessment:    Behavioral Orders   Procedures     Code 1 - Restrict to Unit     Routine Programming     As clinically indicated     Status 15     Every 15 minutes.     PTA medications stopped    -Wellbutrin due to  starting Clozaril. Both decrease seizure threshhold  -Abilify due to not being effective    PTA medications continued/changed:     -Lithium 900 mg at bedtime reduced to 450 mg at bedtime to address tremor with improvement  -Namenda 10 mg BID  -Lyrica 100 mg TID  -Clonazepam restarted at 1 mg TID- switched to Lorazepam 1 mg TID on 7/23.   New medications tried and stopped:      -None     New medications initiated:      -Clozapine 12.5 mg at bedtime on 7/19.-> Increase to 25 mg at bedtime on 7/20 -> 50 mg on 7/23 -> 75 mg on 7/24 ->100 mg on 7/26 -> 125 mg on 7/28 -> 150 mg on 7/30 -> 175 mg on 7/31 --> 200 mg on 08/01 -> 175 mg on 8/2 -> 150 mg on 8/3  -Vitamin D2 50,000 units weekly x 8 weeks     Today's Changes:  -none    Programming: Patient will be treated in a therapeutic milieu with appropriate individual and group therapies. Education will be provided on diagnoses, medications, and treatments. Encouraged behavioral activation and participation in group programming.     Medical diagnoses:      #. URI, resolved  - Suspect URI. Myocarditis ruled out  - CRP mildly elevated. Mild leukocytosis. Normal troponin. Rest of labs reassuring. Negative COVID. EKG shows sinus tach, no new findings. Echo is normal.  - Analgesics for fever. Working  - Zofran prn  - Reduced Clozapine    #. Severe malnutrition, improving  - Nutrition consulted  - 11 pound weight gain since admission    #. Sialorrhea, resolved  #. Enuresis, resolved  - Secondary to Clozapine  - Reduced dose with improvement    Consults: Nutrition  Tests: Clozapine    Disposition: Board and lodge       ATTESTATION    Joshua Somers MD  [facility]   Psychiatry    Telehealth video visit that took place via an interactive audio and video telecommunications system using secure connection. Patient identity was verified.  Patient verbalized agreement and understanding of test ordered, diagnosis, treatment plan, education, and side effects of medications, if  prescribed.  Start: 7:00 end: 7:30

## 2022-08-14 NOTE — PLAN OF CARE
SHIFT NOTE: [id number]      Problem: Fall Injury Risk  Goal: Absence of Fall and Fall-Related Injury  Outcome: Ongoing, Progressing       Patient is balanced and steady on feet.  Free from falls this shift.       Problem: Thought Process Alteration  Goal: Optimal Thought Clarity  Description: Patient will hold reality based conversations while on the unit.     Outcome: Ongoing, Progressing   Patient is bale to have a linear, reality based conversation with staff.  No delusional or paranoid statements noted.       Problem: Behavioral Health Plan of Care  Goal: Patient-Specific Goal (Individualization)  Description: Patient will report at least 6-8 hours of sleep each night.   Patient will complete all ADL's independently while on the unit.   Patient will be compliant with treatment team recommendations.     Pt will eat at least 50% of meals and have enough fluid intake.  Outcome: Ongoing, Progressing   Patient has been calm, cooperative, and medication complaint this shift.  Out in the lounge much of the evening watching TV.  He mostly keeps to himself but does make needs known to staff.  Reported feeling anxious and would like his PRN ativan increased.  Asking lots of questions about potential discharge options.  States he is frustrated with having no place to go.  No complaints of pain.  Vs WNL.  In bed by 2230.  Patient slept all night with regular respirations and position changes noted. Face to face end of shift report communicated to day shift RN.     Shantal Real RN  8/13/2022  10:59 PM

## 2022-08-15 LAB
BASOPHILS # BLD AUTO: 0.1 10E3/UL (ref 0–0.2)
BASOPHILS NFR BLD AUTO: 1 %
EOSINOPHIL # BLD AUTO: 1.3 10E3/UL (ref 0–0.7)
EOSINOPHIL NFR BLD AUTO: 12 %
ERYTHROCYTE [DISTWIDTH] IN BLOOD BY AUTOMATED COUNT: 12.7 % (ref 10–15)
HCT VFR BLD AUTO: 42.6 % (ref 40–53)
HGB BLD-MCNC: 13.8 G/DL (ref 13.3–17.7)
HOLD SPECIMEN: NORMAL
IMM GRANULOCYTES # BLD: 0.2 10E3/UL
IMM GRANULOCYTES NFR BLD: 2 %
LYMPHOCYTES # BLD AUTO: 1.8 10E3/UL (ref 0.8–5.3)
LYMPHOCYTES NFR BLD AUTO: 17 %
MCH RBC QN AUTO: 28.8 PG (ref 26.5–33)
MCHC RBC AUTO-ENTMCNC: 32.4 G/DL (ref 31.5–36.5)
MCV RBC AUTO: 89 FL (ref 78–100)
MONOCYTES # BLD AUTO: 0.7 10E3/UL (ref 0–1.3)
MONOCYTES NFR BLD AUTO: 6 %
NEUTROPHILS # BLD AUTO: 6.7 10E3/UL (ref 1.6–8.3)
NEUTROPHILS NFR BLD AUTO: 62 %
NRBC # BLD AUTO: 0 10E3/UL
NRBC BLD AUTO-RTO: 0 /100
PLATELET # BLD AUTO: 309 10E3/UL (ref 150–450)
RBC # BLD AUTO: 4.8 10E6/UL (ref 4.4–5.9)
WBC # BLD AUTO: 10.7 10E3/UL (ref 4–11)

## 2022-08-15 PROCEDURE — 250N000013 HC RX MED GY IP 250 OP 250 PS 637: Performed by: STUDENT IN AN ORGANIZED HEALTH CARE EDUCATION/TRAINING PROGRAM

## 2022-08-15 PROCEDURE — 99233 SBSQ HOSP IP/OBS HIGH 50: CPT | Performed by: PSYCHIATRY & NEUROLOGY

## 2022-08-15 PROCEDURE — 250N000013 HC RX MED GY IP 250 OP 250 PS 637: Performed by: PSYCHIATRY & NEUROLOGY

## 2022-08-15 PROCEDURE — 85025 COMPLETE CBC W/AUTO DIFF WBC: CPT | Performed by: STUDENT IN AN ORGANIZED HEALTH CARE EDUCATION/TRAINING PROGRAM

## 2022-08-15 PROCEDURE — 250N000013 HC RX MED GY IP 250 OP 250 PS 637: Performed by: NURSE PRACTITIONER

## 2022-08-15 PROCEDURE — 36415 COLL VENOUS BLD VENIPUNCTURE: CPT | Performed by: STUDENT IN AN ORGANIZED HEALTH CARE EDUCATION/TRAINING PROGRAM

## 2022-08-15 PROCEDURE — 124N000001 HC R&B MH

## 2022-08-15 RX ORDER — LORAZEPAM 0.5 MG/1
0.5 TABLET ORAL DAILY
Status: DISCONTINUED | OUTPATIENT
Start: 2022-08-15 | End: 2022-08-15

## 2022-08-15 RX ORDER — LORAZEPAM 0.5 MG/1
0.5 TABLET ORAL
Status: DISCONTINUED | OUTPATIENT
Start: 2022-08-15 | End: 2022-09-02 | Stop reason: HOSPADM

## 2022-08-15 RX ADMIN — LORAZEPAM 1 MG: 1 TABLET ORAL at 08:51

## 2022-08-15 RX ADMIN — MEMANTINE 10 MG: 10 TABLET ORAL at 20:35

## 2022-08-15 RX ADMIN — PREGABALIN 100 MG: 25 CAPSULE ORAL at 13:56

## 2022-08-15 RX ADMIN — SULINDAC 150 MG: 150 TABLET ORAL at 20:35

## 2022-08-15 RX ADMIN — BUPROPION HYDROCHLORIDE 300 MG: 300 TABLET, EXTENDED RELEASE ORAL at 08:48

## 2022-08-15 RX ADMIN — PREGABALIN 100 MG: 25 CAPSULE ORAL at 20:35

## 2022-08-15 RX ADMIN — MULTIPLE VITAMINS W/ MINERALS TAB 1 TABLET: TAB at 08:48

## 2022-08-15 RX ADMIN — LORAZEPAM 0.5 MG: 0.5 TABLET ORAL at 16:33

## 2022-08-15 RX ADMIN — HYDROXYZINE HYDROCHLORIDE 50 MG: 25 TABLET, FILM COATED ORAL at 20:35

## 2022-08-15 RX ADMIN — LORAZEPAM 1 MG: 1 TABLET ORAL at 13:57

## 2022-08-15 RX ADMIN — LORAZEPAM 1 MG: 1 TABLET ORAL at 20:35

## 2022-08-15 RX ADMIN — LITHIUM CARBONATE 450 MG: 450 TABLET, EXTENDED RELEASE ORAL at 20:35

## 2022-08-15 RX ADMIN — PREGABALIN 100 MG: 25 CAPSULE ORAL at 08:49

## 2022-08-15 RX ADMIN — MEMANTINE 10 MG: 10 TABLET ORAL at 08:49

## 2022-08-15 RX ADMIN — CLOZAPINE 150 MG: 50 TABLET ORAL at 20:34

## 2022-08-15 ASSESSMENT — ACTIVITIES OF DAILY LIVING (ADL)
ADLS_ACUITY_SCORE: 42
LAUNDRY: UNABLE TO COMPLETE
ADLS_ACUITY_SCORE: 42
DRESS: SCRUBS (BEHAVIORAL HEALTH);INDEPENDENT
ADLS_ACUITY_SCORE: 42
ADLS_ACUITY_SCORE: 42
ORAL_HYGIENE: INDEPENDENT
HYGIENE/GROOMING: INDEPENDENT
ADLS_ACUITY_SCORE: 42

## 2022-08-15 NOTE — PROGRESS NOTES
[facility] PSYCHIATRY  -  PROGRESS NOTE     ID   Name: Steven Carter  MRN#: [id number]     SUBJECTIVE   Today, I met with nursing, SW and OT and reviewed patient's clinical condition. We discussed clinical care both before and after the interview. I have reviewed the patient's clinical course by review of records including previous notes, labs, and vital signs.     On interview, Steven is met within his room. Tolerated increase in bupropion XL to 300 mg. Reporting some anxiety around dinnertime. Discussed adding a temporary low dose of ativan before dinner as a temporary measure while his bupropion takes effect. He denies physical symptoms today. Denies SI, no plan, no intent. He is working with social work on placement. He is attending groups.    He is seen later in the day interacting with his peers in the milieu    With patient permission, spoke with patient's mother. [facility] on file. She reports she has seen improvements in Steven.  Discussed his complicated history. She is worried about him discharging to a homeless shelter and not having a safe disposition as he quickly decompensates without structure.        MEDICATIONS   Scheduled Meds:    buPROPion  300 mg Oral Daily     cloZAPine  150 mg Oral At Bedtime     lithium  450 mg Oral At Bedtime     LORazepam  0.5 mg Oral Daily before supper     LORazepam  1 mg Oral TID     memantine  10 mg Oral BID     multivitamin w/minerals  1 tablet Oral Daily     pregabalin  100 mg Oral TID     vitamin D2  50,000 Units Oral Q7 Days     PRN Meds:.acetaminophen, alum & mag hydroxide-simethicone, docusate sodium, hydrOXYzine, ondansetron, sulindac     ALLERGIES   Allergies   Allergen Reactions     Pork Allergy         VITALS   Vitals: /78   Pulse 98   Temp 99.3  F (37.4  C) (Temporal)   Resp 18   Wt 85.7 kg (189 lb)   SpO2 98%   BMI 25.63 kg/m       MENTAL STATUS EXAM     Appearance: Hair and beard are over grown-pt is clean/showered. Awake, alert, dressed in  "hospital scrubs  Attitude:  cooperative   Eye Contact: Improving  Mood:  \"alright today\"  Affect:  intensity remains blunted, less withdrawn, at baseline  Speech: monotone, soft spoken, engaging in more conversation  Psychomotor Behavior:  no evidence of tardive dyskinesia, dystonia, or tics, lithium tremor improved  Thought Process:  [place]  Associations: none noted  Thought Content:  no evidence of suicidal ideation or homicidal ideation and no evidence of psychotic thought, denies hallucinations  Insight: Adequate  Judgment: Adequate  Oriented to:  time, person, and place  Attention Span and Concentration:  limited  Recent and Remote Memory:  limited  Fund of Knowledge: Low  Muscle Strength and Tone: normal  Gait and Station: normal        LABS   Recent Results (from the past 24 hour(s))   CBC with platelets and differential    Collection Time: 08/15/22 10:43 AM   Result Value Ref Range    WBC Count 10.7 4.0 - 11.0 10e3/uL    RBC Count 4.80 4.40 - 5.90 10e6/uL    Hemoglobin 13.8 13.3 - 17.7 g/dL    Hematocrit 42.6 40.0 - 53.0 %    MCV 89 78 - 100 fL    MCH 28.8 26.5 - 33.0 pg    MCHC 32.4 31.5 - 36.5 g/dL    RDW 12.7 10.0 - 15.0 %    Platelet Count 309 150 - 450 10e3/uL    % Neutrophils 62 %    % Lymphocytes 17 %    % Monocytes 6 %    % Eosinophils 12 %    % Basophils 1 %    % Immature Granulocytes 2 %    NRBCs per 100 WBC 0 <1 /100    Absolute Neutrophils 6.7 1.6 - 8.3 10e3/uL    Absolute Lymphocytes 1.8 0.8 - 5.3 10e3/uL    Absolute Monocytes 0.7 0.0 - 1.3 10e3/uL    Absolute Eosinophils 1.3 (H) 0.0 - 0.7 10e3/uL    Absolute Basophils 0.1 0.0 - 0.2 10e3/uL    Absolute Immature Granulocytes 0.2 <=0.4 10e3/uL    Absolute NRBCs 0.0 10e3/uL   Extra Green Top (Lithium Heparin) Tube    Collection Time: 08/15/22 10:43 AM   Result Value Ref Range    Hold Specimen JI          ASSESSMENT   This is a 31 year old male with a PMH of schizoaffective disorder, polysubstance abuse, traumatic brain injury with coma, and a " near fatal overdose with cardiac arrest who presented in a severe depressive episode with catatonia, occurring in the context of medication changes (lorazepam being discontinued) and the patient having severe malnutrition due to poor intake.     The patient has improved dramatically since starting Clozapine and resuming Ativan with his catatonia improving, his engagement with the world, speech, and ability to interact with peers. The patient has improved to the point that he would benefit from discharge to a facility as soon as available with his medication regimen to continue     Today: continue treatment plan without alteration. Tolerated increase in bupropion. Patient's mother reports a notable difference in Steven as well and is concerned that a safe place be identified as last time Steven discharged without a safe disposition, he ended up sleeping under a bridge and stopping his medications.      DIAGNOSTIC FORMULATION   1. Schizoaffective disorder, depressive type, multiple episodes, currently in acute episode, with catatonia   2. 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 Multiple other TBIs  3. 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 Methamphetamine use disorder, moderate, in early remission  5. Alcohol use disorder, moderate     PLAN     Location: Unit 5  Legal Status: Orders Placed This Encounter      Legal status Voluntary    Safety Assessment:    Behavioral Orders   Procedures     Code 1 - Restrict to Unit     Routine Programming     As clinically indicated     Status 15     Every 15 minutes.     PTA medications stopped    -Wellbutrin due to starting Clozaril. Both decrease seizure threshhold  -Abilify due to not being effective    PTA medications continued/changed:     -Lithium 900 mg at bedtime reduced to 450 mg at bedtime to address tremor with improvement  -Namenda 10 mg BID  -Lyrica 100 mg TID  -Clonazepam restarted at 1 mg  TID- switched to Lorazepam 1 mg TID on 7/23.   New medications tried and stopped:      -None     New medications initiated:      -Clozapine 12.5 mg at bedtime on 7/19.-> Increase to 25 mg at bedtime on 7/20 -> 50 mg on 7/23 -> 75 mg on 7/24 ->100 mg on 7/26 -> 125 mg on 7/28 -> 150 mg on 7/30 -> 175 mg on 7/31 --> 200 mg on 08/01 -> 175 mg on 8/2 -> 150 mg on 8/3  -Vitamin D2 50,000 units weekly x 8 weeks     Today's Changes:  -16:30 lorazepam 0.5 mg added for increased anxiety, will allow while bupropion takes effect.     Programming: Patient will be treated in a therapeutic milieu with appropriate individual and group therapies. Education will be provided on diagnoses, medications, and treatments. Encouraged behavioral activation and participation in group programming.     Medical diagnoses:      #. URI, resolved  - Suspect URI. Myocarditis ruled out  - CRP mildly elevated. Mild leukocytosis. Normal troponin. Rest of labs reassuring. Negative COVID. EKG shows sinus tach, no new findings. Echo is normal.  - Analgesics for fever. Working  - Zofran prn  - Reduced Clozapine    #. Severe malnutrition, improving  - Nutrition consulted  - 11 pound weight gain since admission    #. Sialorrhea, resolved  #. Enuresis, resolved  - Secondary to Clozapine  - Reduced dose with improvement    Consults: Nutrition  Tests: Clozapine    Disposition: awaiting [facility] funding, board and lodge       TREATMENT TEAM CARE PLAN     Progress: Symptoms improved.    Continued Stay Criteria/Rationale: Ongoing treatment and safe discharge planning.    Medical/Physical: See above.    Precautions: See above.     Plan: Continue inpatient care with unit support and medication management.    Rationale for change in precautions or plan: NA due to no change.    Participants: Joshua Somers MD, Nursing, SW, OT.    The patient's care was discussed with the treatment team and chart notes were reviewed.         ATTESTATION    Joshua Somers  MD  [facility]

## 2022-08-15 NOTE — PLAN OF CARE
Face to face end of shift report received from Shantal ALCARAZ RN.  Pt observed awake in room.        Pt observed awake in room at the beginning of shift but went to bed soon after. Pt appeared to sleep about 7 hours through the night with a normal breathing pattern.  Will continue to monitor.       Problem: Behavioral Health Plan of Care  Goal: Patient-Specific Goal (Individualization)  Description: Patient will report at least 6-8 hours of sleep each night.   Patient will complete all ADL's independently while on the unit.   Patient will be compliant with treatment team recommendations.     Pt will eat at least 50% of meals and have enough fluid intake.  Outcome: Ongoing, Progressing     Problem: Suicide Risk  Goal: Absence of Self-Harm  Outcome: Ongoing, Progressing     Problem: Fall Injury Risk  Goal: Absence of Fall and Fall-Related Injury  Outcome: Ongoing, Progressing   Goal Outcome Evaluation:            Face to face end of shift report communicated to oncoming  RN.     Prisca Erickson RN  8/15/2022

## 2022-08-15 NOTE — PLAN OF CARE
Problem: Behavioral Health Plan of Care  Goal: Patient-Specific Goal (Individualization)  Description: Patient will report at least 6-8 hours of sleep each night.   Patient will complete all ADL's independently while on the unit.   Patient will be compliant with treatment team recommendations.     Pt will eat at least 50% of meals and have enough fluid intake.  Outcome: Ongoing, Progressing     Pt up in the lounge and hallways most of the shift. Pt walked with male peer in the hallways talking for a few hours this shift,. Pt attended some evening group, socialized with peers and staff. Pt denied pain early in the shift but requested sulindac 150mg at 2035 for low back pain rated at 4/10. Pt reported anxiety and depression this shift but states he has had a good day, but that it feels fake. Pt denies hallucinations, SI and HI. Pt stated that he felt less depressed this shift but feels like its gotten worse at the end of the shift. Pt requested hydroxyzine 50mg at 2035 for sleep.       Problem: Thought Process Alteration  Goal: Optimal Thought Clarity  Description: Patient will hold reality based conversations while on the unit.     Outcome: Ongoing, Progressing     Problem: Suicide Risk  Goal: Absence of Self-Harm  Outcome: Ongoing, Progressing     Pt denies SI this shift.     Problem: Suicidal Behavior  Goal: Suicidal Behavior is Absent or Managed  Outcome: Ongoing, Progressing     Problem: Fall Injury Risk  Goal: Absence of Fall and Fall-Related Injury  Outcome: Ongoing, Progressing   Goal Outcome Evaluation:        Face to face end of shift report communicated to night shift RN. Reported that pt is a risk for falls and suicide.     Sarahi Hui RN  8/15/2022  4:15 PM

## 2022-08-15 NOTE — PLAN OF CARE
Problem: Fall Injury Risk  Goal: Absence of Fall and Fall-Related Injury  Outcome: Ongoing, Progressing     Patient is balanced and steady on feet.  Free from falls this shift.       Problem: Thought Process Alteration  Goal: Optimal Thought Clarity  Description: Patient will hold reality based conversations while on the unit.     Outcome: Ongoing, Progressing   Patient is able to have a linear, reality based conversation with staff.        Problem: Behavioral Health Plan of Care  Goal: Patient-Specific Goal (Individualization)  Description: Patient will report at least 6-8 hours of sleep each night.   Patient will complete all ADL's independently while on the unit.   Patient will be compliant with treatment team recommendations.     Pt will eat at least 50% of meals and have enough fluid intake.  Outcome: Ongoing, Progressing   Patient has been calm, cooperative, and medication complaint this shift.  He complained of anxiety and took 50 mg hydroxyzine at 1832 but reports little effect.  He is concerned that his mental health is getting worse the longer he is in the hospital and feels more anxious every day.  Did not attend groups.  Ate 100% of meals and snack.  Too PRN sulindac 150 mg at 2008 for back pain then went to bed.  Sleeping by 2130.  VS WNL.  Face to face end of shift report communicated to night shift RN.     Shantal Real RN  8/14/2022  10:30 PM

## 2022-08-16 PROCEDURE — 250N000013 HC RX MED GY IP 250 OP 250 PS 637: Performed by: NURSE PRACTITIONER

## 2022-08-16 PROCEDURE — 250N000013 HC RX MED GY IP 250 OP 250 PS 637: Performed by: STUDENT IN AN ORGANIZED HEALTH CARE EDUCATION/TRAINING PROGRAM

## 2022-08-16 PROCEDURE — 250N000013 HC RX MED GY IP 250 OP 250 PS 637: Performed by: PSYCHIATRY & NEUROLOGY

## 2022-08-16 PROCEDURE — 99232 SBSQ HOSP IP/OBS MODERATE 35: CPT | Performed by: PSYCHIATRY & NEUROLOGY

## 2022-08-16 PROCEDURE — 124N000001 HC R&B MH

## 2022-08-16 RX ORDER — LIDOCAINE 4 G/G
1 PATCH TOPICAL
Status: DISCONTINUED | OUTPATIENT
Start: 2022-08-16 | End: 2022-08-20

## 2022-08-16 RX ADMIN — LORAZEPAM 1 MG: 1 TABLET ORAL at 13:56

## 2022-08-16 RX ADMIN — SULINDAC 150 MG: 150 TABLET ORAL at 16:41

## 2022-08-16 RX ADMIN — PREGABALIN 100 MG: 25 CAPSULE ORAL at 13:56

## 2022-08-16 RX ADMIN — LORAZEPAM 0.5 MG: 0.5 TABLET ORAL at 16:41

## 2022-08-16 RX ADMIN — HYDROXYZINE HYDROCHLORIDE 50 MG: 25 TABLET, FILM COATED ORAL at 20:54

## 2022-08-16 RX ADMIN — PREGABALIN 100 MG: 25 CAPSULE ORAL at 08:15

## 2022-08-16 RX ADMIN — MULTIPLE VITAMINS W/ MINERALS TAB 1 TABLET: TAB at 08:15

## 2022-08-16 RX ADMIN — BUPROPION HYDROCHLORIDE 300 MG: 300 TABLET, EXTENDED RELEASE ORAL at 08:15

## 2022-08-16 RX ADMIN — LITHIUM CARBONATE 450 MG: 450 TABLET, EXTENDED RELEASE ORAL at 20:54

## 2022-08-16 RX ADMIN — LORAZEPAM 1 MG: 1 TABLET ORAL at 20:54

## 2022-08-16 RX ADMIN — CLOZAPINE 150 MG: 50 TABLET ORAL at 20:55

## 2022-08-16 RX ADMIN — MEMANTINE 10 MG: 10 TABLET ORAL at 08:15

## 2022-08-16 RX ADMIN — LORAZEPAM 1 MG: 1 TABLET ORAL at 08:15

## 2022-08-16 RX ADMIN — PREGABALIN 100 MG: 25 CAPSULE ORAL at 20:54

## 2022-08-16 RX ADMIN — MEMANTINE 10 MG: 10 TABLET ORAL at 20:54

## 2022-08-16 RX ADMIN — Medication 1 PATCH: at 19:36

## 2022-08-16 ASSESSMENT — ACTIVITIES OF DAILY LIVING (ADL)
ADLS_ACUITY_SCORE: 42

## 2022-08-16 NOTE — PLAN OF CARE
Problem: Fall Injury Risk  Goal: Absence of Fall and Fall-Related Injury  Outcome: Ongoing, Progressing     Problem: Suicidal Behavior  Goal: Suicidal Behavior is Absent or Managed  Outcome: Ongoing, Progressing     Problem: Suicide Risk  Goal: Absence of Self-Harm  Outcome: Ongoing, Progressing     Problem: Thought Process Alteration  Goal: Optimal Thought Clarity  Description: Patient will hold reality based conversations while on the unit.     Outcome: Ongoing, Progressing     Problem: Behavioral Health Plan of Care  Goal: Patient-Specific Goal (Individualization)  Description: Patient will report at least 6-8 hours of sleep each night.   Patient will complete all ADL's independently while on the unit.   Patient will be compliant with treatment team recommendations.     Pt will eat at least 50% of meals and have enough fluid intake.  Outcome: Ongoing, Progressing        Face to Face report received from Deejay MEJIA RN.  Rounding completed. Patient observed in the lounge.  Patient reports back pain but refuses medications stating he just needs to get up & walk for a while.  Patient is calm, cooperative & medication compliant.  Patient denies SI/HI and hallucinations.  He eats his meals in the lounge and is social with his peers walking & having conversations with themf.  Patient stopped this writer to ask how my day was & how the weather was outside.  He states he misses being outside & is ready to go out again.  He discussed how he is hoping for a place to live so he isn't just homeless; although at least he would be outside.  Patient attends groups. Face to face end of shift report communicated to alexa LARKIN.     Amara Dial RN  8/16/2022  10:57 AM

## 2022-08-16 NOTE — PLAN OF CARE
Problem: Behavioral Health Plan of Care  Goal: Patient-Specific Goal (Individualization)  Description: Patient will report at least 6-8 hours of sleep each night.   Patient will complete all ADL's independently while on the unit.   Patient will be compliant with treatment team recommendations.     Pt will eat at least 50% of meals and have enough fluid intake.  Outcome: Ongoing, Progressing     Problem: Fall Injury Risk  Goal: Absence of Fall and Fall-Related Injury  Outcome: Ongoing, Progressing   Goal Outcome Evaluation:      Face to face shift report received from RN. Rounding completed, pt observed.Client rested in room for 3.5 hours with eyes closed and respirations noted. No signs of distress. Client had no falls this shift. Face to face report will be communicated to oncoming RN.    Didier Brooks RN  8/16/2022  6:14 AM

## 2022-08-16 NOTE — PHARMACY
Pharmacy Clozapine Continuation Note    Patient's Name: Steven Carter  Patient's : 1990    Current cloZAPine regimen: 150 mg PO at bedtime   Has there been a known interruption in therapy for greater than/equal to 48 hours which would warrant retitration? No    Recent ANC Value(s) for last 30 days:  2022: Absolute Neutrophils 4.0 10e3/uL (Ref range: 1.6 - 8.3 10e3/uL)  2022: Absolute Neutrophils 4.3 10e3/uL (Ref range: 1.6 - 8.3 10e3/uL)  2022: Absolute Neutrophils 8.0 10e3/uL (Ref range: 1.6 - 8.3 10e3/uL); Absolute Neutrophils 9.7 10e3/uL (H; Ref range: 1.6 - 8.3 10e3/uL); Absolute Neutrophils 9.8 10e3/uL (H; Ref range: 1.6 - 8.3 10e3/uL)  2022: Absolute Neutrophils 10.5 10e3/uL (H; Ref range: 1.6 - 8.3 10e3/uL)  2022: Absolute Neutrophils 6.7 10e3/uL (Ref range: 1.6 - 8.3 10e3/uL)  2022: Absolute Neutrophils 7.5 10e3/uL (Ref range: 1.6 - 8.3 10e3/uL)  8/15/2022: Absolute Neutrophils 6.7 10e3/uL (Ref range: 1.6 - 8.3 10e3/uL)      A REMS Dispense Authorization was obtained from the clozapine REMS prgram? Yes   A Dispense Rationale was needed to obtain the REMS Dispense Authorization? No  Is the ANC (if available) within recommended limits? Yes  Does the patient have any signs or symptoms of infection, including fever? No    REMS Patient ID: [id number]  Patient RDA from 22: [id number]      Plan:  1. Continue clozapine therapy at 150 mg PO at bedtime.  2. A WBC with differential will be ordered at least weekly, NEXT DUE 2022. ANC values will be entered into the REMS program.    Sobeida Howe RPH

## 2022-08-16 NOTE — PROGRESS NOTES
"  [facility] PSYCHIATRY  -  PROGRESS NOTE     ID   Name: Steven Carter  MRN#: [id number]     SUBJECTIVE   Today, I met with nursing, SW and OT and reviewed patient's clinical condition. We discussed clinical care both before and after the interview. I have reviewed the patient's clinical course by review of records including previous notes, labs, and vital signs.     On interview, Steven is met within his room. He states he is more accepting of the mnchoice application and is looking forward to interview later in the week. He states he has chronic pack pain, wondering about a topical solution. He denies SI, no plan, no intent.  He was made aware that writer spoke with his mother yesterday and he said thank you.  He did smile on one occasion during interview which was a first for Steven with this writer.      MEDICATIONS   Scheduled Meds:    buPROPion  300 mg Oral Daily     cloZAPine  150 mg Oral At Bedtime     lithium  450 mg Oral At Bedtime     LORazepam  0.5 mg Oral Daily before supper     LORazepam  1 mg Oral TID     memantine  10 mg Oral BID     multivitamin w/minerals  1 tablet Oral Daily     pregabalin  100 mg Oral TID     vitamin D2  50,000 Units Oral Q7 Days     PRN Meds:.acetaminophen, alum & mag hydroxide-simethicone, docusate sodium, hydrOXYzine, ondansetron, sulindac     ALLERGIES   Allergies   Allergen Reactions     Pork Allergy         VITALS   Vitals: /62   Pulse 79   Temp 97.8  F (36.6  C) (Temporal)   Resp 16   Wt 85.7 kg (189 lb)   SpO2 98%   BMI 25.63 kg/m       MENTAL STATUS EXAM     Appearance: Hair and beard are over grown-pt is clean/showered. Awake, alert, dressed in hospital scrubs  Attitude:  cooperative   Eye Contact: Improving  Mood:  \"pretty good\"  Affect:  intensity remains blunted, less withdrawn, at baseline  Speech: monotone, soft spoken, engaging in more conversation  Psychomotor Behavior:  no evidence of tardive dyskinesia, dystonia, or tics, lithium tremor " improved  Thought Process:  [place]  Associations: none noted  Thought Content:  no evidence of suicidal ideation or homicidal ideation and no evidence of psychotic thought, denies hallucinations  Insight: Adequate  Judgment: Adequate  Oriented to:  time, person, and place  Attention Span and Concentration:  limited  Recent and Remote Memory:  limited  Fund of Knowledge: Low  Muscle Strength and Tone: normal  Gait and Station: normal        LABS   No results found for this or any previous visit (from the past 24 hour(s)).      ASSESSMENT   This is a 31 year old male with a PMH of schizoaffective disorder, polysubstance abuse, traumatic brain injury with coma, and a near fatal overdose with cardiac arrest who presented in a severe depressive episode with catatonia, occurring in the context of medication changes (lorazepam being discontinued) and the patient having severe malnutrition due to poor intake.     The patient has improved dramatically since starting Clozapine and resuming Ativan with his catatonia improving, his engagement with the world, speech, and ability to interact with peers. The patient has improved to the point that he would benefit from discharge to a facility as soon as available with his medication regimen to continue     Today: no adjustments to psychotropic medication regimen today. He has a [facility]choice interview on 08/18. Encourage to discuss with Steven the importance of having adequate outpatient supports put in place to avoid him from having to come back to the hospital. Chronic back pain reported, ordered lidocaine patch as noted below.       DIAGNOSTIC FORMULATION   1. Schizoaffective disorder, depressive type, multiple episodes, currently in acute episode, with catatonia   2. 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 Multiple other TBIs  3. 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 Methamphetamine use disorder,  moderate, in early remission  5. Alcohol use disorder, moderate     PLAN     Location: Unit 5  Legal Status: Orders Placed This Encounter      Legal status Voluntary    Safety Assessment:    Behavioral Orders   Procedures     Code 1 - Restrict to Unit     Routine Programming     As clinically indicated     Status 15     Every 15 minutes.     PTA medications stopped    -Wellbutrin due to starting Clozaril. Both decrease seizure threshhold  -Abilify due to not being effective    PTA medications continued/changed:     -Lithium 900 mg at bedtime reduced to 450 mg at bedtime to address tremor with improvement  -Namenda 10 mg BID  -Lyrica 100 mg TID  -Clonazepam restarted at 1 mg TID- switched to Lorazepam 1 mg TID on 7/23.   New medications tried and stopped:      -None     New medications initiated:      -Clozapine 12.5 mg at bedtime on 7/19.-> Increase to 25 mg at bedtime on 7/20 -> 50 mg on 7/23 -> 75 mg on 7/24 ->100 mg on 7/26 -> 125 mg on 7/28 -> 150 mg on 7/30 -> 175 mg on 7/31 --> 200 mg on 08/01 -> 175 mg on 8/2 -> 150 mg on 8/3  -Vitamin D2 50,000 units weekly x 8 weeks     Today's Changes:  -maintain 16:30 lorazepam, tolerated well  -spoke with nursing, will order lidocaine patch for bedtime for his chronic back pain, prefers to have it in the evenings and we will go ahead and do that.     Programming: Patient will be treated in a therapeutic milieu with appropriate individual and group therapies. Education will be provided on diagnoses, medications, and treatments. Encouraged behavioral activation and participation in group programming.     Medical diagnoses:      #. URI, resolved  - Suspect URI. Myocarditis ruled out  - CRP mildly elevated. Mild leukocytosis. Normal troponin. Rest of labs reassuring. Negative COVID. EKG shows sinus tach, no new findings. Echo is normal.  - Analgesics for fever. Working  - Zofran prn  - Reduced Clozapine    #. Severe malnutrition, improving  - Nutrition consulted  - 11 pound  weight gain since admission    #. Sialorrhea, resolved  #. Enuresis, resolved  - Secondary to Clozapine  - Reduced dose with improvement    Consults: Nutrition  Tests: Clozapine    Disposition: [facility]choice assessment on 08/18       ATTESTATION    Joshua Somers MD  [facility]

## 2022-08-17 PROCEDURE — 250N000013 HC RX MED GY IP 250 OP 250 PS 637: Performed by: STUDENT IN AN ORGANIZED HEALTH CARE EDUCATION/TRAINING PROGRAM

## 2022-08-17 PROCEDURE — 124N000001 HC R&B MH

## 2022-08-17 PROCEDURE — 99232 SBSQ HOSP IP/OBS MODERATE 35: CPT | Performed by: NURSE PRACTITIONER

## 2022-08-17 PROCEDURE — 250N000013 HC RX MED GY IP 250 OP 250 PS 637: Performed by: NURSE PRACTITIONER

## 2022-08-17 PROCEDURE — 250N000013 HC RX MED GY IP 250 OP 250 PS 637: Performed by: PSYCHIATRY & NEUROLOGY

## 2022-08-17 RX ADMIN — BUPROPION HYDROCHLORIDE 300 MG: 300 TABLET, EXTENDED RELEASE ORAL at 08:21

## 2022-08-17 RX ADMIN — PREGABALIN 100 MG: 25 CAPSULE ORAL at 13:41

## 2022-08-17 RX ADMIN — LORAZEPAM 0.5 MG: 0.5 TABLET ORAL at 16:43

## 2022-08-17 RX ADMIN — MULTIPLE VITAMINS W/ MINERALS TAB 1 TABLET: TAB at 08:21

## 2022-08-17 RX ADMIN — MEMANTINE 10 MG: 10 TABLET ORAL at 08:21

## 2022-08-17 RX ADMIN — PREGABALIN 100 MG: 25 CAPSULE ORAL at 08:21

## 2022-08-17 RX ADMIN — LITHIUM CARBONATE 450 MG: 450 TABLET, EXTENDED RELEASE ORAL at 20:22

## 2022-08-17 RX ADMIN — LORAZEPAM 1 MG: 1 TABLET ORAL at 20:22

## 2022-08-17 RX ADMIN — SULINDAC 150 MG: 150 TABLET ORAL at 20:26

## 2022-08-17 RX ADMIN — PREGABALIN 100 MG: 25 CAPSULE ORAL at 20:22

## 2022-08-17 RX ADMIN — HYDROXYZINE HYDROCHLORIDE 50 MG: 25 TABLET, FILM COATED ORAL at 20:26

## 2022-08-17 RX ADMIN — CLOZAPINE 150 MG: 50 TABLET ORAL at 20:22

## 2022-08-17 RX ADMIN — LORAZEPAM 1 MG: 1 TABLET ORAL at 13:40

## 2022-08-17 RX ADMIN — Medication 1 PATCH: at 20:26

## 2022-08-17 RX ADMIN — LORAZEPAM 1 MG: 1 TABLET ORAL at 08:21

## 2022-08-17 RX ADMIN — MEMANTINE 10 MG: 10 TABLET ORAL at 20:22

## 2022-08-17 ASSESSMENT — ACTIVITIES OF DAILY LIVING (ADL)
ADLS_ACUITY_SCORE: 42

## 2022-08-17 NOTE — PLAN OF CARE
Problem: Behavioral Health Plan of Care  Goal: Patient-Specific Goal (Individualization)  Description: Patient will report at least 6-8 hours of sleep each night.   Patient will complete all ADL's independently while on the unit.   Patient will be compliant with treatment team recommendations.     Pt will eat at least 50% of meals and have enough fluid intake.  Outcome: Ongoing, Progressing     Problem: Fall Injury Risk  Goal: Absence of Fall and Fall-Related Injury  Outcome: Ongoing, Progressing   Goal Outcome Evaluation:      Face to face shift report received from RN. Rounding completed, pt observed.Client rested in room for 7 hours with eyes closed and respirations noted. No signs of distress. Client had no falls this shift.Face to face report will be communicated to oncoming RN.    Didier Brooks RN  8/17/2022  6:29 AM

## 2022-08-17 NOTE — PROGRESS NOTES
[facility] PSYCHIATRY  -  PROGRESS NOTE     ID   Name: Steven Carter  MRN#: [id number]     SUBJECTIVE   Today, I met with nursing, SW and OT and reviewed patient's clinical condition. We discussed clinical care both before and after the interview. I have reviewed the patient's clinical course by review of records including previous notes, labs, and vital signs.     This is my first time meeting Steven. On interview, Steven is met within the dayroom where he was found watching TV. He reports some depression today, rates it a 4 out of 10. He asks about lowering his Clozapine dose due to concerns related to poor concentration and weight gain. He was encouraged to further discuss these concerns with Dr. Somers upon his return tomorrow. He denies SI, no plan, no intent. He is eating and sleeping well. No symptoms of catatonia noted. He is aware of the mnchoice assessment tomorrow and reports some hesitancy and frustration by the wait to get him to a safe disposition.      MEDICATIONS   Scheduled Meds:    buPROPion  300 mg Oral Daily     cloZAPine  150 mg Oral At Bedtime     lidocaine  1 patch Transdermal Q24h    And     lidocaine   Transdermal Q8H LEANDER     lithium  450 mg Oral At Bedtime     LORazepam  0.5 mg Oral Daily before supper     LORazepam  1 mg Oral TID     memantine  10 mg Oral BID     multivitamin w/minerals  1 tablet Oral Daily     pregabalin  100 mg Oral TID     vitamin D2  50,000 Units Oral Q7 Days     PRN Meds:.acetaminophen, alum & mag hydroxide-simethicone, docusate sodium, hydrOXYzine, ondansetron, sulindac     ALLERGIES   Allergies   Allergen Reactions     Pork Allergy         VITALS   Vitals: /73   Pulse 88   Temp 97.7  F (36.5  C) (Temporal)   Resp 16   Wt 85.7 kg (189 lb)   SpO2 98%   BMI 25.63 kg/m       MENTAL STATUS EXAM     Appearance: Hair and beard are over grown-pt is clean/showered. Awake, alert, dressed in hospital scrubs  Attitude:  cooperative   Eye Contact:  "Improving  Mood:  \"pretty good\"  Affect:  intensity remains blunted, less withdrawn, at baseline  Speech: monotone, soft spoken, engaging in more conversation  Psychomotor Behavior:  no evidence of tardive dyskinesia, dystonia, or tics, lithium tremor improved  Thought Process:  [place]  Associations: none noted  Thought Content:  no evidence of suicidal ideation or homicidal ideation and no evidence of psychotic thought, denies hallucinations  Insight: Adequate  Judgment: Adequate  Oriented to:  time, person, and place  Attention Span and Concentration:  limited  Recent and Remote Memory:  limited  Fund of Knowledge: Low  Muscle Strength and Tone: normal  Gait and Station: normal        LABS   No results found for this or any previous visit (from the past 24 hour(s)).      ASSESSMENT   This is a 31 year old male with a PMH of schizoaffective disorder, polysubstance abuse, traumatic brain injury with coma, and a near fatal overdose with cardiac arrest who presented in a severe depressive episode with catatonia, occurring in the context of medication changes (lorazepam being discontinued) and the patient having severe malnutrition due to poor intake.     The patient has improved dramatically since starting Clozapine and resuming Ativan with his catatonia improving, his engagement with the world, speech, and ability to interact with peers. The patient has improved to the point that he would benefit from discharge to a facility as soon as available with his medication regimen to continue     Today: No adjustments to psychotropic medication regimen today. Wants Clozapine dose decreased due to concerns related to impaired concentration and weight gain and was encouraged to further discuss with Dr. Somers tomorrow. He has a mnchoice interview on 08/18. Encourage to discuss with Steven the importance of having adequate outpatient supports put in place to avoid him from having to come back to the hospital.  "     DIAGNOSTIC FORMULATION   1. Schizoaffective disorder, depressive type, multiple episodes, currently in acute episode, with catatonia   2. 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 Multiple other TBIs  3. 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 Methamphetamine use disorder, moderate, in early remission  5. Alcohol use disorder, moderate     PLAN     Location: Unit 5  Legal Status: Orders Placed This Encounter      Legal status Voluntary    Safety Assessment:    Behavioral Orders   Procedures     Code 1 - Restrict to Unit     Routine Programming     As clinically indicated     Status 15     Every 15 minutes.     PTA medications stopped    -Wellbutrin due to starting Clozaril. Both decrease seizure threshhold  -Abilify due to not being effective    PTA medications continued/changed:     -Lithium 900 mg at bedtime reduced to 450 mg at bedtime to address tremor with improvement  -Namenda 10 mg BID  -Lyrica 100 mg TID  -Clonazepam restarted at 1 mg TID- switched to Lorazepam 1 mg TID on 7/23.   New medications tried and stopped:      -None     New medications initiated:      -Clozapine 12.5 mg at bedtime on 7/19.-> Increase to 25 mg at bedtime on 7/20 -> 50 mg on 7/23 -> 75 mg on 7/24 ->100 mg on 7/26 -> 125 mg on 7/28 -> 150 mg on 7/30 -> 175 mg on 7/31 --> 200 mg on 08/01 -> 175 mg on 8/2 -> 150 mg on 8/3  -Vitamin D2 50,000 units weekly x 8 weeks     Today's Changes:  -maintain 16:30 lorazepam, tolerated well  -spoke with nursing, will order lidocaine patch for bedtime for his chronic back pain, prefers to have it in the evenings and we will go ahead and do that.     Programming: Patient will be treated in a therapeutic milieu with appropriate individual and group therapies. Education will be provided on diagnoses, medications, and treatments. Encouraged behavioral activation and participation in group programming.     Medical diagnoses:       #. URI, resolved  - Suspect URI. Myocarditis ruled out  - CRP mildly elevated. Mild leukocytosis. Normal troponin. Rest of labs reassuring. Negative COVID. EKG shows sinus tach, no new findings. Echo is normal.  - Analgesics for fever. Working  - Zofran prn  - Reduced Clozapine    #. Severe malnutrition, improving  - Nutrition consulted  - 11 pound weight gain since admission    #. Sialorrhea, resolved  #. Enuresis, resolved  - Secondary to Clozapine  - Reduced dose with improvement    Consults: Nutrition  Tests: Clozapine    Disposition: Mnchoice assessment on 08/18       ATTESTATION    Teri Davila NP

## 2022-08-17 NOTE — PLAN OF CARE
Late entry for 8/15/22    Face to face report received from Prisca FISHER RN.  Rounding completed.  Patient observed in his room at the beginning of shift.  Patient was withdrawn & quiet with peers & staff this shift.  Patient was calm, cooperative & medication compliant.  Patient denied SI/HI thoughts and behaviors.  Patient attended groups.  Patient ate at least 75% or meals.  Face to face end of shift report communicated to oncoming RN.    Amara Dial RN  8/15/22

## 2022-08-17 NOTE — PLAN OF CARE
"  Problem: Behavioral Health Plan of Care  Goal: Patient-Specific Goal (Individualization)  Description: Patient will report at least 6-8 hours of sleep each night.   Patient will complete all ADL's independently while on the unit.   Patient will be compliant with treatment team recommendations.     Pt will eat at least 50% of meals and have enough fluid intake.  Outcome: Ongoing, Progressing     [facility]. Pt requested sulindac and hydroxyzine for anxiety. Pt given sulindac 150mg at 1641, pt given his scheduled ativan at this time, pt decided to wait to take the hydroxyzine later tonight. Pt reports anxiety at 3/10 and lower back pain at 3/10. Pt does report depression but states it is getting better. Pt denied SI, HI and hallucinations. Pt walked with male peer in the hallways for a couple of hours and played cards with 3 male peers at snack time. Pt requested to have his lidocaine patch early, given at 1936. Pt asked if he could have more than one patch, encouraged him to ask the provider in the morning.     Pt asked if the doctor addressed his concern about his Clozaril side effects. Pt was encouraged to talk to the provider in the morning. Pt stated that he meets with the doctor early in the morning and doesn't feel he is all the way \"awake\" enough to talk to him.       Problem: Thought Process Alteration  Goal: Optimal Thought Clarity  Description: Patient will hold reality based conversations while on the unit.     Outcome: Ongoing, Progressing     Problem: Suicide Risk  Goal: Absence of Self-Harm  Outcome: Ongoing, Progressing     Problem: Suicidal Behavior  Goal: Suicidal Behavior is Absent or Managed  Outcome: Ongoing, Progressing     Problem: Fall Injury Risk  Goal: Absence of Fall and Fall-Related Injury  Outcome: Ongoing, Progressing   Goal Outcome Evaluation:    Plan of Care Reviewed With: patient        Face to face end of shift report communicated to night shift RN. " Reported that pt is a risk for suicide and falls.     Sarahi Hui RN  8/16/2022  8:00 PM

## 2022-08-17 NOTE — PLAN OF CARE
Problem: Fall Injury Risk  Goal: Absence of Fall and Fall-Related Injury  Outcome: Ongoing, Progressing     Problem: Suicidal Behavior  Goal: Suicidal Behavior is Absent or Managed  Outcome: Ongoing, Progressing     Problem: Suicide Risk  Goal: Absence of Self-Harm  Intervention: Promote Psychosocial Wellbeing  Recent Flowsheet Documentation  Taken 8/17/2022 0900 by Amara Dial RN  Family/Support System Care: self-care encouraged     Problem: Thought Process Alteration  Goal: Optimal Thought Clarity  Description: Patient will hold reality based conversations while on the unit.     Outcome: Ongoing, Progressing     Problem: Behavioral Health Plan of Care  Goal: Patient-Specific Goal (Individualization)  Description: Patient will report at least 6-8 hours of sleep each night.   Patient will complete all ADL's independently while on the unit.   Patient will be compliant with treatment team recommendations.     Pt will eat at least 50% of meals and have enough fluid intake.  Outcome: Ongoing, Progressing        Face to Face report received from Deejay CORDOVA RN.  Rounding completed. Patient observed in the lounge.  Patient is calm, cooperative & medication compliant.  Patient is social with his peers; he walks the halls & chats with multiple peers.  Patient stopped this writer in the [place] to inquire as to my day & the weather outside.  He states he cannot wait to go outside again and is hopeful for a place to go so he isn't homeless.  Patient denies SI/HI thoughts today.  Patient ate at least 75% of meals this shift.  Patient was free from falls this shift.  Face to face end of shift report communicated to oncsalomón LARKIN.     Amara Dial RN  8/17/2022  2:48 PM

## 2022-08-17 NOTE — PLAN OF CARE
"  Problem: Behavioral Health Plan of Care  Goal: Patient-Specific Goal (Individualization)  Description: Patient will report at least 6-8 hours of sleep each night.   Patient will complete all ADL's independently while on the unit.   Patient will be compliant with treatment team recommendations.     Pt will eat at least 50% of meals and have enough fluid intake.  Outcome: Ongoing, Progressing     Pt in lounge most of the shift. Pt walked with male peer and socialized with peers. Pt attended some parts of the evening groups but stated he didn't feel like participating in creativity. Pt reported pain in lower back and requested sulindac 150mg and hydroxyzine 50mg for sleep at 2026.     Pt stated his anxiety was \"ok\" this shift, denied depression, SI, HI and hallucinations.     Pt's mom called today and asked about the meeting tomorrow, asked if she needed to be there. Pt's mom will call tomorrow to talk to .     Pt was upset that his clozaril was not reduced this evening, stated that he doesn't want to take it when he leaves here. Pt stated that he has a new provider today and didn't get a chance to talk to the psychiatrist about it. Encouraged to talk to the new provider.     Problem: Thought Process Alteration  Goal: Optimal Thought Clarity  Description: Patient will hold reality based conversations while on the unit.     Outcome: Ongoing, Progressing     Problem: Suicide Risk  Goal: Absence of Self-Harm  Outcome: Ongoing, Progressing     Problem: Suicidal Behavior  Goal: Suicidal Behavior is Absent or Managed  Outcome: Ongoing, Progressing     Problem: Fall Injury Risk  Goal: Absence of Fall and Fall-Related Injury  Outcome: Ongoing, Progressing   Goal Outcome Evaluation:        Face to face end of shift report communicated to night shift RN. Reported that pt is a risk for suicide and falls.     Sarahi Hui, RN  8/17/2022  4:38 PM                   "

## 2022-08-18 PROCEDURE — 250N000013 HC RX MED GY IP 250 OP 250 PS 637: Performed by: NURSE PRACTITIONER

## 2022-08-18 PROCEDURE — 250N000013 HC RX MED GY IP 250 OP 250 PS 637: Performed by: STUDENT IN AN ORGANIZED HEALTH CARE EDUCATION/TRAINING PROGRAM

## 2022-08-18 PROCEDURE — 99232 SBSQ HOSP IP/OBS MODERATE 35: CPT | Mod: 95 | Performed by: PSYCHIATRY & NEUROLOGY

## 2022-08-18 PROCEDURE — 250N000013 HC RX MED GY IP 250 OP 250 PS 637: Performed by: PSYCHIATRY & NEUROLOGY

## 2022-08-18 PROCEDURE — 124N000001 HC R&B MH

## 2022-08-18 RX ADMIN — PREGABALIN 100 MG: 25 CAPSULE ORAL at 08:19

## 2022-08-18 RX ADMIN — BUPROPION HYDROCHLORIDE 300 MG: 300 TABLET, EXTENDED RELEASE ORAL at 08:19

## 2022-08-18 RX ADMIN — HYDROXYZINE HYDROCHLORIDE 50 MG: 25 TABLET, FILM COATED ORAL at 20:46

## 2022-08-18 RX ADMIN — PREGABALIN 100 MG: 25 CAPSULE ORAL at 13:58

## 2022-08-18 RX ADMIN — LORAZEPAM 1 MG: 1 TABLET ORAL at 20:45

## 2022-08-18 RX ADMIN — SULINDAC 150 MG: 150 TABLET ORAL at 20:49

## 2022-08-18 RX ADMIN — MEMANTINE 10 MG: 10 TABLET ORAL at 08:19

## 2022-08-18 RX ADMIN — PREGABALIN 100 MG: 25 CAPSULE ORAL at 20:45

## 2022-08-18 RX ADMIN — LITHIUM CARBONATE 450 MG: 450 TABLET, EXTENDED RELEASE ORAL at 20:45

## 2022-08-18 RX ADMIN — MEMANTINE 10 MG: 10 TABLET ORAL at 20:46

## 2022-08-18 RX ADMIN — LORAZEPAM 0.5 MG: 0.5 TABLET ORAL at 15:55

## 2022-08-18 RX ADMIN — LORAZEPAM 1 MG: 1 TABLET ORAL at 13:58

## 2022-08-18 RX ADMIN — MULTIPLE VITAMINS W/ MINERALS TAB 1 TABLET: TAB at 08:19

## 2022-08-18 RX ADMIN — CLOZAPINE 150 MG: 50 TABLET ORAL at 20:45

## 2022-08-18 RX ADMIN — LORAZEPAM 1 MG: 1 TABLET ORAL at 08:19

## 2022-08-18 RX ADMIN — Medication 1 PATCH: at 20:44

## 2022-08-18 ASSESSMENT — ACTIVITIES OF DAILY LIVING (ADL)
ORAL_HYGIENE: INDEPENDENT
ADLS_ACUITY_SCORE: 42
DRESS: INDEPENDENT
ADLS_ACUITY_SCORE: 42
ADLS_ACUITY_SCORE: 42
DRESS: INDEPENDENT
ADLS_ACUITY_SCORE: 42
HYGIENE/GROOMING: INDEPENDENT
ADLS_ACUITY_SCORE: 42
HYGIENE/GROOMING: INDEPENDENT
ORAL_HYGIENE: INDEPENDENT

## 2022-08-18 NOTE — PLAN OF CARE
"Spoke with pt this afternoon, pt had just completed his Mnchoice interview with Nikole. Pt states he is still wanting to go to the [facility] in [place] and has been calling daily.     Pts mom called and shared her concern for pt going to a homeless shelter. Let her know pt is no longer under a commitment and is his own legal guardian and if he wants to leave to a homeless shelter he does have those rights. \"Well I think you would have taken away his rights the first time he tried to commit suicide.\" Explained to mom this is not how it works.    "

## 2022-08-18 NOTE — PROGRESS NOTES
[facility] PSYCHIATRY  -  PROGRESS NOTE     ID   Name: Steven Carter  MRN#: [id number]     SUBJECTIVE   Today, I met with nursing, SW and OT and reviewed patient's clinical condition. We discussed clinical care both before and after the interview. I have reviewed the patient's clinical course by review of records including previous notes, labs, and vital signs.     On interview, Steven is met within his room. Has mnchoice interview today. He states he is more accepting of the funding that can be provided with this. He requests to increase wellbutrin to 450 mg XL today. He makes comments about not wanting to stay on clozapine as he feels it is like haloperidol and requests to lower it a little. I politely declined this request and informed him that he is on a good dose to keep him out of the hospital and that it has helped him improve greatly - he does agree with this assessment and does not ask to lower further again.  He denies SI, no plan, no intent. He has been having conversations with his mother.       MEDICATIONS   Scheduled Meds:    buPROPion  300 mg Oral Daily     cloZAPine  150 mg Oral At Bedtime     lidocaine  1 patch Transdermal Q24h    And     lidocaine   Transdermal Q8H LEANDER     lithium  450 mg Oral At Bedtime     LORazepam  0.5 mg Oral Daily before supper     LORazepam  1 mg Oral TID     memantine  10 mg Oral BID     multivitamin w/minerals  1 tablet Oral Daily     pregabalin  100 mg Oral TID     vitamin D2  50,000 Units Oral Q7 Days     PRN Meds:.acetaminophen, alum & mag hydroxide-simethicone, docusate sodium, hydrOXYzine, ondansetron, sulindac     ALLERGIES   Allergies   Allergen Reactions     Pork Allergy         VITALS   Vitals: /70   Pulse 78   Temp 98.4  F (36.9  C) (Temporal)   Resp 18   Wt 85.7 kg (189 lb)   SpO2 97%   BMI 25.63 kg/m       MENTAL STATUS EXAM     Appearance: Hair and beard are over grown-pt is clean/showered. Awake, alert, dressed in hospital  "scrubs  Attitude:  cooperative   Eye Contact: Improving  Mood:  \"alright\"  Affect:  intensity remains blunted, less withdrawn, at baseline  Speech: monotone, soft spoken, engaging in more conversation  Psychomotor Behavior:  no evidence of tardive dyskinesia, dystonia, or tics, lithium tremor improved  Thought Process:  [place]  Associations: none noted  Thought Content:  no evidence of suicidal ideation or homicidal ideation and no evidence of psychotic thought, denies hallucinations  Insight: Adequate  Judgment: Adequate  Oriented to:  time, person, and place  Attention Span and Concentration:  limited  Recent and Remote Memory:  limited  Fund of Knowledge: Low  Muscle Strength and Tone: normal  Gait and Station: normal        LABS   No results found for this or any previous visit (from the past 24 hour(s)).      ASSESSMENT   This is a 31 year old male with a PMH of schizoaffective disorder, polysubstance abuse, traumatic brain injury with coma, and a near fatal overdose with cardiac arrest who presented in a severe depressive episode with catatonia, occurring in the context of medication changes (lorazepam being discontinued) and the patient having severe malnutrition due to poor intake.     The patient has improved dramatically since starting Clozapine and resuming Ativan with his catatonia improving, his engagement with the world, speech, and ability to interact with peers. The patient has improved to the point that he would benefit from discharge to a facility as soon as available with his medication regimen to continue     Today: has mnchoice interview today. He intermittently will ask to lower the clozapine dose. Discussed with him that this was not a wise move as it has helped him greatly and will likely keep him out of the hospital in the future, prevention decompsenations. He has some insight into this as he does agree.  Will increase bupropion to 450 mg Xl which was previously taking and is affect has " improved on it.         DIAGNOSTIC FORMULATION   1. Schizoaffective disorder, depressive type, multiple episodes, currently in acute episode, with catatonia   2. 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 Multiple other TBIs  3. 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 Methamphetamine use disorder, moderate, in early remission  5. Alcohol use disorder, moderate     PLAN     Location: Unit 5  Legal Status: Orders Placed This Encounter      Legal status Voluntary    Safety Assessment:    Behavioral Orders   Procedures     Code 1 - Restrict to Unit     Routine Programming     As clinically indicated     Status 15     Every 15 minutes.     PTA medications stopped    -Wellbutrin due to starting Clozaril. Both decrease seizure threshhold  -Abilify due to not being effective    PTA medications continued/changed:     -Lithium 900 mg at bedtime reduced to 450 mg at bedtime to address tremor with improvement  -Namenda 10 mg BID  -Lyrica 100 mg TID  -Clonazepam restarted at 1 mg TID- switched to Lorazepam 1 mg TID on 7/23.   New medications tried and stopped:      -None     New medications initiated:      -Clozapine 12.5 mg at bedtime on 7/19.-> Increase to 25 mg at bedtime on 7/20 -> 50 mg on 7/23 -> 75 mg on 7/24 ->100 mg on 7/26 -> 125 mg on 7/28 -> 150 mg on 7/30 -> 175 mg on 7/31 --> 200 mg on 08/01 -> 175 mg on 8/2 -> 150 mg on 8/3  -Vitamin D2 50,000 units weekly x 8 weeks     Today's Changes:  -increase wellbutrin to 450 mg starting tomorrow (08/19/22)    Programming: Patient will be treated in a therapeutic milieu with appropriate individual and group therapies. Education will be provided on diagnoses, medications, and treatments. Encouraged behavioral activation and participation in group programming.     Medical diagnoses:      #. URI, resolved  - Suspect URI. Myocarditis ruled out  - CRP mildly elevated. Mild leukocytosis. Normal troponin.  Rest of labs reassuring. Negative COVID. EKG shows sinus tach, no new findings. Echo is normal.  - Analgesics for fever. Working  - Zofran prn  - Reduced Clozapine    #. Severe malnutrition, improving  - Nutrition consulted  - 11 pound weight gain since admission    #. Sialorrhea, resolved  #. Enuresis, resolved  - Secondary to Clozapine  - Reduced dose with improvement    Consults: Nutrition  Tests: Clozapine    Disposition: Mnchoice assessment on 08/18       ATTESTATION    Joshua Somers MD  [facility]   Psychiatry    Telehealth video visit that took place via an interactive audio and video telecommunications system using secure connection. Patient identity was verified.  Patient verbalized agreement and understanding of test ordered, diagnosis, treatment plan, education, and side effects of medications, if prescribed. Start: 7:00 stop: 7:30

## 2022-08-18 NOTE — PLAN OF CARE
"Face to face shift report received from Yoan CARDOZA RN. Rounding completed, pt observed.    Problem: Behavioral Health Plan of Care  Goal: Patient-Specific Goal (Individualization)  Description: Patient will report at least 6-8 hours of sleep each night.   Patient will complete all ADL's independently while on the unit.   Patient will be compliant with treatment team recommendations.     Pt will eat at least 50% of meals and have enough fluid intake.  Outcome: Ongoing, Progressing  Note: Shift Summary:  Patient was up in [facility] shortly after the start of this shift.  Patient is maintaining  ADLs.  Appetite is good.  Compliant with medications.  Converses more with peers and staff. Hand tremors remain but less obvious and not as bothersome per patient. Patient did state that he \"wish I could stop taking the clozapine\".  When asked why he stated that he did not like how it makes him feel \"just like on Haloperidol\".  Encouraged patient that he seems better on this medication compared to other admissions.  Patient did oot agree but did not disagree either.  Patient can make his needs known.  Steady on feet.     Problem: Thought Process Alteration  Goal: Optimal Thought Clarity  Description: Patient will hold reality based conversations while on the unit.     Outcome: Ongoing, Progressing  Face to face report will be communicated to oncoming RN.    Alma Delia Garsia RN  8/18/2022                          "

## 2022-08-18 NOTE — PLAN OF CARE
Face to face shift report received from nurse. Rounding completed, pt observed.    Problem: Behavioral Health Plan of Care  Goal: Patient-Specific Goal (Individualization)  Description: Patient will report at least 6-8 hours of sleep each night.   Patient will complete all ADL's independently while on the unit.   Patient will be compliant with treatment team recommendations.     Pt will eat at least 50% of meals and have enough fluid intake.  Outcome: Ongoing, Progressing  Pt has been in bed with eyes closed and regular respirations observed all night. Will continue to monitor. Patient slept 7 hours. Patient was up once during the night ate a snack and returned to bed.     Face to face report will be communicated to oncoming RN.    Yoan Erickson RN  8/18/2022

## 2022-08-19 PROCEDURE — 124N000001 HC R&B MH

## 2022-08-19 PROCEDURE — 250N000013 HC RX MED GY IP 250 OP 250 PS 637: Performed by: NURSE PRACTITIONER

## 2022-08-19 PROCEDURE — 250N000013 HC RX MED GY IP 250 OP 250 PS 637: Performed by: PSYCHIATRY & NEUROLOGY

## 2022-08-19 PROCEDURE — 99233 SBSQ HOSP IP/OBS HIGH 50: CPT | Mod: 95 | Performed by: STUDENT IN AN ORGANIZED HEALTH CARE EDUCATION/TRAINING PROGRAM

## 2022-08-19 PROCEDURE — 250N000013 HC RX MED GY IP 250 OP 250 PS 637: Performed by: STUDENT IN AN ORGANIZED HEALTH CARE EDUCATION/TRAINING PROGRAM

## 2022-08-19 RX ADMIN — PREGABALIN 100 MG: 25 CAPSULE ORAL at 20:38

## 2022-08-19 RX ADMIN — MEMANTINE 10 MG: 10 TABLET ORAL at 20:39

## 2022-08-19 RX ADMIN — BUPROPION HYDROCHLORIDE 450 MG: 300 TABLET, EXTENDED RELEASE ORAL at 08:26

## 2022-08-19 RX ADMIN — MULTIPLE VITAMINS W/ MINERALS TAB 1 TABLET: TAB at 08:26

## 2022-08-19 RX ADMIN — LORAZEPAM 1 MG: 1 TABLET ORAL at 08:25

## 2022-08-19 RX ADMIN — Medication 1 PATCH: at 20:37

## 2022-08-19 RX ADMIN — SULINDAC 150 MG: 150 TABLET ORAL at 20:38

## 2022-08-19 RX ADMIN — LORAZEPAM 0.5 MG: 0.5 TABLET ORAL at 16:48

## 2022-08-19 RX ADMIN — MEMANTINE 10 MG: 10 TABLET ORAL at 08:26

## 2022-08-19 RX ADMIN — HYDROXYZINE HYDROCHLORIDE 50 MG: 25 TABLET, FILM COATED ORAL at 20:38

## 2022-08-19 RX ADMIN — CLOZAPINE 125 MG: 100 TABLET ORAL at 20:39

## 2022-08-19 RX ADMIN — LORAZEPAM 1 MG: 1 TABLET ORAL at 20:38

## 2022-08-19 RX ADMIN — LITHIUM CARBONATE 450 MG: 450 TABLET, EXTENDED RELEASE ORAL at 20:38

## 2022-08-19 RX ADMIN — PREGABALIN 100 MG: 25 CAPSULE ORAL at 08:25

## 2022-08-19 RX ADMIN — PREGABALIN 100 MG: 25 CAPSULE ORAL at 14:21

## 2022-08-19 RX ADMIN — LORAZEPAM 1 MG: 1 TABLET ORAL at 14:21

## 2022-08-19 ASSESSMENT — ACTIVITIES OF DAILY LIVING (ADL)
LAUNDRY: UNABLE TO COMPLETE
ADLS_ACUITY_SCORE: 42
ADLS_ACUITY_SCORE: 42
HYGIENE/GROOMING: INDEPENDENT
DRESS: SCRUBS (BEHAVIORAL HEALTH);INDEPENDENT
ADLS_ACUITY_SCORE: 42
HYGIENE/GROOMING: INDEPENDENT
ADLS_ACUITY_SCORE: 42
DRESS: INDEPENDENT
ADLS_ACUITY_SCORE: 42
ORAL_HYGIENE: INDEPENDENT
ORAL_HYGIENE: INDEPENDENT
ADLS_ACUITY_SCORE: 42

## 2022-08-19 NOTE — PLAN OF CARE
"Face to face shift report received from Yoan CARDOZA RN. Rounding completed, pt observed.    Problem: Behavioral Health Plan of Care  Goal: Patient-Specific Goal (Individualization)  Description: Patient will report at least 6-8 hours of sleep each night.   Patient will complete all ADL's independently while on the unit.   Patient will be compliant with treatment team recommendations.     Pt will eat at least 50% of meals and have enough fluid intake.  Outcome: Ongoing, Progressing  Note: Shift Summary:  [facility] of this shift.  Patient is less animated today.  Flat affect and asking to \"get off clozaril\".  States he does not need to take it.  Encouraged to speak with Dr. Timmons.  Patient gave nurse multiple sticky notes with his written notes about his frustrations and reasons he does not want to remain hospitalized.  Put in hard chart for DO to see when here next week and epic sticky note left for psychiatrist. PAtint ahs been med compliant.  Declines offer of PO PRN pain reliever after removing lidoderm patch removed.  Less social but cooperative.     Problem: Thought Process Alteration  Goal: Optimal Thought Clarity  Description: Patient will hold reality based conversations while on the unit.     Outcome: Ongoing, Progressing  Face to face report will be communicated to oncoming RN.    Alma Delia Garsia RN  8/19/2022                    "

## 2022-08-19 NOTE — PLAN OF CARE
Face to face shift report received from nurse. Rounding completed, pt observed.    Problem: Behavioral Health Plan of Care  Goal: Patient-Specific Goal (Individualization)  Description: Patient will report at least 6-8 hours of sleep each night.   Patient will complete all ADL's independently while on the unit.   Patient will be compliant with treatment team recommendations.     Pt will eat at least 50% of meals and have enough fluid intake.  Outcome: Ongoing, Progressing  Pt has been in bed with eyes closed and regular respirations observed all night. Will continue to monitor. Patient was up right away at the start of shift got a snack and went back to bed. Patient slept 7 hours throughout the night.     Face to face report will be communicated to oncoming RN.    Yoan Erickson RN  8/19/2022

## 2022-08-19 NOTE — PROGRESS NOTES
[facility] PSYCHIATRY  -  PROGRESS NOTE     ID   Name: Steven Carter  MRN#: [id number]     SUBJECTIVE   Today, I met with nursing, SW and OT and reviewed patient's clinical condition. We discussed clinical care both before and after the interview. I have reviewed the patient's clinical course by review of records including previous notes, labs, and vital signs.     On interview, Steven is met within his room. He notes that his MNChoice interview went well. Discussed challenges with looking at an assisted living facility at such a young age. Tried to reframe his perspective while providing therapeutic support. He continues to want to decrease Clozapine due to drooling, bed wetting, sedation, and weight gain. Discussed how the blood level is likely higher with Wellbutrin, which can make a decrease less likely to destabilize him. He consents to a decrease. Discussed how weight gain might not respond to this decrease, including drooling and bed wetting. Previously, this did help.     He denies any safety concerns. Denies any headache, fever, chills, nausea, or vomiting. Patient doing well with the increase in Wellbutrin.     MEDICATIONS   Scheduled Meds:    buPROPion  450 mg Oral Daily     cloZAPine  125 mg Oral At Bedtime     lidocaine  1 patch Transdermal Q24h    And     lidocaine   Transdermal Q8H LEANDER     lithium  450 mg Oral At Bedtime     LORazepam  0.5 mg Oral Daily before supper     LORazepam  1 mg Oral TID     memantine  10 mg Oral BID     multivitamin w/minerals  1 tablet Oral Daily     pregabalin  100 mg Oral TID     vitamin D2  50,000 Units Oral Q7 Days     PRN Meds:.acetaminophen, alum & mag hydroxide-simethicone, docusate sodium, hydrOXYzine, ondansetron, sulindac     ALLERGIES   Allergies   Allergen Reactions     Pork Allergy         VITALS   Vitals: /66   Pulse 98   Temp 98.3  F (36.8  C) (Tympanic)   Resp 16   Wt 85.7 kg (189 lb)   SpO2 99%   BMI 25.63 kg/m       MENTAL STATUS EXAM  "    Appearance: Hair and beard are over grown-pt is clean/showered. Awake, alert, dressed in hospital scrubs  Attitude:  cooperative   Eye Contact: Improving  Mood:  \"alright\"  Affect:  intensity remains blunted, less withdrawn, at baseline  Speech: monotone, soft spoken, engaging in more conversation  Psychomotor Behavior:  no evidence of tardive dyskinesia, dystonia, or tics, lithium tremor improved  Thought Process:  [place]  Associations: none noted  Thought Content:  no evidence of suicidal ideation or homicidal ideation and no evidence of psychotic thought, denies hallucinations  Insight: Adequate  Judgment: Adequate  Oriented to:  time, person, and place  Attention Span and Concentration:  limited  Recent and Remote Memory:  limited  Fund of Knowledge: Low  Muscle Strength and Tone: normal  Gait and Station: normal        LABS   No results found for this or any previous visit (from the past 24 hour(s)).      ASSESSMENT   This is a 31 year old male with a PMH of schizoaffective disorder, polysubstance abuse, traumatic brain injury with coma, and a near fatal overdose with cardiac arrest who presented in a severe depressive episode with catatonia, occurring in the context of medication changes (lorazepam being discontinued) and the patient having severe malnutrition due to poor intake.     The patient has improved dramatically since starting Clozapine and resuming Ativan with his catatonia improving, his engagement with the world, speech, and ability to interact with peers. The patient has improved to the point that he would benefit from discharge to a facility as soon as available with his medication regimen to continue     Today: Decreasing Clozapine to address side effects with starting Wellbutrin possibly contributing given increasing blood level. Decreasing cautiously with no further decrease being recommended at this point. Continues to be stable for discharge.       DIAGNOSTIC FORMULATION   1. " Schizoaffective disorder, depressive type, multiple episodes, currently in acute episode, with catatonia   2. 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 Multiple other TBIs  3. 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 Methamphetamine use disorder, moderate, in early remission  5. Alcohol use disorder, moderate     PLAN     Location: Unit 5  Legal Status: Orders Placed This Encounter      Legal status Voluntary    Safety Assessment:    Behavioral Orders   Procedures     Code 1 - Restrict to Unit     Routine Programming     As clinically indicated     Status 15     Every 15 minutes.     PTA medications stopped    -Wellbutrin originally due to starting Clozapine and risk of seizures, resumed with patient aware of risks on 8/12  -Abilify due to not being effective    PTA medications continued/changed:     -Lithium 900 mg at bedtime reduced to 450 mg at bedtime to address tremor with improvement  -Namenda 10 mg BID  -Lyrica 100 mg TID  -Clonazepam restarted at 1 mg TID- switched to Lorazepam 1 mg TID on 7/23. Added 0.5 mg before supper on 8/15  -Wellbutrin  mg daily     New medications tried and stopped:      -None     New medications initiated:      -Clozapine 12.5 mg at bedtime on 7/19.-> Increase to 25 mg at bedtime on 7/20 -> 50 mg on 7/23 -> 75 mg on 7/24 ->100 mg on 7/26 -> 125 mg on 7/28 -> 150 mg on 7/30 -> 175 mg on 7/31 --> 200 mg on 08/01 -> 175 mg on 8/2 -> 150 mg on 8/3 -> 125 mg on 8/19  -Vitamin D2 50,000 units weekly x 8 weeks     Today's Changes:    -Reduce Clozapine to 125 mg     Programming: Patient will be treated in a therapeutic milieu with appropriate individual and group therapies. Education will be provided on diagnoses, medications, and treatments. Encouraged behavioral activation and participation in group programming.     Medical diagnoses:      #. URI, resolved  - Suspect URI. Myocarditis ruled out  - CRP mildly  elevated. Mild leukocytosis. Normal troponin. Rest of labs reassuring. Negative COVID. EKG shows sinus tach, no new findings. Echo is normal.  - Analgesics for fever. Working  - Zofran prn  - Reduced Clozapine    #. Severe malnutrition, resolved  - Nutrition consulted  - 27 pound weight gain since admission    #. Sialorrhea  #. Enuresis  - Secondary to Clozapine  - Reduced dose with improvement. Reducing again to see if helps    Consults: Nutrition  Tests: None    Disposition: [facility] assessment on 08/18. AL vs group home       ATTESTATION    David Timmons DO, MA  Psychiatrist     VIDEO VISIT    Patient has given verbal consent for video visit?: Yes     Video- Visit Details  Type of service:  video visit for mental health treatment.  Time of service:    Date:  08/19/2022    Video Start Time: 845AM      Video End Time: 900AM    Reason for video visit: COVID-19 [place]  Originating Site (patient location):  [facility]  Distant Site (provider location):  Remote location  Mode of Communication:  Video Conference via [employer]

## 2022-08-19 NOTE — PLAN OF CARE
Problem: Behavioral Health Plan of Care  Goal: Patient-Specific Goal (Individualization)  Description: Patient will report at least 6-8 hours of sleep each night.   Patient will complete all ADL's independently while on the unit.   Patient will be compliant with treatment team recommendations.     Pt will eat at least 50% of meals and have enough fluid intake.  Outcome: Ongoing, Progressing  Note:     1900 Patient up on the unit and complaining of anxiety and states that he wanted an additional dose of ativan which was not due at that time. Patient is quiet but has no further complaints. Patient steady on his feet, denies auditory and visual hallucinations and pain. Patient denies skin or other issues.    2130 Patient in [facility] area and took mediations. Patient opened up about concerns he has about where he is going and feels he shouldn't be sent to a adult foster home as he is 31 years old. Patient states that he had an upsetting phone call with his mother who doesn't seem to support him. Patient verbalized that he appreciates being able to vent about it.    Face to face end of shift report communicated to 11-7 shift RN.     Le Bermudez RN  8/18/2022  10:07 PM          Problem: Thought Process Alteration  Goal: Optimal Thought Clarity  Description: Patient will hold reality based conversations while on the unit.     Outcome: Ongoing, Progressing     Problem: Suicide Risk  Goal: Absence of Self-Harm  Outcome: Ongoing, Progressing     Problem: Suicidal Behavior  Goal: Suicidal Behavior is Absent or Managed  Outcome: Ongoing, Progressing     Problem: Fall Injury Risk  Goal: Absence of Fall and Fall-Related Injury  Outcome: Ongoing, Not Progressing      Goal Outcome Evaluation:    Plan of Care Reviewed With: patient

## 2022-08-20 PROCEDURE — 250N000013 HC RX MED GY IP 250 OP 250 PS 637: Performed by: NURSE PRACTITIONER

## 2022-08-20 PROCEDURE — 124N000001 HC R&B MH

## 2022-08-20 PROCEDURE — 250N000013 HC RX MED GY IP 250 OP 250 PS 637: Performed by: PSYCHIATRY & NEUROLOGY

## 2022-08-20 PROCEDURE — 99231 SBSQ HOSP IP/OBS SF/LOW 25: CPT | Performed by: NURSE PRACTITIONER

## 2022-08-20 PROCEDURE — 250N000013 HC RX MED GY IP 250 OP 250 PS 637: Performed by: STUDENT IN AN ORGANIZED HEALTH CARE EDUCATION/TRAINING PROGRAM

## 2022-08-20 RX ORDER — LIDOCAINE 4 G/G
2 PATCH TOPICAL
Status: DISCONTINUED | OUTPATIENT
Start: 2022-08-20 | End: 2022-09-02 | Stop reason: HOSPADM

## 2022-08-20 RX ADMIN — MEMANTINE 10 MG: 10 TABLET ORAL at 20:16

## 2022-08-20 RX ADMIN — PREGABALIN 100 MG: 25 CAPSULE ORAL at 14:27

## 2022-08-20 RX ADMIN — SULINDAC 150 MG: 150 TABLET ORAL at 20:16

## 2022-08-20 RX ADMIN — PREGABALIN 100 MG: 25 CAPSULE ORAL at 08:49

## 2022-08-20 RX ADMIN — LITHIUM CARBONATE 450 MG: 450 TABLET, EXTENDED RELEASE ORAL at 20:17

## 2022-08-20 RX ADMIN — HYDROXYZINE HYDROCHLORIDE 50 MG: 25 TABLET, FILM COATED ORAL at 20:16

## 2022-08-20 RX ADMIN — MULTIPLE VITAMINS W/ MINERALS TAB 1 TABLET: TAB at 08:49

## 2022-08-20 RX ADMIN — PREGABALIN 100 MG: 25 CAPSULE ORAL at 20:16

## 2022-08-20 RX ADMIN — Medication 2 PATCH: at 20:14

## 2022-08-20 RX ADMIN — MEMANTINE 10 MG: 10 TABLET ORAL at 08:49

## 2022-08-20 RX ADMIN — LORAZEPAM 1 MG: 1 TABLET ORAL at 14:27

## 2022-08-20 RX ADMIN — CLOZAPINE 125 MG: 100 TABLET ORAL at 20:16

## 2022-08-20 RX ADMIN — LORAZEPAM 0.5 MG: 0.5 TABLET ORAL at 16:47

## 2022-08-20 RX ADMIN — LORAZEPAM 1 MG: 1 TABLET ORAL at 08:49

## 2022-08-20 RX ADMIN — BUPROPION HYDROCHLORIDE 450 MG: 300 TABLET, EXTENDED RELEASE ORAL at 08:49

## 2022-08-20 RX ADMIN — LORAZEPAM 1 MG: 1 TABLET ORAL at 20:16

## 2022-08-20 ASSESSMENT — ACTIVITIES OF DAILY LIVING (ADL)
ADLS_ACUITY_SCORE: 42
HYGIENE/GROOMING: INDEPENDENT
ORAL_HYGIENE: INDEPENDENT
ADLS_ACUITY_SCORE: 42
ADLS_ACUITY_SCORE: 42
DRESS: SCRUBS (BEHAVIORAL HEALTH);INDEPENDENT
ORAL_HYGIENE: INDEPENDENT
ADLS_ACUITY_SCORE: 42
LAUNDRY: UNABLE TO COMPLETE
ADLS_ACUITY_SCORE: 42
HYGIENE/GROOMING: INDEPENDENT
ADLS_ACUITY_SCORE: 42
LAUNDRY: UNABLE TO COMPLETE
ADLS_ACUITY_SCORE: 42
DRESS: SCRUBS (BEHAVIORAL HEALTH);INDEPENDENT
ADLS_ACUITY_SCORE: 42

## 2022-08-20 NOTE — PLAN OF CARE
"  Problem: Behavioral Health Plan of Care  Goal: Patient-Specific Goal (Individualization)  Description: Patient will report at least 6-8 hours of sleep each night.   Patient will complete all ADL's independently while on the unit.   Patient will be compliant with treatment team recommendations.     Pt will eat at least 50% of meals and have enough fluid intake.  Outcome: Ongoing, Progressing  Note: Patient is walking in the hallway socializing with a peer at start of shift. He voices his frustration about his continued hospitalization.  He states he is \"fine\". He is cooperative with medications and assessment.  He denies SI, HI, hallucinations.  He does have some anxiety. C/O lower and upper back pain. He states he will utilize scheduled Lidocaine patches at HS. Declines further intervention for pain.  His affect is flat.  Bilateral hand tremor noted. Tremor was present at time of admission.   He asks if his clozapine dose will decrease tonight which it will not.  He states \"it's my own fault. I could have talked to the provider today but I didn't.\"       Problem: Thought Process Alteration  Goal: Optimal Thought Clarity  Description: Patient will hold reality based conversations while on the unit.     Outcome: Ongoing, Progressing  Note: Patient is able to hold reality based conversation.     Problem: Suicidal Behavior  Goal: Suicidal Behavior is Absent or Managed  Outcome: Ongoing, Progressing  Note: Patient denies SI and had no noted self harm this shift.      Problem: Fall Injury Risk  Goal: Absence of Fall and Fall-Related Injury  Outcome: Ongoing, Progressing  Note: Patient's gait is balanced and steady.  He had no noted falls this shift.    Goal Outcome Evaluation:                      "

## 2022-08-20 NOTE — PLAN OF CARE
Problem: Behavioral Health Plan of Care  Goal: Patient-Specific Goal (Individualization)  Description: Patient will report at least 6-8 hours of sleep each night.   Patient will complete all ADL's independently while on the unit.   Patient will be compliant with treatment team recommendations.     Pt will eat at least 50% of meals and have enough fluid intake.  Outcome: Ongoing, Progressing  Note: Patient denies SI, HI, hallucinations.  His mom visited [facility].  Visit appeared to go well.  His affect is flat.  He does walk the hallways socializing with a male peer.  He states he does not like the Clozaril and would like to be off of it. Patient has been attending groups [facility] and was filling out his WRAP packet.      Problem: Suicide Risk  Goal: Absence of Self-Harm  Outcome: Ongoing, Progressing  Note: Patient denies SI and had no noted self harm this shift.      Problem: Suicidal Behavior  Goal: Suicidal Behavior is Absent or Managed  Outcome: Ongoing, Progressing     Problem: Fall Injury Risk  Goal: Absence of Fall and Fall-Related Injury  Outcome: Ongoing, Progressing  Note: Gait is balanced and steady.  He had no noted falls this shift.     Goal Outcome Evaluation:

## 2022-08-20 NOTE — PLAN OF CARE
"  Problem: Behavioral Health Plan of Care  Goal: Patient-Specific Goal (Individualization)  Description: Patient will report at least 6-8 hours of sleep each night.   Patient will complete all ADL's independently while on the unit.   Patient will be compliant with treatment team recommendations.     Pt will eat at least 50% of meals and have enough fluid intake.  Outcome: Ongoing, Progressing     Pt in lounge watching tv most of the shift. Pt cooperative with nursing assessment. Pt reports pain at 4/10, anxiety at 3/10. Pt denies SI, HI and hallucinations. Pt states his depression is the same. Pt asked \"how many people did I piss off writing that note yesterday\"?  Pt was assured that no one was angry and that he has the right to voice his opinion. Pt feels he has been here too long and is getting frustrated with having to wait.         Problem: Thought Process Alteration  Goal: Optimal Thought Clarity  Description: Patient will hold reality based conversations while on the unit.     Outcome: Ongoing, Progressing     Problem: Suicide Risk  Goal: Absence of Self-Harm  Outcome: Ongoing, Progressing     Problem: Suicidal Behavior  Goal: Suicidal Behavior is Absent or Managed  Outcome: Ongoing, Progressing     Problem: Fall Injury Risk  Goal: Absence of Fall and Fall-Related Injury  Outcome: Ongoing, Progressing   Goal Outcome Evaluation:        Face to face end of shift report communicated to evening shift RN. Reported that pt is a risk for falls and suicide.     Sarahi Hui RN  8/20/2022  11:54 AM                   "

## 2022-08-20 NOTE — PLAN OF CARE
Problem: Behavioral Health Plan of Care  Goal: Patient-Specific Goal (Individualization)  Description: Patient will report at least 6-8 hours of sleep each night.   Patient will complete all ADL's independently while on the unit.   Patient will be compliant with treatment team recommendations.     Pt will eat at least 50% of meals and have enough fluid intake.  Outcome: Ongoing, Progressing     Problem: Fall Injury Risk  Goal: Absence of Fall and Fall-Related Injury  Outcome: Ongoing, Progressing   Goal Outcome Evaluation:      Face to face shift report received from RN. Rounding completed, pt observed.Client rested in room for 7 hours with eyes closed and respirations noted. No signs of distress. Client had no falls this shift.Face to face report will be communicated to oncoming RN.    Didier Brooks RN  8/20/2022  6:15 AM

## 2022-08-20 NOTE — PROGRESS NOTES
"  [facility] PSYCHIATRY  -  PROGRESS NOTE     ID   Name: Steven Carter  MRN#: [id number]     SUBJECTIVE     Per his nurse, Steven had been requesting to speak with me this morning though did not elaborate a reason. When I go to see him this afternoon he declines to speak with me on two separate occasions. He has been sitting up in the lounge watching television with peers for much of the day. Per nursing staff, he has been voicing frustration with his stay in the hospital. In review of notes it appears that his mother did come to visit last night, which apparently went well. Will add an additional lidocaine patch for back pain per his previous request.  Will attempt to speak with him again tomorrow.         MEDICATIONS   Scheduled Meds:    buPROPion  450 mg Oral Daily     cloZAPine  125 mg Oral At Bedtime     lidocaine  1 patch Transdermal Q24h    And     lidocaine   Transdermal Q8H LEANDER     lithium  450 mg Oral At Bedtime     LORazepam  0.5 mg Oral Daily before supper     LORazepam  1 mg Oral TID     memantine  10 mg Oral BID     multivitamin w/minerals  1 tablet Oral Daily     pregabalin  100 mg Oral TID     vitamin D2  50,000 Units Oral Q7 Days     PRN Meds:.acetaminophen, alum & mag hydroxide-simethicone, docusate sodium, hydrOXYzine, ondansetron, sulindac     ALLERGIES   Allergies   Allergen Reactions     Pork Allergy         VITALS   Vitals: /69   Pulse 88   Temp 98.8  F (37.1  C) (Tympanic)   Resp 16   Wt 84.4 kg (186 lb 1.6 oz)   SpO2 96%   BMI 25.24 kg/m       MENTAL STATUS EXAM     Appearance: Hair and beard are over grown-pt is clean/showered. Awake, alert, dressed in hospital scrubs  Attitude:  cooperative   Eye Contact: Improving  Mood:  \"fine\"  Affect:  intensity remains blunted, less withdrawn, at baseline  Speech: monotone, soft spoken, engaging in more conversation  Psychomotor Behavior:  no evidence of tardive dyskinesia, dystonia, or tics, lithium tremor improved  Thought Process: "  [place]  Associations: none noted  Thought Content:  no evidence of suicidal ideation or homicidal ideation and no evidence of psychotic thought, denies hallucinations  Insight: Adequate  Judgment: Adequate  Oriented to:  time, person, and place  Attention Span and Concentration:  limited  Recent and Remote Memory:  limited  Fund of Knowledge: Low  Muscle Strength and Tone: normal  Gait and Station: normal        LABS   No results found for this or any previous visit (from the past 24 hour(s)).      ASSESSMENT   This is a 31 year old male with a PMH of schizoaffective disorder, polysubstance abuse, traumatic brain injury with coma, and a near fatal overdose with cardiac arrest who presented in a severe depressive episode with catatonia, occurring in the context of medication changes (lorazepam being discontinued) and the patient having severe malnutrition due to poor intake.     The patient has improved dramatically since starting Clozapine and resuming Ativan with his catatonia improving, his engagement with the world, speech, and ability to interact with peers. The patient has improved to the point that he would benefit from discharge to a facility as soon as available with his medication regimen to continue        DIAGNOSTIC FORMULATION   1. Schizoaffective disorder, depressive type, multiple episodes, currently in acute episode, with catatonia   2. 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 Multiple other TBIs  3. 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 Methamphetamine use disorder, moderate, in early remission  5. Alcohol use disorder, moderate     PLAN     Location: Unit 5  Legal Status: Orders Placed This Encounter      Legal status Voluntary    Safety Assessment:    Behavioral Orders   Procedures     Code 1 - Restrict to Unit     Routine Programming     As clinically indicated     Status 15     Every 15 minutes.     PTA medications  stopped    -Wellbutrin originally due to starting Clozapine and risk of seizures, resumed with patient aware of risks on 8/12  -Abilify due to not being effective    PTA medications continued/changed:     -Lithium 900 mg at bedtime reduced to 450 mg at bedtime to address tremor with improvement  -Namenda 10 mg BID  -Lyrica 100 mg TID  -Clonazepam restarted at 1 mg TID- switched to Lorazepam 1 mg TID on 7/23. Added 0.5 mg before supper on 8/15  -Wellbutrin  mg daily     New medications tried and stopped:      -None     New medications initiated:      -Clozapine 12.5 mg at bedtime on 7/19.-> Increase to 25 mg at bedtime on 7/20 -> 50 mg on 7/23 -> 75 mg on 7/24 ->100 mg on 7/26 -> 125 mg on 7/28 -> 150 mg on 7/30 -> 175 mg on 7/31 --> 200 mg on 08/01 -> 175 mg on 8/2 -> 150 mg on 8/3 -> 125 mg on 8/19  -Vitamin D2 50,000 units weekly x 8 weeks     Today's Changes:    -Add lidocaine patch for back pain      Programming: Patient will be treated in a therapeutic milieu with appropriate individual and group therapies. Education will be provided on diagnoses, medications, and treatments. Encouraged behavioral activation and participation in group programming.     Medical diagnoses:      #. URI, resolved  - Suspect URI. Myocarditis ruled out  - CRP mildly elevated. Mild leukocytosis. Normal troponin. Rest of labs reassuring. Negative COVID. EKG shows sinus tach, no new findings. Echo is normal.  - Analgesics for fever. Working  - Zofran prn  - Reduced Clozapine    #. Severe malnutrition, resolved  - Nutrition consulted  - 27 pound weight gain since admission    #. Sialorrhea  #. Enuresis  - Secondary to Clozapine  - Reduced dose with improvement. Reducing again to see if helps    Consults: Nutrition  Tests: None    Disposition: [facility] assessment on 08/18. AL vs group home       ATTESTATION    Christelle Contreras, CNP

## 2022-08-21 PROCEDURE — 99232 SBSQ HOSP IP/OBS MODERATE 35: CPT | Performed by: NURSE PRACTITIONER

## 2022-08-21 PROCEDURE — 124N000001 HC R&B MH

## 2022-08-21 PROCEDURE — 250N000013 HC RX MED GY IP 250 OP 250 PS 637: Performed by: NURSE PRACTITIONER

## 2022-08-21 PROCEDURE — 250N000013 HC RX MED GY IP 250 OP 250 PS 637: Performed by: STUDENT IN AN ORGANIZED HEALTH CARE EDUCATION/TRAINING PROGRAM

## 2022-08-21 PROCEDURE — 250N000013 HC RX MED GY IP 250 OP 250 PS 637: Performed by: PSYCHIATRY & NEUROLOGY

## 2022-08-21 RX ADMIN — CLOZAPINE 125 MG: 100 TABLET ORAL at 20:47

## 2022-08-21 RX ADMIN — LORAZEPAM 1 MG: 1 TABLET ORAL at 08:32

## 2022-08-21 RX ADMIN — LORAZEPAM 1 MG: 1 TABLET ORAL at 20:28

## 2022-08-21 RX ADMIN — MEMANTINE 10 MG: 10 TABLET ORAL at 20:27

## 2022-08-21 RX ADMIN — MEMANTINE 10 MG: 10 TABLET ORAL at 08:32

## 2022-08-21 RX ADMIN — PREGABALIN 100 MG: 25 CAPSULE ORAL at 08:31

## 2022-08-21 RX ADMIN — BUPROPION HYDROCHLORIDE 450 MG: 300 TABLET, EXTENDED RELEASE ORAL at 08:30

## 2022-08-21 RX ADMIN — PREGABALIN 100 MG: 25 CAPSULE ORAL at 13:30

## 2022-08-21 RX ADMIN — Medication 2 PATCH: at 20:28

## 2022-08-21 RX ADMIN — LORAZEPAM 1 MG: 1 TABLET ORAL at 13:30

## 2022-08-21 RX ADMIN — ERGOCALCIFEROL 50000 UNITS: 1.25 CAPSULE, LIQUID FILLED ORAL at 10:39

## 2022-08-21 RX ADMIN — LITHIUM CARBONATE 450 MG: 450 TABLET, EXTENDED RELEASE ORAL at 20:27

## 2022-08-21 RX ADMIN — MULTIPLE VITAMINS W/ MINERALS TAB 1 TABLET: TAB at 08:31

## 2022-08-21 RX ADMIN — PREGABALIN 100 MG: 25 CAPSULE ORAL at 20:28

## 2022-08-21 RX ADMIN — LORAZEPAM 0.5 MG: 0.5 TABLET ORAL at 17:09

## 2022-08-21 ASSESSMENT — ACTIVITIES OF DAILY LIVING (ADL)
ADLS_ACUITY_SCORE: 42
ORAL_HYGIENE: INDEPENDENT
ADLS_ACUITY_SCORE: 42
LAUNDRY: UNABLE TO COMPLETE
ADLS_ACUITY_SCORE: 42
ADLS_ACUITY_SCORE: 42
HYGIENE/GROOMING: INDEPENDENT
ADLS_ACUITY_SCORE: 42
ADLS_ACUITY_SCORE: 42
DRESS: SCRUBS (BEHAVIORAL HEALTH);INDEPENDENT
ADLS_ACUITY_SCORE: 42

## 2022-08-21 NOTE — PLAN OF CARE
Face to face shift report received from Nurse. Rounding completed, pt observed.           Problem: Behavioral Health Plan of Care  Goal: Patient-Specific Goal (Individualization)  Description: Patient will report at least 6-8 hours of sleep each night.   Patient will complete all ADL's independently while on the unit.   Patient will be compliant with treatment team recommendations.     Pt will eat at least 50% of meals and have enough fluid intake.  Outcome: Ongoing, Progressing     Problem: Thought Process Alteration  Goal: Optimal Thought Clarity  Description: Patient will hold reality based conversations while on the unit.     Outcome: Ongoing, Progressing     Problem: Suicide Risk  Goal: Absence of Self-Harm  Outcome: Ongoing, Progressing     Problem: Suicidal Behavior  Goal: Suicidal Behavior is Absent or Managed  Outcome: Ongoing, Progressing     Problem: Fall Injury Risk  Goal: Absence of Fall and Fall-Related Injury  Outcome: Ongoing, Progressing        Observed Pt. Laying in bed / Eyes Closed / Noted Breathing without difficulty.      Pt. In the lounge watching Tv.   Pt. Consuming meals provided.   Pt. Attending groups this shift.     Face to face report will be communicated to oncoming RN.    Trinidad Hurley RN  8/21/2022  2:58 PM

## 2022-08-21 NOTE — PROGRESS NOTES
"  [facility] PSYCHIATRY  -  PROGRESS NOTE     ID   Name: Steven Carter  MRN#: [id number]     SUBJECTIVE     Steven is up walking the halls when I see him today. He reports that he is doing \"pretty good\". He reports that medications are helping, does not feel that anything needs to be adjusted. He reports that he feels frustrated and \"stuck\" right now. He doesn't feel that being here for another week or two is going to be beneficial for him and is just making him feel worse. He does state that he contacted [facility] and plans to call there again tomorrow. States he wants to get back on section 8 housing. He notes that it is his birthday on the 27th and his child's birthday on the 1st. He states that in the past they have been able to celebrate together, so is sad to still be here. He notes that he has not seen his kids in about 3 months, almost gets tearful when talking about this. He notes that his mother wants to go \"to some home\" and she gets upset when he mentions going to a homeless shelter. He states that the visitation with his kids happens at her house, so he worries about seeing his kids if he doesn't stay until he is in a group home or foster care, which is what she would like to see. He is calm throughout our interaction, states he is just going to keep calm and will talk to the  again on Monday.       MEDICATIONS   Scheduled Meds:    buPROPion  450 mg Oral Daily     cloZAPine  125 mg Oral At Bedtime     lidocaine  2 patch Transdermal Q24h    And     lidocaine   Transdermal Q8H LEANDER     lithium  450 mg Oral At Bedtime     LORazepam  0.5 mg Oral Daily before supper     LORazepam  1 mg Oral TID     memantine  10 mg Oral BID     multivitamin w/minerals  1 tablet Oral Daily     pregabalin  100 mg Oral TID     vitamin D2  50,000 Units Oral Q7 Days     PRN Meds:.acetaminophen, alum & mag hydroxide-simethicone, docusate sodium, hydrOXYzine, ondansetron, sulindac     ALLERGIES   Allergies   Allergen Reactions " "    Pork Allergy         VITALS   Vitals: /67   Pulse 85   Temp 97.5  F (36.4  C) (Temporal)   Resp 18   Wt 84.4 kg (186 lb 1.6 oz)   SpO2 98%   BMI 25.24 kg/m       MENTAL STATUS EXAM     Appearance: Hair and beard are over grown-pt is clean/showered. Awake, alert, dressed in hospital scrubs  Attitude:  cooperative   Eye Contact: Improving  Mood:  \"fine\"  Affect:  intensity remains blunted, less withdrawn, at baseline  Speech: monotone, soft spoken, engaging in more conversation  Psychomotor Behavior:  no evidence of tardive dyskinesia, dystonia, or tics, lithium tremor improved  Thought Process:  [place], linear  Associations: none noted  Thought Content:  no evidence of suicidal ideation or homicidal ideation and no evidence of psychotic thought, denies hallucinations  Insight: Adequate  Judgment: Adequate  Oriented to:  time, person, and place  Attention Span and Concentration:  limited  Recent and Remote Memory:  limited  Fund of Knowledge: Low  Muscle Strength and Tone: normal  Gait and Station: normal        LABS   No results found for this or any previous visit (from the past 24 hour(s)).      ASSESSMENT   This is a 31 year old male with a PMH of schizoaffective disorder, polysubstance abuse, traumatic brain injury with coma, and a near fatal overdose with cardiac arrest who presented in a severe depressive episode with catatonia, occurring in the context of medication changes (lorazepam being discontinued) and the patient having severe malnutrition due to poor intake.     The patient has improved dramatically since starting Clozapine and resuming Ativan with his catatonia improving, his engagement with the world, speech, and ability to interact with peers. The patient has improved to the point that he would benefit from discharge to a facility as soon as available with his medication regimen to continue        DIAGNOSTIC FORMULATION   1. Schizoaffective disorder, depressive type, multiple " episodes, currently in acute episode, with catatonia   2. 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 Multiple other TBIs  3. 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 Methamphetamine use disorder, moderate, in early remission  5. Alcohol use disorder, moderate     PLAN     Location: Unit 5  Legal Status: Orders Placed This Encounter      Legal status Voluntary    Safety Assessment:    Behavioral Orders   Procedures     Code 1 - Restrict to Unit     Routine Programming     As clinically indicated     Status 15     Every 15 minutes.     PTA medications stopped    -Wellbutrin originally due to starting Clozapine and risk of seizures, resumed with patient aware of risks on 8/12  -Abilify due to not being effective    PTA medications continued/changed:     -Lithium 900 mg at bedtime reduced to 450 mg at bedtime to address tremor with improvement  -Namenda 10 mg BID  -Lyrica 100 mg TID  -Clonazepam restarted at 1 mg TID- switched to Lorazepam 1 mg TID on 7/23. Added 0.5 mg before supper on 8/15  -Wellbutrin  mg daily     New medications tried and stopped:      -None     New medications initiated:      -Clozapine 12.5 mg at bedtime on 7/19.-> Increase to 25 mg at bedtime on 7/20 -> 50 mg on 7/23 -> 75 mg on 7/24 ->100 mg on 7/26 -> 125 mg on 7/28 -> 150 mg on 7/30 -> 175 mg on 7/31 --> 200 mg on 08/01 -> 175 mg on 8/2 -> 150 mg on 8/3 -> 125 mg on 8/19  -Vitamin D2 50,000 units weekly x 8 weeks     Today's Changes:    -No changes today      Programming: Patient will be treated in a therapeutic milieu with appropriate individual and group therapies. Education will be provided on diagnoses, medications, and treatments. Encouraged behavioral activation and participation in group programming.     Medical diagnoses:      #. URI, resolved  - Suspect URI. Myocarditis ruled out  - CRP mildly elevated. Mild leukocytosis. Normal troponin. Rest of labs  reassuring. Negative COVID. EKG shows sinus tach, no new findings. Echo is normal.  - Analgesics for fever. Working  - Zofran prn  - Reduced Clozapine    #. Severe malnutrition, resolved  - Nutrition consulted  - 27 pound weight gain since admission    #. Sialorrhea  #. Enuresis  - Secondary to Clozapine  - Reduced dose with improvement. Reducing again to see if helps    Consults: none today  Tests: None    Disposition: [facility] assessment on 08/18. AL vs group home vs B&L       ATTESTATION    Christelle Contreras, CNP

## 2022-08-22 LAB
BASOPHILS # BLD AUTO: 0.1 10E3/UL (ref 0–0.2)
BASOPHILS NFR BLD AUTO: 1 %
EOSINOPHIL # BLD AUTO: 0.7 10E3/UL (ref 0–0.7)
EOSINOPHIL NFR BLD AUTO: 9 %
ERYTHROCYTE [DISTWIDTH] IN BLOOD BY AUTOMATED COUNT: 12.7 % (ref 10–15)
HCT VFR BLD AUTO: 44.8 % (ref 40–53)
HGB BLD-MCNC: 14.7 G/DL (ref 13.3–17.7)
IMM GRANULOCYTES # BLD: 0 10E3/UL
IMM GRANULOCYTES NFR BLD: 1 %
LYMPHOCYTES # BLD AUTO: 2.2 10E3/UL (ref 0.8–5.3)
LYMPHOCYTES NFR BLD AUTO: 30 %
MCH RBC QN AUTO: 29.1 PG (ref 26.5–33)
MCHC RBC AUTO-ENTMCNC: 32.8 G/DL (ref 31.5–36.5)
MCV RBC AUTO: 89 FL (ref 78–100)
MONOCYTES # BLD AUTO: 0.7 10E3/UL (ref 0–1.3)
MONOCYTES NFR BLD AUTO: 10 %
NEUTROPHILS # BLD AUTO: 3.7 10E3/UL (ref 1.6–8.3)
NEUTROPHILS NFR BLD AUTO: 49 %
NRBC # BLD AUTO: 0 10E3/UL
NRBC BLD AUTO-RTO: 0 /100
PLATELET # BLD AUTO: 266 10E3/UL (ref 150–450)
RBC # BLD AUTO: 5.05 10E6/UL (ref 4.4–5.9)
WBC # BLD AUTO: 7.4 10E3/UL (ref 4–11)

## 2022-08-22 PROCEDURE — 250N000013 HC RX MED GY IP 250 OP 250 PS 637: Performed by: PSYCHIATRY & NEUROLOGY

## 2022-08-22 PROCEDURE — 250N000013 HC RX MED GY IP 250 OP 250 PS 637: Performed by: NURSE PRACTITIONER

## 2022-08-22 PROCEDURE — 36415 COLL VENOUS BLD VENIPUNCTURE: CPT | Performed by: STUDENT IN AN ORGANIZED HEALTH CARE EDUCATION/TRAINING PROGRAM

## 2022-08-22 PROCEDURE — 99231 SBSQ HOSP IP/OBS SF/LOW 25: CPT | Performed by: NURSE PRACTITIONER

## 2022-08-22 PROCEDURE — 250N000013 HC RX MED GY IP 250 OP 250 PS 637: Performed by: STUDENT IN AN ORGANIZED HEALTH CARE EDUCATION/TRAINING PROGRAM

## 2022-08-22 PROCEDURE — 85025 COMPLETE CBC W/AUTO DIFF WBC: CPT | Performed by: STUDENT IN AN ORGANIZED HEALTH CARE EDUCATION/TRAINING PROGRAM

## 2022-08-22 PROCEDURE — 124N000001 HC R&B MH

## 2022-08-22 RX ADMIN — MEMANTINE 10 MG: 10 TABLET ORAL at 08:41

## 2022-08-22 RX ADMIN — LORAZEPAM 1 MG: 1 TABLET ORAL at 20:17

## 2022-08-22 RX ADMIN — PREGABALIN 100 MG: 25 CAPSULE ORAL at 20:17

## 2022-08-22 RX ADMIN — MULTIPLE VITAMINS W/ MINERALS TAB 1 TABLET: TAB at 08:40

## 2022-08-22 RX ADMIN — LORAZEPAM 1 MG: 1 TABLET ORAL at 08:40

## 2022-08-22 RX ADMIN — SULINDAC 150 MG: 150 TABLET ORAL at 20:17

## 2022-08-22 RX ADMIN — BUPROPION HYDROCHLORIDE 450 MG: 300 TABLET, EXTENDED RELEASE ORAL at 08:40

## 2022-08-22 RX ADMIN — LORAZEPAM 1 MG: 1 TABLET ORAL at 14:17

## 2022-08-22 RX ADMIN — CLOZAPINE 125 MG: 100 TABLET ORAL at 20:16

## 2022-08-22 RX ADMIN — PREGABALIN 100 MG: 25 CAPSULE ORAL at 08:40

## 2022-08-22 RX ADMIN — LITHIUM CARBONATE 450 MG: 450 TABLET, EXTENDED RELEASE ORAL at 20:17

## 2022-08-22 RX ADMIN — Medication 2 PATCH: at 20:17

## 2022-08-22 RX ADMIN — LORAZEPAM 0.5 MG: 0.5 TABLET ORAL at 16:50

## 2022-08-22 RX ADMIN — PREGABALIN 100 MG: 25 CAPSULE ORAL at 14:17

## 2022-08-22 RX ADMIN — MEMANTINE 10 MG: 10 TABLET ORAL at 20:18

## 2022-08-22 ASSESSMENT — ACTIVITIES OF DAILY LIVING (ADL)
ADLS_ACUITY_SCORE: 42
ORAL_HYGIENE: INDEPENDENT
DRESS: INDEPENDENT;SCRUBS (BEHAVIORAL HEALTH)
ADLS_ACUITY_SCORE: 42
HYGIENE/GROOMING: INDEPENDENT
ADLS_ACUITY_SCORE: 42
LAUNDRY: UNABLE TO COMPLETE
ADLS_ACUITY_SCORE: 42
HYGIENE/GROOMING: INDEPENDENT
ADLS_ACUITY_SCORE: 42

## 2022-08-22 NOTE — PLAN OF CARE
"  Problem: Behavioral Health Plan of Care  Goal: Patient-Specific Goal (Individualization)  Description: Patient will report at least 6-8 hours of sleep each night.   Patient will complete all ADL's independently while on the unit.   Patient will be compliant with treatment team recommendations.     Pt will eat at least 50% of meals and have enough fluid intake.  Outcome: Ongoing, Progressing  Note: Patient is cooperative with medications and assessment.  He is in the lounge much of the shift.  Walks the hallways socializing with peers.  His is polite during interactions.  He denies SI, HI, hallucinations.  C/o lower back pain-utilizing scheduled lidocaine patches.  Patient talked to this writer for approximately 30 minutes about his goals for the future including employment, housing, and saving money.  Eye contact if appropriate.  Affect remains flat.  He states he is in a \"constant state of depression\" because he is in and out of facilities which prevents him from holding a job and seeing his children. He feels ready to discharge and states he feels \"stuck\".  He is worried he will be placed on civil commitment if he attempts to leave.      Problem: Suicide Risk  Goal: Absence of Self-Harm  Intervention: Promote Psychosocial Wellbeing  Recent Flowsheet Documentation  Taken 8/21/2022 1900 by Alice Mckeon, RN  Family/Support System Care: self-care encouraged   Goal Outcome Evaluation: Patient denies SI and had no noted self harm this shift.     Plan of Care Reviewed With: patient                 "

## 2022-08-22 NOTE — PLAN OF CARE
Problem: Behavioral Health Plan of Care  Goal: Patient-Specific Goal (Individualization)  Description: Patient will report at least 6-8 hours of sleep each night.   Patient will complete all ADL's independently while on the unit.   Patient will be compliant with treatment team recommendations.     Pt will eat at least 50% of meals and have enough fluid intake.  Outcome: Ongoing, Progressing     Problem: Suicide Risk  Goal: Absence of Self-Harm  Outcome: Ongoing, Progressing   Goal Outcome Evaluation:    Face to face shift report received from RN. Rounding completed, pt observed.Client rested in room for 7 hours with eyes closed and respirations noted. No falls this shift. Face to face report will be communicated to oncoming RN.    Didier Brooks RN  8/22/2022  6:23 AM

## 2022-08-22 NOTE — PHARMACY
Pharmacy Clozapine Continuation Note    Patient's Name: Steven Carter  Patient's : 1990    Current cloZAPine regimen: Clozapine 125 mg at bedtime  Has there been a known interruption in therapy for greater than/equal to 48 hours which would warrant retitration No    Recent ANC Value(s) for last 30 days:  2022: Absolute Neutrophils 4.3 10e3/uL (Ref range: 1.6 - 8.3 10e3/uL)  2022: Absolute Neutrophils 8.0 10e3/uL (Ref range: 1.6 - 8.3 10e3/uL); Absolute Neutrophils 9.7 10e3/uL (H; Ref range: 1.6 - 8.3 10e3/uL); Absolute Neutrophils 9.8 10e3/uL (H; Ref range: 1.6 - 8.3 10e3/uL)  2022: Absolute Neutrophils 10.5 10e3/uL (H; Ref range: 1.6 - 8.3 10e3/uL)  2022: Absolute Neutrophils 6.7 10e3/uL (Ref range: 1.6 - 8.3 10e3/uL)  2022: Absolute Neutrophils 7.5 10e3/uL (Ref range: 1.6 - 8.3 10e3/uL)  8/15/2022: Absolute Neutrophils 6.7 10e3/uL (Ref range: 1.6 - 8.3 10e3/uL)  2022: Absolute Neutrophils 3.7 10e3/uL (Ref range: 1.6 - 8.3 10e3/uL)      A REMS Dispense Authorization was obtained from the clozapine REMS prgram? Yes   A Dispense Rationale was needed to obtain the REMS Dispense Authorization? No   Is the ANC (if available) within recommended limits? Yes  Does the patient have any signs or symptoms of infection, including fever? No    REMS Patient ID: [id number]  Patient RDA: [id number]    Plan:  1. Continue clozapine therapy at 125 mg PO at bedtime.  2. A WBC with differential will be ordered at least weekly, NEXT DUE 2022. ANC values will be entered into the REMS program.    Angela Washington [facility]

## 2022-08-22 NOTE — PROGRESS NOTES
"  [facility] PSYCHIATRY  -  PROGRESS NOTE     ID   Name: Steven Carter  MRN#: [id number]     SUBJECTIVE     Steven is up walking the halls when I see him today. He states that he heard someone from [facility] was going to come in person to do a screening, which he is feeling more hopeful about. He denies having any questions or concerns, does not report any issues with medications. [facility]       MEDICATIONS   Scheduled Meds:    buPROPion  450 mg Oral Daily     cloZAPine  125 mg Oral At Bedtime     lidocaine  2 patch Transdermal Q24h    And     lidocaine   Transdermal Q8H LEANDER     lithium  450 mg Oral At Bedtime     LORazepam  0.5 mg Oral Daily before supper     LORazepam  1 mg Oral TID     memantine  10 mg Oral BID     multivitamin w/minerals  1 tablet Oral Daily     pregabalin  100 mg Oral TID     vitamin D2  50,000 Units Oral Q7 Days     PRN Meds:.acetaminophen, alum & mag hydroxide-simethicone, docusate sodium, hydrOXYzine, ondansetron, sulindac     ALLERGIES   Allergies   Allergen Reactions     Pork Allergy         VITALS   Vitals: /78   Pulse 78   Temp 98.7  F (37.1  C) (Temporal)   Resp 18   Wt 84.4 kg (186 lb 1.6 oz)   SpO2 97%   BMI 25.24 kg/m       MENTAL STATUS EXAM     Appearance: Hair and beard are over grown-pt is clean/showered. Awake, alert, dressed in hospital scrubs  Attitude:  cooperative   Eye Contact: Improving  Mood:  \"fine\"  Affect:  intensity remains blunted, less withdrawn, at baseline  Speech: monotone, soft spoken, engaging in more conversation  Psychomotor Behavior:  no evidence of tardive dyskinesia, dystonia, or tics, lithium tremor improved  Thought Process:  [place], linear  Associations: none noted  Thought Content:  no evidence of suicidal ideation or homicidal ideation and no evidence of psychotic thought, denies hallucinations  Insight: Adequate  Judgment: Adequate  Oriented to:  time, person, and " place  Attention Span and Concentration:  limited  Recent and Remote Memory:  limited  Fund of Knowledge: Low  Muscle Strength and Tone: normal  Gait and Station: normal        LABS   Recent Results (from the past 24 hour(s))   CBC with platelets and differential    Collection Time: 08/22/22 12:15 PM   Result Value Ref Range    WBC Count 7.4 4.0 - 11.0 10e3/uL    RBC Count 5.05 4.40 - 5.90 10e6/uL    Hemoglobin 14.7 13.3 - 17.7 g/dL    Hematocrit 44.8 40.0 - 53.0 %    MCV 89 78 - 100 fL    MCH 29.1 26.5 - 33.0 pg    MCHC 32.8 31.5 - 36.5 g/dL    RDW 12.7 10.0 - 15.0 %    Platelet Count 266 150 - 450 10e3/uL    % Neutrophils 49 %    % Lymphocytes 30 %    % Monocytes 10 %    % Eosinophils 9 %    % Basophils 1 %    % Immature Granulocytes 1 %    NRBCs per 100 WBC 0 <1 /100    Absolute Neutrophils 3.7 1.6 - 8.3 10e3/uL    Absolute Lymphocytes 2.2 0.8 - 5.3 10e3/uL    Absolute Monocytes 0.7 0.0 - 1.3 10e3/uL    Absolute Eosinophils 0.7 0.0 - 0.7 10e3/uL    Absolute Basophils 0.1 0.0 - 0.2 10e3/uL    Absolute Immature Granulocytes 0.0 <=0.4 10e3/uL    Absolute NRBCs 0.0 10e3/uL         ASSESSMENT   This is a 31 year old male with a PMH of schizoaffective disorder, polysubstance abuse, traumatic brain injury with coma, and a near fatal overdose with cardiac arrest who presented in a severe depressive episode with catatonia, occurring in the context of medication changes (lorazepam being discontinued) and the patient having severe malnutrition due to poor intake.     The patient has improved dramatically since starting Clozapine and resuming Ativan with his catatonia improving, his engagement with the world, speech, and ability to interact with peers. The patient has improved to the point that he would benefit from discharge to a facility as soon as available with his medication regimen to continue        DIAGNOSTIC FORMULATION   1. Schizoaffective disorder, depressive type, multiple episodes, currently in acute episode,  with catatonia   2. 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 Multiple other TBIs  3. 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 Methamphetamine use disorder, moderate, in early remission  5. Alcohol use disorder, moderate     PLAN     Location: Unit 5  Legal Status: Orders Placed This Encounter      Legal status Voluntary    Safety Assessment:    Behavioral Orders   Procedures     Code 1 - Restrict to Unit     Routine Programming     As clinically indicated     Status 15     Every 15 minutes.     PTA medications stopped    -Wellbutrin originally due to starting Clozapine and risk of seizures, resumed with patient aware of risks on 8/12  -Abilify due to not being effective    PTA medications continued/changed:     -Lithium 900 mg at bedtime reduced to 450 mg at bedtime to address tremor with improvement  -Namenda 10 mg BID  -Lyrica 100 mg TID  -Clonazepam restarted at 1 mg TID- switched to Lorazepam 1 mg TID on 7/23. Added 0.5 mg before supper on 8/15  -Wellbutrin  mg daily     New medications tried and stopped:      -None     New medications initiated:      -Clozapine 12.5 mg at bedtime on 7/19.-> Increase to 25 mg at bedtime on 7/20 -> 50 mg on 7/23 -> 75 mg on 7/24 ->100 mg on 7/26 -> 125 mg on 7/28 -> 150 mg on 7/30 -> 175 mg on 7/31 --> 200 mg on 08/01 -> 175 mg on 8/2 -> 150 mg on 8/3 -> 125 mg on 8/19  -Vitamin D2 50,000 units weekly x 8 weeks     Today's Changes:    -No changes today  -Has screening tomorrow with [place]      Programming: Patient will be treated in a therapeutic milieu with appropriate individual and group therapies. Education will be provided on diagnoses, medications, and treatments. Encouraged behavioral activation and participation in group programming.     Medical diagnoses:      #. URI, resolved  - Suspect URI. Myocarditis ruled out  - CRP mildly elevated. Mild leukocytosis. Normal troponin. Rest of labs  reassuring. Negative COVID. EKG shows sinus tach, no new findings. Echo is normal.  - Analgesics for fever. Working  - Zofran prn  - Reduced Clozapine    #. Severe malnutrition, resolved  - Nutrition consulted  - 27 pound weight gain since admission    #. Sialorrhea  #. Enuresis  - Secondary to Clozapine  - Reduced dose with improvement. Reducing again to see if helps    Consults: none today  Tests: None. Weekly CBC reviewed and unremarkable    Disposition: [facility] assessment on 08/18. AL vs group home vs B&L       ATTESTATION    Christelle Contreras, CNP

## 2022-08-22 NOTE — PLAN OF CARE
Problem: Behavioral Health Plan of Care  Goal: Patient-Specific Goal (Individualization)  Description: Patient will report at least 6-8 hours of sleep each night.   Patient will complete all ADL's independently while on the unit.   Patient will be compliant with treatment team recommendations.     Pt will eat at least 50% of meals and have enough fluid intake.  Outcome: Ongoing, Progressing    Pt up in the lounge at the start of the shift. Pt pleasant and cooperative with nursing assessment and medications. Pt reports pain in lower back at 3/10, states that the patches are working to help him sleep. Pt states that during group he used a roller on his back that he felt was helpful along with ice.     Pt reports anxiety at 3/10 but states it is manageable. Pt denied SI, HI and hallucinations. Pt stated that he is feeling more hopeful since [facility] is coming here to interview him. Pt attended groups this shift.          Problem: Thought Process Alteration  Goal: Optimal Thought Clarity  Description: Patient will hold reality based conversations while on the unit.     Outcome: Ongoing, Progressing     Problem: Suicide Risk  Goal: Absence of Self-Harm  Outcome: Ongoing, Progressing     Problem: Fall Injury Risk  Goal: Absence of Fall and Fall-Related Injury  Outcome: Ongoing, Progressing   Goal Outcome Evaluation:           Face to face end of shift report communicated to evening shift RN. Reported that pt is a risk for suicide and falls.     Sarhai Hui RN  8/22/2022  7:58 AM

## 2022-08-22 NOTE — PLAN OF CARE
"Spoke with pt this afternoon and let him know [facility] would be setting up a date and time to come up and interview him. Pt seemed excited about this and asked questions about the place. Let pt know to write his questions down that he could ask in the interview as this writer does not know much about the new location.     Pt came up to this writer after lunch in a panic as another pt told him that \"the assisted living would be a place for old people who have no freedom\". Pt was veery panicked when speaking with this writer. Assured pt this was not true for this facility.     Pt has an interview with [facility] tomorrow 8/23 @ 10:00 AM. [facility] will be coming up to see pt.   "

## 2022-08-23 PROCEDURE — 250N000013 HC RX MED GY IP 250 OP 250 PS 637: Performed by: NURSE PRACTITIONER

## 2022-08-23 PROCEDURE — 124N000001 HC R&B MH

## 2022-08-23 PROCEDURE — 99232 SBSQ HOSP IP/OBS MODERATE 35: CPT | Performed by: NURSE PRACTITIONER

## 2022-08-23 PROCEDURE — 250N000013 HC RX MED GY IP 250 OP 250 PS 637: Performed by: PSYCHIATRY & NEUROLOGY

## 2022-08-23 RX ORDER — CLOZAPINE 100 MG/1
100 TABLET ORAL AT BEDTIME
Status: DISCONTINUED | OUTPATIENT
Start: 2022-08-23 | End: 2022-09-02 | Stop reason: HOSPADM

## 2022-08-23 RX ADMIN — MEMANTINE 10 MG: 10 TABLET ORAL at 08:28

## 2022-08-23 RX ADMIN — BUPROPION HYDROCHLORIDE 450 MG: 300 TABLET, EXTENDED RELEASE ORAL at 08:28

## 2022-08-23 RX ADMIN — LORAZEPAM 1 MG: 1 TABLET ORAL at 08:28

## 2022-08-23 RX ADMIN — PREGABALIN 100 MG: 25 CAPSULE ORAL at 14:46

## 2022-08-23 RX ADMIN — LORAZEPAM 1 MG: 1 TABLET ORAL at 14:46

## 2022-08-23 RX ADMIN — HYDROXYZINE HYDROCHLORIDE 50 MG: 25 TABLET, FILM COATED ORAL at 20:23

## 2022-08-23 RX ADMIN — CLOZAPINE 100 MG: 100 TABLET ORAL at 20:25

## 2022-08-23 RX ADMIN — PREGABALIN 100 MG: 25 CAPSULE ORAL at 20:23

## 2022-08-23 RX ADMIN — LORAZEPAM 0.5 MG: 0.5 TABLET ORAL at 17:07

## 2022-08-23 RX ADMIN — SULINDAC 150 MG: 150 TABLET ORAL at 20:23

## 2022-08-23 RX ADMIN — MEMANTINE 10 MG: 10 TABLET ORAL at 20:24

## 2022-08-23 RX ADMIN — LITHIUM CARBONATE 450 MG: 450 TABLET, EXTENDED RELEASE ORAL at 20:24

## 2022-08-23 RX ADMIN — MULTIPLE VITAMINS W/ MINERALS TAB 1 TABLET: TAB at 08:28

## 2022-08-23 RX ADMIN — PREGABALIN 100 MG: 25 CAPSULE ORAL at 08:28

## 2022-08-23 RX ADMIN — LORAZEPAM 1 MG: 1 TABLET ORAL at 20:23

## 2022-08-23 RX ADMIN — Medication 2 PATCH: at 20:24

## 2022-08-23 ASSESSMENT — ACTIVITIES OF DAILY LIVING (ADL)
ADLS_ACUITY_SCORE: 42
HYGIENE/GROOMING: INDEPENDENT
ADLS_ACUITY_SCORE: 42

## 2022-08-23 NOTE — PLAN OF CARE
Problem: Behavioral Health Plan of Care  Goal: Patient-Specific Goal (Individualization)  Description: Patient will report at least 6-8 hours of sleep each night.   Patient will complete all ADL's independently while on the unit.   Patient will be compliant with treatment team recommendations.     Pt will eat at least 50% of meals and have enough fluid intake.  Outcome: Ongoing, Progressing     Problem: Thought Process Alteration  Goal: Optimal Thought Clarity  Description: Patient will hold reality based conversations while on the unit.     Outcome: Ongoing, Progressing     Problem: Suicidal Behavior  Goal: Suicidal Behavior is Absent or Managed  Outcome: Ongoing, Progressing     Problem: Fall Injury Risk  Goal: Absence of Fall and Fall-Related Injury  Intervention: Identify and Manage Contributors  Recent Flowsheet Documentation  Taken 8/23/2022 1218 by Denise Brown RN  Medication Review/Management: medications reviewed   Goal Outcome Evaluation:        Pt presently in group interacting with peers. Cooperative and calm. Conversation reality based. Denies suicidal thoughts. Remains flat and blunted. Answers questions appropriately. Denies pain. No requests or complaints.  2023- pt requested and received Atarax 50 mg po for sleep and clinoril 150 mg po for pain to low back.   Face to face end of shift report communicated to TRAE Brown RN  8/23/2022  4:29 PM

## 2022-08-23 NOTE — PLAN OF CARE
Problem: Behavioral Health Plan of Care  Goal: Patient-Specific Goal (Individualization)  Description: Patient will report at least 6-8 hours of sleep each night.   Patient will complete all ADL's independently while on the unit.   Patient will be compliant with treatment team recommendations.     Pt will eat at least 50% of meals and have enough fluid intake.  Outcome: Ongoing, Progressing     Problem: Fall Injury Risk  Goal: Absence of Fall and Fall-Related Injury  Outcome: Ongoing, Progressing   Goal Outcome Evaluation:        Face to face shift report received from RN. Rounding completed, pt observed.Client rested in room for 7 hours with eyes closed and respirations noted. Client had no falls this shift. Face to face report will be communicated to oncoming RN.    Didier Brooks RN  8/23/2022  6:51 AM

## 2022-08-23 NOTE — PROGRESS NOTES
"  [facility] PSYCHIATRY  -  PROGRESS NOTE     ID   Name: Steven Carter  MRN#: [id number]     SUBJECTIVE     Steven is sitting up in the lounge when I see him today. He states that his interview with [facility] went \"okay, but it's just not for me. [place]\". He states that he decided to decline going there. He notes that his  Rajwinder will be coming for a meeting on Thursday \"I'm hoping she's got some good news for me\". He does request to decrease his Clozaril one more time. He states that he knows there is not a ledbetter, so would like to decrease the dose. Will decrease by 25 mg tonight. He has been up in the lounge and does attend groups. He denies having any questions, denies any SI or hallucinations. Mood has been calm.        MEDICATIONS   Scheduled Meds:    buPROPion  450 mg Oral Daily     cloZAPine  125 mg Oral At Bedtime     lidocaine  2 patch Transdermal Q24h    And     lidocaine   Transdermal Q8H LEANDER     lithium  450 mg Oral At Bedtime     LORazepam  0.5 mg Oral Daily before supper     LORazepam  1 mg Oral TID     memantine  10 mg Oral BID     multivitamin w/minerals  1 tablet Oral Daily     pregabalin  100 mg Oral TID     vitamin D2  50,000 Units Oral Q7 Days     PRN Meds:.acetaminophen, alum & mag hydroxide-simethicone, docusate sodium, hydrOXYzine, ondansetron, sulindac     ALLERGIES   Allergies   Allergen Reactions     Pork Allergy         VITALS   Vitals: /76   Pulse 82   Temp (!) 96.7  F (35.9  C) (Tympanic)   Resp 18   Wt 84.4 kg (186 lb 1.6 oz)   SpO2 98%   BMI 25.24 kg/m       MENTAL STATUS EXAM     Appearance: Hair and beard are over grown-pt is clean/showered. Awake, alert, dressed in hospital scrubs  Attitude:  cooperative   Eye Contact: Improving  Mood:  \"fine\", calm  Affect:  intensity remains blunted, less withdrawn, at baseline  Speech: monotone, soft spoken, engaging in more conversation  Psychomotor Behavior:  no evidence of tardive " dyskinesia, dystonia, or tics, lithium tremor improved  Thought Process:  [place], linear  Associations: none noted  Thought Content:  no evidence of suicidal ideation or homicidal ideation and no evidence of psychotic thought, denies hallucinations  Insight: Adequate  Judgment: Adequate  Oriented to:  time, person, and place  Attention Span and Concentration:  limited  Recent and Remote Memory:  limited  Fund of Knowledge: Low  Muscle Strength and Tone: normal  Gait and Station: normal        LABS   No results found for this or any previous visit (from the past 24 hour(s)).      ASSESSMENT   This is a 31 year old male with a PMH of schizoaffective disorder, polysubstance abuse, traumatic brain injury with coma, and a near fatal overdose with cardiac arrest who presented in a severe depressive episode with catatonia, occurring in the context of medication changes (lorazepam being discontinued) and the patient having severe malnutrition due to poor intake.     The patient has improved dramatically since starting Clozapine and resuming Ativan with his catatonia improving, his engagement with the world, speech, and ability to interact with peers. The patient has improved to the point that he would benefit from discharge to a facility as soon as available with his medication regimen to continue        DIAGNOSTIC FORMULATION   1. Schizoaffective disorder, depressive type, multiple episodes, currently in acute episode, with catatonia   2. 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 Multiple other TBIs  3. 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 Methamphetamine use disorder, moderate, in early remission  5. Alcohol use disorder, moderate     PLAN     Location: Unit 5  Legal Status: Orders Placed This Encounter      Legal status Voluntary    Safety Assessment:    Behavioral Orders   Procedures     Code 1 - Restrict to Unit     Routine Programming      As clinically indicated     Status 15     Every 15 minutes.     PTA medications stopped    -Wellbutrin originally due to starting Clozapine and risk of seizures, resumed with patient aware of risks on 8/12  -Abilify due to not being effective    PTA medications continued/changed:     -Lithium 900 mg at bedtime reduced to 450 mg at bedtime to address tremor with improvement  -Namenda 10 mg BID  -Lyrica 100 mg TID  -Clonazepam restarted at 1 mg TID- switched to Lorazepam 1 mg TID on 7/23. Added 0.5 mg before supper on 8/15  -Wellbutrin  mg daily     New medications tried and stopped:      -None     New medications initiated:      -Clozapine 12.5 mg at bedtime on 7/19.-> Increase to 25 mg at bedtime on 7/20 -> 50 mg on 7/23 -> 75 mg on 7/24 ->100 mg on 7/26 -> 125 mg on 7/28 -> 150 mg on 7/30 -> 175 mg on 7/31 --> 200 mg on 08/01 -> 175 mg on 8/2 -> 150 mg on 8/3 -> 125 mg on 8/19 -> 100 mg on 8/23  -Vitamin D2 50,000 units weekly x 8 weeks     Today's Changes:    -Decrease Clozapine to 100 mg at bedtime  -Had screening with Lopez today  -CM to come meet with him on Thursday       Programming: Patient will be treated in a therapeutic milieu with appropriate individual and group therapies. Education will be provided on diagnoses, medications, and treatments. Encouraged behavioral activation and participation in group programming.     Medical diagnoses:      #. URI, resolved  - Suspect URI. Myocarditis ruled out  - CRP mildly elevated. Mild leukocytosis. Normal troponin. Rest of labs reassuring. Negative COVID. EKG shows sinus tach, no new findings. Echo is normal.  - Analgesics for fever. Working  - Zofran prn  - Reduced Clozapine    #. Severe malnutrition, resolved  - Nutrition consulted  - 27 pound weight gain since admission    #. Sialorrhea  #. Enuresis  - Secondary to Clozapine  - Reduced dose with improvement. Reducing again to see if helps    Consults: none today  Tests: None. Weekly CBC reviewed and  unremarkable    Disposition: [facility] [place] on 08/18. AL vs group home vs B&L       ATTESTATION    Christelle Contreras, CNP

## 2022-08-23 NOTE — PLAN OF CARE
"Face to face end of shift report communicated to oncoming shift.     Sobeida Benton RN  8/23/2022  3:20 PM       Problem: Behavioral Health Plan of Care  Goal: Patient-Specific Goal (Individualization)  Description: Patient will report at least 6-8 hours of sleep each night.   Patient will complete all ADL's independently while on the unit.   Patient will be compliant with treatment team recommendations.     Pt will eat at least 50% of meals and have enough fluid intake.  Outcome: Ongoing, Not Progressing  Note: Patient up on unit, patient is walking back and forth.  Pleasant and cooperative with nursing assessment and cares.  Patient endorses being ready to leave, yet being particular of where he would like to go.  \"I just can't be 10 miles out of town how will I have access to anything?\"  \"I just can't do that I'm not a young teenager I need to have some independence and live my life, I've been around this stuff long enough to know what I need to do and will do, I'm not doing crack\"    Patient denies SI, HI, AH and VH.    Patient attends groups.  Talks about medications and weight gain, requests to have weight checked.    Patient waving arms rapping in hallway, affect is pleasant.   Patient takes all medications as prescribed.        Goal Outcome Evaluation:    Plan of Care Reviewed With: patient                 "

## 2022-08-23 NOTE — PLAN OF CARE
Pt had their interview with [facility] this morning, after questions were answered pt declined admission to their facility.     Spoke with pt after interview to gain insight on why he declined. Pt shared that she made him feel that he would be stuck there and not able to leave. Also, this facility is 10 miles from [facility] and he would not have transportation to go to town. Explained to pt that he would not be stuck there and that he could work on independent living from there if he wished. Pt still states he would like something else but states he is still calling he homeless shelters everyday to see if there are openings.     Pt has a meeting with his  (Rajwinder Andrews) Thursday @ 1:00 PM.

## 2022-08-24 LAB
BASOPHILS # BLD AUTO: 0.1 10E3/UL (ref 0–0.2)
BASOPHILS NFR BLD AUTO: 1 %
EOSINOPHIL # BLD AUTO: 0.6 10E3/UL (ref 0–0.7)
EOSINOPHIL NFR BLD AUTO: 8 %
IMM GRANULOCYTES # BLD: 0 10E3/UL
IMM GRANULOCYTES NFR BLD: 0 %
LYMPHOCYTES # BLD AUTO: 2.3 10E3/UL (ref 0.8–5.3)
LYMPHOCYTES NFR BLD AUTO: 30 %
MONOCYTES # BLD AUTO: 0.7 10E3/UL (ref 0–1.3)
MONOCYTES NFR BLD AUTO: 9 %
NEUTROPHILS # BLD AUTO: 3.8 10E3/UL (ref 1.6–8.3)
NEUTROPHILS NFR BLD AUTO: 52 %
NRBC # BLD AUTO: 0 10E3/UL
NRBC BLD AUTO-RTO: 0 /100
WBC # BLD AUTO: 7.4 10E3/UL (ref 4–11)

## 2022-08-24 PROCEDURE — 250N000013 HC RX MED GY IP 250 OP 250 PS 637: Performed by: NURSE PRACTITIONER

## 2022-08-24 PROCEDURE — 124N000001 HC R&B MH

## 2022-08-24 PROCEDURE — 36415 COLL VENOUS BLD VENIPUNCTURE: CPT | Performed by: NURSE PRACTITIONER

## 2022-08-24 PROCEDURE — 99233 SBSQ HOSP IP/OBS HIGH 50: CPT | Performed by: NURSE PRACTITIONER

## 2022-08-24 PROCEDURE — 85048 AUTOMATED LEUKOCYTE COUNT: CPT | Performed by: NURSE PRACTITIONER

## 2022-08-24 PROCEDURE — 250N000013 HC RX MED GY IP 250 OP 250 PS 637: Performed by: PSYCHIATRY & NEUROLOGY

## 2022-08-24 RX ADMIN — PREGABALIN 100 MG: 25 CAPSULE ORAL at 20:16

## 2022-08-24 RX ADMIN — BUPROPION HYDROCHLORIDE 450 MG: 300 TABLET, EXTENDED RELEASE ORAL at 08:12

## 2022-08-24 RX ADMIN — LORAZEPAM 1 MG: 1 TABLET ORAL at 20:16

## 2022-08-24 RX ADMIN — LITHIUM CARBONATE 450 MG: 450 TABLET, EXTENDED RELEASE ORAL at 20:16

## 2022-08-24 RX ADMIN — MEMANTINE 10 MG: 10 TABLET ORAL at 20:16

## 2022-08-24 RX ADMIN — PREGABALIN 100 MG: 25 CAPSULE ORAL at 08:12

## 2022-08-24 RX ADMIN — HYDROXYZINE HYDROCHLORIDE 50 MG: 25 TABLET, FILM COATED ORAL at 16:32

## 2022-08-24 RX ADMIN — CLOZAPINE 100 MG: 100 TABLET ORAL at 20:16

## 2022-08-24 RX ADMIN — SULINDAC 150 MG: 150 TABLET ORAL at 16:32

## 2022-08-24 RX ADMIN — MEMANTINE 10 MG: 10 TABLET ORAL at 08:15

## 2022-08-24 RX ADMIN — MULTIPLE VITAMINS W/ MINERALS TAB 1 TABLET: TAB at 08:12

## 2022-08-24 RX ADMIN — PREGABALIN 100 MG: 25 CAPSULE ORAL at 14:15

## 2022-08-24 RX ADMIN — Medication 2 PATCH: at 20:17

## 2022-08-24 RX ADMIN — LORAZEPAM 0.5 MG: 0.5 TABLET ORAL at 16:30

## 2022-08-24 RX ADMIN — LORAZEPAM 1 MG: 1 TABLET ORAL at 14:16

## 2022-08-24 RX ADMIN — LORAZEPAM 1 MG: 1 TABLET ORAL at 08:12

## 2022-08-24 ASSESSMENT — ACTIVITIES OF DAILY LIVING (ADL)
ADLS_ACUITY_SCORE: 42
ADLS_ACUITY_SCORE: 42
ORAL_HYGIENE: INDEPENDENT
ADLS_ACUITY_SCORE: 42
DRESS: INDEPENDENT
ADLS_ACUITY_SCORE: 42
HYGIENE/GROOMING: INDEPENDENT
ADLS_ACUITY_SCORE: 42
ADLS_ACUITY_SCORE: 42
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
## 2022-08-24 NOTE — PLAN OF CARE
Face to face end of shift report communicated to oncoming shift.     Sobeida Benton RN  8/24/2022  3:12 PM    Problem: Behavioral Health Plan of Care  Goal: Patient-Specific Goal (Individualization)  Description: Patient will report at least 6-8 hours of sleep each night.   Patient will complete all ADL's independently while on the unit.   Patient will be compliant with treatment team recommendations.     Pt will eat at least 50% of meals and have enough fluid intake.  Outcome: Ongoing, Progressing  Note: Patient up on unit walking the halls.  Patient polite and cooperative with nursing cares and assessment.    Patient eats 100% of breakfast.  Patient does not attend groups this shift.    Patient's affect is flat this shift, more so than previous shift.     Patient makes phone calls, requesting to use ascom, informed patient that ascom is not for personal calls, only legal matters or .       Goal Outcome Evaluation:    Plan of Care Reviewed With: patient

## 2022-08-24 NOTE — PLAN OF CARE
Face to face shift report received from Sobeida CARDOZA RN. Rounding completed, pt observed.    Problem: Behavioral Health Plan of Care  Goal: Patient-Specific Goal (Individualization)  Description: Patient will report at least 6-8 hours of sleep each night.   Patient will complete all ADL's independently while on the unit.   Patient will be compliant with treatment team recommendations.     Pt will eat at least 50% of meals and have enough fluid intake.  Outcome: Ongoing, Progressing  Note: Shift summary:  Patient was up in [facility] at the start of this shift.  Attending groups.  Requested and given Clinoril 150 mg po and Hydroxyzine 50 mg po at 1632 for c/o back pain and increased anxiety.  Observed walking slowly in the hallway a lot.  Reserved this shift.  Gait is steady and balanced. Lidoderm patches applied to lower and mid back.  States his SW is coming tomorrow and says he is hopeful for a resolution for placement.     Problem: Thought Process Alteration  Goal: Optimal Thought Clarity  Description: Patient will hold reality based conversations while on the unit.     Outcome: Ongoing, Progressing  Face to face report will be communicated to oncoming RN.    Alma Delia Garsia RN  8/24/2022

## 2022-08-24 NOTE — PLAN OF CARE
Problem: Behavioral Health Plan of Care  Goal: Patient-Specific Goal (Individualization)  Description: Patient will report at least 6-8 hours of sleep each night.   Patient will complete all ADL's independently while on the unit.   Patient will be compliant with treatment team recommendations.     Pt will eat at least 50% of meals and have enough fluid intake.  Outcome: Ongoing, Progressing     Problem: Fall Injury Risk  Goal: Absence of Fall and Fall-Related Injury  Outcome: Ongoing, Progressing   Goal Outcome Evaluation:      Face to face shift report received from RN. Rounding completed, pt observed.Client rested in room for 7 hours with eyes closed and respirations noted. No signs of distress. Client had no falls this shift.Face to face report will be communicated to oncoming RN.    Didier Brooks RN  8/24/2022  6:28 AM

## 2022-08-25 PROCEDURE — 124N000001 HC R&B MH

## 2022-08-25 PROCEDURE — 250N000013 HC RX MED GY IP 250 OP 250 PS 637: Performed by: NURSE PRACTITIONER

## 2022-08-25 PROCEDURE — 99233 SBSQ HOSP IP/OBS HIGH 50: CPT | Performed by: NURSE PRACTITIONER

## 2022-08-25 PROCEDURE — 250N000013 HC RX MED GY IP 250 OP 250 PS 637: Performed by: PSYCHIATRY & NEUROLOGY

## 2022-08-25 RX ADMIN — ACETAMINOPHEN 325MG 650 MG: 325 TABLET ORAL at 20:28

## 2022-08-25 RX ADMIN — BUPROPION HYDROCHLORIDE 450 MG: 300 TABLET, EXTENDED RELEASE ORAL at 08:44

## 2022-08-25 RX ADMIN — LORAZEPAM 1 MG: 1 TABLET ORAL at 08:44

## 2022-08-25 RX ADMIN — Medication 2 PATCH: at 20:29

## 2022-08-25 RX ADMIN — HYDROXYZINE HYDROCHLORIDE 50 MG: 25 TABLET, FILM COATED ORAL at 18:30

## 2022-08-25 RX ADMIN — MULTIPLE VITAMINS W/ MINERALS TAB 1 TABLET: TAB at 08:43

## 2022-08-25 RX ADMIN — MEMANTINE 10 MG: 10 TABLET ORAL at 08:44

## 2022-08-25 RX ADMIN — SULINDAC 150 MG: 150 TABLET ORAL at 18:30

## 2022-08-25 RX ADMIN — LORAZEPAM 0.5 MG: 0.5 TABLET ORAL at 16:24

## 2022-08-25 RX ADMIN — LORAZEPAM 1 MG: 1 TABLET ORAL at 20:28

## 2022-08-25 RX ADMIN — PREGABALIN 100 MG: 25 CAPSULE ORAL at 14:23

## 2022-08-25 RX ADMIN — MEMANTINE 10 MG: 10 TABLET ORAL at 20:29

## 2022-08-25 RX ADMIN — PREGABALIN 100 MG: 25 CAPSULE ORAL at 08:44

## 2022-08-25 RX ADMIN — LITHIUM CARBONATE 450 MG: 450 TABLET, EXTENDED RELEASE ORAL at 20:28

## 2022-08-25 RX ADMIN — LORAZEPAM 1 MG: 1 TABLET ORAL at 14:23

## 2022-08-25 RX ADMIN — CLOZAPINE 100 MG: 100 TABLET ORAL at 20:29

## 2022-08-25 RX ADMIN — PREGABALIN 100 MG: 25 CAPSULE ORAL at 20:28

## 2022-08-25 ASSESSMENT — ACTIVITIES OF DAILY LIVING (ADL)
HYGIENE/GROOMING: INDEPENDENT
ORAL_HYGIENE: INDEPENDENT
ADLS_ACUITY_SCORE: 42
LAUNDRY: UNABLE TO COMPLETE
ADLS_ACUITY_SCORE: 42
ADLS_ACUITY_SCORE: 42
ORAL_HYGIENE: INDEPENDENT
DRESS: INDEPENDENT
ADLS_ACUITY_SCORE: 42
DRESS: SCRUBS (BEHAVIORAL HEALTH);INDEPENDENT
ADLS_ACUITY_SCORE: 42
HYGIENE/GROOMING: INDEPENDENT
ADLS_ACUITY_SCORE: 42
ADLS_ACUITY_SCORE: 42

## 2022-08-25 NOTE — PLAN OF CARE
Face to face shift report received from Shima ALEXANDRA RN. Rounding completed, pt observed.    Problem: Behavioral Health Plan of Care  Goal: Patient-Specific Goal (Individualization)  Description: Patient will report at least 6-8 hours of sleep each night.   Patient will complete all ADL's independently while on the unit.   Patient will be compliant with treatment team recommendations.     Pt will eat at least 50% of meals and have enough fluid intake.  Outcome: Ongoing, Progressing  Note: Shift Summary: Patient has been up on the unit this shift.  Social with male peer and cooperative with nurse.  Patient stated that he was disappointed that the [facility] person was not able to visit him today.  Patient does state that he has called [facility] and is hopeful they will take him.  Patient asked what an assisted living is like.  Nurse described that he would have his own room with a bathroom and that his meals would be provided and staff would administer his meds.  States concern about being able to come and go.  [facility]  [employer] [facility] is available as well.  Patient also wanting to fill out an application for [employer].  Mother here to visit and patient stated taht went well.     Problem: Thought Process Alteration  Goal: Optimal Thought Clarity  Description: Patient will hold reality based conversations while on the unit.     Outcome: Ongoing, Progressing     Problem: Fall Injury Risk  Goal: Absence of Fall and Fall-Related Injury  Outcome: Ongoing, Progressing  Face to face report will be communicated to oncoming RN.    Alma Delia Garsia RN  8/25/2022

## 2022-08-25 NOTE — PLAN OF CARE
Pt requests for this writer to send a referral to [facility] in [facility] as they have one opening. Clarify to pt about how he was denied from [facility] and intake staff covers referrals for all locations. Pt then states that he calls [place] and [facility] daily to look for openings. Asked pt about [place] or [place] or homeless shleters and he states he believes he is not allowed at those locations because he has had PD called on him when there. Pt states he wants to stay in the [place] area.
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
## 2022-08-25 NOTE — PLAN OF CARE
Problem: Behavioral Health Plan of Care  Goal: Patient-Specific Goal (Individualization)  Description: Patient will report at least 6-8 hours of sleep each night.   Patient will complete all ADL's independently while on the unit.   Patient will be compliant with treatment team recommendations.     Pt will eat at least 50% of meals and have enough fluid intake.  Outcome: Ongoing, Progressing     Problem: Fall Injury Risk  Goal: Absence of Fall and Fall-Related Injury  Outcome: Ongoing, Progressing   Goal Outcome Evaluation:      Face to face shift report received from RN. Rounding completed, pt observed.Client rested in room for 7 hours with eyes closed and respirations noted. No sign of distress.Client had no falls this shift.Face to face report will be communicated to oncoming RN.    Didier Brooks RN  8/25/2022  6:30 AM

## 2022-08-25 NOTE — PLAN OF CARE
Problem: Behavioral Health Plan of Care  Goal: Patient-Specific Goal (Individualization)  Description: Patient will report at least 6-8 hours of sleep each night.   Patient will complete all ADL's independently while on the unit.   Patient will be compliant with treatment team recommendations.     Pt will eat at least 50% of meals and have enough fluid intake.  Outcome: Ongoing, Progressing     Pt in his room at the start of the shift. Pt reports pain at 3/10, anxiety at 5/10 and depression at 5/10. Pt denies SI, HI and hallucinations. Pt states he believes his pain level is the same as it always is. Pt declined prn for pain. Pt has been making calls to different facilities for placement. Pt requested that paperwork be sent to [facility]. SW notified. Pt received a call from [facility], talked to a worker there for about 5 minutes.         Problem: Thought Process Alteration  Goal: Optimal Thought Clarity  Description: Patient will hold reality based conversations while on the unit.     Outcome: Ongoing, Progressing     Problem: Suicide Risk  Goal: Absence of Self-Harm  Outcome: Ongoing, Progressing     Problem: Suicidal Behavior  Goal: Suicidal Behavior is Absent or Managed  Outcome: Ongoing, Progressing     Problem: Fall Injury Risk  Goal: Absence of Fall and Fall-Related Injury  Outcome: Ongoing, Progressing   Goal Outcome Evaluation:

## 2022-08-26 PROCEDURE — 250N000013 HC RX MED GY IP 250 OP 250 PS 637: Performed by: NURSE PRACTITIONER

## 2022-08-26 PROCEDURE — 124N000001 HC R&B MH

## 2022-08-26 PROCEDURE — 99233 SBSQ HOSP IP/OBS HIGH 50: CPT | Performed by: NURSE PRACTITIONER

## 2022-08-26 PROCEDURE — 250N000013 HC RX MED GY IP 250 OP 250 PS 637: Performed by: PSYCHIATRY & NEUROLOGY

## 2022-08-26 RX ADMIN — PREGABALIN 100 MG: 25 CAPSULE ORAL at 13:54

## 2022-08-26 RX ADMIN — CLOZAPINE 100 MG: 100 TABLET ORAL at 20:20

## 2022-08-26 RX ADMIN — LORAZEPAM 1 MG: 1 TABLET ORAL at 13:54

## 2022-08-26 RX ADMIN — MEMANTINE 10 MG: 10 TABLET ORAL at 20:20

## 2022-08-26 RX ADMIN — HYDROXYZINE HYDROCHLORIDE 50 MG: 25 TABLET, FILM COATED ORAL at 15:17

## 2022-08-26 RX ADMIN — BUPROPION HYDROCHLORIDE 450 MG: 300 TABLET, EXTENDED RELEASE ORAL at 08:08

## 2022-08-26 RX ADMIN — LORAZEPAM 1 MG: 1 TABLET ORAL at 08:08

## 2022-08-26 RX ADMIN — MULTIPLE VITAMINS W/ MINERALS TAB 1 TABLET: TAB at 08:08

## 2022-08-26 RX ADMIN — Medication 2 PATCH: at 20:21

## 2022-08-26 RX ADMIN — LORAZEPAM 0.5 MG: 0.5 TABLET ORAL at 16:52

## 2022-08-26 RX ADMIN — MEMANTINE 10 MG: 10 TABLET ORAL at 08:08

## 2022-08-26 RX ADMIN — PREGABALIN 100 MG: 25 CAPSULE ORAL at 20:20

## 2022-08-26 RX ADMIN — PREGABALIN 100 MG: 25 CAPSULE ORAL at 08:08

## 2022-08-26 RX ADMIN — LORAZEPAM 1 MG: 1 TABLET ORAL at 20:20

## 2022-08-26 RX ADMIN — LITHIUM CARBONATE 450 MG: 450 TABLET, EXTENDED RELEASE ORAL at 20:20

## 2022-08-26 RX ADMIN — SULINDAC 150 MG: 150 TABLET ORAL at 15:17

## 2022-08-26 ASSESSMENT — ACTIVITIES OF DAILY LIVING (ADL)
ADLS_ACUITY_SCORE: 42
DRESS: INDEPENDENT
ADLS_ACUITY_SCORE: 42
ORAL_HYGIENE: INDEPENDENT
ADLS_ACUITY_SCORE: 42
HYGIENE/GROOMING: INDEPENDENT
ADLS_ACUITY_SCORE: 42

## 2022-08-26 NOTE — PLAN OF CARE
Problem: Behavioral Health Plan of Care  Goal: Patient-Specific Goal (Individualization)  Description: Patient will report at least 6-8 hours of sleep each night.   Patient will complete all ADL's independently while on the unit.   Patient will be compliant with treatment team recommendations.     Pt will eat at least 50% of meals and have enough fluid intake.  Outcome: Ongoing, Progressing   Goal Outcome Evaluation:      Face to face shift report received from RN. Rounding completed, pt observed.Client rested in room for 7 hours with eyes closed and respirations noted. No signs of distress. No falls this shift. Face to face report will be communicated to oncoming RN.    Didier Brooks RN  8/26/2022  6:22 AM

## 2022-08-26 NOTE — PROGRESS NOTES
[facility]  -  PROGRESS NOTE     ID   Name: Steven Carter  MRN#: [id number]     SUBJECTIVE   Steven is still doing very well.  He is more socially engaged with other patients than I have ever seen him.  He is still flat the majority of the day though affect is still better than I have ever seen him in the past.  He is not guarded as he used to be and answers questions and adds to the conversation and even has been initiating conversation.  [facility] is reviewing his records.  I did speak with Azalea the manager today.  Steven does state that if he is not able to find a place to go he will go to the homeless shelter in [place].  He states he has been calling there daily and is aware that there is availability at the [facility].  I did tell him I was not comfortable with him going to a shelter.  He is on a medication that needs close monitoring of white blood cell count and I do not think he would do well with this in a shelter.  His compliance is also going to be questionable.       MEDICATIONS   Scheduled Meds:    buPROPion  450 mg Oral Daily     cloZAPine  100 mg Oral At Bedtime     lidocaine  2 patch Transdermal Q24h    And     lidocaine   Transdermal Q8H LAENDER     lithium  450 mg Oral At Bedtime     LORazepam  0.5 mg Oral Daily before supper     LORazepam  1 mg Oral TID     memantine  10 mg Oral BID     multivitamin w/minerals  1 tablet Oral Daily     pregabalin  100 mg Oral TID     vitamin D2  50,000 Units Oral Q7 Days     PRN Meds:.acetaminophen, alum & mag hydroxide-simethicone, docusate sodium, hydrOXYzine, ondansetron, sulindac     ALLERGIES   Allergies   Allergen Reactions     Pork Allergy         VITALS   Vitals: /65   Pulse 86   Temp 97.8  F (36.6  C) (Tympanic)   Resp 16   Wt 84.4 kg (186 lb 1.6 oz)   SpO2 97%   BMI 25.24 kg/m       MENTAL STATUS EXAM     Appearance: Hair and beard are over grown-pt is clean/showered. Awake, alert, dressed in hospital scrubs  Attitude:   "cooperative   Eye Contact: Improving  Mood:   \"im content\"  Affect:  restricted though improved overall.   Speech: , engaging in more conversation initiates conversation asks appropriate questions  Psychomotor Behavior:  no evidence of tardive dyskinesia, dystonia, or tics, lithium tremor improved  Thought Process:  Brigantine, linear  Associations: no elsy noted  Thought Content:  no evidence of suicidal ideation or homicidal ideation and no evidence of psychotic thought, denies hallucinations  Insight: improving  Judgment: improving.  Oriented to:  time, person, and place  Attention Span and Concentration:  limited  Recent and Remote Memory:  limited  Fund of Knowledge: Low  Muscle Strength and Tone: normal  Gait and Station: normal        LABS   No results found for this or any previous visit (from the past 24 hour(s)).      ASSESSMENT   This is a 31 year old male with a PMH of schizoaffective disorder, polysubstance abuse, traumatic brain injury with coma, and a near fatal overdose with cardiac arrest who presented in a severe depressive episode with catatonia, occurring in the context of medication changes (lorazepam being discontinued) and the patient having severe malnutrition due to poor intake.     The patient has improved dramatically since starting Clozapine and resuming Ativan with his catatonia improving, his engagement with the world, speech, and ability to interact with peers. The patient has improved to the point that he would benefit from discharge to a facility as soon as available with his medication regimen to continue        DIAGNOSTIC FORMULATION   1. Schizoaffective disorder, depressive type, multiple episodes, currently in acute episode, with catatonia   2. 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 Multiple other TBIs  3. 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 Methamphetamine use disorder, moderate, in early " remission  5. Alcohol use disorder, moderate     PLAN     Location: Unit 5  Legal Status: Orders Placed This Encounter      Legal status Voluntary    Safety Assessment:    Behavioral Orders   Procedures     Code 1 - Restrict to Unit     Routine Programming     As clinically indicated     Status 15     Every 15 minutes.     PTA medications stopped    -Wellbutrin originally due to starting Clozapine and risk of seizures, resumed with patient aware of risks on 8/12  -Abilify due to not being effective    PTA medications continued/changed:     -Lithium 900 mg at bedtime reduced to 450 mg at bedtime to address tremor with improvement  -Namenda 10 mg BID  -Lyrica 100 mg TID  -Clonazepam restarted at 1 mg TID- switched to Lorazepam 1 mg TID on 7/23. Added 0.5 mg before supper on 8/15  -Wellbutrin  mg daily     New medications tried and stopped:      -None     New medications initiated:      -Clozapine 12.5 mg at bedtime on 7/19.-> Increase to 25 mg at bedtime on 7/20 -> 50 mg on 7/23 -> 75 mg on 7/24 ->100 mg on 7/26 -> 125 mg on 7/28 -> 150 mg on 7/30 -> 175 mg on 7/31 --> 200 mg on 08/01 -> 175 mg on 8/2 -> 150 mg on 8/3 -> 125 mg on 8/19 -> 100 mg on 8/23  -Vitamin D2 50,000 units weekly x 8 weeks     Today's Changes:    No changes in medications today  Still trying to encourage him to find more stable housing than shelter      Programming: Patient will be treated in a therapeutic milieu with appropriate individual and group therapies. Education will be provided on diagnoses, medications, and treatments. Encouraged behavioral activation and participation in group programming.     Medical diagnoses:      #. URI, resolved  - Suspect URI. Myocarditis ruled out  - CRP mildly elevated. Mild leukocytosis. Normal troponin. Rest of labs reassuring. Negative COVID. EKG shows sinus tach, no new findings. Echo is normal.  - Analgesics for fever. Working  - Zofran prn  - Reduced Clozapine    #. Severe malnutrition,  resolved  - Nutrition consulted  - 27 pound weight gain since admission    #. Sialorrhea  #. Enuresis  - Secondary to Clozapine  - Reduced dose with improvement. Reducing again to see if helps    Consults: none today  Tests: None. Weekly CBC reviewed and unremarkable    Disposition: [facility] assessment on 08/18. AL vs group home vs B&L       ATTESTATION    April Dominga Heaton NP

## 2022-08-26 NOTE — PLAN OF CARE
Problem: Behavioral Health Plan of Care  Goal: Patient-Specific Goal (Individualization)  Description: Patient will report at least 6-8 hours of sleep each night.   Patient will complete all ADL's independently while on the unit.   Patient will be compliant with treatment team recommendations.     Pt will eat at least 50% of meals and have enough fluid intake.  Outcome: Ongoing, Progressing     Problem: Thought Process Alteration  Goal: Optimal Thought Clarity  Description: Patient will hold reality based conversations while on the unit.     Outcome: Ongoing, Progressing     Problem: Suicide Risk  Goal: Absence of Self-Harm  Outcome: Ongoing, Progressing     Problem: Suicidal Behavior  Goal: Suicidal Behavior is Absent or Managed  Outcome: Ongoing, Progressing   Goal Outcome Evaluation:    Plan of Care Reviewed With: patient           Pt. Was up and ate breakfast in dayroom, appetite is good, slept 7 hours last night, taking prescribed medications, is able to hold a linear conversation and is able to make needs be known, denies suicidal ideation and hallucinations, is independent with ADL's, will continue to monitor progress.  1517-  Pt. Requested/received clinoril 150 mg po for low back pain, 4 0f 10 and atarax 50 mg po for anxiety.      Face to face end of shift report will be communicated to oncoming afternoon shift RN.     Farida Saenz RN  8/26/2022  10:00 AM

## 2022-08-27 PROCEDURE — 250N000013 HC RX MED GY IP 250 OP 250 PS 637: Performed by: NURSE PRACTITIONER

## 2022-08-27 PROCEDURE — 99232 SBSQ HOSP IP/OBS MODERATE 35: CPT | Performed by: NURSE PRACTITIONER

## 2022-08-27 PROCEDURE — 250N000013 HC RX MED GY IP 250 OP 250 PS 637: Performed by: PSYCHIATRY & NEUROLOGY

## 2022-08-27 PROCEDURE — 124N000001 HC R&B MH

## 2022-08-27 RX ADMIN — LORAZEPAM 1 MG: 1 TABLET ORAL at 08:18

## 2022-08-27 RX ADMIN — HYDROXYZINE HYDROCHLORIDE 50 MG: 25 TABLET, FILM COATED ORAL at 15:21

## 2022-08-27 RX ADMIN — LORAZEPAM 1 MG: 1 TABLET ORAL at 20:46

## 2022-08-27 RX ADMIN — PREGABALIN 100 MG: 25 CAPSULE ORAL at 20:46

## 2022-08-27 RX ADMIN — CLOZAPINE 100 MG: 100 TABLET ORAL at 20:45

## 2022-08-27 RX ADMIN — BUPROPION HYDROCHLORIDE 450 MG: 300 TABLET, EXTENDED RELEASE ORAL at 08:19

## 2022-08-27 RX ADMIN — Medication 2 PATCH: at 20:48

## 2022-08-27 RX ADMIN — PREGABALIN 100 MG: 25 CAPSULE ORAL at 08:18

## 2022-08-27 RX ADMIN — LORAZEPAM 0.5 MG: 0.5 TABLET ORAL at 17:10

## 2022-08-27 RX ADMIN — LITHIUM CARBONATE 450 MG: 450 TABLET, EXTENDED RELEASE ORAL at 20:46

## 2022-08-27 RX ADMIN — LORAZEPAM 1 MG: 1 TABLET ORAL at 13:27

## 2022-08-27 RX ADMIN — MEMANTINE 10 MG: 10 TABLET ORAL at 20:46

## 2022-08-27 RX ADMIN — MEMANTINE 10 MG: 10 TABLET ORAL at 08:18

## 2022-08-27 RX ADMIN — ACETAMINOPHEN 325MG 650 MG: 325 TABLET ORAL at 15:21

## 2022-08-27 RX ADMIN — MULTIPLE VITAMINS W/ MINERALS TAB 1 TABLET: TAB at 08:18

## 2022-08-27 RX ADMIN — PREGABALIN 100 MG: 25 CAPSULE ORAL at 13:28

## 2022-08-27 ASSESSMENT — ACTIVITIES OF DAILY LIVING (ADL)
ADLS_ACUITY_SCORE: 42
LAUNDRY: UNABLE TO COMPLETE
ADLS_ACUITY_SCORE: 42
HYGIENE/GROOMING: INDEPENDENT
DRESS: INDEPENDENT
DRESS: SCRUBS (BEHAVIORAL HEALTH);INDEPENDENT
ADLS_ACUITY_SCORE: 42
ADLS_ACUITY_SCORE: 42
HYGIENE/GROOMING: INDEPENDENT
ORAL_HYGIENE: INDEPENDENT
ADLS_ACUITY_SCORE: 42
ORAL_HYGIENE: INDEPENDENT
ADLS_ACUITY_SCORE: 42

## 2022-08-27 NOTE — PLAN OF CARE
Problem: Behavioral Health Plan of Care  Goal: Patient-Specific Goal (Individualization)  Description: Patient will report at least 6-8 hours of sleep each night.   Patient will complete all ADL's independently while on the unit.   Patient will be compliant with treatment team recommendations.     Pt will eat at least 50% of meals and have enough fluid intake.  Outcome: Ongoing, Progressing     Problem: Thought Process Alteration  Goal: Optimal Thought Clarity  Description: Patient will hold reality based conversations while on the unit.     Outcome: Ongoing, Progressing     Problem: Suicidal Behavior  Goal: Suicidal Behavior is Absent or Managed  Outcome: Ongoing, Progressing     Problem: Fall Injury Risk  Goal: Absence of Fall and Fall-Related Injury  Outcome: Ongoing, Progressing   Goal Outcome Evaluation:    Plan of Care Reviewed With: patient      Pt. Has been up and about on unit, ate breakfast in dayroom, appetite is good, taking prescribed medications, denies suicidal ideation and hallucinations, gait is balanced and steady, wearing appropriate footwear, is independent with ADL's, slept 7 hours last night, compliant with treatment team recommendations, will continue to monitor progress.  1521-  Pt. Requested/received tylenol 650 mg po for generalized pain, would not rate, and atarax 50 mg po for anxiety.      Face to face end of shift report will be communicated to oncoming afternoon shift RN.     Farida Saenz RN  8/27/2022  10:34 AM

## 2022-08-27 NOTE — PLAN OF CARE
Problem: Behavioral Health Plan of Care  Goal: Patient-Specific Goal (Individualization)  Description: Patient will report at least 6-8 hours of sleep each night.   Patient will complete all ADL's independently while on the unit.   Patient will be compliant with treatment team recommendations.     Pt will eat at least 50% of meals and have enough fluid intake.  Outcome: Ongoing, Progressing  Note: Pt appeared to be sleeping 7 hours tonight. Regular respirations and position changes noted throughout the night.                                                                                 Face to face end of shift report communicated to oncoming RN.      Problem: Suicide Risk  Goal: Absence of Self-Harm  Outcome: Ongoing, Progressing  Note: Pt remained free from self harm.      Problem: Fall Injury Risk  Goal: Absence of Fall and Fall-Related Injury  Outcome: Ongoing, Progressing  Note: Pt remained free from falls.

## 2022-08-27 NOTE — PLAN OF CARE
Face to face shift report received from TRAE Iqbal.       Problem: Behavioral Health Plan of Care  Goal: Patient-Specific Goal (Individualization)  Description: Patient will report at least 6-8 hours of sleep each night.   Patient will complete all ADL's independently while on the unit.   Patient will be compliant with treatment team recommendations.     Pt will eat at least 50% of meals and have enough fluid intake.  Outcome: Ongoing, Progressing   Calm and cooperative. Medication compliant. Denies mental health issues. Pleasant and appropriate. Social with others. Spends time in lounge area. No reports of pain.     Problem: Thought Process Alteration  Goal: Optimal Thought Clarity  Description: Patient will hold reality based conversations while on the unit.   Outcome: Ongoing, Progressing       Problem: Fall Injury Risk  Goal: Absence of Fall and Fall-Related Injury  Outcome: Ongoing, Progressing   Free from self harm or injury this shift    Face to face end of shift report to be communicated to oncoming RN.

## 2022-08-28 PROCEDURE — 250N000013 HC RX MED GY IP 250 OP 250 PS 637: Performed by: NURSE PRACTITIONER

## 2022-08-28 PROCEDURE — 250N000013 HC RX MED GY IP 250 OP 250 PS 637: Performed by: PSYCHIATRY & NEUROLOGY

## 2022-08-28 PROCEDURE — 124N000001 HC R&B MH

## 2022-08-28 RX ORDER — LORAZEPAM 0.5 MG/1
0.5 TABLET ORAL DAILY
Status: DISCONTINUED | OUTPATIENT
Start: 2022-08-29 | End: 2022-09-02 | Stop reason: HOSPADM

## 2022-08-28 RX ADMIN — LITHIUM CARBONATE 450 MG: 450 TABLET, EXTENDED RELEASE ORAL at 20:16

## 2022-08-28 RX ADMIN — MEMANTINE 10 MG: 10 TABLET ORAL at 08:02

## 2022-08-28 RX ADMIN — LORAZEPAM 1 MG: 1 TABLET ORAL at 08:02

## 2022-08-28 RX ADMIN — PREGABALIN 100 MG: 25 CAPSULE ORAL at 08:02

## 2022-08-28 RX ADMIN — CLOZAPINE 100 MG: 100 TABLET ORAL at 20:16

## 2022-08-28 RX ADMIN — BUPROPION HYDROCHLORIDE 450 MG: 300 TABLET, EXTENDED RELEASE ORAL at 08:02

## 2022-08-28 RX ADMIN — MULTIPLE VITAMINS W/ MINERALS TAB 1 TABLET: TAB at 08:02

## 2022-08-28 RX ADMIN — PREGABALIN 100 MG: 25 CAPSULE ORAL at 20:16

## 2022-08-28 RX ADMIN — LORAZEPAM 0.5 MG: 0.5 TABLET ORAL at 16:00

## 2022-08-28 RX ADMIN — MEMANTINE 10 MG: 10 TABLET ORAL at 20:17

## 2022-08-28 RX ADMIN — ERGOCALCIFEROL 50000 UNITS: 1.25 CAPSULE, LIQUID FILLED ORAL at 11:33

## 2022-08-28 RX ADMIN — LORAZEPAM 1 MG: 1 TABLET ORAL at 13:11

## 2022-08-28 RX ADMIN — PREGABALIN 100 MG: 25 CAPSULE ORAL at 13:12

## 2022-08-28 RX ADMIN — Medication 2 PATCH: at 20:14

## 2022-08-28 RX ADMIN — LORAZEPAM 1 MG: 1 TABLET ORAL at 20:16

## 2022-08-28 ASSESSMENT — ACTIVITIES OF DAILY LIVING (ADL)
ADLS_ACUITY_SCORE: 42
ADLS_ACUITY_SCORE: 42
LAUNDRY: UNABLE TO COMPLETE
HYGIENE/GROOMING: INDEPENDENT
ORAL_HYGIENE: INDEPENDENT
DRESS: SCRUBS (BEHAVIORAL HEALTH)
ADLS_ACUITY_SCORE: 42
DRESS: SCRUBS (BEHAVIORAL HEALTH);INDEPENDENT
ADLS_ACUITY_SCORE: 42
ORAL_HYGIENE: INDEPENDENT
ADLS_ACUITY_SCORE: 42
HYGIENE/GROOMING: INDEPENDENT
ADLS_ACUITY_SCORE: 42

## 2022-08-28 NOTE — PLAN OF CARE
Problem: Behavioral Health Plan of Care  Goal: Patient-Specific Goal (Individualization)  Description: Patient will report at least 6-8 hours of sleep each night.   Patient will complete all ADL's independently while on the unit.   Patient will be compliant with treatment team recommendations.     Pt will eat at least 50% of meals and have enough fluid intake.  Outcome: Ongoing, Progressing     Problem: Thought Process Alteration  Goal: Optimal Thought Clarity  Description: Patient will hold reality based conversations while on the unit.     Outcome: Ongoing, Progressing     Problem: Suicide Risk  Goal: Absence of Self-Harm  Outcome: Ongoing, Progressing     Problem: Suicidal Behavior  Goal: Suicidal Behavior is Absent or Managed  Outcome: Ongoing, Progressing     Problem: Fall Injury Risk  Goal: Absence of Fall and Fall-Related Injury  Outcome: Ongoing, Progressing   Goal Outcome Evaluation:    Plan of Care Reviewed With: patient      Pt. Was up and about on unit, ate breakfast in dayroom, appetite is good, denies suicidal ideation, is independent with ADL's, slept 6.5 hours last night, affect is flat and blunted, is able to make needs be known, will continue to monitor progress.    Face to face end of shift report will be communicated to oncoming afternoon shift RN.     Farida Saenz RN  8/28/2022  10:04 AM

## 2022-08-28 NOTE — PLAN OF CARE
Rounding completed. Patient observed in his room with eyes closed appearing to sleep for 6.5 hours; resting respirations noted on 15 minute safety checks.  Will continue to monitor.  Face to face end of shift report communicated to oncoming day shift RN.       Problem: Behavioral Health Plan of Care  Goal: Patient-Specific Goal (Individualization)  Description: Patient will report at least 6-8 hours of sleep each night.   Patient will complete all ADL's independently while on the unit.   Patient will be compliant with treatment team recommendations.     Pt will eat at least 50% of meals and have enough fluid intake.  8/28/2022 0435 by Adithya Villalobos RN  Outcome: Ongoing, Progressing  8/27/2022 2138 by Adithya Villalobos RN  Outcome: Ongoing, Progressing  Goal: Absence of New-Onset Illness or Injury  Outcome: Ongoing, Progressing  Intervention: Identify and Manage Fall Risk  Recent Flowsheet Documentation  Taken 8/27/2022 2100 by Adithya Villalobos RN  Safety Measures:    environmental rounds completed    safety rounds completed     Problem: Thought Process Alteration  Goal: Optimal Thought Clarity  Description: Patient will hold reality based conversations while on the unit.     8/28/2022 0435 by Adithya Villalobos RN  Outcome: Ongoing, Progressing  8/27/2022 2138 by Adithya Villalobos RN  Outcome: Ongoing, Progressing     Problem: Suicide Risk  Goal: Absence of Self-Harm  8/28/2022 0435 by Adithya Villalobos RN  Outcome: Ongoing, Progressing  8/27/2022 2138 by Adithya Villalobos RN  Outcome: Ongoing, Progressing  Intervention: Promote Psychosocial Wellbeing  Recent Flowsheet Documentation  Taken 8/27/2022 2100 by Adithya Villalobos RN  Family/Support System Care: self-care encouraged     Problem: Suicidal Behavior  Goal: Suicidal Behavior is Absent or Managed  8/28/2022 0435 by Adithya Villalobos RN  Outcome: Ongoing, Progressing  8/27/2022 2138 by Adithya Villalobos RN  Outcome: Ongoing, Progressing     Problem: Fall Injury Risk  Goal: Absence of  Fall and Fall-Related Injury  8/28/2022 0435 by Adithya Villalobos, RN  Outcome: Ongoing, Progressing  8/27/2022 2138 by Adithya Villalobos, RN  Outcome: Ongoing, Progressing   Goal Outcome Evaluation:    Plan of Care Reviewed With: patient

## 2022-08-28 NOTE — PLAN OF CARE
SHIFT SUMMARY:  Calm and cooperative. Flat affect. Expresses that he wants to leave tomorrow to the homeless shelter, despite an opportunity to discharge to the facility of his choice. Denies SI/HI, AH/VH, and pain. Experiencing anxiety and depression, but refused PRN medications. Lidocaine patches applied to lower and mid-back at HS. .       Problem: Behavioral Health Plan of Care  Goal: Patient-Specific Goal (Individualization)  Description: Patient will report at least 6-8 hours of sleep each night.   Patient will complete all ADL's independently while on the unit.   Patient will be compliant with treatment team recommendations.     Pt will eat at least 50% of meals and have enough fluid intake.  Outcome: Ongoing, Progressing  Goal: Absence of New-Onset Illness or Injury  Outcome: Ongoing, Progressing     Problem: Thought Process Alteration  Goal: Optimal Thought Clarity  Description: Patient will hold reality based conversations while on the unit.     Outcome: Ongoing, Progressing     Problem: Suicide Risk  Goal: Absence of Self-Harm  Outcome: Ongoing, Progressing     Problem: Suicidal Behavior  Goal: Suicidal Behavior is Absent or Managed  Outcome: Ongoing, Progressing     Problem: Fall Injury Risk  Goal: Absence of Fall and Fall-Related Injury  Outcome: Ongoing, Progressing   Goal Outcome Evaluation:

## 2022-08-28 NOTE — PROGRESS NOTES
"  [facility]  -  PROGRESS NOTE     ID   Name: Steven Carter  MRN#: [id number]     SUBJECTIVE   Steven states that the homeless shelter in [place] has an opening likely tomorrow. I did tell him that I was very uncomfortable withhim leaving on clozaril and ativan.  He tells me \"you could stop the Ativan\" I told him that he is never done well when we have tried to taper him off of Ativan and his symptoms return significantly and he is unable to function.  When I talked to him about how he recalls his psychiatric state when he came to the unit he does not get upset with me when I tell him how emaciated due to lack of eating and how catatonic he was.  I reminded him that when he came into the hospital he could hardly say a couple of words and stared blankly.  He shrugs his shoulders and states \"I just do not think I had a lot to say\".  He does not much insight into his illness though states he is willing to stay on the medications.     MEDICATIONS   Scheduled Meds:    buPROPion  450 mg Oral Daily     cloZAPine  100 mg Oral At Bedtime     lidocaine  2 patch Transdermal Q24h    And     lidocaine   Transdermal Q8H LEANDER     lithium  450 mg Oral At Bedtime     LORazepam  0.5 mg Oral Daily before supper     LORazepam  1 mg Oral TID     memantine  10 mg Oral BID     multivitamin w/minerals  1 tablet Oral Daily     pregabalin  100 mg Oral TID     vitamin D2  50,000 Units Oral Q7 Days     PRN Meds:.acetaminophen, alum & mag hydroxide-simethicone, docusate sodium, hydrOXYzine, ondansetron, sulindac     ALLERGIES   Allergies   Allergen Reactions     Pork Allergy         VITALS   Vitals: /62   Pulse 83   Temp 97  F (36.1  C) (Tympanic)   Resp 20   Wt 85.2 kg (187 lb 14.4 oz)   SpO2 96%   BMI 25.48 kg/m       MENTAL STATUS EXAM     Appearance: Hair and beard are over grown-pt is clean/showered. Awake, alert, dressed in hospital scrubs  Attitude:  cooperative   Eye Contact: Improving  Mood:   \"im " "content\"  Affect:  restricted though improved overall.   Speech: , engaging in more conversation initiates conversation asks appropriate questions  Psychomotor Behavior:  no evidence of tardive dyskinesia, dystonia, or tics, lithium tremor improved  Thought Process:  Willard, linear  Associations: no elsy noted  Thought Content:  no evidence of suicidal ideation or homicidal ideation and no evidence of psychotic thought, denies hallucinations  Insight: improving  Judgment: improving.  Oriented to:  time, person, and place  Attention Span and Concentration:  limited  Recent and Remote Memory:  limited  Fund of Knowledge: Low  Muscle Strength and Tone: normal  Gait and Station: normal        LABS   No results found for this or any previous visit (from the past 24 hour(s)).      ASSESSMENT   This is a 31 year old male with a PMH of schizoaffective disorder, polysubstance abuse, traumatic brain injury with coma, and a near fatal overdose with cardiac arrest who presented in a severe depressive episode with catatonia, occurring in the context of medication changes (lorazepam being discontinued) and the patient having severe malnutrition due to poor intake.     The patient has improved dramatically since starting Clozapine and resuming Ativan with his catatonia improving, his engagement with the world, speech, and ability to interact with peers. The patient has improved to the point that he would benefit from discharge to a facility as soon as available with his medication regimen to continue        DIAGNOSTIC FORMULATION   1. Schizoaffective disorder, depressive type, multiple episodes, currently in acute episode, with catatonia   2. 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 Multiple other TBIs  3. 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 Methamphetamine use disorder, moderate, in early remission  5. Alcohol use disorder, moderate     PLAN "     Location: Unit 5  Legal Status: Orders Placed This Encounter      Legal status Voluntary    Safety Assessment:    Behavioral Orders   Procedures     Code 1 - Restrict to Unit     Routine Programming     As clinically indicated     Status 15     Every 15 minutes.     PTA medications stopped    -Wellbutrin originally due to starting Clozapine and risk of seizures, resumed with patient aware of risks on 8/12  -Abilify due to not being effective    PTA medications continued/changed:     -Lithium 900 mg at bedtime reduced to 450 mg at bedtime to address tremor with improvement  -Namenda 10 mg BID  -Lyrica 100 mg TID  -Clonazepam restarted at 1 mg TID- switched to Lorazepam 1 mg TID on 7/23. Added 0.5 mg before supper on 8/15  -Wellbutrin  mg daily     New medications tried and stopped:      -None     New medications initiated:      -Clozapine 12.5 mg at bedtime on 7/19.-> Increase to 25 mg at bedtime on 7/20 -> 50 mg on 7/23 -> 75 mg on 7/24 ->100 mg on 7/26 -> 125 mg on 7/28 -> 150 mg on 7/30 -> 175 mg on 7/31 --> 200 mg on 08/01 -> 175 mg on 8/2 -> 150 mg on 8/3 -> 125 mg on 8/19 -> 100 mg on 8/23  -Vitamin D2 50,000 units weekly x 8 weeks     Today's Changes:    -No changes in medications      Programming: Patient will be treated in a therapeutic milieu with appropriate individual and group therapies. Education will be provided on diagnoses, medications, and treatments. Encouraged behavioral activation and participation in group programming.     Medical diagnoses:      #. URI, resolved  - Suspect URI. Myocarditis ruled out  - CRP mildly elevated. Mild leukocytosis. Normal troponin. Rest of labs reassuring. Negative COVID. EKG shows sinus tach, no new findings. Echo is normal.  - Analgesics for fever. Working  - Zofran prn  - Reduced Clozapine    #. Severe malnutrition, resolved  - Nutrition consulted  - 27 pound weight gain since admission    #. Sialorrhea  #. Enuresis  - Secondary to Clozapine  - Reduced  dose with improvement. Reducing again to see if helps    Consults: none today  Tests: None. Weekly CBC reviewed and unremarkable    [place]. [place] vs group home vs B&[facility]       ATTESTATION    April Dominga Heaton NP

## 2022-08-29 LAB
BASOPHILS # BLD AUTO: 0 10E3/UL (ref 0–0.2)
BASOPHILS NFR BLD AUTO: 1 %
EOSINOPHIL # BLD AUTO: 0.4 10E3/UL (ref 0–0.7)
EOSINOPHIL NFR BLD AUTO: 7 %
ERYTHROCYTE [DISTWIDTH] IN BLOOD BY AUTOMATED COUNT: 12.6 % (ref 10–15)
HCT VFR BLD AUTO: 46.3 % (ref 40–53)
HGB BLD-MCNC: 15.5 G/DL (ref 13.3–17.7)
HOLD SPECIMEN: NORMAL
HOLD SPECIMEN: NORMAL
IMM GRANULOCYTES # BLD: 0 10E3/UL
IMM GRANULOCYTES NFR BLD: 0 %
LYMPHOCYTES # BLD AUTO: 1.5 10E3/UL (ref 0.8–5.3)
LYMPHOCYTES NFR BLD AUTO: 24 %
MCH RBC QN AUTO: 29 PG (ref 26.5–33)
MCHC RBC AUTO-ENTMCNC: 33.5 G/DL (ref 31.5–36.5)
MCV RBC AUTO: 87 FL (ref 78–100)
MONOCYTES # BLD AUTO: 0.4 10E3/UL (ref 0–1.3)
MONOCYTES NFR BLD AUTO: 7 %
NEUTROPHILS # BLD AUTO: 4 10E3/UL (ref 1.6–8.3)
NEUTROPHILS NFR BLD AUTO: 61 %
NRBC # BLD AUTO: 0 10E3/UL
NRBC BLD AUTO-RTO: 0 /100
PLATELET # BLD AUTO: 211 10E3/UL (ref 150–450)
RBC # BLD AUTO: 5.34 10E6/UL (ref 4.4–5.9)
WBC # BLD AUTO: 6.4 10E3/UL (ref 4–11)

## 2022-08-29 PROCEDURE — 250N000013 HC RX MED GY IP 250 OP 250 PS 637: Performed by: PSYCHIATRY & NEUROLOGY

## 2022-08-29 PROCEDURE — 99233 SBSQ HOSP IP/OBS HIGH 50: CPT | Performed by: NURSE PRACTITIONER

## 2022-08-29 PROCEDURE — 250N000013 HC RX MED GY IP 250 OP 250 PS 637: Performed by: NURSE PRACTITIONER

## 2022-08-29 PROCEDURE — 124N000001 HC R&B MH

## 2022-08-29 PROCEDURE — 85025 COMPLETE CBC W/AUTO DIFF WBC: CPT | Performed by: STUDENT IN AN ORGANIZED HEALTH CARE EDUCATION/TRAINING PROGRAM

## 2022-08-29 PROCEDURE — 36415 COLL VENOUS BLD VENIPUNCTURE: CPT | Performed by: STUDENT IN AN ORGANIZED HEALTH CARE EDUCATION/TRAINING PROGRAM

## 2022-08-29 RX ADMIN — LITHIUM CARBONATE 450 MG: 450 TABLET, EXTENDED RELEASE ORAL at 20:15

## 2022-08-29 RX ADMIN — Medication 2 PATCH: at 20:12

## 2022-08-29 RX ADMIN — ACETAMINOPHEN 325MG 650 MG: 325 TABLET ORAL at 20:15

## 2022-08-29 RX ADMIN — LORAZEPAM 1 MG: 1 TABLET ORAL at 08:18

## 2022-08-29 RX ADMIN — LORAZEPAM 0.5 MG: 0.5 TABLET ORAL at 16:05

## 2022-08-29 RX ADMIN — LORAZEPAM 0.5 MG: 0.5 TABLET ORAL at 11:07

## 2022-08-29 RX ADMIN — PREGABALIN 100 MG: 25 CAPSULE ORAL at 20:14

## 2022-08-29 RX ADMIN — MULTIPLE VITAMINS W/ MINERALS TAB 1 TABLET: TAB at 08:32

## 2022-08-29 RX ADMIN — LORAZEPAM 1 MG: 1 TABLET ORAL at 20:15

## 2022-08-29 RX ADMIN — BUPROPION HYDROCHLORIDE 450 MG: 300 TABLET, EXTENDED RELEASE ORAL at 08:18

## 2022-08-29 RX ADMIN — CLOZAPINE 100 MG: 100 TABLET ORAL at 20:14

## 2022-08-29 RX ADMIN — MEMANTINE 10 MG: 10 TABLET ORAL at 08:18

## 2022-08-29 RX ADMIN — PREGABALIN 100 MG: 25 CAPSULE ORAL at 13:28

## 2022-08-29 RX ADMIN — MEMANTINE 10 MG: 10 TABLET ORAL at 20:15

## 2022-08-29 RX ADMIN — LORAZEPAM 1 MG: 1 TABLET ORAL at 13:28

## 2022-08-29 RX ADMIN — PREGABALIN 100 MG: 25 CAPSULE ORAL at 08:18

## 2022-08-29 ASSESSMENT — ACTIVITIES OF DAILY LIVING (ADL)
ADLS_ACUITY_SCORE: 42
LAUNDRY: UNABLE TO COMPLETE
ADLS_ACUITY_SCORE: 42
ORAL_HYGIENE: INDEPENDENT
ADLS_ACUITY_SCORE: 42
ADLS_ACUITY_SCORE: 42
DRESS: INDEPENDENT
HYGIENE/GROOMING: INDEPENDENT
ADLS_ACUITY_SCORE: 42
DRESS: SCRUBS (BEHAVIORAL HEALTH);INDEPENDENT
ORAL_HYGIENE: INDEPENDENT
ADLS_ACUITY_SCORE: 42
HYGIENE/GROOMING: INDEPENDENT

## 2022-08-29 NOTE — PHARMACY
Pharmacy Clozapine Continuation Note    Patient's Name: Steven Carter  Patient's : 1990    Current cloZAPine regimen: 100 mg at bedtime  Has there been a known interruption in therapy for greater than/equal to 48 hours which would warrant retitration No    Recent ANC Value(s) for last 30 days:  2022: Absolute Neutrophils 8.0 10e3/uL (Ref range: 1.6 - 8.3 10e3/uL); Absolute Neutrophils 9.7 10e3/uL (H; Ref range: 1.6 - 8.3 10e3/uL); Absolute Neutrophils 9.8 10e3/uL (H; Ref range: 1.6 - 8.3 10e3/uL)  2022: Absolute Neutrophils 10.5 10e3/uL (H; Ref range: 1.6 - 8.3 10e3/uL)  2022: Absolute Neutrophils 6.7 10e3/uL (Ref range: 1.6 - 8.3 10e3/uL)  2022: Absolute Neutrophils 7.5 10e3/uL (Ref range: 1.6 - 8.3 10e3/uL)  8/15/2022: Absolute Neutrophils 6.7 10e3/uL (Ref range: 1.6 - 8.3 10e3/uL)  2022: Absolute Neutrophils 3.7 10e3/uL (Ref range: 1.6 - 8.3 10e3/uL)  2022: Absolute Neutrophils 3.8 10e3/uL (Ref range: 1.6 - 8.3 10e3/uL)  2022: Absolute Neutrophils 4.0 10e3/uL (Ref range: 1.6 - 8.3 10e3/uL)      A REMS Dispense Authorization was obtained from the clozapine REMS prgram? Yes   A Dispense Rationale was needed to obtain the REMS Dispense Authorization? No   Is the ANC (if available) within recommended limits? Yes  Does the patient have any signs or symptoms of infection, including fever? No    REMS Patient ID: [id number]  Patient RDA: [id number]    Plan:  1. Continue clozapine therapy at 100 mg PO at bedtime.  2. A WBC with differential will be ordered at least weekly, NEXT DUE 2022. ANC values will be entered into the REMS program.    Angela Washington RPH

## 2022-08-29 NOTE — PLAN OF CARE
Problem: Behavioral Health Plan of Care  Goal: Patient-Specific Goal (Individualization)  Description: Patient will report at least 6-8 hours of sleep each night.   Patient will complete all ADL's independently while on the unit.   Patient will be compliant with treatment team recommendations.     Pt will eat at least 50% of meals and have enough fluid intake.  Outcome: Ongoing, Progressing     Problem: Fall Injury Risk  Goal: Absence of Fall and Fall-Related Injury  Outcome: Ongoing, Progressing   Goal Outcome Evaluation:      Face to face shift report received from RN. Rounding completed, pt observed.Client rested in room for 7 hours with eyes closed and respirations noted. No signs of distress. Face to face report will be communicated to oncoming RN.    Didier Brooks RN  8/29/2022  6:25 AM

## 2022-08-29 NOTE — PLAN OF CARE
"Face to face shift report received from Didier CORDOVA RN. Rounding completed, pt observed.    Problem: Behavioral Health Plan of Care  Goal: Patient-Specific Goal (Individualization)  Description: Patient will report at least 6-8 hours of sleep each night.   Patient will complete all ADL's independently while on the unit.   Patient will be compliant with treatment team recommendations.     Pt will eat at least 50% of meals and have enough fluid intake.  Outcome: Ongoing, Progressing  Note: Shift Summary:  Patient has been up in lounge this shift.  Nurse greeted patient with a Happy Birthday.  Patient asked that staff \"not make a big deal about my birthday\".  Informed patient that staff had already requested a birthday dessert for his tray but that we could respect his wishes. Compliant with medications.       Problem: Thought Process Alteration  Goal: Optimal Thought Clarity  Description: Patient will hold reality based conversations while on the unit.     Outcome: Ongoing, Progressing  Face to face report will be communicated to oncoming RN.    Alma Delia Garsia RN  8/29/2022                        "

## 2022-08-29 NOTE — PROGRESS NOTES
"  [facility] PSYCHIATRY  -  PROGRESS NOTE     ID   Name: Steven Carter  MRN#: [id number]     SUBJECTIVE   steven has quite an animated affect and mood today.  He was very talkative though not manic and pressured.  He was goal-directed in his thought process though not overly goal-directed.  He was talking about some of the past things he has done that he is \"not proud of\" and at one point during our conversation he laughed.  I have never seen/heard him laugh in the past nor has any of the staff here in the hospital seen this.  I pointed it out to him and told him how good it was to see him smile and laugh and I asked him if he noticed a difference in how he was doing during this admission in comparison to other admissions and before this and he states that he feels good and states \"I do not think I am going to want to go and get fucked up this time. I feel different. I have ambition\".  He appears to have ambition and drive again and I have not seen that in him before.  He has been making phone calls on his own and advocating for himself to try to find a place to live and is very polite on the phone when doing this.  He states that he does notice that he has been smiling and laughing and is very aware that he has not done so for a very long time \"unless I have been high\" he tells me that he has not used methamphetamines for close to 1 year now he states that he will wait until he hears from [facility] whether or not they would accept him after the initially denied him.  If he finds out that they will not take him back, he does plan on going to the homeless shelter in [place].  I did tell him I was quite concerned about this because he is going to be needing weekly blood draws for Clozaril and I will be giving him a week supply of Ativan at a time when he leaves and he will have to return to the pharmacy weekly.  He states that he can do that.  I did tell him that I would really like to talk to his mom to tell " "his mom that he will need weekly prescriptions for now until he has stable housing and somebody dispensing his medications to him     MEDICATIONS   Scheduled Meds:    buPROPion  450 mg Oral Daily     cloZAPine  100 mg Oral At Bedtime     lidocaine  2 patch Transdermal Q24h    And     lidocaine   Transdermal Q8H LEANDER     lithium  450 mg Oral At Bedtime     LORazepam  0.5 mg Oral Daily     LORazepam  0.5 mg Oral Daily before supper     LORazepam  1 mg Oral TID     memantine  10 mg Oral BID     multivitamin w/minerals  1 tablet Oral Daily     pregabalin  100 mg Oral TID     vitamin D2  50,000 Units Oral Q7 Days     PRN Meds:.acetaminophen, alum & mag hydroxide-simethicone, docusate sodium, hydrOXYzine, ondansetron, sulindac     ALLERGIES   Allergies   Allergen Reactions     Pork Allergy         VITALS   Vitals: /65 (BP Location: Right arm)   Pulse 79   Temp 97.1  F (36.2  C) (Temporal)   Resp 14   Wt 85.2 kg (187 lb 14.4 oz)   SpO2 98%   BMI 25.48 kg/m       MENTAL STATUS EXAM     Appearance: Hair and beard are over grown-pt is clean/showered. Awake, alert, dressed in hospital scrubs  Attitude:  cooperative   Eye Contact: Improving  Mood:   \"I feel like I have ambition again\"  Affect: Smiles at times and laughs at times  Speech: , engaging in more conversation initiates conversation asks appropriate questions  Psychomotor Behavior:  no evidence of tardive dyskinesia, dystonia, or tics, lithium tremor improved  Thought Process: Active goal-directed  Associations: no elsy noted  Thought Content:  no evidence of suicidal ideation or homicidal ideation and no evidence of psychotic thought, denies hallucinations  Insight: improving  Judgment: improving.  Oriented to:  time, person, and place  Attention Span and Concentration:  limited  Recent and Remote Memory:  limited  Fund of Knowledge: Low  Muscle Strength and Tone: normal  Gait and Station: normal        LABS   Recent Results (from the past 24 hour(s))   CBC " with platelets and differential    Collection Time: 08/29/22 10:01 AM   Result Value Ref Range    WBC Count 6.4 4.0 - 11.0 10e3/uL    RBC Count 5.34 4.40 - 5.90 10e6/uL    Hemoglobin 15.5 13.3 - 17.7 g/dL    Hematocrit 46.3 40.0 - 53.0 %    MCV 87 78 - 100 fL    MCH 29.0 26.5 - 33.0 pg    MCHC 33.5 31.5 - 36.5 g/dL    RDW 12.6 10.0 - 15.0 %    Platelet Count 211 150 - 450 10e3/uL    % Neutrophils 61 %    % Lymphocytes 24 %    % Monocytes 7 %    % Eosinophils 7 %    % Basophils 1 %    % Immature Granulocytes 0 %    NRBCs per 100 WBC 0 <1 /100    Absolute Neutrophils 4.0 1.6 - 8.3 10e3/uL    Absolute Lymphocytes 1.5 0.8 - 5.3 10e3/uL    Absolute Monocytes 0.4 0.0 - 1.3 10e3/uL    Absolute Eosinophils 0.4 0.0 - 0.7 10e3/uL    Absolute Basophils 0.0 0.0 - 0.2 10e3/uL    Absolute Immature Granulocytes 0.0 <=0.4 10e3/uL    Absolute NRBCs 0.0 10e3/uL   Extra Red Top Tube    Collection Time: 08/29/22 10:01 AM   Result Value Ref Range    Hold Specimen JIC    Extra Green Top (Lithium Heparin) Tube    Collection Time: 08/29/22 10:01 AM   Result Value Ref Range    Hold Specimen JIC          ASSESSMENT   This is a 31 year old male with a PMH of schizoaffective disorder, polysubstance abuse, traumatic brain injury with coma, and a near fatal overdose with cardiac arrest who presented in a severe depressive episode with catatonia, occurring in the context of medication changes (lorazepam being discontinued) and the patient having severe malnutrition due to poor intake.     The patient has improved dramatically since starting Clozapine and resuming Ativan with his catatonia improving, his engagement with the world, speech, and ability to interact with peers. The patient has improved to the point that he would benefit from discharge to a facility as soon as available with his medication regimen to continue        DIAGNOSTIC FORMULATION   1. Schizoaffective disorder, depressive type, multiple episodes, currently in acute episode,  with catatonia   2. 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 Multiple other TBIs  3. 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 Methamphetamine use disorder, moderate, in early remission  5. Alcohol use disorder, moderate     PLAN     Location: Unit 5  Legal Status: Orders Placed This Encounter      Legal status Voluntary    Safety Assessment:    Behavioral Orders   Procedures     Code 1 - Restrict to Unit     Routine Programming     As clinically indicated     Status 15     Every 15 minutes.     PTA medications stopped    -Wellbutrin originally due to starting Clozapine and risk of seizures, resumed with patient aware of risks on 8/12  -Abilify due to not being effective    PTA medications continued/changed:     -Lithium 900 mg at bedtime reduced to 450 mg at bedtime to address tremor with improvement  -Namenda 10 mg BID  -Lyrica 100 mg TID  -Clonazepam restarted at 1 mg TID- switched to Lorazepam 1 mg TID on 7/23. Added 0.5 mg before supper on 8/15  -Wellbutrin  mg daily     New medications tried and stopped:      -None     New medications initiated:      -Clozapine 12.5 mg at bedtime on 7/19.-> Increase to 25 mg at bedtime on 7/20 -> 50 mg on 7/23 -> 75 mg on 7/24 ->100 mg on 7/26 -> 125 mg on 7/28 -> 150 mg on 7/30 -> 175 mg on 7/31 --> 200 mg on 08/01 -> 175 mg on 8/2 -> 150 mg on 8/3 -> 125 mg on 8/19 -> 100 mg on 8/23  -Vitamin D2 50,000 units weekly x 8 weeks     Today's Changes:    Will contact [facility]  Did try to call [facility] and left message with [facility] they have not yet returned call.  No changes in medications  ANC was reviewed and unremarkable    Programming: Patient will be treated in a therapeutic milieu with appropriate individual and group therapies. Education will be provided on diagnoses, medications, and treatments. Encouraged behavioral activation and participation in group programming.     Medical  diagnoses:      #. URI, resolved  - Suspect URI. Myocarditis ruled out  - CRP mildly elevated. Mild leukocytosis. Normal troponin. Rest of labs reassuring. Negative COVID. EKG shows sinus tach, no new findings. Echo is normal.  - Analgesics for fever. Working  - Zofran prn  - Reduced Clozapine    #. Severe malnutrition, resolved  - Nutrition consulted  - 27 pound weight gain since admission    #. Sialorrhea  #. Enuresis  - Secondary to Clozapine  - Reduced dose with improvement. Reducing again to see if helps    Consults: none today  Tests: None. Weekly CBC reviewed and unremarkable    Disposition: [place]18. AL vs group home vs B&L       ATTESTATION    April Dominga Heaton NP

## 2022-08-30 PROCEDURE — 250N000013 HC RX MED GY IP 250 OP 250 PS 637: Performed by: NURSE PRACTITIONER

## 2022-08-30 PROCEDURE — 99233 SBSQ HOSP IP/OBS HIGH 50: CPT | Performed by: NURSE PRACTITIONER

## 2022-08-30 PROCEDURE — 124N000001 HC R&B MH

## 2022-08-30 PROCEDURE — 250N000013 HC RX MED GY IP 250 OP 250 PS 637: Performed by: PSYCHIATRY & NEUROLOGY

## 2022-08-30 RX ORDER — CLOZAPINE 100 MG/1
100 TABLET ORAL DAILY
Qty: 14 TABLET | Refills: 0 | Status: SHIPPED | OUTPATIENT
Start: 2022-08-30 | End: 2022-09-01

## 2022-08-30 RX ORDER — CLOZAPINE 100 MG/1
100 TABLET ORAL AT BEDTIME
Qty: 30 TABLET | Refills: 0 | Status: SHIPPED | OUTPATIENT
Start: 2022-08-30 | End: 2022-09-01

## 2022-08-30 RX ADMIN — LORAZEPAM 1 MG: 1 TABLET ORAL at 08:30

## 2022-08-30 RX ADMIN — CLOZAPINE 100 MG: 100 TABLET ORAL at 20:48

## 2022-08-30 RX ADMIN — PREGABALIN 100 MG: 25 CAPSULE ORAL at 13:36

## 2022-08-30 RX ADMIN — BUPROPION HYDROCHLORIDE 450 MG: 300 TABLET, EXTENDED RELEASE ORAL at 08:30

## 2022-08-30 RX ADMIN — MEMANTINE 10 MG: 10 TABLET ORAL at 08:30

## 2022-08-30 RX ADMIN — Medication 2 PATCH: at 20:46

## 2022-08-30 RX ADMIN — LORAZEPAM 0.5 MG: 0.5 TABLET ORAL at 11:44

## 2022-08-30 RX ADMIN — MULTIPLE VITAMINS W/ MINERALS TAB 1 TABLET: TAB at 08:30

## 2022-08-30 RX ADMIN — PREGABALIN 100 MG: 25 CAPSULE ORAL at 20:48

## 2022-08-30 RX ADMIN — LORAZEPAM 1 MG: 1 TABLET ORAL at 13:36

## 2022-08-30 RX ADMIN — LORAZEPAM 0.5 MG: 0.5 TABLET ORAL at 17:02

## 2022-08-30 RX ADMIN — MEMANTINE 10 MG: 10 TABLET ORAL at 20:48

## 2022-08-30 RX ADMIN — PREGABALIN 100 MG: 25 CAPSULE ORAL at 08:30

## 2022-08-30 RX ADMIN — LORAZEPAM 1 MG: 1 TABLET ORAL at 20:48

## 2022-08-30 RX ADMIN — LITHIUM CARBONATE 450 MG: 450 TABLET, EXTENDED RELEASE ORAL at 20:48

## 2022-08-30 ASSESSMENT — ACTIVITIES OF DAILY LIVING (ADL)
LAUNDRY: UNABLE TO COMPLETE
ADLS_ACUITY_SCORE: 42
DRESS: INDEPENDENT;SCRUBS (BEHAVIORAL HEALTH)
DRESS: INDEPENDENT
ADLS_ACUITY_SCORE: 42
ORAL_HYGIENE: INDEPENDENT
ADLS_ACUITY_SCORE: 42
ORAL_HYGIENE: INDEPENDENT
HYGIENE/GROOMING: INDEPENDENT
HYGIENE/GROOMING: INDEPENDENT

## 2022-08-30 NOTE — PLAN OF CARE
Face to face shift report received from night shift RN. Rounding completed, pt observed.    Problem: Behavioral Health Plan of Care  Goal: Patient-Specific Goal (Individualization)  Description: Patient will report at least 6-8 hours of sleep each night.   Patient will complete all ADL's independently while on the unit.   Patient will be compliant with treatment team recommendations.     Pt will eat at least 50% of meals and have enough fluid intake.  Outcome: Ongoing, Progressing  Note: Shift Summary:  Patient has been up on the unit this shift.  Patient is restless and wanting to know if [facility] has accepted him or not. NP awaiting return call from their nurse. Patient is visibly restless.  Hand tremors are visible.  States he just 'wants to leave the hospital.  Compliant with medications.  No physical complaints but expresses a lot of frustration.  Face sheet and last labs faxed to [facility]. He can easily make his needs known to staff.     Problem: Thought Process Alteration  Goal: Optimal Thought Clarity  Description: Patient will hold reality based conversations while on the unit.     Outcome: Ongoing, Progressing  Face to face report will be communicated to oncoming RN.    Alma Delia Garsia RN  8/30/2022

## 2022-08-30 NOTE — PLAN OF CARE
Problem: Behavioral Health Plan of Care  Goal: Patient-Specific Goal (Individualization)  Description: Patient will report at least 6-8 hours of sleep each night.   Patient will complete all ADL's independently while on the unit.   Patient will be compliant with treatment team recommendations.     Pt will eat at least 50% of meals and have enough fluid intake.  Outcome: Ongoing, Progressing     Problem: Fall Injury Risk  Goal: Absence of Fall and Fall-Related Injury  Outcome: Ongoing, Progressing   Goal Outcome Evaluation:        Face to face shift report received from RN. Rounding completed, pt observed.Client rested in room with eyes closed and respirations noted for 7 hours. No falls this shift. Face to face report will be communicated to oncoming RN.    Didier Brooks RN  8/30/2022  6:27 AM

## 2022-08-30 NOTE — PLAN OF CARE
"Spoke with pt this afternoon. Pt is anxious and states \"annoyed\" with [facility] for not responding fast enough. \"They are probably out golfing\". Let pt know this writer will have their email on all night incase they try and contact. Pt states he wants to go to [facility] tomorrow afternoon if nothing is heard by then for [place].   "

## 2022-08-30 NOTE — PLAN OF CARE
SHIFT SUMMARY:  Anxious and cooperative. Wants to discharge to [facility] assisted living, or a homeless shelter. Pacing the [facility]co. Denies pain, auditory or visual hallucinations, suicidal or homicidal ideation. Frustrated and anxious related to the discharge process.  Taking medications as prescribed, compliant with treatment team recommendations, eating 50% of meals and has adequate fluid intake. Flat affect, but occasionally engages in conversation.     Problem: Behavioral Health Plan of Care  Goal: Patient-Specific Goal (Individualization)  Description: Patient will report at least 6-8 hours of sleep each night.   Patient will complete all ADL's independently while on the unit.   Patient will be compliant with treatment team recommendations.     Pt will eat at least 50% of meals and have enough fluid intake.  Outcome: Ongoing, Progressing  Goal: Absence of New-Onset Illness or Injury  Outcome: Ongoing, Progressing     Problem: Thought Process Alteration  Goal: Optimal Thought Clarity  Description: Patient will hold reality based conversations while on the unit.     Outcome: Ongoing, Progressing     Problem: Suicide Risk  Goal: Absence of Self-Harm  Outcome: Ongoing, Progressing     Problem: Suicidal Behavior  Goal: Suicidal Behavior is Absent or Managed  Outcome: Ongoing, Progressing     Problem: Fall Injury Risk  Goal: Absence of Fall and Fall-Related Injury  Outcome: Ongoing, Progressing   Goal Outcome Evaluation:    Plan of Care Reviewed With: patient

## 2022-08-30 NOTE — PLAN OF CARE
SHIFT SUMMARY:  Slightly irritable the first half of this shift related to wanting to discharge. Denies auditory or visual hallucinations, and suicidal or homicidal ideation. Experiencing anxiety, depression because he is ready to discharge. Pain rated 4/10 for back pain; Administered PRN PO acetaminophen 650 mg; Effective. Walked the halls, or sat in the lounge alone. Not attending group. Affect is more animated.       Problem: Behavioral Health Plan of Care  Goal: Patient-Specific Goal (Individualization)  Description: Patient will report at least 6-8 hours of sleep each night.   Patient will complete all ADL's independently while on the unit.   Patient will be compliant with treatment team recommendations.     Pt will eat at least 50% of meals and have enough fluid intake.  Outcome: Ongoing, Progressing  Goal: Absence of New-Onset Illness or Injury  Outcome: Ongoing, Progressing  Intervention: Identify and Manage Fall Risk  Recent Flowsheet Documentation  Taken 8/29/2022 2100 by Adithya Villalobos, RN  Safety Measures:   environmental rounds completed   safety rounds completed   suicide assessment completed     Problem: Thought Process Alteration  Goal: Optimal Thought Clarity  Description: Patient will hold reality based conversations while on the unit.     Outcome: Ongoing, Progressing     Problem: Suicide Risk  Goal: Absence of Self-Harm  Outcome: Ongoing, Progressing  Intervention: Promote Psychosocial Wellbeing  Recent Flowsheet Documentation  Taken 8/29/2022 2100 by Adithya Villalobos, RN  Family/Support System Care: self-care encouraged     Problem: Suicidal Behavior  Goal: Suicidal Behavior is Absent or Managed  Outcome: Ongoing, Progressing     Problem: Fall Injury Risk  Goal: Absence of Fall and Fall-Related Injury  Outcome: Ongoing, Progressing   Goal Outcome Evaluation:    Plan of Care Reviewed With: patient

## 2022-08-31 PROCEDURE — 99232 SBSQ HOSP IP/OBS MODERATE 35: CPT | Performed by: NURSE PRACTITIONER

## 2022-08-31 PROCEDURE — 250N000013 HC RX MED GY IP 250 OP 250 PS 637: Performed by: NURSE PRACTITIONER

## 2022-08-31 PROCEDURE — 250N000013 HC RX MED GY IP 250 OP 250 PS 637: Performed by: PSYCHIATRY & NEUROLOGY

## 2022-08-31 PROCEDURE — 124N000001 HC R&B MH

## 2022-08-31 RX ADMIN — LORAZEPAM 1 MG: 1 TABLET ORAL at 08:13

## 2022-08-31 RX ADMIN — Medication 2 PATCH: at 20:11

## 2022-08-31 RX ADMIN — CLOZAPINE 100 MG: 100 TABLET ORAL at 20:05

## 2022-08-31 RX ADMIN — LORAZEPAM 1 MG: 1 TABLET ORAL at 20:05

## 2022-08-31 RX ADMIN — ACETAMINOPHEN 325MG 650 MG: 325 TABLET ORAL at 16:56

## 2022-08-31 RX ADMIN — MULTIPLE VITAMINS W/ MINERALS TAB 1 TABLET: TAB at 08:10

## 2022-08-31 RX ADMIN — MEMANTINE 10 MG: 10 TABLET ORAL at 08:11

## 2022-08-31 RX ADMIN — BUPROPION HYDROCHLORIDE 450 MG: 300 TABLET, EXTENDED RELEASE ORAL at 08:10

## 2022-08-31 RX ADMIN — LORAZEPAM 0.5 MG: 0.5 TABLET ORAL at 11:17

## 2022-08-31 RX ADMIN — PREGABALIN 100 MG: 25 CAPSULE ORAL at 14:03

## 2022-08-31 RX ADMIN — HYDROXYZINE HYDROCHLORIDE 50 MG: 25 TABLET, FILM COATED ORAL at 16:56

## 2022-08-31 RX ADMIN — PREGABALIN 100 MG: 25 CAPSULE ORAL at 08:11

## 2022-08-31 RX ADMIN — LITHIUM CARBONATE 450 MG: 450 TABLET, EXTENDED RELEASE ORAL at 20:05

## 2022-08-31 RX ADMIN — LORAZEPAM 0.5 MG: 0.5 TABLET ORAL at 16:46

## 2022-08-31 RX ADMIN — PREGABALIN 100 MG: 25 CAPSULE ORAL at 20:05

## 2022-08-31 RX ADMIN — LORAZEPAM 1 MG: 1 TABLET ORAL at 14:03

## 2022-08-31 RX ADMIN — MEMANTINE 10 MG: 10 TABLET ORAL at 20:05

## 2022-08-31 ASSESSMENT — ACTIVITIES OF DAILY LIVING (ADL)
ADLS_ACUITY_SCORE: 42
DRESS: INDEPENDENT
ADLS_ACUITY_SCORE: 42
HYGIENE/GROOMING: INDEPENDENT
ORAL_HYGIENE: INDEPENDENT
ADLS_ACUITY_SCORE: 42

## 2022-08-31 NOTE — PLAN OF CARE
Problem: Behavioral Health Plan of Care  Goal: Patient-Specific Goal (Individualization)  Description: Patient will report at least 6-8 hours of sleep each night.   Patient will complete all ADL's independently while on the unit.   Patient will be compliant with treatment team recommendations.     Pt will eat at least 50% of meals and have enough fluid intake.  Outcome: Ongoing, Progressing     Problem: Fall Injury Risk  Goal: Absence of Fall and Fall-Related Injury  Outcome: Ongoing, Progressing   Goal Outcome Evaluation:      Face to face shift report received from RN. Rounding completed, pt observed.Client rested in room for 7 hours with eyes closed and respirations noted.  Client had no falls this shift.Face to face report will be communicated to oncoming RN.    Didier Brooks RN  8/31/2022  6:23 AM

## 2022-08-31 NOTE — PLAN OF CARE
"Face to face shift report received from Didier CORDOVA RN. Rounding completed, pt observed.    Problem: Behavioral Health Plan of Care  Goal: Patient-Specific Goal (Individualization)  Description: Patient will report at least 6-8 hours of sleep each night.   Patient will complete all ADL's independently while on the unit.   Patient will be compliant with treatment team recommendations.     Pt will eat at least 50% of meals and have enough fluid intake.  Outcome: Ongoing, Progressing  Note: Shift Summary:  Patient up in room at the start of this shift.  Expresses that if he does not hear anything from [facility] that he would like to discharge today.  \"Ill give it till like 2:30 then I will go to the [facility]\".  Patient states that he knows that it due to things he has done in the past as to why places won't take me\". Patient visible restless as walking more in the hallway and tapping table when sitting.  Patient now aware that [facility] staff will be here to interview him tomorrow so less restless. Compliant with meds.  Can easily make his needs known to staff.     Problem: Thought Process Alteration  Goal: Optimal Thought Clarity  Description: Patient will hold reality based conversations while on the unit.     Outcome: Ongoing, Progressing     Problem: Fall Injury Risk  Goal: Absence of Fall and Fall-Related Injury  Outcome: Ongoing, Progressing  Face to face report will be communicated to oncoming RN.    Alma Delia Garsia RN  8/31/2022                    "

## 2022-08-31 NOTE — PLAN OF CARE
Face to face shift report received from TRAE Flannery.       Problem: Behavioral Health Plan of Care  Goal: Patient-Specific Goal (Individualization)  Description: Patient will report at least 6-8 hours of sleep each night.   Patient will complete all ADL's independently while on the unit.   Patient will be compliant with treatment team recommendations.   Pt will eat at least 50% of meals and have enough fluid intake.  Outcome: Ongoing, Progressing   Calm and cooperative. Medication compliant. Reports anxiety and frustration due to continued hospitalization. Requested and received Hydroxyzine 50mg at 1646. Flat affect. Pleasant during conversation. Spends time in lounge area. Social and appropriate with others. Reports 4/10 back pain, received Tylenol 650mg at 1646.  Two lidocaine patches placed at HS, lower and mid back.   Problem: Thought Process Alteration  Goal: Optimal Thought Clarity  Description: Patient will hold reality based conversations while on the unit.   Outcome: Ongoing, Progressing       Problem: Suicide Risk  Goal: Absence of Self-Harm  Outcome: Ongoing, Progressing   Free from self harm or injury this shift.     Problem: Fall Injury Risk  Goal: Absence of Fall and Fall-Related Injury  Outcome: Ongoing, Progressing   Free from falls or injury this shift.     Face to face end of shift report to be communicated to oncoming RN.

## 2022-08-31 NOTE — PROGRESS NOTES
"  [facility] PSYCHIATRY  -  PROGRESS NOTE     ID   Name: Steven Carter  MRN#: [id number]     SUBJECTIVE       Steven is doing very well despite being a bit frustrated that he is stable and still in the hospital.  He makes comments about \"all of the shit I have done in my past makes me look so bad\".  He is aware there is a possibility [facility] may still be excepting him.  We have not yet heard anything back from them today other than that the nurse had questions and was hoping to speak with somebody the hospital.  I did leave a message on her phone this morning as well as after 330 today.  It sounded as though they wanted questions regarding what type of follow-up care and supportive services he has.  He has followed up with his outpatient nurse practitioner, Nuno Hendrix, quite well though has lived in [facility] and had assistance with transportation at that time.  Today he told me \"I am getting really mad that I am still here.  I am doing good.  I got so mad that I yelled the word fuck in the lounge today pretty loud\".  He stated he did not yell it at anyone else or directed anyone.  Overall his moods have improved significantly.  He is still smiling and laughing and even being frustrated today he was still laughing at times at appropriate times.  I asked more about any current or past hallucinations or delusions.  He still tells me that the only time he ever heard or saw anything was when he was high on methamphetamines 3 years ago when he was first admitted to the hospital though states it was methamphetamine induced.  He still makes strange comments that are a little strange and possibly religiously or spiritually preoccupied.  He talks about chakras and reading a very large book about chakras and needing to meet the woman that wrote the book.  He asked me if I knew much about chakras and I told him no and he then appeared less comfortable talking to me about this as I do believe it has been a " "preoccupation versus delusion at times.    He did ask me multiple times \"if I have to go to homeless shelter, how will I get the medications and lab done\" it was good to see he was thinking about both this visit shows me he has some intent on staying on the medications if he goes to a homeless shelter.    If he does go to a shelter there is a high possibility that [facility] could meet him at the shelter.  They have done this for people in the past from my understanding.  We would have to speak with them about this.    I did tell him that if he is excepted to [facility] but cannot get there for a few days I would be extremely uncomfortable discharging him to a shelter.  If he does go to a shelter he is at very high risk of abruptly stopping clozapine and the rest of his medications including lorazepam which could potentially be life-threatening.  I did tell him that if it is only a few days until he can moved to [facility] I would want him to wait here.  He did not like to hear this but did state that it was the safest option and gave him best chances of staying stable.  He does state that he feels much better and it seems as though he is starting to believe that the medications are what is helping him feel so much better    Clozaril was sent to [facility] so was labs and lab order.  Also sent to [facility] for 2-week supply.     MEDICATIONS   Scheduled Meds:    buPROPion  450 mg Oral Daily     cloZAPine  100 mg Oral At Bedtime     lidocaine  2 patch Transdermal Q24h    And     lidocaine   Transdermal Q8H LEANDER     lithium  450 mg Oral At Bedtime     LORazepam  0.5 mg Oral Daily     LORazepam  0.5 mg Oral Daily before supper     LORazepam  1 mg Oral TID     memantine  10 mg Oral BID     multivitamin w/minerals  1 tablet Oral Daily     pregabalin  100 mg Oral TID     vitamin D2  50,000 Units Oral Q7 Days     PRN Meds:.acetaminophen, alum & mag hydroxide-simethicone, docusate sodium, hydrOXYzine, ondansetron, sulindac     ALLERGIES " "  Allergies   Allergen Reactions     Pork Allergy         VITALS   Vitals: /74   Pulse 79   Temp 98.8  F (37.1  C) (Tympanic)   Resp 16   Wt 85.2 kg (187 lb 14.4 oz)   SpO2 99%   BMI 25.48 kg/m       MENTAL STATUS EXAM     Appearance: Hair and beard are over grown-pt is clean/showered. Awake, alert, dressed in hospital scrubs  Attitude:  cooperative   Eye Contact: Improving  Mood:   \"I feel like I have ambition again\"  Affect: Smiles at times and laughs at times  Speech: , engaging in more conversation initiates conversation asks appropriate questions  Psychomotor Behavior:  no evidence of tardive dyskinesia, dystonia, or tics, lithium tremor improved  Thought Process: Active goal-directed  Associations: no elsy noted  Thought Content:  no evidence of suicidal ideation or homicidal ideation and no evidence of psychotic thought, denies hallucinations  Insight: improving  Judgment: improving.  Oriented to:  time, person, and place  Attention Span and Concentration:  limited  Recent and Remote Memory:  limited  Fund of Knowledge: Low  Muscle Strength and Tone: normal  Gait and Station: normal        LABS   No results found for this or any previous visit (from the past 24 hour(s)).      ASSESSMENT   This is a 31 year old male with a PMH of schizoaffective disorder, polysubstance abuse, traumatic brain injury with coma, and a near fatal overdose with cardiac arrest who presented in a severe depressive episode with catatonia, occurring in the context of medication changes (lorazepam being discontinued) and the patient having severe malnutrition due to poor intake.     The patient has improved dramatically since starting Clozapine and resuming Ativan with his catatonia improving, his engagement with the world, speech, and ability to interact with peers. The patient has improved to the point that he would benefit from discharge to a facility as soon as available with his medication regimen to continue    "     DIAGNOSTIC FORMULATION   1. Schizoaffective disorder, depressive type, multiple episodes, currently in acute episode, with catatonia   2. 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 Multiple other TBIs  3. 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 Methamphetamine use disorder, moderate, in early remission  5. Alcohol use disorder, moderate     PLAN     Location: Unit 5  Legal Status: Orders Placed This Encounter      Legal status Voluntary    Safety Assessment:    Behavioral Orders   Procedures     Code 1 - Restrict to Unit     Routine Programming     As clinically indicated     Status 15     Every 15 minutes.     PTA medications stopped    -Wellbutrin originally due to starting Clozapine and risk of seizures, resumed with patient aware of risks on 8/12  -Abilify due to not being effective    PTA medications continued/changed:     -Lithium 900 mg at bedtime reduced to 450 mg at bedtime to address tremor with improvement  -Namenda 10 mg BID  -Lyrica 100 mg TID  -Clonazepam restarted at 1 mg TID- switched to Lorazepam 1 mg TID on 7/23. Added 0.5 mg before supper on 8/15  -Wellbutrin  mg daily     New medications tried and stopped:      -None     New medications initiated:      -Clozapine 12.5 mg at bedtime on 7/19.-> Increase to 25 mg at bedtime on 7/20 -> 50 mg on 7/23 -> 75 mg on 7/24 ->100 mg on 7/26 -> 125 mg on 7/28 -> 150 mg on 7/30 -> 175 mg on 7/31 --> 200 mg on 08/01 -> 175 mg on 8/2 -> 150 mg on 8/3 -> 125 mg on 8/19 -> 100 mg on 8/23  -Vitamin D2 50,000 units weekly x 8 weeks     Today's Changes:    Will contact [facility]  Did try to call [facility] and left message with [facility] they have not yet returned call.  No changes in medications  ANC was reviewed and unremarkable    Programming: Patient will be treated in a therapeutic milieu with appropriate individual and group therapies. Education will be provided on diagnoses,  medications, and treatments. Encouraged behavioral activation and participation in group programming.     Medical diagnoses:      #. URI, resolved  - Suspect URI. Myocarditis ruled out  - CRP mildly elevated. Mild leukocytosis. Normal troponin. Rest of labs reassuring. Negative COVID. EKG shows sinus tach, no new findings. Echo is normal.  - Analgesics for fever. Working  - Zofran prn  - Reduced Clozapine    #. Severe malnutrition, resolved  - Nutrition consulted  - 27 pound weight gain since admission    #. Sialorrhea  #. Enuresis  - Secondary to Clozapine  - Reduced dose with improvement. Reducing again to see if helps    Consults: none today  Tests: None. Weekly CBC reviewed and unremarkable    Disposition: High possibility he will be accepted to [facility] tomorrow.  If he does not go to [facility] he is going to discharge to homeless shelter preferably [facility] or the [facility] though likely will end up going to [facility] if those 2 do not have openings       ATTESTATION    Ashely Heaton NP

## 2022-08-31 NOTE — PROGRESS NOTES
[facility] PSYCHIATRY  -  PROGRESS NOTE     ID   Name: Steven Carter  MRN#: [id number]     SUBJECTIVE     Steven is sitting up in the lounge when I see him today. He appears anxious, tapping his fingers on the table while we talk. He states he is anxious to hear if [facility] will accept him. If he is declined he states that he would like to go to a homeless shelter in [place]. He reports that he feels stable and he is actually doing quite well compared to admission. He does voice concern about getting his medications and lab draws. We reviewed that we would first see what [facility]'s response is, then can work on a plan for getting medications sent to [place]. He does agree with this. I was informed later in the day that [facility] plans to come meet with the patient tomorrow, which patient is happy to hear. He agrees to participate in the interview as he does want to find placement. He would benefit greatly from a structured setting to help support sobriety and that can help him manage his medications.       Clozaril was sent to [facility] along with lab order. Also sent to [facility] for 2-week supply.     MEDICATIONS   Scheduled Meds:    buPROPion  450 mg Oral Daily     cloZAPine  100 mg Oral At Bedtime     lidocaine  2 patch Transdermal Q24h    And     lidocaine   Transdermal Q8H LEANDER     lithium  450 mg Oral At Bedtime     LORazepam  0.5 mg Oral Daily     LORazepam  0.5 mg Oral Daily before supper     LORazepam  1 mg Oral TID     memantine  10 mg Oral BID     multivitamin w/minerals  1 tablet Oral Daily     pregabalin  100 mg Oral TID     vitamin D2  50,000 Units Oral Q7 Days     PRN Meds:.acetaminophen, alum & mag hydroxide-simethicone, docusate sodium, hydrOXYzine, ondansetron, sulindac     ALLERGIES   Allergies   Allergen Reactions     Pork Allergy         VITALS   Vitals: /62   Pulse 89   Temp 97.7  F (36.5  C) (Temporal)   Resp 16   Wt 85.2 kg (187 lb 14.4 oz)   SpO2 100%   BMI 25.48 kg/m        MENTAL STATUS EXAM     Appearance: Hair and beard are over grown-pt is clean/showered. Awake, alert, dressed in hospital scrubs  Attitude:  cooperative, pleasant  Eye Contact: better  Mood:  feeling anxious today regarding discharge planning, reports feeling stable overall  Affect: has improved  Speech: clear, coherent, more spontaneous, fluent conversation  Psychomotor Behavior:  no evidence of tardive dyskinesia, dystonia, or tics, lithium tremor improved  Thought Process: linear, goal-directed  Associations: no AMBER noted  Thought Content:  no evidence of suicidal ideation or homicidal ideation and no evidence of psychotic thought, denies hallucinations  Insight: improving  Judgment: improving.  Oriented to:  time, person, and place  Attention Span and Concentration:  limited  Recent and Remote Memory:  limited  Fund of Knowledge: Low  Muscle Strength and Tone: normal  Gait and Station: normal        LABS   No results found for this or any previous visit (from the past 24 hour(s)).      ASSESSMENT   This is a 31 year old male with a PMH of schizoaffective disorder, polysubstance abuse, traumatic brain injury with coma, and a near fatal overdose with cardiac arrest who presented in a severe depressive episode with catatonia, occurring in the context of medication changes (lorazepam being discontinued) and the patient having severe malnutrition due to poor intake.     The patient has improved dramatically since starting Clozapine and resuming Ativan with his catatonia improving, his engagement with the world, speech, and ability to interact with peers. The patient has improved to the point that he would benefit from discharge to a facility as soon as available with his medication regimen to continue        DIAGNOSTIC FORMULATION   1. Schizoaffective disorder, depressive type, multiple episodes, currently in acute episode, with catatonia   2. TBI with LOC and coma at age 3 with significant rehabilitation  required. Multiple other TBIs  3. 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 Methamphetamine use disorder, moderate, in early remission  5. Alcohol use disorder, moderate     PLAN     Location: Unit 5  Legal Status: Orders Placed This Encounter      Legal status Voluntary    Safety Assessment:    Behavioral Orders   Procedures     Code 1 - Restrict to Unit     Routine Programming     As clinically indicated     Status 15     Every 15 minutes.     PTA medications stopped    -Wellbutrin originally due to starting Clozapine and risk of seizures, resumed with patient aware of risks on 8/12  -Abilify due to not being effective    PTA medications continued/changed:     -Lithium 900 mg at bedtime reduced to 450 mg at bedtime to address tremor with improvement  -Namenda 10 mg BID  -Lyrica 100 mg TID  -Clonazepam restarted at 1 mg TID- switched to Lorazepam 1 mg TID on 7/23. Added 0.5 mg before supper on 8/15  -Wellbutrin  mg daily     New medications tried and stopped:      -None     New medications initiated:      -Clozapine 12.5 mg at bedtime on 7/19.-> Increase to 25 mg at bedtime on 7/20 -> 50 mg on 7/23 -> 75 mg on 7/24 ->100 mg on 7/26 -> 125 mg on 7/28 -> 150 mg on 7/30 -> 175 mg on 7/31 --> 200 mg on 08/01 -> 175 mg on 8/2 -> 150 mg on 8/3 -> 125 mg on 8/19 -> 100 mg on 8/23  -Vitamin D2 50,000 units weekly x 8 weeks     Today's Changes:  -[facility] will come and meet with patient on the unit tomorrow  -Will contact [facility] once discharge location is solidified  -No changes in medications      Programming: Patient will be treated in a therapeutic milieu with appropriate individual and group therapies. Education will be provided on diagnoses, medications, and treatments. Encouraged behavioral activation and participation in group programming.     Medical diagnoses:      #. URI, resolved  - Suspect URI. Myocarditis ruled out  - CRP mildly elevated. Mild  leukocytosis. Normal troponin. Rest of labs reassuring. Negative COVID. EKG shows sinus tach, no new findings. Echo is normal.  - Analgesics for fever. Working  - Zofran prn  - Reduced Clozapine    #. Severe malnutrition, resolved  - Nutrition consulted  - 27 pound weight gain since admission    #. Sialorrhea  #. Enuresis  - Secondary to Clozapine  - Reduced dose with improvement. Reducing again to see if helps    Consults: none today  Tests: None. Weekly CBC reviewed and unremarkable    Disposition: Possibly [facility]- will have in person interview on unit tomorrow. Will either plan to discharge to [facility] or likely homeless shelter, preferably [facility] or [facility] shelter though may end up at [facility] in [place]. Once disposition is known, will coordinate medications and labs.       ATTESTATION    Christelle Contreras NP

## 2022-09-01 PROCEDURE — 250N000013 HC RX MED GY IP 250 OP 250 PS 637: Performed by: NURSE PRACTITIONER

## 2022-09-01 PROCEDURE — 99232 SBSQ HOSP IP/OBS MODERATE 35: CPT | Performed by: NURSE PRACTITIONER

## 2022-09-01 PROCEDURE — 124N000001 HC R&B MH

## 2022-09-01 PROCEDURE — 250N000013 HC RX MED GY IP 250 OP 250 PS 637: Performed by: PSYCHIATRY & NEUROLOGY

## 2022-09-01 RX ORDER — ACETAMINOPHEN 325 MG/1
650 TABLET ORAL EVERY 4 HOURS PRN
Status: ON HOLD | COMMUNITY
Start: 2022-09-01 | End: 2023-02-22

## 2022-09-01 RX ORDER — HYDROXYZINE HYDROCHLORIDE 50 MG/1
50 TABLET, FILM COATED ORAL EVERY 6 HOURS PRN
Qty: 90 TABLET | Refills: 1 | Status: SHIPPED | OUTPATIENT
Start: 2022-09-01 | End: 2023-02-22

## 2022-09-01 RX ORDER — CLOZAPINE 100 MG/1
100 TABLET ORAL AT BEDTIME
Qty: 7 TABLET | Refills: 0 | Status: SHIPPED | OUTPATIENT
Start: 2022-09-01 | End: 2022-09-15

## 2022-09-01 RX ORDER — CLOZAPINE 100 MG/1
100 TABLET ORAL AT BEDTIME
Qty: 30 TABLET | Refills: 0 | Status: SHIPPED | OUTPATIENT
Start: 2022-09-01

## 2022-09-01 RX ORDER — LITHIUM CARBONATE 450 MG
450 TABLET, EXTENDED RELEASE ORAL AT BEDTIME
Qty: 30 TABLET | Refills: 1 | Status: CANCELLED | OUTPATIENT
Start: 2022-09-01

## 2022-09-01 RX ORDER — BUPROPION HYDROCHLORIDE 150 MG/1
150 TABLET ORAL EVERY MORNING
Qty: 30 TABLET | Refills: 1 | Status: SHIPPED | OUTPATIENT
Start: 2022-09-01

## 2022-09-01 RX ORDER — MULTIPLE VITAMINS W/ MINERALS TAB 9MG-400MCG
1 TAB ORAL DAILY
Qty: 30 TABLET | Refills: 1 | Status: CANCELLED | OUTPATIENT
Start: 2022-09-02

## 2022-09-01 RX ORDER — MEMANTINE HYDROCHLORIDE 10 MG/1
10 TABLET ORAL 2 TIMES DAILY
Qty: 60 TABLET | Refills: 1 | Status: SHIPPED | OUTPATIENT
Start: 2022-09-01

## 2022-09-01 RX ORDER — LITHIUM CARBONATE 450 MG
450 TABLET, EXTENDED RELEASE ORAL AT BEDTIME
Qty: 30 TABLET | Refills: 1 | Status: SHIPPED | OUTPATIENT
Start: 2022-09-01

## 2022-09-01 RX ORDER — BUPROPION HYDROCHLORIDE 300 MG/1
300 TABLET ORAL EVERY MORNING
Qty: 30 TABLET | Refills: 1 | Status: SHIPPED | OUTPATIENT
Start: 2022-09-01

## 2022-09-01 RX ORDER — LORAZEPAM 1 MG/1
1 TABLET ORAL 3 TIMES DAILY
Qty: 90 TABLET | Refills: 1 | Status: SHIPPED | OUTPATIENT
Start: 2022-09-01 | End: 2023-02-22

## 2022-09-01 RX ORDER — DOCUSATE SODIUM 100 MG/1
100 CAPSULE, LIQUID FILLED ORAL 2 TIMES DAILY PRN
Qty: 30 CAPSULE | Refills: 0 | Status: SHIPPED | OUTPATIENT
Start: 2022-09-01

## 2022-09-01 RX ORDER — LORAZEPAM 0.5 MG/1
0.5 TABLET ORAL 2 TIMES DAILY
Qty: 60 TABLET | Refills: 1 | Status: SHIPPED | OUTPATIENT
Start: 2022-09-01 | End: 2023-02-22

## 2022-09-01 RX ORDER — LIDOCAINE 4 G/G
2 PATCH TOPICAL EVERY 24 HOURS
Qty: 30 PATCH | Status: ON HOLD | COMMUNITY
Start: 2022-09-01 | End: 2023-02-22

## 2022-09-01 RX ORDER — MULTIPLE VITAMINS W/ MINERALS TAB 9MG-400MCG
1 TAB ORAL DAILY
Qty: 30 TABLET | Refills: 1 | Status: ON HOLD | OUTPATIENT
Start: 2022-09-02 | End: 2023-02-22

## 2022-09-01 RX ORDER — MEMANTINE HYDROCHLORIDE 10 MG/1
10 TABLET ORAL 2 TIMES DAILY
Qty: 60 TABLET | Refills: 1 | Status: CANCELLED | OUTPATIENT
Start: 2022-09-01

## 2022-09-01 RX ORDER — ERGOCALCIFEROL 1.25 MG/1
50000 CAPSULE, LIQUID FILLED ORAL
Qty: 2 CAPSULE | Refills: 0 | Status: ON HOLD | OUTPATIENT
Start: 2022-09-04 | End: 2023-02-22

## 2022-09-01 RX ORDER — PREGABALIN 100 MG/1
100 CAPSULE ORAL 3 TIMES DAILY
Qty: 90 CAPSULE | Refills: 1 | Status: SHIPPED | OUTPATIENT
Start: 2022-09-01 | End: 2023-02-22

## 2022-09-01 RX ORDER — PREGABALIN 100 MG/1
100 CAPSULE ORAL 3 TIMES DAILY
Qty: 90 CAPSULE | Refills: 1 | Status: CANCELLED | OUTPATIENT
Start: 2022-09-01

## 2022-09-01 RX ADMIN — LORAZEPAM 0.5 MG: 0.5 TABLET ORAL at 11:05

## 2022-09-01 RX ADMIN — HYDROXYZINE HYDROCHLORIDE 50 MG: 25 TABLET, FILM COATED ORAL at 18:03

## 2022-09-01 RX ADMIN — LITHIUM CARBONATE 450 MG: 450 TABLET, EXTENDED RELEASE ORAL at 20:17

## 2022-09-01 RX ADMIN — PREGABALIN 100 MG: 25 CAPSULE ORAL at 08:31

## 2022-09-01 RX ADMIN — PREGABALIN 100 MG: 25 CAPSULE ORAL at 20:16

## 2022-09-01 RX ADMIN — PREGABALIN 100 MG: 25 CAPSULE ORAL at 14:00

## 2022-09-01 RX ADMIN — LORAZEPAM 1 MG: 1 TABLET ORAL at 14:00

## 2022-09-01 RX ADMIN — MEMANTINE 10 MG: 10 TABLET ORAL at 08:31

## 2022-09-01 RX ADMIN — BUPROPION HYDROCHLORIDE 450 MG: 300 TABLET, EXTENDED RELEASE ORAL at 08:31

## 2022-09-01 RX ADMIN — LORAZEPAM 0.5 MG: 0.5 TABLET ORAL at 16:54

## 2022-09-01 RX ADMIN — LORAZEPAM 1 MG: 1 TABLET ORAL at 20:17

## 2022-09-01 RX ADMIN — MEMANTINE 10 MG: 10 TABLET ORAL at 20:16

## 2022-09-01 RX ADMIN — LORAZEPAM 1 MG: 1 TABLET ORAL at 08:31

## 2022-09-01 RX ADMIN — ACETAMINOPHEN 325MG 650 MG: 325 TABLET ORAL at 18:03

## 2022-09-01 RX ADMIN — CLOZAPINE 100 MG: 100 TABLET ORAL at 20:17

## 2022-09-01 RX ADMIN — MULTIPLE VITAMINS W/ MINERALS TAB 1 TABLET: TAB at 08:31

## 2022-09-01 ASSESSMENT — ACTIVITIES OF DAILY LIVING (ADL)
DRESS: INDEPENDENT
ADLS_ACUITY_SCORE: 42
ORAL_HYGIENE: INDEPENDENT
ADLS_ACUITY_SCORE: 42
HYGIENE/GROOMING: INDEPENDENT

## 2022-09-01 NOTE — PLAN OF CARE
"Face to face shift report received from Prisca CARDOZA RN. Rounding completed, pt observed.    Problem: Behavioral Health Plan of Care  Goal: Patient-Specific Goal (Individualization)  Description: Patient will report at least 6-8 hours of sleep each night.   Patient will complete all ADL's independently while on the unit.   Patient will be compliant with treatment team recommendations.     Pt will eat at least 50% of meals and have enough fluid intake.  Outcome: Ongoing, Progressing  Note: Shift Summary:  Patient was awake in his room at the start of this shift. Patient up and showered after breakfast.  Remains visibly anxious about meeting with staff from [facility] later today as finger tapping and foot tapping observed.  Denies pain.  Met with staff from [facility] around lunch time.Stated \"I did the best that I could\" after meeting.  Encouraged that he did all he could. Feeling hopeful.  Gait is balanced and steady.  Denies unwanted side effects.     Problem: Thought Process Alteration  Goal: Optimal Thought Clarity  Description: Patient will hold reality based conversations while on the unit.     Outcome: Ongoing, Progressing     Problem: Fall Injury Risk  Goal: Absence of Fall and Fall-Related Injury  Outcome: Ongoing, Progressing  Face to face report will be communicated to oncoming RN.    Alma Delia Garsia RN  9/1/2022                    "

## 2022-09-01 NOTE — PLAN OF CARE
Pt had their interview with [facility] today. Pt is accepted to [facility] for 9/2 with admission before noon.    Scheduled med management appoint (see discharge).  Requested therapy appointment with Stanislav (see discharge instruction).     Called Susana turneri to schedule ride for tomorrow, they state they can do it no problem and to just call in the morning when he is ready.     Emailed Danitza @ [facility] appointment schedule.

## 2022-09-01 NOTE — PLAN OF CARE
Problem: Behavioral Health Plan of Care  Goal: Patient-Specific Goal (Individualization)  Description: Patient will report at least 6-8 hours of sleep each night.   Patient will complete all ADL's independently while on the unit.   Patient will be compliant with treatment team recommendations.     Pt will eat at least 50% of meals and have enough fluid intake.  Outcome: Ongoing, Progressing     Problem: Fall Injury Risk  Goal: Absence of Fall and Fall-Related Injury  Outcome: Ongoing, Progressing   Goal Outcome Evaluation:      Face to face shift report received from RN. Rounding completed, pt observed.Client rested in room for 7 hours with eyes closed and respirations noted. Client had no falls this shift. Face to face report will be communicated to oncoming RN.    Didier Brooks RN  8/21/2022  7:03 AM                       Lidocaine Approved    Filling Pharmacy: [facility]

## 2022-09-01 NOTE — PROGRESS NOTES
"  [facility] PSYCHIATRY  -  PROGRESS NOTE     ID   Name: Steven Carter  MRN#: [id number]     SUBJECTIVE     Steven is resting in bed when I see him today. He states that his interview with [facility] went \"really good\" and that they have accepted him for tomorrow. He is happy about this as he then does not need to stress and worry about lab draws and managing medications on his own. Did review that this is the best option as they can also help remind him of appointments and ensure he is doing well. He denies any suicidal thoughts and feels ready for discharge.    -7 days of Clozapine sent to [facility]- will be delivered to [facility] on 9/2  -30 days of Clozapine sent to [facility] in [place]- their phlebotomist comes to this area typically on Mondays (though next week it may be on Tuesday due to the holiday) to draw labs at [facility] and deliver Clozapine.   -30 days with 1 RF of all other medications sent to [facility]- will be delivered to [facility] on 9/2         MEDICATIONS   Scheduled Meds:    buPROPion  450 mg Oral Daily     cloZAPine  100 mg Oral At Bedtime     lidocaine  2 patch Transdermal Q24h    And     lidocaine   Transdermal Q8H LEANDER     lithium  450 mg Oral At Bedtime     LORazepam  0.5 mg Oral Daily     LORazepam  0.5 mg Oral Daily before supper     LORazepam  1 mg Oral TID     memantine  10 mg Oral BID     multivitamin w/minerals  1 tablet Oral Daily     pregabalin  100 mg Oral TID     vitamin D2  50,000 Units Oral Q7 Days     PRN Meds:.acetaminophen, alum & mag hydroxide-simethicone, docusate sodium, hydrOXYzine, ondansetron, sulindac     ALLERGIES   Allergies   Allergen Reactions     Pork Allergy         VITALS   Vitals: /66   Pulse 84   Temp 97.5  F (36.4  C) (Temporal)   Resp 14   Wt 85.2 kg (187 lb 14.4 oz)   SpO2 98%   BMI 25.48 kg/m       MENTAL STATUS EXAM     Appearance: Hair and beard are over grown-pt is clean/showered. Awake, alert, dressed in " "hospital scrubs  Attitude:  cooperative, pleasant  Eye Contact: better  Mood:  \"good\"  Affect: has improved  Speech: clear, coherent, more spontaneous, fluent conversation  Psychomotor Behavior:  no evidence of tardive dyskinesia, dystonia, or tics, lithium tremor improved  Thought Process: linear, goal-directed  Associations: no AMBER noted  Thought Content:  no evidence of suicidal ideation or homicidal ideation and no evidence of psychotic thought, denies hallucinations  Insight: improving  Judgment: improving.  Oriented to:  time, person, and place  Attention Span and Concentration:  limited  Recent and Remote Memory:  limited  Fund of Knowledge: Low  Muscle Strength and Tone: normal  Gait and Station: normal        LABS   No results found for this or any previous visit (from the past 24 hour(s)).      ASSESSMENT   This is a 31 year old male with a PMH of schizoaffective disorder, polysubstance abuse, traumatic brain injury with coma, and a near fatal overdose with cardiac arrest who presented in a severe depressive episode with catatonia, occurring in the context of medication changes (lorazepam being discontinued) and the patient having severe malnutrition due to poor intake.     The patient has improved dramatically since starting Clozapine and resuming Ativan with his catatonia improving, his engagement with the world, speech, and ability to interact with peers. The patient has improved to the point that he would benefit from discharge to a facility as soon as available with his medication regimen to continue        DIAGNOSTIC FORMULATION   1. Schizoaffective disorder, depressive type, multiple episodes, currently in acute episode, with catatonia   2. 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 Multiple other TBIs  3. 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 Methamphetamine use disorder, moderate, in early remission  5. Alcohol use " disorder, moderate     PLAN     Location: Unit 5  Legal Status: Orders Placed This Encounter      Legal status Voluntary    Safety Assessment:    Behavioral Orders   Procedures     Code 1 - Restrict to Unit     Routine Programming     As clinically indicated     Status 15     Every 15 minutes.     PTA medications stopped    -Wellbutrin originally due to starting Clozapine and risk of seizures, resumed with patient aware of risks on 8/12  -Abilify due to not being effective    PTA medications continued/changed:     -Lithium 900 mg at bedtime reduced to 450 mg at bedtime to address tremor with improvement  -Namenda 10 mg BID  -Lyrica 100 mg TID  -Clonazepam restarted at 1 mg TID- switched to Lorazepam 1 mg TID on 7/23. Added 0.5 mg before supper on 8/15  -Wellbutrin  mg daily     New medications tried and stopped:      -None     New medications initiated:      -Clozapine 12.5 mg at bedtime on 7/19.-> Increase to 25 mg at bedtime on 7/20 -> 50 mg on 7/23 -> 75 mg on 7/24 ->100 mg on 7/26 -> 125 mg on 7/28 -> 150 mg on 7/30 -> 175 mg on 7/31 --> 200 mg on 08/01 -> 175 mg on 8/2 -> 150 mg on 8/3 -> 125 mg on 8/19 -> 100 mg on 8/23  -Vitamin D2 50,000 units weekly x 8 weeks     Today's Changes:  -Accepted to [facility] for 9/2  -Medication sent to [facility] and [facility] (see above note)         Programming: Patient will be treated in a therapeutic milieu with appropriate individual and group therapies. Education will be provided on diagnoses, medications, and treatments. Encouraged behavioral activation and participation in group programming.     Medical diagnoses:      #. URI, resolved  - Suspect URI. Myocarditis ruled out  - CRP mildly elevated. Mild leukocytosis. Normal troponin. Rest of labs reassuring. Negative COVID. EKG shows sinus tach, no new findings. Echo is normal.  - Analgesics for fever. Working  - Zofran prn  - Reduced Clozapine    #. Severe malnutrition, resolved  - Nutrition consulted  - 27 pound  weight gain since admission    #. Sialorrhea  #. Enuresis  - Secondary to Clozapine  - Reduced dose with improvement. Reducing again to see if helps    Consults: none today  Tests: None. Weekly CBC reviewed and unremarkable    Disposition: will discharge tomorrow to [facility]       ATTESTATION    Christelle Contreras NP

## 2022-09-01 NOTE — PLAN OF CARE
Face to face end of shift report received from Nevaeh GARCIA RN.  Pt observed laying supine.  Pt appeared to be sleeping with a normal breathing pattern.      Problem: Behavioral Health Plan of Care  Goal: Patient-Specific Goal (Individualization)  Description: Patient will report at least 6-8 hours of sleep each night.   Patient will complete all ADL's independently while on the unit.   Patient will be compliant with treatment team recommendations.     Pt will eat at least 50% of meals and have enough fluid intake.  Outcome: Ongoing, Progressing   Pt appeared to sleep7 hours through the night.  Pt appeared to have a normal breathing pattern.  Pt did not appear to engage in self harm. Pt did not have a fall this shift.  Will continue to monitor.     Problem: Thought Process Alteration  Goal: Optimal Thought Clarity  Description: Patient will hold reality based conversations while on the unit.     Outcome: Ongoing, Progressing     Problem: Suicide Risk  Goal: Absence of Self-Harm  Outcome: Ongoing, Progressing     Problem: Fall Injury Risk  Goal: Absence of Fall and Fall-Related Injury  Outcome: Ongoing, Progressing   Goal Outcome Evaluation:             Face to face end of shift report communicated to oncoming RN.     Prisca Erickson RN  9/1/2022

## 2022-09-02 VITALS
WEIGHT: 187.9 LBS | BODY MASS INDEX: 25.48 KG/M2 | DIASTOLIC BLOOD PRESSURE: 69 MMHG | OXYGEN SATURATION: 97 % | SYSTOLIC BLOOD PRESSURE: 105 MMHG | HEART RATE: 87 BPM | RESPIRATION RATE: 16 BRPM | TEMPERATURE: 98.5 F

## 2022-09-02 PROCEDURE — 250N000013 HC RX MED GY IP 250 OP 250 PS 637: Performed by: NURSE PRACTITIONER

## 2022-09-02 PROCEDURE — 99239 HOSP IP/OBS DSCHRG MGMT >30: CPT | Performed by: NURSE PRACTITIONER

## 2022-09-02 PROCEDURE — 250N000013 HC RX MED GY IP 250 OP 250 PS 637: Performed by: PSYCHIATRY & NEUROLOGY

## 2022-09-02 RX ADMIN — BUPROPION HYDROCHLORIDE 450 MG: 300 TABLET, EXTENDED RELEASE ORAL at 08:16

## 2022-09-02 RX ADMIN — MEMANTINE 10 MG: 10 TABLET ORAL at 08:16

## 2022-09-02 RX ADMIN — LORAZEPAM 1 MG: 1 TABLET ORAL at 08:16

## 2022-09-02 RX ADMIN — PREGABALIN 100 MG: 25 CAPSULE ORAL at 08:16

## 2022-09-02 RX ADMIN — MULTIPLE VITAMINS W/ MINERALS TAB 1 TABLET: TAB at 08:16

## 2022-09-02 ASSESSMENT — ACTIVITIES OF DAILY LIVING (ADL)
ADLS_ACUITY_SCORE: 42

## 2022-09-02 NOTE — DISCHARGE SUMMARY
[facility] PSYCHIATRY  DISCHARGE SUMMARY     DISCHARGE DATA     Steven Carter MRN# [id number]   Age: 32 year old YOB: 1990     Date of Admission: 7/18/2022  Date of Discharge: 9/02/2022  Discharge Provider: Christelle Contreras NP       REASON FOR ADMISSION     This is a 31 year old male with a PMH of traumatic brain injury with coma and significant brain damage as evidenced in MRI as well as multiple episodes of catatonia which can lead to food refusal.  He appears to have lost a very large amount of weight since his last hospitalization and is cachectic though his labs are unremarkable.  It is unclear when the lorazepam was discontinued and why it was discontinued.  He does state he feels Klonopin to be more beneficial.  It is imperative we restart benzodiazepines due to his catatonia as his food intake is minimal and he has lost a large amount of weight.  He also has unprovoked aggression at times likely due to catatonia versus psychosis.  I did speak with him about starting clozapine for psychosis/catatonia.  I explained that it was an antipsychotic that required weekly white blood cell count to monitor for side effects of neutropenia and explained what this was.  He did sign the neuroleptic consent.  He does admit that he would like to feel better and that he feels extremely flat and emotionless.    See H&P completed by Ashely Heaton CNP for full details.       DISCHARGE DIAGNOSES     1. Schizoaffective disorder, depressive type, multiple episodes, currently in acute episode, with catatonia   2. 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 Multiple other TBIs  3. 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 Methamphetamine use disorder, moderate, in early remission  5. Alcohol use disorder, moderate       CONSULTS     Nutrition       HOSPITAL COURSE     Legal status: Orders Placed This Encounter      Legal status  Voluntary    Patient was admitted to unit 5 due to the aforementioned presentation. The patient was placed under 15 minute checks to ensure patient safety. Upon admission to unit patient was found to be extremely disheveled and malnourished. He answered questions with one word answers. Benzodiazepines were restarted due to patient's minimal food intake resulting in significant weight loss, this seemed to correlate with the discontinuation of his scheduled Lorazepam on an outpatient basis. Abilify was stopped as was not effective and patient was started on Clozapine. Dose was increased to 200 mg though patient developed sialhorrea and enuresis, both of which resolved when dose was decreased to 100 mg at bedtime. Lithium had been continued though patient developed tremor, which improved with decrease in dose. Patient tolerated the adjustments to medications and was denying any side effects. Patient's appetite returned to normal and he was able to gain back some of the weight he lost. His speech became much more fluid and he was able to participate in full conversations. The biggest improvement noted was the improved affect, with patient smiling and joking at times. He is aware of the importance of continued compliance as an outpatient and the need for weekly lab monitoring with Clozapine. He was interviewed by [facility] and accepted today. Clozaril blood draws have been set up with [facility] in [place], they will meet with him weekly starting next week. Other medications sent to [facility] to be delivered to [facility]. Patient denies any suicidal thoughts, denies hallucinations or paranoia. He will discharge today to follow-up with outpatient psych, therapy, and with .     The following changes to the patient's psychiatric medications were made:    PTA medications held:     -Abilify- not effective  -Clonazepam- switched to Lorazepam which was more effective    PTA medications continued/changed:      -Lithium 900 mg at bedtime reduced to 450 mg at bedtime to address tremor with improvement  -Namenda 10 mg BID to treat catatonia  -Lyrica 100 mg TID  -Wellbutrin  mg daily     New medications tried and stopped:     -None    New medications initiated:     -Lorazepam 1 mg TID at 0800, 1400, and 2100- it is important to continue this dose of Lorazepam as this has proven to be an effective treatment for patient's catatonia. Attempts to decrease dose result in increase in catatonia symptoms which leads to patient decreasing his food intake leaving him significantly underweight  -Lorazepam 0.5 mg BID at 1100 and 1700- added for day time anxiety with good results.  -Vitamin D2 50,000 mcg weekly on Sunday x 8 weeks (has 2 doses left on 9/4 and 9/11)  -Clozapine 100 mg at bedtime (had been on up to 200 mg though started experiencing sialorrhea and enuresis, both resolved with dose reduction)    With these changes and supports the patient noticed improvement in their symptoms and felt sufficiently ready for discharge. As a result, Steven Carter was discharged to [facility]. At the time of discharge, Steven Carter was determined to not be a danger to self or others. The patient was also medically stable for discharge. At the current time of discharge, the patient does not meet criteria for involuntary hospitalization. On the day of discharge, the patient reports that they do not have suicidal or homicidal ideation. Steps taken to minimize risk include: assessing patient s behavior and thought process daily during hospital stay, discharging patient with adequate plan for follow up for mental and physical health and discussing safety plan of returning to the hospital should the patient ever have thoughts of harming themselves or others. Therefore, based on all available evidence including the factors cited above, the patient does not appear to be at imminent risk for self-harm, and is appropriate for  outpatient level of care. However, if patient uses substances or is medication non-adherent, their risk of decompensation and SI will be elevated. This was discussed with the patient.       DISCHARGE MEDICATIONS     Discharge Medication List as of 9/2/2022  7:57 AM      START taking these medications    Details   acetaminophen (TYLENOL) 325 MG tablet Take 2 tablets (650 mg) by mouth every 4 hours as needed for mild pain (to moderate pain), OTC      docusate sodium (COLACE) 100 MG capsule Take 1 capsule (100 mg) by mouth 2 times daily as needed for constipation, Disp-30 capsule, R-0, E-Prescribe      hydrOXYzine (ATARAX) 50 MG tablet Take 1 tablet (50 mg) by mouth every 6 hours as needed for anxiety, Disp-90 tablet, R-1, E-Prescribe      Lidocaine (LIDOCARE) 4 % Patch Place 2 patches onto the skin every 24 hours To prevent lidocaine toxicity, patient should be patch free for 12 hrs daily.Disp-30 patchOTC      !! LORazepam (ATIVAN) 0.5 MG tablet Take 1 tablet (0.5 mg) by mouth 2 times daily At 11AM and 5PM, Disp-60 tablet, R-1, E-PrescribeThis is in addition to 1 mg script      !! LORazepam (ATIVAN) 1 MG tablet Take 1 tablet (1 mg) by mouth 3 times daily at 8AM, 2PM, and 9PM, Disp-90 tablet, R-1, E-PrescribeThis is in addition to 0.5 mg dose      multivitamin w/minerals (THERA-VIT-M) tablet Take 1 tablet by mouth daily, Disp-30 tablet, R-1, E-Prescribe      vitamin D2 (ERGOCALCIFEROL) 70914 units (1250 mcg) capsule Take 1 capsule (50,000 Units) by mouth every 7 days on Sundays for 2 doses, Disp-2 capsule, R-0, E-Prescribe       !! - Potential duplicate medications found. Please discuss with provider.      CONTINUE these medications which have CHANGED    Details   !! buPROPion (WELLBUTRIN XL) 150 MG 24 hr tablet Take 1 tablet (150 mg) by mouth every morning . Take along with a 300 mg tablet for a total daily dose of 450 mg., Disp-30 tablet, R-1, E-Prescribe      !! buPROPion (WELLBUTRIN XL) 300 MG 24 hr tablet Take 1  "tablet (300 mg) by mouth every morning . Take along with a 150 mg tablet for a total daily dose of 450 mg., Disp-30 tablet, R-1, E-Prescribe      !! cloZAPine (CLOZARIL) 100 MG tablet Take 1 tablet (100 mg) by mouth At Bedtime, Disp-30 tablet, R-0, E-Prescribe   Sent to [facility] in [place]- they will take over filling next week   !! cloZAPine (CLOZARIL) 100 MG tablet Take 1 tablet (100 mg) by mouth At Bedtime, Disp-7 tablet, R-0, E-Prescribe   Sent to [facility] until can be filed by [facility]   lithium (ESKALITH CR/LITHOBID) 450 MG CR tablet Take 1 tablet (450 mg) by mouth At Bedtime, Disp-30 tablet, R-1, E-Prescribe      memantine (NAMENDA) 10 MG tablet Take 1 tablet (10 mg) by mouth 2 times daily, Disp-60 tablet, R-1, E-Prescribe      pregabalin (LYRICA) 100 MG capsule Take 1 capsule (100 mg) by mouth 3 times daily, Disp-90 capsule, R-1, E-Prescribe       !! - Potential duplicate medications found. Please discuss with provider.      STOP taking these medications       ARIPiprazole (ABILIFY) 10 MG tablet Comments:   Reason for Stopping: switched to Clozapine        clonazePAM (KLONOPIN) 0.5 MG tablet Comments:   Reason for Stopping: switched to Ativan        VITAMIN D3 25 MCG (1000 UT) tablet Comments:   Reason for Stopping: can resume taking after D2 course finished            Reason for two or more neuroleptics: not applicable       MENTAL STATUS EXAM   Vitals: /69 (BP Location: Right arm)   Pulse 87   Temp 98.5  F (36.9  C) (Temporal)   Resp 16   Wt 85.2 kg (187 lb 14.4 oz)   SpO2 97%   BMI 25.48 kg/m      Appearance: Alert, oriented, dressed in hospital scrubs, appears stated age   Attitude: Cooperative, pleasant  Eye Contact: better  Mood: \"good\", anxious about discharge  Affect: still blunted though showing improvement in variability, smiling more  Speech: Normal rate and rhythm, more spontaneous and fluid conversation  Psychomotor Behavior: No tremor, rigidity, or psychomotor abnormality. Lithium " tremor improved   Thought Process: Logical, goal directed   Associations: No loose associations   Thought Content: Denies SI or plan. No SIB. Denies A/V hallucinations. No evidence of delusional thought.  Insight: fair, has improved from admit  Judgment: fair, has improved from admit  Oriented to: Person, place, and time  Attention Span and Concentration: Intact  Recent and Remote Memory: limited  Language: English with appropriate syntax and vocabulary  Fund of Knowledge: below average  Muscle Strength and Tone: Grossly normal  Gait and Station: Grossly normal       DISCHARGE PLAN     1.  Education given regarding diagnostic and treatment options with risks, benefits and alternatives with adequate verbalization of understanding.  2.  Discharge to [facility]. Upon detailed review of risk factors, patient amenable for release.   3.  Continue aforementioned medications and associated medication changes with follow-up by outpatient provider.  4.  Crisis management planning in place.    5.  Nursing and  to review further discharge recommendations.   6.  Patient is being discharged with the following appointments as detailed below.      Health Care Follow-up:      Referral to [facility] for 9/2  Phone - [phone]  Fax - [phone]     [facility]  INTAKE APPOINTMENT THERAPY- Tuesday September 6th @ 1:00 PM  [address] [address]  Phone: [phone]   Fax: [phone]     Eirmed   Med management: Mg [facility]rix- Monday September 19th @ 11:30 AM  [facility] DONATO Jayce  [address] [address]  Phone: [phone]  Fax: [phone]  [email or web address]     [facility]  Case Management- Keila Andrews-  [address] [place] [address]  Phone: [phone] Fax - [phone]       DISCHARGE SERVICES PROVIDED     60 minutes spent on discharge services, including:  Final examination of patient.  Review and discussion of hospital  stay.  Instructions for continued outpatient care/goals.  Preparation of discharge records.  Preparation of medications refills and new prescriptions.  Preparation of applicable referral forms.        ATTESTATION     Christelle Contreras NP       LABS THIS ADMISSION     Results for orders placed or performed during the hospital encounter of 07/18/22   Asymptomatic COVID-19 Virus (Coronavirus) by PCR Nose     Status: Normal    Specimen: Nose; Swab   Result Value Ref Range    SARS CoV2 PCR Negative Negative    Narrative    Testing was performed using the Xpert Xpress SARS-CoV-2 Assay on the   Cepheid Gene-Xpert Instrument Systems. Additional information about   this Emergency Use Authorization (EUA) assay can be found via the Lab   Guide. This test should be ordered for the detection of SARS-CoV-2 in   individuals who meet SARS-CoV-2 clinical and/or epidemiological   criteria. Test performance is unknown in asymptomatic patients. This   test is for in vitro diagnostic use under the FDA EUA for   laboratories certified under CLIA to perform high complexity testing.   This test has not been FDA cleared or approved. A negative result   does not rule out the presence of PCR inhibitors in the specimen or   target RNA in concentration below the limit of detection for the   assay. The possibility of a false negative should be considered if   the patient's recent exposure or clinical presentation suggests   COVID-19. This test was validated by [facility] laboratory. This laboratory is certified under the Clinical Laboratory Improve  ment Amendments (CLIA) as qualified to perform high complexity testing.   Comprehensive metabolic panel     Status: Abnormal   Result Value Ref Range    Sodium 141 133 - 144 mmol/L    Potassium 3.6 3.4 - 5.3 mmol/L    Chloride 111 (H) 94 - 109 mmol/L    Carbon Dioxide (CO2) 26 20 - 32 mmol/L    Anion Gap 4 3 - 14 mmol/L    Urea Nitrogen 5 (L) 7 - 30 mg/dL    Creatinine 0.88 0.66 -  1.25 mg/dL    Calcium 8.5 8.5 - 10.1 mg/dL    Glucose 83 70 - 99 mg/dL    Alkaline Phosphatase 66 40 - 150 U/L    AST 26 0 - 45 U/L    ALT 57 0 - 70 U/L    Protein Total 5.9 (L) 6.8 - 8.8 g/dL    Albumin 3.6 3.4 - 5.0 g/dL    Bilirubin Total 0.6 0.2 - 1.3 mg/dL    GFR Estimate >90 >60 mL/min/1.73m2   TSH with free T4 reflex     Status: Normal   Result Value Ref Range    TSH 1.51 0.40 - 4.00 mU/L   UA with Microscopic reflex to Culture     Status: Abnormal    Specimen: Urine, Midstream   Result Value Ref Range    Color Urine Light Yellow Colorless, Straw, Light Yellow, Yellow    Appearance Urine Clear Clear    Glucose Urine Negative Negative mg/dL    Bilirubin Urine Negative Negative    Ketones Urine Negative Negative mg/dL    Specific Gravity Urine 1.011 1.003 - 1.035    Blood Urine Negative Negative    pH Urine 8.0 4.7 - 8.0    Protein Albumin Urine Negative Negative mg/dL    Urobilinogen Urine Normal Normal, 2.0 mg/dL    Nitrite Urine Negative Negative    Leukocyte Esterase Urine Negative Negative    Mucus Urine Present (A) None Seen /LPF    RBC Urine 1 <=2 /HPF    WBC Urine 2 <=5 /HPF    Squamous Epithelials Urine 0 <=1 /HPF    Narrative    Urine Culture not indicated   Ethyl Alcohol Level     Status: Normal   Result Value Ref Range    Alcohol ethyl <0.01 <=0.01 g/dL   CBC with platelets and differential     Status: None   Result Value Ref Range    WBC Count 5.8 4.0 - 11.0 10e3/uL    RBC Count 4.86 4.40 - 5.90 10e6/uL    Hemoglobin 14.6 13.3 - 17.7 g/dL    Hematocrit 43.4 40.0 - 53.0 %    MCV 89 78 - 100 fL    MCH 30.0 26.5 - 33.0 pg    MCHC 33.6 31.5 - 36.5 g/dL    RDW 12.2 10.0 - 15.0 %    Platelet Count 238 150 - 450 10e3/uL    % Neutrophils 69 %    % Lymphocytes 23 %    % Monocytes 7 %    % Eosinophils 1 %    % Basophils 0 %    % Immature Granulocytes 0 %    NRBCs per 100 WBC 0 <1 /100    Absolute Neutrophils 4.0 1.6 - 8.3 10e3/uL    Absolute Lymphocytes 1.3 0.8 - 5.3 10e3/uL    Absolute Monocytes 0.4 0.0 -  1.3 10e3/uL    Absolute Eosinophils 0.1 0.0 - 0.7 10e3/uL    Absolute Basophils 0.0 0.0 - 0.2 10e3/uL    Absolute Immature Granulocytes 0.0 <=0.4 10e3/uL    Absolute NRBCs 0.0 10e3/uL   Magnesium     Status: Normal   Result Value Ref Range    Magnesium 1.9 1.6 - 2.3 mg/dL   CK total     Status: Abnormal   Result Value Ref Range     (H) 30 - 300 U/L   Extra Tube     Status: None    Narrative    The following orders were created for panel order Extra Tube.  Procedure                               Abnormality         Status                     ---------                               -----------         ------                     Extra Blue Top Tube[[id number]]                              Final result               Extra Red Top Tube[[id number]]                               Final result               Extra Green Top (Lithium...[[id number]]                      Final result                 Please view results for these tests on the individual orders.   Extra Blue Top Tube     Status: None   Result Value Ref Range    Hold Specimen jic    Extra Red Top Tube     Status: None   Result Value Ref Range    Hold Specimen JIC    Extra Green Top (Lithium Heparin) ON ICE     Status: None   Result Value Ref Range    Hold Specimen JIC    Lithium level     Status: Normal   Result Value Ref Range    Lithium 0.6   mmol/L   Magnesium     Status: Normal   Result Value Ref Range    Magnesium 2.0 1.6 - 2.3 mg/dL   Phosphorus     Status: Normal   Result Value Ref Range    Phosphorus 2.5 2.5 - 4.5 mg/dL   Comprehensive metabolic panel     Status: Abnormal   Result Value Ref Range    Sodium 142 133 - 144 mmol/L    Potassium 4.2 3.4 - 5.3 mmol/L    Chloride 109 94 - 109 mmol/L    Carbon Dioxide (CO2) 30 20 - 32 mmol/L    Anion Gap 3 3 - 14 mmol/L    Urea Nitrogen 8 7 - 30 mg/dL    Creatinine 0.96 0.66 - 1.25 mg/dL    Calcium 8.3 (L) 8.5 - 10.1 mg/dL    Glucose 85 70 - 99 mg/dL    Alkaline Phosphatase 82 40 - 150 U/L    AST 19 0 - 45 U/L     ALT 46 0 - 70 U/L    Protein Total 5.7 (L) 6.8 - 8.8 g/dL    Albumin 3.2 (L) 3.4 - 5.0 g/dL    Bilirubin Total 0.3 0.2 - 1.3 mg/dL    GFR Estimate >90 >60 mL/min/1.73m2   Vitamin D     Status: Normal   Result Value Ref Range    Vitamin D, Total (25-Hydroxy) 21 20 - 75 ug/L    Narrative    Season, race, dietary intake, and treatment affect the concentration of 25-hydroxy-Vitamin D. Values may decrease during winter months and increase during summer months. Values 20-29 ug/L may indicate Vitamin D insufficiency and values <20 ug/L may indicate Vitamin D deficiency.    Vitamin D determination is routinely performed by an immunoassay specific for 25 hydroxyvitamin D3.  If an individual is on vitamin D2(ergocalciferol) supplementation, please specify 25 OH vitamin D2 and D3 level determination by LCMSMS test [id number].     Extra Tube     Status: None    Narrative    The following orders were created for panel order Extra Tube.  Procedure                               Abnormality         Status                     ---------                               -----------         ------                     Extra Red Top Tube[[id number]]                               Final result               Extra Purple Top Tube[[id number]]                            Final result                 Please view results for these tests on the individual orders.   Extra Red Top Tube     Status: None   Result Value Ref Range    Hold Specimen [facility]    Extra Purple Top Tube     Status: None   Result Value Ref Range    Hold Specimen [facility]    Urine Drugs of Abuse Screen Panel 13     Status: Normal   Result Value Ref Range    Cannabinoids (52-chd-7-carboxy-9-THC) Not Detected Not Detected, Indeterminate    Phencyclidine Not Detected Not Detected, Indeterminate    Cocaine (Benzoylecgonine) Not Detected Not Detected, Indeterminate    Methamphetamine (d-Methamphetamine) Not Detected Not Detected, Indeterminate    Opiates (Morphine) Not Detected Not Detected,  Indeterminate    Amphetamine (d-Amphetamine) Not Detected Not Detected, Indeterminate    Benzodiazepines (Nordiazepam) Not Detected Not Detected, Indeterminate    Tricyclic Antidepressants (Desipramine) Not Detected Not Detected, Indeterminate    Methadone Not Detected Not Detected, Indeterminate    Barbiturates (Butalbital) Not Detected Not Detected, Indeterminate    Oxycodone Not Detected Not Detected, Indeterminate    Propoxyphene (Norpropoxyphene) Not Detected Not Detected, Indeterminate    Buprenorphine Not Detected Not Detected, Indeterminate   Comprehensive metabolic panel     Status: Abnormal   Result Value Ref Range    Sodium 144 133 - 144 mmol/L    Potassium 4.1 3.4 - 5.3 mmol/L    Chloride 109 94 - 109 mmol/L    Carbon Dioxide (CO2) 31 20 - 32 mmol/L    Anion Gap 4 3 - 14 mmol/L    Urea Nitrogen 10 7 - 30 mg/dL    Creatinine 0.86 0.66 - 1.25 mg/dL    Calcium 8.5 8.5 - 10.1 mg/dL    Glucose 69 (L) 70 - 99 mg/dL    Alkaline Phosphatase 59 40 - 150 U/L    AST 14 0 - 45 U/L    ALT 32 0 - 70 U/L    Protein Total 5.4 (L) 6.8 - 8.8 g/dL    Albumin 2.9 (L) 3.4 - 5.0 g/dL    Bilirubin Total 0.2 0.2 - 1.3 mg/dL    GFR Estimate >90 >60 mL/min/1.73m2   Phosphorus     Status: Normal   Result Value Ref Range    Phosphorus 2.8 2.5 - 4.5 mg/dL   Magnesium     Status: Normal   Result Value Ref Range    Magnesium 2.1 1.6 - 2.3 mg/dL   Extra Tube     Status: None    Narrative    The following orders were created for panel order Extra Tube.  Procedure                               Abnormality         Status                     ---------                               -----------         ------                     Extra Red Top Tube[[id number]]                               Final result               Extra Purple Top Tube[[id number]]                            Final result                 Please view results for these tests on the individual orders.   Extra Red Top Tube     Status: None   Result Value Ref Range    Hold Specimen  JIC    Extra Purple Top Tube     Status: None   Result Value Ref Range    Hold Specimen JI    Glucose by meter     Status: Abnormal   Result Value Ref Range    GLUCOSE BY METER POCT 101 (H) 70 - 99 mg/dL   CBC with platelets and differential     Status: Abnormal   Result Value Ref Range    WBC Count 7.4 4.0 - 11.0 10e3/uL    RBC Count 5.39 4.40 - 5.90 10e6/uL    Hemoglobin 15.8 13.3 - 17.7 g/dL    Hematocrit 50.8 40.0 - 53.0 %    MCV 94 78 - 100 fL    MCH 29.3 26.5 - 33.0 pg    MCHC 31.1 (L) 31.5 - 36.5 g/dL    RDW 13.1 10.0 - 15.0 %    Platelet Count 234 150 - 450 10e3/uL    % Neutrophils 57 %    % Lymphocytes 31 %    % Monocytes 7 %    % Eosinophils 3 %    % Basophils 1 %    % Immature Granulocytes 1 %    NRBCs per 100 WBC 0 <1 /100    Absolute Neutrophils 4.3 1.6 - 8.3 10e3/uL    Absolute Lymphocytes 2.3 0.8 - 5.3 10e3/uL    Absolute Monocytes 0.5 0.0 - 1.3 10e3/uL    Absolute Eosinophils 0.2 0.0 - 0.7 10e3/uL    Absolute Basophils 0.0 0.0 - 0.2 10e3/uL    Absolute Immature Granulocytes 0.1 <=0.4 10e3/uL    Absolute NRBCs 0.0 10e3/uL   Extra Tube     Status: None    Narrative    The following orders were created for panel order Extra Tube.  Procedure                               Abnormality         Status                     ---------                               -----------         ------                     Extra Red Top Tube[[id number]]                               Final result                 Please view results for these tests on the individual orders.   Extra Red Top Tube     Status: None   Result Value Ref Range    Hold Specimen JI    CBC with platelets and differential     Status: Abnormal   Result Value Ref Range    WBC Count 9.4 4.0 - 11.0 10e3/uL    RBC Count 4.95 4.40 - 5.90 10e6/uL    Hemoglobin 14.7 13.3 - 17.7 g/dL    Hematocrit 46.0 40.0 - 53.0 %    MCV 93 78 - 100 fL    MCH 29.7 26.5 - 33.0 pg    MCHC 32.0 31.5 - 36.5 g/dL    RDW 13.0 10.0 - 15.0 %    Platelet Count 238 150 - 450 10e3/uL    %  Neutrophils 85 %    % Lymphocytes 7 %    % Monocytes 5 %    % Eosinophils 2 %    % Basophils 0 %    % Immature Granulocytes 1 %    NRBCs per 100 WBC 0 <1 /100    Absolute Neutrophils 8.0 1.6 - 8.3 10e3/uL    Absolute Lymphocytes 0.7 (L) 0.8 - 5.3 10e3/uL    Absolute Monocytes 0.5 0.0 - 1.3 10e3/uL    Absolute Eosinophils 0.2 0.0 - 0.7 10e3/uL    Absolute Basophils 0.0 0.0 - 0.2 10e3/uL    Absolute Immature Granulocytes 0.1 <=0.4 10e3/uL    Absolute NRBCs 0.0 10e3/uL   Extra Tube     Status: None    Narrative    The following orders were created for panel order Extra Tube.  Procedure                               Abnormality         Status                     ---------                               -----------         ------                     Extra Red Top Tube[[id number]]                               Final result               Extra Green Top (Lithium...[[id number]]                      Final result                 Please view results for these tests on the individual orders.   Extra Red Top Tube     Status: None   Result Value Ref Range    Hold Specimen hold    Extra Green Top (Lithium Heparin) Tube     Status: None   Result Value Ref Range    Hold Specimen hold    Basic metabolic panel     Status: Normal   Result Value Ref Range    Sodium 137 133 - 144 mmol/L    Potassium 3.9 3.4 - 5.3 mmol/L    Chloride 106 94 - 109 mmol/L    Carbon Dioxide (CO2) 28 20 - 32 mmol/L    Anion Gap 3 3 - 14 mmol/L    Urea Nitrogen 19 7 - 30 mg/dL    Creatinine 0.85 0.66 - 1.25 mg/dL    Calcium 9.0 8.5 - 10.1 mg/dL    Glucose 92 70 - 99 mg/dL    GFR Estimate >90 >60 mL/min/1.73m2   Symptomatic; Yes; 8/1/2022 Influenza A/B & SARS-CoV2 (COVID-19) Virus PCR Multiplex Nose     Status: Normal    Specimen: Nose; Swab   Result Value Ref Range    Influenza A PCR Negative Negative    Influenza B PCR Negative Negative    RSV PCR Negative Negative    SARS CoV2 PCR Negative Negative    Narrative    Testing was performed using the Xpert Xpress  CoV2/Flu/RSV Assay on the [employer] Gene[employer]pert Instrument. This test should be ordered for the detection of SARS-CoV-2 and influenza viruses in individuals who meet clinical and/or epidemiological criteria. Test performance is unknown in asymptomatic patients. This test is for in vitro diagnostic use under the FDA EUA for laboratories certified under CLIA to perform high or moderate complexity testing. This test has not been FDA cleared or approved. A negative result does not rule out the presence of PCR inhibitors in the specimen or target RNA in concentration below the limit of detection for the assay. If only one viral target is positive but coinfection with multiple targets is suspected, the sample should be re-tested with another FDA cleared, approved, or authorized test, if coinfection would change clinical management. This test was validated by the [facility] [employer]. These laboratories are certified under the Clinical  Laboratory Improvement Amendments of 1988 (CLIA-88) as qualified to perform high complexity laboratory testing.   CBC with platelets and differential     Status: Abnormal   Result Value Ref Range    WBC Count 11.4 (H) 4.0 - 11.0 10e3/uL    RBC Count 4.98 4.40 - 5.90 10e6/uL    Hemoglobin 14.9 13.3 - 17.7 g/dL    Hematocrit 45.6 40.0 - 53.0 %    MCV 92 78 - 100 fL    MCH 29.9 26.5 - 33.0 pg    MCHC 32.7 31.5 - 36.5 g/dL    RDW 12.9 10.0 - 15.0 %    Platelet Count 241 150 - 450 10e3/uL    % Neutrophils 85 %    % Lymphocytes 5 %    % Monocytes 7 %    % Eosinophils 2 %    % Basophils 0 %    % Immature Granulocytes 1 %    NRBCs per 100 WBC 0 <1 /100    Absolute Neutrophils 9.7 (H) 1.6 - 8.3 10e3/uL    Absolute Lymphocytes 0.6 (L) 0.8 - 5.3 10e3/uL    Absolute Monocytes 0.8 0.0 - 1.3 10e3/uL    Absolute Eosinophils 0.3 0.0 - 0.7 10e3/uL    Absolute Basophils 0.0 0.0 - 0.2 10e3/uL    Absolute Immature Granulocytes 0.1 <=0.4 10e3/uL    Absolute NRBCs 0.0 10e3/uL   CK total     Status: Normal    Result Value Ref Range    CK 55 30 - 300 U/L   Troponin I     Status: Normal   Result Value Ref Range    Troponin I High Sensitivity 4 <79 ng/L   Extra Tube     Status: None    Narrative    The following orders were created for panel order Extra Tube.  Procedure                               Abnormality         Status                     ---------                               -----------         ------                     Extra Blue Top Tube[[id number]]                              Final result               Extra Red Top Tube[[id number]]                               Final result               Extra Green Top (Lithium...[[id number]]                      Final result               Extra Green Top (Lithium...[[id number]]                      Final result               Extra Purple Top Tube[[id number]]                            Final result                 Please view results for these tests on the individual orders.   Extra Blue Top Tube     Status: None   Result Value Ref Range    Hold Specimen JIC    Extra Red Top Tube     Status: None   Result Value Ref Range    Hold Specimen JIC    Extra Green Top (Lithium Heparin) Tube     Status: None   Result Value Ref Range    Hold Specimen JIC    Extra Green Top (Lithium Heparin) Tube     Status: None   Result Value Ref Range    Hold Specimen JIC    Extra Purple Top Tube     Status: None   Result Value Ref Range    Hold Specimen JIC    CBC with platelets and differential     Status: Abnormal   Result Value Ref Range    WBC Count 11.5 (H) 4.0 - 11.0 10e3/uL    RBC Count 4.88 4.40 - 5.90 10e6/uL    Hemoglobin 14.5 13.3 - 17.7 g/dL    Hematocrit 44.2 40.0 - 53.0 %    MCV 91 78 - 100 fL    MCH 29.7 26.5 - 33.0 pg    MCHC 32.8 31.5 - 36.5 g/dL    RDW 13.0 10.0 - 15.0 %    Platelet Count 244 150 - 450 10e3/uL    % Neutrophils 85 %    % Lymphocytes 5 %    % Monocytes 7 %    % Eosinophils 2 %    % Basophils 0 %    % Immature Granulocytes 1 %    NRBCs per 100 WBC 0 <1 /100    Absolute  Neutrophils 9.8 (H) 1.6 - 8.3 10e3/uL    Absolute Lymphocytes 0.6 (L) 0.8 - 5.3 10e3/uL    Absolute Monocytes 0.8 0.0 - 1.3 10e3/uL    Absolute Eosinophils 0.2 0.0 - 0.7 10e3/uL    Absolute Basophils 0.0 0.0 - 0.2 10e3/uL    Absolute Immature Granulocytes 0.1 <=0.4 10e3/uL    Absolute NRBCs 0.0 10e3/uL   CRP inflammation     Status: Abnormal   Result Value Ref Range    CRP Inflammation 15.4 (H) 0.0 - 8.0 mg/L   Symptomatic; Yes; 7/31/2022 Influenza A/B & SARS-CoV2 (COVID-19) Virus PCR Multiplex Nasopharyngeal     Status: Normal    Specimen: Nasopharyngeal; Swab   Result Value Ref Range    Influenza A PCR Negative Negative    Influenza B PCR Negative Negative    RSV PCR Negative Negative    SARS CoV2 PCR Negative Negative    Narrative    Testing was performed using the Xpert Xpress CoV2/Flu/RSV Assay on the [employer] Gene[employer]pert Instrument. This test should be ordered for the detection of SARS-CoV-2 and influenza viruses in individuals who meet clinical and/or epidemiological criteria. Test performance is unknown in asymptomatic patients. This test is for in vitro diagnostic use under the FDA EUA for laboratories certified under CLIA to perform high or moderate complexity testing. This test has not been FDA cleared or approved. A negative result does not rule out the presence of PCR inhibitors in the specimen or target RNA in concentration below the limit of detection for the assay. If only one viral target is positive but coinfection with multiple targets is suspected, the sample should be re-tested with another FDA cleared, approved, or authorized test, if coinfection would change clinical management. This test was validated by the [facility] [employer]. These laboratories are certified under the Clinical  Laboratory Improvement Amendments of 1988 (CLIA-88) as qualified to perform high complexity laboratory testing.   CBC with platelets and differential     Status: Abnormal   Result Value Ref Range    WBC  Count 12.3 (H) 4.0 - 11.0 10e3/uL    RBC Count 4.75 4.40 - 5.90 10e6/uL    Hemoglobin 14.1 13.3 - 17.7 g/dL    Hematocrit 43.1 40.0 - 53.0 %    MCV 91 78 - 100 fL    MCH 29.7 26.5 - 33.0 pg    MCHC 32.7 31.5 - 36.5 g/dL    RDW 13.1 10.0 - 15.0 %    Platelet Count 224 150 - 450 10e3/uL    % Neutrophils 86 %    % Lymphocytes 5 %    % Monocytes 7 %    % Eosinophils 2 %    % Basophils 0 %    % Immature Granulocytes 0 %    NRBCs per 100 WBC 0 <1 /100    Absolute Neutrophils 10.5 (H) 1.6 - 8.3 10e3/uL    Absolute Lymphocytes 0.6 (L) 0.8 - 5.3 10e3/uL    Absolute Monocytes 0.9 0.0 - 1.3 10e3/uL    Absolute Eosinophils 0.3 0.0 - 0.7 10e3/uL    Absolute Basophils 0.0 0.0 - 0.2 10e3/uL    Absolute Immature Granulocytes 0.1 <=0.4 10e3/uL    Absolute NRBCs 0.0 10e3/uL   Extra Tube     Status: None    Narrative    The following orders were created for panel order Extra Tube.  Procedure                               Abnormality         Status                     ---------                               -----------         ------                     Extra Red Top Tube[[id number]]                               Final result               Extra Green Top (Lithium...[[id number]]                      Final result               Extra Green Top (Lithium...[[id number]]                      Final result                 Please view results for these tests on the individual orders.   Extra Red Top Tube     Status: None   Result Value Ref Range    Hold Specimen JIC    Extra Green Top (Lithium Heparin) Tube     Status: None   Result Value Ref Range    Hold Specimen JIC    Extra Green Top (Lithium Heparin) ON ICE     Status: None   Result Value Ref Range    Hold Specimen JIC    CBC with platelets and differential     Status: None   Result Value Ref Range    WBC Count 8.9 4.0 - 11.0 10e3/uL    RBC Count 4.52 4.40 - 5.90 10e6/uL    Hemoglobin 13.4 13.3 - 17.7 g/dL    Hematocrit 40.5 40.0 - 53.0 %    MCV 90 78 - 100 fL    MCH 29.6 26.5 - 33.0 pg     MCHC 33.1 31.5 - 36.5 g/dL    RDW 13.0 10.0 - 15.0 %    Platelet Count 244 150 - 450 10e3/uL    % Neutrophils 74 %    % Lymphocytes 10 %    % Monocytes 9 %    % Eosinophils 6 %    % Basophils 0 %    % Immature Granulocytes 1 %    NRBCs per 100 WBC 0 <1 /100    Absolute Neutrophils 6.7 1.6 - 8.3 10e3/uL    Absolute Lymphocytes 0.9 0.8 - 5.3 10e3/uL    Absolute Monocytes 0.8 0.0 - 1.3 10e3/uL    Absolute Eosinophils 0.5 0.0 - 0.7 10e3/uL    Absolute Basophils 0.0 0.0 - 0.2 10e3/uL    Absolute Immature Granulocytes 0.0 <=0.4 10e3/uL    Absolute NRBCs 0.0 10e3/uL   CBC with platelets and differential     Status: Abnormal   Result Value Ref Range    WBC Count 11.2 (H) 4.0 - 11.0 10e3/uL    RBC Count 4.71 4.40 - 5.90 10e6/uL    Hemoglobin 13.9 13.3 - 17.7 g/dL    Hematocrit 42.5 40.0 - 53.0 %    MCV 90 78 - 100 fL    MCH 29.5 26.5 - 33.0 pg    MCHC 32.7 31.5 - 36.5 g/dL    RDW 12.5 10.0 - 15.0 %    Platelet Count 326 150 - 450 10e3/uL    % Neutrophils 68 %    % Lymphocytes 12 %    % Monocytes 7 %    % Eosinophils 12 %    % Basophils 0 %    % Immature Granulocytes 1 %    NRBCs per 100 WBC 0 <1 /100    Absolute Neutrophils 7.5 1.6 - 8.3 10e3/uL    Absolute Lymphocytes 1.4 0.8 - 5.3 10e3/uL    Absolute Monocytes 0.8 0.0 - 1.3 10e3/uL    Absolute Eosinophils 1.4 (H) 0.0 - 0.7 10e3/uL    Absolute Basophils 0.0 0.0 - 0.2 10e3/uL    Absolute Immature Granulocytes 0.1 <=0.4 10e3/uL    Absolute NRBCs 0.0 10e3/uL   Extra Tube     Status: None    Narrative    The following orders were created for panel order Extra Tube.  Procedure                               Abnormality         Status                     ---------                               -----------         ------                     Extra Green Top (Lithium...[[id number]]                      Final result                 Please view results for these tests on the individual orders.   Extra Green Top (Lithium Heparin) Tube     Status: None   Result Value Ref Range    Hold  Specimen [facility]    Clozapine and Metabolites Quant     Status: None   Result Value Ref Range    Clozapine Quant <100 ng/mL    Norclozapine Quant <100 ng/mL    Clozapine-N-Oxide Quant <100 ng/mL    Clozapine and Metabolites Total Not Applicable <=1500 ng/mL   Extra Tube     Status: None    Narrative    The following orders were created for panel order Extra Tube.  Procedure                               Abnormality         Status                     ---------                               -----------         ------                     Extra Red Top Tube[[id number]]                               Final result                 Please view results for these tests on the individual orders.   Extra Red Top Tube     Status: None   Result Value Ref Range    Hold Specimen [facility]    CBC with platelets and differential     Status: Abnormal   Result Value Ref Range    WBC Count 10.7 4.0 - 11.0 10e3/uL    RBC Count 4.80 4.40 - 5.90 10e6/uL    Hemoglobin 13.8 13.3 - 17.7 g/dL    Hematocrit 42.6 40.0 - 53.0 %    MCV 89 78 - 100 fL    MCH 28.8 26.5 - 33.0 pg    MCHC 32.4 31.5 - 36.5 g/dL    RDW 12.7 10.0 - 15.0 %    Platelet Count 309 150 - 450 10e3/uL    % Neutrophils 62 %    % Lymphocytes 17 %    % Monocytes 6 %    % Eosinophils 12 %    % Basophils 1 %    % Immature Granulocytes 2 %    NRBCs per 100 WBC 0 <1 /100    Absolute Neutrophils 6.7 1.6 - 8.3 10e3/uL    Absolute Lymphocytes 1.8 0.8 - 5.3 10e3/uL    Absolute Monocytes 0.7 0.0 - 1.3 10e3/uL    Absolute Eosinophils 1.3 (H) 0.0 - 0.7 10e3/uL    Absolute Basophils 0.1 0.0 - 0.2 10e3/uL    Absolute Immature Granulocytes 0.2 <=0.4 10e3/uL    Absolute NRBCs 0.0 10e3/uL   Extra Tube     Status: None    Narrative    The following orders were created for panel order Extra Tube.  Procedure                               Abnormality         Status                     ---------                               -----------         ------                     Extra Green Top (Lithium...[[id number]]                       Final result                 Please view results for these tests on the individual orders.   Extra Green Top (Lithium Heparin) Tube     Status: None   Result Value Ref Range    Hold Specimen JIC    CBC with platelets and differential     Status: None   Result Value Ref Range    WBC Count 7.4 4.0 - 11.0 10e3/uL    RBC Count 5.05 4.40 - 5.90 10e6/uL    Hemoglobin 14.7 13.3 - 17.7 g/dL    Hematocrit 44.8 40.0 - 53.0 %    MCV 89 78 - 100 fL    MCH 29.1 26.5 - 33.0 pg    MCHC 32.8 31.5 - 36.5 g/dL    RDW 12.7 10.0 - 15.0 %    Platelet Count 266 150 - 450 10e3/uL    % Neutrophils 49 %    % Lymphocytes 30 %    % Monocytes 10 %    % Eosinophils 9 %    % Basophils 1 %    % Immature Granulocytes 1 %    NRBCs per 100 WBC 0 <1 /100    Absolute Neutrophils 3.7 1.6 - 8.3 10e3/uL    Absolute Lymphocytes 2.2 0.8 - 5.3 10e3/uL    Absolute Monocytes 0.7 0.0 - 1.3 10e3/uL    Absolute Eosinophils 0.7 0.0 - 0.7 10e3/uL    Absolute Basophils 0.1 0.0 - 0.2 10e3/uL    Absolute Immature Granulocytes 0.0 <=0.4 10e3/uL    Absolute NRBCs 0.0 10e3/uL   WBC and Differential     Status: None   Result Value Ref Range    WBC Count 7.4 4.0 - 11.0 10e3/uL    % Neutrophils 52 %    % Lymphocytes 30 %    % Monocytes 9 %    % Eosinophils 8 %    % Basophils 1 %    % Immature Granulocytes 0 %    NRBCs per 100 WBC 0 <1 /100    Absolute Neutrophils 3.8 1.6 - 8.3 10e3/uL    Absolute Lymphocytes 2.3 0.8 - 5.3 10e3/uL    Absolute Monocytes 0.7 0.0 - 1.3 10e3/uL    Absolute Eosinophils 0.6 0.0 - 0.7 10e3/uL    Absolute Basophils 0.1 0.0 - 0.2 10e3/uL    Absolute Immature Granulocytes 0.0 <=0.4 10e3/uL    Absolute NRBCs 0.0 10e3/uL   CBC with platelets and differential     Status: None   Result Value Ref Range    WBC Count 6.4 4.0 - 11.0 10e3/uL    RBC Count 5.34 4.40 - 5.90 10e6/uL    Hemoglobin 15.5 13.3 - 17.7 g/dL    Hematocrit 46.3 40.0 - 53.0 %    MCV 87 78 - 100 fL    MCH 29.0 26.5 - 33.0 pg    MCHC 33.5 31.5 - 36.5 g/dL    RDW 12.6 10.0 -  15.0 %    Platelet Count 211 150 - 450 10e3/uL    % Neutrophils 61 %    % Lymphocytes 24 %    % Monocytes 7 %    % Eosinophils 7 %    % Basophils 1 %    % Immature Granulocytes 0 %    NRBCs per 100 WBC 0 <1 /100    Absolute Neutrophils 4.0 1.6 - 8.3 10e3/uL    Absolute Lymphocytes 1.5 0.8 - 5.3 10e3/uL    Absolute Monocytes 0.4 0.0 - 1.3 10e3/uL    Absolute Eosinophils 0.4 0.0 - 0.7 10e3/uL    Absolute Basophils 0.0 0.0 - 0.2 10e3/uL    Absolute Immature Granulocytes 0.0 <=0.4 10e3/uL    Absolute NRBCs 0.0 10e3/uL   Extra Tube     Status: None    Narrative    The following orders were created for panel order Extra Tube.  Procedure                               Abnormality         Status                     ---------                               -----------         ------                     Extra Red Top Tube[[id number]]                               Final result               Extra Green Top (Lithium...[[id number]]                      Final result                 Please view results for these tests on the individual orders.   Extra Red Top Tube     Status: None   Result Value Ref Range    Hold Specimen JIC    Extra Green Top (Lithium Heparin) Tube     Status: None   Result Value Ref Range    Hold Specimen JIC    Echocardiogram Complete     Status: None   Result Value Ref Range    LVEF  55-60%     Narrative    [id number]  [id number]  [id number]  [id number]^CHITRA^CHAVA^MILKA     [facility]  Echocardiography Laboratory  [address] [place] [id number]     Name: CHRIST KNOWLES  MRN: [id number]  : 1990  Study Date: 2022 07:10 AM  Age: 31 yrs  Gender: Male  Patient Location: [facility]  Reason For Study: Acute Myocarditis  History: Acute myocarditis  Ordering Physician: CHAVA BUENROSTRO  Performed By: Mariana Diaz RDCS     BSA: 2.0 m2  Height: 72 in  Weight: 177 lb  ______________________________________________________________________________  Procedure  Echocardiogram with  two-dimensional, color and spectral Doppler performed.  ______________________________________________________________________________  Interpretation Summary  No pericardial effusion is present.  Global and regional left ventricular function is normal with an EF of 55-60%.  The right ventricle is normal size.  Global right ventricular function is normal.  Both atria appear normal.  Trace mitral insufficiency is present.  Aortic valve is normal in structure and function.  Trace tricuspid insufficiency is present.  Trace pulmonic insufficiency is present.  ______________________________________________________________________________  Left Ventricle  Global and regional left ventricular function is normal with an EF of 55-60%.     Right Ventricle  The right ventricle is normal size. Global right ventricular function is  normal.     Atria  Both atria appear normal.     Mitral Valve  Trace mitral insufficiency is present.     Aortic Valve  Aortic valve is normal in structure and function. The aortic valve is  tricuspid. The mean AoV pressure gradient is 2.2 mmHg. The peak AoV pressure  gradient is 3.9 mmHg.     Tricuspid Valve  Trace tricuspid insufficiency is present.     Pulmonic Valve  Trace pulmonic insufficiency is present.     Vessels  Ascending aorta 3.15 cm.     Pericardium  No pericardial effusion is present.     ______________________________________________________________________________  MMode/2D Measurements & Calculations  IVSd: 0.92 cm  LVIDd: 5.2 cm  LVIDs: 3.3 cm  LVPWd: 0.95 cm  FS: 35.9 %  LV mass(C)d: 178.2 grams  LV mass(C)dI: 88.1 grams/m2  Ao root diam: 3.8 cm  LA dimension: 3.6 cm     asc Aorta Diam: 3.1 cm  LA/Ao: 0.96  LVOT diam: 2.4 cm  LVOT area: 4.4 cm2  LA Volume Indexed (AL/bp): 29.2 ml/m2  RWT: 0.36     Doppler Measurements & Calculations  MV E max lee: 65.0 cm/sec  MV A max lee: 43.0 cm/sec  MV E/A: 1.5  MV dec slope: 392.0 cm/sec2  MV dec time: 0.17 sec  Ao V2 max: 99.0 cm/sec  Ao max  PG: 3.9 mmHg  Ao V2 mean: 70.0 cm/sec  Ao mean P.2 mmHg  Ao V2 VTI: 18.0 cm  STEPHANIE(I,D): 3.7 cm2  STEPHANIE(V,D): 3.6 cm2  LV V1 max P.5 mmHg  LV V1 max: 79.0 cm/sec  LV V1 VTI: 15.1 cm     SV(LVOT): 67.0 ml  SI(LVOT): 33.1 ml/m2  AV Phong Ratio (DI): 0.80  STEPHANIE Index (cm2/m2): 1.8  E/E' av.5  Lateral E/e': 6.0  Medial E/e': 4.9     ______________________________________________________________________________  Report approved by: Meliton Chow 2022 01:11 PM         Group A Streptococcus PCR Throat Swab     Status: Normal    Specimen: Throat; Swab   Result Value Ref Range    Group A strep by PCR Not Detected Not Detected    Narrative    The Xpert Xpress Strep A test, performed on the [employer]ument [employer], is a rapid, qualitative in vitro diagnostic test for the detection of Streptococcus pyogenes (Group A ß-hemolytic Streptococcus, Strep A) in throat swab specimens from patients with signs and symptoms of pharyngitis. The Xpert Xpress Strep A test can be used as an aid in the diagnosis of Group A Streptococcal pharyngitis. The assay is not intended to monitor treatment for Group A Streptococcus infections. The Xpert Xpress Strep A test utilizes an automated real-time polymerase chain reaction (PCR) to detect Streptococcus pyogenes DNA.   CBC with platelets differential     Status: None    Narrative    The following orders were created for panel order CBC with platelets differential.  Procedure                               Abnormality         Status                     ---------                               -----------         ------                     CBC with platelets and d...[[id number]]                      Final result                 Please view results for these tests on the individual orders.   Urine Drugs of Abuse Screen     Status: Normal    Narrative    The following orders were created for panel order Urine Drugs of Abuse Screen.  Procedure                                Abnormality         Status                     ---------                               -----------         ------                     Urine Drugs of Abuse Scr...[[id number]]  Normal              Final result                 Please view results for these tests on the individual orders.   CBC with Platelets & Differential *Canceled*     Status: None ()    Narrative    The following orders were created for panel order CBC with Platelets & Differential.  Procedure                               Abnormality         Status                     ---------                               -----------         ------                       Please view results for these tests on the individual orders.   CBC with Platelets & Differential     Status: Abnormal    Narrative    The following orders were created for panel order CBC with Platelets & Differential.  Procedure                               Abnormality         Status                     ---------                               -----------         ------                     CBC with platelets and d...[[id number]]  Abnormal            Final result                 Please view results for these tests on the individual orders.   CBC with Platelets & Differential     Status: Abnormal    Narrative    The following orders were created for panel order CBC with Platelets & Differential.  Procedure                               Abnormality         Status                     ---------                               -----------         ------                     CBC with platelets and d...[[id number]]  Abnormal            Final result                 Please view results for these tests on the individual orders.   CBC with Platelets & Differential     Status: Abnormal    Narrative    The following orders were created for panel order CBC with Platelets & Differential.  Procedure                               Abnormality         Status                     ---------                                -----------         ------                     CBC with platelets and d...[[id number]]  Abnormal            Final result                 Please view results for these tests on the individual orders.   CBC with Platelets & Differential     Status: Abnormal    Narrative    The following orders were created for panel order CBC with Platelets & Differential.  Procedure                               Abnormality         Status                     ---------                               -----------         ------                     CBC with platelets and d...[[id number]]  Abnormal            Final result                 Please view results for these tests on the individual orders.   CBC with Platelets & Differential     Status: Abnormal    Narrative    The following orders were created for panel order CBC with Platelets & Differential.  Procedure                               Abnormality         Status                     ---------                               -----------         ------                     CBC with platelets and d...[[id number]]  Abnormal            Final result                 Please view results for these tests on the individual orders.   CBC with platelets differential     Status: None    Narrative    The following orders were created for panel order CBC with platelets differential.  Procedure                               Abnormality         Status                     ---------                               -----------         ------                     CBC with platelets and d...[[id number]]                      Final result                 Please view results for these tests on the individual orders.   CBC with Platelets & Differential     Status: Abnormal    Narrative    The following orders were created for panel order CBC with Platelets & Differential.  Procedure                               Abnormality         Status                     ---------                               -----------          ------                     CBC with platelets and d...[[id number]]  Abnormal            Final result                 Please view results for these tests on the individual orders.   CBC with Platelets & Differential     Status: Abnormal    Narrative    The following orders were created for panel order CBC with Platelets & Differential.  Procedure                               Abnormality         Status                     ---------                               -----------         ------                     CBC with platelets and d...[[id number]]  Abnormal            Final result                 Please view results for these tests on the individual orders.   CBC with Platelets & Differential     Status: None    Narrative    The following orders were created for panel order CBC with Platelets & Differential.  Procedure                               Abnormality         Status                     ---------                               -----------         ------                     CBC with platelets and d...[[id number]]                      Final result                 Please view results for these tests on the individual orders.   WBC and differential     Status: None    Narrative    The following orders were created for panel order WBC and differential.  Procedure                               Abnormality         Status                     ---------                               -----------         ------                     WBC and Differential[[id number]]                             Final result                 Please view results for these tests on the individual orders.   CBC with Platelets & Differential     Status: None    Narrative    The following orders were created for panel order CBC with Platelets & Differential.  Procedure                               Abnormality         Status                     ---------                               -----------         ------                     CBC with platelets and  d...[[id number]]                      Final result                 Please view results for these tests on the individual orders.

## 2022-09-02 NOTE — PLAN OF CARE
Face to face shift report received from TRAE Flannery.       Problem: Behavioral Health Plan of Care  Goal: Patient-Specific Goal (Individualization)  Description: Patient will report at least 6-8 hours of sleep each night.   Patient will complete all ADL's independently while on the unit.   Patient will be compliant with treatment team recommendations.     Pt will eat at least 50% of meals and have enough fluid intake.  Outcome: Ongoing, Progressing  Calm and cooperative. Reports anxiety, denies all other mental health issues. Requested and received Hydroxyzine 50mg at 1800. Pt does appear more anxious, observed to be more restless than previous days. Flat affect. Pleasant during conversation. Spends majority of shift in lounge area. Social and appropriate. Reports 3/10 back pain, requested and received Tylenol 650mg at 1803. Pt reports this was effective.     Problem: Suicide Risk  Goal: Absence of Self-Harm  Outcome: Ongoing, Progressing   Free from self harm or injury this shift.     Problem: Fall Injury Risk  Goal: Absence of Fall and Fall-Related Injury  Outcome: Ongoing, Progressing   Free from falls or injury this shift.     Face to face end of shift report to be communicated to oncoming RN.

## 2022-09-02 NOTE — PLAN OF CARE
"  Problem: Behavioral Health Plan of Care  Goal: Patient-Specific Goal (Individualization)  Description: Patient will report at least 6-8 hours of sleep each night.   Patient will complete all ADL's independently while on the unit.   Patient will be compliant with treatment team recommendations.     Pt will eat at least 50% of meals and have enough fluid intake.  Note: Patient up to lounge in beginning of shift. Appears anxious about discharge but reports being \"a little excited\". Compliant with all scheduled medications.     Discharge Note    Patient Discharged to [facility] on 9/2/2022 at 9:02 AM via [facility] accompanied by staff to the door.     Patient informed of discharge instructions in AVS. Patient verbalizes understanding and denies having any questions pertaining to AVS. Patient stable at time of discharge. Patient denies SI, HI, and thoughts of self harm at time of discharge. All personal belongings returned to patient. Discharge prescriptions sent to [facility] pharmacies via electronic communication and will be delivered to [facility].     Stephanie Pearson RN  9/2/2022  9:09 AM    "

## 2022-09-02 NOTE — PLAN OF CARE
Scheduled ride with [employer] for 9:00 AM. RN notified.     Pt is discharging at the recommendation of the treatment team. Pt is discharging to [facility] [facility] transported by [facility] [facility]. Pt denies having any thoughts of hurting themself or anyone else. Pt denies anxiety or depression. Pt has follow up with [facility] and [facility]. Discharge instructions, including; demographic sheet, psychiatric evaluation, discharge summary, and AVS were faxed to these next level of care providers.

## 2022-09-14 DIAGNOSIS — Z79.899 DRUG THERAPY: Primary | ICD-10-CM

## 2022-09-15 ENCOUNTER — LAB (OUTPATIENT)
Dept: LAB | Facility: OTHER | Age: 32
End: 2022-09-15
Payer: COMMERCIAL

## 2022-09-15 DIAGNOSIS — Z79.899 DRUG THERAPY: ICD-10-CM

## 2022-09-15 DIAGNOSIS — T14.91XA SUICIDAL BEHAVIOR WITH ATTEMPTED SELF-INJURY (H): ICD-10-CM

## 2022-09-15 LAB
BASOPHILS # BLD AUTO: 0.1 10E3/UL (ref 0–0.2)
BASOPHILS NFR BLD AUTO: 1 %
EOSINOPHIL # BLD AUTO: 0.1 10E3/UL (ref 0–0.7)
EOSINOPHIL NFR BLD AUTO: 1 %
ERYTHROCYTE [DISTWIDTH] IN BLOOD BY AUTOMATED COUNT: 12.4 % (ref 10–15)
HCT VFR BLD AUTO: 49.9 % (ref 40–53)
HGB BLD-MCNC: 16.6 G/DL (ref 13.3–17.7)
IMM GRANULOCYTES # BLD: 0 10E3/UL
IMM GRANULOCYTES NFR BLD: 0 %
LYMPHOCYTES # BLD AUTO: 1.6 10E3/UL (ref 0.8–5.3)
LYMPHOCYTES NFR BLD AUTO: 17 %
MCH RBC QN AUTO: 29 PG (ref 26.5–33)
MCHC RBC AUTO-ENTMCNC: 33.3 G/DL (ref 31.5–36.5)
MCV RBC AUTO: 87 FL (ref 78–100)
MONOCYTES # BLD AUTO: 0.6 10E3/UL (ref 0–1.3)
MONOCYTES NFR BLD AUTO: 6 %
NEUTROPHILS # BLD AUTO: 7.2 10E3/UL (ref 1.6–8.3)
NEUTROPHILS NFR BLD AUTO: 75 %
NRBC # BLD AUTO: 0 10E3/UL
NRBC BLD AUTO-RTO: 0 /100
PLATELET # BLD AUTO: 327 10E3/UL (ref 150–450)
RBC # BLD AUTO: 5.72 10E6/UL (ref 4.4–5.9)
WBC # BLD AUTO: 9.7 10E3/UL (ref 4–11)

## 2022-09-15 PROCEDURE — 36415 COLL VENOUS BLD VENIPUNCTURE: CPT | Mod: ZL

## 2022-09-15 PROCEDURE — 85025 COMPLETE CBC W/AUTO DIFF WBC: CPT | Mod: ZL

## 2022-09-15 RX ORDER — CLOZAPINE 100 MG/1
100 TABLET ORAL AT BEDTIME
Qty: 7 TABLET | Refills: 0 | Status: SHIPPED | OUTPATIENT
Start: 2022-09-15 | End: 2023-02-22

## 2022-09-20 ENCOUNTER — APPOINTMENT (OUTPATIENT)
Dept: GENERAL RADIOLOGY | Facility: HOSPITAL | Age: 32
End: 2022-09-20
Attending: PHYSICIAN ASSISTANT
Payer: COMMERCIAL

## 2022-09-20 ENCOUNTER — HOSPITAL ENCOUNTER (EMERGENCY)
Facility: HOSPITAL | Age: 32
Discharge: HOME | End: 2022-09-21
Attending: PHYSICIAN ASSISTANT | Admitting: PHYSICIAN ASSISTANT
Payer: COMMERCIAL

## 2022-09-20 DIAGNOSIS — R41.82 ALTERED MENTAL STATUS, UNSPECIFIED ALTERED MENTAL STATUS TYPE: ICD-10-CM

## 2022-09-20 LAB
ALBUMIN SERPL-MCNC: 3.7 G/DL (ref 3.4–5)
ALP SERPL-CCNC: 67 U/L (ref 40–150)
ALT SERPL W P-5'-P-CCNC: 74 U/L (ref 0–70)
ANION GAP SERPL CALCULATED.3IONS-SCNC: 5 MMOL/L (ref 3–14)
AST SERPL W P-5'-P-CCNC: 192 U/L (ref 0–45)
BASOPHILS # BLD AUTO: 0.1 10E3/UL (ref 0–0.2)
BASOPHILS NFR BLD AUTO: 0 %
BILIRUB SERPL-MCNC: 0.6 MG/DL (ref 0.2–1.3)
BUN SERPL-MCNC: 17 MG/DL (ref 7–30)
CALCIUM SERPL-MCNC: 8.1 MG/DL (ref 8.5–10.1)
CHLORIDE BLD-SCNC: 111 MMOL/L (ref 94–109)
CO2 SERPL-SCNC: 21 MMOL/L (ref 20–32)
CREAT SERPL-MCNC: 1.41 MG/DL (ref 0.66–1.25)
CRP SERPL-MCNC: 25.7 MG/L (ref 0–8)
EOSINOPHIL # BLD AUTO: 0.1 10E3/UL (ref 0–0.7)
EOSINOPHIL NFR BLD AUTO: 0 %
ERYTHROCYTE [DISTWIDTH] IN BLOOD BY AUTOMATED COUNT: 12.3 % (ref 10–15)
ETHANOL SERPL-MCNC: <0.01 G/DL
FLUAV RNA SPEC QL NAA+PROBE: NEGATIVE
FLUBV RNA RESP QL NAA+PROBE: NEGATIVE
GFR SERPL CREATININE-BSD FRML MDRD: 67 ML/MIN/1.73M2
GLUCOSE BLD-MCNC: 95 MG/DL (ref 70–99)
HCT VFR BLD AUTO: 41.9 % (ref 40–53)
HGB BLD-MCNC: 14.1 G/DL (ref 13.3–17.7)
IMM GRANULOCYTES # BLD: 0.1 10E3/UL
IMM GRANULOCYTES NFR BLD: 1 %
LACTATE SERPL-SCNC: 1 MMOL/L (ref 0.7–2)
LYMPHOCYTES # BLD AUTO: 1.3 10E3/UL (ref 0.8–5.3)
LYMPHOCYTES NFR BLD AUTO: 10 %
MCH RBC QN AUTO: 29.3 PG (ref 26.5–33)
MCHC RBC AUTO-ENTMCNC: 33.7 G/DL (ref 31.5–36.5)
MCV RBC AUTO: 87 FL (ref 78–100)
MONOCYTES # BLD AUTO: 1.1 10E3/UL (ref 0–1.3)
MONOCYTES NFR BLD AUTO: 9 %
NEUTROPHILS # BLD AUTO: 10.5 10E3/UL (ref 1.6–8.3)
NEUTROPHILS NFR BLD AUTO: 80 %
NRBC # BLD AUTO: 0 10E3/UL
NRBC BLD AUTO-RTO: 0 /100
PLATELET # BLD AUTO: 259 10E3/UL (ref 150–450)
POTASSIUM BLD-SCNC: 3.6 MMOL/L (ref 3.4–5.3)
PROCALCITONIN SERPL-MCNC: 0.13 NG/ML
PROT SERPL-MCNC: 6.5 G/DL (ref 6.8–8.8)
RBC # BLD AUTO: 4.82 10E6/UL (ref 4.4–5.9)
RSV RNA SPEC NAA+PROBE: NEGATIVE
SARS-COV-2 RNA RESP QL NAA+PROBE: NEGATIVE
SODIUM SERPL-SCNC: 137 MMOL/L (ref 133–144)
WBC # BLD AUTO: 13.1 10E3/UL (ref 4–11)

## 2022-09-20 PROCEDURE — 87637 SARSCOV2&INF A&B&RSV AMP PRB: CPT | Performed by: PHYSICIAN ASSISTANT

## 2022-09-20 PROCEDURE — 84145 PROCALCITONIN (PCT): CPT | Performed by: PHYSICIAN ASSISTANT

## 2022-09-20 PROCEDURE — 82077 ASSAY SPEC XCP UR&BREATH IA: CPT | Performed by: PHYSICIAN ASSISTANT

## 2022-09-20 PROCEDURE — 99284 EMERGENCY DEPT VISIT MOD MDM: CPT | Mod: 25 | Performed by: EMERGENCY MEDICINE

## 2022-09-20 PROCEDURE — 86140 C-REACTIVE PROTEIN: CPT | Performed by: PHYSICIAN ASSISTANT

## 2022-09-20 PROCEDURE — 62270 DX LMBR SPI PNXR: CPT

## 2022-09-20 PROCEDURE — 83605 ASSAY OF LACTIC ACID: CPT | Performed by: PHYSICIAN ASSISTANT

## 2022-09-20 PROCEDURE — 99285 EMERGENCY DEPT VISIT HI MDM: CPT | Mod: 25

## 2022-09-20 PROCEDURE — 36415 COLL VENOUS BLD VENIPUNCTURE: CPT | Performed by: PHYSICIAN ASSISTANT

## 2022-09-20 PROCEDURE — 71045 X-RAY EXAM CHEST 1 VIEW: CPT

## 2022-09-20 PROCEDURE — 250N000011 HC RX IP 250 OP 636: Performed by: PHYSICIAN ASSISTANT

## 2022-09-20 PROCEDURE — 62270 DX LMBR SPI PNXR: CPT | Performed by: EMERGENCY MEDICINE

## 2022-09-20 PROCEDURE — 258N000003 HC RX IP 258 OP 636: Performed by: PHYSICIAN ASSISTANT

## 2022-09-20 PROCEDURE — 87077 CULTURE AEROBIC IDENTIFY: CPT | Performed by: PHYSICIAN ASSISTANT

## 2022-09-20 PROCEDURE — C9803 HOPD COVID-19 SPEC COLLECT: HCPCS

## 2022-09-20 PROCEDURE — 80053 COMPREHEN METABOLIC PANEL: CPT | Performed by: PHYSICIAN ASSISTANT

## 2022-09-20 PROCEDURE — 85025 COMPLETE CBC W/AUTO DIFF WBC: CPT | Performed by: PHYSICIAN ASSISTANT

## 2022-09-20 PROCEDURE — 96374 THER/PROPH/DIAG INJ IV PUSH: CPT | Mod: XU

## 2022-09-20 RX ORDER — KETOROLAC TROMETHAMINE 30 MG/ML
30 INJECTION, SOLUTION INTRAMUSCULAR; INTRAVENOUS ONCE
Status: COMPLETED | OUTPATIENT
Start: 2022-09-20 | End: 2022-09-20

## 2022-09-20 RX ADMIN — KETOROLAC TROMETHAMINE 30 MG: 30 INJECTION, SOLUTION INTRAMUSCULAR; INTRAVENOUS at 22:45

## 2022-09-20 RX ADMIN — SODIUM CHLORIDE 1000 ML: 9 INJECTION, SOLUTION INTRAVENOUS at 22:45

## 2022-09-20 ASSESSMENT — ACTIVITIES OF DAILY LIVING (ADL): ADLS_ACUITY_SCORE: 37

## 2022-09-21 ENCOUNTER — APPOINTMENT (OUTPATIENT)
Dept: CT IMAGING | Facility: HOSPITAL | Age: 32
End: 2022-09-21
Attending: PHYSICIAN ASSISTANT
Payer: COMMERCIAL

## 2022-09-21 ENCOUNTER — APPOINTMENT (OUTPATIENT)
Dept: CT IMAGING | Facility: HOSPITAL | Age: 32
End: 2022-09-21
Attending: EMERGENCY MEDICINE
Payer: COMMERCIAL

## 2022-09-21 ENCOUNTER — LAB REQUISITION (OUTPATIENT)
Dept: LAB | Facility: HOSPITAL | Age: 32
End: 2022-09-21
Payer: COMMERCIAL

## 2022-09-21 VITALS
OXYGEN SATURATION: 99 % | DIASTOLIC BLOOD PRESSURE: 69 MMHG | RESPIRATION RATE: 18 BRPM | TEMPERATURE: 98.4 F | HEART RATE: 59 BPM | SYSTOLIC BLOOD PRESSURE: 117 MMHG

## 2022-09-21 LAB
ALBUMIN UR-MCNC: 30 MG/DL
AMPHETAMINES UR QL: NOT DETECTED
APPEARANCE CSF: CLEAR
APPEARANCE UR: ABNORMAL
BARBITURATES UR QL SCN: NOT DETECTED
BENZODIAZ UR QL SCN: DETECTED
BILIRUB UR QL STRIP: NEGATIVE
BUPRENORPHINE UR QL: NOT DETECTED
C GATTII+NEOFOR DNA CSF QL NAA+NON-PROBE: NEGATIVE
CA CARBONATE CRY #/AREA URNS HPF: ABNORMAL /HPF
CANNABINOIDS UR QL: NOT DETECTED
CMV DNA CSF QL NAA+NON-PROBE: NEGATIVE
COCAINE UR QL SCN: NOT DETECTED
COLOR CSF: COLORLESS
COLOR UR AUTO: ABNORMAL
D-METHAMPHET UR QL: NOT DETECTED
E COLI K1 AG CSF QL: NEGATIVE
EV RNA SPEC QL NAA+PROBE: NEGATIVE
GLUCOSE CSF-MCNC: 45 MG/DL (ref 40–70)
GLUCOSE UR STRIP-MCNC: NEGATIVE MG/DL
GP B STREP DNA CSF QL NAA+NON-PROBE: NEGATIVE
HAEM INFLU DNA CSF QL NAA+NON-PROBE: NEGATIVE
HGB UR QL STRIP: ABNORMAL
HHV6 DNA CSF QL NAA+NON-PROBE: NEGATIVE
HSV1 DNA CSF QL NAA+NON-PROBE: NEGATIVE
HSV1 DNA CSF QL NAA+PROBE: NOT DETECTED
HSV2 DNA CSF QL NAA+NON-PROBE: NEGATIVE
HSV2 DNA CSF QL NAA+PROBE: NOT DETECTED
HYALINE CASTS: 9 /LPF
KETONES UR STRIP-MCNC: 10 MG/DL
L MONOCYTOG DNA CSF QL NAA+NON-PROBE: NEGATIVE
LEUKOCYTE ESTERASE UR QL STRIP: ABNORMAL
LITHIUM SERPL-SCNC: 0.3 MMOL/L
METHADONE UR QL SCN: NOT DETECTED
MUCOUS THREADS #/AREA URNS LPF: PRESENT /LPF
N MEN DNA CSF QL NAA+NON-PROBE: NEGATIVE
NITRATE UR QL: NEGATIVE
OPIATES UR QL SCN: NOT DETECTED
OXYCODONE UR QL SCN: NOT DETECTED
PARECHOVIRUS A RNA CSF QL NAA+NON-PROBE: NEGATIVE
PCP UR QL SCN: NOT DETECTED
PH UR STRIP: 6 [PH] (ref 4.7–8)
PROPOXYPH UR QL: NOT DETECTED
PROT CSF-MCNC: 38 MG/DL (ref 15–60)
RBC # CSF MANUAL: 5 /UL (ref 0–2)
RBC URINE: <1 /HPF
S PNEUM AG SPEC QL: NEGATIVE
S PNEUM DNA CSF QL NAA+NON-PROBE: NEGATIVE
SP GR UR STRIP: 1.02 (ref 1–1.03)
SQUAMOUS EPITHELIAL: 0 /HPF
TRICYCLICS UR QL SCN: NOT DETECTED
TUBE # CSF: 3
UROBILINOGEN UR STRIP-MCNC: NORMAL MG/DL
VZV DNA CSF QL NAA+NON-PROBE: NEGATIVE
WBC # CSF MANUAL: 3 /UL (ref 0–5)
WBC URINE: 7 /HPF

## 2022-09-21 PROCEDURE — 80306 DRUG TEST PRSMV INSTRMNT: CPT | Performed by: PHYSICIAN ASSISTANT

## 2022-09-21 PROCEDURE — 87205 SMEAR GRAM STAIN: CPT | Performed by: EMERGENCY MEDICINE

## 2022-09-21 PROCEDURE — 70450 CT HEAD/BRAIN W/O DYE: CPT

## 2022-09-21 PROCEDURE — 250N000011 HC RX IP 250 OP 636: Performed by: EMERGENCY MEDICINE

## 2022-09-21 PROCEDURE — 87086 URINE CULTURE/COLONY COUNT: CPT | Performed by: PHYSICIAN ASSISTANT

## 2022-09-21 PROCEDURE — 87075 CULTR BACTERIA EXCEPT BLOOD: CPT | Performed by: EMERGENCY MEDICINE

## 2022-09-21 PROCEDURE — 87476 LYME DIS DNA AMP PROBE: CPT | Performed by: EMERGENCY MEDICINE

## 2022-09-21 PROCEDURE — 81001 URINALYSIS AUTO W/SCOPE: CPT | Performed by: PHYSICIAN ASSISTANT

## 2022-09-21 PROCEDURE — 82945 GLUCOSE OTHER FLUID: CPT | Performed by: EMERGENCY MEDICINE

## 2022-09-21 PROCEDURE — 87899 AGENT NOS ASSAY W/OPTIC: CPT | Performed by: EMERGENCY MEDICINE

## 2022-09-21 PROCEDURE — 70498 CT ANGIOGRAPHY NECK: CPT

## 2022-09-21 PROCEDURE — 70496 CT ANGIOGRAPHY HEAD: CPT

## 2022-09-21 PROCEDURE — 84157 ASSAY OF PROTEIN OTHER: CPT | Performed by: EMERGENCY MEDICINE

## 2022-09-21 PROCEDURE — 87483 CNS DNA AMP PROBE TYPE 12-25: CPT | Performed by: EMERGENCY MEDICINE

## 2022-09-21 PROCEDURE — 87529 HSV DNA AMP PROBE: CPT | Mod: XU | Performed by: EMERGENCY MEDICINE

## 2022-09-21 PROCEDURE — 86592 SYPHILIS TEST NON-TREP QUAL: CPT | Performed by: EMERGENCY MEDICINE

## 2022-09-21 PROCEDURE — 80178 ASSAY OF LITHIUM: CPT | Performed by: NURSE PRACTITIONER

## 2022-09-21 PROCEDURE — 89050 BODY FLUID CELL COUNT: CPT | Performed by: EMERGENCY MEDICINE

## 2022-09-21 RX ORDER — IOPAMIDOL 755 MG/ML
50 INJECTION, SOLUTION INTRAVASCULAR ONCE
Status: COMPLETED | OUTPATIENT
Start: 2022-09-21 | End: 2022-09-21

## 2022-09-21 RX ADMIN — IOPAMIDOL 50 ML: 755 INJECTION, SOLUTION INTRAVENOUS at 03:31

## 2022-09-21 ASSESSMENT — ACTIVITIES OF DAILY LIVING (ADL)
ADLS_ACUITY_SCORE: 37

## 2022-09-21 NOTE — ED NOTES
Pt to get lumbar puncture. Still very confused but easy to redirect. Contacted Clifford Cooley and confirmed that pt is his own decision maker. Will proceed with LP.

## 2022-09-21 NOTE — DISCHARGE INSTRUCTIONS
You were seen in the emergency department for altered mental status.  We did not observe a fever here which was reported by paramedics.  We did many tests all of which are reassuring.  This included a CT of your head, a CT of your head and neck to rule out stroke, a lumbar puncture to rule out meningitis, we also ruled out influenza, COVID, RSV, pneumonia, urinary tract infection.  At this point, we suspect your change in mental status may be psychiatric.    What to expect when you have contrast    During your exam, we will inject  contrast  into your vein or artery. (Contrast is a clear liquid with iodine in it. It shows up on X-rays.)    You may feel warm or hot. You may have a metal taste in your mouth and a slight upset stomach. You may also feel pressure near the kidneys and bladder. These effects will last about 1 to 3 minutes.    Please tell us if you have:   Sneezing    Itching   Hives    Swelling in the face   A hoarse voice   Breathing problems   Other new symptoms    Serious problems are rare.  They may include:   Irregular heartbeat    Seizures   Kidney failure             Tissue damage   Shock     Death    If you have any problems during the exam, we  will treat them right away.    When you get home    Call your hospital if you have any new symptoms in the next 2 days, like hives or swelling. (Phone numbers are at the bottom of this page.) Or call your family doctor.     If you have wheezing or trouble breathing, call 911.    Self-care  -Drink at least 4 extra glasses of water today.   This reduces the stress on your kidneys.  -Keep taking your regular medicines.    The contrast will pass out of your body in your  Urine(pee). This will happen in the next 24 hours. You  will not feel this. Your urine will not  change color.    If you have kidney problems or take metformin    Drink 4 to 8 large glasses of water for the next  2 days, if you are not on a fluid restriction.    ?If you take metformin  (Glucophage or Glucovance) for diabetes, keep taking it.      ?Your kidney function tests are abnormal.  If you take Metformin, do not take it for 48 hours. Please go to your clinic for a blood test within 3 days after your exam before the restarting this medicine.     (Note to provider:please give patient prescription for lab tests.)    ?Special instructions:     I have read and understand the above information.    Patient Sign Here:______________________________________Date:________Time:______    Staff Sign Here:________________________________________Date:_______Time:______      Radiology Departments:     ?[facility]: [phone] ?[place]: [phone]     ?[facility]: [phone] ?[facility]:[phone]      ?[facility]: [phone]  ?[facility]: [phone]  ?[place]:[phone]    ?[facility]:[phone]  ?[facility]:[phone]

## 2022-09-21 NOTE — ED NOTES
Pt awake and alert and oriented. Pt to be discharged back to [facility]. Attempted to call [facility] x 3 without an answer. Will continue trying before pt. leaves.

## 2022-09-21 NOTE — ED PROVIDER NOTES
History     Chief Complaint   Patient presents with     Fever     Altered Mental Status     The history is provided by the patient.     Steven Carter is a 32 year old male who presented to the emergency department via EMS for evaluation of altered mental status.  The patient lives at a local assisted living and staff reports that he exhibited another residents room with slurred speech and acting inappropriately.  EMS was called as they believe the patient may have ingested some form of drug or alcohol.  EMS found that the patient did have a fever.  Patient is alert and oriented.  His fast was negative per EMS.  Patient has a history of bipolar disease and is on a multitude of psychotropic medications.    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, subsequent encounter 09/01/2020     Priority: Medium     Added automatically from request for surgery [id number]       Psychosis (H) 06/23/2020     Priority: Medium     Seizure (H) 04/19/2016     Priority: Medium     ADD (attention deficit disorder) 12/02/2015     Priority: Medium     Methamphetamine abuse (H) 12/02/2015     Priority: Medium     Suicidal behavior 07/23/2014     Priority: Medium     Moderate dextromethorphan use disorder (H) 07/20/2014     Priority: Medium     Paranoia (H) 07/20/2014     Priority: Medium     Depression with anxiety 03/02/2009     Priority:  Medium     Overview:   IMO Update 10/11       Alcohol abuse 02/02/2009     Priority: Medium     Overview:   IMO Update 10/11       Polysubstance abuse (H) 02/02/2009     Priority: Medium     Tobacco use disorder 11/01/2008     Priority: Medium     Anxiety disorder 11/01/2008     Priority: Medium     Formatting of this note might be different from the original.  Diagnosed Rosalba Padilla          Past Medical History:    No past medical history on file.    Past Surgical History:    Past Surgical History:   Procedure Laterality Date     COLONOSCOPY - HIM SCAN N/A 12/03/2020    Procedure:  Colonoscopy diagnostic with random biopsies;  Surgeon:  Jenise GOLDMAN MD;  Location:  [facility] OR       Family History:    No family history on file.    Social History:  Marital Status:  Single [1]  Social History     Tobacco Use     Smoking status: Current Every Day Smoker     Packs/day: 0.50     Years: 9.00     Pack years: 4.50     Smokeless tobacco: Current User     Types: Chew   Vaping Use     Vaping Use: Never used   Substance Use Topics     Alcohol use: No     Comment: daily to occasionally per pt.     Drug use: Yes     Types: Other     Comment: reports hx of use but none recently        Medications:    acetaminophen (TYLENOL) 325 MG tablet  buPROPion (WELLBUTRIN XL) 150 MG 24 hr tablet  buPROPion (WELLBUTRIN XL) 300 MG 24 hr tablet  cloZAPine (CLOZARIL) 100 MG tablet  cloZAPine (CLOZARIL) 100 MG tablet  docusate sodium (COLACE) 100 MG capsule  hydrOXYzine (ATARAX) 50 MG tablet  Lidocaine (LIDOCARE) 4 % Patch  lithium (ESKALITH CR/LITHOBID) 450 MG CR tablet  LORazepam (ATIVAN) 0.5 MG tablet  LORazepam (ATIVAN) 1 MG tablet  memantine (NAMENDA) 10 MG tablet  multivitamin w/minerals (THERA-VIT-M) tablet  pregabalin (LYRICA) 100 MG capsule  vitamin D2 (ERGOCALCIFEROL) 66819 units (1250 mcg) capsule          Review of Systems   Unable to perform ROS: Mental status change       Physical Exam   BP: 129/79  Pulse: 96  Temp: 99.8  F  (37.7  C)  Resp: 18  SpO2: 94 %      Physical Exam  Vitals and nursing note reviewed.   Constitutional:       General: He is not in acute distress.     Appearance: Normal appearance. He is normal weight. He is ill-appearing and diaphoretic. He is not toxic-appearing.   Cardiovascular:      Rate and Rhythm: Normal rate and regular rhythm.   Pulmonary:      Effort: Pulmonary effort is normal.      Breath sounds: Normal breath sounds.   Abdominal:      Palpations: Abdomen is soft.      Tenderness: There is no abdominal tenderness.   Musculoskeletal:      Cervical back: Normal range of motion and neck supple.   Skin:     General: Skin is warm.      Capillary Refill: Capillary refill takes less than 2 seconds.   Neurological:      General: No focal deficit present.      Mental Status: He is alert and oriented to person, place, and time.      Comments: The patient is oriented x3.         ED Course                 [facility]    -Lumbar Puncture    Date/Time: 9/24/2022 4:10 AM  Performed by: Slim Davila MD  Authorized by: Slim Davila MD     Risks, benefits and alternatives discussed.      PRE-PROCEDURE DETAILS:     Procedure purpose:  Diagnostic    ANESTHESIA (see MAR for exact dosages):     Anesthesia method:  Local infiltration    Local anesthetic:  Lidocaine 1% w/o epi      PROCEDURE DETAILS:     Lumbar space:  L4-L5 interspace    Patient position:  L lateral decubitus    Needle gauge:  20    Needle type:  Spinal needle - Quincke tip    Needle length (in):  3.5    Ultrasound guidance: no      Number of attempts:  1    Opening pressure (cm H2O):  8    Fluid appearance:  Clear    Tubes of fluid:  4    Total volume (ml):  8    POST-PROCEDURE:     Puncture site:  Adhesive bandage applied        PROCEDURE    Patient Tolerance:  Patient tolerated the procedure well with no immediate complications                Critical Care time:  none               No results found for this or any previous visit (from the  past 24 hour(s)).    Medications   0.9% sodium chloride BOLUS (0 mLs Intravenous Stopped 9/20/22 2345)   ketorolac (TORADOL) injection 30 mg (30 mg Intravenous Given 9/20/22 2245)   iopamidol (ISOVUE-370) solution 50 mL (50 mLs Intravenous Given 9/21/22 0331)   sodium chloride (PF) 0.9% PF flush 100 mL (100 mLs Intravenous Given 9/21/22 0331)       Assessments & Plan (with Medical Decision Making)   32-year-old male who presented to the emergency department via EMS for evaluation of fever and altered mental status.  Full work-up initiated in the emergency department.  Signed out to Dr. Davila at routine change of shift.    This document was prepared using a combination of typing and voice generated software.  While every attempt was made for accuracy, spelling and grammatical errors may exist.  I have reviewed the nursing notes.    I have reviewed the findings, diagnosis, plan and need for follow up with the patient.   32-year-old male here with altered mental status.  At this point, patient able to speak, walk without difficulty, steady gait.  Broad infectious work-up unremarkable.  Stable for discharge home with return precautions.    Discharge Medication List as of 9/21/2022  5:24 AM          Final diagnoses:   Altered mental status, unspecified altered mental status type       9/20/2022   [facility]     Roberto De La Garza PA-C  09/22/22 1128       Slim Davila MD  09/24/22 6353

## 2022-09-22 DIAGNOSIS — Z79.899 ENCOUNTER FOR LONG-TERM (CURRENT) USE OF MEDICATIONS: Primary | ICD-10-CM

## 2022-09-22 LAB — BACTERIA UR CULT: NORMAL

## 2022-09-23 LAB — VDRL CSF QL: NON REACTIVE

## 2022-09-26 LAB
B BURGDOR DNA SPEC QL NAA+PROBE: NOT DETECTED
BACTERIA BLD CULT: ABNORMAL
BACTERIA BLD CULT: NO GROWTH

## 2022-09-28 LAB
BACTERIA CSF CULT: NO GROWTH
BACTERIA CSF CULT: NORMAL
GRAM STAIN RESULT: NORMAL
GRAM STAIN RESULT: NORMAL

## 2022-12-06 DIAGNOSIS — Z79.899 HIGH RISK MEDICATION USE: Primary | ICD-10-CM

## 2022-12-21 ENCOUNTER — APPOINTMENT (OUTPATIENT)
Dept: GENERAL RADIOLOGY | Facility: HOSPITAL | Age: 32
End: 2022-12-21
Attending: NURSE PRACTITIONER
Payer: COMMERCIAL

## 2022-12-21 ENCOUNTER — HOSPITAL ENCOUNTER (EMERGENCY)
Facility: HOSPITAL | Age: 32
Discharge: HOME | End: 2022-12-21
Attending: NURSE PRACTITIONER | Admitting: NURSE PRACTITIONER
Payer: COMMERCIAL

## 2022-12-21 ENCOUNTER — APPOINTMENT (OUTPATIENT)
Dept: CT IMAGING | Facility: HOSPITAL | Age: 32
End: 2022-12-21
Attending: NURSE PRACTITIONER
Payer: COMMERCIAL

## 2022-12-21 VITALS
DIASTOLIC BLOOD PRESSURE: 86 MMHG | RESPIRATION RATE: 16 BRPM | HEART RATE: 89 BPM | OXYGEN SATURATION: 97 % | SYSTOLIC BLOOD PRESSURE: 128 MMHG | TEMPERATURE: 99.5 F

## 2022-12-21 DIAGNOSIS — S09.93XA FACIAL INJURY, INITIAL ENCOUNTER: ICD-10-CM

## 2022-12-21 DIAGNOSIS — S22.41XA CLOSED FRACTURE OF MULTIPLE RIBS OF RIGHT SIDE, INITIAL ENCOUNTER: Primary | ICD-10-CM

## 2022-12-21 PROCEDURE — 71101 X-RAY EXAM UNILAT RIBS/CHEST: CPT | Mod: RT

## 2022-12-21 PROCEDURE — 70486 CT MAXILLOFACIAL W/O DYE: CPT

## 2022-12-21 PROCEDURE — G0463 HOSPITAL OUTPT CLINIC VISIT: HCPCS

## 2022-12-21 PROCEDURE — 99213 OFFICE O/P EST LOW 20 MIN: CPT | Performed by: NURSE PRACTITIONER

## 2022-12-21 PROCEDURE — 999N000157 HC STATISTIC RCP TIME EA 10 MIN

## 2022-12-21 ASSESSMENT — ENCOUNTER SYMPTOMS
HEADACHES: 0
DIZZINESS: 1
MYALGIAS: 1
COUGH: 0
SHORTNESS OF BREATH: 0

## 2022-12-21 ASSESSMENT — ACTIVITIES OF DAILY LIVING (ADL): ADLS_ACUITY_SCORE: 37
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
## 2022-12-21 NOTE — DISCHARGE INSTRUCTIONS
You have 3 broken ribs on the right side.  You can take Tylenol or ibuprofen as needed for pain.    Continue with warm compresses or do cold packs to the affected area.    Use the incentive spirometer as you were instructed to do today.    I need you to schedule an appointment with the primary medical doctor as soon as possible for reevaluation.    Return to the emergency department or urgent care for any concerning symptoms.
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
## 2023-01-19 ENCOUNTER — MEDICAL CORRESPONDENCE (OUTPATIENT)
Dept: CT IMAGING | Facility: HOSPITAL | Age: 33
End: 2023-01-19

## 2023-01-20 ENCOUNTER — HOSPITAL ENCOUNTER (OUTPATIENT)
Dept: CT IMAGING | Facility: HOSPITAL | Age: 33
Discharge: HOME | End: 2023-01-20
Attending: PHYSICIAN ASSISTANT | Admitting: PHYSICIAN ASSISTANT
Payer: COMMERCIAL

## 2023-01-20 DIAGNOSIS — R91.8 OTHER NONSPECIFIC ABNORMAL FINDING OF LUNG FIELD: ICD-10-CM

## 2023-01-20 PROCEDURE — 71250 CT THORAX DX C-: CPT

## 2023-02-22 ENCOUNTER — HOSPITAL ENCOUNTER (INPATIENT)
Facility: HOSPITAL | Age: 33
LOS: 6 days | Discharge: NURSING, REHAB OR LONG-TERM CARE | End: 2023-02-28
Attending: PHYSICIAN ASSISTANT | Admitting: STUDENT IN AN ORGANIZED HEALTH CARE EDUCATION/TRAINING PROGRAM
Payer: COMMERCIAL

## 2023-02-22 ENCOUNTER — MEDICAL CORRESPONDENCE (OUTPATIENT)
Dept: HEALTH INFORMATION MANAGEMENT | Facility: HOSPITAL | Age: 33
End: 2023-02-22

## 2023-02-22 DIAGNOSIS — F31.9 BIPOLAR 1 DISORDER (H): Primary | Chronic | ICD-10-CM

## 2023-02-22 DIAGNOSIS — R46.89 AGGRESSIVE BEHAVIOR: ICD-10-CM

## 2023-02-22 LAB
ALBUMIN SERPL BCG-MCNC: 4 G/DL (ref 3.5–5.2)
ALP SERPL-CCNC: 77 U/L (ref 40–129)
ALT SERPL W P-5'-P-CCNC: 54 U/L (ref 10–50)
ANION GAP SERPL CALCULATED.3IONS-SCNC: 10 MMOL/L (ref 7–15)
AST SERPL W P-5'-P-CCNC: 65 U/L (ref 10–50)
BASOPHILS # BLD AUTO: 0 10E3/UL (ref 0–0.2)
BASOPHILS NFR BLD AUTO: 0 %
BILIRUB SERPL-MCNC: 0.4 MG/DL
BUN SERPL-MCNC: 10.2 MG/DL (ref 6–20)
CALCIUM SERPL-MCNC: 9.3 MG/DL (ref 8.6–10)
CHLORIDE SERPL-SCNC: 105 MMOL/L (ref 98–107)
CREAT SERPL-MCNC: 0.9 MG/DL (ref 0.67–1.17)
DEPRECATED HCO3 PLAS-SCNC: 28 MMOL/L (ref 22–29)
EOSINOPHIL # BLD AUTO: 0.1 10E3/UL (ref 0–0.7)
EOSINOPHIL NFR BLD AUTO: 1 %
ERYTHROCYTE [DISTWIDTH] IN BLOOD BY AUTOMATED COUNT: 12.4 % (ref 10–15)
GFR SERPL CREATININE-BSD FRML MDRD: >90 ML/MIN/1.73M2
GLUCOSE SERPL-MCNC: 93 MG/DL (ref 70–99)
HCT VFR BLD AUTO: 41.6 % (ref 40–53)
HGB BLD-MCNC: 13.4 G/DL (ref 13.3–17.7)
HOLD SPECIMEN: NORMAL
IMM GRANULOCYTES # BLD: 0 10E3/UL
IMM GRANULOCYTES NFR BLD: 0 %
LYMPHOCYTES # BLD AUTO: 1.6 10E3/UL (ref 0.8–5.3)
LYMPHOCYTES NFR BLD AUTO: 17 %
MCH RBC QN AUTO: 29.1 PG (ref 26.5–33)
MCHC RBC AUTO-ENTMCNC: 32.2 G/DL (ref 31.5–36.5)
MCV RBC AUTO: 90 FL (ref 78–100)
MONOCYTES # BLD AUTO: 0.8 10E3/UL (ref 0–1.3)
MONOCYTES NFR BLD AUTO: 9 %
NEUTROPHILS # BLD AUTO: 6.7 10E3/UL (ref 1.6–8.3)
NEUTROPHILS NFR BLD AUTO: 73 %
NRBC # BLD AUTO: 0 10E3/UL
NRBC BLD AUTO-RTO: 0 /100
PLATELET # BLD AUTO: 261 10E3/UL (ref 150–450)
POTASSIUM SERPL-SCNC: 3.7 MMOL/L (ref 3.4–5.3)
PROT SERPL-MCNC: 6.3 G/DL (ref 6.4–8.3)
RBC # BLD AUTO: 4.6 10E6/UL (ref 4.4–5.9)
SARS-COV-2 RNA RESP QL NAA+PROBE: NEGATIVE
SODIUM SERPL-SCNC: 143 MMOL/L (ref 136–145)
WBC # BLD AUTO: 9.3 10E3/UL (ref 4–11)

## 2023-02-22 PROCEDURE — 124N000001 HC R&B MH

## 2023-02-22 PROCEDURE — 250N000013 HC RX MED GY IP 250 OP 250 PS 637: Performed by: NURSE PRACTITIONER

## 2023-02-22 PROCEDURE — 99285 EMERGENCY DEPT VISIT HI MDM: CPT

## 2023-02-22 PROCEDURE — C9803 HOPD COVID-19 SPEC COLLECT: HCPCS

## 2023-02-22 PROCEDURE — 250N000009 HC RX 250: Performed by: NURSE PRACTITIONER

## 2023-02-22 PROCEDURE — 250N000011 HC RX IP 250 OP 636: Performed by: STUDENT IN AN ORGANIZED HEALTH CARE EDUCATION/TRAINING PROGRAM

## 2023-02-22 PROCEDURE — 36415 COLL VENOUS BLD VENIPUNCTURE: CPT | Performed by: PHYSICIAN ASSISTANT

## 2023-02-22 PROCEDURE — 85025 COMPLETE CBC W/AUTO DIFF WBC: CPT | Performed by: PHYSICIAN ASSISTANT

## 2023-02-22 PROCEDURE — 99223 1ST HOSP IP/OBS HIGH 75: CPT | Mod: AI | Performed by: NURSE PRACTITIONER

## 2023-02-22 PROCEDURE — 99283 EMERGENCY DEPT VISIT LOW MDM: CPT | Performed by: PHYSICIAN ASSISTANT

## 2023-02-22 PROCEDURE — U0005 INFEC AGEN DETEC AMPLI PROBE: HCPCS | Performed by: PHYSICIAN ASSISTANT

## 2023-02-22 PROCEDURE — 99222 1ST HOSP IP/OBS MODERATE 55: CPT | Performed by: NURSE PRACTITIONER

## 2023-02-22 PROCEDURE — 80053 COMPREHEN METABOLIC PANEL: CPT | Performed by: PHYSICIAN ASSISTANT

## 2023-02-22 RX ORDER — CHOLECALCIFEROL (VITAMIN D3) 25 MCG
1 TABLET ORAL DAILY
Status: ON HOLD | COMMUNITY
Start: 2023-01-16 | End: 2023-02-22

## 2023-02-22 RX ORDER — DOCUSATE SODIUM 100 MG/1
100 CAPSULE, LIQUID FILLED ORAL 2 TIMES DAILY PRN
Status: DISCONTINUED | OUTPATIENT
Start: 2023-02-22 | End: 2023-02-28 | Stop reason: HOSPADM

## 2023-02-22 RX ORDER — PREGABALIN 150 MG/1
1 CAPSULE ORAL 3 TIMES DAILY
Status: ON HOLD | COMMUNITY
Start: 2023-02-16 | End: 2023-03-23 | Stop reason: DRUGHIGH

## 2023-02-22 RX ORDER — LORAZEPAM 1 MG/1
1 TABLET ORAL
Status: ON HOLD | COMMUNITY
End: 2023-02-28

## 2023-02-22 RX ORDER — NICOTINE 21 MG/24HR
1 PATCH, TRANSDERMAL 24 HOURS TRANSDERMAL DAILY
Status: DISCONTINUED | OUTPATIENT
Start: 2023-02-22 | End: 2023-02-28 | Stop reason: HOSPADM

## 2023-02-22 RX ORDER — MEMANTINE HYDROCHLORIDE 10 MG/1
10 TABLET ORAL 2 TIMES DAILY
Status: DISCONTINUED | OUTPATIENT
Start: 2023-02-22 | End: 2023-02-28 | Stop reason: HOSPADM

## 2023-02-22 RX ORDER — ACETAMINOPHEN 325 MG/1
650 TABLET ORAL EVERY 4 HOURS PRN
Status: DISCONTINUED | OUTPATIENT
Start: 2023-02-22 | End: 2023-02-28 | Stop reason: HOSPADM

## 2023-02-22 RX ORDER — HALOPERIDOL 5 MG/1
5 TABLET ORAL ONCE
Status: COMPLETED | OUTPATIENT
Start: 2023-02-22 | End: 2023-02-22

## 2023-02-22 RX ORDER — CLOZAPINE 100 MG/1
100 TABLET ORAL AT BEDTIME
Status: DISCONTINUED | OUTPATIENT
Start: 2023-02-22 | End: 2023-02-28 | Stop reason: HOSPADM

## 2023-02-22 RX ORDER — LORAZEPAM 1 MG/1
1 TABLET ORAL
Status: DISCONTINUED | OUTPATIENT
Start: 2023-02-22 | End: 2023-02-22

## 2023-02-22 RX ORDER — HYDROXYZINE HYDROCHLORIDE 50 MG/1
50 TABLET, FILM COATED ORAL EVERY 6 HOURS PRN
COMMUNITY
End: 2024-04-20

## 2023-02-22 RX ORDER — LORAZEPAM 1 MG/1
1 TABLET ORAL 4 TIMES DAILY
Status: DISCONTINUED | OUTPATIENT
Start: 2023-02-22 | End: 2023-02-28 | Stop reason: HOSPADM

## 2023-02-22 RX ORDER — MULTIPLE VITAMINS W/ MINERALS TAB 9MG-400MCG
1 TAB ORAL DAILY
Status: DISCONTINUED | OUTPATIENT
Start: 2023-02-22 | End: 2023-02-28 | Stop reason: HOSPADM

## 2023-02-22 RX ORDER — DIPHENHYDRAMINE HYDROCHLORIDE 50 MG/ML
INJECTION INTRAMUSCULAR; INTRAVENOUS
Status: DISCONTINUED
Start: 2023-02-22 | End: 2023-02-22 | Stop reason: WASHOUT

## 2023-02-22 RX ORDER — OLANZAPINE 10 MG/1
10 TABLET ORAL 3 TIMES DAILY PRN
Status: DISCONTINUED | OUTPATIENT
Start: 2023-02-22 | End: 2023-02-28 | Stop reason: HOSPADM

## 2023-02-22 RX ORDER — PREGABALIN 150 MG/1
150 CAPSULE ORAL 3 TIMES DAILY
Status: DISCONTINUED | OUTPATIENT
Start: 2023-02-22 | End: 2023-02-28 | Stop reason: HOSPADM

## 2023-02-22 RX ORDER — HALOPERIDOL 5 MG/ML
5 INJECTION INTRAMUSCULAR ONCE
Status: COMPLETED | OUTPATIENT
Start: 2023-02-22 | End: 2023-02-22

## 2023-02-22 RX ORDER — HYDROXYZINE HYDROCHLORIDE 25 MG/1
25 TABLET, FILM COATED ORAL EVERY 4 HOURS PRN
Status: DISCONTINUED | OUTPATIENT
Start: 2023-02-22 | End: 2023-02-28 | Stop reason: HOSPADM

## 2023-02-22 RX ORDER — LANOLIN ALCOHOL/MO/W.PET/CERES
3 CREAM (GRAM) TOPICAL
Status: DISCONTINUED | OUTPATIENT
Start: 2023-02-22 | End: 2023-02-28 | Stop reason: HOSPADM

## 2023-02-22 RX ORDER — LITHIUM CARBONATE 450 MG
450 TABLET, EXTENDED RELEASE ORAL AT BEDTIME
Status: DISCONTINUED | OUTPATIENT
Start: 2023-02-22 | End: 2023-02-28 | Stop reason: HOSPADM

## 2023-02-22 RX ORDER — MAGNESIUM HYDROXIDE/ALUMINUM HYDROXICE/SIMETHICONE 120; 1200; 1200 MG/30ML; MG/30ML; MG/30ML
30 SUSPENSION ORAL EVERY 4 HOURS PRN
Status: DISCONTINUED | OUTPATIENT
Start: 2023-02-22 | End: 2023-02-28 | Stop reason: HOSPADM

## 2023-02-22 RX ORDER — BUPROPION HYDROCHLORIDE 300 MG/1
300 TABLET ORAL EVERY MORNING
Status: DISCONTINUED | OUTPATIENT
Start: 2023-02-23 | End: 2023-02-28 | Stop reason: HOSPADM

## 2023-02-22 RX ORDER — BUPROPION HYDROCHLORIDE 150 MG/1
150 TABLET ORAL EVERY MORNING
Status: DISCONTINUED | OUTPATIENT
Start: 2023-02-23 | End: 2023-02-28 | Stop reason: HOSPADM

## 2023-02-22 RX ORDER — AMOXICILLIN 250 MG
1 CAPSULE ORAL 2 TIMES DAILY PRN
Status: DISCONTINUED | OUTPATIENT
Start: 2023-02-22 | End: 2023-02-28 | Stop reason: HOSPADM

## 2023-02-22 RX ORDER — VITAMIN B COMPLEX
25 TABLET ORAL DAILY
Status: DISCONTINUED | OUTPATIENT
Start: 2023-02-22 | End: 2023-02-28 | Stop reason: HOSPADM

## 2023-02-22 RX ORDER — OLANZAPINE 10 MG/2ML
10 INJECTION, POWDER, FOR SOLUTION INTRAMUSCULAR 3 TIMES DAILY PRN
Status: DISCONTINUED | OUTPATIENT
Start: 2023-02-22 | End: 2023-02-28 | Stop reason: HOSPADM

## 2023-02-22 RX ADMIN — PREGABALIN 150 MG: 150 CAPSULE ORAL at 21:18

## 2023-02-22 RX ADMIN — PREGABALIN 150 MG: 150 CAPSULE ORAL at 14:59

## 2023-02-22 RX ADMIN — HALOPERIDOL LACTATE 5 MG: 5 INJECTION, SOLUTION INTRAMUSCULAR at 15:54

## 2023-02-22 RX ADMIN — LORAZEPAM 1 MG: 1 TABLET ORAL at 21:17

## 2023-02-22 RX ADMIN — CLOZAPINE 100 MG: 100 TABLET ORAL at 21:17

## 2023-02-22 RX ADMIN — LITHIUM CARBONATE 450 MG: 450 TABLET, EXTENDED RELEASE ORAL at 21:18

## 2023-02-22 RX ADMIN — LORAZEPAM 1 MG: 1 TABLET ORAL at 14:59

## 2023-02-22 ASSESSMENT — COLUMBIA-SUICIDE SEVERITY RATING SCALE - C-SSRS
ATTEMPT LIFETIME: YES
2. HAVE YOU ACTUALLY HAD ANY THOUGHTS OF KILLING YOURSELF?: NO
4. HAVE YOU HAD THESE THOUGHTS AND HAD SOME INTENTION OF ACTING ON THEM?: YES
ATTEMPT PAST THREE MONTHS: NO
REASONS FOR IDEATION LIFETIME: COMPLETELY TO END OR STOP THE PAIN (YOU COULDN'T GO ON LIVING WITH THE PAIN OR HOW YOU WERE FEELING)
2. HAVE YOU ACTUALLY HAD ANY THOUGHTS OF KILLING YOURSELF?: YES
LETHALITY/MEDICAL DAMAGE CODE FIRST ACTUAL ATTEMPT: SEVERE PHYSICAL DAMAGE, MEDICAL HOSPITALIZATION WITH INTENSIVE CARE REQUIRED
6. HAVE YOU EVER DONE ANYTHING, STARTED TO DO ANYTHING, OR PREPARED TO DO ANYTHING TO END YOUR LIFE?: NO
1. IN THE PAST MONTH, HAVE YOU WISHED YOU WERE DEAD OR WISHED YOU COULD GO TO SLEEP AND NOT WAKE UP?: NO
TOTAL  NUMBER OF PREPARATORY ACTS LIFETIME: 1
6. HAVE YOU EVER DONE ANYTHING, STARTED TO DO ANYTHING, OR PREPARED TO DO ANYTHING TO END YOUR LIFE?: YES
TOTAL  NUMBER OF INTERRUPTED ATTEMPTS LIFETIME: NO
3. HAVE YOU BEEN THINKING ABOUT HOW YOU MIGHT KILL YOURSELF?: YES
5. HAVE YOU STARTED TO WORK OUT OR WORKED OUT THE DETAILS OF HOW TO KILL YOURSELF? DO YOU INTEND TO CARRY OUT THIS PLAN?: NO
LETHALITY/MEDICAL DAMAGE CODE FIRST POTENTIAL ATTEMPT: BEHAVIOR LIKELY TO RESULT IN DEATH DESPITE AVAILABLE MEDICAL CARE
4. HAVE YOU HAD THESE THOUGHTS AND HAD SOME INTENTION OF ACTING ON THEM?: NO
REASONS FOR IDEATION PAST MONTH: COMPLETELY TO GET ATTENTION, REVENGE, OR A REACTION FROM OTHERS
1. HAVE YOU WISHED YOU WERE DEAD OR WISHED YOU COULD GO TO SLEEP AND NOT WAKE UP?: YES
LETHALITY/MEDICAL DAMAGE CODE MOST LETHAL POTENTIAL ATTEMPT: BEHAVIOR LIKELY TO RESULT IN DEATH DESPITE AVAILABLE MEDICAL CARE
LETHALITY/MEDICAL DAMAGE CODE MOST LETHAL ACTUAL ATTEMPT: SEVERE PHYSICAL DAMAGE, MEDICAL HOSPITALIZATION WITH INTENSIVE CARE REQUIRED
LETHALITY/MEDICAL DAMAGE CODE MOST RECENT POTENTIAL ATTEMPT: BEHAVIOR LIKELY TO RESULT IN DEATH DESPITE AVAILABLE MEDICAL CARE
MOST LETHAL DATE: 64026
5. HAVE YOU STARTED TO WORK OUT OR WORKED OUT THE DETAILS OF HOW TO KILL YOURSELF? DO YOU INTEND TO CARRY OUT THIS PLAN?: YES
TOTAL  NUMBER OF ACTUAL ATTEMPTS LIFETIME: 1
FIRST ATTEMPT DATE: 64026
MOST RECENT DATE: 64026
TOTAL  NUMBER OF ABORTED OR SELF INTERRUPTED ATTEMPTS LIFETIME: NO
LETHALITY/MEDICAL DAMAGE CODE MOST RECENT ACTUAL ATTEMPT: SEVERE PHYSICAL DAMAGE, MEDICAL HOSPITALIZATION WITH INTENSIVE CARE REQUIRED

## 2023-02-22 ASSESSMENT — ACTIVITIES OF DAILY LIVING (ADL)
CHANGE_IN_FUNCTIONAL_STATUS_SINCE_ONSET_OF_CURRENT_ILLNESS/INJURY: YES
ORAL_HYGIENE: INDEPENDENT
HYGIENE/GROOMING: INDEPENDENT
CONCENTRATING,_REMEMBERING_OR_MAKING_DECISIONS_DIFFICULTY: YES
NUMBER_OF_TIMES_PATIENT_HAS_FALLEN_WITHIN_LAST_SIX_MONTHS: 2
WALKING_OR_CLIMBING_STAIRS_DIFFICULTY: YES
ADLS_ACUITY_SCORE: 43
DRESS: INDEPENDENT
WEAR_GLASSES_OR_BLIND: NO
DRESSING/BATHING_DIFFICULTY: NO
ADLS_ACUITY_SCORE: 43
ADLS_ACUITY_SCORE: 37
DIFFICULTY_EATING/SWALLOWING: NO
FALL_HISTORY_WITHIN_LAST_SIX_MONTHS: YES
TOILETING_ISSUES: NO
ADLS_ACUITY_SCORE: 43
DOING_ERRANDS_INDEPENDENTLY_DIFFICULTY: YES
ADLS_ACUITY_SCORE: 43
LAUNDRY: UNABLE TO COMPLETE
TRANSFERRING: 0-->INDEPENDENT
WALKING_OR_CLIMBING_STAIRS: OTHER (SEE COMMENTS)
ADLS_ACUITY_SCORE: 43
TRANSFERRING: 0-->INDEPENDENT
HYGIENE/GROOMING: INDEPENDENT

## 2023-02-22 NOTE — CONSULTS
Diagnostic Evaluation Consultation  Crisis Assessment    Patient was assessed: Bandar  Patient location: [facility]  Was a release of information signed: No. Reason: patient transferred to inpatient       Referral Data and Chief Complaint  Steven Carter is a 32 year old, who uses he/him pronouns, and presents to the ED via EMS. Patient is referred to the ED by community provider(s). Patient is presenting to the ED for the following concerns: aggression with erratic behaviors.      Informed Consent and Assessment Methods     Patient is his own guardian. Writer met with patient and explained the crisis assessment process, including applicable information disclosures and limits to confidentiality, assessed understanding of the process, and obtained consent to proceed with the assessment. Patient was observed to be able to participate in the assessment as evidenced by being cooperative and able to respond to questions. Assessment methods included conducting a formal interview with patient, review of medical records, collaboration with medical staff, and obtaining relevant collateral information from family and community providers when available..     Over the course of this crisis assessment provided reassurance, offered validation and engaged patient in problem solving and disposition planning. Patient's response to interventions was pleasant and receptive.     Summary of Patient Situation    The patient was seen today at [facility], by the [facility] ([facility]), through virtual crisis assessment. The patient is alert, oriented x3, calm, and cooperative. Thought processes are organized. Speech coherent. Affect and eye contact are appropriate. Mood is irritable. Patient denies suicidal ideation, plans or intent. He denies homicidal ideation. He is able to have an appropriate conversation and seems lucid. He reports no symptoms of sarah and no symptoms of psychosis. He denies use of alcohol or illicit  "drugs.    Patient arrives to the ED from [facility] by EMS. [place] called police due to patient's increasing level of aggression and erratic behaviors. He has been making threats about harming people and walking around and \"poking people with sticks\". Patient made several comments about Nuno Robert and said he was going to \"go Velalejandra on people\".         Brief Psychosocial History    Patient resides at [facility] for unknown amount of time. He has two children ages of 9 and 13. He has a mother and sister for family members. He is not currently employed and not attending a [employer] or [employer] organization.     Significant Clinical History    Patient has a history for schizoaffective disorder, multiple TBI's with LOC, polysubstance abuse, depression, anxiety, ADHD, suicidal ideation, and suicide attempt. He has multiple previous inpatient hospitalizations and ED visits at [facility]. The last psychiatric hospitalizations occurred in July of 2022. He does have a serious suicide attempt that occurred in April of 2016. During childhood he had a significant brain injury that resulted in LOC. He has several other brain injuries in his lifetime. Epic medical records indicate he endured an anoxic brain injury following suicide attempt in 2016. He has had multiple previous civil court commitments for MICD. He is not currently on a commitment. He mentions that he has a psych appointment tomorrow with Nuno Hendrix.        Collateral Information    .The following information was received from  [facility] whose relationship to the patient is . Information was obtained via phone. Their phone number is # [phone] they last had contact with patient on today.    Why does patient live in the facility:  History for brain injury and mental health issues    Significant changes to environment:  Over the course of the last couple of weeks he has been acting more aggressive, " bizarre, and erratic.    Legal status (Commitment, probation, guardian, etc.):  none    Has the patient made any comments about wanting to kill themselves/others:  No not recent    What happened today:  He has been increasingly aggressive. He has been making statements about killing others. He was walking with a tree branch inside the building and poking it at residents and staff. He has been making statements about Nuno Robert and telling people he was going to go Yesica on someone.     What is different about patient's functioning:  Increased aggression with bizarre and erratic    Concern about alcohol/drug use: Yes He has been found with multiple bottles of cough syrup, mucinex, and delsium    If d/c is recommended, can patient return to current living situation: yes.      Additional information:  Patient has a psych appointment scheduled for tomorrow with Nuno Hendrix      Risk Assessment    [place] Suicide Severity Rating Scale Full Clinical Version:  2/22/23  Suicidal Ideation  1. Wish to be Dead (Lifetime): Yes  1. Wish to be Dead (Past 1 Month): No  2. Non-Specific Active Suicidal Thoughts (Lifetime): Yes  2. Non-Specific Active Suicidal Thoughts (Past 1 Month): No  3. Active Suicidal Ideation with any Methods (Not Plan) Without Intent to Act (Lifetime): Yes  3. Active Suicidal Ideation with any Methods (Not Plan) Without Intent to Act (Past 1 Month): No  4. Active Suicidal Ideation with Some Intent to Act, Without Specific Plan (Lifetime): Yes  4. Active Suicidal Ideation with Some Intent to Act, Without Specific Plan (Past 1 Month): No  5. Active Suicidal Ideation with Specific Plan and Intent (Lifetime): Yes  5. Active Suicidal Ideation with Specific Plan and Intent (Past 1 Month): No    Intensity of Ideation  Most Severe Ideation Rating (Lifetime): 5  Most Severe Ideation Rating (Past 1 Month): 1  Frequency (Lifetime): Once a week  Frequency (Past 1 Month): Less than once a week  Duration (Lifetime): Less  than 1 hour/some of the time  Duration (Past 1 Month): Fleeting, few seconds or minutes  Controllability (Lifetime): Unable to control thoughts  Controllability (Past 1 Month): Easily able to control thoughts  Deterrents (Lifetime): Deterrents definitely did not stop you  Deterrents (Past 1 Month): Deterrents definitely stopped you from attempting suicide  Reasons for Ideation (Lifetime): Completely to end or stop the pain (You couldn't go on living with the pain or how you were feeling)  Reasons for Ideation (Past 1 Month): Completely to get attention, revenge, or a reaction from others    Suicidal Behavior  Actual Attempt (Lifetime): Yes  Total Number of Actual Attempts (Lifetime): 1  Actual Attempt (Past 3 Months): No  Has subject engaged in non-suicidal self-injurious behavior? (Lifetime): No  Interrupted Attempts (Lifetime): No  Aborted or Self-Interrupted Attempt (Lifetime): No  Preparatory Acts or Behavior (Lifetime): Yes  Total Number of Preparatory Acts (Lifetime): 1  Preparatory Acts or Behavior (Past 3 Months): No  C-SSRS Risk (Lifetime/Recent)  Calculated C-SSRS Risk Score (Lifetime/Recent): Moderate Risk    [place] Suicide Severity Rating Scale Since Last Contact:  2/22/23        Actual/Potential Lethality (Most Lethal Attempt)  Most Lethal Attempt Date: 04/18/16  Actual Lethality/Medical Damage Code (Most Lethal Attempt): Severe physical damage, medical hospitalization with intensive care required  Potential Lethality Code (Most Lethal Attempt): Behavior likely to result in death despite available medical care       Validity of evaluation is not impacted by presenting factors during interview.   Comments regarding subjective versus objective responses to [place] tool:  Patient denies suicidal ideation. He has not been making statements about suicide to staff at the residence. He seems to be honest and forthcoming regarding suicidality.    Environmental or Psychosocial Events:  impulsivity/recklessness, history of TBI and ongoing abuse of substances  Chronic Risk Factors: history of suicide attempts (1x), history of psychiatric hospitalization and serious, persistent mental illness   Warning Signs: acting reckless or engaging in risky activities, anxiety, agitation, unable to sleep, sleeping all the time, dramatic changes in mood and engaging in self-destructive behavior  Protective Factors: strong bond to family unit, community support, or employment, lives in a responsibly safe and stable environment and help seeking  Interpretation of Risk Scoring, Risk Mitigation Interventions and Safety Plan:  Results of CSSRS seem valid. Patient denies suicidal ideation and is able to engage in appropriate conversation. He is being admitted to the ED for observation and medication evaluation due to aggression, erratic behavior, and decreased insight and judgement. He has been making statements about harming others.       Does the patient have thoughts of harming others? Yes.  Does the patient have a specific victim in mind? No Do they have a plan? No Do they have intent? No Is this a duty to warn situation?  no     Is the patient engaging in sexually inappropriate behavior?  no        Current Substance Abuse     Is there recent substance abuse? He has a history for polysubstance abuse. He reports recent marijuana use but unknown amounts or frequency. Staff report he has been seen to have a large stash of cough syrup, mucinex, and delsium. He does have a history for abusing those substances.      Was a urine drug screen or blood alcohol level obtained: No       Mental Status Exam     Affect: Dramatic   Appearance: Disheveled    Attention Span/Concentration: Inattentive  Eye Contact: Intense   Fund of Knowledge: Appropriate    Language /Speech Content: Fluent   Language /Speech Volume: Normal    Language /Speech Rate/Productions: Normal    Recent Memory: Intact   Remote Memory: Intact   Mood: Irritable     Orientation to Person: Yes    Orientation to Place: Yes   Orientation to Time of Day: No    Orientation to Date: No    Situation (Do they understand why they are here?): Yes    Psychomotor Behavior: Normal    Thought Content: Homicidal   Thought Form: Intact      History of commitment: Yes multiple previous commitments. he is no longer on a civil commitment as of 2021       Medication    Psychotropic medications:  Wellbutrin, Clozaril, Lithium, Lorazapam, Namenda, Lyrica,  Compliant: yes  Medication changes made in the last two weeks:  no     Current Care Team    Primary Care Provider:  Sudhakar Herrera MD  Psychiatrist:  Nuno Hendrix PMLUCASP, CNP  At [facility]  Therapist:   no  :  Kristen Andrews at [facility]  [phone]     CTSS or ARM:  no  ACT Team:  no  Other:  no      Diagnosis      F25.70 Schizoaffective disorder, bipolar type, by history      Clinical Summary and Substantiation of Recommendations      Patient is recommended for admission to the behavioral health unit for safety, stabilization, and further evaluation of mental health risk. Patient has history of significant mental health problems related to schizoaffective disorder and traumatic brain injury. He demonstrates recently escalating bizarre and erratic behaviors with aggression toward staff and patient's. He demonstrates impaired ability to insight and judgement with inability to care for self in the community. He has been making threats at staff and others, threatening to kill people. Today he was carrying a large stick and poking it at other residents.      Admission to Inpatient Level of Care is indicated due to:    1. Patient risk of severity of behavioral health disorder is appropriate to proposed level of care as indicated by:    Imminent Risk of Harm: Command auditory hallucinations or paranoid delusions contributing to risk of homicide or serious harm to another  And/or:  Behavioral health disorder is present and  appropriate for inpatient care with both of the following:     Severe psychiatric, behavioral or other comorbid conditions are appropriate for management at inpatient mental health as indicated by at least one of the following:   o Hyperactivity or inattention and Other psychiatric symptoms related to underlying psychiatric disorder, Impaired impulse control, judgement, or insight and Externalizing symptoms (angry outbursts, aggression, disruptive behaviors), rule out hallucinations    Severe dysfunction in daily living is present as indicated by at least one of the following:   o Extreme deterioration in social interactions and Complete inability to maintain any appropriate aspect of personal responsibility in any adult roles    2. Inpatient mental health services are necessary to meet patient needs and at least one of the following:  Specific condition related to admission diagnosis is present and judged likely to further improve at proposed level of care and Specific condition related to admission diagnosis is present and judged likely to deteriorate in absence of treatment at proposed level of care    3. Situation and expectations are appropriate for inpatient care, as indicated by one of the following:   Around-the-clock medical and nursing care to address symptoms and initiate intervention is required and Patient management/treatment at lower level of care is not feasible or is inappropriate      Disposition    Recommended disposition: Inpatient Mental Health       Reviewed case and recommendations with attending provider. Attending Name:  Roberto De La Garza       Attending concurs with disposition: Yes       Patient concurs with disposition: Yes       Guardian concurs with disposition: NA        Final disposition: Inpatient mental health .     Inpatient Details (if applicable):   Is patient admitted voluntarily:Yes      Patient aware of potential for transfer if there is not appropriate placement? Yes       Patient  is willing to travel outside of the [facility] for placement? Yes      Behavioral Intake Notified? No . [facility] says the patient will go upstairs as they have already discussed with the 5th floor.       Assessment Details    Patient interview started at:  11:15am and completed at: 12:20pm.     Total duration spent on the patient case in minutes: 1.0 hrs      CPT code(s) utilized: 98192 - Psychotherapy for Crisis - 60 (30-74*) min       Vern Chen, [facility] HospitalKENDRA, MSW, Psychotherapist  DEC - Triage & Transition Services  Callback:  [phone]

## 2023-02-22 NOTE — ED PROVIDER NOTES
"  History     Chief Complaint   Patient presents with     Psychiatric Evaluation     The history is provided by the patient and the nursing home.     Steven Carter is a 32 year old male who presented to the emergency department along with [place] police for evaluation of aggressive behavior.  Lives at a local assisted living and has been \"poking people with sticks.\"  He is also been making some aggressive comments about harming people.  He is well-known to our behavioral health unit.    Allergies:  Allergies   Allergen Reactions     Pork Allergy        Problem List:    Patient Active Problem List    Diagnosis Date Noted     Aggressive behavior 02/22/2023     Priority: Medium     Attention deficit disorder, unspecified hyperactivity presence 07/18/2022     Priority: Medium     Bipolar 1 disorder (H) 12/14/2021     Priority: Medium     Suicidal behavior with attempted self-injury (H) 11/01/2021     Priority: Medium     Suicidal ideation 10/21/2021     Priority: Medium     [place] 03/07/2021     Priority: Medium     Schizoaffective disorder, bipolar type (H) 03/07/2021     Priority: Medium     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, subsequent encounter 09/01/2020     Priority: Medium     Added automatically from request for surgery [id number]       Psychosis (H) 06/23/2020     Priority: Medium     Seizure (H) 04/19/2016     Priority: Medium     ADD (attention deficit disorder) 12/02/2015     Priority: Medium     Methamphetamine abuse (H) 12/02/2015     Priority: Medium     Suicidal behavior 07/23/2014     Priority: Medium     Moderate dextromethorphan use disorder (H) 07/20/2014     Priority: Medium     Paranoia (H) 07/20/2014     Priority: Medium     Depression with anxiety 03/02/2009     Priority: Medium     Overview:   IMO Update 10/11       Alcohol abuse 02/02/2009     Priority: Medium     Overview:   IMO Update 10/11       Polysubstance abuse (H) 02/02/2009     " Priority: Medium     Tobacco use disorder 11/01/2008     Priority: Medium     Anxiety disorder 11/01/2008     Priority: Medium     Formatting of this note might be different from the original.  Diagnosed Rosalba Padilla          Past Medical History:    No past medical history on file.    Past Surgical History:    Past Surgical History:   Procedure Laterality Date     COLONOSCOPY - HIM SCAN N/A 12/03/2020    Procedure:  Colonoscopy diagnostic with random biopsies;  Surgeon:  Jenise GOLDMAN MD;  Location:  [facility] OR       Family History:    No family history on file.    Social History:  Marital Status:  Single [1]  Social History     Tobacco Use     Smoking status: Every Day     Packs/day: 0.50     Years: 9.00     Pack years: 4.50     Types: Cigarettes     Smokeless tobacco: Current     Types: Chew   Vaping Use     Vaping Use: Never used   Substance Use Topics     Alcohol use: No     Comment: daily to occasionally per pt.     Drug use: Yes     Types: Other     Comment: reports hx of use but none recently        Medications:    acetaminophen (TYLENOL) 325 MG tablet  buPROPion (WELLBUTRIN XL) 150 MG 24 hr tablet  buPROPion (WELLBUTRIN XL) 300 MG 24 hr tablet  cloZAPine (CLOZARIL) 100 MG tablet  docusate sodium (COLACE) 100 MG capsule  lithium (ESKALITH CR/LITHOBID) 450 MG CR tablet  LORazepam (ATIVAN) 0.5 MG tablet  LORazepam (ATIVAN) 1 MG tablet  memantine (NAMENDA) 10 MG tablet  multivitamin w/minerals (THERA-VIT-M) tablet  pregabalin (LYRICA) 100 MG capsule  vitamin D2 (ERGOCALCIFEROL) 15743 units (1250 mcg) capsule  Lidocaine (LIDOCARE) 4 % Patch          Review of Systems   Psychiatric/Behavioral:        See HPI       Physical Exam   BP: 121/78  Pulse: 93  Temp: 99.7  F (37.6  C)  Resp: 16  SpO2: 96 %      Physical Exam  Vitals and nursing note reviewed.   Constitutional:       General: He is not in acute distress.  Neurological:      General: No focal deficit present.      Mental Status: He is alert.         ED  Course              ED Course as of 02/22/23 1216   Wed Feb 22, 2023   1201 Being evaluated by Ashely Heaton      Procedures              Critical Care time:  none               No results found for this or any previous visit (from the past 24 hour(s)).    Medications - No data to display    Assessments & Plan (with Medical Decision Making)   Assessment by DEC as well as Ashely Heaton nurse practitioner from behavioral health unit.  Plan will be for admission.    This document was prepared using a combination of typing and voice generated software.  While every attempt was made for accuracy, spelling and grammatical errors may exist.    I have reviewed the nursing notes.    I have reviewed the findings, diagnosis, plan and need for follow up with the patient.           Medical Decision Making  The patient's presentation was of moderate complexity (a chronic illness mild to moderate exacerbation, progression, or side effect of treatment).    The patient's evaluation involved:  ordering and/or review of 3+ test(s) in this encounter (Ashely Heaton NP )  discussion of management or test interpretation with another health professional (Ashely Heaton NP )    The patient's management necessitated high risk (a decision regarding hospitalization).        New Prescriptions    No medications on file       Final diagnoses:   Aggressive behavior       2/22/2023   [facility]     Roberto De La Garza PA-C  02/22/23 0157

## 2023-02-22 NOTE — ED TRIAGE NOTES
"Pt presents via [place] PD with c/o being disruptive at [place], PD reports Pt was poking people with sticks and talking about \"going Gasey\" on people.      "

## 2023-02-22 NOTE — ED NOTES
Pt was changed into hospital scrubs. It was explained to the pt many times that it was hospital policy for pt to change if they are able. Pt questioned if that ment he was going upstairs. staff express that he will be evaluated and a decision will be made once this has been completed. Pt stated that he didn't want his stuff to be stolen staff assured him it will be placed into a lock locker in 3 bags pt has a cell phone, wallet, lighter, shoes, socks, pants, long underwear, coat, t shirt. This has been placed into locker B by security.     Pt request a glass of water this was given to him after he changed. He drank all of the water quickly. Once change the pt was sitting on the edge of bed became tearful and threw bottle of water that was given to him. Nursing staff redirected behavior.     1:1 was handed off to security with a detailed report of pt.     DEC has been setup upon arrival and is in process now.

## 2023-02-22 NOTE — PHARMACY
Pharmacy Clozapine Initial Note    Patient's Name: Steven Carter  Patient's : 1990    Recent ANC Value(s) for last 7 days:  Recent labs: (last 7 days)     23  1220   ANEUTAUTO 6.7       Is the patient enrolled in the cloZAPine REMS program? Yes  Ordering prescriber: Ashely Heaton  Is this provider certified in the cloZAPine REMS program? Yes  A REMS Dispense Authorization was obtained from the clozapine REMS program? Yes, RDA: [id number]  Is the ANC from within the last 7 days within recommended limits? Yes  Does the patient have any signs or symptoms of infection, including fever? No    Plan:  1. Initiate clozapine therapy at 100 mg PO at bedtime.  2. A WBC with differential will be ordered at least weekly, next due 2023. ANC values will be entered into the REMS program.    Vera Hillman RPH

## 2023-02-22 NOTE — DISCHARGE INSTRUCTIONS
Behavioral Discharge Planning and Instructions    Summary: Steven was brought in by police department after staff from [facility] called police regarding erratic unpredictable and threatening behaviors likely related to Dextromethorphan abuse.     Main Diagnosis:   1.  Schizoaffective disorder bipolar type  2.  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  Dextromethorphan use disorder severe  4.  Amphetamine use disorder, amount used unknown    Health Care Follow-up:     Eirmed  Med management: Mg Hendrix- Tuesday the March 7th @ 10:30 AM   [address]  [place] [address]  Phone: [phone]   Fax: [phone]  [email or web address]    [facility]  [address] [address]  Phone: [phone]  Fax: [phone]    [facility] [place], [place]  Case Management- Keila Vol- as needed  [address], [place] [address]  Phone: [phone] Fax - [phone]    [place]  [address] [place] [address]  Phone- [phone]  Fax- [phone]    Attend all scheduled appointments with your outpatient providers. Call at least 24 hours in advance if you need to reschedule an appointment to ensure continued access to your outpatient providers.     Major Treatments, Procedures and Findings:  You were provided with: a psychiatric assessment, assessed for medical stability, medication evaluation and/or management, group therapy, family therapy, individual therapy, CD evaluation/assessment, milieu management, and medical interventions    Symptoms to Report: feeling more aggressive, increased confusion, losing more sleep, mood getting worse, or thoughts of suicide    Early warning signs can include: increased depression or anxiety sleep disturbances increased thoughts or behaviors of suicide or self-harm  increased unusual thinking, such as paranoia or hearing voices    Safety and Wellness:  Take all medicines as directed.  Make no changes unless your  "doctor suggests them.      Follow treatment recommendations.  Refrain from alcohol and non-prescribed drugs.  Ask your support system to help you reduce your access to items that could harm yourself or others. Items could include:  Firearms  Medicines (both prescribed and over-the-counter)  Knives and other sharp objects  Ropes and like materials  Car keys  If there is a concern for safety, call 911. If there is a concern for safety, call 911.    Resources:   Crisis Intervention: 889.929.1501 or 551-127-9079 (TTY: 726.861.4513).  Call anytime for help.  National [place] on Mental Illness (www.[place].nichelle.org): 510.821.9274 or 797-653-7107.  Alcoholics Anonymous (www.alcoholics-anonymous.org): Check your phone book for your local chapter.  Suicide Awareness Voices of Education (SAVE) (www.save.org): 970-752-GRLD (0449)  National Suicide Prevention Line (www.mentalhealthmn.org): 300-484-CCTW (1404)  Mental Health Consumer/Survivor Network of [place] (www.mhcsn.net): 140.870.9367 or 577-220-2744  Mental Health Association of [place] (www.mentalhealth.org): 640.702.4169 or 979-243-1226  Self- Management and Recovery Training., SMART-- Toll free: 768.326.6017  www.Trendy Mondays.org  [facility] Crisis Response 342 238-4816  Text 4 Life: txt \"LIFE\" to 78288 for immediate support and crisis intervention  Crisis text line: Text \"[place]\" to 659416. Free, confidential, 24/7.  Crisis Intervention: 759.852.1666 or 743-142-6843. Call anytime for help.     General Medication Instructions:   See your medication sheet(s) for instructions.   Take all medicines as directed.  Make no changes unless your doctor suggests them.   Go to all your doctor visits.  Be sure to have all your required lab tests. This way, your medicines can be refilled on time.  Do not use any drugs not prescribed by your doctor.  Avoid alcohol.    Advance Directives:   Scanned document on file with Brandon? No scanned doc  Is document scanned? Pt states no documents  Honoring " Choices Your Rights Handout: Informed and given  Was more information offered? Pt declined    The Treatment team has appreciated the opportunity to work with you. If you have any questions or concerns about your recent admission, you can contact the unit which can receive your call 24 hours a day, 7 days a week. They will be able to get in touch with a Provider if needed. The unit number is [phone] .
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
## 2023-02-22 NOTE — CARE PLAN
Prior to Admission Medication Reconciliation:     Medications added:   [] None  [x] As listed below:    Hydroxyzine    Medications deleted:   [] None  [x] As listed below: not on sending facility MAR    Vit D     Multivitamin    apap    lidocaine    Medications marked for review/removal by attending:  [x] None  [] As listed below:    Changes made to existing medications:   [] None  [x] Updated time of day, strengths and frequencies to most current.     Lorazepam and lyrica doses updated per EIR     Pertinent notes/medications patient takes different than prescribed:     none    Last times/dates taken verified with patient:  [x] Yes- completed myself   [] Nurse completed, no changes made (double checked entries)  [] Unable to review with patient at this time:    Allergy review:    []Did not review: reviewed by nursing  [x]Allergies reviewed and updated if needed.     Medication reconciliation sources:   []Patient  []Patient family member/emergency contact: **  [][facility] Report Review  []Epic Chart Review  []Care Everywhere review  []Pharmacy med list/phone call: **  []Fill dates reflect compliancy. No concerns.  []Fill dates do not reflect compliancy on the following medications:   [x]Outside meds dispense report:   [x]Fill dates reflect compliancy. No concerns.  []Fill dates do not reflect compliancy on the following medications:   [x]HomeCare medlist, Nursing home or Assisted Living MAR: Clifford Cooley  [x]wellbutrin 150 mg + 300 mg daily 2/22/23 0800  [x]Lorazepam 1 mg five times daily 2/22/23 0800/1100/1400/1700/2000  [x]memantine 10 mg BID 2/22/23 0800   [x]pregabalin 150 mg TID 2/22/23 0800  [x]Clozapine 100 mg at bedtime   [x]Lithium carb er 450 mg at bedtime   [x]Docusate 100 mg BID PRN   [x]Hydroxyzine 50 mg 1 tab q6h prn  [x]Behavioral Health Provider: [facility] Med     Last seen 1/26/23    Clozapine 100 mg at bedtime     lyrica 150 mg TID     Lithium 450 mg CR at bedtime     Bupropion 150 mg + 300 mg xl 450  mg daily     Ativan 1 mg five times per day     memantine 10 mg BID  []Other: **    Pharmacy desired at discharge: [facility]    Is patient on coumadin?   [x]No  []Yes      Fill dates and reported compliancy:  [x] Compliant: fill dates reflect reported compliancy.   [] Not applicable. Patient is not taking any prescribed maintenance medications at this time.   [] Fill dates do not coincide with compliancy, but reports compliancy.    [] Fill dates reflect compliancy, but reports non-compliancy.   [] Cannot assess at this time.     Historian accuracy:  [] Excellent- alert and oriented, understands why meds were prescribed and how to take, able to answer specifics  [] Good- alert and oriented, understands why meds were prescribed and how to take, some confusion   [] Fair- alert and oriented, doesn't know medications without list, cannot answer specifics about medications, but has a decent process for which to take at home  [] Poor- does not know medications, may not have a process to take at home, may be cognitively unable to review at this time  [x]Medication management done by family member or facility, no concerns about historian accuracy.   [] Cannot assess at this time.     Medication Management:  [] Manages meds independently  [] Family member/ other party manages meds/assists:  [x] Meds managed by staff at facility  [] Meds set up by home care, family/other party helps administer  [] Meds set up by home care, self administers  [] Cannot assess at this time.     Other medications aside from PTA:  [x] Denies taking any other medications aside from those listed in PTA meds, this includes over-the-counter vitamins, supplements and analgesics.   [] Unable to confirm at this time.     Cynthia Nguyen on 2/22/2023 at 12:18 PM     Notifying appropriate party of changes/additions/discrepancies:  [x]No pertinent changes made, notification not necessary.   [] Notified attending provider via text page/phone call/sticky note or  other:  [] Notified other:  [] Medications have not been reconciled by a provider yet, notification not necessary  [] Pt is not admitted to floor yet or patient is boarding, PTA meds completed before admission.     Medications Prior to Admission   Medication Sig Dispense Refill Last Dose     buPROPion (WELLBUTRIN XL) 150 MG 24 hr tablet Take 1 tablet (150 mg) by mouth every morning . Take along with a 300 mg tablet for a total daily dose of 450 mg. 30 tablet 1 2/22/2023 at 0800     buPROPion (WELLBUTRIN XL) 300 MG 24 hr tablet Take 1 tablet (300 mg) by mouth every morning . Take along with a 150 mg tablet for a total daily dose of 450 mg. 30 tablet 1 2/22/2023 at 0800     cloZAPine (CLOZARIL) 100 MG tablet Take 1 tablet (100 mg) by mouth At Bedtime 30 tablet 0 2/21/2023 at 2000     docusate sodium (COLACE) 100 MG capsule Take 1 capsule (100 mg) by mouth 2 times daily as needed for constipation 30 capsule 0 More than a month     hydrOXYzine (ATARAX) 50 MG tablet Take 50 mg by mouth every 6 hours as needed   Unknown     lithium (ESKALITH CR/LITHOBID) 450 MG CR tablet Take 1 tablet (450 mg) by mouth At Bedtime 30 tablet 1 2/21/2023 at 2000     LORazepam (ATIVAN) 1 MG tablet Take 1 mg by mouth 5 times daily   2/22/2023 at 0800     memantine (NAMENDA) 10 MG tablet Take 1 tablet (10 mg) by mouth 2 times daily 60 tablet 1 2/22/2023 at 0800     pregabalin (LYRICA) 150 MG capsule Take 1 capsule by mouth 3 times daily   2/22/2023 at 0800
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
## 2023-02-22 NOTE — PROGRESS NOTES
23 [id number]   Patient Belongings   Did you bring any home meds/supplements to the hospital?  Yes   Disposition of meds  Sent to security/pharmacy per site process   Patient Belongings locker;sent to security per site process   Patient Belongings Put in Hospital Secure Location (Security or Locker, etc.) cell phone/electronics;clothing;wallet;shoes   Belongings Search Yes   Clothing Search Yes   Second Staff Denise RN   Comment Black underware, plaid flannel shirt, red t-shirt, black tennis shoes, black long underware, black and gray socks, grey sweat pants, black hat, red lighter     List items sent to safe: cell phone in red case ( few scratches), black wallet, temporary drivers license, verification paper [place] card,  [place], [place] card, [place], [facility] card    All other belongings put in assigned cubby in belongings room.       I have reviewed my belongings list on admission and verify that it is correct.     Patient signature_______________________________    Second staff witness (if patient unable to sign) ______________________________       I have received all my belongings at discharge.    Patient signature________________________________    Colette  2023  2:10 PM

## 2023-02-23 PROCEDURE — 124N000001 HC R&B MH

## 2023-02-23 PROCEDURE — 250N000009 HC RX 250: Performed by: NURSE PRACTITIONER

## 2023-02-23 PROCEDURE — 250N000013 HC RX MED GY IP 250 OP 250 PS 637: Performed by: NURSE PRACTITIONER

## 2023-02-23 PROCEDURE — 99233 SBSQ HOSP IP/OBS HIGH 50: CPT | Performed by: NURSE PRACTITIONER

## 2023-02-23 RX ADMIN — Medication 25 MCG: at 08:03

## 2023-02-23 RX ADMIN — BUPROPION HYDROCHLORIDE 300 MG: 300 TABLET, FILM COATED, EXTENDED RELEASE ORAL at 08:07

## 2023-02-23 RX ADMIN — LORAZEPAM 1 MG: 1 TABLET ORAL at 16:58

## 2023-02-23 RX ADMIN — LORAZEPAM 1 MG: 1 TABLET ORAL at 14:01

## 2023-02-23 RX ADMIN — LITHIUM CARBONATE 450 MG: 450 TABLET, EXTENDED RELEASE ORAL at 20:09

## 2023-02-23 RX ADMIN — LORAZEPAM 1 MG: 1 TABLET ORAL at 08:03

## 2023-02-23 RX ADMIN — PREGABALIN 150 MG: 150 CAPSULE ORAL at 08:03

## 2023-02-23 RX ADMIN — NICOTINE 1 PATCH: 21 PATCH, EXTENDED RELEASE TRANSDERMAL at 08:03

## 2023-02-23 RX ADMIN — PREGABALIN 150 MG: 150 CAPSULE ORAL at 14:01

## 2023-02-23 RX ADMIN — LORAZEPAM 1 MG: 1 TABLET ORAL at 20:09

## 2023-02-23 RX ADMIN — PREGABALIN 150 MG: 150 CAPSULE ORAL at 20:09

## 2023-02-23 RX ADMIN — MULTIPLE VITAMINS W/ MINERALS TAB 1 TABLET: TAB at 08:03

## 2023-02-23 RX ADMIN — CLOZAPINE 100 MG: 100 TABLET ORAL at 20:09

## 2023-02-23 RX ADMIN — BUPROPION HYDROCHLORIDE 150 MG: 150 TABLET, FILM COATED, EXTENDED RELEASE ORAL at 08:04

## 2023-02-23 ASSESSMENT — ACTIVITIES OF DAILY LIVING (ADL)
ADLS_ACUITY_SCORE: 43
ORAL_HYGIENE: INDEPENDENT
ADLS_ACUITY_SCORE: 43
LAUNDRY: UNABLE TO COMPLETE
ADLS_ACUITY_SCORE: 43
HYGIENE/GROOMING: INDEPENDENT
ADLS_ACUITY_SCORE: 43
DRESS: INDEPENDENT
ADLS_ACUITY_SCORE: 43

## 2023-02-23 NOTE — PLAN OF CARE
Seclusion/Restraint Face to Face Note  In person face to face assessment completed, including an evaluation of the patient's immediate reaction to the intervention, complete review of systems assessment, behavioral assessment and review/assessment of history, drugs and medications, recent labs, etc., and behavioral condition.  The patient experienced: No adverse physical outcome from manual hold/restraint initiation.  The intervention of restraint terminated. Manual Hold was initiated <2minutes.   If face to face was completed by this writer, the above findings were briefly discused with accepting provider Dr. David Timmons. See below H&P for further details by author Harmony Cuevas CNP; visit occurred post restraint/manual hold event.    Connie Wharton RN  2/22/2023  6:35 PM

## 2023-02-23 NOTE — PLAN OF CARE
Problem: Adult Behavioral Health Plan of Care  Goal: Patient-Specific Goal (Individualization)  Description: Pt will follow recommendations of treatment team.   Pt will be compliant with medications.   Pt will sleep 6-8 hours each night.   Pt will attend 50 % of groups     2/22/2023 Pt is not to wean today due to unpredictable and disorganized behaviors. Treatment team to assess daily.   Outcome: Progressing     Problem: Fall Injury Risk  Goal: Absence of Fall and Fall-Related Injury  Description: Pt will be free from falls  Pt will report if feeling unsteady    Outcome: Progressing     Face to face shift report received from Almita LARKIN. Rounding completed, pt observed.     Pt appeared to sleep most of this shift. Pt did not have any noted falls this shift.    Face to face report will be communicated to oncoming RN.    Kecia Eisenberg RN  2/23/2023  5:56 AM

## 2023-02-23 NOTE — PLAN OF CARE
Violent/Self-Destructive Seclusion or Restraint Initiation Note    Patient behaviors justifying violent/self-destructive seclusion or restraint (describe attempted least restricted alternatives and patient's response to interventions): per admitting AM RN report; 2:1 staffing initiated in [facility], pt presents as hyperactive, impulsive, doing pushups, verbally escalating, multiple staff attempts/approaches for pt to willing accept one time dose haldol; rapid, pressured speech, argumentative, Failed verbal deescalation, reasoning/teaching of risks vs benefits; no evidence of learning et/or comprehension of information given  Type of restraint initiated: Manual hold   Date Started: 2/22/2023  Time Started: 15:57  LIP notified (name): Dr. David Timmons   Order obtained: Yes.   Patient educated on reason for seclusion or restraints and discontinuation criteria: Yes.  Care plan updated: Yes.

## 2023-02-23 NOTE — PLAN OF CARE
"  Problem: Adult Behavioral Health Plan of Care  Goal: Patient-Specific Goal (Individualization)  Description: Pt will follow recommendations of treatment team.   Pt will be compliant with medications.   Pt will sleep 6-8 hours each night.   Pt will attend 50 % of groups     2/22/2023 Pt is not to wean today due to unpredictable and disorganized behaviors. Treatment team to assess daily.   Note: Patient laying in bed resting/napping for majority of morning. Denies all assessment criteria at this time. While talking about events yesterday afternoon, patient states, \"yeah, I fucked up.\" Up to [facility] lounge, watching television before 1100.   1105- This writer observed patient, through nurse's station window, falling [facility] in [facility] to floor. Staff immediately in [facility] as patient was getting self up and sitting back into chair. Denies hitting head or having any injuries from the fall. No redness, bruising or other obvious signs of injury noted upon basic assessment. Patient repetitively states, \"I'm okay\" and \"I got a little light headed but I'm okay\". Orthostatic blood pressures do show a slight drop from sitting to standing and patient has some moderate bilateral hand tremors noted. Requesting food and fluids stating, \"I probably need to eat\". Patient took in adequate amounts of food and fluids all shift. Appears steady on feet and hand tremors have decreased by end of shift. Provider and house supervisor updated.   Patient is compliant with all scheduled medications this shift. Polite during all interactions with clear speech and appropriate responses. Patient does ask when he is going to be able to leave and remains appropriate when encouraged to stay at this time. No aggressive behaviors noted all shift.      Problem: Thought Process Alteration  Goal: Optimal Thought Clarity  Description: Pt will have a logical and linear conversation.   Pt will not make any delusional statements by discharge.    Note: " Continue to monitor at this time.    Face to face end of shift report communicated to oncoming RN.     Stephanie Pearson RN  2/23/2023  10:05 AM

## 2023-02-23 NOTE — PLAN OF CARE
Violent/Self-Destructive Seclusion or Restraint Discontinuation Note    Type of seclusion or restraint: Manual Hold   Patient's response to intervention: No adverse physical outcome et/or injury caused to patient; No evidence of learning or memory re-call   Date of discontinuation: 2/22/2023  Time of discontinuation: 15:59   Face to face assessment completed: Yes.  Restraint monitoring minimum of every 15 minutes: No.  Debriefing with patient completed: Yes.  Care plan updated: Yes.

## 2023-02-23 NOTE — PROGRESS NOTES
Called and Spoke with [facility] this afternoon. They state they are willing to take the pt back though are hesitant with his recent behaviors. They state they would like to see hi stabilize for a few more days before returning. Explained that pt was voluntary but if the provider felt enough concern that a hold could be placed. Updated MAR faxed to [facility].     Spoke with pt with provider. Pt is visibly frustrated and states that he has not gotten his medications today. (Pt did in fact receive these). He presents pressured with rapid speech. Tired to explain to pt why [facility] is hesitant with return and that it would be a good idea to get a day or two more of stabilization before returning because of pt's recent and long term hx with placements. Unsure if pt is able to understand this writer at this time. Pt continues to request for higher doses of benzos and other medications.

## 2023-02-23 NOTE — PLAN OF CARE
"Face to face end of shift report received from Denise ESTES RN. Rounding completed. Patient observed in room with staff.     Pt very animated and disorganized upon introduction. Exhibiting unsafe behaviors such as karate kicking the air, rolling around on the floor, and standing on bed. He is visibly agitated regarding why he is here stating \"I didn't hurt anyone. I just had a stick. I wasn't going to poke them\". This writer and LPN attempted to get pt to agree to IM Haldol- pt upset stating he refused to allow us to inject Haldol into his body. Attempted to reassure pt, but pt continued to refuse. Pt placed in manual hold and administered Haldol 5 mg into R gluteal. Pt agreed to safe behaviors and was released from manual hold. Pt remorseful after manual hold stating \"I'm sorry that I am here. I wasn't trying to hurt anyone\". Reassured pt. Pt observed resting in bed, appears asleep, with easy respirations.     Pt up again at about 2100- pt administered HS medications, signed voluntary rights, and provided snack. Pt questioned \"What's gong on\". Questioned if pt remembers coming into the hospital and he stated \"Adeline, yea\". Pt didn't have any further concerns at this time- encouraged to speak with provider regarding treatment plan tomorrow. Pt consuming a lot of liquids-stating he's very thirsty.     Steady gait- no falls. He did not have anymore disorganized/animated behaviors for the remainder of the shift. Frequent rounding.     Problem: Adult Behavioral Health Plan of Care  Goal: Patient-Specific Goal (Individualization)  Description: Pt will follow recommendations of treatment team.   Pt will be compliant with medications.   Pt will sleep 6-8 hours each night.   Pt will attend 50 % of groups     2/22/2023 Pt is not to wean today due to unpredictable and disorganized behaviors. Treatment team to assess daily.   Outcome: Not Progressing     Problem: Thought Process Alteration  Goal: Optimal Thought " Clarity  Description: Pt will have a logical and linear conversation.   Pt will not make any delusional statements by discharge.    Outcome: Not Progressing     Problem: Fall Injury Risk  Goal: Absence of Fall and Fall-Related Injury  Description: Pt will be free from falls  Pt will report if feeling unsteady    Outcome: Progressing       Almita Aguilar RN  2/22/2023  6:07 PM

## 2023-02-23 NOTE — PLAN OF CARE
Face to face end of shift report received from Stephanie ALCARAZ RN. Rounding completed. Patient observed in bed, awake.     Pt has been calm, cooperative this shift. He has remained in bed for most of the afternoon. He does get up to use the restroom. Appears steady on feet- no falls. He denied any SI/HI and AH/VH. No bizarre behaviors. He has taken all medications as prescribed. Pt has the shakes at the beginning of the shift and seemed to improve throughout the evening. Encouraged fluids. He is able to make his needs known.      Problem: Adult Behavioral Health Plan of Care  Goal: Patient-Specific Goal (Individualization)  Description: Pt will follow recommendations of treatment team.   Pt will be compliant with medications.   Pt will sleep 6-8 hours each night.   Pt will attend 50 % of groups     2/23/2023 Pt is not to wean today due to unpredictable and disorganized behaviors. Treatment team to assess daily.   2/23/2023 1651 by Almita Aguilar RN  Outcome: Progressing     Problem: Thought Process Alteration  Goal: Optimal Thought Clarity  Description: Pt will have a logical and linear conversation.   Pt will not make any delusional statements by discharge.    Outcome: Progressing     Problem: Fall Injury Risk  Goal: Absence of Fall and Fall-Related Injury  Description: Pt will be free from falls  Pt will report if feeling unsteady    Outcome: Progressing       Almita Aguilar RN  2/23/2023  4:51 PM

## 2023-02-24 LAB
ALBUMIN SERPL BCG-MCNC: 3.7 G/DL (ref 3.5–5.2)
ALP SERPL-CCNC: 78 U/L (ref 40–129)
ALT SERPL W P-5'-P-CCNC: 60 U/L (ref 10–50)
ANION GAP SERPL CALCULATED.3IONS-SCNC: 10 MMOL/L (ref 7–15)
AST SERPL W P-5'-P-CCNC: 73 U/L (ref 10–50)
BILIRUB SERPL-MCNC: 0.3 MG/DL
BUN SERPL-MCNC: 6.4 MG/DL (ref 6–20)
CALCIUM SERPL-MCNC: 9.3 MG/DL (ref 8.6–10)
CHLORIDE SERPL-SCNC: 107 MMOL/L (ref 98–107)
CREAT SERPL-MCNC: 0.85 MG/DL (ref 0.67–1.17)
DEPRECATED HCO3 PLAS-SCNC: 25 MMOL/L (ref 22–29)
GFR SERPL CREATININE-BSD FRML MDRD: >90 ML/MIN/1.73M2
GLUCOSE SERPL-MCNC: 92 MG/DL (ref 70–99)
HOLD SPECIMEN: NORMAL
POTASSIUM SERPL-SCNC: 4.9 MMOL/L (ref 3.4–5.3)
PROT SERPL-MCNC: 6.3 G/DL (ref 6.4–8.3)
SODIUM SERPL-SCNC: 142 MMOL/L (ref 136–145)

## 2023-02-24 PROCEDURE — 80053 COMPREHEN METABOLIC PANEL: CPT | Performed by: NURSE PRACTITIONER

## 2023-02-24 PROCEDURE — 36415 COLL VENOUS BLD VENIPUNCTURE: CPT | Performed by: NURSE PRACTITIONER

## 2023-02-24 PROCEDURE — 124N000001 HC R&B MH

## 2023-02-24 PROCEDURE — 250N000009 HC RX 250: Performed by: NURSE PRACTITIONER

## 2023-02-24 PROCEDURE — 99232 SBSQ HOSP IP/OBS MODERATE 35: CPT | Performed by: NURSE PRACTITIONER

## 2023-02-24 PROCEDURE — 250N000013 HC RX MED GY IP 250 OP 250 PS 637: Performed by: NURSE PRACTITIONER

## 2023-02-24 RX ADMIN — CLOZAPINE 100 MG: 100 TABLET ORAL at 20:15

## 2023-02-24 RX ADMIN — LITHIUM CARBONATE 450 MG: 450 TABLET, EXTENDED RELEASE ORAL at 20:14

## 2023-02-24 RX ADMIN — MULTIPLE VITAMINS W/ MINERALS TAB 1 TABLET: TAB at 08:06

## 2023-02-24 RX ADMIN — BUPROPION HYDROCHLORIDE 300 MG: 300 TABLET, FILM COATED, EXTENDED RELEASE ORAL at 08:11

## 2023-02-24 RX ADMIN — LORAZEPAM 1 MG: 1 TABLET ORAL at 08:06

## 2023-02-24 RX ADMIN — PREGABALIN 150 MG: 150 CAPSULE ORAL at 13:08

## 2023-02-24 RX ADMIN — LORAZEPAM 1 MG: 1 TABLET ORAL at 13:08

## 2023-02-24 RX ADMIN — Medication 25 MCG: at 08:07

## 2023-02-24 RX ADMIN — LORAZEPAM 1 MG: 1 TABLET ORAL at 20:15

## 2023-02-24 RX ADMIN — LORAZEPAM 1 MG: 1 TABLET ORAL at 16:15

## 2023-02-24 RX ADMIN — BUPROPION HYDROCHLORIDE 150 MG: 150 TABLET, FILM COATED, EXTENDED RELEASE ORAL at 08:07

## 2023-02-24 RX ADMIN — PREGABALIN 150 MG: 150 CAPSULE ORAL at 20:15

## 2023-02-24 RX ADMIN — PREGABALIN 150 MG: 150 CAPSULE ORAL at 08:07

## 2023-02-24 ASSESSMENT — ACTIVITIES OF DAILY LIVING (ADL)
ADLS_ACUITY_SCORE: 43
HYGIENE/GROOMING: INDEPENDENT
DRESS: SCRUBS (BEHAVIORAL HEALTH);INDEPENDENT
ADLS_ACUITY_SCORE: 43
ORAL_HYGIENE: INDEPENDENT
ADLS_ACUITY_SCORE: 43
LAUNDRY: UNABLE TO COMPLETE
DRESS: SCRUBS (BEHAVIORAL HEALTH);INDEPENDENT
ORAL_HYGIENE: INDEPENDENT
ADLS_ACUITY_SCORE: 43
HYGIENE/GROOMING: INDEPENDENT
ADLS_ACUITY_SCORE: 43

## 2023-02-24 NOTE — PLAN OF CARE
Problem: Adult Behavioral Health Plan of Care  Goal: Patient-Specific Goal (Individualization)  Description: Pt will follow recommendations of treatment team.   Pt will be compliant with medications.   Pt will sleep 6-8 hours each night.   Pt will attend 50 % of groups     2/23/2023 Pt is not to wean today due to unpredictable and disorganized behaviors. Treatment team to assess daily.   Outcome: Progressing   Goal Outcome Evaluation:    Patient is Alert, able to make needs known. VSS, afebrile. Assessment and vitals as charted. Orthos obtained. Denies pain.   Patient has been pleasant and cooperative. Took scheduled medications. Does endorse some anxiety but states he just wants to go home. He understands he needs a few more days to stabilize on medications before going back to [facility]. Steady gait. No falls. Denies dizziness. Reports sleeping well.     Patient did wean out to open unit for about 30 minutes. Tolerated well.       Face to face report given with opportunity to observe patient.    Report given to PM, RN.     Jade Shaikh RN   2/24/2023  3:23 PM

## 2023-02-24 NOTE — PLAN OF CARE
Problem: Adult Behavioral Health Plan of Care  Goal: Patient-Specific Goal (Individualization)  Description: Pt will follow recommendations of treatment team.   Pt will be compliant with medications.   Pt will sleep 6-8 hours each night.   Pt will attend 50 % of groups     2/23/2023 Pt is not to wean today due to unpredictable and disorganized behaviors. Treatment team to assess daily.   Outcome: Progressing     Problem: Fall Injury Risk  Goal: Absence of Fall and Fall-Related Injury  Description: Pt will be free from falls  Pt will report if feeling unsteady    Outcome: Progressing     Face to face shift report received from Almita LARKIN. Rounding completed, pt observed.     Pt appeared to sleep most of this shift. Pt did not have any noted falls this shift.    Face to face report will be communicated to oncoming RN.    Kecia Eisenberg RN  2/24/2023  6:21 AM

## 2023-02-24 NOTE — PROGRESS NOTES
[facility] PSYCHIATRY  PROGRESS NOTE     RODGER     Is up lying in bed in his room located in the MHICU when I see him today with RN monitoring his b/p for potential orthostatic concerns. Following my introduction Steven informed me he did not want to repeat his story all over again and requested he be seen by AP who he has worked with in the past. I told him I would look into this. I returned and found him sitting up, I informed him there would be no change in providers which he accepted w/o incident. Steven is upset that he is here, he wants to go back to [facility]. Azalea the manager there will need to be notified prior to consideration for discharge as [facility] may not accept him if he is not stable. Steven becomes irritable when discussing his behavior at [facility] he is in denial of any type of substance abuse and lacks no insight into his behavior which occurs when he is under the influence, some of this he may not even remember.       MEDICATIONS   Scheduled Meds:    buPROPion  150 mg Oral QAM     buPROPion  300 mg Oral QAM     cloZAPine  100 mg Oral At Bedtime     lithium  450 mg Oral At Bedtime     LORazepam  1 mg Oral 4x Daily     [Held by provider] memantine  10 mg Oral BID     multivitamin w/minerals  1 tablet Oral Daily     nicotine  1 patch Transdermal Daily     nicotine   Transdermal Q8H     pregabalin  150 mg Oral TID     Vitamin D3  25 mcg Oral Daily     PRN Meds:.acetaminophen, alum & mag hydroxide-simethicone, docusate sodium, hydrOXYzine, melatonin, nicotine, OLANZapine **OR** OLANZapine, senna-docusate     ALLERGIES   Allergies   Allergen Reactions     Pork Allergy         MENTAL STATUS EXAM   Vitals: /70 (BP Location: Right arm)   Pulse 78   Temp 97.6  F (36.4  C) (Temporal)   Resp 18   Ht 1.829 m (6')   Wt 90.7 kg (200 lb)   SpO2 99%   BMI 27.12 kg/m      Appearance:  dressed in hospital scrubs and unkempt, scraggly long hair and beard  Attitude:  guarded  Eye Contact:   fair  Mood:  anxious, labile  Affect:  intensity is heightened  Speech: somewhat mumbled, at times is slightly pressured  Psychomotor Behavior:  no evidence of tardive dyskinesia, dystonia, or tics  Thought Process:  tangental, somewhat disorganized  Associations:  no loose associations  Thought Content:  Patient denies any SI or HI, denies hallucinations though may be responding to internal stimuli  Insight:  poor  Judgment:  poor  Oriented to:  time, person, and place  Attention Span and Concentration:  limited  Recent and Remote Memory:  poor  Fund of Knowledge: delayed  Muscle Strength and Tone: normal  Gait and Station: Normal       LABS   Recent Results (from the past 24 hour(s))   Comprehensive metabolic panel    Collection Time: 02/24/23  8:56 AM   Result Value Ref Range    Sodium 142 136 - 145 mmol/L    Potassium 4.9 3.4 - 5.3 mmol/L    Chloride 107 98 - 107 mmol/L    Carbon Dioxide (CO2) 25 22 - 29 mmol/L    Anion Gap 10 7 - 15 mmol/L    Urea Nitrogen 6.4 6.0 - 20.0 mg/dL    Creatinine 0.85 0.67 - 1.17 mg/dL    Calcium 9.3 8.6 - 10.0 mg/dL    Glucose 92 70 - 99 mg/dL    Alkaline Phosphatase 78 40 - 129 U/L    AST 73 (H) 10 - 50 U/L    ALT 60 (H) 10 - 50 U/L    Protein Total 6.3 (L) 6.4 - 8.3 g/dL    Albumin 3.7 3.5 - 5.2 g/dL    Bilirubin Total 0.3 <=1.2 mg/dL    GFR Estimate >90 >60 mL/min/1.73m2   Extra Purple Top Tube    Collection Time: 02/24/23  8:56 AM   Result Value Ref Range    Hold Specimen JIC          IMPRESSION     This is a 32 year old male with a PMH of schizoaffective disorder bipolar type as well as a traumatic brain injury at the age of 3 who has been doing quite well psychiatrically on clozapine and lorazepam per his outpatient provider as well as management though has been experiencing episodic periods of unexplained changes in mentation which are reported last a day or 2, fully resolved for a while then once again we will return.  He has a history of dextromethorphan abuse and  management found large amounts of dextromethorphan bottles.  He has been threatening other residents of [facility] likely when he gets into a dissociative state from the dextromethorphan.  There is also some possible recent amphetamine use though waiting on drug screen.  He is currently very high risk of losing his housing primarily due to his substance abuse.       DIAGNOSES     1.  Schizoaffective disorder bipolar type  2.  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  Dextromethorphan use disorder severe  4.  Amphetamine use disorder, amount used unknown       PLAN     Location: Unit 5  Legal Status: voluntary    Safety Assessment:    Behavioral Orders   Procedures     Code 1 - Restrict to Unit     Fall precautions     Routine Programming     As clinically indicated     Status 15     Every 15 minutes.      PTA psychotropic medications stopped:     -Holding Namenda as he was likely abusing dextromethorphan prior to admit which is also an NMDA receptor antagonist    PTA psychotropic medications continued/changed:     -Ativan decrease to 1 mg QID  -Wellbutrin  mg daily  -Clozapine 100 mg at bedtime  -Lithium  mg at bedtime  -Lyrica 150 mg TID    New medications tried and stopped:     -None    New medications initiated:     -none     Today's Changes:  None  -Continue current medications  -Reviewed the need to not abuse substances as he will likely put his placement at [facility] in jeopardy    Programming: Patient will be treated in a therapeutic milieu with appropriate individual and group therapies. Education will be provided on diagnoses, medications, and treatments.     Medical diagnoses:  Per medicine    Consult: None  Tests: Utox ordered however so far patient has declined to provide    Anticipated LOS: 5-7 days. Unclear if he will need commitment however is agreeable to remain in hospital voluntarily at this time to stabilize.  Disposition: Back to [facility] once he is stable        TREATMENT TEAM CARE PLAN     Progress: Continued symptoms. Labile mood, limited insight    Continued Stay Criteria/Rationale: Continued symptoms without sufficient improvement/resolution.     Medical/Physical: See above.    Precautions: See above.     Plan: Continue inpatient care with unit support and medication management.    Rationale for change in precautions or plan: NA due to no change.    Participants: Ary Palmer NP, Nursing, SW, OT.    The patient's care was discussed with the treatment team and chart notes were reviewed.       ATTESTATION      Ary Palmer CNP

## 2023-02-25 PROCEDURE — 250N000013 HC RX MED GY IP 250 OP 250 PS 637: Performed by: NURSE PRACTITIONER

## 2023-02-25 PROCEDURE — 250N000009 HC RX 250: Performed by: NURSE PRACTITIONER

## 2023-02-25 PROCEDURE — 124N000001 HC R&B MH

## 2023-02-25 RX ADMIN — Medication 25 MCG: at 08:47

## 2023-02-25 RX ADMIN — BUPROPION HYDROCHLORIDE 150 MG: 150 TABLET, FILM COATED, EXTENDED RELEASE ORAL at 08:47

## 2023-02-25 RX ADMIN — PREGABALIN 150 MG: 150 CAPSULE ORAL at 20:10

## 2023-02-25 RX ADMIN — CLOZAPINE 100 MG: 100 TABLET ORAL at 20:10

## 2023-02-25 RX ADMIN — LORAZEPAM 1 MG: 1 TABLET ORAL at 08:48

## 2023-02-25 RX ADMIN — PREGABALIN 150 MG: 150 CAPSULE ORAL at 13:23

## 2023-02-25 RX ADMIN — LITHIUM CARBONATE 450 MG: 450 TABLET, EXTENDED RELEASE ORAL at 20:10

## 2023-02-25 RX ADMIN — PREGABALIN 150 MG: 150 CAPSULE ORAL at 08:47

## 2023-02-25 RX ADMIN — BUPROPION HYDROCHLORIDE 300 MG: 300 TABLET, FILM COATED, EXTENDED RELEASE ORAL at 08:48

## 2023-02-25 RX ADMIN — MULTIPLE VITAMINS W/ MINERALS TAB 1 TABLET: TAB at 08:48

## 2023-02-25 RX ADMIN — LORAZEPAM 1 MG: 1 TABLET ORAL at 16:48

## 2023-02-25 RX ADMIN — LORAZEPAM 1 MG: 1 TABLET ORAL at 13:23

## 2023-02-25 RX ADMIN — LORAZEPAM 1 MG: 1 TABLET ORAL at 20:10

## 2023-02-25 ASSESSMENT — ACTIVITIES OF DAILY LIVING (ADL)
ADLS_ACUITY_SCORE: 43
DRESS: SCRUBS (BEHAVIORAL HEALTH)
HYGIENE/GROOMING: INDEPENDENT
ORAL_HYGIENE: INDEPENDENT
ADLS_ACUITY_SCORE: 43
LAUNDRY: UNABLE TO COMPLETE
ADLS_ACUITY_SCORE: 43

## 2023-02-25 NOTE — PLAN OF CARE
Face to face shift report received from TRAE Mcdonnell.       Problem: Adult Behavioral Health Plan of Care  Goal: Patient-Specific Goal (Individualization)  Description: Pt will follow recommendations of treatment team.   Pt will be compliant with medications.   Pt will sleep 6-8 hours each night.   Pt will attend 50 % of groups     2-24-23 - Pt is a no wean due to labile, unpredictable behavior.   Outcome: Not Progressing   Room remains in MHICU due to possibility of unpredictable behaviors. No wean at this time. Flat affect. Cooperative. Medication compliant. Irritable during conversation. States frustration due to admission. Social with others. No reports of pain.     Problem: Thought Process Alteration  Goal: Optimal Thought Clarity  Description: Pt will have a logical and linear conversation.   Pt will not make any delusional statements by discharge.  Outcome: Not Progressing       Problem: Fall Injury Risk  Goal: Absence of Fall and Fall-Related Injury  Description: Pt will be free from falls  Pt will report if feeling unsteady  Outcome: Progressing   Free from falls or injury this shift.     Face to face end of shift report to be communicated to oncoming RN.

## 2023-02-25 NOTE — PLAN OF CARE
"Problem: Adult Behavioral Health Plan of Care  Goal: Patient-Specific Goal (Individualization)  Description: Pt will follow recommendations of treatment team.   Pt will be compliant with medications.   Pt will sleep 6-8 hours each night.   Pt will attend 50 % of groups   2-24-23 - Pt is a no wean due to labile, unpredictable behavior.   Note: Pt remains a no wean at this time due to labile, unpredictable behavior. Pt had an angry mood tonight. His speech was pressured and loud. He yelled \"I shouldn't be here right now. I didn't do anything wrong. Nobody is fucking listening to me about my medications. They lowered my Ativan. I'm supposed to be getting it 5 times a day.\" Pt yelling about how he needs more \"narcotics\" and that he would \"rather be in retirement\" because he \"would get out faster.\" Pt was argumentative and difficult to interrupt. At one point during conversation, pt proceeded to lift up his right sweatshirt sleeve to expose his forearm. Old vertical SIB scars noted. Pt yelled and repeated 3 times \"the meds are fucking working aren't they huh? At least I know how to cut the right way.\" Pt was given his scheduled Ativan to calm down. Medication effective. Pt continues to have no insight into his behavior prior to admission or his substance abuse. He informed writer that every now and then he drinks a \"5 ounce bottle\" of cough syrup to \"get high.\"     Pt ate 100% of his supper. Pt was compliant with his scheduled medications.     Face to face end of shift report communicated to oncoming RN.      Problem: Thought Process Alteration  Goal: Optimal Thought Clarity  Description: Pt will have a logical and linear conversation.   Pt will not make any delusional statements by discharge.  Outcome: Not Progressing  Note: Pt was argumentative. His speech was pressured, rambling, and irrational.      Problem: Fall Injury Risk  Goal: Absence of Fall and Fall-Related Injury  Description: Pt will be free from falls  Pt will " report if feeling unsteady  Outcome: Progressing  Note: Pt remained free from falls. His gait was balanced and steady.      Goal Outcome Evaluation:    Plan of Care Reviewed With: patient

## 2023-02-25 NOTE — PLAN OF CARE
Problem: Adult Behavioral Health Plan of Care  Goal: Patient-Specific Goal (Individualization)  Description: Pt will follow recommendations of treatment team.   Pt will be compliant with medications.   Pt will sleep 6-8 hours each night.   Pt will attend 50 % of groups     2-24-23 - Pt is a no wean due to labile, unpredictable behavior.   Outcome: Progressing     Problem: Fall Injury Risk  Goal: Absence of Fall and Fall-Related Injury  Description: Pt will be free from falls  Pt will report if feeling unsteady    Outcome: Progressing     Face to face shift report received from Le LARKIN. Rounding completed, pt observed.     Pt appeared to sleep most of this shift. Pt did not have any noted falls this shift.    Face to face report will be communicated to oncoming RN.    Kecia Eisenberg RN  2/25/2023  6:02 AM

## 2023-02-26 PROCEDURE — 250N000013 HC RX MED GY IP 250 OP 250 PS 637: Performed by: NURSE PRACTITIONER

## 2023-02-26 PROCEDURE — 250N000009 HC RX 250: Performed by: NURSE PRACTITIONER

## 2023-02-26 PROCEDURE — 124N000001 HC R&B MH

## 2023-02-26 PROCEDURE — 99232 SBSQ HOSP IP/OBS MODERATE 35: CPT | Performed by: NURSE PRACTITIONER

## 2023-02-26 RX ADMIN — CLOZAPINE 100 MG: 100 TABLET ORAL at 20:47

## 2023-02-26 RX ADMIN — MULTIPLE VITAMINS W/ MINERALS TAB 1 TABLET: TAB at 08:49

## 2023-02-26 RX ADMIN — LORAZEPAM 1 MG: 1 TABLET ORAL at 08:49

## 2023-02-26 RX ADMIN — BUPROPION HYDROCHLORIDE 150 MG: 150 TABLET, FILM COATED, EXTENDED RELEASE ORAL at 08:51

## 2023-02-26 RX ADMIN — LORAZEPAM 1 MG: 1 TABLET ORAL at 17:05

## 2023-02-26 RX ADMIN — PREGABALIN 150 MG: 150 CAPSULE ORAL at 13:07

## 2023-02-26 RX ADMIN — BUPROPION HYDROCHLORIDE 300 MG: 300 TABLET, FILM COATED, EXTENDED RELEASE ORAL at 08:49

## 2023-02-26 RX ADMIN — LORAZEPAM 1 MG: 1 TABLET ORAL at 20:48

## 2023-02-26 RX ADMIN — PREGABALIN 150 MG: 150 CAPSULE ORAL at 08:49

## 2023-02-26 RX ADMIN — Medication 25 MCG: at 08:49

## 2023-02-26 RX ADMIN — LITHIUM CARBONATE 450 MG: 450 TABLET, EXTENDED RELEASE ORAL at 20:47

## 2023-02-26 RX ADMIN — PREGABALIN 150 MG: 150 CAPSULE ORAL at 20:47

## 2023-02-26 RX ADMIN — LORAZEPAM 1 MG: 1 TABLET ORAL at 13:07

## 2023-02-26 ASSESSMENT — ACTIVITIES OF DAILY LIVING (ADL)
ADLS_ACUITY_SCORE: 43
DRESS: SCRUBS (BEHAVIORAL HEALTH);INDEPENDENT
ADLS_ACUITY_SCORE: 43
HYGIENE/GROOMING: INDEPENDENT
ORAL_HYGIENE: INDEPENDENT
ADLS_ACUITY_SCORE: 43
LAUNDRY: UNABLE TO COMPLETE
ADLS_ACUITY_SCORE: 43

## 2023-02-26 NOTE — PLAN OF CARE
Problem: Adult Behavioral Health Plan of Care  Goal: Patient-Specific Goal (Individualization)  Description: Pt will follow recommendations of treatment team.   Pt will be compliant with medications.   Pt will sleep 6-8 hours each night.   Pt will attend 50 % of groups     2-24-23 - Pt is a no wean due to labile, unpredictable behavior.   Outcome: Progressing     Problem: Fall Injury Risk  Goal: Absence of Fall and Fall-Related Injury  Description: Pt will be free from falls  Pt will report if feeling unsteady    Outcome: Progressing     Face to face shift report received from Danica LARKIN. Rounding completed, pt observed.     Pt appeared to sleep most of this shift. Pt did not have any noted falls this shift.    Face to face report will be communicated to oncoming RN.    Kecia Eisenberg RN  2/26/2023  5:50 AM

## 2023-02-26 NOTE — PLAN OF CARE
Problem: Adult Behavioral Health Plan of Care  Goal: Patient-Specific Goal (Individualization)  Description: Pt will follow recommendations of treatment team.   Pt will be compliant with medications.   Pt will sleep 6-8 hours each night.   Pt will attend 50 % of groups     2/26/23 - Pt to wean as appropriate.  Outcome: Progressing     Problem: Thought Process Alteration  Goal: Optimal Thought Clarity  Description: Pt will have a logical and linear conversation.   Pt will not make any delusional statements by discharge.  Outcome: Progressing     Problem: Fall Injury Risk  Goal: Absence of Fall and Fall-Related Injury  Description: Pt will be free from falls  Pt will report if feeling unsteady  Outcome: Progressing       Face to face shift report received from TRAE Mcdonnell. Rounding completed, pt observed laying in bed in ICU.    Patient only ate about 10% of his breakfast. He declined his nicotine patch & took all other scheduled medications as ordered.     Patient stated that he felt like people were lying to him and that he was told one thing and then another and when he is lied to he just wants to stop taking him medication. Staff explained to him that it's not a good idea for him to stop taking medications, he agrees to continue to take them. He stated that he feels cough medicine is not as bad as other drugs that he could be doing.    Patient weaned out of the back for part of the morning until after lunch. He then requested to go back to the MHICU to shower & nap, patient took his afternoon medications as ordered. Patient ate 100% of lunch. Patient was appropriate with interactions between staff & peers.     Pt ortho static vitals as follows: 111/55, 121/69, 109/65.    1430-Patient weaned to the open unit again.     Face to face report will be communicated to oncsalomón LARKIN.    Caryl Palmer RN  2/26/2023  2:54 PM

## 2023-02-26 NOTE — PROGRESS NOTES
[facility] PSYCHIATRY  PROGRESS NOTE     SUBJECTIVE       Steven is up in the MICU when I go speak with him today. He is fairly easily agitated when discussing his return to [facility]. If he is unable to return he requests being discharged to a homeless shelter and he is going off his medications. He does not understand why cough syrup is a problem as it is sold over the counter. Originally the goal was to wean pt to the open unit however at this time given his level of agitation will wait and see if he settles down. Pt lacks insight into his mental health and substance use. He is taking his medications when given by nursing.         Given pts significant MH history and his reports to stop taking his medications commitment may have to be considered.        MEDICATIONS   Scheduled Meds:    buPROPion  150 mg Oral QAM     buPROPion  300 mg Oral QAM     cloZAPine  100 mg Oral At Bedtime     lithium  450 mg Oral At Bedtime     LORazepam  1 mg Oral 4x Daily     [Held by provider] memantine  10 mg Oral BID     multivitamin w/minerals  1 tablet Oral Daily     nicotine  1 patch Transdermal Daily     nicotine   Transdermal Q8H     pregabalin  150 mg Oral TID     Vitamin D3  25 mcg Oral Daily     PRN Meds:.acetaminophen, alum & mag hydroxide-simethicone, docusate sodium, hydrOXYzine, melatonin, nicotine, OLANZapine **OR** OLANZapine, senna-docusate     ALLERGIES   Allergies   Allergen Reactions     Pork Allergy         MENTAL STATUS EXAM   Vitals: /68   Pulse 62   Temp 97.7  F (36.5  C) (Temporal)   Resp 20   Ht 1.829 m (6')   Wt 89 kg (196 lb 4.8 oz)   SpO2 97%   BMI 26.62 kg/m      Appearance:  dressed in hospital scrubs and unkempt, scraggly long hair and beard  Attitude:  guarded  Eye Contact:  fair  Mood:  Agitated - labile  Affect:  intensity is heightened  Speech: somewhat mumbled, at times is slightly pressured  Psychomotor Behavior:  no evidence of tardive dyskinesia, dystonia, or tics  Thought  Process:  tangental, somewhat disorganized  Associations:  no loose associations  Thought Content:  Patient denies any SI or HI, denies hallucinations though may be responding to internal stimuli  Insight:  poor  Judgment:  poor  Oriented to:  time, person, and place  Attention Span and Concentration:  limited  Recent and Remote Memory:  poor  Fund of Knowledge: delayed  Muscle Strength and Tone: normal  Gait and Station: Normal       LABS   No results found for this or any previous visit (from the past 24 hour(s)).      IMPRESSION     This is a 32 year old male with a PMH of schizoaffective disorder bipolar type as well as a traumatic brain injury at the age of 3 who has been doing quite well psychiatrically on clozapine and lorazepam per his outpatient provider as well as management though has been experiencing episodic periods of unexplained changes in mentation which are reported last a day or 2, fully resolved for a while then once again we will return.  He has a history of dextromethorphan abuse and management found large amounts of dextromethorphan bottles.  He has been threatening other residents of [place] likely when he gets into a dissociative state from the dextromethorphan.  There is also some possible recent amphetamine use though waiting on drug screen.  He is currently very high risk of losing his housing primarily due to his substance abuse.       DIAGNOSES     1.  Schizoaffective disorder bipolar type  2.  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  Dextromethorphan use disorder severe  4.  Amphetamine use disorder, amount used unknown       PLAN     Location: Unit 5  Legal Status: voluntary    Safety Assessment:    Behavioral Orders   Procedures     Code 1 - Restrict to Unit     Fall precautions     Routine Programming     As clinically indicated     Status 15     Every 15 minutes.      PTA psychotropic medications stopped:     -Holding Namenda as he was likely abusing  dextromethorphan prior to admit which is also an NMDA receptor antagonist    PTA psychotropic medications continued/changed:     -Ativan decrease to 1 mg QID  -Wellbutrin  mg daily  -Clozapine 100 mg at bedtime  -Lithium  mg at bedtime  -Lyrica 150 mg TID    New medications tried and stopped:     -None    New medications initiated:     -none     Today's Changes:  None  -Continue current medications  -Reviewed the need to not abuse substances as he will likely put his placement at [facility] in jeopardy    Programming: Patient will be treated in a therapeutic milieu with appropriate individual and group therapies. Education will be provided on diagnoses, medications, and treatments.     Medical diagnoses:  Per medicine    Consult: None  Tests: Utox ordered however so far patient has declined to provide    Anticipated LOS: 5-7 days. Unclear if he will need commitment however is agreeable to remain in hospital voluntarily at this time to stabilize.  Disposition: Back to [facility] once he is stable       TREATMENT TEAM CARE PLAN     Progress: Continued symptoms. Labile mood, limited insight    Continued Stay Criteria/Rationale: Continued symptoms without sufficient improvement/resolution.     Medical/Physical: See above.    Precautions: See above.     Plan: Continue inpatient care with unit support and medication management.    Rationale for change in precautions or plan: NA due to no change.    Participants: Ary Palmer, NP, Nursing, SW, OT.    The patient's care was discussed with the treatment team and chart notes were reviewed.       ATTESTATION      Ary Palmer CNP

## 2023-02-26 NOTE — PLAN OF CARE
Problem: Adult Behavioral Health Plan of Care  Goal: Patient-Specific Goal (Individualization)  Description: Pt will follow recommendations of treatment team.   Pt will be compliant with medications.   Pt will sleep 6-8 hours each night.   Pt will attend 50 % of groups     2-24-23 - Pt is a no wean due to labile, unpredictable behavior.   Outcome: Not Progressing     Problem: Thought Process Alteration  Goal: Optimal Thought Clarity  Description: Pt will have a logical and linear conversation.   Pt will not make any delusional statements by discharge.    Outcome: Not Progressing     Problem: Fall Injury Risk  Goal: Absence of Fall and Fall-Related Injury  Description: Pt will be free from falls  Pt will report if feeling unsteady    Outcome: Progressing     Pt denies SI/HI, AH/VH, pain, anxiety, depression. Pt is medication compliant and VSS. Pt ate 0% of dinner. Pt has a flat affect. Pt is a no wean at this time. Pt is irritable in conversation.     Face to face report will be communicated to oncoming RN.    Danica Hawkins RN  2/25/2023

## 2023-02-27 PROCEDURE — 250N000013 HC RX MED GY IP 250 OP 250 PS 637: Performed by: NURSE PRACTITIONER

## 2023-02-27 PROCEDURE — 250N000009 HC RX 250: Performed by: NURSE PRACTITIONER

## 2023-02-27 PROCEDURE — 124N000001 HC R&B MH

## 2023-02-27 PROCEDURE — 99232 SBSQ HOSP IP/OBS MODERATE 35: CPT | Performed by: NURSE PRACTITIONER

## 2023-02-27 RX ADMIN — Medication 25 MCG: at 08:35

## 2023-02-27 RX ADMIN — LITHIUM CARBONATE 450 MG: 450 TABLET, EXTENDED RELEASE ORAL at 20:28

## 2023-02-27 RX ADMIN — LORAZEPAM 1 MG: 1 TABLET ORAL at 13:29

## 2023-02-27 RX ADMIN — PREGABALIN 150 MG: 150 CAPSULE ORAL at 13:29

## 2023-02-27 RX ADMIN — LORAZEPAM 1 MG: 1 TABLET ORAL at 16:33

## 2023-02-27 RX ADMIN — CLOZAPINE 100 MG: 100 TABLET ORAL at 20:28

## 2023-02-27 RX ADMIN — OLANZAPINE 10 MG: 10 TABLET, FILM COATED ORAL at 20:28

## 2023-02-27 RX ADMIN — BUPROPION HYDROCHLORIDE 150 MG: 150 TABLET, FILM COATED, EXTENDED RELEASE ORAL at 08:36

## 2023-02-27 RX ADMIN — MULTIPLE VITAMINS W/ MINERALS TAB 1 TABLET: TAB at 08:35

## 2023-02-27 RX ADMIN — LORAZEPAM 1 MG: 1 TABLET ORAL at 20:28

## 2023-02-27 RX ADMIN — PREGABALIN 150 MG: 150 CAPSULE ORAL at 20:28

## 2023-02-27 RX ADMIN — BUPROPION HYDROCHLORIDE 300 MG: 300 TABLET, FILM COATED, EXTENDED RELEASE ORAL at 08:35

## 2023-02-27 RX ADMIN — PREGABALIN 150 MG: 150 CAPSULE ORAL at 08:35

## 2023-02-27 RX ADMIN — LORAZEPAM 1 MG: 1 TABLET ORAL at 08:35

## 2023-02-27 ASSESSMENT — ACTIVITIES OF DAILY LIVING (ADL)
ORAL_HYGIENE: INDEPENDENT
ADLS_ACUITY_SCORE: 43
LAUNDRY: UNABLE TO COMPLETE
HYGIENE/GROOMING: INDEPENDENT
ADLS_ACUITY_SCORE: 43
DRESS: INDEPENDENT
ADLS_ACUITY_SCORE: 43
ADLS_ACUITY_SCORE: 43
ORAL_HYGIENE: INDEPENDENT
ADLS_ACUITY_SCORE: 43
DRESS: INDEPENDENT
ADLS_ACUITY_SCORE: 43
HYGIENE/GROOMING: INDEPENDENT
ADLS_ACUITY_SCORE: 43

## 2023-02-27 NOTE — PROGRESS NOTES
"[facility] PSYCHIATRY  PROGRESS NOTE     SUBJECTIVE     Prior to interviewing the patient, I met with nursing and reviewed patient's clinical condition. We discussed clinical care both before and after the interview. I have reviewed the patient's clinical course by review of records including previous notes, labs, and vital signs.     Per nursing, the patient had the following behavioral events over the last 24-hours: none    On psychiatric interview, patient met with in the group room. Immediately states \"can I please just go home today?\" He was notified that we are awaiting approval from [place] for him to return. Patient swearing stating he should have never been sent to the hospital. He denies that his dextromethorphan abuse is a problem. He states he needs his Ativan increased back to 5x daily. Patient quite agitated and argumentative.      MEDICATIONS   Scheduled Meds:    buPROPion  150 mg Oral QAM     buPROPion  300 mg Oral QAM     cloZAPine  100 mg Oral At Bedtime     lithium  450 mg Oral At Bedtime     LORazepam  1 mg Oral 4x Daily     [Held by provider] memantine  10 mg Oral BID     multivitamin w/minerals  1 tablet Oral Daily     nicotine  1 patch Transdermal Daily     nicotine   Transdermal Q8H     pregabalin  150 mg Oral TID     Vitamin D3  25 mcg Oral Daily     PRN Meds:.acetaminophen, alum & mag hydroxide-simethicone, docusate sodium, hydrOXYzine, melatonin, nicotine, OLANZapine **OR** OLANZapine, senna-docusate     ALLERGIES   Allergies   Allergen Reactions     Pork Allergy         MENTAL STATUS EXAM   Vitals: /58   Pulse 62   Temp 98.7  F (37.1  C) (Tympanic)   Resp 16   Ht 1.829 m (6')   Wt 89 kg (196 lb 4.8 oz)   SpO2 97%   BMI 26.62 kg/m      Appearance:  dressed in hospital scrubs and unkempt, scraggly long hair and beard  Attitude:  guarded  Eye Contact:  fair  Mood:  Agitated - labile  Affect:  intensity is heightened  Speech: somewhat mumbled, at times is slightly " pressured  Psychomotor Behavior:  no evidence of tardive dyskinesia, dystonia, or tics  Thought Process:  tangental, somewhat disorganized  Associations:  no loose associations  Thought Content:  Patient denies any SI or HI, denies hallucinations though may be responding to internal stimuli  Insight:  poor  Judgment:  poor  Oriented to:  time, person, and place  Attention Span and Concentration:  limited  Recent and Remote Memory:  poor  Fund of Knowledge: delayed  Muscle Strength and Tone: normal  Gait and Station: Normal       LABS   No results found for this or any previous visit (from the past 24 hour(s)).      IMPRESSION     This is a 32 year old male with a PMH of schizoaffective disorder bipolar type as well as a traumatic brain injury at the age of 3 who has been doing quite well psychiatrically on clozapine and lorazepam per his outpatient provider as well as management though has been experiencing episodic periods of unexplained changes in mentation which are reported last a day or 2, fully resolved for a while then once again we will return.  He has a history of dextromethorphan abuse and management found large amounts of dextromethorphan bottles.  He has been threatening other residents of [place] likely when he gets into a dissociative state from the dextromethorphan.  There is also some possible recent amphetamine use though waiting on drug screen.  He is currently very high risk of losing his housing primarily due to his substance abuse.       DIAGNOSES     1.  Schizoaffective disorder bipolar type  2.  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  Dextromethorphan use disorder severe  4.  Amphetamine use disorder, amount used unknown       PLAN     Location: Unit 5  Legal Status: voluntary    Safety Assessment:    Behavioral Orders   Procedures     Code 1 - Restrict to Unit     Fall precautions     Routine Programming     As clinically indicated     Status 15     Every 15  minutes.      PTA psychotropic medications stopped:     -Holding Namenda as he was likely abusing dextromethorphan prior to admit which is also an NMDA receptor antagonist    PTA psychotropic medications continued/changed:     -Ativan decrease to 1 mg QID  -Wellbutrin  mg daily  -Clozapine 100 mg at bedtime  -Lithium  mg at bedtime  -Lyrica 150 mg TID    New medications tried and stopped:     -None    New medications initiated:     -none     Today's Changes:  None  -Continue current medications  -Reviewed the need to not abuse substances as he will likely put his placement at [facility] in jeopardy    Programming: Patient will be treated in a therapeutic milieu with appropriate individual and group therapies. Education will be provided on diagnoses, medications, and treatments.     Medical diagnoses:  Per medicine    Consult: None  Tests: Utox ordered however so far patient has declined to provide    Anticipated LOS: Likely discharge tomorrow  Disposition: Back to [facility] once he is stable       TREATMENT TEAM CARE PLAN     Progress: Continued symptoms. Labile mood, limited insight    Continued Stay Criteria/Rationale: Continued symptoms without sufficient improvement/resolution.     Medical/Physical: See above.    Precautions: See above.     Plan: Continue inpatient care with unit support and medication management.    Rationale for change in precautions or plan: NA due to no change.    Participants: Teri Davila NP, Nursing, SW, OT.    The patient's care was discussed with the treatment team and chart notes were reviewed.       ATTESTATION      Teri Davila NP

## 2023-02-27 NOTE — PLAN OF CARE
"Problem: Suicidal Behavior  Goal: Suicidal Behavior is Absent or Managed  Outcome: Progressing    Pt denies SI     Problem: Fall Injury Risk  Goal: Absence of Fall and Fall-Related Injury  Description: Pt will be free from falls  Pt will report if feeling unsteady    Outcome: Progressing    Pt was up and steady this shift     Problem: Thought Process Alteration  Goal: Optimal Thought Clarity  Description: Pt will have a logical and linear conversation.   Pt will not make any delusional statements by discharge.    Outcome: Progressing    Pt  Was logical and organized this shift     Problem: Adult Behavioral Health Plan of Care  Goal: Patient-Specific Goal (Individualization)  Description: Pt will follow recommendations of treatment team.   Pt will be compliant with medications.   Pt will sleep 6-8 hours each night.   Pt will attend 50 % of groups     2/26/23 - Pt to wean as appropriate.   Outcome: Progressing   Goal Outcome Evaluation:    Plan of Care Reviewed With: patient      1530 Face to face rounding complete.  Pt introduced to nursing for the shift.    Pt weaned out of the MHICU all shift walking with peers, working on puzzles and joking with staff and peers.  Pt's affect is very bright and his thinking is logical and organized. He told me that he knows that he was messed up when he got here. He says that he likes \"[employer]\" and does not think that he can give it up even though it is not good for his mental health.  He has no hallucinations, delusions, and reports no depression. He says that he is hoping to go back to [place] soon.          2300 Face to face end of shift report communicated to Night shift RN's along with Pt's fall risk.      Joon Funk RN  2/26/2023  10:04 PM      "

## 2023-02-27 NOTE — PLAN OF CARE
"Face to face shift report received from Kecia SONG RN. Rounding completed, pt observed.    Problem: Adult Behavioral Health Plan of Care  Goal: Individualized Daily Interaction Plan (IDIP)  Outcome: Progressing  Note: Shift Summary:  Patient remains in a room in the MHICU r/t unpredictable behavior.  Patient is compliant with scheduled medications.  Affect is flat.  Mood is indifrent until he asked when he would be discharging.  Nurse informs him that [place] would be reviewing him for return and that it would be up to them as to when they felt he was stable to return there.  Patient insists that he did \"nothing wrong to be sent here\". He goes on to state \"they could've at least said something to me and not just send me to the hospital\".Patint lacking insight into his abusing cold medicine prior to admission as minimizes greatly.  Gait is balanced and steady. Weaning to open unit as behavior is appropriate to wean.  Patient can make his needs known to staff.  1310:  Patient has successfully weaned since before lunch.  Attended some of OT group.  Returns to ICU when he needs to use the BR and if staff ask him to return.       Problem: Thought Process Alteration  Goal: Optimal Thought Clarity  Description: Pt will have a logical and linear conversation.   Pt will not make any delusional statements by discharge.    Outcome: Progressing     Problem: Fall Injury Risk  Goal: Absence of Fall and Fall-Related Injury  Description: Pt will be free from falls  Pt will report if feeling unsteady    Outcome: Progressing  Face to face report will be communicated to oncoming RN.    Alma Delia Garsia RN  2/27/2023                            "

## 2023-02-27 NOTE — PROGRESS NOTES
Spoke with Pt this afternoon. Pt is bright and able to have a linear conversation with this writer. He shares that he understands that he will be assessed in a day or two before returning to [place] and shares good insight to why he is here and understanding why he was not able to return to [place] right away. No questions or concerns from pt this afternoon.     Spoke with [facility] and gave update on pt. They state they will call back tomorrow after our team meeting to make sure he is good to go tomorrow.

## 2023-02-27 NOTE — PLAN OF CARE
Problem: Adult Behavioral Health Plan of Care  Goal: Patient-Specific Goal (Individualization)  Description: Pt will follow recommendations of treatment team.   Pt will be compliant with medications.   Pt will sleep 6-8 hours each night.   Pt will attend 50 % of groups     2/26/23 - Pt to wean as appropriate.   Outcome: Progressing     Problem: Fall Injury Risk  Goal: Absence of Fall and Fall-Related Injury  Description: Pt will be free from falls  Pt will report if feeling unsteady    Outcome: Progressing     Problem: Suicidal Behavior  Goal: Suicidal Behavior is Absent or Managed  Outcome: Progressing     Face to face shift report received from Joon LARKIN. Rounding completed, pt observed.     Pt appeared to sleep 5.5 hours on and off this shift. Pt did not have any noted episodes of self harm or falls this shift.    Face to face report will be communicated to oncoming RN.    Kecia Eisenberg RN  2/27/2023  6:09 AM

## 2023-02-28 VITALS
TEMPERATURE: 97.1 F | RESPIRATION RATE: 16 BRPM | SYSTOLIC BLOOD PRESSURE: 116 MMHG | BODY MASS INDEX: 26.59 KG/M2 | HEART RATE: 98 BPM | WEIGHT: 196.3 LBS | DIASTOLIC BLOOD PRESSURE: 67 MMHG | OXYGEN SATURATION: 98 % | HEIGHT: 72 IN

## 2023-02-28 PROCEDURE — 99239 HOSP IP/OBS DSCHRG MGMT >30: CPT | Performed by: NURSE PRACTITIONER

## 2023-02-28 PROCEDURE — 250N000013 HC RX MED GY IP 250 OP 250 PS 637: Performed by: NURSE PRACTITIONER

## 2023-02-28 PROCEDURE — 250N000009 HC RX 250: Performed by: NURSE PRACTITIONER

## 2023-02-28 RX ORDER — LORAZEPAM 1 MG/1
1 TABLET ORAL 4 TIMES DAILY
Qty: 120 TABLET | Refills: 1 | Status: ON HOLD | OUTPATIENT
Start: 2023-02-28 | End: 2023-03-27

## 2023-02-28 RX ADMIN — BUPROPION HYDROCHLORIDE 300 MG: 300 TABLET, FILM COATED, EXTENDED RELEASE ORAL at 08:12

## 2023-02-28 RX ADMIN — BUPROPION HYDROCHLORIDE 150 MG: 150 TABLET, FILM COATED, EXTENDED RELEASE ORAL at 08:11

## 2023-02-28 RX ADMIN — PREGABALIN 150 MG: 150 CAPSULE ORAL at 08:11

## 2023-02-28 RX ADMIN — Medication 25 MCG: at 08:11

## 2023-02-28 RX ADMIN — MULTIPLE VITAMINS W/ MINERALS TAB 1 TABLET: TAB at 08:11

## 2023-02-28 RX ADMIN — LORAZEPAM 1 MG: 1 TABLET ORAL at 08:11

## 2023-02-28 ASSESSMENT — ACTIVITIES OF DAILY LIVING (ADL)
ADLS_ACUITY_SCORE: 43
DRESS: INDEPENDENT
ADLS_ACUITY_SCORE: 43
HYGIENE/GROOMING: INDEPENDENT
ORAL_HYGIENE: INDEPENDENT
ADLS_ACUITY_SCORE: 43
ADLS_ACUITY_SCORE: 43

## 2023-02-28 NOTE — PLAN OF CARE
"Face to face end of shift report received from Alma Delia ALCARAZ RN. Rounding completed. Patient observed in [facility].     Pt has been calm, cooperative this shift. He is flat, withdrawn and keeps to himself for most of the shift. He paces the hallways and does attend groups. He reports that he is frustrated that he is still here- after speaking with pt he was able to calm down and stated he will be patient and wait. He initially refuses the Ativan stating that it doesn't help him and they are lowering it so he might as well not take it at all. Pt did end up taking the Ativan. He did report that he uses the dextromethorphan to get high \"I don't get addicted to it. I can wake up and not be jonesing for it. It's better than abusing prescription drugs or opiates. I do know that I shouldn't use that stuff with opiates. That really fucks me up\". Attempted to educated pt on health risks associated with using too much dextromethorphan. Pt showed no evidence of learning. Pt requests Zyprexa to be given with HS medications. Pt behaviors remained appropriate throughout the evening. Steady gait- no falls. Frequent rounding.       Problem: Adult Behavioral Health Plan of Care  Goal: Patient-Specific Goal (Individualization)  Description: Pt will follow recommendations of treatment team.   Pt will be compliant with medications.   Pt will sleep 6-8 hours each night.   Pt will attend 50 % of groups     2/27/23 - Pt to wean as appropriate.   Outcome: Progressing     Problem: Thought Process Alteration  Goal: Optimal Thought Clarity  Description: Pt will have a logical and linear conversation.   Pt will not make any delusional statements by discharge.    Outcome: Progressing     Problem: Fall Injury Risk  Goal: Absence of Fall and Fall-Related Injury  Description: Pt will be free from falls  Pt will report if feeling unsteady    Outcome: Progressing     Problem: Suicidal Behavior  Goal: Suicidal Behavior is Absent or Managed  Outcome: " Progressing       Almita Aguilar RN  2/27/2023  6:08 PM

## 2023-02-28 NOTE — PLAN OF CARE
Report received from outgoing staff.    Problem: Adult Behavioral Health Plan of Care  Goal: Patient-Specific Goal (Individualization)  Description: Pt will follow recommendations of treatment team.   Pt will be compliant with medications.   Pt will sleep 6-8 hours each night.   Pt will attend 50 % of groups     2/27/23 - Pt to wean as appropriate.   Outcome: Not Progressing     Problem: Thought Process Alteration  Goal: Optimal Thought Clarity  Description: Pt will have a logical and linear conversation.   Pt will not make any delusional statements by discharge.  Outcome: Not Progressing     Problem: Fall Injury Risk  Goal: Absence of Fall and Fall-Related Injury  Description: Pt will be free from falls  Pt will report if feeling unsteady  Outcome: Not Progressing     Problem: Suicidal Behavior  Goal: Suicidal Behavior is Absent or Managed  Outcome: Not Progressing    Rounds completed. Safety checks completed every 15 minutes throughout the night. Pt noted to be resting with eyes closed and easy resp. for 5.5 hours. No suicidal gestures or comments noted.    Face to face end of shift report to be communicated to oncoming RN.

## 2023-02-28 NOTE — PLAN OF CARE
Scheduled ride with [facility] for 12:30 PM discharge.   Called [facility] and informed them of pt's arrival time.    scheduled pt with [facility]  Med management: Mg Hendrix- Tuesday the March 7th @ 10:30 AM     Pt is discharging at the recommendation of the treatment team. Pt is discharging to [facility] transported by [facility]. Pt denies having any thoughts of hurting themself or anyone else. Pt denies anxiety or depression. Pt has follow up with psychatriy. Discharge instructions, including; demographic sheet, psychiatric evaluation, discharge summary, and AVS were faxed to these next level of care providers.

## 2023-02-28 NOTE — PLAN OF CARE
Problem: Adult Behavioral Health Plan of Care  Goal: Plan of Care Review  Outcome: Adequate for Care Transition  Flowsheets (Taken 2/28/2023 1133)  Patient Agreement with Plan of Care: agrees  Note: Shift Summary: Discharge Note    Patient Discharged to home  To [facility] on 2/28/2023 12:41 PM via [facility] transportation accompanied by .     Patient informed of discharge instructions in AVS. patient verbalizes understanding and denies having any questions pertaining to AVS. Patient stable at time of discharge. Patient denies SI, HI, and thoughts of self harm at time of discharge. All personal belongings returned to patient. Discharge prescriptions sent to [facility] via electronic communication. Psych evaluation, history and physical, AVS, and discharge summary faxed to next level of care- Mg Hendrix at [facility].     Alma Delia Garsia RN  2/28/2023  12:41 PM    Goal: Patient-Specific Goal (Individualization)  Description: Pt will follow recommendations of treatment team.   Pt will be compliant with medications.   Pt will sleep 6-8 hours each night.   Pt will attend 50 % of groups     2/27/23 - Pt to wean as appropriate.   Outcome: Adequate for Care Transition  Goal: Individualized Daily Interaction Plan (IDIP)  Outcome: Adequate for Care Transition  Goal: Adheres to Safety Considerations for Self and Others  Outcome: Adequate for Care Transition  Intervention: Develop and Maintain Individualized Safety Plan  Recent Flowsheet Documentation  Taken 2/28/2023 1133 by Alma Delia Garsia RN  Safety Measures:   environmental rounds completed   safety rounds completed   suicide assessment completed  Goal: Absence of New-Onset Illness or Injury  Outcome: Adequate for Care Transition  Intervention: Identify and Manage Fall Risk  Recent Flowsheet Documentation  Taken 2/28/2023 1133 by Alma Delia Garsia RN  Safety Measures:   environmental rounds completed   safety rounds completed   suicide assessment  completed  Goal: Optimized Coping Skills in Response to Life Stressors  Outcome: Adequate for Care Transition  Goal: Develops/Participates in Therapeutic Neville to Support Successful Transition  Outcome: Adequate for Care Transition  Intervention: Foster Therapeutic Neville  Recent Flowsheet Documentation  Taken 2/28/2023 1133 by Alma Delia Garsia RN  Trust Relationship/Rapport:   care explained   choices provided     Problem: Thought Process Alteration  Goal: Optimal Thought Clarity  Description: Pt will have a logical and linear conversation.   Pt will not make any delusional statements by discharge.    Outcome: Adequate for Care Transition     Problem: Fall Injury Risk  Goal: Absence of Fall and Fall-Related Injury  Description: Pt will be free from falls  Pt will report if feeling unsteady    Outcome: Adequate for Care Transition     Problem: Suicidal Behavior  Goal: Suicidal Behavior is Absent or Managed  Outcome: Adequate for Care Transition   Goal Outcome Evaluation:    Plan of Care Reviewed With: patient
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
## 2023-03-01 ENCOUNTER — TELEPHONE (OUTPATIENT)
Dept: BEHAVIORAL HEALTH | Facility: HOSPITAL | Age: 33
End: 2023-03-01

## 2023-03-01 NOTE — TELEPHONE ENCOUNTER
He was discharged to home on  2/28/23 from [facility]. I called today regarding the patient's discharge.  No answer was received. Message left to call back with any concerns.    .

## 2023-03-23 ENCOUNTER — TELEPHONE (OUTPATIENT)
Dept: BEHAVIORAL HEALTH | Facility: CLINIC | Age: 33
End: 2023-03-23

## 2023-03-23 ENCOUNTER — HOSPITAL ENCOUNTER (INPATIENT)
Facility: HOSPITAL | Age: 33
LOS: 4 days | Discharge: NURSING, REHAB OR LONG-TERM CARE | End: 2023-03-27
Attending: EMERGENCY MEDICINE | Admitting: STUDENT IN AN ORGANIZED HEALTH CARE EDUCATION/TRAINING PROGRAM
Payer: COMMERCIAL

## 2023-03-23 DIAGNOSIS — R46.89 AGGRESSIVE BEHAVIOR: ICD-10-CM

## 2023-03-23 DIAGNOSIS — Z71.1 MENTAL HEALTH-RELATED COMPLAINT: ICD-10-CM

## 2023-03-23 DIAGNOSIS — F25.0 SCHIZOAFFECTIVE DISORDER, BIPOLAR TYPE (H): Primary | Chronic | ICD-10-CM

## 2023-03-23 LAB
ALBUMIN SERPL BCG-MCNC: 4.2 G/DL (ref 3.5–5.2)
ALP SERPL-CCNC: 79 U/L (ref 40–129)
ALT SERPL W P-5'-P-CCNC: 42 U/L (ref 10–50)
AMPHETAMINES UR QL: NOT DETECTED
ANION GAP SERPL CALCULATED.3IONS-SCNC: 9 MMOL/L (ref 7–15)
AST SERPL W P-5'-P-CCNC: 53 U/L (ref 10–50)
BARBITURATES UR QL SCN: NOT DETECTED
BASOPHILS # BLD AUTO: 0 10E3/UL (ref 0–0.2)
BASOPHILS NFR BLD AUTO: 1 %
BENZODIAZ UR QL SCN: DETECTED
BILIRUB SERPL-MCNC: 0.4 MG/DL
BUN SERPL-MCNC: 13.3 MG/DL (ref 6–20)
BUPRENORPHINE UR QL: NOT DETECTED
CALCIUM SERPL-MCNC: 9.5 MG/DL (ref 8.6–10)
CANNABINOIDS UR QL: DETECTED
CHLORIDE SERPL-SCNC: 109 MMOL/L (ref 98–107)
COCAINE UR QL SCN: NOT DETECTED
CREAT SERPL-MCNC: 0.99 MG/DL (ref 0.67–1.17)
D-METHAMPHET UR QL: NOT DETECTED
DEPRECATED HCO3 PLAS-SCNC: 25 MMOL/L (ref 22–29)
EOSINOPHIL # BLD AUTO: 0.2 10E3/UL (ref 0–0.7)
EOSINOPHIL NFR BLD AUTO: 2 %
ERYTHROCYTE [DISTWIDTH] IN BLOOD BY AUTOMATED COUNT: 12.1 % (ref 10–15)
ETHANOL SERPL-MCNC: <0.01 G/DL
GFR SERPL CREATININE-BSD FRML MDRD: >90 ML/MIN/1.73M2
GLUCOSE SERPL-MCNC: 99 MG/DL (ref 70–99)
HCT VFR BLD AUTO: 45.7 % (ref 40–53)
HGB BLD-MCNC: 15.4 G/DL (ref 13.3–17.7)
IMM GRANULOCYTES # BLD: 0 10E3/UL
IMM GRANULOCYTES NFR BLD: 0 %
LYMPHOCYTES # BLD AUTO: 1.8 10E3/UL (ref 0.8–5.3)
LYMPHOCYTES NFR BLD AUTO: 28 %
MCH RBC QN AUTO: 29.3 PG (ref 26.5–33)
MCHC RBC AUTO-ENTMCNC: 33.7 G/DL (ref 31.5–36.5)
MCV RBC AUTO: 87 FL (ref 78–100)
METHADONE UR QL SCN: NOT DETECTED
MONOCYTES # BLD AUTO: 0.6 10E3/UL (ref 0–1.3)
MONOCYTES NFR BLD AUTO: 9 %
NEUTROPHILS # BLD AUTO: 4 10E3/UL (ref 1.6–8.3)
NEUTROPHILS NFR BLD AUTO: 60 %
NRBC # BLD AUTO: 0 10E3/UL
NRBC BLD AUTO-RTO: 0 /100
OPIATES UR QL SCN: NOT DETECTED
OXYCODONE UR QL SCN: NOT DETECTED
PCP UR QL SCN: NOT DETECTED
PLATELET # BLD AUTO: 246 10E3/UL (ref 150–450)
POTASSIUM SERPL-SCNC: 4 MMOL/L (ref 3.4–5.3)
PROPOXYPH UR QL: NOT DETECTED
PROT SERPL-MCNC: 6.4 G/DL (ref 6.4–8.3)
RBC # BLD AUTO: 5.25 10E6/UL (ref 4.4–5.9)
SARS-COV-2 RNA RESP QL NAA+PROBE: NEGATIVE
SODIUM SERPL-SCNC: 143 MMOL/L (ref 136–145)
TRICYCLICS UR QL SCN: NOT DETECTED
WBC # BLD AUTO: 6.7 10E3/UL (ref 4–11)

## 2023-03-23 PROCEDURE — U0003 INFECTIOUS AGENT DETECTION BY NUCLEIC ACID (DNA OR RNA); SEVERE ACUTE RESPIRATORY SYNDROME CORONAVIRUS 2 (SARS-COV-2) (CORONAVIRUS DISEASE [COVID-19]), AMPLIFIED PROBE TECHNIQUE, MAKING USE OF HIGH THROUGHPUT TECHNOLOGIES AS DESCRIBED BY CMS-2020-01-R: HCPCS | Performed by: EMERGENCY MEDICINE

## 2023-03-23 PROCEDURE — 99223 1ST HOSP IP/OBS HIGH 75: CPT | Mod: AI | Performed by: NURSE PRACTITIONER

## 2023-03-23 PROCEDURE — 80362 OPIOIDS & OPIATE ANALOGS 1/2: CPT | Performed by: NURSE PRACTITIONER

## 2023-03-23 PROCEDURE — 124N000001 HC R&B MH

## 2023-03-23 PROCEDURE — 99291 CRITICAL CARE FIRST HOUR: CPT

## 2023-03-23 PROCEDURE — 99221 1ST HOSP IP/OBS SF/LOW 40: CPT | Performed by: NURSE PRACTITIONER

## 2023-03-23 PROCEDURE — C9803 HOPD COVID-19 SPEC COLLECT: HCPCS

## 2023-03-23 PROCEDURE — 250N000009 HC RX 250: Performed by: NURSE PRACTITIONER

## 2023-03-23 PROCEDURE — 250N000013 HC RX MED GY IP 250 OP 250 PS 637: Performed by: EMERGENCY MEDICINE

## 2023-03-23 PROCEDURE — 99284 EMERGENCY DEPT VISIT MOD MDM: CPT | Performed by: EMERGENCY MEDICINE

## 2023-03-23 PROCEDURE — 85004 AUTOMATED DIFF WBC COUNT: CPT | Performed by: EMERGENCY MEDICINE

## 2023-03-23 PROCEDURE — 36415 COLL VENOUS BLD VENIPUNCTURE: CPT | Performed by: EMERGENCY MEDICINE

## 2023-03-23 PROCEDURE — 250N000013 HC RX MED GY IP 250 OP 250 PS 637: Performed by: NURSE PRACTITIONER

## 2023-03-23 PROCEDURE — 80053 COMPREHEN METABOLIC PANEL: CPT | Performed by: EMERGENCY MEDICINE

## 2023-03-23 PROCEDURE — 82077 ASSAY SPEC XCP UR&BREATH IA: CPT | Performed by: EMERGENCY MEDICINE

## 2023-03-23 PROCEDURE — 99292 CRITICAL CARE ADDL 30 MIN: CPT

## 2023-03-23 PROCEDURE — 80306 DRUG TEST PRSMV INSTRMNT: CPT | Performed by: EMERGENCY MEDICINE

## 2023-03-23 PROCEDURE — 250N000011 HC RX IP 250 OP 636: Performed by: NURSE PRACTITIONER

## 2023-03-23 RX ORDER — LORAZEPAM 1 MG/1
1 TABLET ORAL 4 TIMES DAILY
Status: DISCONTINUED | OUTPATIENT
Start: 2023-03-23 | End: 2023-03-24

## 2023-03-23 RX ORDER — LANOLIN ALCOHOL/MO/W.PET/CERES
3 CREAM (GRAM) TOPICAL
Status: DISCONTINUED | OUTPATIENT
Start: 2023-03-23 | End: 2023-03-27 | Stop reason: HOSPADM

## 2023-03-23 RX ORDER — DOCUSATE SODIUM 100 MG/1
100 CAPSULE, LIQUID FILLED ORAL 2 TIMES DAILY PRN
Status: DISCONTINUED | OUTPATIENT
Start: 2023-03-23 | End: 2023-03-27 | Stop reason: HOSPADM

## 2023-03-23 RX ORDER — BUPROPION HYDROCHLORIDE 300 MG/1
300 TABLET ORAL EVERY MORNING
Status: DISCONTINUED | OUTPATIENT
Start: 2023-03-23 | End: 2023-03-27

## 2023-03-23 RX ORDER — LORAZEPAM 1 MG/1
1 TABLET ORAL ONCE
Status: COMPLETED | OUTPATIENT
Start: 2023-03-23 | End: 2023-03-23

## 2023-03-23 RX ORDER — OLANZAPINE 10 MG/1
10 TABLET ORAL 3 TIMES DAILY PRN
Status: DISCONTINUED | OUTPATIENT
Start: 2023-03-23 | End: 2023-03-27 | Stop reason: HOSPADM

## 2023-03-23 RX ORDER — HALOPERIDOL 10 MG/1
10 TABLET ORAL 2 TIMES DAILY PRN
Status: DISCONTINUED | OUTPATIENT
Start: 2023-03-23 | End: 2023-03-27 | Stop reason: HOSPADM

## 2023-03-23 RX ORDER — MAGNESIUM HYDROXIDE/ALUMINUM HYDROXICE/SIMETHICONE 120; 1200; 1200 MG/30ML; MG/30ML; MG/30ML
30 SUSPENSION ORAL EVERY 4 HOURS PRN
Status: DISCONTINUED | OUTPATIENT
Start: 2023-03-23 | End: 2023-03-27 | Stop reason: HOSPADM

## 2023-03-23 RX ORDER — BUPROPION HYDROCHLORIDE 150 MG/1
150 TABLET ORAL EVERY MORNING
Status: DISCONTINUED | OUTPATIENT
Start: 2023-03-23 | End: 2023-03-27

## 2023-03-23 RX ORDER — ALPRAZOLAM 1 MG
1 TABLET ORAL DAILY PRN
Status: ON HOLD | COMMUNITY
Start: 2023-03-13 | End: 2023-03-27

## 2023-03-23 RX ORDER — HYDROXYZINE HYDROCHLORIDE 25 MG/1
25 TABLET, FILM COATED ORAL EVERY 4 HOURS PRN
Status: DISCONTINUED | OUTPATIENT
Start: 2023-03-23 | End: 2023-03-27 | Stop reason: HOSPADM

## 2023-03-23 RX ORDER — ACETAMINOPHEN 325 MG/1
650 TABLET ORAL EVERY 4 HOURS PRN
Status: DISCONTINUED | OUTPATIENT
Start: 2023-03-23 | End: 2023-03-27 | Stop reason: HOSPADM

## 2023-03-23 RX ORDER — CLOZAPINE 100 MG/1
100 TABLET ORAL AT BEDTIME
Status: DISCONTINUED | OUTPATIENT
Start: 2023-03-23 | End: 2023-03-27 | Stop reason: HOSPADM

## 2023-03-23 RX ORDER — LITHIUM CARBONATE 450 MG
450 TABLET, EXTENDED RELEASE ORAL AT BEDTIME
Status: DISCONTINUED | OUTPATIENT
Start: 2023-03-23 | End: 2023-03-27 | Stop reason: HOSPADM

## 2023-03-23 RX ORDER — MEMANTINE HYDROCHLORIDE 10 MG/1
10 TABLET ORAL 2 TIMES DAILY
Status: DISCONTINUED | OUTPATIENT
Start: 2023-03-23 | End: 2023-03-27

## 2023-03-23 RX ORDER — LORAZEPAM 1 MG/1
1 TABLET ORAL 3 TIMES DAILY
Status: DISCONTINUED | OUTPATIENT
Start: 2023-03-23 | End: 2023-03-23

## 2023-03-23 RX ORDER — OLANZAPINE 10 MG/2ML
10 INJECTION, POWDER, FOR SOLUTION INTRAMUSCULAR 3 TIMES DAILY PRN
Status: DISCONTINUED | OUTPATIENT
Start: 2023-03-23 | End: 2023-03-27 | Stop reason: HOSPADM

## 2023-03-23 RX ORDER — PREGABALIN 200 MG/1
200 CAPSULE ORAL 3 TIMES DAILY
COMMUNITY
Start: 2023-03-07 | End: 2024-04-20

## 2023-03-23 RX ORDER — LORAZEPAM 1 MG/1
2 TABLET ORAL ONCE
Status: COMPLETED | OUTPATIENT
Start: 2023-03-23 | End: 2023-03-23

## 2023-03-23 RX ORDER — AMOXICILLIN 250 MG
1 CAPSULE ORAL 2 TIMES DAILY PRN
Status: DISCONTINUED | OUTPATIENT
Start: 2023-03-23 | End: 2023-03-27 | Stop reason: HOSPADM

## 2023-03-23 RX ORDER — HALOPERIDOL 5 MG/ML
10 INJECTION INTRAMUSCULAR 2 TIMES DAILY PRN
Status: DISCONTINUED | OUTPATIENT
Start: 2023-03-23 | End: 2023-03-27 | Stop reason: HOSPADM

## 2023-03-23 RX ORDER — LORAZEPAM 2 MG/ML
2 INJECTION INTRAMUSCULAR ONCE
Status: COMPLETED | OUTPATIENT
Start: 2023-03-23 | End: 2023-03-23

## 2023-03-23 RX ADMIN — LORAZEPAM 1 MG: 1 TABLET ORAL at 20:44

## 2023-03-23 RX ADMIN — LORAZEPAM 1 MG: 1 TABLET ORAL at 16:34

## 2023-03-23 RX ADMIN — PREGABALIN 200 MG: 25 CAPSULE ORAL at 13:56

## 2023-03-23 RX ADMIN — LORAZEPAM 1 MG: 1 TABLET ORAL at 10:34

## 2023-03-23 RX ADMIN — MEMANTINE 10 MG: 10 TABLET ORAL at 13:56

## 2023-03-23 RX ADMIN — LORAZEPAM 2 MG: 2 INJECTION, SOLUTION INTRAMUSCULAR; INTRAVENOUS at 09:37

## 2023-03-23 RX ADMIN — LITHIUM CARBONATE 450 MG: 450 TABLET, EXTENDED RELEASE ORAL at 20:44

## 2023-03-23 RX ADMIN — LORAZEPAM 1 MG: 1 TABLET ORAL at 09:37

## 2023-03-23 RX ADMIN — LORAZEPAM 2 MG: 1 TABLET ORAL at 12:28

## 2023-03-23 RX ADMIN — PREGABALIN 200 MG: 25 CAPSULE ORAL at 20:40

## 2023-03-23 RX ADMIN — OLANZAPINE 10 MG: 10 INJECTION, POWDER, FOR SOLUTION INTRAMUSCULAR at 15:44

## 2023-03-23 RX ADMIN — LORAZEPAM 1 MG: 1 TABLET ORAL at 13:56

## 2023-03-23 RX ADMIN — CLOZAPINE 100 MG: 100 TABLET ORAL at 20:44

## 2023-03-23 ASSESSMENT — ACTIVITIES OF DAILY LIVING (ADL)
WEAR_GLASSES_OR_BLIND: NO
TOILETING_ISSUES: NO
ADLS_ACUITY_SCORE: 39
TRANSFERRING: 0-->INDEPENDENT
CONCENTRATING,_REMEMBERING_OR_MAKING_DECISIONS_DIFFICULTY: YES
TRANSFERRING: 0-->INDEPENDENT
HYGIENE/GROOMING: INDEPENDENT
DRESS: SCRUBS (BEHAVIORAL HEALTH);INDEPENDENT
ORAL_HYGIENE: INDEPENDENT
LAUNDRY: UNABLE TO COMPLETE
ADLS_ACUITY_SCORE: 37
WALKING_OR_CLIMBING_STAIRS_DIFFICULTY: NO
DRESSING/BATHING_DIFFICULTY: NO
DRESS: SCRUBS (BEHAVIORAL HEALTH)
ADLS_ACUITY_SCORE: 39
ADLS_ACUITY_SCORE: 39
ORAL_HYGIENE: INDEPENDENT
NUMBER_OF_TIMES_PATIENT_HAS_FALLEN_WITHIN_LAST_SIX_MONTHS: 0
CHANGE_IN_FUNCTIONAL_STATUS_SINCE_ONSET_OF_CURRENT_ILLNESS/INJURY: NO
ADLS_ACUITY_SCORE: 39
HYGIENE/GROOMING: INDEPENDENT
LAUNDRY: UNABLE TO COMPLETE
DIFFICULTY_EATING/SWALLOWING: NO
DOING_ERRANDS_INDEPENDENTLY_DIFFICULTY: NO
ADLS_ACUITY_SCORE: 39
WALKING_OR_CLIMBING_STAIRS: OTHER (SEE COMMENTS)
ADLS_ACUITY_SCORE: 39
ADLS_ACUITY_SCORE: 37
FALL_HISTORY_WITHIN_LAST_SIX_MONTHS: NO

## 2023-03-23 NOTE — PLAN OF CARE
Problem: Suicidal Behavior  Goal: Suicidal Behavior is Absent or Managed  Outcome: Not met    Pt engaged in self injury this shift     Problem: Homicidal Behavior  Goal: Improved Impulse and Aggression Control (Homicidal Behavior)  Description: Patient will deny HI by discharge.  Patient will remain free from harm to self or others during hospitalization.  Outcome: Not Met     Pt made threats to Security staff     Problem: Adult Behavioral Health Plan of Care  Goal: Patient-Specific Goal (Individualization)  Description: Patient will be compliant with treatment team recommendations and medication administrations during hospitalization.  Patient will remain independent with ADLs daily.  Patient will sleep 6-8 hours per night.  Patient will eat at least 50% of meals daily.  3/23/2023-patient placed in [facility] due to unpredictable behaviors and history of aggression.  Flowsheets (Taken 3/23/2023 1852)  Patient Vulnerabilities:    poor impulse control    lacks insight into illness    history of unsuccessful treatment    limited support system    Goal Outcome Evaluation:    Plan of Care Reviewed With: patient      1530 Face to face rounding complete.  Pt introduced to nursing for the shift.    Pt slept in his room in bed after eating his three entrees.  Pt was much more logical, calm, and polite when he woke for HS snack and medications. He told me that he was feeling very tired still and went right back to bed.  His rate of speech slowed to a normal rate and his thinking was logical and organized.      2300 Face to face end of shift report communicated to Night shift RN's along with Pt's fall risk.     Joon Funk RN  3/23/2023
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
## 2023-03-23 NOTE — PHARMACY
Pharmacy Clozapine Initial Note    Patient's Name: Steven Carter  Patient's : 1990    Recent ANC Value(s) for last 7 days:  Recent labs: (last 7 days)     23  0932   ANEUTAUTO 4.0       Is the patient enrolled in the cloZAPine REMS program? Yes  Ordering prescriber: Ashely Heaton NP  Is this provider certified in the cloZAPine REMS program? Yes  A REMS Dispense Authorization was obtained from the clozapine REMS program? Yes  Is the ANC from within the last 7 days within recommended limits? Yes  Does the patient have any signs or symptoms of infection, including fever? No    REMS Patient ID: [id number]  RDA (for 3/23/23): [id number]    Plan:  1. Initiate clozapine therapy at 100 mg PO at bedtime (PTA dose).  2. A WBC with differential will be ordered at least weekly, next due 3/30/2023. ANC values will be entered into the REMS program.    Angela Washington [facility]
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
## 2023-03-23 NOTE — PLAN OF CARE
Posion control notified regarding recommendations for ingestion of dextromethorphan. Recommend benzos. Primary nurse updated.
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
## 2023-03-23 NOTE — H&P
"[facility] PSYCHIATRY   HISTORY AND PHYSICAL     ADMISSION DATA     Steven Carter MRN# [id number]   Age: 32 year old YOB: 1990     Date of Admission: 3/23/2023  Primary Physician: No Ref-Primary, Physician        CHIEF COMPLAINT   \" I have no clue why I am here, I want to go home.\"       HISTORY OF PRESENT ILLNESS     I received a phone call today from the manager of [facility] stating that Steven was going to be coming into the hospital by police.  She stated that this morning he became very upset and was so loud and threatening that all of the residents in the dining room left in fear.  He had made comments about \"bashing heads in\".  She stated that he had given her a bottle of NyQuil yesterday as he has quite a significant history of abusing NyQuil for the dextromethorphan.  She stated he appeared to be intoxicated yesterday though did not appear to be significantly intoxicated today.    When he arrived to the emergency room I met with him in the emergency room and he did not appear to be intoxicated as he did on his last admission last month though was clearly delusional and very labile.  He was making multiple comments about \"having a hit out on me\".  He told me that people follow him in special suits and blend into the things around them so he cannot see these people.  When I told him that this sounded delusional and paranoid he became upset.  He was very fixated on \"all I need is Xanax twice a day and I should be able to go back to [facility]\".  I told him he was not going to be able to return to [facility] today and that we need to make sure he was safe and stable.  He became upset with this.  He began to yell and scream in the emergency room about 10 minutes after I had left his room.  When he arrived to our psychiatric unit he was quite agitated demanding to leave and stating he did not want or need to be on the unit.  I did place him on a 72-hour hold.  He made some threatening comments " "towards staff though did not physically harm or tried to harm anyone.  He was very labile at times he would be singing and shortly after he would be screaming at staff.  At 1 point he was hitting his head fairly hard against the wall Ativan and Zyprexa were administered and he did calm down a bit to the extent where he quit attempting to injure himself.      While he was in the emergency room he was showing me both of his wrists.  He states \"look at what I had to do\".  There was an older scar on his left wrist going quite far down his arm and also one that looked more recent on his right arm going down a large portion of his arm.  He made a comment about \"see I could not even do it the knife needs to be sharper\".  I asked him multiple times when this happened as the one on his right arm to look more recent.  He states the one on his left arm occurred 6 months ago and I believe he stated the one on his right arm occurred prior to the last admission.  He clearly is still having suicidal thoughts as he states that he needs to have his knife sharper next time       PSYCHIATRIC HISTORY     Has been under civil commitment in the past. Was just discharged from out unit last month after a short stay.        SUBSTANCE USE HISTORY     He has been abusing dextromethorphan in the form of robitussin and other cough medicine tablets for many years. He minimizes how this affects his mental health and does not see it as a problem.            SOCIAL HISTORY   Raised by parents. Father  when he was 15. Has good relationship with his mother. Has 2 sisters. Did complete high school with \"significant support\" after TBI at age 3. Has 2 children that I believe were adopted though live in the area.        FAMILY HISTORY   No family history on file.        PAST MEDICAL HISTORY   No past medical history on file.    Past Surgical History:   Procedure Laterality Date     COLONOSCOPY - HIM SCAN N/A 2020    Procedure:  Colonoscopy " diagnostic with random biopsies;  Surgeon:  Jenise GOLDMAN MD;  Location:  [facility] OR       Pork allergy     MEDICATIONS   Prior to Admission medications    Medication Sig Start Date End Date Taking? Authorizing Provider   ALPRAZolam (XANAX) 1 MG tablet Take 1 mg by mouth daily as needed for anxiety or agitation 3/13/23  Yes Reported, Patient   buPROPion (WELLBUTRIN XL) 150 MG 24 hr tablet Take 1 tablet (150 mg) by mouth every morning . Take along with a 300 mg tablet for a total daily dose of 450 mg. 9/1/22  Yes Christelle Contreras NP   buPROPion (WELLBUTRIN XL) 300 MG 24 hr tablet Take 1 tablet (300 mg) by mouth every morning . Take along with a 150 mg tablet for a total daily dose of 450 mg. 9/1/22  Yes Christelle Contreras NP   cloZAPine (CLOZARIL) 100 MG tablet Take 1 tablet (100 mg) by mouth At Bedtime 9/1/22  Yes Christelle Contreras NP   docusate sodium (COLACE) 100 MG capsule Take 1 capsule (100 mg) by mouth 2 times daily as needed for constipation 9/1/22  Yes Christelle Contreras NP   hydrOXYzine (ATARAX) 50 MG tablet Take 50 mg by mouth every 6 hours as needed   Yes Reported, Patient   lithium (ESKALITH CR/LITHOBID) 450 MG CR tablet Take 1 tablet (450 mg) by mouth At Bedtime 9/1/22  Yes Christelle Contreras NP   LORazepam (ATIVAN) 1 MG tablet Take 1 tablet (1 mg) by mouth 4 times daily 2/28/23  Yes Ashely Heaton NP   memantine (NAMENDA) 10 MG tablet Take 1 tablet (10 mg) by mouth 2 times daily 9/1/22  Yes Christelle Contreras NP   Pregabalin (LYRICA) 200 MG capsule Take 200 mg by mouth 3 times daily 3/7/23  Yes Reported, Patient        PHYSICAL EXAM/ROS     I have reviewed the physical exam as documented by Zohreh Samuel CNP  and agree with findings and assessment and have no additional findings to add at this time. The review of systems is negative other than noted in the HPI.       LABS   Recent Results (from the past 24 hour(s))   Asymptomatic COVID-19 Virus (Coronavirus) by PCR Nasopharyngeal     Collection Time: 03/23/23  9:31 AM    Specimen: Nasopharyngeal; Swab   Result Value Ref Range    SARS CoV2 PCR Negative Negative   Alcohol ethyl    Collection Time: 03/23/23  9:32 AM   Result Value Ref Range    Alcohol ethyl <0.01 <=0.01 g/dL   Comprehensive metabolic panel    Collection Time: 03/23/23  9:32 AM   Result Value Ref Range    Sodium 143 136 - 145 mmol/L    Potassium 4.0 3.4 - 5.3 mmol/L    Chloride 109 (H) 98 - 107 mmol/L    Carbon Dioxide (CO2) 25 22 - 29 mmol/L    Anion Gap 9 7 - 15 mmol/L    Urea Nitrogen 13.3 6.0 - 20.0 mg/dL    Creatinine 0.99 0.67 - 1.17 mg/dL    Calcium 9.5 8.6 - 10.0 mg/dL    Glucose 99 70 - 99 mg/dL    Alkaline Phosphatase 79 40 - 129 U/L    AST 53 (H) 10 - 50 U/L    ALT 42 10 - 50 U/L    Protein Total 6.4 6.4 - 8.3 g/dL    Albumin 4.2 3.5 - 5.2 g/dL    Bilirubin Total 0.4 <=1.2 mg/dL    GFR Estimate >90 >60 mL/min/1.73m2   CBC with platelets and differential    Collection Time: 03/23/23  9:32 AM   Result Value Ref Range    WBC Count 6.7 4.0 - 11.0 10e3/uL    RBC Count 5.25 4.40 - 5.90 10e6/uL    Hemoglobin 15.4 13.3 - 17.7 g/dL    Hematocrit 45.7 40.0 - 53.0 %    MCV 87 78 - 100 fL    MCH 29.3 26.5 - 33.0 pg    MCHC 33.7 31.5 - 36.5 g/dL    RDW 12.1 10.0 - 15.0 %    Platelet Count 246 150 - 450 10e3/uL    % Neutrophils 60 %    % Lymphocytes 28 %    % Monocytes 9 %    % Eosinophils 2 %    % Basophils 1 %    % Immature Granulocytes 0 %    NRBCs per 100 WBC 0 <1 /100    Absolute Neutrophils 4.0 1.6 - 8.3 10e3/uL    Absolute Lymphocytes 1.8 0.8 - 5.3 10e3/uL    Absolute Monocytes 0.6 0.0 - 1.3 10e3/uL    Absolute Eosinophils 0.2 0.0 - 0.7 10e3/uL    Absolute Basophils 0.0 0.0 - 0.2 10e3/uL    Absolute Immature Granulocytes 0.0 <=0.4 10e3/uL    Absolute NRBCs 0.0 10e3/uL   Urine Drugs of Abuse Screen Panel 13    Collection Time: 03/23/23 10:23 AM   Result Value Ref Range    Cannabinoids (24-bkb-4-carboxy-9-THC) Detected (A) Not Detected, Indeterminate    Phencyclidine Not Detected  Not Detected, Indeterminate    Cocaine (Benzoylecgonine) Not Detected Not Detected, Indeterminate    Methamphetamine (d-Methamphetamine) Not Detected Not Detected, Indeterminate    Opiates (Morphine) Not Detected Not Detected, Indeterminate    Amphetamine (d-Amphetamine) Not Detected Not Detected, Indeterminate    Benzodiazepines (Nordiazepam) Detected (A) Not Detected, Indeterminate    Tricyclic Antidepressants (Desipramine) Not Detected Not Detected, Indeterminate    Methadone Not Detected Not Detected, Indeterminate    Barbiturates (Butalbital) Not Detected Not Detected, Indeterminate    Oxycodone Not Detected Not Detected, Indeterminate    Propoxyphene (Norpropoxyphene) Not Detected Not Detected, Indeterminate    Buprenorphine Not Detected Not Detected, Indeterminate         MENTAL STATUS EXAM   Vitals: /89   Pulse 85   Temp 98.4  F (36.9  C) (Tympanic)   Resp 20   SpO2 97%     MSE/PSYCH  PSYCHIATRIC EXAM  /89   Pulse 85   Temp 98.4  F (36.9  C) (Tympanic)   Resp 20   SpO2 97%   -Appearance/Behavior: disorganized disheveled  -Motor: intact  -Gait: intact  -Abnormal involuntary movements: None  -Mood: fearful, irritable  -Affect: restritcted  -Speech: pressurred very tangential    -Thought process/associations: disorganized  -Thought content:  paranoid and likely responding to hallucinations  -Perceptual disturbances: hallucinations apparent though denies. Preoccupied            -Suicidal/Homicidal Ideation: Shows me his arms and states the knife should have been sharper  -Judgment: poor  -Insight: poor  *Orientation: time, place and person.  *Memory: difficult to assess due to sarah and psychosis  *Attention: poor  *Language: fluent, no aphasias, able to repeat phrases and name objects. Vocab intact.  *Fund of information: difficult to assess due to psychosis  *Cognitive functioning estimate: 0 - independent.         ASSESSMENT     This is a 32 year old male with a PMH of significant  "dextromethorphan abuse, schizoaffective disorder and traumatic brain injury who has been residing at a local assisted living though has been abusing dextromethorphan regularly which causes great decompensation of his mental health.  He has been adamant about not going to any chemical dependency treatment and does not see his dextromethorphan abuse as a contributor to his hospitalizations.  He is making suicidal statements and he is very paranoid that people in \"special suits that blend into the surroundings\" have been following him and have a hit out on him.  His  is aware of his decompensation and that he is in the hospital.       DIAGNOSIS     Schizoaffective disorder, bipolar type  Dextromethorphan use disorder, severe       PLAN     Location: Unit 5  Legal Status: Orders Placed This Encounter      Emergency Hospitalization Hold (72 Hr Hold)    Safety Assessment:    Behavioral Orders   Procedures     Code 1 - Restrict to Unit     Routine Programming     As clinically indicated     Status 15     Every 15 minutes.      PTA medications held:     -wellbutrin held until psychosis improves.     PTA medications continued/changed:     -continue ativan 1 mg tid  -stop xanax  -continue clozaril  -hold wellbutrin until psychosis improved    New medications initiated:     -none    Programming: Patient will be treated in a therapeutic milieu with appropriate individual and group therapies. Education will be provided on diagnoses, medications, and treatments.     Medical diagnoses:  Per medicine    Consult: none    Tests: uds needed    Anticipated LOS: 5 days or more to stabilize. Likely needs rule 25 though currently refusing treatment.   Disposition: back to [facility] when stable       ATTESTATION      Ashely Heaton NP             "

## 2023-03-23 NOTE — CARE PLAN
Seclusion/Restraint Face to Face Note  In person face to face assessment completed, including an evaluation of the patient's immediate reaction to the intervention, complete review of systems assessment, behavioral assessment and review/assessment of history, drugs and medications, recent labs, etc., and behavioral condition.  The patient experienced: No adverse physical outcome from seclusion/restraint initiation.  The intervention of restraint or seclusion needs to terminate.  If face to face was completed by a trained RN, the above findings were discused with Ashely Heaton NP. (responsible provider).   Jade Shaikh RN  3/23/2023  4:45 PM

## 2023-03-23 NOTE — PLAN OF CARE
Violent/Self-Destructive Seclusion or Restraint Discontinuation Note    Type of seclusion or restraint: Hands on  Patient's response to intervention: Pt relaxed after manual hold for IM Zyprexa  Date of discontinuation: 3/23/2023  Time of discontinuation: 1557   Face to face assessment completed: Yes.  Restraint monitoring minimum of every 15 minutes: Yes.  Debriefing with patient completed: Yes.  Care plan updated: Yes.    Goal Outcome Evaluation:

## 2023-03-23 NOTE — TELEPHONE ENCOUNTER
9:35 AM Intake received call from [facility] that patient will admit to [facility] with Ashely Heaton. Intake set up [facility] account and completed indicia.

## 2023-03-23 NOTE — ED NOTES
Provider at bedside.   Patient is not understanding why he is here, explained to him that he threatened people at his care facility.   Patient started punching himself in the head, patient told to stop trying to hurt himself, he stopped.

## 2023-03-23 NOTE — ED TRIAGE NOTES
Patient brought in by PD as they state they went to [facility] as he was saying he wanted to machete people there. Per PD he also stated that he wanted to use his gun to go kill all the people at [facility].

## 2023-03-23 NOTE — CARE PLAN
Prior to Admission Medication Reconciliation:     Medications added:   [x] None  [] As listed below:    Medications deleted:   [] None  [x] As listed below:    lyrica 150 mg TID- pt on 200 mg TID-dose adjusted 3/8/23    Medications marked for review/removal by attending:  [x] None  [] As listed below:    Changes made to existing medications:   [x] None  [] Updated time of day, strengths and frequencies to most current.     Pertinent notes/medications patient takes different than prescribed:     Took all AM meds at [facility] 3/23/23    Last times/dates taken verified with patient:  [x] Yes- completed myself   [] Nurse completed, no changes made (double checked entries)  [] Unable to review with patient at this time:    Allergy review:    []Did not review: reviewed by nursing  [x]Allergies reviewed and updated if needed.     Medication reconciliation sources:   []Patient  []Patient family member/emergency contact: **  [][facility] Report Review  []Epic Chart Review  []Care Everywhere review  []Pharmacy med list/phone call: **  []Fill dates reflect compliancy. No concerns.  []Fill dates do not reflect compliancy on the following medications:   [x]Outside meds dispense report:   [x]Fill dates reflect compliancy. No concerns.  []Fill dates do not reflect compliancy on the following medications:   [x]HomeCare medlist, Nursing home or Assisted Living MAR: [facility] med list  []Behavioral Health Provider:      []Other: **    Pharmacy desired at discharge: [facility]    Is patient on coumadin?   [x]No  []Yes      Fill dates and reported compliancy:  [x] Compliant: fill dates reflect reported compliancy.   [] Not applicable. Patient is not taking any prescribed maintenance medications at this time.   [] Fill dates do not coincide with compliancy, but reports compliancy.    [] Fill dates reflect compliancy, but reports non-compliancy.   [] Cannot assess at this time.     Historian accuracy:  [] Excellent- alert and  oriented, understands why meds were prescribed and how to take, able to answer specifics  [] Good- alert and oriented, understands why meds were prescribed and how to take, some confusion   [] Fair- alert and oriented, doesn't know medications without list, cannot answer specifics about medications, but has a decent process for which to take at home  [] Poor- does not know medications, may not have a process to take at home, may be cognitively unable to review at this time  [x]Medication management done by family member or facility, no concerns about historian accuracy.   [] Cannot assess at this time.     Medication Management:  [] Manages meds independently  [] Family member/ other party manages meds/assists:  [x] Meds managed by staff at facility  [] Meds set up by home care, family/other party helps administer  [] Meds set up by home care, self administers  [] Cannot assess at this time.     Other medications aside from PTA:  [x] Denies taking any other medications aside from those listed in PTA meds, this includes over-the-counter vitamins, supplements and analgesics.   [] Unable to confirm at this time.     Cynthia Nguyen on 3/23/2023 at 12:41 PM     Notifying appropriate party of changes/additions/discrepancies:  []No pertinent changes made, notification not necessary.   [] Notified attending provider via text page/phone call/sticky note or other:  [] Notified other:  [x] Medications have not been reconciled by a provider yet, notification not necessary  [] Pt is not admitted to floor yet or patient is boarding, PTA meds completed before admission.     Medications Prior to Admission   Medication Sig Dispense Refill Last Dose     ALPRAZolam (XANAX) 1 MG tablet Take 1 mg by mouth daily as needed for anxiety or agitation        buPROPion (WELLBUTRIN XL) 150 MG 24 hr tablet Take 1 tablet (150 mg) by mouth every morning . Take along with a 300 mg tablet for a total daily dose of 450 mg. 30 tablet 1      buPROPion  (WELLBUTRIN XL) 300 MG 24 hr tablet Take 1 tablet (300 mg) by mouth every morning . Take along with a 150 mg tablet for a total daily dose of 450 mg. 30 tablet 1      cloZAPine (CLOZARIL) 100 MG tablet Take 1 tablet (100 mg) by mouth At Bedtime 30 tablet 0      docusate sodium (COLACE) 100 MG capsule Take 1 capsule (100 mg) by mouth 2 times daily as needed for constipation 30 capsule 0      hydrOXYzine (ATARAX) 50 MG tablet Take 50 mg by mouth every 6 hours as needed        lithium (ESKALITH CR/LITHOBID) 450 MG CR tablet Take 1 tablet (450 mg) by mouth At Bedtime 30 tablet 1      LORazepam (ATIVAN) 1 MG tablet Take 1 tablet (1 mg) by mouth 4 times daily 120 tablet 1      memantine (NAMENDA) 10 MG tablet Take 1 tablet (10 mg) by mouth 2 times daily 60 tablet 1      Pregabalin (LYRICA) 200 MG capsule Take 200 mg by mouth 3 times daily
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
## 2023-03-23 NOTE — PLAN OF CARE
Violent/Self-Destructive Seclusion or Restraint Initiation Note    Patient behaviors justifying violent/self-destructive seclusion or restraint (describe attempted least restricted alternatives and patient's response to interventions): Pt banging head into wall and had blanket wrapped around his neck tight.  Pt offered PRN Zyprexa PO and refused.  Pt offered PRN IM Zyprexa after a code green called at 1549 and Pt refused.  Code 21 called 1549 and Pt offered IM cooperative, but refused.  Pt held hands on sitting in the dayroom chair.  Pt offered minimal resistance when given IM Zyprexa in his left deltoid.  Type of restraint initiated: 1556  Date Started: 3/23/2023  Time Started: 1556  LIP notified (name): April Henley NP  Order obtained: Yes.   Patient educated on reason for seclusion or restraints and discontinuation criteria: Yes.  Care plan updated: Yes.   Goal Outcome Evaluation:
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
## 2023-03-23 NOTE — PROGRESS NOTES
03/23/23 1156   Patient Belongings   Did you bring any home meds/supplements to the hospital?  No   Patient Belongings locker;sent to security per site process   Patient Belongings Remaining with Patient clothing;shoes   Patient Belongings Put in Hospital Secure Location (Security or Locker, etc.) purse/wallet;cell phone/electronics   Belongings Search Yes   Clothing Search Yes   Second Staff cindi   Comment Blue undie, black pair of shoes, ,2 black pair of pants, 2 hats, 2 black shirts, empty chew container, red lighter.     List items sent to safe: Black android with few scratches, white iphone with red case with few scratches, black wallet, 3 photos of kids, 2 [place] drivers ID, ucare card, library card.    All other belongings put in assigned cubby in belongings room.       I have reviewed my belongings list on admission and verify that it is correct.     Patient signature_______________________________    Second staff witness (if patient unable to sign) ______________________________       I have received all my belongings at discharge.    Patient signature________________________________    Sierrarose  3/23/2023  11:59 AM

## 2023-03-24 PROCEDURE — 250N000013 HC RX MED GY IP 250 OP 250 PS 637: Performed by: NURSE PRACTITIONER

## 2023-03-24 PROCEDURE — 250N000011 HC RX IP 250 OP 636: Performed by: NURSE PRACTITIONER

## 2023-03-24 PROCEDURE — 124N000001 HC R&B MH

## 2023-03-24 PROCEDURE — 99233 SBSQ HOSP IP/OBS HIGH 50: CPT | Performed by: NURSE PRACTITIONER

## 2023-03-24 PROCEDURE — 250N000009 HC RX 250: Performed by: NURSE PRACTITIONER

## 2023-03-24 RX ORDER — LORAZEPAM 1 MG/1
1 TABLET ORAL
Status: DISCONTINUED | OUTPATIENT
Start: 2023-03-24 | End: 2023-03-27 | Stop reason: HOSPADM

## 2023-03-24 RX ADMIN — PREGABALIN 200 MG: 25 CAPSULE ORAL at 13:04

## 2023-03-24 RX ADMIN — LORAZEPAM 1 MG: 1 TABLET ORAL at 21:35

## 2023-03-24 RX ADMIN — PREGABALIN 200 MG: 25 CAPSULE ORAL at 08:24

## 2023-03-24 RX ADMIN — LITHIUM CARBONATE 450 MG: 450 TABLET, EXTENDED RELEASE ORAL at 20:17

## 2023-03-24 RX ADMIN — CLOZAPINE 100 MG: 100 TABLET ORAL at 20:16

## 2023-03-24 RX ADMIN — LORAZEPAM 1 MG: 1 TABLET ORAL at 16:38

## 2023-03-24 RX ADMIN — LORAZEPAM 1 MG: 1 TABLET ORAL at 13:04

## 2023-03-24 RX ADMIN — OLANZAPINE 10 MG: 10 INJECTION, POWDER, FOR SOLUTION INTRAMUSCULAR at 16:52

## 2023-03-24 RX ADMIN — LORAZEPAM 1 MG: 1 TABLET ORAL at 08:24

## 2023-03-24 RX ADMIN — PREGABALIN 200 MG: 25 CAPSULE ORAL at 20:16

## 2023-03-24 ASSESSMENT — ACTIVITIES OF DAILY LIVING (ADL)
ADLS_ACUITY_SCORE: 39
LAUNDRY: UNABLE TO COMPLETE
ADLS_ACUITY_SCORE: 39
ORAL_HYGIENE: INDEPENDENT
ADLS_ACUITY_SCORE: 39
DRESS: SCRUBS (BEHAVIORAL HEALTH);INDEPENDENT
ADLS_ACUITY_SCORE: 39
LAUNDRY: UNABLE TO COMPLETE
ADLS_ACUITY_SCORE: 39
HYGIENE/GROOMING: INDEPENDENT

## 2023-03-24 NOTE — PLAN OF CARE
Problem: Adult Behavioral Health Plan of Care  Goal: Patient-Specific Goal (Individualization)  Description: Patient will be compliant with treatment team recommendations and medication administrations during hospitalization.  Patient will remain independent with ADLs daily.  Patient will sleep 6-8 hours per night.  Patient will eat at least 50% of meals daily.  3/23/2023-patient placed in MHICU due to unpredictable behaviors and history of aggression.  Outcome: Progressing     Problem: Suicidal Behavior  Goal: Suicidal Behavior is Absent or Managed  Outcome: Progressing     Face to face shift report received from Joon LARKIN. Rounding completed, pt observed.     Pt appeared to sleep most of this shift. Pt did not have any noted episodes of self harm this shift.    Face to face report will be communicated to oncoming RN.    Kecia Eisenberg RN  3/24/2023  6:06 AM
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
## 2023-03-24 NOTE — PLAN OF CARE
"  Problem: Suicidal Behavior  Goal: Suicidal Behavior is Absent or Managed  Outcome: Not Progressing     Problem: Homicidal Behavior  Goal: Improved Impulse and Aggression Control (Homicidal Behavior)  Description: Patient will deny HI by discharge.  Patient will remain free from harm to self or others during hospitalization.  Outcome: Not Progressing     Problem: Adult Behavioral Health Plan of Care  Goal: Patient-Specific Goal (Individualization)  Description: Patient will be compliant with treatment team recommendations and medication administrations during hospitalization.  Patient will remain independent with ADLs daily.  Patient will sleep 6-8 hours per night.  Patient will eat at least 50% of meals daily.  3/23/2023-patient placed in MHICU due to unpredictable behaviors and history of aggression.  Outcome: Not Progressing   Goal Outcome Evaluation:    Plan of Care Reviewed With: patient      1530 Face to face to face rounding complete.  Pt introduced to nursing for the shift.    1700 Pt continually banging his head against the walls and nurses station window.  Pt refused multiple offers of PO Zyprexa or Haldol.  A Code Josias was called and Pt agree to take IM Zyprexa after significant encouragement.  He said, \"If I don't get these medications right and some Xanax I am going to end up killing myself!  I just can't take it anymore!\"  Pt given 10 mg IM in his left arm at 1652 and he was cooperative.  He did calm down after I told him that I was able to get a hold of his provider and she plans to see him soon.  Pt calmed down a great deal after the PRN's and news about his provider.  Pt told me that he he was wrongly brought here because his own pool cue was missing. He complained that he is not on Wellbutrin now and needs Xanax.  I encouraged him to talk with his provider about his medications. His speech is rapid and his thought content tangential and grandiose    2300 Face to face end of shift report " communicated to Night shift RN's along with Pt's fall risk.     Joon Funk RN  3/24/2023

## 2023-03-25 PROCEDURE — 250N000009 HC RX 250: Performed by: NURSE PRACTITIONER

## 2023-03-25 PROCEDURE — 250N000013 HC RX MED GY IP 250 OP 250 PS 637: Performed by: NURSE PRACTITIONER

## 2023-03-25 PROCEDURE — 124N000001 HC R&B MH

## 2023-03-25 RX ADMIN — CLOZAPINE 100 MG: 100 TABLET ORAL at 20:18

## 2023-03-25 RX ADMIN — OLANZAPINE 10 MG: 10 TABLET, FILM COATED ORAL at 08:26

## 2023-03-25 RX ADMIN — LORAZEPAM 1 MG: 1 TABLET ORAL at 10:29

## 2023-03-25 RX ADMIN — PREGABALIN 200 MG: 25 CAPSULE ORAL at 13:48

## 2023-03-25 RX ADMIN — PREGABALIN 200 MG: 25 CAPSULE ORAL at 08:18

## 2023-03-25 RX ADMIN — LORAZEPAM 1 MG: 1 TABLET ORAL at 08:18

## 2023-03-25 RX ADMIN — LORAZEPAM 1 MG: 1 TABLET ORAL at 21:00

## 2023-03-25 RX ADMIN — PREGABALIN 200 MG: 25 CAPSULE ORAL at 20:18

## 2023-03-25 RX ADMIN — OLANZAPINE 10 MG: 10 TABLET, FILM COATED ORAL at 18:51

## 2023-03-25 RX ADMIN — LITHIUM CARBONATE 450 MG: 450 TABLET, EXTENDED RELEASE ORAL at 20:18

## 2023-03-25 RX ADMIN — LORAZEPAM 1 MG: 1 TABLET ORAL at 17:21

## 2023-03-25 RX ADMIN — LORAZEPAM 1 MG: 1 TABLET ORAL at 13:48

## 2023-03-25 ASSESSMENT — ACTIVITIES OF DAILY LIVING (ADL)
ADLS_ACUITY_SCORE: 39
LAUNDRY: UNABLE TO COMPLETE
ADLS_ACUITY_SCORE: 39
DRESS: INDEPENDENT;SCRUBS (BEHAVIORAL HEALTH)
ADLS_ACUITY_SCORE: 39
HYGIENE/GROOMING: INDEPENDENT
ORAL_HYGIENE: INDEPENDENT

## 2023-03-25 NOTE — PLAN OF CARE
Problem: Adult Behavioral Health Plan of Care  Goal: Patient-Specific Goal (Individualization)  Description: Patient will be compliant with treatment team recommendations and medication administrations during hospitalization.  Patient will remain independent with ADLs daily.  Patient will sleep 6-8 hours per night.  Patient will eat at least 50% of meals daily.  3/23/2023-patient placed in MHICU due to unpredictable behaviors and history of aggression.  Outcome: Progressing     Problem: Suicidal Behavior  Goal: Suicidal Behavior is Absent or Managed  Outcome: Progressing     Face to face shift report received from Joon LARKIN. Rounding completed, pt observed.     Pt appeared to sleep most of this shift. Pt did not have any noted episodes of self harm this shift.    Face to face report will be communicated to oncoming RN.    Kecia Eisenberg RN  3/25/2023  6:11 AM

## 2023-03-25 NOTE — PLAN OF CARE
Face to face end of shift report  received from Kecia SONG RN.  Pt observed awake in room.          Problem: Adult Behavioral Health Plan of Care  Goal: Patient-Specific Goal (Individualization)  Description: Patient will be compliant with treatment team recommendations and medication administrations during hospitalization.  Patient will remain independent with ADLs daily.  Patient will sleep 6-8 hours per night.  Patient will eat at least 50% of meals daily.  3/23/2023-patient placed in MHICU due to unpredictable behaviors and history of aggression.  Outcome: Progressing     Problem: Homicidal Behavior  Goal: Improved Impulse and Aggression Control (Homicidal Behavior)  Description: Patient will deny HI by discharge.  Patient will remain free from harm to self or others during hospitalization.  Outcome: Progressing     Problem: Suicidal Behavior  Goal: Suicidal Behavior is Absent or Managed  Outcome: Progressing   Goal Outcome Evaluation:       Pt was initially irritable with writer. Pt c/o that someone at [facility] bothering him.  Pt accepting of PRN Zyprexa 10 mg at 0826.  A little later.  Pt was was calm and more pleasant.  He took a shower this am.  Denies all criteria to nursing assessment.  No weaning at this time.     1500- Writer is continuing care for pt through the shift.  Pt continues to remain in MHICU.  Pt has been in and out of his room.  1851- Pt started karate moves in the MHICU.  Pt stated he was starting to feel rammy.  Pt requested PRN.  Writer gave pt PRN zyprexa 10 mg at this time.  Pt did later report PRN effective.  Has been cooperative, makes needs known.        Face to face end of shift report communicated to oncoming night shift RN.     Prisca Erickson RN  3/25/2023

## 2023-03-25 NOTE — PROGRESS NOTES
"[facility] PSYCHIATRY  PROGRESS NOTE     SUBJECTIVE     Prior to interviewing the patient, I met with nursing and reviewed patient's clinical condition. We discussed clinical care both before and after the interview. I have reviewed the patient's clinical course by review of records including previous notes, labs, and vital signs.     Per nursing, the patient had the following behavioral events over the last 24-hours: none.    On psychiatric interview, I met with pt who was resting on his bed in the MICU. Prior to my visit pt was agitated, banging his head on  The wall w/ concerns of self harm.Olanzapine was given IM with reduction of agitation. Pt reports he needs an increase in his ativan, his OP provider had placed him on xanax to take in between the ativan doses. This I informed him I would not do. Pt made the decision for no further conversation as he needed to sleep.       MEDICATIONS   Scheduled Meds:    [Held by provider] buPROPion  150 mg Oral QAM     [Held by provider] buPROPion  300 mg Oral QAM     cloZAPine  100 mg Oral At Bedtime     lithium  450 mg Oral At Bedtime     LORazepam  1 mg Oral 5x Daily     [Held by provider] memantine  10 mg Oral BID     Pregabalin  200 mg Oral TID     PRN Meds:.acetaminophen, alum & mag hydroxide-simethicone, docusate sodium, haloperidol **OR** haloperidol lactate, hydrOXYzine, melatonin, OLANZapine **OR** OLANZapine, senna-docusate     ALLERGIES   Allergies   Allergen Reactions     Pork Allergy         MENTAL STATUS EXAM   Vitals: /74   Pulse 74   Temp 97  F (36.1  C) (Tympanic)   Resp 22   SpO2 98%     Appearance: Alert, oriented, dressed in hospital scrubs  Attitude: Cooperative to an extent  Eye Contact: Fair  Mood: \"terrible\"  Affect: Blunted, reduced range   Speech: Normal rate and rhythm   Psychomotor Behavior: No TD or rigidity. No tremor or akathisia.   Thought Process: Disorganized  Associations: No loose associations   Thought Content: Denies SI, " "plan. Displays SIB. Denies AVH. Delusional thought present.  Insight: Poor  Judgment: Poor  Oriented to: Person, place, and time  Attention Span and Concentration: Intact  Recent and Remote Memory: Intact  Language: English with appropriate syntax and vocabulary  Fund of Knowledge: Average   Muscle Strength and Tone: Grossly normal  Gait and Station: Grossly normal       LABS   No results found for this or any previous visit (from the past 24 hour(s)).      IMPRESSION   This is a 32 year old male with a PMH of significant dextromethorphan abuse, schizoaffective disorder and traumatic brain injury who has been residing at a local assisted living though has been abusing dextromethorphan regularly which causes great decompensation of his mental health.  He has been adamant about not going to any chemical dependency treatment and does not see his dextromethorphan abuse as a contributor to his hospitalizations.  He is making suicidal statements and he is very paranoid that people in \"special suits that blend into the surroundings\" have been following him and have a hit out on him.  His  is aware of his decompensation and that he is in the hospital.         DIAGNOSES     1.Schizoaffective disorder, bipolar type  2.Dextromethorphan use disorder, severe         PLAN     Location: Unit 5  Legal Status: Orders Placed This Encounter      Emergency Hospitalization Hold (72 Hr Hold)    Safety Assessment:    Behavioral Orders   Procedures     Code 1 - Restrict to Unit     Routine Programming     As clinically indicated     Status 15     Every 15 minutes.      PTA medications continued/changed:   wellbutrin help.  Xanax stopped      New medications tried and stopped:     -None    New medications initiated:   none    Today's Changes:  Ativan 1 mg added - do not suspect this will be long term as pt is more than likely in withdrawal.    Programming: Patient will be treated in a therapeutic milieu with appropriate " individual and group therapies. Education will be provided on diagnoses, medications, and treatments.     Medical diagnoses:  Per medicine    Consult: None  Tests: None    Anticipated LOS: 5 days or more to stabilize. Likely needs rule 25 though currently refusing treatment.   Disposition: back to [place] when stable       TREATMENT TEAM CARE PLAN     Progress: Continued symptoms.    Continued Stay Criteria/Rationale: Continued symptoms without sufficient improvement/resolution.    Medical/Physical: See above.    Precautions: See above.     Plan: Continue inpatient care with unit support and medication management.    Rationale for change in precautions or plan: NA due to no change.    Participants: DAYSI Smith CNP, Nursing, SW, OT.    The patient's care was discussed with the treatment team and chart notes were reviewed.       ATTESTATION      Ary TAVAREZ, CNP

## 2023-03-26 PROCEDURE — 250N000009 HC RX 250: Performed by: NURSE PRACTITIONER

## 2023-03-26 PROCEDURE — 124N000001 HC R&B MH

## 2023-03-26 PROCEDURE — 250N000013 HC RX MED GY IP 250 OP 250 PS 637: Performed by: NURSE PRACTITIONER

## 2023-03-26 PROCEDURE — 99232 SBSQ HOSP IP/OBS MODERATE 35: CPT | Performed by: NURSE PRACTITIONER

## 2023-03-26 RX ADMIN — PREGABALIN 200 MG: 25 CAPSULE ORAL at 13:12

## 2023-03-26 RX ADMIN — PREGABALIN 200 MG: 25 CAPSULE ORAL at 08:20

## 2023-03-26 RX ADMIN — PREGABALIN 200 MG: 25 CAPSULE ORAL at 20:21

## 2023-03-26 RX ADMIN — LORAZEPAM 1 MG: 1 TABLET ORAL at 17:17

## 2023-03-26 RX ADMIN — LORAZEPAM 1 MG: 1 TABLET ORAL at 10:07

## 2023-03-26 RX ADMIN — OLANZAPINE 10 MG: 10 TABLET, FILM COATED ORAL at 19:33

## 2023-03-26 RX ADMIN — LITHIUM CARBONATE 450 MG: 450 TABLET, EXTENDED RELEASE ORAL at 20:21

## 2023-03-26 RX ADMIN — LORAZEPAM 1 MG: 1 TABLET ORAL at 08:20

## 2023-03-26 RX ADMIN — LORAZEPAM 1 MG: 1 TABLET ORAL at 13:12

## 2023-03-26 RX ADMIN — LORAZEPAM 1 MG: 1 TABLET ORAL at 21:04

## 2023-03-26 RX ADMIN — CLOZAPINE 100 MG: 100 TABLET ORAL at 20:21

## 2023-03-26 ASSESSMENT — ACTIVITIES OF DAILY LIVING (ADL)
ADLS_ACUITY_SCORE: 39
ADLS_ACUITY_SCORE: 39
ORAL_HYGIENE: INDEPENDENT
ADLS_ACUITY_SCORE: 39
HYGIENE/GROOMING: INDEPENDENT
LAUNDRY: UNABLE TO COMPLETE
ADLS_ACUITY_SCORE: 39
ADLS_ACUITY_SCORE: 39
DRESS: SCRUBS (BEHAVIORAL HEALTH);INDEPENDENT
ORAL_HYGIENE: INDEPENDENT
ADLS_ACUITY_SCORE: 39
DRESS: SCRUBS (BEHAVIORAL HEALTH);INDEPENDENT
ADLS_ACUITY_SCORE: 39
LAUNDRY: UNABLE TO COMPLETE
ADLS_ACUITY_SCORE: 39
ADLS_ACUITY_SCORE: 39
HYGIENE/GROOMING: INDEPENDENT
ADLS_ACUITY_SCORE: 39

## 2023-03-26 NOTE — PLAN OF CARE
Face to face shift report received from nurse. Rounding completed, pt observed.    Problem: Adult Behavioral Health Plan of Care  Goal: Patient-Specific Goal (Individualization)  Description: Patient will be compliant with treatment team recommendations and medication administrations during hospitalization.  Patient will remain independent with ADLs daily.  Patient will sleep 6-8 hours per night.  Patient will eat at least 50% of meals daily.  3/23/2023-patient placed in MHICU due to unpredictable behaviors and history of aggression.  Outcome: Progressing  Patient calm and cooperative throughout the shift. Patient medication and treatment team compliant. Patient weened from MHICU most of shift. Patient seen rapping and singing walking a the halls. Patient behaviors were appropriate throughout the day. No issues noted throughout the shift.     Problem: Suicidal Behavior  Goal: Suicidal Behavior is Absent or Managed  Outcome: Progressing     Face to face report will be communicated to oncoming RN.    Yoan Erickson RN  3/26/2023

## 2023-03-26 NOTE — PROGRESS NOTES
"[facility] PSYCHIATRY  PROGRESS NOTE     SUBJECTIVE     Prior to interviewing the patient, I met with nursing and reviewed patient's clinical condition. We discussed clinical care both before and after the interview. I have reviewed the patient's clinical course by review of records including previous notes, labs, and vital signs.     Per nursing, the patient had the following behavioral events over the last 24-hours: none.    On psychiatric interview, I met with patient who was ambulating in the hallways. Pt was smiling and joking, reporting it is much better being out of the MICU. He is looking forward to returning to [place]. Pt understands that he is on a 72 hold, given the weekend and his good behavior may want to consider discharge back to [place] on 3/27/23 if [facility] is willing. Pt continues to minimize his use of illicit drugs or dextromethorphan. Pt desires to resume care with his primary mental health provider.       MEDICATIONS   Scheduled Meds:    [Held by provider] buPROPion  150 mg Oral QAM     [Held by provider] buPROPion  300 mg Oral QAM     cloZAPine  100 mg Oral At Bedtime     lithium  450 mg Oral At Bedtime     LORazepam  1 mg Oral 5x Daily     [Held by provider] memantine  10 mg Oral BID     Pregabalin  200 mg Oral TID     PRN Meds:.acetaminophen, alum & mag hydroxide-simethicone, docusate sodium, haloperidol **OR** haloperidol lactate, hydrOXYzine, melatonin, OLANZapine **OR** OLANZapine, senna-docusate     ALLERGIES   Allergies   Allergen Reactions     Pork Allergy         MENTAL STATUS EXAM   Vitals: /77   Pulse 67   Temp 98.7  F (37.1  C) (Tympanic)   Resp 16   SpO2 98%     Appearance: Alert, oriented, dressed in hospital scrubs  Attitude: Cooperative to an extent  Eye Contact: Fair  Mood: \"better\"  Affect: full   Speech: Normal rate and rhythm   Psychomotor Behavior: No TD or rigidity. No tremor or akathisia.   Thought Process: linear, goal oriented  Associations: No " "loose associations   Thought Content: Denies SI, plan. No SIB. Denies AVH.   Insight: Poor  Judgment: Poor  Oriented to: Person, place, and time  Attention Span and Concentration: Intact  Recent and Remote Memory: Intact  Language: English with appropriate syntax and vocabulary  Fund of Knowledge: Average   Muscle Strength and Tone: Grossly normal  Gait and Station: Grossly normal       LABS   No results found for this or any previous visit (from the past 24 hour(s)).      IMPRESSION   This is a 32 year old male with a PMH of significant dextromethorphan abuse, schizoaffective disorder and traumatic brain injury who has been residing at a local assisted living though has been abusing dextromethorphan regularly which causes great decompensation of his mental health.  He has been adamant about not going to any chemical dependency treatment and does not see his dextromethorphan abuse as a contributor to his hospitalizations.  He is making suicidal statements and he is very paranoid that people in \"special suits that blend into the surroundings\" have been following him and have a hit out on him.  His  is aware of his decompensation and that he is in the hospital.         DIAGNOSES     1.Schizoaffective disorder, bipolar type  2.Dextromethorphan use disorder, severe         PLAN     Location: Unit 5  Legal Status: Orders Placed This Encounter      Emergency Hospitalization Hold (72 Hr Hold)    Safety Assessment:    Behavioral Orders   Procedures     Code 1 - Restrict to Unit     Routine Programming     As clinically indicated     Status 15     Every 15 minutes.      PTA medications continued/changed:   wellbutrin help.  Xanax stopped      New medications tried and stopped:     -None    New medications initiated:   none    Today's Changes:  None    Programming: Patient will be treated in a therapeutic milieu with appropriate individual and group therapies. Education will be provided on diagnoses, medications, " and treatments.     Medical diagnoses:  Per medicine    Consult: None  Tests: None    Anticipated LOS: 5 days or more to stabilize. Likely needs rule 25 though currently refusing treatment.   Disposition: back to [place] when stable       TREATMENT TEAM CARE PLAN     Progress: Continued symptoms.    Continued Stay Criteria/Rationale: Continued symptoms without sufficient improvement/resolution.    Medical/Physical: See above.    Precautions: See above.     Plan: Continue inpatient care with unit support and medication management.    Rationale for change in precautions or plan: NA due to no change.    Participants: DAYSI Smith CNP, Nursing, SW, OT.    The patient's care was discussed with the treatment team and chart notes were reviewed.       ATTESTATION      Ary TAVAREZ, CNP

## 2023-03-26 NOTE — PLAN OF CARE
Problem: Adult Behavioral Health Plan of Care  Goal: Patient-Specific Goal (Individualization)  Description: Patient will be compliant with treatment team recommendations and medication administrations during hospitalization.  Patient will remain independent with ADLs daily.  Patient will sleep 6-8 hours per night.  Patient will eat at least 50% of meals daily.  3/23/2023-patient placed in MHICU due to unpredictable behaviors and history of aggression.  Outcome: Progressing     Problem: Suicidal Behavior  Goal: Suicidal Behavior is Absent or Managed  Outcome: Progressing     Face to face shift report received from Alanna LARKIN. Rounding completed, pt observed.     Pt appeared to sleep most of this shift. Pt did not have any noted episodes of self harm this shift.    Face to face report will be communicated to oncoming RN.    Kecia Eisenberg RN  3/26/2023  5:55 AM

## 2023-03-27 VITALS
HEART RATE: 62 BPM | OXYGEN SATURATION: 97 % | BODY MASS INDEX: 26.76 KG/M2 | WEIGHT: 197.3 LBS | SYSTOLIC BLOOD PRESSURE: 125 MMHG | DIASTOLIC BLOOD PRESSURE: 77 MMHG | RESPIRATION RATE: 14 BRPM | TEMPERATURE: 97.6 F

## 2023-03-27 LAB
BASOPHILS # BLD AUTO: 0 10E3/UL (ref 0–0.2)
BASOPHILS NFR BLD AUTO: 1 %
EOSINOPHIL # BLD AUTO: 0 10E3/UL (ref 0–0.7)
EOSINOPHIL NFR BLD AUTO: 0 %
HOLD SPECIMEN: NORMAL
HOLD SPECIMEN: NORMAL
IMM GRANULOCYTES # BLD: 0 10E3/UL
IMM GRANULOCYTES NFR BLD: 0 %
LYMPHOCYTES # BLD AUTO: 1.9 10E3/UL (ref 0.8–5.3)
LYMPHOCYTES NFR BLD AUTO: 25 %
MONOCYTES # BLD AUTO: 0.5 10E3/UL (ref 0–1.3)
MONOCYTES NFR BLD AUTO: 6 %
NEUTROPHILS # BLD AUTO: 5 10E3/UL (ref 1.6–8.3)
NEUTROPHILS NFR BLD AUTO: 68 %
NRBC # BLD AUTO: 0 10E3/UL
NRBC BLD AUTO-RTO: 0 /100
WBC # BLD AUTO: 7.5 10E3/UL (ref 4–11)

## 2023-03-27 PROCEDURE — 99239 HOSP IP/OBS DSCHRG MGMT >30: CPT | Performed by: NURSE PRACTITIONER

## 2023-03-27 PROCEDURE — 36415 COLL VENOUS BLD VENIPUNCTURE: CPT | Performed by: NURSE PRACTITIONER

## 2023-03-27 PROCEDURE — 250N000013 HC RX MED GY IP 250 OP 250 PS 637: Performed by: NURSE PRACTITIONER

## 2023-03-27 PROCEDURE — 85004 AUTOMATED DIFF WBC COUNT: CPT | Performed by: NURSE PRACTITIONER

## 2023-03-27 PROCEDURE — 250N000009 HC RX 250: Performed by: NURSE PRACTITIONER

## 2023-03-27 RX ORDER — LORAZEPAM 1 MG/1
1 TABLET ORAL
Qty: 150 TABLET | Refills: 0 | Status: SHIPPED | OUTPATIENT
Start: 2023-03-27

## 2023-03-27 RX ORDER — MEMANTINE HYDROCHLORIDE 10 MG/1
10 TABLET ORAL 2 TIMES DAILY
Status: DISCONTINUED | OUTPATIENT
Start: 2023-03-27 | End: 2023-03-27 | Stop reason: HOSPADM

## 2023-03-27 RX ORDER — BUPROPION HYDROCHLORIDE 300 MG/1
300 TABLET ORAL EVERY MORNING
Status: DISCONTINUED | OUTPATIENT
Start: 2023-03-27 | End: 2023-03-27 | Stop reason: HOSPADM

## 2023-03-27 RX ADMIN — LORAZEPAM 1 MG: 1 TABLET ORAL at 08:36

## 2023-03-27 RX ADMIN — LORAZEPAM 1 MG: 1 TABLET ORAL at 13:23

## 2023-03-27 RX ADMIN — BUPROPION HYDROCHLORIDE 300 MG: 300 TABLET, FILM COATED, EXTENDED RELEASE ORAL at 11:33

## 2023-03-27 RX ADMIN — PREGABALIN 200 MG: 25 CAPSULE ORAL at 08:36

## 2023-03-27 RX ADMIN — LORAZEPAM 1 MG: 1 TABLET ORAL at 11:33

## 2023-03-27 RX ADMIN — PREGABALIN 200 MG: 25 CAPSULE ORAL at 13:23

## 2023-03-27 RX ADMIN — MEMANTINE 10 MG: 10 TABLET ORAL at 11:33

## 2023-03-27 ASSESSMENT — ACTIVITIES OF DAILY LIVING (ADL)
ADLS_ACUITY_SCORE: 39

## 2023-03-27 NOTE — PLAN OF CARE
Problem: Adult Behavioral Health Plan of Care  Goal: Patient-Specific Goal (Individualization)  Description: Patient will be compliant with treatment team recommendations and medication administrations during hospitalization.  Patient will remain independent with ADLs daily.  Patient will sleep 6-8 hours per night.  Patient will eat at least 50% of meals daily.  3/26/2023-patient placed in MHICU due to unpredictable behaviors and history of aggression. Patient may ween per behaviors.  Outcome: Progressing     Problem: Suicidal Behavior  Goal: Suicidal Behavior is Absent or Managed  Outcome: Progressing     Face to face shift report received from Adithya LARKIN. Rounding completed, pt observed.     Pt appeared to sleep most of this shift. Pt did not have any noted episodes of self harm this shift.    Face to face report will be communicated to oncoming RN.    Kecia Eisenberg RN  3/27/2023  6:09 AM   Yes

## 2023-03-27 NOTE — PLAN OF CARE
"SHIFT SUMMARY:  Restless, but cooperative. Singing as he was walking the halls.  Stated \"I suppose I shouldn't talk about a pet cemetery and scare people.\" Weaned the majority of the shift. Denies suicidal or homicidal ideation, pain, and hallucinations. Not attending group. Compliant with treatment team recommendations and medication administration.     PRN zyprexa 10 mg administered per pt request at 19:33 related to increased restlessness/agitation.       Problem: Adult Behavioral Health Plan of Care  Goal: Patient-Specific Goal (Individualization)  Description: Patient will be compliant with treatment team recommendations and medication administrations during hospitalization.  Patient will remain independent with ADLs daily.  Patient will sleep 6-8 hours per night.  Patient will eat at least 50% of meals daily.  3/26/2023-patient placed in MHICU due to unpredictable behaviors and history of aggression. Patient may ween per behaviors.  Outcome: Progressing     Problem: Homicidal Behavior  Goal: Improved Impulse and Aggression Control (Homicidal Behavior)  Description: Patient will deny HI by discharge.  Patient will remain free from harm to self or others during hospitalization.  Outcome: Progressing     Problem: Suicidal Behavior  Goal: Suicidal Behavior is Absent or Managed  Outcome: Progressing   Goal Outcome Evaluation:                        "

## 2023-03-27 NOTE — DISCHARGE SUMMARY
"[facility] PSYCHIATRY  DISCHARGE SUMMARY     DISCHARGE DATA     Steven Carter MRN# [id number]   Age: 32 year old YOB: 1990     Date of Admission: 3/23/2023  Date of Discharge: March 27, 2023  Discharge Provider: Christelle Contreras NP       REASON FOR ADMISSION     I received a phone call today from the manager of [facility] stating that Steven was going to be coming into the hospital by police.  She stated that this morning he became very upset and was so loud and threatening that all of the residents in the dining room left in fear.  He had made comments about \"bashing heads in\".  She stated that he had given her a bottle of NyQuil yesterday as he has quite a significant history of abusing NyQuil for the dextromethorphan.  She stated he appeared to be intoxicated yesterday though did not appear to be significantly intoxicated today.     When he arrived to the emergency room I met with him in the emergency room and he did not appear to be intoxicated as he did on his last admission last month though was clearly delusional and very labile.  He was making multiple comments about \"having a hit out on me\".  He told me that people follow him in special suits and blend into the things around them so he cannot see these people.  When I told him that this sounded delusional and paranoid he became upset.  He was very fixated on \"all I need is Xanax twice a day and I should be able to go back to [facility]\".  I told him he was not going to be able to return to [facility] today and that we need to make sure he was safe and stable.  He became upset with this.  He began to yell and scream in the emergency room about 10 minutes after I had left his room.  When he arrived to our psychiatric unit he was quite agitated demanding to leave and stating he did not want or need to be on the unit.  I did place him on a 72-hour hold.  He made some threatening comments towards staff though did not physically harm or tried to " "harm anyone.  He was very labile at times he would be singing and shortly after he would be screaming at staff.  At 1 point he was hitting his head fairly hard against the wall Ativan and Zyprexa were administered and he did calm down a bit to the extent where he quit attempting to injure himself.       While he was in the emergency room he was showing me both of his wrists.  He states \"look at what I had to do\".  There was an older scar on his left wrist going quite far down his arm and also one that looked more recent on his right arm going down a large portion of his arm.  He made a comment about \"see I could not even do it the knife needs to be sharper\".  I asked him multiple times when this happened as the one on his right arm to look more recent.  He states the one on his left arm occurred 6 months ago and I believe he stated the one on his right arm occurred prior to the last admission.  He clearly is still having suicidal thoughts as he states that he needs to have his knife sharper next time.    See H&P completed by Ashely Heaton CNP for full admission information.       DISCHARGE DIAGNOSES     1.Schizoaffective disorder, bipolar type  2.Dextromethorphan use disorder, severe       CONSULTS     none       HOSPITAL COURSE     Legal status: Orders Placed This Encounter      Emergency Hospitalization Hold (72 Hr Hold)    Patient was admitted to unit 5 due to the aforementioned presentation. The patient was placed under 15 minute checks to ensure patient safety. He was placed on a 72 hour hold due to agitation and threats towards staff. His mood was very labile. He did hit his head several times on the wall and did receive PRN medications. I don't believe he required any restraint or seclusion. His behavior did improve, he was able to wean out of the MHICU and walk the halls. Limited to no insight into his abuse of dextromethorphan and the affect it has on his mental health. Xanax prn was stopped and scheduled " Ativan increased. No other changes were made, other medications continued at PTA dosing. His behavior has been more appropriate through the weekend, mood is less labile. [facility] has been given updates and is willing to accept him back today. Will send the increased Ativan dose to the pharmacy, he will follow-up with yosi Holder as scheduled.    Mr. Carter did not require seclusion/restraint during hospitalization.     We reviewed with Mr. Carter current and past medication trials including duration, dose, response and side effects. During this hospitalization, the following changes to the patient's psychotropic medications were made:    PTA psychotropic medications stopped:     -Xanax prn    PTA psychotropic medications continued/changed:     -Ativan 1 mg five times daily  -Wellbutrin  mg daily  -Clozapine 100 mg at bedtime  -Hydroxyzine 50 mg every 6 hours as needed  -Lithium  mg at bedtime  -Namenda 10 mg BID  -Lyrica 200 mg TID    New psychotropic medications tried and stopped:     -none    New psychotropic medications initiated:     -none    With these changes and supports the patient noticed improvement in their symptoms and felt sufficiently ready for discharge. As a result, Steven Carter was discharged back to [facility]. At the time of discharge, Steven Carter was determined to not be a danger to self or others. The patient was also medically stable for discharge. At the current time of discharge, the patient does not meet criteria for involuntary hospitalization. On the day of discharge, the patient reports that they do not have suicidal or homicidal ideation. Steps taken to minimize risk include: assessing patient s behavior and thought process daily during hospital stay, discharging patient with adequate plan for follow up for mental and physical health and discussing safety plan of returning to the hospital should the patient ever have thoughts of harming themselves or others.  Therefore, based on all available evidence including the factors cited above, the patient does not appear to be at imminent risk for self-harm, and is appropriate for outpatient level of care. However, if patient uses substances or is medication non-adherent, their risk of decompensation and SI will be elevated. This was discussed with the patient.       DISCHARGE MEDICATIONS     Current Discharge Medication List      CONTINUE these medications which have CHANGED    Details   LORazepam (ATIVAN) 1 MG tablet Take 1 tablet (1 mg) by mouth 5 times daily  Qty: 150 tablet, Refills: 0    Associated Diagnoses: Schizoaffective disorder, bipolar type (H)         CONTINUE these medications which have NOT CHANGED    Details   !! buPROPion (WELLBUTRIN XL) 150 MG 24 hr tablet Take 1 tablet (150 mg) by mouth every morning . Take along with a 300 mg tablet for a total daily dose of 450 mg.  Qty: 30 tablet, Refills: 1    Associated Diagnoses: Schizoaffective disorder, bipolar type (H)      !! buPROPion (WELLBUTRIN XL) 300 MG 24 hr tablet Take 1 tablet (300 mg) by mouth every morning . Take along with a 150 mg tablet for a total daily dose of 450 mg.  Qty: 30 tablet, Refills: 1    Associated Diagnoses: Schizoaffective disorder, bipolar type (H)      cloZAPine (CLOZARIL) 100 MG tablet Take 1 tablet (100 mg) by mouth At Bedtime  Qty: 30 tablet, Refills: 0    Associated Diagnoses: Schizoaffective disorder, bipolar type (H)      docusate sodium (COLACE) 100 MG capsule Take 1 capsule (100 mg) by mouth 2 times daily as needed for constipation  Qty: 30 capsule, Refills: 0    Associated Diagnoses: Drug-induced constipation      hydrOXYzine (ATARAX) 50 MG tablet Take 50 mg by mouth every 6 hours as needed      lithium (ESKALITH CR/LITHOBID) 450 MG CR tablet Take 1 tablet (450 mg) by mouth At Bedtime  Qty: 30 tablet, Refills: 1    Associated Diagnoses: Schizoaffective disorder, bipolar type (H)      memantine (NAMENDA) 10 MG tablet Take 1  "tablet (10 mg) by mouth 2 times daily  Qty: 60 tablet, Refills: 1    Associated Diagnoses: Catatonia; Schizoaffective disorder, depressive type (H)      Pregabalin (LYRICA) 200 MG capsule Take 200 mg by mouth 3 times daily       !! - Potential duplicate medications found. Please discuss with provider.      STOP taking these medications       ALPRAZolam (XANAX) 1 MG tablet Comments:   Reason for Stopping:             Reason for two or more neuroleptics: not applicable        MENTAL STATUS EXAM   Vitals: /77   Pulse 62   Temp 97.6  F (36.4  C) (Temporal)   Resp 14   Wt 89.5 kg (197 lb 4.8 oz)   SpO2 97%   BMI 26.76 kg/m      Appearance: Alert, oriented, dressed in hospital scrubs, appears stated age   Attitude: Cooperative   Eye Contact: fair  Mood: \"Better\"  Affect: less blunted  Speech: Normal rate and rhythm   Psychomotor Behavior: No tremor, rigidity, or psychomotor abnormality   Thought Process: more linear and goal oriented  Associations: No loose associations   Thought Content: Denies SI or plan. No SIB. Denies A/V hallucinations.   Insight: limited  Judgment: limited  Oriented to: Person, place, and time  Attention Span and Concentration: fair  Recent and Remote Memory: fair  Language: English with appropriate syntax and vocabulary  Fund of Knowledge: Average  Muscle Strength and Tone: Grossly normal  Gait and Station: Grossly normal       DISCHARGE PLAN     1.  Education given regarding diagnostic and treatment options with risks, benefits and alternatives with adequate verbalization of understanding.  2.  Discharge to [facility]. Upon detailed review of risk factors, patient amenable for release.   3.  Continue aforementioned medications and associated medication changes with follow-up by outpatient provider.  4.  Crisis management planning in place.    5.  Nursing and  to review further discharge recommendations.   6.  Patient is being discharged with the following appointments as " detailed below.      Health Care Follow-up:      [facility]  Med management: Mg Hendrix- as scheduled  [address] [facility] [address]  [place] [address]  Phone: [phone]  Fax: [phone]  [email or web address]     [facility]  [address] [address]  Phone: [phone]  Fax: [phone]     [facility]  Case Management- Keila Andrews- as needed  [address] [address]  Phone: [phone] Fax - [phone]     [facility]  [address] [facility]  [place] [address]  Phone- [phone]  Fax- [phone]       DISCHARGE SERVICES PROVIDED     40 minutes spent on discharge services, including:  Final examination of patient.  Review and discussion of hospital stay.  Instructions for continued outpatient care/goals.  Preparation of discharge records.  Preparation of medications refills and new prescriptions.  Preparation of applicable referral forms.        ATTESTATION     Christelle Contreras NP       LABS THIS ADMISSION     Results for orders placed or performed during the hospital encounter of 03/23/23   Alcohol ethyl     Status: Normal   Result Value Ref Range    Alcohol ethyl <0.01 <=0.01 g/dL   Comprehensive metabolic panel     Status: Abnormal   Result Value Ref Range    Sodium 143 136 - 145 mmol/L    Potassium 4.0 3.4 - 5.3 mmol/L    Chloride 109 (H) 98 - 107 mmol/L    Carbon Dioxide (CO2) 25 22 - 29 mmol/L    Anion Gap 9 7 - 15 mmol/L    Urea Nitrogen 13.3 6.0 - 20.0 mg/dL    Creatinine 0.99 0.67 - 1.17 mg/dL    Calcium 9.5 8.6 - 10.0 mg/dL    Glucose 99 70 - 99 mg/dL    Alkaline Phosphatase 79 40 - 129 U/L    AST 53 (H) 10 - 50 U/L    ALT 42 10 - 50 U/L    Protein Total 6.4 6.4 - 8.3 g/dL    Albumin 4.2 3.5 - 5.2 g/dL    Bilirubin Total 0.4 <=1.2 mg/dL    GFR Estimate >90 >60 mL/min/1.73m2   Asymptomatic COVID-19 Virus (Coronavirus) by PCR Nasopharyngeal     Status: Normal    Specimen: Nasopharyngeal; Swab   Result Value Ref Range    SARS CoV2 PCR Negative  Negative    Narrative    Testing was performed using the Xpert Xpress SARS-CoV-2 Assay on the Cepheid Gene-Xpert Instrument Systems. Additional information about this Emergency Use Authorization (EUA) assay can be found via the Lab Guide. This test should be ordered for the detection of SARS-CoV-2 in individuals who meet SARS-CoV-2 clinical and/or epidemiological criteria as well as from individuals without symptoms or other reasons to suspect COVID-19. Test performance for asymptomatic patients has only been established in anterior nasal swab specimens. This test is for in vitro diagnostic use under the FDA EUA for laboratories certified under CLIA to perform high complexity testing. This test has not been FDA cleared or approved. A negative result does not rule out the presence of PCR inhibitors in the specimen or target RNA concentration below the limit of detection for the assay. The possibility of a false negative should be considered if the patient's recent exposure or clinical presentation suggests COVID-19. This test was validated by [facility] laboratory. This laboratory is certified under the Clinical Laboratory Improvement Amendments (CLIA) as qualified to perform high complexity testing.   CBC with platelets and differential     Status: None   Result Value Ref Range    WBC Count 6.7 4.0 - 11.0 10e3/uL    RBC Count 5.25 4.40 - 5.90 10e6/uL    Hemoglobin 15.4 13.3 - 17.7 g/dL    Hematocrit 45.7 40.0 - 53.0 %    MCV 87 78 - 100 fL    MCH 29.3 26.5 - 33.0 pg    MCHC 33.7 31.5 - 36.5 g/dL    RDW 12.1 10.0 - 15.0 %    Platelet Count 246 150 - 450 10e3/uL    % Neutrophils 60 %    % Lymphocytes 28 %    % Monocytes 9 %    % Eosinophils 2 %    % Basophils 1 %    % Immature Granulocytes 0 %    NRBCs per 100 WBC 0 <1 /100    Absolute Neutrophils 4.0 1.6 - 8.3 10e3/uL    Absolute Lymphocytes 1.8 0.8 - 5.3 10e3/uL    Absolute Monocytes 0.6 0.0 - 1.3 10e3/uL    Absolute Eosinophils 0.2 0.0 - 0.7  10e3/uL    Absolute Basophils 0.0 0.0 - 0.2 10e3/uL    Absolute Immature Granulocytes 0.0 <=0.4 10e3/uL    Absolute NRBCs 0.0 10e3/uL   Extra Tube *Canceled*     Status: None ()    Narrative    The following orders were created for panel order Extra Tube.  Procedure                               Abnormality         Status                     ---------                               -----------         ------                     Extra Blue Top Tube[[id number]]                                                         Extra Red Top Tube[[id number]]                                                          Extra Green Top (Lithium...[[id number]]                                                   Please view results for these tests on the individual orders.   Urine Drugs of Abuse Screen Panel 13     Status: Abnormal   Result Value Ref Range    Cannabinoids (84-oea-9-carboxy-9-THC) Detected (A) Not Detected, Indeterminate    Phencyclidine Not Detected Not Detected, Indeterminate    Cocaine (Benzoylecgonine) Not Detected Not Detected, Indeterminate    Methamphetamine (d-Methamphetamine) Not Detected Not Detected, Indeterminate    Opiates (Morphine) Not Detected Not Detected, Indeterminate    Amphetamine (d-Amphetamine) Not Detected Not Detected, Indeterminate    Benzodiazepines (Nordiazepam) Detected (A) Not Detected, Indeterminate    Tricyclic Antidepressants (Desipramine) Not Detected Not Detected, Indeterminate    Methadone Not Detected Not Detected, Indeterminate    Barbiturates (Butalbital) Not Detected Not Detected, Indeterminate    Oxycodone Not Detected Not Detected, Indeterminate    Propoxyphene (Norpropoxyphene) Not Detected Not Detected, Indeterminate    Buprenorphine Not Detected Not Detected, Indeterminate   WBC and Differential     Status: None   Result Value Ref Range    WBC Count 7.5 4.0 - 11.0 10e3/uL    % Neutrophils 68 %    % Lymphocytes 25 %    % Monocytes 6 %    % Eosinophils 0 %    % Basophils 1 %    %  Immature Granulocytes 0 %    NRBCs per 100 WBC 0 <1 /100    Absolute Neutrophils 5.0 1.6 - 8.3 10e3/uL    Absolute Lymphocytes 1.9 0.8 - 5.3 10e3/uL    Absolute Monocytes 0.5 0.0 - 1.3 10e3/uL    Absolute Eosinophils 0.0 0.0 - 0.7 10e3/uL    Absolute Basophils 0.0 0.0 - 0.2 10e3/uL    Absolute Immature Granulocytes 0.0 <=0.4 10e3/uL    Absolute NRBCs 0.0 10e3/uL   Extra Tube     Status: None    Narrative    The following orders were created for panel order Extra Tube.  Procedure                               Abnormality         Status                     ---------                               -----------         ------                     Extra Red Top Tube[[id number]]                               Final result               Extra Green Top (Lithium...[[id number]]                      Final result                 Please view results for these tests on the individual orders.   Extra Red Top Tube     Status: None   Result Value Ref Range    Hold Specimen JI    Extra Green Top (Lithium Heparin) Tube     Status: None   Result Value Ref Range    Hold Specimen JI    Urine Drugs of Abuse Screen     Status: Abnormal    Narrative    The following orders were created for panel order Urine Drugs of Abuse Screen.  Procedure                               Abnormality         Status                     ---------                               -----------         ------                     Urine Drugs of Abuse Scr...[[id number]]  Abnormal            Final result                 Please view results for these tests on the individual orders.   CBC with platelets differential     Status: None    Narrative    The following orders were created for panel order CBC with platelets differential.  Procedure                               Abnormality         Status                     ---------                               -----------         ------                     CBC with platelets and d...[[id number]]                      Final result                  Please view results for these tests on the individual orders.   WBC and differential     Status: None    Narrative    The following orders were created for panel order WBC and differential.  Procedure                               Abnormality         Status                     ---------                               -----------         ------                     WBC and Differential[[id number]]                             Final result                 Please view results for these tests on the individual orders.

## 2023-03-27 NOTE — PLAN OF CARE
Scheduled ride with [employer] for today 3/27 @ 1:00 PM.  called and stated she would like the pt to return to [facility] today before his court hearing at 3 PM.    Pt is discharging at the recommendation of the treatment team. Pt is discharging to [facility] transported by [facility]. Pt denies having any thoughts of hurting themself or anyone else. Pt denies anxiety or depression. Pt has follow up with [facility]. Discharge instructions, including; demographic sheet, psychiatric evaluation, discharge summary, and AVS were faxed to these next level of care providers.

## 2023-03-27 NOTE — PLAN OF CARE
Discharge Note    Patient Discharged to home on 3/27/2023 1:37 PM via No transportation concerns accompanied by unit assistant walks to exit.     Patient informed of discharge instructions in AVS. patient verbalizes understanding and denies having any questions pertaining to AVS. Patient stable at time of discharge. Patient denies SI, HI, and thoughts of self harm at time of discharge. All personal belongings returned to patient. Discharge prescriptions sent to [email or web address] electronic communication.   3/27/2023  1:37 PM    Problem: Adult Behavioral Health Plan of Care  Goal: Plan of Care Review  Outcome: Met  Goal: Patient-Specific Goal (Individualization)  Description: Patient will be compliant with treatment team recommendations and medication administrations during hospitalization.  Patient will remain independent with ADLs daily.  Patient will sleep 6-8 hours per night.  Patient will eat at least 50% of meals daily.  3/26/2023-patient placed in MHICU due to unpredictable behaviors and history of aggression. Patient may ween per behaviors.  Outcome: Met  Goal: Individualized Daily Interaction Plan (IDIP)  Outcome: Met  Goal: Adheres to Safety Considerations for Self and Others  Outcome: Met  Goal: Absence of New-Onset Illness or Injury  Outcome: Met  Goal: Optimized Coping Skills in Response to Life Stressors  Outcome: Met  Goal: Develops/Participates in Therapeutic [place] to Support Successful Transition  Outcome: Met   Goal Outcome Evaluation:

## 2023-03-27 NOTE — PLAN OF CARE
"Face to face end of shift report communicated to oncoming shift.     Sobeida Benton RN  3/27/2023  1:36 PM      Problem: Adult Behavioral Health Plan of Care  Goal: Patient-Specific Goal (Individualization)  Description: Patient will be compliant with treatment team recommendations and medication administrations during hospitalization.  Patient will remain independent with ADLs daily.  Patient will sleep 6-8 hours per night.  Patient will eat at least 50% of meals daily.  3/26/2023-patient placed in MHICU due to unpredictable behaviors and history of aggression. Patient may ween per behaviors.  Outcome: Progressing  Note: Patient requesting to wean to open unit on this writer's arrival to unit.    Patient eats breakfast, eats 100%.  Patient's behaviors remain appropriate with staff and peers.  Patient takes medications as prescribed.    Patient attends groups.  Patient requesting phone numbers, making phone calls.    Patient lets needs be known.     Patient denies SI/HI, AH/VH and pain.     \"I think I will be leaving today, I am ready\"  Spends time out in lounge with peers anticipating discharge.      Goal Outcome Evaluation:                        "

## 2023-03-29 ENCOUNTER — TELEPHONE (OUTPATIENT)
Dept: BEHAVIORAL HEALTH | Facility: HOSPITAL | Age: 33
End: 2023-03-29

## 2023-03-29 NOTE — TELEPHONE ENCOUNTER
He was discharged to home on  3/27/23 from [facility]. I called today regarding the patient's discharge.    Number listed as preferred number in chart has not been his phone number for 2 years per the female that answered the phone. This nurse was able to remove that number from his chart.  This nurse attempted to reach patient through [email or web address] and no answer was received, unable to leave a message.

## 2023-03-31 LAB
D-METHORPHAN UR-MCNC: 3795.2 NG/ML
DXO UR-MCNC: NORMAL NG/ML

## 2023-07-06 DIAGNOSIS — Z79.899 HIGH RISK MEDICATION USE: Primary | ICD-10-CM

## 2023-08-31 NOTE — PLAN OF CARE
Face to face report received from Bri LARKIN. Pt. Observed.     Problem: Behavioral Health Plan of Care  Goal: Patient-Specific Goal (Individualization)  Description: Patient will be free from self harm or injury  Patient will eat at least 50% of meals  Patient will attend at least 50% of groups when able.       Outcome: No Change   Pt has been in bed with eyes closed and regular respirations x 7 hours this noc shift. 15 minute and PRN checks all night. No complaints offered. Will continue to monitor.      Face to face end of shift report to be communicated to oncoming RN.     Liat Lai RN  1/4/2022      Hospitalist History and Physical      Chief Complaint:    Chief Complaint   Patient presents with   • Chest Pain       History Of Present Illness  52 year old male with PMH of DM2, CAD, HLD who presented to the ED with complaints of chest pain. Symptoms started about a month prior to admission and has been progressively getting worse. The pain is located in left upper chest, radiating to his shoulder, achy in quality. The pain occurs mostly at rest when he is driving. It last for 5 minutes and resolves on it;s own. Sometimes the pain has responded to nitro as well. Today the pain lasted for an hour so he decided to come to the ED for evaluation. He denies any associated sob, nausea or diaphoresis. This pain is different than his previous pains    In the ED, initial vitals showed T 98.6, P 82, RR 16 and /65  Labs showed Na 134, glucose 284  CXR: no acute cardiopulm process  EKG showed NSR  Patient was admitted to the hospital for further management. Cardiology was placed on consult.      Past Medical History  Past Medical History:   Diagnosis Date   • Abdominal wall pain in left upper quadrant    • Abnormal US (ultrasound) of abdomen    • Anal fissure    • Atherosclerotic cardiovascular disease    • CAD (coronary artery disease)    • Chest pain    • Constipation    • Costochondritis    • Diabetes (CMD)    • Diabetes mellitus (CMD)    • Elevated PSA    • Elimination disorder with fecal symptoms    • Enlarged prostate with lower urinary tract symptoms (LUTS)    • External hemorrhoids    • Headache    • Hematochezia    • Herpes, genital    • Hyperlipidemia    • IBS (irritable bowel syndrome)    • Internal hemorrhoids    • Myocardial infarct (CMD)    • Pericarditis    • Rectal bleed    • Thrombocytopenia (CMD)    • Urinary frequency    • Weight loss          Surgical History  Past Surgical History:   Procedure Laterality Date   • Colonoscopy  07/01/2022    at [facility] by dr. estrada   • Stent implant      Heart x 5           Social History  Social History     Tobacco Use   • Smoking status: Never   • Smokeless tobacco: Never   Vaping Use   • Vaping Use: never used   Substance Use Topics   • Alcohol use: No   • Drug use: Never         Family History  Family History   Problem Relation Age of Onset   • Diabetes Mother    • Dementia/Alzheimers Mother    • Diabetes Father    • Diabetes Brother           Allergies  ALLERGIES:  Patient has no known allergies.      Home Medications  Current Facility-Administered Medications   Medication   • nitroGLYCERIN (NITROSTAT) sublingual tablet 0.4 mg   • ASPIRIN 81 MG PO CHEW Pyxis Override   • clopidogrel (PLAVIX) tablet 75 mg   • metoPROLOL succinate (TOPROL-XL) ER tablet 50 mg   • rosuvastatin (CRESTOR) tablet 20 mg   • nitroGLYCERIN (NITROSTAT) sublingual tablet 0.4 mg     Current Outpatient Medications   Medication Sig   • Semaglutide 3 MG Tab Take 1 tablet by mouth daily (before breakfast).   • nitroGLYCERIN (NITROSTAT) 0.4 MG sublingual tablet Place 1 tablet under the tongue every 5 minutes as needed for Chest pain.   • metoPROLOL succinate (TOPROL-XL) 50 MG 24 hr tablet Take 1 tablet by mouth daily.   • lidocaine (LIDOCARE) 4 % patch Place 1 patch onto the skin every 24 hours.   • Alcohol Swabs Pads Use to clean area before insulin injection   • blood glucose test strip Test blood sugar 2-3 times daily as directed. Please dispense test strips covered by patient's insurance   • Blood Glucose Monitoring Suppl w/Device Kit Use to check blood sugar 2-3 times daily. Please dispense glucometer covered under patient's insurance.   • Lancets Thin Misc Use to test blood sugar 2-3 times per day. Please dispense lancets covered by patient's insurance   • glyBURIDE-metformin (GLUCOVANCE) 5-500 MG per tablet Take 2 tablets by mouth in the morning and 2 tablets in the evening. Take with meals.   • rosuvastatin (CRESTOR) 20 MG tablet Take 1 tablet by mouth daily.   • clobetasol (TEMOVATE) 0.05 % cream  APPLY TO AFFECTED AREA TWICE A DAY   • lactulose (CHRONULAC) 10 GM/15ML solution Take 30 mLs by mouth in the morning and 30 mLs at noon and 30 mLs in the evening.   • clopidogrel (PLAVIX) 75 MG tablet Take 1 tablet by mouth daily.   • pantoprazole (PROTONIX) 40 MG tablet Take 1 tablet by mouth daily.   • Gemtesa 75 MG Tab Take 75 mg by mouth daily.   • linaclotide (Linzess) 290 MCG Take 1 capsule by mouth daily.   • acetaminophen (TYLENOL) 325 MG tablet Take 650 mg by mouth every 4 hours as needed.         Inpatient Medications  Current Facility-Administered Medications   Medication   • nitroGLYCERIN (NITROSTAT) sublingual tablet 0.4 mg   • ASPIRIN 81 MG PO CHEW Pyxis Override   • clopidogrel (PLAVIX) tablet 75 mg   • metoPROLOL succinate (TOPROL-XL) ER tablet 50 mg   • rosuvastatin (CRESTOR) tablet 20 mg   • nitroGLYCERIN (NITROSTAT) sublingual tablet 0.4 mg     Current Outpatient Medications   Medication Sig   • Semaglutide 3 MG Tab Take 1 tablet by mouth daily (before breakfast).   • nitroGLYCERIN (NITROSTAT) 0.4 MG sublingual tablet Place 1 tablet under the tongue every 5 minutes as needed for Chest pain.   • metoPROLOL succinate (TOPROL-XL) 50 MG 24 hr tablet Take 1 tablet by mouth daily.   • lidocaine (LIDOCARE) 4 % patch Place 1 patch onto the skin every 24 hours.   • Alcohol Swabs Pads Use to clean area before insulin injection   • blood glucose test strip Test blood sugar 2-3 times daily as directed. Please dispense test strips covered by patient's insurance   • Blood Glucose Monitoring Suppl w/Device Kit Use to check blood sugar 2-3 times daily. Please dispense glucometer covered under patient's insurance.   • Lancets Thin Misc Use to test blood sugar 2-3 times per day. Please dispense lancets covered by patient's insurance   • glyBURIDE-metformin (GLUCOVANCE) 5-500 MG per tablet Take 2 tablets by mouth in the morning and 2 tablets in the evening. Take with meals.   • rosuvastatin (CRESTOR) 20 MG tablet  Take 1 tablet by mouth daily.   • clobetasol (TEMOVATE) 0.05 % cream APPLY TO AFFECTED AREA TWICE A DAY   • lactulose (CHRONULAC) 10 GM/15ML solution Take 30 mLs by mouth in the morning and 30 mLs at noon and 30 mLs in the evening.   • clopidogrel (PLAVIX) 75 MG tablet Take 1 tablet by mouth daily.   • pantoprazole (PROTONIX) 40 MG tablet Take 1 tablet by mouth daily.   • Gemtesa 75 MG Tab Take 75 mg by mouth daily.   • linaclotide (Linzess) 290 MCG Take 1 capsule by mouth daily.   • acetaminophen (TYLENOL) 325 MG tablet Take 650 mg by mouth every 4 hours as needed.          Lines/Tubes/Drain      In/Out  No intake or output data in the 24 hours ending 08/31/23 1703       Review of Systems    Review of Systems     Pertinent positives in bold    Constitutional: Fever, chills, fatigue, weakness, decreased  appetite  HEENT: hearing loss, vision changes, nasal congestion, sore throat, ear pain  CVS: chest pain, Shortness of breath on exertion, palpitations, leg swelling  Pulmonology/respiratory: cough, sputum, SOB, wheeze, hemoptysis  Gastrointestinal: Abdominal pain, nausea, vomiting, diarrhea, constipation  : dysuria, increased frequency, hematuria  Neuro: weakness, numbness, tingling, light headedness, dizziness, headache  Skin: rashes, itching, abrasions  MSK: back pain, neck pain, joint pain, arthralgias, myalgias  Endo: polyuria, polydipsia, heat intolerance, cold intolerance  Psych: anxiety, lakshmi, depression  Allergy: profile listed above    Labs   Recent Results (from the past 72 hour(s))   Comprehensive Metabolic Panel    Collection Time: 08/31/23 11:57 AM   Result Value Ref Range    Fasting Status      Sodium 134 (L) 135 - 145 mmol/L    Potassium 4.6 3.4 - 5.1 mmol/L    Chloride 100 97 - 110 mmol/L    Carbon Dioxide 27 21 - 32 mmol/L    Anion Gap 12 7 - 19 mmol/L    Glucose 284 (H) 70 - 99 mg/dL    BUN 8 6 - 20 mg/dL    Creatinine 1.03 0.67 - 1.17 mg/dL    Glomerular Filtration Rate 86 >=60    BUN/Cr 8 7 -  25    Calcium 8.6 8.4 - 10.2 mg/dL    Bilirubin, Total 0.5 0.2 - 1.0 mg/dL    GOT/AST 16 <=37 Units/L    GPT/ALT 24 <64 Units/L    Alkaline Phosphatase 47 45 - 117 Units/L    Albumin 3.7 3.6 - 5.1 g/dL    Protein, Total 7.7 6.4 - 8.2 g/dL    Globulin 4.0 2.0 - 4.0 g/dL    A/G Ratio 0.9 (L) 1.0 - 2.4   TROPONIN I, HIGH SENSITIVITY    Collection Time: 08/31/23 11:57 AM   Result Value Ref Range    Troponin I, High Sensitivity 10 <77 ng/L   CBC with Automated Differential (performable only)    Collection Time: 08/31/23 11:57 AM   Result Value Ref Range    WBC 5.0 4.2 - 11.0 K/mcL    RBC 4.38 (L) 4.50 - 5.90 mil/mcL    HGB 13.8 13.0 - 17.0 g/dL    HCT 40.8 39.0 - 51.0 %    MCV 93.2 78.0 - 100.0 fl    MCH 31.5 26.0 - 34.0 pg    MCHC 33.8 32.0 - 36.5 g/dL    RDW-CV 12.6 11.0 - 15.0 %    RDW-SD 43.3 39.0 - 50.0 fL     140 - 450 K/mcL    NRBC 0 <=0 /100 WBC    Neutrophil, Percent 29 %    Lymphocytes, Percent 62 %    Mono, Percent 8 %    Eosinophils, Percent 1 %    Basophils, Percent 0 %    Immature Granulocytes 0 %    Absolute Neutrophils 1.5 (L) 1.8 - 7.7 K/mcL    Absolute Lymphocytes 3.1 1.0 - 4.0 K/mcL    Absolute Monocytes 0.4 0.3 - 0.9 K/mcL    Absolute Eosinophils  0.1 0.0 - 0.5 K/mcL    Absolute Basophils 0.0 0.0 - 0.3 K/mcL    Absolute Immature Granulocytes 0.0 0.0 - 0.2 K/mcL   TROPONIN I, HIGH SENSITIVITY    Collection Time: 08/31/23  2:46 PM   Result Value Ref Range    Troponin I, High Sensitivity 9 <77 ng/L           Physical Exam  Vitals:    08/31/23 1159 08/31/23 1552   Temp: 98.6 °F (37 °C)    Pulse: 82 63   Resp: 16 14   SpO2: 99% 100%   BP: 124/65 114/75        Physical Exam   General appearance normal, awake, appropriate for age.  No acute distress  HEENT: NC, AT  Respiratory: clear to auscultation, no rales, rhonchi or wheezing  Cardiac S1 and S2 present.  No S3, no S4.  No murmur, no rubs  Abdomen soft, no distention, no tenderness.  Bowel sounds present  Extremity no pitting edema, no calf  tenderness, pulses palpable bilaterally  Back no CVA tenderness, no spinal tenderness  Skin intact, warm and dry  CNS alert, awake, oriented x3. Speech intact.  No focal neuro finding  Psych cooperative      Imaging  XR CHEST PA AND LATERAL 2 VIEWS   Final Result   No acute cardiopulmonary process.      Electronically Signed by: DOMINIQUE WALSH M.D.    Signed on: 8/31/2023 2:21 PM    Workstation ID: [id number]          Microbiology Results     None          Assessment and Plan  No new Assessment & Plan notes have been filed under this hospital service since the last note was generated.  Service: Hospitalist    Principal Problem:    Chest pain, unspecified type     Atypical chest pain    troponin negative   EKG showed NSR  Recent cardiac cath in Sep 2022 negative  Continue plavix, statin  Cards consulted  Plan for stress echo in AM    CAD s/p PCI   Metoprolol, plavix and statin     DM2: pta on glyburide- metformin  SSI  Last a1c 2 months ago 9.4  Monitor sugars  Will provide counseling on better sugar control    HLD: statin    HTN: metoprolol    Constipation: linzess    GERD: protonix      Misc:    Diet: cardiac  DVT prophylaxis: SQH  GI prophylaxis: protonix  Code: full  Fluids: none  Consult:cards  Disposition: pending clinical improvement     Primary Care Physician  Kym Adams DO notified      Elvia Orosco MD  [facility] Hospitalist  8/31/2023 5:03 PM

## 2023-10-25 NOTE — PLAN OF CARE
Face to face shift report received from Svetlana GARCIA RN. Rounding completed, pt observed.    Problem: Behavioral Health Plan of Care  Goal: Patient-Specific Goal (Individualization)  Description: Patient will be free from self harm or injury  Patient will eat at least 50% of meals  Patient will attend at least 50% of groups when able.       Outcome: Improving  Note: Shift Summery:  Patient was awake in his room at the start of this shift.  Patient did eat some breakfast.  Showered this morning and compliant with medications.  Denies pain or unwanted side effects.  Patient did attend some of community group this shift otherwise quite isolative.  Patient can make his needs known to staff. No self talk noted.    Problem: Behavior Regulation Impairment (Psychotic Signs/Symptoms)  Goal: Improved Behavioral Control (Psychotic Signs/Symptoms)  Description: Patient will be able to have a reality based conversation.     Outcome: Improving   Face to face report will be communicated to oncoming RN.    Alma Delia Garsia RN  1/6/2022         English

## 2023-11-22 ENCOUNTER — HOSPITAL ENCOUNTER (EMERGENCY)
Facility: HOSPITAL | Age: 33
Discharge: HOME | End: 2023-11-22
Attending: NURSE PRACTITIONER | Admitting: NURSE PRACTITIONER
Payer: COMMERCIAL

## 2023-11-22 VITALS
SYSTOLIC BLOOD PRESSURE: 132 MMHG | RESPIRATION RATE: 16 BRPM | HEART RATE: 75 BPM | OXYGEN SATURATION: 95 % | DIASTOLIC BLOOD PRESSURE: 80 MMHG | TEMPERATURE: 98.5 F

## 2023-11-22 DIAGNOSIS — Z28.21 REFUSES TETANUS, DIPHTHERIA, AND ACELLULAR PERTUSSIS (TDAP) VACCINATION: ICD-10-CM

## 2023-11-22 DIAGNOSIS — S00.211A ABRASION OF RIGHT EYEBROW, INITIAL ENCOUNTER: ICD-10-CM

## 2023-11-22 DIAGNOSIS — S01.01XA LACERATION OF SCALP WITHOUT FOREIGN BODY, INITIAL ENCOUNTER: ICD-10-CM

## 2023-11-22 PROCEDURE — 99283 EMERGENCY DEPT VISIT LOW MDM: CPT

## 2023-11-22 PROCEDURE — 99282 EMERGENCY DEPT VISIT SF MDM: CPT | Performed by: NURSE PRACTITIONER

## 2023-11-22 ASSESSMENT — ENCOUNTER SYMPTOMS
ENDOCRINE NEGATIVE: 1
CONSTITUTIONAL NEGATIVE: 1
WOUND: 1
HEMATOLOGIC/LYMPHATIC NEGATIVE: 1
RESPIRATORY NEGATIVE: 1
CARDIOVASCULAR NEGATIVE: 1
MUSCULOSKELETAL NEGATIVE: 1
PSYCHIATRIC NEGATIVE: 1
DIZZINESS: 1
GASTROINTESTINAL NEGATIVE: 1
WEAKNESS: 0
LIGHT-HEADEDNESS: 0
SEIZURES: 0
ALLERGIC/IMMUNOLOGIC NEGATIVE: 1
HEADACHES: 1
EYES NEGATIVE: 1
NUMBNESS: 0

## 2023-11-22 NOTE — ED NOTES
Patient given written and verbal discharge instructions, verbalized understanding. All questions answered no concern. Patient declined updated tdap. Declined last set of vitals. Patient left ambulatory back to Tab.

## 2023-11-22 NOTE — ED NOTES
Called [facility] and updated nurse on patient. Verbalized understanding, no further concerns. OK to take patient back.

## 2023-11-22 NOTE — ED NOTES
Patient presents via [facility] after injuring head when a bar fell on him while working out. Patient initially refused any cares but later agreed to be seen by ER provider. Patient notes headache at site of injury. Small abrasion noted to eyebrow and 1.5 inch laceration on top of head. Patient declines nausea, visual changes, dizziness. Declined staples or care to wound, was willing to have it cleaned. Bleeding controlled at time of arrival to ED.

## 2023-11-22 NOTE — ED TRIAGE NOTES
[facility] he raised up and hit the bar with his head on the bar then it fell onto him. Pt has a small laceration to the center of the top of his head. Pt denies any LOC, denies nausea. Pt is not on blood thinners and the bleeding is controled.      Triage Assessment (Adult)       Row Name 11/22/23 0656          Triage Assessment    Airway WDL WDL        Peripheral/Neurovascular WDL    Peripheral Neurovascular WDL WDL        Cognitive/Neuro/Behavioral WDL    Cognitive/Neuro/Behavioral WDL WDL

## 2023-11-22 NOTE — ED PROVIDER NOTES
History     Chief Complaint   Patient presents with    Head Injury     HPI  Steven Carter is a 33 year old individual with history of's schizoaffective disorder, bipolar 1 disorder, ADD, seizures, anxiety disorder, depression, history of polysubstance abuse, is sent in for evaluation after head injury.  Patient states wait to bandage fell on top of his head and then hit right eyebrow.  Did have bleeding and group home apparently wants patient checked out.  No loss of consciousness reported.  No visual disturbances, nausea, paresthesias, or weaknesses.  No neck or back pain.  Patient states he does have slight headache but no other issues.  No anticoagulation use reported.    Allergies:  Allergies   Allergen Reactions    Pork Allergy        Problem List:    Patient Active Problem List    Diagnosis Date Noted    Aggressive behavior 02/22/2023     Priority: Medium    Attention deficit disorder, unspecified hyperactivity presence 07/18/2022     Priority: Medium    Bipolar 1 disorder (H) 12/14/2021     Priority: Medium    Suicidal behavior with attempted self-injury (H) 11/01/2021     Priority: Medium    Suicidal ideation 10/21/2021     Priority: Medium    Catatonia 03/07/2021     Priority: Medium    Schizoaffective disorder, bipolar type (H) 03/07/2021     Priority: Medium    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, subsequent encounter 09/01/2020     Priority: Medium     Added automatically from request for surgery [id number]      Psychosis (H) 06/23/2020     Priority: Medium    Seizure (H) 04/19/2016     Priority: Medium    ADD (attention deficit disorder) 12/02/2015     Priority: Medium    Methamphetamine abuse (H) 12/02/2015     Priority: Medium    Suicidal behavior 07/23/2014     Priority: Medium    Moderate dextromethorphan use disorder (H) 07/20/2014     Priority: Medium    Paranoia (H) 07/20/2014     Priority: Medium    Depression with anxiety 03/02/2009      Priority: Medium     Overview:   IMO Update 10/11      Alcohol abuse 02/02/2009     Priority: Medium     Overview:   IMO Update 10/11      Polysubstance abuse (H) 02/02/2009     Priority: Medium    Tobacco use disorder 11/01/2008     Priority: Medium    Anxiety disorder 11/01/2008     Priority: Medium     Formatting of this note might be different from the original.  Diagnosed Rosalba Padilla          Past Medical History:    No past medical history on file.    Past Surgical History:    Past Surgical History:   Procedure Laterality Date    COLONOSCOPY - HIM SCAN N/A 12/03/2020    Procedure:  Colonoscopy diagnostic with random biopsies;  Surgeon:  Jenise GOLDMAN MD;  Location[facility]  [facility] OR       Family History:    No family history on file.    Social History:  Marital Status:  Single [1]  Social History     Tobacco Use    Smoking status: Every Day     Packs/day: 0.50     Years: 9.00     Additional pack years: 0.00     Total pack years: 4.50     Types: Cigarettes    Smokeless tobacco: Current     Types: Chew   Vaping Use    Vaping Use: Never used   Substance Use Topics    Alcohol use: No     Comment: daily to occasionally per pt.    Drug use: Yes     Types: Other     Comment: reports hx of use but none recently        Medications:    buPROPion (WELLBUTRIN XL) 150 MG 24 hr tablet  buPROPion (WELLBUTRIN XL) 300 MG 24 hr tablet  cloZAPine (CLOZARIL) 100 MG tablet  docusate sodium (COLACE) 100 MG capsule  hydrOXYzine (ATARAX) 50 MG tablet  lithium (ESKALITH CR/LITHOBID) 450 MG CR tablet  LORazepam (ATIVAN) 1 MG tablet  memantine (NAMENDA) 10 MG tablet  Pregabalin (LYRICA) 200 MG capsule          Review of Systems   Constitutional: Negative.    HENT: Negative.     Eyes: Negative.    Respiratory: Negative.     Cardiovascular: Negative.    Gastrointestinal: Negative.    Endocrine: Negative.    Genitourinary: Negative.    Musculoskeletal: Negative.    Skin:  Positive for wound (Scalp and right eyebrow.).   Allergic/Immunologic:  Negative.    Neurological:  Positive for dizziness and headaches. Negative for seizures, syncope, weakness, light-headedness and numbness.   Hematological: Negative.    Psychiatric/Behavioral: Negative.         Physical Exam   BP: 132/80  Pulse: 75  Temp: 98.5  F (36.9  C)  Resp: 16  SpO2: 95 %      GENERAL APPEARANCE:  The patient is a 33 year old well-developed, well-nourished individual in no acute distress that appears as stated age.  HEENT:  Normocephalic.  Pupils are equal, round, and reactive to light.  Oropharynx is clear.  No avulsed teeth, buccal or tongue lacerations present.  Voice is clear and without muffling.   No otorrhea or rhinorrhea present.  Negative white sign.  Negative for hemotympanum bilaterally.  MUSCULOSKELETAL:  Normal gait and station.  No cervical/thoracic/lumbar spinal tenderness, step-offs, deformities noted to palpation.  No paraspinal tenderness noted to palpation.  Pelvis stable upon palpation.  No crepitus, deformities, tenderness noted to palpation to the upper or lower extremities to palpation.  Range of motion intact to all joints.  Strength equal bilaterally.  MENTAL STATUS:   The patient was alert and oriented to person, place, time and purpose. Registration and recall intact. No difficulty with concentration.  Spontaneous eye opening.  Follows commands.  GCS 15.   CRANIAL NERVES:  PERRL. EOMI; no nystagmus.  Full visual fields.  Trapezius and sternocleidomastoid are full strength. Tongue was midline and protrudes midline. Uvula was midline and raises midline. Facial sensation was intact to pain and light touch at all distributions. No speech disturbance. Hearing intact to conversation and whisper.  No facial asymmetry.  SENSORY:  Sensation intact.  PSYCHIATRIC:  The patient is awake, alert, and oriented x4.  Recent and remote memory is intact.  Appropriate mood and affect.  Calm and cooperative with history and physical exam.  SKIN:  Warm, dry, and well perfused.  Good  turgor.  2 mm abrasion to right eyebrow with no active bleeding or foreign body.  1.5 cm straight laceration to top of scalp.  No active bleeding or foreign body.  TDAP STATUS: Last Tdap given 02/01/2011.      Comment: Discrepancies between my note and notes on behalf of the nursing team or other care providers are secondary to my findings reflecting my physical examination and questioning of the patient.  Any conflicting information provided is not in line with my examination of the patient.       ED Course              ED Course as of 11/22/23 1443   Wed Nov 22, 2023   1408 In to see patient and history/physical completed.    1408 Patient refuses stitches for further assessment.  Neurological examination normal.  1.5 cm straight laceration to scalp and abrasion to the right eyebrow.  No active bleeding or foreign body.  For this reason we will discharge patient home to do wound care as described in AVS.  Return if worsening.  Patient in agreement.               No results found for this or any previous visit (from the past 24 hour(s)).    Medications - No data to display    Assessments & Plan (with Medical Decision Making)     I have reviewed the nursing notes.    I have reviewed the findings, diagnosis, plan and need for follow up with the patient.      Summary:  Patient presents to the ER today for laceration to his scalp and abrasion to right eyebrow..  Potential diagnosis which have been considered and evaluated include intracerebral bleed, simple laceration, foreign body, as well as others. Many of these have been excluded using the various modalities and assessment as noted on the chart. At the present time, the diagnosis given seems to be the most likely abrasion and laceration to scalp and eyebrow without foreign body.  Upon arrival, vitals signs are normal.  The patient is alert and oriented no distress.  Neurological examination normal.  No soft spots or indentations noted to palpation of the scalp.  No  cervical, thoracic, lumbar abnormalities noted upon palpation.  Patient is ambulatory without any issues.  Does have 1.5 cm straight laceration to scalp with no active bleeding or foreign body.  Also has abrasion to right eyebrow.  These were cleaned with saline.  Patient defers any suturing or closure as it has stopped bleeding.  Patient is also overdue for tetanus but refuses this.  Patient defers any other examination.  At this time patient in no distress.  Neurological examination.  Imaging not warranted.  This reason we will discharge patient home to do standard wound care with bacitracin and washing with soap and water.  Follow-up with PCP for reevaluation.  Return if worsening of symptoms.  Patient verbalized understanding agrees to plan of care.  Patient discharged home.      Critical Care Time: None    Impression and plan discussed with patient. Questions answered, concerns addressed, indications for urgent re-evaluation reviewed, and  given. Patient/Parent/Caregiver agree with treatment plan and have no further questions at this time.  AVS provided at discharge.    This note was created by the Dragon Voice Dictation System. Inadvertent typographical errors, due to software recognition problems, may still exist.             New Prescriptions    No medications on file       Final diagnoses:   Laceration of scalp without foreign body, initial encounter   Abrasion of right eyebrow, initial encounter   Refuses tetanus, diphtheria, and acellular pertussis (Tdap) vaccination       11/22/2023   [facility]       Roland Lopes APRN CNP  11/22/23 6832

## 2023-11-22 NOTE — DISCHARGE INSTRUCTIONS
Wound care:   Wash your wound 2 times each day with soap and water.  After this, apply antibiotic ointment and a dressing (such as a Band-Aid) for 2-3 days.  Otherwise, keep your wound clean and dry.  This means no swimming or extended submersion in water until the wound is completely healed.  Remember, all wounds will leave some sort of scar.    Signs of infection:   Watch for signs of infection which include severe pain at site, increasing redness with pain, pus colored drainage, or if you develop a fever.  If this occurs, come back in for re-evaluation and most likely antibiotic therapy.        Follow-up with your primary care provider for reevaluation.  Contact your primary care provider if you have any questions or concerns.  Do not hesitate to return to the ER if any new or worsening symptoms.     Please read the attached instructions (if any).  They highlight more specific treatments and interventions for you at home.              Thank you for letting me participate in your care and wish you a fast and uneventful recovery,    Roland TAVAREZ, CNP    Do not hesitate to contact me with questions or concerns.  [email or web address]  [email or web address]

## 2024-01-14 NOTE — PLAN OF CARE
Face to face end of shift report will be communicated to oncoming RN.     Problem: Behavioral Health Plan of Care  Goal: Patient-Specific Goal (Individualization)  Description: Patient will report at least 6-8 hours of sleep each night.   Patient will complete all ADL's independently while on the unit.   Patient will be compliant with treatment team recommendations.   Outcome: Ongoing, Progressing     Problem: Thought Process Alteration  Goal: Optimal Thought Clarity  Description: Patient will hold reality based conversations while on the unit.     Outcome: Ongoing, Progressing     Problem: Suicide Risk  Goal: Absence of Self-Harm  Outcome: Ongoing, Progressing     Problem: Suicidal Behavior  Goal: Suicidal Behavior is Absent or Managed  Outcome: Ongoing, Progressing      Face to face end of shift report obtained from [facility] [place]. Pt observed resting in bed.  At 0045 safety check, pt was standing by door and asked nurse for apple juice, pt noted to be shaky and unsteady in feet. Staff assisted pt to bed. Pt states this is the second time tonight he has been feeling light headed and believes is the recent Clozaril increase. Apple juice provided as requested. BS obtained at 101, as pt had a previous lab glucose result of 69. Pt states he feels better after juices and wants to go back to bed. Politely declined VS at this time. Will obtain ortho BP this AM and/or if pt has another episode.   0200-Pt appears to be sleeping in bed with eyes closed, having regular respirations, and position changes. 15 minutes and PRN safety checks completed with no noted complains. No self harm, delusional comments, or bizarre behaviors noted or reported so far this shift.  0620- Ortho BP noted with significant change. Pt unsteady and shaky. Provider on call Christelle Contreras NP notified. No new orders received but ok to hold scheduled AM Ativan till VS are rechecked after breakfast. Pt appeared to had slept 6.5 hours last night.        For your respiratory and throat symptoms:    Use a humidifier or vaporizer in bedroom at night time  Purchase an inexpensive humidity gauge for the bedroom.      -  Greater than 50% humidity will help.      -  Less than 50% humidity can worsen symptoms  Drink plenty of fluids  Guaifenesin (Mucinex) 1200 mg twice a day to promote sinus drainage.  Hold off on using any decongestants during this phase of the illness (Sudafed, pseudoephedrine, PSE, phenylephrine, etc) These will tend to dry you out and can actually make your symptoms worse. At this point, you will want to thin the mucus and not thicken it. While you may get some temporary relief with decongestants, when the medication wears off, the symptoms seem to be worse than before.  Take the antibiotics as prescribed.  Ok to use an over the counter supplement with Zinc, such as Zicam or a multivitamin, as this can help heal the mucous membranes.  Chloraseptic throat spray (or similar) for temporary relief of throat pain.    Call or return to the clinic or your regular doctor if symptoms worsen, or new symptoms develop.

## 2024-04-20 ENCOUNTER — HOSPITAL ENCOUNTER (EMERGENCY)
Facility: HOSPITAL | Age: 34
Discharge: HOME | End: 2024-04-20
Attending: NURSE PRACTITIONER | Admitting: NURSE PRACTITIONER
Payer: COMMERCIAL

## 2024-04-20 VITALS
SYSTOLIC BLOOD PRESSURE: 129 MMHG | RESPIRATION RATE: 19 BRPM | TEMPERATURE: 97.7 F | DIASTOLIC BLOOD PRESSURE: 82 MMHG | HEART RATE: 54 BPM | OXYGEN SATURATION: 97 %

## 2024-04-20 DIAGNOSIS — K04.7 DENTAL INFECTION: ICD-10-CM

## 2024-04-20 PROCEDURE — 250N000011 HC RX IP 250 OP 636: Performed by: NURSE PRACTITIONER

## 2024-04-20 PROCEDURE — 99213 OFFICE O/P EST LOW 20 MIN: CPT | Performed by: NURSE PRACTITIONER

## 2024-04-20 PROCEDURE — G0463 HOSPITAL OUTPT CLINIC VISIT: HCPCS | Mod: 25

## 2024-04-20 PROCEDURE — 96372 THER/PROPH/DIAG INJ SC/IM: CPT | Performed by: NURSE PRACTITIONER

## 2024-04-20 PROCEDURE — 250N000013 HC RX MED GY IP 250 OP 250 PS 637: Performed by: NURSE PRACTITIONER

## 2024-04-20 RX ORDER — KETOROLAC TROMETHAMINE 30 MG/ML
30 INJECTION, SOLUTION INTRAMUSCULAR; INTRAVENOUS ONCE
Status: COMPLETED | OUTPATIENT
Start: 2024-04-20 | End: 2024-04-20

## 2024-04-20 RX ORDER — IBUPROFEN 600 MG/1
600 TABLET, FILM COATED ORAL EVERY 6 HOURS PRN
Qty: 30 TABLET | Refills: 0 | Status: SHIPPED | OUTPATIENT
Start: 2024-04-20

## 2024-04-20 RX ORDER — CHLORHEXIDINE GLUCONATE ORAL RINSE 1.2 MG/ML
15 SOLUTION DENTAL 2 TIMES DAILY
Qty: 300 ML | Refills: 0 | Status: SHIPPED | OUTPATIENT
Start: 2024-04-20 | End: 2024-04-20

## 2024-04-20 RX ORDER — CHLORHEXIDINE GLUCONATE ORAL RINSE 1.2 MG/ML
15 SOLUTION DENTAL 2 TIMES DAILY
Qty: 300 ML | Refills: 0 | Status: SHIPPED | OUTPATIENT
Start: 2024-04-20 | End: 2024-04-30

## 2024-04-20 RX ORDER — GABAPENTIN 800 MG/1
TABLET ORAL
COMMUNITY
Start: 2024-03-11

## 2024-04-20 RX ORDER — IBUPROFEN 600 MG/1
600 TABLET, FILM COATED ORAL EVERY 6 HOURS PRN
Qty: 30 TABLET | Refills: 0 | Status: SHIPPED | OUTPATIENT
Start: 2024-04-20 | End: 2024-04-20

## 2024-04-20 RX ADMIN — AMOXICILLIN AND CLAVULANATE POTASSIUM 1 TABLET: 875; 125 TABLET, FILM COATED ORAL at 11:27

## 2024-04-20 RX ADMIN — KETOROLAC TROMETHAMINE 30 MG: 30 INJECTION, SOLUTION INTRAMUSCULAR at 11:28

## 2024-04-20 ASSESSMENT — ENCOUNTER SYMPTOMS
HEADACHES: 0
NECK PAIN: 0
SINUS PRESSURE: 0
SINUS PAIN: 0
ACTIVITY CHANGE: 1
RHINORRHEA: 1
EYES NEGATIVE: 1
CHILLS: 0
NECK STIFFNESS: 0
SORE THROAT: 0
NAUSEA: 0
FEVER: 0
SHORTNESS OF BREATH: 0
VOMITING: 0

## 2024-04-20 ASSESSMENT — COLUMBIA-SUICIDE SEVERITY RATING SCALE - C-SSRS
6. HAVE YOU EVER DONE ANYTHING, STARTED TO DO ANYTHING, OR PREPARED TO DO ANYTHING TO END YOUR LIFE?: NO
2. HAVE YOU ACTUALLY HAD ANY THOUGHTS OF KILLING YOURSELF IN THE PAST MONTH?: NO
1. IN THE PAST MONTH, HAVE YOU WISHED YOU WERE DEAD OR WISHED YOU COULD GO TO SLEEP AND NOT WAKE UP?: NO

## 2024-04-20 ASSESSMENT — ACTIVITIES OF DAILY LIVING (ADL): ADLS_ACUITY_SCORE: 37

## 2024-04-20 NOTE — DISCHARGE INSTRUCTIONS
"Augmentin as prescribed for infection.  -Increase fluids.   -Complete all antibiotics even if feeling better.    -Taking antibiotics with food may decrease the symptoms, of an upset stomach, that can occur when taking antibiotics. Antibiotics frequently cause diarrhea. Probiotics or yogurt may help prevent or decrease these symptoms.   -Return to be reevaluated if symptoms do not improve, or worsen.    Peridex swish and spit twice daily. Do not use longer than ten days as it may stain your teeth purple.    Apply a cold pack or ice compress for up to 20 minutes several times a day. This is to help reduce pain and relieve swelling. Cover it with a thin, dry cloth before putting it on your skin.    Rinse your mouth with warm saltwater several times per day. This will help reduce irritation, gum swelling, and pain.  Good oral hygiene is imperitive. Brush your at least twice a day. Use a fluoride toothpaste and soft-bristle toothbrush.  Eat a healthy diet that doesn t include many sugary foods and drinks.  Call ASAP to schedule an appointment to see a dentist.   Our  department can try to assist you if you are having difficulty finding a dentist, dental coverage, or paying for dental care.  You can reach them by calling [phone] and asking for .  Return to ED/UC if symptoms increase or concerns develop such as those discussed and listed on the \"When to go the Emergency Room\" portion of your discharge instructions.       After 5 pm may start ibuprofen: Ibuprofen 600 to 800 mg (3 - 4 tabs of over the counter med) every six to eight hours as needed;not to exceed maximum amount of 3200 mg in 24 hours. Take with food. Tylenol 650 to 1000 mg every four to six hours as needed (not to exceed more than 4000 mg in a 24 hour period). May use interchangeably. Suggest medicating around the clock for the next 24-48 hours.  "

## 2024-04-20 NOTE — ED NOTES
Pt left right after getting a shot of toradol.  Unknown if he has had it before, no record that I could see.

## 2024-04-20 NOTE — ED TRIAGE NOTES
Pt presents with c/o dental pain    Ongoing for 4 weeks.  Bottom front   No swelling currently    Tyl with in the hour today

## 2024-04-20 NOTE — ED PROVIDER NOTES
History     Chief Complaint   Patient presents with    Dental Pain     HPI  Steven Carter is a 33 year old male who presents with left lower dental pain that started 4 weeks ago after eating skittles.  This is accompanied with runny nose.  Dental pain worse today.  Has been utilizing Magic mouthwash and took Tylenol today with little to no relief of his discomfort.  Last dentist appointment unknown.  Non-smoker.  Lives at [place].  States that NP at [place] did see him previously, but denied antibiotics as he did not have any swelling.  Denies fevers, chills, nausea, vomiting, neck pain or stiffness, and headaches.    Allergies:  Allergies   Allergen Reactions    Pork Allergy        Problem List:    Patient Active Problem List    Diagnosis Date Noted    Aggressive behavior 02/22/2023     Priority: Medium    Attention deficit disorder, unspecified hyperactivity presence 07/18/2022     Priority: Medium    Bipolar 1 disorder (H) 12/14/2021     Priority: Medium    Suicidal behavior with attempted self-injury (H) 11/01/2021     Priority: Medium    Suicidal ideation 10/21/2021     Priority: Medium    Catatonia 03/07/2021     Priority: Medium    Schizoaffective disorder, bipolar type (H) 03/07/2021     Priority: Medium    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, subsequent encounter 09/01/2020     Priority: Medium     Added automatically from request for surgery [id number]      Psychosis (H) 06/23/2020     Priority: Medium    Seizure (H) 04/19/2016     Priority: Medium    ADD (attention deficit disorder) 12/02/2015     Priority: Medium    Methamphetamine abuse (H) 12/02/2015     Priority: Medium    Suicidal behavior 07/23/2014     Priority: Medium    Moderate dextromethorphan use disorder (H) 07/20/2014     Priority: Medium    Paranoia (H) 07/20/2014     Priority: Medium    Depression with anxiety 03/02/2009     Priority: Medium     Overview:   IMO Update 10/11       Alcohol abuse 02/02/2009     Priority: Medium     Overview:   IMO Update 10/11      Polysubstance abuse (H) 02/02/2009     Priority: Medium    Tobacco use disorder 11/01/2008     Priority: Medium    Anxiety disorder 11/01/2008     Priority: Medium     Formatting of this note might be different from the original.  Diagnosed Rosalba Padilla          Past Medical History:    History reviewed. No pertinent past medical history.    Past Surgical History:    Past Surgical History:   Procedure Laterality Date    COLONOSCOPY - HIM SCAN N/A 12/03/2020    Procedure:  Colonoscopy diagnostic with random biopsies;  Surgeon:  Jenise GOLDMAN MD;  Location[facility]  [facility]       Family History:    History reviewed. No pertinent family history.    Social History:  Marital Status:  Single [1]  Social History     Tobacco Use    Smoking status: Every Day     Current packs/day: 0.50     Average packs/day: 0.5 packs/day for 9.0 years (4.5 ttl pk-yrs)     Types: Cigarettes    Smokeless tobacco: Current     Types: Chew   Vaping Use    Vaping status: Never Used   Substance Use Topics    Alcohol use: No     Comment: daily to occasionally per pt.    Drug use: Yes     Types: Other     Comment: reports hx of use but none recently        Medications:    amoxicillin-clavulanate (AUGMENTIN) 875-125 MG tablet  buPROPion (WELLBUTRIN XL) 150 MG 24 hr tablet  buPROPion (WELLBUTRIN XL) 300 MG 24 hr tablet  chlorhexidine (PERIDEX) 0.12 % solution  gabapentin (NEURONTIN) 800 MG tablet  ibuprofen (ADVIL/MOTRIN) 600 MG tablet  lithium (ESKALITH CR/LITHOBID) 450 MG CR tablet  LORazepam (ATIVAN) 1 MG tablet  memantine (NAMENDA) 10 MG tablet  cloZAPine (CLOZARIL) 100 MG tablet  docusate sodium (COLACE) 100 MG capsule          Review of Systems   Constitutional:  Positive for activity change. Negative for chills and fever.   HENT:  Positive for dental problem and rhinorrhea. Negative for ear pain, sinus pressure, sinus pain and sore throat.    Eyes: Negative.     Respiratory:  Negative for shortness of breath.    Gastrointestinal:  Negative for nausea and vomiting.   Musculoskeletal:  Negative for neck pain and neck stiffness.   Neurological:  Negative for headaches.       Physical Exam   BP: 129/82  Pulse: 54  Temp: 97.7  F (36.5  C)  Resp: 19  SpO2: 97 %      Physical Exam  Vitals and nursing note reviewed.   Constitutional:       General: He is in acute distress (Mild to moderate).      Appearance: He is normal weight.   HENT:      Head: Normocephalic.      Jaw: There is normal jaw occlusion.        Right Ear: Tympanic membrane and ear canal normal.      Left Ear: Tympanic membrane and ear canal normal.      Nose: Nose normal.      Mouth/Throat:      Lips: Pink.      Mouth: Mucous membranes are moist.      Dentition: Abnormal dentition. Dental tenderness, gingival swelling, dental caries and dental abscesses present.     Eyes:      Conjunctiva/sclera: Conjunctivae normal.   Cardiovascular:      Rate and Rhythm: Bradycardia present.   Pulmonary:      Effort: Pulmonary effort is normal.   Musculoskeletal:      Cervical back: No tenderness.   Lymphadenopathy:      Cervical: No cervical adenopathy.   Skin:     General: Skin is warm and dry.   Neurological:      Mental Status: He is alert and oriented to person, place, and time.   Psychiatric:         Behavior: Behavior normal.         ED Course        Procedures             No results found for this or any previous visit (from the past 24 hour(s)).    Medications   amoxicillin-clavulanate (AUGMENTIN) 875-125 MG per tablet 1 tablet (1 tablet Oral $Given 4/20/24 1127)   ketorolac (TORADOL) injection 30 mg (30 mg Intramuscular $Given 4/20/24 1128)       Assessments & Plan (with Medical Decision Making)     I have reviewed the nursing notes.    I have reviewed the findings, diagnosis, plan and need for follow up with the patient.  (K04.7) Dental infection  Comment: 33 year old male who presents with left lower dental pain that  started 4 weeks ago after eating skittles.  This is accompanied with runny nose.  Dental pain worse today.  Has been utilizing Magic mouthwash and took Tylenol today with little to no relief of his discomfort.  Last dentist appointment unknown.  Non-smoker.  Lives at [place].  States that NP at [place] did see him previously, but denied antibiotics as he did not have any swelling.  Denies fevers, chills, nausea, vomiting, neck pain or stiffness, and headaches.    MDM: Mild to moderate left-sided facial swelling.  Slightly difficult to tell as has a fullface beard.  Multiple dental caries noted throughout oral cavity, many eroded to the gumline and are black.  Tooth #20 is painful to touch.  Occlusion normal    Ketorolac 30 mg given IM for dental pain.  Refused to stay 15 minutes after receiving.  First dose of Augmentin 875/125 mg given p.o.    Plan: Augmentin twice daily for 7 days.  Peridex swish and spit.  Education provided and/or discussed for this/these medications and dental infection.  -Increase fluids.   -Complete all antibiotics even if feeling better.    -Taking antibiotics with food may decrease the symptoms, of an upset stomach, that can occur when taking antibiotics. Antibiotics frequently cause diarrhea. Probiotics or yogurt may help prevent or decrease these symptoms.   -Return to be reevaluated if symptoms do not improve, or worsen.    Peridex swish and spit twice daily. Do not use longer than ten days as it may stain your teeth purple.    Apply a cold pack or ice compress for up to 20 minutes several times a day. This is to help reduce pain and relieve swelling. Cover it with a thin, dry cloth before putting it on your skin.    Rinse your mouth with warm saltwater several times per day. This will help reduce irritation, gum swelling, and pain.  Good oral hygiene is imperitive. Brush your at least twice a day. Use a fluoride toothpaste and soft-bristle toothbrush.  Eat a healthy diet that  "doesn t include many sugary foods and drinks.  Call [facility] to schedule an appointment to see a dentist.   Our  department can try to assist you if you are having difficulty finding a dentist, dental coverage, or paying for dental care.  You can reach them by calling [phone] and asking for .  Return to ED/UC if symptoms increase or concerns develop such as those discussed and listed on the \"When to go the Emergency Room\" portion of your discharge instructions.       After 5 pm may start ibuprofen: Ibuprofen 600 to 800 mg (3 - 4 tabs of over the counter med) every six to eight hours as needed;not to exceed maximum amount of 3200 mg in 24 hours. Take with food. Tylenol 650 to 1000 mg every four to six hours as needed (not to exceed more than 4000 mg in a 24 hour period). May use interchangeably. Suggest medicating around the clock for the next 24-48 hours.  These discharge instructions and medications were reviewed with him and understanding verbalized.    This document was prepared using a combination of typing and voice generated software.  While every attempt was made for accuracy, spelling and grammatical errors may exist.    Discharge Medication List as of 4/20/2024 11:16 AM        START taking these medications    Details   amoxicillin-clavulanate (AUGMENTIN) 875-125 MG tablet Take 1 tablet by mouth 2 times daily for 7 days, Disp-13 tablet, R-0, E-PrescribeHad first dose in UC      chlorhexidine (PERIDEX) 0.12 % solution Swish and spit 15 mLs in mouth 2 times daily for 10 days, Disp-300 mL, R-0, E-Prescribe      ibuprofen (ADVIL/MOTRIN) 600 MG tablet Take 1 tablet (600 mg) by mouth every 6 hours as needed for moderate pain, Disp-30 tablet, R-0, E-Prescribe             Final diagnoses:   Dental infection       4/20/2024   [facility]         Kristina Greco, CNP  04/20/24 1157    "

## 2024-05-06 DIAGNOSIS — Z79.899 ENCOUNTER FOR LONG-TERM (CURRENT) USE OF MEDICATIONS: Primary | ICD-10-CM

## 2024-08-14 ENCOUNTER — MEDICAL CORRESPONDENCE (OUTPATIENT)
Dept: MRI IMAGING | Facility: HOSPITAL | Age: 34
End: 2024-08-14

## 2024-08-21 ENCOUNTER — HOSPITAL ENCOUNTER (EMERGENCY)
Facility: HOSPITAL | Age: 34
Discharge: HOME | End: 2024-08-21
Attending: STUDENT IN AN ORGANIZED HEALTH CARE EDUCATION/TRAINING PROGRAM | Admitting: STUDENT IN AN ORGANIZED HEALTH CARE EDUCATION/TRAINING PROGRAM
Payer: COMMERCIAL

## 2024-08-21 ENCOUNTER — APPOINTMENT (OUTPATIENT)
Dept: GENERAL RADIOLOGY | Facility: HOSPITAL | Age: 34
End: 2024-08-21
Attending: STUDENT IN AN ORGANIZED HEALTH CARE EDUCATION/TRAINING PROGRAM
Payer: COMMERCIAL

## 2024-08-21 VITALS
DIASTOLIC BLOOD PRESSURE: 93 MMHG | RESPIRATION RATE: 17 BRPM | HEART RATE: 84 BPM | TEMPERATURE: 98.2 F | OXYGEN SATURATION: 96 % | SYSTOLIC BLOOD PRESSURE: 137 MMHG

## 2024-08-21 DIAGNOSIS — S61.215A LACERATION OF LEFT RING FINGER WITHOUT FOREIGN BODY WITHOUT DAMAGE TO NAIL, INITIAL ENCOUNTER: ICD-10-CM

## 2024-08-21 PROCEDURE — 90715 TDAP VACCINE 7 YRS/> IM: CPT | Performed by: STUDENT IN AN ORGANIZED HEALTH CARE EDUCATION/TRAINING PROGRAM

## 2024-08-21 PROCEDURE — 250N000011 HC RX IP 250 OP 636: Performed by: STUDENT IN AN ORGANIZED HEALTH CARE EDUCATION/TRAINING PROGRAM

## 2024-08-21 PROCEDURE — 12002 RPR S/N/AX/GEN/TRNK2.6-7.5CM: CPT

## 2024-08-21 PROCEDURE — 99283 EMERGENCY DEPT VISIT LOW MDM: CPT | Mod: 25

## 2024-08-21 PROCEDURE — 12002 RPR S/N/AX/GEN/TRNK2.6-7.5CM: CPT | Performed by: STUDENT IN AN ORGANIZED HEALTH CARE EDUCATION/TRAINING PROGRAM

## 2024-08-21 PROCEDURE — 90471 IMMUNIZATION ADMIN: CPT | Performed by: STUDENT IN AN ORGANIZED HEALTH CARE EDUCATION/TRAINING PROGRAM

## 2024-08-21 PROCEDURE — 250N000009 HC RX 250: Performed by: STUDENT IN AN ORGANIZED HEALTH CARE EDUCATION/TRAINING PROGRAM

## 2024-08-21 PROCEDURE — 73130 X-RAY EXAM OF HAND: CPT | Mod: LT

## 2024-08-21 RX ADMIN — LIDOCAINE HYDROCHLORIDE 10 ML: 10 INJECTION, SOLUTION INFILTRATION; PERINEURAL at 16:49

## 2024-08-21 RX ADMIN — CLOSTRIDIUM TETANI TOXOID ANTIGEN (FORMALDEHYDE INACTIVATED), CORYNEBACTERIUM DIPHTHERIAE TOXOID ANTIGEN (FORMALDEHYDE INACTIVATED), BORDETELLA PERTUSSIS TOXOID ANTIGEN (GLUTARALDEHYDE INACTIVATED), BORDETELLA PERTUSSIS FILAMENTOUS HEMAGGLUTININ ANTIGEN (FORMALDEHYDE INACTIVATED), BORDETELLA PERTUSSIS PERTACTIN ANTIGEN, AND BORDETELLA PERTUSSIS FIMBRIAE 2/3 ANTIGEN 0.5 ML: 5; 2; 2.5; 5; 3; 5 INJECTION, SUSPENSION INTRAMUSCULAR at 17:06

## 2024-08-21 ASSESSMENT — COLUMBIA-SUICIDE SEVERITY RATING SCALE - C-SSRS
2. HAVE YOU ACTUALLY HAD ANY THOUGHTS OF KILLING YOURSELF IN THE PAST MONTH?: NO
6. HAVE YOU EVER DONE ANYTHING, STARTED TO DO ANYTHING, OR PREPARED TO DO ANYTHING TO END YOUR LIFE?: NO
1. IN THE PAST MONTH, HAVE YOU WISHED YOU WERE DEAD OR WISHED YOU COULD GO TO SLEEP AND NOT WAKE UP?: NO

## 2024-08-21 ASSESSMENT — ACTIVITIES OF DAILY LIVING (ADL)
ADLS_ACUITY_SCORE: 37

## 2024-08-21 NOTE — ED PROVIDER NOTES
"[facility]  ED Provider Note    Chief Complaint   Patient presents with    Laceration     Left 3rd and 4th fingers lacerations, \"It's down to the bone\"     History:  Steven Carter is a 33 year old male with no relevant past medical history presents to the emergency department today with a laceration on his hand he was playing basketball and got his hand caught on a chain and it bled he came here.  No other history    Review of Systems   Performed; see HPI for pertinent positives and negatives.     Medical history, surgical history, and social history was reviewed.  Nursing documentation, triage note, and vitals were reviewed.    Vitals:  BP: 119/85  Pulse: 81  Temp: 98.6  F (37  C)  Resp: 18  SpO2: 94 %    Physical Exam:  Constitutional: Alert and conversant. NAD   HENT: NCAT   Eyes: Normal pupils   Neck: supple   CV: No pallor  Pulmonary/Chest: Non-labored respirations  Abdominal: non-distended   MSK: NOLAN.  Y-shaped laceration covering the entire palmar pad of the left ring finger middle phalanx with minimal intrusion at the joint line of the PIP with normal range of motion and strength of that finger.  Small abrasion on the middle finger on the proximal phalanx palmar side  Neuro: Alert and appropriate   Skin: Warm and dry. No diaphoresis. No rashes on exposed skin    Psych: Appropriate mood and affect       MDM:      ED Course as of 08/21/24 1702   Wed Aug 21, 2024   1659 Pt with laceration  Ddx includes but is not limited to soft tissue injury, foreign body, fracture/dislocation, soft tissue infection  Laceration evaluated and washed out thoroughly per standard practice. Pain controlled in the ED with lidocaine. Given exam and clinical hx, the presence of a FB or fracture/dislocation could not be made without imaging, therefore, imaging was ordered  with no acute findings. Based on the extent of injury, bedside repair deemed appropriate. After thorough exam, history, and based on the " duration of time since injury, wound closure was indicated. See associated laceration repair procedure note for details. Tetanus indicated and  given.  Based on location, extent of injury, pt risk factors, dirtiness of the wound, antibiotics are not indicated and have not been prescribed.   Pt discharged in stable condition with all questions answered and return precautions given.          Procedures:  [facility]    -Laceration Repair    Date/Time: 8/21/2024 5:01 PM    Performed by: Chivo Almonte MD  Authorized by: Chivo Almonte MD    Risks, benefits and alternatives discussed.      ANESTHESIA (see MAR for exact dosages):     Anesthesia method:  Nerve block    Block location:  Palm    Block needle gauge:  27 G    Block anesthetic:  Lidocaine 1% w/o epi    Block injection procedure:  Anatomic landmarks identified, introduced needle, incremental injection, anatomic landmarks palpated and negative aspiration for blood    Block outcome:  Anesthesia achieved    LACERATION DETAILS     Location:  Finger    Finger location:  L ring finger    Length (cm):  4    REPAIR TYPE:     Repair type:  Simple    EXPLORATION:     Wound exploration: wound explored through full range of motion and entire depth of wound probed and visualized      Contaminated: no      TREATMENT:     Area cleansed with:  Saline    Amount of cleaning:  Standard    Irrigation solution:  Sterile saline    Visualized foreign bodies/material removed: no      SKIN REPAIR     Repair method:  Sutures    Suture size:  4-0    Suture material:  Nylon    Suture technique:  Horizontal mattress    Number of sutures:  3    APPROXIMATION     Approximation:  Close    POST-PROCEDURE DETAILS     Dressing:  Antibiotic ointment and sterile dressing      PROCEDURE    Patient Tolerance:  Patient tolerated the procedure well with no immediate complications          Impression:  Final diagnoses:   Laceration of left ring finger without foreign body without damage to nail,  initial encounter            Chivo Almonte MD  08/21/24 4979

## 2024-08-21 NOTE — ED NOTES
Gave patient his Tetanus shot in left deltoid. Tolerated well. Patient stated he does not have time to sit and wait the required 20 minutes after an injection. We discussed signs of intolerance/allergy and to come back immediately if he has any signs/symptoms. Patient verbalized understanding. Patient ambulated out with his nephew.

## 2024-08-21 NOTE — ED TRIAGE NOTES
"States that he was dunking a basketball on a chain link fence and got caught in the fence, lacerations to left 3rd and 4th digits, states \"the 4th one is to the bone\".  Wrapped prior to arrival, no breakthrough bleeding noted.      Triage Assessment (Adult)       Row Name 08/21/24 1415          Triage Assessment    Airway WDL WDL        Respiratory WDL    Respiratory WDL WDL        Skin Circulation/Temperature WDL    Skin Circulation/Temperature WDL WDL        Cardiac WDL    Cardiac WDL WDL        Peripheral/Neurovascular WDL    Peripheral Neurovascular WDL WDL        Cognitive/Neuro/Behavioral WDL    Cognitive/Neuro/Behavioral WDL WDL                     "

## 2024-08-29 ENCOUNTER — HOSPITAL ENCOUNTER (OUTPATIENT)
Dept: MRI IMAGING | Facility: HOSPITAL | Age: 34
Discharge: HOME | End: 2024-08-29
Attending: PHYSICIAN ASSISTANT | Admitting: PHYSICIAN ASSISTANT
Payer: COMMERCIAL

## 2024-08-29 DIAGNOSIS — S09.90XS UNSPECIFIED INJURY OF HEAD, SEQUELA: ICD-10-CM

## 2024-08-29 PROCEDURE — 70553 MRI BRAIN STEM W/O & W/DYE: CPT

## 2024-08-29 PROCEDURE — A9585 GADOBUTROL INJECTION: HCPCS | Performed by: RADIOLOGY

## 2024-08-29 PROCEDURE — 255N000002 HC RX 255 OP 636: Performed by: RADIOLOGY

## 2024-08-29 RX ORDER — GADOBUTROL 604.72 MG/ML
10 INJECTION INTRAVENOUS ONCE
Status: COMPLETED | OUTPATIENT
Start: 2024-08-29 | End: 2024-08-29

## 2024-08-29 RX ADMIN — GADOBUTROL 10 ML: 604.72 INJECTION INTRAVENOUS at 07:58

## 2024-10-28 ENCOUNTER — LAB REQUISITION (OUTPATIENT)
Dept: LAB | Facility: HOSPITAL | Age: 34
End: 2024-10-28
Payer: COMMERCIAL

## 2024-10-28 DIAGNOSIS — R30.0 DYSURIA: ICD-10-CM

## 2024-10-28 DIAGNOSIS — F15.180: ICD-10-CM

## 2024-10-28 LAB
ALBUMIN UR-MCNC: NEGATIVE MG/DL
AMPHETAMINES UR QL SCN: ABNORMAL
APPEARANCE UR: CLEAR
BARBITURATES UR QL SCN: ABNORMAL
BENZODIAZ UR QL SCN: ABNORMAL
BILIRUB UR QL STRIP: NEGATIVE
BZE UR QL SCN: ABNORMAL
CANNABINOIDS UR QL SCN: ABNORMAL
COLOR UR AUTO: ABNORMAL
FENTANYL UR QL: ABNORMAL
GLUCOSE UR STRIP-MCNC: NEGATIVE MG/DL
HGB UR QL STRIP: NEGATIVE
KETONES UR STRIP-MCNC: NEGATIVE MG/DL
LEUKOCYTE ESTERASE UR QL STRIP: NEGATIVE
MUCOUS THREADS #/AREA URNS LPF: PRESENT /LPF
NITRATE UR QL: NEGATIVE
OPIATES UR QL SCN: ABNORMAL
PCP QUAL URINE (ROCHE): ABNORMAL
PH UR STRIP: 6.5 [PH] (ref 4.7–8)
RBC URINE: 0 /HPF
SP GR UR STRIP: 1 (ref 1–1.03)
SQUAMOUS EPITHELIAL: 0 /HPF
UROBILINOGEN UR STRIP-MCNC: NORMAL MG/DL
WBC URINE: <1 /HPF

## 2024-10-28 PROCEDURE — 87086 URINE CULTURE/COLONY COUNT: CPT | Performed by: PHYSICIAN ASSISTANT

## 2024-10-28 PROCEDURE — 80307 DRUG TEST PRSMV CHEM ANLYZR: CPT | Performed by: PHYSICIAN ASSISTANT

## 2024-10-28 PROCEDURE — 81001 URINALYSIS AUTO W/SCOPE: CPT | Performed by: PHYSICIAN ASSISTANT

## 2024-10-29 LAB
C TRACH DNA SPEC QL PROBE+SIG AMP: NEGATIVE
N GONORRHOEA DNA SPEC QL NAA+PROBE: NEGATIVE

## 2024-10-29 PROCEDURE — 87491 CHLMYD TRACH DNA AMP PROBE: CPT | Performed by: PHYSICIAN ASSISTANT

## 2024-10-31 LAB — BACTERIA UR CULT: NO GROWTH

## 2024-12-11 ENCOUNTER — TRANSFERRED RECORDS (OUTPATIENT)
Dept: HEALTH INFORMATION MANAGEMENT | Facility: CLINIC | Age: 34
End: 2024-12-11

## 2024-12-12 ENCOUNTER — TELEPHONE (OUTPATIENT)
Dept: BEHAVIORAL HEALTH | Facility: CLINIC | Age: 34
End: 2024-12-12

## 2024-12-12 ENCOUNTER — HOSPITAL ENCOUNTER (INPATIENT)
Facility: HOSPITAL | Age: 34
End: 2024-12-12
Attending: PHYSICIAN ASSISTANT | Admitting: STUDENT IN AN ORGANIZED HEALTH CARE EDUCATION/TRAINING PROGRAM
Payer: COMMERCIAL

## 2024-12-12 VITALS
SYSTOLIC BLOOD PRESSURE: 164 MMHG | DIASTOLIC BLOOD PRESSURE: 94 MMHG | RESPIRATION RATE: 18 BRPM | TEMPERATURE: 98.4 F | HEART RATE: 107 BPM | OXYGEN SATURATION: 98 %

## 2024-12-12 DIAGNOSIS — F25.0 SCHIZOAFFECTIVE DISORDER, BIPOLAR TYPE (H): Chronic | ICD-10-CM

## 2024-12-12 DIAGNOSIS — F23 ACUTE PSYCHOSIS (H): ICD-10-CM

## 2024-12-12 DIAGNOSIS — F25.1 SCHIZOAFFECTIVE DISORDER, DEPRESSIVE TYPE (H): ICD-10-CM

## 2024-12-12 DIAGNOSIS — F06.1 CATATONIA: Chronic | ICD-10-CM

## 2024-12-12 DIAGNOSIS — K08.89 TOOTH PAIN: Primary | ICD-10-CM

## 2024-12-12 LAB
ALBUMIN SERPL BCG-MCNC: 5 G/DL (ref 3.5–5.2)
ALP SERPL-CCNC: 78 U/L (ref 40–150)
ALT SERPL W P-5'-P-CCNC: 22 U/L (ref 0–70)
ANION GAP SERPL CALCULATED.3IONS-SCNC: 18 MMOL/L (ref 7–15)
AST SERPL W P-5'-P-CCNC: 20 U/L (ref 0–45)
BILIRUB SERPL-MCNC: 0.9 MG/DL
BUN SERPL-MCNC: 15.9 MG/DL (ref 6–20)
CALCIUM SERPL-MCNC: 9.8 MG/DL (ref 8.8–10.4)
CHLORIDE SERPL-SCNC: 100 MMOL/L (ref 98–107)
CREAT SERPL-MCNC: 1.16 MG/DL (ref 0.67–1.17)
EGFRCR SERPLBLD CKD-EPI 2021: 84 ML/MIN/1.73M2
ERYTHROCYTE [DISTWIDTH] IN BLOOD BY AUTOMATED COUNT: 11.8 % (ref 10–15)
GLUCOSE SERPL-MCNC: 94 MG/DL (ref 70–99)
HCO3 SERPL-SCNC: 20 MMOL/L (ref 22–29)
HCT VFR BLD AUTO: 45.7 % (ref 40–53)
HGB BLD-MCNC: 16.2 G/DL (ref 13.3–17.7)
HOLD SPECIMEN: NORMAL
MCH RBC QN AUTO: 29.2 PG (ref 26.5–33)
MCHC RBC AUTO-ENTMCNC: 35.4 G/DL (ref 31.5–36.5)
MCV RBC AUTO: 83 FL (ref 78–100)
PLATELET # BLD AUTO: 285 10E3/UL (ref 150–450)
POTASSIUM SERPL-SCNC: 4 MMOL/L (ref 3.4–5.3)
PROT SERPL-MCNC: 7.2 G/DL (ref 6.4–8.3)
RBC # BLD AUTO: 5.54 10E6/UL (ref 4.4–5.9)
SODIUM SERPL-SCNC: 138 MMOL/L (ref 135–145)
WBC # BLD AUTO: 7.1 10E3/UL (ref 4–11)

## 2024-12-12 PROCEDURE — 99284 EMERGENCY DEPT VISIT MOD MDM: CPT | Performed by: STUDENT IN AN ORGANIZED HEALTH CARE EDUCATION/TRAINING PROGRAM

## 2024-12-12 PROCEDURE — 250N000011 HC RX IP 250 OP 636: Performed by: STUDENT IN AN ORGANIZED HEALTH CARE EDUCATION/TRAINING PROGRAM

## 2024-12-12 PROCEDURE — 84155 ASSAY OF PROTEIN SERUM: CPT | Performed by: STUDENT IN AN ORGANIZED HEALTH CARE EDUCATION/TRAINING PROGRAM

## 2024-12-12 PROCEDURE — 82435 ASSAY OF BLOOD CHLORIDE: CPT | Performed by: STUDENT IN AN ORGANIZED HEALTH CARE EDUCATION/TRAINING PROGRAM

## 2024-12-12 PROCEDURE — 85041 AUTOMATED RBC COUNT: CPT | Performed by: STUDENT IN AN ORGANIZED HEALTH CARE EDUCATION/TRAINING PROGRAM

## 2024-12-12 PROCEDURE — 99285 EMERGENCY DEPT VISIT HI MDM: CPT

## 2024-12-12 PROCEDURE — 99223 1ST HOSP IP/OBS HIGH 75: CPT | Mod: AI | Performed by: STUDENT IN AN ORGANIZED HEALTH CARE EDUCATION/TRAINING PROGRAM

## 2024-12-12 PROCEDURE — 124N000001 HC R&B MH

## 2024-12-12 PROCEDURE — 36415 COLL VENOUS BLD VENIPUNCTURE: CPT | Performed by: STUDENT IN AN ORGANIZED HEALTH CARE EDUCATION/TRAINING PROGRAM

## 2024-12-12 PROCEDURE — 85014 HEMATOCRIT: CPT | Performed by: STUDENT IN AN ORGANIZED HEALTH CARE EDUCATION/TRAINING PROGRAM

## 2024-12-12 RX ORDER — LORAZEPAM 2 MG/ML
2 INJECTION INTRAMUSCULAR ONCE
Status: DISCONTINUED | OUTPATIENT
Start: 2024-12-12 | End: 2024-12-14

## 2024-12-12 RX ORDER — DIPHENHYDRAMINE HCL 50 MG
50 CAPSULE ORAL EVERY 8 HOURS PRN
Status: DISCONTINUED | OUTPATIENT
Start: 2024-12-12 | End: 2025-01-03 | Stop reason: HOSPADM

## 2024-12-12 RX ORDER — ACETAMINOPHEN 325 MG/1
650 TABLET ORAL EVERY 4 HOURS PRN
Status: DISCONTINUED | OUTPATIENT
Start: 2024-12-12 | End: 2025-01-03 | Stop reason: HOSPADM

## 2024-12-12 RX ORDER — IBUPROFEN 600 MG/1
600 TABLET, FILM COATED ORAL EVERY 6 HOURS PRN
Status: DISCONTINUED | OUTPATIENT
Start: 2024-12-12 | End: 2025-01-03 | Stop reason: HOSPADM

## 2024-12-12 RX ORDER — BUPROPION HYDROCHLORIDE 150 MG/1
150 TABLET ORAL EVERY MORNING
Status: DISCONTINUED | OUTPATIENT
Start: 2024-12-13 | End: 2025-01-03 | Stop reason: HOSPADM

## 2024-12-12 RX ORDER — LORAZEPAM 1 MG/1
2 TABLET ORAL EVERY 8 HOURS PRN
Status: DISCONTINUED | OUTPATIENT
Start: 2024-12-12 | End: 2025-01-03 | Stop reason: HOSPADM

## 2024-12-12 RX ORDER — LORAZEPAM 1 MG/1
1 TABLET ORAL 3 TIMES DAILY
Status: DISCONTINUED | OUTPATIENT
Start: 2024-12-12 | End: 2024-12-12

## 2024-12-12 RX ORDER — CLONAZEPAM 1 MG/1
1 TABLET ORAL 3 TIMES DAILY
Status: ON HOLD | COMMUNITY
Start: 2024-11-25 | End: 2024-12-31

## 2024-12-12 RX ORDER — TRAZODONE HYDROCHLORIDE 50 MG/1
50 TABLET ORAL
Status: DISCONTINUED | OUTPATIENT
Start: 2024-12-12 | End: 2025-01-03 | Stop reason: HOSPADM

## 2024-12-12 RX ORDER — CLONAZEPAM 1 MG/1
1 TABLET ORAL 3 TIMES DAILY
Status: DISCONTINUED | OUTPATIENT
Start: 2024-12-12 | End: 2024-12-12

## 2024-12-12 RX ORDER — HALOPERIDOL 5 MG/ML
5 INJECTION INTRAMUSCULAR EVERY 8 HOURS PRN
Status: DISCONTINUED | OUTPATIENT
Start: 2024-12-12 | End: 2025-01-03 | Stop reason: HOSPADM

## 2024-12-12 RX ORDER — LORAZEPAM 1 MG/1
2 TABLET ORAL 2 TIMES DAILY
Status: DISCONTINUED | OUTPATIENT
Start: 2024-12-12 | End: 2024-12-12

## 2024-12-12 RX ORDER — LORAZEPAM 2 MG/1
2 TABLET ORAL 2 TIMES DAILY PRN
Status: ON HOLD | COMMUNITY
Start: 2024-12-09 | End: 2024-12-31

## 2024-12-12 RX ORDER — AMOXICILLIN 250 MG
1 CAPSULE ORAL 2 TIMES DAILY PRN
Status: DISCONTINUED | OUTPATIENT
Start: 2024-12-12 | End: 2025-01-03 | Stop reason: HOSPADM

## 2024-12-12 RX ORDER — DIPHENHYDRAMINE HYDROCHLORIDE 50 MG/ML
50 INJECTION INTRAMUSCULAR; INTRAVENOUS EVERY 8 HOURS PRN
Status: DISCONTINUED | OUTPATIENT
Start: 2024-12-12 | End: 2025-01-03 | Stop reason: HOSPADM

## 2024-12-12 RX ORDER — HALOPERIDOL 5 MG/1
5 TABLET ORAL EVERY 8 HOURS PRN
Status: DISCONTINUED | OUTPATIENT
Start: 2024-12-12 | End: 2025-01-03 | Stop reason: HOSPADM

## 2024-12-12 RX ORDER — HALOPERIDOL 5 MG/1
5 TABLET ORAL DAILY PRN
Status: ON HOLD | COMMUNITY
End: 2024-12-31

## 2024-12-12 RX ORDER — OLANZAPINE 10 MG/1
10 TABLET ORAL EVERY 6 HOURS PRN
Status: DISCONTINUED | OUTPATIENT
Start: 2024-12-12 | End: 2024-12-25

## 2024-12-12 RX ORDER — OLANZAPINE 10 MG/2ML
10 INJECTION, POWDER, FOR SOLUTION INTRAMUSCULAR EVERY 6 HOURS PRN
Status: DISCONTINUED | OUTPATIENT
Start: 2024-12-12 | End: 2024-12-25

## 2024-12-12 RX ORDER — MAGNESIUM HYDROXIDE/ALUMINUM HYDROXICE/SIMETHICONE 120; 1200; 1200 MG/30ML; MG/30ML; MG/30ML
30 SUSPENSION ORAL EVERY 4 HOURS PRN
Status: DISCONTINUED | OUTPATIENT
Start: 2024-12-12 | End: 2025-01-03 | Stop reason: HOSPADM

## 2024-12-12 RX ORDER — GUANFACINE 1 MG/1
1 TABLET, EXTENDED RELEASE ORAL AT BEDTIME
Status: ON HOLD | COMMUNITY
Start: 2024-12-10 | End: 2024-12-31

## 2024-12-12 RX ORDER — BUPROPION HYDROCHLORIDE 300 MG/1
300 TABLET ORAL EVERY MORNING
Status: DISCONTINUED | OUTPATIENT
Start: 2024-12-13 | End: 2025-01-03 | Stop reason: HOSPADM

## 2024-12-12 RX ORDER — GABAPENTIN 400 MG/1
800 CAPSULE ORAL 3 TIMES DAILY
Status: DISCONTINUED | OUTPATIENT
Start: 2024-12-12 | End: 2024-12-17

## 2024-12-12 RX ORDER — HYDROXYZINE HYDROCHLORIDE 25 MG/1
25 TABLET, FILM COATED ORAL EVERY 6 HOURS PRN
Status: DISCONTINUED | OUTPATIENT
Start: 2024-12-12 | End: 2025-01-03 | Stop reason: HOSPADM

## 2024-12-12 RX ORDER — LORAZEPAM 2 MG/ML
2 INJECTION INTRAMUSCULAR EVERY 8 HOURS PRN
Status: DISCONTINUED | OUTPATIENT
Start: 2024-12-12 | End: 2025-01-03 | Stop reason: HOSPADM

## 2024-12-12 RX ORDER — MEMANTINE HYDROCHLORIDE 5 MG/1
10 TABLET ORAL 2 TIMES DAILY
Status: DISCONTINUED | OUTPATIENT
Start: 2024-12-12 | End: 2024-12-17

## 2024-12-12 RX ADMIN — HALOPERIDOL LACTATE 5 MG: 5 INJECTION, SOLUTION INTRAMUSCULAR at 14:25

## 2024-12-12 RX ADMIN — LORAZEPAM 2 MG: 2 INJECTION INTRAMUSCULAR; INTRAVENOUS at 14:25

## 2024-12-12 RX ADMIN — DIPHENHYDRAMINE HYDROCHLORIDE 50 MG: 50 INJECTION, SOLUTION INTRAMUSCULAR; INTRAVENOUS at 14:49

## 2024-12-12 ASSESSMENT — ACTIVITIES OF DAILY LIVING (ADL)
ADLS_ACUITY_SCORE: 34
ADLS_ACUITY_SCORE: 24
ADLS_ACUITY_SCORE: 50
ADLS_ACUITY_SCORE: 24
DRESS: SCRUBS (BEHAVIORAL HEALTH);INDEPENDENT
HYGIENE/GROOMING: PROMPTS
ADLS_ACUITY_SCORE: 24
ADLS_ACUITY_SCORE: 24
ADLS_ACUITY_SCORE: 50
ADLS_ACUITY_SCORE: 24
ORAL_HYGIENE: PROMPTS
LAUNDRY: UNABLE TO COMPLETE
ADLS_ACUITY_SCORE: 50
ADLS_ACUITY_SCORE: 50
ADLS_ACUITY_SCORE: 24
ADLS_ACUITY_SCORE: 24

## 2024-12-12 ASSESSMENT — COLUMBIA-SUICIDE SEVERITY RATING SCALE - C-SSRS: IS THE PATIENT NOT ABLE TO COMPLETE C-SSRS: REFUSES TO ANSWER

## 2024-12-12 NOTE — PROGRESS NOTES
12/12/24 1427   Patient Belongings   Did you bring any home meds/supplements to the hospital?  No   Patient Belongings Put in Hospital Secure Location (Security or Locker, etc.) clothing;shoes;wallet;cash/credit card   Belongings Search Yes   Clothing Search Yes   Second Staff Kathy   Comment Blue pair of socks, grey head band, yellow boxer, black long sleeve shirt, grey long sleeve shirt, black beanie hat, black pair of pants, blue hoodie, , grey pair of long johns, blue boxer, underarmor cap, black pair of shoes.     List items sent to safe: 2 [employer] cards, [employer] card, debit card, 2 [place] drivers ID cards, EBT card, [employer] card.    All other belongings put in assigned cubby in belongings room.     I have reviewed my belongings list on admission and verify that it is correct.     Patient signature_______________________________    Second staff witness (if patient unable to sign) ______________________________       I have received all my belongings at discharge.    Patient signature________________________________    [place]   12/12/2024  2:30 PM

## 2024-12-12 NOTE — ED TRIAGE NOTES
Arrived to ER room 8 via [place] [employer] in hand cuffs for psych eval. Comes from [facility]. [place] [employer] report patient was placed on a 72 hour hold by his primary provider and police was requested to transport patient to ER for eval. Officers report patient was resisting coming in and using threatening body language so they placed patient in hand cuffs. Police report patient has not spoken a word to them. Officer Bacilio reports he did speak to patient's mother who reported patient did not make any suicidal comments to her but has been gifting his belongings to family so she is concerned that he is giving away his possessions. Attempted to ask patient triage questions but he would not respond at all, would not speak or nod head. Patient just makes eye contact and stares directly at you. Does follow commands and was cooperative with vital checks even lifting his arm for blood pressure cuff placement. Police remain at bedside awaiting security arrival for one to one.      Triage Assessment (Adult)       Row Name 12/12/24 1201          Triage Assessment    Airway WDL WDL        Respiratory WDL    Respiratory WDL WDL        Cognitive/Neuro/Behavioral WDL    Cognitive/Neuro/Behavioral WDL X;all     Level of Consciousness alert     Arousal Level opens eyes spontaneously     Speech unable to speak     Mood/Behavior hypoactive (quiet, withdrawn)        Pupils (CN II)    Pupil PERRLA yes     Pupil Size Left 3 mm     Pupil Size Right 3 mm        Motor Response    Motor Response general motor response     General Motor Response purposeful/movement localizing;purposeful motor response

## 2024-12-12 NOTE — MEDICATION SCRIBE - ADMISSION MEDICATION HISTORY
Medication Scribe Admission Medication History    Admission medication history is complete. The information provided in this note is only as accurate as the sources available at the time of the update.    Information Source(s): Facility (U/NH/) medication list/MAR and CareEverywhere/SureScripts via N/A    Pertinent Information:   Patient resides at [facility], staff manage medications. PTA meds updated utilizing MAR from facility, dispense hx checked.   Note regarding medications from nursing staff at facility: Meds refused since 12/10/24. Resident stopped clozaril and lithium in October 2024 and his psych provider ended up discontinuing due to his refusals, compliant with other meds up until recently.     Changes made to PTA medication list:  Added: haldol PNR, magic mouthwash PRN, lorazepam PRN, guanfacine ER, klonopin  Deleted: clozaril, lithium  Changed: input instructions on gabapentin    Allergies reviewed with patient and updates made in EHR: yes    Medication History Completed By: Cynthia Nguyen 12/12/2024 1:17 PM    PTA Med List   Medication Sig Last Dose/Taking    buPROPion (WELLBUTRIN XL) 150 MG 24 hr tablet Take 1 tablet (150 mg) by mouth every morning . Take along with a 300 mg tablet for a total daily dose of 450 mg. 12/10/2024    buPROPion (WELLBUTRIN XL) 300 MG 24 hr tablet Take 1 tablet (300 mg) by mouth every morning . Take along with a 150 mg tablet for a total daily dose of 450 mg. 12/10/2024 at  8:48 AM    clonazePAM (KLONOPIN) 1 MG tablet Take 1 mg by mouth 3 times daily. 12/10/2024 at  8:48 AM    gabapentin (NEURONTIN) 800 MG tablet Take 800 mg by mouth 3 times daily. 12/9/2024 at  8:48 AM    guanFACINE (INTUNIV) 1 MG TB24 24 hr tablet Take 1 mg by mouth at bedtime. 12/9/2024 at  7:26 PM    haloperidol (HALDOL) 5 MG tablet Take 5 mg by mouth daily as needed (aggression). Unknown    ibuprofen (ADVIL/MOTRIN) 600 MG tablet Take 1 tablet (600 mg) by mouth every 6 hours as needed for  moderate pain Unknown    LORazepam (ATIVAN) 2 MG tablet Take 2 mg by mouth 2 times daily as needed (aggression). 12/8/2024 at  3:43 PM    magic mouthwash (ENTER INGREDIENTS IN COMMENTS) suspension Swish and spit 5 mLs in mouth 3 times daily as needed (pain). (Equal parts viscous lidocaine, liquid maalox, liquid diphenhydramine)    Hold/swish in mouth for 1-2 minutes then spit out. Do not eat or drink for 30 minutes after administration. Unknown    memantine (NAMENDA) 10 MG tablet Take 1 tablet (10 mg) by mouth 2 times daily 12/10/2024 at  8:48 AM

## 2024-12-12 NOTE — PROGRESS NOTES
ADMISSION NOTE    Reason for admission Psychosis with catatonia.  Safety concerns: Violence history.  Risk for or history of violence: History of violence and was verbally threatening [facility] staff.   Full skin assessment: Performed in ED by DARRELL Britton and other ED staff members, patient was also wanded down there as well, dry skin but no other issues reported.    Patient arrived on unit from [place] ED accompanied by ED Staff, DARRELL LARKIN, and Security on 12/12/2024  2:05 PM.   Status on arrival: Patient is catatonic. Nonverbal, blankly staring with random rigid hand movements/gestures. Eventually cooperative with vitals. Patient offered water and food, waved his hands as if trying to say no but unsure. Unable to complete any admission questions d/t patient's current state and being nonverbal.  /89   Pulse 113   Temp 99.3  F (37.4  C) (Tympanic)   Resp 14   SpO2 99%   Patient given tour of unit and Welcome to  unit papers given to patient, wanding completed, belongings inventoried, and admission assessment completed.   Patient's legal status on arrival is 72 Hour Hold expires 12/17/24 at 1335. Appropriate legal rights discussed with and copy given to patient. Patient Bill of Rights discussed with and copy given to patient.   Patient denies SI, HI, and thoughts of self harm and contracts for safety while on unit.      IM ativan and haldol administered, but patient became too agitated for the second shot of IM benadryl. Patient was nonverbal the entire time, but doing various hand gestures, often 'hang loose' and 'gun' appearing gestures. Patient sat at edge of bed and staff members left.  December 12, 2024 2:42 PM    3 staff members attempted to give IM benadryl without luck and patient abruptly stood up, posturing, and kept doing hand gestures. Code green called.  December 12, 2024 2:49 PM    IM benadryl successfully given with security presence and therapeutic touch to bilateral arms for comfort and safety  "measures to inject in proper location. Patient did speak a few words, stating \"you guys don't have the right HIPAA credentials. I don't need to be here. I've been here before.\" Patient reminded he is on 72 hour hold. Patient laid down after injection.   December 12, 2024 2:58 PM                                 "

## 2024-12-12 NOTE — ED NOTES
"Report received from [facility]. TRAE Samano ([phone]) states that patient has paranoid schizophrenia and has refused his medications for the last two days. Nurse reports that he has been aggressive and threatening staff saying, \"I'll fucking kill you.\" Patient's mother states that patient is suicidal and trying to give her his things, but patient has not said any suicidal statements to any staff or police.     Nurse reports that patient has a history of aggression, civil commitment, and \"significant catatonia.\" Nurse states that they attempted to have police take patient to ER previously, but patient declined to go and police stated that was within his rights. Primary care provider saw him today and placed a hold on the patient. Three police officers are escorting patient to ED. Nurse states that patient is currently catatonic, but also stated that he was resisting police.   "

## 2024-12-12 NOTE — H&P
"[facility] PSYCHIATRY   HISTORY AND PHYSICAL     ADMISSION DATA     Steven Carter MRN# [id number]   Age: 34 year old YOB: 1990     Date of Admission: 12/12/2024  Primary Physician: Rosmery Perla        CHIEF COMPLAINT   \"Psychosis.\"       HISTORY OF PRESENT ILLNESS     Per ED:    Steven Carter is a 34 year old male with history of psychiatric and polysubstance abuse history who has been living out of assisted living facility called [facility] where he reportedly has been refusing medications and been posing a threat to other adults living there.  According to his PA Hoda FISHER he he has been extremely paranoid and threatening, verbal threats of harm towards staff catatonic features appears to be responding to internal stimuli.  She goes on to note he has been using hand gun and blow dart gestures lacks insight and finishes her written statement with regard to the patient by noting that his psychiatrist is aware of the current situation and recommends immediate hospitalization.  I have attempted to speak with the patient and he ignores me but did allow me to listen to his chest and be present in the room with him without making any overtly threatening gestures.    Per Patient:    Patient had a code called on him prior to interview requiring Haldol/Benadryl/Ativan. Patient was sedated after this being too sedated to engage in the interview. Staff report the patient has been catatonic and agitated, being difficult to redirect. He is in the MHICU. No acute concern elicited from staff or the patient during this time.       PSYCHIATRIC HISTORY     Multiple hospitalizations, last here in 2023 for psychosis. History of civil commitment.  Multiple medication trials, including Clozapine, Lithium, Ativan, Klonopin, Ambien, Namenda, Tenex, Haldol, Zyprexa, Wellbutrin, and others.        SUBSTANCE USE HISTORY   History   Drug Use    Types: Other     Comment: reports hx of use but none " recently       Social History    Substance and Sexual Activity      Alcohol use: No        Comment: daily to occasionally per pt.      History   Smoking Status    Every Day    Packs/day: 0.50    Years: 9.00   Smokeless Tobacco    Current    Types: Chew     History of abusing DXM. Unclear current use pattern.    History of stimulant abuse. Suspect has not used in greater than a year.    History of alcohol abuse. Unclear current pattern.    History of CD treatment, inpatient and outpatient.       SOCIAL HISTORY   Social History     Socioeconomic History    Marital status: Single     Spouse name: Not on file    Number of children: Not on file    Years of education: Not on file    Highest education level: Not on file   Occupational History    Not on file   Tobacco Use    Smoking status: Every Day     Current packs/day: 0.50     Average packs/day: 0.5 packs/day for 9.0 years (4.5 ttl pk-yrs)     Types: Cigarettes    Smokeless tobacco: Current     Types: Chew   Vaping Use    Vaping status: Never Used   Substance and Sexual Activity    Alcohol use: No     Comment: daily to occasionally per pt.    Drug use: Yes     Types: Other     Comment: reports hx of use but none recently    Sexual activity: Not Currently   Other Topics Concern    Not on file   Social History Narrative    ** Merged History Encounter **          Social Drivers of Health     Financial Resource Strain: Unknown (12/12/2024)    Financial Resource Strain     Within the past 12 months, have you or your family members you live with been unable to get utilities (heat, electricity) when it was really needed?: Patient unable to answer   Food Insecurity: Unknown (12/12/2024)    Food Insecurity     Within the past 12 months, did you worry that your food would run out before you got money to buy more?: Patient unable to answer     Within the past 12 months, did the food you bought just not last and you didn t have money to get more?: Patient unable to answer  "  Transportation Needs: Unknown (2024)    Transportation Needs     Within the past 12 months, has lack of transportation kept you from medical appointments, getting your medicines, non-medical meetings or appointments, work, or from getting things that you need?: Patient unable to answer   Physical Activity: Not on file   Stress: Not on file   Social Connections: Not on file   Interpersonal Safety: Not on file   Housing Stability: Unknown (2024)    Housing Stability     Do you have housing? : Patient unable to answer     Are you worried about losing your housing?: Patient unable to answer     Raised by parents. Father  when he was 15. Has good relationship with his mother. Has 2 sisters. Did complete high school with \"significant support\" after TBI at age 3. Has 2 children that were adopted.       FAMILY HISTORY   Family History   Problem Relation Age of Onset    Depression No family hx of          PAST MEDICAL HISTORY   No past medical history on file.    Past Surgical History:   Procedure Laterality Date    COLONOSCOPY - HIM SCAN N/A 2020    Procedure:  Colonoscopy diagnostic with random biopsies;  Surgeon:  Jenise GOLDMAN MD;  Location:  [facility] OR       Pork allergy     MEDICATIONS   Prior to Admission medications    Medication Sig Start Date End Date Taking? Authorizing Provider   buPROPion (WELLBUTRIN XL) 150 MG 24 hr tablet Take 1 tablet (150 mg) by mouth every morning . Take along with a 300 mg tablet for a total daily dose of 450 mg. 22  Yes Christelle Contreras NP   buPROPion (WELLBUTRIN XL) 300 MG 24 hr tablet Take 1 tablet (300 mg) by mouth every morning . Take along with a 150 mg tablet for a total daily dose of 450 mg. 22  Yes Christelle Contreras NP   clonazePAM (KLONOPIN) 1 MG tablet Take 1 mg by mouth 3 times daily. 24  Yes Reported, Patient   gabapentin (NEURONTIN) 800 MG tablet Take 800 mg by mouth 3 times daily. 3/11/24  Yes Reported, Patient   guanFACINE (INTUNIV) 1 " MG TB24 24 hr tablet Take 1 mg by mouth at bedtime. 12/10/24  Yes Reported, Patient   haloperidol (HALDOL) 5 MG tablet Take 5 mg by mouth daily as needed (aggression).   Yes Reported, Patient   ibuprofen (ADVIL/MOTRIN) 600 MG tablet Take 1 tablet (600 mg) by mouth every 6 hours as needed for moderate pain 4/20/24  Yes Kristina Greco CNP   LORazepam (ATIVAN) 2 MG tablet Take 2 mg by mouth 2 times daily as needed (aggression). 12/9/24  Yes Reported, Patient   magic mouthwash (ENTER INGREDIENTS IN COMMENTS) suspension Swish and spit 5 mLs in mouth 3 times daily as needed (pain). (Equal parts viscous lidocaine, liquid maalox, liquid diphenhydramine)    Hold/swish in mouth for 1-2 minutes then spit out. Do not eat or drink for 30 minutes after administration.   Yes Reported, Patient   memantine (NAMENDA) 10 MG tablet Take 1 tablet (10 mg) by mouth 2 times daily 9/1/22  Yes Christelle Contreras NP        PHYSICAL EXAM/ROS     I have reviewed the physical exam as documented by the medical team and agree with findings and assessment and have no additional findings to add at this time. The review of systems is negative other than noted in the HPI.    General: Awake and alert, NAD  HEENT: EOMI, no scleral icterus, no injection of conjunctivae, moist mucus membranes  Respiratory: Breathing comfortably   Extremities: No cyanosis, clubbing, or edema   Skin: No gross rash, no bruising  Neuro: CN II-XII intact, no focal deficits        LABS   Recent Results (from the past 24 hours)   CBC with platelets    Collection Time: 12/12/24  1:09 PM   Result Value Ref Range    WBC Count 7.1 4.0 - 11.0 10e3/uL    RBC Count 5.54 4.40 - 5.90 10e6/uL    Hemoglobin 16.2 13.3 - 17.7 g/dL    Hematocrit 45.7 40.0 - 53.0 %    MCV 83 78 - 100 fL    MCH 29.2 26.5 - 33.0 pg    MCHC 35.4 31.5 - 36.5 g/dL    RDW 11.8 10.0 - 15.0 %    Platelet Count 285 150 - 450 10e3/uL   Comprehensive metabolic panel    Collection Time: 12/12/24  1:09 PM   Result  Value Ref Range    Sodium 138 135 - 145 mmol/L    Potassium 4.0 3.4 - 5.3 mmol/L    Carbon Dioxide (CO2) 20 (L) 22 - 29 mmol/L    Anion Gap 18 (H) 7 - 15 mmol/L    Urea Nitrogen 15.9 6.0 - 20.0 mg/dL    Creatinine 1.16 0.67 - 1.17 mg/dL    GFR Estimate 84 >60 mL/min/1.73m2    Calcium 9.8 8.8 - 10.4 mg/dL    Chloride 100 98 - 107 mmol/L    Glucose 94 70 - 99 mg/dL    Alkaline Phosphatase 78 40 - 150 U/L    AST 20 0 - 45 U/L    ALT 22 0 - 70 U/L    Protein Total 7.2 6.4 - 8.3 g/dL    Albumin 5.0 3.5 - 5.2 g/dL    Bilirubin Total 0.9 <=1.2 mg/dL   Extra Red Top Tube    Collection Time: 12/12/24  1:09 PM   Result Value Ref Range    Hold Specimen JI          MENTAL STATUS EXAM   Vitals: /94   Pulse 107   Temp 98.4  F (36.9  C) (Temporal)   Resp 18   SpO2 98%     Appearance: Asleep  Attitude: Guarded   Eye Contact: Poor  Mood: Agitated  Affect: Blunted  Speech: Did not observe due to asleep  Psychomotor Behavior: No tremor or rigidity. Withdrawal present.  Thought Process: Suspect disorganized   Associations: No loose associations   Thought Content: Suspect AH and delusional thought.  Insight: Poor  Judgment: Poor  Oriented to: Unable to evaluate due to asleep  Attention Span and Concentration: Impaired  Recent and Remote Memory: Impaired  Language: English with appropriate syntax and vocabulary typically  Fund of Knowledge: Low to average given previous history  Muscle Strength and Tone: Relaxed  Gait and Station: Did not observe due to asleep       ASSESSMENT     This is a 34 year old male with a PMH of SZAD, polysubstance abuse, and multiple TBIs with neurological sequalae who presents in a severe psychotic and catatonic episode in the setting of medication partial non-adherence. Patient has SPMI living in [place]. He has impaired functioning and generally poor insight. He has tried many medications having minimal response at times. He would benefit from hospitalization with resumption of medication as best  able. Will focus first on treating catatonia along with psychosis.       DIAGNOSIS     #. Schizoaffective disorder, bipolar type, continuous   #. Multiple TBIs with LOC with sequale   #. Hallucinogen (DXM) use disorder, unspecified  #. Stimulant use disorder, unspecified  #. Alcohol use disorder, unspecified        PLAN     Location: Unit 5  Legal Status: Orders Placed This Encounter      Emergency Hospitalization Hold (72 Hr Hold)      Legal status 72 Hour Hold    Safety Assessment:    Behavioral Orders   Procedures    Code 1 - Restrict to Unit    Routine Programming     As clinically indicated    Status 15     Every 15 minutes.      PTA psychotropic medications held:     - Intuniv 1 mg at bedtime to simplify regimen  - Haldol 5 mg daily prn aggression due to using Zyprexa and Haldol/Benadryl/Ativan  - Ativan 2 mg BID prn aggression changed to Haldol/Benadryl/Ativan prn     PTA psychotropic medications continued/changed:     - Klonopin 1 mg TID increased to 1.5 mg TID temporarily   - Wellbutrin  mg daily  - Gabapentin 800 mg TID  - Namenda 10 mg BID    New psychotropic medications initiated:     -Standard unit prn agents, including Zyprexa prn agitation, in addition to Haldol/Benadryl/Ativan    Programming: Patient will be treated in a therapeutic milieu with appropriate individual and group therapies. Education will be provided on diagnoses, medications, and treatments.     Medical diagnoses:  Per medicine    Consult: None  Tests: None    Anticipated LOS: >7  days   Disposition: [place] with outpatient services    Justification for hospitalization: reasons for hospitalization include potential safety risk to self or others within the last week, decreased functioning in outpatient setting and in the setting of no outpatient management, need for highly structured inpatient management for stabilization of psychiatric symptoms, need for psychiatric medication initiation and stabilization.       ATTESTATION       Dr. David Timmons  Psychiatrist     VIDEO VISIT    Patient has given verbal consent for video visit?: Yes     Video- Visit Details  Type of service:  video visit for mental health treatment.  Time of service:  Date:  12/12/2024  Video Start Time: 400PM     Video End Time: 430PM    [place] for [place] visit[place]  Originating Site (patient location):  [facility]  Distant Site (provider location):  Remote location  Mode of Communication:  Video Conference via [employer]

## 2024-12-12 NOTE — ED NOTES
Introduced myself as his nurse.  Patient sitting at bedside following nurse with eyes and head but does not respond with any verbal communication.  Did use hand motion in a way of communication but not consistent.  Offered a pen and paper for a means of communication but did not respond yes in any way.  Offered a lunch and fluids but also did not respond with a yes.  1:1 present.

## 2024-12-12 NOTE — TELEPHONE ENCOUNTER
[facility]    1:56p Received call from Nino Brown informing pt will directly admit from ED to [facility].  Intake to complete indicia request.    2:01p Indicia Completed.

## 2024-12-12 NOTE — ED PROVIDER NOTES
[facility]  ED Provider Note    Chief Complaint   Patient presents with    Psychiatric Evaluation     History:  Steven Carter is a 34 year old male with history of psychiatric and polysubstance abuse history who has been living out of assisted living facility called [facility] where he reportedly has been refusing medications and been posing a threat to other adults living there.  According to his PA Hoda FISHER he he has been extremely paranoid and threatening, verbal threats of harm towards staff catatonic features appears to be responding to internal stimuli.  She goes on to note he has been using hand gun and blow dart gestures lacks insight and finishes her written statement with regard to the patient by noting that his psychiatrist is aware of the current situation and recommends immediate hospitalization.  I have attempted to speak with the patient and he ignores me but did allow me to listen to his chest and be present in the room with him without making any overtly threatening gestures    Review of Systems   Performed; see HPI for pertinent positives and negatives.     Medical history, surgical history, and social history was reviewed.  Nursing documentation, triage note, and vitals were reviewed.    Vitals:  BP: 152/89  Pulse: 113  Temp: 99.3  F (37.4  C)  Resp: 14  SpO2: 99 %    Physical Exam:  Constitutional: Alert    HENT: NCAT   Eyes: Normal pupils   Neck: supple   CV: No pallor  Pulmonary/Chest: Non-labored respirations  Abdominal: non-distended   MSK: NOLAN.   Neuro: Alert and appropriate   Skin: Warm and dry. No diaphoresis. No rashes on exposed skin    Psych: Appropriate mood and affect       MDM:      ED Course as of 12/12/24 1253   Thu Dec 12, 2024   1216 Per Dr. Ruma Bailey Psych provider who recommends hold. Since Friday, verbally threatening behaviors at facility. Catatonic on exam. Med noncom intermittent for months, completely off 1-2 wk.    1251  Based on the available information, it seems most reasonable to proceed with plan for admission.  I did discuss the case with our psychiatrist.  He and I are in agreement that it be reasonable to proceed with placing a 72-hour hold here as it is not entirely clear whether the patient was placed on a 72-hour hold prior despite the report that having police gave us that there is a 72-hour hold.  Is possible it was just  hold and the patient appears to be fairly sick.  Will place some basic lab orders and plan for admission       Procedures:  Procedures        Impression:  Final diagnoses:   Acute psychosis (H)            Chivo Almonte MD  12/12/24 3232

## 2024-12-12 NOTE — ED NOTES
Printed off the 72hold paperwork and given to patient.  Tobi from  floor came down and assisted ED staff getting him changed into scrubs; security present as well.   Report given to TRAE Joiner  Central intake called at [phone]

## 2024-12-13 PROCEDURE — 250N000013 HC RX MED GY IP 250 OP 250 PS 637: Performed by: STUDENT IN AN ORGANIZED HEALTH CARE EDUCATION/TRAINING PROGRAM

## 2024-12-13 PROCEDURE — 124N000001 HC R&B MH

## 2024-12-13 PROCEDURE — 99233 SBSQ HOSP IP/OBS HIGH 50: CPT | Mod: 95 | Performed by: STUDENT IN AN ORGANIZED HEALTH CARE EDUCATION/TRAINING PROGRAM

## 2024-12-13 PROCEDURE — 99221 1ST HOSP IP/OBS SF/LOW 40: CPT | Performed by: NURSE PRACTITIONER

## 2024-12-13 RX ADMIN — MEMANTINE 10 MG: 5 TABLET ORAL at 20:27

## 2024-12-13 RX ADMIN — GABAPENTIN 800 MG: 400 CAPSULE ORAL at 13:12

## 2024-12-13 RX ADMIN — CLONAZEPAM 1.5 MG: 0.5 TABLET ORAL at 13:12

## 2024-12-13 RX ADMIN — GABAPENTIN 800 MG: 400 CAPSULE ORAL at 20:26

## 2024-12-13 RX ADMIN — CLONAZEPAM 1.5 MG: 0.5 TABLET ORAL at 20:27

## 2024-12-13 ASSESSMENT — ACTIVITIES OF DAILY LIVING (ADL)
ADLS_ACUITY_SCORE: 34
ADLS_ACUITY_SCORE: 34
LAUNDRY: UNABLE TO COMPLETE
ADLS_ACUITY_SCORE: 34
DRESS: SCRUBS (BEHAVIORAL HEALTH)
ADLS_ACUITY_SCORE: 34
HYGIENE/GROOMING: PROMPTS
ADLS_ACUITY_SCORE: 34
ORAL_HYGIENE: PROMPTS
ADLS_ACUITY_SCORE: 34

## 2024-12-13 NOTE — H&P
[facility]    History and Physical  Medical Services       Date of Admission:  12/12/2024  Date of Service (when I saw the patient): 12/13/24    Assessment & Plan     Principal Problem:    Suicidal ideation    Active Medical Problems:    Acute psychosis (H)    Chronic back pain- Pt denies any pain currently.  Tylenol is available as needed.     Labs reviewed- CBC, CMP unremarkable.    Pt medically stable, no acute medical concerns. Chronic medical problems stable. Will sign off. Please consult for any new medical issues or concerns.          Code Status: Full Code    Harmony Samuel CNP    Primary Care Physician   Rosmery Perla    Chief Complaint   Psych evaluation     History is obtained from the patient and electronic health record    History of Present Illness   (Per ED) Steven Carter is a 34 year old male with history of psychiatric and polysubstance abuse history who has been living out of assisted living facility called [facility] where he reportedly has been refusing medications and been posing a threat to other adults living there.  According to his PA Hoda FISHER he he has been extremely paranoid and threatening, verbal threats of harm towards staff catatonic features appears to be responding to internal stimuli.  She goes on to note he has been using hand gun and blow dart gestures lacks insight and finishes her written statement with regard to the patient by noting that his psychiatrist is aware of the current situation and recommends immediate hospitalization.  I have attempted to speak with the patient and he ignores me but did allow me to listen to his chest and be present in the room with him without making any overtly threatening gestures     Past Medical History    I have reviewed this patient's medical history and updated it with pertinent information if needed.   No past medical history on file.    Past Surgical History   I have reviewed this patient's surgical history and  updated it with pertinent information if needed.  Past Surgical History:   Procedure Laterality Date    COLONOSCOPY - HIM SCAN N/A 12/03/2020    Procedure:  Colonoscopy diagnostic with random biopsies;  Surgeon:  Jenise GOLDMAN MD;  Location:  [facility] OR       Prior to Admission Medications   Prior to Admission Medications   Prescriptions Last Dose Informant Patient Reported? Taking?   LORazepam (ATIVAN) 2 MG tablet 12/8/2024 at  3:43 PM  Yes Yes   Sig: Take 2 mg by mouth 2 times daily as needed (aggression).   buPROPion (WELLBUTRIN XL) 150 MG 24 hr tablet 12/10/2024  No Yes   Sig: Take 1 tablet (150 mg) by mouth every morning . Take along with a 300 mg tablet for a total daily dose of 450 mg.   buPROPion (WELLBUTRIN XL) 300 MG 24 hr tablet 12/10/2024 at  8:48 AM  No Yes   Sig: Take 1 tablet (300 mg) by mouth every morning . Take along with a 150 mg tablet for a total daily dose of 450 mg.   clonazePAM (KLONOPIN) 1 MG tablet 12/10/2024 at  8:48 AM  Yes Yes   Sig: Take 1 mg by mouth 3 times daily.   gabapentin (NEURONTIN) 800 MG tablet 12/9/2024 at  8:48 AM  Yes Yes   Sig: Take 800 mg by mouth 3 times daily.   guanFACINE (INTUNIV) 1 MG TB24 24 hr tablet 12/9/2024 at  7:26 PM  Yes Yes   Sig: Take 1 mg by mouth at bedtime.   haloperidol (HALDOL) 5 MG tablet Unknown  Yes Yes   Sig: Take 5 mg by mouth daily as needed (aggression).   ibuprofen (ADVIL/MOTRIN) 600 MG tablet Unknown  No Yes   Sig: Take 1 tablet (600 mg) by mouth every 6 hours as needed for moderate pain   magic mouthwash (ENTER INGREDIENTS IN COMMENTS) suspension Unknown  Yes Yes   Sig: Swish and spit 5 mLs in mouth 3 times daily as needed (pain). (Equal parts viscous lidocaine, liquid maalox, liquid diphenhydramine)    Hold/swish in mouth for 1-2 minutes then spit out. Do not eat or drink for 30 minutes after administration.   memantine (NAMENDA) 10 MG tablet 12/10/2024 at  8:48 AM  No Yes   Sig: Take 1 tablet (10 mg) by mouth 2 times daily       Facility-Administered Medications: None     Allergies   Allergies   Allergen Reactions    Pork Allergy        Social History   I have reviewed this patient's social history and updated it with pertinent information if needed. Steven Carter  reports that he has been smoking. He has a 4.5 pack-year smoking history. His smokeless tobacco use includes chew. He reports current drug use. Drug: Other. He reports that he does not drink alcohol.    Family History   I have reviewed this patient's family history and updated it with pertinent information if needed.   Family History   Problem Relation Age of Onset    Depression No family hx of        Review of Systems   CONSTITUTIONAL:  negative  EYES:  negative  HEENT:  negative  RESPIRATORY:  negative  CARDIOVASCULAR:  negative  GASTROINTESTINAL:  negative  GENITOURINARY:  negative  INTEGUMENT/BREAST:  negative  HEMATOLOGIC/LYMPHATIC:  negative  ALLERGIC/IMMUNOLOGIC:  negative  ENDOCRINE:  negative  MUSCULOSKELETAL:  negative  NEUROLOGICAL:  negative    Physical Exam   Temp: 98.4  F (36.9  C) Temp src: Temporal BP: 164/94 (nurse notified) Pulse: 107   Resp: 18 SpO2: 98 % O2 Device: None (Room air)    Vital Signs with Ranges  Temp:  [98.4  F (36.9  C)-99.3  F (37.4  C)] 98.4  F (36.9  C)  Pulse:  [107-113] 107  Resp:  [14-18] 18  BP: (152-164)/(89-94) 164/94  SpO2:  [98 %-99 %] 98 %  0 lbs 0 oz      Refusing vitals.     Constitutional: awake, alert, cooperative, no apparent distress, and appears stated age, disheveled  Eyes: Lids and lashes normal, pupils equal, round and reactive to light, extra ocular muscles intact, sclera clear, conjunctiva normal  ENT: Normocephalic, without obvious abnormality, atraumatic, external ears without lesions, oral pharynx with moist mucous membranes, no erythema or exudates, gums normal   Hematologic / Lymphatic: no cervical lymphadenopathy  Respiratory: No increased work of breathing, good air exchange, clear to auscultation bilaterally,  no crackles or wheezing  Cardiovascular: Normal apical impulse, regular rate and rhythm, normal S1 and S2, no S3 or S4, and no murmur noted  GI:  normal bowel sounds, soft, non-distended, non-tender, no masses palpated, no hepatosplenomegally  Genitounirinary: deferred  Skin: normal skin color, texture, turgor and no redness, warmth, or swelling  Musculoskeletal: There is no redness, warmth, or swelling of the joints.  Full range of motion noted.    Neurologic: Awake, alert, oriented to name, place and time.  Cranial nerves II-XII are grossly intact.    Neuropsychiatric: General: blunted, calm, and poor to intense at times eye contact    Data   Data reviewed today:   Recent Labs   Lab 12/12/24  1309   WBC 7.1   HGB 16.2   MCV 83         POTASSIUM 4.0   CHLORIDE 100   CO2 20*   BUN 15.9   CR 1.16   ANIONGAP 18*   NIKKI 9.8   GLC 94   ALBUMIN 5.0   PROTTOTAL 7.2   BILITOTAL 0.9   ALKPHOS 78   ALT 22   AST 20       No results found for this or any previous visit (from the past 24 hours).

## 2024-12-13 NOTE — PLAN OF CARE
Problem: Adult Behavioral Health Plan of Care  Goal: Patient-Specific Goal (Individualization)  Description:  Patient will accept medications as ordered by the physician   Patient will attend groups and participate in the unit mileau during hospitalization.   Patient will accept and follow the recommendations of the discharge planning team.  12/12/2024- No wean at this time due to agitation.  Treatment team to assess daily.   Outcome: Progressing   Goal Outcome Evaluation:        Face to face shift report received from RN. Rounding completed, pt observed.Client rested in room for 0 hours with eyes closed and respirations noted.Client had no falls or instances of instability this shift.

## 2024-12-13 NOTE — PROGRESS NOTES
[facility] PSYCHIATRY  PROGRESS NOTE     SUBJECTIVE     Prior to interviewing the patient, I met with nursing and reviewed patient's clinical condition. We discussed clinical care both before and after the interview. I have reviewed the patient's clinical course by review of records including previous notes, labs, and vital signs.     Per nursing, the patient had the following behavioral events over the last 24-hours: none.    On psychiatric interview, the patient was found in his room. He notes that he came in yesterday. He notes that he came in for some mental health problems. He notes that he has been feeling fine overall. He denies any problems. He notes feeling like he wants to go back home soon.    He notes that he has been using THC recently. He notes that he uses it daily. He notes that he uses medical THC for joint and bone pain. He notes that he has no intent to stop using. He notes that he does not smoke that much.     He notes that he would like to continue taking Gabapentin. He notes that he is not interested in using any neuroleptic. He is refusing any Clozapine. He is not interested taking Lithium. He notes that he will talk to his doctor when he leaves. He notes that he sees Nuno Hendrix.     He notes that he is not hearing any voices or seeing things. He denies feeling like his mind is playing tricks on him. He denies any safety concerns.       MEDICATIONS   Scheduled Meds:  Current Facility-Administered Medications   Medication Dose Route Frequency Provider Last Rate Last Admin     buPROPion (WELLBUTRIN XL) 24 hr tablet 150 mg  150 mg Oral QAM David Timmons, DO         buPROPion (WELLBUTRIN XL) 24 hr tablet 300 mg  300 mg Oral QAM David Timmons, DO         clonazePAM (klonoPIN) tablet 1.5 mg  1.5 mg Oral TID David Timmons, DO         gabapentin (NEURONTIN) capsule 800 mg  800 mg Oral TID David Timmons, DO         LORazepam (ATIVAN) injection 2 mg  2 mg Intramuscular Once  David Timmons DO         memantine (NAMENDA) tablet 10 mg  10 mg Oral BID David Timmons DO         PRN Meds:.  Current Facility-Administered Medications   Medication Dose Route Frequency Provider Last Rate Last Admin     acetaminophen (TYLENOL) tablet 650 mg  650 mg Oral Q4H PRN David Timmons DO         alum & mag hydroxide-simethicone (MAALOX) suspension 30 mL  30 mL Oral Q4H PRN David Timmons DO         haloperidol (HALDOL) tablet 5 mg  5 mg Oral Q8H PRN David Timmons DO        And     LORazepam (ATIVAN) tablet 2 mg  2 mg Oral Q8H PRN David Timmons DO        And     diphenhydrAMINE (BENADRYL) capsule 50 mg  50 mg Oral Q8H PRN David Timmons DO         haloperidol lactate (HALDOL) injection 5 mg  5 mg Intramuscular Q8H PRN David Timmons DO   5 mg at 12/12/24 1425    And     LORazepam (ATIVAN) injection 2 mg  2 mg Intramuscular Q8H PRN David Timmons DO   2 mg at 12/12/24 1425    And     diphenhydrAMINE (BENADRYL) injection 50 mg  50 mg Intramuscular Q8H PRN David Timmons DO   50 mg at 12/12/24 1449     hydrOXYzine HCl (ATARAX) tablet 25 mg  25 mg Oral Q6H PRN David Timmons DO         ibuprofen (ADVIL/MOTRIN) tablet 600 mg  600 mg Oral Q6H PRN Harmony Samuel CNP         magic mouthwash suspension (diphenhydrAMINE, lidocaine, aluminum-magnesium & simethicone)  5 mL Swish & Spit TID PRN Harmony Samuel CNP         OLANZapine (zyPREXA) tablet 10 mg  10 mg Oral Q6H PRN David Timmons DO        Or     OLANZapine (zyPREXA) injection 10 mg  10 mg Intramuscular Q6H PRN David Timmons DO         senna-docusate (SENOKOT-S/PERICOLACE) 8.6-50 MG per tablet 1 tablet  1 tablet Oral BID PRN David Timmons DO         traZODone (DESYREL) tablet 50 mg  50 mg Oral At Bedtime PRN David Timmons, DO            ALLERGIES   Allergies   Allergen Reactions     Pork Allergy         MENTAL STATUS EXAM   Vitals: /94   Pulse 107   Temp 98.4  F (36.9  C)  "(Temporal)   Resp 18   SpO2 98%     Appearance: Alert, oriented, dressed in hospital scrubs, disheveled  Attitude: Cooperative to an extent  Eye Contact: Intense  Mood: \"Alright\"  Affect: Blunted, reduced range   Speech: Normal rate and rhythm   Psychomotor Behavior: No TD or rigidity. No tremor or akathisia.   Thought Process: Disorganized  Associations: No loose associations   Thought Content: Denies SI, plan, or SIB. Denies AVH. Delusional thought present, although denies  Insight: Poor  Judgment: Poor  Oriented to: Person, place, and time  Attention Span and Concentration: Intact  Recent and Remote Memory: Intact  Language: English with appropriate syntax and vocabulary  Fund of Knowledge:  Low  Muscle Strength and Tone: Grossly normal  Gait and Station: Grossly normal       LABS   Recent Results (from the past 24 hours)   CBC with platelets    Collection Time: 12/12/24  1:09 PM   Result Value Ref Range    WBC Count 7.1 4.0 - 11.0 10e3/uL    RBC Count 5.54 4.40 - 5.90 10e6/uL    Hemoglobin 16.2 13.3 - 17.7 g/dL    Hematocrit 45.7 40.0 - 53.0 %    MCV 83 78 - 100 fL    MCH 29.2 26.5 - 33.0 pg    MCHC 35.4 31.5 - 36.5 g/dL    RDW 11.8 10.0 - 15.0 %    Platelet Count 285 150 - 450 10e3/uL   Comprehensive metabolic panel    Collection Time: 12/12/24  1:09 PM   Result Value Ref Range    Sodium 138 135 - 145 mmol/L    Potassium 4.0 3.4 - 5.3 mmol/L    Carbon Dioxide (CO2) 20 (L) 22 - 29 mmol/L    Anion Gap 18 (H) 7 - 15 mmol/L    Urea Nitrogen 15.9 6.0 - 20.0 mg/dL    Creatinine 1.16 0.67 - 1.17 mg/dL    GFR Estimate 84 >60 mL/min/1.73m2    Calcium 9.8 8.8 - 10.4 mg/dL    Chloride 100 98 - 107 mmol/L    Glucose 94 70 - 99 mg/dL    Alkaline Phosphatase 78 40 - 150 U/L    AST 20 0 - 45 U/L    ALT 22 0 - 70 U/L    Protein Total 7.2 6.4 - 8.3 g/dL    Albumin 5.0 3.5 - 5.2 g/dL    Bilirubin Total 0.9 <=1.2 mg/dL   Extra Red Top Tube    Collection Time: 12/12/24  1:09 PM   Result Value Ref Range    Hold Specimen JIC      "     IMPRESSION     This is a 34 year old male with a PMH of SZAD, polysubstance abuse, and multiple TBIs with neurological sequalae who presents in a severe psychotic and catatonic episode in the setting of medication partial non-adherence. Patient has SPMI living in [facility]. He has impaired functioning and generally poor insight. He has tried many medications having minimal response at times. He would benefit from hospitalization with resumption of medication as best able. Will focus first on treating catatonia along with psychosis.       DIAGNOSES     #. Schizoaffective disorder, bipolar type, continuous   #. Multiple TBIs with LOC with sequale   #. Hallucinogen (DXM) use disorder, in sustained remission  #. Stimulant use disorder, in sustained remission  #. Alcohol use disorder, in sustained remission  #. Cannabis use        PLAN     Location: Unit 5  Legal Status: Orders Placed This Encounter      Emergency Hospitalization Hold (72 Hr Hold)    Petition for commitment and Sarah being submitted to [facility]    Safety Assessment:    Behavioral Orders   Procedures     Code 1 - Restrict to Unit     Routine Programming     As clinically indicated     Status 15     Every 15 minutes.      PTA psychotropic medications held:      - Intuniv 1 mg at bedtime to simplify regimen  - Haldol 5 mg daily prn aggression due to using Zyprexa and Haldol/Benadryl/Ativan  - Ativan 2 mg BID prn aggression changed to Haldol/Benadryl/Ativan prn      PTA psychotropic medications continued/changed:      - Klonopin 1 mg TID increased to 1.5 mg TID temporarily   - Wellbutrin  mg daily  - Gabapentin 800 mg TID  - Namenda 10 mg BID     New psychotropic medications initiated:      - Standard unit prn agents, including Zyprexa prn agitation, in addition to Haldol/Benadryl/Ativan    Today's Changes:    - Encourage use of medications   - File CJ  - Recommend CAMARGO and/or Clozapine if patient willing to take    Programming: Patient will be  treated in a therapeutic milieu with appropriate individual and group therapies. Education will be provided on diagnoses, medications, and treatments.     Medical diagnoses:  Per medicine    Consult: None  Tests: None     Anticipated LOS: >7  days   Disposition: [place] with outpatient services       TREATMENT TEAM CARE PLAN     Progress: Continued symptoms.    Continued Stay Criteria/Rationale: Continued symptoms without sufficient improvement/resolution.    Medical/Physical: See above.    Precautions: See above.     Plan: Continue inpatient care with unit support and medication management.    Rationale for change in precautions or plan: NA due to no change.    Participants: David Timmons, DO, Nursing, SW, OT.    The patient's care was discussed with the treatment team and chart notes were reviewed.       ATTESTATION      Dr. David Timmons  Psychiatrist    Video Visit: Patient has given verbal consent for video visit?: Yes  Type of Service: video visit for mental health treatment  Reason for [place] Visit: [place]  Originating Site (patient location): [facility]  Distant Site (provider location): Remote Location  Mode of Communication: Video Conference via [employer]  Time of Service: Date: 12/13/2024 Start: 1030 end: 1044

## 2024-12-13 NOTE — PROGRESS NOTES
CLINICAL NUTRITION SERVICES  -  ASSESSMENT NOTE    REASON FOR ASSESSMENT:  Admission Nutrition Risk Screen - unsure of weight loss, reduced intake/appetite      NUTRITION HISTORY  Steven Carter is a 34 year old male admitted for psychosis with catatonia. PMH includes TBI, schizoaffective disorder, polysubstance abuse. Met criteria for malnutrition in 2022. No current weight or recent weight hx to review. Declining meals today.    Allergies     Allergies   Allergen Reactions    Pork Allergy        CURRENT NUTRITION ORDERS  Diet Order:   Orders Placed This Encounter      Regular Diet Adult    Current Intake/Tolerance: 100%, 0%, 0%    ANTHROPOMETRICS  Height: Data Unavailable  Weight: 0 lbs 0 oz  There is no height or weight on file to calculate BMI.  Weight Status:  unable to assess  Weight History:   Wt Readings from Last 10 Encounters:   03/26/23 89.5 kg (197 lb 4.8 oz)   02/25/23 89 kg (196 lb 4.8 oz)   08/27/22 85.2 kg (187 lb 14.4 oz)   03/05/22 93 kg (205 lb 1.6 oz)   12/14/21 97.7 kg (215 lb 4.8 oz)   12/01/21 97.8 kg (215 lb 11.2 oz)   10/31/21 105.1 kg (231 lb 11.2 oz)   03/27/21 105.2 kg (232 lb)   11/30/15 96.6 kg (213 lb)   08/18/14 94.5 kg (208 lb 6.4 oz)        LABS  Labs reviewed    MEDICATIONS  Medications reviewed    Malnutrition Diagnosis: Unable to determine due to limited hx available     NUTRITION INTERVENTIONS  Recommendations / Nutrition Prescription  Encourage intake during meal/snacks  Offer Ensure with meals  Multivitamin supplement with poor intake  Monitor weight as able    MONITORING AND EVALUATION:  Intake, weight, labs

## 2024-12-13 NOTE — PLAN OF CARE
Problem: Psychotic Signs/Symptoms  Goal: Improved Behavioral Control (Psychotic Signs/Symptoms)  Outcome: Progressing     Problem: Adult Behavioral Health Plan of Care  Goal: Patient-Specific Goal (Individualization)  Description:  Patient will accept medications as ordered by the physician   Patient will attend groups and participate in the unit mileau during hospitalization.   Patient will accept and follow the recommendations of the discharge planning team.  12/12/2024- No wean at this time due to agitation.  Treatment team to assess daily.   Outcome: Progressing     Patient in bed all shift.  Refused all meals and would not allow VS to be taken.  Offered patient his HS medication but he covered his mouth and stated he didn't want to take them.  Encouraged patient to take medication but he refused.  He remained polite with staff and did not display any aggressive behaviors.  He asked this patient what time it was and became worried when he learned it was 2100.  Asked this writer if it was ok for him to stay the night because it was to late for him to go home.  Reassured patient that he can stay and that we are here to help him.  Attempted to give HS medications again but his continued to refuse.  Patient in bed asleep as of 2200.  Face to face end of shift report communicated to night shift RN.     Shantal Real RN  12/12/2024  10:10 PM
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
## 2024-12-13 NOTE — PLAN OF CARE
"  Problem: Adult Behavioral Health Plan of Care  Goal: Patient-Specific Goal (Individualization)  Description:  Patient will accept medications as ordered by the physician   Patient will attend groups and participate in the unit mileau during hospitalization.   Patient will accept and follow the recommendations of the discharge planning team.  12/12/2024- No wean at this time due to agitation.  Treatment team to assess daily.   Outcome: Not Progressing     Problem: Psychotic Signs/Symptoms  Goal: Improved Mood Symptoms (Psychotic Signs/Symptoms)  Outcome: Not Progressing   Goal Outcome Evaluation:       Pt. In room so far this shift, refused to eat breakfast, is drinking adequate fluids, refused to take any prescribed medications, stated \"I don't need those today\", is dismissive with this writer, refused to answer assessment questions, has been resting in bed, will continue to monitor progress.    Face to face end of shift report will be communicated to oncoming afternoon shift RN.     Farida Saenz RN  12/13/2024  10:25 AM                      "

## 2024-12-13 NOTE — PLAN OF CARE
Face to face shift report received from Farida LARKIN. Rounding completed, pt observed laying in bed at the start of the shift.    Patient remains in MHICU due to unpredictable behavior. No wean at this time. Alert and making needs known. Laying in bed majority of the evening. Ate 50% of dinner. Denies SI/HI and pain. Requested hs medication tonight. Pleasant upon interactions and thanking staff. Requested orange juice and returned to bed for remainder of evening.    Problem: Adult Behavioral Health Plan of Care  Goal: Patient-Specific Goal (Individualization)  Description:  Patient will accept medications as ordered by the physician   Patient will attend groups and participate in the unit mileau during hospitalization.   Patient will accept and follow the recommendations of the discharge planning team.  12/13/2024- No wean at this time due to agitation.  Treatment team to assess daily.   Outcome: Not Progressing     Face to face report will be communicated to oncoming RN.    Juana English RN  12/13/2024

## 2024-12-14 LAB
ALBUMIN UR-MCNC: NEGATIVE MG/DL
AMPHETAMINES UR QL SCN: ABNORMAL
APPEARANCE UR: CLEAR
BARBITURATES UR QL SCN: ABNORMAL
BENZODIAZ UR QL SCN: ABNORMAL
BILIRUB UR QL STRIP: NEGATIVE
BZE UR QL SCN: ABNORMAL
CANNABINOIDS UR QL SCN: ABNORMAL
COLOR UR AUTO: NORMAL
FENTANYL UR QL: ABNORMAL
GLUCOSE UR STRIP-MCNC: NEGATIVE MG/DL
HGB UR QL STRIP: NEGATIVE
KETONES UR STRIP-MCNC: NEGATIVE MG/DL
LEUKOCYTE ESTERASE UR QL STRIP: NEGATIVE
NITRATE UR QL: NEGATIVE
OPIATES UR QL SCN: ABNORMAL
PCP QUAL URINE (ROCHE): ABNORMAL
PH UR STRIP: 7 [PH] (ref 4.7–8)
RBC URINE: <1 /HPF
SP GR UR STRIP: 1 (ref 1–1.03)
SQUAMOUS EPITHELIAL: 0 /HPF
UROBILINOGEN UR STRIP-MCNC: NORMAL MG/DL
WBC URINE: 1 /HPF

## 2024-12-14 PROCEDURE — 81003 URINALYSIS AUTO W/O SCOPE: CPT | Performed by: STUDENT IN AN ORGANIZED HEALTH CARE EDUCATION/TRAINING PROGRAM

## 2024-12-14 PROCEDURE — 250N000013 HC RX MED GY IP 250 OP 250 PS 637: Performed by: STUDENT IN AN ORGANIZED HEALTH CARE EDUCATION/TRAINING PROGRAM

## 2024-12-14 PROCEDURE — 99233 SBSQ HOSP IP/OBS HIGH 50: CPT | Mod: 95 | Performed by: STUDENT IN AN ORGANIZED HEALTH CARE EDUCATION/TRAINING PROGRAM

## 2024-12-14 PROCEDURE — 124N000001 HC R&B MH

## 2024-12-14 PROCEDURE — 80307 DRUG TEST PRSMV CHEM ANLYZR: CPT | Performed by: STUDENT IN AN ORGANIZED HEALTH CARE EDUCATION/TRAINING PROGRAM

## 2024-12-14 PROCEDURE — 250N000013 HC RX MED GY IP 250 OP 250 PS 637: Performed by: NURSE PRACTITIONER

## 2024-12-14 RX ADMIN — GABAPENTIN 800 MG: 400 CAPSULE ORAL at 08:34

## 2024-12-14 RX ADMIN — BUPROPION HYDROCHLORIDE 150 MG: 150 TABLET, EXTENDED RELEASE ORAL at 08:35

## 2024-12-14 RX ADMIN — GABAPENTIN 800 MG: 400 CAPSULE ORAL at 13:08

## 2024-12-14 RX ADMIN — GABAPENTIN 800 MG: 400 CAPSULE ORAL at 20:00

## 2024-12-14 RX ADMIN — CLONAZEPAM 1.5 MG: 0.5 TABLET ORAL at 13:08

## 2024-12-14 RX ADMIN — MEMANTINE 10 MG: 5 TABLET ORAL at 20:00

## 2024-12-14 RX ADMIN — Medication 5 MG: at 22:16

## 2024-12-14 RX ADMIN — BUPROPION HYDROCHLORIDE 300 MG: 300 TABLET, EXTENDED RELEASE ORAL at 08:34

## 2024-12-14 RX ADMIN — MEMANTINE 10 MG: 5 TABLET ORAL at 08:34

## 2024-12-14 RX ADMIN — CLONAZEPAM 1.5 MG: 0.5 TABLET ORAL at 20:00

## 2024-12-14 RX ADMIN — CLONAZEPAM 1.5 MG: 0.5 TABLET ORAL at 08:34

## 2024-12-14 ASSESSMENT — ACTIVITIES OF DAILY LIVING (ADL)
ADLS_ACUITY_SCORE: 34
ADLS_ACUITY_SCORE: 24
DRESS: INDEPENDENT
ADLS_ACUITY_SCORE: 34
ADLS_ACUITY_SCORE: 24
ORAL_HYGIENE: INDEPENDENT
DRESS: SCRUBS (BEHAVIORAL HEALTH);INDEPENDENT
ADLS_ACUITY_SCORE: 24
ADLS_ACUITY_SCORE: 24
LAUNDRY: UNABLE TO COMPLETE
ADLS_ACUITY_SCORE: 24
ADLS_ACUITY_SCORE: 24
ADLS_ACUITY_SCORE: 34
ADLS_ACUITY_SCORE: 24
ADLS_ACUITY_SCORE: 34
LAUNDRY: UNABLE TO COMPLETE
ADLS_ACUITY_SCORE: 24
ADLS_ACUITY_SCORE: 34
ADLS_ACUITY_SCORE: 24
HYGIENE/GROOMING: INDEPENDENT
ORAL_HYGIENE: INDEPENDENT
HYGIENE/GROOMING: INDEPENDENT;SHOWER

## 2024-12-14 NOTE — PROGRESS NOTES
[facility] PSYCHIATRY  PROGRESS NOTE     SUBJECTIVE     Prior to interviewing the patient, I met with nursing and reviewed patient's clinical condition. We discussed clinical care both before and after the interview. I have reviewed the patient's clinical course by review of records including previous notes, labs, and vital signs.     Per nursing, the patient had the following behavioral events over the last 24-hours: none. Continues in MHICU. Spent majority of time in bed. Did starting taking some medications.     On psychiatric interview, the patient was found in his room. He notes that he has some trouble urinating. He notes that it feels like it is not coming out. He denies any burning or that his urine smells. He denies any abdominal pain.    He notes that he is willing to start taking medications. He is not interested in any additional ones besides what he has been taking. He notes that he does not want to take Clozapine or Lithium.    He denies any problems with taking the medications he started.     He denies any other acute concerns.        MEDICATIONS   Scheduled Meds:  Current Facility-Administered Medications   Medication Dose Route Frequency Provider Last Rate Last Admin     buPROPion (WELLBUTRIN XL) 24 hr tablet 150 mg  150 mg Oral QAM David Timmons, DO         buPROPion (WELLBUTRIN XL) 24 hr tablet 300 mg  300 mg Oral QAM David Timmons, DO         clonazePAM (klonoPIN) tablet 1.5 mg  1.5 mg Oral TID David Timmons DO   1.5 mg at 12/13/24 2027     gabapentin (NEURONTIN) capsule 800 mg  800 mg Oral TID David Timmons DO   800 mg at 12/13/24 2026     LORazepam (ATIVAN) injection 2 mg  2 mg Intramuscular Once David Timmons DO         memantine (NAMENDA) tablet 10 mg  10 mg Oral BID David Timmons, DO   10 mg at 12/13/24 2027     PRN Meds:.  Current Facility-Administered Medications   Medication Dose Route Frequency Provider Last Rate Last Admin     acetaminophen (TYLENOL)  "tablet 650 mg  650 mg Oral Q4H PRN David Timmons DO         alum & mag hydroxide-simethicone (MAALOX) suspension 30 mL  30 mL Oral Q4H PRN David Timmons DO         haloperidol (HALDOL) tablet 5 mg  5 mg Oral Q8H PRN David Timmons DO        And     LORazepam (ATIVAN) tablet 2 mg  2 mg Oral Q8H PRN David Timmons DO        And     diphenhydrAMINE (BENADRYL) capsule 50 mg  50 mg Oral Q8H PRN David Timmons DO         haloperidol lactate (HALDOL) injection 5 mg  5 mg Intramuscular Q8H PRN David Timmons DO   5 mg at 12/12/24 1425    And     LORazepam (ATIVAN) injection 2 mg  2 mg Intramuscular Q8H PRN David Timmons DO   2 mg at 12/12/24 1425    And     diphenhydrAMINE (BENADRYL) injection 50 mg  50 mg Intramuscular Q8H PRN David Timmons DO   50 mg at 12/12/24 1449     hydrOXYzine HCl (ATARAX) tablet 25 mg  25 mg Oral Q6H PRN David Timmons DO         ibuprofen (ADVIL/MOTRIN) tablet 600 mg  600 mg Oral Q6H PRN Harmony Samuel CNP         magic mouthwash suspension (diphenhydrAMINE, lidocaine, aluminum-magnesium & simethicone)  5 mL Swish & Spit TID PRN Harmony Samuel CNP         OLANZapine (zyPREXA) tablet 10 mg  10 mg Oral Q6H PRN David Timmons DO        Or     OLANZapine (zyPREXA) injection 10 mg  10 mg Intramuscular Q6H PRN David Timmons DO         senna-docusate (SENOKOT-S/PERICOLACE) 8.6-50 MG per tablet 1 tablet  1 tablet Oral BID PRN David Timmons DO         traZODone (DESYREL) tablet 50 mg  50 mg Oral At Bedtime PRN David Timmons DO            ALLERGIES   Allergies   Allergen Reactions     Pork Allergy         MENTAL STATUS EXAM   Vitals: BP 98/59   Pulse 75   Temp 96.8  F (36  C) (Temporal)   Resp 14   SpO2 99%     Appearance: Alert, oriented, dressed in hospital scrubs, disheveled  Attitude: Cooperative to an extent  Eye Contact: Intense  Mood: \"Fine\"  Affect: Blunted, reduced range   Speech: Normal rate and rhythm   Psychomotor " Behavior: No TD or rigidity. No tremor or akathisia.   Thought Process: More linear  Associations: No loose associations   Thought Content: Denies SI, plan, or SIB. Denies AVH. Delusional thought present, although denies  Insight: Poor  Judgment: Poor  Oriented to: Person, place, and time  Attention Span and Concentration: Intact  Recent and Remote Memory: Intact  Language: English with appropriate syntax and vocabulary  Fund of Knowledge:  Low  Muscle Strength and Tone: Grossly normal  Gait and Station: Grossly normal       LABS   No results found for this or any previous visit (from the past 24 hours).        IMPRESSION     This is a 34 year old male with a PMH of SZAD, polysubstance abuse, and multiple TBIs with neurological sequalae who presents in a severe psychotic and catatonic episode in the setting of medication partial non-adherence. Patient has SPMI living in [facility]. He has impaired functioning and generally poor insight. He has tried many medications having minimal response at times. He would benefit from hospitalization with resumption of medication as best able. Will focus first on treating catatonia along with psychosis.    Today: Patient continues to be psychotic, albeit calmer. Started taking medications, albeit cautiously. Refusing interventions of any new medications. Commitment and Sarah submitted.        DIAGNOSES     #. Schizoaffective disorder, bipolar type, continuous   #. Multiple TBIs with LOC with sequale   #. Hallucinogen (DXM) use disorder, in sustained remission  #. Stimulant use disorder, in sustained remission  #. Alcohol use disorder, in sustained remission  #. Cannabis use        PLAN     Location: Unit 5  Legal Status: Orders Placed This Encounter      Emergency Hospitalization Hold (72 Hr Hold)    Petition for commitment and Sarah being submitted to [facility]    Safety Assessment:    Behavioral Orders   Procedures     Code 1 - Restrict to Unit     Routine Programming     As  clinically indicated     Status 15     Every 15 minutes.      PTA psychotropic medications held:      - Intuniv 1 mg at bedtime to simplify regimen  - Haldol 5 mg daily prn aggression due to using Zyprexa and Haldol/Benadryl/Ativan  - Ativan 2 mg BID prn aggression changed to Haldol/Benadryl/Ativan prn      PTA psychotropic medications continued/changed:      - Klonopin 1 mg TID increased to 1.5 mg TID temporarily   - Wellbutrin  mg daily  - Gabapentin 800 mg TID  - Namenda 10 mg BID     New psychotropic medications initiated:      - Standard unit prn agents, including Zyprexa prn agitation, in addition to Haldol/Benadryl/Ativan    Today's Changes:    - Encourage use of medications   - Recommend CAMARGO and/or Clozapine if patient willing to take    Programming: Patient will be treated in a therapeutic milieu with appropriate individual and group therapies. Education will be provided on diagnoses, medications, and treatments.     Medical diagnoses:  Per medicine    Consult: None  Tests: None     Anticipated LOS: >7  days   Disposition: [place] with outpatient services       ATTESTATION      Dr. David Timmons  Psychiatrist    Video Visit: Patient has given verbal consent for video visit?: Yes  Type of Service: video visit for mental health treatment  Reason for [place] Visit: [place]  Originating Site (patient location): [facility]  Distant Site (provider location): Remote Location  Mode of Communication: Video Conference via [employer]  Time of Service: Date: 12/14/2024 Start: 845 end: 910

## 2024-12-14 NOTE — PLAN OF CARE
Problem: Adult Behavioral Health Plan of Care  Goal: Patient-Specific Goal (Individualization)  Description:  Patient will accept medications as ordered by the physician   Patient will attend groups and participate in the unit mileau during hospitalization.   Patient will accept and follow the recommendations of the discharge planning team.  12/13/2024- No wean at this time due to agitation.  Treatment team to assess daily.   12/14/2024 0236 by Farida Cox, RN  Outcome: Progressing  Note: Report received from [facility]. Rounding complete. Pt observed sleeping in prone position with regular and unlabored respirations.    Pt has been in bed with eyes closed and regular respirations. 15 minute and PRN checks all night. No complaints offered.     Pt slept approx  7.5  hours this NOC shift.    Face to face end of shift report communicated to oncoming RN.    Farida KIMBLE RN  December 14, 2024  2:36 AM          Problem: Thought Process Alteration  Goal: Optimal Thought Clarity  Outcome: Progressing  Note: Unable to assess due to pt sleeping. No issues or concerns noted at this time.     Goal Outcome Evaluation:

## 2024-12-14 NOTE — PLAN OF CARE
Problem: Adult Behavioral Health Plan of Care  Goal: Patient-Specific Goal (Individualization)  Description:  Patient will accept medications as ordered by the physician   Patient will attend groups and participate in the unit mileau during hospitalization.   Patient will accept and follow the recommendations of the discharge planning team.  12/13/2024- No wean at this time due to agitation.  Treatment team to assess daily.   Outcome: Progressing     Problem: Thought Process Alteration  Goal: Optimal Thought Clarity  Outcome: Progressing   Goal Outcome Evaluation:         Pt. In room so far this shift, took prescribed medications, answering questions appropriately so far, endorses mild depression and anxiety, denies suicidal ideation and hallucinations, slept 7.5 hours last night, is able to make needs be known, affect is flat, does not initiate conversation, but will answer questions, no wean today due to unpredictable behaviors, will continue to monitor progress.    Face to face end of shift report will be communicated to oncoming afternoon shift RN.     Farida Saenz RN  12/14/2024  10:28 AM

## 2024-12-15 PROCEDURE — 99233 SBSQ HOSP IP/OBS HIGH 50: CPT | Mod: 95 | Performed by: STUDENT IN AN ORGANIZED HEALTH CARE EDUCATION/TRAINING PROGRAM

## 2024-12-15 PROCEDURE — 250N000013 HC RX MED GY IP 250 OP 250 PS 637: Performed by: STUDENT IN AN ORGANIZED HEALTH CARE EDUCATION/TRAINING PROGRAM

## 2024-12-15 PROCEDURE — 124N000001 HC R&B MH

## 2024-12-15 RX ADMIN — CLONAZEPAM 1.5 MG: 0.5 TABLET ORAL at 20:10

## 2024-12-15 RX ADMIN — BUPROPION HYDROCHLORIDE 300 MG: 300 TABLET, EXTENDED RELEASE ORAL at 08:26

## 2024-12-15 RX ADMIN — CLONAZEPAM 1.5 MG: 0.5 TABLET ORAL at 13:06

## 2024-12-15 RX ADMIN — BUPROPION HYDROCHLORIDE 150 MG: 150 TABLET, EXTENDED RELEASE ORAL at 08:27

## 2024-12-15 RX ADMIN — GABAPENTIN 800 MG: 400 CAPSULE ORAL at 08:26

## 2024-12-15 RX ADMIN — OLANZAPINE 10 MG: 10 TABLET, FILM COATED ORAL at 21:50

## 2024-12-15 RX ADMIN — CLONAZEPAM 1.5 MG: 0.5 TABLET ORAL at 08:27

## 2024-12-15 RX ADMIN — MEMANTINE 10 MG: 5 TABLET ORAL at 20:10

## 2024-12-15 RX ADMIN — GABAPENTIN 800 MG: 400 CAPSULE ORAL at 13:06

## 2024-12-15 RX ADMIN — MEMANTINE 10 MG: 5 TABLET ORAL at 08:26

## 2024-12-15 RX ADMIN — GABAPENTIN 800 MG: 400 CAPSULE ORAL at 20:10

## 2024-12-15 ASSESSMENT — ACTIVITIES OF DAILY LIVING (ADL)
ORAL_HYGIENE: INDEPENDENT
ADLS_ACUITY_SCORE: 24
HYGIENE/GROOMING: INDEPENDENT
ADLS_ACUITY_SCORE: 24
ORAL_HYGIENE: INDEPENDENT
ADLS_ACUITY_SCORE: 24
ADLS_ACUITY_SCORE: 24
DRESS: SCRUBS (BEHAVIORAL HEALTH);INDEPENDENT
ADLS_ACUITY_SCORE: 24
LAUNDRY: UNABLE TO COMPLETE
ADLS_ACUITY_SCORE: 24
ADLS_ACUITY_SCORE: 24
DRESS: INDEPENDENT;SCRUBS (BEHAVIORAL HEALTH)
ADLS_ACUITY_SCORE: 24
HYGIENE/GROOMING: INDEPENDENT
ADLS_ACUITY_SCORE: 24
ADLS_ACUITY_SCORE: 24
LAUNDRY: UNABLE TO COMPLETE
ADLS_ACUITY_SCORE: 24

## 2024-12-15 NOTE — PLAN OF CARE
Problem: Thought Process Alteration  Goal: Optimal Thought Clarity  Outcome: Progressing     Problem: Adult Behavioral Health Plan of Care  Goal: Patient-Specific Goal (Individualization)  Description:  Patient will accept medications as ordered by the physician   Patient will attend groups and participate in the unit mileau during hospitalization.   Patient will accept and follow the recommendations of the discharge planning team.  12/14/2024- No wean at this time due to agitation.  Treatment team to assess daily.   Outcome: Progressing     Face to Face report received from TRAE Murguia. Rounding completed. Pt has been bed with eyes closed and regular respirations noted. 15 minute and PRN checks all night. No reports of falls or self-harm.     Face to face end of shift report communicated to day shift RN.     Amara Dial RN  12/15/2024  5:11 AM

## 2024-12-15 NOTE — PLAN OF CARE
Problem: Thought Process Alteration  Goal: Optimal Thought Clarity  Outcome: Progressing     Problem: Adult Behavioral Health Plan of Care  Goal: Patient-Specific Goal (Individualization)  Description:  Patient will accept medications as ordered by the physician   Patient will attend groups and participate in the unit mileau during hospitalization.   Patient will accept and follow the recommendations of the discharge planning team.  12/14/2024- No wean at this time due to agitation.  Treatment team to assess daily.   Outcome: Progressing     Patient calm, cooperative, and medication complaint this shift.  He is able to have a linear, reality based conversation with staff.  Asking appropriate questions about his medications and what his treatment plan is while he is in the hospital.  He is concerned that he is going to be sent far away and will lose all of his belongings that are currently at [facility].  Patient ate 100% of meals and requested snacks.  No complaints of pain.  Vs WNL.  Face to face end of shift report communicated to night shift RN.     Shantal Real RN  12/14/2024  9:39 PM           Detail Level: Zone Include Location In Plan?: No Detail Level: Detailed

## 2024-12-15 NOTE — PROGRESS NOTES
[facility] PSYCHIATRY  PROGRESS NOTE     SUBJECTIVE     Prior to interviewing the patient, I met with nursing and reviewed patient's clinical condition. We discussed clinical care both before and after the interview. I have reviewed the patient's clinical course by review of records including previous notes, labs, and vital signs.     Per nursing, the patient had the following behavioral events over the last 24-hours: none. Continues in MHICU. Spent majority of time in bed. Did starting taking some medications.     On psychiatric interview, the patient was found in his room. He notes that he is doing alright today. He notes that he is doing fine with getting back on medications. He denies any problems with them. He notes that he had some trouble sleeping last night. He notes that he has been resting a lot in bed.     He notes that he is supposed to be getting 1 mg of Klonopin 3x a day. Discussed rationale for being on the higher dose for right now.    He notes that he is not sure why he here. Discussed the rationale for him being here. He notes that he disagrees with him being verbally aggressive.        MEDICATIONS   Scheduled Meds:  Current Facility-Administered Medications   Medication Dose Route Frequency Provider Last Rate Last Admin     buPROPion (WELLBUTRIN XL) 24 hr tablet 150 mg  150 mg Oral QAM David Timmons, DO   150 mg at 12/14/24 0835     buPROPion (WELLBUTRIN XL) 24 hr tablet 300 mg  300 mg Oral QAM David Timmons, DO   300 mg at 12/14/24 0834     clonazePAM (klonoPIN) tablet 1.5 mg  1.5 mg Oral TID David Timmons, DO   1.5 mg at 12/14/24 2000     gabapentin (NEURONTIN) capsule 800 mg  800 mg Oral TID David Timmons DO   800 mg at 12/14/24 2000     memantine (NAMENDA) tablet 10 mg  10 mg Oral BID David Timmons, DO   10 mg at 12/14/24 2000     PRN Meds:.  Current Facility-Administered Medications   Medication Dose Route Frequency Provider Last Rate Last Admin     acetaminophen  (TYLENOL) tablet 650 mg  650 mg Oral Q4H PRN David Timmons DO         alum & mag hydroxide-simethicone (MAALOX) suspension 30 mL  30 mL Oral Q4H PRN David Timmons DO         haloperidol (HALDOL) tablet 5 mg  5 mg Oral Q8H PRN David Timmons DO        And     LORazepam (ATIVAN) tablet 2 mg  2 mg Oral Q8H PRN David Timmons DO        And     diphenhydrAMINE (BENADRYL) capsule 50 mg  50 mg Oral Q8H PRN David Timmons DO         haloperidol lactate (HALDOL) injection 5 mg  5 mg Intramuscular Q8H PRN David Timmons DO   5 mg at 12/12/24 1425    And     LORazepam (ATIVAN) injection 2 mg  2 mg Intramuscular Q8H PRN David Timmons DO   2 mg at 12/12/24 1425    And     diphenhydrAMINE (BENADRYL) injection 50 mg  50 mg Intramuscular Q8H PRN David Timmons DO   50 mg at 12/12/24 1449     hydrOXYzine HCl (ATARAX) tablet 25 mg  25 mg Oral Q6H PRN David Timmons DO         ibuprofen (ADVIL/MOTRIN) tablet 600 mg  600 mg Oral Q6H PRN Harmony Samuel CNP         magic mouthwash suspension (diphenhydrAMINE, lidocaine, aluminum-magnesium & simethicone)  5 mL Swish & Spit TID PRN Harmony Samuel CNP         melatonin tablet 5 mg  5 mg Oral At Bedtime PRN Nargis Solomon APRN CNP   5 mg at 12/14/24 2216     OLANZapine (zyPREXA) tablet 10 mg  10 mg Oral Q6H PRN David Timmons DO        Or     OLANZapine (zyPREXA) injection 10 mg  10 mg Intramuscular Q6H PRN David Timmons DO         senna-docusate (SENOKOT-S/PERICOLACE) 8.6-50 MG per tablet 1 tablet  1 tablet Oral BID PRN David Timmons DO         traZODone (DESYREL) tablet 50 mg  50 mg Oral At Bedtime PRN Rebman, David Gaetano, DO            ALLERGIES   Allergies   Allergen Reactions     Pork Allergy         MENTAL STATUS EXAM   Vitals: /69   Pulse 66   Temp (!) 96.7  F (35.9  C) (Temporal)   Resp 15   SpO2 99%     Appearance: Alert, oriented, dressed in hospital scrubs, disheveled  Attitude: Cooperative to an  "extent  Eye Contact: Intense  Mood: \"Alright\"  Affect: Blunted, reduced range   Speech: Normal rate and rhythm   Psychomotor Behavior: No TD or rigidity. No tremor or akathisia.   Thought Process: More linear  Associations: No loose associations   Thought Content: Denies SI, plan, or SIB. Denies AVH. Delusional thought present, although denies  Insight: Poor  Judgment: Poor  Oriented to: Person, place, and time  Attention Span and Concentration: Intact  Recent and Remote Memory: Intact  Language: English with appropriate syntax and vocabulary  Fund of Knowledge:  Low  Muscle Strength and Tone: Grossly normal  Gait and Station: Grossly normal       LABS   Recent Results (from the past 24 hours)   UA with Microscopic reflex to Culture    Collection Time: 12/14/24 11:55 AM    Specimen: Urine, Midstream   Result Value Ref Range    Color Urine Straw Colorless, Straw, Light Yellow, Yellow    Appearance Urine Clear Clear    Glucose Urine Negative Negative mg/dL    Bilirubin Urine Negative Negative    Ketones Urine Negative Negative mg/dL    Specific Gravity Urine 1.003 1.003 - 1.035    Blood Urine Negative Negative    pH Urine 7.0 4.7 - 8.0    Protein Albumin Urine Negative Negative mg/dL    Urobilinogen Urine Normal Normal, 2.0 mg/dL    Nitrite Urine Negative Negative    Leukocyte Esterase Urine Negative Negative    RBC Urine <1 <=2 /HPF    WBC Urine 1 <=5 /HPF    Squamous Epithelials Urine 0 <=1 /HPF   Urine Drug Screen Panel    Collection Time: 12/14/24 11:55 AM   Result Value Ref Range    Amphetamines Urine Screen Negative Screen Negative    Barbituates Urine Screen Negative Screen Negative    Benzodiazepine Urine Screen Positive (A) Screen Negative    Cannabinoids Urine Screen Positive (A) Screen Negative    Cocaine Urine Screen Negative Screen Negative    Fentanyl Qual Urine Screen Negative Screen Negative    Opiates Urine Screen Negative Screen Negative    PCP Urine Screen Negative Screen Negative       "     IMPRESSION     This is a 34 year old male with a PMH of SZAD, polysubstance abuse, and multiple TBIs with neurological sequalae who presents in a severe psychotic and catatonic episode in the setting of medication partial non-adherence. Patient has SPMI living in [facility]. He has impaired functioning and generally poor insight. He has tried many medications having minimal response at times. He would benefit from hospitalization with resumption of medication as best able. Will focus first on treating catatonia along with psychosis.    Today: Patient continues to be psychotic, albeit calmer. Started taking medications, albeit cautiously. Refusing interventions of any new medications. Commitment and Sarah submitted. Starting to wean from MHICU.        DIAGNOSES     #. Schizoaffective disorder, bipolar type, continuous   #. Multiple TBIs with LOC with sequale   #. Hallucinogen (DXM) use disorder, in sustained remission  #. Stimulant use disorder, in sustained remission  #. Alcohol use disorder, in sustained remission  #. Cannabis use        PLAN     Location: Unit 5  Legal Status: Orders Placed This Encounter      Emergency Hospitalization Hold (72 Hr Hold)    Petition for commitment and Sarah submitted to [facility]    Safety Assessment:    Behavioral Orders   Procedures     Code 1 - Restrict to Unit     Routine Programming     As clinically indicated     Status 15     Every 15 minutes.      PTA psychotropic medications held:      - Intuniv 1 mg at bedtime to simplify regimen  - Haldol 5 mg daily prn aggression due to using Zyprexa and Haldol/Benadryl/Ativan  - Ativan 2 mg BID prn aggression changed to Haldol/Benadryl/Ativan prn      PTA psychotropic medications continued/changed:      - Klonopin 1 mg TID increased to 1.5 mg TID temporarily   - Wellbutrin  mg daily  - Gabapentin 800 mg TID  - Namenda 10 mg BID     New psychotropic medications initiated:      - Standard unit prn agents, including Zyprexa  prn agitation, in addition to Haldol/Benadryl/Ativan    Today's Changes:    - Recommend CAMARGO and/or Clozapine if patient willing to take    Programming: Patient will be treated in a therapeutic milieu with appropriate individual and group therapies. Education will be provided on diagnoses, medications, and treatments.     Medical diagnoses:  Per medicine    #. Difficulty voiding  - Likely secondary to Klopin  - UA wnl  - Monitor and decrease Klonopin cautiously over next week    Consult: None  Tests: None     Anticipated LOS: >7  days   Disposition: [place] with outpatient services       ATTESTATION      Dr. David Timmons  Psychiatrist    Video Visit: Patient has given verbal consent for video visit?: Yes  Type of Service: video visit for mental health treatment  Reason for [place] Visit: [place]  Originating Site (patient location): [facility]  Distant Site (provider location): Remote Location  Mode of Communication: Video Conference via [employer]  Time of Service: Date: 12/15/2024 Start:730 end: 842

## 2024-12-15 NOTE — PLAN OF CARE
Problem: Adult Behavioral Health Plan of Care  Goal: Patient-Specific Goal (Individualization)  Description:  Patient will accept medications as ordered by the physician   Patient will attend groups and participate in the unit mileau during hospitalization.   Patient will accept and follow the recommendations of the discharge planning team.  12/14/2024- No wean at this time due to agitation.  Treatment team to assess daily.   Outcome: Progressing     Problem: Thought Process Alteration  Goal: Optimal Thought Clarity  Outcome: Progressing   Goal Outcome Evaluation:     Plan of Care Reviewed With: patient       Pt. In room at beginning of shift, slept 7 hours last night, taking prescribed medications, is able to make needs be known, affect remains flat and blunted, endorses depression and anxiety, paces in his room and in the MHICU, unable to wean at this time due to unpredictable behaviors, appetite is better, eating the breakfast meal provided, taking in adequate amounts of fluids, will continue to monitor progress.    Face to face end of shift report will be communicated to oncoming afternoon shift RN.     Farida Saenz RN  12/15/2024  11:19 AM

## 2024-12-16 PROCEDURE — 250N000013 HC RX MED GY IP 250 OP 250 PS 637: Performed by: STUDENT IN AN ORGANIZED HEALTH CARE EDUCATION/TRAINING PROGRAM

## 2024-12-16 PROCEDURE — 99233 SBSQ HOSP IP/OBS HIGH 50: CPT | Mod: 95 | Performed by: STUDENT IN AN ORGANIZED HEALTH CARE EDUCATION/TRAINING PROGRAM

## 2024-12-16 PROCEDURE — 124N000001 HC R&B MH

## 2024-12-16 RX ADMIN — BUPROPION HYDROCHLORIDE 150 MG: 150 TABLET, EXTENDED RELEASE ORAL at 08:34

## 2024-12-16 RX ADMIN — BUPROPION HYDROCHLORIDE 300 MG: 300 TABLET, EXTENDED RELEASE ORAL at 08:34

## 2024-12-16 RX ADMIN — MEMANTINE 10 MG: 5 TABLET ORAL at 20:35

## 2024-12-16 RX ADMIN — MEMANTINE 10 MG: 5 TABLET ORAL at 08:35

## 2024-12-16 RX ADMIN — CLONAZEPAM 1.5 MG: 0.5 TABLET ORAL at 08:35

## 2024-12-16 RX ADMIN — CLONAZEPAM 1.5 MG: 0.5 TABLET ORAL at 20:35

## 2024-12-16 RX ADMIN — GABAPENTIN 800 MG: 400 CAPSULE ORAL at 13:14

## 2024-12-16 RX ADMIN — GABAPENTIN 800 MG: 400 CAPSULE ORAL at 20:35

## 2024-12-16 RX ADMIN — GABAPENTIN 800 MG: 400 CAPSULE ORAL at 08:36

## 2024-12-16 RX ADMIN — CLONAZEPAM 1.5 MG: 0.5 TABLET ORAL at 13:15

## 2024-12-16 ASSESSMENT — ACTIVITIES OF DAILY LIVING (ADL)
ADLS_ACUITY_SCORE: 24
HYGIENE/GROOMING: INDEPENDENT
ADLS_ACUITY_SCORE: 24
DRESS: SCRUBS (BEHAVIORAL HEALTH);INDEPENDENT
ORAL_HYGIENE: INDEPENDENT
ADLS_ACUITY_SCORE: 24
LAUNDRY: UNABLE TO COMPLETE
ADLS_ACUITY_SCORE: 24

## 2024-12-16 NOTE — PROGRESS NOTES
[facility] PSYCHIATRY  PROGRESS NOTE     SUBJECTIVE     Prior to interviewing the patient, I met with nursing and reviewed patient's clinical condition. We discussed clinical care both before and after the interview. I have reviewed the patient's clinical course by review of records including previous notes, labs, and vital signs.     Per nursing, the patient had the following behavioral events over the last 24-hours: none. Continues in MHICU due to trouble weaning. Did receive prn Zyprexa yesterday.     On psychiatric interview, the patient was found in his room. He notes that he is tired this morning. He got some sleep. He did take a prn Zyprexa.    He is not sure if the pills are real. He notes that it might be part of his schizoaffective disorder.     He notes that he got upset last night over family situation. He notes that he got upset because he feels like it was prolonging the period he is in the hospital.    He notes that he is not interested in any scheduled medications right now apart from what he is taking. Discussed my recommendation.       MEDICATIONS   Scheduled Meds:  Current Facility-Administered Medications   Medication Dose Route Frequency Provider Last Rate Last Admin     buPROPion (WELLBUTRIN XL) 24 hr tablet 150 mg  150 mg Oral QAM David Timmons, DO   150 mg at 12/16/24 0834     buPROPion (WELLBUTRIN XL) 24 hr tablet 300 mg  300 mg Oral QAM David Timmons, DO   300 mg at 12/16/24 0834     clonazePAM (klonoPIN) tablet 1.5 mg  1.5 mg Oral TID David Timmons DO   1.5 mg at 12/16/24 0835     gabapentin (NEURONTIN) capsule 800 mg  800 mg Oral TID David Timmons DO   800 mg at 12/16/24 0836     memantine (NAMENDA) tablet 10 mg  10 mg Oral BID David Timmons, DO   10 mg at 12/16/24 0835     PRN Meds:.  Current Facility-Administered Medications   Medication Dose Route Frequency Provider Last Rate Last Admin     acetaminophen (TYLENOL) tablet 650 mg  650 mg Oral Q4H PRN  David Timmons DO         alum & mag hydroxide-simethicone (MAALOX) suspension 30 mL  30 mL Oral Q4H PRN David Timmons DO         haloperidol (HALDOL) tablet 5 mg  5 mg Oral Q8H PRN David Timmons DO        And     LORazepam (ATIVAN) tablet 2 mg  2 mg Oral Q8H PRN David Timmons DO        And     diphenhydrAMINE (BENADRYL) capsule 50 mg  50 mg Oral Q8H PRN David Timmons DO         haloperidol lactate (HALDOL) injection 5 mg  5 mg Intramuscular Q8H PRN David Timmons DO   5 mg at 12/12/24 1425    And     LORazepam (ATIVAN) injection 2 mg  2 mg Intramuscular Q8H PRN David Timmons DO   2 mg at 12/12/24 1425    And     diphenhydrAMINE (BENADRYL) injection 50 mg  50 mg Intramuscular Q8H PRN David Timmons DO   50 mg at 12/12/24 1449     hydrOXYzine HCl (ATARAX) tablet 25 mg  25 mg Oral Q6H PRN David Timmons DO         ibuprofen (ADVIL/MOTRIN) tablet 600 mg  600 mg Oral Q6H PRN Harmony Samuel CNP         magic mouthwash suspension (diphenhydrAMINE, lidocaine, aluminum-magnesium & simethicone)  5 mL Swish & Spit TID PRN Harmony Samuel CNP         melatonin tablet 5 mg  5 mg Oral At Bedtime PRN Nargis Solomon APRN CNP   5 mg at 12/14/24 2216     OLANZapine (zyPREXA) tablet 10 mg  10 mg Oral Q6H PRN David Timmons DO   10 mg at 12/15/24 2150    Or     OLANZapine (zyPREXA) injection 10 mg  10 mg Intramuscular Q6H PRN David Timmons DO         senna-docusate (SENOKOT-S/PERICOLACE) 8.6-50 MG per tablet 1 tablet  1 tablet Oral BID PRN David Timmons DO         traZODone (DESYREL) tablet 50 mg  50 mg Oral At Bedtime PRN David Timmons DO            ALLERGIES   Allergies   Allergen Reactions     Pork Allergy         MENTAL STATUS EXAM   Vitals: /84   Pulse 98   Temp 97.2  F (36.2  C) (Temporal)   Resp 16   SpO2 96%     Appearance: Alert, oriented, dressed in hospital scrubs, disheveled  Attitude: Cooperative to an extent  Eye Contact:  "Intense  Mood: \"Fine\"  Affect: Blunted, reduced range   Speech: Normal rate and rhythm   Psychomotor Behavior: No TD or rigidity. No tremor or akathisia.   Thought Process: More linear  Associations: No loose associations   Thought Content: Denies SI, plan, or SIB. Denies AVH. Delusional thought present, although denies  Insight: Poor  Judgment: Poor  Oriented to: Person, place, and time  Attention Span and Concentration: Intact  Recent and Remote Memory: Intact  Language: English with appropriate syntax and vocabulary  Fund of Knowledge:  Low  Muscle Strength and Tone: Grossly normal  Gait and Station: Grossly normal       LABS   No results found for this or any previous visit (from the past 24 hours).          IMPRESSION     This is a 34 year old male with a PMH of SZAD, polysubstance abuse, and multiple TBIs with neurological sequalae who presents in a severe psychotic and catatonic episode in the setting of medication partial non-adherence. Patient has SPMI living in [place]. He has impaired functioning and generally poor insight. He has tried many medications having minimal response at times. He would benefit from hospitalization with resumption of medication as best able. Will focus first on treating catatonia along with psychosis.    Today: Patient continues to be psychotic, albeit calmer. Started taking medications, albeit cautiously. Refusing interventions of any new medications. Commitment and Sarah submitted. Starting to wean from MHICU, with this stopped given agitation.       DIAGNOSES     #. Schizoaffective disorder, bipolar type, continuous   #. Multiple TBIs with LOC with sequale   #. Hallucinogen (DXM) use disorder, in sustained remission  #. Stimulant use disorder, in sustained remission  #. Alcohol use disorder, in sustained remission  #. Cannabis use        PLAN     Location: Unit 5  Legal Status: Orders Placed This Encounter      Emergency Hospitalization Hold (72 Hr Hold)    Petition for " commitment and Sarah submitted to [facility]    Safety Assessment:    Behavioral Orders   Procedures     Code 1 - Restrict to Unit     Routine Programming     As clinically indicated     Status 15     Every 15 minutes.      PTA psychotropic medications held:      - Intuniv 1 mg at bedtime to simplify regimen  - Haldol 5 mg daily prn aggression due to using Zyprexa and Haldol/Benadryl/Ativan  - Ativan 2 mg BID prn aggression changed to Haldol/Benadryl/Ativan prn      PTA psychotropic medications continued/changed:      - Klonopin 1 mg TID increased to 1.5 mg TID temporarily   - Wellbutrin  mg daily  - Gabapentin 800 mg TID  - Namenda 10 mg BID     New psychotropic medications initiated:      - Standard unit prn agents, including Zyprexa prn agitation, in addition to Haldol/Benadryl/Ativan    Today's Changes:    - Recommend CAMARGO and/or Clozapine if patient willing to take    Programming: Patient will be treated in a therapeutic milieu with appropriate individual and group therapies. Education will be provided on diagnoses, medications, and treatments.     Medical diagnoses:  Per medicine    #. Difficulty voiding  - Likely secondary to Klopin  - UA wnl  - Monitor and decrease Klonopin cautiously over next week    Consult: None  Tests: None     Anticipated LOS: >7  days   Disposition: [place] with outpatient services       TREATMENT TEAM CARE PLAN     Progress: Continued symptoms.    Continued Stay Criteria/Rationale: Continued symptoms without sufficient improvement/resolution.    Medical/Physical: See above.    Precautions: See above.     Plan: Continue inpatient care with unit support and medication management.    Rationale for change in precautions or plan: NA due to no change.    Participants: David Timmosn, DO, Nursing, SW, OT.    The patient's care was discussed with the treatment team and chart notes were reviewed.         ATTESTATION      Dr. David Timmons  Psychiatrist    Video Visit: Patient has  given verbal consent for video visit?: Yes  Type of Service: video visit for mental health treatment  Reason for [place] Visit: [place]  Originating Site (patient location): [facility]  Distant Site (provider location): Remote Location  Mode of Communication: Video Conference via [employer]  Time of Service: Date: 12/16/2024 Start: 830 end: 995

## 2024-12-16 NOTE — PLAN OF CARE
Problem: Thought Process Alteration  Goal: Optimal Thought Clarity  Outcome: Not Progressing     Problem: Adult Behavioral Health Plan of Care  Goal: Patient-Specific Goal (Individualization)  Description:  Patient will accept medications as ordered by the physician   Patient will attend groups and participate in the unit mileau during hospitalization.   Patient will accept and follow the recommendations of the discharge planning team.  12/15/2024- Patient no wean at this time.  Irritable and angry after making phone calls on open unit -12/15/2024.  Treatment team to assess daily.   Outcome: Not Progressing     Patient polite and friendly with staff at start of the shift.  Talked a lot about his medications and not wanting to return to [place].  Took all his medications as prescribed.  Ate 100% of meals.  Allowed patient to wean to the open unit to make phone calls.  He became irritable on the phone and returned to the MHICU right after finishing the phone call.  He requested PRN medication for agitation but became upset with staff when offered Zyprexa. Refused to tale medication and states none of his medications are correct.  Believes we are 3D printing medications to give him and everything is a placebo.  Patient did not threaten staff but did make statements about this writer not knowing what they are doing and that I never should have been given a job.  Rambling and tangential in conversation.  Mood is labile.  At 2200 patient agreed to take 10 mg Zyprexa from other staff nurse on shift.  Will continue to monitor.  Face to face end of shift report communicated to night shift RN.     Shantal Real RN  12/15/2024  9:17 PM

## 2024-12-16 NOTE — PLAN OF CARE
Problem: Thought Process Alteration  Goal: Optimal Thought Clarity  Outcome: Progressing     Problem: Adult Behavioral Health Plan of Care  Goal: Patient-Specific Goal (Individualization)  Description:  Patient will accept medications as ordered by the physician   Patient will attend groups and participate in the unit mileau during hospitalization.   Patient will accept and follow the recommendations of the discharge planning team.    12/15/2024- Patient no wean at this time.  Irritable and angry after making phone calls on open unit -12/15/2024.  Treatment team to assess daily.     Outcome: Progressing     Face to Face report received from TRAE Murguia. Rounding completed. Pt has been bed with eyes closed and regular respirations noted. 15 minute and PRN checks all night. No reports of falls or self-harm.     Face to face end of shift report communicated to day shift RN.     Amara Dial RN  12/16/2024  5:59 AM

## 2024-12-16 NOTE — PROGRESS NOTES
TRAE Samano at [facility] asked for update on pt. Sw let her know about petition for commitment that was filed.

## 2024-12-16 NOTE — PLAN OF CARE
Face to face shift report received from Amara LARKIN. Rounding completed, pt observed laying in bed at the start of the shift.    Patient remains in MHICU at this time. No wean today due to increased irritability on open unit yesterday. Alert and making needs known. Eating well for meals. Pleasant and polite upon interactions with staff. Compliant with scheduled medication and nursing assessment. Denies anxiety, hallucinations, SI/HI, and pain. States he doesn't want to be sleeping in so late and would like to be up more during the day. Spent majority of the shift sitting on the edge of his bed.    Problem: Adult Behavioral Health Plan of Care  Goal: Patient-Specific Goal (Individualization)  Description:  Patient will accept medications as ordered by the physician   Patient will attend groups and participate in the unit mileau during hospitalization.   Patient will accept and follow the recommendations of the discharge planning team.    12/16/2024- Patient no wean at this time.  Irritable and angry after making phone calls on open unit. Treatment team to assess daily.   Outcome: Progressing     Problem: Thought Process Alteration  Goal: Optimal Thought Clarity  Outcome: Progressing    Face to face report will be communicated to oncsalomón LARKIN.    Juana English RN  12/16/2024

## 2024-12-17 PROCEDURE — 250N000013 HC RX MED GY IP 250 OP 250 PS 637: Performed by: STUDENT IN AN ORGANIZED HEALTH CARE EDUCATION/TRAINING PROGRAM

## 2024-12-17 PROCEDURE — 124N000001 HC R&B MH

## 2024-12-17 PROCEDURE — 99232 SBSQ HOSP IP/OBS MODERATE 35: CPT | Mod: 95 | Performed by: STUDENT IN AN ORGANIZED HEALTH CARE EDUCATION/TRAINING PROGRAM

## 2024-12-17 RX ORDER — GABAPENTIN 400 MG/1
800 CAPSULE ORAL 3 TIMES DAILY
Status: DISCONTINUED | OUTPATIENT
Start: 2024-12-17 | End: 2025-01-03 | Stop reason: HOSPADM

## 2024-12-17 RX ORDER — MEMANTINE HYDROCHLORIDE 5 MG/1
10 TABLET ORAL 2 TIMES DAILY
Status: DISCONTINUED | OUTPATIENT
Start: 2024-12-17 | End: 2025-01-03 | Stop reason: HOSPADM

## 2024-12-17 RX ADMIN — BUPROPION HYDROCHLORIDE 150 MG: 150 TABLET, EXTENDED RELEASE ORAL at 08:03

## 2024-12-17 RX ADMIN — MEMANTINE 10 MG: 5 TABLET ORAL at 19:10

## 2024-12-17 RX ADMIN — GABAPENTIN 800 MG: 400 CAPSULE ORAL at 08:02

## 2024-12-17 RX ADMIN — CLONAZEPAM 1.5 MG: 0.5 TABLET ORAL at 13:19

## 2024-12-17 RX ADMIN — BUPROPION HYDROCHLORIDE 300 MG: 300 TABLET, EXTENDED RELEASE ORAL at 08:02

## 2024-12-17 RX ADMIN — GABAPENTIN 800 MG: 400 CAPSULE ORAL at 19:10

## 2024-12-17 RX ADMIN — GABAPENTIN 800 MG: 400 CAPSULE ORAL at 13:19

## 2024-12-17 RX ADMIN — TRAZODONE HYDROCHLORIDE 50 MG: 50 TABLET ORAL at 22:38

## 2024-12-17 RX ADMIN — CLONAZEPAM 1.5 MG: 0.5 TABLET ORAL at 19:10

## 2024-12-17 RX ADMIN — CLONAZEPAM 1.5 MG: 0.5 TABLET ORAL at 08:02

## 2024-12-17 RX ADMIN — MEMANTINE 10 MG: 5 TABLET ORAL at 08:02

## 2024-12-17 ASSESSMENT — ACTIVITIES OF DAILY LIVING (ADL)
ADLS_ACUITY_SCORE: 24
ADLS_ACUITY_SCORE: 24
ORAL_HYGIENE: INDEPENDENT
ADLS_ACUITY_SCORE: 24
HYGIENE/GROOMING: INDEPENDENT
ADLS_ACUITY_SCORE: 24
ADLS_ACUITY_SCORE: 24
HYGIENE/GROOMING: INDEPENDENT
ADLS_ACUITY_SCORE: 24
DRESS: SCRUBS (BEHAVIORAL HEALTH);INDEPENDENT
LAUNDRY: UNABLE TO COMPLETE
ADLS_ACUITY_SCORE: 24
ADLS_ACUITY_SCORE: 24
DRESS: SCRUBS (BEHAVIORAL HEALTH);INDEPENDENT
ORAL_HYGIENE: INDEPENDENT
ADLS_ACUITY_SCORE: 24
LAUNDRY: UNABLE TO COMPLETE
ADLS_ACUITY_SCORE: 24

## 2024-12-17 NOTE — PLAN OF CARE
"  PT weaned out briefly to use the phone. Was appropriate and went back into the MHICU, patient says he prefers it there.   PT presenting with intermittent paranoia, hyperverbal, pressured at times.  Reports that he would be agreeable to stay for a few weeks or even go to a [facility] if that is what Dr. Timmons recommends but reports that he does not want to be on another commitment. He reports he is agreeable to help voluntarily and knows he needs it.     PT gave me a list of his belongings and where they would need to go if he was not going to return to [place], also reporting he is not sure he wants to return to [place].   PT repeatedly asked writer to prepare a discharge plan for him while simultaneously telling me his plan is to do what Dr. Timmons recommends and that he is open to help.   Reports he would like to try and go back on disability for his TBI. Reports he wants to do well for his kids and he knows he needs help and for \"some reason I think  can help me, but I really do not want a commitment, but I would go to a [facility] if that's what he thinks I need, but I dont want to go under commitment, it looks bad when people see commitment stuff\"     PT cooperative with HS medication pass. Eating well.     Appears disheveled.     Face to face end of shift report communicated to oncoming RN     Maria Esther Bridges RN  12/16/2024  9:52 PM                 Problem: Adult Behavioral Health Plan of Care  Goal: Plan of Care Review  Outcome: Progressing     Problem: Adult Behavioral Health Plan of Care  Goal: Adheres to Safety Considerations for Self and Others  Outcome: Progressing     Problem: Thought Process Alteration  Goal: Optimal Thought Clarity  Outcome: Progressing   Goal Outcome Evaluation:                        "

## 2024-12-17 NOTE — PROGRESS NOTES
Sw received court paperwork. Confinement hearing I 12/20 @ 8:30am and commitment 12/27 @ 8:30am. Sarah petition sent in to [facility].

## 2024-12-17 NOTE — PLAN OF CARE
Problem: Adult Behavioral Health Plan of Care  Goal: Patient-Specific Goal (Individualization)  Description:  Patient will accept medications as ordered by the physician   Patient will attend groups and participate in the unit mileau during hospitalization.   Patient will accept and follow the recommendations of the discharge planning team.    12/16/2024- Patient no wean at this time.  Irritable and angry after making phone calls on open unit. Treatment team to assess daily.   Outcome: Progressing     Problem: Thought Process Alteration  Goal: Optimal Thought Clarity  Outcome: Progressing   Goal Outcome Evaluation:             Pt. Has been up and about in room, appetite is good, eating at least 50% of meals, taking prescribed medications, is able to make needs be known, weaned out to make a phone call, went right back to room after phone call, denies suicidal ideation and hallucinations, will continue to monitor progress.    Face to face end of shift report will be communicated to oncoming afternoon shift RN.     Farida Saenz RN  12/17/2024  11:01 AM

## 2024-12-17 NOTE — PROGRESS NOTES
"[facility] PSYCHIATRY  PROGRESS NOTE     SUBJECTIVE     Prior to interviewing the patient, I met with nursing and reviewed patient's clinical condition. We discussed clinical care both before and after the interview. I have reviewed the patient's clinical course by review of records including previous notes, labs, and vital signs.     Per nursing, the patient had the following behavioral events over the last 24-hours: none. Continues in MHICU. Taking medications as prescribed.     On psychiatric interview, the patient was found in his room. He notes that he is using his wise mind. He notes that he feels like he is thinking clearer. He notes that he is willing to continue making progress here.     He notes that he has a personal THC supply. 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     He notes that he would like to take his medications at night at 7PM.     He notes that he is not interested in neuroleptics right now. He notes that he feels like he is in a better spot.    He denies any acute concerns. He notes that he wants to go back to [facility]. He notes that he changed his mind today.       MEDICATIONS   Scheduled Meds:  Current Facility-Administered Medications   Medication Dose Route Frequency Provider Last Rate Last Admin     buPROPion (WELLBUTRIN XL) 24 hr tablet 150 mg  150 mg Oral QAM David Timmons, DO   150 mg at 12/17/24 0803     buPROPion (WELLBUTRIN XL) 24 hr tablet 300 mg  300 mg Oral QAM David Timmons, DO   300 mg at 12/17/24 0802     clonazePAM (klonoPIN) tablet 1.5 mg  1.5 mg Oral TID David Timmons, DO   1.5 mg at 12/17/24 0802     gabapentin (NEURONTIN) capsule 800 mg  800 mg Oral TID David Timmons DO   800 mg at 12/17/24 0802     memantine (NAMENDA) tablet 10 mg  10 mg Oral BID David Timmons, DO   10 mg at 12/17/24 0802     PRN Meds:.  Current Facility-Administered " Medications   Medication Dose Route Frequency Provider Last Rate Last Admin     acetaminophen (TYLENOL) tablet 650 mg  650 mg Oral Q4H PRN David Timmons DO         alum & mag hydroxide-simethicone (MAALOX) suspension 30 mL  30 mL Oral Q4H PRN David Timmons DO         haloperidol (HALDOL) tablet 5 mg  5 mg Oral Q8H PRN David Timmons DO        And     LORazepam (ATIVAN) tablet 2 mg  2 mg Oral Q8H PRN David Timmons DO        And     diphenhydrAMINE (BENADRYL) capsule 50 mg  50 mg Oral Q8H PRN David Timmons DO         haloperidol lactate (HALDOL) injection 5 mg  5 mg Intramuscular Q8H PRN David Timmons DO   5 mg at 12/12/24 1425    And     LORazepam (ATIVAN) injection 2 mg  2 mg Intramuscular Q8H PRN David Timmons DO   2 mg at 12/12/24 1425    And     diphenhydrAMINE (BENADRYL) injection 50 mg  50 mg Intramuscular Q8H PRN David Timmons DO   50 mg at 12/12/24 1449     hydrOXYzine HCl (ATARAX) tablet 25 mg  25 mg Oral Q6H PRN David Timmons DO         ibuprofen (ADVIL/MOTRIN) tablet 600 mg  600 mg Oral Q6H PRN Harmony Samuel CNP         magic mouthwash suspension (diphenhydrAMINE, lidocaine, aluminum-magnesium & simethicone)  5 mL Swish & Spit TID PRN Harmony Samuel CNP         melatonin tablet 5 mg  5 mg Oral At Bedtime PRN Nargis Solomon APRN CNP   5 mg at 12/14/24 2216     OLANZapine (zyPREXA) tablet 10 mg  10 mg Oral Q6H PRN David Timmons DO   10 mg at 12/15/24 2150    Or     OLANZapine (zyPREXA) injection 10 mg  10 mg Intramuscular Q6H PRN David Timmons DO         senna-docusate (SENOKOT-S/PERICOLACE) 8.6-50 MG per tablet 1 tablet  1 tablet Oral BID PRN David Timmons DO         traZODone (DESYREL) tablet 50 mg  50 mg Oral At Bedtime PRN David Timmons, DO            ALLERGIES   Allergies   Allergen Reactions     Pork Allergy         MENTAL STATUS EXAM   Vitals: /92   Pulse 51   Temp 96.9  F (36.1  C) (Temporal)   Resp 14    "SpO2 100%     Appearance: Alert, oriented, dressed in hospital scrubs, disheveled  Attitude: Cooperative  Eye Contact: Fair  Mood: \"Pretty good\"  Affect: Blunted, reduced range   Speech: Normal rate and rhythm   Psychomotor Behavior: No TD or rigidity. No tremor or akathisia.   Thought Process: More linear  Associations: No loose associations   Thought Content: Denies SI, plan, or SIB. Denies AVH. Delusional thought present, although denies  Insight: Poor  Judgment: Poor  Oriented to: Person, place, and time  Attention Span and Concentration: Intact  Recent and Remote Memory: Intact  Language: English with appropriate syntax and vocabulary  Fund of Knowledge:  Low  Muscle Strength and Tone: Grossly normal  Gait and Station: Grossly normal       LABS   No results found for this or any previous visit (from the past 24 hours).          IMPRESSION     This is a 34 year old male with a PMH of SZAD, polysubstance abuse, and multiple TBIs with neurological sequalae who presents in a severe psychotic and catatonic episode in the setting of medication partial non-adherence. Patient has SPMI living in [place]. He has impaired functioning and generally poor insight. He has tried many medications having minimal response at times. He would benefit from hospitalization with resumption of medication as best able. Will focus first on treating catatonia along with psychosis.    Today: Patient continues to be psychotic, albeit calmer. Started taking medications, albeit cautiously. Refusing interventions of any new medications. Commitment and Sarah submitted. Starting to wean from MHICU, with this stopped given agitation. Will resume again.       DIAGNOSES     #. Schizoaffective disorder, bipolar type, continuous   #. Multiple TBIs with LOC with sequale   #. Hallucinogen (DXM) use disorder, in sustained remission  #. Stimulant use disorder, in sustained remission  #. Alcohol use disorder, in sustained remission  #. Cannabis use    "     PLAN     Location: Unit 5  Legal Status: Orders Placed This Encounter      Emergency Hospitalization Hold (72 Hr Hold)    Petition for commitment and Sarah submitted to [facility]    Safety Assessment:    Behavioral Orders   Procedures     Code 1 - Restrict to Unit     Routine Programming     As clinically indicated     Status 15     Every 15 minutes.      PTA psychotropic medications held:      - Intuniv 1 mg at bedtime to simplify regimen  - Haldol 5 mg daily prn aggression due to using Zyprexa and Haldol/Benadryl/Ativan  - Ativan 2 mg BID prn aggression changed to Haldol/Benadryl/Ativan prn      PTA psychotropic medications continued/changed:      - Klonopin 1 mg TID increased to 1.5 mg TID temporarily   - Wellbutrin  mg daily  - Gabapentin 800 mg TID  - Namenda 10 mg BID     New psychotropic medications initiated:      - Standard unit prn agents, including Zyprexa prn agitation, in addition to Haldol/Benadryl/Ativan    Today's Changes:    - Recommend CAMARGO and/or Clozapine if patient willing to take    Programming: Patient will be treated in a therapeutic milieu with appropriate individual and group therapies. Education will be provided on diagnoses, medications, and treatments.     Medical diagnoses:  Per medicine    #. Difficulty voiding  - Likely secondary to Klonopin  - UA wnl  - Monitor and decrease Klonopin cautiously over next week    Consult: None  Tests: None     Anticipated LOS: >7  days   Disposition: [facility] with outpatient services       ATTESTATION      Dr. David Timmons  Psychiatrist    Video Visit: Patient has given verbal consent for video visit?: Yes  Type of Service: video visit for mental health treatment  [place] Visit: [place]  Originating Site (patient location): [facility]  Distant Site (provider location): Remote Location  Mode of Communication: Video Conference via [employer]  Time of Service: Date: 12/17/2024 Start: 900 end: 930

## 2024-12-17 NOTE — PLAN OF CARE
"  PT requesting blood labs for STD testing, and a CBC to make sure his white and red blood cells are where they are supposed to be.   PT reports he does not agree with commitment, and wanted it known that it will ruin his family and his chance to see his kids. He reports he is open to letting  help him for at least 2 weeks and wants to put a \"small percentage in trust that he will do the right thing\", but does not want to be committed. He reports he is agreeable to taking his current medications.   PT endorses that he needs help at this time.   He denies SI/HI and hallucinations.     1900 PT pressured, anxious, rambling, flight of ideas, trying to create a plan of care for himself. Deciding now he does not want to go back to [facility] and that he only wants to stay a week, and does not agree with commitment process. Reports he feels like nothing is changing for him and the medications feel like they are not working, while simultaneously saying he does not want to take any medications that make him feel groggy. Expresses frustration and his need to be a functional father for his children. Reports trouble concentrating, and remembering.   Reports he is exactly in the universe where he is supposed to be but feeling like he is losing james and that false documents by people are ruining his life. PT reporting he does not know who to trust.   Compliant with HS medications.   Reports he is trying his best to \"coexist with us\"     2200- PT increasing in irritability, restless, pacing. Requesting something to help him sleep. Writer listed available PRN PT reported he is not a science project or a IV drug user. PT agreeable to take Trazodone.   2300-PT restlessness continues. Asking about lab draws, showing staff his veins again reporting he is not an IV drug user. Speech pressured. PT encouraged to try and rest.     Face to face end of shift report communicated to oncoming TRAE Bridges RN  12/17/2024  11:00 " PM         Face to face end of shift report communicated to oncoming RN     Maria Esthre Bridges RN  12/17/2024  9:51 PM                     Problem: Adult Behavioral Health Plan of Care  Goal: Patient-Specific Goal (Individualization)  Description:  Patient will accept medications as ordered by the physician   Patient will attend groups and participate in the unit mileau during hospitalization.   Patient will accept and follow the recommendations of the discharge planning team.    12/16/2024- Patient no wean at this time.  Irritable and angry after making phone calls on open unit. Treatment team to assess daily.   Outcome: Progressing     Problem: Thought Process Alteration  Goal: Optimal Thought Clarity  Outcome: Progressing   Goal Outcome Evaluation:    Plan of Care Reviewed With: patient

## 2024-12-17 NOTE — PLAN OF CARE
Problem: Adult Behavioral Health Plan of Care  Goal: Patient-Specific Goal (Individualization)  Description:  Patient will accept medications as ordered by the physician   Patient will attend groups and participate in the unit mileau during hospitalization.   Patient will accept and follow the recommendations of the discharge planning team.    12/16/2024- Patient no wean at this time.  Irritable and angry after making phone calls on open unit. Treatment team to assess daily.   Outcome: Progressing  Note: Report received from Mar[facility] Rounding complete. Pt observed awake in his room. Writer spoke with pt and asked if he needed anything to help with sleep or if he was hungry or thirsty. Pt stated he was feeling melancholy and why asked why that was he stated he meant content but that he planned to stay up for the rest of the evening. Pt was reminded that it was night time and not evening and he stated he knew that but felt well rested and wanted to stay up to organize his thoughts. He stated he is just waiting and always kind of expects the worst for an outcome in everything. Writer spoke to pt about trying to think in a positive manner as constantly expecting the worst can sometimes lead to negative outcomes.  Pt agreed with this and stated he was going to try to do this and put positive vibes and karma into the universe as he thinks this may help. Writer was speaking of a positive mindset and being hopeful but pt expressed an understanding of this in a bit different of a manner, although still a positive thought process to have. He declined any needs or complaints at this time but is agreeable to let us know if he needs anything.    0300- Pt sleeping  0350- Pt moved from his bed to the floor to resume sleeping     15 minute and PRN checks all night. No complaints offered.     Pt slept approx  2  hours this NOC shift.    Face to face end of shift report communicated to alexa LARKIN.    Farida KIMBLE RN  December 17,  2024  2:12 AM       Problem: Thought Process Alteration  Goal: Optimal Thought Clarity  Outcome: Progressing

## 2024-12-18 PROCEDURE — 250N000013 HC RX MED GY IP 250 OP 250 PS 637: Performed by: STUDENT IN AN ORGANIZED HEALTH CARE EDUCATION/TRAINING PROGRAM

## 2024-12-18 PROCEDURE — 124N000001 HC R&B MH

## 2024-12-18 PROCEDURE — 99232 SBSQ HOSP IP/OBS MODERATE 35: CPT | Performed by: STUDENT IN AN ORGANIZED HEALTH CARE EDUCATION/TRAINING PROGRAM

## 2024-12-18 RX ADMIN — CLONAZEPAM 1.5 MG: 0.5 TABLET ORAL at 08:35

## 2024-12-18 RX ADMIN — MEMANTINE 10 MG: 5 TABLET ORAL at 08:34

## 2024-12-18 RX ADMIN — GABAPENTIN 800 MG: 400 CAPSULE ORAL at 13:02

## 2024-12-18 RX ADMIN — CLONAZEPAM 1.5 MG: 0.5 TABLET ORAL at 18:12

## 2024-12-18 RX ADMIN — BUPROPION HYDROCHLORIDE 150 MG: 150 TABLET, EXTENDED RELEASE ORAL at 08:35

## 2024-12-18 RX ADMIN — GABAPENTIN 800 MG: 400 CAPSULE ORAL at 08:35

## 2024-12-18 RX ADMIN — MEMANTINE 10 MG: 5 TABLET ORAL at 18:12

## 2024-12-18 RX ADMIN — BUPROPION HYDROCHLORIDE 300 MG: 300 TABLET, EXTENDED RELEASE ORAL at 08:34

## 2024-12-18 RX ADMIN — CLONAZEPAM 1.5 MG: 0.5 TABLET ORAL at 13:02

## 2024-12-18 RX ADMIN — GABAPENTIN 800 MG: 400 CAPSULE ORAL at 18:12

## 2024-12-18 ASSESSMENT — ACTIVITIES OF DAILY LIVING (ADL)
ADLS_ACUITY_SCORE: 24
LAUNDRY: UNABLE TO COMPLETE
HYGIENE/GROOMING: INDEPENDENT
LAUNDRY: UNABLE TO COMPLETE
ADLS_ACUITY_SCORE: 24
DRESS: SCRUBS (BEHAVIORAL HEALTH)
ADLS_ACUITY_SCORE: 24
ORAL_HYGIENE: INDEPENDENT
ADLS_ACUITY_SCORE: 24
ADLS_ACUITY_SCORE: 24
DRESS: SCRUBS (BEHAVIORAL HEALTH);INDEPENDENT
ADLS_ACUITY_SCORE: 24
ADLS_ACUITY_SCORE: 24
HYGIENE/GROOMING: INDEPENDENT
ADLS_ACUITY_SCORE: 24
ORAL_HYGIENE: INDEPENDENT
ADLS_ACUITY_SCORE: 24

## 2024-12-18 NOTE — PLAN OF CARE
Problem: Adult Behavioral Health Plan of Care  Goal: Patient-Specific Goal (Individualization)  Description:  Patient will accept medications as ordered by the physician   Patient will attend groups and participate in the unit mileau during hospitalization.   Patient will accept and follow the recommendations of the discharge planning team.    12/16/2024- Patient no wean at this time.  Irritable and angry after making phone calls on open unit. Treatment team to assess daily.   Outcome: Progressing     Problem: Thought Process Alteration  Goal: Optimal Thought Clarity  Outcome: Progressing   Goal Outcome Evaluation:         Pt. In room so far this shift, did wean for a phone call, went right back to room after phone call, slept 5 hours last night, taking prescribed medications, appetite is good, eating at least 50% of meals, is able to make needs be known, affect is flat and blunted, is cooperative and pleasant with cares, appears to be responding to internal stimuli, talking to self occasionally, will continue to monitor progress.    Face to face end of shift report will be communicated to oncoming afternoon shift RN.     Farida Saenz RN  12/18/2024  10:20 AM

## 2024-12-18 NOTE — PROGRESS NOTES
"[facility] PSYCHIATRY  PROGRESS NOTE     SUBJECTIVE     Prior to interviewing the patient, I met with nursing and reviewed patient's clinical condition. We discussed clinical care both before and after the interview. I have reviewed the patient's clinical course by review of records including previous notes, labs, and vital signs.     Per nursing, the patient had the following behavioral events over the last 24-hours: none. Continues in MHICU. Taking medications as prescribed. Making psychotic statements.     On psychiatric interview, the patient was found in his room. He discussed for 20 minutes the various drugs he has used and how they impacted him with him noting that \"speed helped him think clearly and set boundaries.\" He notes that he does not intent to use any substances right now except for THC. Discussed rationales for not using, with pros and cons.    The patient notes that he is concerned about commitment because he has children who depend him. Discussed how there is no \"3 strike rule I am aware of.\"    Patient is not interested in neuroleptics or a mood stabilizer right now. He notes that he is fine with his current regimen.    He has no acute concerns except for managing his \"tapering\" with it unclear what medications he was referring to.       MEDICATIONS   Scheduled Meds:  Current Facility-Administered Medications   Medication Dose Route Frequency Provider Last Rate Last Admin     buPROPion (WELLBUTRIN XL) 24 hr tablet 150 mg  150 mg Oral QAM David Timmons, DO   150 mg at 12/18/24 0835     buPROPion (WELLBUTRIN XL) 24 hr tablet 300 mg  300 mg Oral QAM David Timmons, DO   300 mg at 12/18/24 0834     clonazePAM (klonoPIN) tablet 1.5 mg  1.5 mg Oral TID David Timmons, DO   1.5 mg at 12/18/24 1302     gabapentin (NEURONTIN) capsule 800 mg  800 mg Oral TID David Timmons DO   800 mg at 12/18/24 1302     memantine (NAMENDA) tablet 10 mg  10 mg Oral BID David Timmons, DO   10 mg " at 12/18/24 0834     PRN Meds:.  Current Facility-Administered Medications   Medication Dose Route Frequency Provider Last Rate Last Admin     acetaminophen (TYLENOL) tablet 650 mg  650 mg Oral Q4H PRN David Timmons DO         alum & mag hydroxide-simethicone (MAALOX) suspension 30 mL  30 mL Oral Q4H PRN David Timmons DO         haloperidol (HALDOL) tablet 5 mg  5 mg Oral Q8H PRN David Timmons DO        And     LORazepam (ATIVAN) tablet 2 mg  2 mg Oral Q8H PRN David Timmons DO        And     diphenhydrAMINE (BENADRYL) capsule 50 mg  50 mg Oral Q8H PRN David Timmons DO         haloperidol lactate (HALDOL) injection 5 mg  5 mg Intramuscular Q8H PRN David Timmons DO   5 mg at 12/12/24 1425    And     LORazepam (ATIVAN) injection 2 mg  2 mg Intramuscular Q8H PRN David Timmons DO   2 mg at 12/12/24 1425    And     diphenhydrAMINE (BENADRYL) injection 50 mg  50 mg Intramuscular Q8H PRN David Timmons DO   50 mg at 12/12/24 1449     hydrOXYzine HCl (ATARAX) tablet 25 mg  25 mg Oral Q6H PRN David Timmons DO         ibuprofen (ADVIL/MOTRIN) tablet 600 mg  600 mg Oral Q6H PRN Harmony Samuel, CNP         magic mouthwash suspension (diphenhydrAMINE, lidocaine, aluminum-magnesium & simethicone)  5 mL Swish & Spit TID PRN Harmony Samuel CNP         melatonin tablet 5 mg  5 mg Oral At Bedtime PRN Nargis Solomon APRN CNP   5 mg at 12/14/24 2216     OLANZapine (zyPREXA) tablet 10 mg  10 mg Oral Q6H PRN David Timmons DO   10 mg at 12/15/24 2150    Or     OLANZapine (zyPREXA) injection 10 mg  10 mg Intramuscular Q6H PRN David Timmons DO         senna-docusate (SENOKOT-S/PERICOLACE) 8.6-50 MG per tablet 1 tablet  1 tablet Oral BID PRN David Timmons DO         traZODone (DESYREL) tablet 50 mg  50 mg Oral At Bedtime PRN David Timmons DO   50 mg at 12/17/24 6493        ALLERGIES   Allergies   Allergen Reactions     Pork Allergy         MENTAL STATUS EXAM  "  Vitals: /88   Pulse 77   Temp 98.9  F (37.2  C) (Temporal)   Resp 16   SpO2 97%     Appearance: Alert, oriented, dressed in hospital scrubs, disheveled, long hair and beard  Attitude: Cooperative  Eye Contact: Fair  Mood: \"Alright man\"  Affect: Blunted, reduced range   Speech: Normal rate and rhythm   Psychomotor Behavior: No TD or rigidity. No tremor or akathisia.   Thought Process: More disorganized today  Associations: No loose associations   Thought Content: Denies SI, plan, or SIB. Denies AVH. Delusional thought presen=  Insight: Poor  Judgment: Poor  Oriented to: Person, place, and time  Attention Span and Concentration: Intact  Recent and Remote Memory: Intact  Language: English with appropriate syntax and vocabulary  Fund of Knowledge:  Low  Muscle Strength and Tone: Grossly normal  Gait and Station: Grossly normal       LABS   No results found for this or any previous visit (from the past 24 hours).          IMPRESSION     This is a 34 year old male with a PMH of SZAD, polysubstance abuse, and multiple TBIs with neurological sequalae who presents in a severe psychotic and catatonic episode in the setting of medication partial non-adherence. Patient [place] SP[place]. He has impaired functioning and generally poor insight. He has tried many medications having minimal response at times. He would benefit from hospitalization with resumption of medication as best able. Will focus first on treating catatonia along with psychosis.    Today: Patient continues to be psychotic with him more disorganized today. Started taking medications, albeit cautiously. Refusing interventions of any new medications. Commitment and Sarah submitted. Starting to wean from MHICU slowly again.       DIAGNOSES     #. Schizoaffective disorder, bipolar type, continuous   #. Multiple TBIs with LOC with sequale   #. Hallucinogen (DXM) use disorder, in sustained remission  #. Stimulant use disorder, in sustained " remission  #. Alcohol use disorder, in sustained remission  #. Cannabis use        PLAN     Location: Unit 5  Legal Status: Orders Placed This Encounter      Emergency Hospitalization Hold (72 Hr Hold)    Petition for commitment and Sarah submitted to [facility]    Safety Assessment:    Behavioral Orders   Procedures     Code 1 - Restrict to Unit     Routine Programming     As clinically indicated     Status 15     Every 15 minutes.      PTA psychotropic medications held:      - Intuniv 1 mg at bedtime to simplify regimen  - Haldol 5 mg daily prn aggression due to using Zyprexa and Haldol/Benadryl/Ativan  - Ativan 2 mg BID prn aggression changed to Haldol/Benadryl/Ativan prn      PTA psychotropic medications continued/changed:      - Klonopin 1 mg TID increased to 1.5 mg TID temporarily   - Wellbutrin  mg daily  - Gabapentin 800 mg TID  - Namenda 10 mg BID     New psychotropic medications initiated:      - Standard unit prn agents, including Zyprexa prn agitation, in addition to Haldol/Benadryl/Ativan    Today's Changes:    - Recommend CAMARGO and/or Clozapine if patient willing to take  - Motivational interviewing regarding substance use    Programming: Patient will be treated in a therapeutic milieu with appropriate individual and group therapies. Education will be provided on diagnoses, medications, and treatments.     Medical diagnoses:  Per medicine    #. Difficulty voiding  - Likely secondary to Klonopin  - UA wnl  - Monitor and decrease Klonopin cautiously over next week    Consult: None  Tests: None     Anticipated LOS: >7  days   Disposition: [facility] with outpatient services       ATTESTATION      Dr. David Timmons  Psychiatrist

## 2024-12-18 NOTE — PLAN OF CARE
Calm and cooperative. Denies suicidal or homicidal ideation, auditory, visual or tactile hallucinations, and pain. Affect was flat.     Taking medications as prescribed and following treatment team recommendations.    Per Dr. Timmons, moved to the open unit for higher acuity patient needing the MHICU bed.     Requested a change from the gabapentin to a marijuana-derivative.     PRNs administered:    Report given to oncoming night-shift nurse.         Problem: Adult Behavioral Health Plan of Care  Goal: Patient-Specific Goal (Individualization)  Description:  Patient will accept medications as ordered by the physician   Patient will attend groups and participate in the unit mileau during hospitalization.   Patient will accept and follow the recommendations of the discharge planning team.    12/8/2024- Patient able to wean at low stimulus times and be observed by staff when behavior is appropriate. Treatment team to assess daily.   Outcome: Progressing     Problem: Thought Process Alteration  Goal: Optimal Thought Clarity  Outcome: Progressing   Goal Outcome Evaluation:

## 2024-12-18 NOTE — PLAN OF CARE
Problem: Adult Behavioral Health Plan of Care  Goal: Patient-Specific Goal (Individualization)  Description:  Patient will accept medications as ordered by the physician   Patient will attend groups and participate in the unit mileau during hospitalization.   Patient will accept and follow the recommendations of the discharge planning team.    12/16/2024- Patient no wean at this time.  Irritable and angry after making phone calls on open unit. Treatment team to assess daily.   Outcome: Progressing  Note: Report received from Marivel [facility]Cba complete. Pt observed sleeping in right side lying position with regular and unlabored respirations.    Pt has been in bed with eyes closed and regular respirations. 15 minute and PRN checks all night. No complaints offered.     Pt slept approx 5   hours this NOC shift.    Face to face end of shift report communicated to oncoming RN.    Farida KIMBLE RN  December 18, 2024  1:48 AM          Problem: Thought Process Alteration  Goal: Optimal Thought Clarity  Outcome: Progressing  Note: Unable to assess due to pt sleeping. No issues or concerns noted at this time.     Goal Outcome Evaluation:

## 2024-12-18 NOTE — PROGRESS NOTES
Pt's ledbetter will be same time and date 12/27 @ 8:30a. Pt given copy.     1:1 with pt. Pt is upset by commitment and would like to make his own treatment plan and go back to [facility] [place].

## 2024-12-19 PROCEDURE — 99233 SBSQ HOSP IP/OBS HIGH 50: CPT | Performed by: STUDENT IN AN ORGANIZED HEALTH CARE EDUCATION/TRAINING PROGRAM

## 2024-12-19 PROCEDURE — 124N000001 HC R&B MH

## 2024-12-19 PROCEDURE — 250N000011 HC RX IP 250 OP 636: Performed by: STUDENT IN AN ORGANIZED HEALTH CARE EDUCATION/TRAINING PROGRAM

## 2024-12-19 PROCEDURE — 250N000013 HC RX MED GY IP 250 OP 250 PS 637: Performed by: STUDENT IN AN ORGANIZED HEALTH CARE EDUCATION/TRAINING PROGRAM

## 2024-12-19 RX ORDER — PALIPERIDONE 3 MG/1
3 TABLET, EXTENDED RELEASE ORAL 2 TIMES DAILY
Status: DISCONTINUED | OUTPATIENT
Start: 2024-12-19 | End: 2024-12-21

## 2024-12-19 RX ORDER — HALOPERIDOL 5 MG/ML
5 INJECTION INTRAMUSCULAR 2 TIMES DAILY
Status: DISCONTINUED | OUTPATIENT
Start: 2024-12-19 | End: 2024-12-21

## 2024-12-19 RX ADMIN — CLONAZEPAM 1.5 MG: 0.5 TABLET ORAL at 19:29

## 2024-12-19 RX ADMIN — LORAZEPAM 2 MG: 2 INJECTION INTRAMUSCULAR; INTRAVENOUS at 05:48

## 2024-12-19 RX ADMIN — CLONAZEPAM 1.5 MG: 0.5 TABLET ORAL at 08:11

## 2024-12-19 RX ADMIN — MEMANTINE 10 MG: 5 TABLET ORAL at 08:11

## 2024-12-19 RX ADMIN — BUPROPION HYDROCHLORIDE 300 MG: 300 TABLET, EXTENDED RELEASE ORAL at 08:10

## 2024-12-19 RX ADMIN — HALOPERIDOL LACTATE 5 MG: 5 INJECTION, SOLUTION INTRAMUSCULAR at 21:19

## 2024-12-19 RX ADMIN — CLONAZEPAM 1.5 MG: 0.5 TABLET ORAL at 13:59

## 2024-12-19 RX ADMIN — PALIPERIDONE 3 MG: 3 TABLET, EXTENDED RELEASE ORAL at 09:57

## 2024-12-19 RX ADMIN — DIPHENHYDRAMINE HYDROCHLORIDE 50 MG: 50 INJECTION, SOLUTION INTRAMUSCULAR; INTRAVENOUS at 21:19

## 2024-12-19 RX ADMIN — PALIPERIDONE 3 MG: 3 TABLET, EXTENDED RELEASE ORAL at 19:29

## 2024-12-19 RX ADMIN — GABAPENTIN 800 MG: 400 CAPSULE ORAL at 13:59

## 2024-12-19 RX ADMIN — BUPROPION HYDROCHLORIDE 150 MG: 150 TABLET, EXTENDED RELEASE ORAL at 08:14

## 2024-12-19 RX ADMIN — HALOPERIDOL LACTATE 5 MG: 5 INJECTION, SOLUTION INTRAMUSCULAR at 05:48

## 2024-12-19 RX ADMIN — LORAZEPAM 2 MG: 2 INJECTION INTRAMUSCULAR; INTRAVENOUS at 21:19

## 2024-12-19 RX ADMIN — DIPHENHYDRAMINE HYDROCHLORIDE 50 MG: 50 INJECTION, SOLUTION INTRAMUSCULAR; INTRAVENOUS at 05:48

## 2024-12-19 RX ADMIN — GABAPENTIN 800 MG: 400 CAPSULE ORAL at 19:29

## 2024-12-19 RX ADMIN — MEMANTINE 10 MG: 5 TABLET ORAL at 19:30

## 2024-12-19 RX ADMIN — GABAPENTIN 800 MG: 400 CAPSULE ORAL at 08:11

## 2024-12-19 ASSESSMENT — ACTIVITIES OF DAILY LIVING (ADL)
ADLS_ACUITY_SCORE: 24
LAUNDRY: UNABLE TO COMPLETE
DRESS: SCRUBS (BEHAVIORAL HEALTH)
DRESS: SCRUBS (BEHAVIORAL HEALTH)
ADLS_ACUITY_SCORE: 24
LAUNDRY: UNABLE TO COMPLETE
ADLS_ACUITY_SCORE: 24
ADLS_ACUITY_SCORE: 24
ORAL_HYGIENE: INDEPENDENT
ADLS_ACUITY_SCORE: 24
ADLS_ACUITY_SCORE: 24
HYGIENE/GROOMING: INDEPENDENT
ADLS_ACUITY_SCORE: 24
HYGIENE/GROOMING: INDEPENDENT
ADLS_ACUITY_SCORE: 24
ORAL_HYGIENE: INDEPENDENT
ADLS_ACUITY_SCORE: 24

## 2024-12-19 NOTE — PROGRESS NOTES
1:1 time with the patient.  No changes made to the discharge plan at this time.  Pt talked with  today. Pt has confinement court tomorrow 12/20 @ 8:30am.

## 2024-12-19 NOTE — PLAN OF CARE
"  Problem: Adult Behavioral Health Plan of Care  Goal: Patient-Specific Goal (Individualization)  Description:  Patient will accept medications as ordered by the physician   Patient will attend groups and participate in the unit mileau during hospitalization.   Patient will accept and follow the recommendations of the discharge planning team.    12/8/2024- Patient able to wean at low stimulus times and be observed by staff when behavior is appropriate. Treatment team to assess daily.   Outcome: Progressing     Problem: Thought Process Alteration  Goal: Optimal Thought Clarity  Outcome: Progressing    Face to face shift report received from previously assigned nurse. Rounding completed, pt observed sitting on the edge of bed with blanket wrapped around shoulders.    Patient is met with in his room. Denies pain, SI, HI, A/V hallucinations, anxiety, and depression. Patient appears to be slightly confused, although is appropriate in conversation. Patient compliant with AM medication administration. Patient unsure of where to get breakfast which writer helped show patient where to go. No further needs at this time.     Patient compliant with administration of newly ordered Invega, no further needs at this time.    Patient resting in his room for a majority of the shift. No further needs at this time.     CARE CONTINUED INTO THE EVENING:     Patient is met with in his room. Assessment unchanged from this AM, however, patient continues to deny A/V hallucinations, even though staff can see patient responding to internal stimuli continuously when walking past his room. No further needs at this time.     Patient compliant with HS medication administration, scheduled emergency Invega given early with other HS medications due to patient preference, patient is attending groups. No further needs at this time.     Patient up to the nurses station window, aggressively questioning staff. States, \"Why am I here\", reminded patient of " "behaviors outside of here that required him to be admitted. Patient not receptive to this, growing more agitated. Patient states, \"I have four people I communicate with and you are stopping this from happening. You have murdered them. It's your fault I am here\". Patient is accusatory toward staff and posturing. Notified patient that these behaviors are not appropriate and we will need to take some medications. Patient refusing to do this, notified patient that due to his threatening behaviors we will need to utilize PRN medications for his safety and the safety of others. Patient continues to refuse, clenching fist and hitting it into his other hand.     Code Josias called: 2114  Approached patient giving them the option to cooperate with IM medications. Patient continues to refuse.     Code 21 called: 2123  Patient approached in his room on the open unit. Patient continues to refuse to participate, patient sitting on the floor at this time. Therapeutic touch initiated to assist patient off the floor and direct him to 530 in the MHICU. Patient did not resist this transfer to ICU. Continues to refuse IM medications, after some discussion and therapeutic guided touch, patient laid on the bed and allowed IM medications to be administered, administered PRN IM 5 mg haldol, 50 mg benadryl, 2 mg ativan. Patient continues to make paranoid and delusional statements about staff trying to \"do suicide\", patient making comments about the medications being fentanyl. Code team retreated at this time.    Patient paranoid that the sheets on the bed were not changed as well as making other demands. Patient continues to be agitated, banging on the window, appropriate boundaries reinforced. Patient can be seen on the cameras shadowboxing aggressively by the MHICU doors. Now resting in room. No further needs at this time.     Face to face report communicated to oncsalomón LARKIN.    Domenica Pelayo RN  12/19/2024  8:47 AM       "

## 2024-12-19 NOTE — PROGRESS NOTES
[facility] PSYCHIATRY  PROGRESS NOTE     SUBJECTIVE     Prior to interviewing the patient, I met with nursing and reviewed patient's clinical condition. We discussed clinical care both before and after the interview. I have reviewed the patient's clinical course by review of records including previous notes, labs, and vital signs.     Per nursing, the patient had the following behavioral events over the last 24-hours: none. Moved out of MHICU. Taking medications. Did receive Haldol/Benadryl/Ativant this morning given agitation and psychosis. Patient threatening to staff. Code Green had to be called.     On psychiatric interview, the patient was found in his room. He notes that he is tired today. He recently received a prn agent, which would explain this. He notes that he is doing fine overall. He plans to rest. He is not interested in Geodon.    He notes that he feels like something might be wrong with his blood and he might have STDs. Denies any sexual activity. No IV drug use elicited. Discussed how labs won't be ordered at this time given no indication with patient indicating he understands.    He denies any other concerns today.        MEDICATIONS   Scheduled Meds:  Current Facility-Administered Medications   Medication Dose Route Frequency Provider Last Rate Last Admin     buPROPion (WELLBUTRIN XL) 24 hr tablet 150 mg  150 mg Oral QAM David Timmons, DO   150 mg at 12/19/24 0814     buPROPion (WELLBUTRIN XL) 24 hr tablet 300 mg  300 mg Oral QAM David Timmons, DO   300 mg at 12/19/24 0810     clonazePAM (klonoPIN) tablet 1.5 mg  1.5 mg Oral TID David Timmons DO   1.5 mg at 12/19/24 0811     gabapentin (NEURONTIN) capsule 800 mg  800 mg Oral TID David Timmons DO   800 mg at 12/19/24 0811     memantine (NAMENDA) tablet 10 mg  10 mg Oral BID David Timmons, DO   10 mg at 12/19/24 0811     PRN Meds:.  Current Facility-Administered Medications   Medication Dose Route Frequency Provider  Last Rate Last Admin     acetaminophen (TYLENOL) tablet 650 mg  650 mg Oral Q4H PRN David Timmons DO         alum & mag hydroxide-simethicone (MAALOX) suspension 30 mL  30 mL Oral Q4H PRN David Timmons DO         haloperidol (HALDOL) tablet 5 mg  5 mg Oral Q8H PRN David Timmons DO        And     LORazepam (ATIVAN) tablet 2 mg  2 mg Oral Q8H PRN David Timmons DO        And     diphenhydrAMINE (BENADRYL) capsule 50 mg  50 mg Oral Q8H PRN David Timmons DO         haloperidol lactate (HALDOL) injection 5 mg  5 mg Intramuscular Q8H PRN David Timmons DO   5 mg at 12/19/24 0548    And     LORazepam (ATIVAN) injection 2 mg  2 mg Intramuscular Q8H PRN David Timmons DO   2 mg at 12/19/24 0548    And     diphenhydrAMINE (BENADRYL) injection 50 mg  50 mg Intramuscular Q8H PRN David Timmons DO   50 mg at 12/19/24 0548     hydrOXYzine HCl (ATARAX) tablet 25 mg  25 mg Oral Q6H PRN David Timmons DO         ibuprofen (ADVIL/MOTRIN) tablet 600 mg  600 mg Oral Q6H PRN Harmony Samuel, CNP         magic mouthwash suspension (diphenhydrAMINE, lidocaine, aluminum-magnesium & simethicone)  5 mL Swish & Spit TID PRN Harmony Samuel CNP         melatonin tablet 5 mg  5 mg Oral At Bedtime PRN Nargis Solomon APRN CNP   5 mg at 12/14/24 2216     OLANZapine (zyPREXA) tablet 10 mg  10 mg Oral Q6H PRN David Timmons DO   10 mg at 12/15/24 2150    Or     OLANZapine (zyPREXA) injection 10 mg  10 mg Intramuscular Q6H PRN David Timmons DO         senna-docusate (SENOKOT-S/PERICOLACE) 8.6-50 MG per tablet 1 tablet  1 tablet Oral BID PRN David Timmons DO         traZODone (DESYREL) tablet 50 mg  50 mg Oral At Bedtime PRN David Timmons, DO   50 mg at 12/17/24 8140        ALLERGIES   Allergies   Allergen Reactions     Pork Allergy         MENTAL STATUS EXAM   Vitals: /88   Pulse 77   Temp 98.9  F (37.2  C) (Temporal)   Resp 16   SpO2 97%     Appearance: Alert,  oriented, dressed in hospital scrubs, disheveled, long hair and beard  Attitude: Cooperative  Eye Contact: Fair  Mood: Groggy  Affect: Blunted, reduced range   Speech: Normal rate and rhythm   Psychomotor Behavior: No TD or rigidity. No tremor or akathisia.   Thought Process: More disorganized today  Associations: No loose associations   Thought Content: Denies SI, plan, or SIB. Denies AVH. Delusional thought present (paranoia, somatic). Responding to internal stimuli at times.  Insight: Poor  Judgment: Poor  Oriented to: Person, place, and time  Attention Span and Concentration: Intact  Recent and Remote Memory: Intact  Language: English with appropriate syntax and vocabulary  Fund of Knowledge:  Low  Muscle Strength and Tone: Grossly normal  Gait and Station: Grossly normal       LABS   No results found for this or any previous visit (from the past 24 hours).          IMPRESSION     This is a 34 year old male with a PMH of SZAD, polysubstance abuse, and multiple TBIs with neurological sequalae who presents in a severe psychotic and catatonic episode in the setting of medication partial non-adherence. Patient has SPMI living in [place]. He has impaired functioning and generally poor insight. He has tried many medications having minimal response at times. He would benefit from hospitalization with resumption of medication as best able. Will focus first on treating catatonia along with psychosis.    Today: Patient continues to be psychotic with him more disorganized recently and responding to internal stimuli overnight leading to Code Green and threat to hurt others. As a result, emergency neuroleptics being started especially in light of his history. Will pursue agent with [facility] as this will be needed.         DIAGNOSES     #. Schizoaffective disorder, bipolar type, continuous   #. Multiple TBIs with LOC with sequale   #. Hallucinogen (DXM) use disorder, in sustained remission  #. Stimulant use disorder, in sustained  remission  #. Alcohol use disorder, in sustained remission  #. Cannabis use        PLAN     Location: Unit 5  Legal Status: Orders Placed This Encounter      Legal status Court Hold    Petition for commitment and Sarah (all available neuroleptics) submitted to [facility]    Safety Assessment:    Behavioral Orders   Procedures     Code 1 - Restrict to Unit     Routine Programming     As clinically indicated     Status 15     Every 15 minutes.      PTA psychotropic medications held:      - Intuniv 1 mg at bedtime to simplify regimen  - Haldol 5 mg daily prn aggression due to using Zyprexa and Haldol/Benadryl/Ativan  - Ativan 2 mg BID prn aggression changed to Haldol/Benadryl/Ativan prn      PTA psychotropic medications continued/changed:      - Klonopin 1 mg TID increased to 1.5 mg TID temporarily   - Wellbutrin  mg daily  - Gabapentin 800 mg TID  - Namenda 10 mg BID     New psychotropic medications initiated:      - Standard unit prn agents, including Zyprexa prn agitation, in addition to Haldol/Benadryl/Ativan    Today's Changes:    - Start Invega 3 mg BID for emergency purposes with Haldol 5 mg IM backup     Programming: Patient will be treated in a therapeutic milieu with appropriate individual and group therapies. Education will be provided on diagnoses, medications, and treatments.     Medical diagnoses:  Per medicine    #. Difficulty voiding  - Likely secondary to Klonopin  - UA wnl  - Monitor and decrease Klonopin cautiously over next week    Consult: None  Tests: None     Anticipated LOS: >7  days   Disposition: [facility] with outpatient services       ATTESTATION      Dr. David Timmons  Psychiatrist

## 2024-12-19 NOTE — PLAN OF CARE
Problem: Adult Behavioral Health Plan of Care  Goal: Patient-Specific Goal (Individualization)  Description:  Patient will accept medications as ordered by the physician   Patient will attend groups and participate in the unit mileau during hospitalization.   Patient will accept and follow the recommendations of the discharge planning team.    12/8/2024- Patient able to wean at low stimulus times and be observed by staff when behavior is appropriate. Treatment team to assess daily.   Outcome: Progressing  Note: Report received from Adithya. Rounding complete. Pt to nurses station with request for hot water. Pt advised hot water comes out with coffee in the morning at 6. Pt offered c/o the water in his sink not getting hot enough.     0130- Pt up with request for pepe crackers which were provided. Pt apologized for requesting them. Writer told pt that he shouldn't feel bad and that we are here to help him with the things he needs whether it's to talk, food, medications, etc. Pt said he still feels bad asking for stuff. Writer again advised not to feel that way and to feel free to let us know when he needs things. Pt agreeable to this. Pt returned to his room.     0415- Pt heard continuously talking to himself off and on throughout the NOC shift. Upon speaking with pt and offering him PRN's pt stated that the best thing I could do to help him would be to let him walk out the doors. Pt advised that he is on a court hold and that I am unable to let him leave at this time. Pt then began talking about gang stalking, and that he thinks the universe is pissed off and that 4 branches of the  including space force are in trouble for making illegal calls and that he can't explain it more than that. He went on to talk tangentially about spirituality type connections to the universe. Pt also demanding he needs us to give him his 4 briefcases bc he thinks there's an IED in them. He also verbalized feeling slighted by  "the system and that he is being wrongfully held here.  He stated that he is willing to give Dr Timmons a little bit of trust to get his meds right and than also said he was glad that he was brought here. He then immediately went back to speaking about not thinking he should be here. When advised that he has been observed to be talking to himself most of the night, he said that he was talking to the wall because he is a telepathic empath and he is hoping if he talks to the wall loud enough that Shanelle would hear him and know what he was thinking.  He again declined offers for PRN's tonight and does not meet criteria for emergency medication as he is not agitated or aggressive and not a threat to himself or others.    0535- Writer approached pt due to him becoming disruptive on the unit and yelling out and swearing and making aggressive arm movements while he paced the unit. Pt is clearly responding to internal stimuli and is becoming increasingly more agitated by the moment. Pt was initially offered and refused PO Zyprexa and then made verbal threats asking this writer \"Are you ready to see someone get fucked up? Because it's about to go down.\" Code green called and therapeutic touch for reassurance was utilized while walking and talking with pt on the way back to his room for IM B52 (given at 0554) which pt accepted once in his room. Therapeutic touch for pt reassurance was only utilized from [id number]. Pt stated after admin that he thought we might have given fentanyl and were trying to kill him. Pt was reassured that it was not fentanyl and writer stayed behind and spoke with him for a moment after code was complete. Pt advised again that it was benadryl, haldol, and ativan that he was given and writer verified that he has taken it before. Pt seemed slightly more at ease after being reminded of this.     Pt has been in bed with eyes closed and regular respirations. 15 minute and PRN checks all night. No complaints " "offered.     Pt slept approx 0 hours this NOC shift.    Face to face end of shift report communicated to oncoming RN.    Farida KIMBLE RN  December 19, 2024  12:26 AM          Problem: Thought Process Alteration  Goal: Optimal Thought Clarity  Outcome: Progressing  Note: Pt calm and cooperative at interaction although appears somewhat annoyed.    Pt appears to be responding to internal stimuli although denies this and states he is just trying to \"telepathically empathically send messages out into the universe and feels as if he is speaking to the same 4 people.  "

## 2024-12-19 NOTE — PROGRESS NOTES
CLINICAL NUTRITION SERVICES  -  REASSESSMENT NOTE    REASON FOR ASSESSMENT:  follow up      NUTRITION HISTORY  Steven Carter is a 34 year old male admitted for psychosis with catatonia. PMH includes TBI, schizoaffective disorder, polysubstance abuse. Met criteria for malnutrition in 2022. No current weight or recent weight hx to review. Adequate intake, good appetite.    Allergies     Allergies   Allergen Reactions    Pork Allergy        CURRENT NUTRITION ORDERS  Diet Order:   Orders Placed This Encounter      Regular Diet Adult    Current Intake/Tolerance: 16 meals with 0-100% intake (13 meals with % intake)    ANTHROPOMETRICS  Height: Data Unavailable  Weight: 0 lbs 0 oz  There is no height or weight on file to calculate BMI.  Weight Status:  unable to assess  Weight History:   Wt Readings from Last 10 Encounters:   03/26/23 89.5 kg (197 lb 4.8 oz)   02/25/23 89 kg (196 lb 4.8 oz)   08/27/22 85.2 kg (187 lb 14.4 oz)   03/05/22 93 kg (205 lb 1.6 oz)   12/14/21 97.7 kg (215 lb 4.8 oz)   12/01/21 97.8 kg (215 lb 11.2 oz)   10/31/21 105.1 kg (231 lb 11.2 oz)   03/27/21 105.2 kg (232 lb)   11/30/15 96.6 kg (213 lb)   08/18/14 94.5 kg (208 lb 6.4 oz)        LABS  Labs reviewed    MEDICATIONS  Medications reviewed    Malnutrition Diagnosis: Unable to determine due to limited hx available      NUTRITION INTERVENTIONS  Recommendations / Nutrition Prescription  Encourage intake during meal/snacks  Multivitamin supplement with poor intake  Monitor weight as able     MONITORING AND EVALUATION:  Intake, weight, labs

## 2024-12-20 PROCEDURE — 250N000013 HC RX MED GY IP 250 OP 250 PS 637: Performed by: STUDENT IN AN ORGANIZED HEALTH CARE EDUCATION/TRAINING PROGRAM

## 2024-12-20 PROCEDURE — 99233 SBSQ HOSP IP/OBS HIGH 50: CPT | Mod: 95 | Performed by: STUDENT IN AN ORGANIZED HEALTH CARE EDUCATION/TRAINING PROGRAM

## 2024-12-20 PROCEDURE — 124N000001 HC R&B MH

## 2024-12-20 PROCEDURE — 250N000013 HC RX MED GY IP 250 OP 250 PS 637: Performed by: NURSE PRACTITIONER

## 2024-12-20 RX ORDER — BENZTROPINE MESYLATE 1 MG/1
1 TABLET ORAL 2 TIMES DAILY PRN
Status: DISCONTINUED | OUTPATIENT
Start: 2024-12-20 | End: 2025-01-03 | Stop reason: HOSPADM

## 2024-12-20 RX ADMIN — MEMANTINE 10 MG: 5 TABLET ORAL at 08:42

## 2024-12-20 RX ADMIN — GABAPENTIN 800 MG: 400 CAPSULE ORAL at 08:42

## 2024-12-20 RX ADMIN — BUPROPION HYDROCHLORIDE 150 MG: 150 TABLET, EXTENDED RELEASE ORAL at 08:42

## 2024-12-20 RX ADMIN — CLONAZEPAM 1.5 MG: 0.5 TABLET ORAL at 19:21

## 2024-12-20 RX ADMIN — PALIPERIDONE 3 MG: 3 TABLET, EXTENDED RELEASE ORAL at 08:42

## 2024-12-20 RX ADMIN — CLONAZEPAM 1.5 MG: 0.5 TABLET ORAL at 13:29

## 2024-12-20 RX ADMIN — GABAPENTIN 800 MG: 400 CAPSULE ORAL at 19:21

## 2024-12-20 RX ADMIN — CLONAZEPAM 1.5 MG: 0.5 TABLET ORAL at 08:42

## 2024-12-20 RX ADMIN — IBUPROFEN 600 MG: 600 TABLET, FILM COATED ORAL at 17:50

## 2024-12-20 RX ADMIN — MEMANTINE 10 MG: 5 TABLET ORAL at 19:21

## 2024-12-20 RX ADMIN — GABAPENTIN 800 MG: 400 CAPSULE ORAL at 13:29

## 2024-12-20 RX ADMIN — BUPROPION HYDROCHLORIDE 300 MG: 300 TABLET, EXTENDED RELEASE ORAL at 08:47

## 2024-12-20 RX ADMIN — PALIPERIDONE 3 MG: 3 TABLET, EXTENDED RELEASE ORAL at 21:11

## 2024-12-20 ASSESSMENT — ACTIVITIES OF DAILY LIVING (ADL)
ADLS_ACUITY_SCORE: 24
DRESS: SCRUBS (BEHAVIORAL HEALTH);INDEPENDENT
ADLS_ACUITY_SCORE: 24
HYGIENE/GROOMING: INDEPENDENT
ADLS_ACUITY_SCORE: 24
LAUNDRY: UNABLE TO COMPLETE
ADLS_ACUITY_SCORE: 24
DRESS: INDEPENDENT;SCRUBS (BEHAVIORAL HEALTH)
ADLS_ACUITY_SCORE: 24
ADLS_ACUITY_SCORE: 24
HYGIENE/GROOMING: INDEPENDENT
ADLS_ACUITY_SCORE: 24
LAUNDRY: UNABLE TO COMPLETE
ADLS_ACUITY_SCORE: 24
ORAL_HYGIENE: INDEPENDENT
ADLS_ACUITY_SCORE: 24
ORAL_HYGIENE: INDEPENDENT

## 2024-12-20 NOTE — PLAN OF CARE
Problem: Adult Behavioral Health Plan of Care  Goal: Patient-Specific Goal (Individualization)  Description:  Patient will accept medications as ordered by the physician   Patient will attend groups and participate in the unit mileau during hospitalization.   Patient will accept and follow the recommendations of the discharge planning team.    12/19/2024 - Patient is a no wean at this time due to threatening behaviors and paranoid/delusional beliefs. Treatment team to assess daily.   Outcome: Progressing  Note: Report received from Nirmal castañeda. Pt observed sleeping in supine position on his floor with regular and unlabored respirations. Pt has stated previously that he prefers sleeping on the floor.    0400- Pt up and requested pepe crackers and ice water. Pt returned to laying on the floor to go to sleep in his room immediately after finishing his pepe crackers. Pt denied any other needs or requests at this time.     Pt has been in bed with eyes closed and regular respirations. 15 minute and PRN checks all night. No complaints offered.     Pt slept approx  6  hours this NOC shift.    Face to face end of shift report communicated to oncoming RN.    Farida KIMBLE RN  December 20, 2024  4:12 AM          Problem: Thought Process Alteration  Goal: Optimal Thought Clarity  Outcome: Progressing  Note: JUAN ANTONIO Pt was only up briefly and chatted with UA while this writer was with another pt.    Goal Outcome Evaluation:

## 2024-12-20 NOTE — PROGRESS NOTES
[facility] PSYCHIATRY  PROGRESS NOTE     SUBJECTIVE     Prior to interviewing the patient, I met with nursing and reviewed patient's clinical condition. We discussed clinical care both before and after the interview. I have reviewed the patient's clinical course by review of records including previous notes, labs, and vital signs.     Per nursing, the patient had the following behavioral events over the last 24-hours: Patient back in MHICU given agitation and verbal threats. Code 21 with forced IM medications. Taking medications as prescribed. Received emergency scheduled neuroleptics.     On psychiatric interview, the patient was found in his room. He notes that he isn't happy about being in the back area. He notes that he gets angry and doesn't know how to vent his frustration. He notes that he is worried with the commitment that his family will be taken away. Provided reassurance.    The patient notes that people don't understand what is going on. He notes that the nursing staff does not understand basic math. He notes that people here don't understand that.     He notes that he doesn't want to be on Invega, but understands that he has to take it right now. He notes that he doesn't think he should be here. He notes some stiffness but he is fine overall. Discussed how prn Cogentin will be provided.     He notes that he thinks that 3D printers are making up false documents against him. He asked me to give him THC in a pill.       MEDICATIONS   Scheduled Meds:  Current Facility-Administered Medications   Medication Dose Route Frequency Provider Last Rate Last Admin    buPROPion (WELLBUTRIN XL) 24 hr tablet 150 mg  150 mg Oral QAM David Timmons, DO   150 mg at 12/20/24 0842    buPROPion (WELLBUTRIN XL) 24 hr tablet 300 mg  300 mg Oral QAM David Timmons, DO   300 mg at 12/20/24 0847    clonazePAM (klonoPIN) tablet 1.5 mg  1.5 mg Oral TID David Timmons, DO   1.5 mg at 12/20/24 0842    gabapentin  (NEURONTIN) capsule 800 mg  800 mg Oral TID David Timmons DO   800 mg at 12/20/24 0842    paliperidone ER (INVEGA) 24 hr tablet 3 mg  3 mg Oral BID David Timmons DO   3 mg at 12/20/24 0842    Or    haloperidol lactate (HALDOL) injection 5 mg  5 mg Intramuscular BID David Timmons DO        memantine (NAMENDA) tablet 10 mg  10 mg Oral BID David Timmons DO   10 mg at 12/20/24 0842     PRN Meds:.  Current Facility-Administered Medications   Medication Dose Route Frequency Provider Last Rate Last Admin    acetaminophen (TYLENOL) tablet 650 mg  650 mg Oral Q4H PRN David Timmons DO        alum & mag hydroxide-simethicone (MAALOX) suspension 30 mL  30 mL Oral Q4H PRN David Timmons DO        haloperidol (HALDOL) tablet 5 mg  5 mg Oral Q8H PRN David Timmons DO        And    LORazepam (ATIVAN) tablet 2 mg  2 mg Oral Q8H PRN David Timmons DO        And    diphenhydrAMINE (BENADRYL) capsule 50 mg  50 mg Oral Q8H PRN David Timmons DO        haloperidol lactate (HALDOL) injection 5 mg  5 mg Intramuscular Q8H PRN David Timmons DO   5 mg at 12/19/24 2119    And    LORazepam (ATIVAN) injection 2 mg  2 mg Intramuscular Q8H PRN David Timmons DO   2 mg at 12/19/24 2119    And    diphenhydrAMINE (BENADRYL) injection 50 mg  50 mg Intramuscular Q8H PRN David Timmons DO   50 mg at 12/19/24 2119    hydrOXYzine HCl (ATARAX) tablet 25 mg  25 mg Oral Q6H PRN David Timmons DO        ibuprofen (ADVIL/MOTRIN) tablet 600 mg  600 mg Oral Q6H PRN Harmony Samuel CNP        magic mouthwash suspension (diphenhydrAMINE, lidocaine, aluminum-magnesium & simethicone)  5 mL Swish & Spit TID PRN Harmony Samuel CNP        melatonin tablet 5 mg  5 mg Oral At Bedtime PRN Nargis Solomon APRN CNP   5 mg at 12/14/24 2216    OLANZapine (zyPREXA) tablet 10 mg  10 mg Oral Q6H PRN David Timmons DO   10 mg at 12/15/24 2150    Or    OLANZapine (zyPREXA) injection 10 mg  10 mg  Intramuscular Q6H PRN David Timmons DO        senna-docusate (SENOKOT-S/PERICOLACE) 8.6-50 MG per tablet 1 tablet  1 tablet Oral BID PRN David Timmons DO        traZODone (DESYREL) tablet 50 mg  50 mg Oral At Bedtime PRN David Timmons DO   50 mg at 12/17/24 2090        ALLERGIES   Allergies   Allergen Reactions    Pork Allergy         MENTAL STATUS EXAM   Vitals: /70   Pulse 85   Temp 98.3  F (36.8  C) (Temporal)   Resp 17   SpO2 97%     Appearance: Alert, oriented, dressed in hospital scrubs, disheveled, long hair and beard  Attitude: Cooperative  Eye Contact: Fair  Mood: Irritated  Affect: Blunted, reduced range   Speech: Normal rate and rhythm   Psychomotor Behavior: No TD or rigidity. No tremor or akathisia.   Thought Process: More disorganized today  Associations: No loose associations   Thought Content: Denies SI, plan, or SIB. Denies AVH. Delusional thought present (paranoia, somatic). Responding to internal stimuli at times.  Insight: Poor  Judgment: Poor  Oriented to: Person, place, and time  Attention Span and Concentration: Intact  Recent and Remote Memory: Intact  Language: English with appropriate syntax and vocabulary  Fund of Knowledge:  Low  Muscle Strength and Tone: Grossly normal  Gait and Station: Grossly normal       LABS   No results found for this or any previous visit (from the past 24 hours).          IMPRESSION     This is a 34 year old male with a PMH of SZAD, polysubstance abuse, and multiple TBIs with neurological sequalae who presents in a severe psychotic and catatonic episode in the setting of medication partial non-adherence. Patient has SPMI living in [place]. He has impaired functioning and generally poor insight. He has tried many medications having minimal response at times. He would benefit from hospitalization with resumption of medication as best able. Will focus first on treating catatonia along with psychosis.    Today: Patient continues to be  psychotic with him more disorganized recently and responding to internal stimuli with patient threatening staff leading to emergency medications with him in the MHICU tonight after a Code 21 last night. Will continue medications started yesterday with increase tomorrow.        DIAGNOSES     #. Schizoaffective disorder, bipolar type, continuous   #. Multiple TBIs with LOC with sequale   #. Hallucinogen (DXM) use disorder, in sustained remission  #. Stimulant use disorder, in sustained remission  #. Alcohol use disorder, in sustained remission  #. Cannabis use        PLAN     Location: Unit 5  Legal Status: Orders Placed This Encounter      Legal status Court Hold    Petition for commitment and Sarah (all available neuroleptics) submitted to [facility]    Safety Assessment:    Behavioral Orders   Procedures    Code 1 - Restrict to Unit    Routine Programming     As clinically indicated    Status 15     Every 15 minutes.      PTA psychotropic medications held:      - Intuniv 1 mg at bedtime to simplify regimen  - Haldol 5 mg daily prn aggression due to using Zyprexa and Haldol/Benadryl/Ativan  - Ativan 2 mg BID prn aggression changed to Haldol/Benadryl/Ativan prn      PTA psychotropic medications continued/changed:      - Klonopin 1 mg TID increased to 1.5 mg TID temporarily   - Wellbutrin  mg daily  - Gabapentin 800 mg TID  - Namenda 10 mg BID     New psychotropic medications initiated:     - Invega 3 mg BID with Haldol 5 mg IM backup for emergency purposes as of 12/19  - Standard unit prn agents, including Zyprexa prn agitation, in addition to Haldol/Benadryl/Ativan    Today's Changes:    - Continue medications including forced neuroleptics  - Start Cogentin 1 mg BID prn EPSE  - Continue in MHICU    Programming: Patient will be treated in a therapeutic milieu with appropriate individual and group therapies. Education will be provided on diagnoses, medications, and treatments.     Medical diagnoses:  Per  medicine    #. Difficulty voiding  - Likely secondary to Klonopin  - UA wnl  - Monitor and decrease Klonopin cautiously over next week    Consult: None  Tests: None     Anticipated LOS: >7  days   Disposition: [place] with outpatient services       ATTESTATION      Dr. David Timmons  Psychiatrist    Video Visit: Patient has given verbal consent for video visit?: Yes  Type of Service: video visit for mental health treatment  Reason [place] Visit: [place]  Originating Site (patient location): [facility]  Distant Site (provider location): Remote Location  Mode of Communication: Video Conference via [employer]  Time of Service: Date: 12/20/2024 Start: 900 end: 934

## 2024-12-20 NOTE — PROGRESS NOTES
Pt had confinement court today. Pt is confined. C&J is 12/27 @ 8:30a.     Pt asked sw why he was here. What Clifford said and what are in the notes are fabricated according to pt. Sw explained that is why the court and county was brought in to be involved for a outside opinion.

## 2024-12-20 NOTE — PLAN OF CARE
"  Problem: Adult Behavioral Health Plan of Care  Goal: Patient-Specific Goal (Individualization)  Description:  Patient will accept medications as ordered by the physician   Patient will attend groups and participate in the unit mileau during hospitalization.   Patient will accept and follow the recommendations of the discharge planning team.    12/20/2024 - Patient is a no wean at this time due to threatening behaviors and paranoid/delusional beliefs. Treatment team to assess daily.   Outcome: Progressing   Goal Outcome Evaluation:    Plan of Care Reviewed With: patient      Patient is Alert, able to make needs known. VSS, afebrile. Assessment and vitals as charted.   Patient irritable, gaurded. Had court this morning. Patient stated, \"it went bad.\" States all the evidence against him is lies. He did request to take his medications this morning after court after some verbal frustration. He remained calm during our entire conversation. Requested clean bedding and soap. Offered shower supplies as patient appears disheveled, although he declined.   He denies SI, SIB, HI, A/VH although appears to be responding at times. Endorses some depression.        "

## 2024-12-21 PROCEDURE — 99233 SBSQ HOSP IP/OBS HIGH 50: CPT | Mod: 95 | Performed by: STUDENT IN AN ORGANIZED HEALTH CARE EDUCATION/TRAINING PROGRAM

## 2024-12-21 PROCEDURE — 250N000013 HC RX MED GY IP 250 OP 250 PS 637: Performed by: NURSE PRACTITIONER

## 2024-12-21 PROCEDURE — 124N000001 HC R&B MH

## 2024-12-21 PROCEDURE — 250N000013 HC RX MED GY IP 250 OP 250 PS 637: Performed by: STUDENT IN AN ORGANIZED HEALTH CARE EDUCATION/TRAINING PROGRAM

## 2024-12-21 RX ORDER — PALIPERIDONE 3 MG/1
9 TABLET, EXTENDED RELEASE ORAL AT BEDTIME
Status: DISCONTINUED | OUTPATIENT
Start: 2024-12-22 | End: 2024-12-29

## 2024-12-21 RX ORDER — PALIPERIDONE 1.5 MG/1
4.5 TABLET, EXTENDED RELEASE ORAL 2 TIMES DAILY
Status: COMPLETED | OUTPATIENT
Start: 2024-12-21 | End: 2024-12-21

## 2024-12-21 RX ORDER — HALOPERIDOL 5 MG/ML
5 INJECTION INTRAMUSCULAR 2 TIMES DAILY
Status: COMPLETED | OUTPATIENT
Start: 2024-12-21 | End: 2024-12-21

## 2024-12-21 RX ORDER — HALOPERIDOL 5 MG/ML
5 INJECTION INTRAMUSCULAR AT BEDTIME
Status: DISCONTINUED | OUTPATIENT
Start: 2024-12-22 | End: 2024-12-26

## 2024-12-21 RX ORDER — HALOPERIDOL 5 MG/ML
5 INJECTION INTRAMUSCULAR 2 TIMES DAILY
Status: DISCONTINUED | OUTPATIENT
Start: 2024-12-21 | End: 2024-12-21

## 2024-12-21 RX ORDER — PALIPERIDONE 1.5 MG/1
4.5 TABLET, EXTENDED RELEASE ORAL 2 TIMES DAILY
Status: DISCONTINUED | OUTPATIENT
Start: 2024-12-21 | End: 2024-12-21

## 2024-12-21 RX ADMIN — GABAPENTIN 800 MG: 400 CAPSULE ORAL at 13:39

## 2024-12-21 RX ADMIN — MEMANTINE 10 MG: 5 TABLET ORAL at 18:17

## 2024-12-21 RX ADMIN — PALIPERIDONE 4.5 MG: 1.5 TABLET, EXTENDED RELEASE ORAL at 09:33

## 2024-12-21 RX ADMIN — BUPROPION HYDROCHLORIDE 150 MG: 150 TABLET, EXTENDED RELEASE ORAL at 09:39

## 2024-12-21 RX ADMIN — BUPROPION HYDROCHLORIDE 300 MG: 300 TABLET, EXTENDED RELEASE ORAL at 09:34

## 2024-12-21 RX ADMIN — TRAZODONE HYDROCHLORIDE 50 MG: 50 TABLET ORAL at 20:00

## 2024-12-21 RX ADMIN — CLONAZEPAM 1.5 MG: 0.5 TABLET ORAL at 18:17

## 2024-12-21 RX ADMIN — CLONAZEPAM 1.5 MG: 0.5 TABLET ORAL at 09:34

## 2024-12-21 RX ADMIN — MEMANTINE 10 MG: 5 TABLET ORAL at 09:33

## 2024-12-21 RX ADMIN — TRAZODONE HYDROCHLORIDE 50 MG: 50 TABLET ORAL at 22:32

## 2024-12-21 RX ADMIN — GABAPENTIN 800 MG: 400 CAPSULE ORAL at 18:17

## 2024-12-21 RX ADMIN — GABAPENTIN 800 MG: 400 CAPSULE ORAL at 09:33

## 2024-12-21 RX ADMIN — PALIPERIDONE 4.5 MG: 1.5 TABLET, EXTENDED RELEASE ORAL at 20:00

## 2024-12-21 RX ADMIN — CLONAZEPAM 1.5 MG: 0.5 TABLET ORAL at 13:39

## 2024-12-21 RX ADMIN — IBUPROFEN 600 MG: 600 TABLET, FILM COATED ORAL at 06:13

## 2024-12-21 ASSESSMENT — ACTIVITIES OF DAILY LIVING (ADL)
ADLS_ACUITY_SCORE: 24

## 2024-12-21 NOTE — PLAN OF CARE
Problem: Adult Behavioral Health Plan of Care  Goal: Patient-Specific Goal (Individualization)  Description:  Patient will accept medications as ordered by the physician   Patient will attend groups and participate in the unit mileau during hospitalization.   Patient will accept and follow the recommendations of the discharge planning team.    12/19/2024 - Patient is a no wean at this time due to threatening behaviors and paranoid/delusional beliefs. Treatment team to assess daily.   Outcome: Progressing   Goal Outcome Evaluation:       Face to face shift report received from RN. Rounding completed, pt observed.Client rested in room for 6 hours with eyes closed and respirations noted. Client had no falls or instances of instability this shift.Face to face report will be communicated to oncoming RN.    Didier Brooks RN  12/21/2024  6:20 AM

## 2024-12-21 NOTE — PLAN OF CARE
"Face to face shift report received from TRAE Britton. Rounding completed, pt observed sitting on bed at the start of this shift.     Pt. Denies all MH criteria this shift. Pt. Endorses 3/10 pain secondary to the \"4 shots I've gotten, it feels like its in my bones\". Pt. Requested and received prn 600mg Ibuprofen at 1750. On reassessment, pt. Rated pain a 1/10, declined further intervention.     Pt. Has not made any delusional statements this evening. Pt. Has remained calm and compliant with staff. Pt. Has remained free of verbal or physical outbursts. Pt. Was polite in conversation. Pt. Verbalized that he understands that he needs the medication management and will take them when asked. Pt. Was encouraged to verbalize his feelings of when he is starting to feel agitated, pt. Agreed.     Pt. Continues to c/o difficulty urinating, plan and possible rational, per provider, was discussed w/ pt., pt. Verbalized understanding. Pt. Also requested to either increase the Neurontin or switch to Lyrica. Pt. Was informed that note was left for provider.     Pt. Was compliant with taking the PO Invega but appeared upset about having to take it stating \"why do I have to take it? I didn't do anything today..\" pt. Began talking again about how the injection made his bones hurt, pt. Was told that this is PO and it won't hurt.    Pts. Appetite is adequate. Pt. Remains withdrawn to room.     Face to face report will be communicated to oncoming RN.    Juanita Watkins RN  12/20/2024  10:49 PM       Problem: Adult Behavioral Health Plan of Care  Goal: Patient-Specific Goal (Individualization)  Description:  Patient will accept medications as ordered by the physician   Patient will attend groups and participate in the unit mileau during hospitalization.   Patient will accept and follow the recommendations of the discharge planning team.    12/19/2024 - Patient is a no wean at this time due to threatening behaviors and paranoid/delusional beliefs. " Treatment team to assess daily.   Outcome: Progressing       Problem: Thought Process Alteration  Goal: Optimal Thought Clarity  Description: Pt will be able to maintain linear and reality based conversations during this stay.   Pt will make their needs known during this stay.   Pt will report manageable hallucinations by discharge.   Pt will contract for safety while on the unit.     12/20/2024 1947 by Juanita Watkins RN  Outcome: Progressing

## 2024-12-21 NOTE — PROGRESS NOTES
"Problem: Adult Behavioral Health Plan of Care  Goal: Patient-Specific Goal (Individualization)  Description:  Patient will accept medications as ordered by the physician   Patient will accept and follow the recommendations of the discharge planning team.  Patient will sleep at least 6 hours at night  Patient will eat at least 50% of meals    12/21/2024 - Patient is a no wean at this time due to threatening behaviors and paranoid/delusional beliefs. Treatment team to assess daily.   Outcome: Progressing     Problem: Thought Process Alteration  Goal: Optimal Thought Clarity  Description: Pt will be able to maintain linear and reality based conversations during this stay.   Pt will make their needs known during this stay.   Pt will report manageable hallucinations by discharge.   Pt will contract for safety while on the unit.     Outcome: Progressing   Goal Outcome Evaluation:    Patient reports pain, \"in my waist from those shots, they felt like covid shots,\" declines prn medication. Patient is irritable, \"I asked for phone call all day yesterday. I shouldn't even be here. I don't think my phone calls should be supervised.\" Patient educated on protocol and responds with, \"I'm not going to argue.\" Patient reports mood as \"pretty shitty. Doesn't get my mind off reality,\" patient does not elaborate further. Patient agreeable to vitals. Patient denies AH/VH, SI/HI, or any harmful thoughts towards self or others at this time, agrees to notify staff if anything changes.    December 21, 2024 9:04 AM    Patient was able to make a phone call. Patient med compliant. Patient more calm and cooperative with this interaction.  December 21, 2024 9:35 AM    Patient showered. Patient med compliant with afternoon medications. Patient much more calm at this time. Patient still frustrated with being here. Patient does not report any concerns or complaints at this time.   December 21, 2024 1:46 PM    Patient requesting to be sent to [facility] " [facility] treatment center, he's unsure of the name. Patient notified this writer would leave note for treatment team, see sticky note.  December 21, 2024 2:34 PM    Patient's assessment unchanged from this morning, remains irritable at times but has been pleasant this afternoon. Patient made phone call. Patient does not report any concerns or complaints at this time.   December 21, 2024 4:25 PM    Patient reports \"I think I have kidney stones or gallbladder\" patient states he is unable to urinate normal. Patient reports dribbling and feels like he is unable to empty. Patient denies abdominal pain, frequency, urgency, or pain with urination. Patient requesting cranberry juice, educated we do not have this. Provider updated.  December 21, 2024 4:41 PM                                                  "

## 2024-12-21 NOTE — PROGRESS NOTES
"[facility] PSYCHIATRY  PROGRESS NOTE     SUBJECTIVE     Prior to interviewing the patient, I met with nursing and reviewed patient's clinical condition. We discussed clinical care both before and after the interview. I have reviewed the patient's clinical course by review of records including previous notes, labs, and vital signs.     Per nursing, the patient had the following behavioral events over the last 24-hours: Patient continues in the MHICU. Taking medications as prescribed.     On psychiatric interview, the patient was found in his room. He notes that he does not like how he has to take forced Invega. He notes that he would prefer just to be on the medications he was taking. Discussed increasing this medication making patient aware of risks, including EPSE.    He notes that he has a phobia at night of sleeping. He notes that he keep waking up with Band-Aids stuck on him. He notes that he feels like he got 4 COVID shots in two days when he wasn't have an outburst.     He notes that he feels like his civil commitment is not necessary and that he should be able to leave today. He notes that he would like to leave today and that it is not working out being here. He notes that he can \"pardon himself\" to get out of here. He notes that he has better judgement than half the people.    He notes that he feels like [place] is trying to write his will. He notes that this is a waste of time.        MEDICATIONS   Scheduled Meds:  Current Facility-Administered Medications   Medication Dose Route Frequency Provider Last Rate Last Admin    buPROPion (WELLBUTRIN XL) 24 hr tablet 150 mg  150 mg Oral QAM David Timmons, DO   150 mg at 12/20/24 0842    buPROPion (WELLBUTRIN XL) 24 hr tablet 300 mg  300 mg Oral QAM David Timomns, DO   300 mg at 12/20/24 0847    clonazePAM (klonoPIN) tablet 1.5 mg  1.5 mg Oral TID David Timmons, DO   1.5 mg at 12/20/24 1921    gabapentin (NEURONTIN) capsule 800 mg  800 mg Oral " TID David Timmons DO   800 mg at 12/20/24 1921    paliperidone ER (INVEGA) 24 hr tablet 3 mg  3 mg Oral BID David Timmons DO   3 mg at 12/20/24 2111    Or    haloperidol lactate (HALDOL) injection 5 mg  5 mg Intramuscular BID David Timmons DO        memantine (NAMENDA) tablet 10 mg  10 mg Oral BID David Timmons DO   10 mg at 12/20/24 1921     PRN Meds:.  Current Facility-Administered Medications   Medication Dose Route Frequency Provider Last Rate Last Admin    acetaminophen (TYLENOL) tablet 650 mg  650 mg Oral Q4H PRN David Timmons DO        alum & mag hydroxide-simethicone (MAALOX) suspension 30 mL  30 mL Oral Q4H PRN David Timmons DO        benztropine (COGENTIN) tablet 1 mg  1 mg Oral BID PRN David Timmons DO        haloperidol (HALDOL) tablet 5 mg  5 mg Oral Q8H PRN David Timmons DO        And    LORazepam (ATIVAN) tablet 2 mg  2 mg Oral Q8H PRN David Timmons DO        And    diphenhydrAMINE (BENADRYL) capsule 50 mg  50 mg Oral Q8H PRN David Timmons DO        haloperidol lactate (HALDOL) injection 5 mg  5 mg Intramuscular Q8H PRN David Timmons DO   5 mg at 12/19/24 2119    And    LORazepam (ATIVAN) injection 2 mg  2 mg Intramuscular Q8H PRN David Timmons DO   2 mg at 12/19/24 2119    And    diphenhydrAMINE (BENADRYL) injection 50 mg  50 mg Intramuscular Q8H PRN David Timmons DO   50 mg at 12/19/24 2119    hydrOXYzine HCl (ATARAX) tablet 25 mg  25 mg Oral Q6H PRN David Timmons DO        ibuprofen (ADVIL/MOTRIN) tablet 600 mg  600 mg Oral Q6H PRN Harmony Samuel, CNP   600 mg at 12/21/24 0613    magic mouthwash suspension (diphenhydrAMINE, lidocaine, aluminum-magnesium & simethicone)  5 mL Swish & Spit TID PRN Harmony Samuel CNP        melatonin tablet 5 mg  5 mg Oral At Bedtime PRN Nargis Solomon APRN CNP   5 mg at 12/14/24 2216    OLANZapine (zyPREXA) tablet 10 mg  10 mg Oral Q6H PRN David Timmons DO   10 mg at  "12/15/24 2150    Or    OLANZapine (zyPREXA) injection 10 mg  10 mg Intramuscular Q6H PRN David Timmons DO        senna-docusate (SENOKOT-S/PERICOLACE) 8.6-50 MG per tablet 1 tablet  1 tablet Oral BID PRN David Timmons DO        traZODone (DESYREL) tablet 50 mg  50 mg Oral At Bedtime PRN David Timmons DO   50 mg at 12/17/24 2238        ALLERGIES   Allergies   Allergen Reactions    Pork Allergy         MENTAL STATUS EXAM   Vitals: /86   Pulse 89   Temp 98.1  F (36.7  C) (Temporal)   Resp 18   SpO2 97%     Appearance: Alert, oriented, dressed in hospital scrubs, disheveled, long hair and beard  Attitude: Cooperative  Eye Contact: Fair  Mood: \"Not happy\"  Affect: Blunted, reduced range   Speech: Normal rate and rhythm   Psychomotor Behavior: No TD or rigidity. No tremor or akathisia.   Thought Process: More disorganized today  Associations: No loose associations   Thought Content: Denies SI, plan, or SIB. Denies AVH. Delusional thought present (paranoia, somatic, some grandiosity). Responding to internal stimuli at times.  Insight: Poor  Judgment: Poor  Oriented to: Person, place, and time  Attention Span and Concentration: Intact  Recent and Remote Memory: Intact  Language: English with appropriate syntax and vocabulary  Fund of Knowledge:  Low  Muscle Strength and Tone: Grossly normal  Gait and Station: Grossly normal       LABS   No results found for this or any previous visit (from the past 24 hours).          IMPRESSION     This is a 34 year old male with a PMH of SZAD, polysubstance abuse, and multiple TBIs with neurological sequalae who presents in a severe psychotic and catatonic episode in the setting of medication partial non-adherence. Patient has SPMI living in [place]. He has impaired functioning and generally poor insight. He has tried many medications having minimal response at times. He would benefit from hospitalization with resumption of medication as best able. Will focus first " on treating catatonia along with psychosis.    Today: Patient continues to be psychotic with him more disorganized recently and responding to internal stimuli with patient threatening staff leading to emergency medications with him in the MHICU tonight after a Code 21 last night. Will continue medications with increase and consolidation to evening dose. No signs of worsening catatonia with Invega.        DIAGNOSES     #. Schizoaffective disorder, bipolar type, continuous   #. Multiple TBIs with LOC with sequale   #. Hallucinogen (DXM) use disorder, in sustained remission  #. Stimulant use disorder, in sustained remission  #. Alcohol use disorder, in sustained remission  #. Cannabis use        PLAN     Location: Unit 5  Legal Status: Orders Placed This Encounter      Legal status Court Hold    Petition for commitment and Sarah (all available neuroleptics) submitted to [facility]    Safety Assessment:    Behavioral Orders   Procedures    Code 1 - Restrict to Unit    Routine Programming     As clinically indicated    Status 15     Every 15 minutes.      PTA psychotropic medications held:      - Intuniv 1 mg at bedtime to simplify regimen  - Haldol 5 mg daily prn aggression due to using Zyprexa and Haldol/Benadryl/Ativan  - Ativan 2 mg BID prn aggression changed to Haldol/Benadryl/Ativan prn      PTA psychotropic medications continued/changed:      - Klonopin 1 mg TID increased to 1.5 mg TID temporarily   - Wellbutrin  mg daily  - Gabapentin 800 mg TID  - Namenda 10 mg BID     New psychotropic medications initiated:     - Invega 3 mg BID with Haldol 5 mg IM backup for emergency purposes as of 12/19 -> 4.5 mg BID on 12/21 -> 9 mg at bedtime on 12/22  - Standard unit prn agents, including Zyprexa prn agitation, in addition to Haldol/Benadryl/Ativan    Today's Changes:    - Continue medications including forced neuroleptics  - Continue in MHICU  - Increase Invega to 4.5 mg BID then 9 mg at bedtime  tomorrow    Programming: Patient will be treated in a therapeutic milieu with appropriate individual and group therapies. Education will be provided on diagnoses, medications, and treatments.     Medical diagnoses:  Per medicine    #. Difficulty voiding  - Likely secondary to Klonopin  - UA wnl  - Monitor and decrease Klonopin cautiously over next week    Consult: None  Tests: None     Anticipated LOS: > 7  days   Disposition: [place] with outpatient services       ATTESTATION      Dr. David Timmons  Psychiatrist    Video Visit: Patient has given verbal consent for video visit?: Yes  Type of Service: video visit for mental health treatment  Reason [place] Visit: [place]  Originating Site (patient location): [facility]  Distant Site (provider location): Remote Location  Mode of Communication: Video Conference via [employer]  Time of Service: Date: 12/21/2024 Start: 800 end: 832

## 2024-12-22 PROCEDURE — 250N000013 HC RX MED GY IP 250 OP 250 PS 637: Performed by: PSYCHIATRY & NEUROLOGY

## 2024-12-22 PROCEDURE — 250N000013 HC RX MED GY IP 250 OP 250 PS 637: Performed by: STUDENT IN AN ORGANIZED HEALTH CARE EDUCATION/TRAINING PROGRAM

## 2024-12-22 PROCEDURE — 124N000001 HC R&B MH

## 2024-12-22 PROCEDURE — 250N000013 HC RX MED GY IP 250 OP 250 PS 637: Performed by: NURSE PRACTITIONER

## 2024-12-22 PROCEDURE — 99232 SBSQ HOSP IP/OBS MODERATE 35: CPT | Mod: 95 | Performed by: PSYCHIATRY & NEUROLOGY

## 2024-12-22 RX ORDER — GINSENG 100 MG
200 CAPSULE ORAL
Status: DISCONTINUED | OUTPATIENT
Start: 2024-12-22 | End: 2025-01-03 | Stop reason: HOSPADM

## 2024-12-22 RX ADMIN — BUPROPION HYDROCHLORIDE 300 MG: 300 TABLET, EXTENDED RELEASE ORAL at 08:21

## 2024-12-22 RX ADMIN — CLONAZEPAM 1.5 MG: 0.5 TABLET ORAL at 08:21

## 2024-12-22 RX ADMIN — GABAPENTIN 800 MG: 400 CAPSULE ORAL at 19:21

## 2024-12-22 RX ADMIN — GABAPENTIN 800 MG: 400 CAPSULE ORAL at 13:17

## 2024-12-22 RX ADMIN — Medication 5 MG: at 20:18

## 2024-12-22 RX ADMIN — Medication 200 MG: at 17:06

## 2024-12-22 RX ADMIN — GABAPENTIN 800 MG: 400 CAPSULE ORAL at 08:21

## 2024-12-22 RX ADMIN — CLONAZEPAM 1.5 MG: 0.5 TABLET ORAL at 13:17

## 2024-12-22 RX ADMIN — LORAZEPAM 2 MG: 1 TABLET ORAL at 00:06

## 2024-12-22 RX ADMIN — DIPHENHYDRAMINE HYDROCHLORIDE 50 MG: 50 CAPSULE ORAL at 00:06

## 2024-12-22 RX ADMIN — MEMANTINE 10 MG: 5 TABLET ORAL at 19:21

## 2024-12-22 RX ADMIN — CLONAZEPAM 1.5 MG: 0.5 TABLET ORAL at 19:21

## 2024-12-22 RX ADMIN — MEMANTINE 10 MG: 5 TABLET ORAL at 08:21

## 2024-12-22 RX ADMIN — HALOPERIDOL 5 MG: 5 TABLET ORAL at 00:06

## 2024-12-22 RX ADMIN — PALIPERIDONE 9 MG: 3 TABLET, EXTENDED RELEASE ORAL at 20:11

## 2024-12-22 RX ADMIN — BUPROPION HYDROCHLORIDE 150 MG: 150 TABLET, EXTENDED RELEASE ORAL at 08:28

## 2024-12-22 ASSESSMENT — ACTIVITIES OF DAILY LIVING (ADL)
ADLS_ACUITY_SCORE: 24
DRESS: SCRUBS (BEHAVIORAL HEALTH)
ORAL_HYGIENE: INDEPENDENT
ADLS_ACUITY_SCORE: 24
LAUNDRY: UNABLE TO COMPLETE
ADLS_ACUITY_SCORE: 24
HYGIENE/GROOMING: INDEPENDENT
ADLS_ACUITY_SCORE: 24

## 2024-12-22 NOTE — PLAN OF CARE
"  Problem: Adult Behavioral Health Plan of Care  Goal: Patient-Specific Goal (Individualization)  Description:  Patient will accept medications as ordered by the physician   Patient will accept and follow the recommendations of the discharge planning team.  Patient will sleep at least 6 hours at night  Patient will eat at least 50% of meals    12/21/2024 - Patient is a no wean at this time due to threatening behaviors and paranoid/delusional beliefs. Treatment team to assess daily.   Outcome: Progressing   Goal Outcome Evaluation:        Face to face shift report received from RN. Rounding completed, pt observed.  Client rested in room for 6 hours with eyes closed and respiration noted.Client had no falls or instances of instability this shift.Client displays agitation regarding feeling of urinary urgency and feeling like his bladder is \"inflamed\". Client request something for sleep and endorses feeling frustrated over urinary issues and staffing at [place]. Client received Ativan 2 mg po, Haldol 5 mg po and Benadryl 50 mg po. Client is apologetic for being irritable. Feelings validated and client has returned to room to rest.Face to face report will be communicated to oncoming RN.    Didier Brooks RN  12/22/2024  6:32 AM                     "

## 2024-12-22 NOTE — PROGRESS NOTES
"[facility] PSYCHIATRY  PROGRESS NOTE     SUBJECTIVE     Prior to interviewing the patient, I met with nursing and reviewed patient's clinical condition. We discussed clinical care both before and after the interview. I have reviewed the patient's clinical course by review of records including previous notes, labs, and vital signs.     Per nursing, the patient had the following behavioral events over the last 24-hours: Patient continues in the MHICU. Taking medications as prescribed.     On psychiatric interview, the patient was found in MHICU. States \"I am fine\".  He states \"I am just going to be good today\".  He does not elaborate on what he means by this. He states he tolerated invega and is aware it is going to 9 mg at bedtime tonight.  He states he is still having \"bladder problems\" - he agrees to taking cranberry supplement TID.  He has no other concerns and continues to believe his current hospitalization is not necessary.      MEDICATIONS   Scheduled Meds:  Current Facility-Administered Medications   Medication Dose Route Frequency Provider Last Rate Last Admin    buPROPion (WELLBUTRIN XL) 24 hr tablet 150 mg  150 mg Oral QAM David Timmons DO   150 mg at 12/22/24 0828    buPROPion (WELLBUTRIN XL) 24 hr tablet 300 mg  300 mg Oral QAM David Timmons DO   300 mg at 12/22/24 0821    clonazePAM (klonoPIN) tablet 1.5 mg  1.5 mg Oral TID David Timmons DO   1.5 mg at 12/22/24 1317    cranberry capsule 200 mg  200 mg Oral TID w/meals Joshua Somers MD        gabapentin (NEURONTIN) capsule 800 mg  800 mg Oral TID David Timmons DO   800 mg at 12/22/24 1317    paliperidone ER (INVEGA) 24 hr tablet 9 mg  9 mg Oral At Bedtime David Timmons DO        Or    haloperidol lactate (HALDOL) injection 5 mg  5 mg Intramuscular At Bedtime David Timmons DO        memantine (NAMENDA) tablet 10 mg  10 mg Oral BID David Timmons DO   10 mg at 12/22/24 0821     PRN Meds:.  Current " Facility-Administered Medications   Medication Dose Route Frequency Provider Last Rate Last Admin    acetaminophen (TYLENOL) tablet 650 mg  650 mg Oral Q4H PRN David Timmons DO        alum & mag hydroxide-simethicone (MAALOX) suspension 30 mL  30 mL Oral Q4H PRN David Timmons DO        benztropine (COGENTIN) tablet 1 mg  1 mg Oral BID PRN David Timmons DO        haloperidol (HALDOL) tablet 5 mg  5 mg Oral Q8H PRN David Timmons DO   5 mg at 12/22/24 0006    And    LORazepam (ATIVAN) tablet 2 mg  2 mg Oral Q8H PRN David Timmons DO   2 mg at 12/22/24 0006    And    diphenhydrAMINE (BENADRYL) capsule 50 mg  50 mg Oral Q8H PRN David Timmons DO   50 mg at 12/22/24 0006    haloperidol lactate (HALDOL) injection 5 mg  5 mg Intramuscular Q8H PRN David Timmons DO   5 mg at 12/19/24 2119    And    LORazepam (ATIVAN) injection 2 mg  2 mg Intramuscular Q8H PRN David Timmons DO   2 mg at 12/19/24 2119    And    diphenhydrAMINE (BENADRYL) injection 50 mg  50 mg Intramuscular Q8H PRN David Timmons DO   50 mg at 12/19/24 2119    hydrOXYzine HCl (ATARAX) tablet 25 mg  25 mg Oral Q6H PRN David Timmons DO        ibuprofen (ADVIL/MOTRIN) tablet 600 mg  600 mg Oral Q6H PRN Harmony Samuel CNP   600 mg at 12/21/24 0613    magic mouthwash suspension (diphenhydrAMINE, lidocaine, aluminum-magnesium & simethicone)  5 mL Swish & Spit TID PRN Harmony Samuel CNP        melatonin tablet 5 mg  5 mg Oral At Bedtime PRN Nargis Solomon APRN CNP   5 mg at 12/14/24 2216    OLANZapine (zyPREXA) tablet 10 mg  10 mg Oral Q6H PRN David Timmons DO   10 mg at 12/15/24 2150    Or    OLANZapine (zyPREXA) injection 10 mg  10 mg Intramuscular Q6H PRN David Timmons,         senna-docusate (SENOKOT-S/PERICOLACE) 8.6-50 MG per tablet 1 tablet  1 tablet Oral BID PRN David Timmons DO        traZODone (DESYREL) tablet 50 mg  50 mg Oral At Bedtime PRN David Timmons,    50 mg at  "12/21/24 2232        ALLERGIES   Allergies   Allergen Reactions    Pork Allergy         MENTAL STATUS EXAM   Vitals: /85   Pulse 81   Temp 97.8  F (36.6  C) (Temporal)   Resp 19   SpO2 98%     Appearance: Alert, oriented, dressed in hospital scrubs, disheveled, long hair and beard  Attitude: Cooperative  Eye Contact: Fair  Mood: \"fine\"  Affect: Blunted, reduced range   Speech: Normal rate and rhythm   Psychomotor Behavior: No TD or rigidity. No tremor or akathisia.   Thought Process: More disorganized today  Associations: No loose associations   Thought Content: Denies SI, plan, or SIB. Denies AVH. Delusional thought present (paranoia, somatic, some grandiosity). Responding to internal stimuli at times.  Insight: Poor  Judgment: Poor  Oriented to: Person, place, and time  Attention Span and Concentration: Intact  Recent and Remote Memory: Intact  Language: English with appropriate syntax and vocabulary  Fund of Knowledge:  Low  Muscle Strength and Tone: Grossly normal  Gait and Station: Grossly normal       LABS   No results found for this or any previous visit (from the past 24 hours).          IMPRESSION     This is a 34 year old male with a PMH of SZAD, polysubstance abuse, and multiple TBIs with neurological sequalae who presents in a severe psychotic and catatonic episode in the setting of medication partial non-adherence. Patient has SPMI living in [place]. He has impaired functioning and generally poor insight. He has tried many medications having minimal response at times. He would benefit from hospitalization with resumption of medication as best able. Will focus first on treating catatonia along with psychosis.    Today: will increase invega to 9 mg at bedtime tonight.  Add cranberry pill TID for his urinary complaints.        DIAGNOSES     #. Schizoaffective disorder, bipolar type, continuous   #. Multiple TBIs with LOC with sequale   #. Hallucinogen (DXM) use disorder, in sustained remission  #. " Stimulant use disorder, in sustained remission  #. Alcohol use disorder, in sustained remission  #. Cannabis use        PLAN     Location: Unit 5  Legal Status: Orders Placed This Encounter      Legal status Court Hold    Petition for commitment and Sarah (all available neuroleptics) submitted to [facility]    Safety Assessment:    Behavioral Orders   Procedures    Code 1 - Restrict to Unit    Routine Programming     As clinically indicated    Status 15     Every 15 minutes.      PTA psychotropic medications held:      - Intuniv 1 mg at bedtime to simplify regimen  - Haldol 5 mg daily prn aggression due to using Zyprexa and Haldol/Benadryl/Ativan  - Ativan 2 mg BID prn aggression changed to Haldol/Benadryl/Ativan prn      PTA psychotropic medications continued/changed:      - Klonopin 1 mg TID increased to 1.5 mg TID temporarily   - Wellbutrin  mg daily  - Gabapentin 800 mg TID  - Namenda 10 mg BID     New psychotropic medications initiated:     - Invega 3 mg BID with Haldol 5 mg IM backup for emergency purposes as of 12/19 -> 4.5 mg BID on 12/21 -> 9 mg at bedtime on 12/22  - Standard unit prn agents, including Zyprexa prn agitation, in addition to Haldol/Benadryl/Ativan    Today's Changes:    - Continue medications including forced neuroleptics  - Continue in MHICU  - increase invega to 9 mg at bedtime tonight (12/22)    Programming: Patient will be treated in a therapeutic milieu with appropriate individual and group therapies. Education will be provided on diagnoses, medications, and treatments.     Medical diagnoses:  Per medicine    #. Difficulty voiding  - Likely secondary to Klonopin  - UA wnl  - Monitor and decrease Klonopin cautiously over next week    Consult: None  Tests: None     Anticipated LOS: > 7  days   Disposition: [place] with outpatient services       ATTESTATION    Joshua Somers MD  [facility]   Psychiatry  Type of Service: video visit for mental health treatment  Reason  for Video Visit: COVID-19 [place]  Originating Site (patient location): [facility]  Distant Site (provider location): Remote Location  Mode of Communication: Video Conference via [employer]  Time of Service: Date: 12/22/2024 , Start: 10:00 end: 10:30

## 2024-12-22 NOTE — PLAN OF CARE
"  Problem: Adult Behavioral Health Plan of Care  Goal: Patient-Specific Goal (Individualization)  Description:  Patient will accept medications as ordered by the physician   Patient will accept and follow the recommendations of the discharge planning team.  Patient will sleep at least 6 hours at night  Patient will eat at least 50% of meals    12/21/2024 - Patient is a no wean at this time due to threatening behaviors and paranoid/delusional beliefs. Treatment team to assess daily.   **Pt ok to have 2 pillows\"    Outcome: Progressing     Problem: Thought Process Alteration  Goal: Optimal Thought Clarity  Description: Pt will be able to maintain linear and reality based conversations during this stay.   Pt will make their needs known during this stay.   Pt will report manageable hallucinations by discharge.   Pt will contract for safety while on the unit.   Outcome: Progressing    Face to face shift report received from previously assigned nurse. Rounding completed, pt observed resting on the floor of his room.    Patient is met with in his room. Denies pain, SI, HI, A/V hallucinations, and depression. Endorses some anxiety, but states, \"I'll be okay\". Patient is appropriate with staff in interactions. Compliant with AM medication administration. No paranoid or delusional statements at this time. Patient refusing covid-19 swab at this time. No further needs at this time.     Patient remains appropriate with staff and peers. Compliant with afternoon medication administration. Patient requesting cranberry pill due to feelings of a \"blockage\" when urinating. Still urinating appropriately, although feels it is growing more and more difficult. Provider aware. Verbal order for cranberry pill placed. No further needs at this time.     Face to face report communicated to oncoming RN.    Domenica Pelayo RN  12/22/2024  9:13 AM       "

## 2024-12-23 PROCEDURE — 250N000013 HC RX MED GY IP 250 OP 250 PS 637: Performed by: STUDENT IN AN ORGANIZED HEALTH CARE EDUCATION/TRAINING PROGRAM

## 2024-12-23 PROCEDURE — 250N000013 HC RX MED GY IP 250 OP 250 PS 637: Performed by: PSYCHIATRY & NEUROLOGY

## 2024-12-23 PROCEDURE — 250N000013 HC RX MED GY IP 250 OP 250 PS 637: Performed by: NURSE PRACTITIONER

## 2024-12-23 PROCEDURE — 124N000001 HC R&B MH

## 2024-12-23 PROCEDURE — 99232 SBSQ HOSP IP/OBS MODERATE 35: CPT | Mod: 95 | Performed by: STUDENT IN AN ORGANIZED HEALTH CARE EDUCATION/TRAINING PROGRAM

## 2024-12-23 RX ADMIN — BUPROPION HYDROCHLORIDE 150 MG: 150 TABLET, EXTENDED RELEASE ORAL at 08:40

## 2024-12-23 RX ADMIN — Medication 5 MG: at 20:04

## 2024-12-23 RX ADMIN — Medication 200 MG: at 17:05

## 2024-12-23 RX ADMIN — MEMANTINE 10 MG: 5 TABLET ORAL at 19:56

## 2024-12-23 RX ADMIN — MEMANTINE 10 MG: 5 TABLET ORAL at 08:39

## 2024-12-23 RX ADMIN — CLONAZEPAM 1.5 MG: 0.5 TABLET ORAL at 08:39

## 2024-12-23 RX ADMIN — Medication 200 MG: at 12:03

## 2024-12-23 RX ADMIN — GABAPENTIN 800 MG: 400 CAPSULE ORAL at 19:56

## 2024-12-23 RX ADMIN — Medication 200 MG: at 08:39

## 2024-12-23 RX ADMIN — CLONAZEPAM 1.5 MG: 0.5 TABLET ORAL at 19:56

## 2024-12-23 RX ADMIN — GABAPENTIN 800 MG: 400 CAPSULE ORAL at 08:39

## 2024-12-23 RX ADMIN — GABAPENTIN 800 MG: 400 CAPSULE ORAL at 12:55

## 2024-12-23 RX ADMIN — CLONAZEPAM 1.5 MG: 0.5 TABLET ORAL at 12:55

## 2024-12-23 RX ADMIN — BUPROPION HYDROCHLORIDE 300 MG: 300 TABLET, EXTENDED RELEASE ORAL at 08:39

## 2024-12-23 RX ADMIN — OLANZAPINE 10 MG: 10 TABLET, FILM COATED ORAL at 19:56

## 2024-12-23 RX ADMIN — PALIPERIDONE 9 MG: 3 TABLET, EXTENDED RELEASE ORAL at 19:56

## 2024-12-23 ASSESSMENT — ACTIVITIES OF DAILY LIVING (ADL)
DRESS: SCRUBS (BEHAVIORAL HEALTH)
ADLS_ACUITY_SCORE: 24
HYGIENE/GROOMING: INDEPENDENT
ORAL_HYGIENE: INDEPENDENT
ADLS_ACUITY_SCORE: 24
ORAL_HYGIENE: INDEPENDENT
ADLS_ACUITY_SCORE: 24
HYGIENE/GROOMING: INDEPENDENT
ADLS_ACUITY_SCORE: 24
ADLS_ACUITY_SCORE: 24
LAUNDRY: UNABLE TO COMPLETE
DRESS: SCRUBS (BEHAVIORAL HEALTH);INDEPENDENT
ADLS_ACUITY_SCORE: 24
LAUNDRY: UNABLE TO COMPLETE

## 2024-12-23 NOTE — PLAN OF CARE
"  Problem: Adult Behavioral Health Plan of Care  Goal: Patient-Specific Goal (Individualization)  Description:  Patient will accept medications as ordered by the physician   Patient will accept and follow the recommendations of the discharge planning team.  Patient will sleep at least 6 hours at night  Patient will eat at least 50% of meals    12/21/2024 - Patient is a no wean at this time due to threatening behaviors and paranoid/delusional beliefs. Treatment team to assess daily.   **Pt ok to have 2 pillows\"    Outcome: Progressing   Goal Outcome Evaluation:       Face to face shift report received from RN. Rounding completed, pt observed.Client rested in room for 6 hours with eyes closed and respirations noted. Client had no falls or instances of instability this shift.Face to face report will be communicated to oncoming RN.    Didier Brooks RN  12/23/2024  6:16 AM                   "

## 2024-12-23 NOTE — PLAN OF CARE
"Face to face shift report received from TRAE Velásquez. Rounding completed, pt observed resting in bed at start of shift.    Problem: Adult Behavioral Health Plan of Care  Goal: Patient-Specific Goal (Individualization)  Description:  Patient will accept medications as ordered by the physician   Patient will accept and follow the recommendations of the discharge planning team.  Patient will sleep at least 6 hours at night  Patient will eat at least 50% of meals    12/21/2024 - Patient is a no wean at this time due to threatening behaviors and paranoid/delusional beliefs. Treatment team to assess daily.   **Pt ok to have 2 pillows\"    Outcome: Progressing     Problem: Thought Process Alteration  Goal: Optimal Thought Clarity  Description: Pt will be able to maintain linear and reality based conversations during this stay.   Pt will make their needs known during this stay.   Pt will report manageable hallucinations by discharge.   Pt will contract for safety while on the unit.     Outcome: Progressing   Goal Outcome Evaluation:       Pt. Denied having any physical pain this shift. They also denied having any SI, HI, or intent to self-harm. Pt. Spent much or the morning resting in bed. They did wean to the open unit at 1121 until 1135 and again at 1247 for about 10 minutes. During these times, Pt. Made a phone call and walked in the lounge. Behaviors were appropriate.  100% was eaten at breakfast and at lunch.     Pt. Mentioned that they came in with a thor hammer necklace that wasn't listed on their belongings list. Pt. Stated that it was taken from them on the first night they were here. Belongings bin checked and supervisor notified. They are looking into it.    Face to face report will be communicated to oncoming RN.    Emma Alvarez RN  12/23/2024  1:49 PM           "

## 2024-12-23 NOTE — PLAN OF CARE
"  Problem: Adult Behavioral Health Plan of Care  Goal: Patient-Specific Goal (Individualization)  Description:  Patient will accept medications as ordered by the physician   Patient will accept and follow the recommendations of the discharge planning team.  Patient will sleep at least 6 hours at night  Patient will eat at least 50% of meals    12/23/24- Pt. May wean to open unit during low stimulation times if behaviors remain appropriate. Treatment team to assess daily.    **Pt ok to have 2 pillows\"    Outcome: Progressing     Problem: Thought Process Alteration  Goal: Optimal Thought Clarity  Description: Pt will be able to maintain linear and reality based conversations during this stay.   Pt will make their needs known during this stay.   Pt will report manageable hallucinations by discharge.   Pt will contract for safety while on the unit.   Outcome: Progressing    Face to face shift report received from previously assigned nurse. Rounding completed, pt observed sitting on the edge of the bed in his room.    Patient is met with in his room. Denies pain, SI, HI, A/V hallucinations, and depression. Endorses anxiety, states it is tolerable at this time and he is taking it \"one day at a time\". Patient remains appropriate with staff and peers. No further needs at this time.    Compliant with HS medication administration. Agreeable to take PRN zyprexa and melatonin at this time due to increasing agitation and paranoia. No further needs at this time.      Face to face report communicated to oncoming RN.    Domenica Pelayo RN  12/23/2024  5:33 PM       "

## 2024-12-23 NOTE — PROGRESS NOTES
[facility] PSYCHIATRY  PROGRESS NOTE     SUBJECTIVE     Prior to interviewing the patient, I met with nursing and reviewed patient's clinical condition. We discussed clinical care both before and after the interview. I have reviewed the patient's clinical course by review of records including previous notes, labs, and vital signs.     Per nursing, the patient had the following behavioral events over the last 24-hours: Patient continues in the MHICU. Taking medications as prescribed. Slept 6 hours.      On psychiatric interview, the patient was found in MHICU. Notes that he is feeling better. He notes that his mood is more optimistic. He notes that he feels back to baseline. Discussed how Invega might have played a positive role. He notes that he started taking Cranberry pills. He is urinating easier. He notes that he is grateful for this. He denies any problems with Invega.        MEDICATIONS   Scheduled Meds:  Current Facility-Administered Medications   Medication Dose Route Frequency Provider Last Rate Last Admin    buPROPion (WELLBUTRIN XL) 24 hr tablet 150 mg  150 mg Oral QAM David Timmons DO   150 mg at 12/23/24 0840    buPROPion (WELLBUTRIN XL) 24 hr tablet 300 mg  300 mg Oral QAM David Timmons DO   300 mg at 12/23/24 0839    clonazePAM (klonoPIN) tablet 1.5 mg  1.5 mg Oral TID David Timmons DO   1.5 mg at 12/23/24 0839    cranberry capsule 200 mg  200 mg Oral TID w/meals Joshua Somers MD   200 mg at 12/23/24 0839    gabapentin (NEURONTIN) capsule 800 mg  800 mg Oral TID David Timmons DO   800 mg at 12/23/24 0839    paliperidone ER (INVEGA) 24 hr tablet 9 mg  9 mg Oral At Bedtime David Timmons DO   9 mg at 12/22/24 2011    Or    haloperidol lactate (HALDOL) injection 5 mg  5 mg Intramuscular At Bedtime David Timmons DO        memantine (NAMENDA) tablet 10 mg  10 mg Oral BID David Timmons DO   10 mg at 12/23/24 0839     PRN Meds:.  Current Facility-Administered  Medications   Medication Dose Route Frequency Provider Last Rate Last Admin    acetaminophen (TYLENOL) tablet 650 mg  650 mg Oral Q4H PRN David Timmons DO        alum & mag hydroxide-simethicone (MAALOX) suspension 30 mL  30 mL Oral Q4H PRN David Timmons DO        benztropine (COGENTIN) tablet 1 mg  1 mg Oral BID PRN David Timmons DO        haloperidol (HALDOL) tablet 5 mg  5 mg Oral Q8H PRN David Timmons DO   5 mg at 12/22/24 0006    And    LORazepam (ATIVAN) tablet 2 mg  2 mg Oral Q8H PRN David Timmons DO   2 mg at 12/22/24 0006    And    diphenhydrAMINE (BENADRYL) capsule 50 mg  50 mg Oral Q8H PRN David Timmons DO   50 mg at 12/22/24 0006    haloperidol lactate (HALDOL) injection 5 mg  5 mg Intramuscular Q8H PRN David Timmons DO   5 mg at 12/19/24 2119    And    LORazepam (ATIVAN) injection 2 mg  2 mg Intramuscular Q8H PRN Daivd Timmons DO   2 mg at 12/19/24 2119    And    diphenhydrAMINE (BENADRYL) injection 50 mg  50 mg Intramuscular Q8H PRN David Timmons DO   50 mg at 12/19/24 2119    hydrOXYzine HCl (ATARAX) tablet 25 mg  25 mg Oral Q6H PRN David Timmons DO        ibuprofen (ADVIL/MOTRIN) tablet 600 mg  600 mg Oral Q6H PRN Harmony Samuel CNP   600 mg at 12/21/24 0613    magic mouthwash suspension (diphenhydrAMINE, lidocaine, aluminum-magnesium & simethicone)  5 mL Swish & Spit TID PRN Harmony Samuel CNP        melatonin tablet 5 mg  5 mg Oral At Bedtime PRN Nargis Solomon APRN CNP   5 mg at 12/22/24 2018    OLANZapine (zyPREXA) tablet 10 mg  10 mg Oral Q6H PRN David Timmons DO   10 mg at 12/15/24 2150    Or    OLANZapine (zyPREXA) injection 10 mg  10 mg Intramuscular Q6H PRN David Timmons DO        senna-docusate (SENOKOT-S/PERICOLACE) 8.6-50 MG per tablet 1 tablet  1 tablet Oral BID PRN David Timmons DO        traZODone (DESYREL) tablet 50 mg  50 mg Oral At Bedtime PRN David Timmons DO   50 mg at 12/21/24 8571     "    ALLERGIES   Allergies   Allergen Reactions    Pork Allergy         MENTAL STATUS EXAM   Vitals: /65   Pulse 83   Temp 97.1  F (36.2  C) (Temporal)   Resp 14   SpO2 98%     Appearance: Alert, oriented, dressed in hospital scrubs, disheveled, long hair and beard  Attitude: Cooperative  Eye Contact: Fair  Mood: \"Better\"  Affect: Blunted, reduced range   Speech: Normal rate and rhythm   Psychomotor Behavior: No TD or rigidity. No tremor or akathisia.   Thought Process: More disorganized today  Associations: No loose associations   Thought Content: Denies SI, plan, or SIB. Denies AVH. Delusional thought present (paranoia, somatic, some grandiosity). Responding to internal stimuli at times.  Insight: Poor  Judgment: Poor  Oriented to: Person, place, and time  Attention Span and Concentration: Intact  Recent and Remote Memory: Intact  Language: English with appropriate syntax and vocabulary  Fund of Knowledge:  Low  Muscle Strength and Tone: Grossly normal  Gait and Station: Grossly normal       LABS   No results found for this or any previous visit (from the past 24 hours).          IMPRESSION     This is a 34 year old male with a PMH of SZAD, polysubstance abuse, and multiple TBIs with neurological sequalae who presents in a severe psychotic and catatonic episode in the setting of medication partial non-adherence. Patient has SPMI living in [place]. He has impaired functioning and generally poor insight. He has tried many medications having minimal response at times. He would benefit from hospitalization with resumption of medication as best able. Will focus first on treating catatonia along with psychosis.    Today: Patient showing some improvement now taking Invega, albeit a small amount with MHICU being continued. Allowing further time to work.        DIAGNOSES     #. Schizoaffective disorder, bipolar type, continuous   #. Multiple TBIs with LOC with sequale   #. Hallucinogen (DXM) use disorder, in sustained " remission  #. Stimulant use disorder, in sustained remission  #. Alcohol use disorder, in sustained remission  #. Cannabis use        PLAN     Location: Unit 5  Legal Status: Orders Placed This Encounter      Legal status Court Hold    Petition for commitment and Sarah (all available neuroleptics) submitted to [facility]. Sarah 12/27 @ 830    Safety Assessment:    Behavioral Orders   Procedures    Code 1 - Restrict to Unit    Routine Programming     As clinically indicated    Status 15     Every 15 minutes.      PTA psychotropic medications held:      - Intuniv 1 mg at bedtime to simplify regimen  - Haldol 5 mg daily prn aggression due to using Zyprexa and Haldol/Benadryl/Ativan  - Ativan 2 mg BID prn aggression changed to Haldol/Benadryl/Ativan prn      PTA psychotropic medications continued/changed:      - Klonopin 1 mg TID increased to 1.5 mg TID temporarily   - Wellbutrin  mg daily  - Gabapentin 800 mg TID  - Namenda 10 mg BID     New psychotropic medications initiated:     - Invega 3 mg BID with Haldol 5 mg IM backup for emergency purposes as of 12/19 -> 4.5 mg BID on 12/21 -> 9 mg at bedtime on 12/22  - Standard unit prn agents, including Zyprexa prn agitation, in addition to Haldol/Benadryl/Ativan    Today's Changes:    - Continue medications including forced neuroleptics  - Continue in MHICU    Programming: Patient will be treated in a therapeutic milieu with appropriate individual and group therapies. Education will be provided on diagnoses, medications, and treatments.     Medical diagnoses:  Per medicine    #. Difficulty voiding  - Likely secondary to Klonopin  - UA wnl  - Monitor and decrease Klonopin cautiously over next week  - Cranberry pills TID with some improvement    Consult: None  Tests: None     Anticipated LOS: > 7  days   Disposition: [place] with outpatient services       TREATMENT TEAM CARE PLAN     Progress: Continued symptoms.    Continued Stay Criteria/Rationale: Continued  symptoms without sufficient improvement/resolution.    Medical/Physical: See above.    Precautions: See above.     Plan: Continue inpatient care with unit support and medication management.    Rationale for change in precautions or plan: NA due to no change.    Participants: David Timmons DO, Nursing, SW, OT.    The patient's care was discussed with the treatment team and chart notes were reviewed.       ATTESTATION    David Timmons DO, MA  Psychiatrist     Type of Service: video visit for mental health treatment  Reason for Video Visit: COVID-19 [place]  Originating Site (patient location): [facility]  Distant Site (provider location): Remote Location  Mode of Communication: Video Conference via [employer]  Time of Service: Date: 12/23/2024 , Start: 930 end: 942

## 2024-12-23 NOTE — PLAN OF CARE
"Face to face end of shift report received from day RN. Rounding completed. Patient observed in the MHICU in his room sitting on his bed.     Boo Wiley, TRAE  12/22/2024  7215    Patient spent entire shift in the MHICU in his room resting/napping in bed. Declines to wean to open unit. Denies SI/HI/AVH/depression/anxiety/pain. Grateful for the cranberry pills with meals, \"Thank you, this is the best thing that has happened to me today.\" Calm, cooperative, and appropriate. Appetite/hygiene good, showered and trimmed facial hair.    Report will be given to night RN at shift change.    Problem: Adult Behavioral Health Plan of Care  Goal: Patient-Specific Goal (Individualization)  Description:  Patient will accept medications as ordered by the physician   Patient will accept and follow the recommendations of the discharge planning team.  Patient will sleep at least 6 hours at night  Patient will eat at least 50% of meals    12/21/2024 - Patient is a no wean at this time due to threatening behaviors and paranoid/delusional beliefs. Treatment team to assess daily.   **Pt ok to have 2 pillows\"    Outcome: Progressing     Problem: Thought Process Alteration  Goal: Optimal Thought Clarity  Description: Pt will be able to maintain linear and reality based conversations during this stay.   Pt will make their needs known during this stay.   Pt will report manageable hallucinations by discharge.   Pt will contract for safety while on the unit.     Outcome: Progressing     "

## 2024-12-24 PROCEDURE — 99232 SBSQ HOSP IP/OBS MODERATE 35: CPT | Mod: 95 | Performed by: STUDENT IN AN ORGANIZED HEALTH CARE EDUCATION/TRAINING PROGRAM

## 2024-12-24 PROCEDURE — 250N000013 HC RX MED GY IP 250 OP 250 PS 637: Performed by: STUDENT IN AN ORGANIZED HEALTH CARE EDUCATION/TRAINING PROGRAM

## 2024-12-24 PROCEDURE — 250N000013 HC RX MED GY IP 250 OP 250 PS 637: Performed by: PSYCHIATRY & NEUROLOGY

## 2024-12-24 PROCEDURE — 124N000001 HC R&B MH

## 2024-12-24 PROCEDURE — 250N000013 HC RX MED GY IP 250 OP 250 PS 637: Performed by: NURSE PRACTITIONER

## 2024-12-24 RX ADMIN — GABAPENTIN 800 MG: 400 CAPSULE ORAL at 08:01

## 2024-12-24 RX ADMIN — CLONAZEPAM 1.5 MG: 0.5 TABLET ORAL at 08:00

## 2024-12-24 RX ADMIN — MEMANTINE 10 MG: 5 TABLET ORAL at 08:00

## 2024-12-24 RX ADMIN — Medication 200 MG: at 08:00

## 2024-12-24 RX ADMIN — BUPROPION HYDROCHLORIDE 300 MG: 300 TABLET, EXTENDED RELEASE ORAL at 08:00

## 2024-12-24 RX ADMIN — CLONAZEPAM 1.5 MG: 0.5 TABLET ORAL at 19:03

## 2024-12-24 RX ADMIN — MEMANTINE 10 MG: 5 TABLET ORAL at 19:03

## 2024-12-24 RX ADMIN — PALIPERIDONE 9 MG: 3 TABLET, EXTENDED RELEASE ORAL at 19:03

## 2024-12-24 RX ADMIN — GABAPENTIN 800 MG: 400 CAPSULE ORAL at 19:03

## 2024-12-24 RX ADMIN — CLONAZEPAM 1.5 MG: 0.5 TABLET ORAL at 12:44

## 2024-12-24 RX ADMIN — GABAPENTIN 800 MG: 400 CAPSULE ORAL at 12:44

## 2024-12-24 RX ADMIN — Medication 200 MG: at 16:43

## 2024-12-24 RX ADMIN — Medication 200 MG: at 11:48

## 2024-12-24 RX ADMIN — BUPROPION HYDROCHLORIDE 150 MG: 150 TABLET, EXTENDED RELEASE ORAL at 08:01

## 2024-12-24 RX ADMIN — Medication 5 MG: at 20:07

## 2024-12-24 ASSESSMENT — ACTIVITIES OF DAILY LIVING (ADL)
ADLS_ACUITY_SCORE: 24
DRESS: INDEPENDENT
ADLS_ACUITY_SCORE: 24
LAUNDRY: UNABLE TO COMPLETE
ORAL_HYGIENE: INDEPENDENT
ADLS_ACUITY_SCORE: 24
HYGIENE/GROOMING: INDEPENDENT
ADLS_ACUITY_SCORE: 24
ORAL_HYGIENE: INDEPENDENT
ADLS_ACUITY_SCORE: 24
HYGIENE/GROOMING: INDEPENDENT
ADLS_ACUITY_SCORE: 24
DRESS: SCRUBS (BEHAVIORAL HEALTH);INDEPENDENT
ADLS_ACUITY_SCORE: 24
ADLS_ACUITY_SCORE: 24

## 2024-12-24 NOTE — PLAN OF CARE
"Face to face shift report received from TRAE Velásquez. Rounding completed, pt observed resting in bed at start of shift.    Problem: Adult Behavioral Health Plan of Care  Goal: Patient-Specific Goal (Individualization)  Description:  Patient will accept medications as ordered by the physician   Patient will accept and follow the recommendations of the discharge planning team.  Patient will sleep at least 6 hours at night  Patient will eat at least 50% of meals    12/23/24- Pt. May wean to open unit during low stimulation times if behaviors remain appropriate. Treatment team to assess daily.    **Pt ok to have 2 pillows\"    Outcome: Progressing     Problem: Thought Process Alteration  Goal: Optimal Thought Clarity  Description: Pt will be able to maintain linear and reality based conversations during this stay.   Pt will make their needs known during this stay.   Pt will report manageable hallucinations by discharge.   Pt will contract for safety while on the unit.     Outcome: Progressing   Goal Outcome Evaluation:    Plan of Care Reviewed With: patient        Pt. Denied having any physical pain this shift. They did have some anxiety. Pt. Displayed some rocking in their bed and restless pacing in their room and the MHICU. PRN Zyprexa 10 mg was offered to Pt. At 1148, but was declined by them. They stated that it makes them too sleepy. SI, HI, and depression were denied by Pt. They spent most of the morning resting in bed and walking in the MHICU. Pt. Did tell staff this shift that they thought that they had epilepsy and were having lots of mini seizures. None of these have been seen by staff.Provider notified at 0919 with orders to just monitor. Pt. Weaned to open unit 1 time to use the phone. Behaviors were appropriate during this time. 100% was eaten at breakfast and at lunch.     Face to face report will be communicated to oncoming TRAE.    Emma Alvarez RN  12/24/2024  1:33 PM      "

## 2024-12-24 NOTE — PLAN OF CARE
"  Problem: Adult Behavioral Health Plan of Care  Goal: Patient-Specific Goal (Individualization)  Description:  Patient will accept medications as ordered by the physician   Patient will accept and follow the recommendations of the discharge planning team.  Patient will sleep at least 6 hours at night  Patient will eat at least 50% of meals    12/23/24- Pt. May wean to open unit during low stimulation times if behaviors remain appropriate. Treatment team to assess daily.    **Pt ok to have 2 pillows\"    Outcome: Progressing   Goal Outcome Evaluation:        Face to face shift report received from RN. Rounding completed, pt observed.Client rested in room for 7 hours with eyes closed and respirations noted. Client had no falls or instances of instability this shift.Face to face report will be communicated to oncoming RN.    Didier Brooks RN  12/24/2024  6:34 AM                       "

## 2024-12-24 NOTE — PROGRESS NOTES
"[facility] PSYCHIATRY  PROGRESS NOTE     SUBJECTIVE     Prior to interviewing the patient, I met with nursing and reviewed patient's clinical condition. We discussed clinical care both before and after the interview. I have reviewed the patient's clinical course by review of records including previous notes, labs, and vital signs.     Per nursing, the patient had the following behavioral events over the last 24-hours: Patient continues in the MHICU. Taking medications as prescribed. Slept overnight.     On psychiatric interview, the patient was found in MHICU. He notes that he had a moment of irritability and anger yesterday for 15 minutes. He notes that he has a hard time with this. He notes that he struck the bathroom door yesterday when frustrated. He notes that this didn't help. He notes that he has had \"telekinesis done on him a year ago that caused him to cut his wrists.\" He notes that sometimes he feels like he is controlled by an outside source, noting that this is part of his schizoaffective.     He notes that he can try working out when he is feeling irritable or angry. He notes that he will try walking, stretch, squats, and pushups.     He notes that he is not an IV drug user. He notes that he woke up one morning with what looked like an IV agusto. He notes that he thinks he might have STDs. Discussed some recent testing that was negative. He notes that he understands.    He notes that the cranberry pills are helping. He is able to urinate more freely.        MEDICATIONS   Scheduled Meds:  Current Facility-Administered Medications   Medication Dose Route Frequency Provider Last Rate Last Admin    buPROPion (WELLBUTRIN XL) 24 hr tablet 150 mg  150 mg Oral QAM David Timmons, DO   150 mg at 12/24/24 0801    buPROPion (WELLBUTRIN XL) 24 hr tablet 300 mg  300 mg Oral QAM David Timmons, DO   300 mg at 12/24/24 0800    clonazePAM (klonoPIN) tablet 1.5 mg  1.5 mg Oral TID David Timmons, DO   1.5 " This patient was assigned to me by error. This patient was never interviewed nor examined by me. I did not participate in the care of this patient.     Aldair Morel MD  Emergency Medicine Resident mg at 12/24/24 0800    cranberry capsule 200 mg  200 mg Oral TID w/meals Joshua Somers MD   200 mg at 12/24/24 0800    gabapentin (NEURONTIN) capsule 800 mg  800 mg Oral TID David Timmons DO   800 mg at 12/24/24 0801    paliperidone ER (INVEGA) 24 hr tablet 9 mg  9 mg Oral At Bedtime David Timmons DO   9 mg at 12/23/24 1956    Or    haloperidol lactate (HALDOL) injection 5 mg  5 mg Intramuscular At Bedtime David Timmons DO        memantine (NAMENDA) tablet 10 mg  10 mg Oral BID David Timmons DO   10 mg at 12/24/24 0800     PRN Meds:.  Current Facility-Administered Medications   Medication Dose Route Frequency Provider Last Rate Last Admin    acetaminophen (TYLENOL) tablet 650 mg  650 mg Oral Q4H PRN David Timmons DO        alum & mag hydroxide-simethicone (MAALOX) suspension 30 mL  30 mL Oral Q4H PRN David Timmons DO        benztropine (COGENTIN) tablet 1 mg  1 mg Oral BID PRN David Timmons DO        haloperidol (HALDOL) tablet 5 mg  5 mg Oral Q8H PRN David Timmons DO   5 mg at 12/22/24 0006    And    LORazepam (ATIVAN) tablet 2 mg  2 mg Oral Q8H PRN David Timmons, DO   2 mg at 12/22/24 0006    And    diphenhydrAMINE (BENADRYL) capsule 50 mg  50 mg Oral Q8H PRN David Timmons DO   50 mg at 12/22/24 0006    haloperidol lactate (HALDOL) injection 5 mg  5 mg Intramuscular Q8H PRN David Timmons DO   5 mg at 12/19/24 2119    And    LORazepam (ATIVAN) injection 2 mg  2 mg Intramuscular Q8H PRN David Timmons DO   2 mg at 12/19/24 2119    And    diphenhydrAMINE (BENADRYL) injection 50 mg  50 mg Intramuscular Q8H PRN David Timmons DO   50 mg at 12/19/24 2119    hydrOXYzine HCl (ATARAX) tablet 25 mg  25 mg Oral Q6H PRN David Timmons,         ibuprofen (ADVIL/MOTRIN) tablet 600 mg  600 mg Oral Q6H PRN Harmony Samuel CNP   600 mg at 12/21/24 0613    magic mouthwash suspension (diphenhydrAMINE, lidocaine, aluminum-magnesium & simethicone)  5  "mL Swish & Spit TID PRN Harmony Samuel, CNP        melatonin tablet 5 mg  5 mg Oral At Bedtime PRN Nargis Solomon APRN CNP   5 mg at 12/23/24 2004    OLANZapine (zyPREXA) tablet 10 mg  10 mg Oral Q6H PRN David Timmons DO   10 mg at 12/23/24 1956    Or    OLANZapine (zyPREXA) injection 10 mg  10 mg Intramuscular Q6H PRN David Timmons DO        senna-docusate (SENOKOT-S/PERICOLACE) 8.6-50 MG per tablet 1 tablet  1 tablet Oral BID PRN David Timmons DO        traZODone (DESYREL) tablet 50 mg  50 mg Oral At Bedtime PRN David Timmons DO   50 mg at 12/21/24 2232        ALLERGIES   Allergies   Allergen Reactions    Pork Allergy         MENTAL STATUS EXAM   Vitals: /64   Pulse 97   Temp 97  F (36.1  C) (Temporal)   Resp 16   SpO2 98%     Appearance: Alert, oriented, dressed in hospital scrubs, disheveled, long hair and beard  Attitude: Cooperative  Eye Contact: Fair  Mood: \"Alright\"  Affect: Blunted, reduced range   Speech: Normal rate and rhythm   Psychomotor Behavior: No TD or rigidity. No tremor or akathisia.   Thought Process: More disorganized today  Associations: No loose associations   Thought Content: Denies SI, plan, or SIB. Denies AVH. Delusional thought present (paranoia, somatic, some grandiosity). Responding to internal stimuli at times.  Insight: Poor  Judgment: Poor  Oriented to: Person, place, and time  Attention Span and Concentration: Intact  Recent and Remote Memory: Intact  Language: English with appropriate syntax and vocabulary  Fund of Knowledge:  Low  Muscle Strength and Tone: Grossly normal  Gait and Station: Grossly normal       LABS   No results found for this or any previous visit (from the past 24 hours).          IMPRESSION     This is a 34 year old male with a PMH of SZAD, polysubstance abuse, and multiple TBIs with neurological sequalae who presents in a severe psychotic and catatonic episode in the setting of medication partial non-adherence. Patient " has SPMI living in [place]. He has impaired functioning and generally poor insight. He has tried many medications having minimal response at times. He would benefit from hospitalization with resumption of medication as best able. Will focus first on treating catatonia along with psychosis.    Today: Patient showing some improvement now taking Invega, albeit a small amount with MHICU being continued. Allowing further time to work.        DIAGNOSES     #. Schizoaffective disorder, bipolar type, continuous   #. Multiple TBIs with LOC with sequale   #. Hallucinogen (DXM) use disorder, in sustained remission  #. Stimulant use disorder, in sustained remission  #. Alcohol use disorder, in sustained remission  #. Cannabis use        PLAN     Location: Unit 5  Legal Status: Orders Placed This Encounter      Legal status Court Hold    Petition for commitment and Sarah (all available neuroleptics) submitted to [facility]. Sarah 12/27 @ 830    Safety Assessment:    Behavioral Orders   Procedures    Code 1 - Restrict to Unit    Routine Programming     As clinically indicated    Status 15     Every 15 minutes.      PTA psychotropic medications held:      - Intuniv 1 mg at bedtime to simplify regimen  - Haldol 5 mg daily prn aggression due to using Zyprexa and Haldol/Benadryl/Ativan  - Ativan 2 mg BID prn aggression changed to Haldol/Benadryl/Ativan prn      PTA psychotropic medications continued/changed:      - Klonopin 1 mg TID increased to 1.5 mg TID temporarily   - Wellbutrin  mg daily  - Gabapentin 800 mg TID  - Namenda 10 mg BID     New psychotropic medications initiated:     - Invega 3 mg BID with Haldol 5 mg IM backup for emergency purposes as of 12/19 -> 4.5 mg BID on 12/21 -> 9 mg at bedtime on 12/22  - Standard unit prn agents, including Zyprexa prn agitation, in addition to Haldol/Benadryl/Ativan    Today's Changes:    - Continue medications including forced neuroleptics  - Continue in MHICU  - Lower Klonopin  starting tomorrow afternoon    Programming: Patient will be treated in a therapeutic milieu with appropriate individual and group therapies. Education will be provided on diagnoses, medications, and treatments.     Medical diagnoses:  Per medicine    #. Difficulty voiding  - Likely secondary to Klonopin  - UA wnl  - Monitor and decrease Klonopin cautiously over next week  - Cranberry pills TID with some improvement    Consult: None  Tests: None     Anticipated LOS: > 7  days   Disposition: [place] with outpatient services       ATTESTATION    David Timmons DO, MA  Psychiatrist     Type of Service: video visit for mental health treatment  Reason for Video Visit: COVID-19 [place]  Originating Site (patient location): [facility]  Distant Site (provider location): Remote Location  Mode of Communication: Video Conference via [email or web address]  Time of Service: Date: 12/24/2024 , Start: 800 end: 830

## 2024-12-24 NOTE — PROGRESS NOTES
CLINICAL NUTRITION SERVICES  -  REASSESSMENT NOTE    REASON FOR ASSESSMENT:  follow up      NUTRITION HISTORY  Steven Carter is a 34 year old male admitted for psychosis with catatonia. PMH includes TBI, schizoaffective disorder, polysubstance abuse. Met criteria for malnutrition in 2022. No current weight or recent weight hx to review. Good appetite/adequate intake    Allergies     Allergies   Allergen Reactions    Pork Allergy        CURRENT NUTRITION ORDERS  Diet Order:   Orders Placed This Encounter      Regular Diet Adult    Current Intake/Tolerance: 8 meals documented with 100% intake    ANTHROPOMETRICS  Height: Data Unavailable  Weight: 0 lbs 0 oz  There is no height or weight on file to calculate BMI.  Weight Status:  no weight to assess  Weight History:   Wt Readings from Last 10 Encounters:   03/26/23 89.5 kg (197 lb 4.8 oz)   02/25/23 89 kg (196 lb 4.8 oz)   08/27/22 85.2 kg (187 lb 14.4 oz)   03/05/22 93 kg (205 lb 1.6 oz)   12/14/21 97.7 kg (215 lb 4.8 oz)   12/01/21 97.8 kg (215 lb 11.2 oz)   10/31/21 105.1 kg (231 lb 11.2 oz)   03/27/21 105.2 kg (232 lb)   11/30/15 96.6 kg (213 lb)   08/18/14 94.5 kg (208 lb 6.4 oz)        LABS  Labs reviewed    MEDICATIONS  Medications reviewed    Malnutrition Diagnosis: Unable to determine due to limited hx available      NUTRITION INTERVENTIONS  Recommendations / Nutrition Prescription  Encourage intake during meal/snacks  Multivitamin supplement with poor intake  Obtain measured weight as able     MONITORING AND EVALUATION:  Intake, weight, labs

## 2024-12-24 NOTE — PLAN OF CARE
"Face to face shift report received from Emma TOTH RN. Rounding completed, pt observed.    Problem: Adult Behavioral Health Plan of Care  Goal: Patient-Specific Goal (Individualization)  Description:  Patient will accept medications as ordered by the physician   Patient will accept and follow the recommendations of the discharge planning team.  Patient will sleep at least 6 hours at night  Patient will eat at least 50% of meals    12/23/24- Pt. May wean to open unit during low stimulation times if behaviors remain appropriate. Treatment team to assess daily.    **Pt ok to have 2 pillows\"    Outcome: Progressing  Note: Shift Summary: Patient awake in his room at the start of the shift.  Mood is indifferent.  Remains in the MHICU r/t unpredictable behavior.  Patient did wean to make phone calls but went back to room when completed.  Cooperative with cranberry capsule at evening meal.  Intake adequate. No aggression this shift.     Problem: Thought Process Alteration  Goal: Optimal Thought Clarity  Description: Pt will be able to maintain linear and reality based conversations during this stay.   Pt will make their needs known during this stay.   Pt will report manageable hallucinations by discharge.   Pt will contract for safety while on the unit.     Outcome: Progressing  Face to face report will be communicated to oncoming RN.    Alma Delia Garsia RN  12/24/2024                            "

## 2024-12-25 PROCEDURE — 250N000013 HC RX MED GY IP 250 OP 250 PS 637: Performed by: STUDENT IN AN ORGANIZED HEALTH CARE EDUCATION/TRAINING PROGRAM

## 2024-12-25 PROCEDURE — 99232 SBSQ HOSP IP/OBS MODERATE 35: CPT | Mod: 95 | Performed by: STUDENT IN AN ORGANIZED HEALTH CARE EDUCATION/TRAINING PROGRAM

## 2024-12-25 PROCEDURE — 250N000013 HC RX MED GY IP 250 OP 250 PS 637: Performed by: NURSE PRACTITIONER

## 2024-12-25 PROCEDURE — 124N000001 HC R&B MH

## 2024-12-25 PROCEDURE — 250N000013 HC RX MED GY IP 250 OP 250 PS 637: Performed by: PSYCHIATRY & NEUROLOGY

## 2024-12-25 RX ORDER — OLANZAPINE 5 MG/1
5 TABLET ORAL EVERY 6 HOURS PRN
Status: DISCONTINUED | OUTPATIENT
Start: 2024-12-25 | End: 2025-01-03 | Stop reason: HOSPADM

## 2024-12-25 RX ORDER — OLANZAPINE 10 MG/2ML
5 INJECTION, POWDER, FOR SOLUTION INTRAMUSCULAR EVERY 6 HOURS PRN
Status: DISCONTINUED | OUTPATIENT
Start: 2024-12-25 | End: 2025-01-03 | Stop reason: HOSPADM

## 2024-12-25 RX ADMIN — CLONAZEPAM 1.5 MG: 0.5 TABLET ORAL at 07:48

## 2024-12-25 RX ADMIN — GABAPENTIN 800 MG: 400 CAPSULE ORAL at 07:48

## 2024-12-25 RX ADMIN — Medication 200 MG: at 07:46

## 2024-12-25 RX ADMIN — Medication 200 MG: at 11:22

## 2024-12-25 RX ADMIN — GABAPENTIN 800 MG: 400 CAPSULE ORAL at 19:07

## 2024-12-25 RX ADMIN — CLONAZEPAM 1.25 MG: 0.5 TABLET ORAL at 13:21

## 2024-12-25 RX ADMIN — GABAPENTIN 800 MG: 400 CAPSULE ORAL at 13:21

## 2024-12-25 RX ADMIN — Medication 5 MG: at 19:13

## 2024-12-25 RX ADMIN — MEMANTINE 10 MG: 5 TABLET ORAL at 19:07

## 2024-12-25 RX ADMIN — BUPROPION HYDROCHLORIDE 300 MG: 300 TABLET, EXTENDED RELEASE ORAL at 07:47

## 2024-12-25 RX ADMIN — Medication 200 MG: at 16:43

## 2024-12-25 RX ADMIN — BUPROPION HYDROCHLORIDE 150 MG: 150 TABLET, EXTENDED RELEASE ORAL at 07:46

## 2024-12-25 RX ADMIN — PALIPERIDONE 9 MG: 3 TABLET, EXTENDED RELEASE ORAL at 19:08

## 2024-12-25 RX ADMIN — MEMANTINE 10 MG: 5 TABLET ORAL at 07:46

## 2024-12-25 RX ADMIN — CLONAZEPAM 1.25 MG: 0.5 TABLET ORAL at 19:08

## 2024-12-25 ASSESSMENT — ACTIVITIES OF DAILY LIVING (ADL)
ADLS_ACUITY_SCORE: 24
DRESS: INDEPENDENT
ADLS_ACUITY_SCORE: 24
ADLS_ACUITY_SCORE: 24
ORAL_HYGIENE: INDEPENDENT
ADLS_ACUITY_SCORE: 24
HYGIENE/GROOMING: INDEPENDENT
ADLS_ACUITY_SCORE: 24

## 2024-12-25 NOTE — PROGRESS NOTES
[facility] PSYCHIATRY  PROGRESS NOTE     SUBJECTIVE     Prior to interviewing the patient, I met with nursing and reviewed patient's clinical condition. We discussed clinical care both before and after the interview. I have reviewed the patient's clinical course by review of records including previous notes, labs, and vital signs.     Per nursing, the patient had the following behavioral events over the last 24-hours: Patient continues in the MHICU. Taking medications as prescribed. Slept overnight.     On psychiatric interview, the patient was found in MHICU. He notes that he is doing alright today. He notes that he feels like things are going a little better. He notes that he is feeling like he is reaching baseline. He notes that he is thankful for this. He notes that his thoughts are clearer. He notes that sometimes he wakes up early. He notes that he is more energetic today, but also fatigued. He notes that he is drinking some tea and trying to compensate for this.     The patient notes that his problems with urinating are better but come and go sometimes. He notes that he is doing well with the medications. [facility]     Patient notes that he would prefer that the Zyprexa prn be 5 mg.        MEDICATIONS   Scheduled Meds:  Current Facility-Administered Medications   Medication Dose Route Frequency Provider Last Rate Last Admin    buPROPion (WELLBUTRIN XL) 24 hr tablet 150 mg  150 mg Oral QAM David Timmons, DO   150 mg at 12/25/24 0746    buPROPion (WELLBUTRIN XL) 24 hr tablet 300 mg  300 mg Oral QAM David Timmons DO   300 mg at 12/25/24 0747    clonazePAM (klonoPIN) tablet 1.5 mg  1.5 mg Oral TID David Timmons DO   1.5 mg at 12/25/24 0748    cranberry capsule 200 mg  200 mg Oral TID w/meals Joshua Somers MD   200 mg at 12/25/24 0746    gabapentin (NEURONTIN) capsule 800 mg  800 mg Oral TID David Timmons DO   800 mg at 12/25/24 0748    paliperidone ER  (INVEGA) 24 hr tablet 9 mg  9 mg Oral At Bedtime David Timmons DO   9 mg at 12/24/24 1903    Or    haloperidol lactate (HALDOL) injection 5 mg  5 mg Intramuscular At Bedtime David Timmons DO        memantine (NAMENDA) tablet 10 mg  10 mg Oral BID David Timmons DO   10 mg at 12/25/24 0746     PRN Meds:.  Current Facility-Administered Medications   Medication Dose Route Frequency Provider Last Rate Last Admin    acetaminophen (TYLENOL) tablet 650 mg  650 mg Oral Q4H PRN David Timmons DO        alum & mag hydroxide-simethicone (MAALOX) suspension 30 mL  30 mL Oral Q4H PRN David Timmons DO        benztropine (COGENTIN) tablet 1 mg  1 mg Oral BID PRN David Timmons DO        haloperidol (HALDOL) tablet 5 mg  5 mg Oral Q8H PRN David Timmons DO   5 mg at 12/22/24 0006    And    LORazepam (ATIVAN) tablet 2 mg  2 mg Oral Q8H PRN David Timmons DO   2 mg at 12/22/24 0006    And    diphenhydrAMINE (BENADRYL) capsule 50 mg  50 mg Oral Q8H PRN David Timmons DO   50 mg at 12/22/24 0006    haloperidol lactate (HALDOL) injection 5 mg  5 mg Intramuscular Q8H PRN David Timmons DO   5 mg at 12/19/24 2119    And    LORazepam (ATIVAN) injection 2 mg  2 mg Intramuscular Q8H PRN David Timmons DO   2 mg at 12/19/24 2119    And    diphenhydrAMINE (BENADRYL) injection 50 mg  50 mg Intramuscular Q8H PRN David Timmons DO   50 mg at 12/19/24 2119    hydrOXYzine HCl (ATARAX) tablet 25 mg  25 mg Oral Q6H PRN David Timmons DO        ibuprofen (ADVIL/MOTRIN) tablet 600 mg  600 mg Oral Q6H PRN Harmony Samuel, CNP   600 mg at 12/21/24 0613    magic mouthwash suspension (diphenhydrAMINE, lidocaine, aluminum-magnesium & simethicone)  5 mL Swish & Spit TID PRN Harmony Samuel CNP        melatonin tablet 5 mg  5 mg Oral At Bedtime PRN Nargis Solomon APRN CNP   5 mg at 12/24/24 2007    OLANZapine (zyPREXA) tablet 10 mg  10 mg Oral Q6H PRN David Timmons DO   10 mg at  "12/23/24 1956    Or    OLANZapine (zyPREXA) injection 10 mg  10 mg Intramuscular Q6H PRN David Timmons DO        senna-docusate (SENOKOT-S/PERICOLACE) 8.6-50 MG per tablet 1 tablet  1 tablet Oral BID PRN David Timmons DO        traZODone (DESYREL) tablet 50 mg  50 mg Oral At Bedtime PRN David Timmons DO   50 mg at 12/21/24 5422        ALLERGIES   Allergies   Allergen Reactions    Pork Allergy         MENTAL STATUS EXAM   Vitals: /75   Pulse 88   Temp 97.1  F (36.2  C) (Temporal)   Resp 14   SpO2 95%     Appearance: Alert, oriented, dressed in hospital scrubs, disheveled, long hair and beard  Attitude: Cooperative  Eye Contact: Fair  Mood: \"Fine\"  Affect: Blunted, reduced range   Speech: Normal rate and rhythm   Psychomotor Behavior: No TD or rigidity. No tremor or akathisia.   Thought Process: More disorganized today  Associations: No loose associations   Thought Content: Denies SI, plan, or SIB. Denies AVH. Delusional thought present (paranoia, somatic, some grandiosity). Responding to internal stimuli at times. Small improvement.  Insight: Poor  Judgment: Poor  Oriented to: Person, place, and time  Attention Span and Concentration: Intact  Recent and Remote Memory: Intact  Language: English with appropriate syntax and vocabulary  Fund of Knowledge:  Low  Muscle Strength and Tone: Grossly normal  Gait and Station: Grossly normal       LABS   No results found for this or any previous visit (from the past 24 hours).          IMPRESSION     This is a 34 year old male with a PMH of SZAD, polysubstance abuse, and multiple TBIs with neurological sequalae who presents in a severe psychotic and catatonic episode in the setting of medication partial non-adherence. Patient has SPMI living in assisted. He has impaired functioning and generally poor insight. He has tried many medications having minimal response at times. He would benefit from hospitalization with resumption of medication as best able. " Will focus first on treating catatonia along with psychosis.    Today: Patient showing some improvement now taking Invega, albeit a small amount with MHICU being continued. Allowing further time to work. Reducing Klonopin with goal to go to PTA dose if able. Will monitor for worsening catatonia.         DIAGNOSES     #. Schizoaffective disorder, bipolar type, continuous, with catatonia   #. Multiple TBIs with LOC with sequale   #. Hallucinogen (DXM) use disorder, in sustained remission  #. Stimulant use disorder, in sustained remission  #. Alcohol use disorder, in sustained remission  #. Cannabis use        PLAN     Location: Unit 5  Legal Status: Orders Placed This Encounter      Legal status Court Hold    Petition for commitment and Sarah (all available neuroleptics) submitted to [facility]. Sarah 12/27 @ 830    Safety Assessment:    Behavioral Orders   Procedures    Code 1 - Restrict to Unit    Routine Programming     As clinically indicated    Status 15     Every 15 minutes.      PTA psychotropic medications held:      - Intuniv 1 mg at bedtime to simplify regimen  - Haldol 5 mg daily prn aggression due to using Zyprexa and Haldol/Benadryl/Ativan  - Ativan 2 mg BID prn aggression changed to Haldol/Benadryl/Ativan prn      PTA psychotropic medications continued/changed:      - Klonopin 1 mg TID increased to 1.5 mg TID temporarily -> 1.25 mg TID on 12/25  - Wellbutrin  mg daily  - Gabapentin 800 mg TID  - Namenda 10 mg BID     New psychotropic medications initiated:     - Invega 3 mg BID with Haldol 5 mg IM backup for emergency purposes as of 12/19 -> 4.5 mg BID on 12/21 -> 9 mg at bedtime on 12/22  - Standard unit prn agents, including Zyprexa prn agitation, in addition to Haldol/Benadryl/Ativan    Today's Changes:    - Continue medications including forced neuroleptics  - Continue in MHICU  - Reduce Klonopin to 1.25 mg TID  - Reduce Zyprexa prn to 5 mg given patient preference    Programming:  Patient will be treated in a therapeutic milieu with appropriate individual and group therapies. Education will be provided on diagnoses, medications, and treatments.     Medical diagnoses:  Per medicine    #. Difficulty voiding  - Likely secondary to Klonopin  - UA wnl  - Monitor and decrease Klonopin cautiously over next week  - Cranberry pills TID with some improvement    Consult: None  Tests: None     Anticipated LOS: > 7  days   Disposition: [facility] with outpatient services       ATTESTATION    David Timmons DO, MA  Psychiatrist     Type of Service: video visit for mental health treatment  Reason for Video Visit: COVID-19 [place]  Originating Site (patient location): [facility]  Distant Site (provider location): Remote Location  Mode of Communication: Video Conference via [employer]  Time of Service: Date: 12/25/2024  Start: 800 end: 211

## 2024-12-25 NOTE — PLAN OF CARE
"Face to face shift report received from Yoan CARDOZA RN. Rounding completed, pt observed.    Problem: Adult Behavioral Health Plan of Care  Goal: Patient-Specific Goal (Individualization)  Description:  Patient will accept medications as ordered by the physician   Patient will accept and follow the recommendations of the discharge planning team.  Patient will sleep at least 6 hours at night  Patient will eat at least 50% of meals    12/24/24- Pt. May wean to open unit during low stimulation times if behaviors remain appropriate. Treatment team to assess daily.    **Pt ok to have 2 pillows\"    Outcome: Progressing  Note: Shift Summary:  Patient remains in a room in the MHICU r/t unpredictable behavior.  Patient is cooperative with scheduled medications.  Polite and appropriate with all interactions with staff.  Showered this morning..  States that the urinary retention \"is no worse\" when asked about his symptoms. Patient has weaned brief;y to retrieve hot water for tea and to use the phone. Gait is   balanced and steady.  Behavior appropriate.     Care continued for next shift. Rounding completed and patient observed awake sitting on his bed. Denies needs at this time.  Patient was cooperative this shift.  Weaned briefly to use the phone.  Compliant with  scheduled meds.  Requested and given Melatonin for sleep aid at .    Problem: Thought Process Alteration  Goal: Optimal Thought Clarity  Description: Pt will be able to maintain linear and reality based conversations during this stay.   Pt will make their needs known during this stay.   Pt will report manageable hallucinations by discharge.   Pt will contract for safety while on the unit.     Outcome: Progressing  Face to face report will be communicated to oncoming RN.    Alma Delia Garsia RN  12/25/2024                     "

## 2024-12-25 NOTE — PLAN OF CARE
"Face to face shift report received from nurse. Rounding completed, pt observed.    Problem: Adult Behavioral Health Plan of Care  Goal: Patient-Specific Goal (Individualization)  Description:  Patient will accept medications as ordered by the physician   Patient will accept and follow the recommendations of the discharge planning team.  Patient will sleep at least 6 hours at night  Patient will eat at least 50% of meals    12/24/24- Pt. May wean to open unit during low stimulation times if behaviors remain appropriate. Treatment team to assess daily.    **Pt ok to have 2 pillows\"    Outcome: Progressing   Pt has been in bed with eyes closed and regular respirations observed all night. Will continue to monitor. Patient sept 6.5 hours no issues noted.    Face to face report will be communicated to oncoming RN.    Yoan Erickson RN  12/25/2024  "

## 2024-12-26 PROCEDURE — 250N000013 HC RX MED GY IP 250 OP 250 PS 637: Performed by: NURSE PRACTITIONER

## 2024-12-26 PROCEDURE — 99233 SBSQ HOSP IP/OBS HIGH 50: CPT | Mod: 95 | Performed by: STUDENT IN AN ORGANIZED HEALTH CARE EDUCATION/TRAINING PROGRAM

## 2024-12-26 PROCEDURE — 124N000001 HC R&B MH

## 2024-12-26 PROCEDURE — 250N000013 HC RX MED GY IP 250 OP 250 PS 637: Performed by: PSYCHIATRY & NEUROLOGY

## 2024-12-26 PROCEDURE — 250N000013 HC RX MED GY IP 250 OP 250 PS 637: Performed by: STUDENT IN AN ORGANIZED HEALTH CARE EDUCATION/TRAINING PROGRAM

## 2024-12-26 RX ADMIN — DIPHENHYDRAMINE HYDROCHLORIDE 50 MG: 50 CAPSULE ORAL at 21:16

## 2024-12-26 RX ADMIN — CLONAZEPAM 1.25 MG: 0.5 TABLET ORAL at 19:16

## 2024-12-26 RX ADMIN — CLONAZEPAM 1.25 MG: 0.5 TABLET ORAL at 13:27

## 2024-12-26 RX ADMIN — MEMANTINE 10 MG: 5 TABLET ORAL at 08:22

## 2024-12-26 RX ADMIN — Medication 200 MG: at 12:02

## 2024-12-26 RX ADMIN — PALIPERIDONE 9 MG: 3 TABLET, EXTENDED RELEASE ORAL at 19:16

## 2024-12-26 RX ADMIN — Medication 200 MG: at 16:51

## 2024-12-26 RX ADMIN — Medication 5 MG: at 19:17

## 2024-12-26 RX ADMIN — Medication 200 MG: at 08:22

## 2024-12-26 RX ADMIN — ACETAMINOPHEN 650 MG: 325 TABLET, FILM COATED ORAL at 06:56

## 2024-12-26 RX ADMIN — GABAPENTIN 800 MG: 400 CAPSULE ORAL at 08:22

## 2024-12-26 RX ADMIN — BUPROPION HYDROCHLORIDE 150 MG: 150 TABLET, EXTENDED RELEASE ORAL at 08:23

## 2024-12-26 RX ADMIN — CLONAZEPAM 1.25 MG: 0.5 TABLET ORAL at 08:22

## 2024-12-26 RX ADMIN — GABAPENTIN 800 MG: 400 CAPSULE ORAL at 13:28

## 2024-12-26 RX ADMIN — GABAPENTIN 800 MG: 400 CAPSULE ORAL at 19:17

## 2024-12-26 RX ADMIN — BUPROPION HYDROCHLORIDE 300 MG: 300 TABLET, EXTENDED RELEASE ORAL at 08:23

## 2024-12-26 RX ADMIN — MEMANTINE 10 MG: 5 TABLET ORAL at 19:17

## 2024-12-26 ASSESSMENT — ACTIVITIES OF DAILY LIVING (ADL)
ORAL_HYGIENE: INDEPENDENT
ADLS_ACUITY_SCORE: 24
DRESS: INDEPENDENT
ADLS_ACUITY_SCORE: 24
DRESS: INDEPENDENT
ADLS_ACUITY_SCORE: 24
ORAL_HYGIENE: INDEPENDENT
ADLS_ACUITY_SCORE: 24
LAUNDRY: UNABLE TO COMPLETE
ADLS_ACUITY_SCORE: 24
HYGIENE/GROOMING: INDEPENDENT
ADLS_ACUITY_SCORE: 24
HYGIENE/GROOMING: INDEPENDENT
ADLS_ACUITY_SCORE: 24

## 2024-12-26 NOTE — PROGRESS NOTES
[place] RN updated on pt.     Pt has court for C&J tomorrow @ 8:30a.     1:1 with pt. Pt asked about discharging tomorrow. Sw explained that more will be known after court tomorrow on discharge date. Ensured pt that [facility] is still holding his room and they are updated on what is happening at the hospital.

## 2024-12-26 NOTE — PLAN OF CARE
"Face to face report received from Didier RN. Pt. Observed.    Problem: Adult Behavioral Health Plan of Care  Goal: Patient-Specific Goal (Individualization)  Description:  Patient will accept medications as ordered by the physician   Patient will accept and follow the recommendations of the discharge planning team.  Patient will sleep at least 6 hours at night  Patient will eat at least 50% of meals    12/26/24- Pt. May wean to open unit during low stimulation times if behaviors remain appropriate. Treatment team to assess daily.    **Pt ok to have 2 pillows\"    Outcome: Progressing  Pt. Denies HI, SI, anxiety, depression, hallucinations and pain. Pt. Reporting the tylenol he took this a.m. was effective with his headache. Pt. Is cooperative with medications and nursing assessment. Pt. Is in agreement to update staff to thoughts feelings of wanting to harm self or others. Pt. Weaning to open unit without any issues. Pt. Encouraged to attend unit programs and shower. Pt. Eating WDL. Pt. Denies any issues with bowel and bladder.     Pt. Refusing covid swab lab. TORB via Dr. Timmons. to discontinue lab.      Face to face end of shift report communicated to oncoming TRAE.     Liat Lai RN  12/26/2024  10:53 AM      "

## 2024-12-26 NOTE — PLAN OF CARE
"  Problem: Adult Behavioral Health Plan of Care  Goal: Patient-Specific Goal (Individualization)  Description:  Patient will accept medications as ordered by the physician   Patient will accept and follow the recommendations of the discharge planning team.  Patient will sleep at least 6 hours at night  Patient will eat at least 50% of meals    12/24/24- Pt. May wean to open unit during low stimulation times if behaviors remain appropriate. Treatment team to assess daily.    **Pt ok to have 2 pillows\"    Outcome: Progressing   Goal Outcome Evaluation:        Face to face shift report received from RN. Rounding completed, pt observed.Client rested in room for 7 hours with eyes closed and respirations noted. Client had no falls or instances of instability this shift.Face to face report will be communicated to oncoming RN.    Didier Brooks RN  12/26/2024  6:00 AM                       "

## 2024-12-26 NOTE — PROGRESS NOTES
[facility] PSYCHIATRY  PROGRESS NOTE     SUBJECTIVE     Prior to interviewing the patient, I met with nursing and reviewed patient's clinical condition. We discussed clinical care both before and after the interview. I have reviewed the patient's clinical course by review of records including previous notes, labs, and vital signs.     Per nursing, the patient had the following behavioral events over the last 24-hours: Patient continues in the MHICU. Taking medications as prescribed. Slept overnight. Weaning out.     On psychiatric interview, the patient was found in the general unit. He notes that he is doing fine with reduction in Klonopin. He notes that he has had some shots already. He notes that he is lery of the CAMARGO but would like to take it. The patient is willing to take Invega Sustenna after discussing B/R/SE. He is willing to take medications now too.    The patient notes that his phobia of going to sleep at night is weaning out. He notes that he still knows reality. He notes that he feels like his mind is improving.     He denies any acute concerns today.       MEDICATIONS   Scheduled Meds:  Current Facility-Administered Medications   Medication Dose Route Frequency Provider Last Rate Last Admin    buPROPion (WELLBUTRIN XL) 24 hr tablet 150 mg  150 mg Oral QAM David Timmons, DO   150 mg at 12/26/24 0823    buPROPion (WELLBUTRIN XL) 24 hr tablet 300 mg  300 mg Oral QAM David Timmons, DO   300 mg at 12/26/24 0823    clonazePAM (klonoPIN) half-tab 1.25 mg  1.25 mg Oral TID David Timomns DO   1.25 mg at 12/26/24 0822    cranberry capsule 200 mg  200 mg Oral TID w/meals Joshua Somers MD   200 mg at 12/26/24 0822    gabapentin (NEURONTIN) capsule 800 mg  800 mg Oral TID David Timmons DO   800 mg at 12/26/24 0822    paliperidone ER (INVEGA) 24 hr tablet 9 mg  9 mg Oral At Bedtime David Timmons, DO   9 mg at 12/25/24 1908    Or    haloperidol lactate (HALDOL) injection 5 mg  5  mg Intramuscular At Bedtime David Timmons DO        memantine (NAMENDA) tablet 10 mg  10 mg Oral BID David Timmons DO   10 mg at 12/26/24 0822     PRN Meds:.  Current Facility-Administered Medications   Medication Dose Route Frequency Provider Last Rate Last Admin    acetaminophen (TYLENOL) tablet 650 mg  650 mg Oral Q4H PRN David Timmons DO   650 mg at 12/26/24 0656    alum & mag hydroxide-simethicone (MAALOX) suspension 30 mL  30 mL Oral Q4H PRN David Timmons, DO        benztropine (COGENTIN) tablet 1 mg  1 mg Oral BID PRN David Timmons DO        haloperidol (HALDOL) tablet 5 mg  5 mg Oral Q8H PRN David Timmons DO   5 mg at 12/22/24 0006    And    LORazepam (ATIVAN) tablet 2 mg  2 mg Oral Q8H PRN David Timmons DO   2 mg at 12/22/24 0006    And    diphenhydrAMINE (BENADRYL) capsule 50 mg  50 mg Oral Q8H PRN David Timmons DO   50 mg at 12/22/24 0006    haloperidol lactate (HALDOL) injection 5 mg  5 mg Intramuscular Q8H PRN David Timmons DO   5 mg at 12/19/24 2119    And    LORazepam (ATIVAN) injection 2 mg  2 mg Intramuscular Q8H PRN David Timmons, DO   2 mg at 12/19/24 2119    And    diphenhydrAMINE (BENADRYL) injection 50 mg  50 mg Intramuscular Q8H PRN David Timmons DO   50 mg at 12/19/24 2119    hydrOXYzine HCl (ATARAX) tablet 25 mg  25 mg Oral Q6H PRN David Timmons DO        ibuprofen (ADVIL/MOTRIN) tablet 600 mg  600 mg Oral Q6H PRN Harmony Samuel CNP   600 mg at 12/21/24 0613    magic mouthwash suspension (diphenhydrAMINE, lidocaine, aluminum-magnesium & simethicone)  5 mL Swish & Spit TID PRN Harmony Samuel CNP        melatonin tablet 5 mg  5 mg Oral At Bedtime PRN Nargis Solomon APRN CNP   5 mg at 12/25/24 1913    OLANZapine (zyPREXA) tablet 5 mg  5 mg Oral Q6H PRN David Timmons DO        Or    OLANZapine (zyPREXA) injection 5 mg  5 mg Intramuscular Q6H PRN David Timmons DO        senna-docusate (SENOKOT-S/PERICOLACE)  "8.6-50 MG per tablet 1 tablet  1 tablet Oral BID PRN David Timmons DO        traZODone (DESYREL) tablet 50 mg  50 mg Oral At Bedtime PRN David Timmons DO   50 mg at 12/21/24 1200        ALLERGIES   Allergies   Allergen Reactions    Pork Allergy         MENTAL STATUS EXAM   Vitals: /71   Pulse 79   Temp (!) 96.6  F (35.9  C) (Temporal)   Resp 15   SpO2 96%     Appearance: Alert, oriented, dressed in hospital scrubs, disheveled, long hair and beard  Attitude: Cooperative  Eye Contact: Fair  Mood: \"Alright\"  Affect: Blunted, reduced range   Speech: Normal rate and rhythm   Psychomotor Behavior: No TD or rigidity. No tremor or akathisia.   Thought Process: More linear today  Associations: No loose associations   Thought Content: Denies SI, plan, or SIB. Denies AVH. Delusional thought present (paranoia, somatic, some grandiosity). Responding to internal stimuli at times. Small improvement.  Insight: Limited  Judgment: Limited  Oriented to: Person, place, and time  Attention Span and Concentration: Intact  Recent and Remote Memory: Intact  Language: English with appropriate syntax and vocabulary  Fund of Knowledge:  Low  Muscle Strength and Tone: Grossly normal  Gait and Station: Grossly normal       LABS   No results found for this or any previous visit (from the past 24 hours).          IMPRESSION     This is a 34 year old male with a PMH of SZAD, polysubstance abuse, and multiple TBIs with neurological sequalae who presents in a severe psychotic and catatonic episode in the setting of medication partial non-adherence. Patient has SPMI living in [place]. He has impaired functioning and generally poor insight. He has tried many medications having minimal response at times. He would benefit from hospitalization with resumption of medication as best able. Will focus first on treating catatonia along with psychosis.    Today: Patient showing some improvement now on Invega. Stopping emergency medications " given patient willing to take. Starting CAMARGO given patient acceptance also. Doing well with reduction in Klonopin, monitoring closely for worsening of catatonia.       DIAGNOSES     #. Schizoaffective disorder, bipolar type, continuous, with catatonia   #. Multiple TBIs with LOC with sequale   #. Hallucinogen (DXM) use disorder, in sustained remission  #. Stimulant use disorder, in sustained remission  #. Alcohol use disorder, in sustained remission  #. Cannabis use        PLAN     Location: Unit 5  Legal Status: Orders Placed This Encounter      Legal status Court Hold    Petition for commitment and Sarah (all available neuroleptics) submitted to [facility]. Sarah 12/27 @ 830    Safety Assessment:    Behavioral Orders   Procedures    Code 1 - Restrict to Unit    Routine Programming     As clinically indicated    Status 15     Every 15 minutes.      PTA psychotropic medications held:      - Intuniv 1 mg at bedtime to simplify regimen  - Haldol 5 mg daily prn aggression due to using Zyprexa and Haldol/Benadryl/Ativan  - Ativan 2 mg BID prn aggression changed to Haldol/Benadryl/Ativan prn      PTA psychotropic medications continued/changed:      - Klonopin 1 mg TID increased to 1.5 mg TID temporarily -> 1.25 mg TID on 12/25  - Wellbutrin  mg daily  - Gabapentin 800 mg TID  - Namenda 10 mg BID     New psychotropic medications initiated:     - Invega 3 mg BID with Haldol 5 mg IM backup for emergency purposes as of 12/19 -> 4.5 mg BID on 12/21 -> 9 mg at bedtime on 12/22  - Invega Sustenna 234 mg on 12/26 with 156 mg on 01/02 then 156 mg every 4 weeks on 01/30/2025  - Standard unit prn agents, including Zyprexa prn agitation, in addition to Haldol/Benadryl/Ativan    Today's Changes:    -  Stopping forced medications given emergency abated and patient willing to take medications  -  Start Invega 234 mg with 156 mg in one week and then 156 mg afterwards  -  Starting to wean    Programming: Patient will be  treated in a therapeutic milieu with appropriate individual and group therapies. Education will be provided on diagnoses, medications, and treatments.     Medical diagnoses:  Per medicine    #. Difficulty voiding  - Likely secondary to Klonopin  - UA wnl  - Monitor and decrease Klonopin cautiously over next week  - Cranberry pills TID with some improvement    Consult: None  Tests: None     Anticipated LOS: > 7  days   Disposition: [place] with outpatient services       ATTESTATION    David Timmons DO, MA  Psychiatrist     Type of Service: video visit for mental health treatment  Reason for Video Visit: COVID-19 [place]  Originating Site (patient location): [facility]  Distant Site (provider location): Remote Location  Mode of Communication: Video Conference via [employer]  Time of Service: Date: 12/26/2024  Start: 930 end: 450

## 2024-12-26 NOTE — PLAN OF CARE
"Face to face shift report received from Liat CORDOVA RN. Rounding completed, pt observed.    Problem: Adult Behavioral Health Plan of Care  Goal: Patient-Specific Goal (Individualization)  Description:  Patient will accept medications as ordered by the physician   Patient will accept and follow the recommendations of the discharge planning team.  Patient will sleep at least 6 hours at night  Patient will eat at least 50% of meals    12/26/24- Pt. May wean to open unit during low stimulation times if behaviors remain appropriate. Treatment team to assess daily.    **Pt ok to have 2 pillows\"    Outcome: Progressing   Shift Summary: Patient  remains in a room in the MHICU r/t unpredictable behavior.  Patient was weaning to open unit a little more than for phone calls.  He sat at a table in the lounge and colored a picture which he gave to staff. Ate well at evening meal.  Requested Melatonin at HS and was cooperative with all scheduled meds. Requested Benadryl at 2116.  Med linked to Haldol and Ativan.  On call provider notified that patient took only the Benadryl 50 mg po.  Patient's affect is restricted and he apologizes every time he asks for something.  Reassured that this is our job and not a problem to help him. Gait is steady.  Denies side effects.  Problem: Thought Process Alteration  Goal: Optimal Thought Clarity  Description: Pt will be able to maintain linear and reality based conversations during this stay.   Pt will make their needs known during this stay.   Pt will report manageable hallucinations by discharge.   Pt will contract for safety while on the unit.     Outcome: Progressing  Face to face report will be communicated to oncoming RN.    Alma Delia Garsia RN  12/26/2024                            "

## 2024-12-27 PROCEDURE — 99232 SBSQ HOSP IP/OBS MODERATE 35: CPT | Mod: 95 | Performed by: STUDENT IN AN ORGANIZED HEALTH CARE EDUCATION/TRAINING PROGRAM

## 2024-12-27 PROCEDURE — 250N000013 HC RX MED GY IP 250 OP 250 PS 637: Performed by: PSYCHIATRY & NEUROLOGY

## 2024-12-27 PROCEDURE — 124N000004

## 2024-12-27 PROCEDURE — 250N000013 HC RX MED GY IP 250 OP 250 PS 637: Performed by: STUDENT IN AN ORGANIZED HEALTH CARE EDUCATION/TRAINING PROGRAM

## 2024-12-27 RX ORDER — DIPHENHYDRAMINE HCL 50 MG
50 CAPSULE ORAL EVERY 6 HOURS PRN
Status: DISCONTINUED | OUTPATIENT
Start: 2024-12-27 | End: 2025-01-03 | Stop reason: HOSPADM

## 2024-12-27 RX ADMIN — PALIPERIDONE 9 MG: 3 TABLET, EXTENDED RELEASE ORAL at 19:14

## 2024-12-27 RX ADMIN — DIPHENHYDRAMINE HYDROCHLORIDE 50 MG: 50 CAPSULE ORAL at 19:14

## 2024-12-27 RX ADMIN — OLANZAPINE 5 MG: 5 TABLET, FILM COATED ORAL at 19:14

## 2024-12-27 RX ADMIN — CLONAZEPAM 1.25 MG: 0.5 TABLET ORAL at 19:14

## 2024-12-27 RX ADMIN — GABAPENTIN 800 MG: 400 CAPSULE ORAL at 07:57

## 2024-12-27 RX ADMIN — CLONAZEPAM 1.25 MG: 0.5 TABLET ORAL at 13:21

## 2024-12-27 RX ADMIN — Medication 200 MG: at 17:55

## 2024-12-27 RX ADMIN — MEMANTINE 10 MG: 5 TABLET ORAL at 07:57

## 2024-12-27 RX ADMIN — BUPROPION HYDROCHLORIDE 300 MG: 300 TABLET, EXTENDED RELEASE ORAL at 07:57

## 2024-12-27 RX ADMIN — CLONAZEPAM 1.25 MG: 0.5 TABLET ORAL at 07:57

## 2024-12-27 RX ADMIN — GABAPENTIN 800 MG: 400 CAPSULE ORAL at 19:14

## 2024-12-27 RX ADMIN — MEMANTINE 10 MG: 5 TABLET ORAL at 19:14

## 2024-12-27 RX ADMIN — Medication 200 MG: at 07:57

## 2024-12-27 RX ADMIN — Medication 200 MG: at 11:57

## 2024-12-27 RX ADMIN — BUPROPION HYDROCHLORIDE 150 MG: 150 TABLET, EXTENDED RELEASE ORAL at 07:56

## 2024-12-27 RX ADMIN — GABAPENTIN 800 MG: 400 CAPSULE ORAL at 13:21

## 2024-12-27 ASSESSMENT — ACTIVITIES OF DAILY LIVING (ADL)
LAUNDRY: UNABLE TO COMPLETE
ADLS_ACUITY_SCORE: 24
ORAL_HYGIENE: INDEPENDENT
ADLS_ACUITY_SCORE: 24
ADLS_ACUITY_SCORE: 24
HYGIENE/GROOMING: INDEPENDENT
ADLS_ACUITY_SCORE: 24
HYGIENE/GROOMING: INDEPENDENT
ORAL_HYGIENE: INDEPENDENT
ADLS_ACUITY_SCORE: 24
DRESS: INDEPENDENT
ADLS_ACUITY_SCORE: 24
DRESS: SCRUBS (BEHAVIORAL HEALTH);INDEPENDENT
ADLS_ACUITY_SCORE: 24
LAUNDRY: UNABLE TO COMPLETE
ADLS_ACUITY_SCORE: 24

## 2024-12-27 NOTE — PROGRESS NOTES
[facility] PSYCHIATRY  PROGRESS NOTE     SUBJECTIVE     Prior to interviewing the patient, I met with nursing and reviewed patient's clinical condition. We discussed clinical care both before and after the interview. I have reviewed the patient's clinical course by review of records including previous notes, labs, and vital signs.     Per nursing, the patient had the following behavioral events over the last 24-hours: Patient continues in the MHICU. Taking medications as prescribed. Slept overnight. CAMARGO yesterday. Weaning out.     On psychiatric interview, the patient was found in the MHICU. He notes that the CAMARGO went well. Denies any problems. He notes that he took Benadryl with his medications last night with that working well.    He notes that he is doing pretty well. Denies any restlessness. Notes that he was feeling restless last night with Benadryl being used. He notes he doesn't need to use it. Discussed how will make available as prn if needed in future.     Denies any acute concerns.        MEDICATIONS   Scheduled Meds:  Current Facility-Administered Medications   Medication Dose Route Frequency Provider Last Rate Last Admin    buPROPion (WELLBUTRIN XL) 24 hr tablet 150 mg  150 mg Oral QAM David Timmons, DO   150 mg at 12/27/24 0756    buPROPion (WELLBUTRIN XL) 24 hr tablet 300 mg  300 mg Oral QAM David Timmons DO   300 mg at 12/26/24 0823    clonazePAM (klonoPIN) half-tab 1.25 mg  1.25 mg Oral TID David Timmons DO   1.25 mg at 12/27/24 0757    cranberry capsule 200 mg  200 mg Oral TID w/meals Joshua Somers MD   200 mg at 12/27/24 0757    gabapentin (NEURONTIN) capsule 800 mg  800 mg Oral TID David Timmons DO   800 mg at 12/27/24 0757    memantine (NAMENDA) tablet 10 mg  10 mg Oral BID David Timmons DO   10 mg at 12/27/24 0757    paliperidone ER (INVEGA) 24 hr tablet 9 mg  9 mg Oral At Bedtime David Timmons DO   9 mg at 12/26/24 1916     PRN Meds:.  Current  Facility-Administered Medications   Medication Dose Route Frequency Provider Last Rate Last Admin    acetaminophen (TYLENOL) tablet 650 mg  650 mg Oral Q4H PRN David Timmons DO   650 mg at 12/26/24 0656    alum & mag hydroxide-simethicone (MAALOX) suspension 30 mL  30 mL Oral Q4H PRN David Timmons DO        benztropine (COGENTIN) tablet 1 mg  1 mg Oral BID PRN David Timmons DO        haloperidol (HALDOL) tablet 5 mg  5 mg Oral Q8H PRN David Timmons DO   5 mg at 12/22/24 0006    And    LORazepam (ATIVAN) tablet 2 mg  2 mg Oral Q8H PRN David Timmnos DO   2 mg at 12/22/24 0006    And    diphenhydrAMINE (BENADRYL) capsule 50 mg  50 mg Oral Q8H PRN David Timmons DO   50 mg at 12/26/24 2116    haloperidol lactate (HALDOL) injection 5 mg  5 mg Intramuscular Q8H PRN David Timmons DO   5 mg at 12/19/24 2119    And    LORazepam (ATIVAN) injection 2 mg  2 mg Intramuscular Q8H PRN David Timmons DO   2 mg at 12/19/24 2119    And    diphenhydrAMINE (BENADRYL) injection 50 mg  50 mg Intramuscular Q8H PRN David Timmons DO   50 mg at 12/19/24 2119    hydrOXYzine HCl (ATARAX) tablet 25 mg  25 mg Oral Q6H PRN David Timmons DO        ibuprofen (ADVIL/MOTRIN) tablet 600 mg  600 mg Oral Q6H PRN Harmony Samuel CNP   600 mg at 12/21/24 0613    magic mouthwash suspension (diphenhydrAMINE, lidocaine, aluminum-magnesium & simethicone)  5 mL Swish & Spit TID PRN Harmony Samuel CNP        melatonin tablet 5 mg  5 mg Oral At Bedtime PRN Nargis Solomon APRN CNP   5 mg at 12/26/24 1917    OLANZapine (zyPREXA) tablet 5 mg  5 mg Oral Q6H PRN David Timmons DO        Or    OLANZapine (zyPREXA) injection 5 mg  5 mg Intramuscular Q6H PRN David Timmons,         senna-docusate (SENOKOT-S/PERICOLACE) 8.6-50 MG per tablet 1 tablet  1 tablet Oral BID PRN David Timmons DO        traZODone (DESYREL) tablet 50 mg  50 mg Oral At Bedtime PRN David Timmons,    50 mg at  "12/21/24 2232        ALLERGIES   Allergies   Allergen Reactions    Pork Allergy         MENTAL STATUS EXAM   Vitals: /69   Pulse 100   Temp 97  F (36.1  C) (Temporal)   Resp 14   SpO2 97%     Appearance: Alert, oriented, dressed in hospital scrubs, disheveled, long hair and beard  Attitude: Cooperative  Eye Contact: Fair  Mood: \"Pretty good\"  Affect: Blunted, reduced range   Speech: Normal rate and rhythm   Psychomotor Behavior: No TD or rigidity. No tremor or akathisia.   Thought Process: More linear today  Associations: No loose associations   Thought Content: Denies SI, plan, or SIB. Denies AVH. Delusional thought present (paranoia, somatic, some grandiosity). Responding to internal stimuli at times. Small improvement.  Insight: Limited  Judgment: Limited  Oriented to: Person, place, and time  Attention Span and Concentration: Intact  Recent and Remote Memory: Intact  Language: English with appropriate syntax and vocabulary  Fund of Knowledge:  Low  Muscle Strength and Tone: Grossly normal  Gait and Station: Grossly normal       LABS   No results found for this or any previous visit (from the past 24 hours).       IMPRESSION     This is a 34 year old male with a PMH of SZAD, polysubstance abuse, and multiple TBIs with neurological sequalae who presents in a severe psychotic and catatonic episode in the setting of medication partial non-adherence. [facility] He has impaired functioning and generally poor insight. He has tried many medications having minimal response at times. He would benefit from hospitalization with resumption of medication as best able. Will focus first on treating catatonia along with psychosis.    Today: Patient showing some improvement now on Invega. Stopping emergency medications given patient willing to take. Started CAMARGO given patient acceptance also. Doing well with reduction in Klonopin, monitoring closely for worsening of catatonia. Sarah hearing today.   "     DIAGNOSES     #. Schizoaffective disorder, bipolar type, continuous, with catatonia   #. Multiple TBIs with LOC with sequale   #. Hallucinogen (DXM) use disorder, in sustained remission  #. Stimulant use disorder, in sustained remission  #. Alcohol use disorder, in sustained remission  #. Cannabis use        PLAN     Location: Unit 5  Legal Status: Orders Placed This Encounter      Legal status Court Hold    Petition for commitment and Sarah (all available neuroleptics) submitted to [facility]. Sarah 12/27 @ 830    Safety Assessment:    Behavioral Orders   Procedures    Code 1 - Restrict to Unit    Routine Programming     As clinically indicated    Status 15     Every 15 minutes.      PTA psychotropic medications held:      - Intuniv 1 mg at bedtime to simplify regimen  - Haldol 5 mg daily prn aggression due to using Zyprexa and Haldol/Benadryl/Ativan  - Ativan 2 mg BID prn aggression changed to Haldol/Benadryl/Ativan prn      PTA psychotropic medications continued/changed:      - Klonopin 1 mg TID increased to 1.5 mg TID temporarily -> 1.25 mg TID on 12/25  - Wellbutrin  mg daily  - Gabapentin 800 mg TID  - Namenda 10 mg BID     New psychotropic medications initiated:     - Invega 3 mg BID with Haldol 5 mg IM backup for emergency purposes as of 12/19 -> 4.5 mg BID on 12/21 -> 9 mg at bedtime on 12/22  - Invega Sustenna 234 mg on 12/26 with 156 mg on 01/02 then 156 mg every 4 weeks on 01/30/2025  - Standard unit prn agents, including Zyprexa prn agitation, in addition to Haldol/Benadryl/Ativan    Today's Changes:    -  Start Benadryl 50 mg every 6 hours prn EPSE    Programming: Patient will be treated in a therapeutic milieu with appropriate individual and group therapies. Education will be provided on diagnoses, medications, and treatments.     Medical diagnoses:  Per medicine    #. Difficulty voiding  - Likely secondary to Klonopin  - UA wnl  - Monitor and decrease Klonopin cautiously over next  week  - Cranberry pills TID with some improvement    Consult: None  Tests: None     Anticipated LOS: > 7  days   Disposition: [place] with outpatient services       ATTESTATION    David Timmons DO, MA  Psychiatrist     Type of Service: video visit for mental health treatment  Reason for Video Visit: COVID-19 [place]  Originating Site (patient location): [facility]  Distant Site (provider location): Remote Location  Mode of Communication: Video Conference via [employer]  Time of Service: Date: 12/27/2024  Start: 715 end: 797

## 2024-12-27 NOTE — PROGRESS NOTES
"1:1 with pt. Pt opened up to sw about his past and wanting to keep progressing. Pt had his children and a home with them a few years back and he thought it would be their \" forever home\" but he became unwell and was using and lost it. Pt has a goal to get children back and get a place for them to live. Pt talked about how he hasn't used much since being released from custodial and wants to stay sober. Pt stated he is in a better place with his family and uses them as supports. Pt understands medications helps but would like to find a balance of meds and skills.   "

## 2024-12-27 NOTE — PLAN OF CARE
"  Problem: Thought Process Alteration  Goal: Optimal Thought Clarity  Description: Pt will be able to maintain linear and reality based conversations during this stay.   Pt will make their needs known during this stay.   Pt will report manageable hallucinations by discharge.   Pt will contract for safety while on the unit.     Outcome: Progressing     Problem: Adult Behavioral Health Plan of Care  Goal: Patient-Specific Goal (Individualization)  Description:  Patient will accept medications as ordered by the physician   Patient will accept and follow the recommendations of the discharge planning team.  Patient will sleep at least 6 hours at night  Patient will eat at least 50% of meals    12/26/24- Pt. May wean to open unit during low stimulation times if behaviors remain appropriate. Treatment team to assess daily.    **Pt ok to have 2 pillows\"    Outcome: Progressing     Face to Face report received from TRAE Flannery. Rounding completed. Pt has been bed with eyes closed and regular respirations noted. 15 minute and PRN checks all night. No reports of falls or self-harm.     Face to face end of shift report communicated to day shift RN.     Amara Dial RN  12/27/2024  5:40 AM    "

## 2024-12-27 NOTE — PROGRESS NOTES
Pt had court today for commitment and ledbetter. Both were granted and the  stated she will try and get the order out today but not later then Monday.     Pt stated he understands the decision but wants to know when he can leave. Sw stated the order has to signed and given to the hospital before pt leaves and treatment team will make a decision on discharge.    Pt's order for C&J came in. Pt given copy and copy put in chart. Treatment team updated.

## 2024-12-27 NOTE — PLAN OF CARE
"  Problem: Adult Behavioral Health Plan of Care  Goal: Patient-Specific Goal (Individualization)  Description:  Patient will accept medications as ordered by the physician   Patient will accept and follow the recommendations of the discharge planning team.  Patient will sleep at least 6 hours at night  Patient will eat at least 50% of meals    12/26/24- Pt. May wean to open unit during low stimulation times if behaviors remain appropriate. Treatment team to assess daily.    **Pt ok to have 2 pillows\"    Outcome: Progressing     Patient cooperative with assessment. Affect is flat. States he is doing ok. Some nervousness about court but is ok with it. Denies depression SI HI pain and hallucinations. Withdrawn to self but weans out for periods of time and behaviors remains appropriate. He is cooperative with medications without issues. Makes needs known. Free from falls.     Problem: Thought Process Alteration  Goal: Optimal Thought Clarity  Description: Pt will be able to maintain linear and reality based conversations during this stay.   Pt will make their needs known during this stay.   Pt will report manageable hallucinations by discharge.   Pt will contract for safety while on the unit.     Outcome: Progressing    Face to face report given with opportunity to observe patient.    Report given to evening shift RN.     Mckayla Del Cid RN   12/27/2024  7:50 AM       "

## 2024-12-28 PROCEDURE — 250N000013 HC RX MED GY IP 250 OP 250 PS 637: Performed by: PSYCHIATRY & NEUROLOGY

## 2024-12-28 PROCEDURE — 124N000004

## 2024-12-28 PROCEDURE — 99232 SBSQ HOSP IP/OBS MODERATE 35: CPT | Mod: 95 | Performed by: STUDENT IN AN ORGANIZED HEALTH CARE EDUCATION/TRAINING PROGRAM

## 2024-12-28 PROCEDURE — 250N000013 HC RX MED GY IP 250 OP 250 PS 637: Performed by: STUDENT IN AN ORGANIZED HEALTH CARE EDUCATION/TRAINING PROGRAM

## 2024-12-28 RX ORDER — CLONAZEPAM 1 MG/1
1 TABLET ORAL 3 TIMES DAILY
Status: DISCONTINUED | OUTPATIENT
Start: 2024-12-28 | End: 2025-01-03 | Stop reason: HOSPADM

## 2024-12-28 RX ADMIN — GABAPENTIN 800 MG: 400 CAPSULE ORAL at 19:35

## 2024-12-28 RX ADMIN — PALIPERIDONE 9 MG: 3 TABLET, EXTENDED RELEASE ORAL at 19:34

## 2024-12-28 RX ADMIN — Medication 200 MG: at 07:43

## 2024-12-28 RX ADMIN — GABAPENTIN 800 MG: 400 CAPSULE ORAL at 07:43

## 2024-12-28 RX ADMIN — OLANZAPINE 5 MG: 5 TABLET, FILM COATED ORAL at 19:35

## 2024-12-28 RX ADMIN — CLONAZEPAM 1 MG: 1 TABLET ORAL at 13:15

## 2024-12-28 RX ADMIN — GABAPENTIN 800 MG: 400 CAPSULE ORAL at 13:15

## 2024-12-28 RX ADMIN — CLONAZEPAM 1.25 MG: 0.5 TABLET ORAL at 07:43

## 2024-12-28 RX ADMIN — DIPHENHYDRAMINE HYDROCHLORIDE 50 MG: 50 CAPSULE ORAL at 19:35

## 2024-12-28 RX ADMIN — Medication 200 MG: at 17:09

## 2024-12-28 RX ADMIN — MEMANTINE 10 MG: 5 TABLET ORAL at 19:35

## 2024-12-28 RX ADMIN — MEMANTINE 10 MG: 5 TABLET ORAL at 07:43

## 2024-12-28 RX ADMIN — Medication 200 MG: at 11:42

## 2024-12-28 RX ADMIN — CLONAZEPAM 1 MG: 1 TABLET ORAL at 19:35

## 2024-12-28 RX ADMIN — BUPROPION HYDROCHLORIDE 150 MG: 150 TABLET, EXTENDED RELEASE ORAL at 07:43

## 2024-12-28 RX ADMIN — BUPROPION HYDROCHLORIDE 300 MG: 300 TABLET, EXTENDED RELEASE ORAL at 07:43

## 2024-12-28 ASSESSMENT — ACTIVITIES OF DAILY LIVING (ADL)
ADLS_ACUITY_SCORE: 24
ORAL_HYGIENE: INDEPENDENT
DRESS: SCRUBS (BEHAVIORAL HEALTH)
ADLS_ACUITY_SCORE: 24
HYGIENE/GROOMING: INDEPENDENT
ADLS_ACUITY_SCORE: 24
LAUNDRY: UNABLE TO COMPLETE
ADLS_ACUITY_SCORE: 24
LAUNDRY: UNABLE TO COMPLETE
ADLS_ACUITY_SCORE: 24
DRESS: SCRUBS (BEHAVIORAL HEALTH);INDEPENDENT
ADLS_ACUITY_SCORE: 24
ADLS_ACUITY_SCORE: 24
HYGIENE/GROOMING: INDEPENDENT
ORAL_HYGIENE: INDEPENDENT
ADLS_ACUITY_SCORE: 24

## 2024-12-28 NOTE — PLAN OF CARE
"  Problem: Adult Behavioral Health Plan of Care  Goal: Patient-Specific Goal (Individualization)  Description:  Patient will accept medications as ordered by the physician   Patient will accept and follow the recommendations of the discharge planning team.  Patient will sleep at least 6 hours at night  Patient will eat at least 50% of meals    12/27/24- Pt may wean to open unit as long as behaviors remain appropriate. Treatment team to reassess daily.    **Pt ok to have 2 pillows\"    Outcome: Progressing     Problem: Thought Process Alteration  Goal: Optimal Thought Clarity  Description: Pt will be able to maintain linear and reality based conversations during this stay.   Pt will make their needs known during this stay.   Pt will report manageable hallucinations by discharge.   Pt will contract for safety while on the unit.   Outcome: Progressing    Face to face shift report received from previously assigned nurse. Rounding completed, pt observed sitting in room on bed.    Patient is met with in his room. Denies pain, SI, HI, A/V hallucinations, and depression. Endorses anxiety, but states, \"it's more of social anxiety\". Patient is appropriate in interactions and maintains appropriate behaviors. No further needs at this time.     Compliant with HS medication administration. Requested and received PRN zyprexa and benadryl for increase in agitation and anxiety. Patient continues to be appropriate throughout the shift. No further needs at this time.     Face to face report communicated to oncoming RN.    Domenica Pelayo RN  12/28/2024  3:52 PM       "

## 2024-12-28 NOTE — PLAN OF CARE
"Face to face end of shift report received from Rolo SALVADOR RN. Rounding completed. Patient observed in room.     Pt calm, cooperative this shift. He is withdrawn, isolative to himself. He weaned throughout the shift with appropriate behaviors. He denied any SI/HI and AH/VH. Denied pain. Observed sitting on bed in the dark for extended period of times. He is able to make his needs known. Steady gait-no falls. Frequent rounding.       Problem: Adult Behavioral Health Plan of Care  Goal: Patient-Specific Goal (Individualization)  Description:  Patient will accept medications as ordered by the physician   Patient will accept and follow the recommendations of the discharge planning team.  Patient will sleep at least 6 hours at night  Patient will eat at least 50% of meals    12/27/24- Pt may wean to open unit as long as behaviors remain appropriate. Treatment team to reassess daily.    **Pt ok to have 2 pillows\"    Outcome: Progressing     Problem: Thought Process Alteration  Goal: Optimal Thought Clarity  Description: Pt will be able to maintain linear and reality based conversations during this stay.   Pt will make their needs known during this stay.   Pt will report manageable hallucinations by discharge.   Pt will contract for safety while on the unit.     Outcome: Progressing       Almita Gage RN  12/27/2024  9:52 PM    "

## 2024-12-28 NOTE — PLAN OF CARE
"Face to face shift report received from RTAE Hutson. Rounding completed, pt observed resting in bed at start of shift.     Problem: Adult Behavioral Health Plan of Care  Goal: Patient-Specific Goal (Individualization)  Description:  Patient will accept medications as ordered by the physician   Patient will accept and follow the recommendations of the discharge planning team.  Patient will sleep at least 6 hours at night  Patient will eat at least 50% of meals    12/27/24- Pt may wean to open unit as long as behaviors remain appropriate. Treatment team to reassess daily.    **Pt ok to have 2 pillows\"    Outcome: Progressing     Problem: Thought Process Alteration  Goal: Optimal Thought Clarity  Description: Pt will be able to maintain linear and reality based conversations during this stay.   Pt will make their needs known during this stay.   Pt will report manageable hallucinations by discharge.   Pt will contract for safety while on the unit.     Outcome: Progressing   Goal Outcome Evaluation:      Pt. Denied having any physical pain this shift. They also denied having any SI, HI, or intent to self-harm. Pt. Took their morning medications with no issues this shift with their breakfast and then got ready for the day. This was followed by a nap. The rest  of the shift was spent mostly stretching and exercising in their room. Pt. Did wean out to the open unit a couple times briefly. Behaviors were appropriate during this time. Pt. Spent this time sitting out in the lounge and drawing. 100% was eaten at breakfast and at lunch. No groups were attended this shift.       Face to face report will be communicated to oncoming RN.    Emma Alvarez RN  12/28/2024  1:34 PM           "

## 2024-12-28 NOTE — PROGRESS NOTES
[facility] PSYCHIATRY  PROGRESS NOTE     SUBJECTIVE     Prior to interviewing the patient, I met with nursing and reviewed patient's clinical condition. We discussed clinical care both before and after the interview. I have reviewed the patient's clinical course by review of records including previous notes, labs, and vital signs.     Per nursing, the patient had the following behavioral events over the last 24-hours: Patient continues in the MHICU, weaning out. Taking medications as prescribed. Slept overnight.      On psychiatric interview, the patient was found in the MHICU. He notes that he is doing alright. He notes that he plans to color and do some artwork. He notes that he might exercise. He is going to try to stay active.    He notes that he comfortable this morning. No akathisia elicited. Patient doing fine with medications. He consents to reducing Klonopin back to 1 mg.       MEDICATIONS   Scheduled Meds:  Current Facility-Administered Medications   Medication Dose Route Frequency Provider Last Rate Last Admin    buPROPion (WELLBUTRIN XL) 24 hr tablet 150 mg  150 mg Oral QAM David Timmons DO   150 mg at 12/28/24 0743    buPROPion (WELLBUTRIN XL) 24 hr tablet 300 mg  300 mg Oral QAM David Timmons, DO   300 mg at 12/28/24 0743    clonazePAM (klonoPIN) tablet 1 mg  1 mg Oral TID David Timmons DO        cranberry capsule 200 mg  200 mg Oral TID w/meals Joshua Somers MD   200 mg at 12/28/24 0743    gabapentin (NEURONTIN) capsule 800 mg  800 mg Oral TID David Timmons DO   800 mg at 12/28/24 0743    memantine (NAMENDA) tablet 10 mg  10 mg Oral BID David Timmons DO   10 mg at 12/28/24 0743    paliperidone ER (INVEGA) 24 hr tablet 9 mg  9 mg Oral At Bedtime David Timmons DO   9 mg at 12/27/24 1914     PRN Meds:.  Current Facility-Administered Medications   Medication Dose Route Frequency Provider Last Rate Last Admin    acetaminophen (TYLENOL) tablet 650 mg  650 mg Oral  Q4H PRN David Timmons DO   650 mg at 12/26/24 0656    alum & mag hydroxide-simethicone (MAALOX) suspension 30 mL  30 mL Oral Q4H PRN David Timmons, DO        benztropine (COGENTIN) tablet 1 mg  1 mg Oral BID PRN David Timmons, DO        diphenhydrAMINE (BENADRYL) capsule 50 mg  50 mg Oral Q6H PRN David Timmons DO   50 mg at 12/27/24 1914    haloperidol (HALDOL) tablet 5 mg  5 mg Oral Q8H PRN David Timmons DO   5 mg at 12/22/24 0006    And    LORazepam (ATIVAN) tablet 2 mg  2 mg Oral Q8H PRN David Timmons DO   2 mg at 12/22/24 0006    And    diphenhydrAMINE (BENADRYL) capsule 50 mg  50 mg Oral Q8H PRN David Timmons DO   50 mg at 12/26/24 2116    haloperidol lactate (HALDOL) injection 5 mg  5 mg Intramuscular Q8H PRN David Timmons DO   5 mg at 12/19/24 2119    And    LORazepam (ATIVAN) injection 2 mg  2 mg Intramuscular Q8H PRN David Timmons DO   2 mg at 12/19/24 2119    And    diphenhydrAMINE (BENADRYL) injection 50 mg  50 mg Intramuscular Q8H PRN David Timmons, DO   50 mg at 12/19/24 2119    hydrOXYzine HCl (ATARAX) tablet 25 mg  25 mg Oral Q6H PRN David Timmons DO        ibuprofen (ADVIL/MOTRIN) tablet 600 mg  600 mg Oral Q6H PRN Harmony Samuel, CNP   600 mg at 12/21/24 0613    magic mouthwash suspension (diphenhydrAMINE, lidocaine, aluminum-magnesium & simethicone)  5 mL Swish & Spit TID PRN Laurie Samuelesa, CNP        melatonin tablet 5 mg  5 mg Oral At Bedtime PRN Nargis Solomon APRN CNP   5 mg at 12/26/24 1917    OLANZapine (zyPREXA) tablet 5 mg  5 mg Oral Q6H PRN David Timmons DO   5 mg at 12/27/24 1914    Or    OLANZapine (zyPREXA) injection 5 mg  5 mg Intramuscular Q6H PRN David Timmons DO        senna-docusate (SENOKOT-S/PERICOLACE) 8.6-50 MG per tablet 1 tablet  1 tablet Oral BID PRN David Timmons DO        traZODone (DESYREL) tablet 50 mg  50 mg Oral At Bedtime PRN David Timmons DO   50 mg at 12/21/24 2232     "    ALLERGIES   Allergies   Allergen Reactions    Pork Allergy         MENTAL STATUS EXAM   Vitals: /72   Pulse 97   Temp 97.2  F (36.2  C) (Temporal)   Resp 14   SpO2 98%     Appearance: Alert, oriented, dressed in hospital scrubs, more kempt, long hair and beard  Attitude: Cooperative  Eye Contact: Fair  Mood: \"Alright\"  Affect: Blunted, reduced range   Speech: Normal rate and rhythm   Psychomotor Behavior: No TD or rigidity. No tremor or akathisia.   Thought Process: Linear  Associations: No loose associations   Thought Content: Denies SI, plan, or SIB. Denies AVH. Delusional thought present (paranoia, somatic, some grandiosity). Responding to internal stimuli at times. All improving.  Insight: Limited  Judgment: Limited  Oriented to: Person, place, and time  Attention Span and Concentration: Intact  Recent and Remote Memory: Intact  Language: English with appropriate syntax and vocabulary  Fund of Knowledge:  Low  Muscle Strength and Tone: Grossly normal  Gait and Station: Grossly normal       LABS   No results found for this or any previous visit (from the past 24 hours).       IMPRESSION     This is a 34 year old male with a PMH of SZAD, polysubstance abuse, and multiple TBIs with neurological sequalae who presents in a severe psychotic and catatonic episode in the setting of medication partial non-adherence. Patient has SPMI living in group home. He has impaired functioning and generally poor insight. He has tried many medications having minimal response at times. He would benefit from hospitalization with resumption of medication as best able. Will focus first on treating catatonia along with psychosis.    Today: Patient showing some improvement now on Invega. Patient committed with Sarah. Will continue Invega. Second CAMARGO shot next week. Reducing Klonopin down to PTA dose, monitoring for any signs of catatonia, not seen at this point with previous reduction.       DIAGNOSES     #. Schizoaffective " disorder, bipolar type, continuous, with catatonia   #. Multiple TBIs with LOC with sequale   #. Hallucinogen (DXM) use disorder, in sustained remission  #. Stimulant use disorder, in sustained remission  #. Alcohol use disorder, in sustained remission  #. Cannabis use        PLAN     Location: Unit 5  Legal Status: Orders Placed This Encounter      Legal status Patient is Committed    Commitment and Sarah (all FDA approved neuroleptics) through [facility]    Safety Assessment:    Behavioral Orders   Procedures    Code 1 - Restrict to Unit    Routine Programming     As clinically indicated    Status 15     Every 15 minutes.      PTA psychotropic medications held:      - Intuniv 1 mg at bedtime to simplify regimen  - Haldol 5 mg daily prn aggression due to using Zyprexa and Haldol/Benadryl/Ativan  - Ativan 2 mg BID prn aggression changed to Haldol/Benadryl/Ativan prn      PTA psychotropic medications continued/changed:      - Klonopin 1 mg TID increased to 1.5 mg TID temporarily -> 1.25 mg TID on 12/25 -> 1 mg TID on 12/28  - Wellbutrin  mg daily  - Gabapentin 800 mg TID  - Namenda 10 mg BID     New psychotropic medications initiated:     - Invega 3 mg BID with Haldol 5 mg IM backup for emergency purposes as of 12/19 -> 4.5 mg BID on 12/21 -> 9 mg at bedtime on 12/22  - Invega Sustenna 234 mg on 12/26 with 156 mg on 01/02 then 156 mg every 4 weeks on 01/30/2025  - Standard unit prn agents, including Zyprexa prn agitation, in addition to Haldol/Benadryl/Ativan    Today's Changes:    - Reduce Klonopin to 1 mg TID    Programming: Patient will be treated in a therapeutic milieu with appropriate individual and group therapies. Education will be provided on diagnoses, medications, and treatments.     Medical diagnoses:  Per medicine    #. Difficulty voiding, improving   - Likely secondary to Klonopin  - UA wnl  - Monitor and decrease Klonopin cautiously over next week  - Cranberry pills TID with some  improvement    Consult: None  Tests: None     Anticipated LOS: > 7  days   Disposition: [place] with outpatient services       ATTESTATION    David Timmons DO, MA  Psychiatrist     Type of Service: video visit for mental health treatment  Reason for Video Visit: COVID-19 [place]  Originating Site (patient location): [facility]  Distant Site (provider location): Remote Location  Mode of Communication: Video Conference via [employer]  Time of Service: Date: 12/28/2024  Start: 730 end: 812

## 2024-12-28 NOTE — PLAN OF CARE
"Problem: Adult Behavioral Health Plan of Care  Goal: Patient-Specific Goal (Individualization)  Description:  Patient will accept medications as ordered by the physician   Patient will accept and follow the recommendations of the discharge planning team.  Patient will sleep at least 6 hours at night  Patient will eat at least 50% of meals    12/27/24- Pt may wean to open unit as long as behaviors remain appropriate. Treatment team to reassess daily.    **Pt ok to have 2 pillows\"  Outcome: Progressing     Problem: Thought Process Alteration  Goal: Optimal Thought Clarity  Description: Pt will be able to maintain linear and reality based conversations during this stay.   Pt will make their needs known during this stay.   Pt will report manageable hallucinations by discharge.   Pt will contract for safety while on the unit.   Outcome: Progressing     Face to face shift report received from Almita.     Patient appeared to be sleeping for approximately 8.25 hours since 2145 last shift.    No concerns noted this shift.    Face to face report will be communicated to oncoming RN.    Caryl Palmer RN  12/28/2024  6:27 AM    "

## 2024-12-29 PROCEDURE — 250N000013 HC RX MED GY IP 250 OP 250 PS 637: Performed by: STUDENT IN AN ORGANIZED HEALTH CARE EDUCATION/TRAINING PROGRAM

## 2024-12-29 PROCEDURE — 124N000004

## 2024-12-29 PROCEDURE — 250N000013 HC RX MED GY IP 250 OP 250 PS 637: Performed by: PSYCHIATRY & NEUROLOGY

## 2024-12-29 PROCEDURE — 99232 SBSQ HOSP IP/OBS MODERATE 35: CPT | Mod: 95 | Performed by: STUDENT IN AN ORGANIZED HEALTH CARE EDUCATION/TRAINING PROGRAM

## 2024-12-29 PROCEDURE — 250N000013 HC RX MED GY IP 250 OP 250 PS 637: Performed by: NURSE PRACTITIONER

## 2024-12-29 RX ORDER — PALIPERIDONE 3 MG/1
6 TABLET, EXTENDED RELEASE ORAL AT BEDTIME
Status: DISCONTINUED | OUTPATIENT
Start: 2024-12-29 | End: 2024-12-31

## 2024-12-29 RX ADMIN — BUPROPION HYDROCHLORIDE 300 MG: 300 TABLET, EXTENDED RELEASE ORAL at 07:46

## 2024-12-29 RX ADMIN — GABAPENTIN 800 MG: 400 CAPSULE ORAL at 07:46

## 2024-12-29 RX ADMIN — MEMANTINE 10 MG: 5 TABLET ORAL at 07:46

## 2024-12-29 RX ADMIN — GABAPENTIN 800 MG: 400 CAPSULE ORAL at 13:01

## 2024-12-29 RX ADMIN — DIPHENHYDRAMINE HYDROCHLORIDE 50 MG: 50 CAPSULE ORAL at 19:32

## 2024-12-29 RX ADMIN — CLONAZEPAM 1 MG: 1 TABLET ORAL at 19:33

## 2024-12-29 RX ADMIN — GABAPENTIN 800 MG: 400 CAPSULE ORAL at 19:32

## 2024-12-29 RX ADMIN — Medication 200 MG: at 18:10

## 2024-12-29 RX ADMIN — Medication 200 MG: at 07:46

## 2024-12-29 RX ADMIN — BUPROPION HYDROCHLORIDE 150 MG: 150 TABLET, EXTENDED RELEASE ORAL at 07:46

## 2024-12-29 RX ADMIN — OLANZAPINE 5 MG: 5 TABLET, FILM COATED ORAL at 19:32

## 2024-12-29 RX ADMIN — Medication 200 MG: at 11:44

## 2024-12-29 RX ADMIN — PALIPERIDONE 6 MG: 3 TABLET, EXTENDED RELEASE ORAL at 19:34

## 2024-12-29 RX ADMIN — IBUPROFEN 600 MG: 600 TABLET, FILM COATED ORAL at 20:29

## 2024-12-29 RX ADMIN — CLONAZEPAM 1 MG: 1 TABLET ORAL at 13:01

## 2024-12-29 RX ADMIN — MEMANTINE 10 MG: 5 TABLET ORAL at 19:32

## 2024-12-29 RX ADMIN — CLONAZEPAM 1 MG: 1 TABLET ORAL at 07:46

## 2024-12-29 ASSESSMENT — ACTIVITIES OF DAILY LIVING (ADL)
ADLS_ACUITY_SCORE: 24
HYGIENE/GROOMING: INDEPENDENT
ADLS_ACUITY_SCORE: 24
LAUNDRY: UNABLE TO COMPLETE
ADLS_ACUITY_SCORE: 24
HYGIENE/GROOMING: INDEPENDENT
ORAL_HYGIENE: INDEPENDENT
LAUNDRY: UNABLE TO COMPLETE
DRESS: INDEPENDENT;SCRUBS (BEHAVIORAL HEALTH)
ADLS_ACUITY_SCORE: 24
DRESS: SCRUBS (BEHAVIORAL HEALTH)
ORAL_HYGIENE: INDEPENDENT
ADLS_ACUITY_SCORE: 24
ADLS_ACUITY_SCORE: 24

## 2024-12-29 NOTE — PROGRESS NOTES
"[facility] PSYCHIATRY  PROGRESS NOTE     SUBJECTIVE     Prior to interviewing the patient, I met with nursing and reviewed patient's clinical condition. We discussed clinical care both before and after the interview. I have reviewed the patient's clinical course by review of records including previous notes, labs, and vital signs.     Per nursing, the patient had the following behavioral events over the last 24-hours: Patient continues in the MHICU, weaning out. Taking medications as prescribed. Slept overnight.  Prn Zyprexa yesterday.     On psychiatric interview, the patient was found in the MHICU. He notes that he is doing good today. He notes that he plans to do artwork today. He notes that he enjoys doing this. He likes to color.     He denies any worsening of anxiety. He notes that he feels calm. He notes that he has a feeling, described as \"like the day before [place].\" He notes that he is able to focus.     He notes that his issues with trouble urinating is improving. He notes that he might have had a tape worm but it is gone now.  He notes that he is sleeping better now that the phobia is gone.     He notes that he is tolerating medications well overall.        MEDICATIONS   Scheduled Meds:  Current Facility-Administered Medications   Medication Dose Route Frequency Provider Last Rate Last Admin    buPROPion (WELLBUTRIN XL) 24 hr tablet 150 mg  150 mg Oral QAM David Timmons, DO   150 mg at 12/29/24 0746    buPROPion (WELLBUTRIN XL) 24 hr tablet 300 mg  300 mg Oral QAM David Timmons, DO   300 mg at 12/29/24 0746    clonazePAM (klonoPIN) tablet 1 mg  1 mg Oral TID David Timmons, DO   1 mg at 12/29/24 0746    cranberry capsule 200 mg  200 mg Oral TID w/meals Joshua Somers MD   200 mg at 12/29/24 0746    gabapentin (NEURONTIN) capsule 800 mg  800 mg Oral TID David Timmons DO   800 mg at 12/29/24 0746    memantine (NAMENDA) tablet 10 mg  10 mg Oral BID David Timmons, DO   10 " mg at 12/29/24 0746    paliperidone ER (INVEGA) 24 hr tablet 9 mg  9 mg Oral At Bedtime David Timmons DO   9 mg at 12/28/24 1934     PRN Meds:.  Current Facility-Administered Medications   Medication Dose Route Frequency Provider Last Rate Last Admin    acetaminophen (TYLENOL) tablet 650 mg  650 mg Oral Q4H PRN David Timmons DO   650 mg at 12/26/24 0656    alum & mag hydroxide-simethicone (MAALOX) suspension 30 mL  30 mL Oral Q4H PRN David Timmons, DO        benztropine (COGENTIN) tablet 1 mg  1 mg Oral BID PRN David Timmons DO        diphenhydrAMINE (BENADRYL) capsule 50 mg  50 mg Oral Q6H PRN David Timmons, DO   50 mg at 12/28/24 1935    haloperidol (HALDOL) tablet 5 mg  5 mg Oral Q8H PRN David Timmons DO   5 mg at 12/22/24 0006    And    LORazepam (ATIVAN) tablet 2 mg  2 mg Oral Q8H PRN David Timmons DO   2 mg at 12/22/24 0006    And    diphenhydrAMINE (BENADRYL) capsule 50 mg  50 mg Oral Q8H PRN David Timmons DO   50 mg at 12/26/24 2116    haloperidol lactate (HALDOL) injection 5 mg  5 mg Intramuscular Q8H PRN David Timmons, DO   5 mg at 12/19/24 2119    And    LORazepam (ATIVAN) injection 2 mg  2 mg Intramuscular Q8H PRN David Timmons DO   2 mg at 12/19/24 2119    And    diphenhydrAMINE (BENADRYL) injection 50 mg  50 mg Intramuscular Q8H PRN David Timmons DO   50 mg at 12/19/24 2119    hydrOXYzine HCl (ATARAX) tablet 25 mg  25 mg Oral Q6H PRN David Timmons DO        ibuprofen (ADVIL/MOTRIN) tablet 600 mg  600 mg Oral Q6H PRN Harmony Samuel, CNP   600 mg at 12/21/24 0613    magic mouthwash suspension (diphenhydrAMINE, lidocaine, aluminum-magnesium & simethicone)  5 mL Swish & Spit TID PRN Harmony Samuel CNP        melatonin tablet 5 mg  5 mg Oral At Bedtime PRN Nargis Solomon APRN CNP   5 mg at 12/26/24 1917    OLANZapine (zyPREXA) tablet 5 mg  5 mg Oral Q6H PRN David Timmons DO   5 mg at 12/28/24 1935    Or    OLANZapine (zyPREXA)  "injection 5 mg  5 mg Intramuscular Q6H PRN David Timmons DO        senna-docusate (SENOKOT-S/PERICOLACE) 8.6-50 MG per tablet 1 tablet  1 tablet Oral BID PRN David Timmons DO        traZODone (DESYREL) tablet 50 mg  50 mg Oral At Bedtime PRN David Timmons, DO   50 mg at 12/21/24 6722        ALLERGIES   Allergies   Allergen Reactions    Pork Allergy         MENTAL STATUS EXAM   Vitals: /68   Pulse 82   Temp 96.9  F (36.1  C) (Temporal)   Resp 16   SpO2 99%     Appearance: Alert, oriented, dressed in hospital scrubs, more kempt, long hair and beard  Attitude: Cooperative  Eye Contact: Fair  Mood: \"Good\"  Affect: Blunted, reduced range   Speech: Normal rate and rhythm   Psychomotor Behavior: No TD or rigidity. No tremor or akathisia.   Thought Process: Linear  Associations: No loose associations   Thought Content: Denies SI, plan, or SIB. Denies AVH. Delusional thought present (paranoia, somatic, some grandiosity). Responding to internal stimuli at times. All improving.  Insight: Limited  Judgment: Limited  Oriented to: Person, place, and time  Attention Span and Concentration: Intact  Recent and Remote Memory: Intact  Language: English with appropriate syntax and vocabulary  Fund of Knowledge:  Low  Muscle Strength and Tone: Grossly normal  Gait and Station: Grossly normal       LABS   No results found for this or any previous visit (from the past 24 hours).       IMPRESSION     This is a 34 year old male with a PMH of SZAD, polysubstance abuse, and multiple TBIs with neurological sequalae who presents in a severe psychotic and catatonic episode in the setting of medication partial non-adherence. Patient has SPMI living in care home. He has impaired functioning and generally poor insight. He has tried many medications having minimal response at times. He would benefit from hospitalization with resumption of medication as best able. Will focus first on treating catatonia along with " psychosis.    Today: Patient showing some improvement now on Invega. Patient committed with Sarah. Will continue Invega. Second CAMARGO shot next week. Reducing Klonopin down to PTA dose, monitoring for any signs of catatonia, not seen at this point with previous reduction.       DIAGNOSES     #. Schizoaffective disorder, bipolar type, continuous, with catatonia   #. Multiple TBIs with LOC with sequale   #. Hallucinogen (DXM) use disorder, in sustained remission  #. Stimulant use disorder, in sustained remission  #. Alcohol use disorder, in sustained remission  #. Cannabis use        PLAN     Location: Unit 5  Legal Status: Orders Placed This Encounter      Legal status Patient is Committed    Commitment and Sarah (all FDA approved neuroleptics) through [facility]    Safety Assessment:    Behavioral Orders   Procedures    Code 1 - Restrict to Unit    Routine Programming     As clinically indicated    Status 15     Every 15 minutes.      PTA psychotropic medications held:      - Intuniv 1 mg at bedtime to simplify regimen  - Haldol 5 mg daily prn aggression due to using Zyprexa and Haldol/Benadryl/Ativan  - Ativan 2 mg BID prn aggression changed to Haldol/Benadryl/Ativan prn      PTA psychotropic medications continued/changed:      - Klonopin 1 mg TID increased to 1.5 mg TID temporarily -> 1.25 mg TID on 12/25 -> 1 mg TID on 12/28  - Wellbutrin  mg daily  - Gabapentin 800 mg TID  - Namenda 10 mg BID     New psychotropic medications initiated:     - Invega 3 mg BID with Haldol 5 mg IM backup for emergency purposes as of 12/19 -> 4.5 mg BID on 12/21 -> 9 mg at bedtime on 12/22  - Invega Sustenna 234 mg on 12/26 with 156 mg on 01/02 then 156 mg every 4 weeks on 01/30/2025  - Standard unit prn agents, including Zyprexa prn agitation, in addition to Haldol/Benadryl/Ativan    Today's Changes:    - None    Programming: Patient will be treated in a therapeutic milieu with appropriate individual and group therapies.  Education will be provided on diagnoses, medications, and treatments.     Medical diagnoses:  Per medicine    #. Difficulty voiding, improving   - Likely secondary to Klonopin. Returned to PTA dose  - UA wnl  - Cranberry pills TID with some improvement    Consult: None  Tests: None     Anticipated LOS: > 7  days   Disposition: [place] with outpatient services       ATTESTATION    David Timmons DO, MA  Psychiatrist     Type of Service: video visit for mental health treatment  Reason for Video Visit: COVID-19 [place]  Originating Site (patient location): [facility]  Distant Site (provider location): Remote Location  Mode of Communication: Video Conference via [employer]  Time of Service: Date: 12/29/2024  Start: 700 end: 509

## 2024-12-29 NOTE — PLAN OF CARE
"  Problem: Adult Behavioral Health Plan of Care  Goal: Patient-Specific Goal (Individualization)  Description:  Patient will accept medications as ordered by the physician   Patient will accept and follow the recommendations of the discharge planning team.  Patient will sleep at least 6 hours at night  Patient will eat at least 50% of meals    12/27/24- Pt may wean to open unit as long as behaviors remain appropriate. Treatment team to reassess daily.    **Pt ok to have 2 pillows\"    Outcome: Progressing     Problem: Thought Process Alteration  Goal: Optimal Thought Clarity  Description: Pt will be able to maintain linear and reality based conversations during this stay.   Pt will make their needs known during this stay.   Pt will report manageable hallucinations by discharge.   Pt will contract for safety while on the unit.   Outcome: Progressing    Face to face shift report received from previously assigned nurse. Rounding completed, pt observed walking around his room.    Patient is met with in his room. Denies pain, SI, HI, A/V hallucinations, and depression. Endorses anxiety, although states this is related to medication changes such as his klonopin which causes him worry about withdrawals. Patient is redirectable, appropriate in interactions. No further needs at this time.     Compliant with HS medication administration, requested and received PRN benadryl and zyprexa with medications. Remains polite and appropriate with staff. No further needs at this time.     Requested and received PRN ibuprofen for generalized pain. Expresses concern for a right lower tooth abscess that he would like assessed by provider. No further needs at this time.     Face to face report communicated to oncoming TRAE.    Domenica Pelayo RN  12/29/2024  4:08 PM       "

## 2024-12-29 NOTE — PLAN OF CARE
"Problem: Adult Behavioral Health Plan of Care  Goal: Patient-Specific Goal (Individualization)  Description:  Patient will accept medications as ordered by the physician   Patient will accept and follow the recommendations of the discharge planning team.  Patient will sleep at least 6 hours at night  Patient will eat at least 50% of meals    12/27/24- Pt may wean to open unit as long as behaviors remain appropriate. Treatment team to reassess daily.    **Pt ok to have 2 pillows\"  Outcome: Progressing     Problem: Thought Process Alteration  Goal: Optimal Thought Clarity  Description: Pt will be able to maintain linear and reality based conversations during this stay.   Pt will make their needs known during this stay.   Pt will report manageable hallucinations by discharge.   Pt will contract for safety while on the unit.   Outcome: Progressing     Face to face shift report received from Domenica.     Patient appeared to be sleeping for approximately 8 hours since 2200 last shift.    Face to face report will be communicated to oncoming RN.    Caryl Palmer RN  12/29/2024  6:18 AM    "

## 2024-12-29 NOTE — PLAN OF CARE
"Face to face shift report received from TRAE Hutson. Rounding completed, pt observed resting in their room on yoga mat at start of shift.    Problem: Adult Behavioral Health Plan of Care  Goal: Patient-Specific Goal (Individualization)  Description:  Patient will accept medications as ordered by the physician   Patient will accept and follow the recommendations of the discharge planning team.  Patient will sleep at least 6 hours at night  Patient will eat at least 50% of meals    12/27/24- Pt may wean to open unit as long as behaviors remain appropriate. Treatment team to reassess daily.    **Pt ok to have 2 pillows\"    Outcome: Progressing     Problem: Thought Process Alteration  Goal: Optimal Thought Clarity  Description: Pt will be able to maintain linear and reality based conversations during this stay.   Pt will make their needs known during this stay.   Pt will report manageable hallucinations by discharge.   Pt will contract for safety while on the unit.     Outcome: Progressing   Goal Outcome Evaluation:    Plan of Care Reviewed With: patient         Pt. Denied having any physical pain this shift. They also denied having any SI, HI, or intent to self-harm. Pt. Did have some mild anxiety but stated that they were doing good today. Pt. Ate 100% at breakfast and 100% at lunch. These were eaten in their room by choice of Pt. They weaned to open unit intermittently this shift. During this time, behaviors were appropriate. Pt. Used the phone and worked on artwork out in the lounge. They also spent some time exercising in their room and took a shower. No groups were taken this shift. All scheduled medications were taken without any issues.     Pt. Was slightly irritable at end of shift. They came up to nurse's station around 2:08 PM and wanted it known that they did not agree with the benzo taper. Pt. Stated that they wanted it written somewhere that it was going to kill them. Pt. Reassured.     Face to face report " will be communicated to oncoming RN.    Emma Alvarez RN  12/29/2024  1:32 PM

## 2024-12-30 PROCEDURE — 124N000004

## 2024-12-30 PROCEDURE — 250N000013 HC RX MED GY IP 250 OP 250 PS 637: Performed by: NURSE PRACTITIONER

## 2024-12-30 PROCEDURE — 99233 SBSQ HOSP IP/OBS HIGH 50: CPT | Mod: 95 | Performed by: STUDENT IN AN ORGANIZED HEALTH CARE EDUCATION/TRAINING PROGRAM

## 2024-12-30 PROCEDURE — 250N000013 HC RX MED GY IP 250 OP 250 PS 637: Performed by: STUDENT IN AN ORGANIZED HEALTH CARE EDUCATION/TRAINING PROGRAM

## 2024-12-30 PROCEDURE — 250N000013 HC RX MED GY IP 250 OP 250 PS 637: Performed by: PSYCHIATRY & NEUROLOGY

## 2024-12-30 PROCEDURE — 250N000009 HC RX 250: Performed by: STUDENT IN AN ORGANIZED HEALTH CARE EDUCATION/TRAINING PROGRAM

## 2024-12-30 RX ADMIN — BENZTROPINE MESYLATE 1 MG: 1 TABLET ORAL at 19:19

## 2024-12-30 RX ADMIN — DIPHENHYDRAMINE HYDROCHLORIDE 50 MG: 50 CAPSULE ORAL at 19:19

## 2024-12-30 RX ADMIN — PALIPERIDONE 6 MG: 3 TABLET, EXTENDED RELEASE ORAL at 19:19

## 2024-12-30 RX ADMIN — BUPROPION HYDROCHLORIDE 150 MG: 150 TABLET, EXTENDED RELEASE ORAL at 08:00

## 2024-12-30 RX ADMIN — Medication 200 MG: at 18:00

## 2024-12-30 RX ADMIN — IBUPROFEN 600 MG: 600 TABLET, FILM COATED ORAL at 19:19

## 2024-12-30 RX ADMIN — CLONAZEPAM 1 MG: 1 TABLET ORAL at 08:01

## 2024-12-30 RX ADMIN — Medication 200 MG: at 11:48

## 2024-12-30 RX ADMIN — MEMANTINE 10 MG: 5 TABLET ORAL at 08:01

## 2024-12-30 RX ADMIN — BUPROPION HYDROCHLORIDE 300 MG: 300 TABLET, EXTENDED RELEASE ORAL at 08:00

## 2024-12-30 RX ADMIN — GABAPENTIN 800 MG: 400 CAPSULE ORAL at 13:12

## 2024-12-30 RX ADMIN — GABAPENTIN 800 MG: 400 CAPSULE ORAL at 19:19

## 2024-12-30 RX ADMIN — MEMANTINE 10 MG: 5 TABLET ORAL at 19:19

## 2024-12-30 RX ADMIN — BENZTROPINE MESYLATE 1 MG: 1 TABLET ORAL at 09:32

## 2024-12-30 RX ADMIN — Medication 200 MG: at 08:00

## 2024-12-30 RX ADMIN — GABAPENTIN 800 MG: 400 CAPSULE ORAL at 08:00

## 2024-12-30 RX ADMIN — LIDOCAINE HYDROCHLORIDE 10 ML: 20 SOLUTION ORAL at 10:05

## 2024-12-30 RX ADMIN — CLONAZEPAM 1 MG: 1 TABLET ORAL at 13:12

## 2024-12-30 RX ADMIN — OLANZAPINE 5 MG: 5 TABLET, FILM COATED ORAL at 19:19

## 2024-12-30 RX ADMIN — LIDOCAINE HYDROCHLORIDE 10 ML: 20 SOLUTION ORAL at 17:15

## 2024-12-30 RX ADMIN — CLONAZEPAM 1 MG: 1 TABLET ORAL at 19:19

## 2024-12-30 ASSESSMENT — ACTIVITIES OF DAILY LIVING (ADL)
ADLS_ACUITY_SCORE: 24
DRESS: SCRUBS (BEHAVIORAL HEALTH)
ADLS_ACUITY_SCORE: 24
DRESS: INDEPENDENT;SCRUBS (BEHAVIORAL HEALTH)
ADLS_ACUITY_SCORE: 24
ADLS_ACUITY_SCORE: 24
ORAL_HYGIENE: INDEPENDENT
ADLS_ACUITY_SCORE: 24
HYGIENE/GROOMING: INDEPENDENT
ADLS_ACUITY_SCORE: 24
HYGIENE/GROOMING: INDEPENDENT
ORAL_HYGIENE: INDEPENDENT
ADLS_ACUITY_SCORE: 24
LAUNDRY: UNABLE TO COMPLETE
ADLS_ACUITY_SCORE: 24
LAUNDRY: UNABLE TO COMPLETE
ADLS_ACUITY_SCORE: 24

## 2024-12-30 NOTE — PLAN OF CARE
"  Problem: Thought Process Alteration  Goal: Optimal Thought Clarity  Description: Pt will be able to maintain linear and reality based conversations during this stay.   Pt will make their needs known during this stay.   Pt will report manageable hallucinations by discharge.   Pt will contract for safety while on the unit.     Outcome: Progressing     Problem: Adult Behavioral Health Plan of Care  Goal: Patient-Specific Goal (Individualization)  Description:  Patient will accept medications as ordered by the physician   Patient will accept and follow the recommendations of the discharge planning team.  Patient will sleep at least 6 hours at night  Patient will eat at least 50% of meals    12/27/24- Pt may wean to open unit as long as behaviors remain appropriate. Treatment team to reassess daily.    **Pt ok to have 2 pillows\"    Outcome: Progressing     Face to Face report received from TRAE Metzger. Rounding completed. Pt appeared to sleep throughout NOC. 15 minute and PRN checks all night. No reports of falls or self-harm.     Face to face end of shift report communicated to day shift RN.     Amara Dial RN  12/30/2024  6:04 AM      "

## 2024-12-30 NOTE — PLAN OF CARE
"Face to face shift report received from TRAE Maloney. Rounding completed, pt observed resting in bed at start of shift.    Problem: Adult Behavioral Health Plan of Care  Goal: Patient-Specific Goal (Individualization)  Description:  Patient will accept medications as ordered by the physician   Patient will accept and follow the recommendations of the discharge planning team.  Patient will sleep at least 6 hours at night  Patient will eat at least 50% of meals    12/27/24- Pt may wean to open unit as long as behaviors remain appropriate. Treatment team to reassess daily.    **Pt ok to have 2 pillows\"    Outcome: Progressing     Problem: Thought Process Alteration  Goal: Optimal Thought Clarity  Description: Pt will be able to maintain linear and reality based conversations during this stay.   Pt will make their needs known during this stay.   Pt will report manageable hallucinations by discharge.   Pt will contract for safety while on the unit.     Outcome: Progressing   Goal Outcome Evaluation:    Plan of Care Reviewed With: patient        Pt. Had some physical pain in their bottom right molar this shift. PRN magic mouthwash was given for this at 1005. They also had some anxiety and C/O stiffness. PRN cogentin 1 mg was given for this at 0932. Pt. Denied having any SI, HI, or intent to self-harm. They weaned to open unit for much of the start of the shift. Behaviors were appropriate during this time. Pt. Was then moved to a room on the open unit at 1030 per treatment team. This was tolerated well by Pt. They checked out some groups this shift, did some exercising in their room and sat out in the lounge. 100% was eaten at breakfast and at lunch.     Face to face report will be communicated to oncoming RN.    Emma Alvarez RN  12/30/2024  2:20 PM       "

## 2024-12-30 NOTE — PROGRESS NOTES
1:1 with pt. Pt talked about discharge on Friday. Pt stated he wishes he could go sooner but is looking forward to getting home.     [place] emailed [place] and pt will be working with Keila Klein again. She is out until Thursday.     Clifford given update on pt.

## 2024-12-30 NOTE — PROGRESS NOTES
[facility] PSYCHIATRY  PROGRESS NOTE     SUBJECTIVE     Prior to interviewing the patient, I met with nursing and reviewed patient's clinical condition. We discussed clinical care both before and after the interview. I have reviewed the patient's clinical course by review of records including previous notes, labs, and vital signs.     Per nursing, the patient had the following behavioral events over the last 24-hours: Patient continues in the MHICU, weaning out. Taking medications as prescribed. Slept overnight.     On psychiatric interview, the patient was found in the MHICU. He notes that he has some tooth pain. He notes that the last time he went to the dentist was 3 years ago. He notes that he would like to try antibiotics. Discussed how will start with magic mouthwash and observe.    He notes that he thinks he might have had a seizure last night. He notes that he was laying on the floor, stretched, and then started shaking all over. He notes that it felt mostly in his brain. He notes that he was a little dizzy afterwards. It lasted around 30 seconds. He notes that this started long before he was on medications. He notes that he did not have any loss of bowel or bladder. He was conscious the whole time.     He notes that he feels a little stiff. He is able to walk around fine. Discussed how Cogentin is available that he can take.        MEDICATIONS   Scheduled Meds:  Current Facility-Administered Medications   Medication Dose Route Frequency Provider Last Rate Last Admin    buPROPion (WELLBUTRIN XL) 24 hr tablet 150 mg  150 mg Oral QAM David Timmons, DO   150 mg at 12/30/24 0800    buPROPion (WELLBUTRIN XL) 24 hr tablet 300 mg  300 mg Oral QAM David Timmons DO   300 mg at 12/30/24 0800    clonazePAM (klonoPIN) tablet 1 mg  1 mg Oral TID David Timmons, DO   1 mg at 12/30/24 0801    cranberry capsule 200 mg  200 mg Oral TID w/meals Joshua Somers MD   200 mg at 12/30/24 0800    gabapentin  (NEURONTIN) capsule 800 mg  800 mg Oral TID David Timmons DO   800 mg at 12/30/24 0800    memantine (NAMENDA) tablet 10 mg  10 mg Oral BID David Timmons DO   10 mg at 12/30/24 0801    paliperidone ER (INVEGA) 24 hr tablet 6 mg  6 mg Oral At Bedtime David Timmons DO   6 mg at 12/29/24 1934     PRN Meds:.  Current Facility-Administered Medications   Medication Dose Route Frequency Provider Last Rate Last Admin    acetaminophen (TYLENOL) tablet 650 mg  650 mg Oral Q4H PRN David Timmons DO   650 mg at 12/26/24 0656    alum & mag hydroxide-simethicone (MAALOX) suspension 30 mL  30 mL Oral Q4H PRN David Timmons DO        benztropine (COGENTIN) tablet 1 mg  1 mg Oral BID PRN David Timmons DO        diphenhydrAMINE (BENADRYL) capsule 50 mg  50 mg Oral Q6H PRN David Timmons DO   50 mg at 12/29/24 1932    haloperidol (HALDOL) tablet 5 mg  5 mg Oral Q8H PRN David Timmons DO   5 mg at 12/22/24 0006    And    LORazepam (ATIVAN) tablet 2 mg  2 mg Oral Q8H PRN David Timmons DO   2 mg at 12/22/24 0006    And    diphenhydrAMINE (BENADRYL) capsule 50 mg  50 mg Oral Q8H PRN David Timmons DO   50 mg at 12/26/24 2116    haloperidol lactate (HALDOL) injection 5 mg  5 mg Intramuscular Q8H PRN David Timmons DO   5 mg at 12/19/24 2119    And    LORazepam (ATIVAN) injection 2 mg  2 mg Intramuscular Q8H PRN David Timmons DO   2 mg at 12/19/24 2119    And    diphenhydrAMINE (BENADRYL) injection 50 mg  50 mg Intramuscular Q8H PRN David Timmons DO   50 mg at 12/19/24 2119    hydrOXYzine HCl (ATARAX) tablet 25 mg  25 mg Oral Q6H PRN Rebman, David Gaetano, DO        ibuprofen (ADVIL/MOTRIN) tablet 600 mg  600 mg Oral Q6H PRN Harmony Samuel CNP   600 mg at 12/29/24 2029    magic mouthwash suspension (diphenhydrAMINE, lidocaine, aluminum-magnesium & simethicone)  10 mL Swish & Swallow Q6H PRN David Timmons,         melatonin tablet 5 mg  5 mg Oral At Bedtime PRN  "Nargis Solomon, APRN CNP   5 mg at 12/26/24 1917    OLANZapine (zyPREXA) tablet 5 mg  5 mg Oral Q6H PRN David Timmons DO   5 mg at 12/29/24 1932    Or    OLANZapine (zyPREXA) injection 5 mg  5 mg Intramuscular Q6H PRN David Timmons DO        senna-docusate (SENOKOT-S/PERICOLACE) 8.6-50 MG per tablet 1 tablet  1 tablet Oral BID PRN David Timmons DO        traZODone (DESYREL) tablet 50 mg  50 mg Oral At Bedtime PRN David Timmons DO   50 mg at 12/21/24 2232        ALLERGIES   Allergies   Allergen Reactions    Pork Allergy         MENTAL STATUS EXAM   Vitals: /81   Pulse 91   Temp 97  F (36.1  C) (Temporal)   Resp 16   SpO2 96%     Appearance: Alert, oriented, dressed in hospital scrubs, more kempt, long hair and beard  Attitude: Cooperative  Eye Contact: Fair  Mood: \"Alright man\"  Affect: Blunted, reduced range   Speech: Normal rate and rhythm   Psychomotor Behavior: No TD or rigidity. No tremor or akathisia.   Thought Process: Linear  Associations: No loose associations   Thought Content: Denies SI, plan, or SIB. Denies AVH. Delusional thought present (paranoia, somatic, some grandiosity). Responding to internal stimuli at times. All improving.  Insight: Limited  Judgment: Limited  Oriented to: Person, place, and time  Attention Span and Concentration: Intact  Recent and Remote Memory: Intact  Language: English with appropriate syntax and vocabulary  Fund of Knowledge:  Low  Muscle Strength and Tone: Grossly normal  Gait and Station: Grossly normal       LABS   No results found for this or any previous visit (from the past 24 hours).       IMPRESSION     This is a 34 year old male with a PMH of SZAD, polysubstance abuse, and multiple TBIs with neurological sequalae who presents in a severe psychotic and catatonic episode in the setting of medication partial non-adherence. Patient has SPMI living in [place]. He has impaired functioning and generally poor insight. He has tried many " medications having minimal response at times. He would benefit from hospitalization with resumption of medication as best able. Will focus first on treating catatonia along with psychosis.    Today: Patient showing some improvement now on Invega. Patient committed with Sarah. Will continue Invega with tapering of oral dose. Second CAMARGO shot this week. Reducing Klonopin down to PTA dose, monitoring for any signs of catatonia, not seen at this point with previous reduction. Patient having spells not consistent with seizures. Recommend follow-up outpatient with neurology for EEG as unable to obtain here. Treating tooth pain also.       DIAGNOSES     #. Schizoaffective disorder, bipolar type, continuous, with catatonia   #. Multiple TBIs with LOC with sequale   #. Hallucinogen (DXM) use disorder, in sustained remission  #. Stimulant use disorder, in sustained remission  #. Alcohol use disorder, in sustained remission  #. Cannabis use        PLAN     Location: Unit 5  Legal Status: Orders Placed This Encounter      Legal status Patient is Committed    Commitment and Sarah (all FDA approved neuroleptics) through [facility]    Safety Assessment:    Behavioral Orders   Procedures    Code 1 - Restrict to Unit    Routine Programming     As clinically indicated    Status 15     Every 15 minutes.      PTA psychotropic medications held:      - Intuniv 1 mg at bedtime to simplify regimen  - Haldol 5 mg daily prn aggression due to using Zyprexa and Haldol/Benadryl/Ativan  - Ativan 2 mg BID prn aggression changed to Haldol/Benadryl/Ativan prn      PTA psychotropic medications continued/changed:      - Klonopin 1 mg TID increased to 1.5 mg TID temporarily -> 1.25 mg TID on 12/25 -> 1 mg TID on 12/28  - Wellbutrin  mg daily  - Gabapentin 800 mg TID  - Namenda 10 mg BID     New psychotropic medications initiated:     - Invega 3 mg BID with Haldol 5 mg IM backup for emergency purposes as of 12/19 -> 4.5 mg BID on 12/21 ->  9 mg at bedtime on 12/22 -> 6 mg on 12/29  - Invega Sustenna 234 mg on 12/26 with 156 mg on 01/02 then 156 mg every 4 weeks on 01/30/2025  - Standard unit prn agents, including Zyprexa prn agitation, in addition to Haldol/Benadryl/Ativan    Today's Changes:    - Reduced Invega to 6 mg at bedtime on 12/29  - See medical diagnoses below    Programming: Patient will be treated in a therapeutic milieu with appropriate individual and group therapies. Education will be provided on diagnoses, medications, and treatments.     Medical diagnoses:  Per medicine    #. Difficulty voiding, improving   - Likely secondary to Klonopin. Returned to PTA dose  - UA wnl  - Cranberry pills TID with some improvement    #. Spells  - History of seizure in setting of TBI when young and overdose last in 2010  - Patient reports multiple spells not consistent with epileptic events  - Unable to order EEG  - Patient not interested in reducing Wellbutrin  - Recommend outpatient follow-up with neurology    #. Tooth pain  - Magic mouthwash prn     Consult: None  Tests: None     Anticipated LOS: > 7  days   Disposition: [place] with outpatient services       TREATMENT TEAM CARE PLAN     Progress: Continued symptoms with some improvement.     Continued Stay Criteria/Rationale: Continued symptoms without sufficient improvement/resolution.    Medical/Physical: See above.    Precautions: See above.     Plan: Continue inpatient care with unit support and medication management.    Rationale for change in precautions or plan: NA due to no change.    Participants: David Timmons DO, Nursing, SW, OT.    The patient's care was discussed with the treatment team and chart notes were reviewed.       ATTESTATION    David Timmons DO, MA  Psychiatrist     Type of Service: video visit for mental health treatment  Reason for Video Visit: COVID-19 [place]  Originating Site (patient location): [facility]  Distant Site (provider  location): Remote Location  Mode of Communication: Video Conference via Audible Magic  Time of Service: Date: 12/30/2024  Start: 800 end: 084

## 2024-12-30 NOTE — PHARMACY
The patient was credited for the Invega Sustenna 234 mg injection administered on 12/26 due to receiving drug from the 's free trial program. The patient is eligible for one additional free unit this calendar year per program rules. Next dose is due 1/2/2025, which is eligible for replacement.    Nicolasa Lewis, PharmD on 12/30/2024

## 2024-12-30 NOTE — PLAN OF CARE
"  Problem: Adult Behavioral Health Plan of Care  Goal: Patient-Specific Goal (Individualization)  Description:  Patient will accept medications as ordered by the physician   Patient will accept and follow the recommendations of the discharge planning team.  Patient will sleep at least 6 hours at night  Patient will eat at least 50% of meals    **Pt ok to have 2 pillows\"  Outcome: Progressing     Problem: Thought Process Alteration  Goal: Optimal Thought Clarity  Description: Pt will be able to maintain linear and reality based conversations during this stay.   Pt will make their needs known during this stay.   Pt will report manageable hallucinations by discharge.   Pt will contract for safety while on the unit.   Outcome: Progressing    Face to face shift report received from previously assigned nurse. Rounding completed, pt observed pacing in the hallway.    Patient is met with in the hallway. Denies pain, SI, HI, A/V hallucinations, and depression. Endorses some anxiety, states it is \"the same as yesterday\". Patient remains appropriate with staff, grateful for getting magic mouthwash to help with his tooth concern. Patient states he feels less tense at this time, which he is glad is happening. No further needs at this time.     Requested and received PRN magic mouthwash to help with tooth pain. No further needs at this time.     Patient compliant with HS medication administration. Requested and received PRN cogentin, zyprexa, benadryl, and ibuprofen with HS medication. Continues to be appropriate with staff, no further needs at this time.     Face to face report communicated to oncsalomón LARKIN.    Domenica Pelayo RN  12/30/2024  4:06 PM       "

## 2024-12-31 PROCEDURE — 124N000004

## 2024-12-31 PROCEDURE — 99232 SBSQ HOSP IP/OBS MODERATE 35: CPT | Mod: 95 | Performed by: STUDENT IN AN ORGANIZED HEALTH CARE EDUCATION/TRAINING PROGRAM

## 2024-12-31 PROCEDURE — 250N000013 HC RX MED GY IP 250 OP 250 PS 637: Performed by: PSYCHIATRY & NEUROLOGY

## 2024-12-31 PROCEDURE — 250N000009 HC RX 250: Performed by: STUDENT IN AN ORGANIZED HEALTH CARE EDUCATION/TRAINING PROGRAM

## 2024-12-31 PROCEDURE — 250N000013 HC RX MED GY IP 250 OP 250 PS 637: Performed by: STUDENT IN AN ORGANIZED HEALTH CARE EDUCATION/TRAINING PROGRAM

## 2024-12-31 RX ORDER — CLONAZEPAM 1 MG/1
1 TABLET ORAL 3 TIMES DAILY
Qty: 180 TABLET | Refills: 0 | Status: SHIPPED | OUTPATIENT
Start: 2024-12-31

## 2024-12-31 RX ORDER — PALIPERIDONE 3 MG/1
6 TABLET, EXTENDED RELEASE ORAL AT BEDTIME
Status: COMPLETED | OUTPATIENT
Start: 2024-12-31 | End: 2025-01-01

## 2024-12-31 RX ORDER — MEMANTINE HYDROCHLORIDE 10 MG/1
10 TABLET ORAL 2 TIMES DAILY
Qty: 120 TABLET | Refills: 0 | Status: SHIPPED | OUTPATIENT
Start: 2024-12-31

## 2024-12-31 RX ORDER — GINSENG 100 MG
200 CAPSULE ORAL
Qty: 180 CAPSULE | Refills: 0 | Status: SHIPPED | OUTPATIENT
Start: 2024-12-31

## 2024-12-31 RX ORDER — BUPROPION HYDROCHLORIDE 150 MG/1
150 TABLET ORAL EVERY MORNING
Qty: 60 TABLET | Refills: 0 | Status: SHIPPED | OUTPATIENT
Start: 2024-12-31

## 2024-12-31 RX ORDER — BUPROPION HYDROCHLORIDE 300 MG/1
300 TABLET ORAL EVERY MORNING
Qty: 60 TABLET | Refills: 0 | Status: SHIPPED | OUTPATIENT
Start: 2024-12-31

## 2024-12-31 RX ORDER — GABAPENTIN 800 MG/1
800 TABLET ORAL 3 TIMES DAILY
Qty: 180 TABLET | Refills: 0 | Status: SHIPPED | OUTPATIENT
Start: 2024-12-31

## 2024-12-31 RX ADMIN — Medication 200 MG: at 07:56

## 2024-12-31 RX ADMIN — BUPROPION HYDROCHLORIDE 300 MG: 300 TABLET, EXTENDED RELEASE ORAL at 07:56

## 2024-12-31 RX ADMIN — BENZTROPINE MESYLATE 1 MG: 1 TABLET ORAL at 17:52

## 2024-12-31 RX ADMIN — BUPROPION HYDROCHLORIDE 150 MG: 150 TABLET, EXTENDED RELEASE ORAL at 07:56

## 2024-12-31 RX ADMIN — GABAPENTIN 800 MG: 400 CAPSULE ORAL at 13:35

## 2024-12-31 RX ADMIN — GABAPENTIN 800 MG: 400 CAPSULE ORAL at 07:56

## 2024-12-31 RX ADMIN — BENZTROPINE MESYLATE 1 MG: 1 TABLET ORAL at 07:57

## 2024-12-31 RX ADMIN — CLONAZEPAM 1 MG: 1 TABLET ORAL at 13:35

## 2024-12-31 RX ADMIN — Medication 200 MG: at 16:53

## 2024-12-31 RX ADMIN — DIPHENHYDRAMINE HYDROCHLORIDE 50 MG: 50 CAPSULE ORAL at 19:02

## 2024-12-31 RX ADMIN — Medication 200 MG: at 11:33

## 2024-12-31 RX ADMIN — OLANZAPINE 5 MG: 5 TABLET, FILM COATED ORAL at 19:02

## 2024-12-31 RX ADMIN — CLONAZEPAM 1 MG: 1 TABLET ORAL at 19:02

## 2024-12-31 RX ADMIN — PALIPERIDONE 6 MG: 3 TABLET, EXTENDED RELEASE ORAL at 19:02

## 2024-12-31 RX ADMIN — MEMANTINE 10 MG: 5 TABLET ORAL at 19:02

## 2024-12-31 RX ADMIN — MEMANTINE 10 MG: 5 TABLET ORAL at 07:56

## 2024-12-31 RX ADMIN — GABAPENTIN 800 MG: 400 CAPSULE ORAL at 19:02

## 2024-12-31 RX ADMIN — CLONAZEPAM 1 MG: 1 TABLET ORAL at 07:56

## 2024-12-31 RX ADMIN — LIDOCAINE HYDROCHLORIDE 10 ML: 20 SOLUTION ORAL at 08:28

## 2024-12-31 RX ADMIN — LIDOCAINE HYDROCHLORIDE 10 ML: 20 SOLUTION ORAL at 17:26

## 2024-12-31 ASSESSMENT — ACTIVITIES OF DAILY LIVING (ADL)
ADLS_ACUITY_SCORE: 24
ADLS_ACUITY_SCORE: 24
HYGIENE/GROOMING: INDEPENDENT
ADLS_ACUITY_SCORE: 24
DRESS: INDEPENDENT;SCRUBS (BEHAVIORAL HEALTH)
ADLS_ACUITY_SCORE: 24
ORAL_HYGIENE: INDEPENDENT
ADLS_ACUITY_SCORE: 24
HYGIENE/GROOMING: INDEPENDENT
DRESS: SCRUBS (BEHAVIORAL HEALTH);INDEPENDENT
ADLS_ACUITY_SCORE: 24
ORAL_HYGIENE: INDEPENDENT
LAUNDRY: UNABLE TO COMPLETE
ADLS_ACUITY_SCORE: 24
LAUNDRY: UNABLE TO COMPLETE
ADLS_ACUITY_SCORE: 24
ADLS_ACUITY_SCORE: 24

## 2024-12-31 NOTE — PROGRESS NOTES
Pt's CAMARGO can be sent to [facility] and [facility] will give injection to pt. Provider updated,     Updated notes sent to TRAE Samano, at [facility].

## 2024-12-31 NOTE — PROGRESS NOTES
"[facility] PSYCHIATRY  PROGRESS NOTE     SUBJECTIVE     Prior to interviewing the patient, I met with nursing and reviewed patient's clinical condition. We discussed clinical care both before and after the interview. I have reviewed the patient's clinical course by review of records including previous notes, labs, and vital signs.     Per nursing, the patient had the following behavioral events over the last 24-hours: None. Moved out of [facility]ICU.    On psychiatric interview, the patient was found in the lounge. He notes that he has some hard times comprehending things sometimes. He notes that he got angry last night but tried to exercise which helped. He notes that he feels calm today. He notes that overall he feels like he is improving. He notes that he can't get angry like this.    He notes that he is still having seizures with them described in atypical fashion. Discussed how might be stress, anxiety, or an element of dystonia. He notes that they are getting better though.    He denies any problems with Invega right now. He notes that Cogentin has been helpful at times with less \"joint ache.\" He thinks it might be healing some of his bone marrow.        MEDICATIONS   Scheduled Meds:  Current Facility-Administered Medications   Medication Dose Route Frequency Provider Last Rate Last Admin    buPROPion (WELLBUTRIN XL) 24 hr tablet 150 mg  150 mg Oral QAM David Timmons, DO   150 mg at 12/31/24 0756    buPROPion (WELLBUTRIN XL) 24 hr tablet 300 mg  300 mg Oral QAM David Timmons, DO   300 mg at 12/31/24 0756    clonazePAM (klonoPIN) tablet 1 mg  1 mg Oral TID David Timmons DO   1 mg at 12/31/24 0756    cranberry capsule 200 mg  200 mg Oral TID w/meals Joshua Somers MD   200 mg at 12/31/24 0756    gabapentin (NEURONTIN) capsule 800 mg  800 mg Oral TID David Timmons DO   800 mg at 12/31/24 0756    memantine (NAMENDA) tablet 10 mg  10 mg Oral BID David Timmons DO   10 mg at 12/31/24 0756 "    [START ON 1/2/2025] paliperidone (INVEGA SUSTENNA) injection IVETTE 156 mg  156 mg Intramuscular q28 days David Timmons DO        paliperidone ER (INVEGA) 24 hr tablet 6 mg  6 mg Oral At Bedtime David Timmons DO   6 mg at 12/30/24 1919     PRN Meds:.  Current Facility-Administered Medications   Medication Dose Route Frequency Provider Last Rate Last Admin    acetaminophen (TYLENOL) tablet 650 mg  650 mg Oral Q4H PRN David Timmons DO   650 mg at 12/26/24 0656    alum & mag hydroxide-simethicone (MAALOX) suspension 30 mL  30 mL Oral Q4H PRN David Timmons DO        benztropine (COGENTIN) tablet 1 mg  1 mg Oral BID PRN David Timmons DO   1 mg at 12/31/24 0757    diphenhydrAMINE (BENADRYL) capsule 50 mg  50 mg Oral Q6H PRN David Timmons DO   50 mg at 12/30/24 1919    haloperidol (HALDOL) tablet 5 mg  5 mg Oral Q8H PRN David Timmons DO   5 mg at 12/22/24 0006    And    LORazepam (ATIVAN) tablet 2 mg  2 mg Oral Q8H PRN David Timmons DO   2 mg at 12/22/24 0006    And    diphenhydrAMINE (BENADRYL) capsule 50 mg  50 mg Oral Q8H PRN David Timmons DO   50 mg at 12/26/24 2116    haloperidol lactate (HALDOL) injection 5 mg  5 mg Intramuscular Q8H PRN David Timmons DO   5 mg at 12/19/24 2119    And    LORazepam (ATIVAN) injection 2 mg  2 mg Intramuscular Q8H PRN David Timmons DO   2 mg at 12/19/24 2119    And    diphenhydrAMINE (BENADRYL) injection 50 mg  50 mg Intramuscular Q8H PRN David Timmons DO   50 mg at 12/19/24 2119    hydrOXYzine HCl (ATARAX) tablet 25 mg  25 mg Oral Q6H PRN David Timmons DO        ibuprofen (ADVIL/MOTRIN) tablet 600 mg  600 mg Oral Q6H PRN Harmony Samuel CNP   600 mg at 12/30/24 1919    magic mouthwash suspension (diphenhydrAMINE, lidocaine, aluminum-magnesium & simethicone)  10 mL Swish & Swallow Q6H PRN David Timmons, DO   10 mL at 12/31/24 0828    melatonin tablet 5 mg  5 mg Oral At Bedtime PRN Nargis Solomon,  "APRN CNP   5 mg at 12/26/24 1917    OLANZapine (zyPREXA) tablet 5 mg  5 mg Oral Q6H PRN David Timmons DO   5 mg at 12/30/24 1919    Or    OLANZapine (zyPREXA) injection 5 mg  5 mg Intramuscular Q6H PRN David Timmons DO        senna-docusate (SENOKOT-S/PERICOLACE) 8.6-50 MG per tablet 1 tablet  1 tablet Oral BID PRN David Timmons DO        traZODone (DESYREL) tablet 50 mg  50 mg Oral At Bedtime PRN David Timmons DO   50 mg at 12/21/24 2232        ALLERGIES   Allergies   Allergen Reactions    Pork Allergy         MENTAL STATUS EXAM   Vitals: /70   Pulse 67   Temp 96.9  F (36.1  C) (Temporal)   Resp 16   SpO2 96%     Appearance: Alert, oriented, dressed in hospital scrubs, more kempt, long hair and beard  Attitude: Cooperative  Eye Contact: Fair  Mood: \"Fine\"  Affect: Blunted, reduced range   Speech: Normal rate and rhythm   Psychomotor Behavior: No TD or rigidity. No tremor or akathisia.   Thought Process: Linear  Associations: No loose associations   Thought Content: Denies SI, plan, or SIB. Denies AVH. Delusional thought present (paranoia, somatic, some grandiosity). Responding to internal stimuli at times. All improving.  Insight: Limited  Judgment: Limited  Oriented to: Person, place, and time  Attention Span and Concentration: Intact  Recent and Remote Memory: Intact  Language: English with appropriate syntax and vocabulary  Fund of Knowledge:  Low  Muscle Strength and Tone: Grossly normal  Gait and Station: Grossly normal       LABS   No results found for this or any previous visit (from the past 24 hours).       IMPRESSION     This is a 34 year old male with a PMH of SZAD, polysubstance abuse, and multiple TBIs with neurological sequalae who presents in a severe psychotic and catatonic episode in the setting of medication partial non-adherence. Patient has SPMI living in [place]. He has impaired functioning and generally poor insight. He has tried many medications having minimal " response at times. He would benefit from hospitalization with resumption of medication as best able. Will focus first on treating catatonia along with psychosis.    Today: Patient showing some improvement now on Invega. Patient committed with Sarah. Will continue Invega with tapering of oral dose. Second CAMARGO shot this week. Reducing Klonopin down to PTA dose, monitoring for any signs of catatonia, not seen at this point with previous reduction. Patient having spells not consistent with seizures. Recommend follow-up outpatient with neurology for EEG as unable to obtain here. Treating tooth pain also with no need for antibiotics.       DIAGNOSES     #. Schizoaffective disorder, bipolar type, continuous, with catatonia   #. Multiple TBIs with LOC with sequale   #. Hallucinogen (DXM) use disorder, in sustained remission  #. Stimulant use disorder, in sustained remission  #. Alcohol use disorder, in sustained remission  #. Cannabis use        PLAN     Location: Unit 5  Legal Status: Orders Placed This Encounter      Legal status Patient is Committed    Commitment and Sarah (all FDA approved neuroleptics) through [facility]    Safety Assessment:    Behavioral Orders   Procedures    Code 1 - Restrict to Unit    Routine Programming     As clinically indicated    Status 15     Every 15 minutes.      PTA psychotropic medications held:      - Intuniv 1 mg at bedtime to simplify regimen  - Haldol 5 mg daily prn aggression due to using Zyprexa and Haldol/Benadryl/Ativan  - Ativan 2 mg BID prn aggression changed to Haldol/Benadryl/Ativan prn      PTA psychotropic medications continued/changed:      - Klonopin 1 mg TID increased to 1.5 mg TID temporarily -> 1.25 mg TID on 12/25 -> 1 mg TID on 12/28  - Wellbutrin  mg daily  - Gabapentin 800 mg TID  - Namenda 10 mg BID     New psychotropic medications initiated:     - Invega 3 mg BID with Haldol 5 mg IM backup for emergency purposes as of 12/19 -> 4.5 mg BID on 12/21  -> 9 mg at bedtime on 12/22 -> 6 mg on 12/29  - Invega Sustenna 234 mg on 12/26 with 156 mg on 01/02 then 156 mg every 4 weeks on 01/30/2025  - Standard unit prn agents, including Zyprexa prn agitation, in addition to Haldol/Benadryl/Ativan    Today's Changes:    - None    Programming: Patient will be treated in a therapeutic milieu with appropriate individual and group therapies. Education will be provided on diagnoses, medications, and treatments.     Medical diagnoses:  Per medicine    #. Difficulty voiding, improving   - Likely secondary to Klonopin. Returned to PTA dose  - UA wnl  - Cranberry pills TID with some improvement    #. Spells  - History of seizure in setting of TBI when young and overdose last in 2010  - Patient reports multiple spells not consistent with epileptic events  - Unable to order EEG  - Patient not interested in reducing Wellbutrin  - Recommend outpatient follow-up with neurology    #. Tooth pain  - Magic mouthwash prn     Consult: None  Tests: None     Anticipated LOS: > 7  days   Disposition: [place] with outpatient services       ATTESTATION    David Timmons DO, MA  Psychiatrist     Type of Service: video visit for mental health treatment  Reason for Video Visit: COVID-19 [place]  Originating Site (patient location): [facility]  Distant Site (provider location): Remote Location  Mode of Communication: Video Conference via [employer]  Time of Service: Date: 12/31/2024  Start: 1000 end: 1015

## 2024-12-31 NOTE — PLAN OF CARE
"Face to face shift report received from TRAE Garcia. Rounding completed, pt observed in hallway at start of shift.    Problem: Adult Behavioral Health Plan of Care  Goal: Patient-Specific Goal (Individualization)  Description:  Patient will accept medications as ordered by the physician   Patient will accept and follow the recommendations of the discharge planning team.  Patient will sleep at least 6 hours at night  Patient will eat at least 50% of meals    12/27/24- Pt may wean to open unit as long as behaviors remain appropriate. Treatment team to reassess daily.    **Pt ok to have 2 pillows\"    Outcome: Progressing     Problem: Thought Process Alteration  Goal: Optimal Thought Clarity  Description: Pt will be able to maintain linear and reality based conversations during this stay.   Pt will make their needs known during this stay.   Pt will report manageable hallucinations by discharge.   Pt will contract for safety while on the unit.     Outcome: Progressing   Goal Outcome Evaluation:    Plan of Care Reviewed With: patient        Pt. Had some mild physical pain in their tooth this shift. PRN magic mouthwash was given for this at 0828. Pt. Has had some C/O of stiffness and anxiety this shift. PRN cogentin 1 mg was given for this at 0757. They denied having any SI, HI, or intent to self-harm. Pt. Stated that they were going to have a more relaxing day today and spend more of it doing some artwork. They did some of this and then spent a lot of the morning pacing in the hallway. Pt. Stated that they were glad that they were on the cranberry capsules. They liked that they were natural and that they were helping. 100% was eaten at breakfast and at lunch. Some groups were attended this shift.      Face to face report will be communicated to oncoming RN.    Emma Alvarez RN  12/31/2024  1:44 PM      "

## 2024-12-31 NOTE — PLAN OF CARE
"  Problem: Thought Process Alteration  Goal: Optimal Thought Clarity  Description: Pt will be able to maintain linear and reality based conversations during this stay.   Pt will make their needs known during this stay.   Pt will report manageable hallucinations by discharge.   Pt will contract for safety while on the unit.     Outcome: Progressing     Problem: Adult Behavioral Health Plan of Care  Goal: Patient-Specific Goal (Individualization)  Description:  Patient will accept medications as ordered by the physician   Patient will accept and follow the recommendations of the discharge planning team.  Patient will sleep at least 6 hours at night  Patient will eat at least 50% of meals    12/27/24- Pt may wean to open unit as long as behaviors remain appropriate. Treatment team to reassess daily.    **Pt ok to have 2 pillows\"    Outcome: Progressing     Face to Face report received from TRAE Metzger. Rounding completed. Pt appeared to sleep throughout NOC. 15 minute and PRN checks all night. No reports of falls or self-harm.    Face to face end of shift report communicated to day shift RN.     Amara Dial RN  12/31/2024  5:56 AM          "

## 2025-01-01 PROCEDURE — 250N000013 HC RX MED GY IP 250 OP 250 PS 637: Performed by: PSYCHIATRY & NEUROLOGY

## 2025-01-01 PROCEDURE — 99232 SBSQ HOSP IP/OBS MODERATE 35: CPT

## 2025-01-01 PROCEDURE — 250N000013 HC RX MED GY IP 250 OP 250 PS 637: Performed by: STUDENT IN AN ORGANIZED HEALTH CARE EDUCATION/TRAINING PROGRAM

## 2025-01-01 PROCEDURE — 124N000004

## 2025-01-01 PROCEDURE — 250N000009 HC RX 250: Performed by: STUDENT IN AN ORGANIZED HEALTH CARE EDUCATION/TRAINING PROGRAM

## 2025-01-01 RX ADMIN — GABAPENTIN 800 MG: 400 CAPSULE ORAL at 19:16

## 2025-01-01 RX ADMIN — GABAPENTIN 800 MG: 400 CAPSULE ORAL at 13:16

## 2025-01-01 RX ADMIN — CLONAZEPAM 1 MG: 1 TABLET ORAL at 08:41

## 2025-01-01 RX ADMIN — Medication 200 MG: at 17:03

## 2025-01-01 RX ADMIN — CLONAZEPAM 1 MG: 1 TABLET ORAL at 13:16

## 2025-01-01 RX ADMIN — GABAPENTIN 800 MG: 400 CAPSULE ORAL at 08:41

## 2025-01-01 RX ADMIN — BUPROPION HYDROCHLORIDE 300 MG: 300 TABLET, EXTENDED RELEASE ORAL at 08:39

## 2025-01-01 RX ADMIN — LIDOCAINE HYDROCHLORIDE 10 ML: 20 SOLUTION ORAL at 09:09

## 2025-01-01 RX ADMIN — PALIPERIDONE 6 MG: 3 TABLET, EXTENDED RELEASE ORAL at 19:16

## 2025-01-01 RX ADMIN — OLANZAPINE 5 MG: 5 TABLET, FILM COATED ORAL at 19:17

## 2025-01-01 RX ADMIN — DIPHENHYDRAMINE HYDROCHLORIDE 50 MG: 50 CAPSULE ORAL at 19:16

## 2025-01-01 RX ADMIN — BENZTROPINE MESYLATE 1 MG: 1 TABLET ORAL at 19:16

## 2025-01-01 RX ADMIN — MEMANTINE 10 MG: 5 TABLET ORAL at 19:16

## 2025-01-01 RX ADMIN — BENZTROPINE MESYLATE 1 MG: 1 TABLET ORAL at 08:45

## 2025-01-01 RX ADMIN — CLONAZEPAM 1 MG: 1 TABLET ORAL at 19:17

## 2025-01-01 RX ADMIN — Medication 200 MG: at 12:11

## 2025-01-01 RX ADMIN — MEMANTINE 10 MG: 5 TABLET ORAL at 08:39

## 2025-01-01 RX ADMIN — Medication 200 MG: at 08:39

## 2025-01-01 RX ADMIN — BUPROPION HYDROCHLORIDE 150 MG: 150 TABLET, EXTENDED RELEASE ORAL at 08:39

## 2025-01-01 ASSESSMENT — ACTIVITIES OF DAILY LIVING (ADL)
ADLS_ACUITY_SCORE: 24
HYGIENE/GROOMING: INDEPENDENT
ORAL_HYGIENE: INDEPENDENT
ADLS_ACUITY_SCORE: 24
LAUNDRY: UNABLE TO COMPLETE
ADLS_ACUITY_SCORE: 24
DRESS: INDEPENDENT

## 2025-01-01 NOTE — PLAN OF CARE
"  Problem: Thought Process Alteration  Goal: Optimal Thought Clarity  Description: Pt will be able to maintain linear and reality based conversations during this stay.   Pt will make their needs known during this stay.   Pt will report manageable hallucinations by discharge.   Pt will contract for safety while on the unit.     Outcome: Progressing     Problem: Adult Behavioral Health Plan of Care  Goal: Patient-Specific Goal (Individualization)  Description:  Patient will accept medications as ordered by the physician   Patient will accept and follow the recommendations of the discharge planning team.  Patient will sleep at least 6 hours at night  Patient will eat at least 50% of meals  **Pt ok to have 2 pillows\"    Outcome: Progressing     Face to Face report received from TRAE Recio. Rounding completed. Pt appeared to sleep for most of NOC. 15 minute and PRN checks all night. No reports of falls or self-harm.     Face to face end of shift report communicated to day shift RN.     Amara Dial RN  1/1/2025  5:38 AM      "

## 2025-01-01 NOTE — PLAN OF CARE
"Face to face report received from Amara LARKIN. Pt. Observed.    Problem: Adult Behavioral Health Plan of Care  Goal: Patient-Specific Goal (Individualization)  Description:  Patient will accept medications as ordered by the physician   Patient will accept and follow the recommendations of the discharge planning team.  Patient will sleep at least 6 hours at night  Patient will eat at least 50% of meals      **Pt ok to have 2 pillows\"    Outcome: Progressing  Pt. Denies HI, SI, depression, and hallucinations. Pt. Reporting mild anxiety about waiting for Friday to discharge. Pt. Complains of shoulder tightness. PRN administered with effective results. Pt. Is cooperative with medications and nursing assessment. Pt. Is in agreement to update staff to thoughts feelings of wanting to harm self or others. Pt. Encouraged to attend unit programs and shower. Pt. Eating WDL. Pt. Denies any issues with bowel and bladder.     1050 Pt. C/o allergic reaction to his hands. This writer assessed pt. Hands, they appear WDl slightly pink from pt. Rubbing them together. Pt. Offered PRN. Pt. Declines reporting \"I think it's the benadryl I take. I know what it is. It's the bergamot oil I put on them.\" Pt. Encouraged to wash hands.       PRN:     benztropine (COGENTIN) tablet 1 mg, 1 mg at 01/01/25 0845    magic mouthwash suspension (diphenhydrAMINE, lidocaine, aluminum-magnesium & simethicone), 10 mL at 01/01/25 0909       Face to face end of shift report communicated to oncsalomón RN.     Liat Lai RN  1/1/2025  10:22 AM      "

## 2025-01-01 NOTE — PLAN OF CARE
"Face to face end of shift report received from Emma TOTH RN. Rounding completed. Patient observed in [facility].     Pt has been calm, cooperative this shift. He keeps to himself for the majority of the shift. He is more open and social with this writer than previous shifts. He denied any SI/HI and AH/VH. Denied pain. 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 He speaks of his children with a bright affect. He is able to make his needs known. Steady gait- no falls. Frequent rounding.       Problem: Adult Behavioral Health Plan of Care  Goal: Patient-Specific Goal (Individualization)  Description:  Patient will accept medications as ordered by the physician   Patient will accept and follow the recommendations of the discharge planning team.  Patient will sleep at least 6 hours at night  Patient will eat at least 50% of meals      **Pt ok to have 2 pillows\"    Outcome: Progressing     Problem: Thought Process Alteration  Goal: Optimal Thought Clarity  Description: Pt will be able to maintain linear and reality based conversations during this stay.   Pt will make their needs known during this stay.   Pt will report manageable hallucinations by discharge.   Pt will contract for safety while on the unit.     Outcome: Progressing       Almita Gage RN  12/31/2024  9:08 PM    "

## 2025-01-01 NOTE — PROGRESS NOTES
"[facility] PSYCHIATRY  PROGRESS NOTE     SUBJECTIVE     Prior to interviewing the patient, I met with nursing and reviewed patient's clinical condition. We discussed clinical care both before and after the interview. I have reviewed the patient's clinical course by review of records including previous notes, labs, and vital signs.     Per nursing, the patient had the following behavioral events over the last 24-hours: None.     On psychiatric interview, the patient was found in the lounge. He tells me he feels less \"foggy\" today. Notes he is looking forward to discharging this week. Endorses some anxiety, though notes this as tolerable. He states he feels better physically with the Invega injection. Reports the Cogentin helping with the stiffness \"in my marrow\".     Talks to me about a \"gang\" and \"males in their 30's having increase suicidal thoughts\". Denies SI. Denies any acute concerns.      MEDICATIONS   Scheduled Meds:  Current Facility-Administered Medications   Medication Dose Route Frequency Provider Last Rate Last Admin    buPROPion (WELLBUTRIN XL) 24 hr tablet 150 mg  150 mg Oral QAM David Timmons DO   150 mg at 01/01/25 0839    buPROPion (WELLBUTRIN XL) 24 hr tablet 300 mg  300 mg Oral QAM David Timmons DO   300 mg at 01/01/25 0839    clonazePAM (klonoPIN) tablet 1 mg  1 mg Oral TID David Timmons DO   1 mg at 01/01/25 1316    cranberry capsule 200 mg  200 mg Oral TID w/meals Joshua Somers MD   200 mg at 01/01/25 1211    gabapentin (NEURONTIN) capsule 800 mg  800 mg Oral TID David Timmons DO   800 mg at 01/01/25 1316    memantine (NAMENDA) tablet 10 mg  10 mg Oral BID David Timmons DO   10 mg at 01/01/25 0839    [START ON 1/2/2025] paliperidone (INVEGA SUSTENNA) injection IVETTE 156 mg  156 mg Intramuscular q28 days David Timmons DO        paliperidone ER (INVEGA) 24 hr tablet 6 mg  6 mg Oral At Bedtime David Timmons DO   6 mg at 12/31/24 1902     PRN " Meds:.  Current Facility-Administered Medications   Medication Dose Route Frequency Provider Last Rate Last Admin    acetaminophen (TYLENOL) tablet 650 mg  650 mg Oral Q4H PRN David Timmons, DO   650 mg at 12/26/24 0656    alum & mag hydroxide-simethicone (MAALOX) suspension 30 mL  30 mL Oral Q4H PRN David Timmons, DO        benztropine (COGENTIN) tablet 1 mg  1 mg Oral BID PRN David Timmons, DO   1 mg at 01/01/25 0845    diphenhydrAMINE (BENADRYL) capsule 50 mg  50 mg Oral Q6H PRN David Timmons DO   50 mg at 12/30/24 1919    haloperidol (HALDOL) tablet 5 mg  5 mg Oral Q8H PRN David Timmons DO   5 mg at 12/22/24 0006    And    LORazepam (ATIVAN) tablet 2 mg  2 mg Oral Q8H PRN David Timmons DO   2 mg at 12/22/24 0006    And    diphenhydrAMINE (BENADRYL) capsule 50 mg  50 mg Oral Q8H PRN David Timmons DO   50 mg at 12/31/24 1902    haloperidol lactate (HALDOL) injection 5 mg  5 mg Intramuscular Q8H PRN David Timmons DO   5 mg at 12/19/24 2119    And    LORazepam (ATIVAN) injection 2 mg  2 mg Intramuscular Q8H PRN David Timmons, DO   2 mg at 12/19/24 2119    And    diphenhydrAMINE (BENADRYL) injection 50 mg  50 mg Intramuscular Q8H PRN David Timmons, DO   50 mg at 12/19/24 2119    hydrOXYzine HCl (ATARAX) tablet 25 mg  25 mg Oral Q6H PRN David Timmons DO        ibuprofen (ADVIL/MOTRIN) tablet 600 mg  600 mg Oral Q6H PRN Harmony Samuel CNP   600 mg at 12/30/24 1919    magic mouthwash suspension (diphenhydrAMINE, lidocaine, aluminum-magnesium & simethicone)  10 mL Swish & Swallow Q6H PRN David Timmons, DO   10 mL at 01/01/25 0909    melatonin tablet 5 mg  5 mg Oral At Bedtime PRN Nargis Solomon APRN CNP   5 mg at 12/26/24 1917    OLANZapine (zyPREXA) tablet 5 mg  5 mg Oral Q6H PRN David Timmons DO   5 mg at 12/31/24 1902    Or    OLANZapine (zyPREXA) injection 5 mg  5 mg Intramuscular Q6H PRN David Timmons DO        senna-docusate  "(SENOKOT-S/PERICOLACE) 8.6-50 MG per tablet 1 tablet  1 tablet Oral BID PRN David Timmons DO        traZODone (DESYREL) tablet 50 mg  50 mg Oral At Bedtime PRN David Timmons, DO   50 mg at 12/21/24 3684        ALLERGIES   Allergies   Allergen Reactions    Pork Allergy         MENTAL STATUS EXAM   Vitals: /69   Pulse 72   Temp 97.5  F (36.4  C) (Temporal)   Resp 16   SpO2 94%     Appearance: Alert, oriented, dressed in hospital scrubs, more kempt, long hair and beard  Attitude: Cooperative  Eye Contact: Fair  Mood: \"good\"  Affect: Blunted, reduced range   Speech: Normal rate and rhythm   Psychomotor Behavior: No TD or rigidity. No tremor or akathisia.   Thought Process: more linear  Associations: No loose associations   Thought Content: Denies SI, plan, or SIB. Denies AVH. Delusional thought present (paranoia, somatic, some grandiosity). Responding to internal stimuli at times. All improving.  Insight: Limited  Judgment: Limited  Oriented to: Person, place, and time  Attention Span and Concentration: Intact  Recent and Remote Memory: Intact  Language: English with appropriate syntax and vocabulary  Fund of Knowledge:  Low  Muscle Strength and Tone: Grossly normal  Gait and Station: Grossly normal       LABS   No results found for this or any previous visit (from the past 24 hours).       IMPRESSION     This is a 34 year old male with a PMH of SZAD, polysubstance abuse, and multiple TBIs with neurological sequalae who presents in a severe psychotic and catatonic episode in the setting of medication partial non-adherence. Patient has SPMI living in assisted. He has impaired functioning and generally poor insight. He has tried many medications having minimal response at times. He would benefit from hospitalization with resumption of medication as best able. Will focus first on treating catatonia along with psychosis.    Today: Patient showing some improvement now on Invega. Patient committed with Sarah. " Will continue Invega with tapering of oral dose. Second CAMARGO shot 1/2. Reducing Klonopin down to PTA dose, monitoring for any signs of catatonia, not seen at this point with previous reduction. Patient having spells not consistent with seizures. None noted or reported today. Recommend follow-up outpatient with neurology for EEG as unable to obtain here. Treating tooth pain also with no need for antibiotics.       DIAGNOSES     #. Schizoaffective disorder, bipolar type, continuous, with catatonia   #. Multiple TBIs with LOC with sequale   #. Hallucinogen (DXM) use disorder, in sustained remission  #. Stimulant use disorder, in sustained remission  #. Alcohol use disorder, in sustained remission  #. Cannabis use        PLAN     Location: Unit 5  Legal Status: Orders Placed This Encounter      Legal status Patient is Committed    Commitment and Sarah (all FDA approved neuroleptics) through [facility]    Safety Assessment:    Behavioral Orders   Procedures    Code 1 - Restrict to Unit    Routine Programming     As clinically indicated    Status 15     Every 15 minutes.      PTA psychotropic medications held:      - Intuniv 1 mg at bedtime to simplify regimen  - Haldol 5 mg daily prn aggression due to using Zyprexa and Haldol/Benadryl/Ativan  - Ativan 2 mg BID prn aggression changed to Haldol/Benadryl/Ativan prn      PTA psychotropic medications continued/changed:      - Klonopin 1 mg TID increased to 1.5 mg TID temporarily -> 1.25 mg TID on 12/25 -> 1 mg TID on 12/28  - Wellbutrin  mg daily  - Gabapentin 800 mg TID  - Namenda 10 mg BID     New psychotropic medications initiated:     - Invega 3 mg BID with Haldol 5 mg IM backup for emergency purposes as of 12/19 -> 4.5 mg BID on 12/21 -> 9 mg at bedtime on 12/22 -> 6 mg on 12/29  - Invega Sustenna 234 mg on 12/26 with 156 mg on 01/02 then 156 mg every 4 weeks on 01/30/2025  - Standard unit prn agents, including Zyprexa prn agitation, in addition to  Haldol/Benadryl/Ativan    Today's Changes:    - None    Programming: Patient will be treated in a therapeutic milieu with appropriate individual and group therapies. Education will be provided on diagnoses, medications, and treatments.     Medical diagnoses:  Per medicine    #. Difficulty voiding, improving   - Likely secondary to Klonopin. Returned to PTA dose  - UA wnl  - Cranberry pills TID with some improvement    #. Spells  - History of seizure in setting of TBI when young and overdose last in 2010  - Patient reports multiple spells not consistent with epileptic events  - Unable to order EEG  - Patient not interested in reducing Wellbutrin  - Recommend outpatient follow-up with neurology    #. Tooth pain  - Magic mouthwash prn     Consult: None  Tests: None     Anticipated LOS: > 7  days   Disposition: [place] with outpatient services       ATTESTATION    DAYSI Lainez CNP

## 2025-01-02 PROCEDURE — 250N000013 HC RX MED GY IP 250 OP 250 PS 637: Performed by: STUDENT IN AN ORGANIZED HEALTH CARE EDUCATION/TRAINING PROGRAM

## 2025-01-02 PROCEDURE — 99232 SBSQ HOSP IP/OBS MODERATE 35: CPT

## 2025-01-02 PROCEDURE — 250N000013 HC RX MED GY IP 250 OP 250 PS 637: Performed by: PSYCHIATRY & NEUROLOGY

## 2025-01-02 PROCEDURE — 250N000011 HC RX IP 250 OP 636: Mod: JZ | Performed by: STUDENT IN AN ORGANIZED HEALTH CARE EDUCATION/TRAINING PROGRAM

## 2025-01-02 PROCEDURE — 250N000013 HC RX MED GY IP 250 OP 250 PS 637: Performed by: NURSE PRACTITIONER

## 2025-01-02 PROCEDURE — 250N000009 HC RX 250: Performed by: STUDENT IN AN ORGANIZED HEALTH CARE EDUCATION/TRAINING PROGRAM

## 2025-01-02 PROCEDURE — 124N000004

## 2025-01-02 RX ADMIN — Medication 200 MG: at 17:06

## 2025-01-02 RX ADMIN — LIDOCAINE HYDROCHLORIDE 10 ML: 20 SOLUTION ORAL at 17:50

## 2025-01-02 RX ADMIN — PALIPERIDONE PALMITATE 156 MG: 156 INJECTION INTRAMUSCULAR at 09:33

## 2025-01-02 RX ADMIN — LIDOCAINE HYDROCHLORIDE 10 ML: 20 SOLUTION ORAL at 09:39

## 2025-01-02 RX ADMIN — CLONAZEPAM 1 MG: 1 TABLET ORAL at 08:06

## 2025-01-02 RX ADMIN — GABAPENTIN 800 MG: 400 CAPSULE ORAL at 08:07

## 2025-01-02 RX ADMIN — MEMANTINE 10 MG: 5 TABLET ORAL at 08:06

## 2025-01-02 RX ADMIN — GABAPENTIN 800 MG: 400 CAPSULE ORAL at 19:07

## 2025-01-02 RX ADMIN — BUPROPION HYDROCHLORIDE 300 MG: 300 TABLET, EXTENDED RELEASE ORAL at 08:07

## 2025-01-02 RX ADMIN — Medication 200 MG: at 11:51

## 2025-01-02 RX ADMIN — Medication 200 MG: at 08:06

## 2025-01-02 RX ADMIN — CLONAZEPAM 1 MG: 1 TABLET ORAL at 13:04

## 2025-01-02 RX ADMIN — CLONAZEPAM 1 MG: 1 TABLET ORAL at 19:08

## 2025-01-02 RX ADMIN — Medication 5 MG: at 19:08

## 2025-01-02 RX ADMIN — BENZTROPINE MESYLATE 1 MG: 1 TABLET ORAL at 19:07

## 2025-01-02 RX ADMIN — GABAPENTIN 800 MG: 400 CAPSULE ORAL at 13:04

## 2025-01-02 RX ADMIN — BENZTROPINE MESYLATE 1 MG: 1 TABLET ORAL at 08:06

## 2025-01-02 RX ADMIN — MEMANTINE 10 MG: 5 TABLET ORAL at 19:08

## 2025-01-02 RX ADMIN — BUPROPION HYDROCHLORIDE 150 MG: 150 TABLET, EXTENDED RELEASE ORAL at 08:06

## 2025-01-02 ASSESSMENT — ACTIVITIES OF DAILY LIVING (ADL)
ADLS_ACUITY_SCORE: 24
ADLS_ACUITY_SCORE: 24
ORAL_HYGIENE: INDEPENDENT
ADLS_ACUITY_SCORE: 24
LAUNDRY: UNABLE TO COMPLETE
ADLS_ACUITY_SCORE: 24
HYGIENE/GROOMING: INDEPENDENT
ADLS_ACUITY_SCORE: 24
DRESS: SCRUBS (BEHAVIORAL HEALTH);INDEPENDENT

## 2025-01-02 NOTE — DISCHARGE INSTRUCTIONS
Behavioral Discharge Planning and Instructions    Summary: 34 year old male with history of psychiatric and polysubstance abuse history who has been living out of assisted living facility called [facility] where he reportedly has been refusing medications and been posing a threat to other adults living there.     Main Diagnosis: Schizoaffective disorder, bipolar type, continuous   #. Multiple TBIs with LOC with sequale   #. Hallucinogen (DXM) use disorder, unspecified  #. Stimulant use disorder, unspecified  #. Alcohol use disorder, unspecified     Health Care Follow-up:     [facility] [place]  Nuno Hendrix- 1/13 @ [facility]   [address]   [place] [address]  [phone]  Fax: [phone]    Attend all scheduled appointments with your outpatient providers. Call at least 24 hours in advance if you need to reschedule an appointment to ensure continued access to your outpatient providers.     Major Treatments, Procedures and Findings:  You were provided with: a psychiatric assessment, assessed for medical stability, medication evaluation and/or management, group therapy, individual therapy, CD evaluation/assessment, milieu management, and medical interventions    Symptoms to Report: feeling more aggressive, increased confusion, losing more sleep, mood getting worse, or thoughts of suicide    Early warning signs can include: increased depression or anxiety sleep disturbances increased thoughts or behaviors of suicide or self-harm  increased unusual thinking, such as paranoia or hearing voices    Safety and Wellness:  Take all medicines as directed.  Make no changes unless your doctor suggests them.      Follow treatment recommendations.  Refrain from alcohol and non-prescribed drugs.  Ask your support system to help you reduce your access to items that could harm yourself or others. Items could include:  Firearms  Medicines (both prescribed and over-the-counter)  Knives and other sharp objects  Ropes  "and like materials  Car keys  If there is a concern for safety, call 911. If there is a concern for safety, call 911.    Resources:   Crisis Intervention: 283.662.8248 or 236-655-9597 (TTY: 990.822.1576).  Call anytime for help.  National [place] on Mental Illness (www.[place].nichelle.org): 565.848.3036 or 732-557-6135.  [place] Association for Children's Mental Health (www.mac.org): 419.940.1302.  Alcoholics Anonymous (www.alcoholics-anonymous.org): Check your phone book for your local chapter.  Suicide Awareness Voices of Education (SAVE) (www.save.org): 511-308-IGWS (4145)  National Suicide Prevention Line (www.mentalhealthmn.org): 633-585-HPNS (3111)  Mental Health Consumer/Survivor Network of [place] (www.mhcsn.net): 301.632.3490 or 247-727-3753  Mental Health Association of [place] (www.mentalhealth.org): 926.331.6684 or 914-255-5110  Self- Management and Recovery Training., SMART-- Toll free: 262.550.5955  www.[employer]  Text 4 Life: txt \"LIFE\" to 16481 for immediate support and crisis intervention  Crisis text line: Text \"[place]\" to 426594. Free, confidential, 24/7.  Crisis Intervention: 799.341.3091 or 993-901-7071. Call anytime for help.     General Medication Instructions:   See your medication sheet(s) for instructions.   Take all medicines as directed.  Make no changes unless your doctor suggests them.   Go to all your doctor visits.  Be sure to have all your required lab tests. This way, your medicines can be refilled on time.  Do not use any drugs not prescribed by your doctor.  Avoid alcohol.    Advance Directives:   Scanned document on file with [facility]? No scanned doc  Is document scanned? Pt unable to confirm  Honoring Choices Your Rights Handout: Informed and given  Was more information offered? Pt declined    The Treatment team has appreciated the opportunity to work with you. If you have any questions or concerns about your recent admission, you can contact the unit which can receive your call 24 hours a day, " 7 days a week. They will be able to get in touch with a Provider if needed. The unit number is [phone] .

## 2025-01-02 NOTE — PLAN OF CARE
"  Problem: Adult Behavioral Health Plan of Care  Goal: Patient-Specific Goal (Individualization)  Description:  Patient will accept medications as ordered by the physician   Patient will accept and follow the recommendations of the discharge planning team.  Patient will sleep at least 6 hours at night  Patient will eat at least 50% of meals      **Pt ok to have 2 pillows\"    Outcome: Progressing     Problem: Thought Process Alteration  Goal: Optimal Thought Clarity  Description: Pt will be able to maintain linear and reality based conversations during this stay.   Pt will make their needs known during this stay.   Pt will report manageable hallucinations by discharge.   Pt will contract for safety while on the unit.     Outcome: Progressing       Patient calm, cooperative, and medication compliant this shift.  Able to have a linear, reality based conversation with staff.  States he is ready to return to [place] tomorrow.  Received his Invega injection in the left deltoid this morning.  Tolerated well. No complaints of pain.  VS WNL.  Face to face end of shift report communicated to evening shift RN.     Shantal Real RN  1/2/2025  12:39 PM      "

## 2025-01-02 NOTE — DISCHARGE SUMMARY
[facility] PSYCHIATRY  DISCHARGE SUMMARY     DISCHARGE DATA     Steven Carter MRN# [id number]   Age: 34 year old YOB: 1990     Date of Admission: 12/12/2024  Date of Discharge: January 3, 2025  Discharge Provider: DAYSI Lainez CNP       REASON FOR ADMISSION     This is a 34 year old male with a PMH of SZAD, polysubstance abuse, and multiple TBIs with neurological sequalae who presents in a severe psychotic and catatonic episode in the setting of medication partial non-adherence. Patient has SPMI living in [place]. He has impaired functioning and generally poor insight. He has tried many medications having minimal response at times. He would benefit from hospitalization with resumption of medication as best able. Will focus first on treating catatonia along with psychosis.     See H&P 12/12/24 by Dr. Timmons for full details     DISCHARGE DIAGNOSES     #. Schizoaffective disorder, bipolar type, continuous, with catatonia   #. Multiple TBIs with LOC with sequale   #. Hallucinogen (DXM) use disorder, in sustained remission  #. Stimulant use disorder, in sustained remission  #. Alcohol use disorder, in sustained remission  #. Cannabis use           CONSULTS     none       HOSPITAL COURSE     Legal status: Orders Placed This Encounter      Legal status Patient is Committed    Patient was admitted to unit 5 due to the aforementioned presentation. The patient was placed under 15 minute checks to ensure patient safety. The patient participated in unit programming and groups as able.    Mr. Carter did not require seclusion/restraint during hospitalization.     We reviewed with Mr. Carter current and past medication trials including duration, dose, response and side effects. During this hospitalization, the following changes to the patient's psychotropic medications were made:    PTA psychotropic medications stopped:     - Intuniv 1 mg at bedtime to simplify regimen  - Haldol 5 mg daily prn  aggression due to using Zyprexa and Haldol/Benadryl/Ativan  - Ativan 2 mg BID prn aggression changed to Haldol/Benadryl/Ativan prn     PTA psychotropic medications continued/changed:     - Klonopin 1 mg TID increased to 1.5 mg TID temporarily -> 1.25 mg TID on 12/25 -> 1 mg TID on 12/28  - Wellbutrin  mg daily  - Gabapentin 800 mg TID  - Namenda 10 mg BID    New psychotropic medications tried and stopped:     - none    New psychotropic medications initiated:     - Invega 3 mg BID with Haldol 5 mg IM backup for emergency purposes as of 12/19 -> 4.5 mg BID on 12/21 -> 9 mg at bedtime on 12/22 -> 6 mg on 12/29  - Invega Sustenna 234 mg on 12/26 with 156 mg on 01/02 then 156 mg every 4 weeks on 01/30/2025  - Standard unit prn agents, including Zyprexa prn agitation, in addition to Haldol/Benadryl/Ativan     Steven Carter was admitted to [facility] unit [facility] for the above presentation.Due to behaviors leading to admission and continuing into this stay, a petition for commitment and Sarah was submitted to [facility], which was granted. The above PTA medications were continued with the exception of Haldol PRN and Ativan PRN, which were stopped for preference of Haldol/Ativan/Benadryl combination. Invega oral was introduced and titrated to 6 mg, which pt appeared to tolerate with some stiffness that reportedly was reduced with Cogentin. Klonopin increased briefly to target catatonic sx, mainly withdrawal. With these changes, psychosis and catatonia improved. Pt was transitioned to Invega Sustenna, which appeared he tolerated well with both loading doses. The treatment team agreed pt appeared improved and prepared for dismissal with civil commitment and Sarah intact. It is recommended pt follow up with PCP for reported atypical seizure-like activity he described as tonic clonic. Pt was awake and denied any other sx during this episode. Nonetheless, would possibly benefit from follow up with  PCP at very least and neurology if indicated.     With these changes and supports the patient noticed improvement in their symptoms and felt sufficiently ready for discharge. As a result, Steven Carter was discharged. At the time of discharge, Steven Carter was determined to not be a danger to self or others. The patient was also medically stable for discharge. At the current time of discharge, the patient does not meet criteria for involuntary hospitalization. On the day of discharge, the patient reports that they do not have suicidal or homicidal ideation. Steps taken to minimize risk include: assessing patient s behavior and thought process daily during hospital stay, discharging patient with adequate plan for follow up for mental and physical health and discussing safety plan of returning to the hospital should the patient ever have thoughts of harming themselves or others. Therefore, based on all available evidence including the factors cited above, the patient does not appear to be at imminent risk for self-harm, and is appropriate for outpatient level of care. However, if patient uses substances or is medication non-adherent, their risk of decompensation and SI will be elevated. This was discussed with the patient.       DISCHARGE MEDICATIONS     Current Discharge Medication List        START taking these medications    Details   cranberry 200 MG CAPS capsule Take 1 capsule (200 mg) by mouth 3 times daily (with meals).  Qty: 180 capsule, Refills: 0    Associated Diagnoses: Schizoaffective disorder, bipolar type (H)      magic mouthwash suspension (diphenhydrAMINE, lidocaine, aluminum-magnesium & simethicone) Swish and swallow 10 mLs in mouth every 6 hours as needed for mouth sores.  Qty: 200 mL, Refills: 0    Associated Diagnoses: Tooth pain      paliperidone (INVEGA SUSTENNA) 156 MG/ML IVETTE injection Inject 1 mL (156 mg) into the muscle every 28 (twenty-eight) days.  Qty: 1 mL, Refills: 1    Comments:  Nursing Staff to Administer at [place].  Associated Diagnoses: Schizoaffective disorder, bipolar type (H)           CONTINUE these medications which have CHANGED    Details   !! buPROPion (WELLBUTRIN XL) 150 MG 24 hr tablet Take 1 tablet (150 mg) by mouth every morning. . Take along with a 300 mg tablet for a total daily dose of 450 mg.  Qty: 60 tablet, Refills: 0    Associated Diagnoses: Schizoaffective disorder, bipolar type (H)      !! buPROPion (WELLBUTRIN XL) 300 MG 24 hr tablet Take 1 tablet (300 mg) by mouth every morning. . Take along with a 150 mg tablet for a total daily dose of 450 mg.  Qty: 60 tablet, Refills: 0    Associated Diagnoses: Schizoaffective disorder, bipolar type (H)      clonazePAM (KLONOPIN) 1 MG tablet Take 1 tablet (1 mg) by mouth 3 times daily.  Qty: 180 tablet, Refills: 0    Associated Diagnoses: Schizoaffective disorder, bipolar type (H)      gabapentin (NEURONTIN) 800 MG tablet Take 1 tablet (800 mg) by mouth 3 times daily.  Qty: 180 tablet, Refills: 0    Associated Diagnoses: Schizoaffective disorder, bipolar type (H)      memantine (NAMENDA) 10 MG tablet Take 1 tablet (10 mg) by mouth 2 times daily.  Qty: 120 tablet, Refills: 0    Associated Diagnoses: Catatonia; Schizoaffective disorder, depressive type (H)       !! - Potential duplicate medications found. Please discuss with provider.        CONTINUE these medications which have NOT CHANGED    Details   ibuprofen (ADVIL/MOTRIN) 600 MG tablet Take 1 tablet (600 mg) by mouth every 6 hours as needed for moderate pain  Qty: 30 tablet, Refills: 0           STOP taking these medications       guanFACINE (INTUNIV) 1 MG TB24 24 hr tablet Comments:   Reason for Stopping:         haloperidol (HALDOL) 5 MG tablet Comments:   Reason for Stopping:         LORazepam (ATIVAN) 2 MG tablet Comments:   Reason for Stopping:         magic mouthwash (ENTER INGREDIENTS IN COMMENTS) suspension Comments:   Reason for Stopping:                    MENTAL  "STATUS EXAM   Vitals: /81   Pulse 77   Temp 97.4  F (36.3  C) (Temporal)   Resp 16   SpO2 99%     Appearance: Alert, oriented, dressed in hospital scrubs, appears stated age, long hair and beard, slightly unkepmpt  Attitude: Cooperative   Eye Contact: cooperative  Mood: \"fine\"  Affect: blunted  Speech: Normal rate and rhythm   Psychomotor Behavior: No tremor, rigidity, or psychomotor abnormality   Thought Process: improved linearity  Associations: No loose associations   Thought Content: Denies SI or plan. No SIB. Delusional thought softening (paranoia, somatic, some grandiosity). Responding to internal stimuli at times. All improving.   Insight: limited  Judgment: limited  Oriented to: Person, place, and time  Attention Span and Concentration: Intact  Recent and Remote Memory: Intact  Language: English with appropriate syntax and vocabulary  Fund of Knowledge: low  Muscle Strength and Tone: Grossly normal  Gait and Station: Grossly normal       DISCHARGE PLAN     1.  Education given regarding diagnostic and treatment options with risks, benefits and alternatives with adequate verbalization of understanding.  2.  Discharge to [facility]. Upon detailed review of risk factors, patient amenable for release.   3.  Continue aforementioned medications and associated medication changes with follow-up by outpatient provider.  4.  Crisis management planning in place.    5.  Nursing and  to review further discharge recommendations.   6.  Patient is being discharged with the following appointments as detailed below.    [facility]  Nuno Hendrix- 1/13 @ 11am Virtual   [address]   [place] [address]  [phone]  Fax: [phone]       DISCHARGE SERVICES PROVIDED     40 minutes spent on discharge services, including:  Final examination of patient.  Review and discussion of hospital stay.  Instructions for continued outpatient care/goals.  Preparation of discharge records.  Preparation of " medications refills and new prescriptions.  Preparation of applicable referral forms.        ATTESTATION     DAYSI Lainez CNP       LABS THIS ADMISSION     Results for orders placed or performed during the hospital encounter of 12/12/24   CBC with platelets     Status: Normal   Result Value Ref Range    WBC Count 7.1 4.0 - 11.0 10e3/uL    RBC Count 5.54 4.40 - 5.90 10e6/uL    Hemoglobin 16.2 13.3 - 17.7 g/dL    Hematocrit 45.7 40.0 - 53.0 %    MCV 83 78 - 100 fL    MCH 29.2 26.5 - 33.0 pg    MCHC 35.4 31.5 - 36.5 g/dL    RDW 11.8 10.0 - 15.0 %    Platelet Count 285 150 - 450 10e3/uL   Comprehensive metabolic panel     Status: Abnormal   Result Value Ref Range    Sodium 138 135 - 145 mmol/L    Potassium 4.0 3.4 - 5.3 mmol/L    Carbon Dioxide (CO2) 20 (L) 22 - 29 mmol/L    Anion Gap 18 (H) 7 - 15 mmol/L    Urea Nitrogen 15.9 6.0 - 20.0 mg/dL    Creatinine 1.16 0.67 - 1.17 mg/dL    GFR Estimate 84 >60 mL/min/1.73m2    Calcium 9.8 8.8 - 10.4 mg/dL    Chloride 100 98 - 107 mmol/L    Glucose 94 70 - 99 mg/dL    Alkaline Phosphatase 78 40 - 150 U/L    AST 20 0 - 45 U/L    ALT 22 0 - 70 U/L    Protein Total 7.2 6.4 - 8.3 g/dL    Albumin 5.0 3.5 - 5.2 g/dL    Bilirubin Total 0.9 <=1.2 mg/dL   Extra Tube     Status: None    Narrative    The following orders were created for panel order Extra Tube.  Procedure                               Abnormality         Status                     ---------                               -----------         ------                     Extra Red Top Tube[[id number]]                               Final result                 Please view results for these tests on the individual orders.   Extra Red Top Tube     Status: None   Result Value Ref Range    Hold Specimen JIC    UA with Microscopic reflex to Culture     Status: Normal    Specimen: Urine, Midstream   Result Value Ref Range    Color Urine Straw Colorless, Straw, Light Yellow, Yellow    Appearance Urine Clear Clear    Glucose Urine  Negative Negative mg/dL    Bilirubin Urine Negative Negative    Ketones Urine Negative Negative mg/dL    Specific Gravity Urine 1.003 1.003 - 1.035    Blood Urine Negative Negative    pH Urine 7.0 4.7 - 8.0    Protein Albumin Urine Negative Negative mg/dL    Urobilinogen Urine Normal Normal, 2.0 mg/dL    Nitrite Urine Negative Negative    Leukocyte Esterase Urine Negative Negative    RBC Urine <1 <=2 /HPF    WBC Urine 1 <=5 /HPF    Squamous Epithelials Urine 0 <=1 /HPF    Narrative    Urine Culture not indicated   Urine Drug Screen Panel     Status: Abnormal   Result Value Ref Range    Amphetamines Urine Screen Negative Screen Negative    Barbituates Urine Screen Negative Screen Negative    Benzodiazepine Urine Screen Positive (A) Screen Negative    Cannabinoids Urine Screen Positive (A) Screen Negative    Cocaine Urine Screen Negative Screen Negative    Fentanyl Qual Urine Screen Negative Screen Negative    Opiates Urine Screen Negative Screen Negative    PCP Urine Screen Negative Screen Negative   Urine Drug Screen     Status: Abnormal    Narrative    The following orders were created for panel order Urine Drug Screen.  Procedure                               Abnormality         Status                     ---------                               -----------         ------                     Urine Drug Screen Panel[[id number]]      Abnormal            Final result                 Please view results for these tests on the individual orders.

## 2025-01-02 NOTE — PROGRESS NOTES
Andrade called [facility] for ride. Susana Taxjadon can do ride @ 9am tomorrow 1/3/25.       Appointment for follow-up:    Psych John Paul Hendrix- 1/13 @ 11am- [facility]   [address]   [place] [address]  [phone]  Fax: [phone]

## 2025-01-02 NOTE — PLAN OF CARE
"Face to face shift report received from previous shift RN.       Problem: Adult Behavioral Health Plan of Care  Goal: Patient-Specific Goal (Individualization)  Description:  Patient will accept medications as ordered by the physician   Patient will accept and follow the recommendations of the discharge planning team.  Patient will sleep at least 6 hours at night  Patient will eat at least 50% of meals  **Pt ok to have 2 pillows\"  Outcome: Progressing   Calm and cooperative. Rambling during conversation. Reports that he feels something is wrong with his hands, but when asked for more details pt states \"no nevermind.\" No abnormal findings observed on pt's hands. No reports of pain.   1917: Requested and received Zyprexa 5 mg, Benadryl 50 mg and Cogentin 1 mg with HS medications.     Problem: Thought Process Alteration  Goal: Optimal Thought Clarity  Description: Pt will be able to maintain linear and reality based conversations during this stay.   Pt will make their needs known during this stay.   Pt will report manageable hallucinations by discharge.   Pt will contract for safety while on the unit.   Outcome: Progressing         Face to face end of shift report to be communicated to oncoming RN.       "

## 2025-01-02 NOTE — PLAN OF CARE
"Face to face shift report received from previous shift RN.       Problem: Adult Behavioral Health Plan of Care  Goal: Patient-Specific Goal (Individualization)  Description:  Patient will accept medications as ordered by the physician   Patient will accept and follow the recommendations of the discharge planning team.  Patient will sleep at least 6 hours at night  Patient will eat at least 50% of meals  **Pt ok to have 2 pillows\"    Outcome: Progressing  Calm and cooperative. Medication compliant. Pt reports \"I feel a million times better. You know I needed to come here, but now I'm ready to go.\" Pleasant and appropriate. Paces in hallways at times.   1750: requested and received magic mouthwash for mouth pain.    Writer called [facility] to ask about the status of pt's discharge medications. Per [facility] staff, pt's discharge medications were filled and delivered to [facility].     Problem: Thought Process Alteration  Goal: Optimal Thought Clarity  Description: Pt will be able to maintain linear and reality based conversations during this stay.   Pt will make their needs known during this stay.   Pt will report manageable hallucinations by discharge.   Pt will contract for safety while on the unit.   Outcome: Progressing         Face to face end of shift report to be communicated to oncoming RN.       "

## 2025-01-02 NOTE — PROGRESS NOTES
"[facility] PSYCHIATRY  PROGRESS NOTE     SUBJECTIVE     Prior to interviewing the patient, I met with nursing and reviewed patient's clinical condition. We discussed clinical care both before and after the interview. I have reviewed the patient's clinical course by review of records including previous notes, labs, and vital signs.     Per nursing, the patient had the following behavioral events over the last 24-hours: None.     On psychiatric interview, the patient was met on the general unit. He notes that he is doing well. He is looking forward to returning home. Reports sleeping well last night. Eating and drinking well. Denies AVH. He tells me there was a hailee that would come back and forth from [place] that would draw his blood levels when he was on Clozaril. He transitions into saying \"I'd pick it up and put it down\", tells me that sometimes he would use meth. Denies SI. Denies any acute concerns.      MEDICATIONS   Scheduled Meds:  Current Facility-Administered Medications   Medication Dose Route Frequency Provider Last Rate Last Admin    buPROPion (WELLBUTRIN XL) 24 hr tablet 150 mg  150 mg Oral QAM David Timmons, DO   150 mg at 01/02/25 0806    buPROPion (WELLBUTRIN XL) 24 hr tablet 300 mg  300 mg Oral QAM David Timmons, DO   300 mg at 01/02/25 0807    clonazePAM (klonoPIN) tablet 1 mg  1 mg Oral TID David Timmons, DO   1 mg at 01/02/25 0806    cranberry capsule 200 mg  200 mg Oral TID w/meals Joshua Somers MD   200 mg at 01/02/25 0806    gabapentin (NEURONTIN) capsule 800 mg  800 mg Oral TID David Timmons DO   800 mg at 01/02/25 0807    memantine (NAMENDA) tablet 10 mg  10 mg Oral BID David Timmons DO   10 mg at 01/02/25 0806    paliperidone (INVEGA SUSTENNA) injection IVETTE 156 mg  156 mg Intramuscular q28 days David Timmons DO         PRN Meds:.  Current Facility-Administered Medications   Medication Dose Route Frequency Provider Last Rate Last Admin    " acetaminophen (TYLENOL) tablet 650 mg  650 mg Oral Q4H PRN David Timmons, DO   650 mg at 12/26/24 0656    alum & mag hydroxide-simethicone (MAALOX) suspension 30 mL  30 mL Oral Q4H PRN David Timmons, DO        benztropine (COGENTIN) tablet 1 mg  1 mg Oral BID PRN David Timmons, DO   1 mg at 01/02/25 0806    diphenhydrAMINE (BENADRYL) capsule 50 mg  50 mg Oral Q6H PRN David Timmons, DO   50 mg at 12/30/24 1919    haloperidol (HALDOL) tablet 5 mg  5 mg Oral Q8H PRN David Timmons DO   5 mg at 12/22/24 0006    And    LORazepam (ATIVAN) tablet 2 mg  2 mg Oral Q8H PRN David Timmons, DO   2 mg at 12/22/24 0006    And    diphenhydrAMINE (BENADRYL) capsule 50 mg  50 mg Oral Q8H PRN David Timmons DO   50 mg at 01/01/25 1916    haloperidol lactate (HALDOL) injection 5 mg  5 mg Intramuscular Q8H PRN David Timmons DO   5 mg at 12/19/24 2119    And    LORazepam (ATIVAN) injection 2 mg  2 mg Intramuscular Q8H PRN David Timmons, DO   2 mg at 12/19/24 2119    And    diphenhydrAMINE (BENADRYL) injection 50 mg  50 mg Intramuscular Q8H PRN David Timmons, DO   50 mg at 12/19/24 2119    hydrOXYzine HCl (ATARAX) tablet 25 mg  25 mg Oral Q6H PRN David Timmons, DO        ibuprofen (ADVIL/MOTRIN) tablet 600 mg  600 mg Oral Q6H PRN Harmony Samuel CNP   600 mg at 12/30/24 1919    magic mouthwash suspension (diphenhydrAMINE, lidocaine, aluminum-magnesium & simethicone)  10 mL Swish & Swallow Q6H PRN David Timmons, DO   10 mL at 01/01/25 0909    melatonin tablet 5 mg  5 mg Oral At Bedtime PRN Nargis Solomon APRN CNP   5 mg at 12/26/24 1917    OLANZapine (zyPREXA) tablet 5 mg  5 mg Oral Q6H PRN David Timmons DO   5 mg at 01/01/25 1917    Or    OLANZapine (zyPREXA) injection 5 mg  5 mg Intramuscular Q6H PRN David Timmons DO        senna-docusate (SENOKOT-S/PERICOLACE) 8.6-50 MG per tablet 1 tablet  1 tablet Oral BID PRN David Timmons DO        traZODone  "(DESYREL) tablet 50 mg  50 mg Oral At Bedtime PRN David Timmons, DO   50 mg at 12/21/24 8364        ALLERGIES   Allergies   Allergen Reactions    Pork Allergy         MENTAL STATUS EXAM   Vitals: /81   Pulse 77   Temp 97.4  F (36.3  C) (Temporal)   Resp 16   SpO2 99%     Appearance: Alert, oriented, dressed in hospital scrubs, more kempt, long hair and beard  Attitude: Cooperative  Eye Contact: Fair  Mood: \"good\"  Affect: Blunted, reduced range   Speech: Normal rate and rhythm   Psychomotor Behavior: No TD or rigidity. No tremor or akathisia.   Thought Process: more linear  Associations: No loose associations   Thought Content: Denies SI, plan, or SIB. Denies AVH. Delusional thought softening (paranoia, somatic, some grandiosity). Responding to internal stimuli at times. All improving.  Insight: Limited  Judgment: Limited  Oriented to: Person, place, and time  Attention Span and Concentration: Intact  Recent and Remote Memory: Intact  Language: English with appropriate syntax and vocabulary  Fund of Knowledge:  Low  Muscle Strength and Tone: Grossly normal  Gait and Station: Grossly normal       LABS   No results found for this or any previous visit (from the past 24 hours).       IMPRESSION     This is a 34 year old male with a PMH of SZAD, polysubstance abuse, and multiple TBIs with neurological sequalae who presents in a severe psychotic and catatonic episode in the setting of medication partial non-adherence. Patient has SPMI living in long term. He has impaired functioning and generally poor insight. He has tried many medications having minimal response at times. He would benefit from hospitalization with resumption of medication as best able. Will focus first on treating catatonia along with psychosis.    Today: Patient showing some improvement now on Invega, second loading dose today. Patient committed with Sarah. Invega oral has been stopped. Monitoring for any signs of catatonia, not seen at this " point with previous reduction. Patient having spells not consistent with seizures, none reported last 2 days. Recommend follow-up outpatient with neurology for EEG as unable to obtain here. Treating tooth pain also with no need for antibiotics.       DIAGNOSES     #. Schizoaffective disorder, bipolar type, continuous, with catatonia   #. Multiple TBIs with LOC with sequale   #. Hallucinogen (DXM) use disorder, in sustained remission  #. Stimulant use disorder, in sustained remission  #. Alcohol use disorder, in sustained remission  #. Cannabis use        PLAN     Location: Unit 5  Legal Status: Orders Placed This Encounter      Legal status Patient is Committed    Commitment and Sarah (all FDA approved neuroleptics) through [facility]    Safety Assessment:    Behavioral Orders   Procedures    Code 1 - Restrict to Unit    Routine Programming     As clinically indicated    Status 15     Every 15 minutes.      PTA psychotropic medications held:      - Intuniv 1 mg at bedtime to simplify regimen  - Haldol 5 mg daily prn aggression due to using Zyprexa and Haldol/Benadryl/Ativan  - Ativan 2 mg BID prn aggression changed to Haldol/Benadryl/Ativan prn      PTA psychotropic medications continued/changed:      - Klonopin 1 mg TID increased to 1.5 mg TID temporarily -> 1.25 mg TID on 12/25 -> 1 mg TID on 12/28  - Wellbutrin  mg daily  - Gabapentin 800 mg TID  - Namenda 10 mg BID     New psychotropic medications initiated:     - Invega 3 mg BID with Haldol 5 mg IM backup for emergency purposes as of 12/19 -> 4.5 mg BID on 12/21 -> 9 mg at bedtime on 12/22 -> 6 mg on 12/29  - Invega Sustenna 234 mg on 12/26 with 156 mg on 01/02 then 156 mg every 4 weeks on 01/30/2025  - Standard unit prn agents, including Zyprexa prn agitation, in addition to Haldol/Benadryl/Ativan    Today's Changes:    - None    Programming: Patient will be treated in a therapeutic milieu with appropriate individual and group therapies. Education  will be provided on diagnoses, medications, and treatments.     Medical diagnoses:  Per medicine    #. Difficulty voiding, improving   - Likely secondary to Klonopin. Returned to PTA dose  - UA wnl  - Cranberry pills TID with some improvement    #. Spells  - History of seizure in setting of TBI when young and overdose last in 2010  - Patient reports multiple spells not consistent with epileptic events  - Unable to order EEG  - Patient not interested in reducing Wellbutrin  - Recommend outpatient follow-up with neurology    #. Tooth pain  - Magic mouthwash prn     Consult: None  Tests: None     Anticipated LOS: > 7  days   Disposition: [facility] with outpatient services       ATTESTATION    DAYSI Lainez CNP

## 2025-01-02 NOTE — PLAN OF CARE
"  Problem: Thought Process Alteration  Goal: Optimal Thought Clarity  Description: Pt will be able to maintain linear and reality based conversations during this stay.   Pt will make their needs known during this stay.   Pt will report manageable hallucinations by discharge.   Pt will contract for safety while on the unit.   Outcome: Progressing     Problem: Adult Behavioral Health Plan of Care  Goal: Patient-Specific Goal (Individualization)  Description:  Patient will accept medications as ordered by the physician   Patient will accept and follow the recommendations of the discharge planning team.  Patient will sleep at least 6 hours at night  Patient will eat at least 50% of meals  **Pt ok to have 2 pillows\"    Outcome: Progressing     Face to Face report received from TRAE Herring. Rounding completed. Pt appeared to sleep for most of NOC. 15 minute and PRN checks all night. No reports of falls or self-harm.     Face to face end of shift report communicated to day shift RN.     Amara Dial RN  1/2/2025  5:35 AM      "

## 2025-01-03 VITALS
TEMPERATURE: 97.9 F | OXYGEN SATURATION: 99 % | SYSTOLIC BLOOD PRESSURE: 124 MMHG | DIASTOLIC BLOOD PRESSURE: 78 MMHG | HEART RATE: 85 BPM | RESPIRATION RATE: 16 BRPM

## 2025-01-03 PROCEDURE — 250N000013 HC RX MED GY IP 250 OP 250 PS 637: Performed by: STUDENT IN AN ORGANIZED HEALTH CARE EDUCATION/TRAINING PROGRAM

## 2025-01-03 PROCEDURE — 99239 HOSP IP/OBS DSCHRG MGMT >30: CPT

## 2025-01-03 PROCEDURE — 250N000013 HC RX MED GY IP 250 OP 250 PS 637: Performed by: PSYCHIATRY & NEUROLOGY

## 2025-01-03 RX ADMIN — GABAPENTIN 800 MG: 400 CAPSULE ORAL at 07:48

## 2025-01-03 RX ADMIN — CLONAZEPAM 1 MG: 1 TABLET ORAL at 07:48

## 2025-01-03 RX ADMIN — BUPROPION HYDROCHLORIDE 300 MG: 300 TABLET, EXTENDED RELEASE ORAL at 07:48

## 2025-01-03 RX ADMIN — BUPROPION HYDROCHLORIDE 150 MG: 150 TABLET, EXTENDED RELEASE ORAL at 07:48

## 2025-01-03 RX ADMIN — MEMANTINE 10 MG: 5 TABLET ORAL at 07:48

## 2025-01-03 RX ADMIN — Medication 200 MG: at 07:48

## 2025-01-03 RX ADMIN — BENZTROPINE MESYLATE 1 MG: 1 TABLET ORAL at 07:54

## 2025-01-03 ASSESSMENT — ACTIVITIES OF DAILY LIVING (ADL)
ADLS_ACUITY_SCORE: 24
ADLS_ACUITY_SCORE: 24
ORAL_HYGIENE: INDEPENDENT
HYGIENE/GROOMING: INDEPENDENT
ADLS_ACUITY_SCORE: 24
LAUNDRY: UNABLE TO COMPLETE
DRESS: SCRUBS (BEHAVIORAL HEALTH);INDEPENDENT
ADLS_ACUITY_SCORE: 24
ADLS_ACUITY_SCORE: 24

## 2025-01-03 NOTE — PLAN OF CARE
"  Problem: Thought Process Alteration  Goal: Optimal Thought Clarity  Description: Pt will be able to maintain linear and reality based conversations during this stay.   Pt will make their needs known during this stay.   Pt will report manageable hallucinations by discharge.   Pt will contract for safety while on the unit.     Outcome: Progressing     Problem: Adult Behavioral Health Plan of Care  Goal: Patient-Specific Goal (Individualization)  Description:  Patient will accept medications as ordered by the physician   Patient will accept and follow the recommendations of the discharge planning team.  Patient will sleep at least 6 hours at night  Patient will eat at least 50% of meals  **Pt ok to have 2 pillows\"    Outcome: Progressing     Face to Face report received from TRAE Herring. Rounding completed. Pt appeared to sleep for most of NOC. 15 minute and PRN checks all night. No reports of falls or self-harm.     Face to face end of shift report communicated to day shift RN.     Amara Dial RN  1/3/2025  5:58 AM      "

## 2025-01-03 NOTE — PLAN OF CARE
"  Problem: Adult Behavioral Health Plan of Care  Goal: Patient-Specific Goal (Individualization)  Description:  Patient will accept medications as ordered by the physician   Patient will accept and follow the recommendations of the discharge planning team.  Patient will sleep at least 6 hours at night  Patient will eat at least 50% of meals      **Pt ok to have 2 pillows\"    Outcome: Progressing     Patient cooperative with assessment. Reports some anxiety due to discharge but is excited to be going. Says he feels like \"he is finally out of a fog\" and that his medications are working well. Denies SI HI pain and hallucinations. Compliant with medications without issues. Free from falls. Makes needs known.     Problem: Thought Process Alteration  Goal: Optimal Thought Clarity  Description: Pt will be able to maintain linear and reality based conversations during this stay.   Pt will make their needs known during this stay.   Pt will report manageable hallucinations by discharge.   Pt will contract for safety while on the unit.     Outcome: Progressing    Face to face report given with opportunity to observe patient.    Report given to evening shift RN.     Mckayla Del Cid RN   1/3/2025         "

## 2025-01-03 NOTE — PLAN OF CARE
Discharge Note    Patient Discharged to [facility] on 1/3/2025 0947 AM via [facility] accompanied by self.     Patient informed of discharge instructions in AVS. patient verbalizes understanding and denies having any questions pertaining to AVS. Patient stable at time of discharge. Patient denies SI, HI, and thoughts of self harm at time of discharge. All personal belongings returned to patient. Discharge prescriptions sent to [facility] via [facility]. Psych evaluation, history and physical, AVS, and discharge summary faxed to next level of care- per KENDRA.     Mckayla Del Cid, RN  1/3/2025  10:47 AM

## 2025-01-03 NOTE — PROGRESS NOTES
Pt is discharging at the recommendation of the treatment team. Pt is discharging to [facility] transported by [facility]. Pt denies having any thoughts of hurting themself or anyone else. Pt denies anxiety or depression. Pt has follow up with Medication Management and case managaer. Discharge instructions, including; demographic sheet, psychiatric evaluation, discharge summary, and AVS were faxed to these next level of care providers.

## 2025-02-20 ENCOUNTER — LAB REQUISITION (OUTPATIENT)
Dept: LAB | Facility: HOSPITAL | Age: 35
End: 2025-02-20
Payer: COMMERCIAL

## 2025-02-20 DIAGNOSIS — E64.0 SEQUELAE OF PROTEIN-CALORIE MALNUTRITION: ICD-10-CM

## 2025-02-20 DIAGNOSIS — F25.0 SCHIZOAFFECTIVE DISORDER, BIPOLAR TYPE (H): ICD-10-CM

## 2025-02-20 LAB
ALBUMIN SERPL BCG-MCNC: 4.4 G/DL (ref 3.5–5.2)
ALP SERPL-CCNC: 66 U/L (ref 40–150)
ALT SERPL W P-5'-P-CCNC: 27 U/L (ref 0–70)
ANION GAP SERPL CALCULATED.3IONS-SCNC: 11 MMOL/L (ref 7–15)
AST SERPL W P-5'-P-CCNC: 22 U/L (ref 0–45)
BILIRUB SERPL-MCNC: 0.3 MG/DL
BUN SERPL-MCNC: 6.6 MG/DL (ref 6–20)
CALCIUM SERPL-MCNC: 9 MG/DL (ref 8.8–10.4)
CHLORIDE SERPL-SCNC: 107 MMOL/L (ref 98–107)
CHOLEST SERPL-MCNC: 128 MG/DL
CREAT SERPL-MCNC: 1.01 MG/DL (ref 0.67–1.17)
EGFRCR SERPLBLD CKD-EPI 2021: >90 ML/MIN/1.73M2
ERYTHROCYTE [DISTWIDTH] IN BLOOD BY AUTOMATED COUNT: 11.7 % (ref 10–15)
EST. AVERAGE GLUCOSE BLD GHB EST-MCNC: 88 MG/DL
FASTING STATUS PATIENT QL REPORTED: NORMAL
FASTING STATUS PATIENT QL REPORTED: NORMAL
GLUCOSE SERPL-MCNC: 82 MG/DL (ref 70–99)
HBA1C MFR BLD: 4.7 %
HCO3 SERPL-SCNC: 24 MMOL/L (ref 22–29)
HCT VFR BLD AUTO: 41.1 % (ref 40–53)
HDLC SERPL-MCNC: 40 MG/DL
HGB BLD-MCNC: 14 G/DL (ref 13.3–17.7)
LDLC SERPL CALC-MCNC: 78 MG/DL
MCH RBC QN AUTO: 29.4 PG (ref 26.5–33)
MCHC RBC AUTO-ENTMCNC: 34.1 G/DL (ref 31.5–36.5)
MCV RBC AUTO: 86 FL (ref 78–100)
NONHDLC SERPL-MCNC: 88 MG/DL
PLATELET # BLD AUTO: 229 10E3/UL (ref 150–450)
POTASSIUM SERPL-SCNC: 4.3 MMOL/L (ref 3.4–5.3)
PROT SERPL-MCNC: 6.4 G/DL (ref 6.4–8.3)
RBC # BLD AUTO: 4.76 10E6/UL (ref 4.4–5.9)
SODIUM SERPL-SCNC: 142 MMOL/L (ref 135–145)
TRIGL SERPL-MCNC: 52 MG/DL
WBC # BLD AUTO: 7 10E3/UL (ref 4–11)

## 2025-04-30 ENCOUNTER — LAB REQUISITION (OUTPATIENT)
Dept: LAB | Facility: HOSPITAL | Age: 35
End: 2025-04-30
Payer: COMMERCIAL

## 2025-04-30 ENCOUNTER — MEDICAL CORRESPONDENCE (OUTPATIENT)
Dept: HEALTH INFORMATION MANAGEMENT | Facility: HOSPITAL | Age: 35
End: 2025-04-30

## 2025-04-30 DIAGNOSIS — Z79.899 OTHER LONG TERM (CURRENT) DRUG THERAPY: ICD-10-CM

## 2025-04-30 LAB
ALBUMIN UR-MCNC: NEGATIVE MG/DL
AMPHETAMINES UR QL SCN: ABNORMAL
APPEARANCE UR: CLEAR
BARBITURATES UR QL SCN: ABNORMAL
BENZODIAZ UR QL SCN: ABNORMAL
BILIRUB UR QL STRIP: NEGATIVE
BZE UR QL SCN: ABNORMAL
CANNABINOIDS UR QL SCN: ABNORMAL
COLOR UR AUTO: NORMAL
FENTANYL UR QL: ABNORMAL
GLUCOSE UR STRIP-MCNC: NEGATIVE MG/DL
HGB UR QL STRIP: NEGATIVE
KETONES UR STRIP-MCNC: NEGATIVE MG/DL
LEUKOCYTE ESTERASE UR QL STRIP: NEGATIVE
NITRATE UR QL: NEGATIVE
OPIATES UR QL SCN: ABNORMAL
PCP QUAL URINE (ROCHE): ABNORMAL
PH UR STRIP: 6.5 [PH] (ref 4.7–8)
RBC URINE: 0 /HPF
SP GR UR STRIP: 1 (ref 1–1.03)
SQUAMOUS EPITHELIAL: 0 /HPF
UROBILINOGEN UR STRIP-MCNC: NORMAL MG/DL
WBC URINE: 0 /HPF

## 2025-04-30 PROCEDURE — 81001 URINALYSIS AUTO W/SCOPE: CPT | Performed by: NURSE PRACTITIONER

## 2025-04-30 PROCEDURE — 80307 DRUG TEST PRSMV CHEM ANLYZR: CPT | Performed by: NURSE PRACTITIONER

## 2025-05-14 ENCOUNTER — LAB REQUISITION (OUTPATIENT)
Dept: LAB | Facility: HOSPITAL | Age: 35
End: 2025-05-14
Payer: COMMERCIAL

## 2025-05-14 DIAGNOSIS — Z70.8 OTHER SEX COUNSELING: ICD-10-CM

## 2025-05-14 DIAGNOSIS — F15.180: ICD-10-CM

## 2025-05-14 DIAGNOSIS — F25.0 SCHIZOAFFECTIVE DISORDER, BIPOLAR TYPE (H): ICD-10-CM

## 2025-05-14 LAB
ALBUMIN SERPL BCG-MCNC: 4.8 G/DL (ref 3.5–5.2)
ALP SERPL-CCNC: 78 U/L (ref 40–150)
ALT SERPL W P-5'-P-CCNC: 65 U/L (ref 0–70)
ANION GAP SERPL CALCULATED.3IONS-SCNC: 15 MMOL/L (ref 7–15)
AST SERPL W P-5'-P-CCNC: 105 U/L (ref 0–45)
BASOPHILS # BLD AUTO: 0 10E3/UL (ref 0–0.2)
BASOPHILS NFR BLD AUTO: 1 %
BILIRUB SERPL-MCNC: 0.6 MG/DL
BUN SERPL-MCNC: 4.2 MG/DL (ref 6–20)
C TRACH DNA SPEC QL PROBE+SIG AMP: NEGATIVE
CALCIUM SERPL-MCNC: 10 MG/DL (ref 8.8–10.4)
CHLORIDE SERPL-SCNC: 103 MMOL/L (ref 98–107)
CREAT SERPL-MCNC: 1 MG/DL (ref 0.67–1.17)
EGFRCR SERPLBLD CKD-EPI 2021: >90 ML/MIN/1.73M2
EOSINOPHIL # BLD AUTO: 0.1 10E3/UL (ref 0–0.7)
EOSINOPHIL NFR BLD AUTO: 1 %
ERYTHROCYTE [DISTWIDTH] IN BLOOD BY AUTOMATED COUNT: 12.2 % (ref 10–15)
GLUCOSE SERPL-MCNC: 98 MG/DL (ref 70–99)
HCO3 SERPL-SCNC: 22 MMOL/L (ref 22–29)
HCT VFR BLD AUTO: 44.2 % (ref 40–53)
HGB BLD-MCNC: 15.2 G/DL (ref 13.3–17.7)
IMM GRANULOCYTES # BLD: 0 10E3/UL
IMM GRANULOCYTES NFR BLD: 0 %
LYMPHOCYTES # BLD AUTO: 1.5 10E3/UL (ref 0.8–5.3)
LYMPHOCYTES NFR BLD AUTO: 26 %
MCH RBC QN AUTO: 28.5 PG (ref 26.5–33)
MCHC RBC AUTO-ENTMCNC: 34.4 G/DL (ref 31.5–36.5)
MCV RBC AUTO: 83 FL (ref 78–100)
MONOCYTES # BLD AUTO: 0.5 10E3/UL (ref 0–1.3)
MONOCYTES NFR BLD AUTO: 8 %
N GONORRHOEA DNA SPEC QL NAA+PROBE: NEGATIVE
NEUTROPHILS # BLD AUTO: 3.8 10E3/UL (ref 1.6–8.3)
NEUTROPHILS NFR BLD AUTO: 65 %
NRBC # BLD AUTO: 0 10E3/UL
NRBC BLD AUTO-RTO: 0 /100
PLATELET # BLD AUTO: 300 10E3/UL (ref 150–450)
POTASSIUM SERPL-SCNC: 4.3 MMOL/L (ref 3.4–5.3)
PROT SERPL-MCNC: 7.4 G/DL (ref 6.4–8.3)
RBC # BLD AUTO: 5.34 10E6/UL (ref 4.4–5.9)
SODIUM SERPL-SCNC: 140 MMOL/L (ref 135–145)
SPECIMEN TYPE: NORMAL
T PALLIDUM AB SER QL: NONREACTIVE
WBC # BLD AUTO: 5.9 10E3/UL (ref 4–11)

## 2025-05-14 PROCEDURE — 85025 COMPLETE CBC W/AUTO DIFF WBC: CPT | Performed by: PHYSICIAN ASSISTANT

## 2025-05-14 PROCEDURE — 80053 COMPREHEN METABOLIC PANEL: CPT | Performed by: PHYSICIAN ASSISTANT

## 2025-05-14 PROCEDURE — 86803 HEPATITIS C AB TEST: CPT | Performed by: PHYSICIAN ASSISTANT

## 2025-05-14 PROCEDURE — 87389 HIV-1 AG W/HIV-1&-2 AB AG IA: CPT | Performed by: PHYSICIAN ASSISTANT

## 2025-05-14 PROCEDURE — 86780 TREPONEMA PALLIDUM: CPT | Performed by: PHYSICIAN ASSISTANT

## 2025-05-14 PROCEDURE — 87491 CHLMYD TRACH DNA AMP PROBE: CPT | Performed by: PHYSICIAN ASSISTANT

## 2025-05-15 LAB
HCV AB SERPL QL IA: NONREACTIVE
HIV 1+2 AB+HIV1 P24 AG SERPL QL IA: NONREACTIVE

## 2025-05-17 ENCOUNTER — HOSPITAL ENCOUNTER (EMERGENCY)
Facility: HOSPITAL | Age: 35
Discharge: HOME | End: 2025-05-17
Attending: NURSE PRACTITIONER
Payer: COMMERCIAL

## 2025-05-17 ENCOUNTER — APPOINTMENT (OUTPATIENT)
Dept: ULTRASOUND IMAGING | Facility: HOSPITAL | Age: 35
End: 2025-05-17
Attending: NURSE PRACTITIONER
Payer: COMMERCIAL

## 2025-05-17 VITALS
DIASTOLIC BLOOD PRESSURE: 75 MMHG | TEMPERATURE: 98.2 F | RESPIRATION RATE: 18 BRPM | OXYGEN SATURATION: 98 % | SYSTOLIC BLOOD PRESSURE: 109 MMHG | HEART RATE: 98 BPM

## 2025-05-17 DIAGNOSIS — R30.0 DYSURIA: ICD-10-CM

## 2025-05-17 LAB
ALBUMIN UR-MCNC: NEGATIVE MG/DL
APPEARANCE UR: CLEAR
BILIRUB UR QL STRIP: NEGATIVE
C TRACH DNA SPEC QL PROBE+SIG AMP: NEGATIVE
COLOR UR AUTO: ABNORMAL
GLUCOSE UR STRIP-MCNC: NEGATIVE MG/DL
HGB UR QL STRIP: NEGATIVE
KETONES UR STRIP-MCNC: ABNORMAL MG/DL
LEUKOCYTE ESTERASE UR QL STRIP: NEGATIVE
N GONORRHOEA DNA SPEC QL NAA+PROBE: NEGATIVE
NITRATE UR QL: NEGATIVE
PH UR STRIP: 6 [PH] (ref 4.7–8)
RBC URINE: 0 /HPF
SP GR UR STRIP: 1 (ref 1–1.03)
SPECIMEN TYPE: NORMAL
SQUAMOUS EPITHELIAL: 0 /HPF
UROBILINOGEN UR STRIP-MCNC: NORMAL MG/DL
WBC URINE: <1 /HPF

## 2025-05-17 PROCEDURE — 76870 US EXAM SCROTUM: CPT | Mod: 26 | Performed by: RADIOLOGY

## 2025-05-17 PROCEDURE — 93976 VASCULAR STUDY: CPT | Mod: 26 | Performed by: RADIOLOGY

## 2025-05-17 PROCEDURE — 93976 VASCULAR STUDY: CPT

## 2025-05-17 PROCEDURE — G0463 HOSPITAL OUTPT CLINIC VISIT: HCPCS | Mod: 25

## 2025-05-17 PROCEDURE — 87591 N.GONORRHOEAE DNA AMP PROB: CPT | Performed by: NURSE PRACTITIONER

## 2025-05-17 PROCEDURE — 99213 OFFICE O/P EST LOW 20 MIN: CPT | Performed by: NURSE PRACTITIONER

## 2025-05-17 PROCEDURE — 81003 URINALYSIS AUTO W/O SCOPE: CPT | Performed by: NURSE PRACTITIONER

## 2025-05-17 ASSESSMENT — ENCOUNTER SYMPTOMS
MUSCULOSKELETAL NEGATIVE: 1
DYSURIA: 1
FLANK PAIN: 0
RESPIRATORY NEGATIVE: 1
ENDOCRINE NEGATIVE: 1
CARDIOVASCULAR NEGATIVE: 1
GASTROINTESTINAL NEGATIVE: 1
ALLERGIC/IMMUNOLOGIC NEGATIVE: 1
HEMATURIA: 0
NEUROLOGICAL NEGATIVE: 1
EYES NEGATIVE: 1
HEMATOLOGIC/LYMPHATIC NEGATIVE: 1
PSYCHIATRIC NEGATIVE: 1
CONSTITUTIONAL NEGATIVE: 1

## 2025-05-17 ASSESSMENT — ACTIVITIES OF DAILY LIVING (ADL)
ADLS_ACUITY_SCORE: 50

## 2025-05-17 ASSESSMENT — COLUMBIA-SUICIDE SEVERITY RATING SCALE - C-SSRS
2. HAVE YOU ACTUALLY HAD ANY THOUGHTS OF KILLING YOURSELF IN THE PAST MONTH?: NO
6. HAVE YOU EVER DONE ANYTHING, STARTED TO DO ANYTHING, OR PREPARED TO DO ANYTHING TO END YOUR LIFE?: YES
1. IN THE PAST MONTH, HAVE YOU WISHED YOU WERE DEAD OR WISHED YOU COULD GO TO SLEEP AND NOT WAKE UP?: NO

## 2025-05-17 NOTE — DISCHARGE INSTRUCTIONS
Hydrate:   During this time it is important to keep well hydrated with water, juices, or low calorie sports drinks.  Using 1/2 strength G2, Gatorade Zero, or PowerAde Zero is best choice (mix half and half with water).  DO NOT use caffeinated or alcoholic beverages for hydration, as these actually dehydrate you.

## 2025-05-17 NOTE — ED TRIAGE NOTES
Patient was evaluated in triage by Urgent Care provider MEERA Lopes CNP and deemed appropriate for UC.    Patient to be seen in Urgent Care.

## 2025-05-17 NOTE — ED PROVIDER NOTES
History     Chief Complaint   Patient presents with    Dysuria     HPI  Steven Carter is a 34 year old individual with history of schizoaffective disorder, bipolar 1 disorder, ADD, depression/anxiety, polysubstance abuse, tobacco abuse, comes in for dysuria and testicular pain.  Patient states that he has had dysuria and testicular pain.  Comes in for evaluation.   Denies any hematuria.  States he has been having penile discharge that is white.  No scrotal or testicular mass reported.  Does have testicular tenderness but cannot remember which side.  States is sexually active.    Allergies:  Allergies   Allergen Reactions    Pork Allergy        Problem List:    Patient Active Problem List    Diagnosis Date Noted    Acute psychosis (H) 12/12/2024     Priority: Medium    Aggressive behavior 02/22/2023     Priority: Medium    Attention deficit disorder 07/18/2022     Priority: Medium    Bipolar 1 disorder (H) 12/14/2021     Priority: Medium    Suicidal behavior with attempted self-injury (H) 11/01/2021     Priority: Medium    Suicidal ideation 10/21/2021     Priority: Medium    Catatonia 03/07/2021     Priority: Medium    Schizoaffective disorder, bipolar type (H) 03/07/2021     Priority: Medium    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, subsequent encounter 09/01/2020     Priority: Medium     Added automatically from request for surgery [id number]      Psychosis (H) 06/23/2020     Priority: Medium    Seizure (H) 04/19/2016     Priority: Medium    ADD (attention deficit disorder) 12/02/2015     Priority: Medium    Methamphetamine abuse (H) 12/02/2015     Priority: Medium    Suicidal behavior 07/23/2014     Priority: Medium    Moderate dextromethorphan use disorder (H) 07/20/2014     Priority: Medium    Paranoia (H) 07/20/2014     Priority: Medium    Depression with anxiety 03/02/2009     Priority: Medium     Overview:   IMO Update 10/11      Alcohol abuse 02/02/2009      Priority: Medium     Overview:   IMO Update 10/11      Polysubstance abuse (H) 02/02/2009     Priority: Medium    Tobacco use disorder 11/01/2008     Priority: Medium    Anxiety disorder 11/01/2008     Priority: Medium     Formatting of this note might be different from the original.  Diagnosed Rosalba Padilla          Past Medical History:    No past medical history on file.    Past Surgical History:    Past Surgical History:   Procedure Laterality Date    COLONOSCOPY - HIM SCAN N/A 12/03/2020    Procedure:  Colonoscopy diagnostic with random biopsies;  Surgeon:  Jenise GOLDMAN MD;  Location:  [facility] OR       Family History:    Family History   Problem Relation Age of Onset    Depression No family hx of        Social History:  Marital Status:  Single [1]  Social History     Tobacco Use    Smoking status: Every Day     Current packs/day: 0.50     Average packs/day: 0.5 packs/day for 9.0 years (4.5 ttl pk-yrs)     Types: Cigarettes    Smokeless tobacco: Current     Types: Chew   Vaping Use    Vaping status: Never Used   Substance Use Topics    Alcohol use: No     Comment: daily to occasionally per pt.    Drug use: Yes     Types: Other     Comment: reports hx of use but none recently        Medications:    buPROPion (WELLBUTRIN XL) 150 MG 24 hr tablet  buPROPion (WELLBUTRIN XL) 300 MG 24 hr tablet  clonazePAM (KLONOPIN) 1 MG tablet  cranberry 200 MG CAPS capsule  gabapentin (NEURONTIN) 800 MG tablet  ibuprofen (ADVIL/MOTRIN) 600 MG tablet  magic mouthwash suspension (diphenhydrAMINE, lidocaine, aluminum-magnesium & simethicone)  memantine (NAMENDA) 10 MG tablet  paliperidone (INVEGA SUSTENNA) 156 MG/ML IVETTE injection          Review of Systems   Constitutional: Negative.    HENT: Negative.     Eyes: Negative.    Respiratory: Negative.     Cardiovascular: Negative.    Gastrointestinal: Negative.    Endocrine: Negative.    Genitourinary:  Positive for dysuria, penile discharge and testicular pain. Negative for decreased  urine volume, flank pain, hematuria, penile pain, penile swelling and scrotal swelling.   Musculoskeletal: Negative.    Skin: Negative.    Allergic/Immunologic: Negative.    Neurological: Negative.    Hematological: Negative.    Psychiatric/Behavioral: Negative.         Physical Exam   BP: 109/75  Pulse: 98  Temp: 98.2  F (36.8  C)  Resp: 18  SpO2: 98 %      GENERAL APPEARANCE:  The patient is a 34 year old well-developed, well-nourished individual that appears as stated age.  LUNGS:  Breathing is easy.  Breath sounds are equal and clear bilaterally.  No wheezes, rhonchi, or rales.  HEART:  Regular rate and rhythm with normal S1 and S2.  No murmurs, gallops, or rubs.  ABDOMEN:  Soft.  No mass, tenderness, guarding, or rebound.  No organomegaly or hernia.  Bowel sounds are present.  No CVA tenderness or flank mass.  No abdominal bruits or thrills present upon auscultation/palpation.  GENITOURINARY:  Assessment completed with tara Jung RN present.  The phallus is circumcised.  There are no penile plaques or genital skin lesions.  The glans is normal.  The meatus is orthotropic, patent, and clear.  The testicles are descended bilaterally without masses or tenderness.  The epididymis and cords are normal.  The perineum is normal.  PSYCHIATRIC:  The patient is awake, alert, and oriented x4.  Recent and remote memory is intact.  Appropriate mood and affect.  Calm and cooperative with history and physical exam.  SKIN:  Warm, dry, and well perfused.  Good turgor.  No lesions, nodules, or rashes are noted.  No bruising noted.       ED Course     ED Course as of 05/17/25 1214   Sat May 17, 2025   0928 Lab and ultrasound ordered from triage per this provider's request.   0940 In to see patient and history/physical completed.    1150 No acute findings on labs, imaging, or physical examination.  Will discharge home to do hydration and acetaminophen/ibuprofen for pain.  Follow-up recommendations and return  precautions given.          Results for orders placed or performed during the hospital encounter of 05/17/25 (from the past 24 hours)   US Testicular & Scrotum w Doppler Ltd    Narrative    EXAM: US TESTICULAR AND SCROTUM WITH DOPPLER LIMITED  LOCATION: [facility]  DATE: 5/17/2025    INDICATION: Pain at the base of the penis radiating to the testicles.  COMPARISON: None.  TECHNIQUE: Ultrasound of scrotum with color flow and spectral Doppler with waveform analysis performed.    FINDINGS:    RIGHT: Right testicle measures 4.9 x 3.1 x 3 cm. Normal testicle with no masses. Normal arterial duplex and normal color flow. Normal epididymis. No hydrocele. No varicocele.    LEFT: Left testicle measures 5.5 x 3.2 x 2.7 cm. Normal testicle with no masses. Normal arterial duplex and normal color flow. Normal epididymis. No hydrocele. No varicocele.      Impression    IMPRESSION:  Unremarkable ultrasound of the scrotum. No evidence for intratesticular mass or testicular torsion.   UA with Microscopic reflex to Culture    Specimen: Urine, Clean Catch   Result Value Ref Range    Color Urine Straw Colorless, Straw, Light Yellow, Yellow    Appearance Urine Clear Clear    Glucose Urine Negative Negative mg/dL    Bilirubin Urine Negative Negative    Ketones Urine Trace (A) Negative mg/dL    Specific Gravity Urine 1.004 1.003 - 1.035    Blood Urine Negative Negative    pH Urine 6.0 4.7 - 8.0    Protein Albumin Urine Negative Negative mg/dL    Urobilinogen Urine Normal Normal mg/dL    Nitrite Urine Negative Negative    Leukocyte Esterase Urine Negative Negative    RBC Urine 0 <=2 /HPF    WBC Urine <1 <=5 /HPF    Squamous Epithelials Urine 0 <=1 /HPF    Narrative    Urine Culture not indicated   Chlamydia trachomatis/Neisseria gonorrhoeae by PCR    Specimen: Urethra; Urine   Result Value Ref Range    Chlamydia Trachomatis Negative Negative    Neisseria gonorrhoeae Negative Negative    CTNG Specimen Source Urethra     Narrative     Assay performed using [employer] real-time, reverse-transcriptase PCR.       Medications - No data to display    Assessments & Plan (with Medical Decision Making)     I have reviewed the nursing notes.    I have reviewed the findings, diagnosis, plan and need for follow up with the patient.    Summary:  Patient presents to the ER today for dysuria, penile discharge, testicular pain.  Potential diagnosis which have been considered and evaluated include epididymitis, orchitis, UTI, testicular torsion, STD, as well as others. Many of these have been excluded using the various modalities and assessment as noted on the chart. At the present time, the diagnosis is dysuria.  Upon arrival, vitals signs are normal.  The patient is alert and oriented in no distress.  Physical examination shows no acute abnormalities such as testicular swelling, pain, mass.  No penile abnormalities.  No CVA tenderness or abdominal tenderness.  Did get urinalysis which showed no acute abnormality.  GC chlamydia negative.  Testicular ultrasound was normal.  At this time no acute findings noted on examination or labs or imaging.  Will discharge home for follow-up with PCP.  Hydration therapy education given.  Acetaminophen/ibuprofen for discomfort.  Patient verbalized understanding and agrees with plan of care.  Patient discharged home.      Critical Care Time: None    Impression and plan discussed with patient. Questions answered, concerns addressed, indications for urgent re-evaluation reviewed, and  given. Patient/Parent/Caregiver agree with treatment plan and have no further questions at this time.  AVS provided at discharge.    This document was prepared using a combination of typing and voice generated software.  While every attempt was made for accuracy, spelling and grammatical errors may exist.              Discharge Medication List as of 5/17/2025 12:01 PM          Final diagnoses:   Dysuria       5/17/2025   [facility]Y  DEPARTMENT       Roland Lopes, DAYSI CNP  05/17/25 0501

## 2025-07-03 NOTE — PLAN OF CARE
Has appointment 7/23/25.   Scheduled PCP appointment for blood draw at [facility] in [place] for pt on September 6th @ 3:20 PM. Pt states he will go to [facility] today if we do not hear anything back form [facility] today by 2:30 PM.     Danitza Horton will be coming to screen pt tomorrow 9/1 around 11:00 AM.

## 2025-08-06 NOTE — PROGRESS NOTES
[facility] PSYCHIATRY  PROGRESS NOTE     SUBJECTIVE     Is up walking in the MHICU when I see him today. He appears a bit tense. He states that he has not received any medications since he arrived at the hospital. Reviewed that his medications were indeed ordered yesterday afternoon, however he continues to insist that he has not received them. He explains that he did have a stick that he was sanding down at [place] and he claims he jokingly made some sort of comment that was perceived as threatening. He states that he does not want to harm anyone. He denies any hallucinations. He has been a bit unsteady on his feet, however improved from yesterday. He has been more cooperative with the staff though still guarded. He has limited insight into how his behavior when abusing dextromethorphan is likely going to put his current housing at [place] in jeopardy. He states he just wants to get back there, however it was reviewed several times with him that [facility] staff would like to see him more stable prior to considering letting him come back.        MEDICATIONS   Scheduled Meds:    buPROPion  150 mg Oral QAM     buPROPion  300 mg Oral QAM     cloZAPine  100 mg Oral At Bedtime     lithium  450 mg Oral At Bedtime     LORazepam  1 mg Oral 4x Daily     [Held by provider] memantine  10 mg Oral BID     multivitamin w/minerals  1 tablet Oral Daily     nicotine  1 patch Transdermal Daily     nicotine   Transdermal Q8H     pregabalin  150 mg Oral TID     Vitamin D3  25 mcg Oral Daily     PRN Meds:.acetaminophen, alum & mag hydroxide-simethicone, docusate sodium, hydrOXYzine, melatonin, nicotine, OLANZapine **OR** OLANZapine, senna-docusate     ALLERGIES   Allergies   Allergen Reactions     Pork Allergy         MENTAL STATUS EXAM   Vitals: /63   Pulse 83   Temp 98.3  F (36.8  C) (Tympanic)   Resp 18   Ht 1.829 m (6')   Wt 90.7 kg (200 lb)   SpO2 96%   BMI 27.12 kg/m      Appearance:  dressed in hospital  HISTORY OF PRESENT ILLNESS    Teagan Walters is a 13 m.o. female who presents with grandpa to clinic for the following concerns: fever and elevated HR.    Fever started 2 nights ago  Tmax 101.5F  At night her heart rate is elevated (140s), while sleeping it fluctuates up and comes down but it did stay at 135 for a little while last night about 2 hours before she spiked a fever; no tactile fever at the time of the 135-140 reading on [place]   Normal amounts of voids, no change in odor to urine   No vomiting or diarrhea   Normal behavior, just a little more fussy in the offic etoday   Maternal great grandmother lives with grandfather who is Teagan's daytime caretaker and GGM has had URI symptoms and sore throat.     Past Medical History:  I have reviewed patient's past medical history and it is pertinent for:  Patient Active Problem List    Diagnosis Date Noted    At risk for genetic disorder 07/07/2025    Diaper dermatitis 07/07/2025       All review of systems negative except for what is included in HPI.  Objective:    Pulse (!) 143   Temp 99 °F (37.2 °C) (Axillary)   Wt 8.71 kg (19 lb 3.2 oz)   SpO2 99%     Physical Exam  Vitals reviewed.   Constitutional:       Appearance: Normal appearance.      Comments: Flushed cheeks, low grade fever 99F, cries throughout exam    HENT:      Head: Normocephalic and atraumatic.      Right Ear: Tympanic membrane, ear canal and external ear normal.      Left Ear: Tympanic membrane, ear canal and external ear normal.      Nose: Nose normal.      Comments: Clear watery rhinorrhea while crying      Mouth/Throat:      Mouth: Mucous membranes are moist.      Pharynx: Oropharynx is clear.      Comments: Mild pharyngeal erythema   Eyes:      Extraocular Movements: Extraocular movements intact.      Conjunctiva/sclera: Conjunctivae normal.      Pupils: Pupils are equal, round, and reactive to light.   Cardiovascular:      Rate and Rhythm: Normal rate and regular rhythm.      Heart  scrubs and unkempt, scraggly long hair and beard  Attitude:  guarded  Eye Contact:  fair  Mood:  anxious, labile  Affect:  intensity is heightened  Speech: somewhat mumbled, a bit pressured  Psychomotor Behavior:  no evidence of tardive dyskinesia, dystonia, or tics  Thought Process:  tangental, somewhat disorganized  Associations:  no loose associations  Thought Content:  Patient denies any SI or HI, denies hallucinations though may be responding to internal stimuli  Insight:  poor  Judgment:  poor  Oriented to:  time, person, and place  Attention Span and Concentration:  limited  Recent and Remote Memory:  poor  Fund of Knowledge: delayed  Muscle Strength and Tone: normal  Gait and Station: Normal       LABS   Recent Results (from the past 24 hour(s))   Asymptomatic COVID-19 Virus (Coronavirus) by PCR Nasopharyngeal    Collection Time: 02/22/23 12:13 PM    Specimen: Nasopharyngeal; Swab   Result Value Ref Range    SARS CoV2 PCR Negative Negative   Comprehensive metabolic panel    Collection Time: 02/22/23 12:20 PM   Result Value Ref Range    Sodium 143 136 - 145 mmol/L    Potassium 3.7 3.4 - 5.3 mmol/L    Chloride 105 98 - 107 mmol/L    Carbon Dioxide (CO2) 28 22 - 29 mmol/L    Anion Gap 10 7 - 15 mmol/L    Urea Nitrogen 10.2 6.0 - 20.0 mg/dL    Creatinine 0.90 0.67 - 1.17 mg/dL    Calcium 9.3 8.6 - 10.0 mg/dL    Glucose 93 70 - 99 mg/dL    Alkaline Phosphatase 77 40 - 129 U/L    AST 65 (H) 10 - 50 U/L    ALT 54 (H) 10 - 50 U/L    Protein Total 6.3 (L) 6.4 - 8.3 g/dL    Albumin 4.0 3.5 - 5.2 g/dL    Bilirubin Total 0.4 <=1.2 mg/dL    GFR Estimate >90 >60 mL/min/1.73m2   CBC with platelets and differential    Collection Time: 02/22/23 12:20 PM   Result Value Ref Range    WBC Count 9.3 4.0 - 11.0 10e3/uL    RBC Count 4.60 4.40 - 5.90 10e6/uL    Hemoglobin 13.4 13.3 - 17.7 g/dL    Hematocrit 41.6 40.0 - 53.0 %    MCV 90 78 - 100 fL    MCH 29.1 26.5 - 33.0 pg    MCHC 32.2 31.5 - 36.5 g/dL    RDW 12.4 10.0 - 15.0 %     sounds: No murmur heard.  Pulmonary:      Effort: Pulmonary effort is normal. No respiratory distress.      Breath sounds: Normal breath sounds. No wheezing.   Abdominal:      General: Abdomen is flat. Bowel sounds are normal.      Palpations: Abdomen is soft.   Musculoskeletal:      Cervical back: Normal range of motion and neck supple.   Skin:     General: Skin is warm and dry.      Capillary Refill: Capillary refill takes less than 2 seconds.   Neurological:      General: No focal deficit present.      Mental Status: She is alert. Mental status is at baseline.             Assessment:   Fever, unspecified fever cause  -     POCT Strep A, Molecular  -     POCT COVID-19 Rapid Screening  -     POCT Influenza A/B  -     acetaminophen suspension 130.56 mg    Viral syndrome    Viral illness suspected with sick contact at [facility]. UTI is also on the differential if Teagan continues to spike high fever. Discussed option to check UA this afternoon if Teagan spikes another high fever over 101F.     Plan:   Continue rotating tylenol and motrin every 3-4 hours as needed   Give pedialyte in place of milk if needed   Return or any new concerns or for fever 5+ days   Call for elevated heart rate that is sustained and above 160 at rest and without fever         Sherice Ching      Platelet Count 261 150 - 450 10e3/uL    % Neutrophils 73 %    % Lymphocytes 17 %    % Monocytes 9 %    % Eosinophils 1 %    % Basophils 0 %    % Immature Granulocytes 0 %    NRBCs per 100 WBC 0 <1 /100    Absolute Neutrophils 6.7 1.6 - 8.3 10e3/uL    Absolute Lymphocytes 1.6 0.8 - 5.3 10e3/uL    Absolute Monocytes 0.8 0.0 - 1.3 10e3/uL    Absolute Eosinophils 0.1 0.0 - 0.7 10e3/uL    Absolute Basophils 0.0 0.0 - 0.2 10e3/uL    Absolute Immature Granulocytes 0.0 <=0.4 10e3/uL    Absolute NRBCs 0.0 10e3/uL   Extra Blue Top Tube    Collection Time: 02/22/23 12:20 PM   Result Value Ref Range    Hold Specimen JIC    Extra Red Top Tube    Collection Time: 02/22/23 12:20 PM   Result Value Ref Range    Hold Specimen JIC    Extra Green Top (Lithium Heparin) ON ICE    Collection Time: 02/22/23 12:20 PM   Result Value Ref Range    Hold Specimen JIC          IMPRESSION     This is a 32 year old male with a PMH of schizoaffective disorder bipolar type as well as a traumatic brain injury at the age of 3 who has been doing quite well psychiatrically on clozapine and lorazepam per his outpatient provider as well as management though has been experiencing episodic periods of unexplained changes in mentation which are reported last a day or 2, fully resolved for a while then once again we will return.  He has a history of dextromethorphan abuse and management found large amounts of dextromethorphan bottles.  He has been threatening other residents of [place] likely when he gets into a dissociative state from the dextromethorphan.  There is also some possible recent amphetamine use though waiting on drug screen.  He is currently very high risk of losing his housing primarily due to his substance abuse.       DIAGNOSES     1.  Schizoaffective disorder bipolar type  2.  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  Dextromethorphan use disorder severe  4.  Amphetamine use disorder, amount used unknown       PLAN      Location: Unit 5  Legal Status: voluntary    Safety Assessment:    Behavioral Orders   Procedures     Code 1 - Restrict to Unit     Fall precautions     Routine Programming     As clinically indicated     Status 15     Every 15 minutes.      PTA psychotropic medications stopped:     -Holding Namenda as he was likely abusing dextromethorphan prior to admit which is also an NMDA receptor antagonist    PTA psychotropic medications continued/changed:     -Ativan decrease to 1 mg QID  -Wellbutrin  mg daily  -Clozapine 100 mg at bedtime  -Lithium  mg at bedtime  -Lyrica 150 mg TID    New medications tried and stopped:     -None    New medications initiated:     -none     Today's Changes:    -Continue current medications  -Reviewed the need to not abuse substances as he will likely put his placement at [facility] in jeopardy    Programming: Patient will be treated in a therapeutic milieu with appropriate individual and group therapies. Education will be provided on diagnoses, medications, and treatments.     Medical diagnoses:  Per medicine    Consult: None  Tests: Utox ordered however so far patient has declined to provide    Anticipated LOS: 5-7 days. Unclear if he will need commitment however is agreeable to remain in hospital voluntarily at this time to stabilize.  Disposition: Back to [facility] once he is stable       TREATMENT TEAM CARE PLAN     Progress: Continued symptoms. Labile mood, limited insight    Continued Stay Criteria/Rationale: Continued symptoms without sufficient improvement/resolution.     Medical/Physical: See above.    Precautions: See above.     Plan: Continue inpatient care with unit support and medication management.    Rationale for change in precautions or plan: NA due to no change.    Participants: Christelle Contreras NP, Nursing, SW, OT.    The patient's care was discussed with the treatment team and chart notes were reviewed.       ATTESTATION      Christelle Contreras NP

## (undated) RX ORDER — WATER 10 ML/10ML
INJECTION INTRAMUSCULAR; INTRAVENOUS; SUBCUTANEOUS
Status: DISPENSED
Start: 2021-12-16

## (undated) RX ORDER — OLANZAPINE 2.5 MG/1
TABLET, FILM COATED ORAL
Status: DISPENSED
Start: 2021-12-15

## (undated) RX ORDER — OLANZAPINE 10 MG/2ML
INJECTION, POWDER, FOR SOLUTION INTRAMUSCULAR
Status: DISPENSED
Start: 2021-12-16

## (undated) RX ORDER — OLANZAPINE 5 MG/1
TABLET, ORALLY DISINTEGRATING ORAL
Status: DISPENSED
Start: 2021-12-16

## (undated) RX ORDER — OLANZAPINE 2.5 MG/1
TABLET, FILM COATED ORAL
Status: DISPENSED
Start: 2021-12-16

## (undated) RX ORDER — LORAZEPAM 1 MG/1
TABLET ORAL
Status: DISPENSED
Start: 2021-12-15

## (undated) RX ORDER — LORAZEPAM 2 MG/ML
INJECTION INTRAMUSCULAR
Status: DISPENSED
Start: 2021-12-15